# Patient Record
Sex: MALE | Race: WHITE | NOT HISPANIC OR LATINO | URBAN - METROPOLITAN AREA
[De-identification: names, ages, dates, MRNs, and addresses within clinical notes are randomized per-mention and may not be internally consistent; named-entity substitution may affect disease eponyms.]

---

## 2023-10-30 ENCOUNTER — HOSPITAL ENCOUNTER (INPATIENT)
Facility: HOSPITAL | Age: 48
LOS: 15 days | Discharge: HOME HEALTH | DRG: 853 | End: 2023-11-14
Attending: EMERGENCY MEDICINE | Admitting: EMERGENCY MEDICINE
Payer: COMMERCIAL

## 2023-10-30 DIAGNOSIS — N17.9 AKI (ACUTE KIDNEY INJURY): ICD-10-CM

## 2023-10-30 DIAGNOSIS — R00.0 TACHYCARDIA: ICD-10-CM

## 2023-10-30 DIAGNOSIS — R79.89 ELEVATED TROPONIN: ICD-10-CM

## 2023-10-30 DIAGNOSIS — Z98.890 HISTORY OF CARDIOVASCULAR SURGERY: ICD-10-CM

## 2023-10-30 DIAGNOSIS — M31.19 TTP (THROMBOTIC THROMBOCYTOPENIC PURPURA): ICD-10-CM

## 2023-10-30 DIAGNOSIS — I34.0 SEVERE MITRAL REGURGITATION: ICD-10-CM

## 2023-10-30 DIAGNOSIS — R78.81 MRSA BACTEREMIA: ICD-10-CM

## 2023-10-30 DIAGNOSIS — R50.9 FEVER: ICD-10-CM

## 2023-10-30 DIAGNOSIS — D69.6 THROMBOCYTOPENIA: ICD-10-CM

## 2023-10-30 DIAGNOSIS — A41.9 SEPTIC SHOCK: ICD-10-CM

## 2023-10-30 DIAGNOSIS — I38 ENDOCARDITIS: ICD-10-CM

## 2023-10-30 DIAGNOSIS — A41.9 SEPSIS, DUE TO UNSPECIFIED ORGANISM, UNSPECIFIED WHETHER ACUTE ORGAN DYSFUNCTION PRESENT: ICD-10-CM

## 2023-10-30 DIAGNOSIS — I50.20 HFREF (HEART FAILURE WITH REDUCED EJECTION FRACTION): ICD-10-CM

## 2023-10-30 DIAGNOSIS — J96.01 ACUTE HYPOXEMIC RESPIRATORY FAILURE: ICD-10-CM

## 2023-10-30 DIAGNOSIS — I49.9 ARRHYTHMIA: ICD-10-CM

## 2023-10-30 DIAGNOSIS — R79.89 ELEVATED SERUM CREATININE: ICD-10-CM

## 2023-10-30 DIAGNOSIS — I33.0 ACUTE BACTERIAL ENDOCARDITIS: ICD-10-CM

## 2023-10-30 DIAGNOSIS — R78.81 BACTEREMIA: ICD-10-CM

## 2023-10-30 DIAGNOSIS — R73.9 TRANSIENT HYPERGLYCEMIA POST PROCEDURE: ICD-10-CM

## 2023-10-30 DIAGNOSIS — T14.8XXA SURGICAL WOUND PRESENT: ICD-10-CM

## 2023-10-30 DIAGNOSIS — R79.89 ABNORMAL LFTS: ICD-10-CM

## 2023-10-30 DIAGNOSIS — Z98.890 S/P MVR (MITRAL VALVE REPAIR): Primary | ICD-10-CM

## 2023-10-30 DIAGNOSIS — B95.62 MRSA BACTEREMIA: ICD-10-CM

## 2023-10-30 DIAGNOSIS — I51.1 RUPTURE OF CHORDAE TENDINEAE: ICD-10-CM

## 2023-10-30 DIAGNOSIS — R79.89 TROPONIN LEVEL ELEVATED: ICD-10-CM

## 2023-10-30 DIAGNOSIS — I38 ENDOCARDITIS, UNSPECIFIED CHRONICITY, UNSPECIFIED ENDOCARDITIS TYPE: ICD-10-CM

## 2023-10-30 DIAGNOSIS — R65.21 SEPTIC SHOCK: ICD-10-CM

## 2023-10-30 PROBLEM — R74.01 ELEVATED TRANSAMINASE LEVEL: Status: ACTIVE | Noted: 2023-10-30

## 2023-10-30 LAB
ADENOVIRUS: NOT DETECTED
ALBUMIN SERPL BCP-MCNC: 2.8 G/DL (ref 3.5–5.2)
ALLENS TEST: ABNORMAL
ALLENS TEST: ABNORMAL
ALP SERPL-CCNC: 109 U/L (ref 55–135)
ALT SERPL W/O P-5'-P-CCNC: 141 U/L (ref 10–44)
AMMONIA PLAS-SCNC: 30 UMOL/L (ref 10–50)
AMPHET+METHAMPHET UR QL: NEGATIVE
ANION GAP SERPL CALC-SCNC: 12 MMOL/L (ref 8–16)
APAP SERPL-MCNC: <3 UG/ML (ref 10–20)
APTT PPP: 44.4 SEC (ref 21–32)
AST SERPL-CCNC: 191 U/L (ref 10–40)
BACTERIA #/AREA URNS AUTO: ABNORMAL /HPF
BARBITURATES UR QL SCN>200 NG/ML: NEGATIVE
BASOPHILS # BLD AUTO: 0.03 K/UL (ref 0–0.2)
BASOPHILS NFR BLD: 0.5 % (ref 0–1.9)
BENZODIAZ UR QL SCN>200 NG/ML: NEGATIVE
BILIRUB SERPL-MCNC: 3 MG/DL (ref 0.1–1)
BILIRUB UR QL STRIP: NEGATIVE
BORDETELLA PARAPERTUSSIS (IS1001): NOT DETECTED
BORDETELLA PERTUSSIS (PTXP): NOT DETECTED
BUN SERPL-MCNC: 34 MG/DL (ref 6–20)
BZE UR QL SCN: NEGATIVE
CALCIUM SERPL-MCNC: 8 MG/DL (ref 8.7–10.5)
CANNABINOIDS UR QL SCN: NEGATIVE
CHLAMYDIA PNEUMONIAE: NOT DETECTED
CHLORIDE SERPL-SCNC: 100 MMOL/L (ref 95–110)
CK SERPL-CCNC: 1406 U/L (ref 20–200)
CLARITY UR REFRACT.AUTO: CLEAR
CO2 SERPL-SCNC: 17 MMOL/L (ref 23–29)
COLOR UR AUTO: YELLOW
CORONAVIRUS 229E, COMMON COLD VIRUS: NOT DETECTED
CORONAVIRUS HKU1, COMMON COLD VIRUS: NOT DETECTED
CORONAVIRUS NL63, COMMON COLD VIRUS: NOT DETECTED
CORONAVIRUS OC43, COMMON COLD VIRUS: NOT DETECTED
CREAT SERPL-MCNC: 1.6 MG/DL (ref 0.5–1.4)
CREAT UR-MCNC: 63 MG/DL (ref 23–375)
DIFFERENTIAL METHOD: ABNORMAL
EOSINOPHIL # BLD AUTO: 0 K/UL (ref 0–0.5)
EOSINOPHIL NFR BLD: 0.2 % (ref 0–8)
ERYTHROCYTE [DISTWIDTH] IN BLOOD BY AUTOMATED COUNT: 13.6 % (ref 11.5–14.5)
EST. GFR  (NO RACE VARIABLE): 52.5 ML/MIN/1.73 M^2
FLUBV RNA NPH QL NAA+NON-PROBE: NOT DETECTED
GLUCOSE SERPL-MCNC: 146 MG/DL (ref 70–110)
GLUCOSE UR QL STRIP: NEGATIVE
HAV IGM SERPL QL IA: NORMAL
HBV CORE IGM SERPL QL IA: NORMAL
HBV SURFACE AG SERPL QL IA: NORMAL
HCO3 UR-SCNC: 16.3 MMOL/L (ref 24–28)
HCT VFR BLD AUTO: 43.2 % (ref 40–54)
HCV AB SERPL QL IA: NORMAL
HETEROPH AB SERPL QL IA: NEGATIVE
HGB BLD-MCNC: 15 G/DL (ref 14–18)
HGB UR QL STRIP: ABNORMAL
HIV 1+2 AB+HIV1 P24 AG SERPL QL IA: NORMAL
HPIV1 RNA NPH QL NAA+NON-PROBE: NOT DETECTED
HPIV2 RNA NPH QL NAA+NON-PROBE: NOT DETECTED
HPIV3 RNA NPH QL NAA+NON-PROBE: NOT DETECTED
HPIV4 RNA NPH QL NAA+NON-PROBE: NOT DETECTED
HUMAN METAPNEUMOVIRUS: NOT DETECTED
HYALINE CASTS UR QL AUTO: 0 /LPF
IMM GRANULOCYTES # BLD AUTO: 0.09 K/UL (ref 0–0.04)
IMM GRANULOCYTES NFR BLD AUTO: 1.5 % (ref 0–0.5)
INFLUENZA A (SUBTYPES H1,H1-2009,H3): NOT DETECTED
INFLUENZA A, MOLECULAR: NEGATIVE
INFLUENZA B, MOLECULAR: NEGATIVE
INR PPP: 1 (ref 0.8–1.2)
KETONES UR QL STRIP: NEGATIVE
LDH SERPL L TO P-CCNC: 6.61 MMOL/L (ref 0.5–2.2)
LEUKOCYTE ESTERASE UR QL STRIP: ABNORMAL
LYMPHOCYTES # BLD AUTO: 0.4 K/UL (ref 1–4.8)
LYMPHOCYTES NFR BLD: 6.3 % (ref 18–48)
MCH RBC QN AUTO: 30.9 PG (ref 27–31)
MCHC RBC AUTO-ENTMCNC: 34.7 G/DL (ref 32–36)
MCV RBC AUTO: 89 FL (ref 82–98)
METHADONE UR QL SCN>300 NG/ML: NEGATIVE
MICROSCOPIC COMMENT: ABNORMAL
MONOCYTES # BLD AUTO: 0.4 K/UL (ref 0.3–1)
MONOCYTES NFR BLD: 6 % (ref 4–15)
MYCOPLASMA PNEUMONIAE: NOT DETECTED
NEUTROPHILS # BLD AUTO: 5.3 K/UL (ref 1.8–7.7)
NEUTROPHILS NFR BLD: 85.5 % (ref 38–73)
NITRITE UR QL STRIP: POSITIVE
NRBC BLD-RTO: 0 /100 WBC
OPIATES UR QL SCN: NEGATIVE
OSMOLALITY SERPL: 297 MOSM/KG (ref 280–300)
OSMOLALITY UR: 472 MOSM/KG (ref 50–1200)
PATH REV BLD -IMP: NORMAL
PCO2 BLDA: 27.8 MMHG (ref 35–45)
PCP UR QL SCN>25 NG/ML: NEGATIVE
PH SMN: 7.38 [PH] (ref 7.35–7.45)
PH UR STRIP: 6 [PH] (ref 5–8)
PLATELET # BLD AUTO: 23 K/UL (ref 150–450)
PMV BLD AUTO: ABNORMAL FL (ref 9.2–12.9)
PO2 BLDA: 35 MMHG (ref 40–60)
POC BE: -9 MMOL/L
POC SATURATED O2: 68 % (ref 95–100)
POC TCO2: 17 MMOL/L (ref 24–29)
POTASSIUM SERPL-SCNC: 3.7 MMOL/L (ref 3.5–5.1)
PROT SERPL-MCNC: 6.5 G/DL (ref 6–8.4)
PROT UR QL STRIP: ABNORMAL
PROTHROMBIN TIME: 11.1 SEC (ref 9–12.5)
RBC # BLD AUTO: 4.85 M/UL (ref 4.6–6.2)
RBC #/AREA URNS AUTO: 34 /HPF (ref 0–4)
RESPIRATORY INFECTION PANEL SOURCE: NORMAL
RSV RNA NPH QL NAA+NON-PROBE: NOT DETECTED
RV+EV RNA NPH QL NAA+NON-PROBE: NOT DETECTED
SALICYLATES SERPL-MCNC: <5 MG/DL (ref 15–30)
SAMPLE: ABNORMAL
SAMPLE: ABNORMAL
SARS-COV-2 RDRP RESP QL NAA+PROBE: NEGATIVE
SARS-COV-2 RNA RESP QL NAA+PROBE: NOT DETECTED
SITE: ABNORMAL
SITE: ABNORMAL
SODIUM SERPL-SCNC: 129 MMOL/L (ref 136–145)
SP GR UR STRIP: >1.03 (ref 1–1.03)
SPECIMEN SOURCE: NORMAL
TOXICOLOGY INFORMATION: NORMAL
URN SPEC COLLECT METH UR: ABNORMAL
WBC # BLD AUTO: 6.16 K/UL (ref 3.9–12.7)
WBC #/AREA URNS AUTO: 15 /HPF (ref 0–5)

## 2023-10-30 PROCEDURE — 63600175 PHARM REV CODE 636 W HCPCS: Performed by: EMERGENCY MEDICINE

## 2023-10-30 PROCEDURE — 80053 COMPREHEN METABOLIC PANEL: CPT | Performed by: EMERGENCY MEDICINE

## 2023-10-30 PROCEDURE — 85025 COMPLETE CBC W/AUTO DIFF WBC: CPT | Mod: 91

## 2023-10-30 PROCEDURE — U0002 COVID-19 LAB TEST NON-CDC: HCPCS | Performed by: EMERGENCY MEDICINE

## 2023-10-30 PROCEDURE — 84484 ASSAY OF TROPONIN QUANT: CPT

## 2023-10-30 PROCEDURE — 85045 AUTOMATED RETICULOCYTE COUNT: CPT

## 2023-10-30 PROCEDURE — 87389 HIV-1 AG W/HIV-1&-2 AB AG IA: CPT

## 2023-10-30 PROCEDURE — 87186 SC STD MICRODIL/AGAR DIL: CPT | Mod: 59 | Performed by: EMERGENCY MEDICINE

## 2023-10-30 PROCEDURE — 25500020 PHARM REV CODE 255: Performed by: EMERGENCY MEDICINE

## 2023-10-30 PROCEDURE — 99900035 HC TECH TIME PER 15 MIN (STAT)

## 2023-10-30 PROCEDURE — 87798 DETECT AGENT NOS DNA AMP: CPT | Mod: 59

## 2023-10-30 PROCEDURE — 83605 ASSAY OF LACTIC ACID: CPT | Performed by: STUDENT IN AN ORGANIZED HEALTH CARE EDUCATION/TRAINING PROGRAM

## 2023-10-30 PROCEDURE — 82140 ASSAY OF AMMONIA: CPT

## 2023-10-30 PROCEDURE — 83930 ASSAY OF BLOOD OSMOLALITY: CPT

## 2023-10-30 PROCEDURE — 87502 INFLUENZA DNA AMP PROBE: CPT | Performed by: EMERGENCY MEDICINE

## 2023-10-30 PROCEDURE — 85730 THROMBOPLASTIN TIME PARTIAL: CPT

## 2023-10-30 PROCEDURE — 63600175 PHARM REV CODE 636 W HCPCS

## 2023-10-30 PROCEDURE — 87077 CULTURE AEROBIC IDENTIFY: CPT | Mod: 59 | Performed by: EMERGENCY MEDICINE

## 2023-10-30 PROCEDURE — 85025 COMPLETE CBC W/AUTO DIFF WBC: CPT | Performed by: EMERGENCY MEDICINE

## 2023-10-30 PROCEDURE — 87086 URINE CULTURE/COLONY COUNT: CPT | Performed by: EMERGENCY MEDICINE

## 2023-10-30 PROCEDURE — 87150 DNA/RNA AMPLIFIED PROBE: CPT | Performed by: EMERGENCY MEDICINE

## 2023-10-30 PROCEDURE — 82550 ASSAY OF CK (CPK): CPT | Performed by: EMERGENCY MEDICINE

## 2023-10-30 PROCEDURE — 85610 PROTHROMBIN TIME: CPT

## 2023-10-30 PROCEDURE — 93010 ELECTROCARDIOGRAM REPORT: CPT | Mod: ,,, | Performed by: INTERNAL MEDICINE

## 2023-10-30 PROCEDURE — 87040 BLOOD CULTURE FOR BACTERIA: CPT | Performed by: EMERGENCY MEDICINE

## 2023-10-30 PROCEDURE — 99291 CRITICAL CARE FIRST HOUR: CPT

## 2023-10-30 PROCEDURE — 93010 EKG 12-LEAD: ICD-10-PCS | Mod: 76,,, | Performed by: INTERNAL MEDICINE

## 2023-10-30 PROCEDURE — 20000000 HC ICU ROOM

## 2023-10-30 PROCEDURE — 86038 ANTINUCLEAR ANTIBODIES: CPT

## 2023-10-30 PROCEDURE — 86308 HETEROPHILE ANTIBODY SCREEN: CPT

## 2023-10-30 PROCEDURE — 25000003 PHARM REV CODE 250: Performed by: EMERGENCY MEDICINE

## 2023-10-30 PROCEDURE — 83010 ASSAY OF HAPTOGLOBIN QUANT: CPT

## 2023-10-30 PROCEDURE — 80179 DRUG ASSAY SALICYLATE: CPT

## 2023-10-30 PROCEDURE — 83935 ASSAY OF URINE OSMOLALITY: CPT

## 2023-10-30 PROCEDURE — 96365 THER/PROPH/DIAG IV INF INIT: CPT

## 2023-10-30 PROCEDURE — 93005 ELECTROCARDIOGRAM TRACING: CPT

## 2023-10-30 PROCEDURE — 85060 PATHOLOGIST REVIEW: ICD-10-PCS | Mod: ,,, | Performed by: PATHOLOGY

## 2023-10-30 PROCEDURE — 93010 ELECTROCARDIOGRAM REPORT: CPT | Mod: 76,,, | Performed by: INTERNAL MEDICINE

## 2023-10-30 PROCEDURE — 96366 THER/PROPH/DIAG IV INF ADDON: CPT

## 2023-10-30 PROCEDURE — 80307 DRUG TEST PRSMV CHEM ANLYZR: CPT | Performed by: EMERGENCY MEDICINE

## 2023-10-30 PROCEDURE — 85060 BLOOD SMEAR INTERPRETATION: CPT | Mod: ,,, | Performed by: PATHOLOGY

## 2023-10-30 PROCEDURE — 86592 SYPHILIS TEST NON-TREP QUAL: CPT

## 2023-10-30 PROCEDURE — 80143 DRUG ASSAY ACETAMINOPHEN: CPT

## 2023-10-30 PROCEDURE — 25000003 PHARM REV CODE 250

## 2023-10-30 PROCEDURE — 80074 ACUTE HEPATITIS PANEL: CPT

## 2023-10-30 PROCEDURE — 96367 TX/PROPH/DG ADDL SEQ IV INF: CPT

## 2023-10-30 PROCEDURE — 85397 CLOTTING FUNCT ACTIVITY: CPT

## 2023-10-30 PROCEDURE — 83605 ASSAY OF LACTIC ACID: CPT

## 2023-10-30 PROCEDURE — 36415 COLL VENOUS BLD VENIPUNCTURE: CPT

## 2023-10-30 PROCEDURE — 86880 COOMBS TEST DIRECT: CPT

## 2023-10-30 PROCEDURE — 87633 RESP VIRUS 12-25 TARGETS: CPT

## 2023-10-30 PROCEDURE — 96375 TX/PRO/DX INJ NEW DRUG ADDON: CPT

## 2023-10-30 PROCEDURE — 87088 URINE BACTERIA CULTURE: CPT | Performed by: EMERGENCY MEDICINE

## 2023-10-30 PROCEDURE — 81001 URINALYSIS AUTO W/SCOPE: CPT | Performed by: EMERGENCY MEDICINE

## 2023-10-30 PROCEDURE — 86850 RBC ANTIBODY SCREEN: CPT

## 2023-10-30 PROCEDURE — 82803 BLOOD GASES ANY COMBINATION: CPT

## 2023-10-30 PROCEDURE — 85384 FIBRINOGEN ACTIVITY: CPT

## 2023-10-30 RX ORDER — IBUPROFEN 200 MG
24 TABLET ORAL
Status: DISCONTINUED | OUTPATIENT
Start: 2023-10-30 | End: 2023-11-03

## 2023-10-30 RX ORDER — DEXAMETHASONE SODIUM PHOSPHATE 4 MG/ML
10 INJECTION, SOLUTION INTRA-ARTICULAR; INTRALESIONAL; INTRAMUSCULAR; INTRAVENOUS; SOFT TISSUE
Status: COMPLETED | OUTPATIENT
Start: 2023-10-30 | End: 2023-10-30

## 2023-10-30 RX ORDER — ONDANSETRON 2 MG/ML
4 INJECTION INTRAMUSCULAR; INTRAVENOUS EVERY 8 HOURS PRN
Status: DISCONTINUED | OUTPATIENT
Start: 2023-10-30 | End: 2023-11-03

## 2023-10-30 RX ORDER — IBUPROFEN 200 MG
16 TABLET ORAL
Status: DISCONTINUED | OUTPATIENT
Start: 2023-10-30 | End: 2023-11-03

## 2023-10-30 RX ORDER — INSULIN ASPART 100 [IU]/ML
0-5 INJECTION, SOLUTION INTRAVENOUS; SUBCUTANEOUS
Status: DISCONTINUED | OUTPATIENT
Start: 2023-10-30 | End: 2023-11-03

## 2023-10-30 RX ORDER — GLUCAGON 1 MG
1 KIT INJECTION
Status: DISCONTINUED | OUTPATIENT
Start: 2023-10-30 | End: 2023-11-03

## 2023-10-30 RX ORDER — ACETAMINOPHEN 500 MG
1000 TABLET ORAL
Status: COMPLETED | OUTPATIENT
Start: 2023-10-30 | End: 2023-10-30

## 2023-10-30 RX ORDER — NOREPINEPHRINE BITARTRATE/D5W 4MG/250ML
0-3 PLASTIC BAG, INJECTION (ML) INTRAVENOUS CONTINUOUS
Status: DISCONTINUED | OUTPATIENT
Start: 2023-10-30 | End: 2023-10-30

## 2023-10-30 RX ORDER — NOREPINEPHRINE BITARTRATE/D5W 4MG/250ML
0-.2 PLASTIC BAG, INJECTION (ML) INTRAVENOUS CONTINUOUS
Status: ACTIVE | OUTPATIENT
Start: 2023-10-31 | End: 2023-11-01

## 2023-10-30 RX ORDER — SODIUM CHLORIDE 0.9 % (FLUSH) 0.9 %
10 SYRINGE (ML) INJECTION
Status: DISCONTINUED | OUTPATIENT
Start: 2023-10-30 | End: 2023-11-03

## 2023-10-30 RX ADMIN — IOHEXOL 75 ML: 350 INJECTION, SOLUTION INTRAVENOUS at 08:10

## 2023-10-30 RX ADMIN — SODIUM CHLORIDE 2313 ML: 9 INJECTION, SOLUTION INTRAVENOUS at 07:10

## 2023-10-30 RX ADMIN — ACETAMINOPHEN 1000 MG: 500 TABLET ORAL at 08:10

## 2023-10-30 RX ADMIN — DEXAMETHASONE SODIUM PHOSPHATE 10 MG: 4 INJECTION INTRA-ARTICULAR; INTRALESIONAL; INTRAMUSCULAR; INTRAVENOUS; SOFT TISSUE at 08:10

## 2023-10-30 RX ADMIN — ACYCLOVIR SODIUM 800 MG: 50 INJECTION, SOLUTION INTRAVENOUS at 10:10

## 2023-10-30 RX ADMIN — PIPERACILLIN SODIUM AND TAZOBACTAM SODIUM 4.5 G: 4; .5 INJECTION, POWDER, FOR SOLUTION INTRAVENOUS at 07:10

## 2023-10-30 RX ADMIN — VANCOMYCIN HYDROCHLORIDE 1500 MG: 1.5 INJECTION, POWDER, LYOPHILIZED, FOR SOLUTION INTRAVENOUS at 08:10

## 2023-10-30 NOTE — Clinical Note
5 ml of contrast were injected throughout the case. 15 mL of contrast was the total wasted during the case. 20 mL was the total amount used during the case.

## 2023-10-31 PROBLEM — J18.9 PNA (PNEUMONIA): Status: ACTIVE | Noted: 2023-10-31

## 2023-10-31 PROBLEM — R79.89 ELEVATED TROPONIN: Status: ACTIVE | Noted: 2023-10-31

## 2023-10-31 PROBLEM — I38 ENDOCARDITIS: Status: ACTIVE | Noted: 2023-10-31

## 2023-10-31 LAB
ABO + RH BLD: NORMAL
ALBUMIN SERPL BCP-MCNC: 2.3 G/DL (ref 3.5–5.2)
ALP SERPL-CCNC: 81 U/L (ref 55–135)
ALT SERPL W/O P-5'-P-CCNC: 114 U/L (ref 10–44)
AMPHET+METHAMPHET UR QL: NEGATIVE
ANA SER QL IF: NORMAL
ANION GAP SERPL CALC-SCNC: 12 MMOL/L (ref 8–16)
ANISOCYTOSIS BLD QL SMEAR: SLIGHT
ASCENDING AORTA: 3.1 CM
AST SERPL-CCNC: 202 U/L (ref 10–40)
AV INDEX (PROSTH): 0.68
AV MEAN GRADIENT: 3 MMHG
AV PEAK GRADIENT: 5 MMHG
AV VALVE AREA BY VELOCITY RATIO: 2.92 CM²
AV VALVE AREA: 2.89 CM²
AV VELOCITY RATIO: 0.69
BARBITURATES UR QL SCN>200 NG/ML: NEGATIVE
BASOPHILS # BLD AUTO: 0.02 K/UL (ref 0–0.2)
BASOPHILS # BLD AUTO: 0.05 K/UL (ref 0–0.2)
BASOPHILS # BLD AUTO: ABNORMAL K/UL (ref 0–0.2)
BASOPHILS NFR BLD: 0 % (ref 0–1.9)
BASOPHILS NFR BLD: 0.2 % (ref 0–1.9)
BASOPHILS NFR BLD: 0.7 % (ref 0–1.9)
BENZODIAZ UR QL SCN>200 NG/ML: NEGATIVE
BILIRUB SERPL-MCNC: 2.2 MG/DL (ref 0.1–1)
BLD GP AB SCN CELLS X3 SERPL QL: NORMAL
BLD GP AB SCN CELLS X3 SERPL QL: NORMAL
BLD PROD TYP BPU: NORMAL
BLD PROD TYP BPU: NORMAL
BLOOD UNIT EXPIRATION DATE: NORMAL
BLOOD UNIT EXPIRATION DATE: NORMAL
BLOOD UNIT TYPE CODE: 5100
BLOOD UNIT TYPE CODE: 9500
BLOOD UNIT TYPE: NORMAL
BLOOD UNIT TYPE: NORMAL
BSA FOR ECHO PROCEDURE: 1.99 M2
BUN SERPL-MCNC: 24 MG/DL (ref 6–20)
BURR CELLS BLD QL SMEAR: ABNORMAL
BZE UR QL SCN: NEGATIVE
C TRACH DNA SPEC QL NAA+PROBE: NOT DETECTED
CALCIUM SERPL-MCNC: 7.5 MG/DL (ref 8.7–10.5)
CANNABINOIDS UR QL SCN: NEGATIVE
CHLORIDE SERPL-SCNC: 102 MMOL/L (ref 95–110)
CK SERPL-CCNC: 1977 U/L (ref 20–200)
CK SERPL-CCNC: 2363 U/L (ref 20–200)
CO2 SERPL-SCNC: 17 MMOL/L (ref 23–29)
CODING SYSTEM: NORMAL
CODING SYSTEM: NORMAL
CREAT SERPL-MCNC: 1.1 MG/DL (ref 0.5–1.4)
CREAT UR-MCNC: 64 MG/DL (ref 23–375)
CROSSMATCH INTERPRETATION: NORMAL
CROSSMATCH INTERPRETATION: NORMAL
CRP SERPL-MCNC: 327.1 MG/L (ref 0–8.2)
CV ECHO LV RWT: 0.34 CM
DAT IGG-SP REAG RBC-IMP: NORMAL
DIFFERENTIAL METHOD: ABNORMAL
DISPENSE STATUS: NORMAL
DISPENSE STATUS: NORMAL
DOHLE BOD BLD QL SMEAR: PRESENT
DOP CALC AO PEAK VEL: 1.13 M/S
DOP CALC AO VTI: 17.53 CM
DOP CALC LVOT AREA: 4.2 CM2
DOP CALC LVOT DIAMETER: 2.32 CM
DOP CALC LVOT PEAK VEL: 0.78 M/S
DOP CALC LVOT STROKE VOLUME: 50.62 CM3
DOP CALCLVOT PEAK VEL VTI: 11.98 CM
E WAVE DECELERATION TIME: 202.44 MSEC
E/A RATIO: 0.88
E/E' RATIO: 7.45 M/S
ECHO LV POSTERIOR WALL: 0.9 CM (ref 0.6–1.1)
EOSINOPHIL # BLD AUTO: 0 K/UL (ref 0–0.5)
EOSINOPHIL # BLD AUTO: 0 K/UL (ref 0–0.5)
EOSINOPHIL # BLD AUTO: ABNORMAL K/UL (ref 0–0.5)
EOSINOPHIL NFR BLD: 0 % (ref 0–8)
EOSINOPHIL NFR BLD: 0 % (ref 0–8)
EOSINOPHIL NFR BLD: 0.1 % (ref 0–8)
ERYTHROCYTE [DISTWIDTH] IN BLOOD BY AUTOMATED COUNT: 13.7 % (ref 11.5–14.5)
ERYTHROCYTE [DISTWIDTH] IN BLOOD BY AUTOMATED COUNT: 13.7 % (ref 11.5–14.5)
ERYTHROCYTE [DISTWIDTH] IN BLOOD BY AUTOMATED COUNT: 13.8 % (ref 11.5–14.5)
ERYTHROCYTE [SEDIMENTATION RATE] IN BLOOD BY PHOTOMETRIC METHOD: 46 MM/HR (ref 0–23)
EST. GFR  (NO RACE VARIABLE): >60 ML/MIN/1.73 M^2
ETHANOL UR-MCNC: <10 MG/DL
FERRITIN SERPL-MCNC: 4925 NG/ML (ref 20–300)
FIBRINOGEN PPP-MCNC: 489 MG/DL (ref 182–400)
FRACTIONAL SHORTENING: 37 % (ref 28–44)
GIANT PLATELETS BLD QL SMEAR: PRESENT
GLUCOSE SERPL-MCNC: 130 MG/DL (ref 70–110)
HAPTOGLOB SERPL-MCNC: 82 MG/DL (ref 30–250)
HCT VFR BLD AUTO: 33.6 % (ref 40–54)
HCT VFR BLD AUTO: 36.9 % (ref 40–54)
HCT VFR BLD AUTO: 38.1 % (ref 40–54)
HGB BLD-MCNC: 11.8 G/DL (ref 14–18)
HGB BLD-MCNC: 12.9 G/DL (ref 14–18)
HGB BLD-MCNC: 13.3 G/DL (ref 14–18)
HYPOCHROMIA BLD QL SMEAR: ABNORMAL
IMM GRANULOCYTES # BLD AUTO: 0.1 K/UL (ref 0–0.04)
IMM GRANULOCYTES # BLD AUTO: 0.25 K/UL (ref 0–0.04)
IMM GRANULOCYTES # BLD AUTO: ABNORMAL K/UL (ref 0–0.04)
IMM GRANULOCYTES NFR BLD AUTO: 1.4 % (ref 0–0.5)
IMM GRANULOCYTES NFR BLD AUTO: 2.4 % (ref 0–0.5)
IMM GRANULOCYTES NFR BLD AUTO: ABNORMAL % (ref 0–0.5)
INTERVENTRICULAR SEPTUM: 0.7 CM (ref 0.6–1.1)
LA MAJOR: 5.49 CM
LA MINOR: 6.43 CM
LA WIDTH: 4.73 CM
LACTATE SERPL-SCNC: 3.1 MMOL/L (ref 0.5–2.2)
LACTATE SERPL-SCNC: 3.8 MMOL/L (ref 0.5–2.2)
LDH SERPL L TO P-CCNC: 721 U/L (ref 110–260)
LEFT ATRIUM SIZE: 4.42 CM
LEFT ATRIUM VOLUME INDEX MOD: 39.2 ML/M2
LEFT ATRIUM VOLUME INDEX: 52.4 ML/M2
LEFT ATRIUM VOLUME MOD: 78.77 CM3
LEFT ATRIUM VOLUME: 105.25 CM3
LEFT INTERNAL DIMENSION IN SYSTOLE: 3.36 CM (ref 2.1–4)
LEFT VENTRICLE DIASTOLIC VOLUME INDEX: 51.85 ML/M2
LEFT VENTRICLE DIASTOLIC VOLUME: 104.21 ML
LEFT VENTRICLE MASS INDEX: 75 G/M2
LEFT VENTRICLE SYSTOLIC VOLUME INDEX: 23 ML/M2
LEFT VENTRICLE SYSTOLIC VOLUME: 46.14 ML
LEFT VENTRICULAR INTERNAL DIMENSION IN DIASTOLE: 5.3 CM (ref 3.5–6)
LEFT VENTRICULAR MASS: 150.05 G
LV LATERAL E/E' RATIO: 9.11 M/S
LV SEPTAL E/E' RATIO: 6.31 M/S
LYMPHOCYTES # BLD AUTO: 0.3 K/UL (ref 1–4.8)
LYMPHOCYTES # BLD AUTO: 0.6 K/UL (ref 1–4.8)
LYMPHOCYTES # BLD AUTO: ABNORMAL K/UL (ref 1–4.8)
LYMPHOCYTES NFR BLD: 4 % (ref 18–48)
LYMPHOCYTES NFR BLD: 4.6 % (ref 18–48)
LYMPHOCYTES NFR BLD: 5.8 % (ref 18–48)
MAGNESIUM SERPL-MCNC: 2 MG/DL (ref 1.6–2.6)
MCH RBC QN AUTO: 30 PG (ref 27–31)
MCH RBC QN AUTO: 30.7 PG (ref 27–31)
MCH RBC QN AUTO: 30.7 PG (ref 27–31)
MCHC RBC AUTO-ENTMCNC: 34.9 G/DL (ref 32–36)
MCHC RBC AUTO-ENTMCNC: 35 G/DL (ref 32–36)
MCHC RBC AUTO-ENTMCNC: 35.1 G/DL (ref 32–36)
MCV RBC AUTO: 86 FL (ref 82–98)
MCV RBC AUTO: 88 FL (ref 82–98)
MCV RBC AUTO: 88 FL (ref 82–98)
METHADONE UR QL SCN>300 NG/ML: NEGATIVE
MONOCYTES # BLD AUTO: 0.5 K/UL (ref 0.3–1)
MONOCYTES # BLD AUTO: 0.9 K/UL (ref 0.3–1)
MONOCYTES # BLD AUTO: ABNORMAL K/UL (ref 0.3–1)
MONOCYTES NFR BLD: 7 % (ref 4–15)
MONOCYTES NFR BLD: 7.2 % (ref 4–15)
MONOCYTES NFR BLD: 8.5 % (ref 4–15)
MRSA ID BY PCR: POSITIVE
MV PEAK A VEL: 0.93 M/S
MV PEAK E VEL: 0.82 M/S
MV STENOSIS PRESSURE HALF TIME: 58.71 MS
MV VALVE AREA P 1/2 METHOD: 3.75 CM2
MYELOCYTES NFR BLD MANUAL: 1 %
N GONORRHOEA DNA SPEC QL NAA+PROBE: NOT DETECTED
NEUTROPHILS # BLD AUTO: 6.1 K/UL (ref 1.8–7.7)
NEUTROPHILS # BLD AUTO: 8.8 K/UL (ref 1.8–7.7)
NEUTROPHILS NFR BLD: 83 % (ref 38–73)
NEUTROPHILS NFR BLD: 84 % (ref 38–73)
NEUTROPHILS NFR BLD: 86.1 % (ref 38–73)
NEUTS BAND NFR BLD MANUAL: 2 %
NRBC BLD-RTO: 0 /100 WBC
NUM UNITS TRANS FFP: NORMAL
NUM UNITS TRANS FFP: NORMAL
OHS LV EJECTION FRACTION SIMPSONS BIPLANE MOD: 43 %
OPIATES UR QL SCN: NEGATIVE
OVALOCYTES BLD QL SMEAR: ABNORMAL
OVALOCYTES BLD QL SMEAR: ABNORMAL
PCP UR QL SCN>25 NG/ML: NEGATIVE
PHOSPHATE SERPL-MCNC: 1.7 MG/DL (ref 2.7–4.5)
PISA MRMAX VEL: 0.03 M/S
PISA TR MAX VEL: 2.34 M/S
PLATELET # BLD AUTO: 19 K/UL (ref 150–450)
PLATELET # BLD AUTO: 21 K/UL (ref 150–450)
PLATELET # BLD AUTO: 22 K/UL (ref 150–450)
PLATELET BLD QL SMEAR: ABNORMAL
PMV BLD AUTO: 14.8 FL (ref 9.2–12.9)
PMV BLD AUTO: ABNORMAL FL (ref 9.2–12.9)
PMV BLD AUTO: ABNORMAL FL (ref 9.2–12.9)
POCT GLUCOSE: 184 MG/DL (ref 70–110)
POIKILOCYTOSIS BLD QL SMEAR: SLIGHT
POIKILOCYTOSIS BLD QL SMEAR: SLIGHT
POTASSIUM SERPL-SCNC: 3.6 MMOL/L (ref 3.5–5.1)
PROCALCITONIN SERPL IA-MCNC: 12.83 NG/ML
PROT SERPL-MCNC: 5.5 G/DL (ref 6–8.4)
RA MAJOR: 5.29 CM
RA PRESSURE ESTIMATED: 8 MMHG
RA WIDTH: 3.8 CM
RBC # BLD AUTO: 3.84 M/UL (ref 4.6–6.2)
RBC # BLD AUTO: 4.3 M/UL (ref 4.6–6.2)
RBC # BLD AUTO: 4.33 M/UL (ref 4.6–6.2)
RETICS/RBC NFR AUTO: 0.6 % (ref 0.4–2)
RIGHT VENTRICULAR END-DIASTOLIC DIMENSION: 3.51 CM
RPR SER QL: NORMAL
RV TB RVSP: 10 MMHG
SARS-COV-2 IGG SERPL IA-ACNC: 4037 AU/ML
SARS-COV-2 IGG SERPL QL IA: POSITIVE
SINUS: 3.6 CM
SODIUM SERPL-SCNC: 131 MMOL/L (ref 136–145)
SPECIMEN OUTDATE: NORMAL
SPHEROCYTES BLD QL SMEAR: ABNORMAL
STAPH AUREUS ID BY PCR: POSITIVE
STJ: 3.67 CM
TARGETS BLD QL SMEAR: ABNORMAL
TDI LATERAL: 0.09 M/S
TDI SEPTAL: 0.13 M/S
TDI: 0.11 M/S
TOXIC GRANULES BLD QL SMEAR: PRESENT
TOXICOLOGY INFORMATION: NORMAL
TR MAX PG: 22 MMHG
TRICUSPID ANNULAR PLANE SYSTOLIC EXCURSION: 2.05 CM
TRIGL SERPL-MCNC: 370 MG/DL (ref 30–150)
TROPONIN I SERPL DL<=0.01 NG/ML-MCNC: 18.3 NG/ML (ref 0–0.03)
TROPONIN I SERPL DL<=0.01 NG/ML-MCNC: 19.81 NG/ML (ref 0–0.03)
TROPONIN I SERPL DL<=0.01 NG/ML-MCNC: 27.89 NG/ML (ref 0–0.03)
TROPONIN I SERPL DL<=0.01 NG/ML-MCNC: 29.12 NG/ML (ref 0–0.03)
TV REST PULMONARY ARTERY PRESSURE: 30 MMHG
VANCOMYCIN SERPL-MCNC: 8.6 UG/ML
WBC # BLD AUTO: 10.58 K/UL (ref 3.9–12.7)
WBC # BLD AUTO: 13.31 K/UL (ref 3.9–12.7)
WBC # BLD AUTO: 7.11 K/UL (ref 3.9–12.7)
WBC OTHER NFR BLD MANUAL: 2 %
WBC TOXIC VACUOLES BLD QL SMEAR: PRESENT
Z-SCORE OF LEFT VENTRICULAR DIMENSION IN END DIASTOLE: -1.05
Z-SCORE OF LEFT VENTRICULAR DIMENSION IN END SYSTOLE: -0.58

## 2023-10-31 PROCEDURE — 20000000 HC ICU ROOM

## 2023-10-31 PROCEDURE — 80053 COMPREHEN METABOLIC PANEL: CPT | Performed by: NURSE PRACTITIONER

## 2023-10-31 PROCEDURE — 63600175 PHARM REV CODE 636 W HCPCS: Performed by: STUDENT IN AN ORGANIZED HEALTH CARE EDUCATION/TRAINING PROGRAM

## 2023-10-31 PROCEDURE — 82728 ASSAY OF FERRITIN: CPT

## 2023-10-31 PROCEDURE — P9017 PLASMA 1 DONOR FRZ W/IN 8 HR: HCPCS | Performed by: STUDENT IN AN ORGANIZED HEALTH CARE EDUCATION/TRAINING PROGRAM

## 2023-10-31 PROCEDURE — 25000003 PHARM REV CODE 250: Performed by: STUDENT IN AN ORGANIZED HEALTH CARE EDUCATION/TRAINING PROGRAM

## 2023-10-31 PROCEDURE — 83605 ASSAY OF LACTIC ACID: CPT | Performed by: STUDENT IN AN ORGANIZED HEALTH CARE EDUCATION/TRAINING PROGRAM

## 2023-10-31 PROCEDURE — 80202 ASSAY OF VANCOMYCIN: CPT | Performed by: EMERGENCY MEDICINE

## 2023-10-31 PROCEDURE — 83529 ASAY OF INTERLEUKIN-6 (IL-6): CPT | Performed by: STUDENT IN AN ORGANIZED HEALTH CARE EDUCATION/TRAINING PROGRAM

## 2023-10-31 PROCEDURE — 82550 ASSAY OF CK (CPK): CPT | Mod: 91

## 2023-10-31 PROCEDURE — 99223 PR INITIAL HOSPITAL CARE,LEVL III: ICD-10-PCS | Mod: ,,, | Performed by: INTERNAL MEDICINE

## 2023-10-31 PROCEDURE — 86769 SARS-COV-2 COVID-19 ANTIBODY: CPT | Performed by: STUDENT IN AN ORGANIZED HEALTH CARE EDUCATION/TRAINING PROGRAM

## 2023-10-31 PROCEDURE — 25000003 PHARM REV CODE 250

## 2023-10-31 PROCEDURE — 36430 TRANSFUSION BLD/BLD COMPNT: CPT

## 2023-10-31 PROCEDURE — 99291 PR CRITICAL CARE, E/M 30-74 MINUTES: ICD-10-PCS | Mod: ,,, | Performed by: STUDENT IN AN ORGANIZED HEALTH CARE EDUCATION/TRAINING PROGRAM

## 2023-10-31 PROCEDURE — 94761 N-INVAS EAR/PLS OXIMETRY MLT: CPT

## 2023-10-31 PROCEDURE — 99232 SBSQ HOSP IP/OBS MODERATE 35: CPT | Mod: ,,, | Performed by: INTERNAL MEDICINE

## 2023-10-31 PROCEDURE — 93010 EKG 12-LEAD: ICD-10-PCS | Mod: ,,, | Performed by: INTERNAL MEDICINE

## 2023-10-31 PROCEDURE — 86140 C-REACTIVE PROTEIN: CPT | Performed by: STUDENT IN AN ORGANIZED HEALTH CARE EDUCATION/TRAINING PROGRAM

## 2023-10-31 PROCEDURE — 83735 ASSAY OF MAGNESIUM: CPT | Performed by: NURSE PRACTITIONER

## 2023-10-31 PROCEDURE — 82550 ASSAY OF CK (CPK): CPT

## 2023-10-31 PROCEDURE — 84484 ASSAY OF TROPONIN QUANT: CPT | Mod: 91

## 2023-10-31 PROCEDURE — 99291 CRITICAL CARE FIRST HOUR: CPT | Mod: ,,, | Performed by: STUDENT IN AN ORGANIZED HEALTH CARE EDUCATION/TRAINING PROGRAM

## 2023-10-31 PROCEDURE — 84145 PROCALCITONIN (PCT): CPT | Performed by: STUDENT IN AN ORGANIZED HEALTH CARE EDUCATION/TRAINING PROGRAM

## 2023-10-31 PROCEDURE — 85652 RBC SED RATE AUTOMATED: CPT | Performed by: STUDENT IN AN ORGANIZED HEALTH CARE EDUCATION/TRAINING PROGRAM

## 2023-10-31 PROCEDURE — 86901 BLOOD TYPING SEROLOGIC RH(D): CPT

## 2023-10-31 PROCEDURE — 87491 CHLMYD TRACH DNA AMP PROBE: CPT | Performed by: STUDENT IN AN ORGANIZED HEALTH CARE EDUCATION/TRAINING PROGRAM

## 2023-10-31 PROCEDURE — 84100 ASSAY OF PHOSPHORUS: CPT | Performed by: NURSE PRACTITIONER

## 2023-10-31 PROCEDURE — 83615 LACTATE (LD) (LDH) ENZYME: CPT

## 2023-10-31 PROCEDURE — 85025 COMPLETE CBC W/AUTO DIFF WBC: CPT | Performed by: NURSE PRACTITIONER

## 2023-10-31 PROCEDURE — 85007 BL SMEAR W/DIFF WBC COUNT: CPT | Performed by: STUDENT IN AN ORGANIZED HEALTH CARE EDUCATION/TRAINING PROGRAM

## 2023-10-31 PROCEDURE — 84478 ASSAY OF TRIGLYCERIDES: CPT

## 2023-10-31 PROCEDURE — 93005 ELECTROCARDIOGRAM TRACING: CPT

## 2023-10-31 PROCEDURE — 86720 LEPTOSPIRA ANTIBODY: CPT

## 2023-10-31 PROCEDURE — 93010 ELECTROCARDIOGRAM REPORT: CPT | Mod: ,,, | Performed by: INTERNAL MEDICINE

## 2023-10-31 PROCEDURE — 99223 1ST HOSP IP/OBS HIGH 75: CPT | Mod: ,,, | Performed by: INTERNAL MEDICINE

## 2023-10-31 PROCEDURE — 99232 PR SUBSEQUENT HOSPITAL CARE,LEVL II: ICD-10-PCS | Mod: ,,, | Performed by: INTERNAL MEDICINE

## 2023-10-31 PROCEDURE — 93010 ELECTROCARDIOGRAM REPORT: CPT | Mod: 76,,, | Performed by: INTERNAL MEDICINE

## 2023-10-31 PROCEDURE — 27000207 HC ISOLATION

## 2023-10-31 PROCEDURE — 63600175 PHARM REV CODE 636 W HCPCS

## 2023-10-31 PROCEDURE — 85027 COMPLETE CBC AUTOMATED: CPT | Performed by: STUDENT IN AN ORGANIZED HEALTH CARE EDUCATION/TRAINING PROGRAM

## 2023-10-31 PROCEDURE — 80307 DRUG TEST PRSMV CHEM ANLYZR: CPT | Performed by: STUDENT IN AN ORGANIZED HEALTH CARE EDUCATION/TRAINING PROGRAM

## 2023-10-31 RX ORDER — MUPIROCIN 20 MG/G
OINTMENT TOPICAL 2 TIMES DAILY
Status: DISCONTINUED | OUTPATIENT
Start: 2023-10-31 | End: 2023-11-03

## 2023-10-31 RX ORDER — HYDROCODONE BITARTRATE AND ACETAMINOPHEN 500; 5 MG/1; MG/1
TABLET ORAL
Status: DISCONTINUED | OUTPATIENT
Start: 2023-10-31 | End: 2023-11-01

## 2023-10-31 RX ORDER — ACETAMINOPHEN 325 MG/1
650 TABLET ORAL EVERY 6 HOURS PRN
Status: DISCONTINUED | OUTPATIENT
Start: 2023-10-31 | End: 2023-11-03

## 2023-10-31 RX ORDER — SODIUM CHLORIDE, SODIUM LACTATE, POTASSIUM CHLORIDE, CALCIUM CHLORIDE 600; 310; 30; 20 MG/100ML; MG/100ML; MG/100ML; MG/100ML
INJECTION, SOLUTION INTRAVENOUS CONTINUOUS
Status: ACTIVE | OUTPATIENT
Start: 2023-10-31 | End: 2023-11-01

## 2023-10-31 RX ORDER — FAMOTIDINE 10 MG/ML
20 INJECTION INTRAVENOUS 2 TIMES DAILY
Status: DISCONTINUED | OUTPATIENT
Start: 2023-10-31 | End: 2023-11-01

## 2023-10-31 RX ADMIN — SODIUM PHOSPHATE, MONOBASIC, MONOHYDRATE AND SODIUM PHOSPHATE, DIBASIC, ANHYDROUS 39.99 MMOL: 142; 276 INJECTION, SOLUTION INTRAVENOUS at 11:10

## 2023-10-31 RX ADMIN — VANCOMYCIN HYDROCHLORIDE 1000 MG: 1 INJECTION, POWDER, LYOPHILIZED, FOR SOLUTION INTRAVENOUS at 08:10

## 2023-10-31 RX ADMIN — CEFTRIAXONE 2 G: 2 INJECTION, POWDER, FOR SOLUTION INTRAMUSCULAR; INTRAVENOUS at 12:10

## 2023-10-31 RX ADMIN — SODIUM CHLORIDE, POTASSIUM CHLORIDE, SODIUM LACTATE AND CALCIUM CHLORIDE: 600; 310; 30; 20 INJECTION, SOLUTION INTRAVENOUS at 01:10

## 2023-10-31 RX ADMIN — FAMOTIDINE 20 MG: 10 INJECTION, SOLUTION INTRAVENOUS at 08:10

## 2023-10-31 RX ADMIN — MUPIROCIN: 20 OINTMENT TOPICAL at 09:10

## 2023-10-31 RX ADMIN — ACETAMINOPHEN 650 MG: 325 TABLET ORAL at 03:10

## 2023-10-31 RX ADMIN — AZITHROMYCIN 500 MG: 500 INJECTION, POWDER, LYOPHILIZED, FOR SOLUTION INTRAVENOUS at 05:10

## 2023-10-31 RX ADMIN — VANCOMYCIN HYDROCHLORIDE 1000 MG: 1 INJECTION, POWDER, LYOPHILIZED, FOR SOLUTION INTRAVENOUS at 09:10

## 2023-10-31 RX ADMIN — ACYCLOVIR SODIUM 770 MG: 50 INJECTION, SOLUTION INTRAVENOUS at 11:10

## 2023-10-31 RX ADMIN — METHYLPREDNISOLONE SODIUM SUCCINATE 1000 MG: 1 INJECTION, POWDER, LYOPHILIZED, FOR SOLUTION INTRAMUSCULAR; INTRAVENOUS at 11:10

## 2023-10-31 RX ADMIN — FAMOTIDINE 20 MG: 10 INJECTION, SOLUTION INTRAVENOUS at 11:10

## 2023-10-31 RX ADMIN — MUPIROCIN: 20 OINTMENT TOPICAL at 08:10

## 2023-10-31 NOTE — ASSESSMENT & PLAN NOTE
Pt presenting with low platelets (23) in setting of acute liver injury likely secondary to shock. Other cause to rule out can include cirrhosis, BM problem (myelodysplasia, chemotherapy, alcohol), HCV/HIV. Labs ordered for GARRY, Smear. PLASMIC score 6/7 but [id number] wnl.    -- Transfused 2 units FFP  -- solumedrol  -- Trend CBC, PT/INR, aPTT  -- Consulted hematology, appreciate recommendations  -- per hematology team, they heard from lab [id number] wnl  -- PT 11.0, INR 1.0, Rogelio negative, haptoglobin 82, fibrinogen 489, aPTT 44.4  -- transfuse platelets when:   - <10k for patient with failure of platelet production   - <20k for hemorrhagic diathesis   - <50k for active bleeding or immediately prior to invasive procedure   - <100k for neurosurgery, ophthalmic surgery, CNS bleeding   - >100k for evidence of platelet dysfunction

## 2023-10-31 NOTE — HPI
Lorenzo Nino is a 48yoM who presented to the ED for evaluation of fatigue, body aches, abd pain, and hematuria. History provided by pt and significant other. Pt went to urgent care 3 days ago with headache, abdominal pain with nausea but no vomiting or diarrhea. He was discharged with instructions to take Tylenol and return to clinic if still feeling unwell. He represented to urgent care with blood in his urine and was advised to go to the ED on 10/30/23. Presenting symptoms now include decreased PO intake, subjective fevers, persistent headache, severe muscle aches, and neck stiffness. Urine now persistently orange in setting of increased confusion. No reported fever, diaphoresis, nausea or vomiting, alcohol abuse or drug use. Pt is not currently sexually active with his partner secondary to her chronological problems with cancer.     In the ED, patient was found to be significantly tachycardic with mild hypotension requiring IVF and vasopressors. His lactic acid was significantly elevated and there were initial concerns for meningitis. However, patients platelet count was too low for a LP to be performed so he was empirically treated with ABX, acyclovir, and steroids. CT head negative, CT C/A/P showing perinephric stranding, distended bladder, and hiatal hernia. Patient admitted to MICU for further workup and treatment of his hypotension and likely severe sepsis

## 2023-10-31 NOTE — FIRST PROVIDER EVALUATION
"Medical screening examination initiated.  I have conducted a focused provider triage encounter, findings are as follows:    Brief history of present illness:  here with blood in urine. Went to urgent care Friday. Having fevers and upset stomach. Went back today and was told to come to the ED for blood in urine.  "They gave him 2 bags of urine and a B12 shot".  Reports urine is orange. No figueroa blood.  No burning with urination.  Fevers have been going on since Friday. Have been as high as 103 (today). Negative covid test.  Does report diffuse body aches. Having b/l low back pain. No abdominal pain.    Vitals:    10/30/23 1910   BP: 115/80   Pulse: (!) 150   Resp: 20   Temp: 99.9 °F (37.7 °C)   TempSrc: Oral   SpO2: 99%   Weight: 77.1 kg (170 lb)       Pertinent physical exam:  appears to not feel well, tachcyardic    Brief workup plan:  sepsis bundle, to room, 1L NS ordered as the patient just received 2L per family.     Preliminary workup initiated; this workup will be continued and followed by the physician or advanced practice provider that is assigned to the patient when roomed.  "

## 2023-10-31 NOTE — ASSESSMENT & PLAN NOTE
"This patient does have evidence of infective focus  My overall impression is septic shock due to lactate > 4, MAP < 65 and SBP < 90.  Source: Urinary Tract  Antibiotics given-   Antibiotics (72h ago, onward)    Start     Stop Route Frequency Ordered    10/31/23 0000  cefTRIAXone (ROCEPHIN) 2 g in dextrose 5 % in water (D5W) 100 mL IVPB (MB+)         -- IV Every 24 hours (non-standard times) 10/30/23 2259    10/30/23 2358  vancomycin - pharmacy to dose  (vancomycin IVPB (PEDS and ADULTS))        See Hyperspace for full Linked Orders Report.    -- IV pharmacy to manage frequency 10/30/23 2259      Latest lactate reviewed-  No results for input(s): "LACTATE" in the last 72 hours.  Organ dysfunction indicated by Acute kidney injury, Acute liver injury and Thrombocytopenia   Fluid challenge Ideal Body Weight- The patient's ideal body weight is Ideal body weight: 79.9 kg (176 lb 2.4 oz) which will be used to calculate fluid bolus of 30 ml/kg for treatment of septic shock.    Will Start Pressors- Levophed for MAP of 65  Source control achieved by: Vanc & Ceftriaxone      "

## 2023-10-31 NOTE — ASSESSMENT & PLAN NOTE
As evidence by history, physical exam and imaging. CXR significant for perihilar interstitial opacities compatible with multifocal pneumonia vs pulmonary edema. 3 out of 4 SIRS criteria met. Initiate IV antibiotics for comm-acquired pneumonia with vanc, ceftriaxone and azithromycin to cover for atypical pathogens. Respiratory viral panel was negative. Respiratory culture and gram stain ordered.    -- continuing vanc/rocephin/azithro  -- respitory cx w/ gram stain to be collected  -- Monitor SpO2  -- Supplemental O2 as indicated  -- ABGs PRN  -- Acetaminophen for fever  -- Guaifenesin or Tessalon Perls PRN for cough  -- Encourage Incentive spirometry    -- Strict aspiration precautions  -- Consider chest physiotherapy as course dictates  -- Provide nebulizer treatments scheduled

## 2023-10-31 NOTE — CONSULTS
[facility]  Cardiology  Consult Note    Patient Name: Lorenzo Nino  MRN: [id number]  Admission Date: 10/30/2023  Hospital Length of Stay: 1 days  Code Status: Full Code   Attending Provider: Santos Caballero MD   Consulting Provider: Federica Vazquez DO  Primary Care Physician: Santos Caballero MD  Principal Problem:<principal problem not specified>    Patient information was obtained from patient, past medical records, and ER records.     Inpatient consult to Cardiology  Consult performed by: Federica Vazquez DO  Consult ordered by: Bianca Sheppard MD        Subjective:     Chief Complaint:  Sepsis     HPI:   47 yo M with no past medical history admitted 10/30 for sepsis. Last Friday, 10/27, he developed headache, abdominal pain, and nausea. He presented to , who prescribed Tylenol and recommended he return if he continued to feel poorly. He presented to C 10/30 with worsening abdominal pain, as well as fatigue, body aches, and hematuria. He was found to be tachycardic and hypotensive requiring vasopressor support. He was admitted to MICU for management of sepsis 2/2 PNA vs UTI vs meningitis (reported symptoms of neck stiffness). He was unable to undergo LP due to thrombocytopenia of 23. He is currently being evaluated for TTP.     Further work up revealed MRSA bacteremia. He underwent TTE, which was notable for mitral valve vegetation with flail anterior leaflet, as well as possible aortic valve vegetation. EKG sinus rhythm, low voltage. Troponin elevated to 27.8. Cardiology consulted for further recommendations regarding endocarditis.     History reviewed. No pertinent past medical history.    History reviewed. No pertinent surgical history.    Review of patient's allergies indicates:  No Known Allergies    No current facility-administered medications on file prior to encounter.     No current outpatient medications on file prior to encounter.     Family History    None       Tobacco Use     Smoking status: Unknown    Smokeless tobacco: Not on file   Substance and Sexual Activity    Alcohol use: Yes    Drug use: Never    Sexual activity: Not on file     Review of Systems   Constitutional: Positive for chills, fever and malaise/fatigue.   Eyes:  Negative for double vision.   Cardiovascular:  Negative for chest pain, cyanosis, irregular heartbeat, leg swelling and palpitations.   Respiratory:  Negative for cough, shortness of breath and wheezing.    Musculoskeletal:  Positive for muscle weakness, myalgias and neck pain.   Genitourinary:  Positive for hematuria.   Neurological:  Positive for headaches.     Objective:     Vital Signs (Most Recent):  Temp: 99.2 °F (37.3 °C) (10/31/23 1530)  Pulse: (!) 119 (10/31/23 1315)  Resp: (!) 25 (10/31/23 1315)  BP: 100/70 (10/31/23 1530)  SpO2: 95 % (10/31/23 1315) Vital Signs (24h Range):  Temp:  [98.1 °F (36.7 °C)-102.8 °F (39.3 °C)] 99.2 °F (37.3 °C)  Pulse:  [114-150] 119  Resp:  [20-44] 25  SpO2:  [94 %-99 %] 95 %  BP: ()/(58-86) 100/70     Weight: 77.1 kg (170 lb)  Body mass index is 22.43 kg/m².    SpO2: 95 %         Intake/Output Summary (Last 24 hours) at 10/31/2023 1624  Last data filed at 10/31/2023 1542  Gross per 24 hour   Intake 3222.65 ml   Output 2651 ml   Net 571.65 ml       Lines/Drains/Airways       Peripheral Intravenous Line  Duration                  Peripheral IV - Single Lumen 10/30/23 1924 20 G Right Antecubital <1 day         Peripheral IV - Single Lumen 10/30/23 2047 20 G Anterior;Left Forearm <1 day         Peripheral IV - Single Lumen 10/31/23 1230 18 G Anterior;Distal;Right Forearm <1 day                     Physical Exam  Vitals and nursing note reviewed.   Constitutional:       General: He is not in acute distress.     Appearance: Normal appearance. He is not ill-appearing.   HENT:      Head: Normocephalic and atraumatic.   Cardiovascular:      Rate and Rhythm: Regular rhythm. Tachycardia present.      Heart sounds: Murmur  (holosystolic and left sternal border) heard.   Pulmonary:      Effort: Pulmonary effort is normal. No respiratory distress.      Breath sounds: Normal breath sounds. No wheezing or rales.      Comments: On room air  Abdominal:      General: Abdomen is flat.      Palpations: Abdomen is soft.   Musculoskeletal:      Right lower leg: No edema.      Left lower leg: No edema.   Skin:     Comments: Petechiae present BL feet/ankles  No noted splinter hemorrhages   Neurological:      General: No focal deficit present.      Mental Status: He is alert.   Psychiatric:      Comments: Flat affect          Significant Labs: All pertinent lab results from the last 24 hours have been reviewed.    Significant Imaging:  Reviewed  Assessment and Plan:     Endocarditis  47 yo M with no past medical history admitted 10/30 2/2 PNA vs UTI vs meningitis requiring vasopressor support. Further work up revealed MRSA bacteremia. He underwent TTE, which was notable for mitral valve vegetation with flail anterior leaflet, as well as possible aortic valve vegetation.   - EKG sinus rhythm, low voltage.   - Troponin 18.298 --> 19.814 --> 27.88    Risk factors: he reports COVID ~2 weeks ago, this may be a resulting COVID myocarditis. Otherwise, he denies IVDU (supported by negative UDS on admission). No history of dental abscesses or recent dental work (does have dental caps, states they were placed remotely). No significant family cardiac history.     Recommendations:  - Will discuss possible myocarditis with HTS  - Recommend CTS consult for definitive intervention  - Continue antibiotics. Agree with ID consult.        VTE Risk Mitigation (From admission, onward)           Ordered     Place sequential compression device  Until discontinued         10/30/23 2231     IP VTE LOW RISK PATIENT  Once         10/30/23 2231                    Thank you for your consult. I will follow-up with patient. Please contact us if you have any additional  questions.    Federica Vazquez, DO  Cardiology   [facility]

## 2023-10-31 NOTE — HPI
Hematology consults:     Consult received:      Briefly, patient is a 48 year old male who presents to hospital with symptoms of fatigue, body aches and abdominal pain of several days duration. Patient presented to urgent care with with hematuria and was advised to go to ED. Patient initially treated for meningitis. CT head negative, CT CAP with perinephric stranding, distended bladder, and hiatal hernia. He was found to have thrombocytopenia down to 23. Hematology consulted out of concern for TTP.     Work up to date shows mild normocytic anemia, thrombocytopenia but normal WBC count with mild left shift. Ferritin elevated to 4,925, fibrinogen 489, and . He also has elevated CPK and troponin of 19. Notably, little evidence of significant microangiopathic hemolytic anemia. Haptoglobin wnl and peripheral smear with only occasional schistocytes, bilirubin elevated but not fractionated.  PLASMIC score as below. Discussed case with Dr. Dailey. [id number] level sent at 11:30 pm last night and is expected back between 2-4 pm today. Hold of on plasmapheresis until result. Agree with continued high dose steroids and recommend 2 units FFP over 8 hours while awaiting results.

## 2023-10-31 NOTE — ASSESSMENT & PLAN NOTE
Mr. Lorenzo Nino is being treated for FRANCESCO likely secondary to pre-renal azotemia due to IVVD. Scr of 1.6. Ordered CMP, serum osmolality, urine osmolality, urine sodium, and urine creatinine. Started IVF resuscitation. Strict I/Os to monitor renal function. Discontinued nephrotoxic medications.    --Cr improving following fluid bolus  --Monitor with serial Cr / GFR levels closely with serial labs  --Avoid nephrotoxic medications such as NSAIDs, IV contrast, or RAAS blockade  --Renally dose medications    Lab Results   Component Value Date    CREATININE 1.1 10/31/2023

## 2023-10-31 NOTE — ASSESSMENT & PLAN NOTE
Mr. Lorenzo Nino is being treated for FRANCESCO likely secondary to pre-renal azotemia due to IVVD. Scr of 1.6. Ordered CMP, serum osmolality, urine osmolality, urine sodium, and urine creatinine. Started IVF resuscitation. Strict I/Os to monitor renal function. Discontinued nephrotoxic medications.  --F/U labs and evaluate for pre-renal, intrarenal, and post-obstructive etiology.  --Monitor with serial Cr / GFR levels closely with serial labs  --Avoid nephrotoxic medications such as NSAIDs, IV contrast, or RAAS blockade  --Renally dose medications  --Consider Renal ultrasound  --Consider Nephrology consult    Lab Results   Component Value Date    CREATININE 1.6 (H) 10/30/2023

## 2023-10-31 NOTE — ASSESSMENT & PLAN NOTE
This patient does have evidence of infective focus  My overall impression is septic shock due to lactate > 4, MAP < 65 and SBP < 90.  Source: Urinary Tract vs PNA  Antibiotics given-   Antibiotics (72h ago, onward)    Start     Stop Route Frequency Ordered    10/31/23 0600  azithromycin (ZITHROMAX) 500 mg in dextrose 5 % (D5W) 250 mL IVPB (Vial-Mate)         11/03/23 0559 IV Every 24 hours (non-standard times) 10/31/23 0207    10/31/23 0100  cefTRIAXone (ROCEPHIN) 2 g in dextrose 5 % in water (D5W) 100 mL IVPB (MB+)         -- IV Every 12 hours (non-standard times) 10/30/23 2353    10/30/23 2358  vancomycin - pharmacy to dose  (vancomycin IVPB (PEDS and ADULTS))        See Hyperspace for full Linked Orders Report.    -- IV pharmacy to manage frequency 10/30/23 2259      Latest lactate reviewed-  Recent Labs   Lab 10/30/23  2327   LACTATE 3.8*     Organ dysfunction indicated by Acute kidney injury, Acute liver injury and Thrombocytopenia   Fluid challenge Ideal Body Weight- The patient's ideal body weight is Ideal body weight: 79.9 kg (176 lb 2.4 oz) which will be used to calculate fluid bolus of 30 ml/kg for treatment of septic shock.      -- Peripheral Pressors prn to maintain MAP of 65  -- BCx growing gram positive cocci, PCR positive for MRSA  -- Adjust abx as indicated  -- Source control achieved by: Vanc, Ceftriaxone, Azithromycin  -- d/c'd acyclovir given low suspicion of viral meningitis and positive blood cultures

## 2023-10-31 NOTE — HPI
47 yo M with no past medical history admitted 10/30 for sepsis. Last Friday, 10/27, he developed headache, abdominal pain, and nausea. He presented to , who prescribed Tylenol and recommended he return if he continued to feel poorly. He presented to [facility] 10/30 with worsening abdominal pain, as well as fatigue, body aches, and hematuria. He was found to be tachycardic and hypotensive requiring vasopressor support. He was admitted to MICU for management of sepsis 2/2 PNA vs UTI vs meningitis (reported symptoms of neck stiffness). He was unable to undergo LP due to thrombocytopenia of 23. He is currently being evaluated for TTP.     Further work up revealed MRSA bacteremia. He underwent TTE, which was notable for mitral valve vegetation with flail anterior leaflet, as well as possible aortic valve vegetation. EKG sinus rhythm, low voltage. Troponin elevated to 27.8.     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 Pt decompensated with increasing oxygen requirements. Intubated on 11/2/23. Repeat TTE showed mild AV prolapse with regurgitation and MV with flail posterior leaflet and severe regurgitation. CCU contacted for transfer.

## 2023-10-31 NOTE — PROGRESS NOTES
Pharmacokinetic Assessment Follow Up: IV Vancomycin    Vancomycin Regimen Assessment & Plan  - Vancomycin level drawn with morning labs today resulted as 8.6 mcg/mL.  - FRANCESCO appears to be resolving. Down trending SCr and good UOP. Changing from pulse dosing by levels to scheduled vancomycin 1000 mg IV q12h.  - Draw trough on 11/1 @0800 prior to 4th total dose, goal 15-20 mcg/mL.    Drug levels (last 3 results):  Recent Labs   Lab Result Units 10/31/23  0514   Vancomycin, Random ug/mL 8.6     Pharmacy will continue to follow and monitor vancomycin.    Please contact pharmacy at extension [phone] for questions regarding this assessment.    Thank you for the consult,   Ck Wilson     Patient brief summary:  Lorenzo Nino is a 48 y.o. male initiated on antimicrobial therapy with IV Vancomycin for treatment of bacteremia    Drug Allergies:   Review of patient's allergies indicates:  No Known Allergies    Actual Body Weight:   77.1 kg    Renal Function:   Estimated Creatinine Clearance: 88.6 mL/min (based on SCr of 1.1 mg/dL).,     Dialysis Method (if applicable):  N/A

## 2023-10-31 NOTE — ASSESSMENT & PLAN NOTE
Pt presenting with low platelets (23) in setting of acute liver injury likely secondary to shock. Other cause to rule out can include cirrhosis, BM problem (myelodysplasia, chemotherapy, alcohol), HCV/HIV. Labs ordered for PT/INR, aPTT, Fibrinogen, Haptoglobin, AdamsTS, GARRY, Rogelio, Smear.  -- Trend CBC, PT/INR, aPTT  -- F/U lab results  -- Consider Heme/Onc consult  -- transfuse platelets when:   - <10k for patient with failure of platelet production   - <20k for hemorrhagic diathesis   - <50k for active bleeding or immediately prior to invasive procedure   - <100k for neurosurgery, ophthalmic surgery, CNS bleeding   - >100k for evidence of platelet dysfunction

## 2023-10-31 NOTE — HPI
Mr. Nino is a 48yoM without past medical history who presented with 4 days of flu like symptoms and was admitted to the MICU with sepsis. About 2 weeks ago he got COVID, but it was uncomplicated. He works on river barges, loading and unloading them, and on Friday on the way from home he started having fever, chills, body aches, back aching, headache, and fatigue/malaise. He went to urgent care, who treated him supportively. He continued to feel poorly over the weekend and went back to  on Monday. He was found to have hematuria and was [place] [facility] On [place] he was tachycardic into the 140s-150s, normotensive (with mild hypotensive episodes overnight, but no shock). His CXR was concerning for PNA, UA concerning for possible UTI (with perinephric stranding on CT), and he grew MRSA in his serum. TTE showed a flail mitral valve leaflet with a vegetation, moderate MR, and an EF of 40-45%. He is thrombocytopenic with a Plt count in the 20s. His troponin was 18 -> 28. He denies any history of drug use and drug screen is negative. He has poor dentition, only retaining a few teeth (mostly incisors) in the front on the bottom row. No report or oral ulcers, pain, swelling, or signs of infection. No obvious wounds reported. ID is consulted for MRSA bacteremia, endocarditis. CTS is consulted for possible valvular intervention. We were consulted for evaluation of his elevated troponin, concern for possible myocarditis.

## 2023-10-31 NOTE — PLAN OF CARE
SW attempted to complete DC Assessment but patient was asleep and unarousable to voice.  SW will complete assessment at a later date.    Jennifer Katz LMSW  [facility]  [phone]

## 2023-10-31 NOTE — ASSESSMENT & PLAN NOTE
- Plasmic score as previously listed at 6 points. However as noted in care update, there is an insignificant amount of schistocytes per high powered field. In addition, lab evaluation shows detectable haptoglobin which makes hemolysis unlikely.   - Unlikely to have other consumptive processes such as DIC given elevated fibrinogen and grossly normal coagulation studies  - At this point, suspect thrombocytopenia is due to marrow suppression from critical illness and infectious process. If this is the case then platelets will recover with ongoing treatment, although this can take a few weeks.   - [id number] still pending. Will follow up results

## 2023-10-31 NOTE — CONSULTS
[facility]  Hematology  Bone Marrow Transplant  Consult Note    Patient Name: Lorenzo Nino  Admission Date: 10/30/2023  Hospital Length of Stay: 1 days  Code Status: Full Code    HPI:  [email or web address], patient is a 48 year old male who presents to hospital with symptoms of fatigue, body aches and abdominal pain of several days duration. Patient presented to urgent care with with hematuria and was advised to go to ED. Patient initially treated for meningitis. CT head negative, CT CAP with perinephric stranding, distended bladder, and hiatal hernia. He was found to have thrombocytopenia down to 23. Hematology consulted out of concern for TTP.     Work up to date shows mild normocytic anemia, thrombocytopenia but normal WBC count with mild left shift. Ferritin elevated to 4,925, fibrinogen 489, and . He also has elevated CPK and troponin of 19. Notably, little evidence of significant microangiopathic hemolytic anemia. Haptoglobin wnl and peripheral smear with only occasional schistocytes, bilirubin elevated but not fractionated.  PLASMIC score as below. Discussed case with Dr. Dailey. [id number] level sent at 11:30 pm last night and is expected back between 2-4 pm today. Hold of on plasmapheresis until result. Agree with continued high dose steroids and recommend 2 units FFP over 8 hours while awaiting results.      Objective:     Vital Signs (Most Recent):  Temp: 99.2 °F (37.3 °C) (10/31/23 1345)  Pulse: (!) 119 (10/31/23 1315)  Resp: (!) 25 (10/31/23 1315)  BP: 113/73 (10/31/23 1315)  SpO2: 95 % (10/31/23 1315) Vital Signs (24h Range):  Temp:  [98.1 °F (36.7 °C)-102.8 °F (39.3 °C)] 99.2 °F (37.3 °C)  Pulse:  [114-150] 119  Resp:  [20-44] 25  SpO2:  [94 %-99 %] 95 %  BP: ()/(58-86) 113/73     Weight: 77.1 kg (170 lb)  Body mass index is 22.43 kg/m².  Body surface area is 1.99 meters squared.      Intake/Output - Last 3 Shifts         10/29 0700  10/30 0659 10/30 0700  10/31 0659 10/31  0700  11/01 0659    I.V. (mL/kg)   1368.9 (17.8)    IV Piggyback   1465    Total Intake(mL/kg)   2834 (36.8)    Urine (mL/kg/hr)  1150 950 (1.5)    Stool   1    Total Output  1150 951    Net  -1150 +1883           Stool Occurrence   1 x             Physical Exam  Vitals reviewed.   Constitutional:       Appearance: He is well-developed. He is ill-appearing.   HENT:      Head: Normocephalic and atraumatic.      Right Ear: External ear normal.      Left Ear: External ear normal.      Nose: Nose normal.      Mouth/Throat:      Mouth: Mucous membranes are moist.      Pharynx: Oropharynx is clear. No oropharyngeal exudate.   Eyes:      General: No scleral icterus.     Extraocular Movements: Extraocular movements intact.      Conjunctiva/sclera: Conjunctivae normal.   Pulmonary:      Effort: Pulmonary effort is normal. No respiratory distress.   Abdominal:      General: Abdomen is flat. There is no distension.   Musculoskeletal:         General: Normal range of motion.      Cervical back: Normal range of motion and neck supple.      Right lower leg: No edema.      Left lower leg: No edema.   Skin:     General: Skin is dry.   Neurological:      Mental Status: He is alert and oriented to person, place, and time.   Psychiatric:         Behavior: Behavior normal.            Significant Labs:   All pertinent labs from the last 24 hours have been reviewed.    Diagnostic Results:  I have reviewed all pertinent imaging results/findings within the past 24 hours.  Assessment/Plan:     Thrombocytopenia  - Plasmic score as previously listed at 6 points. However as noted in care update, there is an insignificant amount of schistocytes per high powered field. In addition, lab evaluation shows detectable haptoglobin which makes hemolysis unlikely.   - Unlikely to have other consumptive processes such as DIC given elevated fibrinogen and grossly normal coagulation studies  - At this point, suspect thrombocytopenia is due to marrow  suppression from critical illness and infectious process. If this is the case then platelets will recover with ongoing treatment, although this can take a few weeks.   - [id number] still pending. Will follow up results        VTE Risk Mitigation (From admission, onward)           Ordered     Place sequential compression device  Until discontinued         10/30/23 2231     IP VTE LOW RISK PATIENT  Once         10/30/23 2231                    Disposition: Hematology will continue to follow up with the patient, please contact us with any further questions or concerns    Miguel A Hoyos MD  Bone Marrow Transplant  [facility]

## 2023-10-31 NOTE — SUBJECTIVE & OBJECTIVE
History reviewed. No pertinent past medical history.    History reviewed. No pertinent surgical history.    Review of patient's allergies indicates:  No Known Allergies    Current Facility-Administered Medications   Medication    0.9%  NaCl infusion (for blood administration)    0.9%  NaCl infusion (for blood administration)    acetaminophen tablet 650 mg    azithromycin (ZITHROMAX) 500 mg in dextrose 5 % (D5W) 250 mL IVPB (Vial-Mate)    cefTRIAXone (ROCEPHIN) 2 g in dextrose 5 % in water (D5W) 100 mL IVPB (MB+)    dextrose 10% bolus 125 mL 125 mL    dextrose 10% bolus 250 mL 250 mL    famotidine (PF) injection 20 mg    glucagon (human recombinant) injection 1 mg    glucose chewable tablet 16 g    glucose chewable tablet 24 g    insulin aspart U-100 pen 0-5 Units    lactated ringers infusion    methylPREDNISolone sodium succinate (SOLU-MEDROL) 1,000 mg in dextrose 5 % (D5W) 100 mL IVPB    mupirocin 2 % ointment    NORepinephrine bitartrate-D5W 4 mg/250 mL (16 mcg/mL) PERIPHERAL access infusion    ondansetron injection 4 mg    sodium chloride 0.9% flush 10 mL    sodium chloride 0.9% flush 10 mL    vancomycin (VANCOCIN) 1,000 mg in dextrose 5 % (D5W) 250 mL IVPB (Vial-Mate)    vancomycin - pharmacy to dose     Family History    None       Tobacco Use    Smoking status: Unknown    Smokeless tobacco: Not on file   Substance and Sexual Activity    Alcohol use: Yes    Drug use: Never    Sexual activity: Not on file     Review of Systems   Constitutional:  Positive for chills, fatigue and fever. Negative for appetite change.   HENT:  Negative for congestion and sinus pain.    Eyes:  Negative for discharge and redness.   Respiratory:  Positive for cough. Negative for shortness of breath.    Cardiovascular:  Negative for chest pain and leg swelling.   Gastrointestinal:  Negative for diarrhea and nausea.   Endocrine: Negative for polydipsia and polyuria.   Genitourinary:  Positive for hematuria. Negative for dysuria.    Musculoskeletal:  Positive for back pain and myalgias. Negative for arthralgias.   Skin:  Negative for color change and rash.   Neurological:  Positive for headaches. Negative for weakness and numbness.   Psychiatric/Behavioral:  Negative for agitation and confusion.      Objective:     Vital Signs (Most Recent):  Temp: 98.9 °F (37.2 °C) (10/31/23 1700)  Pulse: 105 (10/31/23 1730)  Resp: (!) 23 (10/31/23 1730)  BP: (!) 84/57 (10/31/23 1730)  SpO2: (!) 94 % (10/31/23 1730) Vital Signs (24h Range):  Temp:  [98.1 °F (36.7 °C)-102.8 °F (39.3 °C)] 98.9 °F (37.2 °C)  Pulse:  [104-150] 105  Resp:  [20-44] 23  SpO2:  [94 %-99 %] 94 %  BP: ()/(57-86) 84/57     Patient Vitals for the past 72 hrs (Last 3 readings):   Weight   10/31/23 0834 77.1 kg (170 lb)   10/30/23 1910 77.1 kg (170 lb)     Body mass index is 22.43 kg/m².      Intake/Output Summary (Last 24 hours) at 10/31/2023 1755  Last data filed at 10/31/2023 1700  Gross per 24 hour   Intake 3499.22 ml   Output 3251 ml   Net 248.22 ml          Physical Exam  Constitutional:       General: He is not in acute distress.     Appearance: Normal appearance.   HENT:      Head: Normocephalic and atraumatic.      Mouth/Throat:      Mouth: Mucous membranes are moist.      Pharynx: Oropharynx is clear.   Eyes:      Conjunctiva/sclera: Conjunctivae normal.      Pupils: Pupils are equal, round, and reactive to light.   Cardiovascular:      Rate and Rhythm: Normal rate and regular rhythm.      Pulses: Normal pulses.      Heart sounds: Murmur (holosystolic at LLSB without impressive radiation to axilla) heard.   Pulmonary:      Effort: Pulmonary effort is normal.      Breath sounds: Rales (bibasilar) present.   Abdominal:      General: Abdomen is flat.      Palpations: Abdomen is soft.   Musculoskeletal:      Cervical back: Normal range of motion and neck supple. No tenderness.      Right lower leg: Edema (trace) present.      Left lower leg: Edema (trace) present.   Skin:      "General: Skin is warm and dry.      Comments: Petechiae on dorsal feet, palms of the hands. Poorly kept nails. Black nail on left hand where he recently smashed it. No cutaneous evidence of thromboembolic phenomena.   Neurological:      Mental Status: He is alert and oriented to person, place, and time.            Significant Labs:  CBC:  Recent Labs   Lab 10/30/23  1923 10/30/23  2327 10/31/23  0514   WBC 6.16 7.11 10.58   RBC 4.85 4.30* 4.33*   HGB 15.0 12.9* 13.3*   HCT 43.2 36.9* 38.1*   PLT 23* 22* 21*   MCV 89 86 88   MCH 30.9 30.0 30.7   MCHC 34.7 35.0 34.9     BNP:  No results for input(s): "BNP" in the last 168 hours.    Invalid input(s): "BNPTRIAGELBLO"  CMP:  Recent Labs   Lab 10/30/23  1923 10/31/23  0514   * 130*   CALCIUM 8.0* 7.5*   ALBUMIN 2.8* 2.3*   PROT 6.5 5.5*   * 131*   K 3.7 3.6   CO2 17* 17*    102   BUN 34* 24*   CREATININE 1.6* 1.1   ALKPHOS 109 81   * 114*   * 202*   BILITOT 3.0* 2.2*      Coagulation:   Recent Labs   Lab 10/30/23  2021   INR 1.0   APTT 44.4*     LDH:  Recent Labs   Lab 10/31/23  0019   *     Microbiology:  Microbiology Results (last 7 days)       Procedure Component Value Units Date/Time    MRSA/SA Rapid ID by PCR from Blood culture [[id number]]  (Abnormal) Collected: 10/30/23 1923    Order Status: Completed Updated: 10/31/23 0751     Staph aureus ID by PCR Positive     Methicillin Resistant ID by PCR Positive    Narrative:      Aerobic and anaerobic    Blood culture x two cultures. Draw prior to antibiotics. [[id number]] Collected: 10/30/23 1924    Order Status: Completed Specimen: Blood from Peripheral, Antecubital, Left Updated: 10/31/23 0635     Blood Culture, Routine Gram stain aer bottle: Gram positive cocci in clusters resembling Staph      Gram stain carlie bottle: Gram positive cocci in clusters resembling Staph      Results called to and read back by: Savannah Butler RN 10/31/2023  06    Narrative:      Aerobic and anaerobic    " Blood culture x two cultures. Draw prior to antibiotics. [[id number]] Collected: 10/30/23 1923    Order Status: Completed Specimen: Blood from Peripheral, Antecubital, Right Updated: 10/31/23 0633     Blood Culture, Routine Gram stain aer bottle: Gram positive cocci in clusters resembling Staph      Gram stain carlie bottle: Gram positive cocci in clusters resembling Staph      Results called to and read back by: Savannah Butler RN 10/31/2023  06:33    Narrative:      Aerobic and anaerobic    Culture, Respiratory with Gram Stain [[id number]]     Order Status: No result Specimen: Respiratory from Sputum     C. trachomatis/N. gonorrhoeae by AMP DNA [facility]; Urine [[id number]] Collected: 10/31/23 0030    Order Status: Sent Specimen: Urine from Vaginal Updated: 10/31/23 0045    Respiratory Infection Panel (PCR), Nasopharyngeal [[id number]] Collected: 10/30/23 2153    Order Status: Completed Specimen: Nasopharyngeal Swab Updated: 10/30/23 2323     Respiratory Infection Panel Source NP Swab     Adenovirus Not Detected     Coronavirus 229E, Common Cold Virus Not Detected     Coronavirus HKU1, Common Cold Virus Not Detected     Coronavirus NL63, Common Cold Virus Not Detected     Coronavirus OC43, Common Cold Virus Not Detected     Comment: The Coronavirus strains detected in this test cause the common cold.  These strains are not the COVID-19 (novel Coronavirus)strain   associated with the respiratory disease outbreak.          SARS-CoV2 (COVID-19) Qualitative PCR Not Detected     Human Metapneumovirus Not Detected     Human Rhinovirus/Enterovirus Not Detected     Influenza A (subtypes H1, H1-2009,H3) Not Detected     Influenza B Not Detected     Parainfluenza Virus 1 Not Detected     Parainfluenza Virus 2 Not Detected     Parainfluenza Virus 3 Not Detected     Parainfluenza Virus 4 Not Detected     Respiratory Syncytial Virus Not Detected     Bordetella Parapertussis (HN7021) Not Detected     Bordetella pertussis (ptxP) Not  Detected     Chlamydia pneumoniae Not Detected     Mycoplasma pneumoniae Not Detected    Narrative:      For all other respiratory sources, order [id number] -  Respiratory Viral Panel by PCR    C. trachomatis/N. gonorrhoeae by AMP DNA [[id number]] Collected: 10/30/23 2304    Order Status: Canceled     C. trachomatis/N. gonorrhoeae by AMP DNA [facility]; Urine [[id number]]     Order Status: Completed Specimen: Genital from Vaginal     Urine culture [[id number]] Collected: 10/30/23 2152    Order Status: No result Specimen: Urine Updated: 10/30/23 2237    Influenza A & B by Molecular [[id number]] Collected: 10/30/23 1925    Order Status: Completed Specimen: Nasopharyngeal Swab Updated: 10/30/23 1958     Influenza A, Molecular NEGATIVE     Influenza B, Molecular NEGATIVE     Flu A & B Source Nasal swab            I have reviewed all pertinent labs within the past 24 hours.    Diagnostic Results:  I have reviewed and interpreted all pertinent imaging results/findings within the past 24 hours.

## 2023-10-31 NOTE — CONSULTS
[facility]  Heart Transplant  Consult Note    Patient Name: Lorenzo Nino  MRN: [id number]  Admission Date: 10/30/2023  Hospital Length of Stay: 1 days  Attending Physician: Santos Caballero MD  Primary Care Provider: Santos Caballero MD   Principal Problem:<principal problem not specified>     Inpatient consult to Heart Transplant  Consult performed by: Gillies, Connor M, MD  Consult ordered by: Bianca Sheppard MD        Subjective:     History of Present Illness:  Mr. Nino is a 48yoM without past medical history who presented with 4 days of flu like symptoms and was admitted to the MICU with sepsis. About 2 weeks ago he got COVID, but it was uncomplicated. He works on river barges, loading and unloading them, and on Friday on the way from home he started having fever, chills, body aches, back aching, headache, and fatigue/malaise. He went to urgent care, who treated him supportively. He continued to feel poorly over the weekend and went back to  on Monday. He was found to have hematuria and was sent to [facility] [place]. On admission he was tachycardic into the 140s-150s, normotensive (with mild hypotensive episodes overnight, but no shock). His CXR was concerning for PNA, UA concerning for possible UTI (with perinephric stranding on CT), and he grew MRSA in his serum. TTE showed a flail mitral valve leaflet with a vegetation, moderate MR, and an EF of 40-45%. He is thrombocytopenic with a Plt count in the 20s. His troponin was 18 -> 28. He denies any history of drug use and drug screen is negative. He has poor dentition, only retaining a few teeth (mostly incisors) in the front on the bottom row. No report or oral ulcers, pain, swelling, or signs of infection. No obvious wounds reported. ID is consulted for MRSA bacteremia, endocarditis. CTS is consulted for possible valvular intervention. We were consulted for evaluation of his elevated troponin, concern for possible myocarditis.       History  reviewed. No pertinent past medical history.    History reviewed. No pertinent surgical history.    Review of patient's allergies indicates:  No Known Allergies    Current Facility-Administered Medications   Medication    0.9%  NaCl infusion (for blood administration)    0.9%  NaCl infusion (for blood administration)    acetaminophen tablet 650 mg    azithromycin (ZITHROMAX) 500 mg in dextrose 5 % (D5W) 250 mL IVPB (Vial-Mate)    cefTRIAXone (ROCEPHIN) 2 g in dextrose 5 % in water (D5W) 100 mL IVPB (MB+)    dextrose 10% bolus 125 mL 125 mL    dextrose 10% bolus 250 mL 250 mL    famotidine (PF) injection 20 mg    glucagon (human recombinant) injection 1 mg    glucose chewable tablet 16 g    glucose chewable tablet 24 g    insulin aspart U-100 pen 0-5 Units    lactated ringers infusion    methylPREDNISolone sodium succinate (SOLU-MEDROL) 1,000 mg in dextrose 5 % (D5W) 100 mL IVPB    mupirocin 2 % ointment    NORepinephrine bitartrate-D5W 4 mg/250 mL (16 mcg/mL) PERIPHERAL access infusion    ondansetron injection 4 mg    sodium chloride 0.9% flush 10 mL    sodium chloride 0.9% flush 10 mL    vancomycin (VANCOCIN) 1,000 mg in dextrose 5 % (D5W) 250 mL IVPB (Vial-Mate)    vancomycin - pharmacy to dose     Family History    None       Tobacco Use    Smoking status: Unknown    Smokeless tobacco: Not on file   Substance and Sexual Activity    Alcohol use: Yes    Drug use: Never    Sexual activity: Not on file     Review of Systems   Constitutional:  Positive for chills, fatigue and fever. Negative for appetite change.   HENT:  Negative for congestion and sinus pain.    Eyes:  Negative for discharge and redness.   Respiratory:  Positive for cough. Negative for shortness of breath.    Cardiovascular:  Negative for chest pain and leg swelling.   Gastrointestinal:  Negative for diarrhea and nausea.   Endocrine: Negative for polydipsia and polyuria.   Genitourinary:  Positive for hematuria. Negative for  dysuria.   Musculoskeletal:  Positive for back pain and myalgias. Negative for arthralgias.   Skin:  Negative for color change and rash.   Neurological:  Positive for headaches. Negative for weakness and numbness.   Psychiatric/Behavioral:  Negative for agitation and confusion.      Objective:     Vital Signs (Most Recent):  Temp: 98.9 °F (37.2 °C) (10/31/23 1700)  Pulse: 105 (10/31/23 1730)  Resp: (!) 23 (10/31/23 1730)  BP: (!) 84/57 (10/31/23 1730)  SpO2: (!) 94 % (10/31/23 1730) Vital Signs (24h Range):  Temp:  [98.1 °F (36.7 °C)-102.8 °F (39.3 °C)] 98.9 °F (37.2 °C)  Pulse:  [104-150] 105  Resp:  [20-44] 23  SpO2:  [94 %-99 %] 94 %  BP: ()/(57-86) 84/57     Patient Vitals for the past 72 hrs (Last 3 readings):   Weight   10/31/23 0834 77.1 kg (170 lb)   10/30/23 1910 77.1 kg (170 lb)     Body mass index is 22.43 kg/m².      Intake/Output Summary (Last 24 hours) at 10/31/2023 1755  Last data filed at 10/31/2023 1700  Gross per 24 hour   Intake 3499.22 ml   Output 3251 ml   Net 248.22 ml          Physical Exam  Constitutional:       General: He is not in acute distress.     Appearance: Normal appearance.   HENT:      Head: Normocephalic and atraumatic.      Mouth/Throat:      Mouth: Mucous membranes are moist.      Pharynx: Oropharynx is clear.   Eyes:      Conjunctiva/sclera: Conjunctivae normal.      Pupils: Pupils are equal, round, and reactive to light.   Cardiovascular:      Rate and Rhythm: Normal rate and regular rhythm.      Pulses: Normal pulses.      Heart sounds: Murmur (holosystolic at LLSB without impressive radiation to axilla) heard.   Pulmonary:      Effort: Pulmonary effort is normal.      Breath sounds: Rales (bibasilar) present.   Abdominal:      General: Abdomen is flat.      Palpations: Abdomen is soft.   Musculoskeletal:      Cervical back: Normal range of motion and neck supple. No tenderness.      Right lower leg: Edema (trace) present.      Left lower leg: Edema (trace) present.  "  Skin:     General: Skin is warm and dry.      Comments: Petechiae on dorsal feet, palms of the hands. Poorly kept nails. Black nail on left hand where he recently smashed it. No cutaneous evidence of thromboembolic phenomena.   Neurological:      Mental Status: He is alert and oriented to person, place, and time.            Significant Labs:  CBC:  Recent Labs   Lab 10/30/23  1923 10/30/23  2327 10/31/23  0514   WBC 6.16 7.11 10.58   RBC 4.85 4.30* 4.33*   HGB 15.0 12.9* 13.3*   HCT 43.2 36.9* 38.1*   PLT 23* 22* 21*   MCV 89 86 88   MCH 30.9 30.0 30.7   MCHC 34.7 35.0 34.9     BNP:  No results for input(s): "BNP" in the last 168 hours.    Invalid input(s): "BNPTRIAGELBLO"  CMP:  Recent Labs   Lab 10/30/23  1923 10/31/23  0514   * 130*   CALCIUM 8.0* 7.5*   ALBUMIN 2.8* 2.3*   PROT 6.5 5.5*   * 131*   K 3.7 3.6   CO2 17* 17*    102   BUN 34* 24*   CREATININE 1.6* 1.1   ALKPHOS 109 81   * 114*   * 202*   BILITOT 3.0* 2.2*      Coagulation:   Recent Labs   Lab 10/30/23  2021   INR 1.0   APTT 44.4*     LDH:  Recent Labs   Lab 10/31/23  0019   *     Microbiology:  Microbiology Results (last 7 days)       Procedure Component Value Units Date/Time    MRSA/SA Rapid ID by PCR from Blood culture [[id number]]  (Abnormal) Collected: 10/30/23 1923    Order Status: Completed Updated: 10/31/23 0751     Staph aureus ID by PCR Positive     Methicillin Resistant ID by PCR Positive    Narrative:      Aerobic and anaerobic    Blood culture x two cultures. Draw prior to antibiotics. [[id number]] Collected: 10/30/23 1924    Order Status: Completed Specimen: Blood from Peripheral, Antecubital, Left Updated: 10/31/23 0635     Blood Culture, Routine Gram stain aer bottle: Gram positive cocci in clusters resembling Staph      Gram stain carlie bottle: Gram positive cocci in clusters resembling Staph      Results called to and read back by: Svaannah Butler RN 10/31/2023  06    Narrative:      Aerobic and " anaerobic    Blood culture x two cultures. Draw prior to antibiotics. [[id number]] Collected: 10/30/23 1923    Order Status: Completed Specimen: Blood from Peripheral, Antecubital, Right Updated: 10/31/23 0633     Blood Culture, Routine Gram stain aer bottle: Gram positive cocci in clusters resembling Staph      Gram stain carlie bottle: Gram positive cocci in clusters resembling Staph      Results called to and read back by: Savannah Butler RN 10/31/2023  06:33    Narrative:      Aerobic and anaerobic    Culture, Respiratory with Gram Stain [[id number]]     Order Status: No result Specimen: Respiratory from Sputum     C. trachomatis/N. gonorrhoeae by AMP DNA [facility]; Urine [[id number]] Collected: 10/31/23 0030    Order Status: Sent Specimen: Urine from Vaginal Updated: 10/31/23 0045    Respiratory Infection Panel (PCR), Nasopharyngeal [[id number]] Collected: 10/30/23 2153    Order Status: Completed Specimen: Nasopharyngeal Swab Updated: 10/30/23 2323     Respiratory Infection Panel Source NP Swab     Adenovirus Not Detected     Coronavirus 229E, Common Cold Virus Not Detected     Coronavirus HKU1, Common Cold Virus Not Detected     Coronavirus NL63, Common Cold Virus Not Detected     Coronavirus OC43, Common Cold Virus Not Detected     Comment: The Coronavirus strains detected in this test cause the common cold.  These strains are not the COVID-19 (novel Coronavirus)strain   associated with the respiratory disease outbreak.          SARS-CoV2 (COVID-19) Qualitative PCR Not Detected     Human Metapneumovirus Not Detected     Human Rhinovirus/Enterovirus Not Detected     Influenza A (subtypes H1, H1-2009,H3) Not Detected     Influenza B Not Detected     Parainfluenza Virus 1 Not Detected     Parainfluenza Virus 2 Not Detected     Parainfluenza Virus 3 Not Detected     Parainfluenza Virus 4 Not Detected     Respiratory Syncytial Virus Not Detected     Bordetella Parapertussis (GH1066) Not Detected     Bordetella pertussis  (ptxP) Not Detected     Chlamydia pneumoniae Not Detected     Mycoplasma pneumoniae Not Detected    Narrative:      For all other respiratory sources, order [id number] -  Respiratory Viral Panel by PCR    C. trachomatis/N. gonorrhoeae by AMP DNA [[id number]] Collected: 10/30/23 2304    Order Status: Canceled     C. trachomatis/N. gonorrhoeae by AMP DNA [facility]; Urine [[id number]]     Order Status: Completed Specimen: Genital from Vaginal     Urine culture [[id number]] Collected: 10/30/23 2152    Order Status: No result Specimen: Urine Updated: 10/30/23 2237    Influenza A & B by Molecular [[id number]] Collected: 10/30/23 1925    Order Status: Completed Specimen: Nasopharyngeal Swab Updated: 10/30/23 1958     Influenza A, Molecular NEGATIVE     Influenza B, Molecular NEGATIVE     Flu A & B Source Nasal swab            I have reviewed all pertinent labs within the past 24 hours.    Diagnostic Results:  I have reviewed and interpreted all pertinent imaging results/findings within the past 24 hours.      Assessment/Plan:     Elevated troponin  Mr. Nino is a 48yoM without any medical history who presented with flu like symptoms, was found to have endocarditis with MRSA bacteremia, and new reduced EF at 40-45%. He also has a troponin elevation to 28. He denies any chest pain, has no exercise restrictions, and no ischemic changes on EKG. Non-smoker without family cardiac hx (besides an indirect relative with CABG at an older age). Little concern for ACS at this time. Of note he did have COVID 2 weeks prior. He was diagnosed with bacterial endocarditis and was septic on presentation. It is unlikely this patient has bacterial endocarditis as well as myocarditis. TTE was not concerning for an abscess and showed moderate MR, which is unlikely to cause his reduced EF. Slightly volume up on our exam.    Recommendations:  - No indication for advanced options at this time  - Would diurese to euvolemia  - Unlikely to be  myocarditis. If there is myocarditis present, no additional diagnostics or treatment would be indicated (supportive care)  - Treat endocarditis with ID's help  - Agree with CTS evaluation  - Primary team to manage with the help of general cardiology as needed      Thank you for your consult. I will sign off. Please contact us if you have any additional questions.    Connor M Gillies, MD  Heart Transplant  [facility]

## 2023-10-31 NOTE — H&P
[facility]  Critical Care Medicine  History & Physical    Patient Name: Lorenzo Nino  MRN: [id number]  Admission Date: 10/30/2023  Hospital Length of Stay: 1 days  Code Status: Full Code  Attending Physician: Santos Caballero MD   Primary Care Provider: No primary care provider on file.   Principal Problem: <principal problem not specified>    Subjective:     HPI:  Lorenzo Nino is a 48yoM who presented to the ED for evaluation of fatigue, body aches, abd pain, and hematuria. History provided by pt and significant other. Pt went to urgent care 3 days ago with headache, abdominal pain with nausea but no vomiting or diarrhea. He was discharged with instructions to take Tylenol and return to clinic if still feeling unwell. He represented to urgent care with blood in his urine and was advised to go to the ED on 10/30/23. Presenting symptoms now include decreased PO intake, subjective fevers, persistent headache, severe muscle aches, and neck stiffness. Urine now persistently orange in setting of increased confusion. No reported fever, diaphoresis, nausea or vomiting, alcohol abuse or drug use. Pt is not currently sexually active with his partner secondary to her chronological problems with cancer.     In the ED, patient was found to be significantly tachycardic with mild hypotension requiring IVF and vasopressors. His lactic acid was significantly elevated and there were initial concerns for meningitis. However, patients platelet count was too low for a LP to be performed so he was empirically treated with ABX, acyclovir, and steroids. CT head negative, CT C/A/P showing perinephric stranding, distended bladder, and hiatal hernia. Patient admitted to MICU for further workup and treatment of his hypotension and likely severe sepsis      Hospital/ICU Course:  No notes on file     History reviewed. No pertinent past medical history.    History reviewed. No pertinent surgical history.    Review of  patient's allergies indicates:  No Known Allergies    Family History    None       Tobacco Use    Smoking status: Unknown    Smokeless tobacco: Not on file   Substance and Sexual Activity    Alcohol use: Yes    Drug use: Never    Sexual activity: Not on file      Review of Systems   Constitutional:  Positive for fever.   Respiratory:  Negative for shortness of breath.    Cardiovascular:  Negative for chest pain and palpitations.   Gastrointestinal:  Negative for abdominal pain, nausea and vomiting.   Musculoskeletal:  Positive for neck pain and neck stiffness.   Skin:  Positive for rash.   Neurological:  Positive for weakness. Negative for numbness.   Psychiatric/Behavioral:  Negative for agitation and confusion.    All other systems reviewed and are negative.    Objective:     Vital Signs (Most Recent):  Temp: 99.7 °F (37.6 °C) (10/30/23 2135)  Pulse: (!) 131 (10/30/23 2249)  Resp: (!) 22 (10/30/23 2232)  BP: (!) 89/62 (10/30/23 2249)  SpO2: 96 % (10/30/23 2249) Vital Signs (24h Range):  Temp:  [99.7 °F (37.6 °C)-102.8 °F (39.3 °C)] 99.7 °F (37.6 °C)  Pulse:  [128-150] 131  Resp:  [20-41] 22  SpO2:  [96 %-99 %] 96 %  BP: ()/(59-86) 89/62   Weight: 77.1 kg (170 lb)  Body mass index is 22.43 kg/m².      Intake/Output Summary (Last 24 hours) at 10/30/2023 2316  Last data filed at 10/30/2023 2245  Gross per 24 hour   Intake --   Output 400 ml   Net -400 ml          Physical Exam  Vitals and nursing note reviewed.   Constitutional:       Comments: Patient supine in bed, no acute distress, acutely ill-appearing, protecting his airway    HENT:      Head: Normocephalic and atraumatic.      Right Ear: External ear normal.      Left Ear: External ear normal.      Nose: Nose normal.      Mouth/Throat:      Mouth: Mucous membranes are moist.      Pharynx: Oropharyngeal exudate present.      Comments: White exudates over the anterior tongue, posterior oropharynx,   Eyes:      Extraocular Movements: Extraocular movements  "intact.      Pupils: Pupils are equal, round, and reactive to light.      Comments: Conjunctival erythema bilaterally    Neck:      Comments: Reduced ROM of neck, tenderness over the thoracic paraspinals, no midline c-spine tenderness   Cardiovascular:      Rate and Rhythm: Regular rhythm. Tachycardia present.      Pulses: Normal pulses.      Heart sounds: Normal heart sounds.   Pulmonary:      Effort: Pulmonary effort is normal.      Breath sounds: Normal breath sounds.   Abdominal:      Palpations: Abdomen is soft.      Tenderness: There is no abdominal tenderness. There is no guarding or rebound.   Musculoskeletal:         General: No tenderness, deformity or signs of injury.      Cervical back: Rigidity present.   Skin:     General: Skin is warm.      Findings: Rash present.      Comments: Scattered petechia, primarily over the BLEs, chest sparing,    Neurological:      General: No focal deficit present.      Mental Status: He is oriented to person, place, and time. Mental status is at baseline.   Psychiatric:         Mood and Affect: Mood normal.         Behavior: Behavior normal.            Vents:     Lines/Drains/Airways       Peripheral Intravenous Line  Duration                  Peripheral IV - Single Lumen 10/30/23 1924 20 G Right Antecubital <1 day         Peripheral IV - Single Lumen 10/30/23 2047 20 G Anterior;Left Forearm <1 day                  Significant Labs:    CBC/Anemia Profile:  Recent Labs   Lab 10/30/23  1923   WBC 6.16   HGB 15.0   HCT 43.2   PLT 23*   MCV 89   RDW 13.6        Chemistries:  Recent Labs   Lab 10/30/23  1923   *   K 3.7      CO2 17*   BUN 34*   CREATININE 1.6*   CALCIUM 8.0*   ALBUMIN 2.8*   PROT 6.5   BILITOT 3.0*   ALKPHOS 109   *   *       Blood Culture: No results for input(s): "LABBLOO" in the last 48 hours.  CMP:   Recent Labs   Lab 10/30/23  1923   *   K 3.7      CO2 17*   *   BUN 34*   CREATININE 1.6*   CALCIUM 8.0*   PROT " "6.5   ALBUMIN 2.8*   BILITOT 3.0*   ALKPHOS 109   *   *   ANIONGAP 12     Cardiac Markers: No results for input(s): "CKMB", "TROPONINT", "MYOGLOBIN" in the last 48 hours.  Lactic Acid: No results for input(s): "LACTATE" in the last 48 hours.  POCT Glucose: No results for input(s): "POCTGLUCOSE" in the last 48 hours.  Urine Culture: No results for input(s): "LABURIN" in the last 48 hours.  Urine Studies:   Recent Labs   Lab 10/30/23  2152   COLORU Yellow   APPEARANCEUA Clear   PHUR 6.0   SPECGRAV >1.030*   PROTEINUA 1+*   GLUCUA Negative   KETONESU Negative   BILIRUBINUA Negative   OCCULTUA 3+*   NITRITE Positive*   LEUKOCYTESUR Trace*   RBCUA 34*   WBCUA 15*   BACTERIA Rare   HYALINECASTS 0     All pertinent labs within the past 24 hours have been reviewed.    Significant Imaging: I have reviewed all pertinent imaging results/findings within the past 24 hours.    Assessment/Plan:     Pulmonary  PNA (pneumonia)  As evidence by history, physical exam and imaging. CXR significant for perihilar interstitial opacities compatible with multifocal pneumonia vs pulmonary edema. 3 out of 4 SIRS criteria met. Initiate IV antibiotics for comm-acquired pneumonia with vanc, ceftriaxone and azithromycin to cover for atypical pathogens. Respiratory viral panel was negative. Respiratory culture and gram stain ordered.  -- Monitor SpO2  -- Supplemental O2 as indicated  -- ABGs PRN  -- Acetaminophen for fever  -- Guaifenesin or Tessalon Perls PRN for cough  -- Encourage Incentive spirometry    -- Strict aspiration precautions  -- Consider chest physiotherapy as course dictates  -- Provide nebulizer treatments scheduled    Renal/  FRANCESCO (acute kidney injury)  Mr. Lorenzo Nino is being treated for FRANCESCO likely secondary to pre-renal azotemia due to IVVD. Scr of 1.6. Ordered CMP, serum osmolality, urine osmolality, urine sodium, and urine creatinine. Started IVF resuscitation. Strict I/Os to monitor renal function. " Discontinued nephrotoxic medications.  --F/U labs and evaluate for pre-renal, intrarenal, and post-obstructive etiology.  --Monitor with serial Cr / GFR levels closely with serial labs  --Avoid nephrotoxic medications such as NSAIDs, IV contrast, or RAAS blockade  --Renally dose medications  --Consider Renal ultrasound  --Consider Nephrology consult    Lab Results   Component Value Date    CREATININE 1.6 (H) 10/30/2023          ID  Septic shock  This patient does have evidence of infective focus  My overall impression is septic shock due to lactate > 4, MAP < 65 and SBP < 90.  Source: Urinary Tract vs PNA  Antibiotics given-   Antibiotics (72h ago, onward)      Start     Stop Route Frequency Ordered    10/31/23 0600  azithromycin (ZITHROMAX) 500 mg in dextrose 5 % (D5W) 250 mL IVPB (Vial-Mate)         11/03/23 0559 IV Every 24 hours (non-standard times) 10/31/23 0207    10/31/23 0100  cefTRIAXone (ROCEPHIN) 2 g in dextrose 5 % in water (D5W) 100 mL IVPB (MB+)         -- IV Every 12 hours (non-standard times) 10/30/23 2353    10/30/23 2358  vancomycin - pharmacy to dose  (vancomycin IVPB (PEDS and ADULTS))        See Hyperspace for full Linked Orders Report.    -- IV pharmacy to manage frequency 10/30/23 2259        Latest lactate reviewed-  Recent Labs   Lab 10/30/23  2327   LACTATE 3.8*     Organ dysfunction indicated by Acute kidney injury, Acute liver injury and Thrombocytopenia   Fluid challenge Ideal Body Weight- The patient's ideal body weight is Ideal body weight: 79.9 kg (176 lb 2.4 oz) which will be used to calculate fluid bolus of 30 ml/kg for treatment of septic shock.    -- Peripheral Pressors- Levophed for MAP of 65  -- F/U Bcx and Rcx  -- Adjust abx as indicated  -- Source control achieved by: Vanc, Ceftriaxone, Azithromycin        Hematology  Thrombocytopenia  Pt presenting with low platelets (23) in setting of acute liver injury likely secondary to shock. Other cause to rule out can include  cirrhosis, BM problem (myelodysplasia, chemotherapy, alcohol), HCV/HIV. Labs ordered for PT/INR, aPTT, Fibrinogen, Haptoglobin, AdamsTS, GARRY, Rogelio, Smear.  -- Trend CBC, PT/INR, aPTT  -- F/U lab results  -- Consider Heme/Onc consult  -- transfuse platelets when:   - <10k for patient with failure of platelet production   - <20k for hemorrhagic diathesis   - <50k for active bleeding or immediately prior to invasive procedure   - <100k for neurosurgery, ophthalmic surgery, CNS bleeding   - >100k for evidence of platelet dysfunction    GI  Elevated transaminase level  Elevated Ast and ALT on presentation along with low platelets and low albumin. Likely secondary to Septic Shock. CMP trended daily for liver function. F/U workup for sepsis and thrombocytopenia. Continue abx. F/U cultures. Acute hepatitis panel negative. No significant hepatic pathology found on CT A/P  -- Trend liver function  -- Continue sepsis treatment         Critical Care Daily Checklist:    A: Awake: RASS Goal/Actual Goal:    Actual:     B: Spontaneous Breathing Trial Performed?     C: SAT & SBT Coordinated?  N/A                      D: Delirium: CAM-ICU     E: Early Mobility Performed? Yes   F: Feeding Goal:    Status:     Current Diet Order   Procedures    Diet NPO      AS: Analgesia/Sedation N/A   T: Thromboembolic Prophylaxis thrombocytopenia   H: HOB > 300 Yes   U: Stress Ulcer Prophylaxis (if needed) N/A   G: Glucose Control SSI   B: Bowel Function     I: Indwelling Catheter (Lines & Watson) Necessity PIVs   D: De-escalation of Antimicrobials/Pharmacotherapies Vanc & Ceftriaxone    Plan for the day/ETD Source Isolation    Code Status:  Family/Goals of Care: Full Code         Critical secondary to Patient has a condition that poses threat to life and bodily function: Septic Shock    COMPLETED  Family history is reviewed and has not changed      Critical care was time spent personally by me on the following activities: development of treatment  plan with patient or surrogate and bedside caregivers, discussions with consultants, evaluation of patient's response to treatment, examination of patient, ordering and performing treatments and interventions, ordering and review of laboratory studies, ordering and review of radiographic studies, pulse oximetry, re-evaluation of patient's condition. This critical care time did not overlap with that of any other provider or involve time for any procedures.     Goran Holcomb MD  Critical Care Medicine  [facility] - Cardiac [facility]

## 2023-10-31 NOTE — CONSULTS
Critical Care consulted for advanced care management in setting of septic shock. Mr Nino seen at bedside and will be admitted to MICU. Full H&P to follow.    Goran Holcomb MD  Internal Medicine, PGY-3  [facility]

## 2023-10-31 NOTE — ASSESSMENT & PLAN NOTE
Elevated Ast and ALT on presentation along with low platelets and low albumin. Likely secondary to Septic Shock. CMP trended daily for liver function. F/U workup for sepsis and thrombocytopenia. Continue abx. F/U cultures. Acute hepatitis panel negative. No significant hepatic pathology found on CT A/P    -- Trend liver function  -- Continue sepsis treatment

## 2023-10-31 NOTE — CARE UPDATE
Hematology consults:    Consult received:     Briefly, patient is a 48 year old male who presents to hospital with symptoms of fatigue, body aches and abdominal pain of several days duration. Patient presented to urgent care with with hematuria and was advised to go to ED. Patient initially treated for meningitis. CT head negative, CT CAP with perinephric stranding, distended bladder, and hiatal hernia. He was found to have thrombocytopenia down to 23. Hematology consulted out of concern for TTP.    Work up to date shows mild normocytic anemia, thrombocytopenia but normal WBC count with mild left shift. Ferritin elevated to 4,925, fibrinogen 489, and . He also has elevated CPK and troponin of 19. Notably, little evidence of significant microangiopathic hemolytic anemia. Haptoglobin wnl and peripheral smear with only occasional schistocytes, bilirubin elevated but not fractionated.  PLASMIC score as below. Discussed case with Dr. Dailey. [id number] level sent at 11:30 pm last night and is expected back between 2-4 pm today. Hold of on plasmapheresis until result. Agree with continued high dose steroids and recommend 2 units FFP over 8 hours while awaiting results. Full consult note to follow.     RESULT SUMMARY:  6 points  PLASMIC Score    High risk  Risk group    72 %  Risk of severe [id number] deficiency (defined as [id number] activity level <15%)    INPUTS:  Platelet count 9/L --> 1 = Yes  Hemolysis --> 0 = No  Active cancer --> 1 = No  History of solid-organ or stem-cell transplant --> 1 = No  MCV -14 L ( --> 1 = Yes  INR   --> 1 = Yes  Creatinine  --> 1 = Yes

## 2023-10-31 NOTE — HOSPITAL COURSE
Pt admitted for hypotension and tachycardia. Platelets 21k, strong concern for TTP given PLASMIC score 6/7. Transfused 2 units FFP pending [id number] results. BCx positive for MRSA on 10/31/23. 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 Was contacted by hematology who had received word from the lab that his [id number] was normal. Cardiology and HTS advising CTS consult. CTS recommending NEW for better evaluation of endocarditis, treat endocarditis, and follow-up outpatient. ID advising vanc for 6 weeks following clearance of blood cultures. NEW pending. Pt decompensated with increasing oxygen requirements. Intubated on 11/2/23. Repeat TTE showed mild AV prolapse with regurgitation and MV with flail posterior leaflet and severe regurgitation. Pt to receive IABP today and transfer to CCU with plans for surgery tomorrow if stable enough.

## 2023-10-31 NOTE — SUBJECTIVE & OBJECTIVE
History reviewed. No pertinent past medical history.    History reviewed. No pertinent surgical history.    Review of patient's allergies indicates:  No Known Allergies    Family History    None       Tobacco Use    Smoking status: Unknown    Smokeless tobacco: Not on file   Substance and Sexual Activity    Alcohol use: Yes    Drug use: Never    Sexual activity: Not on file      Review of Systems   Constitutional:  Positive for fever.   Respiratory:  Negative for shortness of breath.    Cardiovascular:  Negative for chest pain and palpitations.   Gastrointestinal:  Negative for abdominal pain, nausea and vomiting.   Musculoskeletal:  Positive for neck pain and neck stiffness.   Skin:  Positive for rash.   Neurological:  Positive for weakness. Negative for numbness.   Psychiatric/Behavioral:  Negative for agitation and confusion.    All other systems reviewed and are negative.    Objective:     Vital Signs (Most Recent):  Temp: 99.7 °F (37.6 °C) (10/30/23 2135)  Pulse: (!) 131 (10/30/23 2249)  Resp: (!) 22 (10/30/23 2232)  BP: (!) 89/62 (10/30/23 2249)  SpO2: 96 % (10/30/23 2249) Vital Signs (24h Range):  Temp:  [99.7 °F (37.6 °C)-102.8 °F (39.3 °C)] 99.7 °F (37.6 °C)  Pulse:  [128-150] 131  Resp:  [20-41] 22  SpO2:  [96 %-99 %] 96 %  BP: ()/(59-86) 89/62   Weight: 77.1 kg (170 lb)  Body mass index is 22.43 kg/m².      Intake/Output Summary (Last 24 hours) at 10/30/2023 2319  Last data filed at 10/30/2023 2245  Gross per 24 hour   Intake --   Output 400 ml   Net -400 ml          Physical Exam  Vitals and nursing note reviewed.   Constitutional:       Comments: Patient supine in bed, no acute distress, acutely ill-appearing, protecting his airway    HENT:      Head: Normocephalic and atraumatic.      Right Ear: External ear normal.      Left Ear: External ear normal.      Nose: Nose normal.      Mouth/Throat:      Mouth: Mucous membranes are moist.      Pharynx: Oropharyngeal exudate present.      Comments:  "White exudates over the anterior tongue, posterior oropharynx,   Eyes:      Extraocular Movements: Extraocular movements intact.      Pupils: Pupils are equal, round, and reactive to light.      Comments: Conjunctival erythema bilaterally    Neck:      Comments: Reduced ROM of neck, tenderness over the thoracic paraspinals, no midline c-spine tenderness   Cardiovascular:      Rate and Rhythm: Regular rhythm. Tachycardia present.      Pulses: Normal pulses.      Heart sounds: Normal heart sounds.   Pulmonary:      Effort: Pulmonary effort is normal.      Breath sounds: Normal breath sounds.   Abdominal:      Palpations: Abdomen is soft.      Tenderness: There is no abdominal tenderness. There is no guarding or rebound.   Musculoskeletal:         General: No tenderness, deformity or signs of injury.      Cervical back: Rigidity present.   Skin:     General: Skin is warm.      Findings: Rash present.      Comments: Scattered petechia, primarily over the BLEs, chest sparing,    Neurological:      General: No focal deficit present.      Mental Status: He is oriented to person, place, and time. Mental status is at baseline.   Psychiatric:         Mood and Affect: Mood normal.         Behavior: Behavior normal.            Vents:     Lines/Drains/Airways       Peripheral Intravenous Line  Duration                  Peripheral IV - Single Lumen 10/30/23 1924 20 G Right Antecubital <1 day         Peripheral IV - Single Lumen 10/30/23 2047 20 G Anterior;Left Forearm <1 day                  Significant Labs:    CBC/Anemia Profile:  Recent Labs   Lab 10/30/23  1923   WBC 6.16   HGB 15.0   HCT 43.2   PLT 23*   MCV 89   RDW 13.6        Chemistries:  Recent Labs   Lab 10/30/23  1923   *   K 3.7      CO2 17*   BUN 34*   CREATININE 1.6*   CALCIUM 8.0*   ALBUMIN 2.8*   PROT 6.5   BILITOT 3.0*   ALKPHOS 109   *   *       Blood Culture: No results for input(s): "LABBLOO" in the last 48 hours.  CMP:   Recent Labs " "  Lab 10/30/23  1923   *   K 3.7      CO2 17*   *   BUN 34*   CREATININE 1.6*   CALCIUM 8.0*   PROT 6.5   ALBUMIN 2.8*   BILITOT 3.0*   ALKPHOS 109   *   *   ANIONGAP 12     Cardiac Markers: No results for input(s): "CKMB", "TROPONINT", "MYOGLOBIN" in the last 48 hours.  Lactic Acid: No results for input(s): "LACTATE" in the last 48 hours.  POCT Glucose: No results for input(s): "POCTGLUCOSE" in the last 48 hours.  Urine Culture: No results for input(s): "LABURIN" in the last 48 hours.  Urine Studies:   Recent Labs   Lab 10/30/23  2152   COLORU Yellow   APPEARANCEUA Clear   PHUR 6.0   SPECGRAV >1.030*   PROTEINUA 1+*   GLUCUA Negative   KETONESU Negative   BILIRUBINUA Negative   OCCULTUA 3+*   NITRITE Positive*   LEUKOCYTESUR Trace*   RBCUA 34*   WBCUA 15*   BACTERIA Rare   HYALINECASTS 0     All pertinent labs within the past 24 hours have been reviewed.    Significant Imaging: I have reviewed all pertinent imaging results/findings within the past 24 hours.  "

## 2023-10-31 NOTE — ED PROVIDER NOTES
Encounter Date: 10/30/2023       History     Chief Complaint   Patient presents with    Fatigue     Pt c/o worsening fatigue, body aches, abd pain, and hematuria. Was seen at  a little while ago and was told to come here     Patient is a 48-year-old male who presented to the emergency department for evaluation of multiple complaints.  Significant other is at bedside and provides primary history as patient is feeling unwell and altered per significant other.  She reports that patient went to urgent care on Friday with headache, abdominal pain with nausea but no vomiting or diarrhea.  There he was evaluated and instructed to take Tylenol and follow up if he still felt unwell.  Patient presented urgent care and was found to have blood in his urine instructed to come to the emergency department.  Today patient reports that he has persistent headache severe muscle aches with neck stiffness.  Significant other reports that patient has been more confused and his urine is persistently orange.  Patient is not having any nausea or vomiting but she reports that patient has had significantly reduced p.o. intake.  She also notes that patient has had fever.  Patient does not abuse alcohol, is not currently sexually active with his partner secondary to her chronological problems with cancer.  He denies any IV drug use.    The history is provided by the patient, a relative and medical records. No  was used.     Review of patient's allergies indicates:  No Known Allergies  History reviewed. No pertinent past medical history.  History reviewed. No pertinent surgical history.  History reviewed. No pertinent family history.  Social History     Tobacco Use    Smoking status: Unknown   Substance Use Topics    Alcohol use: Yes    Drug use: Never         Physical Exam     Initial Vitals [10/30/23 1910]   BP Pulse Resp Temp SpO2   115/80 (!) 150 20 99.9 °F (37.7 °C) 99 %      MAP       --         Physical  Exam    Nursing note and vitals reviewed.  Constitutional: He appears well-developed and well-nourished. He has a sickly appearance.   HENT:   Head: Normocephalic and atraumatic.   Eyes: EOM are normal. Pupils are equal, round, and reactive to light. Right conjunctiva is injected. Left conjunctiva is injected.   Neck:   Normal range of motion.  Cardiovascular:    Tachycardia present.         Pulmonary/Chest: Breath sounds normal. No respiratory distress. He has no wheezes.   Abdominal: Abdomen is soft. He exhibits no distension.   No flank tenderness  No right upper quadrant tenderness  No suprapubic tenderness   Musculoskeletal:      Cervical back: Normal range of motion.     Neurological: He is alert and oriented to person, place, and time.   Patient alert and oriented x3 but slowed to response and having difficulty recalling events   Skin: Skin is warm and dry.   Nonblanching petechial rash noted to the bilateral dorsal aspects of his feet and along his anterior and posterior shin and calf, arms and palms         ED Course   Procedures  Labs Reviewed   CBC W/ AUTO DIFFERENTIAL - Abnormal; Notable for the following components:       Result Value    Platelets 23 (*)     Immature Granulocytes 1.5 (*)     Immature Grans (Abs) 0.09 (*)     Lymph # 0.4 (*)     Gran % 85.5 (*)     Lymph % 6.3 (*)     All other components within normal limits    Narrative:     ADD ON CPK PER DR KINGS IGLESIAS/ORDER# [id number] @ 20:15    PLT  critical result(s) called and verbal readback obtained from   Gamal Price RN  by [facility] 10/30/2023 21:29   COMPREHENSIVE METABOLIC PANEL - Abnormal; Notable for the following components:    Sodium 129 (*)     CO2 17 (*)     Glucose 146 (*)     BUN 34 (*)     Creatinine 1.6 (*)     Calcium 8.0 (*)     Albumin 2.8 (*)     Total Bilirubin 3.0 (*)      (*)      (*)     eGFR 52.5 (*)     All other components within normal limits   URINALYSIS, REFLEX TO URINE CULTURE - Abnormal; Notable for  the following components:    Specific Gravity, UA >1.030 (*)     Protein, UA 1+ (*)     Occult Blood UA 3+ (*)     Nitrite, UA Positive (*)     Leukocytes, UA Trace (*)     All other components within normal limits    Narrative:     Specimen Source->Urine   CK - Abnormal; Notable for the following components:    CPK 1406 (*)     All other components within normal limits    Narrative:     ADD ON CPK PER DR KINGS IGLESIAS/ORDER# [id number] @ 20:15   SALICYLATE LEVEL - Abnormal; Notable for the following components:    Salicylate Lvl <5.0 (*)     All other components within normal limits    Narrative:     Release to patient->Immediate   ACETAMINOPHEN LEVEL - Abnormal; Notable for the following components:    Acetaminophen (Tylenol), Serum <3.0 (*)     All other components within normal limits    Narrative:     Release to patient->Immediate   URINALYSIS MICROSCOPIC - Abnormal; Notable for the following components:    RBC, UA 34 (*)     WBC, UA 15 (*)     All other components within normal limits    Narrative:     Specimen Source->Urine   APTT - Abnormal; Notable for the following components:    aPTT 44.4 (*)     All other components within normal limits    Narrative:     ADD ON PTT PER DR KEILA AGUIRRE/ORDER# [id number] @ 22:51   ISTAT LACTATE - Abnormal; Notable for the following components:    POC Lactate 6.61 (*)     All other components within normal limits   ISTAT PROCEDURE - Abnormal; Notable for the following components:    POC PCO2 27.8 (*)     POC PO2 35 (*)     POC HCO3 16.3 (*)     POC BE -9 (*)     POC TCO2 17 (*)     All other components within normal limits   INFLUENZA A & B BY MOLECULAR   C. TRACHOMATIS/N. GONORRHOEAE BY AMP DNA   CULTURE, URINE   SARS-COV-2 RNA AMPLIFICATION, QUAL   CK   PROTIME-INR   AMMONIA    Narrative:     Release to patient->Immediate   HEPATITIS PANEL, ACUTE    Narrative:     Release to patient->Immediate   OSMOLALITY, URINE RANDOM    Narrative:     Specimen Source->Urine    OSMOLALITY, SERUM    Narrative:     Release to patient->Immediate   HIV 1 / 2 ANTIBODY    Narrative:     Release to patient->Immediate   APTT   PROTIME-INR   TOXICOLOGY SCREEN, URINE, RANDOM (COMPLIANCE)     EKG Readings: (Independently Interpreted)   Initial Reading: No STEMI. Previous EKG: Compared with most recent EKG Previous EKG Date: 4/15/2011. Rhythm: Sinus Tachycardia. Heart Rate: 136. Clinical Impression: Sinus Tachycardia   Nonspecific ST changes      ECG Results              EKG 12-lead (Final result)  Result time 10/31/23 13:31:07      Final result by Interface, Lab In [facility] (10/31/23 13:31:07)                   Narrative:    Test Reason : R50.9,    Vent. Rate : 129 BPM     Atrial Rate : 129 BPM     P-R Int : 154 ms          QRS Dur : 076 ms      QT Int : 292 ms       P-R-T Axes : 084 078 093 degrees     QTc Int : 427 ms    Sinus tachycardia  Otherwise normal ECG  When compared with ECG of 30-OCT-2023 19:11,  No significant change was found  Confirmed by SERA VALENTE MD ([id number]) on 10/31/2023 1:30:59 PM    Referred By: [email or web address]   SELF           Confirmed By:SERA VALENTE MD                                  Imaging Results              X-Ray Chest AP Portable (Final result)  Result time 10/30/23 21:43:30      Final result by Kenyon Escobar DO (10/30/23 21:43:30)                   Impression:      Perihilar interstitial opacities compatible with pulmonary edema or multifocal pneumonia.      Electronically signed by: Kenyon Escobar  Date:    10/30/2023  Time:    21:43               Narrative:    EXAMINATION:  XR CHEST AP PORTABLE    CLINICAL HISTORY:  Sepsis;    TECHNIQUE:  Single frontal view of the chest was performed.    COMPARISON:  None    FINDINGS:  There are perihilar interstitial opacities compatible with pulmonary edema or multifocal pneumonia.  No large focal consolidation.  The pleural spaces are clear.  The cardiac silhouette is enlarged.  Osseous structures are intact.                                        CT Chest Abdomen Pelvis With IV Contrast (XPD) (Final result)  Result time 10/30/23 22:03:33      Final result by Maude Carpio MD (10/30/23 22:03:33)                   Impression:      1. Distention of the urinary bladder.  Correlation for symptoms of urinary retention advised.  2. Small hiatal hernia and questionable distal esophageal wall thickening.  Correlation for symptoms of dysmotility/reflux advised and further assessment as warranted.  3. Wall thickening of the transverse and proximal descending colon, presumably on the basis of nondistention although correlation for symptoms of colitis advised.  4. Nonspecific bilateral perinephric fat stranding.  Correlation with urinalysis for infectious process advised.  5. Subcentimeter hepatic and renal cortical hypodensities too small to definitively characterize.  Questionable subtle splenic hypodensity which could relate to artifact or indeterminate splenic hypodense lesion.  6. Additional findings as discussed above.      Electronically signed by: Maude Carpio MD  Date:    10/30/2023  Time:    22:03               Narrative:    EXAMINATION:  CT CHEST ABDOMEN PELVIS WITH IV CONTRAST (XPD)    CLINICAL HISTORY:  Sepsis;    TECHNIQUE:  Low dose axial images, sagittal and coronal reformations were obtained from the thoracic inlet to the pubic symphysis following the IV administration of 75 mL of Omnipaque 350 .  No oral contrast.    COMPARISON:  None    FINDINGS:  The visualized soft tissue and vascular structures at the base of the neck are within normal limits.  The thoracic aorta is normal in caliber, contour and course.  The heart is not enlarged.  No significant pericardial fluid.  Normal sized axillary and mediastinal lymph nodes are present.    The trachea is midline and the proximal airways are patent.  Lungs demonstrate dependent bibasilar atelectasis.  No focal consolidation, pleural fluid or pneumothorax identified.    The liver  demonstrates a subcentimeter hypodensity in the right hepatic lobe, too small to definitively characterize.  There are no calcified stones in the gallbladder lumen.  The pancreas and adrenal glands are unremarkable.  There is a vague hypodensity within the spleen, partially artifactual versus indeterminate hypodense lesion.  The portal vein and splenic vein are patent.  No significant intra or extrahepatic biliary ductal dilatation.    The kidneys are normal in size and location and enhance symmetrically.  There are bilateral subcentimeter renal cortical hypodensities, too small to definitively characterize.  Nonobstructing punctate calculus in the inferior pole of the left kidney.  No evidence of hydronephrosis.  Nonspecific bilateral perinephric fat stranding.  Urinary bladder is distended measuring up to 14 cm.  The prostate is within normal limits.    The abdominal aorta is nonaneurysmal with scattered atherosclerosis.  There are shotty periaortic lymph nodes.  There is a small hiatal hernia with questionable distal esophageal wall thickening.  Correlation for symptoms of reflux/motility and further assessment with endoscopy as warranted.    There is wall thickening of the transverse colon and proximal descending colon, possibly on the basis of nondistention although correlation for symptoms of colitis advised.  There is diverticulosis without compelling evidence to suggest acute diverticulitis.  The appendix is unremarkable.  There is no ascites, free intraperitoneal air or portal venous gas.    The visualized osseous structures are intact.  Extraperitoneal soft tissues are unremarkable.                                       CT Head Without Contrast (Final result)  Result time 10/30/23 21:24:12      Final result by Maude Carpio MD (10/30/23 21:24:12)                   Impression:      No CT evidence of acute intracranial abnormality. Clinical correlation and further evaluation as  warranted.      Electronically signed by: Maude Carpio MD  Date:    10/30/2023  Time:    21:24               Narrative:    EXAMINATION:  CT HEAD WITHOUT CONTRAST    CLINICAL HISTORY:  Mental status change, unknown cause;Headache, chronic, new features or increased frequency;    TECHNIQUE:  Low dose axial images were obtained through the head.  Coronal and sagittal reformations were also performed. Contrast was not administered.    COMPARISON:  None.    FINDINGS:  There is no acute intracranial hemorrhage, hydrocephalus, midline shift or mass effect. Gray-white matter differentiation appears maintained. The basal cisterns are patent. There is mild mucosal thickening of the ethmoid air cells and frontal sinuses.  The mastoid air cells are clear.  The visualized bones of the calvarium demonstrate no acute osseous abnormality.                                       Medications   sodium chloride 0.9% flush 10 mL (has no administration in time range)   ondansetron injection 4 mg (has no administration in time range)   glucose chewable tablet 16 g (has no administration in time range)   glucose chewable tablet 24 g (has no administration in time range)   glucagon (human recombinant) injection 1 mg (has no administration in time range)   insulin aspart U-100 pen 0-5 Units (has no administration in time range)   dextrose 10% bolus 125 mL 125 mL (has no administration in time range)   dextrose 10% bolus 250 mL 250 mL (has no administration in time range)   sodium chloride 0.9% flush 10 mL (has no administration in time range)   vancomycin - pharmacy to dose (has no administration in time range)   NORepinephrine bitartrate-D5W 4 mg/250 mL (16 mcg/mL) PERIPHERAL access infusion ( Intravenous Not Given 10/31/23 0015)   cefTRIAXone (ROCEPHIN) 2 g in dextrose 5 % in water (D5W) 100 mL IVPB (MB+) (0 g Intravenous Stopped 10/31/23 1324)   lactated ringers infusion ( Intravenous Stopped 10/31/23 1119)   azithromycin (ZITHROMAX) 500 mg  in dextrose 5 % (D5W) 250 mL IVPB (Vial-Mate) (0 mg Intravenous Stopped 10/31/23 0622)   vancomycin (VANCOCIN) 1,000 mg in dextrose 5 % (D5W) 250 mL IVPB (Vial-Mate) (1,000 mg Intravenous Trough Due As Scheduled Before Dose 11/1/23 0800)   mupirocin 2 % ointment ( Nasal Given 10/31/23 0921)   sodium phosphate 39.99 mmol in dextrose 5 % (D5W) 250 mL IVPB ( Intravenous Verify Only 10/31/23 1300)   methylPREDNISolone sodium succinate (SOLU-MEDROL) 1,000 mg in dextrose 5 % (D5W) 100 mL IVPB (0 mg Intravenous Stopped 10/31/23 1220)   0.9%  NaCl infusion (for blood administration) (has no administration in time range)   famotidine (PF) injection 20 mg (20 mg Intravenous Given 10/31/23 1156)   0.9%  NaCl infusion (for blood administration) (has no administration in time range)   acetaminophen tablet 650 mg (has no administration in time range)   piperacillin-tazobactam (ZOSYN) 4.5 g in dextrose 5 % in water (D5W) 100 mL IVPB (MB+) (0 g Intravenous Stopped 10/30/23 2010)   vancomycin 1,500 mg in dextrose 5 % (D5W) 250 mL IVPB (Vial-Mate) (0 mg Intravenous Stopped 10/30/23 2211)   sodium chloride 0.9% bolus 2,313 mL 2,313 mL (0 mLs Intravenous Stopped 10/30/23 2039)   dexAMETHasone injection 10 mg (10 mg Intravenous Given 10/30/23 2034)   acetaminophen tablet 1,000 mg (1,000 mg Oral Given 10/30/23 2034)   acyclovir (ZOVIRAX) 800 mg in dextrose 5 % (D5W) 250 mL IVPB (0 mg Intravenous Stopped 10/30/23 2314)   iohexoL (OMNIPAQUE 350) injection 75 mL (75 mLs Intravenous Given 10/30/23 2056)     Medical Decision Making  Patient is a 48-year-old male who presented to the emergency department  for persistent fevers, worsening mental status, fatigue, body aches, headache.  Patient was initially triaged by physician provider in triage and concern for sepsis.  Patient was brought back.  Sepsis workup was initiated and 1st labs notable was lactic acidosis 6.61.  Patient was febrile, tachycardic with lactic acidosis.  At this time 30  cc/kg bolus initiated and vanc and Zosyn ordered.  Upon my assessment patient has petechial rash with headache and neck stiffness.      Differential including, but not limited to: cholecystitis, ascending cholangitis, meningitis, urinary tract infection, nephrolithiasis, COVID, gastroenteritis, hepatitis, bacteremia.      Concern for possible meningitis however labs were obtained and patient had a platelet count of 23.  Given thrombocytopenia contraindication for LP at this time however given suspicion, acyclovir was added and due to mental status changes added on dexamethasone to already provided antibiotics.       Labs were also notable for LFT elevation and hyperbilirubinemia.  Patient has no right upper quadrant tenderness, no jaundice or scleral icterus.  Patient does not denies any alcohol abuse and reports minor Tylenol usage.  At this time hepatitis panel sent which was negative.  CT chest abdomen pelvis obtained with distention of urinary bladder but no signs of nephrolithiasis.  No signs of cholecystitis or dilated biliary duct.  Given patient has no right upper quadrant tenderness and no CT findings low suspicion for cholecystitis at this time.  Upon reassessment patient blood pressure down trending despite 30 cc/kg bolus.  Maps still maintaining greater than 70 but Levophed ordered.  Given the degree of acuity with severe lactic acidosis and mental status changes discussed case with critical care who came down to evaluate patient.  Consulted Hematology Oncology and spoke to them about consideration of TTP.  They stated that they would evaluate patient but did not have immediate recommendations.  They stated that they would continue to communicate with ICU for recommendations of possible plasma exchange or any other recommendations.  At this time Medical ICU will admit patient for continued care.  Plan discussed with patient and significant other who are agreeable with plan.    Amount and/or Complexity of  Data Reviewed  Independent Historian:      Details: Significant other present at bedside   External Data Reviewed:      Details: No prior records for review   Labs: ordered. Decision-making details documented in ED Course.  Radiology: ordered and independent interpretation performed.  ECG/medicine tests: ordered and independent interpretation performed.    Risk  OTC drugs.  Prescription drug management.  Decision regarding hospitalization.      Additional MDM:   Sepsis:     My overall impression is sepsis.    Organ dysfunction indicated by Acute kidney injury    Fluid challenge Actual Body weight- Patient will receive 30ml/kg actual body weight to calculate fluid bolus for treatment of septic shock.     Post- resuscitation assessment Yes Perfusion exam was performed within 6 hours of septic shock presentation after bolus shows Adequate tissue perfusion assessed by non-invasive monitoring       Will Start Pressors- Levophed for MAP of 65                 Attending Attestation:   Physician Attestation Statement for Resident:  As the supervising MD   Physician Attestation Statement: I have personally seen and examined this patient.   I agree with the above history.  -: Emergent evaluation of a 47 yo male presenting with fevers, headache, general malaise.   Denies recent travel, insect bite, new medications, family history of cancer.   Feels his neck is stiff.  No vomiting.    As the supervising MD I agree with the above PE.   -: Appears ill  Dry mucus membranes  Conjunctival injection  Tachycardia  Lungs clear  Non-tender abdomen   Petechial rash noted to lower extremities  Moving all extremities equally    As the supervising MD I agree with the above treatment, course, plan, and disposition.   -: Sepsis protocol initiated by provider in triage, agree with 30 cc/kilo bolus and broad spectrum antibiotics.  CXR with possible multifocal pneumonia.   Differential is broad, including, but not limited to: bacteremia,  meningitis, viral illness, endocarditis, cholangitis  Given his neck stiffness, headache, fever, considered meningitis.   Will cover empirically, do not believe risk of LP would outweigh benefit at this time given his platelet function.  Labs reviewed -- unknown baseline creatinine, thrombocytopenia, elevated LFTs.  Added hepatitis panel, EBV testing for further w/u.   CT studies reviewed, no acute ICH findings, ritchie ordered for strict I&O monitoring.   No spinal tenderness, lower suspicion for SEA as infectious etiology at this time.   BP down-trending and lactate elevated, initiated vasopressor therapy and appreciate Medical ICU evaluation.  TTP is another consideration (given CNS complaints, thrombocytopenia, renal dysfunction) -- discussed with hematology for evaluation and consideration of plasma exchange.   Medical ICU evaluated patient and planning for admission.      I have reviewed and agree with the residents interpretation of the following: lab data, x-rays, CT scans and EKG.  I have reviewed the following: old records at this facility.        Attending Critical Care:   Critical Care Times:   Direct Patient Care (initial evaluation, reassessments, and time considering the case)................................................................14 minutes.   Additional History from reviewing old medical records or taking additional history from the family, EMS, PCP, etc.......................4 minutes.   Ordering, Reviewing, and Interpreting Diagnostic Studies...............................................................................................................5 minutes.   Documentation..................................................................................................................................................................................5 minutes.   Consultation with other Physicians.  .................................................................................................................................................6 minutes.   ==============================================================  Total Critical Care Time - exclusive of procedural time: 34 minutes.  ==============================================================  Critical care was necessary to treat or prevent imminent or life-threatening deterioration of the following conditions: sepsis.   Critical care was time spent personally by me on the following activities: obtaining history from patient or relative, examination of patient, ordering lab, x-rays, and/or EKG, ordering and performing treatments and interventions, evaluation of patient's response to treatment, discussion with consultants, re-evaluation of patient's conition and interpretation of cardiac measurements.   Critical Care Condition: potentially life-threatening           ED Course as of 10/31/23 1436   Mon Oct 30, 2023   1938 POC Lactate(!!): 6.61 [AB]   2016 WBC: 6.16  No leukocytosis  [AB]   2017 Hemoglobin: 15.0  No anemia  [AB]   2018 Sodium(!): 129  Hyponatremia  [AB]   2018 Creatinine(!): 1.6  Unknown baseline  [AB]   2101 Influenza A, Molecular: NEGATIVE [AB]   2101 Influenza B, Molecular: NEGATIVE [AB]   2101 SARS-CoV-2 RNA, Amplification, Qual: Negative [AB]   2101 Viral swabs negative  [AB]   2146 Ammonia: 30 [AB]      ED Course User Index  [AB] Pa Houston MD               Medical Decision Making:   Clinical Tests:   Sepsis Perfusion Assessment: "I attest a sepsis perfusion exam was performed within 6 hours of sepsis, severe sepsis, or septic shock presentation, following fluid resuscitation."      Clinical Impression:   Final diagnoses:  [R50.9] Fever  [R79.89] Abnormal LFTs (Primary)  [D69.6] Thrombocytopenia  [A41.9] Sepsis, due to unspecified organism, unspecified whether acute organ dysfunction present  [R79.89] Elevated serum  creatinine        ED Disposition Condition    Admit Stable                German Adame MD  Resident  10/30/23 [id number]       German Adame MD  Resident  10/31/23 1419       Pa Houston MD  10/31/23 0591

## 2023-10-31 NOTE — ED TRIAGE NOTES
Lorenzo Nino, a 48 y.o. male presents to the ED w/ complaint of fatigue. Reports body aches, abdominal pain and hematuria    Adult Physical Assessment  LOC: Lorenzo Nino, 48 y.o. male verified via two identifiers.  The patient is awake, alert, oriented and speaking appropriately at this time.  APPEARANCE: Patient resting comfortably and appears to be in no acute distress at this time. Patient is clean and well groomed, patient's clothing is properly fastened.  SKIN:The skin is warm and dry, color consistent with ethnicity, patient has normal skin turgor and moist mucus membranes, skin intact, no breakdown or brusing noted.  MUSCULOSKELETAL: Patient moving all extremities well, no obvious swelling or deformities noted. Reports generalized fatigue x 2 days   RESPIRATORY: Airway is open and patent, respirations are spontaneous, patient has a normal effort and rate, no accessory muscle use noted.  CARDIAC: Patient has a normal rate and rhythm, no periphreal edema noted in any extremity, capillary refill < 3 seconds in all extremities  ABDOMEN: Soft and non tender to palpation, no abdominal distention noted. Bowel sounds present in all four quadrants .Reports body aches, abdominal pain and hematuria  NEUROLOGIC: Eyes open spontaneously, behavior appropriate to situation, follows commands, facial expression symmetrical, bilateral hand grasp equal and even, purposeful motor response noted, normal sensation in all extremities when touched with a finger.      Triage note:  Chief Complaint   Patient presents with    Fatigue     Pt c/o worsening fatigue, body aches, abd pain, and hematuria. Was seen at  a little while ago and was told to come here     Review of patient's allergies indicates:  No Known Allergies  No past medical history on file.

## 2023-10-31 NOTE — ASSESSMENT & PLAN NOTE
49 yo M with no past medical history admitted 10/30 2/2 PNA vs UTI vs meningitis requiring vasopressor support. Further work up revealed MRSA bacteremia. He underwent TTE, which was notable for mitral valve vegetation with flail anterior leaflet, as well as possible aortic valve vegetation.   - EKG sinus rhythm, low voltage.   - Troponin 18.298 --> 19.814 --> 27.88    Risk factors: he reports COVID ~2 weeks ago, this may be a resulting COVID myocarditis. Otherwise, he denies IVDU (supported by negative UDS on admission). No history of dental abscesses or recent dental work (does have dental caps, states they were placed remotely). No significant family cardiac history.     Recommendations:  - Will discuss possible myocarditis with HTS  - Recommend CTS consult for definitive intervention  - Continue antibiotics. Agree with ID consult.

## 2023-10-31 NOTE — SUBJECTIVE & OBJECTIVE
Interval History/Significant Events: Pt appeared ill this morning and was weak on exam. Mental status appears improved from admission.      Objective:     Vital Signs (Most Recent):  Temp: 99.2 °F (37.3 °C) (10/31/23 1530)  Pulse: (!) 119 (10/31/23 1315)  Resp: (!) 25 (10/31/23 1315)  BP: 100/70 (10/31/23 1530)  SpO2: 95 % (10/31/23 1315) Vital Signs (24h Range):  Temp:  [98.1 °F (36.7 °C)-102.8 °F (39.3 °C)] 99.2 °F (37.3 °C)  Pulse:  [114-150] 119  Resp:  [20-44] 25  SpO2:  [94 %-99 %] 95 %  BP: ()/(58-86) 100/70   Weight: 77.1 kg (170 lb)  Body mass index is 22.43 kg/m².      Intake/Output Summary (Last 24 hours) at 10/31/2023 1608  Last data filed at 10/31/2023 1542  Gross per 24 hour   Intake 3222.65 ml   Output 2651 ml   Net 571.65 ml          Physical Exam  Vitals and nursing note reviewed.   Constitutional:       Appearance: He is ill-appearing.   HENT:      Head: Normocephalic and atraumatic.      Mouth/Throat:      Mouth: Mucous membranes are moist.      Pharynx: Oropharyngeal exudate (white exudate over tongue) present.   Eyes:      Extraocular Movements: Extraocular movements intact.      Conjunctiva/sclera:      Right eye: Right conjunctiva is injected.      Left eye: Left conjunctiva is injected.   Cardiovascular:      Rate and Rhythm: Regular rhythm. Tachycardia present.      Heart sounds: Normal heart sounds.   Pulmonary:      Effort: Pulmonary effort is normal. No respiratory distress.      Breath sounds: Normal breath sounds.   Abdominal:      General: Abdomen is flat.      Palpations: Abdomen is soft.      Tenderness: There is no abdominal tenderness. There is no guarding or rebound.   Musculoskeletal:      Cervical back: Decreased range of motion (pain on movement).      Right lower leg: No edema.      Left lower leg: No edema.   Skin:     General: Skin is warm and dry.      Findings: Rash (Scattered petechiae on BLE and lower back) present.   Neurological:      General: No focal deficit  present.      Mental Status: He is alert and oriented to person, place, and time.      Motor: Weakness (generalized weakness in upper and lower extremities) present.   Psychiatric:         Mood and Affect: Mood normal.         Behavior: Behavior normal.            Vents:     Lines/Drains/Airways       Peripheral Intravenous Line  Duration                  Peripheral IV - Single Lumen 10/30/23 1924 20 G Right Antecubital <1 day         Peripheral IV - Single Lumen 10/30/23 2047 20 G Anterior;Left Forearm <1 day         Peripheral IV - Single Lumen 10/31/23 1230 18 G Anterior;Distal;Right Forearm <1 day                  Significant Labs:    CBC/Anemia Profile:  Recent Labs   Lab 10/30/23  1923 10/30/23  2327 10/31/23  0514   WBC 6.16 7.11 10.58   HGB 15.0 12.9* 13.3*   HCT 43.2 36.9* 38.1*   PLT 23* 22* 21*   MCV 89 86 88   RDW 13.6 13.7 13.8   FERRITIN  --   --  4,925*   RETIC  --  0.6  --         Chemistries:  Recent Labs   Lab 10/30/23  1923 10/31/23  0514   * 131*   K 3.7 3.6    102   CO2 17* 17*   BUN 34* 24*   CREATININE 1.6* 1.1   CALCIUM 8.0* 7.5*   ALBUMIN 2.8* 2.3*   PROT 6.5 5.5*   BILITOT 3.0* 2.2*   ALKPHOS 109 81   * 114*   * 202*   MG  --  2.0   PHOS  --  1.7*       All pertinent labs within the past 24 hours have been reviewed.    Significant Imaging:  I have reviewed all pertinent imaging results/findings within the past 24 hours.

## 2023-10-31 NOTE — PROGRESS NOTES
[facility] - Cardiac Medical ICU  Critical Care Medicine  Progress Note    Patient Name: Lorenzo Nino  MRN: [id number]  Admission Date: 10/30/2023  Hospital Length of Stay: 1 days  Code Status: Full Code  Attending Provider: Santos Caballero MD  Primary Care Provider: Santos Caballero MD   Principal Problem: <principal problem not specified>    Subjective:     HPI:  Lorenzo Nino is a 48yoM who presented to the ED for evaluation of fatigue, body aches, abd pain, and hematuria. History provided by pt and significant other. Pt went to urgent care 3 days ago with headache, abdominal pain with nausea but no vomiting or diarrhea. He was discharged with instructions to take Tylenol and return to clinic if still feeling unwell. He represented to urgent care with blood in his urine and was advised to go to the ED on 10/30/23. Presenting symptoms now include decreased PO intake, subjective fevers, persistent headache, severe muscle aches, and neck stiffness. Urine now persistently orange in setting of increased confusion. No reported fever, diaphoresis, nausea or vomiting, alcohol abuse or drug use. Pt is not currently sexually active with his partner secondary to her chronological problems with cancer.     In the ED, patient was found to be significantly tachycardic with mild hypotension requiring IVF and vasopressors. His lactic acid was significantly elevated and there were initial concerns for meningitis. However, patients platelet count was too low for a LP to be performed so he was empirically treated with ABX, acyclovir, and steroids. CT head negative, CT C/A/P showing perinephric stranding, distended bladder, and hiatal hernia. Patient admitted to MICU for further workup and treatment of his hypotension and likely severe sepsis      Hospital/ICU Course:  Pt admitted for hypotension and tachycardia. Platelets 21k, strong concern for TTP given PLASMIC score 6/7. Transfused 2 units FFP pending [id number] results. BCx  positive for MRSA on 10/31/23. 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 Hematology, ID, cardiology, and HTS have been consulted.      Interval History/Significant Events: Pt appeared ill this morning and was weak on exam. Mental status appears improved from admission.      Objective:     Vital Signs (Most Recent):  Temp: 99.2 °F (37.3 °C) (10/31/23 1530)  Pulse: (!) 119 (10/31/23 1315)  Resp: (!) 25 (10/31/23 1315)  BP: 100/70 (10/31/23 1530)  SpO2: 95 % (10/31/23 1315) Vital Signs (24h Range):  Temp:  [98.1 °F (36.7 °C)-102.8 °F (39.3 °C)] 99.2 °F (37.3 °C)  Pulse:  [114-150] 119  Resp:  [20-44] 25  SpO2:  [94 %-99 %] 95 %  BP: ()/(58-86) 100/70   Weight: 77.1 kg (170 lb)  Body mass index is 22.43 kg/m².      Intake/Output Summary (Last 24 hours) at 10/31/2023 1608  Last data filed at 10/31/2023 1542  Gross per 24 hour   Intake 3222.65 ml   Output 2651 ml   Net 571.65 ml          Physical Exam  Vitals and nursing note reviewed.   Constitutional:       Appearance: He is ill-appearing.   HENT:      Head: Normocephalic and atraumatic.      Mouth/Throat:      Mouth: Mucous membranes are moist.      Pharynx: Oropharyngeal exudate (white exudate over tongue) present.   Eyes:      Extraocular Movements: Extraocular movements intact.      Conjunctiva/sclera:      Right eye: Right conjunctiva is injected.      Left eye: Left conjunctiva is injected.   Cardiovascular:      Rate and Rhythm: Regular rhythm. Tachycardia present.      Heart sounds: Normal heart sounds.   Pulmonary:      Effort: Pulmonary effort is normal. No respiratory distress.      Breath sounds: Normal breath sounds.   Abdominal:      General: Abdomen is flat.      Palpations: Abdomen is soft.      Tenderness: There is no abdominal tenderness. There is no guarding or rebound.   Musculoskeletal:      Cervical back: Decreased range of motion (pain on movement).      Right lower leg: No edema.       Left lower leg: No edema.   Skin:     General: Skin is warm and dry.      Findings: Rash (Scattered petechiae on BLE and lower back) present.   Neurological:      General: No focal deficit present.      Mental Status: He is alert and oriented to person, place, and time.      Motor: Weakness (generalized weakness in upper and lower extremities) present.   Psychiatric:         Mood and Affect: Mood normal.         Behavior: Behavior normal.            Vents:     Lines/Drains/Airways       Peripheral Intravenous Line  Duration                  Peripheral IV - Single Lumen 10/30/23 1924 20 G Right Antecubital <1 day         Peripheral IV - Single Lumen 10/30/23 2047 20 G Anterior;Left Forearm <1 day         Peripheral IV - Single Lumen 10/31/23 1230 18 G Anterior;Distal;Right Forearm <1 day                  Significant Labs:    CBC/Anemia Profile:  Recent Labs   Lab 10/30/23  1923 10/30/23  2327 10/31/23  0514   WBC 6.16 7.11 10.58   HGB 15.0 12.9* 13.3*   HCT 43.2 36.9* 38.1*   PLT 23* 22* 21*   MCV 89 86 88   RDW 13.6 13.7 13.8   FERRITIN  --   --  4,925*   RETIC  --  0.6  --         Chemistries:  Recent Labs   Lab 10/30/23  1923 10/31/23  0514   * 131*   K 3.7 3.6    102   CO2 17* 17*   BUN 34* 24*   CREATININE 1.6* 1.1   CALCIUM 8.0* 7.5*   ALBUMIN 2.8* 2.3*   PROT 6.5 5.5*   BILITOT 3.0* 2.2*   ALKPHOS 109 81   * 114*   * 202*   MG  --  2.0   PHOS  --  1.7*       All pertinent labs within the past 24 hours have been reviewed.    Significant Imaging:  I have reviewed all pertinent imaging results/findings within the past 24 hours.      ABG  Recent Labs   Lab 10/30/23  2023   PH 7.377   PO2 35*   PCO2 27.8*   HCO3 16.3*   BE -9*     Assessment/Plan:     Pulmonary  PNA (pneumonia)  As evidence by history, physical exam and imaging. CXR significant for perihilar interstitial opacities compatible with multifocal pneumonia vs pulmonary edema. 3 out of 4 SIRS criteria met. Initiate IV antibiotics  for comm-acquired pneumonia with vanc, ceftriaxone and azithromycin to cover for atypical pathogens. Respiratory viral panel was negative. Respiratory culture and gram stain ordered.    -- continuing vanc/rocephin/azithro  -- respitory cx w/ gram stain to be collected  -- Monitor SpO2  -- Supplemental O2 as indicated  -- ABGs PRN  -- Acetaminophen for fever  -- Guaifenesin or Tessalon Perls PRN for cough  -- Encourage Incentive spirometry    -- Strict aspiration precautions  -- Consider chest physiotherapy as course dictates  -- Provide nebulizer treatments scheduled    Cardiac/Vascular  Endocarditis  BCx growing gram positive cocci w/ PCR positive for MRSA. Obtained TTE.    TTE (10/31/23):    Presence of vegetation on anterior MV leaflet. possivble small vegetation on RCC of AV. consider NEW for further evaluation.    Left Ventricle: The left ventricle is normal in size. Ventricular mass is normal. Normal wall thickness. Normal wall motion. There is mildly reduced systolic function with a visually estimated ejection fraction of 40 - 45%. Biplane (2D) method of discs ejection fraction is 43%. Grade I diastolic dysfunction.    Right Ventricle: Normal right ventricular cavity size. Wall thickness is normal. Right ventricle wall motion  is normal. Systolic function is normal.    Left Atrium: Left atrium is mildly dilated.    Aortic Valve: The aortic valve is a trileaflet valve. Cannot exclude small vegetation on RCC of AV. There is no aortic abscess prsesent . Other leaflets appear normal.    Mitral Valve: There is a medium mobile echogenic mass present on the anterior MV leaflet that is flail consistent with a large vegetation measuring 1.9 x 0.8 cm. There is at least moderate eccentric anteriorly directed regurgitation due to flail anterior leaflet. Partial perforation of anterior MV leaflet cannot be excluded/    Pulmonary Artery: The estimated pulmonary artery systolic pressure is 30 mmHg.    IVC/SVC:  Intermediate venous pressure at 8 mmHg.    -- covering with vanc/rocephin/azithro  -- trending troponins, 19 on admission, 27 most recently  -- consulted ID, cardiology, and HTS, appreciate their recommendations  -- daily BCx    Renal/  FRANCESCO (acute kidney injury)  Mr. Lorenzo Nino is being treated for FRANCESCO likely secondary to pre-renal azotemia due to IVVD. Scr of 1.6. Ordered CMP, serum osmolality, urine osmolality, urine sodium, and urine creatinine. Started IVF resuscitation. Strict I/Os to monitor renal function. Discontinued nephrotoxic medications.    --Cr improving following fluid bolus  --Monitor with serial Cr / GFR levels closely with serial labs  --Avoid nephrotoxic medications such as NSAIDs, IV contrast, or RAAS blockade  --Renally dose medications    Lab Results   Component Value Date    CREATININE 1.1 10/31/2023          ID  Septic shock  This patient does have evidence of infective focus  My overall impression is septic shock due to lactate > 4, MAP < 65 and SBP < 90.  Source: Urinary Tract vs PNA  Antibiotics given-   Antibiotics (72h ago, onward)    Start     Stop Route Frequency Ordered    10/31/23 0600  azithromycin (ZITHROMAX) 500 mg in dextrose 5 % (D5W) 250 mL IVPB (Vial-Mate)         11/03/23 0559 IV Every 24 hours (non-standard times) 10/31/23 0207    10/31/23 0100  cefTRIAXone (ROCEPHIN) 2 g in dextrose 5 % in water (D5W) 100 mL IVPB (MB+)         -- IV Every 12 hours (non-standard times) 10/30/23 2353    10/30/23 2358  vancomycin - pharmacy to dose  (vancomycin IVPB (PEDS and ADULTS))        See Hyperspace for full Linked Orders Report.    -- IV pharmacy to manage frequency 10/30/23 2256      Latest lactate reviewed-  Recent Labs   Lab 10/30/23  2327   LACTATE 3.8*     Organ dysfunction indicated by Acute kidney injury, Acute liver injury and Thrombocytopenia   Fluid challenge Ideal Body Weight- The patient's ideal body weight is Ideal body weight: 79.9 kg (176 lb 2.4 oz) which will be  used to calculate fluid bolus of 30 ml/kg for treatment of septic shock.      -- Peripheral Pressors prn to maintain MAP of 65  -- BCx growing gram positive cocci, PCR positive for MRSA  -- Adjust abx as indicated  -- Source control achieved by: Vanc, Ceftriaxone, Azithromycin  -- d/c'd acyclovir given low suspicion of viral meningitis and positive blood cultures      Hematology  Thrombocytopenia  Pt presenting with low platelets (23) in setting of acute liver injury likely secondary to shock. Other cause to rule out can include cirrhosis, BM problem (myelodysplasia, chemotherapy, alcohol), HCV/HIV. Labs ordered for GARRY, Smear. PLASMIC score 6/7 but [id number] wnl.    -- Transfused 2 units FFP  -- solumedrol  -- Trend CBC, PT/INR, aPTT  -- Consulted hematology, appreciate recommendations  -- per hematology team, they heard from lab [id number] wnl  -- PT 11.0, INR 1.0, Rogelio negative, haptoglobin 82, fibrinogen 489, aPTT 44.4  -- transfuse platelets when:   - <10k for patient with failure of platelet production   - <20k for hemorrhagic diathesis   - <50k for active bleeding or immediately prior to invasive procedure   - <100k for neurosurgery, ophthalmic surgery, CNS bleeding   - >100k for evidence of platelet dysfunction    GI  Elevated transaminase level  Elevated Ast and ALT on presentation along with low platelets and low albumin. Likely secondary to Septic Shock. CMP trended daily for liver function. F/U workup for sepsis and thrombocytopenia. Continue abx. F/U cultures. Acute hepatitis panel negative. No significant hepatic pathology found on CT A/P    -- Trend liver function  -- Continue sepsis treatment      Critical Care Daily Checklist:    A: Awake: RASS Goal/Actual Goal: RASS Goal: 0-->alert and calm  Actual:     B: Spontaneous Breathing Trial Performed?     C: SAT & SBT Coordinated?  n/a                      D: Delirium: CAM-ICU Overall CAM-ICU: Negative   E: Early Mobility Performed? Yes   F: Feeding Goal:     Status:     Current Diet Order   Procedures    Diet NPO      AS: Analgesia/Sedation tylenol   T: Thromboembolic Prophylaxis No, thrombocytopenia   H: HOB > 300 Yes   U: Stress Ulcer Prophylaxis (if needed) n/a   G: Glucose Control SSI   B: Bowel Function Stool Occurrence: 1   I: Indwelling Catheter (Lines & Watson) Necessity PIVs   D: De-escalation of Antimicrobials/Pharmacotherapies Vanc/rocephin/azithro    Plan for the day/ETD ICU, Isolation precautions (MRSA)    Code Status:  Family/Goals of Care: Full Code         Critical secondary to Patient has a condition that poses threat to life and bodily function: Septic shock      Critical care was time spent personally by me on the following activities: development of treatment plan with patient or surrogate and bedside caregivers, discussions with consultants, evaluation of patient's response to treatment, examination of patient, ordering and performing treatments and interventions, ordering and review of laboratory studies, ordering and review of radiographic studies, pulse oximetry, re-evaluation of patient's condition. This critical care time did not overlap with that of any other provider or involve time for any procedures.     Bianca Sheppard MD  Critical Care Medicine  [facility] - Cardiac Medical ICU

## 2023-10-31 NOTE — ASSESSMENT & PLAN NOTE
Mr. Nino is a 48yoM without any medical history who presented with flu like symptoms, was found to have endocarditis with MRSA bacteremia, and new reduced EF at 40-45%. He also has a troponin elevation to 28. He denies any chest pain, has no exercise restrictions, and no ischemic changes on EKG. Non-smoker without family cardiac hx (besides an indirect relative with CABG at an older age). Little concern for ACS at this time. Of note he did have COVID 2 weeks prior. He was diagnosed with bacterial endocarditis and was septic on presentation. It is unlikely this patient has bacterial endocarditis as well as myocarditis. TTE was not concerning for an abscess and showed moderate MR, which is unlikely to cause his reduced EF. Slightly volume up on our exam.    Recommendations:  - No indication for advanced options at this time  - Would diurese to euvolemia  - Unlikely to be myocarditis. If there is myocarditis present, no additional diagnostics or treatment would be indicated (supportive care)  - Treat endocarditis with ID's help  - Agree with CTS evaluation  - Primary team to manage with the help of general cardiology as needed

## 2023-10-31 NOTE — SUBJECTIVE & OBJECTIVE
Objective:     Vital Signs (Most Recent):  Temp: 99.2 °F (37.3 °C) (10/31/23 1345)  Pulse: (!) 119 (10/31/23 1315)  Resp: (!) 25 (10/31/23 1315)  BP: 113/73 (10/31/23 1315)  SpO2: 95 % (10/31/23 1315) Vital Signs (24h Range):  Temp:  [98.1 °F (36.7 °C)-102.8 °F (39.3 °C)] 99.2 °F (37.3 °C)  Pulse:  [114-150] 119  Resp:  [20-44] 25  SpO2:  [94 %-99 %] 95 %  BP: ()/(58-86) 113/73     Weight: 77.1 kg (170 lb)  Body mass index is 22.43 kg/m².  Body surface area is 1.99 meters squared.      Intake/Output - Last 3 Shifts         10/29 0700  10/30 0659 10/30 0700  10/31 0659 10/31 0700  11/01 0659    I.V. (mL/kg)   1368.9 (17.8)    IV Piggyback   1465    Total Intake(mL/kg)   2834 (36.8)    Urine (mL/kg/hr)  1150 950 (1.5)    Stool   1    Total Output  1150 951    Net  -1150 +1883           Stool Occurrence   1 x             Physical Exam  Vitals reviewed.   Constitutional:       Appearance: He is well-developed. He is ill-appearing.   HENT:      Head: Normocephalic and atraumatic.      Right Ear: External ear normal.      Left Ear: External ear normal.      Nose: Nose normal.      Mouth/Throat:      Mouth: Mucous membranes are moist.      Pharynx: Oropharynx is clear. No oropharyngeal exudate.   Eyes:      General: No scleral icterus.     Extraocular Movements: Extraocular movements intact.      Conjunctiva/sclera: Conjunctivae normal.   Pulmonary:      Effort: Pulmonary effort is normal. No respiratory distress.   Abdominal:      General: Abdomen is flat. There is no distension.   Musculoskeletal:         General: Normal range of motion.      Cervical back: Normal range of motion and neck supple.      Right lower leg: No edema.      Left lower leg: No edema.   Skin:     General: Skin is dry.   Neurological:      Mental Status: He is alert and oriented to person, place, and time.   Psychiatric:         Behavior: Behavior normal.            Significant Labs:   All pertinent labs from the last 24 hours have been  reviewed.    Diagnostic Results:  I have reviewed all pertinent imaging results/findings within the past 24 hours.

## 2023-10-31 NOTE — PROGRESS NOTES
"Pharmacokinetic Initial Assessment: IV Vancomycin    Assessment/Plan:    Initiate intravenous vancomycin with loading dose of 1500 mg once with subsequent doses when random concentrations are less than 20 mcg/mL  Desired empiric serum trough concentration is 15 to 20 mcg/mL  Draw vancomycin random level with AM labs on 10/31  Pharmacy will continue to follow and monitor vancomycin.      Please contact pharmacy at extension [phone] with any questions regarding this assessment.     Thank you for the consult,   [place]       Patient brief summary:  Lorenzo Nino is a 48 y.o. male initiated on antimicrobial therapy with IV Vancomycin for treatment of suspected sepsis    Drug Allergies:   Review of patient's allergies indicates:  No Known Allergies    Actual Body Weight:   77.1 kg     Renal Function:   Estimated Creatinine Clearance: 60.9 mL/min (A) (based on SCr of 1.6 mg/dL (H)).,     Dialysis Method (if applicable):  N/A    CBC (last 72 hours):  Recent Labs   Lab Result Units 10/30/23  1923   WBC K/uL 6.16   Hemoglobin g/dL 15.0   Hematocrit % 43.2   Platelets K/uL 23*   Gran % % 85.5*   Lymph % % 6.3*   Mono % % 6.0   Eosinophil % % 0.2   Basophil % % 0.5   Differential Method  Automated       Metabolic Panel (last 72 hours):  Recent Labs   Lab Result Units 10/30/23  1923 10/30/23  2152   Sodium mmol/L 129*  --    Potassium mmol/L 3.7  --    Chloride mmol/L 100  --    CO2 mmol/L 17*  --    Glucose mg/dL 146*  --    Glucose, UA   --  Negative   BUN mg/dL 34*  --    Creatinine mg/dL 1.6*  --    Albumin g/dL 2.8*  --    Total Bilirubin mg/dL 3.0*  --    Alkaline Phosphatase U/L 109  --    AST U/L 191*  --    ALT U/L 141*  --        Drug levels (last 3 results):  No results for input(s): "VANCOMYCINRA", "VANCORANDOM", "VANCOMYCINPE", "VANCOPEAK", "VANCOMYCINTR", "VANCOTROUGH" in the last 72 hours.    Microbiologic Results:  Microbiology Results (last 7 days)       Procedure Component Value Units Date/Time    C. " trachomatis/N. gonorrhoeae by AMP DNA [[id number]] Collected: 10/30/23 2304    Order Status: No result Updated: 10/30/23 2305    C. trachomatis/N. gonorrhoeae by AMP DNA [facility]; Urine [[id number]]     Order Status: Completed Specimen: Genital from Vaginal     Urine culture [[id number]] Collected: 10/30/23 2152    Order Status: No result Specimen: Urine Updated: 10/30/23 2237    Respiratory Infection Panel (PCR), Nasopharyngeal [[id number]] Collected: 10/30/23 2153    Order Status: Completed Specimen: Nasopharyngeal Swab Updated: 10/30/23 2221     Respiratory Infection Panel Source NP Swab    Narrative:      For all other respiratory sources, order [id number] -  Respiratory Viral Panel by PCR    Influenza A & B by Molecular [[id number]] Collected: 10/30/23 1925    Order Status: Completed Specimen: Nasopharyngeal Swab Updated: 10/30/23 1958     Influenza A, Molecular NEGATIVE     Influenza B, Molecular NEGATIVE     Flu A & B Source Nasal swab    Blood culture x two cultures. Draw prior to antibiotics. [[id number]] Collected: 10/30/23 1924    Order Status: Sent Specimen: Blood from Peripheral, Antecubital, Left Updated: 10/30/23 1934    Blood culture x two cultures. Draw prior to antibiotics. [[id number]] Collected: 10/30/23 1923    Order Status: Sent Specimen: Blood from Peripheral, Antecubital, Right Updated: 10/30/23 1934

## 2023-10-31 NOTE — PLAN OF CARE
MICU DAILY GOALS     Family/Goals of care/Code Status   Code Status: Full Code    24H Vital Sign Range  Temp:  [98.1 °F (36.7 °C)-102.8 °F (39.3 °C)]   Pulse:  [104-150]   Resp:  [20-44]   BP: ()/(57-86)   SpO2:  [94 %-99 %]      Shift Events   Pt tolerated 1 unit and 2nd infusing of FFP (MD wants to run over 4hrs). Pt states that he is starting to feel better. He still c/o aches in all over. VSS, afebrile at this time. He tolerated ice chips and sips of water. Mother , father and sign other at bedside most of today. No distress noted, He is talking on the phone at this time.     AWAKE RASS: Goal - RASS Goal: 0-->alert and calm  Actual - RASS ([place]keon Agitation-Sedation Scale): alert and calm    Restraint necessity: Not necessary   BREATHE SBT: Not intubated    Coordinate A & B, analgesics/sedatives Pain: managed   SAT: Not intubated   Delirium CAM-ICU: Overall CAM-ICU: Negative   Early(intubated/ Progressive (non-intubated) Mobility MOVE Screen (INTUBATED ONLY): Not intubated    Activity: Activity Management: Rolling - L1   Feeding/Nutrition Diet order: Diet/Nutrition Received: NPO,     Thrombus DVT prophylaxis: VTE Required Core Measure: Provider determined low risk VTE   HOB Elevation Head of Bed (HOB) Positioning: HOB at 20-30 degrees   Ulcer Prophylaxis GI: yes   Glucose control managed Glycemic Management: blood glucose monitored   Skin Skin assessed during: Daily Assessment    Sacrum intact? Yes  Heels intact? Yes  Surgical wound? No    [] No Altered Skin Integrity Present    []Prevention Measures Documented    [] Altered Skin Integrity Present or Discovered   [] LDA present /added in EPIC   [] Wound Image Taken     Wound Care Consulted? No    Attending Nurse:  Kymberly Marinelli RN/Staff Member:  Dipti Traylor RN   Bowel Function no issues    Indwelling Catheter Necessity         No ritchie   De-escalation Antibiotics Yes       VS and assessment per flow sheet, patient progressing towards goals as  tolerated, plan of care reviewed with , mother, father and sign other, all concerns addressed, will continue to monitor.   Problem: Infection  Goal: Absence of Infection Signs and Symptoms  Outcome: Ongoing, Progressing     Problem: Infection  Goal: Absence of Infection Signs and Symptoms  Outcome: Ongoing, Progressing     Problem: Adult Inpatient Plan of Care  Goal: Plan of Care Review  Outcome: Ongoing, Progressing  Goal: Patient-Specific Goal (Individualized)  Outcome: Ongoing, Progressing  Goal: Absence of Hospital-Acquired Illness or Injury  Outcome: Ongoing, Progressing  Goal: Optimal Comfort and Wellbeing  Outcome: Ongoing, Progressing  Goal: Readiness for Transition of Care  Outcome: Ongoing, Progressing     Problem: Adjustment to Illness (Sepsis/Septic Shock)  Goal: Optimal Coping  Outcome: Ongoing, Progressing     Problem: Bleeding (Sepsis/Septic Shock)  Goal: Absence of Bleeding  Outcome: Ongoing, Progressing     Problem: Glycemic Control Impaired (Sepsis/Septic Shock)  Goal: Blood Glucose Level Within Desired Range  Outcome: Ongoing, Progressing     Problem: Infection Progression (Sepsis/Septic Shock)  Goal: Absence of Infection Signs and Symptoms  Outcome: Ongoing, Progressing     Problem: Nutrition Impaired (Sepsis/Septic Shock)  Goal: Optimal Nutrition Intake  Outcome: Ongoing, Progressing     Problem: Fluid and Electrolyte Imbalance (Acute Kidney Injury/Impairment)  Goal: Fluid and Electrolyte Balance  Outcome: Ongoing, Progressing     Problem: Fall Injury Risk  Goal: Absence of Fall and Fall-Related Injury  Outcome: Ongoing, Progressing

## 2023-10-31 NOTE — SUBJECTIVE & OBJECTIVE
History reviewed. No pertinent past medical history.    History reviewed. No pertinent surgical history.    Review of patient's allergies indicates:  No Known Allergies    No current facility-administered medications on file prior to encounter.     No current outpatient medications on file prior to encounter.     Family History    None       Tobacco Use    Smoking status: Unknown    Smokeless tobacco: Not on file   Substance and Sexual Activity    Alcohol use: Yes    Drug use: Never    Sexual activity: Not on file     Review of Systems   Constitutional: Positive for chills, fever and malaise/fatigue.   Eyes:  Negative for double vision.   Cardiovascular:  Negative for chest pain, cyanosis, irregular heartbeat, leg swelling and palpitations.   Respiratory:  Negative for cough, shortness of breath and wheezing.    Musculoskeletal:  Positive for muscle weakness, myalgias and neck pain.   Genitourinary:  Positive for hematuria.   Neurological:  Positive for headaches.     Objective:     Vital Signs (Most Recent):  Temp: 99.2 °F (37.3 °C) (10/31/23 1530)  Pulse: (!) 119 (10/31/23 1315)  Resp: (!) 25 (10/31/23 1315)  BP: 100/70 (10/31/23 1530)  SpO2: 95 % (10/31/23 1315) Vital Signs (24h Range):  Temp:  [98.1 °F (36.7 °C)-102.8 °F (39.3 °C)] 99.2 °F (37.3 °C)  Pulse:  [114-150] 119  Resp:  [20-44] 25  SpO2:  [94 %-99 %] 95 %  BP: ()/(58-86) 100/70     Weight: 77.1 kg (170 lb)  Body mass index is 22.43 kg/m².    SpO2: 95 %         Intake/Output Summary (Last 24 hours) at 10/31/2023 1624  Last data filed at 10/31/2023 1542  Gross per 24 hour   Intake 3222.65 ml   Output 2651 ml   Net 571.65 ml       Lines/Drains/Airways       Peripheral Intravenous Line  Duration                  Peripheral IV - Single Lumen 10/30/23 1924 20 G Right Antecubital <1 day         Peripheral IV - Single Lumen 10/30/23 2047 20 G Anterior;Left Forearm <1 day         Peripheral IV - Single Lumen 10/31/23 1230 18 G Anterior;Distal;Right  Forearm <1 day                     Physical Exam  Vitals and nursing note reviewed.   Constitutional:       General: He is not in acute distress.     Appearance: Normal appearance. He is not ill-appearing.   HENT:      Head: Normocephalic and atraumatic.   Cardiovascular:      Rate and Rhythm: Regular rhythm. Tachycardia present.      Heart sounds: Murmur (holosystolic and left sternal border) heard.   Pulmonary:      Effort: Pulmonary effort is normal. No respiratory distress.      Breath sounds: Normal breath sounds. No wheezing or rales.      Comments: On room air  Abdominal:      General: Abdomen is flat.      Palpations: Abdomen is soft.   Musculoskeletal:      Right lower leg: No edema.      Left lower leg: No edema.   Skin:     Comments: Petechiae present BL feet/ankles  No noted splinter hemorrhages   Neurological:      General: No focal deficit present.      Mental Status: He is alert.   Psychiatric:      Comments: Flat affect          Significant Labs: All pertinent lab results from the last 24 hours have been reviewed.    Significant Imaging:  Reviewed

## 2023-10-31 NOTE — ASSESSMENT & PLAN NOTE
BCx growing gram positive cocci w/ PCR positive for MRSA. Obtained TTE.    TTE (10/31/23):    Presence of vegetation on anterior MV leaflet. possivble small vegetation on RCC of AV. consider NEW for further evaluation.    Left Ventricle: The left ventricle is normal in size. Ventricular mass is normal. Normal wall thickness. Normal wall motion. There is mildly reduced systolic function with a visually estimated ejection fraction of 40 - 45%. Biplane (2D) method of discs ejection fraction is 43%. Grade I diastolic dysfunction.    Right Ventricle: Normal right ventricular cavity size. Wall thickness is normal. Right ventricle wall motion  is normal. Systolic function is normal.    Left Atrium: Left atrium is mildly dilated.    Aortic Valve: The aortic valve is a trileaflet valve. Cannot exclude small vegetation on RCC of AV. There is no aortic abscess prsesent . Other leaflets appear normal.    Mitral Valve: There is a medium mobile echogenic mass present on the anterior MV leaflet that is flail consistent with a large vegetation measuring 1.9 x 0.8 cm. There is at least moderate eccentric anteriorly directed regurgitation due to flail anterior leaflet. Partial perforation of anterior MV leaflet cannot be excluded/    Pulmonary Artery: The estimated pulmonary artery systolic pressure is 30 mmHg.    IVC/SVC: Intermediate venous pressure at 8 mmHg.    -- covering with vanc/rocephin/azithro  -- trending troponins, 19 on admission, 27 most recently  -- consulted ID, cardiology, and HTS, appreciate their recommendations  -- daily BCx

## 2023-11-01 PROBLEM — I50.20 HFREF (HEART FAILURE WITH REDUCED EJECTION FRACTION): Status: ACTIVE | Noted: 2023-11-01

## 2023-11-01 PROBLEM — I48.91 ATRIAL FIBRILLATION: Status: ACTIVE | Noted: 2023-11-01

## 2023-11-01 LAB
ALBUMIN SERPL BCP-MCNC: 2.2 G/DL (ref 3.5–5.2)
ALP SERPL-CCNC: 90 U/L (ref 55–135)
ALT SERPL W/O P-5'-P-CCNC: 104 U/L (ref 10–44)
ANION GAP SERPL CALC-SCNC: 14 MMOL/L (ref 8–16)
ANISOCYTOSIS BLD QL SMEAR: SLIGHT
AST SERPL-CCNC: 196 U/L (ref 10–40)
BASOPHILS # BLD AUTO: ABNORMAL K/UL (ref 0–0.2)
BASOPHILS NFR BLD: 0 % (ref 0–1.9)
BILIRUB SERPL-MCNC: 1.3 MG/DL (ref 0.1–1)
BUN SERPL-MCNC: 28 MG/DL (ref 6–20)
CALCIUM SERPL-MCNC: 7.6 MG/DL (ref 8.7–10.5)
CHLORIDE SERPL-SCNC: 101 MMOL/L (ref 95–110)
CO2 SERPL-SCNC: 19 MMOL/L (ref 23–29)
CREAT SERPL-MCNC: 1.1 MG/DL (ref 0.5–1.4)
DIFFERENTIAL METHOD: ABNORMAL
EOSINOPHIL # BLD AUTO: ABNORMAL K/UL (ref 0–0.5)
EOSINOPHIL NFR BLD: 0 % (ref 0–8)
ERYTHROCYTE [DISTWIDTH] IN BLOOD BY AUTOMATED COUNT: 13.7 % (ref 11.5–14.5)
EST. GFR  (NO RACE VARIABLE): >60 ML/MIN/1.73 M^2
GLUCOSE SERPL-MCNC: 179 MG/DL (ref 70–110)
HCT VFR BLD AUTO: 34.7 % (ref 40–54)
HGB BLD-MCNC: 12.2 G/DL (ref 14–18)
HYPOCHROMIA BLD QL SMEAR: ABNORMAL
IMM GRANULOCYTES # BLD AUTO: ABNORMAL K/UL (ref 0–0.04)
IMM GRANULOCYTES NFR BLD AUTO: ABNORMAL % (ref 0–0.5)
LYMPHOCYTES # BLD AUTO: ABNORMAL K/UL (ref 1–4.8)
LYMPHOCYTES NFR BLD: 5 % (ref 18–48)
MAGNESIUM SERPL-MCNC: 2.5 MG/DL (ref 1.6–2.6)
MCH RBC QN AUTO: 30.6 PG (ref 27–31)
MCHC RBC AUTO-ENTMCNC: 35.2 G/DL (ref 32–36)
MCV RBC AUTO: 87 FL (ref 82–98)
MONOCYTES # BLD AUTO: ABNORMAL K/UL (ref 0.3–1)
MONOCYTES NFR BLD: 3 % (ref 4–15)
NEUTROPHILS NFR BLD: 90 % (ref 38–73)
NEUTS BAND NFR BLD MANUAL: 2 %
NRBC BLD-RTO: 0 /100 WBC
OVALOCYTES BLD QL SMEAR: ABNORMAL
PATH REV BLD -IMP: NORMAL
PHOSPHATE SERPL-MCNC: 1.6 MG/DL (ref 2.7–4.5)
PLATELET # BLD AUTO: 25 K/UL (ref 150–450)
PLATELET BLD QL SMEAR: ABNORMAL
PMV BLD AUTO: ABNORMAL FL (ref 9.2–12.9)
POCT GLUCOSE: 167 MG/DL (ref 70–110)
POCT GLUCOSE: 202 MG/DL (ref 70–110)
POIKILOCYTOSIS BLD QL SMEAR: SLIGHT
POLYCHROMASIA BLD QL SMEAR: ABNORMAL
POTASSIUM SERPL-SCNC: 3.3 MMOL/L (ref 3.5–5.1)
POTASSIUM SERPL-SCNC: 4 MMOL/L (ref 3.5–5.1)
PROT SERPL-MCNC: 5.7 G/DL (ref 6–8.4)
RBC # BLD AUTO: 3.99 M/UL (ref 4.6–6.2)
SODIUM SERPL-SCNC: 134 MMOL/L (ref 136–145)
TROPONIN I SERPL DL<=0.01 NG/ML-MCNC: 16.88 NG/ML (ref 0–0.03)
VANCOMYCIN TROUGH SERPL-MCNC: 8.9 UG/ML (ref 10–22)
WBC # BLD AUTO: 17.93 K/UL (ref 3.9–12.7)

## 2023-11-01 PROCEDURE — 94761 N-INVAS EAR/PLS OXIMETRY MLT: CPT

## 2023-11-01 PROCEDURE — 25000003 PHARM REV CODE 250: Performed by: STUDENT IN AN ORGANIZED HEALTH CARE EDUCATION/TRAINING PROGRAM

## 2023-11-01 PROCEDURE — 27000221 HC OXYGEN, UP TO 24 HOURS

## 2023-11-01 PROCEDURE — 87040 BLOOD CULTURE FOR BACTERIA: CPT | Mod: 59 | Performed by: STUDENT IN AN ORGANIZED HEALTH CARE EDUCATION/TRAINING PROGRAM

## 2023-11-01 PROCEDURE — 27000207 HC ISOLATION

## 2023-11-01 PROCEDURE — 85027 COMPLETE CBC AUTOMATED: CPT | Performed by: NURSE PRACTITIONER

## 2023-11-01 PROCEDURE — 63600175 PHARM REV CODE 636 W HCPCS: Performed by: STUDENT IN AN ORGANIZED HEALTH CARE EDUCATION/TRAINING PROGRAM

## 2023-11-01 PROCEDURE — 80053 COMPREHEN METABOLIC PANEL: CPT | Performed by: NURSE PRACTITIONER

## 2023-11-01 PROCEDURE — 99291 PR CRITICAL CARE, E/M 30-74 MINUTES: ICD-10-PCS | Mod: ,,, | Performed by: STUDENT IN AN ORGANIZED HEALTH CARE EDUCATION/TRAINING PROGRAM

## 2023-11-01 PROCEDURE — 20000000 HC ICU ROOM

## 2023-11-01 PROCEDURE — 99233 PR SUBSEQUENT HOSPITAL CARE,LEVL III: ICD-10-PCS | Mod: ,,, | Performed by: INTERNAL MEDICINE

## 2023-11-01 PROCEDURE — 84132 ASSAY OF SERUM POTASSIUM: CPT | Performed by: STUDENT IN AN ORGANIZED HEALTH CARE EDUCATION/TRAINING PROGRAM

## 2023-11-01 PROCEDURE — 85007 BL SMEAR W/DIFF WBC COUNT: CPT | Performed by: NURSE PRACTITIONER

## 2023-11-01 PROCEDURE — 99291 CRITICAL CARE FIRST HOUR: CPT | Mod: ,,, | Performed by: STUDENT IN AN ORGANIZED HEALTH CARE EDUCATION/TRAINING PROGRAM

## 2023-11-01 PROCEDURE — 99223 PR INITIAL HOSPITAL CARE,LEVL III: ICD-10-PCS | Mod: ,,, | Performed by: INTERNAL MEDICINE

## 2023-11-01 PROCEDURE — 99223 1ST HOSP IP/OBS HIGH 75: CPT | Mod: ,,, | Performed by: INTERNAL MEDICINE

## 2023-11-01 PROCEDURE — 80202 ASSAY OF VANCOMYCIN: CPT | Performed by: STUDENT IN AN ORGANIZED HEALTH CARE EDUCATION/TRAINING PROGRAM

## 2023-11-01 PROCEDURE — 63600175 PHARM REV CODE 636 W HCPCS

## 2023-11-01 PROCEDURE — 83735 ASSAY OF MAGNESIUM: CPT | Performed by: NURSE PRACTITIONER

## 2023-11-01 PROCEDURE — 99233 SBSQ HOSP IP/OBS HIGH 50: CPT | Mod: ,,, | Performed by: INTERNAL MEDICINE

## 2023-11-01 PROCEDURE — 84100 ASSAY OF PHOSPHORUS: CPT | Performed by: NURSE PRACTITIONER

## 2023-11-01 PROCEDURE — 25000003 PHARM REV CODE 250

## 2023-11-01 RX ORDER — FUROSEMIDE 10 MG/ML
40 INJECTION INTRAMUSCULAR; INTRAVENOUS ONCE
Status: COMPLETED | OUTPATIENT
Start: 2023-11-01 | End: 2023-11-01

## 2023-11-01 RX ORDER — POTASSIUM CHLORIDE 7.45 MG/ML
10 INJECTION INTRAVENOUS
Status: COMPLETED | OUTPATIENT
Start: 2023-11-01 | End: 2023-11-01

## 2023-11-01 RX ORDER — SODIUM,POTASSIUM PHOSPHATES 280-250MG
2 POWDER IN PACKET (EA) ORAL EVERY 4 HOURS
Status: DISPENSED | OUTPATIENT
Start: 2023-11-01 | End: 2023-11-01

## 2023-11-01 RX ORDER — FAMOTIDINE 20 MG/1
20 TABLET, FILM COATED ORAL 2 TIMES DAILY
Status: DISCONTINUED | OUTPATIENT
Start: 2023-11-01 | End: 2023-11-03

## 2023-11-01 RX ADMIN — FUROSEMIDE 40 MG: 10 INJECTION, SOLUTION INTRAVENOUS at 12:11

## 2023-11-01 RX ADMIN — METHYLPREDNISOLONE SODIUM SUCCINATE 1000 MG: 1 INJECTION, POWDER, LYOPHILIZED, FOR SOLUTION INTRAMUSCULAR; INTRAVENOUS at 10:11

## 2023-11-01 RX ADMIN — POTASSIUM BICARBONATE 40 MEQ: 391 TABLET, EFFERVESCENT ORAL at 10:11

## 2023-11-01 RX ADMIN — POTASSIUM CHLORIDE 10 MEQ: 7.46 INJECTION, SOLUTION INTRAVENOUS at 06:11

## 2023-11-01 RX ADMIN — VANCOMYCIN HYDROCHLORIDE 1000 MG: 1 INJECTION, POWDER, LYOPHILIZED, FOR SOLUTION INTRAVENOUS at 10:11

## 2023-11-01 RX ADMIN — CEFTRIAXONE 2 G: 2 INJECTION, POWDER, FOR SOLUTION INTRAMUSCULAR; INTRAVENOUS at 12:11

## 2023-11-01 RX ADMIN — POTASSIUM CHLORIDE 10 MEQ: 7.46 INJECTION, SOLUTION INTRAVENOUS at 05:11

## 2023-11-01 RX ADMIN — VANCOMYCIN HYDROCHLORIDE 1250 MG: 1.25 INJECTION, POWDER, LYOPHILIZED, FOR SOLUTION INTRAVENOUS at 09:11

## 2023-11-01 RX ADMIN — MUPIROCIN: 20 OINTMENT TOPICAL at 10:11

## 2023-11-01 RX ADMIN — AZITHROMYCIN 500 MG: 500 INJECTION, POWDER, LYOPHILIZED, FOR SOLUTION INTRAVENOUS at 05:11

## 2023-11-01 RX ADMIN — FAMOTIDINE 20 MG: 10 INJECTION, SOLUTION INTRAVENOUS at 10:11

## 2023-11-01 RX ADMIN — INSULIN ASPART 1 UNITS: 100 INJECTION, SOLUTION INTRAVENOUS; SUBCUTANEOUS at 09:11

## 2023-11-01 RX ADMIN — FAMOTIDINE 20 MG: 20 TABLET ORAL at 09:11

## 2023-11-01 RX ADMIN — MUPIROCIN: 20 OINTMENT TOPICAL at 09:11

## 2023-11-01 RX ADMIN — POTASSIUM & SODIUM PHOSPHATES POWDER PACK 280-160-250 MG 2 PACKET: 280-160-250 PACK at 10:11

## 2023-11-01 NOTE — ASSESSMENT & PLAN NOTE
Mr. Lorenzo Nino is being treated for FRANCESCO likely secondary to pre-renal azotemia due to IVVD. Scr of 1.6. Ordered CMP, serum osmolality, urine osmolality, urine sodium, and urine creatinine. Started IVF resuscitation. Strict I/Os to monitor renal function. Discontinued nephrotoxic medications.    --Cr improving following fluid bolus  --Monitor with serial Cr / GFR levels closely with serial labs  --Avoid nephrotoxic medications such as NSAIDs, IV contrast, or RAAS blockade  --Renally dose medications    Lab Results   Component Value Date    CREATININE 1.1 11/01/2023

## 2023-11-01 NOTE — PROGRESS NOTES
[facility]  Cardiology  Progress Note    Patient Name: Lorenzo Nino  MRN: [id number]  Admission Date: 10/30/2023  Hospital Length of Stay: 2 days  Code Status: Full Code   Attending Physician: Santos Caballero MD   Primary Care Physician: Santos Caballero MD  Expected Discharge Date: 11/3/2023  Principal Problem:<principal problem not specified>    Subjective:     Hospital Course:   No notes on file    Interval History: Pt off pressors overnight. Appears to have developed new onset irregular rhythm, likely AF. Also became hypoxic to 80s, placed on NC with improvement. Subjectively, feels better today. Denies SOB and CP, reports that muscle aches have improved.     Review of Systems   Constitutional: Positive for malaise/fatigue. Negative for chills and fever.   Eyes:  Negative for double vision.   Cardiovascular:  Negative for chest pain, cyanosis, irregular heartbeat, leg swelling and palpitations.   Respiratory:  Negative for cough, shortness of breath and wheezing.    Musculoskeletal:  Negative for myalgias.   Neurological:  Positive for headaches.     Objective:     Vital Signs (Most Recent):  Temp: 98.7 °F (37.1 °C) (11/01/23 0705)  Pulse: 98 (11/01/23 1200)  Resp: (!) 27 (11/01/23 1200)  BP: 104/77 (11/01/23 1200)  SpO2: (!) 94 % (11/01/23 1200) Vital Signs (24h Range):  Temp:  [98.1 °F (36.7 °C)-99.7 °F (37.6 °C)] 98.7 °F (37.1 °C)  Pulse:  [] 98  Resp:  [18-41] 27  SpO2:  [89 %-97 %] 94 %  BP: ()/(57-79) 104/77     Weight: 77.1 kg (170 lb)  Body mass index is 22.43 kg/m².     SpO2: (!) 94 %         Intake/Output Summary (Last 24 hours) at 11/1/2023 1218  Last data filed at 11/1/2023 1201  Gross per 24 hour   Intake 3999.23 ml   Output 3250 ml   Net 749.23 ml       Lines/Drains/Airways       Peripheral Intravenous Line  Duration                  Peripheral IV - Single Lumen 10/30/23 1924 20 G Right Antecubital 1 day         Peripheral IV - Single Lumen 10/30/23 2047 20 G  Anterior;Left Forearm 1 day         Peripheral IV - Single Lumen 10/31/23 1230 18 G Anterior;Distal;Right Forearm <1 day                       Physical Exam  Vitals and nursing note reviewed.   Constitutional:       General: He is not in acute distress.     Appearance: Normal appearance. He is not ill-appearing.   HENT:      Head: Normocephalic and atraumatic.   Cardiovascular:      Rate and Rhythm: Normal rate. Rhythm irregular.      Heart sounds: Murmur (holosystolic and left sternal border) heard.   Pulmonary:      Effort: Pulmonary effort is normal. No respiratory distress.      Breath sounds: Normal breath sounds. No wheezing or rales.      Comments: NC  Abdominal:      General: Abdomen is flat.      Palpations: Abdomen is soft.   Musculoskeletal:      Right lower leg: No edema.      Left lower leg: No edema.   Skin:     Comments: Petechiae present BL palms and feet/ankles  No noted splinter hemorrhages   Neurological:      General: No focal deficit present.      Mental Status: He is alert.   Psychiatric:      Comments: Flat affect            Significant Labs: All pertinent lab results from the last 24 hours have been reviewed.    Significant Imaging:  Reviewed  Assessment and Plan:     HFrEF (heart failure with reduced ejection fraction)  Results for orders placed during the hospital encounter of 10/30/23    Echo    Interpretation Summary    Presence of vegetation on anterior MV leaflet. possivble small vegetation on RCC of AV. consider NEW for further evaluation.    Left Ventricle: The left ventricle is normal in size. Ventricular mass is normal. Normal wall thickness. Normal wall motion. There is mildly reduced systolic function with a visually estimated ejection fraction of 40 - 45%. Biplane (2D) method of discs ejection fraction is 43%. Grade I diastolic dysfunction.    Right Ventricle: Normal right ventricular cavity size. Wall thickness is normal. Right ventricle wall motion  is normal. Systolic function  is normal.    Left Atrium: Left atrium is mildly dilated.    Aortic Valve: The aortic valve is a trileaflet valve. Cannot exclude small vegetation on RCC of AV. There is no aortic abscess prsesent . Other leaflets appear normal.    Mitral Valve: There is a medium mobile echogenic mass present on the anterior MV leaflet that is flail consistent with a large vegetation measuring 1.9 x 0.8 cm. There is at least moderate eccentric anteriorly directed regurgitation due to flail anterior leaflet. Partial perforation of anterior MV leaflet cannot be excluded/    Pulmonary Artery: The estimated pulmonary artery systolic pressure is 30 mmHg.    IVC/SVC: Intermediate venous pressure at 8 mmHg.    Appears to be a new problem. Would hold addition of GDMT while treating acute illness.   Follow up with outpatient cardiology.    Atrial fibrillation  Patient appeared to have new onset AF overnight 10/31-11/1. Rate controlled in 90's to low 100's. Otherwise remained hemodynamically stable off of pressors. Telemetry reviewed, noted to have irregularly irregular rhythm which is most likely consistent with AF. Likely due to acute illness.    Would not recommend treatment at this time. He is rate controlled, and with his recent hypotension requiring vasopressor support and critical illness, would not suggest addition of a beta blocker. [id number] is 0, anticoagulation not indicated, and would actually be contraindicated in this patient regardless given his thrombocytopenia, and fear that he may have a hemorrhagic conversion/ICH should he have septic emboli to his brain.    Elevated troponin  See endocarditis  Likely due to demand ischemia in setting of critical illness vs myocarditis (see HTS note)    Endocarditis  47 yo M with no past medical history admitted 10/30 2/2 PNA vs UTI vs meningitis requiring vasopressor support. Further work up revealed MRSA bacteremia. He underwent TTE, which was notable for mitral valve vegetation with  flail anterior leaflet, as well as possible aortic valve vegetation.   - EKG sinus rhythm, low voltage.   - Troponin 18.298 --> 19.814 --> 27.88    He denies IVDU (supported by negative UDS on admission). No history of dental abscesses or recent dental work (does have dental caps, states they were placed remotely). No significant family cardiac history.   He has been evaluated by HTS, who believe that stated that while it is possible this is related to myocarditis, there are other likely explanations for his echo findings and elevated troponin, and even if he were to have myocarditis, there would be no interventions to offer regardless.   Per patient's mother, CTS visited at bedside and stated surgical intervention is not indicated    Recommendations:  - Agree with NEW for further evaluation  - Continue antibiotics per ID        VTE Risk Mitigation (From admission, onward)           Ordered     Place sequential compression device  Until discontinued         10/30/23 2231     IP VTE LOW RISK PATIENT  Once         10/30/23 2231                    Federica Vazquez DO  Cardiology  [facility] - [facility]

## 2023-11-01 NOTE — ASSESSMENT & PLAN NOTE
Patient appeared to have new onset AF overnight 10/31-11/1. Rate controlled in 90's to low 100's. Otherwise remained hemodynamically stable off of pressors. Telemetry reviewed, noted to have irregularly irregular rhythm which is most likely consistent with AF.     Would not recommend treatment at this time. He is rate controlled, and with his recent hypotension requiring vasopressor support and critical illness, would not suggest addition of a beta blocker. [id number] is 0, anticoagulation not indicated, and would actually be contraindicated in this patient regardless given his thrombocytopenia, and fear that he may have a hemorrhagic conversion/ICH should he have septic emboli to his brain.

## 2023-11-01 NOTE — ASSESSMENT & PLAN NOTE
Irregular irregular rhythm noted on telemetry with rates in 90s/100s. Like 2/2 to acute illness.    - cardiology does not recommend treatment at this time

## 2023-11-01 NOTE — HPI
Mr. Nino is a very pleasant 48-year-old gentleman who initially presented to the emergency department on October 30 completing fatigue body aches abdominal pain and hematuria.  He also reported fever and headache.  In the emergency department he was tachycardic and hypotensive.  He underwent a thorough evaluation which ultimately demonstrated bacterial endocarditis.  He was being managed medically until the night of November 1 when he decompensated.  He required intubation.  A previous echo had shown only moderate mitral regurgitation but a repeat echo on the morning of November 2 demonstrated severe mitral regurgitation.  His chest x-ray was consistent with pulmonary edema.  A balloon pump was placed to mitigate the mitral regurgitation and diuresis was initiated.  He has had at least 1 episode of atrial fibrillation.  We plan mitral valve repair and resection of left atrial appendage.  Of note, the intraoperative NEW demonstrated no aortic valve abnormalities.

## 2023-11-01 NOTE — ASSESSMENT & PLAN NOTE
49 yo M with no past cteremia. He undmedical history admitted 10/30 2/2 PNA vs UTI vs meningitis requiring vasopressor support. No history of IVDU or recent dental work. Further work up revealed MRSA bacteremia. TTE revealed LVEF 40-45%. Mitral valve vegetation with moderate MR due to flail anterior leaflet. Small vegetation on RCC of Ao valve, no AI noted. No surgical intervention warranted at this time. Recommend get NEW for further evaluation, compete 6 weeks of IV abx, and will follow up in clinic as outpatient.

## 2023-11-01 NOTE — CARE UPDATE
Hematology Consult     Patient with initial concerns for TTP given elevated Plasmic score of 6 but there were no schistocytes on peripheral smear and [id number] came back as normal which excludes TTP. Thrombocytopenia is likely due to marrow suppression/immune response in the setting of critical illness. Expect platelets to recover over the next couple of weeks with treatment of his sepsis.     - Continue treating current illnesses   - CBC daily   - Transfuse if Hg <7, platelets <10, or platelets <50 and actively bleeding   - Hematology will sign off, please contact us if there are any new issues     Lon Todd MD  Hematology/Oncology Fellow PGY-IV

## 2023-11-01 NOTE — ASSESSMENT & PLAN NOTE
Patient appeared to have new onset AF overnight 10/31-11/1. Rate controlled in 90's to low 100's. Otherwise remained hemodynamically stable off of pressors. Telemetry reviewed, noted to have irregularly irregular rhythm which is most likely consistent with AF. Likely due to acute illness.    Would not recommend treatment at this time. He is rate controlled, and with his recent hypotension requiring vasopressor support and critical illness, would not suggest addition of a beta blocker. [id number] is 0, anticoagulation not indicated, and would actually be contraindicated in this patient regardless given his thrombocytopenia, and fear that he may have a hemorrhagic conversion/ICH should he have septic emboli to his brain.

## 2023-11-01 NOTE — MEDICAL/APP STUDENT
[facility] Card[facility]  Progress Note    Patient Name: Lorenzo Nino  MRN: [id number]  Patient Class: IP- Inpatient   Admission Date: 10/30/2023  Length of Stay: 2 days  Attending Physician: Santos Caballero MD  Primary Care Provider: Santos Caballero MD    Subjective:      Mr. Lorenzo Nino is a 48 YOM with no known past medical history admitted ED 10/30 for sepsis. Symptoms started last Friday (10/27), he was feeling unwell and developed a headache, abdominal pain, and nausea. He presented to  on 10/28, and discharged with instructions to take Tylenol and return if condition worsened. He returned to the  on 10/30 where UA was +blood and advised to go to ED. Upon presenting to [facility] that day, he was complaining of worsening abdominal pain, fatigue, and body aches. He was found to be tachycardic (150BPM) and hypotensive requiring vasopressor support and lactate was 6.61. He was started on vanc and zosyn.      He was admitted to MICU for management of sepsis 2/2 PNA vs UTI vs meningitis (reported symptoms of neck stiffness). He was unable to undergo LP due to thrombocytopenia of 23. Patient was started on empiric dexamethasone and acyclovir. His blood cx was positive for MRSA. He underwent a TTE, which was notable for mitral valve vegetation with flail anterior leaflet large vegetation measuring 1.9 x 0.8 cm, as well as possible aortic valve vegetation. EKG sinus rhythm, low voltage. Troponin elevated to 27.8. On Heme/Onc evaluation, thrombocytopenia likely due to marrow suppression/immune response in the setting of critical illness.     He is currently on IV vancomycin. Cardiothoracic surgery evaluated patient and no surgical intervention is warranted at this time, NEW is planned for tomorrow and he will be reviewed in outpatient following completion of 6 week antibiotics course.      Per history provided by patient and mother at bedside: he lives in a home in [place] with his girlfriend  and stepdaughter. He has 3 dogs. He works nearby on the [employer]. Denies any history of smoking or IVDU. Drinks approx 10 beers x week (over the weekend). No hospitalization in the past. No recent surgery. Remote cardiac family history only of maternal grandmother with bypass and valve repair in her 60s.    HIV test is negative. No history of homelessness, incarceration or working as a . Girlfriend has Crohn's Disease and has a history of hospitalization but pt states he avoids hospital visitation. Daughter and GF work at [employer]. Pt and girlfriend usually attend cruises 1-2x per year and have visited places in the [facility] and [facility], last cruise was July 4th. He has dental implants and follows up with his dentist every 6 months. Most recent visit was around April.      ID consulted for found to have endocarditits on TTE, MRSA bacteremia, management recommendations     Review of Systems   Constitutional:  Positive for malaise/fatigue.   HENT: Negative.     Eyes: Negative.    Respiratory: Negative.     Cardiovascular: Negative.    Gastrointestinal: Negative.    Genitourinary:  Positive for hematuria.   Musculoskeletal:  Negative for joint pain.   Skin:  Positive for rash.   Neurological: Negative.       Physical Exam  Constitutional:       Appearance: He is ill-appearing.      Comments: somnolent   HENT:      Head: Normocephalic and atraumatic.      Nose:      Comments: +Nasal canula     Mouth/Throat:      Mouth: Mucous membranes are dry.      Comments: +Dental implant  Cardiovascular:      Heart sounds: Murmur heard.      Comments: Holosystolic murmur  Pulmonary:      Effort: Pulmonary effort is normal.      Breath sounds: Normal breath sounds.   Abdominal:      Palpations: Abdomen is soft.   Musculoskeletal:         General: Normal range of motion.      Cervical back: Normal range of motion.   Skin:     General: Skin is warm.      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    - IV vancomycin for 6 weeks (Trough 15-20)   - Follow up with repeat blood cultures and sensitivities.    - Planning to undergo NEW, follow up with results     Active Diagnoses:    Diagnosis Date Noted POA    Atrial fibrillation [I48.91] 11/01/2023 No    HFrEF (heart failure with reduced ejection fraction) [I50.20] 11/01/2023 Yes    PNA (pneumonia) [J18.9] 10/31/2023 Unknown    Endocarditis [I38] 10/31/2023 Yes    Elevated troponin [R79.89] 10/31/2023 Yes    Septic shock [A41.9, R65.21] 10/30/2023 Yes    FRANCESCO (acute kidney injury) [N17.9] 10/30/2023 Yes    Thrombocytopenia [D69.6] 10/30/2023 Yes    Elevated transaminase level [R74.01] 10/30/2023 Yes      Problems Resolved During this Admission:     VTE Risk Mitigation (From admission, onward)           Ordered     Place sequential compression device  Until discontinued         10/30/23 2231     IP VTE LOW RISK PATIENT  Once         10/30/23 2231                       Annie Der Avedissian [facility] MS[facility]

## 2023-11-01 NOTE — SUBJECTIVE & OBJECTIVE
No current facility-administered medications on file prior to encounter.     No current outpatient medications on file prior to encounter.       Review of patient's allergies indicates:  No Known Allergies    History reviewed. No pertinent past medical history.  History reviewed. No pertinent surgical history.  Family History    None       Tobacco Use    Smoking status: Unknown    Smokeless tobacco: Not on file   Substance and Sexual Activity    Alcohol use: Yes    Drug use: Never    Sexual activity: Not on file     Review of Systems   Constitutional:  Negative for activity change, appetite change, fatigue and fever.   HENT:  Negative for nosebleeds.    Respiratory:  Negative for cough and shortness of breath.    Cardiovascular:  Negative for chest pain, palpitations and leg swelling.   Gastrointestinal:  Negative for abdominal distention, abdominal pain and nausea.   Genitourinary:  Negative for frequency.   Musculoskeletal:  Negative for arthralgias and myalgias.   Skin:  Negative for rash.   Neurological:  Negative for dizziness and numbness.   Hematological:  Does not bruise/bleed easily.     Objective:     Vital Signs (Most Recent):  Temp: 98.6 °F (37 °C) (11/01/23 0300)  Pulse: 108 (11/01/23 0705)  Resp: (!) 25 (11/01/23 0705)  BP: 105/72 (11/01/23 0705)  SpO2: (!) 89 % (11/01/23 0705) Vital Signs (24h Range):  Temp:  [98.1 °F (36.7 °C)-99.7 °F (37.6 °C)] 98.6 °F (37 °C)  Pulse:  [] 108  Resp:  [18-41] 25  SpO2:  [89 %-98 %] 89 %  BP: ()/(57-79) 105/72     Weight: 77.1 kg (170 lb)  Body mass index is 22.43 kg/m².    SpO2: (!) 89 %        Intake/Output - Last 3 Shifts         10/30 0700  10/31 0659 10/31 0700  11/01 0659 11/01 0700  11/02 0659    I.V. (mL/kg)  2855.4 (37)     Blood  583.8     IV Piggyback  2057     Total Intake(mL/kg)  5496.2 (71.3)     Urine (mL/kg/hr) 1150 3200 (1.7)     Stool  1     Total Output 1150 3201     Net -1150 +2295.2            Stool Occurrence  1 x             "  Lines/Drains/Airways       Peripheral Intravenous Line  Duration                  Peripheral IV - Single Lumen 10/30/23 1924 20 G Right Antecubital 1 day         Peripheral IV - Single Lumen 10/30/23 2047 20 G Anterior;Left Forearm 1 day         Peripheral IV - Single Lumen 10/31/23 1230 18 G Anterior;Distal;Right Forearm <1 day                          Physical Exam  Constitutional:       Appearance: Normal appearance.   HENT:      Head: Normocephalic and atraumatic.   Eyes:      Extraocular Movements: Extraocular movements intact.   Cardiovascular:      Rate and Rhythm: Normal rate and regular rhythm.      Heart sounds: Normal heart sounds.   Pulmonary:      Effort: Pulmonary effort is normal.      Breath sounds: Normal breath sounds.   Abdominal:      General: Abdomen is flat.      Palpations: Abdomen is soft.   Musculoskeletal:         General: Normal range of motion.      Cervical back: Normal range of motion.   Skin:     General: Skin is warm and dry.      Capillary Refill: Capillary refill takes less than 2 seconds.   Neurological:      General: No focal deficit present.      Mental Status: He is alert.            Significant Labs:  ABGs:   Recent Labs   Lab 10/30/23  2023   PH 7.377   PCO2 27.8*   PO2 35*   HCO3 16.3*   POCSATURATED 68   BE -9*     Amylase: No results for input(s): "AMYLASE" in the last 48 hours.  BMP:   Recent Labs   Lab 11/01/23  0558   *   *   K 3.3*      CO2 19*   BUN 28*   CREATININE 1.1   CALCIUM 7.6*   MG 2.5     Cardiac markers:   Recent Labs   Lab 10/31/23  2356   TROPONINI 16.875*     CBC:   Recent Labs   Lab 10/31/23  2146 11/01/23  0558   WBC 13.31* 17.93*   RBC 3.84* 3.99*   HGB 11.8* 12.2*   HCT 33.6* 34.7*   PLT 19*  --    MCV 88 87   MCH 30.7 30.6   MCHC 35.1 35.2     CMP:   Recent Labs   Lab 11/01/23  0558   *   CALCIUM 7.6*   ALBUMIN 2.2*   PROT 5.7*   *   K 3.3*   CO2 19*      BUN 28*   CREATININE 1.1   ALKPHOS 90   *   AST " "196*   BILITOT 1.3*     Coagulation:   Recent Labs   Lab 10/30/23  2021   INR 1.0   APTT 44.4*     Lactic Acid:   Recent Labs   Lab 10/31/23  1543   LACTATE 3.1*     LFTs:   Recent Labs   Lab 11/01/23  0558   *   *   ALKPHOS 90   BILITOT 1.3*   PROT 5.7*   ALBUMIN 2.2*     Lipase: No results for input(s): "LIPASE" in the last 48 hours.    Significant Diagnostics:  I have reviewed all pertinent imaging results/findings within the past 24 hours.  Echo 10/31/23    Presence of vegetation on anterior MV leaflet. possivble small vegetation on RCC of AV. consider NEW for further evaluation.    Left Ventricle: The left ventricle is normal in size. Ventricular mass is normal. Normal wall thickness. Normal wall motion. There is mildly reduced systolic function with a visually estimated ejection fraction of 40 - 45%. Biplane (2D) method of discs ejection fraction is 43%. Grade I diastolic dysfunction.    Right Ventricle: Normal right ventricular cavity size. Wall thickness is normal. Right ventricle wall motion  is normal. Systolic function is normal.    Left Atrium: Left atrium is mildly dilated.    Aortic Valve: The aortic valve is a trileaflet valve. Cannot exclude small vegetation on RCC of AV. There is no aortic abscess prsesent . Other leaflets appear normal.    Mitral Valve: There is a medium mobile echogenic mass present on the anterior MV leaflet that is flail consistent with a large vegetation measuring 1.9 x 0.8 cm. There is at least moderate eccentric anteriorly directed regurgitation due to flail anterior leaflet. Partial perforation of anterior MV leaflet cannot be excluded/    Pulmonary Artery: The estimated pulmonary artery systolic pressure is 30 mmHg.    IVC/SVC: Intermediate venous pressure at 8 mmHg.       "

## 2023-11-01 NOTE — ASSESSMENT & PLAN NOTE
BCx growing gram positive cocci w/ PCR positive for MRSA. Obtained TTE.    TTE (10/31/23):    Presence of vegetation on anterior MV leaflet. possivble small vegetation on RCC of AV. consider NEW for further evaluation.    Left Ventricle: The left ventricle is normal in size. Ventricular mass is normal. Normal wall thickness. Normal wall motion. There is mildly reduced systolic function with a visually estimated ejection fraction of 40 - 45%. Biplane (2D) method of discs ejection fraction is 43%. Grade I diastolic dysfunction.    Right Ventricle: Normal right ventricular cavity size. Wall thickness is normal. Right ventricle wall motion  is normal. Systolic function is normal.    Left Atrium: Left atrium is mildly dilated.    Aortic Valve: The aortic valve is a trileaflet valve. Cannot exclude small vegetation on RCC of AV. There is no aortic abscess prsesent . Other leaflets appear normal.    Mitral Valve: There is a medium mobile echogenic mass present on the anterior MV leaflet that is flail consistent with a large vegetation measuring 1.9 x 0.8 cm. There is at least moderate eccentric anteriorly directed regurgitation due to flail anterior leaflet. Partial perforation of anterior MV leaflet cannot be excluded/    Pulmonary Artery: The estimated pulmonary artery systolic pressure is 30 mmHg.    IVC/SVC: Intermediate venous pressure at 8 mmHg.    -- NEW today  -- covering with vanc, d'c'd rocephin and azithromycin  -- trending troponins, 19 on admission, 16 most recently  -- ID recommended 6 weeks of vancomycin following clearance of blood cultures  -- Cardiology and [facility] recommending CTS consult  -- CTS recommending NEW and will f/u outpatient following treatment for endocarditis  -- daily BCx

## 2023-11-01 NOTE — ASSESSMENT & PLAN NOTE
Pt presenting with low platelets (23) in setting of acute liver injury likely secondary to shock. Other cause to rule out can include cirrhosis, BM problem (myelodysplasia, chemotherapy, alcohol), HCV/HIV. Labs ordered for GARRY, Smear. PLASMIC score 6/7 but [id number] wnl. Likely 2/2 sepsis and critical illness.    -- Transfused 2 units FFP, platelets improved to 25  -- solumedrol d/c'd given no TPP  -- Trend CBC, PT/INR, aPTT  -- Consulted hematology, appreciate recommendations  -- per hematology team, they heard from lab [id number] wnl  -- PT 11.0, INR 1.0, Rogelio negative, haptoglobin 82, fibrinogen 489, aPTT 44.4  -- transfuse platelets when:   - <10k for patient with failure of platelet production   - <20k for hemorrhagic diathesis   - <50k for active bleeding or immediately prior to invasive procedure   - <100k for neurosurgery, ophthalmic surgery, CNS bleeding   - >100k for evidence of platelet dysfunction

## 2023-11-01 NOTE — H&P (VIEW-ONLY)
[facility] Card[facility]  Cardiothoracic Surgery  Consult Note    Patient Name: Lorenzo Nion  MRN: [id number]  Admission Date: 10/30/2023  Attending Physician: Santos Caballero MD  Referring Provider: Self, Aaareferral    Patient information was obtained from patient, spouse/SO, ER records and primary team.     Inpatient consult to Cardiothoracic Surgery  Consult performed by: Raquel Escalante PA-C  Consult ordered by: Bianca Sheppard MD        Subjective:     Principal Problem: <principal problem not specified>    History of Present Illness: 49 yo M with no past medical history admitted 10/30 for sepsis. Last Friday, 10/27, he developed headache, abdominal pain, and nausea. He presented to , who prescribed Tylenol and recommended he return if he continued to feel poorly. He presented to C 10/30 with worsening abdominal pain, as well as fatigue, body aches, and hematuria. He was found to be tachycardic and hypotensive requiring vasopressor support. He was admitted to MICU for management of sepsis 2/2 PNA vs UTI vs meningitis (reported symptoms of neck stiffness). He was unable to undergo LP due to thrombocytopenia of 23. He is currently being evaluated for TTP.      Further work up revealed MRSA bacteremia. He underwent TTE, which was notable for mitral valve vegetation with flail anterior leaflet, as well as possible aortic valve vegetation.      Denies IVDU or recent dental work    CTS consulted for endocarditis       No current facility-administered medications on file prior to encounter.     No current outpatient medications on file prior to encounter.       Review of patient's allergies indicates:  No Known Allergies    History reviewed. No pertinent past medical history.  History reviewed. No pertinent surgical history.  Family History    None       Tobacco Use    Smoking status: Unknown    Smokeless tobacco: Not on file   Substance and Sexual Activity    Alcohol use: Yes    Drug use: Never     Sexual activity: Not on file     Review of Systems   Constitutional:  Negative for activity change, appetite change, fatigue and fever.   HENT:  Negative for nosebleeds.    Respiratory:  Negative for cough and shortness of breath.    Cardiovascular:  Negative for chest pain, palpitations and leg swelling.   Gastrointestinal:  Negative for abdominal distention, abdominal pain and nausea.   Genitourinary:  Negative for frequency.   Musculoskeletal:  Negative for arthralgias and myalgias.   Skin:  Negative for rash.   Neurological:  Negative for dizziness and numbness.   Hematological:  Does not bruise/bleed easily.     Objective:     Vital Signs (Most Recent):  Temp: 98.6 °F (37 °C) (11/01/23 0300)  Pulse: 108 (11/01/23 0705)  Resp: (!) 25 (11/01/23 0705)  BP: 105/72 (11/01/23 0705)  SpO2: (!) 89 % (11/01/23 0705) Vital Signs (24h Range):  Temp:  [98.1 °F (36.7 °C)-99.7 °F (37.6 °C)] 98.6 °F (37 °C)  Pulse:  [] 108  Resp:  [18-41] 25  SpO2:  [89 %-98 %] 89 %  BP: ()/(57-79) 105/72     Weight: 77.1 kg (170 lb)  Body mass index is 22.43 kg/m².    SpO2: (!) 89 %        Intake/Output - Last 3 Shifts         10/30 0700  10/31 0659 10/31 0700  11/01 0659 11/01 0700  11/02 0659    I.V. (mL/kg)  2855.4 (37)     Blood  583.8     IV Piggyback  2057     Total Intake(mL/kg)  5496.2 (71.3)     Urine (mL/kg/hr) 1150 3200 (1.7)     Stool  1     Total Output 1150 3201     Net -1150 +2295.2            Stool Occurrence  1 x              Lines/Drains/Airways       Peripheral Intravenous Line  Duration                  Peripheral IV - Single Lumen 10/30/23 1924 20 G Right Antecubital 1 day         Peripheral IV - Single Lumen 10/30/23 2047 20 G Anterior;Left Forearm 1 day         Peripheral IV - Single Lumen 10/31/23 1230 18 G Anterior;Distal;Right Forearm <1 day                          Physical Exam  Constitutional:       Appearance: Normal appearance.   HENT:      Head: Normocephalic and atraumatic.   Eyes:       "Extraocular Movements: Extraocular movements intact.   Cardiovascular:      Rate and Rhythm: Normal rate and regular rhythm.      Heart sounds: Normal heart sounds.   Pulmonary:      Effort: Pulmonary effort is normal.      Breath sounds: Normal breath sounds.   Abdominal:      General: Abdomen is flat.      Palpations: Abdomen is soft.   Musculoskeletal:         General: Normal range of motion.      Cervical back: Normal range of motion.   Skin:     General: Skin is warm and dry.      Capillary Refill: Capillary refill takes less than 2 seconds.   Neurological:      General: No focal deficit present.      Mental Status: He is alert.            Significant Labs:  ABGs:   Recent Labs   Lab 10/30/23  2023   PH 7.377   PCO2 27.8*   PO2 35*   HCO3 16.3*   POCSATURATED 68   BE -9*     Amylase: No results for input(s): "AMYLASE" in the last 48 hours.  BMP:   Recent Labs   Lab 11/01/23  0558   *   *   K 3.3*      CO2 19*   BUN 28*   CREATININE 1.1   CALCIUM 7.6*   MG 2.5     Cardiac markers:   Recent Labs   Lab 10/31/23  2356   TROPONINI 16.875*     CBC:   Recent Labs   Lab 10/31/23  2146 11/01/23  0558   WBC 13.31* 17.93*   RBC 3.84* 3.99*   HGB 11.8* 12.2*   HCT 33.6* 34.7*   PLT 19*  --    MCV 88 87   MCH 30.7 30.6   MCHC 35.1 35.2     CMP:   Recent Labs   Lab 11/01/23  0558   *   CALCIUM 7.6*   ALBUMIN 2.2*   PROT 5.7*   *   K 3.3*   CO2 19*      BUN 28*   CREATININE 1.1   ALKPHOS 90   *   *   BILITOT 1.3*     Coagulation:   Recent Labs   Lab 10/30/23  2021   INR 1.0   APTT 44.4*     Lactic Acid:   Recent Labs   Lab 10/31/23  1543   LACTATE 3.1*     LFTs:   Recent Labs   Lab 11/01/23  0558   *   *   ALKPHOS 90   BILITOT 1.3*   PROT 5.7*   ALBUMIN 2.2*     Lipase: No results for input(s): "LIPASE" in the last 48 hours.    Significant Diagnostics:  I have reviewed all pertinent imaging results/findings within the past 24 hours.  Echo 10/31/23    Presence " of vegetation on anterior MV leaflet. possivble small vegetation on RCC of AV. consider NEW for further evaluation.    Left Ventricle: The left ventricle is normal in size. Ventricular mass is normal. Normal wall thickness. Normal wall motion. There is mildly reduced systolic function with a visually estimated ejection fraction of 40 - 45%. Biplane (2D) method of discs ejection fraction is 43%. Grade I diastolic dysfunction.    Right Ventricle: Normal right ventricular cavity size. Wall thickness is normal. Right ventricle wall motion  is normal. Systolic function is normal.    Left Atrium: Left atrium is mildly dilated.    Aortic Valve: The aortic valve is a trileaflet valve. Cannot exclude small vegetation on RCC of AV. There is no aortic abscess prsesent . Other leaflets appear normal.    Mitral Valve: There is a medium mobile echogenic mass present on the anterior MV leaflet that is flail consistent with a large vegetation measuring 1.9 x 0.8 cm. There is at least moderate eccentric anteriorly directed regurgitation due to flail anterior leaflet. Partial perforation of anterior MV leaflet cannot be excluded/    Pulmonary Artery: The estimated pulmonary artery systolic pressure is 30 mmHg.    IVC/SVC: Intermediate venous pressure at 8 mmHg.         Assessment/Plan:         Endocarditis  49 yo M with no past cteremia. He undmedical history admitted 10/30 2/2 PNA vs UTI vs meningitis requiring vasopressor support. No history of IVDU or recent dental work. Further work up revealed MRSA bacteremia. TTE revealed LVEF 40-45%. Mitral valve vegetation with moderate MR due to flail anterior leaflet. Small vegetation on RCC of Ao valve, no AI noted. No surgical intervention warranted at this time. Recommend get NEW for further evaluation, compete 6 weeks of IV abx, and will follow up in clinic as outpatient. Dr. Beal to review and staff.        Thank you for your consult. Please contact us if you have any additional  questions.    CTS Attending Note:    I have personally taken the history and examined this patient and agree with the SIMEON's note as stated above. I spoke with pt and his family member. With moderate MR only, would not recommend surgery. Recommend NEW and assuming no additional findings, 6 weeks of IV abx. I will be glad to see back in 6 weeks with a TTE.    ANGIE ManeC  Cardiothoracic Surgery  [facility] - [facility]

## 2023-11-01 NOTE — HPI
This is the strange case of a 47 yo man with no medical problems who reportedly developed acute onset of symptoms a few days ago. He was seen in several urgent cares w/headache, abdominal pain with nausea but no vomiting or diarrhea.  There he was evaluated and instructed to take Tylenol and follow up if he still felt unwell.  When he presented with hematuria, he was sent to the ED here and was found to have thrombocytopenia. CT head negative, CT C/A/P showing perinephric stranding, distended bladder, and hiatal hernia. Patient admitted to MICU for further workup and treatment of his hypotension and likely severe sepsis. Blood cultures have grown MRSA and a TTE revealed a flail mitral leflet and a possible vegetation on the aortic valve. ID is consulted for that. The patient is accompanied by his mother. He is scheduled for a NEW. Denies IDU. Denetal implants. HIV NR.

## 2023-11-01 NOTE — SUBJECTIVE & OBJECTIVE
Interval History/Significant Events: Pt says he is feeling better. Strength appears improved.    Objective:     Vital Signs (Most Recent):  Temp: 98.6 °F (37 °C) (11/01/23 1105)  Pulse: 97 (11/01/23 1400)  Resp: (!) 38 (11/01/23 1400)  BP: 105/77 (11/01/23 1400)  SpO2: 95 % (11/01/23 1400) Vital Signs (24h Range):  Temp:  [98.1 °F (36.7 °C)-99.2 °F (37.3 °C)] 98.6 °F (37 °C)  Pulse:  [] 97  Resp:  [18-42] 38  SpO2:  [89 %-97 %] 95 %  BP: ()/(57-84) 105/77   Weight: 77.1 kg (170 lb)  Body mass index is 22.43 kg/m².      Intake/Output Summary (Last 24 hours) at 11/1/2023 1506  Last data filed at 11/1/2023 1201  Gross per 24 hour   Intake 3477.55 ml   Output 2150 ml   Net 1327.55 ml          Physical Exam  Vitals and nursing note reviewed.   Constitutional:       Appearance: He is ill-appearing.   HENT:      Head: Normocephalic and atraumatic.      Mouth/Throat:      Mouth: Mucous membranes are moist.      Pharynx: Oropharyngeal exudate (white exudate on tongue) present.      Comments: Dental implants  Eyes:      Extraocular Movements: Extraocular movements intact.      Conjunctiva/sclera:      Right eye: Right conjunctiva is injected.      Left eye: Left conjunctiva is injected.   Cardiovascular:      Rate and Rhythm: Regular rhythm. Tachycardia present.      Heart sounds: Normal heart sounds.   Pulmonary:      Effort: Pulmonary effort is normal. No respiratory distress.      Breath sounds: Normal breath sounds.   Abdominal:      General: Abdomen is flat. There is no distension.      Palpations: Abdomen is soft.      Tenderness: There is no abdominal tenderness.   Musculoskeletal:      Cervical back: Neck supple.      Right lower leg: No edema.      Left lower leg: No edema.   Skin:     General: Skin is warm and dry.      Findings: Rash (petechiae b/l LE, lower, L palm) present.   Neurological:      General: No focal deficit present.      Mental Status: He is alert and oriented to person, place, and time.       Motor: Weakness (generalized weakness, improving, 4+ all extremities) present.   Psychiatric:         Mood and Affect: Mood normal.         Behavior: Behavior normal.            Vents:     Lines/Drains/Airways       Peripheral Intravenous Line  Duration                  Peripheral IV - Single Lumen 10/30/23 1924 20 G Right Antecubital 1 day         Peripheral IV - Single Lumen 10/30/23 2047 20 G Anterior;Left Forearm 1 day         Peripheral IV - Single Lumen 10/31/23 1230 18 G Anterior;Distal;Right Forearm 1 day                  Significant Labs:    CBC/Anemia Profile:  Recent Labs   Lab 10/30/23  2327 10/31/23  0514 10/31/23  2146 11/01/23  0558   WBC 7.11 10.58 13.31* 17.93*   HGB 12.9* 13.3* 11.8* 12.2*   HCT 36.9* 38.1* 33.6* 34.7*   PLT 22* 21* 19* 25*   MCV 86 88 88 87   RDW 13.7 13.8 13.7 13.7   FERRITIN  --  4,925*  --   --    RETIC 0.6  --   --   --         Chemistries:  Recent Labs   Lab 10/30/23  1923 10/31/23  0514 11/01/23  0558   * 131* 134*   K 3.7 3.6 3.3*    102 101   CO2 17* 17* 19*   BUN 34* 24* 28*   CREATININE 1.6* 1.1 1.1   CALCIUM 8.0* 7.5* 7.6*   ALBUMIN 2.8* 2.3* 2.2*   PROT 6.5 5.5* 5.7*   BILITOT 3.0* 2.2* 1.3*   ALKPHOS 109 81 90   * 114* 104*   * 202* 196*   MG  --  2.0 2.5   PHOS  --  1.7* 1.6*       All pertinent labs within the past 24 hours have been reviewed.    Significant Imaging:  I have reviewed all pertinent imaging results/findings within the past 24 hours.

## 2023-11-01 NOTE — ASSESSMENT & PLAN NOTE
47 yo M with no past medical history admitted 10/30 2/2 PNA vs UTI vs meningitis requiring vasopressor support. Further work up revealed MRSA bacteremia. He underwent TTE, which was notable for mitral valve vegetation with flail anterior leaflet, as well as possible aortic valve vegetation.   - EKG sinus rhythm, low voltage.   - Troponin 18.298 --> 19.814 --> 27.88    He denies IVDU (supported by negative UDS on admission). No history of dental abscesses or recent dental work (does have dental caps, states they were placed remotely). No significant family cardiac history.   He has been evaluated by HTS, who believe that stated that while it is possible this is related to myocarditis, there are other likely explanations for his echo findings and elevated troponin, and even if he were to have myocarditis, there would be no interventions to offer regardless.   Per patient's mother, CTS visited at bedside and stated surgical intervention is not indicated    Recommendations:  - Agree with NEW for further evaluation  - Continue antibiotics per ID

## 2023-11-01 NOTE — CONSULTS
[facility] - Cardiac Medical ICU  Infectious Disease  Consult Note    Patient Name: Lorenzo Nino  MRN: [id number]  Admission Date: 10/30/2023  Hospital Length of Stay: 2 days  Attending Physician: Santos Caballero MD  Primary Care Provider: Santos Caballero MD     Isolation Status: Contact    Patient information was obtained from patient, parent, past medical records and ER records.      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  - awaiting NEW  - agree with vancomycin, keeping trough 15-20  - anticipating 6 weeks of abx from clearance of bacteremia  - d/w patient and Mom    Thank you for your consult. I will follow-up with patient. Please contact us if you have any additional questions.    Mark Montejo MD  Infectious Disease  [facility] - Cardiac Medical ICU    Subjective:     Principal Problem: <principal problem not specified>    HPI: This is the strange case of a 49 yo man with no medical problems who reportedly developed acute onset of symptoms a few days ago. He was seen in several urgent cares w/headache, abdominal pain with nausea but no vomiting or diarrhea.  There he was evaluated and instructed to take Tylenol and follow up if he still felt unwell.  When he presented with hematuria, he was sent to the ED here and was found to have thrombocytopenia. CT head negative, CT C/A/P showing perinephric stranding, distended bladder, and hiatal hernia. Patient admitted to MICU for further workup and treatment of his hypotension and likely severe sepsis. Blood cultures have grown MRSA and a TTE revealed a flail mitral leflet and a possible vegetation on the aortic valve. ID is consulted for that. The patient is accompanied by his mother. He is scheduled for a NEW. Denies IDU. Denetal  implants. HIV NR.         History reviewed. No pertinent past medical history.    History reviewed. No pertinent surgical history.    Review of patient's allergies indicates:  No Known Allergies    Medications:  No medications prior to admission.     Antibiotics (From admission, onward)      Start     Stop Route Frequency Ordered    11/01/23 2100  vancomycin 1,250 mg in dextrose 5 % (D5W) 250 mL IVPB (Vial-Mate)         -- IV Every 12 hours (non-standard times) 11/01/23 1108    10/31/23 0900  mupirocin 2 % ointment         11/05/23 0859 Nasl 2 times daily 10/31/23 0730    10/30/23 2358  vancomycin - pharmacy to dose  (vancomycin IVPB (PEDS and ADULTS))        See Hyperspace for full Linked Orders Report.    -- IV pharmacy to manage frequency 10/30/23 2259          Antifungals (From admission, onward)      None          Antivirals (From admission, onward)      None             Immunization History   Administered Date(s) Administered    Hepatitis A, Adult 09/16/2005    Td - PF (ADULT) 09/16/2005       Family History    None       Social History     Socioeconomic History    Marital status: Single   Tobacco Use    Smoking status: Unknown   Substance and Sexual Activity    Alcohol use: Yes    Drug use: Never     Review of Systems  Objective:     Vital Signs (Most Recent):  Temp: 98.6 °F (37 °C) (11/01/23 1105)  Pulse: 98 (11/01/23 1200)  Resp: (!) 27 (11/01/23 1200)  BP: 104/77 (11/01/23 1200)  SpO2: (!) 94 % (11/01/23 1200) Vital Signs (24h Range):  Temp:  [98.1 °F (36.7 °C)-99.4 °F (37.4 °C)] 98.6 °F (37 °C)  Pulse:  [] 98  Resp:  [18-41] 27  SpO2:  [89 %-97 %] 94 %  BP: ()/(57-79) 104/77     Weight: 77.1 kg (170 lb)  Body mass index is 22.43 kg/m².    Estimated Creatinine Clearance: 88.6 mL/min (based on SCr of 1.1 mg/dL).     Physical Exam  Vitals and nursing note reviewed.   Constitutional:       Appearance: Normal appearance.   HENT:      Head: Normocephalic.      Nose: Nose normal.       Mouth/Throat:      Mouth: Mucous membranes are moist.      Comments: Eschar upper palate  Eyes:      Pupils: Pupils are equal, round, and reactive to light.   Cardiovascular:      Rate and Rhythm: Rhythm irregular.      Heart sounds: Murmur heard.   Abdominal:      Tenderness: There is no abdominal tenderness. There is no guarding.   Skin:     Findings: Lesion present. No erythema.      Comments: Petechiae palms   Neurological:      General: No focal deficit present.      Mental Status: He is alert.   Psychiatric:         Mood and Affect: Mood normal.          Significant Labs: Blood Culture:   Recent Labs   Lab 10/30/23  1923 10/30/23  1924 11/01/23  0607 11/01/23  0609   LABBLOO Gram stain aer bottle: Gram positive cocci in clusters resembling Staph  Gram stain carlie bottle: Gram positive cocci in clusters resembling Staph  Results called to and read back by: Savannah Butler RN 10/31/2023  06:33  STAPHYLOCOCCUS AUREUS  ID consult required at [facility].  For susceptibility see order #[id number]  * Gram stain aer bottle: Gram positive cocci in clusters resembling Staph  Gram stain carlie bottle: Gram positive cocci in clusters resembling Staph  Results called to and read back by: Savannah Butler RN 10/31/2023  06  STAPHYLOCOCCUS AUREUS  Susceptibility pending  ID consult required at [facility].  * No Growth to date No Growth to date     CBC:   Recent Labs   Lab 10/31/23  0514 10/31/23  2146 11/01/23  0558   WBC 10.58 13.31* 17.93*   HGB 13.3* 11.8* 12.2*   HCT 38.1* 33.6* 34.7*   PLT 21* 19* 25*     CMP:   Recent Labs   Lab 10/30/23  1923 10/31/23  0514 11/01/23  0558   * 131* 134*   K 3.7 3.6 3.3*    102 101   CO2 17* 17* 19*   * 130* 179*   BUN 34* 24* 28*   CREATININE 1.6* 1.1 1.1   CALCIUM 8.0* 7.5* 7.6*   PROT 6.5 5.5* 5.7*   ALBUMIN 2.8* 2.3* 2.2*   BILITOT 3.0* 2.2* 1.3*   ALKPHOS 109 81 90   * 202* 196*   * 114* 104*  "  ANIONGAP 12 12 14     Respiratory Culture: No results for input(s): "GSRESP", "RESPIRATORYC" in the last 4320 hours.  Urine Culture:   Recent Labs   Lab 10/30/23  2152   LABURIN STAPHYLOCOCCUS AUREUS  >100,000cfu/ml  Susceptibility pending  No other significant isolate  *       Significant Imaging: I have reviewed all pertinent imaging results/findings within the past 24 hours.              "

## 2023-11-01 NOTE — ASSESSMENT & PLAN NOTE
47 yo man with suspected MRSA MVE/AVE of unclear etiology  - dental implant?  - awaiting NEW  - agree with vancomycin, keeping trough 15-20  - anticipating 6 weeks of abx from clearance of bacteremia  - d/w patient and Mom

## 2023-11-01 NOTE — ASSESSMENT & PLAN NOTE
Results for orders placed during the hospital encounter of 10/30/23    Echo Saline Bubble? No    Interpretation Summary    Left Ventricle: The left ventricle is normal in size. Ventricular mass is normal. Normal wall thickness. Global hypokinesis present. There is mildly reduced systolic function with a visually estimated ejection fraction of 40 - 45%.    Right Ventricle: Normal right ventricular cavity size. Wall thickness is normal. Right ventricle wall motion  is normal. Systolic function is normal.    Left Atrium: Left atrium is severely dilated.    Right Atrium: Right atrium is dilated.    Aortic Valve: RCC is thickended with mild prolapse.  Cannot exclude RCC vegetation. The remaining leaflets have normal mobility. There is trace aortic regurgitation.    Mitral Valve: Both leaflets are thickened.Cannot exclude anterior leaflet vegetation coating this leaflet. There is prolapse of the posterior mitral leaflet. Flail posterior leaflet. There is a large mobile echogenic mass present on the posterior leaflet consistent with vegetation. The mean pressure gradient across the mitral valve is 6 mmHg at a heart rate of 124 bpm. There is severe regurgitation with an anteromedial eccentrically directed jet.    Tricuspid Valve: There is mild regurgitation.    Pulmonary Artery: The estimated pulmonary artery systolic pressure is 31 mmHg.    IVC/SVC: Normal venous pressure at 3 mmHg.    Appears to be a new problem. Would hold addition of GDMT while managing acute illness.   Follow up with outpatient cardiology.

## 2023-11-01 NOTE — ASSESSMENT & PLAN NOTE
New finding of HFrEF, TTE findings above under Endocarditis.     - lasix today  - cardiology advising holding of GDMT while inpatient, will f/u outpatient

## 2023-11-01 NOTE — ASSESSMENT & PLAN NOTE
See endocarditis  Likely due to demand ischemia in setting of critical illness vs myocarditis (see HTS note)

## 2023-11-01 NOTE — PLAN OF CARE
[facility] - Cardiac Medical ICU  Initial Discharge Assessment       Primary Care Provider: Santos Caballero MD    Admission Diagnosis: Tachycardia [R00.0]  Thrombocytopenia [D69.6]  Fever [R50.9]  Abnormal LFTs [R79.89]    Admission Date: 10/30/2023  Expected Discharge Date: 11/3/2023    Transition of Care Barriers: None    Payor: BLUE CROSS BLUE SHIELD / Plan: [place] PPO / Product Type: PPO /     Extended Emergency Contact Information  Primary Emergency Contact: Federica Shelley  Mobile Phone: [phone]  Relation: Significant other  Secondary Emergency Contact: Jolie Briscoe  Mobile Phone: [phone]  Relation: Mother   needed? No    Discharge Plan A: Home with family  Discharge Plan B: Home    No Pharmacies Listed    Initial Assessment (most recent)       Adult Discharge Assessment - 11/01/23 1647          Discharge Assessment    Assessment Type Discharge Planning Assessment     Confirmed/corrected address, phone number and insurance Yes     Confirmed Demographics Correct on Facesheet     Source of Information family     If unable to respond/provide information was family/caregiver contacted? Yes     Contact Name/Number mother Pitts/cp# [phone]     When was your last doctors appointment? --   n/a    Does patient/caregiver understand observation status No     Was observation education provided? No     Communicated DARIELA with patient/caregiver Date not available/Unable to determine     Reason For Admission Tachycardia     People in Home spouse;other relative(s)     Do you expect to return to your current living situation? Yes     Do you have help at home or someone to help you manage your care at home? Yes     Who are your caregiver(s) and their phone number(s)? Federica Shelley, s/o/cp# [phone]     Prior to hospitilization cognitive status: Alert/Oriented     Current cognitive status: Unable to Assess     Home Layout Able to live on 1st floor     Equipment Currently Used at Home none      Readmission within 30 days? No     Patient currently being followed by outpatient case management? No     Do you currently have service(s) that help you manage your care at home? No     Do you take prescription medications? No     Do you have any problems affording any of your prescribed medications? No     Is the patient taking medications as prescribed? no     If no, which medications is patient not taking? Patient not on any medications     Who is going to help you get home at discharge? Mother: Jolie Briscoe     How do you get to doctors appointments? other (see comments)   Patient doesn't have a pcp    Are you on dialysis? No     Do you take coumadin? No     DME Needed Upon Discharge  other (see comments)   TBD    Discharge Plan discussed with: Parent(s)     Name(s) and Number(s) Jolie Briscoe, mother/cp# [phone]     Transition of Care Barriers None     Discharge Plan A Home with family     Discharge Plan B Home        Physical Activity    On average, how many days per week do you engage in moderate to strenuous exercise (like a brisk walk)? 0 days     On average, how many minutes do you engage in exercise at this level? 0 min        Financial Resource Strain    How hard is it for you to pay for the very basics like food, housing, medical care, and heating? Not hard at all        Housing Stability    In the last 12 months, was there a time when you were not able to pay the mortgage or rent on time? No     In the last 12 months, how many places have you lived? 1     In the last 12 months, was there a time when you did not have a steady place to sleep or slept in a shelter (including now)? No        Transportation Needs    In the past 12 months, has lack of transportation kept you from medical appointments or from getting medications? No     In the past 12 months, has lack of transportation kept you from meetings, work, or from getting things needed for daily living? No        Food Insecurity    Within the  past 12 months, you worried that your food would run out before you got the money to buy more. Never true     Within the past 12 months, the food you bought just didn't last and you didn't have money to get more. Never true        Stress    Do you feel stress - tense, restless, nervous, or anxious, or unable to sleep at night because your mind is troubled all the time - these days? Not at all        Social Connections    In a typical week, how many times do you talk on the phone with family, friends, or neighbors? More than three times a week     How often do you get together with friends or relatives? Once a week     How often do you attend Hindu or Moravian services? Never     Do you belong to any clubs or organizations such as Hindu groups, unions, fraternal or athletic groups, or school groups? No     How often do you attend meetings of the clubs or organizations you belong to? Never     Are you , , , , never , or living with a partner? Never         Alcohol Use    Q1: How often do you have a drink containing alcohol? 2-4 times a month     Q2: How many drinks containing alcohol do you have on a typical day when you are drinking? 10 or more     Q3: How often do you have six or more drinks on one occasion? Weekly        OTHER    Name(s) of People in Home Federica Shelley s/natonia and her daughter (Zoila Shelley 24 y/o)                   Spoke to mother (Jolie Briscoe).  Patient lives with Federica Shelley, s/o and jazzmine Navarrete of s/o. Post hospital stay Federica will be his support person and help in the home.  Patient has transportation at discharge with mother or s/o.  There have been no hospitalizations within the last 30 days per mother. Verified patient's PCP and preferred Pharmacy.  Mother states not on Coumadin and is not receiving dialysis.  All questions answered regarding Case Management Discharge Planning, mother verbalized understanding.  SW will  continue to follow and assist with dc planning needs or concerns.      Discharge Plan A and Plan B have been determined by review of patient's clinical status, future medical and therapeutic needs, and coverage/benefits for post-acute care in coordination with multidisciplinary team members.    Jennifer Katz LMSW  [facility]  [phone]

## 2023-11-01 NOTE — SUBJECTIVE & OBJECTIVE
History reviewed. No pertinent past medical history.    History reviewed. No pertinent surgical history.    Review of patient's allergies indicates:  No Known Allergies    Medications:  No medications prior to admission.     Antibiotics (From admission, onward)      Start     Stop Route Frequency Ordered    11/01/23 2100  vancomycin 1,250 mg in dextrose 5 % (D5W) 250 mL IVPB (Vial-Mate)         -- IV Every 12 hours (non-standard times) 11/01/23 1108    10/31/23 0900  mupirocin 2 % ointment         11/05/23 0859 Nasl 2 times daily 10/31/23 0730    10/30/23 2358  vancomycin - pharmacy to dose  (vancomycin IVPB (PEDS and ADULTS))        See Hyperspace for full Linked Orders Report.    -- IV pharmacy to manage frequency 10/30/23 2259          Antifungals (From admission, onward)      None          Antivirals (From admission, onward)      None             Immunization History   Administered Date(s) Administered    Hepatitis A, Adult 09/16/2005    Td - PF (ADULT) 09/16/2005       Family History    None       Social History     Socioeconomic History    Marital status: Single   Tobacco Use    Smoking status: Unknown   Substance and Sexual Activity    Alcohol use: Yes    Drug use: Never     Review of Systems  Objective:     Vital Signs (Most Recent):  Temp: 98.6 °F (37 °C) (11/01/23 1105)  Pulse: 98 (11/01/23 1200)  Resp: (!) 27 (11/01/23 1200)  BP: 104/77 (11/01/23 1200)  SpO2: (!) 94 % (11/01/23 1200) Vital Signs (24h Range):  Temp:  [98.1 °F (36.7 °C)-99.4 °F (37.4 °C)] 98.6 °F (37 °C)  Pulse:  [] 98  Resp:  [18-41] 27  SpO2:  [89 %-97 %] 94 %  BP: ()/(57-79) 104/77     Weight: 77.1 kg (170 lb)  Body mass index is 22.43 kg/m².    Estimated Creatinine Clearance: 88.6 mL/min (based on SCr of 1.1 mg/dL).     Physical Exam  Vitals and nursing note reviewed.   Constitutional:       Appearance: Normal appearance.   HENT:      Head: Normocephalic.      Nose: Nose normal.      Mouth/Throat:      Mouth: Mucous  membranes are moist.      Comments: Eschar upper palate  Eyes:      Pupils: Pupils are equal, round, and reactive to light.   Cardiovascular:      Rate and Rhythm: Rhythm irregular.      Heart sounds: Murmur heard.   Abdominal:      Tenderness: There is no abdominal tenderness. There is no guarding.   Skin:     Findings: Lesion present. No erythema.      Comments: Petechiae palms   Neurological:      General: No focal deficit present.      Mental Status: He is alert.   Psychiatric:         Mood and Affect: Mood normal.          Significant Labs: Blood Culture:   Recent Labs   Lab 10/30/23  1923 10/30/23  1924 11/01/23  0607 11/01/23  0609   LABBLOO Gram stain aer bottle: Gram positive cocci in clusters resembling Staph  Gram stain carlie bottle: Gram positive cocci in clusters resembling Staph  Results called to and read back by: Savannah Butler RN 10/31/2023  06:33  STAPHYLOCOCCUS AUREUS  ID consult required at [facility] and [facility] locations.  For susceptibility see order #[id number]  * Gram stain aer bottle: Gram positive cocci in clusters resembling Staph  Gram stain carlie bottle: Gram positive cocci in clusters resembling Staph  Results called to and read back by: Savannah Butler RN 10/31/2023  06  STAPHYLOCOCCUS AUREUS  Susceptibility pending  ID consult required at [facility] and [facility] locations.  * No Growth to date No Growth to date     CBC:   Recent Labs   Lab 10/31/23  0514 10/31/23  2146 11/01/23  0558   WBC 10.58 13.31* 17.93*   HGB 13.3* 11.8* 12.2*   HCT 38.1* 33.6* 34.7*   PLT 21* 19* 25*     CMP:   Recent Labs   Lab 10/30/23  1923 10/31/23  0514 11/01/23  0558   * 131* 134*   K 3.7 3.6 3.3*    102 101   CO2 17* 17* 19*   * 130* 179*   BUN 34* 24* 28*   CREATININE 1.6* 1.1 1.1   CALCIUM 8.0* 7.5* 7.6*   PROT 6.5 5.5* 5.7*   ALBUMIN 2.8* 2.3* 2.2*   BILITOT 3.0* 2.2* 1.3*   ALKPHOS 109 81 90   * 202* 196*   * 114* 104*   ANIONGAP 12 12 14     Respiratory  "Culture: No results for input(s): "GSRESP", "RESPIRATORYC" in the last 4320 hours.  Urine Culture:   Recent Labs   Lab 10/30/23  2152   LABURIN STAPHYLOCOCCUS AUREUS  >100,000cfu/ml  Susceptibility pending  No other significant isolate  *       Significant Imaging: I have reviewed all pertinent imaging results/findings within the past 24 hours.  "

## 2023-11-01 NOTE — ASSESSMENT & PLAN NOTE
As evidence by history, physical exam and imaging. CXR significant for perihilar interstitial opacities compatible with multifocal pneumonia vs pulmonary edema. 3 out of 4 SIRS criteria met. Initiate IV antibiotics for comm-acquired pneumonia with vanc, ceftriaxone and azithromycin to cover for atypical pathogens. Respiratory viral panel was negative. Respiratory culture and gram stain ordered.    -- continuing vanc  -- Monitor SpO2  -- Supplemental O2 as indicated  -- ABGs PRN  -- Acetaminophen for fever  -- Guaifenesin or Tessalon Perls PRN for cough  -- Encourage Incentive spirometry    -- Strict aspiration precautions  -- Provide nebulizer treatments scheduled

## 2023-11-01 NOTE — PROGRESS NOTES
Pharmacokinetic Assessment Follow Up: IV Vancomycin    Vancomycin Regimen Assessment & Plan  - Vancomycin trough drawn this morning resulted as 8.9 mcg/mL.  - Renal function appears stable and trough is below goal 15-20 mcg/mL. Increasing vancomycin regimen from 1000 mg IV q12h to 1250 mg IV q12h.  - Draw trough on 11/3 @0800 prior to 4th total dose.    Drug levels (last 3 results):  Recent Labs   Lab Result Units 10/31/23  0514 11/01/23  1020   Vancomycin, Random ug/mL 8.6  --    Vancomycin-Trough ug/mL  --  8.9*     Pharmacy will continue to follow and monitor vancomycin.    Please contact pharmacy at extension [phone] for questions regarding this assessment.    Thank you for the consult,   Ck Wilson     Patient brief summary:  Lorenzo Nino is a 48 y.o. male initiated on antimicrobial therapy with IV Vancomycin for treatment of bacteremia    Drug Allergies:   Review of patient's allergies indicates:  No Known Allergies    Actual Body Weight:   77.1 kg    Renal Function:   Estimated Creatinine Clearance: 88.6 mL/min (based on SCr of 1.1 mg/dL).,     Dialysis Method (if applicable):  N/A

## 2023-11-01 NOTE — PROGRESS NOTES
[facility] - Cardiac Medical ICU  Critical Care Medicine  Progress Note    Patient Name: Lorenzo Nino  MRN: [id number]  Admission Date: 10/30/2023  Hospital Length of Stay: 2 days  Code Status: Full Code  Attending Provider: Santos Caballero MD  Primary Care Provider: Santos Caballero MD   Principal Problem: <principal problem not specified>    Subjective:     HPI:  Lorenzo Nino is a 48yoM who presented to the ED for evaluation of fatigue, body aches, abd pain, and hematuria. History provided by pt and significant other. Pt went to urgent care 3 days ago with headache, abdominal pain with nausea but no vomiting or diarrhea. He was discharged with instructions to take Tylenol and return to clinic if still feeling unwell. He represented to urgent care with blood in his urine and was advised to go to the ED on 10/30/23. Presenting symptoms now include decreased PO intake, subjective fevers, persistent headache, severe muscle aches, and neck stiffness. Urine now persistently orange in setting of increased confusion. No reported fever, diaphoresis, nausea or vomiting, alcohol abuse or drug use. Pt is not currently sexually active with his partner secondary to her chronological problems with cancer.     In the ED, patient was found to be significantly tachycardic with mild hypotension requiring IVF and vasopressors. His lactic acid was significantly elevated and there were initial concerns for meningitis. However, patients platelet count was too low for a LP to be performed so he was empirically treated with ABX, acyclovir, and steroids. CT head negative, CT C/A/P showing perinephric stranding, distended bladder, and hiatal hernia. Patient admitted to MICU for further workup and treatment of his hypotension and likely severe sepsis      Hospital/ICU Course:  Pt admitted for hypotension and tachycardia. Platelets 21k, strong concern for TTP given PLASMIC score 6/7. Transfused 2 units FFP pending [id number] results. BCx  positive for MRSA on 10/31/23. 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 Was contacted by hematology who had received word from the lab that his [id number] was normal. Cardiology and HTS advising CTS consult. CTS recommending NEW for better evaluation of endocarditis, treat endocarditis, and follow-up outpatient. ID advising vanc for 6 weeks following clearance of blood cultures. NEW pending.      Interval History/Significant Events: Pt says he is feeling better. Strength appears improved.    Objective:     Vital Signs (Most Recent):  Temp: 98.6 °F (37 °C) (11/01/23 1105)  Pulse: 97 (11/01/23 1400)  Resp: (!) 38 (11/01/23 1400)  BP: 105/77 (11/01/23 1400)  SpO2: 95 % (11/01/23 1400) Vital Signs (24h Range):  Temp:  [98.1 °F (36.7 °C)-99.2 °F (37.3 °C)] 98.6 °F (37 °C)  Pulse:  [] 97  Resp:  [18-42] 38  SpO2:  [89 %-97 %] 95 %  BP: ()/(57-84) 105/77   Weight: 77.1 kg (170 lb)  Body mass index is 22.43 kg/m².      Intake/Output Summary (Last 24 hours) at 11/1/2023 1506  Last data filed at 11/1/2023 1201  Gross per 24 hour   Intake 3477.55 ml   Output 2150 ml   Net 1327.55 ml          Physical Exam  Vitals and nursing note reviewed.   Constitutional:       Appearance: He is ill-appearing.   HENT:      Head: Normocephalic and atraumatic.      Mouth/Throat:      Mouth: Mucous membranes are moist.      Pharynx: Oropharyngeal exudate (white exudate on tongue) present.      Comments: Dental implants  Eyes:      Extraocular Movements: Extraocular movements intact.      Conjunctiva/sclera:      Right eye: Right conjunctiva is injected.      Left eye: Left conjunctiva is injected.   Cardiovascular:      Rate and Rhythm: Regular rhythm. Tachycardia present.      Heart sounds: Normal heart sounds.   Pulmonary:      Effort: Pulmonary effort is normal. No respiratory distress.      Breath sounds: Normal breath sounds.   Abdominal:      General: Abdomen is flat.  There is no distension.      Palpations: Abdomen is soft.      Tenderness: There is no abdominal tenderness.   Musculoskeletal:      Cervical back: Neck supple.      Right lower leg: No edema.      Left lower leg: No edema.   Skin:     General: Skin is warm and dry.      Findings: Rash (petechiae b/l LE, lower, L palm) present.   Neurological:      General: No focal deficit present.      Mental Status: He is alert and oriented to person, place, and time.      Motor: Weakness (generalized weakness, improving, 4+ all extremities) present.   Psychiatric:         Mood and Affect: Mood normal.         Behavior: Behavior normal.            Vents:     Lines/Drains/Airways       Peripheral Intravenous Line  Duration                  Peripheral IV - Single Lumen 10/30/23 1924 20 G Right Antecubital 1 day         Peripheral IV - Single Lumen 10/30/23 2047 20 G Anterior;Left Forearm 1 day         Peripheral IV - Single Lumen 10/31/23 1230 18 G Anterior;Distal;Right Forearm 1 day                  Significant Labs:    CBC/Anemia Profile:  Recent Labs   Lab 10/30/23  2327 10/31/23  0514 10/31/23  2146 11/01/23  0558   WBC 7.11 10.58 13.31* 17.93*   HGB 12.9* 13.3* 11.8* 12.2*   HCT 36.9* 38.1* 33.6* 34.7*   PLT 22* 21* 19* 25*   MCV 86 88 88 87   RDW 13.7 13.8 13.7 13.7   FERRITIN  --  4,925*  --   --    RETIC 0.6  --   --   --         Chemistries:  Recent Labs   Lab 10/30/23  1923 10/31/23  0514 11/01/23  0558   * 131* 134*   K 3.7 3.6 3.3*    102 101   CO2 17* 17* 19*   BUN 34* 24* 28*   CREATININE 1.6* 1.1 1.1   CALCIUM 8.0* 7.5* 7.6*   ALBUMIN 2.8* 2.3* 2.2*   PROT 6.5 5.5* 5.7*   BILITOT 3.0* 2.2* 1.3*   ALKPHOS 109 81 90   * 114* 104*   * 202* 196*   MG  --  2.0 2.5   PHOS  --  1.7* 1.6*       All pertinent labs within the past 24 hours have been reviewed.    Significant Imaging:  I have reviewed all pertinent imaging results/findings within the past 24 hours.      ABG  Recent Labs   Lab  10/30/23  2023   PH 7.377   PO2 35*   PCO2 27.8*   HCO3 16.3*   BE -9*     Assessment/Plan:     Pulmonary  PNA (pneumonia)  As evidence by history, physical exam and imaging. CXR significant for perihilar interstitial opacities compatible with multifocal pneumonia vs pulmonary edema. 3 out of 4 SIRS criteria met. Initiate IV antibiotics for comm-acquired pneumonia with vanc, ceftriaxone and azithromycin to cover for atypical pathogens. Respiratory viral panel was negative. Respiratory culture and gram stain ordered.    -- continuing vanc  -- Monitor SpO2  -- Supplemental O2 as indicated  -- ABGs PRN  -- Acetaminophen for fever  -- Guaifenesin or Tessalon Perls PRN for cough  -- Encourage Incentive spirometry    -- Strict aspiration precautions  -- Provide nebulizer treatments scheduled    Cardiac/Vascular  HFrEF (heart failure with reduced ejection fraction)  New finding of HFrEF, TTE findings above under Endocarditis.     - lasix today  - cardiology advising holding of GDMT while inpatient, will f/u outpatient    Atrial fibrillation  Irregular irregular rhythm noted on telemetry with rates in 90s/100s. Like 2/2 to acute illness.    - cardiology does not recommend treatment at this time    Endocarditis  BCx growing gram positive cocci w/ PCR positive for MRSA. Obtained TTE.    TTE (10/31/23):    Presence of vegetation on anterior MV leaflet. possivble small vegetation on RCC of AV. consider NEW for further evaluation.    Left Ventricle: The left ventricle is normal in size. Ventricular mass is normal. Normal wall thickness. Normal wall motion. There is mildly reduced systolic function with a visually estimated ejection fraction of 40 - 45%. Biplane (2D) method of discs ejection fraction is 43%. Grade I diastolic dysfunction.    Right Ventricle: Normal right ventricular cavity size. Wall thickness is normal. Right ventricle wall motion  is normal. Systolic function is normal.    Left Atrium: Left atrium is mildly  dilated.    Aortic Valve: The aortic valve is a trileaflet valve. Cannot exclude small vegetation on RCC of AV. There is no aortic abscess prsesent . Other leaflets appear normal.    Mitral Valve: There is a medium mobile echogenic mass present on the anterior MV leaflet that is flail consistent with a large vegetation measuring 1.9 x 0.8 cm. There is at least moderate eccentric anteriorly directed regurgitation due to flail anterior leaflet. Partial perforation of anterior MV leaflet cannot be excluded/    Pulmonary Artery: The estimated pulmonary artery systolic pressure is 30 mmHg.    IVC/SVC: Intermediate venous pressure at 8 mmHg.    -- NEW today  -- covering with vanc, d'c'd rocephin and azithromycin  -- trending troponins, 19 on admission, 16 most recently  -- ID recommended 6 weeks of vancomycin following clearance of blood cultures  -- Cardiology and [facility] recommending CTS consult  -- CTS recommending NEW and will f/u outpatient following treatment for endocarditis  -- daily BCx    Renal/  FRANCESCO (acute kidney injury)  Mr. Lorenzo Nino is being treated for FRANCESCO likely secondary to pre-renal azotemia due to IVVD. Scr of 1.6. Ordered CMP, serum osmolality, urine osmolality, urine sodium, and urine creatinine. Started IVF resuscitation. Strict I/Os to monitor renal function. Discontinued nephrotoxic medications.    --Cr improving following fluid bolus  --Monitor with serial Cr / GFR levels closely with serial labs  --Avoid nephrotoxic medications such as NSAIDs, IV contrast, or RAAS blockade  --Renally dose medications    Lab Results   Component Value Date    CREATININE 1.1 11/01/2023          ID  Septic shock  This patient does have evidence of infective focus  My overall impression is septic shock due to lactate > 4, MAP < 65 and SBP < 90.  Source: Urinary Tract vs PNA  Antibiotics given-   Antibiotics (72h ago, onward)    Start     Stop Route Frequency Ordered    11/01/23 2100  vancomycin 1,250 mg in  dextrose 5 % (D5W) 250 mL IVPB (Vial-Mate)         -- IV Every 12 hours (non-standard times) 11/01/23 1108    10/31/23 0900  mupirocin 2 % ointment         11/05/23 0859 Nasl 2 times daily 10/31/23 0730    10/30/23 2358  vancomycin - pharmacy to dose  (vancomycin IVPB (PEDS and ADULTS))        See Hyperspace for full Linked Orders Report.    -- IV pharmacy to manage frequency 10/30/23 2259      Latest lactate reviewed-  Recent Labs   Lab 10/30/23  2327 10/31/23  1543   LACTATE 3.8* 3.1*     Organ dysfunction indicated by Acute kidney injury, Acute liver injury and Thrombocytopenia   Fluid challenge Ideal Body Weight- The patient's ideal body weight is Ideal body weight: 79.9 kg (176 lb 2.4 oz) which will be used to calculate fluid bolus of 30 ml/kg for treatment of septic shock.      -- Peripheral Pressors prn to maintain MAP of 65  -- BCx growing gram positive cocci, PCR positive for MRSA  -- Continuing vanc, d/c'd rocephin and azithromycin 11/1/23  -- d/c'd acyclovir given low suspicion of viral meningitis and positive blood cultures      Hematology  Thrombocytopenia  Pt presenting with low platelets (23) in setting of acute liver injury likely secondary to shock. Other cause to rule out can include cirrhosis, BM problem (myelodysplasia, chemotherapy, alcohol), HCV/HIV. Labs ordered for GARRY, Smear. PLASMIC score 6/7 but [id number] wnl. Likely 2/2 sepsis and critical illness.    -- Transfused 2 units FFP, platelets improved to 25  -- solumedrol d/c'd given no TPP  -- Trend CBC, PT/INR, aPTT  -- Consulted hematology, appreciate recommendations  -- per hematology team, they heard from lab [id number] wnl  -- PT 11.0, INR 1.0, Rogelio negative, haptoglobin 82, fibrinogen 489, aPTT 44.4  -- transfuse platelets when:   - <10k for patient with failure of platelet production   - <20k for hemorrhagic diathesis   - <50k for active bleeding or immediately prior to invasive procedure   - <100k for neurosurgery, ophthalmic surgery,  CNS bleeding   - >100k for evidence of platelet dysfunction    GI  Elevated transaminase level  Elevated Ast and ALT on presentation along with low platelets and low albumin. Likely secondary to Septic Shock. CMP trended daily for liver function. F/U workup for sepsis and thrombocytopenia. Continue abx. F/U cultures. Acute hepatitis panel negative. No significant hepatic pathology found on CT A/P    -- Trend liver function  -- Continue sepsis treatment        Critical Care Daily Checklist:    A: Awake: RASS Goal/Actual Goal: RASS Goal: 0-->alert and calm  Actual:     B: Spontaneous Breathing Trial Performed?     C: SAT & SBT Coordinated?  n/a                      D: Delirium: CAM-ICU Overall CAM-ICU: Negative   E: Early Mobility Performed? Yes   F: Feeding Goal:    Status:     Current Diet Order   Procedures    Diet NPO      AS: Analgesia/Sedation Tylenol   T: Thromboembolic Prophylaxis No, thrombocytopenia   H: HOB > 300 Yes   U: Stress Ulcer Prophylaxis (if needed) Famotidine   G: Glucose Control LDSSI   B: Bowel Function Stool Occurrence: 1   I: Indwelling Catheter (Lines & Watson) Necessity PIVs   D: De-escalation of Antimicrobials/Pharmacotherapies Vanc  D/c'd azithromycin and rocephin 11/1/23    Plan for the day/ETD NEW    Code Status:  Family/Goals of Care: Full Code         Critical secondary to Patient has a condition that poses threat to life and bodily function: Septic shock      Critical care was time spent personally by me on the following activities: development of treatment plan with patient or surrogate and bedside caregivers, discussions with consultants, evaluation of patient's response to treatment, examination of patient, ordering and performing treatments and interventions, ordering and review of laboratory studies, ordering and review of radiographic studies, pulse oximetry, re-evaluation of patient's condition. This critical care time did not overlap with that of any other provider or involve  time for any procedures.     Bianca Sheppard MD  Critical Care Medicine  [facility] - Cardiac Medical ICU

## 2023-11-01 NOTE — ASSESSMENT & PLAN NOTE
This patient does have evidence of infective focus  My overall impression is septic shock due to lactate > 4, MAP < 65 and SBP < 90.  Source: Urinary Tract vs PNA  Antibiotics given-   Antibiotics (72h ago, onward)    Start     Stop Route Frequency Ordered    11/01/23 2100  vancomycin 1,250 mg in dextrose 5 % (D5W) 250 mL IVPB (Vial-Mate)         -- IV Every 12 hours (non-standard times) 11/01/23 1108    10/31/23 0900  mupirocin 2 % ointment         11/05/23 0859 Nasl 2 times daily 10/31/23 0730    10/30/23 2358  vancomycin - pharmacy to dose  (vancomycin IVPB (PEDS and ADULTS))        See Hyperspace for full Linked Orders Report.    -- IV pharmacy to manage frequency 10/30/23 2259      Latest lactate reviewed-  Recent Labs   Lab 10/30/23  2327 10/31/23  1543   LACTATE 3.8* 3.1*     Organ dysfunction indicated by Acute kidney injury, Acute liver injury and Thrombocytopenia   Fluid challenge Ideal Body Weight- The patient's ideal body weight is Ideal body weight: 79.9 kg (176 lb 2.4 oz) which will be used to calculate fluid bolus of 30 ml/kg for treatment of septic shock.      -- Peripheral Pressors prn to maintain MAP of 65  -- BCx growing gram positive cocci, PCR positive for MRSA  -- Continuing vanc, d/c'd rocephin and azithromycin 11/1/23  -- d/c'd acyclovir given low suspicion of viral meningitis and positive blood cultures

## 2023-11-01 NOTE — CONSULTS
[facility]  Cardiothoracic Surgery  Consult Note    Patient Name: Lorenzo Nino  MRN: [id number]  Admission Date: 10/30/2023  Attending Physician: Santos Caballero MD  Referring Provider: Self, Aaareferral    Patient information was obtained from patient, spouse/SO, ER records and primary team.     Inpatient consult to Cardiothoracic Surgery  Consult performed by: Raquel Escalante PA-C  Consult ordered by: Bianca Sheppard MD        Subjective:     Principal Problem: <principal problem not specified>    History of Present Illness: 49 yo M with no past medical history admitted 10/30 for sepsis. Last Friday, 10/27, he developed headache, abdominal pain, and nausea. He presented to , who prescribed Tylenol and recommended he return if he continued to feel poorly. He presented to C 10/30 with worsening abdominal pain, as well as fatigue, body aches, and hematuria. He was found to be tachycardic and hypotensive requiring vasopressor support. He was admitted to MICU for management of sepsis 2/2 PNA vs UTI vs meningitis (reported symptoms of neck stiffness). He was unable to undergo LP due to thrombocytopenia of 23. He is currently being evaluated for TTP.      Further work up revealed MRSA bacteremia. He underwent TTE, which was notable for mitral valve vegetation with flail anterior leaflet, as well as possible aortic valve vegetation.      Denies IVDU or recent dental work    CTS consulted for endocarditis       No current facility-administered medications on file prior to encounter.     No current outpatient medications on file prior to encounter.       Review of patient's allergies indicates:  No Known Allergies    History reviewed. No pertinent past medical history.  History reviewed. No pertinent surgical history.  Family History    None       Tobacco Use    Smoking status: Unknown    Smokeless tobacco: Not on file   Substance and Sexual Activity    Alcohol use: Yes    Drug use: Never     Sexual activity: Not on file     Review of Systems   Constitutional:  Negative for activity change, appetite change, fatigue and fever.   HENT:  Negative for nosebleeds.    Respiratory:  Negative for cough and shortness of breath.    Cardiovascular:  Negative for chest pain, palpitations and leg swelling.   Gastrointestinal:  Negative for abdominal distention, abdominal pain and nausea.   Genitourinary:  Negative for frequency.   Musculoskeletal:  Negative for arthralgias and myalgias.   Skin:  Negative for rash.   Neurological:  Negative for dizziness and numbness.   Hematological:  Does not bruise/bleed easily.     Objective:     Vital Signs (Most Recent):  Temp: 98.6 °F (37 °C) (11/01/23 0300)  Pulse: 108 (11/01/23 0705)  Resp: (!) 25 (11/01/23 0705)  BP: 105/72 (11/01/23 0705)  SpO2: (!) 89 % (11/01/23 0705) Vital Signs (24h Range):  Temp:  [98.1 °F (36.7 °C)-99.7 °F (37.6 °C)] 98.6 °F (37 °C)  Pulse:  [] 108  Resp:  [18-41] 25  SpO2:  [89 %-98 %] 89 %  BP: ()/(57-79) 105/72     Weight: 77.1 kg (170 lb)  Body mass index is 22.43 kg/m².    SpO2: (!) 89 %        Intake/Output - Last 3 Shifts         10/30 0700  10/31 0659 10/31 0700  11/01 0659 11/01 0700  11/02 0659    I.V. (mL/kg)  2855.4 (37)     Blood  583.8     IV Piggyback  2057     Total Intake(mL/kg)  5496.2 (71.3)     Urine (mL/kg/hr) 1150 3200 (1.7)     Stool  1     Total Output 1150 3201     Net -1150 +2295.2            Stool Occurrence  1 x              Lines/Drains/Airways       Peripheral Intravenous Line  Duration                  Peripheral IV - Single Lumen 10/30/23 1924 20 G Right Antecubital 1 day         Peripheral IV - Single Lumen 10/30/23 2047 20 G Anterior;Left Forearm 1 day         Peripheral IV - Single Lumen 10/31/23 1230 18 G Anterior;Distal;Right Forearm <1 day                          Physical Exam  Constitutional:       Appearance: Normal appearance.   HENT:      Head: Normocephalic and atraumatic.   Eyes:       "Extraocular Movements: Extraocular movements intact.   Cardiovascular:      Rate and Rhythm: Normal rate and regular rhythm.      Heart sounds: Normal heart sounds.   Pulmonary:      Effort: Pulmonary effort is normal.      Breath sounds: Normal breath sounds.   Abdominal:      General: Abdomen is flat.      Palpations: Abdomen is soft.   Musculoskeletal:         General: Normal range of motion.      Cervical back: Normal range of motion.   Skin:     General: Skin is warm and dry.      Capillary Refill: Capillary refill takes less than 2 seconds.   Neurological:      General: No focal deficit present.      Mental Status: He is alert.            Significant Labs:  ABGs:   Recent Labs   Lab 10/30/23  2023   PH 7.377   PCO2 27.8*   PO2 35*   HCO3 16.3*   POCSATURATED 68   BE -9*     Amylase: No results for input(s): "AMYLASE" in the last 48 hours.  BMP:   Recent Labs   Lab 11/01/23  0558   *   *   K 3.3*      CO2 19*   BUN 28*   CREATININE 1.1   CALCIUM 7.6*   MG 2.5     Cardiac markers:   Recent Labs   Lab 10/31/23  2356   TROPONINI 16.875*     CBC:   Recent Labs   Lab 10/31/23  2146 11/01/23  0558   WBC 13.31* 17.93*   RBC 3.84* 3.99*   HGB 11.8* 12.2*   HCT 33.6* 34.7*   PLT 19*  --    MCV 88 87   MCH 30.7 30.6   MCHC 35.1 35.2     CMP:   Recent Labs   Lab 11/01/23  0558   *   CALCIUM 7.6*   ALBUMIN 2.2*   PROT 5.7*   *   K 3.3*   CO2 19*      BUN 28*   CREATININE 1.1   ALKPHOS 90   *   *   BILITOT 1.3*     Coagulation:   Recent Labs   Lab 10/30/23  2021   INR 1.0   APTT 44.4*     Lactic Acid:   Recent Labs   Lab 10/31/23  1543   LACTATE 3.1*     LFTs:   Recent Labs   Lab 11/01/23  0558   *   *   ALKPHOS 90   BILITOT 1.3*   PROT 5.7*   ALBUMIN 2.2*     Lipase: No results for input(s): "LIPASE" in the last 48 hours.    Significant Diagnostics:  I have reviewed all pertinent imaging results/findings within the past 24 hours.  Echo 10/31/23    Presence " of vegetation on anterior MV leaflet. possivble small vegetation on RCC of AV. consider NEW for further evaluation.    Left Ventricle: The left ventricle is normal in size. Ventricular mass is normal. Normal wall thickness. Normal wall motion. There is mildly reduced systolic function with a visually estimated ejection fraction of 40 - 45%. Biplane (2D) method of discs ejection fraction is 43%. Grade I diastolic dysfunction.    Right Ventricle: Normal right ventricular cavity size. Wall thickness is normal. Right ventricle wall motion  is normal. Systolic function is normal.    Left Atrium: Left atrium is mildly dilated.    Aortic Valve: The aortic valve is a trileaflet valve. Cannot exclude small vegetation on RCC of AV. There is no aortic abscess prsesent . Other leaflets appear normal.    Mitral Valve: There is a medium mobile echogenic mass present on the anterior MV leaflet that is flail consistent with a large vegetation measuring 1.9 x 0.8 cm. There is at least moderate eccentric anteriorly directed regurgitation due to flail anterior leaflet. Partial perforation of anterior MV leaflet cannot be excluded/    Pulmonary Artery: The estimated pulmonary artery systolic pressure is 30 mmHg.    IVC/SVC: Intermediate venous pressure at 8 mmHg.         Assessment/Plan:         Endocarditis  47 yo M with no past cteremia. He undmedical history admitted 10/30 2/2 PNA vs UTI vs meningitis requiring vasopressor support. No history of IVDU or recent dental work. Further work up revealed MRSA bacteremia. TTE revealed LVEF 40-45%. Mitral valve vegetation with moderate MR due to flail anterior leaflet. Small vegetation on RCC of Ao valve, no AI noted. No surgical intervention warranted at this time. Recommend get NEW for further evaluation, compete 6 weeks of IV abx, and will follow up in clinic as outpatient. Dr. Beal to review and staff.        Thank you for your consult. Please contact us if you have any additional  questions.    CTS Attending Note:    I have personally taken the history and examined this patient and agree with the SIMEON's note as stated above. I spoke with pt and his family member. With moderate MR only, would not recommend surgery. Recommend NEW and assuming no additional findings, 6 weeks of IV abx. I will be glad to see back in 6 weeks with a TTE.    ANGIE ManeC  Cardiothoracic Surgery  [facility]

## 2023-11-02 ENCOUNTER — ANESTHESIA EVENT (OUTPATIENT)
Dept: SURGERY | Facility: HOSPITAL | Age: 48
DRG: 853 | End: 2023-11-02
Payer: COMMERCIAL

## 2023-11-02 DIAGNOSIS — I34.0 MITRAL VALVE INSUFFICIENCY, UNSPECIFIED ETIOLOGY: ICD-10-CM

## 2023-11-02 DIAGNOSIS — I38 ENDOCARDITIS, UNSPECIFIED CHRONICITY, UNSPECIFIED ENDOCARDITIS TYPE: Primary | ICD-10-CM

## 2023-11-02 PROBLEM — J96.01 ACUTE HYPOXEMIC RESPIRATORY FAILURE: Status: ACTIVE | Noted: 2023-11-02

## 2023-11-02 LAB
ABO + RH BLD: NORMAL
ALBUMIN SERPL BCP-MCNC: 2.2 G/DL (ref 3.5–5.2)
ALLENS TEST: ABNORMAL
ALP SERPL-CCNC: 89 U/L (ref 55–135)
ALT SERPL W/O P-5'-P-CCNC: 86 U/L (ref 10–44)
ANION GAP SERPL CALC-SCNC: 17 MMOL/L (ref 8–16)
ANISOCYTOSIS BLD QL SMEAR: SLIGHT
ASCENDING AORTA: 2.95 CM
AST SERPL-CCNC: 105 U/L (ref 10–40)
AV INDEX (PROSTH): 0.78
AV MEAN GRADIENT: 4 MMHG
AV PEAK GRADIENT: 7 MMHG
AV VALVE AREA BY VELOCITY RATIO: 3.24 CM²
AV VALVE AREA: 3.47 CM²
AV VELOCITY RATIO: 0.73
BACTERIA UR CULT: ABNORMAL
BASOPHILS # BLD AUTO: 0.16 K/UL (ref 0–0.2)
BASOPHILS NFR BLD: 0.7 % (ref 0–1.9)
BILIRUB SERPL-MCNC: 1.6 MG/DL (ref 0.1–1)
BLD GP AB SCN CELLS X3 SERPL QL: NORMAL
BLD PROD TYP BPU: NORMAL
BLD PROD TYP BPU: NORMAL
BLOOD UNIT EXPIRATION DATE: NORMAL
BLOOD UNIT EXPIRATION DATE: NORMAL
BLOOD UNIT TYPE CODE: 6200
BLOOD UNIT TYPE CODE: 6200
BLOOD UNIT TYPE: NORMAL
BLOOD UNIT TYPE: NORMAL
BNP SERPL-MCNC: 1681 PG/ML (ref 0–99)
BSA FOR ECHO PROCEDURE: 1.96 M2
BUN SERPL-MCNC: 42 MG/DL (ref 6–20)
BURR CELLS BLD QL SMEAR: ABNORMAL
CALCIUM SERPL-MCNC: 7.5 MG/DL (ref 8.7–10.5)
CHLORIDE SERPL-SCNC: 100 MMOL/L (ref 95–110)
CO2 SERPL-SCNC: 22 MMOL/L (ref 23–29)
CODING SYSTEM: NORMAL
CODING SYSTEM: NORMAL
CREAT SERPL-MCNC: 1.3 MG/DL (ref 0.5–1.4)
CROSSMATCH INTERPRETATION: NORMAL
CROSSMATCH INTERPRETATION: NORMAL
CV ECHO LV RWT: 0.4 CM
DELSYS: ABNORMAL
DIFFERENTIAL METHOD: ABNORMAL
DISPENSE STATUS: NORMAL
DISPENSE STATUS: NORMAL
DOP CALC AO PEAK VEL: 1.33 M/S
DOP CALC AO VTI: 18.92 CM
DOP CALC LVOT AREA: 4.4 CM2
DOP CALC LVOT DIAMETER: 2.38 CM
DOP CALC LVOT PEAK VEL: 0.97 M/S
DOP CALC LVOT STROKE VOLUME: 65.59 CM3
DOP CALC MV VTI: 28.56 CM
DOP CALCLVOT PEAK VEL VTI: 14.75 CM
E/E' RATIO: 8.82 M/S
ECHO LV POSTERIOR WALL: 1.01 CM (ref 0.6–1.1)
EOSINOPHIL # BLD AUTO: 0 K/UL (ref 0–0.5)
EOSINOPHIL NFR BLD: 0 % (ref 0–8)
ERYTHROCYTE [DISTWIDTH] IN BLOOD BY AUTOMATED COUNT: 14.2 % (ref 11.5–14.5)
ERYTHROCYTE [SEDIMENTATION RATE] IN BLOOD BY WESTERGREN METHOD: 20 MM/H
ERYTHROCYTE [SEDIMENTATION RATE] IN BLOOD BY WESTERGREN METHOD: 20 MM/H
EST. GFR  (NO RACE VARIABLE): >60 ML/MIN/1.73 M^2
FIO2: 50
FIO2: 50
FRACTIONAL SHORTENING: 25 % (ref 28–44)
GLUCOSE SERPL-MCNC: 236 MG/DL (ref 70–110)
HCO3 UR-SCNC: 19.5 MMOL/L (ref 24–28)
HCO3 UR-SCNC: 26.1 MMOL/L (ref 24–28)
HCO3 UR-SCNC: 29.6 MMOL/L (ref 24–28)
HCT VFR BLD AUTO: 34.5 % (ref 40–54)
HCT VFR BLD CALC: 34 %PCV (ref 36–54)
HCT VFR BLD CALC: 35 %PCV (ref 36–54)
HGB BLD-MCNC: 11.9 G/DL (ref 14–18)
HR MV ECHO: 124 BPM
HYPOCHROMIA BLD QL SMEAR: ABNORMAL
IL6 SERPL-MCNC: 72 PG/ML
IMM GRANULOCYTES # BLD AUTO: 0.98 K/UL (ref 0–0.04)
IMM GRANULOCYTES NFR BLD AUTO: 4.2 % (ref 0–0.5)
INTERVENTRICULAR SEPTUM: 0.99 CM (ref 0.6–1.1)
LA MAJOR: 6.7 CM
LA MINOR: 6.7 CM
LA WIDTH: 5 CM
LACTATE SERPL-SCNC: 1.8 MMOL/L (ref 0.5–2.2)
LDH SERPL L TO P-CCNC: 2.3 MMOL/L (ref 0.36–1.25)
LEFT ATRIUM SIZE: 5.05 CM
LEFT ATRIUM VOLUME INDEX MOD: 59.1 ML/M2
LEFT ATRIUM VOLUME INDEX: 72.6 ML/M2
LEFT ATRIUM VOLUME MOD: 117.1 CM3
LEFT ATRIUM VOLUME: 143.8 CM3
LEFT INTERNAL DIMENSION IN SYSTOLE: 3.8 CM (ref 2.1–4)
LEFT VENTRICLE DIASTOLIC VOLUME INDEX: 30.16 ML/M2
LEFT VENTRICLE DIASTOLIC VOLUME: 59.71 ML
LEFT VENTRICLE MASS INDEX: 95 G/M2
LEFT VENTRICLE SYSTOLIC VOLUME INDEX: 13.9 ML/M2
LEFT VENTRICLE SYSTOLIC VOLUME: 27.58 ML
LEFT VENTRICULAR INTERNAL DIMENSION IN DIASTOLE: 5.1 CM (ref 3.5–6)
LEFT VENTRICULAR MASS: 188.02 G
LEPTOSPIRA AB SER QL: NEGATIVE
LV LATERAL E/E' RATIO: 7.5 M/S
LV SEPTAL E/E' RATIO: 10.71 M/S
LYMPHOCYTES # BLD AUTO: 1.8 K/UL (ref 1–4.8)
LYMPHOCYTES NFR BLD: 7.6 % (ref 18–48)
MAGNESIUM SERPL-MCNC: 2.4 MG/DL (ref 1.6–2.6)
MCH RBC QN AUTO: 30.8 PG (ref 27–31)
MCHC RBC AUTO-ENTMCNC: 34.5 G/DL (ref 32–36)
MCV RBC AUTO: 89 FL (ref 82–98)
MIN VOL: 10.1
MIN VOL: 9.7
MODE: ABNORMAL
MODE: ABNORMAL
MONOCYTES # BLD AUTO: 2.1 K/UL (ref 0.3–1)
MONOCYTES NFR BLD: 9 % (ref 4–15)
MV MEAN GRADIENT: 6 MMHG
MV PEAK E VEL: 1.5 M/S
MV PEAK GRADIENT: 14 MMHG
MV VALVE AREA BY CONTINUITY EQUATION: 2.3 CM2
NEUTROPHILS # BLD AUTO: 18.5 K/UL (ref 1.8–7.7)
NEUTROPHILS NFR BLD: 78.5 % (ref 38–73)
NRBC BLD-RTO: 0 /100 WBC
OVALOCYTES BLD QL SMEAR: ABNORMAL
PCO2 BLDA: 33.6 MMHG (ref 35–45)
PCO2 BLDA: 35.4 MMHG (ref 35–45)
PCO2 BLDA: 39.6 MMHG (ref 35–45)
PEEP: 10
PEEP: 10
PH SMN: 7.37 [PH] (ref 7.35–7.45)
PH SMN: 7.47 [PH] (ref 7.35–7.45)
PH SMN: 7.48 [PH] (ref 7.35–7.45)
PHOSPHATE SERPL-MCNC: 1.6 MG/DL (ref 2.7–4.5)
PHOSPHATE SERPL-MCNC: 2.3 MG/DL (ref 2.7–4.5)
PIP: 23
PIP: 24
PISA MRMAX VEL: 0.04 M/S
PISA TR MAX VEL: 2.66 M/S
PLATELET # BLD AUTO: 46 K/UL (ref 150–450)
PLATELET BLD QL SMEAR: ABNORMAL
PMV BLD AUTO: 15.3 FL (ref 9.2–12.9)
PO2 BLDA: 61 MMHG (ref 80–100)
PO2 BLDA: 73 MMHG (ref 80–100)
PO2 BLDA: 90 MMHG (ref 80–100)
POC BE: -6 MMOL/L
POC BE: 2 MMOL/L
POC BE: 6 MMOL/L
POC IONIZED CALCIUM: 1.05 MMOL/L (ref 1.06–1.42)
POC IONIZED CALCIUM: 1.08 MMOL/L (ref 1.06–1.42)
POC SATURATED O2: 93 % (ref 95–100)
POC SATURATED O2: 95 % (ref 95–100)
POC SATURATED O2: 97 % (ref 95–100)
POC TCO2: 21 MMOL/L (ref 23–27)
POC TCO2: 27 MMOL/L (ref 23–27)
POC TCO2: 31 MMOL/L (ref 23–27)
POCT GLUCOSE: 265 MG/DL (ref 70–110)
POCT GLUCOSE: 320 MG/DL (ref 70–110)
POIKILOCYTOSIS BLD QL SMEAR: SLIGHT
POLYCHROMASIA BLD QL SMEAR: ABNORMAL
POTASSIUM BLD-SCNC: 3.3 MMOL/L (ref 3.5–5.1)
POTASSIUM BLD-SCNC: 3.3 MMOL/L (ref 3.5–5.1)
POTASSIUM SERPL-SCNC: 3.2 MMOL/L (ref 3.5–5.1)
POTASSIUM SERPL-SCNC: 3.5 MMOL/L (ref 3.5–5.1)
PROT SERPL-MCNC: 5.9 G/DL (ref 6–8.4)
RA MAJOR: 5.81 CM
RA PRESSURE ESTIMATED: 3 MMHG
RA WIDTH: 3.21 CM
RBC # BLD AUTO: 3.86 M/UL (ref 4.6–6.2)
RIGHT VENTRICULAR END-DIASTOLIC DIMENSION: 3.66 CM
RV TB RVSP: 6 MMHG
SAMPLE: ABNORMAL
SINUS: 3.59 CM
SITE: ABNORMAL
SODIUM BLD-SCNC: 138 MMOL/L (ref 136–145)
SODIUM BLD-SCNC: 141 MMOL/L (ref 136–145)
SODIUM SERPL-SCNC: 139 MMOL/L (ref 136–145)
SP02: 95
SP02: 98
SPECIMEN OUTDATE: NORMAL
STJ: 3.33 CM
TDI LATERAL: 0.2 M/S
TDI SEPTAL: 0.14 M/S
TDI: 0.17 M/S
TR MAX PG: 28 MMHG
TRICUSPID ANNULAR PLANE SYSTOLIC EXCURSION: 1.69 CM
TROPONIN I SERPL DL<=0.01 NG/ML-MCNC: 5.86 NG/ML (ref 0–0.03)
TV REST PULMONARY ARTERY PRESSURE: 31 MMHG
UNIT NUMBER: NORMAL
UNIT NUMBER: NORMAL
VT: 480
VT: 480
WBC # BLD AUTO: 23.6 K/UL (ref 3.9–12.7)
Z-SCORE OF LEFT VENTRICULAR DIMENSION IN END DIASTOLE: -1.14
Z-SCORE OF LEFT VENTRICULAR DIMENSION IN END SYSTOLE: 0.62

## 2023-11-02 PROCEDURE — 94640 AIRWAY INHALATION TREATMENT: CPT

## 2023-11-02 PROCEDURE — 93010 EKG 12-LEAD: ICD-10-PCS | Mod: ,,, | Performed by: INTERNAL MEDICINE

## 2023-11-02 PROCEDURE — 25000003 PHARM REV CODE 250

## 2023-11-02 PROCEDURE — 25500020 PHARM REV CODE 255: Performed by: INTERNAL MEDICINE

## 2023-11-02 PROCEDURE — 99900035 HC TECH TIME PER 15 MIN (STAT)

## 2023-11-02 PROCEDURE — 82330 ASSAY OF CALCIUM: CPT

## 2023-11-02 PROCEDURE — 63600175 PHARM REV CODE 636 W HCPCS: Performed by: STUDENT IN AN ORGANIZED HEALTH CARE EDUCATION/TRAINING PROGRAM

## 2023-11-02 PROCEDURE — 83735 ASSAY OF MAGNESIUM: CPT | Performed by: NURSE PRACTITIONER

## 2023-11-02 PROCEDURE — 63600175 PHARM REV CODE 636 W HCPCS

## 2023-11-02 PROCEDURE — 93005 ELECTROCARDIOGRAM TRACING: CPT

## 2023-11-02 PROCEDURE — 25000003 PHARM REV CODE 250: Performed by: STUDENT IN AN ORGANIZED HEALTH CARE EDUCATION/TRAINING PROGRAM

## 2023-11-02 PROCEDURE — 20000000 HC ICU ROOM

## 2023-11-02 PROCEDURE — 63600175 PHARM REV CODE 636 W HCPCS: Performed by: INTERNAL MEDICINE

## 2023-11-02 PROCEDURE — 85025 COMPLETE CBC W/AUTO DIFF WBC: CPT | Performed by: NURSE PRACTITIONER

## 2023-11-02 PROCEDURE — 99233 SBSQ HOSP IP/OBS HIGH 50: CPT | Mod: 25,,, | Performed by: INTERNAL MEDICINE

## 2023-11-02 PROCEDURE — 83605 ASSAY OF LACTIC ACID: CPT | Performed by: INTERNAL MEDICINE

## 2023-11-02 PROCEDURE — 27000221 HC OXYGEN, UP TO 24 HOURS

## 2023-11-02 PROCEDURE — 36430 TRANSFUSION BLD/BLD COMPNT: CPT

## 2023-11-02 PROCEDURE — 99223 1ST HOSP IP/OBS HIGH 75: CPT | Mod: 25,,, | Performed by: INTERNAL MEDICINE

## 2023-11-02 PROCEDURE — C1894 INTRO/SHEATH, NON-LASER: HCPCS | Performed by: INTERNAL MEDICINE

## 2023-11-02 PROCEDURE — 36620 INSERTION CATHETER ARTERY: CPT

## 2023-11-02 PROCEDURE — 37799 UNLISTED PX VASCULAR SURGERY: CPT

## 2023-11-02 PROCEDURE — 99291 PR CRITICAL CARE, E/M 30-74 MINUTES: ICD-10-PCS | Mod: ,,, | Performed by: STUDENT IN AN ORGANIZED HEALTH CARE EDUCATION/TRAINING PROGRAM

## 2023-11-02 PROCEDURE — 99291 PR CRITICAL CARE, E/M 30-74 MINUTES: ICD-10-PCS | Mod: ,,, | Performed by: INTERNAL MEDICINE

## 2023-11-02 PROCEDURE — 27201423 OPTIME MED/SURG SUP & DEVICES STERILE SUPPLY: Performed by: INTERNAL MEDICINE

## 2023-11-02 PROCEDURE — 99292 PR CRITICAL CARE, ADDL 30 MIN: ICD-10-PCS | Mod: ,,, | Performed by: STUDENT IN AN ORGANIZED HEALTH CARE EDUCATION/TRAINING PROGRAM

## 2023-11-02 PROCEDURE — 83880 ASSAY OF NATRIURETIC PEPTIDE: CPT | Performed by: STUDENT IN AN ORGANIZED HEALTH CARE EDUCATION/TRAINING PROGRAM

## 2023-11-02 PROCEDURE — 33967 PR IABP INSERTION: ICD-10-PCS | Mod: ,,, | Performed by: INTERNAL MEDICINE

## 2023-11-02 PROCEDURE — 99900026 HC AIRWAY MAINTENANCE (STAT)

## 2023-11-02 PROCEDURE — 94761 N-INVAS EAR/PLS OXIMETRY MLT: CPT

## 2023-11-02 PROCEDURE — 27000207 HC ISOLATION

## 2023-11-02 PROCEDURE — 25000242 PHARM REV CODE 250 ALT 637 W/ HCPCS

## 2023-11-02 PROCEDURE — 94660 CPAP INITIATION&MGMT: CPT | Mod: XB

## 2023-11-02 PROCEDURE — 83605 ASSAY OF LACTIC ACID: CPT

## 2023-11-02 PROCEDURE — 99233 PR SUBSEQUENT HOSPITAL CARE,LEVL III: ICD-10-PCS | Mod: ,,, | Performed by: INTERNAL MEDICINE

## 2023-11-02 PROCEDURE — 33967 INSERT I-AORT PERCUT DEVICE: CPT | Performed by: INTERNAL MEDICINE

## 2023-11-02 PROCEDURE — 99233 PR SUBSEQUENT HOSPITAL CARE,LEVL III: ICD-10-PCS | Mod: 25,,, | Performed by: INTERNAL MEDICINE

## 2023-11-02 PROCEDURE — 80053 COMPREHEN METABOLIC PANEL: CPT | Performed by: NURSE PRACTITIONER

## 2023-11-02 PROCEDURE — 93010 ELECTROCARDIOGRAM REPORT: CPT | Mod: ,,, | Performed by: INTERNAL MEDICINE

## 2023-11-02 PROCEDURE — 27100171 HC OXYGEN HIGH FLOW UP TO 24 HOURS

## 2023-11-02 PROCEDURE — 84100 ASSAY OF PHOSPHORUS: CPT | Mod: 91 | Performed by: STUDENT IN AN ORGANIZED HEALTH CARE EDUCATION/TRAINING PROGRAM

## 2023-11-02 PROCEDURE — 27000190 HC CPAP FULL FACE MASK W/VALVE

## 2023-11-02 PROCEDURE — 84132 ASSAY OF SERUM POTASSIUM: CPT

## 2023-11-02 PROCEDURE — 99233 SBSQ HOSP IP/OBS HIGH 50: CPT | Mod: ,,, | Performed by: INTERNAL MEDICINE

## 2023-11-02 PROCEDURE — 82565 ASSAY OF CREATININE: CPT

## 2023-11-02 PROCEDURE — P9035 PLATELET PHERES LEUKOREDUCED: HCPCS | Performed by: STUDENT IN AN ORGANIZED HEALTH CARE EDUCATION/TRAINING PROGRAM

## 2023-11-02 PROCEDURE — 99223 PR INITIAL HOSPITAL CARE,LEVL III: ICD-10-PCS | Mod: 25,,, | Performed by: INTERNAL MEDICINE

## 2023-11-02 PROCEDURE — 99292 CRITICAL CARE ADDL 30 MIN: CPT | Mod: ,,, | Performed by: STUDENT IN AN ORGANIZED HEALTH CARE EDUCATION/TRAINING PROGRAM

## 2023-11-02 PROCEDURE — 33967 INSERT I-AORT PERCUT DEVICE: CPT | Mod: ,,, | Performed by: INTERNAL MEDICINE

## 2023-11-02 PROCEDURE — 94002 VENT MGMT INPAT INIT DAY: CPT

## 2023-11-02 PROCEDURE — 36600 WITHDRAWAL OF ARTERIAL BLOOD: CPT

## 2023-11-02 PROCEDURE — 86850 RBC ANTIBODY SCREEN: CPT

## 2023-11-02 PROCEDURE — 84132 ASSAY OF SERUM POTASSIUM: CPT | Performed by: STUDENT IN AN ORGANIZED HEALTH CARE EDUCATION/TRAINING PROGRAM

## 2023-11-02 PROCEDURE — 84100 ASSAY OF PHOSPHORUS: CPT | Performed by: NURSE PRACTITIONER

## 2023-11-02 PROCEDURE — 25000003 PHARM REV CODE 250: Performed by: INTERNAL MEDICINE

## 2023-11-02 PROCEDURE — 27100094 HC IABP, SET-UP

## 2023-11-02 PROCEDURE — 99291 CRITICAL CARE FIRST HOUR: CPT | Mod: ,,, | Performed by: STUDENT IN AN ORGANIZED HEALTH CARE EDUCATION/TRAINING PROGRAM

## 2023-11-02 PROCEDURE — 99291 CRITICAL CARE FIRST HOUR: CPT | Mod: ,,, | Performed by: INTERNAL MEDICINE

## 2023-11-02 PROCEDURE — 84484 ASSAY OF TROPONIN QUANT: CPT | Performed by: STUDENT IN AN ORGANIZED HEALTH CARE EDUCATION/TRAINING PROGRAM

## 2023-11-02 PROCEDURE — C1769 GUIDE WIRE: HCPCS | Performed by: INTERNAL MEDICINE

## 2023-11-02 PROCEDURE — 82803 BLOOD GASES ANY COMBINATION: CPT

## 2023-11-02 RX ORDER — FUROSEMIDE 10 MG/ML
40 INJECTION INTRAMUSCULAR; INTRAVENOUS ONCE
Status: DISCONTINUED | OUTPATIENT
Start: 2023-11-02 | End: 2023-11-02

## 2023-11-02 RX ORDER — NOREPINEPHRINE BITARTRATE/D5W 4MG/250ML
0-.2 PLASTIC BAG, INJECTION (ML) INTRAVENOUS CONTINUOUS
Status: DISCONTINUED | OUTPATIENT
Start: 2023-11-02 | End: 2023-11-03

## 2023-11-02 RX ORDER — POTASSIUM CHLORIDE 20 MEQ/1
40 TABLET, EXTENDED RELEASE ORAL ONCE
Status: COMPLETED | OUTPATIENT
Start: 2023-11-02 | End: 2023-11-02

## 2023-11-02 RX ORDER — CLINDAMYCIN PHOSPHATE 600 MG/50ML
600 INJECTION, SOLUTION INTRAVENOUS
Status: DISCONTINUED | OUTPATIENT
Start: 2023-11-02 | End: 2023-11-04

## 2023-11-02 RX ORDER — HYDROCODONE BITARTRATE AND ACETAMINOPHEN 500; 5 MG/1; MG/1
TABLET ORAL
Status: CANCELLED | OUTPATIENT
Start: 2023-11-02

## 2023-11-02 RX ORDER — IPRATROPIUM BROMIDE AND ALBUTEROL SULFATE 2.5; .5 MG/3ML; MG/3ML
3 SOLUTION RESPIRATORY (INHALATION) EVERY 4 HOURS PRN
Status: DISCONTINUED | OUTPATIENT
Start: 2023-11-02 | End: 2023-11-02

## 2023-11-02 RX ORDER — FUROSEMIDE 10 MG/ML
40 INJECTION INTRAMUSCULAR; INTRAVENOUS ONCE
Status: COMPLETED | OUTPATIENT
Start: 2023-11-02 | End: 2023-11-02

## 2023-11-02 RX ORDER — FUROSEMIDE 10 MG/ML
80 INJECTION INTRAMUSCULAR; INTRAVENOUS 3 TIMES DAILY
Status: DISCONTINUED | OUTPATIENT
Start: 2023-11-02 | End: 2023-11-02

## 2023-11-02 RX ORDER — ROCURONIUM BROMIDE 10 MG/ML
INJECTION, SOLUTION INTRAVENOUS
Status: COMPLETED
Start: 2023-11-02 | End: 2023-11-02

## 2023-11-02 RX ORDER — ROCURONIUM BROMIDE 10 MG/ML
75 INJECTION, SOLUTION INTRAVENOUS ONCE
Status: COMPLETED | OUTPATIENT
Start: 2023-11-02 | End: 2023-11-02

## 2023-11-02 RX ORDER — NOREPINEPHRINE BITARTRATE/D5W 4MG/250ML
0-3 PLASTIC BAG, INJECTION (ML) INTRAVENOUS CONTINUOUS
Status: DISCONTINUED | OUTPATIENT
Start: 2023-11-02 | End: 2023-11-02

## 2023-11-02 RX ORDER — SODIUM,POTASSIUM PHOSPHATES 280-250MG
1 POWDER IN PACKET (EA) ORAL ONCE
Status: COMPLETED | OUTPATIENT
Start: 2023-11-02 | End: 2023-11-02

## 2023-11-02 RX ORDER — ETOMIDATE 2 MG/ML
INJECTION INTRAVENOUS
Status: COMPLETED
Start: 2023-11-02 | End: 2023-11-02

## 2023-11-02 RX ORDER — FENTANYL CITRATE-0.9 % NACL/PF 10 MCG/ML
0-200 PLASTIC BAG, INJECTION (ML) INTRAVENOUS CONTINUOUS
Status: DISCONTINUED | OUTPATIENT
Start: 2023-11-02 | End: 2023-11-03

## 2023-11-02 RX ORDER — IPRATROPIUM BROMIDE AND ALBUTEROL SULFATE 2.5; .5 MG/3ML; MG/3ML
3 SOLUTION RESPIRATORY (INHALATION) EVERY 4 HOURS
Status: DISCONTINUED | OUTPATIENT
Start: 2023-11-02 | End: 2023-11-03

## 2023-11-02 RX ORDER — MUPIROCIN 20 MG/G
OINTMENT TOPICAL
Status: CANCELLED | OUTPATIENT
Start: 2023-11-02

## 2023-11-02 RX ORDER — PROPOFOL 10 MG/ML
VIAL (ML) INTRAVENOUS
Status: COMPLETED
Start: 2023-11-02 | End: 2023-11-02

## 2023-11-02 RX ORDER — ETOMIDATE 2 MG/ML
30 INJECTION INTRAVENOUS ONCE
Status: COMPLETED | OUTPATIENT
Start: 2023-11-02 | End: 2023-11-02

## 2023-11-02 RX ORDER — SUCCINYLCHOLINE CHLORIDE 20 MG/ML
INJECTION INTRAMUSCULAR; INTRAVENOUS
Status: DISPENSED
Start: 2023-11-02 | End: 2023-11-02

## 2023-11-02 RX ORDER — MORPHINE SULFATE 2 MG/ML
2 INJECTION, SOLUTION INTRAMUSCULAR; INTRAVENOUS ONCE
Status: COMPLETED | OUTPATIENT
Start: 2023-11-02 | End: 2023-11-02

## 2023-11-02 RX ORDER — NOREPINEPHRINE BITARTRATE/D5W 4MG/250ML
PLASTIC BAG, INJECTION (ML) INTRAVENOUS
Status: DISPENSED
Start: 2023-11-02 | End: 2023-11-02

## 2023-11-02 RX ORDER — FENTANYL CITRATE 50 UG/ML
100 INJECTION, SOLUTION INTRAMUSCULAR; INTRAVENOUS EVERY 10 MIN PRN
Status: DISCONTINUED | OUTPATIENT
Start: 2023-11-02 | End: 2023-11-03

## 2023-11-02 RX ORDER — DIPHENHYDRAMINE HYDROCHLORIDE 50 MG/ML
INJECTION INTRAMUSCULAR; INTRAVENOUS
Status: DISCONTINUED | OUTPATIENT
Start: 2023-11-02 | End: 2023-11-02 | Stop reason: HOSPADM

## 2023-11-02 RX ORDER — HYDROCODONE BITARTRATE AND ACETAMINOPHEN 500; 5 MG/1; MG/1
TABLET ORAL
Status: DISCONTINUED | OUTPATIENT
Start: 2023-11-02 | End: 2023-11-03

## 2023-11-02 RX ORDER — NOREPINEPHRINE BITARTRATE/D5W 4MG/250ML
0-.2 PLASTIC BAG, INJECTION (ML) INTRAVENOUS CONTINUOUS
Status: DISCONTINUED | OUTPATIENT
Start: 2023-11-02 | End: 2023-11-02

## 2023-11-02 RX ADMIN — ETOMIDATE 30 MG: 2 INJECTION INTRAVENOUS at 09:11

## 2023-11-02 RX ADMIN — FUROSEMIDE 40 MG: 10 INJECTION, SOLUTION INTRAVENOUS at 08:11

## 2023-11-02 RX ADMIN — MORPHINE SULFATE 2 MG: 2 INJECTION, SOLUTION INTRAMUSCULAR; INTRAVENOUS at 08:11

## 2023-11-02 RX ADMIN — IPRATROPIUM BROMIDE AND ALBUTEROL SULFATE 3 ML: .5; 3 SOLUTION RESPIRATORY (INHALATION) at 11:11

## 2023-11-02 RX ADMIN — MUPIROCIN: 20 OINTMENT TOPICAL at 11:11

## 2023-11-02 RX ADMIN — CLINDAMYCIN IN 5 PERCENT DEXTROSE 600 MG: 12 INJECTION, SOLUTION INTRAVENOUS at 05:11

## 2023-11-02 RX ADMIN — VANCOMYCIN HYDROCHLORIDE 1250 MG: 1.25 INJECTION, POWDER, LYOPHILIZED, FOR SOLUTION INTRAVENOUS at 09:11

## 2023-11-02 RX ADMIN — INSULIN ASPART 1 UNITS: 100 INJECTION, SOLUTION INTRAVENOUS; SUBCUTANEOUS at 09:11

## 2023-11-02 RX ADMIN — IPRATROPIUM BROMIDE AND ALBUTEROL SULFATE 3 ML: .5; 3 SOLUTION RESPIRATORY (INHALATION) at 07:11

## 2023-11-02 RX ADMIN — NOREPINEPHRINE BITARTRATE 0.02 MCG/KG/MIN: 4 INJECTION, SOLUTION INTRAVENOUS at 06:11

## 2023-11-02 RX ADMIN — FAMOTIDINE 20 MG: 20 TABLET ORAL at 08:11

## 2023-11-02 RX ADMIN — FUROSEMIDE 40 MG: 10 INJECTION, SOLUTION INTRAVENOUS at 11:11

## 2023-11-02 RX ADMIN — POTASSIUM CHLORIDE 40 MEQ: 1500 TABLET, EXTENDED RELEASE ORAL at 04:11

## 2023-11-02 RX ADMIN — Medication 25 MCG/HR: at 09:11

## 2023-11-02 RX ADMIN — CLINDAMYCIN IN 5 PERCENT DEXTROSE 600 MG: 12 INJECTION, SOLUTION INTRAVENOUS at 08:11

## 2023-11-02 RX ADMIN — POTASSIUM & SODIUM PHOSPHATES POWDER PACK 280-160-250 MG 1 PACKET: 280-160-250 PACK at 04:11

## 2023-11-02 RX ADMIN — MUPIROCIN: 20 OINTMENT TOPICAL at 09:11

## 2023-11-02 RX ADMIN — IPRATROPIUM BROMIDE AND ALBUTEROL SULFATE 3 ML: .5; 3 SOLUTION RESPIRATORY (INHALATION) at 04:11

## 2023-11-02 RX ADMIN — VANCOMYCIN HYDROCHLORIDE 1250 MG: 1.25 INJECTION, POWDER, LYOPHILIZED, FOR SOLUTION INTRAVENOUS at 11:11

## 2023-11-02 RX ADMIN — NOREPINEPHRINE BITARTRATE 0.02 MCG/KG/MIN: 4 INJECTION, SOLUTION INTRAVENOUS at 07:11

## 2023-11-02 RX ADMIN — ROCURONIUM BROMIDE 75 MG: 10 INJECTION, SOLUTION INTRAVENOUS at 09:11

## 2023-11-02 RX ADMIN — IPRATROPIUM BROMIDE AND ALBUTEROL SULFATE 3 ML: .5; 3 SOLUTION RESPIRATORY (INHALATION) at 01:11

## 2023-11-02 RX ADMIN — FAMOTIDINE 20 MG: 20 TABLET ORAL at 11:11

## 2023-11-02 RX ADMIN — INSULIN ASPART 4 UNITS: 100 INJECTION, SOLUTION INTRAVENOUS; SUBCUTANEOUS at 01:11

## 2023-11-02 RX ADMIN — ROCURONIUM BROMIDE 75 MG: 10 INJECTION INTRAVENOUS at 09:11

## 2023-11-02 RX ADMIN — FUROSEMIDE 40 MG: 10 INJECTION, SOLUTION INTRAVENOUS at 01:11

## 2023-11-02 RX ADMIN — INSULIN ASPART 3 UNITS: 100 INJECTION, SOLUTION INTRAVENOUS; SUBCUTANEOUS at 06:11

## 2023-11-02 RX ADMIN — FUROSEMIDE 20 MG/HR: 10 INJECTION, SOLUTION INTRAMUSCULAR; INTRAVENOUS at 11:11

## 2023-11-02 RX ADMIN — Medication 150 MCG/HR: at 11:11

## 2023-11-02 NOTE — SUBJECTIVE & OBJECTIVE
Interval History: Pt noted to have significant O2 desaturation overnight with associate tachypnea, required BiPAP. Intubated this AM 2/2 respiratory distress. Concern for flash pulmonary edema and worsening of MR. Consulting with CCU and CTS, patient may benefit from balloon pump.    Review of Systems   Unable to perform ROS: Intubated     Objective:     Vital Signs (Most Recent):  Temp: 98.4 °F (36.9 °C) (11/02/23 0300)  Pulse: 95 (11/02/23 1200)  Resp: 20 (11/02/23 1200)  BP: 96/69 (11/02/23 1100)  SpO2: 98 % (11/02/23 1200) Vital Signs (24h Range):  Temp:  [98.4 °F (36.9 °C)-98.7 °F (37.1 °C)] 98.4 °F (36.9 °C)  Pulse:  [] 95  Resp:  [14-51] 20  SpO2:  [88 %-98 %] 98 %  BP: ()/(58-90) 96/69     Weight: 74.4 kg (164 lb)  Body mass index is 21.64 kg/m².     SpO2: 98 %         Intake/Output Summary (Last 24 hours) at 11/2/2023 1305  Last data filed at 11/2/2023 1200  Gross per 24 hour   Intake 448.82 ml   Output 3125 ml   Net -2676.18 ml       Lines/Drains/Airways       Drain  Duration                  Urethral Catheter 11/02/23 1030 <1 day              Airway  Duration                  Airway - Non-Surgical 11/02/23 0855 Endotracheal Tube <1 day              Arterial Line  Duration             Arterial Line 11/02/23 1031 Right Radial <1 day              Peripheral Intravenous Line  Duration                  Peripheral IV - Single Lumen 10/30/23 1924 20 G Right Antecubital 2 days         Peripheral IV - Single Lumen 10/30/23 2047 20 G Anterior;Left Forearm 2 days         Peripheral IV - Single Lumen 10/31/23 1230 18 G Anterior;Distal;Right Forearm 2 days                       Physical Exam  Vitals and nursing note reviewed.   Constitutional:       Comments: Sedated/intubated   Cardiovascular:      Rate and Rhythm: Tachycardia present. Rhythm irregular.   Pulmonary:      Comments: Mechanically ventillated  Musculoskeletal:      Right lower leg: No edema.      Left lower leg: No edema.   Skin:      Comments: Petechiae of bilateral feet and palms   Neurological:      Comments: Sedated            Significant Labs: All pertinent lab results from the last 24 hours have been reviewed.    Significant Imaging:  Reviewed

## 2023-11-02 NOTE — ASSESSMENT & PLAN NOTE
Pt presenting with low platelets (23) in setting of acute liver injury likely secondary to shock. Hematology evaluated for TTP, but [id number] wnl. No signs of active bleeding.     -- Transfused 2 units FFP, platelets improved to 25  -- Trend CBC, PT/INR, aPTT  -- PT 11.0, INR 1.0, Rogelio negative, haptoglobin 82, fibrinogen 489, aPTT 44.4  -- transfusing a unit of platelets 11/2/23 for plt 46 prior to surgery  -- transfuse platelets when:          - <10k for patient with failure of platelet production          - <20k for hemorrhagic diathesis          - <50k for active bleeding or immediately prior to invasive procedure          - <100k for neurosurgery, ophthalmic surgery, CNS bleeding          - >100k for evidence of platelet dysfunction

## 2023-11-02 NOTE — H&P
[facility]  Cardiology  History and Physical     Patient Name: Lorenzo Nino  MRN: [id number]  Admission Date: 10/30/2023  Code Status: Full Code   Attending Provider: Emerson Mercado MD   Primary Care Physician: Santos Caballero MD  Principal Problem:Endocarditis    Patient information was obtained from relative(s), past medical records, and ER records.     Subjective:     Chief Complaint:  Endocarditis     HPI:  49 yo M with no past medical history admitted 10/30 for sepsis. Last Friday, 10/27, he developed headache, abdominal pain, and nausea. He presented to , who prescribed Tylenol and recommended he return if he continued to feel poorly. He presented to [facility] [place] 10/30 with worsening abdominal pain, as well as fatigue, body aches, and hematuria. He was found to be tachycardic and hypotensive requiring vasopressor support. He was admitted to MICU for management of sepsis 2/2 PNA vs UTI vs meningitis (reported symptoms of neck stiffness). He was unable to undergo LP due to thrombocytopenia of 23. He is currently being evaluated for TTP.     Further work up revealed MRSA bacteremia. He underwent TTE, which was notable for mitral valve vegetation with flail anterior leaflet, as well as possible aortic valve vegetation. EKG sinus rhythm, low voltage. Troponin elevated to 27.8.     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 Pt decompensated with increasing oxygen requirements. Intubated on 11/2/23. Repeat TTE showed mild AV prolapse with regurgitation and MV with flail posterior leaflet and severe regurgitation. CCU contacted for transfer.    History reviewed. No pertinent past medical history.    History reviewed. No pertinent surgical history.    Review of patient's allergies indicates:  No Known Allergies    No current facility-administered medications on file prior to encounter.     No current outpatient medications  on file prior to encounter.     Family History    None       Tobacco Use    Smoking status: Unknown    Smokeless tobacco: Not on file   Substance and Sexual Activity    Alcohol use: Yes    Drug use: Never    Sexual activity: Not on file     Review of Systems   Reason unable to perform ROS: intubated.     Objective:     Vital Signs (Most Recent):  Temp: 97.6 °F (36.4 °C) (11/02/23 1345)  Pulse: 97 (11/02/23 1345)  Resp: 14 (11/02/23 1345)  BP: 90/63 (11/02/23 1330)  SpO2: 98 % (11/02/23 1345) Vital Signs (24h Range):  Temp:  [97.6 °F (36.4 °C)-98.7 °F (37.1 °C)] 97.6 °F (36.4 °C)  Pulse:  [] 97  Resp:  [12-51] 14  SpO2:  [88 %-98 %] 98 %  BP: ()/(57-90) 90/63  Arterial Line BP: (88-95)/(54-58) 95/58     Weight: 74.4 kg (164 lb)  Body mass index is 21.64 kg/m².    SpO2: 98 %         Intake/Output Summary (Last 24 hours) at 11/2/2023 1503  Last data filed at 11/2/2023 1345  Gross per 24 hour   Intake 1045.13 ml   Output 3125 ml   Net -2079.87 ml       Lines/Drains/Airways       Drain  Duration                  Urethral Catheter 11/02/23 1030 <1 day              Airway  Duration                  Airway - Non-Surgical 11/02/23 0855 Endotracheal Tube <1 day              Arterial Line  Duration             Arterial Line 11/02/23 1031 Right Radial <1 day              Peripheral Intravenous Line  Duration                  Peripheral IV - Single Lumen 10/30/23 1924 20 G Right Antecubital 2 days         Peripheral IV - Single Lumen 10/30/23 2047 20 G Anterior;Left Forearm 2 days         Peripheral IV - Single Lumen 10/31/23 1230 18 G Anterior;Distal;Right Forearm 2 days                     Physical Exam  Vitals and nursing note reviewed.   Constitutional:       General: He is not in acute distress.     Appearance: He is ill-appearing.      Comments: Sedated/intubated   HENT:      Head: Normocephalic and atraumatic.      Nose: No rhinorrhea.   Cardiovascular:      Rate and Rhythm: Tachycardia present. Rhythm  irregular.   Pulmonary:      Comments: Mechanically ventillated  Abdominal:      General: Abdomen is flat.   Musculoskeletal:      Right lower leg: No edema.      Left lower leg: No edema.   Skin:     General: Skin is warm.      Comments: Petechiae of bilateral feet and palms   Neurological:      Comments: Sedated          Significant Labs: CMP   Recent Labs   Lab 11/01/23  0558 11/01/23  2151 11/02/23  0341 11/02/23  0803   *  --  139  --    K 3.3* 4.0 3.2* 3.5     --  100  --    CO2 19*  --  22*  --    *  --  236*  --    BUN 28*  --  42*  --    CREATININE 1.1  --  1.3  --    CALCIUM 7.6*  --  7.5*  --    PROT 5.7*  --  5.9*  --    ALBUMIN 2.2*  --  2.2*  --    BILITOT 1.3*  --  1.6*  --    ALKPHOS 90  --  89  --    *  --  105*  --    *  --  86*  --    ANIONGAP 14  --  17*  --    , CBC   Recent Labs   Lab 10/31/23  2146 11/01/23  0558 11/02/23  0100 11/02/23  0341 11/02/23  0651   WBC 13.31* 17.93*  --  23.60*  --    HGB 11.8* 12.2*  --  11.9*  --    HCT 33.6* 34.7*   < > 34.5* 35*   PLT 19* 25*  --  46*  --     < > = values in this interval not displayed.   Troponin   Recent Labs   Lab 10/31/23  1830 10/31/23  2356 11/02/23  0803   TROPONINI 29.120* 16.875* 5.857*       Significant Imaging:   X-Ray Chest 1 View    Result Date: 11/2/2023  Patchy bilateral airspace disease concerning for an underlying infectious process. Electronically signed by: Alisson Lilly MD Date:    11/02/2023 Time:    09:56    X-Ray Chest 1 View    Result Date: 11/2/2023  Progression of radiographic findings with bilateral pattern interstitial and alveolar opacities, more prominently centrally on the right. Electronically signed by: Adi Briscoe Date:    11/02/2023 Time:    01:38    Assessment and Plan:     * Endocarditis  47 yo M with no past medical history admitted 10/30 2/2 PNA vs UTI vs meningitis requiring vasopressor support. Further work up revealed MRSA bacteremia. He underwent TTE, which was  notable for mitral valve vegetation with flail anterior leaflet, as well as possible aortic valve vegetation.   - EKG sinus rhythm, low voltage.   - Troponin 18.298 --> 19.814 --> 27.88    He denies IVDU (supported by negative UDS on admission). No history of dental abscesses or recent dental work (does have dental caps, states they were placed remotely). No significant family cardiac history.   He has been evaluated by HTS, who believe that while it is possible this is related to myocarditis, there are other likely explanations for his echo findings and elevated troponin, and even if he were to have myocarditis, there would be no interventions to offer regardless.   Per patient's mother, CTS visited at bedside and stated surgical intervention is not indicated.   Overnight 11/1-11/2 unfortunately developed respiratory distress with hypoxia and tachypnea requiring BiPAP and eventually intubation. Concern for flash pulmonary edema as well as worsening of MR in setting of flail leaflet.     Recommendations:  - CTS to place intra aortic balloon pump today for further hemodynamic support  - Admit to CCU  - Diuresis as tolerated   - Continue antibiotics per ID, on vancomycin for MRSA and clindamycin for toxin mediation    Septic shock  See endocarditis    HFrEF (heart failure with reduced ejection fraction)  Results for orders placed during the hospital encounter of 10/30/23    Echo Saline Bubble? No    Interpretation Summary    Left Ventricle: The left ventricle is normal in size. Ventricular mass is normal. Normal wall thickness. Global hypokinesis present. There is mildly reduced systolic function with a visually estimated ejection fraction of 40 - 45%.    Right Ventricle: Normal right ventricular cavity size. Wall thickness is normal. Right ventricle wall motion  is normal. Systolic function is normal.    Left Atrium: Left atrium is severely dilated.    Right Atrium: Right atrium is dilated.    Aortic Valve: RCC is  thickended with mild prolapse.  Cannot exclude RCC vegetation. The remaining leaflets have normal mobility. There is trace aortic regurgitation.    Mitral Valve: Both leaflets are thickened.Cannot exclude anterior leaflet vegetation coating this leaflet. There is prolapse of the posterior mitral leaflet. Flail posterior leaflet. There is a large mobile echogenic mass present on the posterior leaflet consistent with vegetation. The mean pressure gradient across the mitral valve is 6 mmHg at a heart rate of 124 bpm. There is severe regurgitation with an anteromedial eccentrically directed jet.    Tricuspid Valve: There is mild regurgitation.    Pulmonary Artery: The estimated pulmonary artery systolic pressure is 31 mmHg.    IVC/SVC: Normal venous pressure at 3 mmHg.    Appears to be a new problem. Would hold addition of GDMT while managing acute illness.   Follow up with outpatient cardiology.  - continue lasix 20cc/hr    Atrial fibrillation  Patient appeared to have new onset AF overnight 10/31-11/1. Rate controlled in 90's to low 100's. Otherwise remained hemodynamically stable off of pressors. Telemetry reviewed, noted to have irregularly irregular rhythm which is most likely consistent with AF. Likely due to acute illness. CHADSVASc 0, anticoagulation not indicated and contraindicated given thrombocytopenia and potential for hemorrhagic conversion if there are septic emboli present.  - tele      Elevated troponin  See endocarditis  Likely due to demand ischemia in setting of critical illness vs myocarditis (see HTS note)    PNA (pneumonia)  CXR significant for perihilar interstitial opacities compatible with multifocal pneumonia vs pulmonary edema. 3 out of 4 SIRS criteria met. Treated for comm-acquired pneumonia with vanc, ceftriaxone and azithromycin. Respiratory viral panel was negative.   -- continuing vanc  -- ABGs PRN  -- Acetaminophen for fever  -- patient intubated for respiratory distress  11/2/23    Elevated transaminase level  Elevated Ast and ALT on presentation along with low platelets and low albumin. Likely liver dysfunction 2/2 Septic Shock. Acute hepatitis panel negative. No significant hepatic pathology found on CT A/P   - improving    Thrombocytopenia  Pt presenting with low platelets (23) in setting of acute liver injury likely secondary to shock. Hematology evaluated for TTP, but [id number] wnl. No signs of active bleeding.     -- Transfused 2 units FFP, platelets improved to 25  -- Trend CBC, PT/INR, aPTT  -- PT 11.0, INR 1.0, Rogelio negative, haptoglobin 82, fibrinogen 489, aPTT 44.4  -- transfusing a unit of platelets 11/2/23 for plt 46 prior to surgery  -- transfuse platelets when:          - <10k for patient with failure of platelet production          - <20k for hemorrhagic diathesis          - <50k for active bleeding or immediately prior to invasive procedure          - <100k for neurosurgery, ophthalmic surgery, CNS bleeding          - >100k for evidence of platelet dysfunction    FRANCESCO (acute kidney injury)  Cr 1.6 on admission, thought to be 2/2 volume depletion. Improved after fluid repletion.  - monitor CMP  - I/Os        VTE Risk Mitigation (From admission, onward)           Ordered     Place sequential compression device  Until discontinued         10/30/23 2231     IP VTE LOW RISK PATIENT  Once         10/30/23 2231                    Jyoti Azul MD  Cardiology   [facility]

## 2023-11-02 NOTE — ASSESSMENT & PLAN NOTE
Elevated Ast and ALT on presentation along with low platelets and low albumin. Likely liver dysfunction 2/2 Septic Shock. Acute hepatitis panel negative. No significant hepatic pathology found on CT A/P   - improving

## 2023-11-02 NOTE — ASSESSMENT & PLAN NOTE
CXR significant for perihilar interstitial opacities compatible with multifocal pneumonia vs pulmonary edema. 3 out of 4 SIRS criteria met. Treated for comm-acquired pneumonia with vanc, ceftriaxone and azithromycin. Respiratory viral panel was negative.   -- continuing vanc  -- ABGs PRN  -- Acetaminophen for fever  -- patient intubated for respiratory distress 11/2/23

## 2023-11-02 NOTE — ASSESSMENT & PLAN NOTE
Cr 1.6 on admission, thought to be 2/2 volume depletion. Improved after fluid repletion.  - monitor CMP  - I/Os

## 2023-11-02 NOTE — EICU
Intervention Initiated From:  Bedside    Beth intervened regarding:  Documentation, Respiratory, and Time-Out    Comments: call received for intubation.  Dr Bullock and Dr Caballero @ bs for procedure.  Serina CASTILLO and Bria MCCARTY @ bs for assistance.  Pt currently on bipap. Etomidate 30 mg ivp and rocuronium 80 mg ivp per md order @ 0853.  Intubated @ 0855 by Dr Bullock, sz 8.0 ett, 24 cm lips.  Stat cxr ordered.      Procedure end 0901

## 2023-11-02 NOTE — ASSESSMENT & PLAN NOTE
BCx growing gram positive cocci w/ PCR positive for MRSA. Obtained TTE.    TTE (10/31/23):    Presence of vegetation on anterior MV leaflet. possivble small vegetation on RCC of AV. consider NEW for further evaluation.    Left Ventricle: The left ventricle is normal in size. Ventricular mass is normal. Normal wall thickness. Normal wall motion. There is mildly reduced systolic function with a visually estimated ejection fraction of 40 - 45%. Biplane (2D) method of discs ejection fraction is 43%. Grade I diastolic dysfunction.    Right Ventricle: Normal right ventricular cavity size. Wall thickness is normal. Right ventricle wall motion  is normal. Systolic function is normal.    Left Atrium: Left atrium is mildly dilated.    Aortic Valve: The aortic valve is a trileaflet valve. Cannot exclude small vegetation on RCC of AV. There is no aortic abscess prsesent . Other leaflets appear normal.    Mitral Valve: There is a medium mobile echogenic mass present on the anterior MV leaflet that is flail consistent with a large vegetation measuring 1.9 x 0.8 cm. There is at least moderate eccentric anteriorly directed regurgitation due to flail anterior leaflet. Partial perforation of anterior MV leaflet cannot be excluded/    Pulmonary Artery: The estimated pulmonary artery systolic pressure is 30 mmHg.    IVC/SVC: Intermediate venous pressure at 8 mmHg.    TTE (11/02/23):    Left Ventricle: The left ventricle is normal in size. Ventricular mass is normal. Normal wall thickness. Global hypokinesis present. There is mildly reduced systolic function with a visually estimated ejection fraction of 40 - 45%.    Right Ventricle: Normal right ventricular cavity size. Wall thickness is normal. Right ventricle wall motion  is normal. Systolic function is normal.    Left Atrium: Left atrium is severely dilated.    Right Atrium: Right atrium is dilated.    Aortic Valve: RCC is thickended with mild prolapse.  Cannot exclude  RCC vegetation. The remaining leaflets have normal mobility. There is trace aortic regurgitation.    Mitral Valve: Both leaflets are thickened.Cannot exclude anterior leaflet vegetation coating this leaflet. There is prolapse of the posterior mitral leaflet. Flail posterior leaflet. There is a large mobile echogenic mass present on the posterior leaflet consistent with vegetation. The mean pressure gradient across the mitral valve is 6 mmHg at a heart rate of 124 bpm. There is severe regurgitation with an anteromedial eccentrically directed jet.    Tricuspid Valve: There is mild regurgitation.    Pulmonary Artery: The estimated pulmonary artery systolic pressure is 31 mmHg.    IVC/SVC: Normal venous pressure at 3 mmHg.    -- Decompensated due to worsening MV regurg, CTS to place IABP today and transfer to CCU  -- covering with vanc, d'c'd rocephin and azithromycin  -- trending troponins, 19 on admission, 16 most recently  -- ID recommended 6 weeks of vancomycin following clearance of blood cultures  -- daily BCx

## 2023-11-02 NOTE — ASSESSMENT & PLAN NOTE
Pt presenting with low platelets (23) in setting of acute liver injury likely secondary to shock. Other cause to rule out can include cirrhosis, BM problem (myelodysplasia, chemotherapy, alcohol), HCV/HIV. Labs ordered for GARRY, Smear. PLASMIC score 6/7 but [id number] wnl. Likely 2/2 sepsis and critical illness.    -- Transfusing platelets today  -- Transfused 2 units FFP, platelets improved to 25  -- solumedrol d/c'd given no TPP  -- Trend CBC, PT/INR, aPTT  -- Consulted hematology, appreciate recommendations  -- per hematology team, they heard from lab [id number] wnl  -- PT 11.0, INR 1.0, Rogelio negative, haptoglobin 82, fibrinogen 489, aPTT 44.4  -- transfuse platelets when:   - <10k for patient with failure of platelet production   - <20k for hemorrhagic diathesis   - <50k for active bleeding or immediately prior to invasive procedure   - <100k for neurosurgery, ophthalmic surgery, CNS bleeding   - >100k for evidence of platelet dysfunction

## 2023-11-02 NOTE — PROGRESS NOTES
[facility]  Cardiology  Progress Note    Patient Name: Lorenzo Nino  MRN: [id number]  Admission Date: 10/30/2023  Hospital Length of Stay: 3 days  Code Status: Full Code   Attending Physician: Santos Caballero MD   Primary Care Physician: Santos Caballero MD  Expected Discharge Date: 11/3/2023  Principal Problem:<principal problem not specified>    Subjective:     Hospital Course:   No notes on file    Interval History: Pt noted to have significant O2 desaturation overnight with associate tachypnea, required BiPAP. Intubated this AM 2/2 respiratory distress. Concern for flash pulmonary edema and worsening of MR. Consulting with CCU and CTS, patient may benefit from balloon pump.    Review of Systems   Unable to perform ROS: Intubated     Objective:     Vital Signs (Most Recent):  Temp: 98.4 °F (36.9 °C) (11/02/23 0300)  Pulse: 95 (11/02/23 1200)  Resp: 20 (11/02/23 1200)  BP: 96/69 (11/02/23 1100)  SpO2: 98 % (11/02/23 1200) Vital Signs (24h Range):  Temp:  [98.4 °F (36.9 °C)-98.7 °F (37.1 °C)] 98.4 °F (36.9 °C)  Pulse:  [] 95  Resp:  [14-51] 20  SpO2:  [88 %-98 %] 98 %  BP: ()/(58-90) 96/69     Weight: 74.4 kg (164 lb)  Body mass index is 21.64 kg/m².     SpO2: 98 %         Intake/Output Summary (Last 24 hours) at 11/2/2023 1305  Last data filed at 11/2/2023 1200  Gross per 24 hour   Intake 448.82 ml   Output 3125 ml   Net -2676.18 ml       Lines/Drains/Airways       Drain  Duration                  Urethral Catheter 11/02/23 1030 <1 day              Airway  Duration                  Airway - Non-Surgical 11/02/23 0855 Endotracheal Tube <1 day              Arterial Line  Duration             Arterial Line 11/02/23 1031 Right Radial <1 day              Peripheral Intravenous Line  Duration                  Peripheral IV - Single Lumen 10/30/23 1924 20 G Right Antecubital 2 days         Peripheral IV - Single Lumen 10/30/23 2047 20 G Anterior;Left Forearm 2 days         Peripheral  IV - Single Lumen 10/31/23 1230 18 G Anterior;Distal;Right Forearm 2 days                       Physical Exam  Vitals and nursing note reviewed.   Constitutional:       Comments: Sedated/intubated   Cardiovascular:      Rate and Rhythm: Tachycardia present. Rhythm irregular.   Pulmonary:      Comments: Mechanically ventillated  Musculoskeletal:      Right lower leg: No edema.      Left lower leg: No edema.   Skin:     Comments: Petechiae of bilateral feet and palms   Neurological:      Comments: Sedated            Significant Labs: All pertinent lab results from the last 24 hours have been reviewed.    Significant Imaging:  Reviewed  Assessment and Plan:     Endocarditis  47 yo M with no past medical history admitted 10/30 2/2 PNA vs UTI vs meningitis requiring vasopressor support. Further work up revealed MRSA bacteremia. He underwent TTE, which was notable for mitral valve vegetation with flail anterior leaflet, as well as possible aortic valve vegetation.   - EKG sinus rhythm, low voltage.   - Troponin 18.298 --> 19.814 --> 27.88    He denies IVDU (supported by negative UDS on admission). No history of dental abscesses or recent dental work (does have dental caps, states they were placed remotely). No significant family cardiac history.   He has been evaluated by HTS, who believe that stated that while it is possible this is related to myocarditis, there are other likely explanations for his echo findings and elevated troponin, and even if he were to have myocarditis, there would be no interventions to offer regardless.   Per patient's mother, CTS visited at bedside and stated surgical intervention is not indicated.   Overnight 11/1-11/2 unfortunately developed respiratory distress with hypoxia and tachypnea requiring BiPAP and eventually intubation. Concern for flash pulmonary edema as well as worsening of MR in setting of flail leaflet.     Recommendations:  - CTS to place intra aortic balloon pump today for  further hemodynamic support  - Transfer to CCU for further management  - Diuresis as tolerated   - Continue antibiotics per ID    Septic shock  See endocarditis    HFrEF (heart failure with reduced ejection fraction)  Results for orders placed during the hospital encounter of 10/30/23    Echo Saline Bubble? No    Interpretation Summary    Left Ventricle: The left ventricle is normal in size. Ventricular mass is normal. Normal wall thickness. Global hypokinesis present. There is mildly reduced systolic function with a visually estimated ejection fraction of 40 - 45%.    Right Ventricle: Normal right ventricular cavity size. Wall thickness is normal. Right ventricle wall motion  is normal. Systolic function is normal.    Left Atrium: Left atrium is severely dilated.    Right Atrium: Right atrium is dilated.    Aortic Valve: RCC is thickended with mild prolapse.  Cannot exclude RCC vegetation. The remaining leaflets have normal mobility. There is trace aortic regurgitation.    Mitral Valve: Both leaflets are thickened.Cannot exclude anterior leaflet vegetation coating this leaflet. There is prolapse of the posterior mitral leaflet. Flail posterior leaflet. There is a large mobile echogenic mass present on the posterior leaflet consistent with vegetation. The mean pressure gradient across the mitral valve is 6 mmHg at a heart rate of 124 bpm. There is severe regurgitation with an anteromedial eccentrically directed jet.    Tricuspid Valve: There is mild regurgitation.    Pulmonary Artery: The estimated pulmonary artery systolic pressure is 31 mmHg.    IVC/SVC: Normal venous pressure at 3 mmHg.    Appears to be a new problem. Would hold addition of GDMT while managing acute illness.   Follow up with outpatient cardiology.    Atrial fibrillation  Patient appeared to have new onset AF overnight 10/31-11/1. Rate controlled in 90's to low 100's. Otherwise remained hemodynamically stable off of pressors. Telemetry reviewed,  noted to have irregularly irregular rhythm which is most likely consistent with AF. Likely due to acute illness.    Would not recommend treatment at this time. He is rate controlled, and with his recent hypotension requiring vasopressor support and critical illness, would not suggest addition of a beta blocker. [id number] is 0, anticoagulation not indicated, and would actually be contraindicated in this patient regardless given his thrombocytopenia, and fear that he may have a hemorrhagic conversion/ICH should he have septic emboli to his brain.    Elevated troponin  See endocarditis  Likely due to demand ischemia in setting of critical illness vs myocarditis (see HTS note)        VTE Risk Mitigation (From admission, onward)           Ordered     Place sequential compression device  Until discontinued         10/30/23 2231     IP VTE LOW RISK PATIENT  Once         10/30/23 2231                    Federica Vazquez,   Cardiology  [facility]

## 2023-11-02 NOTE — ANESTHESIA PREPROCEDURE EVALUATION
[facility]  Anesthesia Pre-Operative Evaluation         Patient Name: Lorenzo Nino  YOB: 1975  MRN: [id number]    SUBJECTIVE:     Pre-operative evaluation for Procedure(s) (LRB):  REPLACEMENT, MITRAL VALVE (N/A)     11/02/2023    Lorenzo Nino is a 48 y.o. male w/ a significant PMHx of PNA, endocarditis, thrombocytopenia. He was initially seen in several urgent cares w/headache, abdominal pain with nausea but no vomiting or diarrhea. When he presented with hematuria, he was sent to the ED at [facility] and was found to have thrombocytopenia. CT head negative, CT C/A/P showing perinephric stranding, distended bladder, and hiatal hernia. Patient admitted to MICU for further workup and treatment of his hypotension and likely severe sepsis. Blood cultures have grown MRSA and a TTE revealed a flail mitral leflet and a possible vegetation on the aortic valve. There was initial concern for TTP however lab work negative. He has dental implants and follows up with his dentist every 6 months. Scheduled for NEW 11/2 however developed respiratory failure requiring intubation. Planning for IABP 11/2 with interventional cardiology.     Of note, patient's maternal aunt developed cardiac arrest under anesthesia (during colonoscopy)    Paper consent signed by patient's mother and placed in chart. E consent not working.     Echo 11/2/2023:    Left Ventricle: The left ventricle is normal in size. Ventricular mass is normal. Normal wall thickness. Global hypokinesis present. There is mildly reduced systolic function with a visually estimated ejection fraction of 40 - 45%.    Right Ventricle: Normal right ventricular cavity size. Wall thickness is normal. Right ventricle wall motion  is normal. Systolic function is normal.    Left Atrium: Left atrium is severely dilated.    Right Atrium: Right atrium is dilated.    Aortic Valve: RCC is thickended with mild prolapse.  Cannot exclude RCC vegetation. The  remaining leaflets have normal mobility. There is trace aortic regurgitation.    Mitral Valve: Both leaflets are thickened.Cannot exclude anterior leaflet vegetation coating this leaflet. There is prolapse of the posterior mitral leaflet. Flail posterior leaflet. There is a large mobile echogenic mass present on the posterior leaflet consistent with vegetation. The mean pressure gradient across the mitral valve is 6 mmHg at a heart rate of 124 bpm. There is severe regurgitation with an anteromedial eccentrically directed jet.    Tricuspid Valve: There is mild regurgitation.    Pulmonary Artery: The estimated pulmonary artery systolic pressure is 31 mmHg.    IVC/SVC: Normal venous pressure at 3 mmHg.            LDA:        Peripheral IV - Single Lumen 10/30/23 1924 20 G Right Antecubital (Active)   Site Assessment Clean;Dry;Intact;No redness;No swelling 11/02/23 1101   Extremity Assessment Distal to IV No abnormal discoloration;No redness;No swelling;No warmth 11/02/23 1101   Line Status Flushed;Saline locked 11/02/23 1101   Dressing Status Clean;Dry;Intact 11/02/23 1101   Dressing Intervention Integrity maintained 11/02/23 1101   Dressing Change Due 11/03/23 11/02/23 1101   Site Change Due 11/03/23 11/02/23 1101   Reason Not Rotated Not due 11/02/23 1101   Number of days: 2            Peripheral IV - Single Lumen 10/30/23 2047 20 G Anterior;Left Forearm (Active)   Site Assessment Clean;Dry;Intact;No redness;No swelling 11/02/23 1101   Extremity Assessment Distal to IV No abnormal discoloration;No redness;No warmth;No swelling 11/02/23 1101   Line Status Infusing 11/02/23 1101   Dressing Status Clean;Dry;Intact 11/02/23 1101   Dressing Intervention Integrity maintained 11/02/23 1101   Dressing Change Due 11/03/23 11/02/23 1101   Site Change Due 11/03/23 11/02/23 1101   Reason Not Rotated Not due 11/02/23 1101   Number of days: 2            Peripheral IV - Single Lumen 10/31/23 1230 18 G Anterior;Distal;Right Forearm  "(Active)   Site Assessment Clean;Dry;Intact;No redness;No swelling 11/02/23 1101   Extremity Assessment Distal to IV No redness;No abnormal discoloration;No swelling;No warmth 11/02/23 1101   Line Status Flushed;Saline locked 11/02/23 1101   Dressing Status Dry;Clean;Intact 11/02/23 1101   Dressing Intervention Integrity maintained 11/02/23 1101   Dressing Change Due 11/04/23 11/02/23 1101   Site Change Due 11/04/23 11/02/23 1101   Reason Not Rotated Not due 11/02/23 1101   Number of days: 2       Arterial Line 11/02/23 1031 Right Radial (Active)   $ Arterial Line Charges (Upon Insertion) Bedside Insertion Performed 11/02/23 1031   Site Assessment Clean;Dry;Intact;No redness;No swelling 11/02/23 1101   Line Status Pulsatile blood flow 11/02/23 1101   Art Line Waveform Appropriate 11/02/23 1101   Arterial Line Interventions Leveled;Zeroed and calibrated;Connections checked and tightened;Flushed per protocol;Line pulled back 11/02/23 1101   Color/Movement/Sensation Capillary refill less than 3 sec 11/02/23 1101   Dressing Type CHG impregnated dressing/sponge;Central line dressing;Transparent (Tegaderm) 11/02/23 1101   Dressing Status Clean;Dry;Intact 11/02/23 1101   Dressing Intervention Integrity maintained 11/02/23 1101   Dressing Change Due 11/06/23 11/02/23 1101   Tubing Change Due 11/06/23 11/02/23 1101   Number of days: 0            Urethral Catheter 11/02/23 1030 (Active)   Site Assessment Clean;Intact 11/02/23 1101   Collection Container Urimeter 11/02/23 1101   Securement Method secured to top of thigh w/ adhesive device 11/02/23 1101   Catheter Care Performed yes 11/02/23 1101   Reason for Continuing Urinary Catheterization Critically ill in ICU and requiring hourly monitoring of intake/output 11/02/23 1101   CAUTI Prevention Bundle Securement Device in place with 1" slack;Intact seal between catheter & drainage tubing;Drainage bag/urimeter off the floor;No dependent loops or kinks;Sheeting clip in " use;Drainage bag/urimeter not overfilled (<2/3 full);Drainage bag/urimeter below bladder 11/02/23 1101   Output (mL) 75 mL 11/02/23 1200   Number of days: 0       Prev airway: None documented.    Drips:    fentanyl 25 mcg/hr (11/02/23 0901)    furosemide (LASIX) 500 mg in 50 mL infusion (conc: 10 mg/mL) 20 mg/hr (11/02/23 1129)    NORepinephrine bitartrate-D5W         Patient Active Problem List   Diagnosis    Septic shock    FRANCESCO (acute kidney injury)    Thrombocytopenia    Elevated transaminase level    PNA (pneumonia)    Endocarditis    Elevated troponin    Atrial fibrillation    HFrEF (heart failure with reduced ejection fraction)       Review of patient's allergies indicates:  No Known Allergies    Current Inpatient Medications:   albuterol-ipratropium  3 mL Nebulization Q4H    clindamycin (CLEOCIN) IVPB  900 mg Intravenous Q8H    famotidine  20 mg Oral BID    mupirocin   Nasal BID    succinylcholine        vancomycin (VANCOCIN) IV (PEDS and ADULTS)  1,250 mg Intravenous Q12H       No current facility-administered medications on file prior to encounter.     No current outpatient medications on file prior to encounter.       History reviewed. No pertinent surgical history.    Social History     Socioeconomic History    Marital status: Single   Tobacco Use    Smoking status: Unknown   Substance and Sexual Activity    Alcohol use: Yes    Drug use: Never     OBJECTIVE:     Vital Signs Range (Last 24H):  Temp:  [36.9 °C (98.4 °F)-37.1 °C (98.7 °F)]   Pulse:  []   Resp:  [14-51]   BP: ()/(58-90)   SpO2:  [88 %-98 %]       Significant Labs:  Lab Results   Component Value Date    WBC 23.60 (H) 11/02/2023    HGB 11.9 (L) 11/02/2023    HCT 35 (L) 11/02/2023    PLT 46 (L) 11/02/2023    TRIG 370 (H) 10/31/2023    ALT 86 (H) 11/02/2023     (H) 11/02/2023     11/02/2023    K 3.5 11/02/2023     11/02/2023    CREATININE 1.3 11/02/2023    BUN 42 (H) 11/02/2023    CO2 22 (L) 11/02/2023    INR 1.0  10/30/2023       Diagnostic Studies: No relevant studies.    EKG:   Results for orders placed or performed during the hospital encounter of 10/30/23   EKG 12-lead    Collection Time: 11/02/23  7:58 AM    Narrative    Test Reason : I38,    Vent. Rate : 100 BPM     Atrial Rate : 096 BPM     P-R Int : 000 ms          QRS Dur : 092 ms      QT Int : 352 ms       P-R-T Axes : 000 028 -70 degrees     QTc Int : 454 ms    Atrial fibrillation  T wave abnormality, consider inferolateral ischemia  Abnormal ECG  When compared with ECG of 31-OCT-2023 15:25,  Atrial fibrillation has replaced Sinus rhythm  Inverted T waves have replaced nonspecific T wave abnormality in Inferior  leads  Confirmed by SERA VALENTE MD ([id number]) on 11/2/2023 1:11:34 PM    Referred By: [email or web address]   SELF           Confirmed By:SERA VALENTE MD       2D ECHO:  TTE:  Results for orders placed or performed during the hospital encounter of 10/30/23   Echo Saline Bubble? No   Result Value Ref Range    RA Width 3.21 cm    LA volume (mod) 117.10 cm3    Left Atrium Major Axis 6.70 cm    Left Atrium Minor Axis 6.7 cm    RA Major Axis 5.81 cm    LV Diastolic Volume 59.71 mL    LV Systolic Volume 27.58 mL    MV VTI 28.56 cm    TR Max Jabier 2.66 m/s    MV Peak E Jabier 1.50 m/s    MV peak gradient 14 mmHg    Mr max jabier 0.04 m/s    Ao VTI 18.92 cm    Ao peak jabier 1.33 m/s    LVOT peak VTI 14.75 cm    LVOT peak jabier 0.97 m/s    LVOT diameter 2.38 cm    MV mean gradient 6 mmHg    AV mean gradient 4 mmHg    TAPSE 1.69 cm    RVDD 3.66 cm    LA size 5.05 cm    Ascending aorta 2.95 cm    STJ 3.33 cm    Sinus 3.59 cm    LVIDs 3.8 2.1 - 4.0 cm    Posterior Wall 1.01 0.6 - 1.1 cm    IVS 0.99 0.6 - 1.1 cm    LVIDd 5.1 3.5 - 6.0 cm    TDI LATERAL 0.20 m/s    LA WIDTH 5.0 cm    TDI SEPTAL 0.14 m/s    LV LATERAL E/E' RATIO 7.50 m/s    LV SEPTAL E/E' RATIO 10.71 m/s    FS 25 (A) 28 - 44 %    LA volume 143.80 cm3    LV mass 188.02 g    ZLVIDD -1.14     ZLVIDS 0.62     Left Ventricle  Relative Wall Thickness 0.40 cm    AV valve area 3.47 cm²    AV Velocity Ratio 0.73     AV index (prosthetic) 0.78     MV valve area by continuity eq 2.30 cm2    Mean e' 0.17 m/s    LVOT area 4.4 cm2    LVOT stroke volume 65.59 cm3    AV peak gradient 7 mmHg    E/E' ratio 8.82 m/s    LV Systolic Volume Index 13.9 mL/m2    LV Diastolic Volume Index 30.16 mL/m2    LA Volume Index 72.6 mL/m2    LV Mass Index 95 g/m2    Triscuspid Valve Regurgitation Peak Gradient 28 mmHg    LA Volume Index (Mod) 59.1 mL/m2    FATUMA by Velocity Ratio 3.24 cm²    BSA 1.96 m2    TV resting pulmonary artery pressure 31 mmHg    RV TB RVSP 6 mmHg    Est. RA pres 3 mmHg    Mitral Valve Heart Rate 124 bpm    Narrative      Left Ventricle: The left ventricle is normal in size. Ventricular mass   is normal. Normal wall thickness. Global hypokinesis present. There is   mildly reduced systolic function with a visually estimated ejection   fraction of 40 - 45%.    Right Ventricle: Normal right ventricular cavity size. Wall thickness   is normal. Right ventricle wall motion  is normal. Systolic function is   normal.    Left Atrium: Left atrium is severely dilated.    Right Atrium: Right atrium is dilated.    Aortic Valve: RCC is thickended with mild prolapse.  Cannot exclude RCC   vegetation. The remaining leaflets have normal mobility. There is trace   aortic regurgitation.    Mitral Valve: Both leaflets are thickened.Cannot exclude anterior   leaflet vegetation coating this leaflet. There is prolapse of the   posterior mitral leaflet. Flail posterior leaflet. There is a large mobile   echogenic mass present on the posterior leaflet consistent with   vegetation. The mean pressure gradient across the mitral valve is 6 mmHg   at a heart rate of 124 bpm. There is severe regurgitation with an   anteromedial eccentrically directed jet.    Tricuspid Valve: There is mild regurgitation.    Pulmonary Artery: The estimated pulmonary artery systolic pressure is    31 mmHg.    IVC/SVC: Normal venous pressure at 3 mmHg.         NEW:  No results found for this or any previous visit.    ASSESSMENT/PLAN:         Pre-op Assessment    I have reviewed the Patient Summary Reports.     I have reviewed the Nursing Notes. I have reviewed the NPO Status.   I have reviewed the Medications.     Review of Systems  Anesthesia Hx:  No previous Anesthesia   Neg history of prior surgery.          Family Hx of Anesthesia complications:  Family Anesthesia Complications are Patient's maternal aunt developed cardiac arrest under anesthesia (during colonoscopy)  Denies Personal Hx of Anesthesia complications.                    Social:  Social Alcohol Use       Hematology/Oncology:    Oncology Normal    -- Anemia:                                  EENT/Dental:  EENT/Dental Normal           Cardiovascular:                    MRSA bacteremia with mitral vegetations                         Pulmonary:  Pneumonia                      Renal/:  Chronic Renal Disease, ARF                Hepatic/GI:  Hepatic/GI Normal                 Musculoskeletal:  Musculoskeletal Normal                Neurological:  Neurology Normal                                      Endocrine:  Endocrine Normal            Psych:  Psychiatric Normal                    Physical Exam  General: Intubated, sedated  Airway:  Mallampati: unable to assess   Pre-Existing Airway: Oral Endotracheal tube        Anesthesia Plan  Type of Anesthesia, risks & benefits discussed:    Anesthesia Type: Gen ETT  Intra-op Monitoring Plan: Standard ASA Monitors, Art Line, Central Line, NEW, CO and PA  Post Op Pain Control Plan: multimodal analgesia and IV/PO Opioids PRN  Induction:  IV  Airway Plan: Direct and Video, Post-Induction  Informed Consent: Informed consent signed with the Patient representative and all parties understand the risks and agree with anesthesia plan.  All questions answered.   ASA Score: 4  Day of Surgery Review of History &  Physical: H&P Update referred to the surgeon/provider.    Ready For Surgery From Anesthesia Perspective.     .

## 2023-11-02 NOTE — SUBJECTIVE & OBJECTIVE
History reviewed. No pertinent past medical history.    History reviewed. No pertinent surgical history.    Review of patient's allergies indicates:  No Known Allergies    No current facility-administered medications on file prior to encounter.     No current outpatient medications on file prior to encounter.     Family History    None       Tobacco Use    Smoking status: Unknown    Smokeless tobacco: Not on file   Substance and Sexual Activity    Alcohol use: Yes    Drug use: Never    Sexual activity: Not on file     Review of Systems   Reason unable to perform ROS: intubated.     Objective:     Vital Signs (Most Recent):  Temp: 97.6 °F (36.4 °C) (11/02/23 1345)  Pulse: 97 (11/02/23 1345)  Resp: 14 (11/02/23 1345)  BP: 90/63 (11/02/23 1330)  SpO2: 98 % (11/02/23 1345) Vital Signs (24h Range):  Temp:  [97.6 °F (36.4 °C)-98.7 °F (37.1 °C)] 97.6 °F (36.4 °C)  Pulse:  [] 97  Resp:  [12-51] 14  SpO2:  [88 %-98 %] 98 %  BP: ()/(57-90) 90/63  Arterial Line BP: (88-95)/(54-58) 95/58     Weight: 74.4 kg (164 lb)  Body mass index is 21.64 kg/m².    SpO2: 98 %         Intake/Output Summary (Last 24 hours) at 11/2/2023 1503  Last data filed at 11/2/2023 1345  Gross per 24 hour   Intake 1045.13 ml   Output 3125 ml   Net -2079.87 ml       Lines/Drains/Airways       Drain  Duration                  Urethral Catheter 11/02/23 1030 <1 day              Airway  Duration                  Airway - Non-Surgical 11/02/23 0855 Endotracheal Tube <1 day              Arterial Line  Duration             Arterial Line 11/02/23 1031 Right Radial <1 day              Peripheral Intravenous Line  Duration                  Peripheral IV - Single Lumen 10/30/23 1924 20 G Right Antecubital 2 days         Peripheral IV - Single Lumen 10/30/23 2047 20 G Anterior;Left Forearm 2 days         Peripheral IV - Single Lumen 10/31/23 1230 18 G Anterior;Distal;Right Forearm 2 days                     Physical Exam  Vitals and nursing note reviewed.    Constitutional:       General: He is not in acute distress.     Appearance: He is ill-appearing.      Comments: Sedated/intubated   HENT:      Head: Normocephalic and atraumatic.      Nose: No rhinorrhea.   Cardiovascular:      Rate and Rhythm: Tachycardia present. Rhythm irregular.   Pulmonary:      Comments: Mechanically ventillated  Abdominal:      General: Abdomen is flat.   Musculoskeletal:      Right lower leg: No edema.      Left lower leg: No edema.   Skin:     General: Skin is warm.      Comments: Petechiae of bilateral feet and palms   Neurological:      Comments: Sedated          Significant Labs: CMP   Recent Labs   Lab 11/01/23  0558 11/01/23  2151 11/02/23  0341 11/02/23  0803   *  --  139  --    K 3.3* 4.0 3.2* 3.5     --  100  --    CO2 19*  --  22*  --    *  --  236*  --    BUN 28*  --  42*  --    CREATININE 1.1  --  1.3  --    CALCIUM 7.6*  --  7.5*  --    PROT 5.7*  --  5.9*  --    ALBUMIN 2.2*  --  2.2*  --    BILITOT 1.3*  --  1.6*  --    ALKPHOS 90  --  89  --    *  --  105*  --    *  --  86*  --    ANIONGAP 14  --  17*  --    , CBC   Recent Labs   Lab 10/31/23  2146 11/01/23  0558 11/02/23  0100 11/02/23  0341 11/02/23  0651   WBC 13.31* 17.93*  --  23.60*  --    HGB 11.8* 12.2*  --  11.9*  --    HCT 33.6* 34.7*   < > 34.5* 35*   PLT 19* 25*  --  46*  --     < > = values in this interval not displayed.   Troponin   Recent Labs   Lab 10/31/23  1830 10/31/23  2356 11/02/23  0803   TROPONINI 29.120* 16.875* 5.857*       Significant Imaging:   X-Ray Chest 1 View    Result Date: 11/2/2023  Patchy bilateral airspace disease concerning for an underlying infectious process. Electronically signed by: Alisson Lilly MD Date:    11/02/2023 Time:    09:56    X-Ray Chest 1 View    Result Date: 11/2/2023  Progression of radiographic findings with bilateral pattern interstitial and alveolar opacities, more prominently centrally on the right. Electronically signed  by: Adi Briscoe Date:    11/02/2023 Time:    01:38

## 2023-11-02 NOTE — ASSESSMENT & PLAN NOTE
Mr. Lorenzo Nino is being treated for FRANCESCO likely secondary to pre-renal azotemia due to IVVD. Scr of 1.6. Ordered CMP, serum osmolality, urine osmolality, urine sodium, and urine creatinine. Started IVF resuscitation. Strict I/Os to monitor renal function. Discontinued nephrotoxic medications.    --Monitor with serial Cr / GFR levels closely with serial labs  --Avoid nephrotoxic medications such as NSAIDs, IV contrast, or RAAS blockade  --Renally dose medications    Lab Results   Component Value Date    CREATININE 1.3 11/02/2023

## 2023-11-02 NOTE — PLAN OF CARE
MICU DAILY GOALS     Family/Goals of care/Code Status   Code Status: Full Code    24H Vital Sign Range  Temp:  [98.4 °F (36.9 °C)-98.7 °F (37.1 °C)]   Pulse:  []   Resp:  [19-42]   BP: ()/(62-84)   SpO2:  [88 %-96 %]      Shift Events   No acute events throughout shift, Pt desatted to 85% O2 at approximately 0030. RT notified. Pt moved to 10L bubble flow. CC2 notified. CXR, ABG, and meds ordered.    AWAKE RASS: Goal - RASS Goal: 0-->alert and calm  Actual - RASS (Mckeon Agitation-Sedation Scale): alert and calm    Restraint necessity: Not necessary   BREATHE SBT: Not intubated    Coordinate A & B, analgesics/sedatives Pain: managed   SAT: Not intubated   Delirium CAM-ICU: Overall CAM-ICU: Negative   Early(intubated/ Progressive (non-intubated) Mobility MOVE Screen (INTUBATED ONLY): Pass    Activity: Activity Management: Rolling - L1   Feeding/Nutrition Diet order: Diet/Nutrition Received: NPO,     Thrombus DVT prophylaxis: VTE Required Core Measure: (SCDs) Sequential compression device initiated/maintained   HOB Elevation Head of Bed (HOB) Positioning: HOB at 30 degrees   Ulcer Prophylaxis GI: yes   Glucose control managed Glycemic Management: blood glucose monitored   Skin Skin assessed during: Q Shift Change    Sacrum intact? Yes  Heels intact? Yes  Surgical wound? No    [] No Altered Skin Integrity Present    []Prevention Measures Documented    [] Altered Skin Integrity Present or Discovered   [] LDA present /added in EPIC   [] Wound Image Taken     Wound Care Consulted? No    Attending Nurse:  Melissa Marinelli RN/Staff Member:  ANNA Farah   Bowel Function no issues    Indwelling Catheter Necessity            De-escalation Antibiotics No       VS and assessment per flow sheet, patient progressing towards goals as tolerated, plan of care reviewed with  Lorenzo Nino , all concerns addressed, will continue to monitor.

## 2023-11-02 NOTE — PLAN OF CARE
Patient seen and examined on morning rounds.  I spoke with the primary team.  Acute deterioration overnight.  Now intubated.  Chest x-ray concerning for pulmonary edema.  Repeat echo shows now severe MR.  Mitral valve replacement is indicated.  I am concerned that were we to operate immediately separation from cardiopulmonary bypass would be extremely problematic due to the pulmonary edema.  ECMO may very well be required.  We will plan for insertion of a balloon pump with a diuretic infusion in an effort to improve the pulmonary edema prior to surgery.  Tentatively plan for mitral replacement tomorrow.

## 2023-11-02 NOTE — ASSESSMENT & PLAN NOTE
Patient with Hypoxic Respiratory failure which is Acute.  he is not on home oxygen. Supplemental oxygen was provided and noted- Vent Mode: A/C  Oxygen Concentration (%):  [] 50  Resp Rate Total:  [20 br/min-28 br/min] 20 br/min  Vt Set:  [420 mL-480 mL] 480 mL  PEEP/CPAP:  [10 cmH20] 10 cmH20  Mean Airway Pressure:  [14 cmH20] 14 cmH20    .   Signs/symptoms of respiratory failure include- tachypnea, increased work of breathing, respiratory distress and use of accessory muscles. Contributing diagnoses includes - Aspiration, CHF and Mitral Regurgitation Labs and images were reviewed. Patient Has recent ABG, which has been reviewed. Will treat underlying causes and adjust management of respiratory failure as follows:    Pt decompensated overnight with worsening oxygen saturation. After trials of HFNC and BiPAP, the decision was made to intubate. Vent settings as above. Repeat TTE on 11/2/23 showed worsening mitral regurgitation. AHRF likely 2/2 to worsening mitral regurg. Other possible etiologies include penumonia, aspiration, septic emboli.    - monitor sats, adjust vent settings as needed  - ABGs prn  - fentanyl gtt  - diuresing today  - CTS to place IABP today, hoping for surgery tomorrow if stable

## 2023-11-02 NOTE — ASSESSMENT & PLAN NOTE
49 yo man with suspected MRSA MVE/AVE of unclear etiology  - source unclear: dental implant?  - scheduled for IABP and MVR  - continuevancomycin, keeping trough 15-20  - added clinda for toxin mediation  - anticipating 6 weeks of abx from clearance of bacteremia/MVR  - d/w  Mom and Dad

## 2023-11-02 NOTE — CONSULTS
[facility] - Cardiac Medical [facility]  Interventional Cardiology  Consult Note    Patient Name: Lorenzo Nino  MRN: [id number]  Admission Date: 10/30/2023  Hospital Length of Stay: 3 days  Code Status: Full Code   Attending Provider: Santos Caballero MD  Consulting Provider: Dayo Toth MD  Primary Care Physician: Santos Caballero MD  Principal Problem:<principal problem not specified>    Patient information was obtained from relative(s), past medical records, and primary team.     Inpatient consult to Interventional Cardiology  Consult performed by: Dayo Toth MD  Consult ordered by: Marjan Bullock MD        Subjective:     Chief Complaint:  septic shock, staph bacteremia, severe MR     HPI:  49 yo M with no past medical history admitted 10/30 for sepsis. Last Friday, 10/27, he developed headache, abdominal pain, and nausea. He presented to , who prescribed Tylenol and recommended he return if he continued to feel poorly. He presented to [facility] 10/30 with worsening abdominal pain, as well as fatigue, body aches, and hematuria. He was found to be tachycardic and hypotensive requiring vasopressor support. He was admitted to MICU for management of sepsis 2/2 PNA vs UTI vs meningitis (reported symptoms of neck stiffness). He was unable to undergo LP due to thrombocytopenia. He is currently being evaluated for TTP. He is currently intubated for respiratory failure.     Further work up revealed MRSA bacteremia. He underwent TTE, which was notable for mitral valve vegetation with flail anterior leaflet, as well as possible aortic valve vegetation.      CTS consulted and planning for mitral valve repair, and recommended IVC consult for IABP placement.    Hgb 11.9, Plts 46, Cr 1.3.    No obvious active bleeding and hgb stable.    History reviewed. No pertinent past medical history.    History reviewed. No pertinent surgical history.    Review of patient's allergies indicates:  No Known Allergies    No medications  prior to admission.     Family History    None       Tobacco Use    Smoking status: Unknown    Smokeless tobacco: Not on file   Substance and Sexual Activity    Alcohol use: Yes    Drug use: Never    Sexual activity: Not on file     Review of Systems   Reason unable to perform ROS: intubated and sedated.     Objective:     Vital Signs (Most Recent):  Temp: 98.4 °F (36.9 °C) (11/02/23 0300)  Pulse: 96 (11/02/23 1130)  Resp: 20 (11/02/23 1130)  BP: (!) 120/58 (11/02/23 0900)  SpO2: 97 % (11/02/23 1130) Vital Signs (24h Range):  Temp:  [98.4 °F (36.9 °C)-98.7 °F (37.1 °C)] 98.4 °F (36.9 °C)  Pulse:  [] 96  Resp:  [14-51] 20  SpO2:  [88 %-98 %] 97 %  BP: ()/(58-90) 120/58     Weight: 74.4 kg (164 lb)  Body mass index is 21.64 kg/m².    SpO2: 97 %         Intake/Output Summary (Last 24 hours) at 11/2/2023 1214  Last data filed at 11/2/2023 1100  Gross per 24 hour   Intake 448.82 ml   Output 3050 ml   Net -2601.18 ml       Lines/Drains/Airways       Drain  Duration             Male External Urinary Catheter 11/01/23 1800 <1 day              Airway  Duration                  Airway - Non-Surgical 11/02/23 0855 Endotracheal Tube <1 day              Arterial Line  Duration             Arterial Line 11/02/23 1031 Right Radial <1 day              Peripheral Intravenous Line  Duration                  Peripheral IV - Single Lumen 10/30/23 1924 20 G Right Antecubital 2 days         Peripheral IV - Single Lumen 10/30/23 2047 20 G Anterior;Left Forearm 2 days         Peripheral IV - Single Lumen 10/31/23 1230 18 G Anterior;Distal;Right Forearm 1 day                     Physical Exam  Constitutional:       Comments: intubated   Cardiovascular:      Comments: Right radial arterial line  2+ pulses radial femoral and pedal bilaterally  Warm extremities  Pulmonary:      Comments: Mechanically ventilated  Abdominal:      Palpations: Abdomen is soft.      Tenderness: There is no guarding.   Skin:     Comments: Petechiae on  ankles   Neurological:      Comments: sedated            Significant Labs: All pertinent lab results from the last 24 hours have been reviewed.      Assessment and Plan:     ID  Septic shock  Currently on levophed  Cath lab for IABP for hemodynamic support prior to mitral valve surgery        VTE Risk Mitigation (From admission, onward)           Ordered     Place sequential compression device  Until discontinued         10/30/23 2231     IP VTE LOW RISK PATIENT  Once         10/30/23 2231                    Thank you for your consult. I will follow-up with patient. Please contact us if you have any additional questions.    Dayo Toth MD  Interventional Cardiology   [facility] - Cardiac Medical ICU

## 2023-11-02 NOTE — ASSESSMENT & PLAN NOTE
49 yo M with no past medical history admitted 10/30 2/2 PNA vs UTI vs meningitis requiring vasopressor support. Further work up revealed MRSA bacteremia. He underwent TTE, which was notable for mitral valve vegetation with flail anterior leaflet, as well as possible aortic valve vegetation.   - EKG sinus rhythm, low voltage.   - Troponin 18.298 --> 19.814 --> 27.88    He denies IVDU (supported by negative UDS on admission). No history of dental abscesses or recent dental work (does have dental caps, states they were placed remotely). No significant family cardiac history.   He has been evaluated by HTS, who believe that while it is possible this is related to myocarditis, there are other likely explanations for his echo findings and elevated troponin, and even if he were to have myocarditis, there would be no interventions to offer regardless.   Per patient's mother, CTS visited at bedside and stated surgical intervention is not indicated.   Overnight 11/1-11/2 unfortunately developed respiratory distress with hypoxia and tachypnea requiring BiPAP and eventually intubation. Concern for flash pulmonary edema as well as worsening of MR in setting of flail leaflet.     Recommendations:  - CTS to place intra aortic balloon pump today for further hemodynamic support  - Admit to CCU  - Diuresis as tolerated   - Continue antibiotics per ID, on vancomycin for MRSA and clindamycin for toxin mediation

## 2023-11-02 NOTE — PROCEDURES
"Lorenzo Nino is a 48 y.o. male patient.    Temp: 98.3 °F (36.8 °C) (11/02/23 1330)  Pulse: 93 (11/02/23 1330)  Resp: 13 (11/02/23 1330)  BP: 90/63 (11/02/23 1330)  SpO2: 98 % (11/02/23 1330)  Weight: 74.4 kg (164 lb) (11/02/23 0900)  Height: 6' 1" (185.4 cm) (11/02/23 0900)  Mallampati Scale:  (unable to assess. intubated already)  ASA Classification: Class 3    Intubation    Date/Time: 11/2/2023 1:40 PM  Location procedure was performed: [facility] CRIT[facility]    Performed by: Santos Caballero MD  Authorized by: Santos Caballero MD  Assisting provider: Marjan Bullock MD  Pre-operative diagnosis: Acute hypoxic respiratory failure  Post-operative diagnosis: Acute hypoxic respiratory failure  Consent Done: Emergent Situation  Indications: respiratory distress, respiratory failure and airway protection  Intubation method: video-assisted  Patient status: paralyzed (RSI)  Preoxygenation: NIV.  Pretreatment medications: none  Sedatives: etomidate  Paralytic: succinylcholine  Laryngoscope size: Mac 4  Tube size: 8.0 mm  Tube type: cuffed  Number of attempts: 1  Cricoid pressure: no  Cords visualized: yes  Post-procedure assessment: chest rise and CO2 detector  Breath sounds: equal and absent over the epigastrium  Cuff inflated: yes  ETT to teeth: 22 cm  Tube secured with: ETT orona  Chest x-ray interpreted by me.  Chest x-ray findings: endotracheal tube in appropriate position  Patient tolerance: Patient tolerated the procedure well with no immediate complications  Complications: No  Estimated blood loss (mL): 0  Specimens: No  Implants: No          11/2/2023    "

## 2023-11-02 NOTE — ASSESSMENT & PLAN NOTE
Patient appeared to have new onset AF overnight 10/31-11/1. Rate controlled in 90's to low 100's. Otherwise remained hemodynamically stable off of pressors. Telemetry reviewed, noted to have irregularly irregular rhythm which is most likely consistent with AF. Likely due to acute illness. CHADSVASc 0, anticoagulation not indicated and contraindicated given thrombocytopenia and potential for hemorrhagic conversion if there are septic emboli present.  - tele

## 2023-11-02 NOTE — HPI
47 yo M with no past medical history admitted 10/30 for sepsis. Last Friday, 10/27, he developed headache, abdominal pain, and nausea. He presented to , who prescribed Tylenol and recommended he return if he continued to feel poorly. He presented [place] [facility] [place] with worsening abdominal pain, as well as fatigue, body aches, and hematuria. He was found to be tachycardic and hypotensive requiring vasopressor support. He was admitted to MICU for management of sepsis 2/2 PNA vs UTI vs meningitis (reported symptoms of neck stiffness). He was unable to undergo LP due to thrombocytopenia. He is currently being evaluated for TTP. He is currently intubated for respiratory failure.     Further work up revealed MRSA bacteremia. He underwent TTE, which was notable for mitral valve vegetation with flail anterior leaflet, as well as possible aortic valve vegetation.      CTS consulted and planning for mitral valve repair, and recommended IVC consult for IABP placement.    Hgb 11.9, Plts 46, Cr 1.3.    No obvious active bleeding and hgb stable.

## 2023-11-02 NOTE — SUBJECTIVE & OBJECTIVE
History reviewed. No pertinent past medical history.    History reviewed. No pertinent surgical history.    Review of patient's allergies indicates:  No Known Allergies    No medications prior to admission.     Family History    None       Tobacco Use    Smoking status: Unknown    Smokeless tobacco: Not on file   Substance and Sexual Activity    Alcohol use: Yes    Drug use: Never    Sexual activity: Not on file     Review of Systems   Reason unable to perform ROS: intubated and sedated.     Objective:     Vital Signs (Most Recent):  Temp: 98.4 °F (36.9 °C) (11/02/23 0300)  Pulse: 96 (11/02/23 1130)  Resp: 20 (11/02/23 1130)  BP: (!) 120/58 (11/02/23 0900)  SpO2: 97 % (11/02/23 1130) Vital Signs (24h Range):  Temp:  [98.4 °F (36.9 °C)-98.7 °F (37.1 °C)] 98.4 °F (36.9 °C)  Pulse:  [] 96  Resp:  [14-51] 20  SpO2:  [88 %-98 %] 97 %  BP: ()/(58-90) 120/58     Weight: 74.4 kg (164 lb)  Body mass index is 21.64 kg/m².    SpO2: 97 %         Intake/Output Summary (Last 24 hours) at 11/2/2023 1214  Last data filed at 11/2/2023 1100  Gross per 24 hour   Intake 448.82 ml   Output 3050 ml   Net -2601.18 ml       Lines/Drains/Airways       Drain  Duration             Male External Urinary Catheter 11/01/23 1800 <1 day              Airway  Duration                  Airway - Non-Surgical 11/02/23 0855 Endotracheal Tube <1 day              Arterial Line  Duration             Arterial Line 11/02/23 1031 Right Radial <1 day              Peripheral Intravenous Line  Duration                  Peripheral IV - Single Lumen 10/30/23 1924 20 G Right Antecubital 2 days         Peripheral IV - Single Lumen 10/30/23 2047 20 G Anterior;Left Forearm 2 days         Peripheral IV - Single Lumen 10/31/23 1230 18 G Anterior;Distal;Right Forearm 1 day                     Physical Exam  Constitutional:       Comments: intubated   Cardiovascular:      Comments: Right radial arterial line  2+ pulses radial femoral and pedal  bilaterally  Warm extremities  Pulmonary:      Comments: Mechanically ventilated  Abdominal:      Palpations: Abdomen is soft.      Tenderness: There is no guarding.   Skin:     Comments: Petechiae on ankles   Neurological:      Comments: sedated            Significant Labs: All pertinent lab results from the last 24 hours have been reviewed.

## 2023-11-02 NOTE — ASSESSMENT & PLAN NOTE
New finding of HFrEF, TTE findings above under Endocarditis.     - lasix gtt per CTS  - IABP today  - intubated due to worsening respiratory failure, see Acute Hypoxemic Respiratory Failure  - cardiology advising holding of GDMT while inpatient, will f/u outpatient

## 2023-11-02 NOTE — ASSESSMENT & PLAN NOTE
As evidence by history, physical exam and imaging. CXR significant for perihilar interstitial opacities compatible with multifocal pneumonia vs pulmonary edema. 3 out of 4 SIRS criteria met. Initiate IV antibiotics for comm-acquired pneumonia with vanc, ceftriaxone and azithromycin to cover for atypical pathogens. Respiratory viral panel was negative. Respiratory culture and gram stain ordered.    -- Intubated 11/2/23  -- continuing vanc  -- Monitor SpO2  -- ABGs PRN  -- Acetaminophen for fever  -- Guaifenesin or Tessalon Perls PRN for cough    -- Strict aspiration precautions  -- Provide nebulizer treatments scheduled

## 2023-11-02 NOTE — PROGRESS NOTES
[facility]  Critical Care Medicine  Progress Note    Patient Name: Lorenzo Nino  MRN: [id number]  Admission Date: 10/30/2023  Hospital Length of Stay: 3 days  Code Status: Full Code  Attending Provider: Santos Caballero MD  Primary Care Provider: Santos Caballero MD   Principal Problem: <principal problem not specified>    Subjective:     HPI:  Lorenzo Nino is a 48yoM who presented to the ED for evaluation of fatigue, body aches, abd pain, and hematuria. History provided by pt and significant other. Pt went to urgent care 3 days ago with headache, abdominal pain with nausea but no vomiting or diarrhea. He was discharged with instructions to take Tylenol and return to clinic if still feeling unwell. He represented to urgent care with blood in his urine and was advised to go to the ED on 10/30/23. Presenting symptoms now include decreased PO intake, subjective fevers, persistent headache, severe muscle aches, and neck stiffness. Urine now persistently orange in setting of increased confusion. No reported fever, diaphoresis, nausea or vomiting, alcohol abuse or drug use. Pt is not currently sexually active with his partner secondary to her chronological problems with cancer.     In the ED, patient was found to be significantly tachycardic with mild hypotension requiring IVF and vasopressors. His lactic acid was significantly elevated and there were initial concerns for meningitis. However, patients platelet count was too low for a LP to be performed so he was empirically treated with ABX, acyclovir, and steroids. CT head negative, CT C/A/P showing perinephric stranding, distended bladder, and hiatal hernia. Patient admitted to MICU for further workup and treatment of his hypotension and likely severe sepsis      Hospital/ICU Course:  Pt admitted for hypotension and tachycardia. Platelets 21k, strong concern for TTP given PLASMIC score 6/7. Transfused 2 units FFP pending [id number] results. BCx  positive for MRSA on 10/31/23. 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 Was contacted by hematology who had received word from the lab that his [id number] was normal. Cardiology and HTS advising CTS consult. CTS recommending NEW for better evaluation of endocarditis, treat endocarditis, and follow-up outpatient. ID advising vanc for 6 weeks following clearance of blood cultures. NEW pending. Pt decompensated with increasing oxygen requirements. Intubated on 11/2/23. Repeat TTE showed mild AV prolapse with regurgitation and MV with flail posterior leaflet and severe regurgitation. Pt to receive IABP today and transfer to CCU with plans for surgery tomorrow if stable enough.      Interval History/Significant Events: Pt decompensated overnight. CXR showed worsening pulmonary edema. TTE showed worsening MV regurg. Intubated today. Plans for IABP today and transfer to CCU.      Objective:     Vital Signs (Most Recent):  Temp: 98.4 °F (36.9 °C) (11/02/23 0300)  Pulse: 98 (11/02/23 1316)  Resp: 12 (11/02/23 1316)  BP: 96/69 (11/02/23 1100)  SpO2: 98 % (11/02/23 1316) Vital Signs (24h Range):  Temp:  [98.4 °F (36.9 °C)-98.7 °F (37.1 °C)] 98.4 °F (36.9 °C)  Pulse:  [] 98  Resp:  [12-51] 12  SpO2:  [88 %-98 %] 98 %  BP: ()/(58-90) 96/69  Arterial Line BP: (89)/(55) 89/55   Weight: 74.4 kg (164 lb)  Body mass index is 21.64 kg/m².      Intake/Output Summary (Last 24 hours) at 11/2/2023 1319  Last data filed at 11/2/2023 1200  Gross per 24 hour   Intake 448.82 ml   Output 3125 ml   Net -2676.18 ml          Physical Exam  Constitutional:       Appearance: He is toxic-appearing.   HENT:      Head: Normocephalic and atraumatic.      Mouth/Throat:      Mouth: Mucous membranes are moist.      Pharynx: Oropharyngeal exudate present.   Eyes:      Extraocular Movements: Extraocular movements intact.      Conjunctiva/sclera: Conjunctivae normal.   Cardiovascular:       Rate and Rhythm: Regular rhythm. Tachycardia present.      Heart sounds: Murmur heard.   Pulmonary:      Effort: Respiratory distress present.      Breath sounds: Normal breath sounds. No wheezing, rhonchi or rales.   Abdominal:      General: Abdomen is flat. There is no distension.      Palpations: Abdomen is soft.      Tenderness: There is no abdominal tenderness.   Musculoskeletal:      Cervical back: Neck supple.      Right lower leg: No edema.      Left lower leg: No edema.   Skin:     General: Skin is warm and dry.      Findings: Rash (scattered petechiae b/l LE) present.   Neurological:      Mental Status: He is alert and oriented to person, place, and time.      Motor: Weakness (generalized weakness) present.            Vents:  Vent Mode: A/C (11/02/23 1130)  Set Rate: 20 BPM (11/02/23 1130)  Vt Set: 480 mL (11/02/23 1130)  PEEP/CPAP: 10 cmH20 (11/02/23 1130)  Oxygen Concentration (%): 50 (11/02/23 1200)  Peak Airway Pressure: 24 cmH20 (11/02/23 1130)  Plateau Pressure: 20 cmH20 (11/02/23 1130)  Total Ve: 9.94 L/m (11/02/23 1130)  Negative Inspiratory Force (cm H2O): 0 (11/02/23 1130)  F/VT Ratio<105 (RSBI): (!) 40.32 (11/02/23 1130)  Lines/Drains/Airways       Drain  Duration                  Urethral Catheter 11/02/23 1030 <1 day              Airway  Duration                  Airway - Non-Surgical 11/02/23 0855 Endotracheal Tube <1 day              Arterial Line  Duration             Arterial Line 11/02/23 1031 Right Radial <1 day              Peripheral Intravenous Line  Duration                  Peripheral IV - Single Lumen 10/30/23 1924 20 G Right Antecubital 2 days         Peripheral IV - Single Lumen 10/30/23 2047 20 G Anterior;Left Forearm 2 days         Peripheral IV - Single Lumen 10/31/23 1230 18 G Anterior;Distal;Right Forearm 2 days                  Significant Labs:    CBC/Anemia Profile:  Recent Labs   Lab 10/31/23  2146 11/01/23  0558 11/02/23  0100 11/02/23  0341 11/02/23  0651   WBC 13.31*  17.93*  --  23.60*  --    HGB 11.8* 12.2*  --  11.9*  --    HCT 33.6* 34.7* 34* 34.5* 35*   PLT 19* 25*  --  46*  --    MCV 88 87  --  89  --    RDW 13.7 13.7  --  14.2  --         Chemistries:  Recent Labs   Lab 11/01/23  0558 11/01/23  2151 11/02/23  0341 11/02/23  0803   *  --  139  --    K 3.3* 4.0 3.2* 3.5     --  100  --    CO2 19*  --  22*  --    BUN 28*  --  42*  --    CREATININE 1.1  --  1.3  --    CALCIUM 7.6*  --  7.5*  --    ALBUMIN 2.2*  --  2.2*  --    PROT 5.7*  --  5.9*  --    BILITOT 1.3*  --  1.6*  --    ALKPHOS 90  --  89  --    *  --  86*  --    *  --  105*  --    MG 2.5  --  2.4  --    PHOS 1.6*  --  1.6* 2.3*       All pertinent labs within the past 24 hours have been reviewed.    Significant Imaging:  I have reviewed all pertinent imaging results/findings within the past 24 hours.      ABG  Recent Labs   Lab 11/02/23  1042   PH 7.372   PO2 90   PCO2 33.6*   HCO3 19.5*   BE -6*     Assessment/Plan:     Pulmonary  Acute hypoxemic respiratory failure  Patient with Hypoxic Respiratory failure which is Acute.  he is not on home oxygen. Supplemental oxygen was provided and noted- Vent Mode: A/C  Oxygen Concentration (%):  [] 50  Resp Rate Total:  [20 br/min-28 br/min] 20 br/min  Vt Set:  [420 mL-480 mL] 480 mL  PEEP/CPAP:  [10 cmH20] 10 cmH20  Mean Airway Pressure:  [14 cmH20] 14 cmH20    .   Signs/symptoms of respiratory failure include- tachypnea, increased work of breathing, respiratory distress and use of accessory muscles. Contributing diagnoses includes - Aspiration, CHF and Mitral Regurgitation Labs and images were reviewed. Patient Has recent ABG, which has been reviewed. Will treat underlying causes and adjust management of respiratory failure as follows:    Pt decompensated overnight with worsening oxygen saturation. After trials of HFNC and BiPAP, the decision was made to intubate. Vent settings as above. Repeat TTE on 11/2/23 showed worsening mitral  regurgitation. AHRF likely 2/2 to worsening mitral regurg. Other possible etiologies include penumonia, aspiration, septic emboli.    - monitor sats, adjust vent settings as needed  - ABGs prn  - fentanyl gtt  - diuresing today  - CTS to place IABP today, hoping for surgery tomorrow if stable    PNA (pneumonia)  As evidence by history, physical exam and imaging. CXR significant for perihilar interstitial opacities compatible with multifocal pneumonia vs pulmonary edema. 3 out of 4 SIRS criteria met. Initiate IV antibiotics for comm-acquired pneumonia with vanc, ceftriaxone and azithromycin to cover for atypical pathogens. Respiratory viral panel was negative. Respiratory culture and gram stain ordered.    -- Intubated 11/2/23  -- continuing vanc  -- Monitor SpO2  -- ABGs PRN  -- Acetaminophen for fever  -- Guaifenesin or Tessalon Perls PRN for cough    -- Strict aspiration precautions  -- Provide nebulizer treatments scheduled    Cardiac/Vascular  HFrEF (heart failure with reduced ejection fraction)  New finding of HFrEF, TTE findings above under Endocarditis.     - lasix gtt per CTS  - IABP today  - intubated due to worsening respiratory failure, see Acute Hypoxemic Respiratory Failure  - cardiology advising holding of GDMT while inpatient, will f/u outpatient    Atrial fibrillation  Irregular irregular rhythm noted on telemetry with rates in 90s/100s. Like 2/2 to acute illness.    - cardiology does not recommend treatment at this time    Endocarditis  BCx growing gram positive cocci w/ PCR positive for MRSA. Obtained TTE.    TTE (10/31/23):    Presence of vegetation on anterior MV leaflet. possivble small vegetation on RCC of AV. consider NEW for further evaluation.    Left Ventricle: The left ventricle is normal in size. Ventricular mass is normal. Normal wall thickness. Normal wall motion. There is mildly reduced systolic function with a visually estimated ejection fraction of 40 - 45%. Biplane (2D) method of  discs ejection fraction is 43%. Grade I diastolic dysfunction.    Right Ventricle: Normal right ventricular cavity size. Wall thickness is normal. Right ventricle wall motion  is normal. Systolic function is normal.    Left Atrium: Left atrium is mildly dilated.    Aortic Valve: The aortic valve is a trileaflet valve. Cannot exclude small vegetation on RCC of AV. There is no aortic abscess prsesent . Other leaflets appear normal.    Mitral Valve: There is a medium mobile echogenic mass present on the anterior MV leaflet that is flail consistent with a large vegetation measuring 1.9 x 0.8 cm. There is at least moderate eccentric anteriorly directed regurgitation due to flail anterior leaflet. Partial perforation of anterior MV leaflet cannot be excluded/    Pulmonary Artery: The estimated pulmonary artery systolic pressure is 30 mmHg.    IVC/SVC: Intermediate venous pressure at 8 mmHg.    TTE (11/02/23):    Left Ventricle: The left ventricle is normal in size. Ventricular mass is normal. Normal wall thickness. Global hypokinesis present. There is mildly reduced systolic function with a visually estimated ejection fraction of 40 - 45%.    Right Ventricle: Normal right ventricular cavity size. Wall thickness is normal. Right ventricle wall motion  is normal. Systolic function is normal.    Left Atrium: Left atrium is severely dilated.    Right Atrium: Right atrium is dilated.    Aortic Valve: RCC is thickended with mild prolapse.  Cannot exclude RCC vegetation. The remaining leaflets have normal mobility. There is trace aortic regurgitation.    Mitral Valve: Both leaflets are thickened.Cannot exclude anterior leaflet vegetation coating this leaflet. There is prolapse of the posterior mitral leaflet. Flail posterior leaflet. There is a large mobile echogenic mass present on the posterior leaflet consistent with vegetation. The mean pressure gradient across the mitral valve is 6 mmHg at a heart rate of 124  bpm. There is severe regurgitation with an anteromedial eccentrically directed jet.    Tricuspid Valve: There is mild regurgitation.    Pulmonary Artery: The estimated pulmonary artery systolic pressure is 31 mmHg.    IVC/SVC: Normal venous pressure at 3 mmHg.    -- Decompensated due to worsening MV regurg, CTS to place IABP today and transfer to CCU  -- covering with vanc, d'c'd rocephin and azithromycin  -- trending troponins, 19 on admission, 16 most recently  -- ID recommended 6 weeks of vancomycin following clearance of blood cultures  -- daily BCx    Renal/  FRANCESCO (acute kidney injury)  Mr. Lorenzo Nino is being treated for FRANCESCO likely secondary to pre-renal azotemia due to IVVD. Scr of 1.6. Ordered CMP, serum osmolality, urine osmolality, urine sodium, and urine creatinine. Started IVF resuscitation. Strict I/Os to monitor renal function. Discontinued nephrotoxic medications.    --Monitor with serial Cr / GFR levels closely with serial labs  --Avoid nephrotoxic medications such as NSAIDs, IV contrast, or RAAS blockade  --Renally dose medications    Lab Results   Component Value Date    CREATININE 1.3 11/02/2023          ID  Septic shock  This patient does have evidence of infective focus  My overall impression is septic shock due to lactate > 4, MAP < 65 and SBP < 90.  Source: Urinary Tract vs PNA  Antibiotics given-   Antibiotics (72h ago, onward)    Start     Stop Route Frequency Ordered    11/02/23 1500  clindamycin in D5W 600 mg/50 mL IVPB 600 mg         -- IV Every 6 hours (non-standard times) 11/02/23 1307    11/01/23 2100  vancomycin 1,250 mg in dextrose 5 % (D5W) 250 mL IVPB (Vial-Mate)         -- IV Every 12 hours (non-standard times) 11/01/23 1108    10/31/23 0900  mupirocin 2 % ointment         11/05/23 0859 Nasl 2 times daily 10/31/23 0730    10/30/23 2358  vancomycin - pharmacy to dose  (vancomycin IVPB (PEDS and ADULTS))        See Hyperspace for full Linked Orders Report.    -- IV  pharmacy to manage frequency 10/30/23 8734      Latest lactate reviewed-  Recent Labs   Lab 10/30/23  3734 10/31/23  1543   LACTATE 3.8* 3.1*     Organ dysfunction indicated by Acute kidney injury, Acute liver injury and Thrombocytopenia   Fluid challenge Ideal Body Weight- The patient's ideal body weight is Ideal body weight: 79.9 kg (176 lb 2.4 oz) which will be used to calculate fluid bolus of 30 ml/kg for treatment of septic shock.      -- Peripheral Pressors prn to maintain MAP of 65  -- BCx growing gram positive cocci, PCR positive for MRSA  -- Continuing vanc, d/c'd rocephin and azithromycin 11/1/23  -- d/c'd acyclovir given low suspicion of viral meningitis and positive blood cultures      Hematology  Thrombocytopenia  Pt presenting with low platelets (23) in setting of acute liver injury likely secondary to shock. Other cause to rule out can include cirrhosis, BM problem (myelodysplasia, chemotherapy, alcohol), HCV/HIV. Labs ordered for GARRY, Smear. PLASMIC score 6/7 but [id number] wnl. Likely 2/2 sepsis and critical illness.    -- Transfusing platelets today  -- Transfused 2 units FFP, platelets improved to 25  -- solumedrol d/c'd given no TPP  -- Trend CBC, PT/INR, aPTT  -- Consulted hematology, appreciate recommendations  -- per hematology team, they heard from lab [id number] wnl  -- PT 11.0, INR 1.0, Rogelio negative, haptoglobin 82, fibrinogen 489, aPTT 44.4  -- transfuse platelets when:   - <10k for patient with failure of platelet production   - <20k for hemorrhagic diathesis   - <50k for active bleeding or immediately prior to invasive procedure   - <100k for neurosurgery, ophthalmic surgery, CNS bleeding   - >100k for evidence of platelet dysfunction    GI  Elevated transaminase level  Elevated Ast and ALT on presentation along with low platelets and low albumin. Likely secondary to Septic Shock. CMP trended daily for liver function. F/U workup for sepsis and thrombocytopenia. Continue abx. F/U cultures.  Acute hepatitis panel negative. No significant hepatic pathology found on CT A/P    -- Trend liver function  -- Continue sepsis treatment        Critical Care Daily Checklist:    A: Awake: RASS Goal/Actual Goal: RASS Goal: 0-->alert and calm  Actual:     B: Spontaneous Breathing Trial Performed? Spon. Breathing Trial Initiated?: Not initiated (11/02/23 1130)   C: SAT & SBT Coordinated?  n/a                      D: Delirium: CAM-ICU Overall CAM-ICU: Negative   E: Early Mobility Performed? No   F: Feeding Goal:    Status:     Current Diet Order   Procedures    Diet NPO      AS: Analgesia/Sedation Fentanyl   T: Thromboembolic Prophylaxis No, thrombocytopenia   H: HOB > 300 Yes   U: Stress Ulcer Prophylaxis (if needed) Famotidine   G: Glucose Control LDSSI   B: Bowel Function Stool Occurrence: 1   I: Indwelling Catheter (Lines & Ritchie) Necessity 2 PIVs, arterial line, ritchie, ETT   D: De-escalation of Antimicrobials/Pharmacotherapies Vancomycin    Plan for the day/ETD IABP, transfer to CCU    Code Status:  Family/Goals of Care: Full Code  Discussing with mother       Critical secondary to Patient has a condition that poses threat to life and bodily function: Severe Respiratory Distress and Severe Mitral Regurgitation      Critical care was time spent personally by me on the following activities: development of treatment plan with patient or surrogate and bedside caregivers, discussions with consultants, evaluation of patient's response to treatment, examination of patient, ordering and performing treatments and interventions, ordering and review of laboratory studies, ordering and review of radiographic studies, pulse oximetry, re-evaluation of patient's condition. This critical care time did not overlap with that of any other provider or involve time for any procedures.     Bianca Sheppard MD  Critical Care Medicine  [facility] - Cardiac Medical ICU

## 2023-11-02 NOTE — SUBJECTIVE & OBJECTIVE
Interval History/Significant Events: Pt decompensated overnight. CXR showed worsening pulmonary edema. TTE showed worsening MV regurg. Intubated today. Plans for IABP today and transfer to CCU.      Objective:     Vital Signs (Most Recent):  Temp: 98.4 °F (36.9 °C) (11/02/23 0300)  Pulse: 98 (11/02/23 1316)  Resp: 12 (11/02/23 1316)  BP: 96/69 (11/02/23 1100)  SpO2: 98 % (11/02/23 1316) Vital Signs (24h Range):  Temp:  [98.4 °F (36.9 °C)-98.7 °F (37.1 °C)] 98.4 °F (36.9 °C)  Pulse:  [] 98  Resp:  [12-51] 12  SpO2:  [88 %-98 %] 98 %  BP: ()/(58-90) 96/69  Arterial Line BP: (89)/(55) 89/55   Weight: 74.4 kg (164 lb)  Body mass index is 21.64 kg/m².      Intake/Output Summary (Last 24 hours) at 11/2/2023 1319  Last data filed at 11/2/2023 1200  Gross per 24 hour   Intake 448.82 ml   Output 3125 ml   Net -2676.18 ml          Physical Exam  Constitutional:       Appearance: He is toxic-appearing.   HENT:      Head: Normocephalic and atraumatic.      Mouth/Throat:      Mouth: Mucous membranes are moist.      Pharynx: Oropharyngeal exudate present.   Eyes:      Extraocular Movements: Extraocular movements intact.      Conjunctiva/sclera: Conjunctivae normal.   Cardiovascular:      Rate and Rhythm: Regular rhythm. Tachycardia present.      Heart sounds: Murmur heard.   Pulmonary:      Effort: Respiratory distress present.      Breath sounds: Normal breath sounds. No wheezing, rhonchi or rales.   Abdominal:      General: Abdomen is flat. There is no distension.      Palpations: Abdomen is soft.      Tenderness: There is no abdominal tenderness.   Musculoskeletal:      Cervical back: Neck supple.      Right lower leg: No edema.      Left lower leg: No edema.   Skin:     General: Skin is warm and dry.      Findings: Rash (scattered petechiae b/l LE) present.   Neurological:      Mental Status: He is alert and oriented to person, place, and time.      Motor: Weakness (generalized weakness) present.             Vents:  Vent Mode: A/C (11/02/23 1130)  Set Rate: 20 BPM (11/02/23 1130)  Vt Set: 480 mL (11/02/23 1130)  PEEP/CPAP: 10 cmH20 (11/02/23 1130)  Oxygen Concentration (%): 50 (11/02/23 1200)  Peak Airway Pressure: 24 cmH20 (11/02/23 1130)  Plateau Pressure: 20 cmH20 (11/02/23 1130)  Total Ve: 9.94 L/m (11/02/23 1130)  Negative Inspiratory Force (cm H2O): 0 (11/02/23 1130)  F/VT Ratio<105 (RSBI): (!) 40.32 (11/02/23 1130)  Lines/Drains/Airways       Drain  Duration                  Urethral Catheter 11/02/23 1030 <1 day              Airway  Duration                  Airway - Non-Surgical 11/02/23 0855 Endotracheal Tube <1 day              Arterial Line  Duration             Arterial Line 11/02/23 1031 Right Radial <1 day              Peripheral Intravenous Line  Duration                  Peripheral IV - Single Lumen 10/30/23 1924 20 G Right Antecubital 2 days         Peripheral IV - Single Lumen 10/30/23 2047 20 G Anterior;Left Forearm 2 days         Peripheral IV - Single Lumen 10/31/23 1230 18 G Anterior;Distal;Right Forearm 2 days                  Significant Labs:    CBC/Anemia Profile:  Recent Labs   Lab 10/31/23  2146 11/01/23  0558 11/02/23  0100 11/02/23  0341 11/02/23  0651   WBC 13.31* 17.93*  --  23.60*  --    HGB 11.8* 12.2*  --  11.9*  --    HCT 33.6* 34.7* 34* 34.5* 35*   PLT 19* 25*  --  46*  --    MCV 88 87  --  89  --    RDW 13.7 13.7  --  14.2  --         Chemistries:  Recent Labs   Lab 11/01/23  0558 11/01/23  2151 11/02/23  0341 11/02/23  0803   *  --  139  --    K 3.3* 4.0 3.2* 3.5     --  100  --    CO2 19*  --  22*  --    BUN 28*  --  42*  --    CREATININE 1.1  --  1.3  --    CALCIUM 7.6*  --  7.5*  --    ALBUMIN 2.2*  --  2.2*  --    PROT 5.7*  --  5.9*  --    BILITOT 1.3*  --  1.6*  --    ALKPHOS 90  --  89  --    *  --  86*  --    *  --  105*  --    MG 2.5  --  2.4  --    PHOS 1.6*  --  1.6* 2.3*       All pertinent labs within the past 24 hours have been  reviewed.    Significant Imaging:  I have reviewed all pertinent imaging results/findings within the past 24 hours.

## 2023-11-02 NOTE — ASSESSMENT & PLAN NOTE
Results for orders placed during the hospital encounter of 10/30/23    Echo Saline Bubble? No    Interpretation Summary    Left Ventricle: The left ventricle is normal in size. Ventricular mass is normal. Normal wall thickness. Global hypokinesis present. There is mildly reduced systolic function with a visually estimated ejection fraction of 40 - 45%.    Right Ventricle: Normal right ventricular cavity size. Wall thickness is normal. Right ventricle wall motion  is normal. Systolic function is normal.    Left Atrium: Left atrium is severely dilated.    Right Atrium: Right atrium is dilated.    Aortic Valve: RCC is thickended with mild prolapse.  Cannot exclude RCC vegetation. The remaining leaflets have normal mobility. There is trace aortic regurgitation.    Mitral Valve: Both leaflets are thickened.Cannot exclude anterior leaflet vegetation coating this leaflet. There is prolapse of the posterior mitral leaflet. Flail posterior leaflet. There is a large mobile echogenic mass present on the posterior leaflet consistent with vegetation. The mean pressure gradient across the mitral valve is 6 mmHg at a heart rate of 124 bpm. There is severe regurgitation with an anteromedial eccentrically directed jet.    Tricuspid Valve: There is mild regurgitation.    Pulmonary Artery: The estimated pulmonary artery systolic pressure is 31 mmHg.    IVC/SVC: Normal venous pressure at 3 mmHg.    Appears to be a new problem. Would hold addition of GDMT while managing acute illness.   Follow up with outpatient cardiology.  - continue lasix 20cc/hr

## 2023-11-02 NOTE — SUBJECTIVE & OBJECTIVE
Interval History: Events noted. Now intubated. Family at bedside.    Review of Systems   Unable to perform ROS: Intubated     Objective:     Vital Signs (Most Recent):  Temp: 97.6 °F (36.4 °C) (11/02/23 1345)  Pulse: 97 (11/02/23 1345)  Resp: 14 (11/02/23 1345)  BP: 90/63 (11/02/23 1330)  SpO2: 98 % (11/02/23 1345) Vital Signs (24h Range):  Temp:  [97.6 °F (36.4 °C)-98.7 °F (37.1 °C)] 97.6 °F (36.4 °C)  Pulse:  [] 97  Resp:  [12-51] 14  SpO2:  [88 %-98 %] 98 %  BP: ()/(57-90) 90/63  Arterial Line BP: (88-95)/(54-58) 95/58     Weight: 74.4 kg (164 lb)  Body mass index is 21.64 kg/m².    Estimated Creatinine Clearance: 72.3 mL/min (based on SCr of 1.3 mg/dL).     Physical Exam  Vitals and nursing note reviewed.   Constitutional:       Appearance: Normal appearance.   HENT:      Head: Normocephalic and atraumatic.   Eyes:      Pupils: Pupils are equal, round, and reactive to light.   Cardiovascular:      Rate and Rhythm: Normal rate.      Heart sounds: Murmur heard.   Pulmonary:      Breath sounds: Rales present. No rhonchi.   Abdominal:      Tenderness: There is no abdominal tenderness. There is no guarding.   Musculoskeletal:         General: No swelling.   Neurological:      Mental Status: He is alert.          Significant Labs: Blood Culture:   Recent Labs   Lab 10/30/23  1923 10/30/23  1924 11/01/23  0607 11/01/23  0609   LABBLOO Gram stain aer bottle: Gram positive cocci in clusters resembling Staph  Gram stain carlie bottle: Gram positive cocci in clusters resembling Staph  Results called to and read back by: Savannah Butler RN 10/31/2023  06:33  STAPHYLOCOCCUS AUREUS  ID consult required at [facility] [facility].  For susceptibility see order #[id number]  * Gram stain aer bottle: Gram positive cocci in clusters resembling Staph  Gram stain carlie bottle: Gram positive cocci in clusters resembling Staph  Results called to and read back by: Savannah Butler RN 10/31/2023  06  STAPHYLOCOCCUS  AUREUS  Susceptibility pending  [facility]  * Gram stain aer bottle: Gram positive cocci in clusters resembling Staph  Positive results previously called 11/02/2023  04:20 Gram stain aer bottle: Gram positive cocci in clusters resembling Staph  Results called to and read back by:Melissa Ferro RN 11/01/2023  22:25  STAPHYLOCOCCUS AUREUS  [facility] [place]  For susceptibility see order #[id number]  *     BMP:   Recent Labs   Lab 11/02/23  0341 11/02/23  0803   *  --      --    K 3.2* 3.5     --    CO2 22*  --    BUN 42*  --    CREATININE 1.3  --    CALCIUM 7.5*  --    MG 2.4  --      CBC:   Recent Labs   Lab 10/31/23  2146 11/01/23  0558 11/02/23  0100 11/02/23  0341 11/02/23  0651   WBC 13.31* 17.93*  --  23.60*  --    HGB 11.8* 12.2*  --  11.9*  --    HCT 33.6* 34.7* 34* 34.5* 35*   PLT 19* 25*  --  46*  --        Significant Imaging: I have reviewed all pertinent imaging results/findings within the past 24 hours.

## 2023-11-02 NOTE — HOSPITAL COURSE
Patient admitted to CCU for endocarditis in setting of MRSA bacteremia. Underwent TTE, which revealed mitral valve vegetation with flail anterior leaflet, as well as possible aortic valve vegetation. EKG sinus rhythm, low voltage. Troponin elevated to 27.8. Patient was admitted to MICU and treated for septic shock 2/2 mitral valve and aortic valve endocarditis. Pt decompensated with increasing oxygen requirements. Intubated on 11/2/23 and admitted to CCU.  Repeat TTE showed mild AV prolapse with regurgitation and MV with flail posterior leaflet and severe regurgitation. IABP inserted 11/02 with plan for MVR today per CTS, likely transfer to SICU following procedure.

## 2023-11-02 NOTE — ASSESSMENT & PLAN NOTE
This patient does have evidence of infective focus  My overall impression is septic shock due to lactate > 4, MAP < 65 and SBP < 90.  Source: Urinary Tract vs PNA  Antibiotics given-   Antibiotics (72h ago, onward)    Start     Stop Route Frequency Ordered    11/02/23 1500  clindamycin in D5W 600 mg/50 mL IVPB 600 mg         -- IV Every 6 hours (non-standard times) 11/02/23 1307    11/01/23 2100  vancomycin 1,250 mg in dextrose 5 % (D5W) 250 mL IVPB (Vial-Mate)         -- IV Every 12 hours (non-standard times) 11/01/23 1108    10/31/23 0900  mupirocin 2 % ointment         11/05/23 0859 Nasl 2 times daily 10/31/23 0730    10/30/23 2358  vancomycin - pharmacy to dose  (vancomycin IVPB (PEDS and ADULTS))        See Hyperspace for full Linked Orders Report.    -- IV pharmacy to manage frequency 10/30/23 2259      Latest lactate reviewed-  Recent Labs   Lab 10/30/23  2327 10/31/23  1543   LACTATE 3.8* 3.1*     Organ dysfunction indicated by Acute kidney injury, Acute liver injury and Thrombocytopenia   Fluid challenge Ideal Body Weight- The patient's ideal body weight is Ideal body weight: 79.9 kg (176 lb 2.4 oz) which will be used to calculate fluid bolus of 30 ml/kg for treatment of septic shock.      -- Peripheral Pressors prn to maintain MAP of 65  -- BCx growing gram positive cocci, PCR positive for MRSA  -- Continuing vanc, d/c'd rocephin and azithromycin 11/1/23  -- d/c'd acyclovir given low suspicion of viral meningitis and positive blood cultures

## 2023-11-02 NOTE — PROGRESS NOTES
Nurses Note -- 4 Eyes      11/2/2023   4:36 PM      Skin assessed during: Admit      [x] No Altered Skin Integrity Present    [x]Prevention Measures Documented      [] Yes- Altered Skin Integrity Present or Discovered   [] LDA Added if Not in Epic (Describe Wound)   [] New Altered Skin Integrity was Present on Admit and Documented in LDA   [] Wound Image Taken    Wound Care Consulted? No    Attending Nurse:  Roxanna Marinelli RN/Staff Member:  Vianney Gaytan RN and Roxanna Yoo RN

## 2023-11-02 NOTE — ASSESSMENT & PLAN NOTE
49 yo M with no past medical history admitted 10/30 2/2 PNA vs UTI vs meningitis requiring vasopressor support. Further work up revealed MRSA bacteremia. He underwent TTE, which was notable for mitral valve vegetation with flail anterior leaflet, as well as possible aortic valve vegetation.   - EKG sinus rhythm, low voltage.   - Troponin 18.298 --> 19.814 --> 27.88    He denies IVDU (supported by negative UDS on admission). No history of dental abscesses or recent dental work (does have dental caps, states they were placed remotely). No significant family cardiac history.   He has been evaluated by HTS, who believe that stated that while it is possible this is related to myocarditis, there are other likely explanations for his echo findings and elevated troponin, and even if he were to have myocarditis, there would be no interventions to offer regardless.   Per patient's mother, CTS visited at bedside and stated surgical intervention is not indicated.   Overnight 11/1-11/2 unfortunately developed respiratory distress with hypoxia and tachypnea requiring BiPAP and eventually intubation. Concern for flash pulmonary edema as well as worsening of MR in setting of flail leaflet.     Recommendations:  - CTS to place intra aortic balloon pump today for further hemodynamic support  - Transfer to CCU for further management  - Diuresis as tolerated   - Continue antibiotics per ID

## 2023-11-02 NOTE — PROGRESS NOTES
[facility] - Cardiac Medical ICU  Infectious Disease  Progress Note    Patient Name: Lorenzo Nino  MRN: [id number]  Admission Date: 10/30/2023  Length of Stay: 3 days  Attending Physician: Santos Caballero MD  Primary Care Provider: Santos Caballero MD    Isolation Status: Contact     Assessment/Plan:        Endocarditis    47 yo man with suspected MRSA MVE/AVE of unclear etiology  - source unclear: dental implant?  - scheduled for IABP and MVR  - continuevancomycin, keeping trough 15-20  - added clinda for toxin mediation  - anticipating 6 weeks of abx from clearance of bacteremia/MVR  - d/w  Mom and Dad      Mark Montejo MD  Infectious Disease  [facility] - Cardiac Medical ICU    Subjective:     Principal Problem:<principal problem not specified>    HPI: This is the strange case of a 47 yo man with no medical problems who reportedly developed acute onset of symptoms a few days ago. He was seen in several urgent cares w/headache, abdominal pain with nausea but no vomiting or diarrhea.  There he was evaluated and instructed to take Tylenol and follow up if he still felt unwell.  When he presented with hematuria, he was sent to the ED here and was found to have thrombocytopenia. CT head negative, CT C/A/P showing perinephric stranding, distended bladder, and hiatal hernia. Patient admitted to MICU for further workup and treatment of his hypotension and likely severe sepsis. Blood cultures have grown MRSA and a TTE revealed a flail mitral leflet and a possible vegetation on the aortic valve. ID is consulted for that. The patient is accompanied by his mother. He is scheduled for a NEW. Denies IDU. Denetal implants. HIV NR.       Interval History: Events noted. Now intubated. Family at bedside.    Review of Systems   Unable to perform ROS: Intubated     Objective:     Vital Signs (Most Recent):  Temp: 97.6 °F (36.4 °C) (11/02/23 1345)  Pulse: 97 (11/02/23 1345)  Resp: 14 (11/02/23 1345)  BP: 90/63 (11/02/23  1330)  SpO2: 98 % (11/02/23 1345) Vital Signs (24h Range):  Temp:  [97.6 °F (36.4 °C)-98.7 °F (37.1 °C)] 97.6 °F (36.4 °C)  Pulse:  [] 97  Resp:  [12-51] 14  SpO2:  [88 %-98 %] 98 %  BP: ()/(57-90) 90/63  Arterial Line BP: (88-95)/(54-58) 95/58     Weight: 74.4 kg (164 lb)  Body mass index is 21.64 kg/m².    Estimated Creatinine Clearance: 72.3 mL/min (based on SCr of 1.3 mg/dL).     Physical Exam  Vitals and nursing note reviewed.   Constitutional:       Appearance: Normal appearance.   HENT:      Head: Normocephalic and atraumatic.   Eyes:      Pupils: Pupils are equal, round, and reactive to light.   Cardiovascular:      Rate and Rhythm: Normal rate.      Heart sounds: Murmur heard.   Pulmonary:      Breath sounds: Rales present. No rhonchi.   Abdominal:      Tenderness: There is no abdominal tenderness. There is no guarding.   Musculoskeletal:         General: No swelling.   Neurological:      Mental Status: He is alert.          Significant Labs: Blood Culture:   Recent Labs   Lab 10/30/23  1923 10/30/23  1924 11/01/23  0607 11/01/23  0609   LABBLOO Gram stain aer bottle: Gram positive cocci in clusters resembling Staph  Gram stain carlie bottle: Gram positive cocci in clusters resembling Staph  Results called to and read back by: Savannah Butler RN 10/31/2023  06:33  STAPHYLOCOCCUS AUREUS  ID consult required at [facility].  For susceptibility see order #[id number]  * Gram stain aer bottle: Gram positive cocci in clusters resembling Staph  Gram stain carlie bottle: Gram positive cocci in clusters resembling Staph  Results called to and read back by: Savannah Butler RN 10/31/2023  06  STAPHYLOCOCCUS AUREUS  Susceptibility pending  ID consult required at [facility].  * Gram stain aer bottle: Gram positive cocci in clusters resembling Staph  Positive results previously called 11/02/2023  04:20 Gram stain aer bottle: Gram positive cocci in clusters  resembling Staph  Results called to and read back by:Melissa Ferro,ANNA 11/01/2023  22:25  STAPHYLOCOCCUS AUREUS  ID consult required at [facility].  For susceptibility see order #[id number]  *     BMP:   Recent Labs   Lab 11/02/23  0341 11/02/23  0803   *  --      --    K 3.2* 3.5     --    CO2 22*  --    BUN 42*  --    CREATININE 1.3  --    CALCIUM 7.5*  --    MG 2.4  --      CBC:   Recent Labs   Lab 10/31/23  2146 11/01/23  0558 11/02/23  0100 11/02/23  0341 11/02/23  0651   WBC 13.31* 17.93*  --  23.60*  --    HGB 11.8* 12.2*  --  11.9*  --    HCT 33.6* 34.7* 34* 34.5* 35*   PLT 19* 25*  --  46*  --        Significant Imaging: I have reviewed all pertinent imaging results/findings within the past 24 hours.

## 2023-11-02 NOTE — PROCEDURES
"Lorenzo Nino is a 48 y.o. male patient.    Temp: 98.4 °F (36.9 °C) (23 0300)  Pulse: 96 (23 1130)  Resp: 20 (23 1130)  BP: (!) 120/58 (23 09)  SpO2: 97 % (23 113)  Weight: 74.4 kg (164 lb) (23)  Height: 6' 1" (185.4 cm) (23)       Arterial Line    Date/Time: 2023 12:22 PM  Location procedure was performed: [facility] CRITICAL CARE MEDICINE    Performed by: Bianca Sheppard MD  Authorized by: Bianca Sheppard MD  Assisting provider: Santos Caballero MD  Consent Done: Yes  Consent: Written consent obtained.  Risks and benefits: risks, benefits and alternatives were discussed  Consent given by: parent  Relevant documents: relevant documents present and verified  Patient identity confirmed: , MRN and name  Preparation: Patient was prepped and draped in the usual sterile fashion.  Indications: hemodynamic monitoring  Location: right radial    Patient sedated: yes  Sedatives: fentanyl  Analgesia: fentanyl  Vitals: Vital signs were monitored during sedation.  Needle gauge: 20  Seldinger technique: Seldinger technique used  Number of attempts: 1  Complications: No  Estimated blood loss (mL): 3  Specimens: No  Implants: No  Post-procedure: line sutured and dressing applied  Patient tolerance: Patient tolerated the procedure well with no immediate complications  Comments: Pt sedated on fentanyl gtt due to recent intubation          2023    "

## 2023-11-02 NOTE — PROCEDURES
Brief Operative Note:    : Jose Vidal MD     Referring Physician: Self,Aaareferral     All Operators: Surgeon(s):  Jonathon Keith MD Tafur Soto, Jose D., MD     Preoperative Diagnosis: Endocarditis, unspecified chronicity, unspecified endocarditis type [I38]     Postop Diagnosis: Endocarditis, unspecified chronicity, unspecified endocarditis type [I38]    Treatments/Procedures: Procedure(s) (LRB):  INSERTION, INTRA-AORTIC BALLOON PUMP (Right)    Access: Right CFA    Findings: acute MR, endocarditis     See catheterization report for full details.    Intervention: IABP placement.     See catheterization report for full details.    Closure device: none        Plan:  - Post cath protocol   - Bed rest as long as patient has IABP  - Consider anticoagulation while IABP, way risks and benefits considering risk of bleeding with thrombocytopenia, if no anticoagulation, please keep IABP 1:1     Estimated Blood loss: 20 cc    Specimens removed: No    Jonathon Keith MD

## 2023-11-03 ENCOUNTER — ANESTHESIA (OUTPATIENT)
Dept: SURGERY | Facility: HOSPITAL | Age: 48
DRG: 853 | End: 2023-11-03
Payer: COMMERCIAL

## 2023-11-03 DIAGNOSIS — Z95.2 S/P MITRAL VALVE REPLACEMENT: Primary | ICD-10-CM

## 2023-11-03 PROBLEM — B95.62 MRSA BACTEREMIA: Status: ACTIVE | Noted: 2023-11-03

## 2023-11-03 PROBLEM — J18.9 PNA (PNEUMONIA): Status: RESOLVED | Noted: 2023-10-31 | Resolved: 2023-11-03

## 2023-11-03 PROBLEM — I34.0 SEVERE MITRAL REGURGITATION: Status: ACTIVE | Noted: 2023-11-03

## 2023-11-03 PROBLEM — R78.81 MRSA BACTEREMIA: Status: ACTIVE | Noted: 2023-11-03

## 2023-11-03 PROBLEM — I50.22 HEART FAILURE WITH MID-RANGE EJECTION FRACTION (HFMEF): Status: ACTIVE | Noted: 2023-11-01

## 2023-11-03 PROBLEM — I51.1 RUPTURE OF CHORDAE TENDINEAE: Status: ACTIVE | Noted: 2023-11-03

## 2023-11-03 PROBLEM — I33.0 ACUTE BACTERIAL ENDOCARDITIS: Status: ACTIVE | Noted: 2023-11-03

## 2023-11-03 LAB
ALBUMIN SERPL BCP-MCNC: 1.8 G/DL (ref 3.5–5.2)
ALBUMIN SERPL BCP-MCNC: 2.2 G/DL (ref 3.5–5.2)
ALLENS TEST: ABNORMAL
ALP SERPL-CCNC: 62 U/L (ref 55–135)
ALP SERPL-CCNC: 87 U/L (ref 55–135)
ALT SERPL W/O P-5'-P-CCNC: 52 U/L (ref 10–44)
ALT SERPL W/O P-5'-P-CCNC: 68 U/L (ref 10–44)
ANION GAP SERPL CALC-SCNC: 13 MMOL/L (ref 8–16)
ANION GAP SERPL CALC-SCNC: 16 MMOL/L (ref 8–16)
ANISOCYTOSIS BLD QL SMEAR: SLIGHT
APTT PPP: 24.8 SEC (ref 21–32)
AST SERPL-CCNC: 53 U/L (ref 10–40)
AST SERPL-CCNC: 73 U/L (ref 10–40)
BACTERIA BLD CULT: ABNORMAL
BASOPHILS NFR BLD: 0 % (ref 0–1.9)
BILIRUB SERPL-MCNC: 1.3 MG/DL (ref 0.1–1)
BILIRUB SERPL-MCNC: 2 MG/DL (ref 0.1–1)
BLD PROD TYP BPU: NORMAL
BLOOD UNIT EXPIRATION DATE: NORMAL
BLOOD UNIT TYPE CODE: 5100
BLOOD UNIT TYPE: NORMAL
BUN SERPL-MCNC: 64 MG/DL (ref 6–20)
BUN SERPL-MCNC: 66 MG/DL (ref 6–20)
CALCIUM SERPL-MCNC: 7.3 MG/DL (ref 8.7–10.5)
CALCIUM SERPL-MCNC: 9.5 MG/DL (ref 8.7–10.5)
CHLORIDE SERPL-SCNC: 103 MMOL/L (ref 95–110)
CHLORIDE SERPL-SCNC: 109 MMOL/L (ref 95–110)
CO2 SERPL-SCNC: 27 MMOL/L (ref 23–29)
CO2 SERPL-SCNC: 28 MMOL/L (ref 23–29)
CODING SYSTEM: NORMAL
CREAT SERPL-MCNC: 1.4 MG/DL (ref 0.5–1.4)
CREAT SERPL-MCNC: 1.7 MG/DL (ref 0.5–1.4)
CROSSMATCH INTERPRETATION: NORMAL
DELSYS: ABNORMAL
DIFFERENTIAL METHOD: ABNORMAL
DISPENSE STATUS: NORMAL
EOSINOPHIL NFR BLD: 0 % (ref 0–8)
ERYTHROCYTE [DISTWIDTH] IN BLOOD BY AUTOMATED COUNT: 14.6 % (ref 11.5–14.5)
ERYTHROCYTE [DISTWIDTH] IN BLOOD BY AUTOMATED COUNT: 14.6 % (ref 11.5–14.5)
ERYTHROCYTE [SEDIMENTATION RATE] IN BLOOD BY WESTERGREN METHOD: 20 MM/H
ERYTHROCYTE [SEDIMENTATION RATE] IN BLOOD BY WESTERGREN METHOD: 22 MM/H
EST. GFR  (NO RACE VARIABLE): 48.8 ML/MIN/1.73 M^2
EST. GFR  (NO RACE VARIABLE): >60 ML/MIN/1.73 M^2
FIBRINOGEN PPP-MCNC: 332 MG/DL (ref 182–400)
FIO2: 50
FIO2: 80
GLUCOSE SERPL-MCNC: 185 MG/DL (ref 70–110)
GLUCOSE SERPL-MCNC: 204 MG/DL (ref 70–110)
GLUCOSE SERPL-MCNC: 213 MG/DL (ref 70–110)
GLUCOSE SERPL-MCNC: 221 MG/DL (ref 70–110)
GLUCOSE SERPL-MCNC: 234 MG/DL (ref 70–110)
GLUCOSE SERPL-MCNC: 238 MG/DL (ref 70–110)
GLUCOSE SERPL-MCNC: 245 MG/DL (ref 70–110)
GLUCOSE SERPL-MCNC: 274 MG/DL (ref 70–110)
GLUCOSE SERPL-MCNC: 330 MG/DL (ref 70–110)
GRAM STN SPEC: NORMAL
GRAM STN SPEC: NORMAL
HCO3 UR-SCNC: 32.2 MMOL/L (ref 24–28)
HCO3 UR-SCNC: 32.7 MMOL/L (ref 24–28)
HCO3 UR-SCNC: 32.7 MMOL/L (ref 24–28)
HCO3 UR-SCNC: 33.7 MMOL/L (ref 24–28)
HCO3 UR-SCNC: 33.9 MMOL/L (ref 24–28)
HCO3 UR-SCNC: 33.9 MMOL/L (ref 24–28)
HCO3 UR-SCNC: 34.4 MMOL/L (ref 24–28)
HCO3 UR-SCNC: 34.8 MMOL/L (ref 24–28)
HCO3 UR-SCNC: 34.9 MMOL/L (ref 24–28)
HCO3 UR-SCNC: 36.7 MMOL/L (ref 24–28)
HCO3 UR-SCNC: 38.2 MMOL/L (ref 24–28)
HCT VFR BLD AUTO: 27.2 % (ref 40–54)
HCT VFR BLD AUTO: 30.5 % (ref 40–54)
HCT VFR BLD CALC: 24 %PCV (ref 36–54)
HCT VFR BLD CALC: 25 %PCV (ref 36–54)
HCT VFR BLD CALC: 26 %PCV (ref 36–54)
HCT VFR BLD CALC: 26 %PCV (ref 36–54)
HCT VFR BLD CALC: 27 %PCV (ref 36–54)
HCT VFR BLD CALC: 27 %PCV (ref 36–54)
HCT VFR BLD CALC: 28 %PCV (ref 36–54)
HCT VFR BLD CALC: 28 %PCV (ref 36–54)
HCT VFR BLD CALC: 29 %PCV (ref 36–54)
HCT VFR BLD CALC: 30 %PCV (ref 36–54)
HGB BLD-MCNC: 10.1 G/DL (ref 14–18)
HGB BLD-MCNC: 9.1 G/DL (ref 14–18)
HYPOCHROMIA BLD QL SMEAR: ABNORMAL
IMM GRANULOCYTES # BLD AUTO: ABNORMAL K/UL (ref 0–0.04)
IMM GRANULOCYTES NFR BLD AUTO: ABNORMAL % (ref 0–0.5)
INR PPP: 1.3 (ref 0.8–1.2)
LDH SERPL L TO P-CCNC: 1.96 MMOL/L (ref 0.36–1.25)
LDH SERPL L TO P-CCNC: 2.11 MMOL/L (ref 0.5–2.2)
LDH SERPL L TO P-CCNC: 2.71 MMOL/L (ref 0.36–1.25)
LDH SERPL L TO P-CCNC: 3.22 MMOL/L (ref 0.36–1.25)
LDH SERPL L TO P-CCNC: 3.4 MMOL/L (ref 0.36–1.25)
LDH SERPL L TO P-CCNC: 5.28 MMOL/L (ref 0.36–1.25)
LEFT CBA DIAS: 11 CM/S
LEFT CBA SYS: 22 CM/S
LEFT CCA DIST DIAS: 14 CM/S
LEFT CCA DIST SYS: 33 CM/S
LEFT CCA MID DIAS: 11 CM/S
LEFT CCA MID SYS: 39 CM/S
LEFT CCA PROX DIAS: 42 CM/S
LEFT CCA PROX SYS: 35 CM/S
LEFT ECA DIAS: 8 CM/S
LEFT ECA SYS: 31 CM/S
LEFT ICA DIST DIAS: 33 CM/S
LEFT ICA DIST SYS: 34 CM/S
LEFT ICA MID DIAS: 20 CM/S
LEFT ICA MID SYS: 32 CM/S
LEFT ICA PROX DIAS: 14 CM/S
LEFT ICA PROX SYS: 27 CM/S
LEFT VERTEBRAL DIAS: 15 CM/S
LEFT VERTEBRAL SYS: 21 CM/S
LYMPHOCYTES NFR BLD: 6 % (ref 18–48)
MAGNESIUM SERPL-MCNC: 2.5 MG/DL (ref 1.6–2.6)
MAGNESIUM SERPL-MCNC: 3 MG/DL (ref 1.6–2.6)
MCH RBC QN AUTO: 30.4 PG (ref 27–31)
MCH RBC QN AUTO: 31.2 PG (ref 27–31)
MCHC RBC AUTO-ENTMCNC: 33.1 G/DL (ref 32–36)
MCHC RBC AUTO-ENTMCNC: 33.5 G/DL (ref 32–36)
MCV RBC AUTO: 92 FL (ref 82–98)
MCV RBC AUTO: 93 FL (ref 82–98)
METAMYELOCYTES NFR BLD MANUAL: 0.5 %
MIN VOL: 10.5
MODE: ABNORMAL
MONOCYTES NFR BLD: 11.5 % (ref 4–15)
MYELOCYTES NFR BLD MANUAL: 0.5 %
NEUTROPHILS NFR BLD: 79.5 % (ref 38–73)
NEUTS BAND NFR BLD MANUAL: 2 %
NRBC BLD-RTO: 0 /100 WBC
OHS CV CAROTID ULTRASOUND LEFT ICA/CCA RATIO: 1.03
OHS CV PV CAROTID LEFT HIGHEST CCA: 39
OHS CV PV CAROTID LEFT HIGHEST ICA: 34
OHS CV US CAROTID LEFT HIGHEST EDV: 33
PCO2 BLDA: 36.3 MMHG (ref 35–45)
PCO2 BLDA: 37.8 MMHG (ref 35–45)
PCO2 BLDA: 39.6 MMHG (ref 35–45)
PCO2 BLDA: 41.4 MMHG (ref 35–45)
PCO2 BLDA: 41.9 MMHG (ref 35–45)
PCO2 BLDA: 42.7 MMHG (ref 35–45)
PCO2 BLDA: 45.3 MMHG (ref 35–45)
PCO2 BLDA: 49.8 MMHG (ref 35–45)
PCO2 BLDA: 50.5 MMHG (ref 35–45)
PCO2 BLDA: 51 MMHG (ref 35–45)
PCO2 BLDA: 53.9 MMHG (ref 35–45)
PEEP: 10
PEEP: 5
PH SMN: 7.43 [PH] (ref 7.35–7.45)
PH SMN: 7.43 [PH] (ref 7.35–7.45)
PH SMN: 7.44 [PH] (ref 7.35–7.45)
PH SMN: 7.45 [PH] (ref 7.35–7.45)
PH SMN: 7.49 [PH] (ref 7.35–7.45)
PH SMN: 7.49 [PH] (ref 7.35–7.45)
PH SMN: 7.52 [PH] (ref 7.35–7.45)
PH SMN: 7.53 [PH] (ref 7.35–7.45)
PH SMN: 7.56 [PH] (ref 7.35–7.45)
PH SMN: 7.56 [PH] (ref 7.35–7.45)
PH SMN: 7.57 [PH] (ref 7.35–7.45)
PHOSPHATE SERPL-MCNC: 2.6 MG/DL (ref 2.7–4.5)
PHOSPHATE SERPL-MCNC: 2.9 MG/DL (ref 2.7–4.5)
PIP: 20
PLATELET # BLD AUTO: 90 K/UL (ref 150–450)
PLATELET # BLD AUTO: 94 K/UL (ref 150–450)
PLATELET BLD QL SMEAR: ABNORMAL
PMV BLD AUTO: 13.3 FL (ref 9.2–12.9)
PMV BLD AUTO: ABNORMAL FL (ref 9.2–12.9)
PO2 BLDA: 186 MMHG (ref 80–100)
PO2 BLDA: 191 MMHG (ref 80–100)
PO2 BLDA: 197 MMHG (ref 80–100)
PO2 BLDA: 221 MMHG (ref 80–100)
PO2 BLDA: 246 MMHG (ref 80–100)
PO2 BLDA: 28 MMHG (ref 40–60)
PO2 BLDA: 36 MMHG (ref 40–60)
PO2 BLDA: 397 MMHG (ref 80–100)
PO2 BLDA: 419 MMHG (ref 80–100)
PO2 BLDA: 43 MMHG (ref 40–60)
PO2 BLDA: 440 MMHG (ref 80–100)
PO2 BLDA: 467 MMHG (ref 80–100)
POC BE: 10 MMOL/L
POC BE: 11 MMOL/L
POC BE: 12 MMOL/L
POC BE: 12 MMOL/L
POC BE: 13 MMOL/L
POC BE: 16 MMOL/L
POC BE: 9 MMOL/L
POC IONIZED CALCIUM: 0.92 MMOL/L (ref 1.06–1.42)
POC IONIZED CALCIUM: 1.03 MMOL/L (ref 1.06–1.42)
POC IONIZED CALCIUM: 1.04 MMOL/L (ref 1.06–1.42)
POC IONIZED CALCIUM: 1.04 MMOL/L (ref 1.06–1.42)
POC IONIZED CALCIUM: 1.05 MMOL/L (ref 1.06–1.42)
POC IONIZED CALCIUM: 1.06 MMOL/L (ref 1.06–1.42)
POC IONIZED CALCIUM: 1.12 MMOL/L (ref 1.06–1.42)
POC IONIZED CALCIUM: 1.23 MMOL/L (ref 1.06–1.42)
POC IONIZED CALCIUM: 1.26 MMOL/L (ref 1.06–1.42)
POC IONIZED CALCIUM: 1.34 MMOL/L (ref 1.06–1.42)
POC SATURATED O2: 100 % (ref 95–100)
POC SATURATED O2: 57 % (ref 95–100)
POC SATURATED O2: 71 % (ref 95–100)
POC SATURATED O2: 79 % (ref 95–100)
POC TCO2: 33 MMOL/L (ref 23–27)
POC TCO2: 34 MMOL/L (ref 23–27)
POC TCO2: 34 MMOL/L (ref 23–27)
POC TCO2: 35 MMOL/L (ref 23–27)
POC TCO2: 35 MMOL/L (ref 23–27)
POC TCO2: 35 MMOL/L (ref 24–29)
POC TCO2: 36 MMOL/L (ref 23–27)
POC TCO2: 36 MMOL/L (ref 23–27)
POC TCO2: 36 MMOL/L (ref 24–29)
POC TCO2: 38 MMOL/L (ref 23–27)
POC TCO2: 39 MMOL/L (ref 23–27)
POCT GLUCOSE: 148 MG/DL (ref 70–110)
POCT GLUCOSE: 160 MG/DL (ref 70–110)
POCT GLUCOSE: 164 MG/DL (ref 70–110)
POCT GLUCOSE: 164 MG/DL (ref 70–110)
POCT GLUCOSE: 175 MG/DL (ref 70–110)
POCT GLUCOSE: 185 MG/DL (ref 70–110)
POCT GLUCOSE: 235 MG/DL (ref 70–110)
POCT GLUCOSE: 256 MG/DL (ref 70–110)
POCT GLUCOSE: 318 MG/DL (ref 70–110)
POTASSIUM BLD-SCNC: 2.8 MMOL/L (ref 3.5–5.1)
POTASSIUM BLD-SCNC: 2.8 MMOL/L (ref 3.5–5.1)
POTASSIUM BLD-SCNC: 2.9 MMOL/L (ref 3.5–5.1)
POTASSIUM BLD-SCNC: 2.9 MMOL/L (ref 3.5–5.1)
POTASSIUM BLD-SCNC: 3.1 MMOL/L (ref 3.5–5.1)
POTASSIUM BLD-SCNC: 3.1 MMOL/L (ref 3.5–5.1)
POTASSIUM BLD-SCNC: 3.2 MMOL/L (ref 3.5–5.1)
POTASSIUM BLD-SCNC: 3.3 MMOL/L (ref 3.5–5.1)
POTASSIUM SERPL-SCNC: 3.1 MMOL/L (ref 3.5–5.1)
POTASSIUM SERPL-SCNC: 3.1 MMOL/L (ref 3.5–5.1)
POTASSIUM SERPL-SCNC: 3.8 MMOL/L (ref 3.5–5.1)
PROT SERPL-MCNC: 4.7 G/DL (ref 6–8.4)
PROT SERPL-MCNC: 6 G/DL (ref 6–8.4)
PROTHROMBIN TIME: 13.3 SEC (ref 9–12.5)
PROVIDER CREDENTIALS: ABNORMAL
PROVIDER CREDENTIALS: ABNORMAL
PROVIDER NOTIFIED: ABNORMAL
PROVIDER NOTIFIED: ABNORMAL
RBC # BLD AUTO: 2.92 M/UL (ref 4.6–6.2)
RBC # BLD AUTO: 3.32 M/UL (ref 4.6–6.2)
SAMPLE: ABNORMAL
SAMPLE: NORMAL
SITE: ABNORMAL
SODIUM BLD-SCNC: 146 MMOL/L (ref 136–145)
SODIUM BLD-SCNC: 147 MMOL/L (ref 136–145)
SODIUM BLD-SCNC: 148 MMOL/L (ref 136–145)
SODIUM BLD-SCNC: 148 MMOL/L (ref 136–145)
SODIUM BLD-SCNC: 149 MMOL/L (ref 136–145)
SODIUM BLD-SCNC: 150 MMOL/L (ref 136–145)
SODIUM BLD-SCNC: 152 MMOL/L (ref 136–145)
SODIUM SERPL-SCNC: 147 MMOL/L (ref 136–145)
SODIUM SERPL-SCNC: 149 MMOL/L (ref 136–145)
SP02: 100
TIME NOTIFIED: 1819
TIME NOTIFIED: 1819
TOXIC GRANULES BLD QL SMEAR: PRESENT
UNIT NUMBER: NORMAL
VANCOMYCIN TROUGH SERPL-MCNC: 30.6 UG/ML (ref 10–22)
VERBAL RESULT READBACK PERFORMED: YES
VERBAL RESULT READBACK PERFORMED: YES
VT: 480
VT: 500
WBC # BLD AUTO: 17.45 K/UL (ref 3.9–12.7)
WBC # BLD AUTO: 37.84 K/UL (ref 3.9–12.7)

## 2023-11-03 PROCEDURE — 27100171 HC OXYGEN HIGH FLOW UP TO 24 HOURS

## 2023-11-03 PROCEDURE — 25000003 PHARM REV CODE 250: Performed by: STUDENT IN AN ORGANIZED HEALTH CARE EDUCATION/TRAINING PROGRAM

## 2023-11-03 PROCEDURE — 33427 PR MITRALPLASTY W RADICAL RECONSTR: ICD-10-PCS | Mod: ,,, | Performed by: THORACIC SURGERY (CARDIOTHORACIC VASCULAR SURGERY)

## 2023-11-03 PROCEDURE — 82330 ASSAY OF CALCIUM: CPT

## 2023-11-03 PROCEDURE — 87186 SC STD MICRODIL/AGAR DIL: CPT | Performed by: INTERNAL MEDICINE

## 2023-11-03 PROCEDURE — 63600175 PHARM REV CODE 636 W HCPCS: Performed by: INTERNAL MEDICINE

## 2023-11-03 PROCEDURE — 63600175 PHARM REV CODE 636 W HCPCS: Performed by: STUDENT IN AN ORGANIZED HEALTH CARE EDUCATION/TRAINING PROGRAM

## 2023-11-03 PROCEDURE — 27202608 HC CANNULA, MISC

## 2023-11-03 PROCEDURE — 93320 DOPPLER ECHO COMPLETE: CPT | Mod: 26,,, | Performed by: ANESTHESIOLOGY

## 2023-11-03 PROCEDURE — 36556 INSERT NON-TUNNEL CV CATH: CPT | Mod: 59,,, | Performed by: ANESTHESIOLOGY

## 2023-11-03 PROCEDURE — 83735 ASSAY OF MAGNESIUM: CPT | Performed by: NURSE PRACTITIONER

## 2023-11-03 PROCEDURE — 36556 INSERT NON-TUNNEL CV CATH: CPT

## 2023-11-03 PROCEDURE — 99291 CRITICAL CARE FIRST HOUR: CPT | Mod: ,,, | Performed by: INTERNAL MEDICINE

## 2023-11-03 PROCEDURE — 87116 MYCOBACTERIA CULTURE: CPT | Performed by: INTERNAL MEDICINE

## 2023-11-03 PROCEDURE — 80202 ASSAY OF VANCOMYCIN: CPT | Performed by: STUDENT IN AN ORGANIZED HEALTH CARE EDUCATION/TRAINING PROGRAM

## 2023-11-03 PROCEDURE — 85384 FIBRINOGEN ACTIVITY: CPT | Performed by: STUDENT IN AN ORGANIZED HEALTH CARE EDUCATION/TRAINING PROGRAM

## 2023-11-03 PROCEDURE — 99291 PR CRITICAL CARE, E/M 30-74 MINUTES: ICD-10-PCS | Mod: ,,, | Performed by: INTERNAL MEDICINE

## 2023-11-03 PROCEDURE — 87186 SC STD MICRODIL/AGAR DIL: CPT | Mod: 59 | Performed by: INTERNAL MEDICINE

## 2023-11-03 PROCEDURE — 99900026 HC AIRWAY MAINTENANCE (STAT)

## 2023-11-03 PROCEDURE — 87075 CULTR BACTERIA EXCEPT BLOOD: CPT | Performed by: INTERNAL MEDICINE

## 2023-11-03 PROCEDURE — 36556: ICD-10-PCS | Mod: 59,GC,, | Performed by: ANESTHESIOLOGY

## 2023-11-03 PROCEDURE — 33427 REPAIR OF MITRAL VALVE: CPT | Mod: ,,, | Performed by: THORACIC SURGERY (CARDIOTHORACIC VASCULAR SURGERY)

## 2023-11-03 PROCEDURE — 93312 ECHO TRANSESOPHAGEAL: CPT | Mod: 26,59,, | Performed by: ANESTHESIOLOGY

## 2023-11-03 PROCEDURE — 83735 ASSAY OF MAGNESIUM: CPT | Mod: 91 | Performed by: STUDENT IN AN ORGANIZED HEALTH CARE EDUCATION/TRAINING PROGRAM

## 2023-11-03 PROCEDURE — 87015 SPECIMEN INFECT AGNT CONCNTJ: CPT | Performed by: INTERNAL MEDICINE

## 2023-11-03 PROCEDURE — 85007 BL SMEAR W/DIFF WBC COUNT: CPT | Performed by: NURSE PRACTITIONER

## 2023-11-03 PROCEDURE — 83605 ASSAY OF LACTIC ACID: CPT

## 2023-11-03 PROCEDURE — P9035 PLATELET PHERES LEUKOREDUCED: HCPCS | Performed by: INTERNAL MEDICINE

## 2023-11-03 PROCEDURE — 80053 COMPREHEN METABOLIC PANEL: CPT | Mod: 91 | Performed by: STUDENT IN AN ORGANIZED HEALTH CARE EDUCATION/TRAINING PROGRAM

## 2023-11-03 PROCEDURE — 93320 PR DOPPLER ECHO HEART,COMPLETE: ICD-10-PCS | Mod: 26,,, | Performed by: ANESTHESIOLOGY

## 2023-11-03 PROCEDURE — 27100026 HC SHUNT SENSOR, TERUMO

## 2023-11-03 PROCEDURE — 76937 US GUIDE VASCULAR ACCESS: CPT | Mod: 26,,, | Performed by: ANESTHESIOLOGY

## 2023-11-03 PROCEDURE — 27000191 HC C-V MONITORING

## 2023-11-03 PROCEDURE — 25000242 PHARM REV CODE 250 ALT 637 W/ HCPCS

## 2023-11-03 PROCEDURE — 94640 AIRWAY INHALATION TREATMENT: CPT

## 2023-11-03 PROCEDURE — 76937: ICD-10-PCS | Mod: 26,,, | Performed by: ANESTHESIOLOGY

## 2023-11-03 PROCEDURE — 27100088 HC CELL SAVER

## 2023-11-03 PROCEDURE — 87040 BLOOD CULTURE FOR BACTERIA: CPT | Performed by: INTERNAL MEDICINE

## 2023-11-03 PROCEDURE — C1751 CATH, INF, PER/CENT/MIDLINE: HCPCS

## 2023-11-03 PROCEDURE — 87205 SMEAR GRAM STAIN: CPT | Performed by: INTERNAL MEDICINE

## 2023-11-03 PROCEDURE — 27000202 HC IABP, ADD'L DAY

## 2023-11-03 PROCEDURE — D9220A PRA ANESTHESIA: Mod: ,,, | Performed by: ANESTHESIOLOGY

## 2023-11-03 PROCEDURE — 82803 BLOOD GASES ANY COMBINATION: CPT

## 2023-11-03 PROCEDURE — C1729 CATH, DRAINAGE: HCPCS | Performed by: THORACIC SURGERY (CARDIOTHORACIC VASCULAR SURGERY)

## 2023-11-03 PROCEDURE — 27201037 HC PRESSURE MONITORING SET UP

## 2023-11-03 PROCEDURE — 87077 CULTURE AEROBIC IDENTIFY: CPT | Performed by: INTERNAL MEDICINE

## 2023-11-03 PROCEDURE — 87070 CULTURE OTHR SPECIMN AEROBIC: CPT | Performed by: INTERNAL MEDICINE

## 2023-11-03 PROCEDURE — 37000008 HC ANESTHESIA 1ST 15 MINUTES: Performed by: THORACIC SURGERY (CARDIOTHORACIC VASCULAR SURGERY)

## 2023-11-03 PROCEDURE — 37799 UNLISTED PX VASCULAR SURGERY: CPT

## 2023-11-03 PROCEDURE — 87077 CULTURE AEROBIC IDENTIFY: CPT | Mod: 59 | Performed by: INTERNAL MEDICINE

## 2023-11-03 PROCEDURE — 84295 ASSAY OF SERUM SODIUM: CPT

## 2023-11-03 PROCEDURE — 27000175 HC ADULT BYPASS PUMP

## 2023-11-03 PROCEDURE — 87102 FUNGUS ISOLATION CULTURE: CPT | Performed by: INTERNAL MEDICINE

## 2023-11-03 PROCEDURE — 94002 VENT MGMT INPAT INIT DAY: CPT

## 2023-11-03 PROCEDURE — 36592 COLLECT BLOOD FROM PICC: CPT

## 2023-11-03 PROCEDURE — 85610 PROTHROMBIN TIME: CPT | Performed by: STUDENT IN AN ORGANIZED HEALTH CARE EDUCATION/TRAINING PROGRAM

## 2023-11-03 PROCEDURE — 87040 BLOOD CULTURE FOR BACTERIA: CPT | Mod: 59 | Performed by: INTERNAL MEDICINE

## 2023-11-03 PROCEDURE — 87206 SMEAR FLUORESCENT/ACID STAI: CPT | Performed by: INTERNAL MEDICINE

## 2023-11-03 PROCEDURE — 20000000 HC ICU ROOM

## 2023-11-03 PROCEDURE — 36000713 HC OR TIME LEV V EA ADD 15 MIN: Performed by: THORACIC SURGERY (CARDIOTHORACIC VASCULAR SURGERY)

## 2023-11-03 PROCEDURE — 93325 PR DOPPLER COLOR FLOW VELOCITY MAP: ICD-10-PCS | Mod: 26,,, | Performed by: ANESTHESIOLOGY

## 2023-11-03 PROCEDURE — 85027 COMPLETE CBC AUTOMATED: CPT | Mod: 91 | Performed by: STUDENT IN AN ORGANIZED HEALTH CARE EDUCATION/TRAINING PROGRAM

## 2023-11-03 PROCEDURE — 84100 ASSAY OF PHOSPHORUS: CPT | Performed by: NURSE PRACTITIONER

## 2023-11-03 PROCEDURE — 88304 TISSUE EXAM BY PATHOLOGIST: CPT | Performed by: PATHOLOGY

## 2023-11-03 PROCEDURE — 80053 COMPREHEN METABOLIC PANEL: CPT | Performed by: NURSE PRACTITIONER

## 2023-11-03 PROCEDURE — 93325 DOPPLER ECHO COLOR FLOW MAPG: CPT | Mod: 26,,, | Performed by: ANESTHESIOLOGY

## 2023-11-03 PROCEDURE — 99900035 HC TECH TIME PER 15 MIN (STAT)

## 2023-11-03 PROCEDURE — 36000712 HC OR TIME LEV V 1ST 15 MIN: Performed by: THORACIC SURGERY (CARDIOTHORACIC VASCULAR SURGERY)

## 2023-11-03 PROCEDURE — 84100 ASSAY OF PHOSPHORUS: CPT | Mod: 91 | Performed by: STUDENT IN AN ORGANIZED HEALTH CARE EDUCATION/TRAINING PROGRAM

## 2023-11-03 PROCEDURE — 88304 PR  SURG PATH,LEVEL III: ICD-10-PCS | Mod: 26,,, | Performed by: PATHOLOGY

## 2023-11-03 PROCEDURE — 63600175 PHARM REV CODE 636 W HCPCS

## 2023-11-03 PROCEDURE — D9220A PRA ANESTHESIA: ICD-10-PCS | Mod: ,,, | Performed by: ANESTHESIOLOGY

## 2023-11-03 PROCEDURE — 85730 THROMBOPLASTIN TIME PARTIAL: CPT | Performed by: STUDENT IN AN ORGANIZED HEALTH CARE EDUCATION/TRAINING PROGRAM

## 2023-11-03 PROCEDURE — 85014 HEMATOCRIT: CPT

## 2023-11-03 PROCEDURE — 27201015 HC HEMO-CONCENTRATOR

## 2023-11-03 PROCEDURE — 37000009 HC ANESTHESIA EA ADD 15 MINS: Performed by: THORACIC SURGERY (CARDIOTHORACIC VASCULAR SURGERY)

## 2023-11-03 PROCEDURE — 86920 COMPATIBILITY TEST SPIN: CPT | Performed by: INTERNAL MEDICINE

## 2023-11-03 PROCEDURE — 94003 VENT MGMT INPAT SUBQ DAY: CPT

## 2023-11-03 PROCEDURE — 84132 ASSAY OF SERUM POTASSIUM: CPT

## 2023-11-03 PROCEDURE — 27200953 HC CARDIOPLEGIA SYSTEM

## 2023-11-03 PROCEDURE — 93312 TEE: ICD-10-PCS | Mod: 26,59,, | Performed by: ANESTHESIOLOGY

## 2023-11-03 PROCEDURE — 27000207 HC ISOLATION

## 2023-11-03 PROCEDURE — 25000003 PHARM REV CODE 250: Performed by: THORACIC SURGERY (CARDIOTHORACIC VASCULAR SURGERY)

## 2023-11-03 PROCEDURE — 85520 HEPARIN ASSAY: CPT

## 2023-11-03 PROCEDURE — 27201423 OPTIME MED/SURG SUP & DEVICES STERILE SUPPLY: Performed by: THORACIC SURGERY (CARDIOTHORACIC VASCULAR SURGERY)

## 2023-11-03 PROCEDURE — 85027 COMPLETE CBC AUTOMATED: CPT | Performed by: NURSE PRACTITIONER

## 2023-11-03 PROCEDURE — 36556 INSERT NON-TUNNEL CV CATH: CPT | Mod: 59,GC,, | Performed by: ANESTHESIOLOGY

## 2023-11-03 PROCEDURE — 88304 TISSUE EXAM BY PATHOLOGIST: CPT | Mod: 26,,, | Performed by: PATHOLOGY

## 2023-11-03 PROCEDURE — 94761 N-INVAS EAR/PLS OXIMETRY MLT: CPT

## 2023-11-03 DEVICE — PATCH PERI-GUARD 6CM X 8CM: Type: IMPLANTABLE DEVICE | Site: HEART | Status: FUNCTIONAL

## 2023-11-03 RX ORDER — POTASSIUM CHLORIDE 29.8 MG/ML
40 INJECTION INTRAVENOUS
Status: DISCONTINUED | OUTPATIENT
Start: 2023-11-03 | End: 2023-11-06

## 2023-11-03 RX ORDER — OXYCODONE HYDROCHLORIDE 10 MG/1
10 TABLET ORAL EVERY 4 HOURS PRN
Status: DISCONTINUED | OUTPATIENT
Start: 2023-11-03 | End: 2023-11-14 | Stop reason: HOSPADM

## 2023-11-03 RX ORDER — TRANEXAMIC ACID 100 MG/ML
INJECTION, SOLUTION INTRAVENOUS CONTINUOUS PRN
Status: DISCONTINUED | OUTPATIENT
Start: 2023-11-03 | End: 2023-11-03

## 2023-11-03 RX ORDER — ONDANSETRON 2 MG/ML
INJECTION INTRAMUSCULAR; INTRAVENOUS
Status: DISCONTINUED | OUTPATIENT
Start: 2023-11-03 | End: 2023-11-03

## 2023-11-03 RX ORDER — IPRATROPIUM BROMIDE AND ALBUTEROL SULFATE 2.5; .5 MG/3ML; MG/3ML
3 SOLUTION RESPIRATORY (INHALATION) EVERY 4 HOURS PRN
Status: DISCONTINUED | OUTPATIENT
Start: 2023-11-03 | End: 2023-11-14 | Stop reason: HOSPADM

## 2023-11-03 RX ORDER — HEPARIN SODIUM 1000 [USP'U]/ML
INJECTION, SOLUTION INTRAVENOUS; SUBCUTANEOUS
Status: DISCONTINUED | OUTPATIENT
Start: 2023-11-03 | End: 2023-11-03

## 2023-11-03 RX ORDER — MIDAZOLAM HYDROCHLORIDE 1 MG/ML
INJECTION, SOLUTION INTRAMUSCULAR; INTRAVENOUS
Status: DISCONTINUED | OUTPATIENT
Start: 2023-11-03 | End: 2023-11-03

## 2023-11-03 RX ORDER — FAMOTIDINE 10 MG/ML
20 INJECTION INTRAVENOUS 2 TIMES DAILY
Status: DISCONTINUED | OUTPATIENT
Start: 2023-11-03 | End: 2023-11-05

## 2023-11-03 RX ORDER — PROPOFOL 10 MG/ML
0-50 INJECTION, EMULSION INTRAVENOUS CONTINUOUS
Status: DISCONTINUED | OUTPATIENT
Start: 2023-11-03 | End: 2023-11-05

## 2023-11-03 RX ORDER — DOBUTAMINE HYDROCHLORIDE 400 MG/100ML
5 INJECTION INTRAVENOUS CONTINUOUS
Status: DISCONTINUED | OUTPATIENT
Start: 2023-11-03 | End: 2023-11-05

## 2023-11-03 RX ORDER — FENTANYL CITRATE 50 UG/ML
INJECTION, SOLUTION INTRAMUSCULAR; INTRAVENOUS
Status: DISCONTINUED | OUTPATIENT
Start: 2023-11-03 | End: 2023-11-03

## 2023-11-03 RX ORDER — GLUCAGON 1 MG
1 KIT INJECTION
Status: DISCONTINUED | OUTPATIENT
Start: 2023-11-03 | End: 2023-11-03

## 2023-11-03 RX ORDER — ROCURONIUM BROMIDE 10 MG/ML
INJECTION, SOLUTION INTRAVENOUS
Status: DISCONTINUED | OUTPATIENT
Start: 2023-11-03 | End: 2023-11-03

## 2023-11-03 RX ORDER — CEFAZOLIN 2 G/1
INJECTION, POWDER, FOR SOLUTION INTRAMUSCULAR; INTRAVENOUS
Status: DISCONTINUED | OUTPATIENT
Start: 2023-11-03 | End: 2023-11-03

## 2023-11-03 RX ORDER — FENTANYL CITRATE 50 UG/ML
50 INJECTION, SOLUTION INTRAMUSCULAR; INTRAVENOUS
Status: ACTIVE | OUTPATIENT
Start: 2023-11-03 | End: 2023-11-03

## 2023-11-03 RX ORDER — POTASSIUM CHLORIDE 14.9 MG/ML
60 INJECTION INTRAVENOUS
Status: DISCONTINUED | OUTPATIENT
Start: 2023-11-03 | End: 2023-11-06

## 2023-11-03 RX ORDER — MAGNESIUM SULFATE HEPTAHYDRATE 40 MG/ML
2 INJECTION, SOLUTION INTRAVENOUS
Status: DISCONTINUED | OUTPATIENT
Start: 2023-11-03 | End: 2023-11-07

## 2023-11-03 RX ORDER — CALCIUM GLUCONATE 20 MG/ML
3 INJECTION, SOLUTION INTRAVENOUS
Status: DISCONTINUED | OUTPATIENT
Start: 2023-11-03 | End: 2023-11-07

## 2023-11-03 RX ORDER — ACETAMINOPHEN 500 MG
1000 TABLET ORAL EVERY 8 HOURS
Status: DISCONTINUED | OUTPATIENT
Start: 2023-11-03 | End: 2023-11-10

## 2023-11-03 RX ORDER — CALCIUM GLUCONATE 20 MG/ML
1 INJECTION, SOLUTION INTRAVENOUS
Status: DISCONTINUED | OUTPATIENT
Start: 2023-11-03 | End: 2023-11-07

## 2023-11-03 RX ORDER — PROPOFOL 10 MG/ML
INJECTION, EMULSION INTRAVENOUS
Status: COMPLETED
Start: 2023-11-03 | End: 2023-11-04

## 2023-11-03 RX ORDER — MAGNESIUM SULFATE HEPTAHYDRATE 40 MG/ML
4 INJECTION, SOLUTION INTRAVENOUS
Status: DISCONTINUED | OUTPATIENT
Start: 2023-11-03 | End: 2023-11-07

## 2023-11-03 RX ORDER — MUPIROCIN 20 MG/G
OINTMENT TOPICAL 2 TIMES DAILY
Status: COMPLETED | OUTPATIENT
Start: 2023-11-03 | End: 2023-11-08

## 2023-11-03 RX ORDER — CALCIUM GLUCONATE 20 MG/ML
2 INJECTION, SOLUTION INTRAVENOUS
Status: DISCONTINUED | OUTPATIENT
Start: 2023-11-03 | End: 2023-11-07

## 2023-11-03 RX ORDER — NOREPINEPHRINE BITARTRATE/D5W 4MG/250ML
0-3 PLASTIC BAG, INJECTION (ML) INTRAVENOUS CONTINUOUS
Status: DISCONTINUED | OUTPATIENT
Start: 2023-11-03 | End: 2023-11-05

## 2023-11-03 RX ORDER — TRANEXAMIC ACID 100 MG/ML
INJECTION, SOLUTION INTRAVENOUS
Status: DISCONTINUED | OUTPATIENT
Start: 2023-11-03 | End: 2023-11-03

## 2023-11-03 RX ORDER — FENTANYL CITRATE 50 UG/ML
50 INJECTION, SOLUTION INTRAMUSCULAR; INTRAVENOUS
Status: DISCONTINUED | OUTPATIENT
Start: 2023-11-03 | End: 2023-11-04

## 2023-11-03 RX ORDER — PROTAMINE SULFATE 10 MG/ML
INJECTION, SOLUTION INTRAVENOUS
Status: DISCONTINUED | OUTPATIENT
Start: 2023-11-03 | End: 2023-11-03

## 2023-11-03 RX ORDER — POTASSIUM CHLORIDE 14.9 MG/ML
20 INJECTION INTRAVENOUS
Status: DISCONTINUED | OUTPATIENT
Start: 2023-11-03 | End: 2023-11-06

## 2023-11-03 RX ORDER — POTASSIUM CHLORIDE 14.9 MG/ML
INJECTION INTRAVENOUS CONTINUOUS PRN
Status: DISCONTINUED | OUTPATIENT
Start: 2023-11-03 | End: 2023-11-03

## 2023-11-03 RX ORDER — RIFAMPIN 600 MG/10ML
INJECTION, POWDER, LYOPHILIZED, FOR SOLUTION INTRAVENOUS
Status: DISCONTINUED | OUTPATIENT
Start: 2023-11-03 | End: 2023-11-03

## 2023-11-03 RX ORDER — POTASSIUM CHLORIDE 20 MEQ/1
20 TABLET, EXTENDED RELEASE ORAL ONCE
Status: DISCONTINUED | OUTPATIENT
Start: 2023-11-03 | End: 2023-11-03

## 2023-11-03 RX ORDER — ACETAMINOPHEN 325 MG/1
650 TABLET ORAL EVERY 6 HOURS PRN
Status: DISCONTINUED | OUTPATIENT
Start: 2023-11-03 | End: 2023-11-14 | Stop reason: HOSPADM

## 2023-11-03 RX ORDER — ONDANSETRON 2 MG/ML
4 INJECTION INTRAMUSCULAR; INTRAVENOUS EVERY 6 HOURS PRN
Status: DISCONTINUED | OUTPATIENT
Start: 2023-11-03 | End: 2023-11-14 | Stop reason: HOSPADM

## 2023-11-03 RX ORDER — LIDOCAINE HYDROCHLORIDE 20 MG/ML
INJECTION, SOLUTION EPIDURAL; INFILTRATION; INTRACAUDAL; PERINEURAL
Status: DISCONTINUED | OUTPATIENT
Start: 2023-11-03 | End: 2023-11-03

## 2023-11-03 RX ORDER — PROPOFOL 10 MG/ML
VIAL (ML) INTRAVENOUS CONTINUOUS PRN
Status: DISCONTINUED | OUTPATIENT
Start: 2023-11-03 | End: 2023-11-03

## 2023-11-03 RX ORDER — POLYETHYLENE GLYCOL 3350 17 G/17G
17 POWDER, FOR SOLUTION ORAL DAILY
Status: DISCONTINUED | OUTPATIENT
Start: 2023-11-04 | End: 2023-11-14 | Stop reason: HOSPADM

## 2023-11-03 RX ORDER — ASPIRIN 325 MG
325 TABLET ORAL DAILY
Status: DISCONTINUED | OUTPATIENT
Start: 2023-11-03 | End: 2023-11-14 | Stop reason: HOSPADM

## 2023-11-03 RX ORDER — INSULIN ASPART 100 [IU]/ML
0-5 INJECTION, SOLUTION INTRAVENOUS; SUBCUTANEOUS EVERY 6 HOURS PRN
Status: DISCONTINUED | OUTPATIENT
Start: 2023-11-03 | End: 2023-11-03

## 2023-11-03 RX ORDER — OXYCODONE HYDROCHLORIDE 5 MG/1
5 TABLET ORAL EVERY 4 HOURS PRN
Status: DISCONTINUED | OUTPATIENT
Start: 2023-11-03 | End: 2023-11-14 | Stop reason: HOSPADM

## 2023-11-03 RX ORDER — DOCUSATE SODIUM 100 MG/1
100 CAPSULE, LIQUID FILLED ORAL 2 TIMES DAILY
Status: DISCONTINUED | OUTPATIENT
Start: 2023-11-03 | End: 2023-11-14 | Stop reason: HOSPADM

## 2023-11-03 RX ORDER — PROCHLORPERAZINE EDISYLATE 5 MG/ML
5 INJECTION INTRAMUSCULAR; INTRAVENOUS EVERY 6 HOURS PRN
Status: DISCONTINUED | OUTPATIENT
Start: 2023-11-03 | End: 2023-11-14 | Stop reason: HOSPADM

## 2023-11-03 RX ADMIN — CLINDAMYCIN IN 5 PERCENT DEXTROSE 600 MG: 12 INJECTION, SOLUTION INTRAVENOUS at 10:11

## 2023-11-03 RX ADMIN — FENTANYL CITRATE 200 MCG: 50 INJECTION INTRAMUSCULAR; INTRAVENOUS at 02:11

## 2023-11-03 RX ADMIN — PROPOFOL 50 MCG/KG/MIN: 10 INJECTION, EMULSION INTRAVENOUS at 05:11

## 2023-11-03 RX ADMIN — IPRATROPIUM BROMIDE AND ALBUTEROL SULFATE 3 ML: .5; 3 SOLUTION RESPIRATORY (INHALATION) at 07:11

## 2023-11-03 RX ADMIN — ASPIRIN 325 MG ORAL TABLET 325 MG: 325 PILL ORAL at 08:11

## 2023-11-03 RX ADMIN — POTASSIUM CHLORIDE 40 MEQ: 29.8 INJECTION, SOLUTION INTRAVENOUS at 08:11

## 2023-11-03 RX ADMIN — FAMOTIDINE 20 MG: 10 INJECTION, SOLUTION INTRAVENOUS at 08:11

## 2023-11-03 RX ADMIN — FUROSEMIDE 20 MG/HR: 10 INJECTION, SOLUTION INTRAMUSCULAR; INTRAVENOUS at 06:11

## 2023-11-03 RX ADMIN — CALCIUM GLUCONATE 1 G: 98 INJECTION, SOLUTION INTRAVENOUS at 09:11

## 2023-11-03 RX ADMIN — DOCUSATE SODIUM 100 MG: 100 CAPSULE, LIQUID FILLED ORAL at 08:11

## 2023-11-03 RX ADMIN — ROCURONIUM BROMIDE 50 MG: 10 INJECTION INTRAVENOUS at 01:11

## 2023-11-03 RX ADMIN — ROCURONIUM BROMIDE 50 MG: 10 INJECTION INTRAVENOUS at 02:11

## 2023-11-03 RX ADMIN — FAMOTIDINE 20 MG: 20 TABLET ORAL at 08:11

## 2023-11-03 RX ADMIN — IPRATROPIUM BROMIDE AND ALBUTEROL SULFATE 3 ML: .5; 3 SOLUTION RESPIRATORY (INHALATION) at 03:11

## 2023-11-03 RX ADMIN — CLINDAMYCIN IN 5 PERCENT DEXTROSE 600 MG: 12 INJECTION, SOLUTION INTRAVENOUS at 08:11

## 2023-11-03 RX ADMIN — CALCIUM CHLORIDE 1 G: 100 INJECTION, SOLUTION INTRAVENOUS at 02:11

## 2023-11-03 RX ADMIN — ROCURONIUM BROMIDE 50 MG: 10 INJECTION INTRAVENOUS at 04:11

## 2023-11-03 RX ADMIN — FENTANYL CITRATE 500 MCG: 50 INJECTION INTRAMUSCULAR; INTRAVENOUS at 04:11

## 2023-11-03 RX ADMIN — TRANEXAMIC ACID 1 MG/KG/HR: 100 INJECTION, SOLUTION INTRAVENOUS at 01:11

## 2023-11-03 RX ADMIN — ACETAMINOPHEN 1000 MG: 500 TABLET ORAL at 10:11

## 2023-11-03 RX ADMIN — PROPOFOL 50 MCG/KG/MIN: 10 INJECTION, EMULSION INTRAVENOUS at 08:11

## 2023-11-03 RX ADMIN — FENTANYL CITRATE 500 MCG: 50 INJECTION INTRAMUSCULAR; INTRAVENOUS at 02:11

## 2023-11-03 RX ADMIN — CEFAZOLIN 2 G: 330 INJECTION, POWDER, FOR SOLUTION INTRAMUSCULAR; INTRAVENOUS at 02:11

## 2023-11-03 RX ADMIN — SODIUM CHLORIDE, SODIUM GLUCONATE, SODIUM ACETATE, POTASSIUM CHLORIDE, MAGNESIUM CHLORIDE, SODIUM PHOSPHATE, DIBASIC, AND POTASSIUM PHOSPHATE: .53; .5; .37; .037; .03; .012; .00082 INJECTION, SOLUTION INTRAVENOUS at 01:11

## 2023-11-03 RX ADMIN — VANCOMYCIN HYDROCHLORIDE 1250 MG: 1.25 INJECTION, POWDER, LYOPHILIZED, FOR SOLUTION INTRAVENOUS at 09:11

## 2023-11-03 RX ADMIN — POTASSIUM BICARBONATE 20 MEQ: 391 TABLET, EFFERVESCENT ORAL at 08:11

## 2023-11-03 RX ADMIN — PROTAMINE SULFATE 250 MG: 10 INJECTION, SOLUTION INTRAVENOUS at 04:11

## 2023-11-03 RX ADMIN — CLINDAMYCIN IN 5 PERCENT DEXTROSE 600 MG: 12 INJECTION, SOLUTION INTRAVENOUS at 05:11

## 2023-11-03 RX ADMIN — MIDAZOLAM 2 MG: 1 INJECTION INTRAMUSCULAR; INTRAVENOUS at 12:11

## 2023-11-03 RX ADMIN — SODIUM CHLORIDE 10 UNITS/HR: 9 INJECTION, SOLUTION INTRAVENOUS at 01:11

## 2023-11-03 RX ADMIN — TRANEXAMIC ACID 1000 MG: 100 INJECTION INTRAVENOUS at 02:11

## 2023-11-03 RX ADMIN — HEPARIN SODIUM 29000 UNITS: 1000 INJECTION, SOLUTION INTRAVENOUS; SUBCUTANEOUS at 02:11

## 2023-11-03 RX ADMIN — MUPIROCIN: 20 OINTMENT TOPICAL at 08:11

## 2023-11-03 RX ADMIN — POTASSIUM CHLORIDE: 14.9 INJECTION INTRAVENOUS at 04:11

## 2023-11-03 RX ADMIN — EPINEPHRINE 0.06 MCG/KG/MIN: 1 INJECTION INTRAMUSCULAR; INTRAVENOUS; SUBCUTANEOUS at 04:11

## 2023-11-03 RX ADMIN — NOREPINEPHRINE BITARTRATE 0.04 MCG/KG/MIN: 1 INJECTION, SOLUTION, CONCENTRATE INTRAVENOUS at 04:11

## 2023-11-03 RX ADMIN — FENTANYL CITRATE 100 MCG: 50 INJECTION INTRAMUSCULAR; INTRAVENOUS at 02:11

## 2023-11-03 RX ADMIN — VASOPRESSIN 0.04 UNITS/MIN: 20 INJECTION INTRAVENOUS at 04:11

## 2023-11-03 RX ADMIN — LIDOCAINE HYDROCHLORIDE 100 MG: 20 INJECTION, SOLUTION EPIDURAL; INFILTRATION; INTRACAUDAL at 04:11

## 2023-11-03 RX ADMIN — INSULIN ASPART 4 UNITS: 100 INJECTION, SOLUTION INTRAVENOUS; SUBCUTANEOUS at 11:11

## 2023-11-03 NOTE — HPI
Mr. Nino is a 47 yo M with no past medical history admitted 10/30 for sepsis. Last Friday, 10/27, he developed headache, abdominal pain, and nausea. He presented to , who prescribed Tylenol and recommended he return if he continued to feel poorly. He presented to [facility] 10/30 with worsening abdominal pain, as well as fatigue, body aches, and hematuria. He was found to be tachycardic and hypotensive requiring vasopressor support. He was admitted to MICU for management of sepsis 2/2 PNA vs UTI vs meningitis (reported symptoms of neck stiffness). He was unable to undergo LP due to thrombocytopenia of 23. He is currently being evaluated for TTP. Further work up revealed MRSA bacteremia. He underwent TTE, which was notable for mitral valve vegetation with flail anterior leaflet, as well as possible aortic valve vegetation. CTS consulted for endocarditis.    The patient presents to the SICU s/p mitral valve repair with Dr. Beal on 11/03/2023. On admission, they are intubated, sedated with propofol, and in stable condition. Inotropic and vasopressor requirements upon admission 0.04 units/min of vasopressin and IABP 1:1 to maintain blood pressure at a MAP 60-80 and SBP < 140. Central access includes a RIJ CVC and LIJ CVC, arterial access includes a right radial arterial line. They also have 1 pleural and 2 mediastinal chest tubes with appropriate output.    Intraoperatively, they received 3L of crystalloid, 500cc of cell saver. Urine output intraoperatively was 750cc. The pre-operative echocardiogram was notable for EF 40%, normal RV function, severe MR . Post-operative echo was normal BiV function, trace MR.    Immediate post-operative plans include hemodynamic stabilization and weaning of cardiac and respiratory support. Plan to wean vasopressors and inotropes as tolerated, wean ventilator support with goal of early extubation, and closely monitor fluid status with strict Is/Os and continued fluid resuscitation as  needed.

## 2023-11-03 NOTE — ASSESSMENT & PLAN NOTE
11/3 growing MRSA in bld cx x2. ID following.     -- 6 weeks vanc from operation.   -- will need PICC

## 2023-11-03 NOTE — TRANSFER OF CARE
"Anesthesia Transfer of Care Note    Patient: Lorenzo Nino    Procedure(s) Performed: Procedure(s) (LRB):  REPAIR, MITRAL VALVE, OPEN (N/A)  EXCLUSION, LEFT ATRIAL APPENDAGE, OPEN, AS PART OF OPEN CHEST SURGERY (Left)    Patient location: ICU    Anesthesia Type: general    Transport from OR: Transported from OR intubated on 100% O2 by AMBU with adequate controlled ventilation. Upon arrival to PACU/ICU, patient attached to ventilator and auscultated to confirm bilateral breath sounds and adequate TV. Continuous ECG monitoring in transport. Continuous SpO2 monitoring in transport. Continuos invasive BP monitoring in transport    Post pain: adequate analgesia    Post assessment: no apparent anesthetic complications and tolerated procedure well    Post vital signs: stable    Level of consciousness: sedated    Nausea/Vomiting: no nausea/vomiting    Complications: none    Transfer of care protocol was followed      Last vitals: Visit Vitals  BP (!) 88/64   Pulse 84   Temp 36.3 °C (97.4 °F) (Axillary)   Resp 20   Ht 6' 1" (1.854 m)   Wt (S) 76.5 kg (168 lb 10.4 oz)   SpO2 99%   BMI 22.25 kg/m²     "

## 2023-11-03 NOTE — OP NOTE
DATE OF PROCEDURE:  11/3/2023     ATTENDING SURGEON:  Manuel Beal M.D.    ASSISTANT: Ghulam Lehman MD, (Cardiothoracic Resident)     PREOPERATIVE DIAGNOSES:  1.  Severe mitral regurgitation.  2.  Bacterial endocarditis    POSTOPERATIVE DIAGNOSES:  1.  Severe mitral regurgitation.  2.  Bacterial endocarditis    OPERATION PERFORMED:      1)  Mitral valve repair with triangular resection of P2 and placement of a freehand-created bovine pericardial annuloplasty band using a 40 mm Medtronic Simuplus sizer as a template    2)  Resection of left atrial appendage     ANESTHESIA:  General endotracheal.     ESTIMATED BLOOD LOSS:  100 mL.     BRIEF HISTORY:  Mr. Nino is a very pleasant 48-year-old gentleman who initially presented to the emergency department on October 30 completing fatigue body aches abdominal pain and hematuria.  He also reported fever and headache.  In the emergency department he was tachycardic and hypotensive.  He underwent a thorough evaluation which ultimately demonstrated bacterial endocarditis.  He was being managed medically until the night of November 1 when he decompensated.  He required intubation.  A previous echo had shown only moderate mitral regurgitation but a repeat echo on the morning of November 2 demonstrated severe mitral regurgitation.  His chest x-ray was consistent with pulmonary edema.  A balloon pump was placed to mitigate the mitral regurgitation and diuresis was initiated.  He has had at least 1 episode of atrial fibrillation.  We plan mitral valve repair and resection of left atrial appendage.  Of note, the intraoperative NEW demonstrated no aortic valve abnormalities.      PROCEDURE IN DETAIL:  After obtaining informed and written consent, the patient   was brought to the Operating Room and placed on the operating table in supine   position.  After induction of adequate general endotracheal anesthesia, the   patient's chest, abdomen, pelvis and bilateral lower  extremities were prepped   and draped in the usual sterile fashion.  An upper midline skin incision was   made, and a median sternotomy was performed.  A pericardial well was created.    The patient was systemically heparinized.  Cannulation sutures were placed in   the aorta and in the superior vena cava.  The aortic cannula was inserted,   followed by the venous.  An IVC cannula was also placed.  Antegrade and   retrograde cardioplegia catheters were placed.  The patient was then put on   cardiopulmonary bypass.  Once on bypass, circumferential control was obtained   over the superior vena cava.  The aortic crossclamp was applied, and the heart   was arrested using cold blood enhanced antegrade cardioplegia.  A prompt   electromechanical arrest was achieved.  Once the cardioplegia was all in, the   cannulas were repositioned.  A left atriotomy was made near the right-sided   pulmonary veins.  The Bolaños retractor was placed for exposure. The left atrium was quite dilated, suggesting some degree of mitral regurgitation prior to the onset of endocarditis.  The mitral   valve was evaluated.  The P2 scallop of the posterior leaflet demonstrated   myxomatous change, and was quite redundant.  There were multiple vegetations and multiple broken chords.  I performed a triangular resection of the involved portion of P2.  This portion of leaflet was sent for culture.  The remainder of the valve did not have evidence of endocarditis.  The ventricle and left atrium were irrigated with a dilute rifampin solution.  With the involved scallop resected, the valve appeared amenable to repair.  I felt this was a better solution than replacement.  A 4-0 Prolene was used to align the coaptation edges on either side of the resection.  The leaflet was then reconstructed using a running 4-0 Prolene suture in 2 layers.  I elected to use monofilament suture to reduce the risk of recurrent infection, but the leaflet reconstruction was  performed from near the annulus to the coapting edge and back, thus placing the knot away from the coapted edge.  The valve was again tested, and appeared fairly competent.  Multiple nonpledgeted 4-0 Prolene sutures were placed along the posterior annulus from the medial trigone to the lateral trigone.  The annulus was sized using the [employer]iogens sizers.  A 40 mm band was indicated.  We used the Sizer as a template and fashioned a band from a sheet of bovine pericardium.  The pericardial band was brought up, the annuloplasty sutures were passed through it, and it was slid into position.  The sutures were tied and then cut.  The valve was then   tested, and no significant mitral regurgitation was seen. The left atriotomy was then closed in a   single layer using running 4-0 Prolene suture.  Prior to closing the left   atrium, de-airing maneuvers were performed.  We then addressed the left atrial appendage.  It was resected using the echelon stapler. The hot shot was given retrograde.  Additional de-airing maneuvers were   performed, and the aortic cross-clamp was removed.  The patient was subsequently   weaned from cardiopulmonary bypass.  The patient did separate easily from   bypass.  Once off bypass, all surgical sites were inspected.  There was good   hemostasis.  The valve was evaluated using NEW.  There was no residual mitral   regurgitation seen. The test   dose of protamine was administered, and this was well tolerated.  The total dose   was then given.  long term through the total dose, all cannulas were removed.    All surgical sites were again inspected, again there was good hemostasis.    Ventricular pacing wires were placed.  Drains were placed.  After again   confirming adequate hemostasis, the patient's chest was closed using eight #6   stainless steel wires to reapproximate the sternum.  The overlying soft tissues   were reapproximated using absorbable suture material.  The patient's chest was    washed and dried, and a dry dressing was applied.  The patient tolerated the   procedure well, there were no complications.  At the conclusion of the case,   sponge and instrument counts were correct.

## 2023-11-03 NOTE — SUBJECTIVE & OBJECTIVE
Follow-up For: Procedure(s) (LRB):  REPAIR, MITRAL VALVE, OPEN (N/A)  EXCLUSION, LEFT ATRIAL APPENDAGE, OPEN, AS PART OF OPEN CHEST SURGERY (Left)    Post-Operative Day: Day of Surgery     History reviewed. No pertinent past medical history.    Past Surgical History:   Procedure Laterality Date    INSERTION OF INTRA-AORTIC BALLOON ASSIST DEVICE Right 11/2/2023    Procedure: INSERTION, INTRA-AORTIC BALLOON PUMP;  Surgeon: Jose Vidal MD;  Location: [facility] CATH LAB;  Service: Cardiology;  Laterality: Right;       Review of patient's allergies indicates:  No Known Allergies    Family History    None       Tobacco Use    Smoking status: Unknown    Smokeless tobacco: Not on file   Substance and Sexual Activity    Alcohol use: Yes    Drug use: Never    Sexual activity: Not on file      Review of Systems   Unable to perform ROS: Intubated     Objective:     Vital Signs (Most Recent):  Temp: 97.4 °F (36.3 °C) (11/03/23 1101)  Pulse: 77 (11/03/23 1815)  Resp: 10 (11/03/23 1815)  BP: (!) 88/64 (11/03/23 1201)  SpO2: 100 % (11/03/23 1815) Vital Signs (24h Range):  Temp:  [97.4 °F (36.3 °C)-100.4 °F (38 °C)] 97.4 °F (36.3 °C)  Pulse:  [] 77  Resp:  [10-38] 10  SpO2:  [98 %-100 %] 100 %  BP: (88-91)/(58-71) 88/64  Arterial Line BP: (0-96)/(0-68) 90/51     Weight: (S) 76.5 kg (168 lb 10.4 oz)  Body mass index is 22.25 kg/m².      Intake/Output Summary (Last 24 hours) at 11/3/2023 1835  Last data filed at 11/3/2023 1815  Gross per 24 hour   Intake 5042.55 ml   Output 3952 ml   Net 1090.55 ml          Physical Exam  Vitals reviewed.   Constitutional:       General: He is not in acute distress.     Interventions: He is sedated and intubated.   Eyes:      Extraocular Movements: Extraocular movements intact.      Pupils: Pupils are equal, round, and reactive to light.   Neck:      Comments: RIJ CVC  LIJ CVC  Cardiovascular:      Rate and Rhythm: Normal rate and regular rhythm.      Pulses: Normal pulses.   Pulmonary:       Effort: No respiratory distress. He is intubated.   Abdominal:      General: Abdomen is flat.      Palpations: Abdomen is soft.   Musculoskeletal:      Right lower leg: No edema.      Left lower leg: No edema.   Skin:     General: Skin is warm and dry.      Capillary Refill: Capillary refill takes less than 2 seconds.      Comments: MSI clean, dry and intact   Neurological:      General: No focal deficit present.            Vents:  Vent Mode: A/C (11/03/23 1805)  Ventilator Initiated: Yes (11/03/23 1805)  Set Rate: 22 BPM (11/03/23 1805)  Vt Set: 500 mL (11/03/23 1805)  PEEP/CPAP: 5 cmH20 (11/03/23 1805)  Oxygen Concentration (%): 100 (11/03/23 1815)  Peak Airway Pressure: 22 cmH20 (11/03/23 1805)  Plateau Pressure: 0 cmH20 (11/03/23 1805)  Total Ve: 11.2 L/m (11/03/23 1805)  Negative Inspiratory Force (cm H2O): 0 (11/03/23 1805)  F/VT Ratio<105 (RSBI): (!) 43.82 (11/03/23 1805)    Lines/Drains/Airways       Central Venous Catheter Line  Duration             Introducer 11/03/23 1425 Subclavian Left <1 day    Percutaneous Central Line Insertion/Assessment - Triple Lumen  11/03/23 0251 Internal Jugular Right <1 day    Percutaneous Central Line Insertion/Assessment - Triple Lumen  11/03/23 1344 Internal Jugular Left <1 day              Drain  Duration                  Urethral Catheter 11/02/23 1030 1 day         Chest Tube 11/03/23 1723 Tube - 3 Right Pleural 19 Fr. <1 day         Y Chest Tube 1 and 2 11/03/23 1722 1 Left Mediastinal 19 Fr. 2 Right Mediastinal 19 Fr. <1 day              Airway  Duration                  Airway - Non-Surgical 11/02/23 0855 Endotracheal Tube 1 day              Arterial Line  Duration             Arterial Line 11/02/23 1031 Right Radial 1 day              Line  Duration                  IABP 11/02/23 1534 Other (Comment) 40 mL 1 day         Pacer Wires 11/03/23 1700 <1 day              Peripheral Intravenous Line  Duration                  Peripheral IV - Single Lumen 10/30/23 1924 20 G  Right Antecubital 3 days         Peripheral IV - Single Lumen 10/30/23 2047 20 G Anterior;Left Forearm 3 days         Peripheral IV - Single Lumen 10/31/23 1230 18 G Anterior;Distal;Right Forearm 3 days                    Significant Labs:    CBC/Anemia Profile:  Recent Labs   Lab 11/02/23  0341 11/02/23  0651 11/03/23  0306 11/03/23  1511 11/03/23  1624 11/03/23  1812 11/03/23  1815   WBC 23.60*  --  17.45*  --   --  37.84*  --    HGB 11.9*  --  10.1*  --   --  9.1*  --    HCT 34.5*   < > 30.5*   < > 28* 27.2* 26*   PLT 46*  --  94*  --   --  90*  --    MCV 89  --  92  --   --  93  --    RDW 14.2  --  14.6*  --   --  14.6*  --     < > = values in this interval not displayed.        Chemistries:  Recent Labs   Lab 11/02/23  0341 11/02/23  0803 11/03/23  0306     --  147*   K 3.2* 3.5 3.8     --  103   CO2 22*  --  28   BUN 42*  --  64*   CREATININE 1.3  --  1.7*   CALCIUM 7.5*  --  7.3*   ALBUMIN 2.2*  --  2.2*   PROT 5.9*  --  6.0   BILITOT 1.6*  --  1.3*   ALKPHOS 89  --  87   ALT 86*  --  68*   *  --  53*   MG 2.4  --  2.5   PHOS 1.6* 2.3* 2.6*       All pertinent labs within the past 24 hours have been reviewed.    Significant Imaging: I have reviewed all pertinent imaging results/findings within the past 24 hours.

## 2023-11-03 NOTE — ASSESSMENT & PLAN NOTE
TTE showed EF 40-45% with global hypokinesis and MV with flail posterior leaflet associated with severe regurgitation; mild AV prolapse with regurgitation also noted. IABP placed 11/02.    Plan for MVR today with CTS

## 2023-11-03 NOTE — ASSESSMENT & PLAN NOTE
Patient appeared to have new onset AF overnight 10/31-11/1. Rate controlled in 90's to low 100's. Otherwise remained hemodynamically stable off of pressors. Telemetry reviewed, noted to have irregularly irregular rhythm which is most likely consistent with AF. Likely due to acute illness. CHADSVASc 0, anticoagulation not indicated and contraindicated given thrombocytopenia and potential for hemorrhagic conversion if there are septic emboli present.  Rate-controlled    Cardiac telemetry  Consider antiarrythmic if persists post-operatively

## 2023-11-03 NOTE — ASSESSMENT & PLAN NOTE
Elevated Ast and ALT on presentation along with low platelets and low albumin. Likely liver dysfunction 2/2 Septic Shock. Acute hepatitis panel negative. No significant hepatic pathology found on CT A/P   Improving

## 2023-11-03 NOTE — PROGRESS NOTES
Pharmacokinetic Assessment Follow Up: IV Vancomycin    Vancomycin Regimen Assessment & Plan  - Vancomycin trough drawn this morning resulted as 30.6 mcg/mL.  - New FRANCESCO today with up trending SCr and supra therapeutic trough. Changing from scheduled vancomycin regimen to pulse dosing by levels.  - Draw vancomycin level with morning labs tomorrow. Pharmacy to re-dose as needed depending on level and renal function. Goal trough 15-20 mcg/mL.    Drug levels (last 3 results):  Recent Labs   Lab Result Units 11/01/23  1020 11/03/23  0854   Vancomycin-Trough ug/mL 8.9* 30.6*     Pharmacy will continue to follow and monitor vancomycin.    Please contact pharmacy at extension [phone] for questions regarding this assessment.    Thank you for the consult,   Ck Wilson     Patient brief summary:  Lorenzo Nino is a 48 y.o. male initiated on antimicrobial therapy with IV Vancomycin for treatment of bacteremia    Drug Allergies:   Review of patient's allergies indicates:  No Known Allergies    Actual Body Weight:   77.1 kg    Renal Function:   Estimated Creatinine Clearance: 56.9 mL/min (A) (based on SCr of 1.7 mg/dL (H)).,     Dialysis Method (if applicable):  N/A

## 2023-11-03 NOTE — ASSESSMENT & PLAN NOTE
sCr day of sx 1.7 in the setting of prior septic shock.     -- MAP >65  -- avoid nephrotoxic agents, renally dose medications

## 2023-11-03 NOTE — PROGRESS NOTES
11/03/2023  Gómez Pugh    Current provider:  Emerson Mercado MD    Device interrogation:       No data to display                   Rounded on Lorenzo Nino to ensure all mechanical assist device settings (IABP or VAD) were appropriate and all parameters were within limits.  I was able to ensure all back up equipment was present, the staff had no issues, and the Perfusion Department daily rounding was complete.      For implantable VADs: Interrogation of Ventricular assist device was performed with analysis of device parameters and review of device function. I have personally reviewed the interrogation findings and agree with findings as stated.     In emergency, the nursing units have been notified to contact the perfusion department either by:  Calling [phone] from 630am to 4pm Mon thru Fri, utilizing the On-Call Finder functionality of Epic and searching for Perfusion, or by contacting the hospital  from 4pm to 630am and on weekends and asking to speak with the perfusionist on call.    6:52 AM

## 2023-11-03 NOTE — INTERVAL H&P NOTE
The patient has been examined and the H&P has been reviewed:    I concur with the findings and changes have been noted since the H&P was written: Patient with acute decompensation over the last 48 hours requiring intubation for severe pulmonary edema. Repeat TTE demonstrated severe MR. Decision made to insert IABP yesterday, aggressive diuresis overnight, and proceed to the OR today for mitral valve replacement. Plan discussed with the patient's parents and they are in agreement.     Surgery risks, benefits and alternative options discussed and understood by patient/family.          Active Hospital Problems    Diagnosis  POA    *Endocarditis [I38]  Yes    Acute hypoxemic respiratory failure [J96.01]  Unknown    Atrial fibrillation [I48.91]  No    HFrEF (heart failure with reduced ejection fraction) [I50.20]  Yes    PNA (pneumonia) [J18.9]  Unknown    Elevated troponin [R79.89]  Yes    Septic shock [A41.9, R65.21]  Yes    FRANCESCO (acute kidney injury) [N17.9]  Yes    Thrombocytopenia [D69.6]  Yes    Elevated transaminase level [R74.01]  Yes      Resolved Hospital Problems   No resolved problems to display.

## 2023-11-03 NOTE — PROGRESS NOTES
Therapy with vancomycin complete and/or consult discontinued by provider.  Pharmacy will sign off, please re-consult as needed.      Shruti Rodney, PharmD, BCCCP  Neurocritical Care Clinical Pharmacist  Ext. [phone]

## 2023-11-03 NOTE — EICU
Intervention Initiated From:  Bedside    Beth intervened regarding:  Time-Out    Nurse Notified:  Yes    Doctor Notified:  No    Comments: Called into room for time out of central line placement by Dr. Morillo. See flowsheet.

## 2023-11-03 NOTE — ASSESSMENT & PLAN NOTE
Neuro/Psych:     - Sedation: propofol    - Pain:    - Scheduled Tylenol 1g q8h   - Fentanyl PRN while intubated, Oxy PRN             Cardiac:     - S/P MVR with Dr. Beal on 11/3/2023    - BP Goal: MAP 60-80, SBP <140    - IABP 1:1, will wean to 1:2 in AM for removal    - Cleviprex PRN    - Pressors: arrived on vaso 0.04, adding epi 0.04 for inotropic support    - Anti-HTNs: Will start when appropriate    - Rhythm: NSR    - Beta blocker: Will start when appropriate    - Statin: Atorvastatin 40 mg QD      Pulmonary:     - Goal SpO2 >92%    - Will wean ventilator support as tolerated to extubate    - Chest Tubes x 3 (2 Meds & 1 Pleural)    - ABGs PRN      Renal:    - Trend BUN/Cr     - Maintain Watson, record strict Is/Os    Recent Labs   Lab 11/01/23 0558 11/02/23 0341 11/03/23  0306   BUN 28* 42* 64*   CREATININE 1.1 1.3 1.7*         FEN / GI:     - Daily CMP, PRN K/Mag/Phos per protocol     - Replace electrolytes as needed    - Nutrition: NPO pending extubation    - Bowel Regimen: Miralax, docusate      ID:     - Afebrile    - WBC stable    - Abx: vanc x6 weeks. Clindamycin to mediate toxins    Recent Labs   Lab 11/01/23 0558 11/02/23 0341 11/03/23  0306   WBC 17.93* 23.60* 17.45*         Heme/Onc:     - Hgb 10.1 pre-operatively    - CBC daily    - ASA 325mg daily    Recent Labs   Lab 10/30/23  2021 10/30/23  2327 11/01/23  0558 11/02/23  0341 11/03/23  0306   HGB  --    < > 12.2* 11.9* 10.1*   PLT  --    < > 25* 46* 94*   APTT 44.4*  --   --   --   --    INR 1.0  --   --   --   --     < > = values in this interval not displayed.         Endocrine:     - CTS Goal -140    - HgbA1c: not on file    - Endocrinology consulted for insulin management      PPx:   Feeding: npo  Analgesia/Sedation: multimodal  Thromboembolic Prevention: SCDs  HOB >30: Yes  Stress Ulcer: pepcid  Glucose Control: Yes, insulin management per Endocrinology     Lines/Drains/Airway:   ETT   IABP   Left radial arterial line   RIJ  CVC   Trialysis   Left subclavian cordis   Watson   Chest Tubes: 3   Pacing Wires: Temporary ventricular pacing wires      Dispo/Code Status/Palliative:     - Continue SICU Care    - Full Code

## 2023-11-03 NOTE — PROCEDURES
"Lorenzo Nino is a 48 y.o. male patient.    Temp: (!) 100.4 °F (38 °C) (11/03/23 0301)  Pulse: (!) 200 (11/03/23 0500)  Resp: 20 (11/03/23 0500)  BP: 90/63 (11/02/23 1330)  SpO2: 100 % (11/03/23 0500)  Weight: 74.4 kg (164 lb) (11/02/23 0900)  Height: 6' 1" (185.4 cm) (11/02/23 1949)  Mallampati Scale:  (unable to assess. intubated already)  ASA Classification: Class 3    Central Line    Date/Time: 11/3/2023 6:14 AM    Performed by: Santiago Morillo MD  Authorized by: Santiago Morillo MD    Location procedure was performed:  [facility] CARDIOLOGY  Indications:  Med administration and vascular access  Anesthesia:  See MAR for details  Preparation:  Skin prepped with ChloraPrep  Location:  Right internal jugular  Catheter size:  7 Fr  Ultrasound guidance: Yes    Manometry: Yes    Number of attempts:  1  Securement:  Line sutured  XRay:  Placement verified by x-ray  Adverse Events:  None      11/3/2023    "

## 2023-11-03 NOTE — H&P
[facility]  Critical Care - Surgery  History & Physical    Patient Name: Lorenzo Nino  MRN: [id number]  Admission Date: 10/30/2023  Code Status: Full Code  Attending Physician: Manuel Beal MD   Primary Care Provider: Santos Caballero MD   Principal Problem: Endocarditis    Subjective:     HPI:  Mr. Nino is a 49 yo M with no past medical history admitted 10/30 for sepsis. Last Friday, 10/27, he developed headache, abdominal pain, and nausea. He presented to , who prescribed Tylenol and recommended he return if he continued to feel poorly. He presented to [facility] 10/30 with worsening abdominal pain, as well as fatigue, body aches, and hematuria. He was found to be tachycardic and hypotensive requiring vasopressor support. He was admitted to MICU for management of sepsis 2/2 PNA vs UTI vs meningitis (reported symptoms of neck stiffness). He was unable to undergo LP due to thrombocytopenia of 23. He is currently being evaluated for TTP. Further work up revealed MRSA bacteremia. He underwent TTE, which was notable for mitral valve vegetation with flail anterior leaflet, as well as possible aortic valve vegetation. CTS consulted for endocarditis.    The patient presents to the SICU s/p mitral valve repair with Dr. Beal on 11/03/2023. On admission, they are intubated, sedated with propofol, and in stable condition. Inotropic and vasopressor requirements upon admission 0.04 units/min of vasopressin and IABP 1:1 to maintain blood pressure at a MAP 60-80 and SBP < 140. Central access includes a RIJ CVC and LIJ CVC, arterial access includes a right radial arterial line. They also have 1 pleural and 2 mediastinal chest tubes with appropriate output.    Intraoperatively, they received 3L of crystalloid, 500cc of cell saver. Urine output intraoperatively was 750cc. The pre-operative echocardiogram was notable for EF 40%, normal RV function, severe MR . Post-operative echo was normal BiV  function, trace MR.    Immediate post-operative plans include hemodynamic stabilization and weaning of cardiac and respiratory support. Plan to wean vasopressors and inotropes as tolerated, wean ventilator support with goal of early extubation, and closely monitor fluid status with strict Is/Os and continued fluid resuscitation as needed.      Hospital/ICU Course:  No notes on file    Follow-up For: Procedure(s) (LRB):  REPAIR, MITRAL VALVE, OPEN (N/A)  EXCLUSION, LEFT ATRIAL APPENDAGE, OPEN, AS PART OF OPEN CHEST SURGERY (Left)    Post-Operative Day: Day of Surgery     History reviewed. No pertinent past medical history.    Past Surgical History:   Procedure Laterality Date    INSERTION OF INTRA-AORTIC BALLOON ASSIST DEVICE Right 11/2/2023    Procedure: INSERTION, INTRA-AORTIC BALLOON PUMP;  Surgeon: Jose Vidal MD;  Location: [facility] CATH LAB;  Service: Cardiology;  Laterality: Right;       Review of patient's allergies indicates:  No Known Allergies    Family History    None       Tobacco Use    Smoking status: Unknown    Smokeless tobacco: Not on file   Substance and Sexual Activity    Alcohol use: Yes    Drug use: Never    Sexual activity: Not on file      Review of Systems   Unable to perform ROS: Intubated     Objective:     Vital Signs (Most Recent):  Temp: 97.4 °F (36.3 °C) (11/03/23 1101)  Pulse: 77 (11/03/23 1815)  Resp: 10 (11/03/23 1815)  BP: (!) 88/64 (11/03/23 1201)  SpO2: 100 % (11/03/23 1815) Vital Signs (24h Range):  Temp:  [97.4 °F (36.3 °C)-100.4 °F (38 °C)] 97.4 °F (36.3 °C)  Pulse:  [] 77  Resp:  [10-38] 10  SpO2:  [98 %-100 %] 100 %  BP: (88-91)/(58-71) 88/64  Arterial Line BP: (0-96)/(0-68) 90/51     Weight: (S) 76.5 kg (168 lb 10.4 oz)  Body mass index is 22.25 kg/m².      Intake/Output Summary (Last 24 hours) at 11/3/2023 1835  Last data filed at 11/3/2023 1815  Gross per 24 hour   Intake 5042.55 ml   Output 3952 ml   Net 1090.55 ml          Physical Exam  Vitals reviewed.    Constitutional:       General: He is not in acute distress.     Interventions: He is sedated and intubated.   Eyes:      Extraocular Movements: Extraocular movements intact.      Pupils: Pupils are equal, round, and reactive to light.   Neck:      Comments: RI CVC  [facility] CVC  Cardiovascular:      Rate and Rhythm: Normal rate and regular rhythm.      Pulses: Normal pulses.   Pulmonary:      Effort: No respiratory distress. He is intubated.   Abdominal:      General: Abdomen is flat.      Palpations: Abdomen is soft.   Musculoskeletal:      Right lower leg: No edema.      Left lower leg: No edema.   Skin:     General: Skin is warm and dry.      Capillary Refill: Capillary refill takes less than 2 seconds.      Comments: MSI clean, dry and intact   Neurological:      General: No focal deficit present.            Vents:  Vent Mode: A/C (11/03/23 1805)  Ventilator Initiated: Yes (11/03/23 1805)  Set Rate: 22 BPM (11/03/23 1805)  Vt Set: 500 mL (11/03/23 1805)  PEEP/CPAP: 5 cmH20 (11/03/23 1805)  Oxygen Concentration (%): 100 (11/03/23 1815)  Peak Airway Pressure: 22 cmH20 (11/03/23 1805)  Plateau Pressure: 0 cmH20 (11/03/23 1805)  Total Ve: 11.2 L/m (11/03/23 1805)  Negative Inspiratory Force (cm H2O): 0 (11/03/23 1805)  F/VT Ratio<105 (RSBI): (!) 43.82 (11/03/23 1805)    Lines/Drains/Airways       Central Venous Catheter Line  Duration             Introducer 11/03/23 1425 Subclavian Left <1 day    Percutaneous Central Line Insertion/Assessment - Triple Lumen  11/03/23 0251 Internal Jugular Right <1 day    Percutaneous Central Line Insertion/Assessment - Triple Lumen  11/03/23 1344 Internal Jugular Left <1 day              Drain  Duration                  Urethral Catheter 11/02/23 1030 1 day         Chest Tube 11/03/23 1723 Tube - 3 Right Pleural 19 Fr. <1 day         Y Chest Tube 1 and 2 11/03/23 1722 1 Left Mediastinal 19 Fr. 2 Right Mediastinal 19 Fr. <1 day              Airway  Duration                  Airway -  Non-Surgical 11/02/23 0855 Endotracheal Tube 1 day              Arterial Line  Duration             Arterial Line 11/02/23 1031 Right Radial 1 day              Line  Duration                  IABP 11/02/23 1534 Other (Comment) 40 mL 1 day         Pacer Wires 11/03/23 1700 <1 day              Peripheral Intravenous Line  Duration                  Peripheral IV - Single Lumen 10/30/23 1924 20 G Right Antecubital 3 days         Peripheral IV - Single Lumen 10/30/23 2047 20 G Anterior;Left Forearm 3 days         Peripheral IV - Single Lumen 10/31/23 1230 18 G Anterior;Distal;Right Forearm 3 days                    Significant Labs:    CBC/Anemia Profile:  Recent Labs   Lab 11/02/23  0341 11/02/23  0651 11/03/23  0306 11/03/23  1511 11/03/23  1624 11/03/23  1812 11/03/23  1815   WBC 23.60*  --  17.45*  --   --  37.84*  --    HGB 11.9*  --  10.1*  --   --  9.1*  --    HCT 34.5*   < > 30.5*   < > 28* 27.2* 26*   PLT 46*  --  94*  --   --  90*  --    MCV 89  --  92  --   --  93  --    RDW 14.2  --  14.6*  --   --  14.6*  --     < > = values in this interval not displayed.        Chemistries:  Recent Labs   Lab 11/02/23  0341 11/02/23  0803 11/03/23  0306     --  147*   K 3.2* 3.5 3.8     --  103   CO2 22*  --  28   BUN 42*  --  64*   CREATININE 1.3  --  1.7*   CALCIUM 7.5*  --  7.3*   ALBUMIN 2.2*  --  2.2*   PROT 5.9*  --  6.0   BILITOT 1.6*  --  1.3*   ALKPHOS 89  --  87   ALT 86*  --  68*   *  --  53*   MG 2.4  --  2.5   PHOS 1.6* 2.3* 2.6*       All pertinent labs within the past 24 hours have been reviewed.    Significant Imaging: I have reviewed all pertinent imaging results/findings within the past 24 hours.  Assessment/Plan:     Cardiac/Vascular  * Endocarditis    Neuro/Psych:     - Sedation: propofol    - Pain:    - Scheduled Tylenol 1g q8h   - Fentanyl PRN while intubated, Oxy PRN             Cardiac:     - S/P MVR with Dr. Beal on 11/3/2023    - BP Goal: MAP 60-80, SBP <140    - IABP 1:1,  will wean to 1:2 in AM for removal    - Cleviprex PRN    - Pressors: arrived on vaso 0.04, adding epi 0.04 for inotropic support    - Anti-HTNs: Will start when appropriate    - Rhythm: NSR    - Beta blocker: Will start when appropriate    - Statin: Atorvastatin 40 mg QD      Pulmonary:     - Goal SpO2 >92%    - Will wean ventilator support as tolerated to extubate    - Chest Tubes x 3 (2 Meds & 1 Pleural)    - ABGs PRN      Renal:    - Trend BUN/Cr     - Maintain Watson, record strict Is/Os    Recent Labs   Lab 11/01/23  0558 11/02/23  0341 11/03/23  0306   BUN 28* 42* 64*   CREATININE 1.1 1.3 1.7*         FEN / GI:     - Daily CMP, PRN K/Mag/Phos per protocol     - Replace electrolytes as needed    - Nutrition: NPO pending extubation    - Bowel Regimen: Miralax, docusate      ID:     - Afebrile    - WBC stable    - Abx: vanc x6 weeks. Clindamycin to mediate toxins    Recent Labs   Lab 11/01/23  0558 11/02/23  0341 11/03/23  0306   WBC 17.93* 23.60* 17.45*         Heme/Onc:     - Hgb 10.1 pre-operatively    - CBC daily    - ASA 325mg daily    Recent Labs   Lab 10/30/23  2021 10/30/23  2327 11/01/23  0558 11/02/23  0341 11/03/23  0306   HGB  --    < > 12.2* 11.9* 10.1*   PLT  --    < > 25* 46* 94*   APTT 44.4*  --   --   --   --    INR 1.0  --   --   --   --     < > = values in this interval not displayed.         Endocrine:     - CTS Goal -140    - HgbA1c: not on file    - Endocrinology consulted for insulin management      PPx:   Feeding: npo  Analgesia/Sedation: multimodal  Thromboembolic Prevention: SCDs  HOB >30: Yes  Stress Ulcer: pepcid  Glucose Control: Yes, insulin management per Endocrinology     Lines/Drains/Airway:   ETT   IABP   Left radial arterial line   RIJ CVC   Trialysis   Left subclavian cordis   Watson   Chest Tubes: 3   Pacing Wires: Temporary ventricular pacing wires      Dispo/Code Status/Palliative:     - Continue SICU Care    - Full Code     Heart failure with mid-range ejection fraction  (HFmEF)  11/2 TTE with EF 40%. Arrived on IABP 1:1, will start epi to removal IABP in the AM    Renal/  FRANCESCO (acute kidney injury)  sCr day of sx 1.7 in the setting of prior septic shock.     -- MAP >65  -- avoid nephrotoxic agents, renally dose medications      ID  MRSA bacteremia  11/3 growing MRSA in bld cx x2. ID following.     -- 6 weeks vanc from operation.   -- will need PICC        Critical care was time spent personally by me on the following activities: development of treatment plan with patient or surrogate and bedside caregivers, discussions with consultants, evaluation of patient's response to treatment, examination of patient, ordering and performing treatments and interventions, ordering and review of laboratory studies, ordering and review of radiographic studies, pulse oximetry, re-evaluation of patient's condition.  This critical care time did not overlap with that of any other provider or involve time for any procedures.     Mendy Duke NP  Critical Care - Surgery  [facility]

## 2023-11-03 NOTE — PLAN OF CARE
Vital Signs:   Temp: 98.8 °F (37.1 °C) (11/02 1600)  Pulse: 101 (11/02 1900)  Resp: 20 (11/02 1900)  BP: 90/63 (11/02 1330)  SpO2: 99 % (11/02 1900)  Arterial Line BP: 82/56 (11/02 1900)        Neuro: Sedated. Opens eye to stimulation. PERRL. Tmax 36.4 Repositioned q2hr.     Cardiac: Afib . MAP>65, levophed. IABP 1:1, R groin, CDI. P2/2. No edema.     Respiratory: AC -10-50%. Lungs dim.     Gtts: Levophed, Lasix, Fentanyl     Urine Output/Last BM: Watson, UOP adequate. No BM this shift. Last Bowel Movement: 11/01/23      Diet:  NPO. OGT LCS.     Labs/Accuchecks: BG q6hr.     Skin: BLE petechia.      Plan: Maintain MAP>65. Diuresis. MV replacement tomorrow.     Problem: Infection  Goal: Absence of Infection Signs and Symptoms  Outcome: Ongoing, Not Progressing     Problem: Adult Inpatient Plan of Care  Goal: Readiness for Transition of Care  Outcome: Ongoing, Not Progressing     Problem: Adjustment to Illness (Sepsis/Septic Shock)  Goal: Optimal Coping  Outcome: Ongoing, Not Progressing     Problem: Glycemic Control Impaired (Sepsis/Septic Shock)  Goal: Blood Glucose Level Within Desired Range  Outcome: Ongoing, Not Progressing     Problem: Infection Progression (Sepsis/Septic Shock)  Goal: Absence of Infection Signs and Symptoms  Outcome: Ongoing, Not Progressing     Problem: Nutrition Impaired (Sepsis/Septic Shock)  Goal: Optimal Nutrition Intake  Outcome: Ongoing, Not Progressing     Problem: Oral Intake Inadequate (Acute Kidney Injury/Impairment)  Goal: Optimal Nutrition Intake  Outcome: Ongoing, Not Progressing     Problem: Infection (Pneumonia)  Goal: Resolution of Infection Signs and Symptoms  Outcome: Ongoing, Not Progressing     Problem: Respiratory Compromise (Pneumonia)  Goal: Effective Oxygenation and Ventilation  Outcome: Ongoing, Not Progressing     Problem: Communication Impairment (Mechanical Ventilation, Invasive)  Goal: Effective Communication  Outcome: Ongoing, Not Progressing      Problem: Inability to Wean (Mechanical Ventilation, Invasive)  Goal: Mechanical Ventilation Liberation  Outcome: Ongoing, Not Progressing     Problem: Nutrition Impairment (Mechanical Ventilation, Invasive)  Goal: Optimal Nutrition Delivery  Outcome: Ongoing, Not Progressing     Problem: Communication Impairment (Artificial Airway)  Goal: Effective Communication  Outcome: Ongoing, Not Progressing     Problem: Adult Inpatient Plan of Care  Goal: Plan of Care Review  Outcome: Ongoing, Progressing  Goal: Patient-Specific Goal (Individualized)  Outcome: Ongoing, Progressing  Goal: Absence of Hospital-Acquired Illness or Injury  Outcome: Ongoing, Progressing  Goal: Optimal Comfort and Wellbeing  Outcome: Ongoing, Progressing     Problem: Bleeding (Sepsis/Septic Shock)  Goal: Absence of Bleeding  Outcome: Ongoing, Progressing     Problem: Fluid and Electrolyte Imbalance (Acute Kidney Injury/Impairment)  Goal: Fluid and Electrolyte Balance  Outcome: Ongoing, Progressing     Problem: Renal Function Impairment (Acute Kidney Injury/Impairment)  Goal: Effective Renal Function  Outcome: Ongoing, Progressing     Problem: Skin Injury Risk Increased  Goal: Skin Health and Integrity  Outcome: Ongoing, Progressing     Problem: Fall Injury Risk  Goal: Absence of Fall and Fall-Related Injury  Outcome: Ongoing, Progressing     Problem: Fluid Imbalance (Pneumonia)  Goal: Fluid Balance  Outcome: Ongoing, Progressing     Problem: Restraint, Nonbehavioral (Nonviolent)  Goal: Absence of Harm or Injury  Outcome: Ongoing, Progressing     Problem: Device-Related Complication Risk (Mechanical Ventilation, Invasive)  Goal: Optimal Device Function  Outcome: Ongoing, Progressing     Problem: Skin and Tissue Injury (Mechanical Ventilation, Invasive)  Goal: Absence of Device-Related Skin and Tissue Injury  Outcome: Ongoing, Progressing     Problem: Ventilator-Induced Lung Injury (Mechanical Ventilation, Invasive)  Goal: Absence of  Ventilator-Induced Lung Injury  Outcome: Ongoing, Progressing     Problem: Device-Related Complication Risk (Artificial Airway)  Goal: Optimal Device Function  Outcome: Ongoing, Progressing     Problem: Skin and Tissue Injury (Artificial Airway)  Goal: Absence of Device-Related Skin or Tissue Injury  Outcome: Ongoing, Progressing

## 2023-11-03 NOTE — ASSESSMENT & PLAN NOTE
49 yo M with no past medical history admitted 10/30 with sepsis 2/2 MRSA bacteremia requiring vasopressor support. Troponin 18.298 < 19.814 < 27.88. Admit EKG EKG sinus rhythm, low voltage.   No hx of IVDU, UDS negative   No hx of dental abscess, recent intervention  No significant family cardiac history.   Initial TTE notable for mitral valve vegetation with flail anterior leaflet with severe regurgitation.   Per HTS, it is possible this is related to myocarditis, there are other likely explanations for his echo findings and elevated troponin, and even if he were to have myocarditis, there would be no interventions to offer regardless.   Acute respiratory distress 2/2 severe pulmonary edema 11/01-11/02 ultimately requiring intubation.   Repeat NEW with severe MR  IABP 1:1 placed 11/02 for hemodynamic support and aggressive diuresis recommended per CTS      Mitral valve replacement today  Continue diuresis with Lasix gtt 20 mg/hr  Continue IV Vanc/Clinda per ID recs

## 2023-11-03 NOTE — PROGRESS NOTES
Autotransfusion/Rapid Infusion Record:      11/03/2023  Autotransfusionist:  Ayla Ramos    Surgeon(s) and Role:     * Manuel Beal MD - Primary     * Ghulam Lehman MD - Fellow  Anesthesiologist:  Vipul Garibay MD    History reviewed. No pertinent past medical history.    Procedure(s) (LRB):  REPAIR, MITRAL VALVE, OPEN (N/A)  EXCLUSION, LEFT ATRIAL APPENDAGE, OPEN, AS PART OF OPEN CHEST SURGERY (Left)     5:36 PM    Equipment:    Cell Saver     R.I.S.  : [place] Model: CATSmart or CATSplus : [place]   Model: [id number]     Serial number: [id number]   Serial number: 0   Disposable lot #: [id number]   Disposable lot #: [id number]     Were extra cardiotomies used for cell saver?  N   if yes, #:  0    Solutions:  Anticoagulant: ACD-A   Expiration date: 4/24 Volume used: 600   Wash solution: 0.9% NaCl   Expiration date: 8/25 Volume used: 4738     Cell saver checklist  Time completed:           [x]   Circuit assembled correctly     [x]   Cell saver powered and operational     [x]   Vacuum connected, functional, adjust to max -150mmHg     [x]   Anticoagulant drip rate adjusted     [x]   Transfer bag properly labeled with patient name, expiration time, volume,       anticoagulant, OR number, and initials     [x]   Cell saver disinfected after use (completed at end of case)       Cell Saver volumes:    Total volume processed:     4134 mL     Total volume pRBCs recovered     495 mL     Volume pRBCs infused     495 mL         RIS checklist   Time completed:  []   RIS circuit assembled correctly     []   RIS power and operational     []   RIS disinfected after use (completed at end of case)       RIS volumes:    Total volume infused:    (see anesthesia record for blood       product information)   0 mL       Additional comments:

## 2023-11-03 NOTE — ANESTHESIA PROCEDURE NOTES
L Subclavian 9Fr Cordis    Diagnosis: Mitral Regurgitation  Patient location during procedure: done in OR    Staffing  Authorizing Provider: Vipul Garibay MD  Performing Provider: Dnenis Wu MD    Staffing  Performed: resident/CRNA   Performed by: Dennis Wu MD  Authorized by: Vipul Garibay MD    Anesthesiologist was present at the time of the procedure.  Preanesthetic Checklist  Completed: patient identified, IV checked, site marked, risks and benefits discussed, surgical consent, monitors and equipment checked, pre-op evaluation, timeout performed and anesthesia consent given  Indication   Indication: hemodynamic monitoring, vascular access, med administration     Anesthesia   general anesthesia    Central Line   Skin Prep: skin prepped with ChloraPrep, skin prep agent completely dried prior to procedure  Sterile Barriers Followed: Yes    All five maximal barriers used- gloves, gown, cap, mask, and large sterile sheet    hand hygiene performed prior to central venous catheter insertion  Location: left subclavian.   Catheter type: introducer  Catheter Size: 9 Fr  Ultrasound: vascular probe with ultrasound   Vessel Caliber: large, patent, compressibility normal  Needle advanced into vessel with real time Ultrasound guidance.  Guidewire confirmed in vessel.  sterile gel and probe cover used in ultrasound-guided central venous catheter insertion  Manometry: none (placement confirmed by manometry)  Insertion Attempts: 1   Securement:line sutured, chlorhexidine patch, sterile dressing applied and blood return through all ports    Post-Procedure    Adverse Events:none      Guidewire Guidewire removed intact.

## 2023-11-03 NOTE — ASSESSMENT & PLAN NOTE
Patient with Hypoxic Respiratory failure which is Acute. Supplemental oxygen was provided and noted- Vent Mode: A/C  Oxygen Concentration (%):  [40-50] 40  Resp Rate Total:  [20 br/min-24 br/min] 20 br/min  Vt Set:  [480 mL] 480 mL  PEEP/CPAP:  [10 cmH20] 10 cmH20  Mean Airway Pressure:  [14 cyH71-33 cmH20] 14 cmH20    Continue mechanical ventilation  Lasix gtt

## 2023-11-03 NOTE — ASSESSMENT & PLAN NOTE
Cr 1.6 on admission, thought to be 2/2 volume depletion. Improved after fluid repletion.  Cr 1.7     monitor CMP  I/Os

## 2023-11-03 NOTE — ASSESSMENT & PLAN NOTE
Pt presenting with low platelets (23) in setting of acute liver injury likely secondary to shock. Hematology evaluated for TTP, but [id number] wnl. No s/s of active bleed.  PT 11.0, INR 1.0, Rogelio negative, haptoglobin 82, fibrinogen 489, aPTT 44.4  S/p 2u FFP with appropriate correction  Plt 94 today    MVR with CTS today  Monitor CBC  Transfuse as clinically indicated

## 2023-11-03 NOTE — ASSESSMENT & PLAN NOTE
TTE 11/02/23:    Left Ventricle: The left ventricle is normal in size. Ventricular mass is normal. Normal wall thickness. Global hypokinesis present. There is mildly reduced systolic function with a visually estimated ejection fraction of 40 - 45%.    Right Ventricle: Normal right ventricular cavity size. Wall thickness is normal. Right ventricle wall motion  is normal. Systolic function is normal.    Left Atrium: Left atrium is severely dilated.    Right Atrium: Right atrium is dilated.    Aortic Valve: RCC is thickended with mild prolapse. Cannot exclude RCC vegetation. The remaining leaflets have normal mobility. There is trace aortic regurgitation.    Mitral Valve: Both leaflets are thickened.Cannot exclude anterior leaflet vegetation coating this leaflet. There is prolapse of the posterior mitral leaflet. Flail posterior leaflet. There is a large mobile echogenic mass present on the posterior leaflet consistent with vegetation. The mean pressure gradient across the mitral valve is 6 mmHg at a heart rate of 124 bpm. There is severe regurgitation with an anteromedial eccentrically directed jet.    Tricuspid Valve: There is mild regurgitation.    Pulmonary Artery: The estimated pulmonary artery systolic pressure is 31 mmHg.    IVC/SVC: Normal venous pressure at 3 mmHg.    Continue Lasix gtt

## 2023-11-03 NOTE — ASSESSMENT & PLAN NOTE
CXR significant for perihilar interstitial opacities compatible with multifocal pneumonia vs pulmonary edema. Initially treated for comm-acquired pneumonia with vanc, ceftriaxone, and azithromycin. Respiratory viral panel was negative. Presentation more c/w severe pulmonary edema 2/2 mitral regurg

## 2023-11-03 NOTE — ANESTHESIA PROCEDURE NOTES
LIJ 13Fr Trialysis    Diagnosis: Acute MR  Patient location during procedure: done in OR    Staffing  Authorizing Provider: Vipul Garibay MD  Performing Provider: Naresh Hester MD    Staffing  Performed: resident/CRNA   Performed by: Naresh Hester MD  Authorized by: Vipul Garibay MD    Anesthesiologist was present at the time of the procedure.  Preanesthetic Checklist  Completed: patient identified, IV checked, site marked, risks and benefits discussed, surgical consent, monitors and equipment checked, pre-op evaluation, timeout performed and anesthesia consent given  Indication   Indication: hemodynamic monitoring, vascular access, med administration, hemodialysis     Anesthesia   general anesthesia    Central Line   Skin Prep: skin prepped with ChloraPrep, skin prep agent completely dried prior to procedure  Sterile Barriers Followed: Yes    All five maximal barriers used- gloves, gown, cap, mask, and large sterile sheet    hand hygiene performed prior to central venous catheter insertion  Location: left internal jugular.   Catheter type: triple lumen  Catheter Size: 12 Fr (13Fr)  Inserted Catheter Length: 25 cm  Ultrasound: vascular probe with ultrasound   Vessel Caliber: medium, patent  Vascular Doppler:  not done, compressibility normal  Needle advanced into vessel with real time Ultrasound guidance.  Guidewire confirmed in vessel.  Image recorded and saved.  sterile gel and probe cover used in ultrasound-guided central venous catheter insertion  Manometry: none (Wire visualized in RA on NEW)  Insertion Attempts: 1   Securement:line sutured    Post-Procedure    Adverse Events:none      Guidewire  Guidewire removed intact, verified with nurse.

## 2023-11-03 NOTE — ANESTHESIA PROCEDURE NOTES
NEW    Diagnosis: endocarditis  Patient location during procedure: OR  Exam type: Baseline    Staffing  Performed: anesthesiologist and fellow     Anesthesiologist: Vipul Garibay MD        Anesthesiologist Present  Yes      Setup & Induction  Patient preparation: bite block inserted  Probe Insertion: easy  Exam: completeDoppler Echo: 2D, continuous wave Doppler, 3D, color flow mapping and pulse wave Doppler.  Exam     Left Heart  Left Atruim: dilated and severly enlarged (men >5.2; women >4.7)  Left appendage velocity:50    Left Ventricle: 5.4 cm  LV Wall Thickness (posterior wall):cm, normal (men 0.6-1.0 cm; women 0.6-0.9 cm)    LVAD:no  Estimated Ejection Fraction: > 55% normal  Regional Wall Abnormalities: no RWMA            Right Heart  Right Ventricle: normal  Right Ventricle Function: normal  Right Atria:  normal    Intra Atrial Septum  PFO: no shunt by color flow doppler  no IAS aneurysm  no lipomatous hypertrophy  no Atrial Septal Defect (Asd)    Right Ventricle  Size: normal, Free Wall Thickness: normal    Aortic Valve:  Stenosis: none.  Morphology: trileaflet    Regurgitation: no aortic valve regurgitation      Mitral Valve:   Morphology:endocarditis  Prolapse: none  Flail: P2  Jet Description: severe and eccentricStenosis: no significant stenosis.    Tricuspid Valve:  Morphology: normal    Pulmonic Valve:  Morphology:normal  Regurgitation(color flow): none    Great Vessels  Ascending Aorta Atherosclerosis: 1=nl-min dz  Aortic Arch Atherosclerosis: 1=nl-min dz  IABP: no  Descending Aorta Atherosclerosis: 1=nl-min dz  Aorta    Descending aorta IABP: no    Effusions none    SummaryFindings discussed with surgeon.    Other Findings   Intraoperative NEW completed for Dr. Beal  Pre:  -Normal Biventricular function  -No RWMA  -Normal AV without vegetative disease.  No AI or AS.  -Severe MR with P2 prolapse and an anterior directed jet.   -MV posterior leaflet thickened consistent with vegetation  -MV  anterior leaflet slightly thickened. Vegetative disease cannot be ruled out.  -TV and PV structurally normal   -Mild TR  -No PFO by CFD  -No significant aortic pathology  -No clot appreciated in the ROMAN  -No effusions     Post:  -Normal biventricular systolic function  -MV s/p repair with bovine band and P2 resection  -No MR, no MS  -ZAK tendency when hyperdynamic and hypovolemic, otherwise normal    Chest closed without hemodynamic instability   Probe removed atraumatically

## 2023-11-03 NOTE — BRIEF OP NOTE
[facility] - [facility] ([facility])  Cardiothoracic Surgery  Operative Note    SUMMARY     Date of Procedure: 11/3/2023     Procedure: Procedure(s) (LRB):    1)  REPAIR, MITRAL VALVE, with triangular resection of P2 and placement of a freehand-created bovine pericardial annuloplasty band using a 40 mm Medtronic Simuplus sizer as a template  53075    2)  EXCLUSION, LEFT ATRIAL APPENDAGE, OPEN, AS PART OF OPEN CHEST SURGERY 44036    Surgeon(s) and Role:     * Herberth Bennett MD - Primary     * Ghulam Lehman MD - Fellow    Assisting Surgeon: None    Pre-Operative Diagnosis: Endocarditis, unspecified chronicity, unspecified endocarditis type [I38]  Mitral valve insufficiency, unspecified etiology [I34.0]    Post-Operative Diagnosis: Post-Op Diagnosis Codes:     * Endocarditis, unspecified chronicity, unspecified endocarditis type [I38]     * Mitral valve insufficiency, unspecified etiology [I34.0]    Anesthesia: General    Operative Findings (including complications, if any): Flail P2 with multiple broken chords, vegetations, and exudate on leaflet.    Estimated Blood Loss (EBL): 100 mL           Implants:   Implant Name Type Inv. Item Serial No.  Lot No. LRB No. Used Action   PATCH CELINE-GUARD 6CM X 8CM - [id number]  PATCH CELINE-GUARD 6CM X 8CM [id number] [employer] [id number] N/A 1 Implanted       Specimens:   Specimen (24h ago, onward)       Start     Ordered    11/03/23 1633  Specimen to Pathology, Surgery Cardiovascular  Once        Comments: Pre-op Diagnosis: Endocarditis, unspecified chronicity, unspecified endocarditis type [I38]Mitral valve insufficiency, unspecified etiology [I34.0]Procedure(s):REPAIR, MITRAL VALVE, OPEN Number of specimens: 1Name of specimens: 1).  Left Atrial Appendage - Permanent     References:    Click here for ordering Quick Tip   Question Answer Comment   Procedure Type: Cardiovascular    Which provider would you like to cc? HERBERTH BENNETT    Release to  patient Immediate        11/03/23 0013                   Attestation:  I was present and scrubbed for the procedure.

## 2023-11-03 NOTE — PLAN OF CARE
Cardiac ICU Care Plan    POC reviewed with Lorenzo Nino and family. Questions and concerns addressed. No acute events today. Pt progressing toward goals. Will continue to monitor. See below and flowsheets for full assessment and VS info.       Neuro:  Clay Coma Scale  Best Eye Response: 2-->(E2) to pain  Best Motor Response: 4-->(M4) withdraws from pain  Best Verbal Response: 1-->(V1) none  Clay Coma Scale Score: 7  Assessment Qualifiers: patient chemically sedated or paralyzed, patient intubated  Pupil PERRLA: yes    24 hr Temp:  [97.6 °F (36.4 °C)-100.4 °F (38 °C)]      CV:  Rhythm: atrial rhythm   DVT prophylaxis: VTE Required Core Measure: Pharmacological prophylaxis initiated/maintained    CVP (mean): (S) 15 mmHg (11/03/23 0401)      IABP Mode: Auto  IABP Rate: 1:1  IABP Trigger: ECG  IABP Augmented Diastolic Pressure: 75          Pulses  Right Radial Pulse: 2+ (normal)  Left Radial Pulse: 2+ (normal)  Right Dorsalis Pedis Pulse: 2+ (normal)  Left Dorsalis Pedis Pulse: 2+ (normal)  Right Posterior Tibial Pulse: 2+ (normal)  Left Posterior Tibial Pulse: 2+ (normal)    Resp:  Flow (L/min): 15  Vent Mode: A/C  Set Rate: 20 BPM  Oxygen Concentration (%): 50  Vt Set: 480 mL  PEEP/CPAP: 10 cmH20    GI/:  GI prophylaxis: no  Diet/Nutrition Received: NPO  Last Bowel Movement: 11/01/23  Voiding Characteristics: urethral catheter (bladder)       Urethral Catheter 11/02/23 1030-Reason for Continuing Urinary Catheterization: Critically ill in ICU and requiring hourly monitoring of intake/output   Intake/Output Summary (Last 24 hours) at 11/3/2023 0742  Last data filed at 11/3/2023 0701  Gross per 24 hour   Intake 1383.02 ml   Output 1800 ml   Net -416.98 ml        Nutritional Supplement Intake: Quantity None, Type:  None    Labs/Accuchecks:  Recent Labs   Lab 11/01/23  0558 11/02/23  0100 11/02/23  0341 11/02/23  0651 11/03/23  0306   WBC 17.93*  --  23.60*  --  17.45*   RBC 3.99*  --  3.86*  --  3.32*   HGB  12.2*  --  11.9*  --  10.1*   HCT 34.7*   < > 34.5* 35* 30.5*   PLT 25*  --  46*  --  94*    < > = values in this interval not displayed.      Recent Labs   Lab 10/30/23  2021   INR 1.0   APTT 44.4*      Recent Labs     11/03/23  0306   *   K 3.8   CO2 28      BUN 64*   CREATININE 1.7*   ALKPHOS 87   ALT 68*   AST 53*   BILITOT 1.3*       Recent Labs   Lab 10/30/23  1923 10/30/23  2327 10/31/23  0019 10/31/23  0514 10/31/23  1201 10/31/23  1830 10/31/23  2356 11/02/23  0803   CPK 1406*  --  1977* 2363*  --   --   --   --    TROPONINI  --    < >  --  19.814*   < > 29.120* 16.875* 5.857*    < > = values in this interval not displayed.      Recent Labs     11/02/23  0651 11/02/23  1042 11/03/23  0459   PH 7.481* 7.372 7.428   PCO2 39.6 33.6* 51.0*   PO2 73* 90 43   HCO3 29.6* 19.5* 33.7*   POCSATURATED 95 97 79   BE 6* -6* 9*       Electrolytes: Electrolytes replaced  Accuchecks: ACHS    Gtts/LDAs:   fentanyl 150 mcg/hr (11/03/23 0701)    furosemide (LASIX) 500 mg in 50 mL infusion (conc: 10 mg/mL) 20 mg/hr (11/03/23 0701)    NORepinephrine bitartrate-D5W 0.02 mcg/kg/min (11/03/23 0701)       Lines/Drains/Airways       Central Venous Catheter Line  Duration             Percutaneous Central Line Insertion/Assessment - Triple Lumen  11/03/23 0251 Internal Jugular Right <1 day              Drain  Duration                  Urethral Catheter 11/02/23 1030 <1 day              Airway  Duration                  Airway - Non-Surgical 11/02/23 0855 Endotracheal Tube <1 day              Arterial Line  Duration             Arterial Line 11/02/23 1031 Right Radial <1 day              Line  Duration                  IABP 11/02/23 1534 Other (Comment) 40 mL <1 day              Peripheral Intravenous Line  Duration                  Peripheral IV - Single Lumen 10/30/23 1924 20 G Right Antecubital 3 days         Peripheral IV - Single Lumen 10/30/23 2047 20 G Anterior;Left Forearm 3 days         Peripheral IV - Single Lumen  10/31/23 1230 18 G Anterior;Distal;Right Forearm 2 days                    Skin/Wounds  Bathing/Skin Care: (S) bath, complete;dressed/undressed;incontinence care;linen changed (11/03/23 0601)    Wounds: No  Wound care consulted: No  Problem: Adult Inpatient Plan of Care  Goal: Plan of Care Review  Outcome: Ongoing, Progressing  Goal: Absence of Hospital-Acquired Illness or Injury  Outcome: Ongoing, Progressing  Goal: Optimal Comfort and Wellbeing  Outcome: Ongoing, Progressing

## 2023-11-03 NOTE — PROGRESS NOTES
[facility] - [facility] ([facility])  Cardiology  Progress Note    Patient Name: Lorenzo Nino  MRN: [id number]  Admission Date: 10/30/2023  Hospital Length of Stay: 4 days  Code Status: Full Code   Attending Physician: Emerson Mercado MD   Primary Care Physician: Santos Caballero MD  Expected Discharge Date: 11/7/2023  Principal Problem:Endocarditis    Subjective:     Hospital Course:   Patient admitted to CCU for endocarditis in setting of MRSA bacteremia. Underwent TTE, which revealed mitral valve vegetation with flail anterior leaflet, as well as possible aortic valve vegetation. EKG sinus rhythm, low voltage. Troponin elevated to 27.8. Patient was admitted to MICU and treated for septic shock 2/2 mitral valve and aortic valve endocarditis. Pt decompensated with increasing oxygen requirements. Intubated on 11/2/23 and admitted to CCU.  Repeat TTE showed mild AV prolapse with regurgitation and MV with flail posterior leaflet and severe regurgitation. IABP inserted 11/02 with plan for MVR today per CTS, likely transfer to SICU following procedure.     Interval History: NAEON. Remains in afib, HR 80s-110s. Febrile to Tmax 100.4 overnight, remains on IV Vanc/Clinda for MRSA bacteremia/endocarditis. Intubated and sedated on Fentanyl 150 mcg/hr. Initially requiring Levo for pressor support, stopped this afternoon. Continuing Lasix gtt at 20 mg/hr, UoP 1.05 L in last 24 hrs, net -2.43 L since admit; CVP 9. IABP 1:1, plan for MVR today per CTS.       Objective:     Vital Signs (Most Recent):  Temp: 97.4 °F (36.3 °C) (11/03/23 1101)  Pulse: 84 (11/03/23 1201)  Resp: 20 (11/03/23 1201)  BP: (!) 88/64 (11/03/23 1201)  SpO2: 99 % (11/03/23 1201) Vital Signs (24h Range):  Temp:  [97.4 °F (36.3 °C)-100.4 °F (38 °C)] 97.4 °F (36.3 °C)  Pulse:  [] 84  Resp:  [13-38] 20  SpO2:  [86 %-100 %] 99 %  BP: (88-91)/(58-71) 88/64  Arterial Line BP: (0-105)/(0-71) 84/60     Weight: (S) 76.5 kg (168 lb 10.4 oz)  Body mass index is  22.25 kg/m².     SpO2: 99 %         Intake/Output Summary (Last 24 hours) at 11/3/2023 1438  Last data filed at 11/3/2023 1201  Gross per 24 hour   Intake 1254.71 ml   Output 3275 ml   Net -2020.29 ml       Lines/Drains/Airways       Central Venous Catheter Line  Duration             Introducer 11/03/23 1425 <1 day    Percutaneous Central Line Insertion/Assessment - Triple Lumen  11/03/23 0251 Internal Jugular Right <1 day    Percutaneous Central Line Insertion/Assessment - Triple Lumen  11/03/23 1344 Internal Jugular Left <1 day              Drain  Duration                  Urethral Catheter 11/02/23 1030 1 day              Airway  Duration                  Airway - Non-Surgical 11/02/23 0855 Endotracheal Tube 1 day              Arterial Line  Duration             Arterial Line 11/02/23 1031 Right Radial 1 day              Line  Duration                  IABP 11/02/23 1534 Other (Comment) 40 mL <1 day              Peripheral Intravenous Line  Duration                  Peripheral IV - Single Lumen 10/30/23 1924 20 G Right Antecubital 3 days         Peripheral IV - Single Lumen 10/30/23 2047 20 G Anterior;Left Forearm 3 days         Peripheral IV - Single Lumen 10/31/23 1230 18 G Anterior;Distal;Right Forearm 3 days                  Vent Mode: A/C  Oxygen Concentration (%):  [40-50] 40  Resp Rate Total:  [20 br/min-24 br/min] 20 br/min  Vt Set:  [480 mL] 480 mL  PEEP/CPAP:  [10 cmH20] 10 cmH20  Mean Airway Pressure:  [14 lvK76-84 cmH20] 14 cmH20       Physical Exam  Vitals and nursing note reviewed.   Constitutional:       General: He is not in acute distress.     Appearance: He is ill-appearing.      Comments: Sedated/intubated   HENT:      Head: Normocephalic and atraumatic.      Nose: No rhinorrhea.   Cardiovascular:      Rate and Rhythm: Tachycardia present. Rhythm irregular.      Comments: RIJ CVC in place  Pulmonary:      Comments: Mechanically ventillated  Abdominal:      General: Abdomen is flat.    Genitourinary:     Comments: Watson in place  Musculoskeletal:      Right lower leg: No edema.      Left lower leg: No edema.   Skin:     General: Skin is warm.      Comments: Petechiae of bilateral feet and palms   Neurological:      Comments: Sedated            Significant Labs: Blood Culture:   Recent Labs   Lab 11/03/23  0609 11/03/23  0614   LABBLOO No Growth to date No Growth to date   CMP   Recent Labs   Lab 11/02/23  0341 11/02/23  0803 11/03/23  0306     --  147*   K 3.2* 3.5 3.8     --  103   CO2 22*  --  28   *  --  274*   BUN 42*  --  64*   CREATININE 1.3  --  1.7*   CALCIUM 7.5*  --  7.3*   PROT 5.9*  --  6.0   ALBUMIN 2.2*  --  2.2*   BILITOT 1.6*  --  1.3*   ALKPHOS 89  --  87   *  --  53*   ALT 86*  --  68*   ANIONGAP 17*  --  16   , CBC   Recent Labs   Lab 11/02/23  0341 11/02/23  0651 11/03/23  0306   WBC 23.60*  --  17.45*   HGB 11.9*  --  10.1*   HCT 34.5*   < > 30.5*   PLT 46*  --  94*    < > = values in this interval not displayed.     All pertinent lab and imaging results from the last 24 hours have been reviewed.    Significant Imaging:  Transthoracic echo (TTE) complete (Cupid Only):   Results for orders placed or performed during the hospital encounter of 10/30/23   Echo Saline Bubble? No   Result Value Ref Range    RA Width 3.21 cm    LA volume (mod) 117.10 cm3    Left Atrium Major Axis 6.70 cm    Left Atrium Minor Axis 6.7 cm    RA Major Axis 5.81 cm    LV Diastolic Volume 59.71 mL    LV Systolic Volume 27.58 mL    MV VTI 28.56 cm    TR Max Jabier 2.66 m/s    MV Peak E Jabier 1.50 m/s    MV peak gradient 14 mmHg    Mr max jabier 0.04 m/s    Ao VTI 18.92 cm    Ao peak jabier 1.33 m/s    LVOT peak VTI 14.75 cm    LVOT peak jabier 0.97 m/s    LVOT diameter 2.38 cm    MV mean gradient 6 mmHg    AV mean gradient 4 mmHg    TAPSE 1.69 cm    RVDD 3.66 cm    LA size 5.05 cm    Ascending aorta 2.95 cm    STJ 3.33 cm    Sinus 3.59 cm    LVIDs 3.8 2.1 - 4.0 cm    Posterior Wall 1.01 0.6 -  1.1 cm    IVS 0.99 0.6 - 1.1 cm    LVIDd 5.1 3.5 - 6.0 cm    TDI LATERAL 0.20 m/s    LA WIDTH 5.0 cm    TDI SEPTAL 0.14 m/s    LV LATERAL E/E' RATIO 7.50 m/s    LV SEPTAL E/E' RATIO 10.71 m/s    FS 25 (A) 28 - 44 %    LA volume 143.80 cm3    LV mass 188.02 g    ZLVIDD -1.14     ZLVIDS 0.62     Left Ventricle Relative Wall Thickness 0.40 cm    AV valve area 3.47 cm²    AV Velocity Ratio 0.73     AV index (prosthetic) 0.78     MV valve area by continuity eq 2.30 cm2    Mean e' 0.17 m/s    LVOT area 4.4 cm2    LVOT stroke volume 65.59 cm3    AV peak gradient 7 mmHg    E/E' ratio 8.82 m/s    LV Systolic Volume Index 13.9 mL/m2    LV Diastolic Volume Index 30.16 mL/m2    LA Volume Index 72.6 mL/m2    LV Mass Index 95 g/m2    Triscuspid Valve Regurgitation Peak Gradient 28 mmHg    LA Volume Index (Mod) 59.1 mL/m2    FATUMA by Velocity Ratio 3.24 cm²    BSA 1.96 m2    TV resting pulmonary artery pressure 31 mmHg    RV TB RVSP 6 mmHg    Est. RA pres 3 mmHg    Mitral Valve Heart Rate 124 bpm    Narrative      Left Ventricle: The left ventricle is normal in size. Ventricular mass   is normal. Normal wall thickness. Global hypokinesis present. There is   mildly reduced systolic function with a visually estimated ejection   fraction of 40 - 45%.    Right Ventricle: Normal right ventricular cavity size. Wall thickness   is normal. Right ventricle wall motion  is normal. Systolic function is   normal.    Left Atrium: Left atrium is severely dilated.    Right Atrium: Right atrium is dilated.    Aortic Valve: RCC is thickended with mild prolapse.  Cannot exclude RCC   vegetation. The remaining leaflets have normal mobility. There is trace   aortic regurgitation.    Mitral Valve: Both leaflets are thickened.Cannot exclude anterior   leaflet vegetation coating this leaflet. There is prolapse of the   posterior mitral leaflet. Flail posterior leaflet. There is a large mobile   echogenic mass present on the posterior leaflet consistent  with   vegetation. The mean pressure gradient across the mitral valve is 6 mmHg   at a heart rate of 124 bpm. There is severe regurgitation with an   anteromedial eccentrically directed jet.    Tricuspid Valve: There is mild regurgitation.    Pulmonary Artery: The estimated pulmonary artery systolic pressure is   31 mmHg.    IVC/SVC: Normal venous pressure at 3 mmHg.      and X-Ray: CXR: X-Ray Chest 1 View (CXR):   Results for orders placed or performed during the hospital encounter of 10/30/23   X-Ray Chest 1 View    Narrative    EXAMINATION:  XR CHEST 1 VIEW    CLINICAL HISTORY:  pulmonary edema;    TECHNIQUE:  Single frontal view of the chest was performed.    COMPARISON:  11/03/2023    FINDINGS:  ET tube distal tip within the mid trachea, right central line distal tip within the SVC, enteric tube distal tip within the stomach, intra-aortic balloon pump projects between the left posterior 4th and 5th rib, aortic arch region, unchanged.    The cardiac silhouette is normal in size, midline.    Patchy, but diffuse upper and lower lung zones bilateral, more central areas of airspace consolidation sparing somewhat the periphery of the lung concerning for an underlying infectious process, pneumocystis jiroveci pneumonia and/or COVID 19.  Other infectious/inflammatory etiology not excluded    It is not significantly changed from the prior study.    No pleural effusion.  No pneumothorax.    The osseous structures appear normal.      Impression    No interval detrimental change.      Electronically signed by: Alisson Lilly MD  Date:    11/03/2023  Time:    08:23     Assessment and Plan:     * Endocarditis  MRSA bacteremia  Septic shock  49 yo M with no past medical history admitted 10/30 with sepsis 2/2 MRSA bacteremia requiring vasopressor support. Troponin 18.298 < 19.814 < 27.88. Admit EKG EKG sinus rhythm, low voltage.   No hx of IVDU, UDS negative   No hx of dental abscess, recent intervention  No significant  family cardiac history.   Initial TTE notable for mitral valve vegetation with flail anterior leaflet with severe regurgitation.   Per HTS, it is possible this is related to myocarditis, there are other likely explanations for his echo findings and elevated troponin, and even if he were to have myocarditis, there would be no interventions to offer regardless.   Acute respiratory distress 2/2 severe pulmonary edema 11/01-11/02 ultimately requiring intubation.   Repeat NEW with severe MR  IABP 1:1 placed 11/02 for hemodynamic support and aggressive diuresis recommended per CTS      Mitral valve replacement today  Continue diuresis with Lasix gtt 20 mg/hr  Continue IV Vanc/Clinda per ID recs    Severe mitral regurgitation  TTE showed EF 40-45% with global hypokinesis and MV with flail posterior leaflet associated with severe regurgitation; mild AV prolapse with regurgitation also noted. IABP placed 11/02.    Plan for MVR today with [facility]    Acute hypoxemic respiratory failure  Patient with Hypoxic Respiratory failure which is Acute. Supplemental oxygen was provided and noted- Vent Mode: A/C  Oxygen Concentration (%):  [40-50] 40  Resp Rate Total:  [20 br/min-24 br/min] 20 br/min  Vt Set:  [480 mL] 480 mL  PEEP/CPAP:  [10 cmH20] 10 cmH20  Mean Airway Pressure:  [14 yoN94-72 cmH20] 14 cmH20    Continue mechanical ventilation  Lasix gtt     Atrial fibrillation  Patient appeared to have new onset AF overnight 10/31-11/1. Rate controlled in 90's to low 100's. Otherwise remained hemodynamically stable off of pressors. Telemetry reviewed, noted to have irregularly irregular rhythm which is most likely consistent with AF. Likely due to acute illness. CHADSVASc 0, anticoagulation not indicated and contraindicated given thrombocytopenia and potential for hemorrhagic conversion if there are septic emboli present.  Rate-controlled    Cardiac telemetry  Consider antiarrythmic if persists post-operatively       Thrombocytopenia  Pt  presenting with low platelets (23) in setting of acute liver injury likely secondary to shock. Hematology evaluated for TTP, but [id number] wnl. No s/s of active bleed.  PT 11.0, INR 1.0, Rogelio negative, haptoglobin 82, fibrinogen 489, aPTT 44.4  S/p 2u FFP with appropriate correction  Plt 94 today    MVR with CTS today  Monitor CBC  Transfuse as clinically indicated    FRANCESCO (acute kidney injury)  Cr 1.6 on admission, thought to be 2/2 volume depletion. Improved after fluid repletion.  Cr 1.7     monitor CMP  I/Os    Heart failure with mid-range ejection fraction (HFmEF)  TTE 11/02/23:    Left Ventricle: The left ventricle is normal in size. Ventricular mass is normal. Normal wall thickness. Global hypokinesis present. There is mildly reduced systolic function with a visually estimated ejection fraction of 40 - 45%.    Right Ventricle: Normal right ventricular cavity size. Wall thickness is normal. Right ventricle wall motion  is normal. Systolic function is normal.    Left Atrium: Left atrium is severely dilated.    Right Atrium: Right atrium is dilated.    Aortic Valve: RCC is thickended with mild prolapse. Cannot exclude RCC vegetation. The remaining leaflets have normal mobility. There is trace aortic regurgitation.    Mitral Valve: Both leaflets are thickened.Cannot exclude anterior leaflet vegetation coating this leaflet. There is prolapse of the posterior mitral leaflet. Flail posterior leaflet. There is a large mobile echogenic mass present on the posterior leaflet consistent with vegetation. The mean pressure gradient across the mitral valve is 6 mmHg at a heart rate of 124 bpm. There is severe regurgitation with an anteromedial eccentrically directed jet.    Tricuspid Valve: There is mild regurgitation.    Pulmonary Artery: The estimated pulmonary artery systolic pressure is 31 mmHg.    IVC/SVC: Normal venous pressure at 3 mmHg.    Continue Lasix gtt    Elevated troponin  See endocarditis  Likely due to  demand ischemia in setting of critical illness vs myocarditis (see HTS note)    Elevated transaminase level  Elevated Ast and ALT on presentation along with low platelets and low albumin. Likely liver dysfunction 2/2 Septic Shock. Acute hepatitis panel negative. No significant hepatic pathology found on CT A/P   Improving        VTE Risk Mitigation (From admission, onward)           Ordered     Place sequential compression device  Until discontinued         10/30/23 2231     IP VTE LOW RISK PATIENT  Once         10/30/23 2231                    Sandrita Moore MD  Cardiology  [facility] - Surgery ([facility])

## 2023-11-03 NOTE — SUBJECTIVE & OBJECTIVE
Interval History: NAEON. Remains in afib, HR 80s-110s. Febrile to Tmax 100.4 overnight, remains on IV Vanc/Clinda for MRSA bacteremia/endocarditis. Intubated and sedated on Fentanyl 150 mcg/hr. Initially requiring Levo for pressor support, stopped this afternoon. Continuing Lasix gtt at 20 mg/hr, UoP 1.05 L in last 24 hrs, net -2.43 L since admit; CVP 9. IABP 1:1, plan for MVR today per CTS.       Objective:     Vital Signs (Most Recent):  Temp: 97.4 °F (36.3 °C) (11/03/23 1101)  Pulse: 84 (11/03/23 1201)  Resp: 20 (11/03/23 1201)  BP: (!) 88/64 (11/03/23 1201)  SpO2: 99 % (11/03/23 1201) Vital Signs (24h Range):  Temp:  [97.4 °F (36.3 °C)-100.4 °F (38 °C)] 97.4 °F (36.3 °C)  Pulse:  [] 84  Resp:  [13-38] 20  SpO2:  [86 %-100 %] 99 %  BP: (88-91)/(58-71) 88/64  Arterial Line BP: (0-105)/(0-71) 84/60     Weight: (S) 76.5 kg (168 lb 10.4 oz)  Body mass index is 22.25 kg/m².     SpO2: 99 %         Intake/Output Summary (Last 24 hours) at 11/3/2023 1438  Last data filed at 11/3/2023 1201  Gross per 24 hour   Intake 1254.71 ml   Output 3275 ml   Net -2020.29 ml       Lines/Drains/Airways       Central Venous Catheter Line  Duration             Introducer 11/03/23 1425 <1 day    Percutaneous Central Line Insertion/Assessment - Triple Lumen  11/03/23 0251 Internal Jugular Right <1 day    Percutaneous Central Line Insertion/Assessment - Triple Lumen  11/03/23 1344 Internal Jugular Left <1 day              Drain  Duration                  Urethral Catheter 11/02/23 1030 1 day              Airway  Duration                  Airway - Non-Surgical 11/02/23 0855 Endotracheal Tube 1 day              Arterial Line  Duration             Arterial Line 11/02/23 1031 Right Radial 1 day              Line  Duration                  IABP 11/02/23 1534 Other (Comment) 40 mL <1 day              Peripheral Intravenous Line  Duration                  Peripheral IV - Single Lumen 10/30/23 1924 20 G Right Antecubital 3 days          Peripheral IV - Single Lumen 10/30/23 2047 20 G Anterior;Left Forearm 3 days         Peripheral IV - Single Lumen 10/31/23 1230 18 G Anterior;Distal;Right Forearm 3 days                  Vent Mode: A/C  Oxygen Concentration (%):  [40-50] 40  Resp Rate Total:  [20 br/min-24 br/min] 20 br/min  Vt Set:  [480 mL] 480 mL  PEEP/CPAP:  [10 cmH20] 10 cmH20  Mean Airway Pressure:  [14 jjN95-12 cmH20] 14 cmH20       Physical Exam  Vitals and nursing note reviewed.   Constitutional:       General: He is not in acute distress.     Appearance: He is ill-appearing.      Comments: Sedated/intubated   HENT:      Head: Normocephalic and atraumatic.      Nose: No rhinorrhea.   Cardiovascular:      Rate and Rhythm: Tachycardia present. Rhythm irregular.      Comments: RIJ CVC in place  Pulmonary:      Comments: Mechanically ventillated  Abdominal:      General: Abdomen is flat.   Genitourinary:     Comments: Watson in place  Musculoskeletal:      Right lower leg: No edema.      Left lower leg: No edema.   Skin:     General: Skin is warm.      Comments: Petechiae of bilateral feet and palms   Neurological:      Comments: Sedated            Significant Labs: Blood Culture:   Recent Labs   Lab 11/03/23  0609 11/03/23  0614   LABBLOO No Growth to date No Growth to date   CMP   Recent Labs   Lab 11/02/23  0341 11/02/23  0803 11/03/23  0306     --  147*   K 3.2* 3.5 3.8     --  103   CO2 22*  --  28   *  --  274*   BUN 42*  --  64*   CREATININE 1.3  --  1.7*   CALCIUM 7.5*  --  7.3*   PROT 5.9*  --  6.0   ALBUMIN 2.2*  --  2.2*   BILITOT 1.6*  --  1.3*   ALKPHOS 89  --  87   *  --  53*   ALT 86*  --  68*   ANIONGAP 17*  --  16   , CBC   Recent Labs   Lab 11/02/23  0341 11/02/23  0651 11/03/23  0306   WBC 23.60*  --  17.45*   HGB 11.9*  --  10.1*   HCT 34.5*   < > 30.5*   PLT 46*  --  94*    < > = values in this interval not displayed.     All pertinent lab and imaging results from the last 24 hours have been  reviewed.    Significant Imaging:  Transthoracic echo (TTE) complete (Cupid Only):   Results for orders placed or performed during the hospital encounter of 10/30/23   Echo Saline Bubble? No   Result Value Ref Range    RA Width 3.21 cm    LA volume (mod) 117.10 cm3    Left Atrium Major Axis 6.70 cm    Left Atrium Minor Axis 6.7 cm    RA Major Axis 5.81 cm    LV Diastolic Volume 59.71 mL    LV Systolic Volume 27.58 mL    MV VTI 28.56 cm    TR Max Jabier 2.66 m/s    MV Peak E Jabier 1.50 m/s    MV peak gradient 14 mmHg    Mr max jabier 0.04 m/s    Ao VTI 18.92 cm    Ao peak jabier 1.33 m/s    LVOT peak VTI 14.75 cm    LVOT peak jabier 0.97 m/s    LVOT diameter 2.38 cm    MV mean gradient 6 mmHg    AV mean gradient 4 mmHg    TAPSE 1.69 cm    RVDD 3.66 cm    LA size 5.05 cm    Ascending aorta 2.95 cm    STJ 3.33 cm    Sinus 3.59 cm    LVIDs 3.8 2.1 - 4.0 cm    Posterior Wall 1.01 0.6 - 1.1 cm    IVS 0.99 0.6 - 1.1 cm    LVIDd 5.1 3.5 - 6.0 cm    TDI LATERAL 0.20 m/s    LA WIDTH 5.0 cm    TDI SEPTAL 0.14 m/s    LV LATERAL E/E' RATIO 7.50 m/s    LV SEPTAL E/E' RATIO 10.71 m/s    FS 25 (A) 28 - 44 %    LA volume 143.80 cm3    LV mass 188.02 g    ZLVIDD -1.14     ZLVIDS 0.62     Left Ventricle Relative Wall Thickness 0.40 cm    AV valve area 3.47 cm²    AV Velocity Ratio 0.73     AV index (prosthetic) 0.78     MV valve area by continuity eq 2.30 cm2    Mean e' 0.17 m/s    LVOT area 4.4 cm2    LVOT stroke volume 65.59 cm3    AV peak gradient 7 mmHg    E/E' ratio 8.82 m/s    LV Systolic Volume Index 13.9 mL/m2    LV Diastolic Volume Index 30.16 mL/m2    LA Volume Index 72.6 mL/m2    LV Mass Index 95 g/m2    Triscuspid Valve Regurgitation Peak Gradient 28 mmHg    LA Volume Index (Mod) 59.1 mL/m2    FATUMA by Velocity Ratio 3.24 cm²    BSA 1.96 m2    TV resting pulmonary artery pressure 31 mmHg    RV TB RVSP 6 mmHg    Est. RA pres 3 mmHg    Mitral Valve Heart Rate 124 bpm    Narrative      Left Ventricle: The left ventricle is normal in size.  Ventricular mass   is normal. Normal wall thickness. Global hypokinesis present. There is   mildly reduced systolic function with a visually estimated ejection   fraction of 40 - 45%.    Right Ventricle: Normal right ventricular cavity size. Wall thickness   is normal. Right ventricle wall motion  is normal. Systolic function is   normal.    Left Atrium: Left atrium is severely dilated.    Right Atrium: Right atrium is dilated.    Aortic Valve: RCC is thickended with mild prolapse.  Cannot exclude RCC   vegetation. The remaining leaflets have normal mobility. There is trace   aortic regurgitation.    Mitral Valve: Both leaflets are thickened.Cannot exclude anterior   leaflet vegetation coating this leaflet. There is prolapse of the   posterior mitral leaflet. Flail posterior leaflet. There is a large mobile   echogenic mass present on the posterior leaflet consistent with   vegetation. The mean pressure gradient across the mitral valve is 6 mmHg   at a heart rate of 124 bpm. There is severe regurgitation with an   anteromedial eccentrically directed jet.    Tricuspid Valve: There is mild regurgitation.    Pulmonary Artery: The estimated pulmonary artery systolic pressure is   31 mmHg.    IVC/SVC: Normal venous pressure at 3 mmHg.      and X-Ray: CXR: X-Ray Chest 1 View (CXR):   Results for orders placed or performed during the hospital encounter of 10/30/23   X-Ray Chest 1 View    Narrative    EXAMINATION:  XR CHEST 1 VIEW    CLINICAL HISTORY:  pulmonary edema;    TECHNIQUE:  Single frontal view of the chest was performed.    COMPARISON:  11/03/2023    FINDINGS:  ET tube distal tip within the mid trachea, right central line distal tip within the SVC, enteric tube distal tip within the stomach, intra-aortic balloon pump projects between the left posterior 4th and 5th rib, aortic arch region, unchanged.    The cardiac silhouette is normal in size, midline.    Patchy, but diffuse upper and lower lung zones bilateral,  more central areas of airspace consolidation sparing somewhat the periphery of the lung concerning for an underlying infectious process, pneumocystis jiroveci pneumonia and/or COVID 19.  Other infectious/inflammatory etiology not excluded    It is not significantly changed from the prior study.    No pleural effusion.  No pneumothorax.    The osseous structures appear normal.      Impression    No interval detrimental change.      Electronically signed by: Alisson Lilly MD  Date:    11/03/2023  Time:    08:23

## 2023-11-04 PROBLEM — I38 ENDOCARDITIS: Chronic | Status: ACTIVE | Noted: 2023-10-31

## 2023-11-04 PROBLEM — T14.8XXA SURGICAL WOUND PRESENT: Status: ACTIVE | Noted: 2023-11-04

## 2023-11-04 PROBLEM — R73.9 TRANSIENT HYPERGLYCEMIA POST PROCEDURE: Status: ACTIVE | Noted: 2023-11-04

## 2023-11-04 LAB
ABO + RH BLD: NORMAL
ALBUMIN SERPL BCP-MCNC: 1.7 G/DL (ref 3.5–5.2)
ALBUMIN SERPL BCP-MCNC: 1.7 G/DL (ref 3.5–5.2)
ALBUMIN SERPL BCP-MCNC: 1.8 G/DL (ref 3.5–5.2)
ALLENS TEST: ABNORMAL
ALLENS TEST: ABNORMAL
ALP SERPL-CCNC: 55 U/L (ref 55–135)
ALP SERPL-CCNC: 57 U/L (ref 55–135)
ALP SERPL-CCNC: 58 U/L (ref 55–135)
ALT SERPL W/O P-5'-P-CCNC: 40 U/L (ref 10–44)
ALT SERPL W/O P-5'-P-CCNC: 45 U/L (ref 10–44)
ALT SERPL W/O P-5'-P-CCNC: 47 U/L (ref 10–44)
ANION GAP SERPL CALC-SCNC: 10 MMOL/L (ref 8–16)
ANION GAP SERPL CALC-SCNC: 12 MMOL/L (ref 8–16)
ANION GAP SERPL CALC-SCNC: 12 MMOL/L (ref 8–16)
ANION GAP SERPL CALC-SCNC: 15 MMOL/L (ref 8–16)
APTT PPP: 22.2 SEC (ref 21–32)
AST SERPL-CCNC: 60 U/L (ref 10–40)
AST SERPL-CCNC: 67 U/L (ref 10–40)
AST SERPL-CCNC: 68 U/L (ref 10–40)
BILIRUB SERPL-MCNC: 2.1 MG/DL (ref 0.1–1)
BILIRUB SERPL-MCNC: 2.5 MG/DL (ref 0.1–1)
BILIRUB SERPL-MCNC: 2.7 MG/DL (ref 0.1–1)
BLD GP AB SCN CELLS X3 SERPL QL: NORMAL
BUN SERPL-MCNC: 66 MG/DL (ref 6–20)
BUN SERPL-MCNC: 69 MG/DL (ref 6–20)
BUN SERPL-MCNC: 71 MG/DL (ref 6–20)
BUN SERPL-MCNC: 77 MG/DL (ref 6–20)
CALCIUM SERPL-MCNC: 7.7 MG/DL (ref 8.7–10.5)
CALCIUM SERPL-MCNC: 7.9 MG/DL (ref 8.7–10.5)
CALCIUM SERPL-MCNC: 8.5 MG/DL (ref 8.7–10.5)
CALCIUM SERPL-MCNC: 8.6 MG/DL (ref 8.7–10.5)
CHLORIDE SERPL-SCNC: 112 MMOL/L (ref 95–110)
CHLORIDE SERPL-SCNC: 112 MMOL/L (ref 95–110)
CHLORIDE SERPL-SCNC: 113 MMOL/L (ref 95–110)
CHLORIDE SERPL-SCNC: 114 MMOL/L (ref 95–110)
CO2 SERPL-SCNC: 24 MMOL/L (ref 23–29)
CO2 SERPL-SCNC: 25 MMOL/L (ref 23–29)
CO2 SERPL-SCNC: 26 MMOL/L (ref 23–29)
CO2 SERPL-SCNC: 27 MMOL/L (ref 23–29)
CREAT SERPL-MCNC: 1.5 MG/DL (ref 0.5–1.4)
CREAT SERPL-MCNC: 1.5 MG/DL (ref 0.5–1.4)
CREAT SERPL-MCNC: 1.6 MG/DL (ref 0.5–1.4)
CREAT SERPL-MCNC: 1.7 MG/DL (ref 0.5–1.4)
DELSYS: ABNORMAL
DELSYS: ABNORMAL
ERYTHROCYTE [DISTWIDTH] IN BLOOD BY AUTOMATED COUNT: 14.6 % (ref 11.5–14.5)
ERYTHROCYTE [SEDIMENTATION RATE] IN BLOOD BY WESTERGREN METHOD: 13 MM/H
ERYTHROCYTE [SEDIMENTATION RATE] IN BLOOD BY WESTERGREN METHOD: 22 MM/H
EST. GFR  (NO RACE VARIABLE): 48.8 ML/MIN/1.73 M^2
EST. GFR  (NO RACE VARIABLE): 52.5 ML/MIN/1.73 M^2
EST. GFR  (NO RACE VARIABLE): 56.7 ML/MIN/1.73 M^2
EST. GFR  (NO RACE VARIABLE): 56.7 ML/MIN/1.73 M^2
FIO2: 40
FIO2: 50
GLUCOSE SERPL-MCNC: 111 MG/DL (ref 70–110)
GLUCOSE SERPL-MCNC: 121 MG/DL (ref 70–110)
GLUCOSE SERPL-MCNC: 122 MG/DL (ref 70–110)
GLUCOSE SERPL-MCNC: 181 MG/DL (ref 70–110)
HCO3 UR-SCNC: 32 MMOL/L (ref 24–28)
HCT VFR BLD AUTO: 25.5 % (ref 40–54)
HGB BLD-MCNC: 8.7 G/DL (ref 14–18)
INR PPP: 1.1 (ref 0.8–1.2)
LDH SERPL L TO P-CCNC: 2.6 MMOL/L (ref 0.36–1.25)
MAGNESIUM SERPL-MCNC: 2.5 MG/DL (ref 1.6–2.6)
MAGNESIUM SERPL-MCNC: 2.5 MG/DL (ref 1.6–2.6)
MCH RBC QN AUTO: 30.7 PG (ref 27–31)
MCHC RBC AUTO-ENTMCNC: 34.1 G/DL (ref 32–36)
MCV RBC AUTO: 90 FL (ref 82–98)
MIN VOL: 10.5
MODE: ABNORMAL
MODE: ABNORMAL
PCO2 BLDA: 42.3 MMHG (ref 35–45)
PEEP: 5
PH SMN: 7.49 [PH] (ref 7.35–7.45)
PHOSPHATE SERPL-MCNC: 1.6 MG/DL (ref 2.7–4.5)
PHOSPHATE SERPL-MCNC: 3.1 MG/DL (ref 2.7–4.5)
PIP: 20
PLATELET # BLD AUTO: 89 K/UL (ref 150–450)
PMV BLD AUTO: ABNORMAL FL (ref 9.2–12.9)
PO2 BLDA: 156 MMHG (ref 80–100)
POC BE: 9 MMOL/L
POC SATURATED O2: 100 % (ref 95–100)
POC TCO2: 33 MMOL/L (ref 23–27)
POCT GLUCOSE: 105 MG/DL (ref 70–110)
POCT GLUCOSE: 112 MG/DL (ref 70–110)
POCT GLUCOSE: 121 MG/DL (ref 70–110)
POCT GLUCOSE: 121 MG/DL (ref 70–110)
POCT GLUCOSE: 127 MG/DL (ref 70–110)
POCT GLUCOSE: 139 MG/DL (ref 70–110)
POCT GLUCOSE: 141 MG/DL (ref 70–110)
POCT GLUCOSE: 144 MG/DL (ref 70–110)
POCT GLUCOSE: 157 MG/DL (ref 70–110)
POCT GLUCOSE: 161 MG/DL (ref 70–110)
POCT GLUCOSE: 174 MG/DL (ref 70–110)
POCT GLUCOSE: 84 MG/DL (ref 70–110)
POTASSIUM SERPL-SCNC: 3.5 MMOL/L (ref 3.5–5.1)
POTASSIUM SERPL-SCNC: 3.9 MMOL/L (ref 3.5–5.1)
POTASSIUM SERPL-SCNC: 4.3 MMOL/L (ref 3.5–5.1)
POTASSIUM SERPL-SCNC: 4.5 MMOL/L (ref 3.5–5.1)
PROT SERPL-MCNC: 4.6 G/DL (ref 6–8.4)
PROT SERPL-MCNC: 4.7 G/DL (ref 6–8.4)
PROT SERPL-MCNC: 4.8 G/DL (ref 6–8.4)
PROTHROMBIN TIME: 11.9 SEC (ref 9–12.5)
RBC # BLD AUTO: 2.83 M/UL (ref 4.6–6.2)
SAMPLE: ABNORMAL
SAMPLE: ABNORMAL
SITE: ABNORMAL
SITE: ABNORMAL
SODIUM SERPL-SCNC: 149 MMOL/L (ref 136–145)
SODIUM SERPL-SCNC: 150 MMOL/L (ref 136–145)
SODIUM SERPL-SCNC: 151 MMOL/L (ref 136–145)
SODIUM SERPL-SCNC: 152 MMOL/L (ref 136–145)
SP02: 100
SP02: 100
SPECIMEN OUTDATE: NORMAL
VANCOMYCIN SERPL-MCNC: 21.5 UG/ML
VT: 500
WBC # BLD AUTO: 28.25 K/UL (ref 3.9–12.7)

## 2023-11-04 PROCEDURE — 63600175 PHARM REV CODE 636 W HCPCS

## 2023-11-04 PROCEDURE — 99233 PR SUBSEQUENT HOSPITAL CARE,LEVL III: ICD-10-PCS | Mod: ,,, | Performed by: INTERNAL MEDICINE

## 2023-11-04 PROCEDURE — 27000202 HC IABP, ADD'L DAY

## 2023-11-04 PROCEDURE — 99223 1ST HOSP IP/OBS HIGH 75: CPT | Mod: ,,, | Performed by: PHYSICIAN ASSISTANT

## 2023-11-04 PROCEDURE — 25000003 PHARM REV CODE 250

## 2023-11-04 PROCEDURE — 80053 COMPREHEN METABOLIC PANEL: CPT | Mod: 91

## 2023-11-04 PROCEDURE — 99233 SBSQ HOSP IP/OBS HIGH 50: CPT | Mod: ,,, | Performed by: INTERNAL MEDICINE

## 2023-11-04 PROCEDURE — 80053 COMPREHEN METABOLIC PANEL: CPT | Mod: 91 | Performed by: NURSE PRACTITIONER

## 2023-11-04 PROCEDURE — 99900026 HC AIRWAY MAINTENANCE (STAT)

## 2023-11-04 PROCEDURE — 85610 PROTHROMBIN TIME: CPT | Performed by: STUDENT IN AN ORGANIZED HEALTH CARE EDUCATION/TRAINING PROGRAM

## 2023-11-04 PROCEDURE — 99291 PR CRITICAL CARE, E/M 30-74 MINUTES: ICD-10-PCS | Mod: ,,, | Performed by: ANESTHESIOLOGY

## 2023-11-04 PROCEDURE — 27100171 HC OXYGEN HIGH FLOW UP TO 24 HOURS

## 2023-11-04 PROCEDURE — 25000003 PHARM REV CODE 250: Performed by: STUDENT IN AN ORGANIZED HEALTH CARE EDUCATION/TRAINING PROGRAM

## 2023-11-04 PROCEDURE — 85730 THROMBOPLASTIN TIME PARTIAL: CPT | Performed by: STUDENT IN AN ORGANIZED HEALTH CARE EDUCATION/TRAINING PROGRAM

## 2023-11-04 PROCEDURE — 80048 BASIC METABOLIC PNL TOTAL CA: CPT | Performed by: THORACIC SURGERY (CARDIOTHORACIC VASCULAR SURGERY)

## 2023-11-04 PROCEDURE — 27000207 HC ISOLATION

## 2023-11-04 PROCEDURE — 83735 ASSAY OF MAGNESIUM: CPT | Performed by: NURSE PRACTITIONER

## 2023-11-04 PROCEDURE — 99223 PR INITIAL HOSPITAL CARE,LEVL III: ICD-10-PCS | Mod: ,,, | Performed by: PHYSICIAN ASSISTANT

## 2023-11-04 PROCEDURE — 94003 VENT MGMT INPAT SUBQ DAY: CPT

## 2023-11-04 PROCEDURE — 86850 RBC ANTIBODY SCREEN: CPT | Performed by: THORACIC SURGERY (CARDIOTHORACIC VASCULAR SURGERY)

## 2023-11-04 PROCEDURE — 80202 ASSAY OF VANCOMYCIN: CPT | Performed by: THORACIC SURGERY (CARDIOTHORACIC VASCULAR SURGERY)

## 2023-11-04 PROCEDURE — 63600175 PHARM REV CODE 636 W HCPCS: Performed by: INTERNAL MEDICINE

## 2023-11-04 PROCEDURE — 83735 ASSAY OF MAGNESIUM: CPT | Mod: 91 | Performed by: THORACIC SURGERY (CARDIOTHORACIC VASCULAR SURGERY)

## 2023-11-04 PROCEDURE — 63600175 PHARM REV CODE 636 W HCPCS: Performed by: PHYSICIAN ASSISTANT

## 2023-11-04 PROCEDURE — 84100 ASSAY OF PHOSPHORUS: CPT | Mod: 91 | Performed by: THORACIC SURGERY (CARDIOTHORACIC VASCULAR SURGERY)

## 2023-11-04 PROCEDURE — 99900035 HC TECH TIME PER 15 MIN (STAT)

## 2023-11-04 PROCEDURE — 27200966 HC CLOSED SUCTION SYSTEM

## 2023-11-04 PROCEDURE — 84100 ASSAY OF PHOSPHORUS: CPT | Performed by: NURSE PRACTITIONER

## 2023-11-04 PROCEDURE — 63600175 PHARM REV CODE 636 W HCPCS: Performed by: STUDENT IN AN ORGANIZED HEALTH CARE EDUCATION/TRAINING PROGRAM

## 2023-11-04 PROCEDURE — 25000003 PHARM REV CODE 250: Performed by: PHYSICIAN ASSISTANT

## 2023-11-04 PROCEDURE — 25000003 PHARM REV CODE 250: Performed by: THORACIC SURGERY (CARDIOTHORACIC VASCULAR SURGERY)

## 2023-11-04 PROCEDURE — 94761 N-INVAS EAR/PLS OXIMETRY MLT: CPT

## 2023-11-04 PROCEDURE — 80053 COMPREHEN METABOLIC PANEL: CPT | Performed by: STUDENT IN AN ORGANIZED HEALTH CARE EDUCATION/TRAINING PROGRAM

## 2023-11-04 PROCEDURE — 20000000 HC ICU ROOM

## 2023-11-04 PROCEDURE — 63600175 PHARM REV CODE 636 W HCPCS: Performed by: THORACIC SURGERY (CARDIOTHORACIC VASCULAR SURGERY)

## 2023-11-04 PROCEDURE — 99291 CRITICAL CARE FIRST HOUR: CPT | Mod: ,,, | Performed by: ANESTHESIOLOGY

## 2023-11-04 PROCEDURE — 85027 COMPLETE CBC AUTOMATED: CPT | Performed by: STUDENT IN AN ORGANIZED HEALTH CARE EDUCATION/TRAINING PROGRAM

## 2023-11-04 RX ORDER — GLUCAGON 1 MG
1 KIT INJECTION
Status: DISCONTINUED | OUTPATIENT
Start: 2023-11-04 | End: 2023-11-07

## 2023-11-04 RX ORDER — ALBUMIN HUMAN 50 G/1000ML
SOLUTION INTRAVENOUS
Status: DISCONTINUED
Start: 2023-11-04 | End: 2023-11-04 | Stop reason: WASHOUT

## 2023-11-04 RX ORDER — IBUPROFEN 200 MG
16 TABLET ORAL
Status: DISCONTINUED | OUTPATIENT
Start: 2023-11-04 | End: 2023-11-07

## 2023-11-04 RX ORDER — ALBUMIN HUMAN 50 G/1000ML
12.5 SOLUTION INTRAVENOUS ONCE
Status: DISCONTINUED | OUTPATIENT
Start: 2023-11-04 | End: 2023-11-04

## 2023-11-04 RX ORDER — IBUPROFEN 200 MG
24 TABLET ORAL
Status: DISCONTINUED | OUTPATIENT
Start: 2023-11-04 | End: 2023-11-07

## 2023-11-04 RX ORDER — DEXMEDETOMIDINE HYDROCHLORIDE 4 UG/ML
INJECTION, SOLUTION INTRAVENOUS
Status: COMPLETED
Start: 2023-11-04 | End: 2023-11-04

## 2023-11-04 RX ORDER — HYDROMORPHONE HYDROCHLORIDE 1 MG/ML
0.5 INJECTION, SOLUTION INTRAMUSCULAR; INTRAVENOUS; SUBCUTANEOUS
Status: DISCONTINUED | OUTPATIENT
Start: 2023-11-04 | End: 2023-11-07

## 2023-11-04 RX ORDER — DEXMEDETOMIDINE HYDROCHLORIDE 4 UG/ML
0-1.4 INJECTION, SOLUTION INTRAVENOUS CONTINUOUS
Status: DISCONTINUED | OUTPATIENT
Start: 2023-11-04 | End: 2023-11-05

## 2023-11-04 RX ORDER — INSULIN ASPART 100 [IU]/ML
0-5 INJECTION, SOLUTION INTRAVENOUS; SUBCUTANEOUS
Status: DISCONTINUED | OUTPATIENT
Start: 2023-11-04 | End: 2023-11-07

## 2023-11-04 RX ADMIN — OXYCODONE HYDROCHLORIDE 10 MG: 10 TABLET ORAL at 07:11

## 2023-11-04 RX ADMIN — ACETAMINOPHEN 1000 MG: 500 TABLET ORAL at 09:11

## 2023-11-04 RX ADMIN — VANCOMYCIN HYDROCHLORIDE 750 MG: 750 INJECTION, POWDER, LYOPHILIZED, FOR SOLUTION INTRAVENOUS at 02:11

## 2023-11-04 RX ADMIN — INSULIN HUMAN 1.2 UNITS/HR: 1 INJECTION, SOLUTION INTRAVENOUS at 12:11

## 2023-11-04 RX ADMIN — FAMOTIDINE 20 MG: 10 INJECTION, SOLUTION INTRAVENOUS at 08:11

## 2023-11-04 RX ADMIN — INSULIN HUMAN 1.5 UNITS/HR: 1 INJECTION, SOLUTION INTRAVENOUS at 05:11

## 2023-11-04 RX ADMIN — CLINDAMYCIN IN 5 PERCENT DEXTROSE 600 MG: 12 INJECTION, SOLUTION INTRAVENOUS at 08:11

## 2023-11-04 RX ADMIN — POTASSIUM CHLORIDE 20 MEQ: 200 INJECTION, SOLUTION INTRAVENOUS at 02:11

## 2023-11-04 RX ADMIN — PROPOFOL 30 MCG/KG/MIN: 10 INJECTION, EMULSION INTRAVENOUS at 02:11

## 2023-11-04 RX ADMIN — INSULIN ASPART 1 UNITS: 100 INJECTION, SOLUTION INTRAVENOUS; SUBCUTANEOUS at 11:11

## 2023-11-04 RX ADMIN — MUPIROCIN: 20 OINTMENT TOPICAL at 08:11

## 2023-11-04 RX ADMIN — POLYETHYLENE GLYCOL 3350 17 G: 17 POWDER, FOR SOLUTION ORAL at 08:11

## 2023-11-04 RX ADMIN — HYDROMORPHONE HYDROCHLORIDE 0.5 MG: 0.5 INJECTION, SOLUTION INTRAMUSCULAR; INTRAVENOUS; SUBCUTANEOUS at 10:11

## 2023-11-04 RX ADMIN — SODIUM PHOSPHATE, MONOBASIC, MONOHYDRATE AND SODIUM PHOSPHATE, DIBASIC, ANHYDROUS 20.01 MMOL: 142; 276 INJECTION, SOLUTION INTRAVENOUS at 09:11

## 2023-11-04 RX ADMIN — POTASSIUM CHLORIDE 20 MEQ: 200 INJECTION, SOLUTION INTRAVENOUS at 04:11

## 2023-11-04 RX ADMIN — HYDROMORPHONE HYDROCHLORIDE 0.5 MG: 0.5 INJECTION, SOLUTION INTRAMUSCULAR; INTRAVENOUS; SUBCUTANEOUS at 07:11

## 2023-11-04 RX ADMIN — ACETAMINOPHEN 1000 MG: 500 TABLET ORAL at 05:11

## 2023-11-04 RX ADMIN — CLINDAMYCIN IN 5 PERCENT DEXTROSE 600 MG: 12 INJECTION, SOLUTION INTRAVENOUS at 02:11

## 2023-11-04 RX ADMIN — HYDROMORPHONE HYDROCHLORIDE 0.5 MG: 0.5 INJECTION, SOLUTION INTRAMUSCULAR; INTRAVENOUS; SUBCUTANEOUS at 01:11

## 2023-11-04 RX ADMIN — ASPIRIN 325 MG ORAL TABLET 325 MG: 325 PILL ORAL at 08:11

## 2023-11-04 RX ADMIN — INSULIN ASPART 1 UNITS: 100 INJECTION, SOLUTION INTRAVENOUS; SUBCUTANEOUS at 08:11

## 2023-11-04 RX ADMIN — DOCUSATE SODIUM 100 MG: 100 CAPSULE, LIQUID FILLED ORAL at 08:11

## 2023-11-04 RX ADMIN — OXYCODONE HYDROCHLORIDE 10 MG: 10 TABLET ORAL at 04:11

## 2023-11-04 RX ADMIN — DEXMEDETOMIDINE HYDROCHLORIDE 0.2 MCG/KG/HR: 4 INJECTION, SOLUTION INTRAVENOUS at 07:11

## 2023-11-04 RX ADMIN — NOREPINEPHRINE BITARTRATE 0.01 MCG/KG/MIN: 4 INJECTION, SOLUTION INTRAVENOUS at 02:11

## 2023-11-04 RX ADMIN — OXYCODONE HYDROCHLORIDE 10 MG: 10 TABLET ORAL at 12:11

## 2023-11-04 RX ADMIN — ACETAMINOPHEN 1000 MG: 500 TABLET ORAL at 01:11

## 2023-11-04 RX ADMIN — INSULIN ASPART 1 UNITS: 100 INJECTION, SOLUTION INTRAVENOUS; SUBCUTANEOUS at 04:11

## 2023-11-04 NOTE — PLAN OF CARE
SICU PLAN OF CARE NOTE    Dx: Acute bacterial endocarditis    Vital Signs (last 12 hours):   Temp:  [97.8 °F (36.6 °C)-98 °F (36.7 °C)]   Pulse:  [82-92]   Resp:  [22-30]   BP: ()/(52-72)   SpO2:  [99 %-100 %]   Arterial Line BP: ()/()      Neuro: Arouses to Voice and Follows Commands    Cardiac: NSR. Pulses palpable in upper extremities 2+, pulses dopplerable in lower extremities    Respiratory: Ventilator 50%, 5 PEEP    Gtts: Epinephrine , Norepinephrine, Propofol, and Insulin    Urine Output: Urinary Catheter 75 - 125 cc/hour    Drains: Chest Tube, total output 300 cc /  shift and NG/OG Tube 0 cc /  shift    Diet: NPO    IABP 1:2 , lower extremity pulses dopplerable     Labs/Accuchecks: Accuchecks Q1. AM labs    Skin: clean, dry, and intact. Foam dressings in place    Shift Events: Sodium level elevated, team notified, 300 mL water flushed Q6 to be provided. IABP changed from 1:1 to 1:2, pt tolerating well

## 2023-11-04 NOTE — PLAN OF CARE
Recommendations   1. If EN warranted rec'd Impact Peptide 1.5 to goal rate @ 45 ml/hr to provide 1620 kcals, 101 g of protein, and 832 ml of fluid.   2. If pt able to tolerate a diet rec'd cardiac diet-fluid per MD (texture per SLP)   3. RD following     Goals: Meet % of EEN/EPN by RD f/u date  Nutrition Goal Status: goal not met  Communication of RD Recs:POC

## 2023-11-04 NOTE — PROGRESS NOTES
11/04/2023  Kassie Langley    Current provider:  Manuel Beal MD    Device interrogation:       No data to display                   Rounded on Lorenzo Nino to ensure all mechanical assist device settings (IABP or VAD) were appropriate and all parameters were within limits.  I was able to ensure all back up equipment was present, the staff had no issues, and the Perfusion Department daily rounding was complete.      For implantable VADs: Interrogation of Ventricular assist device was performed with analysis of device parameters and review of device function. I have personally reviewed the interrogation findings and agree with findings as stated.     In emergency, the nursing units have been notified to contact the perfusion department either by:  Calling [phone] from 630am to 4pm Mon thru Fri, utilizing the On-Call Finder functionality of Epic and searching for Perfusion, or by contacting the hospital  from 4pm to 630am and on weekends and asking to speak with the perfusionist on call.    1:09 PM

## 2023-11-04 NOTE — CONSULTS
"[facility]  Endocrinology  Diabetes Consult Note    Consult Requested by: Manuel Beal MD   Reason for admit: Acute bacterial endocarditis    HISTORY OF PRESENT ILLNESS:  Reason for Consult: Management of Hyperglycemia     Surgical Procedure and Date: s/p MVr    No known hx of DM and not on meds at home.    HPI:   Patient is a 48 y.o. male with a diagnosis of no past medical history admitted 10/30 for sepsis. Last Friday, 10/27, he developed headache, abdominal pain, and nausea. He presented to , who prescribed Tylenol and recommended he return if he continued to feel poorly. He presented to [facility] 10/30 with worsening abdominal pain, as well as fatigue, body aches, and hematuria. He was found to be tachycardic and hypotensive requiring vasopressor support. He was admitted to MICU for management of sepsis 2/2 PNA vs UTI vs meningitis (reported symptoms of neck stiffness). He was unable to undergo LP due to thrombocytopenia of 23. He is currently being evaluated for TTP. Further work up revealed MRSA bacteremia. He underwent TTE, which was notable for mitral valve vegetation with flail anterior leaflet, as well as possible aortic valve vegetation. Patient is now s/p MVr and resection of left atrial appendage. Endocrinology consulted for BG management post-operatively.     No results found for: "LABA1C", "HGBA1C"               Interval HPI:   Overnight events: Received in 61158/62215 A. BG above goal on IV insulin infusion 1.5-10 cc/hr. Remains intubated. 1 Day Post-Op.   Eating: Diet NPO Except for: Sips with Medication  Nausea: No  Hypoglycemia and intervention: No  Fever: No  TPN and/or TF: No    PMH, PSH, FH, SH  reviewed       Review of Systems   Unable to perform ROS: Intubated     Unable to obtain due to: Sedation,Intubation,Altered mental status,Critical illness,Reviewed ROS from note dated 11/1/23 per Dr. Beal    Current Medications and/or Treatments Impacting Glycemic " Control  Immunotherapy:    Immunosuppressants       None          Steroids:   Hormones (From admission, onward)      Start     Stop Route Frequency Ordered    11/03/23 1900  vasopressin (PITRESSIN) 0.2 Units/mL in dextrose 5 % (D5W) 100 mL infusion         -- IV Continuous 11/03/23 1756          Pressors:    Autonomic Drugs (From admission, onward)      Start     Stop Route Frequency Ordered    11/03/23 1845  EPINEPHrine 5 mg in dextrose 5% 250 mL infusion (premix)        Question Answer Comment   Begin at (in mcg/kg/min): 0.02    Titrate by: (in mcg/kg/min) 0.02    Titrate interval: (in minutes) 5    Titrate to maintain: (SBP or MAP or Cardiac Index) MAP    Greater than: (in mmHg) 60    Maximum dose: (in mcg/kg/min) 2        -- IV Continuous 11/03/23 1839    11/03/23 1800  NORepinephrine 4 mg in dextrose 5% 250 mL infusion (premix)        Question Answer Comment   Begin at (in mcg/kg/min): 0.02    Titrate by: (in mcg/kg/min) 0.02    Titrate interval: (in minutes) 5    Titrate to maintain: (MAP or SBP) MAP    Greater than: (in mmHg) 60    Maximum dose: (in mcg/kg/min) 3        -- IV Continuous 11/03/23 1756    11/02/23 0838  succinylcholine (ANECTINE) 20 mg/mL injection        Note to Pharmacy: Created by cabinet override    11/02/23 2044   11/02/23 0838    10/31/23 0015  NORepinephrine bitartrate-D5W 4 mg/250 mL (16 mcg/mL) PERIPHERAL access infusion        Question Answer Comment   Begin at (in mcg/kg/min): 0.02    Titrate by: (in mcg/kg/min) 0.02    Titrate interval: (in minutes) 5    Titrate to maintain: (MAP or SBP) MAP    Greater than: (in mmHg) 60    Maximum dose: (in mcg/kg/min) 0.2        11/01/23 0014 IV Continuous 10/30/23 2305          Hyperglycemia/Diabetes Medications:   Antihyperglycemics (From admission, onward)      Start     Stop Route Frequency Ordered    11/03/23 1845  insulin regular in 0.9 % NaCl 100 unit/100 mL (1 unit/mL) infusion        Question Answer Comment   Insulin rate changes (DO NOT  "MODIFY ANSWER) [email or web address]\Images\[email or web address]\InsulinInfusions\CTS INSULIN [id number]    Enter initial dose (Units/hr): 1        -- IV Continuous 11/03/23 1747             PHYSICAL EXAMINATION:  Vitals:    11/04/23 0815   BP:    Pulse: 88   Resp: (!) 22   Temp:      Body mass index is 22.25 kg/m².     Physical Exam  Constitutional:       Appearance: He is ill-appearing.   HENT:      Head: Normocephalic and atraumatic.   Pulmonary:      Effort: Pulmonary effort is normal.      Breath sounds: Normal breath sounds.      Comments: Intubated  Musculoskeletal:      Right lower leg: No edema.   Skin:     Findings: No lesion.      Comments: CTx3   Neurological:      Comments: Sedated              Labs Reviewed and Include   Recent Labs   Lab 11/04/23  0541   *   CALCIUM 7.9*   ALBUMIN 1.7*   PROT 4.6*   *   K 4.5   CO2 24   *   BUN 66*   CREATININE 1.5*   ALKPHOS 55   ALT 45*   AST 67*   BILITOT 2.5*     Lab Results   Component Value Date    WBC 28.25 (H) 11/04/2023    HGB 8.7 (L) 11/04/2023    HCT 25.5 (L) 11/04/2023    MCV 90 11/04/2023    PLT 89 (L) 11/04/2023     No results for input(s): "TSH", "FREET4" in the last 168 hours.  No results found for: "HGBA1C"    Nutritional status:   Body mass index is 22.25 kg/m².  Lab Results   Component Value Date    ALBUMIN 1.7 (L) 11/04/2023    ALBUMIN 1.8 (L) 11/04/2023    ALBUMIN 1.8 (L) 11/03/2023     No results found for: "PREALBUMIN"    Estimated Creatinine Clearance: 64.5 mL/min (A) (based on SCr of 1.5 mg/dL (H)).    Accu-Checks  Recent Labs     11/03/23  2356 11/04/23  0058 11/04/23  0207 11/04/23  0309 11/04/23  0400 11/04/23  0505 11/04/23  0542 11/04/23  0833 11/04/23  0906 11/04/23  1123   POCTGLUCOSE 141* 121* 84 127* 112* 121* 105 144* 139* 157*        ASSESSMENT and PLAN    Cardiac/Vascular  * Acute bacterial endocarditis  Managed per primary.   May impact BG      Renal/  FRANCESCO (acute kidney injury)  Titrate insulin slowly to avoid hypoglycemia " as the risk of hypoglycemia increases with decreased creatinine clearance.          Endocrine  Transient hyperglycemia post procedure  BG goal 140 - 180   No known hx of DM.  S/p MVR for endocarditis.    -Discontinue IV insulin infusion protocol  -Start on transition drip at 1.2 u/hr w/ stepdown parameters  -Start /50  -POCT q4 hr      ** Please notify Endocrine for any change and/or advance in diet**  ** Please call Endocrine for any BG related issues **     Discharge Planning:   TBD. Please notify endocrinology prior to discharge.          Orthopedic  Surgical wound present  Optimize BG for surgical wound healing.             Plan discussed with patient, family, and RN at bedside.     Mark Gamble PA-C  Endocrinology  [facility]

## 2023-11-04 NOTE — CARE UPDATE
Plan of care reviewed with patient and family. Patient able to follow commands, moves all extremities when sedation paused. Levo and EPi gtts @ 0.04 each. Watson with 80-150ml/hr. Minimal output from chestubes. IABP removed by CTS fellow, pulses +2 bilaterally in lower extremities.  Pt with no skin breakdown to sacrum or coccyx, pt now being turned every 2 hours. Plan is once 6hrs has passes post IABP removal, to attempt to pt patient on SBT. Family updated by team, PANDA.

## 2023-11-04 NOTE — SUBJECTIVE & OBJECTIVE
Interval HPI:   Overnight events: Received in 34230/08594 A. BG above goal on IV insulin infusion 1.5-10 cc/hr. Remains intubated. 1 Day Post-Op.   Eating: Diet NPO Except for: Sips with Medication  Nausea: No  Hypoglycemia and intervention: No  Fever: No  TPN and/or TF: No    PMH, PSH, FH, SH  reviewed       Review of Systems   Unable to perform ROS: Intubated     Unable to obtain due to: Sedation,Intubation,Altered mental status,Critical illness,Reviewed ROS from note dated 11/1/23 per Dr. Beal    Current Medications and/or Treatments Impacting Glycemic Control  Immunotherapy:    Immunosuppressants       None          Steroids:   Hormones (From admission, onward)      Start     Stop Route Frequency Ordered    11/03/23 1900  vasopressin (PITRESSIN) 0.2 Units/mL in dextrose 5 % (D5W) 100 mL infusion         -- IV Continuous 11/03/23 1756          Pressors:    Autonomic Drugs (From admission, onward)      Start     Stop Route Frequency Ordered    11/03/23 1845  EPINEPHrine 5 mg in dextrose 5% 250 mL infusion (premix)        Question Answer Comment   Begin at (in mcg/kg/min): 0.02    Titrate by: (in mcg/kg/min) 0.02    Titrate interval: (in minutes) 5    Titrate to maintain: (SBP or MAP or Cardiac Index) MAP    Greater than: (in mmHg) 60    Maximum dose: (in mcg/kg/min) 2        -- IV Continuous 11/03/23 1839    11/03/23 1800  NORepinephrine 4 mg in dextrose 5% 250 mL infusion (premix)        Question Answer Comment   Begin at (in mcg/kg/min): 0.02    Titrate by: (in mcg/kg/min) 0.02    Titrate interval: (in minutes) 5    Titrate to maintain: (MAP or SBP) MAP    Greater than: (in mmHg) 60    Maximum dose: (in mcg/kg/min) 3        -- IV Continuous 11/03/23 1756    11/02/23 0838  succinylcholine (ANECTINE) 20 mg/mL injection        Note to Pharmacy: Created by cabinet override    11/02/23 2044   11/02/23 0838    10/31/23 0015  NORepinephrine bitartrate-D5W 4 mg/250 mL (16 mcg/mL) PERIPHERAL access infusion         Question Answer Comment   Begin at (in mcg/kg/min): 0.02    Titrate by: (in mcg/kg/min) 0.02    Titrate interval: (in minutes) 5    Titrate to maintain: (MAP or SBP) MAP    Greater than: (in mmHg) 60    Maximum dose: (in mcg/kg/min) 0.2        11/01/23 0014 IV Continuous 10/30/23 2305          Hyperglycemia/Diabetes Medications:   Antihyperglycemics (From admission, onward)      Start     Stop Route Frequency Ordered    11/03/23 1845  insulin regular in 0.9 % NaCl 100 unit/100 mL (1 unit/mL) infusion        Question Answer Comment   Insulin rate changes (DO NOT MODIFY ANSWER) [email or web address]\Images\[email or web address]\InsulinInfusions\CTS INSULIN [id number]    Enter initial dose (Units/hr): 1        -- IV Continuous 11/03/23 1747             PHYSICAL EXAMINATION:  Vitals:    11/04/23 0815   BP:    Pulse: 88   Resp: (!) 22   Temp:      Body mass index is 22.25 kg/m².     Physical Exam  Constitutional:       Appearance: He is ill-appearing.   HENT:      Head: Normocephalic and atraumatic.   Pulmonary:      Effort: Pulmonary effort is normal.      Breath sounds: Normal breath sounds.      Comments: Intubated  Musculoskeletal:      Right lower leg: No edema.   Skin:     Findings: No lesion.      Comments: CTx3   Neurological:      Comments: Sedated

## 2023-11-04 NOTE — SUBJECTIVE & OBJECTIVE
Interval History/Significant Events: POD 1 s/p MV repair and bovine pericardial annuloplasty for infectious endocarditis. AF, HDS. .04 levo, .04 epi. Balloon pump in place 1:1, maintaining MAPs >65. Hypernatremic and FWF started.     Follow-up For: Procedure(s) (LRB):  REPAIR, MITRAL VALVE, OPEN (N/A)  EXCLUSION, LEFT ATRIAL APPENDAGE, OPEN, AS PART OF OPEN CHEST SURGERY (Left)    Post-Operative Day: 1 Day Post-Op    Objective:     Vital Signs (Most Recent):  Temp: 99 °F (37.2 °C) (11/04/23 1600)  Pulse: 93 (11/04/23 1800)  Resp: (!) 22 (11/04/23 1800)  BP: 101/62 (11/04/23 1800)  SpO2: 100 % (11/04/23 1800) Vital Signs (24h Range):  Temp:  [97.6 °F (36.4 °C)-99 °F (37.2 °C)] 99 °F (37.2 °C)  Pulse:  [] 93  Resp:  [18-30] 22  SpO2:  [99 %-100 %] 100 %  BP: ()/(52-72) 101/62  Arterial Line BP: ()/() 95/56     Weight: (S) 76.5 kg (168 lb 10.4 oz)  Body mass index is 22.25 kg/m².      Intake/Output Summary (Last 24 hours) at 11/4/2023 1828  Last data filed at 11/4/2023 1800  Gross per 24 hour   Intake 2966.15 ml   Output 2815 ml   Net 151.15 ml          Physical Exam  Vitals and nursing note reviewed.   Constitutional:       General: He is not in acute distress.  Cardiovascular:      Rate and Rhythm: Normal rate.      Comments: Balloon pump in place 1:1  Pulmonary:      Comments: Intubated    Abdominal:      General: There is no distension.      Palpations: Abdomen is soft.   Skin:     General: Skin is warm and dry.      Comments: MSI CDI   Neurological:      Mental Status: He is alert.      Comments: Sedated             Vents:  Vent Mode: A/C (11/04/23 1620)  Ventilator Initiated: Yes (11/03/23 1805)  Set Rate: 22 BPM (11/04/23 1620)  Vt Set: 500 mL (11/04/23 1620)  PEEP/CPAP: 5 cmH20 (11/04/23 1620)  Oxygen Concentration (%): 40 (11/04/23 1800)  Peak Airway Pressure: 21 cmH20 (11/04/23 1620)  Plateau Pressure: 17 cmH20 (11/04/23 1620)  Total Ve: 11 L/m (11/04/23 1620)  Negative Inspiratory  Force (cm H2O): 0 (11/04/23 1620)  F/VT Ratio<105 (RSBI): (!) 43.82 (11/04/23 1620)    Lines/Drains/Airways       Central Venous Catheter Line  Duration             Trialysis (Dialysis) Catheter 11/03/23 1341 left internal jugular 1 day              Drain  Duration                  Urethral Catheter 11/02/23 1030 2 days         Chest Tube 11/03/23 1723 Tube - 3 Right Pleural 19 Fr. 1 day         Y Chest Tube 1 and 2 11/03/23 1722 1 Left Mediastinal 19 Fr. 2 Right Mediastinal 19 Fr. 1 day         NG/OG Tube 11/03/23 1832 orogastric 18 Fr. Right mouth <1 day              Airway  Duration                  Airway - Non-Surgical 11/02/23 0855 Endotracheal Tube 2 days              Arterial Line  Duration             Arterial Line 11/02/23 1031 Right Radial 2 days              Line  Duration                  Pacer Wires 11/03/23 1700 1 day              Peripheral Intravenous Line  Duration                  Peripheral IV - Single Lumen 10/30/23 1924 20 G Right Antecubital 4 days         Peripheral IV - Single Lumen 10/30/23 2047 20 G Anterior;Left Forearm 4 days         Peripheral IV - Single Lumen 10/31/23 1230 18 G Anterior;Distal;Right Forearm 4 days                    Significant Labs:    CBC/Anemia Profile:  Recent Labs   Lab 11/03/23  0306 11/03/23  1342 11/03/23  1812 11/03/23  1815 11/03/23  2117 11/03/23  2226 11/04/23  0312   WBC 17.45*  --  37.84*  --   --   --  28.25*   HGB 10.1*  --  9.1*  --   --   --  8.7*   HCT 30.5*   < > 27.2*   < > 26* 27* 25.5*   PLT 94*  --  90*  --   --   --  89*   MCV 92  --  93  --   --   --  90   RDW 14.6*  --  14.6*  --   --   --  14.6*    < > = values in this interval not displayed.        Chemistries:  Recent Labs   Lab 11/03/23  1812 11/04/23  0038 11/04/23  0312 11/04/23  0541 11/04/23  1639   *   < > 149* 150* 151*   K 3.1*  3.1*   < > 3.9 4.5 4.3      < > 112* 114* 113*   CO2 27   < > 27 24 26   BUN 66*   < > 71* 66* 77*   CREATININE 1.4   < > 1.5* 1.5* 1.7*    CALCIUM 9.5   < > 8.5* 7.9* 7.7*   ALBUMIN 1.8*  --  1.8* 1.7* 1.7*   PROT 4.7*  --  4.7* 4.6* 4.8*   BILITOT 2.0*  --  2.7* 2.5* 2.1*   ALKPHOS 62  --  58 55 57   ALT 52*  --  47* 45* 40   AST 73*  --  68* 67* 60*   MG 3.0*  --   --  2.5 2.5   PHOS 2.9  --   --  1.6* 3.1    < > = values in this interval not displayed.       All pertinent labs within the past 24 hours have been reviewed.    Significant Imaging:  I have reviewed all pertinent imaging results/findings within the past 24 hours.

## 2023-11-04 NOTE — PROGRESS NOTES
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  - s/p MV repair with bioprosthetic material  - continue vancomycin, keeping trough 15-20  - vanc tr 21 -> stop clinda  - repeat blood cultures in am  - anticipating 6 weeks of abx from clearance of bacteremia/MVR  - d/w  Mom and Dad    Mark Montejo MD  Infectious Disease  [facility] - Surgical Intensive Care    Subjective:     Principal Problem:Acute bacterial endocarditis    HPI: This is the strange case of a 47 yo man with no medical problems who reportedly developed acute onset of symptoms a few days ago. He was seen in several urgent cares w/headache, abdominal pain with nausea but no vomiting or diarrhea.  There he was evaluated and instructed to take Tylenol and follow up if he still felt unwell.  When he presented with hematuria, he was sent to the ED here and was found to have thrombocytopenia. CT head negative, CT C/A/P showing perinephric stranding, distended bladder, and hiatal hernia. Patient admitted to MICU for further workup and treatment of his hypotension and likely severe sepsis. Blood cultures have grown MRSA and a TTE revealed a flail mitral leflet and a possible vegetation on the aortic valve. ID is consulted for that. The patient is accompanied by his mother. He is scheduled for a NEW. Denies IDU. Denetal implants. HIV NR.       Interval History: Stable in SICU. Intubated. Parents at bedside. POD#1 s/p MV repair.    Review of Systems   Unable to perform ROS: Intubated     Objective:     Vital Signs (Most Recent):  Temp: 99 °F (37.2 °C) (11/04/23 0700)  Pulse: 91 (11/04/23  1000)  Resp: (!) 22 (11/04/23 1000)  BP: 100/65 (11/04/23 1000)  SpO2: 100 % (11/04/23 1000) Vital Signs (24h Range):  Temp:  [97.4 °F (36.3 °C)-99 °F (37.2 °C)] 99 °F (37.2 °C)  Pulse:  [] 91  Resp:  [10-30] 22  SpO2:  [99 %-100 %] 100 %  BP: ()/(52-72) 100/65  Arterial Line BP: ()/() 94/51     Weight: (S) 76.5 kg (168 lb 10.4 oz)  Body mass index is 22.25 kg/m².    Estimated Creatinine Clearance: 64.5 mL/min (A) (based on SCr of 1.5 mg/dL (H)).     Physical Exam  Vitals and nursing note reviewed.   Constitutional:       General: He is not in acute distress.     Appearance: Normal appearance. He is diaphoretic. He is not ill-appearing or toxic-appearing.   HENT:      Head: Normocephalic and atraumatic.   Cardiovascular:      Rate and Rhythm: Normal rate and regular rhythm.      Heart sounds: No murmur heard.  Abdominal:      General: There is no distension.      Tenderness: There is no abdominal tenderness.   Musculoskeletal:         General: Swelling present.   Neurological:      General: No focal deficit present.      Mental Status: He is alert and oriented to person, place, and time.   Psychiatric:         Mood and Affect: Mood normal.         Behavior: Behavior normal.         Thought Content: Thought content normal.         Judgment: Judgment normal.          Significant Labs: Blood Culture:   Recent Labs   Lab 10/30/23  1924 11/01/23  0607 11/01/23  0609 11/03/23  0609 11/03/23  0614   LABBLOO Gram stain aer bottle: Gram positive cocci in clusters resembling Staph  Gram stain carlie bottle: Gram positive cocci in clusters resembling Staph  Results called to and read back by: Savannah Butler RN 10/31/2023  06  METHICILLIN RESISTANT STAPHYLOCOCCUS AUREUS  ID consult required at [facility] and [facility] locations.  * Gram stain aer bottle: Gram positive cocci in clusters resembling Staph  Positive results previously called 11/02/2023  04:20  METHICILLIN RESISTANT STAPHYLOCOCCUS  "AUREUS  For susceptibility see order #[id number]  * Gram stain aer bottle: Gram positive cocci in clusters resembling Staph  Results called to and read back by:Melissa Ferro RN 11/01/2023  22:25  Gram stain carlie bottle: Gram positive cocci in clusters resembling Staph  Positive results previously called 11/03/2023  21:56  METHICILLIN RESISTANT STAPHYLOCOCCUS AUREUS  ID consult required at [facility],[place] and [facility].  For susceptibility see order #[id number]  * Gram stain aer bottle: Gram positive cocci in clusters resembling Staph  Positive results previously called  11/04/2023 06:05 Gram stain aer bottle: Gram positive cocci in clusters resembling Staph  Results called to and read back by:Manuel Centeno Rn 11/04/2023  04:12     CBC:   Recent Labs   Lab 11/03/23  0306 11/03/23  1342 11/03/23  1812 11/03/23  1815 11/03/23  2117 11/03/23  2226 11/04/23  0312   WBC 17.45*  --  37.84*  --   --   --  28.25*   HGB 10.1*  --  9.1*  --   --   --  8.7*   HCT 30.5*   < > 27.2*   < > 26* 27* 25.5*   PLT 94*  --  90*  --   --   --  89*    < > = values in this interval not displayed.     Wound Culture: No results for input(s): "LABAERO" in the last 4320 hours.    Significant Imaging: I have reviewed all pertinent imaging results/findings within the past 24 hours.    "

## 2023-11-04 NOTE — HPI
"Reason for Consult: Management of Hyperglycemia     Surgical Procedure and Date: s/p MVr    No known hx of DM and not on meds at home.    HPI:   Patient is a 48 y.o. male with a diagnosis of no past medical history admitted 10/30 for sepsis. Last Friday, 10/27, he developed headache, abdominal pain, and nausea. He presented to , who prescribed Tylenol and recommended he return if he continued to feel poorly. He presented to [facility] 10/30 with worsening abdominal pain, as well as fatigue, body aches, and hematuria. He was found to be tachycardic and hypotensive requiring vasopressor support. He was admitted to MICU for management of sepsis 2/2 PNA vs UTI vs meningitis (reported symptoms of neck stiffness). He was unable to undergo LP due to thrombocytopenia of 23. He is currently being evaluated for TTP. Further work up revealed MRSA bacteremia. He underwent TTE, which was notable for mitral valve vegetation with flail anterior leaflet, as well as possible aortic valve vegetation. Patient is now s/p MVr and resection of left atrial appendage. Endocrinology consulted for BG management post-operatively.     No results found for: "LABA1C", "HGBA1C"           "

## 2023-11-04 NOTE — ASSESSMENT & PLAN NOTE
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  - s/p MV repair with bioprosthetic material  - continue vancomycin, keeping trough 15-20  - vanc tr 21 -> stop clinda  - repeat blood cultures in am  - anticipating 6 weeks of abx from clearance of bacteremia/MVR  - d/w  Mom and Dad

## 2023-11-04 NOTE — PLAN OF CARE
Please merge this note with today's resident critical care note.    SICU Staff Addendum  I have reviewed and concur with the resident's/NP's history, physical, assessment, and plan.  I have personally interviewed and examined the patient at bedside.  See below for any additional findings/changes.    Reason for admission:  Acute bacterial endocarditis  Present on Admission:   Septic shock   FRANCESCO (acute kidney injury)   Thrombocytopenia   Elevated transaminase level   Endocarditis   Elevated troponin   Heart failure with mid-range ejection fraction (HFmEF)   Acute hypoxemic respiratory failure   MRSA bacteremia   Severe mitral regurgitation   Acute bacterial endocarditis      Active issues/Goals for Today:     POD 1 s/p MV repair and palcement of bovine pericardial annuloplasty in the setting of infectious endocarditis     - Daily SAT. Propofol for RASS -2 and opioids for pain control. Plan for SBT and weaning off propofol today.  - Removing IABP today.  - Epi/levo for MAP > 60. Would wean epi first in the setting of ZAK tendency when hyperdynamic on post-intervention NEW.  - FWF for hypernatremia. Monitoring sodium every 8 hours.  - F/U OR cultures. Currently on vancomycin for MRSA endocarditis. ID recs noted and appreciated.   - FRANCESCO noted. Holding diuresis for now.  - He will need a PICC line eventually.    46 minutes of critical care time was spent personally by me on the following activities: development of treatment plan with patient or surrogate and bedside caregivers, discussions with consultants, evaluation of patient's response to treatment, examination of patient, ordering and performing treatments and interventions, ordering and review of laboratory studies, ordering and review of radiographic studies, pulse oximetry, re-evaluation of patient's condition.  This critical care time did not overlap with that of any other provider or involve time for any procedures.    Edin Robledo,  MD  Anesthesiology/Critical Care

## 2023-11-04 NOTE — ASSESSMENT & PLAN NOTE
BG goal 140 - 180   No known hx of DM.  S/p MVR for endocarditis.    -Discontinue IV insulin infusion protocol  -Start on transition drip at 1.2 u/hr w/ stepdown parameters  -Start /50  -POCT q4 hr      ** Please notify Endocrine for any change and/or advance in diet**  ** Please call Endocrine for any BG related issues **     Discharge Planning:   TBD. Please notify endocrinology prior to discharge.

## 2023-11-04 NOTE — ASSESSMENT & PLAN NOTE
Neuro/Psych:     - Sedation: propofol    - Pain:    - Scheduled Tylenol 1g q8h   - d/c Fentanyl, start dilaudid PRN while intubated, Oxy PRN             Cardiac:     - S/P MVR with Dr. Beal on 11/3/2023    - BP Goal: MAP 60-80, SBP <140    - IABP 1:1, start 1:2 at 5AM for removal today     - Cleviprex PRN    - Pressors: vaso 0.04, adding epi 0.04 for inotropic support, wean    - Anti-HTNs: Will start when appropriate    - Rhythm: NSR    - Beta blocker: Will start when appropriate    - Statin: Atorvastatin 40 mg QD      Pulmonary:     - Goal SpO2 >92%    - Will wean ventilator support as tolerated to extubate    - Chest Tubes x 3 (2 Meds & 1 Pleural), Meds (180cc), Pleural (140cc)    - ABGs PRN      Renal:    - Trend BUN/Cr     - Maintain Watson, record strict Is/Os    Recent Labs   Lab 11/04/23 0312 11/04/23  0541 11/04/23  1639   BUN 71* 66* 77*   CREATININE 1.5* 1.5* 1.7*         FEN / GI:     - Daily CMP, PRN K/Mag/Phos per protocol     - Replace electrolytes as needed    - Nutrition: NPO pending extubation    - Bowel Regimen: Miralax, docusate    - hypernatremic, started  TID. Increase if not improving       ID:     - Afebrile    - WBC stable    - Abx: vanc x6 weeks. Clindamycin to mediate toxins   - will eventually need PICC    Recent Labs   Lab 11/03/23 0306 11/03/23 1812 11/04/23 0312   WBC 17.45* 37.84* 28.25*         Heme/Onc:     - Hgb 10.1 pre-operatively    - CBC daily    - ASA 325mg daily    Recent Labs   Lab 10/30/23  2021 10/30/23  2327 11/03/23  0306 11/03/23  1812 11/04/23  0312   HGB  --    < > 10.1* 9.1* 8.7*   PLT  --    < > 94* 90* 89*   APTT 44.4*  --   --  24.8 22.2   INR 1.0  --   --  1.3* 1.1    < > = values in this interval not displayed.         Endocrine:     - CTS Goal -140    - HgbA1c: not on file    - Endocrinology consulted for insulin management      PPx:   Feeding: npo  Analgesia/Sedation: multimodal, dialudid / prop  Thromboembolic Prevention: SCDs  HOB >30:  Yes  Stress Ulcer: pepcid  Glucose Control: Yes, insulin management per Endocrinology     Lines/Drains/Airway:   ETT   IABP   Left radial arterial line   RIJ CVC   Trialysis   Left subclavian cordis   Watson   Chest Tubes: 3   Pacing Wires: Temporary ventricular pacing wires     D/c KASHIF CVC, L subclavia cordis and swan      Dispo/Code Status/Palliative:     - Continue SICU Care    - Full Code

## 2023-11-04 NOTE — CONSULTS
"  [facility] - Surgical Intensive Care  Adult Nutrition  Consult Note    SUMMARY     Recommendations   1. If EN warranted rec'd Impact Peptide 1.5 to goal rate @ 45 ml/hr to provide 1620 kcals, 101 g of protein, and 832 ml of fluid.   2. If pt able to tolerate a diet rec'd cardiac diet-fluid per MD (texture per SLP)   3. RD following    Goals: Meet % of EEN/EPN by RD f/u date  Nutrition Goal Status: goal not met  Communication of RD Recs:POC    Assessment and Plan    Nutrition Problem  Inadequate energy intake    Related to (etiology):   Physiological demands     Signs and Symptoms (as evidenced by):   NPO status     Interventions (treatment strategy):  Collaboration of nutrition care w/ other providers     Nutrition Diagnosis Status:   New     Reason for Assessment    Reason For Assessment: consult  Diagnosis: cardiac disease  Relevant Medical History: no PMHx documented.  Interdisciplinary Rounds: did not attend  General Information Comments: Pt is a 49 yo M with no past medical history admitted 10/30 with sepsis 2/2 MRSA bacteremia requiring vasopressor support .RD consulted for post op cardiac surgery. Pt is intubated/sedated at this time. Unable to obtain nutriton hx PTA. Alternate nutrition recommendations provided in POC if warranted. Per chart review, limited wt hx available. Unable to assess for malnutrition at this time, will re-assess at RD f/u. LBM noted-11/1/23  Nutrition Discharge Planning: pending clinical course    Nutrition Risk Screen  Level of Risk/Frequency of Follow-up: (1x/week)       Anthropometrics    Temp: 98.8 °F (37.1 °C)  Height: 6' 1" (185.4 cm)  Height (inches): 73 in  Weight Method: Bed Scale  Weight: (S) 76.5 kg (168 lb 10.4 oz)  Weight (lb): 168.65 lb  Ideal Body Weight (IBW), Male: 184 lb  % Ideal Body Weight, Male (lb): 89.13 %  BMI (Calculated): 22.3  BMI Grade: 18.5-24.9 - normal       Lab/Procedures/Meds    Pertinent Labs Reviewed: reviewed  Pertinent Labs Comments: Sodium " 150, BUN 66, GFR 56.7, AST 67, ALT 45  Pertinent Medications Reviewed: reviewed  Pertinent Medications Comments: propofol    Estimated/Assessed Needs    Weight Used For Calorie Calculations: 76.5 kg (168 lb 10.4 oz)  Energy Calorie Requirements (kcal): 1683  Energy Need Method: [place]  Protein Requirements:  g (1.2-1.5 g/kg)  Weight Used For Protein Calculations: 76.5 kg (168 lb 10.4 oz)        RDA Method (mL): 1683         Nutrition Prescription Ordered    Current Diet Order: NPO    Evaluation of Received Nutrient/Fluid Intake    I/O: +443 ml since admit  Energy Calories Required: not meeting needs  Protein Required: not meeting needs  Fluid Required: not meeting needs  Total Fluid Intake (mL/kg): 1 ml or fluid per MD  Tolerance: tolerating  % Intake of Estimated Energy Needs: 0 - 25 %  % Meal Intake: 0 - 25 %    Nutrition Risk    Level of Risk/Frequency of Follow-up: low ((1x/week))       Monitor and Evaluation    Food and Nutrient Intake: energy intake, food and beverage intake  Food and Nutrient Adminstration: diet order  Knowledge/Beliefs/Attitudes: food and nutrition knowledge/skill, beliefs and attitudes  Physical Activity and Function: nutrition-related ADLs and IADLs, factors affecting access to physical activity  Anthropometric Measurements: height/length, weight, weight change, body mass index, growth pattern indices/percentile ranks  Biochemical Data, Medical Tests and Procedures: gastrointestinal profile, electrolyte and renal panel, glucose/endocrine profile, inflammatory profile, lipid profile  Nutrition-Focused Physical Findings: overall appearance, extremities, muscles and bones, head and eyes, skin       Nutrition Follow-Up    RD Follow-up?: Yes

## 2023-11-04 NOTE — PROGRESS NOTES
[facility]  Critical Care - Surgery  Progress Note    Patient Name: Lorenzo Nino  MRN: [id number]  Admission Date: 10/30/2023  Hospital Length of Stay: 5 days  Code Status: Full Code  Attending Provider: Manuel Beal MD  Primary Care Provider: No primary care provider on file.   Principal Problem: Acute bacterial endocarditis    Subjective:     Hospital/ICU Course:  No notes on file    Interval History/Significant Events: POD 1 s/p MV repair and bovine pericardial annuloplasty for infectious endocarditis. AF, HDS. .04 levo, .04 epi. Balloon pump in place 1:1, maintaining MAPs >65. Hypernatremic and FWF started.     Follow-up For: Procedure(s) (LRB):  REPAIR, MITRAL VALVE, OPEN (N/A)  EXCLUSION, LEFT ATRIAL APPENDAGE, OPEN, AS PART OF OPEN CHEST SURGERY (Left)    Post-Operative Day: 1 Day Post-Op    Objective:     Vital Signs (Most Recent):  Temp: 99 °F (37.2 °C) (11/04/23 1600)  Pulse: 93 (11/04/23 1800)  Resp: (!) 22 (11/04/23 1800)  BP: 101/62 (11/04/23 1800)  SpO2: 100 % (11/04/23 1800) Vital Signs (24h Range):  Temp:  [97.6 °F (36.4 °C)-99 °F (37.2 °C)] 99 °F (37.2 °C)  Pulse:  [] 93  Resp:  [18-30] 22  SpO2:  [99 %-100 %] 100 %  BP: ()/(52-72) 101/62  Arterial Line BP: ()/() 95/56     Weight: (S) 76.5 kg (168 lb 10.4 oz)  Body mass index is 22.25 kg/m².      Intake/Output Summary (Last 24 hours) at 11/4/2023 1828  Last data filed at 11/4/2023 1800  Gross per 24 hour   Intake 2966.15 ml   Output 2815 ml   Net 151.15 ml          Physical Exam  Vitals and nursing note reviewed.   Constitutional:       General: He is not in acute distress.  Cardiovascular:      Rate and Rhythm: Normal rate.      Comments: Balloon pump in place 1:1  Pulmonary:      Comments: Intubated    Abdominal:      General: There is no distension.      Palpations: Abdomen is soft.   Skin:     General: Skin is warm and dry.      Comments: MSI CDI   Neurological:      Mental Status: He is  alert.      Comments: Sedated             Vents:  Vent Mode: A/C (11/04/23 1620)  Ventilator Initiated: Yes (11/03/23 1805)  Set Rate: 22 BPM (11/04/23 1620)  Vt Set: 500 mL (11/04/23 1620)  PEEP/CPAP: 5 cmH20 (11/04/23 1620)  Oxygen Concentration (%): 40 (11/04/23 1800)  Peak Airway Pressure: 21 cmH20 (11/04/23 1620)  Plateau Pressure: 17 cmH20 (11/04/23 1620)  Total Ve: 11 L/m (11/04/23 1620)  Negative Inspiratory Force (cm H2O): 0 (11/04/23 1620)  F/VT Ratio<105 (RSBI): (!) 43.82 (11/04/23 1620)    Lines/Drains/Airways       Central Venous Catheter Line  Duration             Trialysis (Dialysis) Catheter 11/03/23 1341 left internal jugular 1 day              Drain  Duration                  Urethral Catheter 11/02/23 1030 2 days         Chest Tube 11/03/23 1723 Tube - 3 Right Pleural 19 Fr. 1 day         Y Chest Tube 1 and 2 11/03/23 1722 1 Left Mediastinal 19 Fr. 2 Right Mediastinal 19 Fr. 1 day         NG/OG Tube 11/03/23 1832 orogastric 18 Fr. Right mouth <1 day              Airway  Duration                  Airway - Non-Surgical 11/02/23 0855 Endotracheal Tube 2 days              Arterial Line  Duration             Arterial Line 11/02/23 1031 Right Radial 2 days              Line  Duration                  Pacer Wires 11/03/23 1700 1 day              Peripheral Intravenous Line  Duration                  Peripheral IV - Single Lumen 10/30/23 1924 20 G Right Antecubital 4 days         Peripheral IV - Single Lumen 10/30/23 2047 20 G Anterior;Left Forearm 4 days         Peripheral IV - Single Lumen 10/31/23 1230 18 G Anterior;Distal;Right Forearm 4 days                    Significant Labs:    CBC/Anemia Profile:  Recent Labs   Lab 11/03/23  0306 11/03/23  1342 11/03/23  1812 11/03/23  1815 11/03/23  2117 11/03/23  2226 11/04/23  0312   WBC 17.45*  --  37.84*  --   --   --  28.25*   HGB 10.1*  --  9.1*  --   --   --  8.7*   HCT 30.5*   < > 27.2*   < > 26* 27* 25.5*   PLT 94*  --  90*  --   --   --  89*   MCV 92   --  93  --   --   --  90   RDW 14.6*  --  14.6*  --   --   --  14.6*    < > = values in this interval not displayed.        Chemistries:  Recent Labs   Lab 11/03/23  1812 11/04/23  0038 11/04/23  0312 11/04/23  0541 11/04/23  1639   *   < > 149* 150* 151*   K 3.1*  3.1*   < > 3.9 4.5 4.3      < > 112* 114* 113*   CO2 27   < > 27 24 26   BUN 66*   < > 71* 66* 77*   CREATININE 1.4   < > 1.5* 1.5* 1.7*   CALCIUM 9.5   < > 8.5* 7.9* 7.7*   ALBUMIN 1.8*  --  1.8* 1.7* 1.7*   PROT 4.7*  --  4.7* 4.6* 4.8*   BILITOT 2.0*  --  2.7* 2.5* 2.1*   ALKPHOS 62  --  58 55 57   ALT 52*  --  47* 45* 40   AST 73*  --  68* 67* 60*   MG 3.0*  --   --  2.5 2.5   PHOS 2.9  --   --  1.6* 3.1    < > = values in this interval not displayed.       All pertinent labs within the past 24 hours have been reviewed.    Significant Imaging:  I have reviewed all pertinent imaging results/findings within the past 24 hours.  Assessment/Plan:     Cardiac/Vascular  Endocarditis    Neuro/Psych:     - Sedation: propofol    - Pain:    - Scheduled Tylenol 1g q8h   - d/c Fentanyl, start dilaudid PRN while intubated, Oxy PRN             Cardiac:     - S/P MVR with Dr. Beal on 11/3/2023    - BP Goal: MAP 60-80, SBP <140    - IABP 1:1, start 1:2 at 5AM for removal today     - Cleviprex PRN    - Pressors: vaso 0.04, adding epi 0.04 for inotropic support, wean    - Anti-HTNs: Will start when appropriate    - Rhythm: NSR    - Beta blocker: Will start when appropriate    - Statin: Atorvastatin 40 mg QD      Pulmonary:     - Goal SpO2 >92%    - Will wean ventilator support as tolerated to extubate    - Chest Tubes x 3 (2 Meds & 1 Pleural), Meds (180cc), Pleural (140cc)    - ABGs PRN      Renal:    - Trend BUN/Cr     - Maintain Watson, record strict Is/Os    Recent Labs   Lab 11/04/23  0312 11/04/23  0541 11/04/23  1639   BUN 71* 66* 77*   CREATININE 1.5* 1.5* 1.7*         FEN / GI:     - Daily CMP, PRN K/Mag/Phos per protocol     - Replace  electrolytes as needed    - Nutrition: NPO pending extubation    - Bowel Regimen: Miralax, docusate    - hypernatremic, started  TID. Increase if not improving       ID:     - Afebrile    - WBC stable    - Abx: vanc x6 weeks. Clindamycin to mediate toxins   - will eventually need PICC    Recent Labs   Lab 11/03/23  0306 11/03/23  1812 11/04/23  0312   WBC 17.45* 37.84* 28.25*         Heme/Onc:     - Hgb 10.1 pre-operatively    - CBC daily    - ASA 325mg daily    Recent Labs   Lab 10/30/23  2021 10/30/23  2327 11/03/23  0306 11/03/23  1812 11/04/23  0312   HGB  --    < > 10.1* 9.1* 8.7*   PLT  --    < > 94* 90* 89*   APTT 44.4*  --   --  24.8 22.2   INR 1.0  --   --  1.3* 1.1    < > = values in this interval not displayed.         Endocrine:     - CTS Goal -140    - HgbA1c: not on file    - Endocrinology consulted for insulin management      PPx:   Feeding: npo  Analgesia/Sedation: multimodal, dialudid / prop  Thromboembolic Prevention: SCDs  HOB >30: Yes  Stress Ulcer: pepcid  Glucose Control: Yes, insulin management per Endocrinology     Lines/Drains/Airway:   ETT   IABP   Left radial arterial line   RIJ CVC   Trialysis   Left subclavian cordis   Watson   Chest Tubes: 3   Pacing Wires: Temporary ventricular pacing wires     D/c KASHIF CVC, L subclavia cordis and swan      Dispo/Code Status/Palliative:     - Continue SICU Care    - Full Code            Luz Loaiza MD  Critical Care - Surgery  [facility]

## 2023-11-04 NOTE — SUBJECTIVE & OBJECTIVE
Interval History: Stable in SICU. Intubated. Parents at bedside. POD#1 s/p MV repair.    Review of Systems   Unable to perform ROS: Intubated     Objective:     Vital Signs (Most Recent):  Temp: 99 °F (37.2 °C) (11/04/23 0700)  Pulse: 91 (11/04/23 1000)  Resp: (!) 22 (11/04/23 1000)  BP: 100/65 (11/04/23 1000)  SpO2: 100 % (11/04/23 1000) Vital Signs (24h Range):  Temp:  [97.4 °F (36.3 °C)-99 °F (37.2 °C)] 99 °F (37.2 °C)  Pulse:  [] 91  Resp:  [10-30] 22  SpO2:  [99 %-100 %] 100 %  BP: ()/(52-72) 100/65  Arterial Line BP: ()/() 94/51     Weight: (S) 76.5 kg (168 lb 10.4 oz)  Body mass index is 22.25 kg/m².    Estimated Creatinine Clearance: 64.5 mL/min (A) (based on SCr of 1.5 mg/dL (H)).     Physical Exam  Vitals and nursing note reviewed.   Constitutional:       General: He is not in acute distress.     Appearance: Normal appearance. He is diaphoretic. He is not ill-appearing or toxic-appearing.   HENT:      Head: Normocephalic and atraumatic.   Cardiovascular:      Rate and Rhythm: Normal rate and regular rhythm.      Heart sounds: No murmur heard.  Abdominal:      General: There is no distension.      Tenderness: There is no abdominal tenderness.   Musculoskeletal:         General: Swelling present.   Neurological:      General: No focal deficit present.      Mental Status: He is alert and oriented to person, place, and time.   Psychiatric:         Mood and Affect: Mood normal.         Behavior: Behavior normal.         Thought Content: Thought content normal.         Judgment: Judgment normal.          Significant Labs: Blood Culture:   Recent Labs   Lab 10/30/23  1924 11/01/23  0607 11/01/23  0609 11/03/23  0609 11/03/23  0614   LABBLOO Gram stain aer bottle: Gram positive cocci in clusters resembling Staph  Gram stain carlie bottle: Gram positive cocci in clusters resembling Staph  Results called to and read back by: Savannah Butler RN 10/31/2023  06  METHICILLIN RESISTANT STAPHYLOCOCCUS  "AUREUS  ID consult required at [facility] and [facility].  * Gram stain aer bottle: Gram positive cocci in clusters resembling Staph  Positive results previously called 11/02/2023  04:20  METHICILLIN RESISTANT STAPHYLOCOCCUS AUREUS  For susceptibility see order #[id number]  * Gram stain aer bottle: Gram positive cocci in clusters resembling Staph  Results called to and read back by:Melissa Ferro RN 11/01/2023  22:25  Gram stain carlie bottle: Gram positive cocci in clusters resembling Staph  Positive results previously called 11/03/2023  21:56  METHICILLIN RESISTANT STAPHYLOCOCCUS AUREUS  ID consult required at [facility] and [facility].  For susceptibility see order #[id number]  * Gram stain aer bottle: Gram positive cocci in clusters resembling Staph  Positive results previously called  11/04/2023 06:05 Gram stain aer bottle: Gram positive cocci in clusters resembling Staph  Results called to and read back by:Manuel Centeno Rn 11/04/2023  04:12     CBC:   Recent Labs   Lab 11/03/23  0306 11/03/23  1342 11/03/23  1812 11/03/23  1815 11/03/23  2117 11/03/23  2226 11/04/23  0312   WBC 17.45*  --  37.84*  --   --   --  28.25*   HGB 10.1*  --  9.1*  --   --   --  8.7*   HCT 30.5*   < > 27.2*   < > 26* 27* 25.5*   PLT 94*  --  90*  --   --   --  89*    < > = values in this interval not displayed.     Wound Culture: No results for input(s): "LABAERO" in the last 4320 hours.    Significant Imaging: I have reviewed all pertinent imaging results/findings within the past 24 hours.  "

## 2023-11-04 NOTE — PROGRESS NOTES
Pharmacokinetic Assessment Follow Up: IV Vancomycin    Vancomycin serum concentration assessment(s):    The random level was drawn correctly and can be used to guide therapy at this time. The measurement is above the desired definitive target range of 15 to 20 mcg/mL.    Vancomycin Regimen Plan:    Based on PK calculations, anticipate level to be within re-dosing range at this time. Will give vancomycin 750 mg x1 (10 mg/kg). Re-dose when the random level is less than 20 mcg/mL, next level to be drawn at 0900 on 11/5 .     Drug levels (last 3 results):  Recent Labs   Lab Result Units 11/03/23  0854 11/04/23  0312   Vancomycin, Random ug/mL  --  21.5   Vancomycin-Trough ug/mL 30.6*  --        Pharmacy will continue to follow and monitor vancomycin.    Please contact pharmacy at extension [phone] for questions regarding this assessment.    Thank you for the consult,   Sid Tamayo       Patient brief summary:  Lorenzo Nino is a 48 y.o. male initiated on antimicrobial therapy with IV Vancomycin for treatment of endocarditis    Drug Allergies:   Review of patient's allergies indicates:  No Known Allergies    Actual Body Weight:   76.5 kg    Renal Function:   Estimated Creatinine Clearance: 64.5 mL/min (A) (based on SCr of 1.5 mg/dL (H)).,     Dialysis Method (if applicable):  N/A

## 2023-11-05 LAB
ALBUMIN SERPL BCP-MCNC: 1.8 G/DL (ref 3.5–5.2)
ALBUMIN SERPL BCP-MCNC: 1.9 G/DL (ref 3.5–5.2)
ALP SERPL-CCNC: 60 U/L (ref 55–135)
ALP SERPL-CCNC: 69 U/L (ref 55–135)
ALT SERPL W/O P-5'-P-CCNC: 39 U/L (ref 10–44)
ALT SERPL W/O P-5'-P-CCNC: 41 U/L (ref 10–44)
ANION GAP SERPL CALC-SCNC: 10 MMOL/L (ref 8–16)
ANION GAP SERPL CALC-SCNC: 8 MMOL/L (ref 8–16)
APTT PPP: 23 SEC (ref 21–32)
AST SERPL-CCNC: 56 U/L (ref 10–40)
AST SERPL-CCNC: 57 U/L (ref 10–40)
BACTERIA BLD CULT: ABNORMAL
BILIRUB SERPL-MCNC: 2 MG/DL (ref 0.1–1)
BILIRUB SERPL-MCNC: 2.1 MG/DL (ref 0.1–1)
BUN SERPL-MCNC: 60 MG/DL (ref 6–20)
BUN SERPL-MCNC: 71 MG/DL (ref 6–20)
CALCIUM SERPL-MCNC: 7.4 MG/DL (ref 8.7–10.5)
CALCIUM SERPL-MCNC: 7.7 MG/DL (ref 8.7–10.5)
CHLORIDE SERPL-SCNC: 114 MMOL/L (ref 95–110)
CHLORIDE SERPL-SCNC: 115 MMOL/L (ref 95–110)
CO2 SERPL-SCNC: 28 MMOL/L (ref 23–29)
CO2 SERPL-SCNC: 29 MMOL/L (ref 23–29)
CREAT SERPL-MCNC: 1.3 MG/DL (ref 0.5–1.4)
CREAT SERPL-MCNC: 1.5 MG/DL (ref 0.5–1.4)
ERYTHROCYTE [DISTWIDTH] IN BLOOD BY AUTOMATED COUNT: 15.4 % (ref 11.5–14.5)
EST. GFR  (NO RACE VARIABLE): 56.7 ML/MIN/1.73 M^2
EST. GFR  (NO RACE VARIABLE): >60 ML/MIN/1.73 M^2
GLUCOSE SERPL-MCNC: 117 MG/DL (ref 70–110)
GLUCOSE SERPL-MCNC: 138 MG/DL (ref 70–110)
HCT VFR BLD AUTO: 21.7 % (ref 40–54)
HGB BLD-MCNC: 7.2 G/DL (ref 14–18)
INR PPP: 1.1 (ref 0.8–1.2)
MAGNESIUM SERPL-MCNC: 2.6 MG/DL (ref 1.6–2.6)
MCH RBC QN AUTO: 31 PG (ref 27–31)
MCHC RBC AUTO-ENTMCNC: 33.2 G/DL (ref 32–36)
MCV RBC AUTO: 94 FL (ref 82–98)
PHOSPHATE SERPL-MCNC: 4.3 MG/DL (ref 2.7–4.5)
PLATELET # BLD AUTO: 84 K/UL (ref 150–450)
PMV BLD AUTO: 14.9 FL (ref 9.2–12.9)
POCT GLUCOSE: 126 MG/DL (ref 70–110)
POCT GLUCOSE: 147 MG/DL (ref 70–110)
POCT GLUCOSE: 149 MG/DL (ref 70–110)
POCT GLUCOSE: 154 MG/DL (ref 70–110)
POCT GLUCOSE: 159 MG/DL (ref 70–110)
POCT GLUCOSE: 173 MG/DL (ref 70–110)
POTASSIUM SERPL-SCNC: 4 MMOL/L (ref 3.5–5.1)
POTASSIUM SERPL-SCNC: 4.5 MMOL/L (ref 3.5–5.1)
PROT SERPL-MCNC: 5 G/DL (ref 6–8.4)
PROT SERPL-MCNC: 5.5 G/DL (ref 6–8.4)
PROTHROMBIN TIME: 11.5 SEC (ref 9–12.5)
RBC # BLD AUTO: 2.32 M/UL (ref 4.6–6.2)
SODIUM SERPL-SCNC: 151 MMOL/L (ref 136–145)
SODIUM SERPL-SCNC: 153 MMOL/L (ref 136–145)
VANCOMYCIN SERPL-MCNC: 11.6 UG/ML
WBC # BLD AUTO: 19.95 K/UL (ref 3.9–12.7)

## 2023-11-05 PROCEDURE — 25000003 PHARM REV CODE 250: Performed by: PHYSICIAN ASSISTANT

## 2023-11-05 PROCEDURE — 85730 THROMBOPLASTIN TIME PARTIAL: CPT | Performed by: STUDENT IN AN ORGANIZED HEALTH CARE EDUCATION/TRAINING PROGRAM

## 2023-11-05 PROCEDURE — 99900026 HC AIRWAY MAINTENANCE (STAT)

## 2023-11-05 PROCEDURE — 84295 ASSAY OF SERUM SODIUM: CPT | Mod: 91

## 2023-11-05 PROCEDURE — 63600175 PHARM REV CODE 636 W HCPCS

## 2023-11-05 PROCEDURE — 97530 THERAPEUTIC ACTIVITIES: CPT

## 2023-11-05 PROCEDURE — 25000003 PHARM REV CODE 250: Performed by: THORACIC SURGERY (CARDIOTHORACIC VASCULAR SURGERY)

## 2023-11-05 PROCEDURE — 25000003 PHARM REV CODE 250

## 2023-11-05 PROCEDURE — 97165 OT EVAL LOW COMPLEX 30 MIN: CPT

## 2023-11-05 PROCEDURE — 99233 SBSQ HOSP IP/OBS HIGH 50: CPT | Mod: ,,, | Performed by: INTERNAL MEDICINE

## 2023-11-05 PROCEDURE — 94761 N-INVAS EAR/PLS OXIMETRY MLT: CPT

## 2023-11-05 PROCEDURE — 94150 VITAL CAPACITY TEST: CPT

## 2023-11-05 PROCEDURE — 94010 BREATHING CAPACITY TEST: CPT

## 2023-11-05 PROCEDURE — 27100171 HC OXYGEN HIGH FLOW UP TO 24 HOURS

## 2023-11-05 PROCEDURE — 97535 SELF CARE MNGMENT TRAINING: CPT

## 2023-11-05 PROCEDURE — 80053 COMPREHEN METABOLIC PANEL: CPT

## 2023-11-05 PROCEDURE — 27000207 HC ISOLATION

## 2023-11-05 PROCEDURE — 87040 BLOOD CULTURE FOR BACTERIA: CPT | Performed by: INTERNAL MEDICINE

## 2023-11-05 PROCEDURE — 84100 ASSAY OF PHOSPHORUS: CPT | Performed by: THORACIC SURGERY (CARDIOTHORACIC VASCULAR SURGERY)

## 2023-11-05 PROCEDURE — 99232 SBSQ HOSP IP/OBS MODERATE 35: CPT | Mod: ,,, | Performed by: PHYSICIAN ASSISTANT

## 2023-11-05 PROCEDURE — 85610 PROTHROMBIN TIME: CPT | Performed by: STUDENT IN AN ORGANIZED HEALTH CARE EDUCATION/TRAINING PROGRAM

## 2023-11-05 PROCEDURE — 97162 PT EVAL MOD COMPLEX 30 MIN: CPT

## 2023-11-05 PROCEDURE — 99233 PR SUBSEQUENT HOSPITAL CARE,LEVL III: ICD-10-PCS | Mod: ,,, | Performed by: INTERNAL MEDICINE

## 2023-11-05 PROCEDURE — 99900035 HC TECH TIME PER 15 MIN (STAT)

## 2023-11-05 PROCEDURE — 20000000 HC ICU ROOM

## 2023-11-05 PROCEDURE — 99291 PR CRITICAL CARE, E/M 30-74 MINUTES: ICD-10-PCS | Mod: ,,, | Performed by: ANESTHESIOLOGY

## 2023-11-05 PROCEDURE — 83735 ASSAY OF MAGNESIUM: CPT | Performed by: THORACIC SURGERY (CARDIOTHORACIC VASCULAR SURGERY)

## 2023-11-05 PROCEDURE — 85027 COMPLETE CBC AUTOMATED: CPT | Performed by: STUDENT IN AN ORGANIZED HEALTH CARE EDUCATION/TRAINING PROGRAM

## 2023-11-05 PROCEDURE — 25000003 PHARM REV CODE 250: Performed by: STUDENT IN AN ORGANIZED HEALTH CARE EDUCATION/TRAINING PROGRAM

## 2023-11-05 PROCEDURE — 99291 CRITICAL CARE FIRST HOUR: CPT | Mod: ,,, | Performed by: ANESTHESIOLOGY

## 2023-11-05 PROCEDURE — 99232 PR SUBSEQUENT HOSPITAL CARE,LEVL II: ICD-10-PCS | Mod: ,,, | Performed by: PHYSICIAN ASSISTANT

## 2023-11-05 PROCEDURE — 80202 ASSAY OF VANCOMYCIN: CPT | Performed by: THORACIC SURGERY (CARDIOTHORACIC VASCULAR SURGERY)

## 2023-11-05 PROCEDURE — 63600175 PHARM REV CODE 636 W HCPCS: Performed by: THORACIC SURGERY (CARDIOTHORACIC VASCULAR SURGERY)

## 2023-11-05 RX ORDER — HEPARIN SODIUM 5000 [USP'U]/ML
5000 INJECTION, SOLUTION INTRAVENOUS; SUBCUTANEOUS EVERY 8 HOURS
Status: DISCONTINUED | OUTPATIENT
Start: 2023-11-05 | End: 2023-11-06

## 2023-11-05 RX ORDER — DEXTROSE MONOHYDRATE 50 MG/ML
INJECTION, SOLUTION INTRAVENOUS CONTINUOUS
Status: DISCONTINUED | OUTPATIENT
Start: 2023-11-05 | End: 2023-11-07

## 2023-11-05 RX ORDER — LABETALOL HCL 20 MG/4 ML
10 SYRINGE (ML) INTRAVENOUS EVERY 4 HOURS PRN
Status: DISCONTINUED | OUTPATIENT
Start: 2023-11-05 | End: 2023-11-06

## 2023-11-05 RX ADMIN — VANCOMYCIN HYDROCHLORIDE 1000 MG: 1 INJECTION, POWDER, LYOPHILIZED, FOR SOLUTION INTRAVENOUS at 11:11

## 2023-11-05 RX ADMIN — HEPARIN SODIUM 5000 UNITS: 5000 INJECTION INTRAVENOUS; SUBCUTANEOUS at 10:11

## 2023-11-05 RX ADMIN — VANCOMYCIN HYDROCHLORIDE 1000 MG: 1 INJECTION, POWDER, LYOPHILIZED, FOR SOLUTION INTRAVENOUS at 10:11

## 2023-11-05 RX ADMIN — DEXTROSE MONOHYDRATE: 50 INJECTION, SOLUTION INTRAVENOUS at 11:11

## 2023-11-05 RX ADMIN — ACETAMINOPHEN 1000 MG: 500 TABLET ORAL at 10:11

## 2023-11-05 RX ADMIN — DOCUSATE SODIUM 100 MG: 100 CAPSULE, LIQUID FILLED ORAL at 08:11

## 2023-11-05 RX ADMIN — FAMOTIDINE 20 MG: 10 INJECTION, SOLUTION INTRAVENOUS at 08:11

## 2023-11-05 RX ADMIN — OXYCODONE HYDROCHLORIDE 5 MG: 5 TABLET ORAL at 08:11

## 2023-11-05 RX ADMIN — POLYETHYLENE GLYCOL 3350 17 G: 17 POWDER, FOR SOLUTION ORAL at 08:11

## 2023-11-05 RX ADMIN — ACETAMINOPHEN 1000 MG: 500 TABLET ORAL at 08:11

## 2023-11-05 RX ADMIN — LABETALOL HYDROCHLORIDE 10 MG: 5 INJECTION, SOLUTION INTRAVENOUS at 08:11

## 2023-11-05 RX ADMIN — INSULIN ASPART 1 UNITS: 100 INJECTION, SOLUTION INTRAVENOUS; SUBCUTANEOUS at 07:11

## 2023-11-05 RX ADMIN — HEPARIN SODIUM 5000 UNITS: 5000 INJECTION INTRAVENOUS; SUBCUTANEOUS at 02:11

## 2023-11-05 RX ADMIN — OXYCODONE HYDROCHLORIDE 10 MG: 10 TABLET ORAL at 02:11

## 2023-11-05 RX ADMIN — ASPIRIN 325 MG ORAL TABLET 325 MG: 325 PILL ORAL at 08:11

## 2023-11-05 RX ADMIN — LABETALOL HYDROCHLORIDE 10 MG: 5 INJECTION, SOLUTION INTRAVENOUS at 10:11

## 2023-11-05 RX ADMIN — INSULIN ASPART 1 UNITS: 100 INJECTION, SOLUTION INTRAVENOUS; SUBCUTANEOUS at 04:11

## 2023-11-05 RX ADMIN — ACETAMINOPHEN 1000 MG: 500 TABLET ORAL at 02:11

## 2023-11-05 RX ADMIN — CLEVIPIDINE 1 MG/HR: 0.5 EMULSION INTRAVENOUS at 11:11

## 2023-11-05 RX ADMIN — MUPIROCIN: 20 OINTMENT TOPICAL at 08:11

## 2023-11-05 RX ADMIN — EPINEPHRINE 0.02 MCG/KG/MIN: 1 INJECTION INTRAMUSCULAR; INTRAVENOUS; SUBCUTANEOUS at 05:11

## 2023-11-05 RX ADMIN — INSULIN HUMAN 0.8 UNITS/HR: 1 INJECTION, SOLUTION INTRAVENOUS at 08:11

## 2023-11-05 RX ADMIN — HYDROMORPHONE HYDROCHLORIDE 0.5 MG: 0.5 INJECTION, SOLUTION INTRAMUSCULAR; INTRAVENOUS; SUBCUTANEOUS at 08:11

## 2023-11-05 NOTE — SUBJECTIVE & OBJECTIVE
Interval History: Extubated. Looks great. Afebrile. Family at bedside.    Review of Systems   Unable to perform ROS: Other     Objective:     Vital Signs (Most Recent):  Temp: 98.1 °F (36.7 °C) (11/05/23 0701)  Pulse: 83 (11/05/23 1000)  Resp: (!) 22 (11/05/23 1000)  BP: 116/68 (11/05/23 0930)  SpO2: (!) 92 % (11/05/23 1000) Vital Signs (24h Range):  Temp:  [98.1 °F (36.7 °C)-100.2 °F (37.9 °C)] 98.1 °F (36.7 °C)  Pulse:  [] 83  Resp:  [11-29] 22  SpO2:  [90 %-100 %] 92 %  BP: ()/(56-74) 116/68  Arterial Line BP: ()/(42-65) 112/55     Weight: (S) 76.5 kg (168 lb 10.4 oz)  Body mass index is 22.25 kg/m².    Estimated Creatinine Clearance: 74.4 mL/min (based on SCr of 1.3 mg/dL).     Physical Exam  Vitals and nursing note reviewed.   Constitutional:       General: He is not in acute distress.     Appearance: Normal appearance. He is not ill-appearing, toxic-appearing or diaphoretic.   HENT:      Head: Normocephalic and atraumatic.      Right Ear: External ear normal.      Left Ear: External ear normal.   Eyes:      Pupils: Pupils are equal, round, and reactive to light.   Cardiovascular:      Rate and Rhythm: Normal rate.      Heart sounds: No murmur heard.  Pulmonary:      Breath sounds: No wheezing or rhonchi.   Neurological:      General: No focal deficit present.      Mental Status: He is alert.   Psychiatric:         Mood and Affect: Mood normal.         Behavior: Behavior normal.         Thought Content: Thought content normal.          Significant Labs: Blood Culture:   Recent Labs   Lab 10/30/23  1924 11/01/23  0607 11/01/23  0609 11/03/23  0609 11/03/23  0614   LABBLOO Gram stain aer bottle: Gram positive cocci in clusters resembling Staph  Gram stain carlie bottle: Gram positive cocci in clusters resembling Staph  Results called to and read back by: Savannah Butler, ANNA 10/31/2023  06  METHICILLIN RESISTANT STAPHYLOCOCCUS AUREUS  [place] consult [place] [facility] [place] [address]  locations.  * Gram stain aer bottle: Gram positive cocci in clusters resembling Staph  Positive results previously called 11/02/2023  04:20  Gram stain carlie bottle: Gram positive cocci in clusters resembling Staph  Positive results previously called 11/04/2023  11:33  METHICILLIN RESISTANT STAPHYLOCOCCUS AUREUS  For susceptibility see order #[id number]  ID consult required at [facility].  * Gram stain aer bottle: Gram positive cocci in clusters resembling Staph  Results called to and read back by:Melissa Ferro RN 11/01/2023  22:25  Gram stain carlie bottle: Gram positive cocci in clusters resembling Staph  Positive results previously called 11/03/2023  21:56  METHICILLIN RESISTANT STAPHYLOCOCCUS AUREUS  ID consult required at [facility].  For susceptibility see order #[id number]  * Gram stain aer bottle: Gram positive cocci in clusters resembling Staph  Positive results previously called  11/04/2023 06:05  STAPHYLOCOCCUS AUREUS  Susceptibility pending  ID consult required at [facility].  * Gram stain aer bottle: Gram positive cocci in clusters resembling Staph  Results called to and read back by:Manuel Centeno Rn 11/04/2023  04:12  STAPHYLOCOCCUS AUREUS  ID consult required at [facility].  For susceptibility see order # [id number]  *     BMP:   Recent Labs   Lab 11/05/23 0310 11/05/23  0849   GLU  --  117*   NA  --  153*   K  --  4.0   CL  --  115*   CO2  --  28   BUN  --  60*   CREATININE  --  1.3   CALCIUM  --  7.7*   MG 2.6  --      CBC:   Recent Labs   Lab 11/03/23  1812 11/03/23  1815 11/03/23  2226 11/04/23 0312 11/05/23  0310   WBC 37.84*  --   --  28.25* 19.95*   HGB 9.1*  --   --  8.7* 7.2*   HCT 27.2*   < > 27* 25.5* 21.7*   PLT 90*  --   --  89* 84*    < > = values in this interval not displayed.     Urine Culture:   Recent Labs   Lab 10/30/23  2152   LABURIN METHICILLIN  RESISTANT STAPHYLOCOCCUS AUREUS  >100,000cfu/ml  No other significant isolate  *     Wound Culture:   Recent Labs   Lab 11/03/23  1516   LABAERO STAPHYLOCOCCUS AUREUS  Moderate  Susceptibility pending  *       Significant Imaging: I have reviewed all pertinent imaging results/findings within the past 24 hours.

## 2023-11-05 NOTE — PROGRESS NOTES
Pharmacokinetic Assessment Follow Up: IV Vancomycin    Vancomycin serum concentration assessment(s):    The random level was drawn correctly and can be used to guide therapy at this time. The measurement is below the desired definitive target range of 15 to 20 mcg/mL.    Vancomycin Regimen Plan:    Will schedule vancomycin 1000 mg IV every 12 hours with improving scr. Next serum concentration measured at 1000 prior to 3rd dose on 11/06 to ensure there is no accumulation    Drug levels (last 3 results):  Recent Labs   Lab Result Units 11/03/23  0854 11/04/23  0312 11/05/23  0849   Vancomycin, Random ug/mL  --  21.5 11.6   Vancomycin-Trough ug/mL 30.6*  --   --        Pharmacy will continue to follow and monitor vancomycin.    Please contact pharmacy at extension [phone] for questions regarding this assessment.    Thank you for the consult,   Sid Tamayo       Patient brief summary:  Lorenzo Nino is a 48 y.o. male initiated on antimicrobial therapy with IV Vancomycin for treatment of endocarditis    Drug Allergies:   Review of patient's allergies indicates:  No Known Allergies    Actual Body Weight:   76.5 kg    Renal Function:   Estimated Creatinine Clearance: 74.4 mL/min (based on SCr of 1.3 mg/dL).,     Dialysis Method (if applicable):  N/A

## 2023-11-05 NOTE — ASSESSMENT & PLAN NOTE
BG goal 140 - 180   No known hx of DM.  S/p MVR for endocarditis.    -Adjust transition drip to 0.8 u/hr w/ stepdown parameters  -Continue /50  -POCT q4 hr      ** Please notify Endocrine for any change and/or advance in diet**  ** Please call Endocrine for any BG related issues **     Discharge Planning:   TBD. Please notify endocrinology prior to discharge.

## 2023-11-05 NOTE — PROGRESS NOTES
[facility]  Infectious Disease  Progress Note    Patient Name: Lorenzo Nino  MRN: [id number]  Admission Date: 10/30/2023  Length of Stay: 6 days  Attending Physician: Manuel Beal MD  Primary Care Provider: No primary care provider on file.    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  - POD#2, s/p MV repair with bioprosthetic material  - continue vancomycin, keeping trough 15-20  - repeat blood culture this am  - anticipating 6 weeks of abx from clearance of bacteremia/MVR (followed by po doxy?)  - d/w  Mom and Dad    Mark Montejo MD  Infectious Disease  [facility]    Subjective:     Principal Problem:Acute bacterial endocarditis    HPI: This is the strange case of a 49 yo man with no medical problems who reportedly developed acute onset of symptoms a few days ago. He was seen in several urgent cares w/headache, abdominal pain with nausea but no vomiting or diarrhea.  There he was evaluated and instructed to take Tylenol and follow up if he still felt unwell.  When he presented with hematuria, he was sent to the ED here and was found to have thrombocytopenia. CT head negative, CT C/A/P showing perinephric stranding, distended bladder, and hiatal hernia. Patient admitted to MICU for further workup and treatment of his hypotension and likely severe sepsis. Blood cultures have grown MRSA and a TTE revealed a flail mitral leflet and a possible vegetation on the aortic valve. ID is consulted for that. The patient is accompanied by his mother. He is scheduled for a NEW. Denies IDU. Denetal implants. HIV NR.       Interval History: Extubated. Looks great. Afebrile. Family at bedside.    Review of Systems   Unable to perform ROS: Other     Objective:     Vital Signs (Most Recent):  Temp: 98.1 °F (36.7 °C) (11/05/23 0701)  Pulse: 83 (11/05/23  1000)  Resp: (!) 22 (11/05/23 1000)  BP: 116/68 (11/05/23 0930)  SpO2: (!) 92 % (11/05/23 1000) Vital Signs (24h Range):  Temp:  [98.1 °F (36.7 °C)-100.2 °F (37.9 °C)] 98.1 °F (36.7 °C)  Pulse:  [] 83  Resp:  [11-29] 22  SpO2:  [90 %-100 %] 92 %  BP: ()/(56-74) 116/68  Arterial Line BP: ()/(42-65) 112/55     Weight: (S) 76.5 kg (168 lb 10.4 oz)  Body mass index is 22.25 kg/m².    Estimated Creatinine Clearance: 74.4 mL/min (based on SCr of 1.3 mg/dL).     Physical Exam  Vitals and nursing note reviewed.   Constitutional:       General: He is not in acute distress.     Appearance: Normal appearance. He is not ill-appearing, toxic-appearing or diaphoretic.   HENT:      Head: Normocephalic and atraumatic.      Right Ear: External ear normal.      Left Ear: External ear normal.   Eyes:      Pupils: Pupils are equal, round, and reactive to light.   Cardiovascular:      Rate and Rhythm: Normal rate.      Heart sounds: No murmur heard.  Pulmonary:      Breath sounds: No wheezing or rhonchi.   Neurological:      General: No focal deficit present.      Mental Status: He is alert.   Psychiatric:         Mood and Affect: Mood normal.         Behavior: Behavior normal.         Thought Content: Thought content normal.          Significant Labs: Blood Culture:   Recent Labs   Lab 10/30/23  1924 11/01/23  0607 11/01/23  0609 11/03/23  0609 11/03/23  0614   LABBLOO Gram stain aer bottle: Gram positive cocci in clusters resembling Staph  Gram stain carlie bottle: Gram positive cocci in clusters resembling Staph  Results called to and read back by: Savannah Butler RN 10/31/2023  06  METHICILLIN RESISTANT STAPHYLOCOCCUS AUREUS  ID consult required at [facility]ly [facility], later [facility] and [facility] locations.  * Gram stain aer bottle: Gram positive cocci in clusters resembling Staph  Positive results previously called 11/02/2023  04:20  Gram stain carlie bottle: Gram positive cocci in clusters resembling Staph  Positive results  previously called 11/04/2023  11:33  METHICILLIN RESISTANT STAPHYLOCOCCUS AUREUS  For susceptibility see order #[id number]  ID consult required at [facility].  * Gram stain aer bottle: Gram positive cocci in clusters resembling Staph  Results called to and read back by:Melissa Ferro RN 11/01/2023  22:25  Gram stain carlie bottle: Gram positive cocci in clusters resembling Staph  Positive results previously called 11/03/2023  21:56  METHICILLIN RESISTANT STAPHYLOCOCCUS AUREUS  ID consult required at [facility].  For susceptibility see order #[id number]  * Gram stain aer bottle: Gram positive cocci in clusters resembling Staph  Positive results previously called  11/04/2023 06:05  STAPHYLOCOCCUS AUREUS  Susceptibility pending  ID consult required at [facility].  * Gram stain aer bottle: Gram positive cocci in clusters resembling Staph  Results called to and read back by:Manuel Centeno Rn 11/04/2023  04:12  STAPHYLOCOCCUS AUREUS  ID consult required at [facility].  For susceptibility see order # [id number]  *     BMP:   Recent Labs   Lab 11/05/23  0310 11/05/23  0849   GLU  --  117*   NA  --  153*   K  --  4.0   CL  --  115*   CO2  --  28   BUN  --  60*   CREATININE  --  1.3   CALCIUM  --  7.7*   MG 2.6  --      CBC:   Recent Labs   Lab 11/03/23  1812 11/03/23  1815 11/03/23  2226 11/04/23  0312 11/05/23  0310   WBC 37.84*  --   --  28.25* 19.95*   HGB 9.1*  --   --  8.7* 7.2*   HCT 27.2*   < > 27* 25.5* 21.7*   PLT 90*  --   --  89* 84*    < > = values in this interval not displayed.     Urine Culture:   Recent Labs   Lab 10/30/23  2152   LABURIN METHICILLIN RESISTANT STAPHYLOCOCCUS AUREUS  >100,000cfu/ml  No other significant isolate  *     Wound Culture:   Recent Labs   Lab 11/03/23  1516   LABAERO STAPHYLOCOCCUS AUREUS  Moderate  Susceptibility pending  *       Significant Imaging: I  have reviewed all pertinent imaging results/findings within the past 24 hours.

## 2023-11-05 NOTE — PT/OT/SLP EVAL
Physical Therapy Co-Evaluation and Treatment    OT present for coeval due to pt's multiple medical comorbidities and functional/cognition deficits requiring two skilled therapists to appropriately progress pt's musculoskeletal strength, neuromuscular control, and endurance while taking into consideration medical acuity and pt safety.    Patient Name:  Lorenzo Nino   MRN:  [id number]    Recommendations:     Discharge Recommendations: Low Intensity Therapy   Discharge Equipment Recommendations:  (TBD pending progress)   Barriers to discharge: None    Assessment:     Lorenzo Nino is a 48 y.o. male admitted with a medical diagnosis of Acute bacterial endocarditis.  He presents with the following impairments/functional limitations: weakness, impaired endurance, impaired self care skills, impaired functional mobility, gait instability, impaired balance, decreased lower extremity function, impaired cardiopulmonary response to activity, decreased upper extremity function, decreased coordination, decreased ROM     Pt receptive and tolerated PT co-eval with OT well. Pt educated on sternal precautions prior to start of treatment with pt verbalizing understanding. Pt presented with delayed cognition needing increased time to answer questions and re-orientation to situation. Pt able to transfer to bedside chair this session with mod A x2 persons with PT/OT blocking drew knees for sit <> stand. PT recommended Low intensity therapy once medically ready for discharge to increased functional mobility and activity tolerance.    Rehab Prognosis: Good; patient would benefit from acute skilled PT services to address these deficits and reach maximum level of function.    Recent Surgery: Procedure(s) (LRB):  REPAIR, MITRAL VALVE, OPEN (N/A)  EXCLUSION, LEFT ATRIAL APPENDAGE, OPEN, AS PART OF OPEN CHEST SURGERY (Left) 2 Days Post-Op    Plan:     During this hospitalization, patient to be seen 5 x/week to address the identified rehab  "impairments via gait training, therapeutic activities, therapeutic exercises, neuromuscular re-education and progress toward the following goals:    Plan of Care Expires:  12/05/23    Subjective     Chief Complaint: none reported  Patient/Family Comments/goals: "I had a stroke" in response to situational questions  Pain/Comfort:  Pain Rating 1: 0/10  Pain Rating Post-Intervention 1: 0/10    Patients cultural, spiritual, Scientologist conflicts given the current situation: no    Patient History:     Living Environment: Pt lives with significant other in [facility] with no [place]SE [place]. Bathroom: tub/shower combo   Prior Level of Function: IND with amb and ADLs  DME owned: none  Caregiver Assistance: family    Objective:     Communicated with RN prior to session.  Patient found HOB elevated with arterial line, pulse ox (continuous), telemetry, blood pressure cuff, SCD, peripheral IV, pressure relief boots, ritchie catheter, chest tube  upon PT entry to room.    General Precautions: Standard, fall, contact, sternal  Orthopedic Precautions:N/A   Braces: N/A  Respiratory Status: Room air    Exams:  Sensation:    -       Intact  RLE ROM: WFL  RLE Strength: grossly 3+/5  LLE ROM: WFL  LLE Strength: grossly 3+/5    Functional Mobility:    Bed Mobility:   Rolling: to R with moderate assistance  Supine > Sit: moderate assistance  Scooting: maximal assistance and of 2 persons    Transfers:   Sit <> Stand Transfer: maximal assistance and of 2 persons from EOB with HHA  PT/OT blocking B knees during transfer  Verbal cues for upright posture  Bed <> Chair: moderate assistance and of 2 persons with HHA  Pt needed increased time to complete    Balance:   Sitting balance: FAIR: Cannot move trunk without losing balance  Standing balance:   POOR: Needs MODERATE assist to maintain  POOR: Needs MOD (moderate) assist during gait                 Gait:  Distance: 4 steps to bedside chair   Assistive Device: no AD  Assistance Level: moderate assistance and of 2 " persons  Gait Assessment: Pt amb with decreased step length, verbal cues for step sequencing and manual cues for upright posture with B knee blocking during transfer      AM-PAC 6 CLICK MOBILITY  Total Score:13       Treatment & Education:  Pt educated on sternal precautions prior to start of treatment with pt verbalizing understanding. Pt needed mod A x2 persons to transfer to bedside chair this session.    Pt educated on safety with mobility and using call button for assistance from nursing staff with OOB mobility.  Pt educated on tips to reduce fall risk.  All questions answered within the scope of PT.  White board updated accordingly.      Patient left up in chair with all lines intact, call button in reach, and RN and family present.    GOALS:   Multidisciplinary Problems       Physical Therapy Goals          Problem: Physical Therapy    Goal Priority Disciplines Outcome Goal Variances Interventions   Physical Therapy Goal     PT, PT/OT Ongoing, Progressing     Description: Goals to be met by: 23     Patient will increase functional independence with mobility by performin. Supine to sit with [place]  2. Sit to stand transfer with Stand-by Assistance  3. Bed to chair transfer with Stand-by Assistance using No Assistive Device  4. Gait  x 175 feet with Stand-by Assistance using No Assistive Device.   5. Lower extremity exercise program x20 reps per handout, with independence  6. Pt independently recalling 3/3 sternal precautions                         History:     History reviewed. No pertinent past medical history.    Past Surgical History:   Procedure Laterality Date    INSERTION OF INTRA-AORTIC BALLOON ASSIST DEVICE Right 2023    Procedure: INSERTION, INTRA-AORTIC BALLOON PUMP;  Surgeon: Jose Vidal MD;  Location: [facility] CATH LAB;  Service: Cardiology;  Laterality: Right;       Time Tracking:     PT Received On: 23  PT Start Time: 1111     PT Stop Time: 1137  PT  Total Time (min): 26 min     Billable Minutes: Evaluation 10 and Therapeutic Activity 16      11/05/2023

## 2023-11-05 NOTE — PLAN OF CARE
SICU PLAN OF CARE NOTE    Dx: Acute bacterial endocarditis    Vital Signs (last 12 hours):   Temp:  [100.1 °F (37.8 °C)-100.2 °F (37.9 °C)]   Pulse:  [77-89]   Resp:  [11-28]   BP: ()/(56-71)   SpO2:  [91 %-100 %]   Arterial Line BP: ()/(43-62)      Neuro: Follows Commands and Moves All Extremities    Cardiac: NSR, all pulses palpable 2+    Respiratory: Room Air    Gtts: Insulin    Urine Output: Urinary Catheter 100 - 150 cc/hour    Drains: Chest Tube, total output 100 cc /  shift    Diet: NPO     Labs/Accuchecks: Accuchecks Q4. AM labs. BMP Q8    Skin: clean, dry, and intact. Foam dressings in use    Shift Events: pt extubated with team at bedside, tolerated well, follows commands, still drowsy.

## 2023-11-05 NOTE — SUBJECTIVE & OBJECTIVE
"Interval HPI:   No acute events overnight. Patient in room 39586/60106 A. Blood glucose stable. BG at goal on current insulin regimen (Transition Insulin Drip). Steroid use- None .   2 Days Post-Op  Renal function- Abnormal -    Vasopressors-  Epinephrine  and Norepinephrine     Diet NPO Except for: Sips with Medication     Eating:   NPO  Nausea: No  Hypoglycemia and intervention: No  Fever: No  TPN and/or TF: No    /65   Pulse 80   Temp 98.1 °F (36.7 °C) (Oral)   Resp 20   Ht 6' 1" (1.854 m)   Wt (S) 76.5 kg (168 lb 10.4 oz)   SpO2 (!) 91%   BMI 22.25 kg/m²     Labs Reviewed and Include    Recent Labs   Lab 11/04/23 2357   *   CALCIUM 7.4*   ALBUMIN 1.8*   PROT 5.0*   *   K 4.5   CO2 29   *   BUN 71*   CREATININE 1.5*   ALKPHOS 60   ALT 39   AST 57*   BILITOT 2.0*     Lab Results   Component Value Date    WBC 19.95 (H) 11/05/2023    HGB 7.2 (L) 11/05/2023    HCT 21.7 (L) 11/05/2023    MCV 94 11/05/2023    PLT 84 (L) 11/05/2023     No results for input(s): "TSH", "FREET4" in the last 168 hours.  No results found for: "HGBA1C"    Nutritional status:   Body mass index is 22.25 kg/m².  Lab Results   Component Value Date    ALBUMIN 1.8 (L) 11/04/2023    ALBUMIN 1.7 (L) 11/04/2023    ALBUMIN 1.7 (L) 11/04/2023     No results found for: "PREALBUMIN"    Estimated Creatinine Clearance: 64.5 mL/min (A) (based on SCr of 1.5 mg/dL (H)).    Accu-Checks  Recent Labs     11/04/23  0400 11/04/23  0505 11/04/23  0542 11/04/23  0833 11/04/23  0906 11/04/23  1123 11/04/23  1638 11/04/23 2002 11/04/23  2359 11/05/23  0408   POCTGLUCOSE 112* 121* 105 144* 139* 157* 174* 161* 147* 126*       Current Medications and/or Treatments Impacting Glycemic Control  Immunotherapy:    Immunosuppressants       None          Steroids:   Hormones (From admission, onward)      None          Pressors:    Autonomic Drugs (From admission, onward)      Start     Stop Route Frequency Ordered    11/03/23 1845  EPINEPHrine 5 " mg in dextrose 5% 250 mL infusion (premix)        Question Answer Comment   Begin at (in mcg/kg/min): 0.02    Titrate by: (in mcg/kg/min) 0.02    Titrate interval: (in minutes) 5    Titrate to maintain: (SBP or MAP or Cardiac Index) MAP    Greater than: (in mmHg) 60    Maximum dose: (in mcg/kg/min) 2        -- IV Continuous 11/03/23 1839    11/03/23 1800  NORepinephrine 4 mg in dextrose 5% 250 mL infusion (premix)        Question Answer Comment   Begin at (in mcg/kg/min): 0.02    Titrate by: (in mcg/kg/min) 0.02    Titrate interval: (in minutes) 5    Titrate to maintain: (MAP or SBP) MAP    Greater than: (in mmHg) 60    Maximum dose: (in mcg/kg/min) 3        -- IV Continuous 11/03/23 1756    11/02/23 0838  succinylcholine (ANECTINE) 20 mg/mL injection        Note to Pharmacy: Created by cabinet override    11/02/23 2044   11/02/23 0838    10/31/23 0015  NORepinephrine bitartrate-D5W 4 mg/250 mL (16 mcg/mL) PERIPHERAL access infusion        Question Answer Comment   Begin at (in mcg/kg/min): 0.02    Titrate by: (in mcg/kg/min) 0.02    Titrate interval: (in minutes) 5    Titrate to maintain: (MAP or SBP) MAP    Greater than: (in mmHg) 60    Maximum dose: (in mcg/kg/min) 0.2        11/01/23 0014 IV Continuous 10/30/23 2305          Hyperglycemia/Diabetes Medications:   Antihyperglycemics (From admission, onward)      Start     Stop Route Frequency Ordered    11/04/23 1100  insulin regular in 0.9 % NaCl 100 unit/100 mL (1 unit/mL) infusion        Question:  Enter initial dose (Units/hr):  Answer:  1.2    -- IV Continuous 11/04/23 0957    11/04/23 1056  insulin aspart U-100 pen 0-5 Units         -- SubQ As needed (PRN) 11/04/23 0957

## 2023-11-05 NOTE — PT/OT/SLP EVAL
Occupational Therapy  Co- Evaluation and Treatment  Co-treat with PT due to medical complexity of pt and to optimize functional performance.      Name: Lorenzo Nino  MRN: [id number]  Admitting Diagnosis: Acute bacterial endocarditis  Recent Surgery: Procedure(s) (LRB):  REPAIR, MITRAL VALVE, OPEN (N/A)  EXCLUSION, LEFT ATRIAL APPENDAGE, OPEN, AS PART OF OPEN CHEST SURGERY (Left) 2 Days Post-Op    Recommendations:     Discharge Recommendations: Low Intensity Therapy  Discharge Equipment Recommendations:  other (see comments) (TBD progress pending)  Barriers to discharge:  None    Assessment:     Lorenzo Nino is a 48 y.o. male with a medical diagnosis of Acute bacterial endocarditis. Performance deficits affecting function: weakness, impaired endurance, impaired self care skills, impaired functional mobility, gait instability, impaired balance, pain, decreased safety awareness, impaired cardiopulmonary response to activity, decreased lower extremity function, decreased upper extremity function, edema, decreased coordination, decreased ROM, orthopedic precautions.      Pt presenting with delayed cognition, increased fatigue, impaired balance, impaired BLE function and decreased safety awareness upon evaluation this date, requiring increased time and assistance to complete functional tasks. Education provided on sternal precautions, will continue to require reinforcement. Patient with slight difficulty with word finding, requiring increased time for communication. Also required orienting to situation, patient expressed that he had a stroke, mother at bedside reports they have been trying to educate him as well. Patient with B knee buckling during stand and step trial requiring increased assistance and B knee blocking for safety. Pt would benefit from continued acute skilled OT services at this time to increase functional performance, and improve quality of life. OT to recommend low intensity therapy with potential  "to progress at discharge once medically appropriate for increased functional independence and to improve patient safety before returning home.      Rehab Prognosis: Fair; patient would benefit from acute skilled OT services to address these deficits and reach maximum level of function.       Plan:     Patient to be seen 5 x/week to address the above listed problems via self-care/home management, therapeutic activities, therapeutic exercises, neuromuscular re-education  Plan of Care Expires: 12/05/23  Plan of Care Reviewed with: patient, family    Subjective   "I had a stroke" -re situation orientation questions    Chief Complaint: none identified  Patient/Family Comments/goals: to return home at [facility]    Occupational Profile:  Living Environment: lives with significant other in [facility] 0 [place]; tub/shower combo  Previous level of function: Independent  Roles and Routines: Drives, works as heavy machines   Equipment Used at Home: none  Assistance upon Discharge: Family/significant other    Pain/Comfort:  Pain Rating 1: 0/10  Pain Rating Post-Intervention 1: 0/10    Patients cultural, spiritual, Roman Catholic conflicts given the current situation: no    Objective:     Communicated with: Nurse prior to session.  Patient found HOB elevated with arterial line, blood pressure cuff, Trialysis, telemetry, SCD, pulse ox (continuous), peripheral IV, pressure relief boots, ritchie catheter, chest tube upon OT entry to room.    General Precautions: Standard, fall, sternal, contact  Orthopedic Precautions: N/A  Braces: N/A  Respiratory Status: Room air    Occupational Performance:    Bed Mobility:    Patient completed Rolling/Turning to Right with moderate assistance  Patient completed Scooting to EOB with maximal assistance x 2  Patient completed Supine to Sit with moderate assistance    Functional Mobility/Transfers:  Patient completed Sit <> Stand Transfer with maximal assistance and of 2 persons  with  hand-held assist ; " required B knee blocking due to buckling  Functional Mobility: Patient completed step transfer x 4 from bed to chair with Mod A x 2 with increased time and cueing for posture as well as B knee blocking for safety    Activities of Daily Living:  Grooming: Declined to perform  Lower Body Dressing: maximal assistance to don socks    Cognitive/Visual Perceptual:  Cognitive/Psychosocial Skills:     -       Oriented to: Person, Place, and Time   -       Follows Commands/attention:Follows one-step commands  -       Communication: clear/fluent  -       Memory: Impaired STM and Impaired LTM  -       Safety awareness/insight to disability: impaired   -       Mood/Affect/Coping skills/emotional control: Appropriate to situation  Visual/Perceptual:      -Intact      Physical Exam:  Balance:    -       Fair+ static sitting  Dominant hand:    -       R hand  Upper Extremity Range of Motion:     -       Right Upper Extremity: WFL, limited due to edema  -       Left Upper Extremity: WFL, limited due to edema  Upper Extremity Strength:    -       Right Upper Extremity: Deferred, sternal  -       Left Upper Extremity: Deferred, sternal   Strength:    -       Right Upper Extremity: 3+/5  -       Left Upper Extremity: 3+/5  Fine Motor Coordination:    -       Intact  Gross motor coordination:   WFL    AMPAC 6 Click ADL:  AMPAC Total Score: 15    Treatment & Education:  -Pt and parents education on OT role and POC.  -Importance of E/OOB activity with staff assistance, emphasis on daily participation  -Safety during functional transfer and mobility ensured  -Patient provided with education on importance of Bilateral UB/LB integration during functional tasks for improvement in functional performance.   -Education provided/reviewed, questions answered within OT scope of practice.   -Patient will continue to require reinforcement to demonstrate understanding and learning this date.         Patient left up in chair with all lines intact,  call button in reach, and nurse and parents present    GOALS:   Multidisciplinary Problems       Occupational Therapy Goals          Problem: Occupational Therapy    Goal Priority Disciplines Outcome Interventions   Occupational Therapy Goal     OT, PT/OT Ongoing, Progressing    Description: Goals set on 11/5, with expiration date 12/4:  Patient will increase functional independence with ADLs by performing:    Bed mobility with Supervision  Grooming while standing at sink with [place]  UB Dressing with [place].  LB Dressing with Supervision.  Toileting from toilet with [place] for hygiene and clothing management.   Functional mobility of household and community distance with Supervision and AD as needed  Pt will demonstrate understanding of education provided regarding energy conservation and task modification through teach-back method.  Pt will demonstrate understanding and learning of sternal precautions via recall and demonstration 3/3.                         History:     History reviewed. No pertinent past medical history.      Past Surgical History:   Procedure Laterality Date    INSERTION OF INTRA-AORTIC BALLOON ASSIST DEVICE Right 11/2/2023    Procedure: INSERTION, INTRA-AORTIC BALLOON PUMP;  Surgeon: Jose Vidal MD;  Location: [facility] CATH LAB;  Service: Cardiology;  Laterality: Right;       Time Tracking:     OT Date of Treatment: 11/05/23  OT Start Time: 1112  OT Stop Time: 1137  OT Total Time (min): 25 min    Billable Minutes:Evaluation 10  Self Care/Home Management 15    11/5/2023

## 2023-11-05 NOTE — ASSESSMENT & PLAN NOTE
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  - POD#2, s/p MV repair with bioprosthetic material  - continue vancomycin, keeping trough 15-20  - repeat blood culture this am  - anticipating 6 weeks of abx from clearance of bacteremia/MVR (followed by po doxy?)  - d/w  Mom and Dad

## 2023-11-05 NOTE — PLAN OF CARE
OT evaluation completed, POC established as appropriate.     Problem: Occupational Therapy  Goal: Occupational Therapy Goal  Description: Goals set on 11/5, with expiration date 12/4:  Patient will increase functional independence with ADLs by performing:    Bed mobility with Supervision  Grooming while standing at sink with Angelina  UB Dressing with Angelina.  LB Dressing with Supervision.  Toileting from toilet with Angelina for hygiene and clothing management.   Functional mobility of household and community distance with Supervision and AD as needed  Pt will demonstrate understanding of education provided regarding energy conservation and task modification through teach-back method.  Pt will demonstrate understanding and learning of sternal precautions via recall and demonstration 3/3.    Outcome: Ongoing, Progressing

## 2023-11-05 NOTE — SUBJECTIVE & OBJECTIVE
Interval History/Significant Events: NAEON. IABP removed. Extubated overnight, off pressors. Hypernatremia worsening and patient is somnolent, arousable.     Follow-up For: Procedure(s) (LRB):  REPAIR, MITRAL VALVE, OPEN (N/A)  EXCLUSION, LEFT ATRIAL APPENDAGE, OPEN, AS PART OF OPEN CHEST SURGERY (Left)    Post-Operative Day: 2 Days Post-Op    Objective:     Vital Signs (Most Recent):  Temp: 98.1 °F (36.7 °C) (11/05/23 0701)  Pulse: 80 (11/05/23 0715)  Resp: 20 (11/05/23 0715)  BP: 118/65 (11/05/23 0715)  SpO2: (!) 91 % (11/05/23 0715) Vital Signs (24h Range):  Temp:  [98.1 °F (36.7 °C)-100.2 °F (37.9 °C)] 98.1 °F (36.7 °C)  Pulse:  [] 80  Resp:  [11-28] 20  SpO2:  [91 %-100 %] 91 %  BP: ()/(56-71) 118/65  Arterial Line BP: ()/(42-65) 112/55     Weight: (S) 76.5 kg (168 lb 10.4 oz)  Body mass index is 22.25 kg/m².      Intake/Output Summary (Last 24 hours) at 11/5/2023 0759  Last data filed at 11/5/2023 0701  Gross per 24 hour   Intake 3065.14 ml   Output 3515 ml   Net -449.86 ml          Physical Exam  Vitals and nursing note reviewed.   Constitutional:       General: He is not in acute distress.     Appearance: Normal appearance. He is ill-appearing.   HENT:      Head: Normocephalic and atraumatic.      Right Ear: External ear normal.      Left Ear: External ear normal.      Nose: Nose normal.      Mouth/Throat:      Mouth: Mucous membranes are dry.   Eyes:      General: No scleral icterus.     Extraocular Movements: Extraocular movements intact.   Cardiovascular:      Rate and Rhythm: Normal rate and regular rhythm.      Pulses: Normal pulses.   Pulmonary:      Effort: Pulmonary effort is normal. No respiratory distress.      Breath sounds: Normal breath sounds.   Abdominal:      General: Abdomen is flat. There is no distension.      Palpations: Abdomen is soft.   Skin:     General: Skin is warm and dry.      Comments: MSI CDI   Neurological:      General: No focal deficit present.      Mental  Status: He is alert.      Comments: Somnolent, arousable answers questions   Psychiatric:         Mood and Affect: Mood normal.         Behavior: Behavior normal.            Vents:  Vent Mode: Spont (11/05/23 0240)  Ventilator Initiated: Yes (11/03/23 1805)  Set Rate: 22 BPM (11/04/23 1950)  Vt Set: 500 mL (11/04/23 1950)  Pressure Support: 8 cmH20 (11/05/23 0240)  PEEP/CPAP: 5 cmH20 (11/05/23 0240)  Oxygen Concentration (%): 30 (11/05/23 0330)  Peak Airway Pressure: 14 cmH20 (11/05/23 0240)  Plateau Pressure: 17 cmH20 (11/05/23 0240)  Total Ve: 8.2 L/m (11/05/23 0240)  Negative Inspiratory Force (cm H2O): -23 (11/05/23 0300)  F/VT Ratio<105 (RSBI): (!) 21.85 (11/05/23 0240)    Lines/Drains/Airways       Central Venous Catheter Line  Duration             Trialysis (Dialysis) Catheter 11/03/23 1341 left internal jugular 1 day              Drain  Duration                  Urethral Catheter 11/02/23 1030 2 days         Chest Tube 11/03/23 1723 Tube - 3 Right Pleural 19 Fr. 1 day         Y Chest Tube 1 and 2 11/03/23 1722 1 Left Mediastinal 19 Fr. 2 Right Mediastinal 19 Fr. 1 day              Airway  Duration                  Airway - Non-Surgical 11/02/23 0855 Endotracheal Tube 3 days              Arterial Line  Duration             Arterial Line 11/02/23 1031 Right Radial 2 days              Line  Duration                  Pacer Wires 11/03/23 1700 1 day              Peripheral Intravenous Line  Duration                  Peripheral IV - Single Lumen 11/05/23 0100 18 G Left;Posterior Hand <1 day                    Significant Labs:    CBC/Anemia Profile:  Recent Labs   Lab 11/03/23  1812 11/03/23  1815 11/03/23  2226 11/04/23  0312 11/05/23  0310   WBC 37.84*  --   --  28.25* 19.95*   HGB 9.1*  --   --  8.7* 7.2*   HCT 27.2*   < > 27* 25.5* 21.7*   PLT 90*  --   --  89* 84*   MCV 93  --   --  90 94   RDW 14.6*  --   --  14.6* 15.4*    < > = values in this interval not displayed.        Chemistries:  Recent Labs   Lab  11/04/23  0541 11/04/23  1639 11/04/23  2357 11/05/23  0310   * 151* 151*  --    K 4.5 4.3 4.5  --    * 113* 114*  --    CO2 24 26 29  --    BUN 66* 77* 71*  --    CREATININE 1.5* 1.7* 1.5*  --    CALCIUM 7.9* 7.7* 7.4*  --    ALBUMIN 1.7* 1.7* 1.8*  --    PROT 4.6* 4.8* 5.0*  --    BILITOT 2.5* 2.1* 2.0*  --    ALKPHOS 55 57 60  --    ALT 45* 40 39  --    AST 67* 60* 57*  --    MG 2.5 2.5  --  2.6   PHOS 1.6* 3.1  --  4.3       All pertinent labs within the past 24 hours have been reviewed.    Significant Imaging:  I have reviewed all pertinent imaging results/findings within the past 24 hours.

## 2023-11-05 NOTE — PLAN OF CARE
PT mitchellal completed- see note for details, goals and POC established.     Problem: Physical Therapy  Goal: Physical Therapy Goal  Description: Goals to be met by: 23     Patient will increase functional independence with mobility by performin. Supine to sit with Set-up [place]  2. Sit to stand transfer with Stand-by Assistance  3. Bed to chair transfer with Stand-by Assistance using No Assistive Device  4. Gait  x 175 feet with Stand-by Assistance using No Assistive Device.   5. Lower extremity exercise program x20 reps per handout, with independence  6. Pt independently recalling 3/3 sternal precautions    Outcome: Ongoing, Progressing   2023

## 2023-11-05 NOTE — ASSESSMENT & PLAN NOTE
Neuro/Psych:   - Afebrile  - Somnolent, arouseable to voice, follows commands   - Sedation: none  - Pain: scheduled acetaminophen, PRN oxycodone            Cardiac:   - S/P MVR with Dr. Beal on 11/3/2023  - BP Goal: MAP 60-80, SBP <140  - IABP removed 11/4  - Cleviprex PRN  - Pressors: weaned off, none  - Anti-HTNs: Will start when appropriate  - Rhythm: NSR  - Beta blocker: can hold  - Statin: Atorvastatin 40 mg QD     Pulmonary:   - Goal SpO2 >92%  - Room air  - Chest Tubes x 3 (2 Meds & 1 Pleural), output 100 am, 100 pm    - ABGs PRN     Renal:  - Trend BUN/Cr   - Maintain Watson, record strict Is/Os  - UOP 3065 past 24 hours  - Net negative 201cc past 24 hours    Recent Labs   Lab 11/04/23  0541 11/04/23  1639 11/04/23  2357   BUN 66* 77* 71*   CREATININE 1.5* 1.7* 1.5*          FEN / GI:   - Daily CMP, PRN K/Mag/Phos per protocol   - Replace electrolytes as needed  - Nutrition: NPO, evaluate SLP swallowing- advance if able  - Bowel Regimen: Miralax, docusate  Hypernatremia- worsened, stable around 151, FWD 3.6L  -Liberalize fluid restriction  -Trend sodium on BMP q8h, avoid correction 12 mEq/24 hours  -Start D5W 75/hour     ID:   - Afebrile  - WBC downtrending  - Abx: Continue vancomycin, 6 week course  - will eventually need PICC  - ID following, appreciate recs    Recent Labs   Lab 11/03/23 1812 11/04/23 0312 11/05/23 0310   WBC 37.84* 28.25* 19.95*          Heme/Onc:   - Hgb 10.1 pre-operatively, slight downtrend postoperatively 7.2 today  - Trend H/H on daily CBC  - CBC daily  - ASA 325mg daily  - Thrombocytopenic, platelets 84- no s/s of bleeding, will continue to monitor    Recent Labs   Lab 11/03/23  1812 11/04/23  0312 11/05/23  0310   HGB 9.1* 8.7* 7.2*   PLT 90* 89* 84*   APTT 24.8 22.2 23.0   INR 1.3* 1.1 1.1          Endocrine:   - CTS Goal -140  - HgbA1c: not on file   - Endocrinology consulted for insulin management     PPx:   Feeding: Advance if pass SLP swallow  Analgesia/Sedation:  multimodal, dialudid / prop  Thromboembolic Prevention: heparin 5000 tid  HOB >30: Yes  Stress Ulcer: not indicated  Glucose Control: Yes, insulin management per Endocrinology     Lines/Drains/Airway:   Left radial arterial line   Watson   Chest Tubes: 3   Pacing Wires: Temporary ventricular pacing wires     Dispo/Code Status/Palliative:   - Continue SICU Care  - Full Code

## 2023-11-05 NOTE — PROGRESS NOTES
"[facility]  Endocrinology  Progress Note    Admit Date: 10/30/2023     Reason for Consult: Management of Hyperglycemia     Surgical Procedure and Date: s/p MVr    No known hx of DM and not on meds at home.    HPI:   Patient is a 48 y.o. male with a diagnosis of no past medical history admitted 10/30 for sepsis. Last Friday, 10/27, he developed headache, abdominal pain, and nausea. He presented to , who prescribed Tylenol and recommended he return if he continued to feel poorly. He presented to [facility] 10/30 with worsening abdominal pain, as well as fatigue, body aches, and hematuria. He was found to be tachycardic and hypotensive requiring vasopressor support. He was admitted to MICU for management of sepsis 2/2 PNA vs UTI vs meningitis (reported symptoms of neck stiffness). He was unable to undergo LP due to thrombocytopenia of 23. He is currently being evaluated for TTP. Further work up revealed MRSA bacteremia. He underwent TTE, which was notable for mitral valve vegetation with flail anterior leaflet, as well as possible aortic valve vegetation. Patient is now s/p MVr and resection of left atrial appendage. Endocrinology consulted for BG management post-operatively.     No results found for: "LABA1C", "HGBA1C"               Interval HPI:   No acute events overnight. Patient in room 39337/70204 A. Blood glucose stable. BG at goal on current insulin regimen (Transition Insulin Drip). Steroid use- None .   2 Days Post-Op  Renal function- Abnormal -    Vasopressors-  Epinephrine  and Norepinephrine     Diet NPO Except for: Sips with Medication     Eating:   NPO  Nausea: No  Hypoglycemia and intervention: No  Fever: No  TPN and/or TF: No    /65   Pulse 80   Temp 98.1 °F (36.7 °C) (Oral)   Resp 20   Ht 6' 1" (1.854 m)   Wt (S) 76.5 kg (168 lb 10.4 oz)   SpO2 (!) 91%   BMI 22.25 kg/m²     Labs Reviewed and Include    Recent Labs   Lab 11/04/23  2357   *   CALCIUM 7.4*   ALBUMIN " "1.8*   PROT 5.0*   *   K 4.5   CO2 29   *   BUN 71*   CREATININE 1.5*   ALKPHOS 60   ALT 39   AST 57*   BILITOT 2.0*     Lab Results   Component Value Date    WBC 19.95 (H) 11/05/2023    HGB 7.2 (L) 11/05/2023    HCT 21.7 (L) 11/05/2023    MCV 94 11/05/2023    PLT 84 (L) 11/05/2023     No results for input(s): "TSH", "FREET4" in the last 168 hours.  No results found for: "HGBA1C"    Nutritional status:   Body mass index is 22.25 kg/m².  Lab Results   Component Value Date    ALBUMIN 1.8 (L) 11/04/2023    ALBUMIN 1.7 (L) 11/04/2023    ALBUMIN 1.7 (L) 11/04/2023     No results found for: "PREALBUMIN"    Estimated Creatinine Clearance: 64.5 mL/min (A) (based on SCr of 1.5 mg/dL (H)).    Accu-Checks  Recent Labs     11/04/23  0400 11/04/23  0505 11/04/23  0542 11/04/23  0833 11/04/23  0906 11/04/23  1123 11/04/23  1638 11/04/23  2002 11/04/23  2359 11/05/23  0408   POCTGLUCOSE 112* 121* 105 144* 139* 157* 174* 161* 147* 126*       Current Medications and/or Treatments Impacting Glycemic Control  Immunotherapy:    Immunosuppressants       None          Steroids:   Hormones (From admission, onward)      None          Pressors:    Autonomic Drugs (From admission, onward)      Start     Stop Route Frequency Ordered    11/03/23 1845  EPINEPHrine 5 mg in dextrose 5% 250 mL infusion (premix)        Question Answer Comment   Begin at (in mcg/kg/min): 0.02    Titrate by: (in mcg/kg/min) 0.02    Titrate interval: (in minutes) 5    Titrate to maintain: (SBP or MAP or Cardiac Index) MAP    Greater than: (in mmHg) 60    Maximum dose: (in mcg/kg/min) 2        -- IV Continuous 11/03/23 1839    11/03/23 1800  NORepinephrine 4 mg in dextrose 5% 250 mL infusion (premix)        Question Answer Comment   Begin at (in mcg/kg/min): 0.02    Titrate by: (in mcg/kg/min) 0.02    Titrate interval: (in minutes) 5    Titrate to maintain: (MAP or SBP) MAP    Greater than: (in mmHg) 60    Maximum dose: (in mcg/kg/min) 3        -- IV " Continuous 11/03/23 1756    11/02/23 0838  succinylcholine (ANECTINE) 20 mg/mL injection        Note to Pharmacy: Created by cabinet override    11/02/23 2044 11/02/23 0838    10/31/23 0015  NORepinephrine bitartrate-D5W 4 mg/250 mL (16 mcg/mL) PERIPHERAL access infusion        Question Answer Comment   Begin at (in mcg/kg/min): 0.02    Titrate by: (in mcg/kg/min) 0.02    Titrate interval: (in minutes) 5    Titrate to maintain: (MAP or SBP) MAP    Greater than: (in mmHg) 60    Maximum dose: (in mcg/kg/min) 0.2        11/01/23 0014 IV Continuous 10/30/23 2305          Hyperglycemia/Diabetes Medications:   Antihyperglycemics (From admission, onward)      Start     Stop Route Frequency Ordered    11/04/23 1100  insulin regular in 0.9 % NaCl 100 unit/100 mL (1 unit/mL) infusion        Question:  Enter initial dose (Units/hr):  Answer:  1.2    -- IV Continuous 11/04/23 0957    11/04/23 1056  insulin aspart U-100 pen 0-5 Units         -- SubQ As needed (PRN) 11/04/23 0957            ASSESSMENT and PLAN    Cardiac/Vascular  * Acute bacterial endocarditis  Managed per primary.   May impact BG      Renal/  FRANCESCO (acute kidney injury)  Titrate insulin slowly to avoid hypoglycemia as the risk of hypoglycemia increases with decreased creatinine clearance.          Endocrine  Transient hyperglycemia post procedure  BG goal 140 - 180   No known hx of DM.  S/p MVR for endocarditis.    -Adjust transition drip to 0.8 u/hr w/ stepdown parameters  -Continue /50  -POCT q4 hr      ** Please notify Endocrine for any change and/or advance in diet**  ** Please call Endocrine for any BG related issues **     Discharge Planning:   TBD. Please notify endocrinology prior to discharge.              Mark Gamble PA-C  Endocrinology  [facility]

## 2023-11-05 NOTE — PROGRESS NOTES
Dr. Loaiza and team at bedside for pt extubation. Pt on 2 L nasal cannula stating 100% and tolerating well. Follows commands, still drowsy. VSS

## 2023-11-06 LAB
ALBUMIN SERPL BCP-MCNC: 1.8 G/DL (ref 3.5–5.2)
ALBUMIN SERPL BCP-MCNC: 1.9 G/DL (ref 3.5–5.2)
ALBUMIN SERPL BCP-MCNC: 2 G/DL (ref 3.5–5.2)
ALP SERPL-CCNC: 77 U/L (ref 55–135)
ALP SERPL-CCNC: 85 U/L (ref 55–135)
ALP SERPL-CCNC: 91 U/L (ref 55–135)
ALT SERPL W/O P-5'-P-CCNC: 38 U/L (ref 10–44)
ALT SERPL W/O P-5'-P-CCNC: 38 U/L (ref 10–44)
ALT SERPL W/O P-5'-P-CCNC: 43 U/L (ref 10–44)
ANION GAP SERPL CALC-SCNC: 7 MMOL/L (ref 8–16)
ANION GAP SERPL CALC-SCNC: 8 MMOL/L (ref 8–16)
ANION GAP SERPL CALC-SCNC: 9 MMOL/L (ref 8–16)
APTT PPP: 25 SEC (ref 21–32)
AST SERPL-CCNC: 51 U/L (ref 10–40)
AST SERPL-CCNC: 57 U/L (ref 10–40)
AST SERPL-CCNC: 59 U/L (ref 10–40)
BACTERIA BLD CULT: ABNORMAL
BACTERIA SPEC AEROBE CULT: ABNORMAL
BILIRUB SERPL-MCNC: 1.6 MG/DL (ref 0.1–1)
BILIRUB SERPL-MCNC: 1.8 MG/DL (ref 0.1–1)
BILIRUB SERPL-MCNC: 1.8 MG/DL (ref 0.1–1)
BLD PROD TYP BPU: NORMAL
BLOOD UNIT EXPIRATION DATE: NORMAL
BLOOD UNIT TYPE CODE: 5100
BLOOD UNIT TYPE CODE: 9500
BLOOD UNIT TYPE: NORMAL
BUN SERPL-MCNC: 23 MG/DL (ref 6–20)
BUN SERPL-MCNC: 26 MG/DL (ref 6–20)
BUN SERPL-MCNC: 36 MG/DL (ref 6–20)
CALCIUM SERPL-MCNC: 7.3 MG/DL (ref 8.7–10.5)
CALCIUM SERPL-MCNC: 7.6 MG/DL (ref 8.7–10.5)
CALCIUM SERPL-MCNC: 7.7 MG/DL (ref 8.7–10.5)
CHLORIDE SERPL-SCNC: 114 MMOL/L (ref 95–110)
CHLORIDE SERPL-SCNC: 115 MMOL/L (ref 95–110)
CHLORIDE SERPL-SCNC: 115 MMOL/L (ref 95–110)
CO2 SERPL-SCNC: 24 MMOL/L (ref 23–29)
CO2 SERPL-SCNC: 25 MMOL/L (ref 23–29)
CO2 SERPL-SCNC: 26 MMOL/L (ref 23–29)
CODING SYSTEM: NORMAL
CREAT SERPL-MCNC: 0.7 MG/DL (ref 0.5–1.4)
CREAT SERPL-MCNC: 0.8 MG/DL (ref 0.5–1.4)
CREAT SERPL-MCNC: 0.9 MG/DL (ref 0.5–1.4)
CROSSMATCH INTERPRETATION: NORMAL
DISPENSE STATUS: NORMAL
ERYTHROCYTE [DISTWIDTH] IN BLOOD BY AUTOMATED COUNT: 16 % (ref 11.5–14.5)
EST. GFR  (NO RACE VARIABLE): >60 ML/MIN/1.73 M^2
ESTIMATED AVG GLUCOSE: 120 MG/DL (ref 68–131)
GLUCOSE SERPL-MCNC: 147 MG/DL (ref 70–110)
GLUCOSE SERPL-MCNC: 187 MG/DL (ref 70–110)
GLUCOSE SERPL-MCNC: 190 MG/DL (ref 70–110)
HBA1C MFR BLD: 5.8 % (ref 4–5.6)
HCT VFR BLD AUTO: 23.1 % (ref 40–54)
HGB BLD-MCNC: 7.4 G/DL (ref 14–18)
INR PPP: 1 (ref 0.8–1.2)
MAGNESIUM SERPL-MCNC: 2.5 MG/DL (ref 1.6–2.6)
MCH RBC QN AUTO: 31 PG (ref 27–31)
MCHC RBC AUTO-ENTMCNC: 32 G/DL (ref 32–36)
MCV RBC AUTO: 97 FL (ref 82–98)
NUM UNITS TRANS FFP: NORMAL
NUM UNITS TRANS PACKED RBC: NORMAL
PHOSPHATE SERPL-MCNC: 3.4 MG/DL (ref 2.7–4.5)
PLATELET # BLD AUTO: 98 K/UL (ref 150–450)
PMV BLD AUTO: 14.4 FL (ref 9.2–12.9)
POCT GLUCOSE: 117 MG/DL (ref 70–110)
POCT GLUCOSE: 156 MG/DL (ref 70–110)
POCT GLUCOSE: 182 MG/DL (ref 70–110)
POCT GLUCOSE: 187 MG/DL (ref 70–110)
POCT GLUCOSE: 190 MG/DL (ref 70–110)
POCT GLUCOSE: 238 MG/DL (ref 70–110)
POTASSIUM SERPL-SCNC: 3.4 MMOL/L (ref 3.5–5.1)
POTASSIUM SERPL-SCNC: 3.4 MMOL/L (ref 3.5–5.1)
POTASSIUM SERPL-SCNC: 3.5 MMOL/L (ref 3.5–5.1)
POTASSIUM SERPL-SCNC: 3.6 MMOL/L (ref 3.5–5.1)
PROT SERPL-MCNC: 5.6 G/DL (ref 6–8.4)
PROT SERPL-MCNC: 6.1 G/DL (ref 6–8.4)
PROT SERPL-MCNC: 6.4 G/DL (ref 6–8.4)
PROTHROMBIN TIME: 11.2 SEC (ref 9–12.5)
RBC # BLD AUTO: 2.39 M/UL (ref 4.6–6.2)
SODIUM SERPL-SCNC: 142 MMOL/L (ref 136–145)
SODIUM SERPL-SCNC: 146 MMOL/L (ref 136–145)
SODIUM SERPL-SCNC: 147 MMOL/L (ref 136–145)
SODIUM SERPL-SCNC: 148 MMOL/L (ref 136–145)
SODIUM SERPL-SCNC: 150 MMOL/L (ref 136–145)
SODIUM SERPL-SCNC: 150 MMOL/L (ref 136–145)
VANCOMYCIN SERPL-MCNC: 13 UG/ML
WBC # BLD AUTO: 13.55 K/UL (ref 3.9–12.7)

## 2023-11-06 PROCEDURE — 63600175 PHARM REV CODE 636 W HCPCS: Performed by: STUDENT IN AN ORGANIZED HEALTH CARE EDUCATION/TRAINING PROGRAM

## 2023-11-06 PROCEDURE — 99291 CRITICAL CARE FIRST HOUR: CPT | Mod: ,,, | Performed by: ANESTHESIOLOGY

## 2023-11-06 PROCEDURE — 25000003 PHARM REV CODE 250: Performed by: STUDENT IN AN ORGANIZED HEALTH CARE EDUCATION/TRAINING PROGRAM

## 2023-11-06 PROCEDURE — 84100 ASSAY OF PHOSPHORUS: CPT | Performed by: THORACIC SURGERY (CARDIOTHORACIC VASCULAR SURGERY)

## 2023-11-06 PROCEDURE — 63600175 PHARM REV CODE 636 W HCPCS: Mod: JZ,JG

## 2023-11-06 PROCEDURE — 97116 GAIT TRAINING THERAPY: CPT

## 2023-11-06 PROCEDURE — 27000207 HC ISOLATION

## 2023-11-06 PROCEDURE — 63600175 PHARM REV CODE 636 W HCPCS

## 2023-11-06 PROCEDURE — 85610 PROTHROMBIN TIME: CPT | Performed by: STUDENT IN AN ORGANIZED HEALTH CARE EDUCATION/TRAINING PROGRAM

## 2023-11-06 PROCEDURE — 99232 SBSQ HOSP IP/OBS MODERATE 35: CPT | Mod: ,,,

## 2023-11-06 PROCEDURE — 99233 SBSQ HOSP IP/OBS HIGH 50: CPT | Mod: ,,, | Performed by: STUDENT IN AN ORGANIZED HEALTH CARE EDUCATION/TRAINING PROGRAM

## 2023-11-06 PROCEDURE — 94761 N-INVAS EAR/PLS OXIMETRY MLT: CPT

## 2023-11-06 PROCEDURE — 97535 SELF CARE MNGMENT TRAINING: CPT

## 2023-11-06 PROCEDURE — 80202 ASSAY OF VANCOMYCIN: CPT | Performed by: THORACIC SURGERY (CARDIOTHORACIC VASCULAR SURGERY)

## 2023-11-06 PROCEDURE — 87077 CULTURE AEROBIC IDENTIFY: CPT | Performed by: STUDENT IN AN ORGANIZED HEALTH CARE EDUCATION/TRAINING PROGRAM

## 2023-11-06 PROCEDURE — 99291 PR CRITICAL CARE, E/M 30-74 MINUTES: ICD-10-PCS | Mod: ,,, | Performed by: ANESTHESIOLOGY

## 2023-11-06 PROCEDURE — 84132 ASSAY OF SERUM POTASSIUM: CPT | Performed by: THORACIC SURGERY (CARDIOTHORACIC VASCULAR SURGERY)

## 2023-11-06 PROCEDURE — 92610 EVALUATE SWALLOWING FUNCTION: CPT

## 2023-11-06 PROCEDURE — 84295 ASSAY OF SERUM SODIUM: CPT | Mod: 91 | Performed by: THORACIC SURGERY (CARDIOTHORACIC VASCULAR SURGERY)

## 2023-11-06 PROCEDURE — 63600175 PHARM REV CODE 636 W HCPCS: Performed by: THORACIC SURGERY (CARDIOTHORACIC VASCULAR SURGERY)

## 2023-11-06 PROCEDURE — 25000003 PHARM REV CODE 250

## 2023-11-06 PROCEDURE — 85027 COMPLETE CBC AUTOMATED: CPT | Performed by: STUDENT IN AN ORGANIZED HEALTH CARE EDUCATION/TRAINING PROGRAM

## 2023-11-06 PROCEDURE — 20000000 HC ICU ROOM

## 2023-11-06 PROCEDURE — 99900035 HC TECH TIME PER 15 MIN (STAT)

## 2023-11-06 PROCEDURE — 87040 BLOOD CULTURE FOR BACTERIA: CPT | Performed by: STUDENT IN AN ORGANIZED HEALTH CARE EDUCATION/TRAINING PROGRAM

## 2023-11-06 PROCEDURE — 85730 THROMBOPLASTIN TIME PARTIAL: CPT | Performed by: STUDENT IN AN ORGANIZED HEALTH CARE EDUCATION/TRAINING PROGRAM

## 2023-11-06 PROCEDURE — 99233 PR SUBSEQUENT HOSPITAL CARE,LEVL III: ICD-10-PCS | Mod: ,,, | Performed by: STUDENT IN AN ORGANIZED HEALTH CARE EDUCATION/TRAINING PROGRAM

## 2023-11-06 PROCEDURE — C9248 INJ, CLEVIDIPINE BUTYRATE: HCPCS | Mod: JZ,JG

## 2023-11-06 PROCEDURE — 80053 COMPREHEN METABOLIC PANEL: CPT | Performed by: STUDENT IN AN ORGANIZED HEALTH CARE EDUCATION/TRAINING PROGRAM

## 2023-11-06 PROCEDURE — 80053 COMPREHEN METABOLIC PANEL: CPT | Mod: 91 | Performed by: THORACIC SURGERY (CARDIOTHORACIC VASCULAR SURGERY)

## 2023-11-06 PROCEDURE — 83735 ASSAY OF MAGNESIUM: CPT | Performed by: THORACIC SURGERY (CARDIOTHORACIC VASCULAR SURGERY)

## 2023-11-06 PROCEDURE — 83036 HEMOGLOBIN GLYCOSYLATED A1C: CPT | Performed by: PHYSICIAN ASSISTANT

## 2023-11-06 PROCEDURE — 97530 THERAPEUTIC ACTIVITIES: CPT

## 2023-11-06 PROCEDURE — 87186 SC STD MICRODIL/AGAR DIL: CPT | Performed by: STUDENT IN AN ORGANIZED HEALTH CARE EDUCATION/TRAINING PROGRAM

## 2023-11-06 PROCEDURE — 99232 PR SUBSEQUENT HOSPITAL CARE,LEVL II: ICD-10-PCS | Mod: ,,,

## 2023-11-06 PROCEDURE — 25000003 PHARM REV CODE 250: Performed by: THORACIC SURGERY (CARDIOTHORACIC VASCULAR SURGERY)

## 2023-11-06 PROCEDURE — 84295 ASSAY OF SERUM SODIUM: CPT

## 2023-11-06 RX ORDER — LANOLIN ALCOHOL/MO/W.PET/CERES
800 CREAM (GRAM) TOPICAL
Status: DISCONTINUED | OUTPATIENT
Start: 2023-11-06 | End: 2023-11-07

## 2023-11-06 RX ORDER — METOPROLOL TARTRATE 25 MG/1
25 TABLET, FILM COATED ORAL 2 TIMES DAILY
Status: DISCONTINUED | OUTPATIENT
Start: 2023-11-06 | End: 2023-11-07

## 2023-11-06 RX ORDER — NIFEDIPINE 30 MG/1
30 TABLET, EXTENDED RELEASE ORAL DAILY
Status: DISCONTINUED | OUTPATIENT
Start: 2023-11-06 | End: 2023-11-07

## 2023-11-06 RX ORDER — SODIUM,POTASSIUM PHOSPHATES 280-250MG
2 POWDER IN PACKET (EA) ORAL
Status: DISCONTINUED | OUTPATIENT
Start: 2023-11-06 | End: 2023-11-07

## 2023-11-06 RX ORDER — ENOXAPARIN SODIUM 100 MG/ML
40 INJECTION SUBCUTANEOUS EVERY 24 HOURS
Status: DISCONTINUED | OUTPATIENT
Start: 2023-11-06 | End: 2023-11-07

## 2023-11-06 RX ORDER — CARVEDILOL 3.12 MG/1
3.12 TABLET ORAL 2 TIMES DAILY
Status: DISCONTINUED | OUTPATIENT
Start: 2023-11-06 | End: 2023-11-06

## 2023-11-06 RX ORDER — POTASSIUM CHLORIDE 20 MEQ/1
20 TABLET, EXTENDED RELEASE ORAL ONCE
Status: COMPLETED | OUTPATIENT
Start: 2023-11-06 | End: 2023-11-06

## 2023-11-06 RX ADMIN — ENOXAPARIN SODIUM 40 MG: 40 INJECTION SUBCUTANEOUS at 05:11

## 2023-11-06 RX ADMIN — OXYCODONE HYDROCHLORIDE 10 MG: 10 TABLET ORAL at 06:11

## 2023-11-06 RX ADMIN — METOPROLOL TARTRATE 25 MG: 25 TABLET, FILM COATED ORAL at 08:11

## 2023-11-06 RX ADMIN — INSULIN ASPART 2 UNITS: 100 INJECTION, SOLUTION INTRAVENOUS; SUBCUTANEOUS at 12:11

## 2023-11-06 RX ADMIN — DEXTROSE MONOHYDRATE: 50 INJECTION, SOLUTION INTRAVENOUS at 04:11

## 2023-11-06 RX ADMIN — POTASSIUM CHLORIDE 40 MEQ: 29.8 INJECTION, SOLUTION INTRAVENOUS at 04:11

## 2023-11-06 RX ADMIN — INSULIN ASPART 1 UNITS: 100 INJECTION, SOLUTION INTRAVENOUS; SUBCUTANEOUS at 09:11

## 2023-11-06 RX ADMIN — DOCUSATE SODIUM 100 MG: 100 CAPSULE, LIQUID FILLED ORAL at 08:11

## 2023-11-06 RX ADMIN — OXYCODONE HYDROCHLORIDE 10 MG: 10 TABLET ORAL at 03:11

## 2023-11-06 RX ADMIN — MUPIROCIN: 20 OINTMENT TOPICAL at 08:11

## 2023-11-06 RX ADMIN — OXYCODONE HYDROCHLORIDE 10 MG: 10 TABLET ORAL at 11:11

## 2023-11-06 RX ADMIN — ACETAMINOPHEN 1000 MG: 500 TABLET ORAL at 06:11

## 2023-11-06 RX ADMIN — POTASSIUM CHLORIDE 40 MEQ: 29.8 INJECTION, SOLUTION INTRAVENOUS at 01:11

## 2023-11-06 RX ADMIN — CHLOROTHIAZIDE 250 MG: 250 SUSPENSION ORAL at 11:11

## 2023-11-06 RX ADMIN — METOPROLOL TARTRATE 25 MG: 25 TABLET, FILM COATED ORAL at 10:11

## 2023-11-06 RX ADMIN — INSULIN ASPART 1 UNITS: 100 INJECTION, SOLUTION INTRAVENOUS; SUBCUTANEOUS at 04:11

## 2023-11-06 RX ADMIN — OXYCODONE HYDROCHLORIDE 10 MG: 10 TABLET ORAL at 08:11

## 2023-11-06 RX ADMIN — VANCOMYCIN HYDROCHLORIDE 1250 MG: 1.25 INJECTION, POWDER, LYOPHILIZED, FOR SOLUTION INTRAVENOUS at 08:11

## 2023-11-06 RX ADMIN — NIFEDIPINE 30 MG: 30 TABLET, FILM COATED, EXTENDED RELEASE ORAL at 08:11

## 2023-11-06 RX ADMIN — HYDROMORPHONE HYDROCHLORIDE 0.5 MG: 0.5 INJECTION, SOLUTION INTRAMUSCULAR; INTRAVENOUS; SUBCUTANEOUS at 12:11

## 2023-11-06 RX ADMIN — HYDROMORPHONE HYDROCHLORIDE 0.5 MG: 0.5 INJECTION, SOLUTION INTRAMUSCULAR; INTRAVENOUS; SUBCUTANEOUS at 02:11

## 2023-11-06 RX ADMIN — CLEVIPIDINE 3 MG/HR: 0.5 EMULSION INTRAVENOUS at 08:11

## 2023-11-06 RX ADMIN — HEPARIN SODIUM 5000 UNITS: 5000 INJECTION INTRAVENOUS; SUBCUTANEOUS at 06:11

## 2023-11-06 RX ADMIN — POLYETHYLENE GLYCOL 3350 17 G: 17 POWDER, FOR SOLUTION ORAL at 08:11

## 2023-11-06 RX ADMIN — POTASSIUM BICARBONATE 50 MEQ: 978 TABLET, EFFERVESCENT ORAL at 10:11

## 2023-11-06 RX ADMIN — ACETAMINOPHEN 1000 MG: 500 TABLET ORAL at 08:11

## 2023-11-06 RX ADMIN — POTASSIUM CHLORIDE 20 MEQ: 1500 TABLET, EXTENDED RELEASE ORAL at 05:11

## 2023-11-06 RX ADMIN — ACETAMINOPHEN 1000 MG: 500 TABLET ORAL at 02:11

## 2023-11-06 RX ADMIN — INSULIN ASPART 1 UNITS: 100 INJECTION, SOLUTION INTRAVENOUS; SUBCUTANEOUS at 11:11

## 2023-11-06 RX ADMIN — ASPIRIN 325 MG ORAL TABLET 325 MG: 325 PILL ORAL at 08:11

## 2023-11-06 RX ADMIN — VANCOMYCIN HYDROCHLORIDE 1000 MG: 1 INJECTION, POWDER, LYOPHILIZED, FOR SOLUTION INTRAVENOUS at 11:11

## 2023-11-06 NOTE — PROGRESS NOTES
[facility] - Surgical Intensive Care  Infectious Disease  Progress Note    Patient Name: Lorenzo Nino  MRN: [id number]  Admission Date: 10/30/2023  Length of Stay: 7 days  Attending Physician: Manuel Beal MD  Primary Care Provider: No primary care provider on file.    Isolation Status: Contact  Assessment/Plan:      Cardiac/Vascular  Endocarditis  49 yo male with MRSA bacteremia c/b MV/AVIE s/p MV repair with bovine pericardial annuloplasty, OR cx with MRSA. Repeat blcx positive from 11/5 - gram/stain with GPC resembling staph. Pt reports tolerating abx therapy, denies new joint or back pain.     Recommendations:  -continue vancomycin pharm to dose   -monitor renal fx while on vancomycin, goal trough 15-20  -d/w micro, MRSA susceptible to dapto/linezolid  -repeat blood cultures (ordered this morning) x 2 given positive gram stain on blood cultures from 11/5  -continue isolation precautions per hospital protocol              Thank you for your consult. I will follow-up with patient. Please contact us if you have any additional questions. Above d/w primary team.         50 minutes of total time spent on the encounter, which includes face to face time and non-face to face time preparing to see the patient (eg, review of tests), obtaining and/or reviewing separately obtained history, documenting clinical information in the electronic or other health record, independently interpreting results (not separately reported) and communicating results to the patient/family/caregiver, or care coordination (not separately reported).       Verito Desir MD  Infectious Disease  [facility] - Surgical Intensive Care    Subjective:     Principal Problem:Acute bacterial endocarditis    HPI: This is the strange case of a 49 yo man with no medical problems who reportedly developed acute onset of symptoms a few days ago. He was seen in several urgent cares w/headache, abdominal pain with nausea but no vomiting or diarrhea.   There he was evaluated and instructed to take Tylenol and follow up if he still felt unwell.  When he presented with hematuria, he was sent to the ED here and was found to have thrombocytopenia. CT head negative, CT C/A/P showing perinephric stranding, distended bladder, and hiatal hernia. Patient admitted to MICU for further workup and treatment of his hypotension and likely severe sepsis. Blood cultures have grown MRSA and a TTE revealed a flail mitral leflet and a possible vegetation on the aortic valve. ID is consulted for that. The patient is accompanied by his mother. He is scheduled for a NEW. Denies IDU. Denetal implants. HIV NR.       Interval History: No fevers documented overnight. Family at bedside. Pt reports tolerating abx without issues.       Review of Systems  Objective:     Vital Signs (Most Recent):  Temp: 98.2 °F (36.8 °C) (11/06/23 0800)  Pulse: 87 (11/06/23 0800)  Resp: 18 (11/06/23 0817)  BP: 125/72 (11/06/23 0800)  SpO2: 95 % (11/06/23 0800) Vital Signs (24h Range):  Temp:  [97.7 °F (36.5 °C)-98.3 °F (36.8 °C)] 98.2 °F (36.8 °C)  Pulse:  [71-92] 87  Resp:  [15-33] 18  SpO2:  [90 %-98 %] 95 %  BP: (111-142)/(65-82) 125/72  Arterial Line BP: ()/(31-64) 142/52     Weight: (S) 76.5 kg (168 lb 10.4 oz)  Body mass index is 22.25 kg/m².    Estimated Creatinine Clearance: 107.4 mL/min (based on SCr of 0.9 mg/dL).     Physical Exam  Constitutional:       General: He is not in acute distress.     Appearance: He is not ill-appearing or toxic-appearing.   HENT:      Head: Normocephalic and atraumatic.   Eyes:      General:         Right eye: No discharge.         Left eye: No discharge.   Cardiovascular:      Comments: Mediastinal surgical site -dressed  Pulmonary:      Comments: Chest tube  Abdominal:      General: There is no distension.      Palpations: Abdomen is soft.      Tenderness: There is no abdominal tenderness.   Skin:     General: Skin is warm and dry.      Comments: JEANINE castellon  scab  L knee scrape   Neurological:      Mental Status: He is alert and oriented to person, place, and time.          Significant Labs:   Microbiology Results (last 7 days)       Procedure Component Value Units Date/Time    Blood culture [[id number]] Collected: 11/05/23 0109    Order Status: Completed Specimen: Blood from Peripheral, Hand, Left Updated: 11/06/23 0329     Blood Culture, Routine Gram stain aer bottle: Gram positive cocci in clusters resembling Staph      Results called to and read back by:Manuel Centeno Rn 11/06/2023  03:29    Aerobic culture [[id number]]  (Abnormal)  (Susceptibility) Collected: 11/03/23 1516    Order Status: Completed Specimen: Chest from Heart Updated: 11/05/23 1343     Aerobic Bacterial Culture METHICILLIN RESISTANT STAPHYLOCOCCUS AUREUS  Moderate      Narrative:      Mitral Leaflet for Culture    Blood culture [[id number]]  (Abnormal) Collected: 11/03/23 0609    Order Status: Completed Specimen: Blood from Peripheral, Hand, Right Updated: 11/05/23 1109     Blood Culture, Routine Gram stain aer bottle: Gram positive cocci in clusters resembling Staph      Positive results previously called  11/04/2023 06:05      STAPHYLOCOCCUS AUREUS  Susceptibility pending  ID consult required at [facility].      Blood culture [[id number]]  (Abnormal) Collected: 11/03/23 0614    Order Status: Completed Specimen: Blood from Peripheral, Hand, Left Updated: 11/05/23 0940     Blood Culture, Routine Gram stain aer bottle: Gram positive cocci in clusters resembling Staph      Results called to and read back by:Manuel Centeno Rn 11/04/2023  04:12      STAPHYLOCOCCUS AUREUS  ID consult required at [facility].  For susceptibility see order # [id number]      Blood culture [[id number]]  (Abnormal) Collected: 11/01/23 0607    Order Status: Completed Specimen: Blood from Peripheral, Forearm, Left Updated: 11/05/23 0938     Blood Culture, Routine  Gram stain aer bottle: Gram positive cocci in clusters resembling Staph      Positive results previously called 11/02/2023  04:20      Gram stain carlie bottle: Gram positive cocci in clusters resembling Staph      Positive results previously called 11/04/2023  11:33      METHICILLIN RESISTANT STAPHYLOCOCCUS AUREUS  For susceptibility see order #[id number]  ID consult required at [facility].      Blood culture [[id number]]  (Abnormal) Collected: 11/01/23 0609    Order Status: Completed Specimen: Blood from Peripheral, Hand, Right Updated: 11/05/23 0714     Blood Culture, Routine Gram stain aer bottle: Gram positive cocci in clusters resembling Staph      Results called to and read back by:Melissa Ferro RN 11/01/2023  22:25      Gram stain carlie bottle: Gram positive cocci in clusters resembling Staph      Positive results previously called 11/03/2023  21:56      METHICILLIN RESISTANT STAPHYLOCOCCUS AUREUS  ID consult required at [facility].  For susceptibility see order #[id number]       Comment: Previous comment was modified by CHRISTEN VELÁSQUEZ at 08:21 on 11/04/2023   ID consult required at [facility].For   susceptibility see order #[id number] consult required at [facility].         AFB Culture & Smear [[id number]] Collected: 11/03/23 1516    Order Status: Completed Specimen: Chest from Heart Updated: 11/04/23 2127     AFB Culture & Smear Culture in progress    Narrative:      Mitral Leaflet for Culture    Culture, Anaerobe [[id number]] Collected: 11/03/23 1516    Order Status: Completed Specimen: Chest from Heart Updated: 11/04/23 1217     Anaerobic Culture Culture in progress    Narrative:      Mitral Leaflet for Culture    Gram stain [[id number]] Collected: 11/03/23 1516    Order Status: Completed Specimen: Chest from Heart Updated: 11/03/23 1711     Gram Stain Result Rare WBC's      Few Gram  positive cocci    Narrative:      Mitral Leaflet for Culture    Fungus culture [[id number]] Collected: 11/03/23 1516    Order Status: Sent Specimen: Chest from Heart Updated: 11/03/23 1541    Blood culture x two cultures. Draw prior to antibiotics. [[id number]]  (Abnormal) Collected: 10/30/23 1923    Order Status: Completed Specimen: Blood from Peripheral, Antecubital, Right Updated: 11/03/23 0800     Blood Culture, Routine Gram stain aer bottle: Gram positive cocci in clusters resembling Staph      Gram stain carlie bottle: Gram positive cocci in clusters resembling Staph      Results called to and read back by: Savannah Butler RN 10/31/2023  06:33      METHICILLIN RESISTANT STAPHYLOCOCCUS AUREUS  ID consult required at [facility].  For susceptibility see order #[id number]      Narrative:      Aerobic and anaerobic    Blood culture x two cultures. Draw prior to antibiotics. [[id number]]  (Abnormal)  (Susceptibility) Collected: 10/30/23 1924    Order Status: Completed Specimen: Blood from Peripheral, Antecubital, Left Updated: 11/03/23 0759     Blood Culture, Routine Gram stain aer bottle: Gram positive cocci in clusters resembling Staph      Gram stain carlie bottle: Gram positive cocci in clusters resembling Staph      Results called to and read back by: Savannah Butler RN 10/31/2023  06      METHICILLIN RESISTANT STAPHYLOCOCCUS AUREUS  ID consult required at [facility] and [facility].      Narrative:      Aerobic and anaerobic    Urine culture [[id number]]  (Abnormal)  (Susceptibility) Collected: 10/30/23 2152    Order Status: Completed Specimen: Urine Updated: 11/02/23 0250     Urine Culture, Routine METHICILLIN RESISTANT STAPHYLOCOCCUS AUREUS  >100,000cfu/ml  No other significant isolate      Narrative:      Specimen Source->Urine    Add on udrug [id number] ctgc [id number] PER DR.WELLFORD BAEZ    10/30/2023  23:04     C. trachomatis/N. gonorrhoeae by AMP DNA [facility]; Urine  [[id number]] Collected: 10/31/23 0030    Order Status: Completed Specimen: Urine from Vaginal Updated: 10/31/23 1814     Chlamydia, Amplified DNA Not Detected     N gonorrhoeae, amplified DNA Not Detected    Narrative:      Sources by Resulting Lab:->[facility]  Release to patient->Immediate    MRSA/SA Rapid ID by PCR from Blood culture [[id number]]  (Abnormal) Collected: 10/30/23 1923    Order Status: Completed Updated: 10/31/23 0751     Staph aureus ID by PCR Positive     Methicillin Resistant ID by PCR Positive    Narrative:      Aerobic and anaerobic    Culture, Respiratory with Gram Stain [[id number]]     Order Status: Canceled Specimen: Respiratory from Sputum     Respiratory Infection Panel (PCR), Nasopharyngeal [[id number]] Collected: 10/30/23 2153    Order Status: Completed Specimen: Nasopharyngeal Swab Updated: 10/30/23 2323     Respiratory Infection Panel Source NP Swab     Adenovirus Not Detected     Coronavirus 229E, Common Cold Virus Not Detected     Coronavirus HKU1, Common Cold Virus Not Detected     Coronavirus NL63, Common Cold Virus Not Detected     Coronavirus OC43, Common Cold Virus Not Detected     Comment: The Coronavirus strains detected in this test cause the common cold.  These strains are not the COVID-19 (novel Coronavirus)strain   associated with the respiratory disease outbreak.          SARS-CoV2 (COVID-19) Qualitative PCR Not Detected     Human Metapneumovirus Not Detected     Human Rhinovirus/Enterovirus Not Detected     Influenza A (subtypes H1, H1-2009,H3) Not Detected     Influenza B Not Detected     Parainfluenza Virus 1 Not Detected     Parainfluenza Virus 2 Not Detected     Parainfluenza Virus 3 Not Detected     Parainfluenza Virus 4 Not Detected     Respiratory Syncytial Virus Not Detected     Bordetella Parapertussis (KY1653) Not Detected     Bordetella pertussis (ptxP) Not Detected     Chlamydia pneumoniae Not Detected     Mycoplasma pneumoniae Not Detected    Narrative:       For all other respiratory sources, order [id number] -  Respiratory Viral Panel by PCR    C. trachomatis/N. gonorrhoeae by AMP DNA [[id number]] Collected: 10/30/23 2304    Order Status: Canceled     C. trachomatis/N. gonorrhoeae by AMP DNA [facility]; Urine [[id number]]     Order Status: Completed Specimen: Genital from Vaginal     Influenza A & B by Molecular [[id number]] Collected: 10/30/23 1925    Order Status: Completed Specimen: Nasopharyngeal Swab Updated: 10/30/23 1958     Influenza A, Molecular NEGATIVE     Influenza B, Molecular NEGATIVE     Flu A & B Source Nasal swab            Significant Imaging: I have reviewed all pertinent imaging results/findings within the past 24 hours.

## 2023-11-06 NOTE — PLAN OF CARE
[facility] - Surgical Intensive Care  Discharge Reassessment    Primary Care Provider: No primary care provider on file.    Expected Discharge Date: 11/9/2023    Reassessment (most recent)       Discharge Reassessment - 11/06/23 1111          Discharge Reassessment    Assessment Type Discharge Planning Reassessment     Did the patient's condition or plan change since previous assessment? Yes     Discharge Plan discussed with: Patient;Parent(s)     Communicated DARIELA with patient/caregiver Date not available/Unable to determine     Discharge Plan A Home with family;Home Health     Discharge Plan B Home;Home with family     DME Needed Upon Discharge  none     Transition of Care Barriers None     Why the patient remains in the hospital Requires continued medical care        Post-Acute Status    Discharge Delays None known at this time                 Discharge Plan A  is for discharge home with home health family and Plan B is for patient to go home with family have been determined by review of patient's clinical status, future medical and therapeutic needs, and coverage/benefits for post-acute care in coordination with multidisciplinary team members.

## 2023-11-06 NOTE — ASSESSMENT & PLAN NOTE
Neuro/Psych:   - Afebrile  - Reserved, quiet but more interactive today. Answering questions, following commands.   - Sedation: none  - Pain: scheduled acetaminophen, PRN oxycodone            Cardiac:   - S/P MVR with Dr. Beal on 11/3/2023  - BP Goal: MAP 60-80, SBP <140  - IABP removed 11/4  - Cleviprex PRN - started last night   - Pressors: weaned off, none  - Anti-HTNs: Start anti-HTN (per CTS preference and wean cleviprex)  - Rhythm: NSR  - Beta blocker: can hold  - Statin: Atorvastatin 40 mg QD  -  20/45= 65/24hrs     Pulmonary:   - Goal SpO2 >92%  - Started on 2L NC when clevidipine started, wean off as tolerated  - Chest Tubes x 3 (2 Meds & 1 Pleural), output 100 am, 100 pm    - ABGs PRN     Renal:  - Trend BUN/Cr - back at baseline 36/0.9  - FRANCESCO resolved  - Maintain [place], record strict Is/Os  - UOP 2460 past 24 hours  - Net negative 237cc past 24 hours      FEN / GI:   - Daily CMP, PRN K/Mag/Phos per protocol   - Replace electrolytes as needed  - Nutrition: NPO, evaluate SLP swallowing- advance diet  - Bowel Regimen: Miralax, docusate  Hypernatremia- stable 150 today, FWD 3.3L  -Liberalize fluid restriction  -Trend sodium q6h, avoid correction 12 mEq/24 hours  -Increase D5W to 100/hour     ID:   - Afebrile  - WBC downtrending - 13 today  - Abx: Continue vancomycin, 6 week course  - Will eventually need PICC prior to discharge  - ID following, appreciate recs     Heme/Onc:   - Hgb 10.1 pre-operatively,  postoperatively 7.2-- remains stable postoperatively  - Trend H/H on daily CBC  - CBC daily  - ASA 325mg daily  - Thrombocytopenic, platelets stable. 98 today - no s/s of bleeding, will continue to monitor       Endocrine:   - CTS Goal -140, currently above goals  - HgbA1c: not on file   - Endocrinology consulted for insulin management  - Insulin gtt per Endocrine with SSI      PPx:   Feeding: Advance if pass SLP swallow  Analgesia/Sedation: multimodal, adequate pain control  Thromboembolic  Prevention: heparin 5000 tid - change to enoxaparin  HOB >30: Yes  Stress Ulcer: not indicated  Glucose Control: Yes, insulin management per Endocrinology     Lines/Drains/Airway:   Left radial arterial line    [place]    Chest Tubes: 3   Pacing Wires: Temporary ventricular pacing wires     Dispo/Code Status/Palliative:   - Continue SICU Care  - Full Code

## 2023-11-06 NOTE — PLAN OF CARE
Pt seen for bedside swallow assessment. Pt with generalized weakness and appearing safest for oral meds whole with thin liquids, thin liquids and level 6 soft bite sized diet. Pt with significantly selective baseline eating preferences and may benefit from supplemental nutritious beverages with input from RD. Speech to continue to follow.    Qi Salas MS, CCC-SLP  Speech Language Pathologist  Pager: [phone]  Date 11/6/2023

## 2023-11-06 NOTE — ANESTHESIA POSTPROCEDURE EVALUATION
Anesthesia Post Evaluation    Patient: Lorenzo Nino    Procedure(s) Performed: Procedure(s) (LRB):  REPAIR, MITRAL VALVE, OPEN (N/A)  EXCLUSION, LEFT ATRIAL APPENDAGE, OPEN, AS PART OF OPEN CHEST SURGERY (Left)    Final Anesthesia Type: general      Patient location during evaluation: ICU  Patient participation: Yes- Able to Participate  Post-procedure vital signs: reviewed and stable  Pain management: adequate  Airway patency: patent    PONV status at discharge: No PONV  Anesthetic complications: no      Cardiovascular status: hemodynamically stable  Respiratory status: unassisted  Follow-up not needed.          Vitals Value Taken Time   /72 11/06/23 0801   Temp 36.8 °C (98.2 °F) 11/06/23 0800   Pulse 88 11/06/23 0818   Resp 20 11/06/23 0818   SpO2 95 % 11/06/23 0818   Vitals shown include unvalidated device data.      No case tracking events are documented in the log.      Pain/Gladis Score: Pain Rating Prior to Med Admin: 6 (11/6/2023  8:17 AM)  Pain Rating Post Med Admin: 2 (11/5/2023  2:40 PM)         Patient was seen in clinic today for follow up.  Patient continues  to smoke  3 cigarettes per day.  Patient remains on prescribed tobacco cessation medication regimen of 21 mg patch without any negative side effects at this time. Refill sent to pharmacy.  Patient does not use nicotine patch every day.  Reminded patient the importance of wear nicotine patch everyday to achieve quit.  Patient also states that the patch came off.  Informed patient about Coban tape and a sample was given.  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  Reviewed strategies to manage unexpected difficulties. We discussed ambivalence and willingness to quit. Prescribed medication management will be by physician. Patient will continue with sessions and medication monitoring by CTTS.

## 2023-11-06 NOTE — PLAN OF CARE
SICU PLAN OF CARE NOTE    Dx: Acute bacterial endocarditis    Vital Signs (last 12 hours):   Temp:  [97.7 °F (36.5 °C)-98.3 °F (36.8 °C)]   Pulse:  [71-85]   Resp:  [15-25]   BP: (114-142)/(68-82)   SpO2:  [91 %-98 %]   Arterial Line BP: (129-160)/(54-64)      Neuro: AAO x4 and Follows Commands    Cardiac: NSR with all pulses palpable 2+. 9 beat run of Vtach, team notified, K 3.4, replacements given    Respiratory: Nasal Cannula 1 L    Gtts: Insulin and Cleviprex     Urine Output: External catheter 200 cc/hour    Drains: Chest Tube, total output 45 cc /  shift    Diet: NPO and Sips with Meds     Labs/Accuchecks: Accuchecks Q4. AM labs, Na+ Q6    Skin: clean, dry, and intact. Foam dressings in place    Shift Events: Cleviprex started. Pt up to chair.

## 2023-11-06 NOTE — PROGRESS NOTES
[facility]  Critical Care - Surgery  Progress Note    Patient Name: Lorenzo Nino  MRN: [id number]  Admission Date: 10/30/2023  Hospital Length of Stay: 7 days  Code Status: Full Code  Attending Provider: Manuel Beal MD  Primary Care Provider: No primary care provider on file.   Principal Problem: Acute bacterial endocarditis    Subjective:     Hospital/ICU Course:  No notes on file    Interval History/Significant Events: NAEON. Hypernatremia improving. Patient seems more arousable, interactive today. HTN overnight, started on clevidipine.      Follow-up For: Procedure(s) (LRB):  REPAIR, MITRAL VALVE, OPEN (N/A)  EXCLUSION, LEFT ATRIAL APPENDAGE, OPEN, AS PART OF OPEN CHEST SURGERY (Left)    Post-Operative Day: 3 Days Post-Op    Objective:     Vital Signs (Most Recent):  Temp: 98.3 °F (36.8 °C) (11/06/23 0300)  Pulse: 82 (11/06/23 0353)  Resp: 18 (11/06/23 0300)  BP: 120/70 (11/06/23 0300)  SpO2: (!) 93 % (11/06/23 0300) Vital Signs (24h Range):  Temp:  [97.7 °F (36.5 °C)-98.3 °F (36.8 °C)] 98.3 °F (36.8 °C)  Pulse:  [71-92] 82  Resp:  [15-33] 18  SpO2:  [90 %-98 %] 93 %  BP: (110-142)/(65-82) 120/70  Arterial Line BP: (109-160)/(48-64) 139/56     Weight: (S) 76.5 kg (168 lb 10.4 oz)  Body mass index is 22.25 kg/m².      Intake/Output Summary (Last 24 hours) at 11/6/2023 0621  Last data filed at 11/6/2023 0500  Gross per 24 hour   Intake 2287.66 ml   Output 2525 ml   Net -237.34 ml          Physical Exam  Vitals and nursing note reviewed.   Constitutional:       General: He is not in acute distress.     Appearance: Normal appearance. He is not ill-appearing.   HENT:      Head: Normocephalic and atraumatic.      Right Ear: External ear normal.      Left Ear: External ear normal.      Nose: Nose normal.      Mouth/Throat:      Mouth: Mucous membranes are dry.   Eyes:      General: No scleral icterus.     Extraocular Movements: Extraocular movements intact.   Cardiovascular:      Rate  and Rhythm: Normal rate and regular rhythm.      Pulses: Normal pulses.   Pulmonary:      Effort: Pulmonary effort is normal. No respiratory distress.      Breath sounds: Normal breath sounds.   Abdominal:      General: Abdomen is flat. There is no distension.      Palpations: Abdomen is soft.   Skin:     General: Skin is warm and dry.      Comments: MSI CDI   Neurological:      General: No focal deficit present.      Mental Status: He is alert.      Comments: Quiet, somnolent but arousable and interactive   Psychiatric:         Mood and Affect: Mood normal.         Behavior: Behavior normal.            Vents:  Vent Mode: Spont (11/05/23 0240)  Ventilator Initiated: Yes (11/03/23 1805)  Set Rate: 22 BPM (11/04/23 1950)  Vt Set: 500 mL (11/04/23 1950)  Pressure Support: 8 cmH20 (11/05/23 0240)  PEEP/CPAP: 5 cmH20 (11/05/23 0240)  Oxygen Concentration (%): 30 (11/06/23 0300)  Peak Airway Pressure: 14 cmH20 (11/05/23 0240)  Plateau Pressure: 17 cmH20 (11/05/23 0240)  Total Ve: 8.2 L/m (11/05/23 0240)  Negative Inspiratory Force (cm H2O): -23 (11/05/23 0300)  F/VT Ratio<105 (RSBI): (!) 21.85 (11/05/23 0240)    Lines/Drains/Airways       Central Venous Catheter Line  Duration             Trialysis (Dialysis) Catheter 11/03/23 1341 left internal jugular 2 days              Drain  Duration                  Chest Tube 11/03/23 1723 Tube - 3 Right Pleural 19 Fr. 2 days         Y Chest Tube 1 and 2 11/03/23 1722 1 Left Mediastinal 19 Fr. 2 Right Mediastinal 19 Fr. 2 days    Male External Urinary Catheter 11/05/23 1235 <1 day              Arterial Line  Duration             Arterial Line 11/02/23 1031 Right Radial 3 days              Line  Duration                  Pacer Wires 11/03/23 1700 2 days              Peripheral Intravenous Line  Duration                  Peripheral IV - Single Lumen 11/05/23 0100 18 G Left;Posterior Hand 1 day         Peripheral IV - Single Lumen 11/05/23 1155 18 G;1 3/4 in Anterior;Left Forearm <1  day                    Significant Labs:    CBC/Anemia Profile:  Recent Labs   Lab 11/05/23 0310 11/06/23 0252   WBC 19.95* 13.55*   HGB 7.2* 7.4*   HCT 21.7* 23.1*   PLT 84* 98*   MCV 94 97   RDW 15.4* 16.0*        Chemistries:  Recent Labs   Lab 11/04/23  1639 11/04/23  2357 11/05/23  0310 11/05/23  0849 11/05/23  1617 11/05/23 2359 11/06/23 0252 11/06/23  0540   * 151*  --  153*   < > 148* 150* 150*   K 4.3 4.5  --  4.0  --   --  3.4*  --    * 114*  --  115*  --   --  115*  --    CO2 26 29  --  28  --   --  26  --    BUN 77* 71*  --  60*  --   --  36*  --    CREATININE 1.7* 1.5*  --  1.3  --   --  0.9  --    CALCIUM 7.7* 7.4*  --  7.7*  --   --  7.3*  --    ALBUMIN 1.7* 1.8*  --  1.9*  --   --  1.8*  --    PROT 4.8* 5.0*  --  5.5*  --   --  5.6*  --    BILITOT 2.1* 2.0*  --  2.1*  --   --  1.8*  --    ALKPHOS 57 60  --  69  --   --  77  --    ALT 40 39  --  41  --   --  38  --    AST 60* 57*  --  56*  --   --  51*  --    MG 2.5  --  2.6  --   --   --  2.5  --    PHOS 3.1  --  4.3  --   --   --  3.4  --     < > = values in this interval not displayed.       All pertinent labs within the past 24 hours have been reviewed.    Significant Imaging:  I have reviewed all pertinent imaging results/findings within the past 24 hours.  Assessment/Plan:     Cardiac/Vascular  * Acute bacterial endocarditis  Neuro/Psych:   - Afebrile  - Reserved, quiet but more interactive today. Answering questions, following commands.   - Sedation: none  - Pain: scheduled acetaminophen, PRN oxycodone            Cardiac:   - S/P MVR with Dr. Beal on 11/3/2023  - BP Goal: MAP 60-80, SBP <140  - IABP removed 11/4  - Cleviprex PRN - started last night, wean as PO is started  - Pressors: weaned off, none  - Anti-HTNs: Start PO anti-HTN today, nifedipine 30mg  - Rhythm: NSR  - Beta blocker: Start metoprolol 25mg bid  - Statin: Atorvastatin 40 mg QD  -  20/45= 65/24hrs     Pulmonary:   - Goal SpO2 >92%  - Started on 2L NC when  clevidipine started, wean off as tolerated  - Chest Tubes x 3 (2 Meds & 1 Pleural), output 100 am, 100 pm    - ABGs PRN     Renal:  - Trend BUN/Cr - back at baseline 36/0.9  - FRANCESCO resolved  - Maintain Watson, record strict Is/Os  - UOP 2460 past 24 hours  - Net negative 237cc past 24 hours  - Start diuril 250mg today      FEN / GI:   - Daily CMP, PRN K/Mag/Phos per protocol   - Replace electrolytes as needed  - Nutrition: Cardiac, 2L fluid restriction  - Bowel Regimen: Miralax, docusate  Hypernatremia- stable 150 today, FWD 3.3L  -Liberalize fluid restriction  -Trend sodium q6h, avoid correction 12 mEq/24 hours  -Increase D5W to 100/hour     ID:   - Afebrile  - WBC downtrending - 13 today  - Abx: Continue vancomycin, 6 week course  - Will eventually need PICC prior to discharge  - ID following, appreciate recs     Heme/Onc:   - Hgb 10.1 pre-operatively,  postoperatively 7.2-- remains stable postoperatively  - Trend H/H on daily CBC  - CBC daily  - ASA 325mg daily  - Thrombocytopenic, platelets stable. 98 today - no s/s of bleeding, will continue to monitor       Endocrine:   - CTS Goal -140, currently above goals  - HgbA1c: not on file   - Endocrinology consulted for insulin management  - Insulin gtt per Endocrine with SSI      PPx:   Feeding: Advance if pass SLP swallow  Analgesia/Sedation: multimodal, adequate pain control  Thromboembolic Prevention: heparin 5000 tid - change to enoxaparin  HOB >30: Yes  Stress Ulcer: not indicated  Glucose Control: Yes, insulin management per Endocrinology     Lines/Drains/Airway:   Left radial arterial line    Watson - remove   Chest Tubes: 3 - remove   Pacing Wires: Temporary ventricular pacing wires     Dispo/Code Status/Palliative:   - Continue SICU Care  - Full Code         Critical secondary to Patient has a condition that poses threat to life and bodily function: Major Cardiac Surgery     Critical care was time spent personally by me on the following activities:  development of treatment plan with patient or surrogate and bedside caregivers, discussions with consultants, evaluation of patient's response to treatment, examination of patient, ordering and performing treatments and interventions, ordering and review of laboratory studies, ordering and review of radiographic studies, pulse oximetry, re-evaluation of patient's condition.  This critical care time did not overlap with that of any other provider or involve time for any procedures.     Jairon Hernandez,   Critical Care - Surgery  [facility]

## 2023-11-06 NOTE — ASSESSMENT & PLAN NOTE
Neuro/Psych:   - Afebrile  - Sedation: none  - Pain: scheduled acetaminophen, PRN oxycodone            Cardiac:   - S/P MVR with Dr. Beal on 11/3/2023  - BP Goal: MAP 60-80, SBP <140  - IABP removed 11/4  - Cleviprex PRN - continued, uptitrate PO meds, wean cleviprex  - Pressors: weaned off, none  - Anti-HTNs: Increase nifedipine 60mg  - Rhythm: NSR  - Beta blocker: Metoprolol 25mg bid  - Statin: Atorvastatin 40 mg QD  - CT removed 11/6     Pulmonary:   - Goal SpO2 >92%  - Room air     Renal:  - Trend BUN/Cr - back at baseline 23/0.7  - FRANCESCO resolved  - Maintain Watson, record strict Is/Os  - UOP 3L past 24 hours  - Net negative 18cc (almost even) past 24 hours  - Continue diuresis PRN    FEN / GI:   - Daily CMP, PRN K/Mag/Phos per protocol   - Replace electrolytes as needed  - Nutrition: Cardiac, 2L fluid restriction  - Bowel Regimen: Miralax, docusate  - Hypernatremia resolved- discontinue D5W, encourage PO intake     ID:   - Afebrile  - Leukocytosis but WBC stable at 14 today  - Abx: Continue vancomycin, 6 week course  - Will eventually need PICC prior to discharge, need 48 hours of cleared blood cultures  - Bcx drawn 11/6 so far NGTD- continue to monitor  - ID following, appreciate recs     Heme/Onc:   - Hgb 10.1 pre-operatively,  postoperatively 7.2-- remains stable postoperatively  - Trend H/H on daily CBC  - CBC daily  - ASA 325mg daily  - Thrombocytopenic, platelets stable. 136 today - no s/s of bleeding, will continue to monitor     Endocrine:   - CTS Goal -140, at goal  - HgbA1c: not on file  - Endocrinology consulted for insulin management  - Insulin gtt per Endocrine with SSI      PPx:   Feeding: Level 6 per SLP, cardiac diet with fluid restriction  Analgesia/Sedation: multimodal, adequate pain control  Thromboembolic Prevention: lovenox   HOB >30: Yes  Stress Ulcer: not indicated  Glucose Control: Yes, insulin management per Endocrinology     Lines/Drains/Airway:   Left radial arterial line - can  remove once clevidipine weaned   Pacing Wires: Temporary ventricular pacing wires     Dispo/Code Status/Palliative:   - Continue SICU Care, likely stepdown after clevidipine wean  - Full Code

## 2023-11-06 NOTE — CARE UPDATE
Plan of care reviewed with patient and family, verbalizes understanding. , VSS. Patient AAOx4 with minor intermittent confusion, able to move all extremities. Patient ambulated in room, up in chair all shift, tolerated well. Patient tolerating liquids at this time, no flatus yet. Pt currently on room air, 02 sats >92%, afebrile. No skin breakdown to sacrum or coccyx. Patient reports pain as well controlled. Plan is for PICC line with blood cultures clear, stepdown tomorrow, WCTM.

## 2023-11-06 NOTE — PROGRESS NOTES
Pt experienced 9 beat run of Vtach. K 3.4, PRN replacements given. Pt asymptomatic. MD notified. No new orders placed.

## 2023-11-06 NOTE — ASSESSMENT & PLAN NOTE
BG goal 140 - 180   No known hx of DM.  S/p MVR for endocarditis.    - transition drip to 0.8 u/hr w/ stepdown parameters  -Continue /50  -POCT q4 hr      ** Please notify Endocrine for any change and/or advance in diet**  ** Please call Endocrine for any BG related issues **     Discharge Planning:   TBD. Please notify endocrinology prior to discharge.

## 2023-11-06 NOTE — PT/OT/SLP PROGRESS
Physical Therapy Treatment    Patient Name:  Lorenzo Nino   MRN:  [id number]  Admit Date: 10/30/2023  Admitting Diagnosis:  Acute bacterial endocarditis   Length of Stay: 7 days  Recent Surgery: Procedure(s) (LRB):  REPAIR, MITRAL VALVE, OPEN (N/A)  EXCLUSION, LEFT ATRIAL APPENDAGE, OPEN, AS PART OF OPEN CHEST SURGERY (Left) 3 Days Post-Op    Recommendations:     Discharge Recommendations:  Low Intensity Therapy   Discharge Equipment Recommendations: shower chair, bedside commode   Barriers to discharge: None    Plan:     During this hospitalization, patient to be seen 5 x/week to address the listed problems via gait training, therapeutic activities, therapeutic exercises, neuromuscular re-education  Plan of Care Expires:  23  Plan of Care Reviewed with: patient, mother, father    Assessment:     Lorenzo Nino is a 48 y.o. male admitted with a medical diagnosis of Acute bacterial endocarditis. Pt found alert and cooperative. VSS throughout session. Pt progressing towards goals, but not at PLOF. Pt tolerated session well.  Pt is improving with therapy evidenced by increased gait distance and improved gait quality. Pt would benefit from continued acute PT to maximize functional mobility after surgical intervention.         Problem List: weakness, impaired endurance, impaired functional mobility, gait instability, impaired balance, decreased upper extremity function, impaired cardiopulmonary response to activity.  Rehab Prognosis: Good     GOALS:   Multidisciplinary Problems       Physical Therapy Goals          Problem: Physical Therapy    Goal Priority Disciplines Outcome Goal Variances Interventions   Physical Therapy Goal     PT, PT/OT Ongoing, Progressing     Description: Goals to be met by: 23     Patient will increase functional independence with mobility by performin. Supine to sit with Set-up [place]  2. Sit to stand transfer with Stand-by Assistance  3. Bed to chair transfer with  "Stand-by Assistance using No Assistive Device  4. Gait  x 175 feet with Stand-by Assistance using No Assistive Device.   5. Lower extremity exercise program x20 reps per handout, with independence  6. Pt independently recalling 3/3 sternal precautions                         Subjective   Communicated with RN prior to session.  Patient found up in chair upon PT entry to room, agreeable to evaluation. Lorenzo Nino's mother, father, and brother present during session.    Chief Complaint: none reported   Patient/Family Comments/goals: to get better  Pain/Comfort:  Pain Rating 1: 0/10  Pain Rating Post-Intervention 1: 0/10    Objective:   Patient found with: telemetry, pulse ox (continuous), blood pressure cuff, central line, peripheral IV, chest tube, arterial line   General Precautions: Standard, Cardiac fall   Orthopedic Precautions:N/A   Braces: N/A   Oxygen Device: Nasal Cannula   Vitals: /74 (BP Location: Right arm, Patient Position: Sitting)   Pulse 77   Temp 98.4 °F (36.9 °C) (Oral)   Resp 19   Ht 6' 1" (1.854 m)   Wt (S) 76.5 kg (168 lb 10.4 oz)   SpO2 96%   BMI 22.25 kg/m²     Outcome Measures:  AM-PAC 6 CLICK MOBILITY  Turning over in bed (including adjusting bedclothes, sheets and blankets)?: 3  Sitting down on and standing up from a chair with arms (e.g., wheelchair, bedside commode, etc.): 2  Moving from lying on back to sitting on the side of the bed?: 3  Moving to and from a bed to a chair (including a wheelchair)?: 2  Need to walk in hospital room?: 2  Climbing 3-5 steps with a railing?: 2  Basic Mobility Total Score: 14       Functional Mobility:  Additional staff present: OT  Bed Mobility:   Not performed 2nd to pt found in chair     Transfers:   Sit <> Stand Transfer: minimum assistance and of 2 persons with no assistive device from chair x 2 trials      Gait:  Patient ambulated: 10ft  + 10ft (ADLs standing at the sink  and seated rest break between trials)   Patient required: min A x " 2 persons   Patient used: B HHA  Gait Pattern observed: reciprocal gait  Gait Deviation(s): occasional unsteady gait, decreased step length, decreased kayode, and inconsistent foot placement   Impairments due to: impaired balance, decreased strength, and decreased endurance  Comments:   Pt demo'd increased energy expenditure with activity . Pt demo'd a short step length with a narrow TAY, requiring B HHA for external stability.  Verbal cuing to increase step length, purposeful steps, upright posture, and forward gaze    Therapeutic Activities, Exercises, and Education:   Educated pt on PT role/POC  Educated pt on importance of OOB activity and daily ambulation   Educated pt on sternal precautions   Pt verbalized understanding     Pt performed ADLs standing at the sink with OT      Patient left up in chair with all lines intact, call button in reach, RN notified, and mother and father  present..    Time Tracking:     PT Received On: 11/06/23  PT Start Time: 1057     PT Stop Time: 1120  PT Total Time (min): 23 min       Billable Minutes:   Gait Training 15 and Therapeutic Activity 8    Treatment Type: Treatment  PT/PTA: PT

## 2023-11-06 NOTE — PT/OT/SLP PROGRESS
Occupational Therapy   Treatment    Name: Lorenzo Nino  MRN: [id number]  Admitting Diagnosis:  Acute bacterial endocarditis  3 Days Post-Op    Recommendations:     Discharge Recommendations: Low Intensity Therapy  Discharge Equipment Recommendations:  none  Barriers to discharge:  None    Assessment:     Lorenzo Nino is a 48 y.o. male with a medical diagnosis of Acute bacterial endocarditis. Performance deficits affecting function are weakness, impaired endurance, impaired self care skills, impaired functional mobility, gait instability, impaired balance, decreased lower extremity function, impaired cardiopulmonary response to activity, decreased upper extremity function, decreased ROM, impaired coordination. Patient agreed to therapy and was motivated to participate during session. VSS throughout session. Patient would benefit from continued skilled acute OT 5x/wk to improve functional mobility, increase independence with ADLs, and address established goals. Recommending low intensity therapy once medically appropriate for discharge to increase maximal independence, reduce burden of care, and ensure safety.     Rehab Prognosis:  Good; patient would benefit from acute skilled OT services to address these deficits and reach maximum level of function.       Plan:     Patient to be seen 5 x/week to address the above listed problems via self-care/home management, therapeutic activities, therapeutic exercises, neuromuscular re-education  Plan of Care Expires: 12/05/23  Plan of Care Reviewed with: patient, spouse    Subjective     Chief Complaint: none  Patient/Family Comments/goals: to get better  Pain/Comfort:  Pain Rating 1: 0/10  Pain Rating Post-Intervention 1: 0/10    Objective:     Communicated with: MERLIN prior to session.  Patient found HOB elevated with arterial line, blood pressure cuff, telemetry, peripheral IV, ritchie catheter upon OT entry to room.    General Precautions: Standard, fall    Orthopedic  Precautions:N/A  Braces: N/A  Respiratory Status: Nasal cannula     Occupational Performance:     Functional Mobility/Transfers:  Patient completed Sit <> Stand Transfer with minimum assistance and of 2 persons  with  hand-held assist   Functional Mobility: Patient ambulated bedside chair>sink for ADL tasks. Patient then sat in chair for seated rest break with chair follow up and then stood again to ambulate again minimally with chair follow up then sat down again prior to end of session. All ambulation performed with HHA and min A of 2 persons.     Activities of Daily Living:  Grooming: minimum assistance for balance as patient stood at sink for oral care with rinsing mouth and washed face. Patient was noted to be shaky and needed some assistance to get toothpaste on toothbrush and some assistance with rinsing for task.    Upper Body Dressing: total assistance Donning back gown only due to lines while sitting up in chair    [facility] 6 Click ADL: 15    Treatment & Education:  Role of OT and POC  ADL retraining  Functional mobility training  Safety  Importance EOB/OOB activity    Patient left up in chair with all lines intact, call button in reach, nurse notified, family present, and all needs met.     GOALS:   Multidisciplinary Problems       Occupational Therapy Goals          Problem: Occupational Therapy    Goal Priority Disciplines Outcome Interventions   Occupational Therapy Goal     OT, PT/OT Ongoing, Progressing    Description: Goals set on 11/5, with expiration date 12/4:  Patient will increase functional independence with ADLs by performing:    Bed mobility with Supervision  Grooming while standing at sink with [place]  UB Dressing with [place].  LB Dressing with Supervision.  Toileting from toilet with [place] for hygiene and clothing management.   Functional mobility of household and community distance with Supervision and AD as needed  Pt will demonstrate understanding of education provided  regarding energy conservation and task modification through teach-back method.  Pt will demonstrate understanding and learning of sternal precautions via recall and demonstration 3/3.                         Time Tracking:     OT Date of Treatment: 11/06/23  OT Start Time: 1057  OT Stop Time: 1120  OT Total Time (min): 23 min    Billable Minutes:Self Care/Home Management 23 11/6/2023

## 2023-11-06 NOTE — PT/OT/SLP EVAL
Speech Language Pathology Evaluation  Bedside Swallow    Patient Name:  Lorenzo Nino   MRN:  [id number]  Admitting Diagnosis: Acute bacterial endocarditis    Recommendations:             Pt seen for bedside swallow assessment. Pt with generalized weakness and appearing safest for oral meds whole with thin liquids, thin liquids and level 6 soft bite sized diet. Pt with significantly selective baseline eating preferences and may benefit from supplemental nutritious beverages with input from RD.           General Recommendations:  Dysphagia therapy  Diet recommendations:  Soft & Bite Sized Diet - IDDSI Level 6, Thin liquids - IDDSI Level 0   Aspiration Precautions: 1 bite/sip at a time, Alternating bites/sips, Meds whole 1 at a time, Small bites/sips, and Standard aspiration precautions   General Precautions: Standard,    Communication strategies:  none    Assessment:     Lorenzo Nino is a 48 y.o. male with an SLP diagnosis of Dysphagia.      History:     History reviewed. No pertinent past medical history.    Past Surgical History:   Procedure Laterality Date    EXCLUSION, LEFT ATRIAL APPENDAGE, OPEN, AS PART OF OPEN CHEST SURGERY Left 11/3/2023    Procedure: EXCLUSION, LEFT ATRIAL APPENDAGE, OPEN, AS PART OF OPEN CHEST SURGERY;  Surgeon: Manuel Beal MD;  Location: [facility] OR [address] [place] [address];  Service: Cardiothoracic;  Laterality: Left;    INSERTION OF INTRA-AORTIC BALLOON ASSIST DEVICE Right 11/2/2023    Procedure: INSERTION, INTRA-AORTIC BALLOON PUMP;  Surgeon: Jose Vidal MD;  Location: [facility] CATH LAB;  Service: Cardiology;  Laterality: Right;    REPAIR, MITRAL VALVE, OPEN N/A 11/3/2023    Procedure: REPAIR, MITRAL VALVE, OPEN;  Surgeon: Manuel Beal MD;  Location: [facility] OR [address] [place] [address];  Service: Cardiothoracic;  Laterality: N/A;     Mr. Nino is a 49 yo M with no past medical history admitted 10/30 for sepsis. Last Friday, 10/27, he developed headache, abdominal pain, and nausea. He presented to  [facility], who prescribed Tylenol and recommended he return if he continued to feel poorly. He presented to C 10/30 with worsening abdominal pain, as well as fatigue, body aches, and hematuria. He was found to be tachycardic and hypotensive requiring vasopressor support. He was admitted to MICU for management of sepsis 2/2 PNA vs UTI vs meningitis (reported symptoms of neck stiffness). He was unable to undergo LP due to thrombocytopenia of 23. He is currently being evaluated for TTP. Further work up revealed MRSA bacteremia. He underwent TTE, which was notable for mitral valve vegetation with flail anterior leaflet, as well as possible aortic valve vegetation. CTS consulted for endocarditis.     The patient presents to the SICU s/p mitral valve repair with Dr. Beal on 11/03/2023.       Prior Intubation HX:  11/2-11/5    Prior diet: Pt very selective diet at baseline       Subjective     Pt awake and alert   Parents at the bedside     Pain/Comfort:  Pain Rating 1: 5/10  Location - Orientation 1: generalized  Pain Rating Post-Intervention 1: 5/10    Respiratory Status: Room air    Objective:     Oral Musculature Evaluation  Oral Musculature: general weakness, WFL  Dentition: scattered dentition (pt reportedly has upper dentures but not present at bedside at this time)  Secretion Management: adequate  Oral Labial Strength and Mobility: WFL  Lingual Strength and Mobility: WFL  Volitional Cough: adequate  Volitional Swallow: prompt; adeqaute rise  Voice Prior to PO Intake: strong and clear    Bedside Swallow Eval:   Consistencies Assessed:  Thin liquids single sips from straw across 3oz   Solids x2      Oral Phase:   Prolonged mastication yet functional   Otherwise intact oral structures     Pharyngeal Phase:   no overt clinical signs/symptoms of aspiration  no overt clinical signs/symptoms of pharyngeal dysphagia    Compensatory Strategies  None    Treatment: Pt seen for bedside swallow assessment. Pt with generalized  weakness and appearing safest for oral meds whole with thin liquids, thin liquids and level 6 soft bite sized diet. Pt with significantly selective baseline eating preferences and may benefit from supplemental nutritious beverages with input from RD.     Goals:   Multidisciplinary Problems       SLP Goals          Problem: SLP    Goal Priority Disciplines Outcome   SLP Goal     SLP    Description: Speech Language Pathology Goals  Goals expected to be met by 11/20    1. Pt will tolerate PO intake without overt clinical signs of aspiration                        Plan:     Patient to be seen:  4 x/week   Plan of Care expires:     Plan of Care reviewed with:  patient, mother, father   SLP Follow-Up:  Yes       Discharge recommendations:  Low Intensity Therapy   Barriers to Discharge:  None    Time Tracking:     SLP Treatment Date:   11/06/23  Speech Start Time:  0822  Speech Stop Time:  0842     Speech Total Time (min):  20 min    Billable Minutes: Eval Swallow and Oral Function 10 and Self Care/Home Management Training 10    11/06/2023

## 2023-11-06 NOTE — SUBJECTIVE & OBJECTIVE
Interval History: No fevers documented overnight. Family at bedside. Pt reports tolerating abx without issues.       Review of Systems  Objective:     Vital Signs (Most Recent):  Temp: 98.2 °F (36.8 °C) (11/06/23 0800)  Pulse: 87 (11/06/23 0800)  Resp: 18 (11/06/23 0817)  BP: 125/72 (11/06/23 0800)  SpO2: 95 % (11/06/23 0800) Vital Signs (24h Range):  Temp:  [97.7 °F (36.5 °C)-98.3 °F (36.8 °C)] 98.2 °F (36.8 °C)  Pulse:  [71-92] 87  Resp:  [15-33] 18  SpO2:  [90 %-98 %] 95 %  BP: (111-142)/(65-82) 125/72  Arterial Line BP: ()/(31-64) 142/52     Weight: (S) 76.5 kg (168 lb 10.4 oz)  Body mass index is 22.25 kg/m².    Estimated Creatinine Clearance: 107.4 mL/min (based on SCr of 0.9 mg/dL).     Physical Exam  Constitutional:       General: He is not in acute distress.     Appearance: He is not ill-appearing or toxic-appearing.   HENT:      Head: Normocephalic and atraumatic.   Eyes:      General:         Right eye: No discharge.         Left eye: No discharge.   Cardiovascular:      Comments: Mediastinal surgical site -dressed  Pulmonary:      Comments: Chest tube  Abdominal:      General: There is no distension.      Palpations: Abdomen is soft.      Tenderness: There is no abdominal tenderness.   Skin:     General: Skin is warm and dry.      Comments: [facility]   R ankle scab  L knee scrape   Neurological:      Mental Status: He is alert and oriented to person, place, and time.          Significant Labs:   Microbiology Results (last 7 days)       Procedure Component Value Units Date/Time    Blood culture [[id number]] Collected: 11/05/23 0109    Order Status: Completed Specimen: Blood from Peripheral, Hand, Left Updated: 11/06/23 0329     Blood Culture, Routine Gram stain aer bottle: Gram positive cocci in clusters resembling Staph      Results called to and read back by:Manuel Centeno Rn 11/06/2023  03:29    Aerobic culture [[id number]]  (Abnormal)  (Susceptibility) Collected: 11/03/23 1516    Order Status:  Completed Specimen: Chest from Heart Updated: 11/05/23 1343     Aerobic Bacterial Culture METHICILLIN RESISTANT STAPHYLOCOCCUS AUREUS  Moderate      Narrative:      Mitral Leaflet for Culture    Blood culture [[id number]]  (Abnormal) Collected: 11/03/23 0609    Order Status: Completed Specimen: Blood from Peripheral, Hand, Right Updated: 11/05/23 1109     Blood Culture, Routine Gram stain aer bottle: Gram positive cocci in clusters resembling Staph      Positive results previously called  11/04/2023 06:05      STAPHYLOCOCCUS AUREUS  Susceptibility pending  ID consult required at [facility].      Blood culture [[id number]]  (Abnormal) Collected: 11/03/23 0614    Order Status: Completed Specimen: Blood from Peripheral, Hand, Left Updated: 11/05/23 0940     Blood Culture, Routine Gram stain aer bottle: Gram positive cocci in clusters resembling Staph      Results called to and read back by:Manuel Centeno Rn 11/04/2023  04:12      STAPHYLOCOCCUS AUREUS  ID consult required at [facility].  For susceptibility see order # [id number]      Blood culture [[id number]]  (Abnormal) Collected: 11/01/23 0607    Order Status: Completed Specimen: Blood from Peripheral, Forearm, Left Updated: 11/05/23 0938     Blood Culture, Routine Gram stain aer bottle: Gram positive cocci in clusters resembling Staph      Positive results previously called 11/02/2023  04:20      Gram stain carlie bottle: Gram positive cocci in clusters resembling Staph      Positive results previously called 11/04/2023  11:33      METHICILLIN RESISTANT STAPHYLOCOCCUS AUREUS  For susceptibility see order #[id number]  ID consult required at [facility].      Blood culture [[id number]]  (Abnormal) Collected: 11/01/23 0609    Order Status: Completed Specimen: Blood from Peripheral, Hand, Right Updated: 11/05/23 0714     Blood Culture, Routine Gram stain aer bottle: Gram positive  cocci in clusters resembling Staph      Results called to and read back by:Melissa Ferro RN 11/01/2023  22:25      Gram stain carlie bottle: Gram positive cocci in clusters resembling Staph      Positive results previously called 11/03/2023  21:56      METHICILLIN RESISTANT STAPHYLOCOCCUS AUREUS  ID consult required at [facility].  For susceptibility see order #[id number]       Comment: Previous comment was modified by CHRISTEN VELÁSQUEZ at 08:21 on 11/04/2023   ID consult required at [facility].For   susceptibility see order #[id number] consult required at [facility].         AFB Culture & Smear [[id number]] Collected: 11/03/23 1516    Order Status: Completed Specimen: Chest from Heart Updated: 11/04/23 2127     AFB Culture & Smear Culture in progress    Narrative:      Mitral Leaflet for Culture    Culture, Anaerobe [[id number]] Collected: 11/03/23 1516    Order Status: Completed Specimen: Chest from Heart Updated: 11/04/23 1217     Anaerobic Culture Culture in progress    Narrative:      Mitral Leaflet for Culture    Gram stain [[id number]] Collected: 11/03/23 1516    Order Status: Completed Specimen: Chest from Heart Updated: 11/03/23 1711     Gram Stain Result Rare WBC's      Few Gram positive cocci    Narrative:      Mitral Leaflet for Culture    Fungus culture [[id number]] Collected: 11/03/23 1516    Order Status: Sent Specimen: Chest from Heart Updated: 11/03/23 1541    Blood culture x two cultures. Draw prior to antibiotics. [[id number]]  (Abnormal) Collected: 10/30/23 1923    Order Status: Completed Specimen: Blood from Peripheral, Antecubital, Right Updated: 11/03/23 0800     Blood Culture, Routine Gram stain aer bottle: Gram positive cocci in clusters resembling Staph      Gram stain carlie bottle: Gram positive cocci in clusters resembling Staph      Results called to and read back by: Savannah Butler RN 10/31/2023  06:33       METHICILLIN RESISTANT STAPHYLOCOCCUS AUREUS  ID consult required at [facility] and [facility].  For susceptibility see order #[id number]      Narrative:      Aerobic and anaerobic    Blood culture x two cultures. Draw prior to antibiotics. [[id number]]  (Abnormal)  (Susceptibility) Collected: 10/30/23 1924    Order Status: Completed Specimen: Blood from Peripheral, Antecubital, Left Updated: 11/03/23 0759     Blood Culture, Routine Gram stain aer bottle: Gram positive cocci in clusters resembling Staph      Gram stain carlie bottle: Gram positive cocci in clusters resembling Staph      Results called to and read back by: Savannah Butler RN 10/31/2023  06      METHICILLIN RESISTANT STAPHYLOCOCCUS AUREUS  ID consult required at [facility] and [facility] locations.      Narrative:      Aerobic and anaerobic    Urine culture [[id number]]  (Abnormal)  (Susceptibility) Collected: 10/30/23 2152    Order Status: Completed Specimen: Urine Updated: 11/02/23 0250     Urine Culture, Routine METHICILLIN RESISTANT STAPHYLOCOCCUS AUREUS  >100,000cfu/ml  No other significant isolate      Narrative:      Specimen Source->Urine    Add on udrug [id number] [facility] [id number] PER DR.WELLFORD BAEZ    10/30/2023  23:04     C. trachomatis/N. gonorrhoeae by AMP DNA [facility]; Urine [[id number]] Collected: 10/31/23 0030    Order Status: Completed Specimen: Urine from Vaginal Updated: 10/31/23 1814     Chlamydia, Amplified DNA Not Detected     N gonorrhoeae, amplified DNA Not Detected    Narrative:      Sources by Resulting Lab:->[facility]  Release to patient->Immediate    MRSA/SA Rapid ID by PCR from Blood culture [[id number]]  (Abnormal) Collected: 10/30/23 1923    Order Status: Completed Updated: 10/31/23 0751     Staph aureus ID by PCR Positive     Methicillin Resistant ID by PCR Positive    Narrative:      Aerobic and anaerobic    Culture, Respiratory with Gram Stain [[id number]]     Order Status: Canceled Specimen:  Respiratory from Sputum     Respiratory Infection Panel (PCR), Nasopharyngeal [[id number]] Collected: 10/30/23 2153    Order Status: Completed Specimen: Nasopharyngeal Swab Updated: 10/30/23 2323     Respiratory Infection Panel Source NP Swab     Adenovirus Not Detected     Coronavirus 229E, Common Cold Virus Not Detected     Coronavirus HKU1, Common Cold Virus Not Detected     Coronavirus NL63, Common Cold Virus Not Detected     Coronavirus OC43, Common Cold Virus Not Detected     Comment: The Coronavirus strains detected in this test cause the common cold.  These strains are not the COVID-19 (novel Coronavirus)strain   associated with the respiratory disease outbreak.          SARS-CoV2 (COVID-19) Qualitative PCR Not Detected     Human Metapneumovirus Not Detected     Human Rhinovirus/Enterovirus Not Detected     Influenza A (subtypes H1, H1-2009,H3) Not Detected     Influenza B Not Detected     Parainfluenza Virus 1 Not Detected     Parainfluenza Virus 2 Not Detected     Parainfluenza Virus 3 Not Detected     Parainfluenza Virus 4 Not Detected     Respiratory Syncytial Virus Not Detected     Bordetella Parapertussis (OS1173) Not Detected     Bordetella pertussis (ptxP) Not Detected     Chlamydia pneumoniae Not Detected     Mycoplasma pneumoniae Not Detected    Narrative:      For all other respiratory sources, order [id number] -  Respiratory Viral Panel by PCR    C. trachomatis/N. gonorrhoeae by AMP DNA [[id number]] Collected: 10/30/23 2304    Order Status: Canceled     C. trachomatis/N. gonorrhoeae by AMP DNA [facility]; Urine [[id number]]     Order Status: Completed Specimen: Genital from Vaginal     Influenza A & B by Molecular [[id number]] Collected: 10/30/23 1925    Order Status: Completed Specimen: Nasopharyngeal Swab Updated: 10/30/23 1958     Influenza A, Molecular NEGATIVE     Influenza B, Molecular NEGATIVE     Flu A & B Source Nasal swab            Significant Imaging: I have reviewed all pertinent imaging  results/findings within the past 24 hours.

## 2023-11-06 NOTE — ASSESSMENT & PLAN NOTE
47 yo male with MRSA bacteremia c/b MV/AVIE s/p MV repair with bioprosthetic material, OR cx with MRSA. Repeat blcx positive from 11/5 - gram/stain with GPC resembling staph. Pt reports tolerating abx therapy, denies new joint or back pain.     Recommendations:  -continue vancomycin pharm to dose  -d/w micro, MRSA susceptible to dapto/linezolid  -repeat blood cultures (ordered this morning) x 2 given positive gram stain on blood cultures from 1//5  -continue isolation precautions per hospital protocol

## 2023-11-06 NOTE — SUBJECTIVE & OBJECTIVE
Interval History/Significant Events: NAEON. Hypernatremia improving. Patient seems more arousable, interactive today. HTN overnight, started on clevidipine.      Follow-up For: Procedure(s) (LRB):  REPAIR, MITRAL VALVE, OPEN (N/A)  EXCLUSION, LEFT ATRIAL APPENDAGE, OPEN, AS PART OF OPEN CHEST SURGERY (Left)    Post-Operative Day: 3 Days Post-Op    Objective:     Vital Signs (Most Recent):  Temp: 98.3 °F (36.8 °C) (11/06/23 0300)  Pulse: 82 (11/06/23 0353)  Resp: 18 (11/06/23 0300)  BP: 120/70 (11/06/23 0300)  SpO2: (!) 93 % (11/06/23 0300) Vital Signs (24h Range):  Temp:  [97.7 °F (36.5 °C)-98.3 °F (36.8 °C)] 98.3 °F (36.8 °C)  Pulse:  [71-92] 82  Resp:  [15-33] 18  SpO2:  [90 %-98 %] 93 %  BP: (110-142)/(65-82) 120/70  Arterial Line BP: (109-160)/(48-64) 139/56     Weight: (S) 76.5 kg (168 lb 10.4 oz)  Body mass index is 22.25 kg/m².      Intake/Output Summary (Last 24 hours) at 11/6/2023 0621  Last data filed at 11/6/2023 0500  Gross per 24 hour   Intake 2287.66 ml   Output 2525 ml   Net -237.34 ml          Physical Exam  Vitals and nursing note reviewed.   Constitutional:       General: He is not in acute distress.     Appearance: Normal appearance. He is not ill-appearing.   HENT:      Head: Normocephalic and atraumatic.      Right Ear: External ear normal.      Left Ear: External ear normal.      Nose: Nose normal.      Mouth/Throat:      Mouth: Mucous membranes are dry.   Eyes:      General: No scleral icterus.     Extraocular Movements: Extraocular movements intact.   Cardiovascular:      Rate and Rhythm: Normal rate and regular rhythm.      Pulses: Normal pulses.   Pulmonary:      Effort: Pulmonary effort is normal. No respiratory distress.      Breath sounds: Normal breath sounds.   Abdominal:      General: Abdomen is flat. There is no distension.      Palpations: Abdomen is soft.   Skin:     General: Skin is warm and dry.      Comments: MSI CDI   Neurological:      General: No focal deficit present.       Mental Status: He is alert.      Comments: Quiet, somnolent but arousable and interactive   Psychiatric:         Mood and Affect: Mood normal.         Behavior: Behavior normal.            Vents:  Vent Mode: Spont (11/05/23 0240)  Ventilator Initiated: Yes (11/03/23 1805)  Set Rate: 22 BPM (11/04/23 1950)  Vt Set: 500 mL (11/04/23 1950)  Pressure Support: 8 cmH20 (11/05/23 0240)  PEEP/CPAP: 5 cmH20 (11/05/23 0240)  Oxygen Concentration (%): 30 (11/06/23 0300)  Peak Airway Pressure: 14 cmH20 (11/05/23 0240)  Plateau Pressure: 17 cmH20 (11/05/23 0240)  Total Ve: 8.2 L/m (11/05/23 0240)  Negative Inspiratory Force (cm H2O): -23 (11/05/23 0300)  F/VT Ratio<105 (RSBI): (!) 21.85 (11/05/23 0240)    Lines/Drains/Airways       Central Venous Catheter Line  Duration             Trialysis (Dialysis) Catheter 11/03/23 1341 left internal jugular 2 days              Drain  Duration                  Chest Tube 11/03/23 1723 Tube - 3 Right Pleural 19 Fr. 2 days         Y Chest Tube 1 and 2 11/03/23 1722 1 Left Mediastinal 19 Fr. 2 Right Mediastinal 19 Fr. 2 days    Male External Urinary Catheter 11/05/23 1235 <1 day              Arterial Line  Duration             Arterial Line 11/02/23 1031 Right Radial 3 days              Line  Duration                  Pacer Wires 11/03/23 1700 2 days              Peripheral Intravenous Line  Duration                  Peripheral IV - Single Lumen 11/05/23 0100 18 G Left;Posterior Hand 1 day         Peripheral IV - Single Lumen 11/05/23 1155 18 G;1 3/4 in Anterior;Left Forearm <1 day                    Significant Labs:    CBC/Anemia Profile:  Recent Labs   Lab 11/05/23  0310 11/06/23  0252   WBC 19.95* 13.55*   HGB 7.2* 7.4*   HCT 21.7* 23.1*   PLT 84* 98*   MCV 94 97   RDW 15.4* 16.0*        Chemistries:  Recent Labs   Lab 11/04/23  1639 11/04/23  2357 11/05/23  0310 11/05/23  0849 11/05/23  1617 11/05/23  2359 11/06/23  0252 11/06/23  0540   * 151*  --  153*   < > 148* 150* 150*   K 4.3  4.5  --  4.0  --   --  3.4*  --    * 114*  --  115*  --   --  115*  --    CO2 26 29  --  28  --   --  26  --    BUN 77* 71*  --  60*  --   --  36*  --    CREATININE 1.7* 1.5*  --  1.3  --   --  0.9  --    CALCIUM 7.7* 7.4*  --  7.7*  --   --  7.3*  --    ALBUMIN 1.7* 1.8*  --  1.9*  --   --  1.8*  --    PROT 4.8* 5.0*  --  5.5*  --   --  5.6*  --    BILITOT 2.1* 2.0*  --  2.1*  --   --  1.8*  --    ALKPHOS 57 60  --  69  --   --  77  --    ALT 40 39  --  41  --   --  38  --    AST 60* 57*  --  56*  --   --  51*  --    MG 2.5  --  2.6  --   --   --  2.5  --    PHOS 3.1  --  4.3  --   --   --  3.4  --     < > = values in this interval not displayed.       All pertinent labs within the past 24 hours have been reviewed.    Significant Imaging:  I have reviewed all pertinent imaging results/findings within the past 24 hours.   Cimzia Counseling:  I discussed with the patient the risks of Cimzia including but not limited to immunosuppression, allergic reactions and infections.  The patient understands that monitoring is required including a PPD at baseline and must alert us or the primary physician if symptoms of infection or other concerning signs are noted.

## 2023-11-06 NOTE — PLAN OF CARE
"      SICU PLAN OF CARE NOTE    Dx: Acute bacterial endocarditis    Shift Events: Pt. OOBTC throughout day, VSS throughout shift. Removed ritchie per orders.     Goals of Care: MAP 60-80, SBP < 140     Neuro: Arouses to voice     Vital Signs: /72 (BP Location: Left arm, Patient Position: Lying)   Pulse 80   Temp 97.7 °F (36.5 °C) (Oral)   Resp (!) 27   Ht 6' 1" (1.854 m)   Wt (S) 76.5 kg (168 lb 10.4 oz)   SpO2 95%   BMI 22.25 kg/m²     Respiratory: Room Air    Diet: NPO and Sips with Meds    Gtts: Insulin @ .08   D5 @ 75 ml/hr    Urine Output: Voids Spontaneously 1420 cc/shift    Drains:   Chest tube 1 - 5 cc /shift   Chest tube 2 - 20 cc/shift      Labs/Accuchecks: Accucheck ACHS.    Skin: See LDA      "

## 2023-11-06 NOTE — PROGRESS NOTES
"[facility]  Endocrinology  Progress Note    Admit Date: 10/30/2023     Reason for Consult: Management of Hyperglycemia     Surgical Procedure and Date: s/p MVr    No known hx of DM and not on meds at home.    HPI:   Patient is a 48 y.o. male with a diagnosis of no past medical history admitted 10/30 for sepsis. Last Friday, 10/27, he developed headache, abdominal pain, and nausea. He presented to , who prescribed Tylenol and recommended he return if he continued to feel poorly. [place] [facility] [place] with worsening abdominal pain, as well as fatigue, body aches, and hematuria. He was found to be tachycardic and hypotensive requiring vasopressor support. He was admitted to MICU for management of sepsis 2/2 PNA vs UTI vs meningitis (reported symptoms of neck stiffness). He was unable to undergo LP due to thrombocytopenia of 23. He is currently being evaluated for TTP. Further work up revealed MRSA bacteremia. He underwent TTE, which was notable for mitral valve vegetation with flail anterior leaflet, as well as possible aortic valve vegetation. Patient is now s/p MVr and resection of left atrial appendage. Endocrinology consulted for BG management post-operatively.     No results found for: "LABA1C", "HGBA1C"               Interval HPI:   No acute events overnight. Patient in room 46960/41921 A. Blood glucose stable. BG at and above goal on current insulin regimen (Transition Insulin Drip). Steroid use- None  3 Days Post-Op  Renal function-  Normal   Vasopressors-  None     Diet clear liquid Fluid - 1500mL; Isolation Tray - Styrofoam     Eating:   <25%  Nausea: No  Hypoglycemia and intervention: No  Fever: No  TPN and/or TF: No    /72 (BP Location: Right arm, Patient Position: Sitting)   Pulse 90   Temp 98.2 °F (36.8 °C) (Oral)   Resp (!) 24   Ht 6' 1" (1.854 m)   Wt (S) 76.5 kg (168 lb 10.4 oz)   SpO2 95%   BMI 22.25 kg/m²     Labs Reviewed and Include    Recent Labs   Lab " "11/06/23  0252 11/06/23  0540   *  --    CALCIUM 7.3*  --    ALBUMIN 1.8*  --    PROT 5.6*  --    * 150*   K 3.4*  --    CO2 26  --    *  --    BUN 36*  --    CREATININE 0.9  --    ALKPHOS 77  --    ALT 38  --    AST 51*  --    BILITOT 1.8*  --      Lab Results   Component Value Date    WBC 13.55 (H) 11/06/2023    HGB 7.4 (L) 11/06/2023    HCT 23.1 (L) 11/06/2023    MCV 97 11/06/2023    PLT 98 (L) 11/06/2023     No results for input(s): "TSH", "FREET4" in the last 168 hours.  Lab Results   Component Value Date    HGBA1C 5.8 (H) 11/06/2023       Nutritional status:   Body mass index is 22.25 kg/m².  Lab Results   Component Value Date    ALBUMIN 1.8 (L) 11/06/2023    ALBUMIN 1.9 (L) 11/05/2023    ALBUMIN 1.8 (L) 11/04/2023     No results found for: "PREALBUMIN"    Estimated Creatinine Clearance: 107.4 mL/min (based on SCr of 0.9 mg/dL).    Accu-Checks  Recent Labs     11/04/23 2002 11/04/23  2359 11/05/23  0408 11/05/23  0837 11/05/23  1225 11/05/23  1615 11/05/23  1957 11/05/23  2359 11/06/23  0435 11/06/23  0801   POCTGLUCOSE 161* 147* 126* 159* 149* 154* 173* 238* 187* 156*       Current Medications and/or Treatments Impacting Glycemic Control  Immunotherapy:    Immunosuppressants       None          Steroids:   Hormones (From admission, onward)      None          Pressors:    Autonomic Drugs (From admission, onward)      Start     Stop Route Frequency Ordered    11/02/23 0838  succinylcholine (ANECTINE) 20 mg/mL injection        Note to Pharmacy: Created by cabinet override    11/02/23 2044 11/02/23 0838    10/31/23 0015  NORepinephrine bitartrate-D5W 4 mg/250 mL (16 mcg/mL) PERIPHERAL access infusion        Question Answer Comment   Begin at (in mcg/kg/min): 0.02    Titrate by: (in mcg/kg/min) 0.02    Titrate interval: (in minutes) 5    Titrate to maintain: (MAP or SBP) MAP    Greater than: (in mmHg) 60    Maximum dose: (in mcg/kg/min) 0.2        11/01/23 0014 IV Continuous 10/30/23 1274 "          Hyperglycemia/Diabetes Medications:   Antihyperglycemics (From admission, onward)      Start     Stop Route Frequency Ordered    11/05/23 0745  insulin regular in 0.9 % NaCl 100 unit/100 mL (1 unit/mL) infusion        Question:  Enter initial dose (Units/hr):  Answer:  0.8    -- IV Continuous 11/05/23 0740    11/04/23 1056  insulin aspart U-100 pen 0-5 Units         -- SubQ As needed (PRN) 11/04/23 0957            ASSESSMENT and PLAN    Cardiac/Vascular  * Acute bacterial endocarditis  Managed per primary      Endocrine  Transient hyperglycemia post procedure  BG goal 140 - 180   No known hx of DM.  S/p MVR for endocarditis.    - transition drip to 0.8 u/hr w/ stepdown parameters  -Continue /50  -POCT q4 hr      ** Please notify Endocrine for any change and/or advance in diet**  ** Please call Endocrine for any BG related issues **     Discharge Planning:   TBD. Please notify endocrinology prior to discharge.          Orthopedic  Surgical wound present  Optimize BG for surgical wound healing.             Rupali Bray PA-C  Endocrinology  [facility]

## 2023-11-06 NOTE — PROGRESS NOTES
Pharmacokinetic Assessment Follow Up: IV Vancomycin    Vancomycin serum concentration assessment(s):    The trough level was drawn correctly and can be used to guide therapy at this time. The measurement is below the desired definitive target range of 15 to 20 mcg/mL.     FRANCESCO resolving  While not quite at steady state, favor more aggressive dosing given persistent MRSA bacteremia. Will increase dose to 1250 q 12 h (AUC/FABY =499)    Vancomycin Regimen Plan:    Change regimen to Vancomycin 1250 mg IV every 12 hours with next serum trough concentration measured at 0800 prior to 4th dose on 11/8    Drug levels (last 3 results):  Recent Labs   Lab Result Units 11/04/23  0312 11/05/23  0849 11/06/23  1021   Vancomycin, Random ug/mL 21.5 11.6 13.0       Pharmacy will continue to follow and monitor vancomycin.    Please contact pharmacy at extension [phone] for questions regarding this assessment.    Thank you for the consult,   Janet Sparrow       Patient brief summary:  Lorenzo Nino is a 48 y.o. male initiated on antimicrobial therapy with IV Vancomycin for treatment of bacteremia    The patient's current regimen is 1,000 mg IV q 12 hours    Drug Allergies:   Review of patient's allergies indicates:  No Known Allergies    Actual Body Weight:   76.5 kg    Renal Function:   Estimated Creatinine Clearance: 120.9 mL/min (based on SCr of 0.8 mg/dL).,     Dialysis Method (if applicable):  N/A    CBC (last 72 hours):  Recent Labs   Lab Result Units 11/03/23 1812 11/04/23  0312 11/05/23  0310 11/06/23  0252   WBC K/uL 37.84* 28.25* 19.95* 13.55*   Hemoglobin g/dL 9.1* 8.7* 7.2* 7.4*   Hemoglobin A1C %  --   --   --  5.8*   Hematocrit % 27.2* 25.5* 21.7* 23.1*   Platelets K/uL 90* 89* 84* 98*       Metabolic Panel (last 72 hours):  Recent Labs   Lab Result Units 11/03/23  1812 11/04/23  0038 11/04/23  0312 11/04/23  0541 11/04/23  1639 11/04/23  2357 11/05/23  0310 11/05/23  0849 11/05/23  1617 11/05/23  1821 11/05/23  2359  11/06/23  0252 11/06/23  0540 11/06/23  1021   Sodium mmol/L 149* 152* 149* 150* 151* 151*  --  153* 151* 151* 148* 150* 150* 147*   Potassium mmol/L 3.1*  3.1* 3.5 3.9 4.5 4.3 4.5  --  4.0  --   --   --  3.4*  --  3.4*   Chloride mmol/L 109 112* 112* 114* 113* 114*  --  115*  --   --   --  115*  --  114*   CO2 mmol/L 27 25 27 24 26 29  --  28  --   --   --  26  --  25   Glucose mg/dL 185* 122* 121* 111* 181* 138*  --  117*  --   --   --  190*  --  187*   BUN mg/dL 66* 69* 71* 66* 77* 71*  --  60*  --   --   --  36*  --  26*   Creatinine mg/dL 1.4 1.6* 1.5* 1.5* 1.7* 1.5*  --  1.3  --   --   --  0.9  --  0.8   Albumin g/dL 1.8*  --  1.8* 1.7* 1.7* 1.8*  --  1.9*  --   --   --  1.8*  --  2.0*   Total Bilirubin mg/dL 2.0*  --  2.7* 2.5* 2.1* 2.0*  --  2.1*  --   --   --  1.8*  --  1.8*   Alkaline Phosphatase U/L 62  --  58 55 57 60  --  69  --   --   --  77  --  91   AST U/L 73*  --  68* 67* 60* 57*  --  56*  --   --   --  51*  --  57*   ALT U/L 52*  --  47* 45* 40 39  --  41  --   --   --  38  --  43   Magnesium mg/dL 3.0*  --   --  2.5 2.5  --  2.6  --   --   --   --  2.5  --   --    Phosphorus mg/dL 2.9  --   --  1.6* 3.1  --  4.3  --   --   --   --  3.4  --   --        Vancomycin Administrations:  vancomycin given in the last 96 hours                     vancomycin (VANCOCIN) 1,000 mg in dextrose 5 % (D5W) 250 mL IVPB (Vial-Mate) (mg) 1,000 mg New Bag 11/06/23 1135     1,000 mg New Bag 11/05/23 2234     1,000 mg New Bag  1152    vancomycin 750 mg in dextrose 5 % (D5W) 250 mL IVPB (Vial-Mate) (mg) 750 mg New Bag 11/04/23 1448    vancomycin 1,250 mg in dextrose 5 % (D5W) 250 mL IVPB (Vial-Mate) (mg) 1,250 mg New Bag 11/03/23 0906     1,250 mg New Bag 11/02/23 2151                    Microbiologic Results:  Microbiology Results (last 7 days)       Procedure Component Value Units Date/Time    AFB Culture & Smear [[id number]] Collected: 11/03/23 1516    Order Status: Completed Specimen: Chest from Heart Updated:  11/06/23 1424     AFB Culture & Smear Culture in progress     AFB CULTURE STAIN No acid fast bacilli seen.    Narrative:      Mitral Leaflet for Culture    Blood culture [[id number]] Collected: 11/06/23 1228    Order Status: Sent Specimen: Blood from Peripheral, Hand, Left Updated: 11/06/23 1238    Blood culture [[id number]] Collected: 11/06/23 1216    Order Status: Sent Specimen: Blood from Peripheral, Antecubital, Right Updated: 11/06/23 1222    Blood culture [[id number]]  (Abnormal) Collected: 11/03/23 0614    Order Status: Completed Specimen: Blood from Peripheral, Hand, Left Updated: 11/06/23 1155     Blood Culture, Routine Gram stain aer bottle: Gram positive cocci in clusters resembling Staph      Results called to and read back by:Manuel Centeno Rn 11/04/2023  04:12      METHICILLIN RESISTANT STAPHYLOCOCCUS AUREUS  ID consult required at [facility].  For susceptibility see order # [id number]      Blood culture [[id number]]  (Abnormal) Collected: 11/01/23 0607    Order Status: Completed Specimen: Blood from Peripheral, Forearm, Left Updated: 11/06/23 1154     Blood Culture, Routine Gram stain aer bottle: Gram positive cocci in clusters resembling Staph      Positive results previously called 11/02/2023  04:20      Gram stain carlie bottle: Gram positive cocci in clusters resembling Staph      Positive results previously called 11/04/2023  11:33      METHICILLIN RESISTANT STAPHYLOCOCCUS AUREUS  For susceptibility see order #[id number]  ID consult required at [facility] and [facility].      Blood culture [[id number]]  (Abnormal)  (Susceptibility) Collected: 11/03/23 0609    Order Status: Completed Specimen: Blood from Peripheral, Hand, Right Updated: 11/06/23 1153     Blood Culture, Routine Gram stain aer bottle: Gram positive cocci in clusters resembling Staph      Positive results previously called  11/04/2023 06:05      METHICILLIN RESISTANT STAPHYLOCOCCUS  AUREUS  ID consult required at [facility].      Aerobic culture [[id number]]  (Abnormal)  (Susceptibility) Collected: 11/03/23 1516    Order Status: Completed Specimen: Chest from Heart Updated: 11/06/23 1028     Aerobic Bacterial Culture METHICILLIN RESISTANT STAPHYLOCOCCUS AUREUS  Moderate      Narrative:      Mitral Leaflet for Culture    Blood culture [[id number]] Collected: 11/05/23 0109    Order Status: Completed Specimen: Blood from Peripheral, Hand, Left Updated: 11/06/23 0329     Blood Culture, Routine Gram stain aer bottle: Gram positive cocci in clusters resembling Staph      Results called to and read back by:Manuel Centeno Rn 11/06/2023  03:29    Blood culture [[id number]]  (Abnormal) Collected: 11/01/23 0609    Order Status: Completed Specimen: Blood from Peripheral, Hand, Right Updated: 11/05/23 0714     Blood Culture, Routine Gram stain aer bottle: Gram positive cocci in clusters resembling Staph      Results called to and read back by:Melissa Ferro RN 11/01/2023  22:25      Gram stain carlie bottle: Gram positive cocci in clusters resembling Staph      Positive results previously called 11/03/2023  21:56      METHICILLIN RESISTANT STAPHYLOCOCCUS AUREUS  ID consult required at [facility].  For susceptibility see order #[id number]       Comment: Previous comment was modified by CHRISTEN VELÁSQUEZ at 08:21 on 11/04/2023   ID consult required at [facility].For   susceptibility see order #[id number] consult required at [facility].         Culture, Anaerobe [[id number]] Collected: 11/03/23 1516    Order Status: Completed Specimen: Chest from Heart Updated: 11/04/23 1217     Anaerobic Culture Culture in progress    Narrative:      Mitral Leaflet for Culture    Gram stain [[id number]] Collected: 11/03/23 1516    Order Status: Completed Specimen: Chest from Heart Updated: 11/03/23 1711      Gram Stain Result Rare WBC's      Few Gram positive cocci    Narrative:      Mitral Leaflet for Culture    Fungus culture [[id number]] Collected: 11/03/23 1516    Order Status: Sent Specimen: Chest from Heart Updated: 11/03/23 1541    Blood culture x two cultures. Draw prior to antibiotics. [[id number]]  (Abnormal) Collected: 10/30/23 1923    Order Status: Completed Specimen: Blood from Peripheral, Antecubital, Right Updated: 11/03/23 0800     Blood Culture, Routine Gram stain aer bottle: Gram positive cocci in clusters resembling Staph      Gram stain carlie bottle: Gram positive cocci in clusters resembling Staph      Results called to and read back by: Savannah Butler RN 10/31/2023  06:33      METHICILLIN RESISTANT STAPHYLOCOCCUS AUREUS  ID consult required at [facility].  For susceptibility see order #[id number]      Narrative:      Aerobic and anaerobic    Blood culture x two cultures. Draw prior to antibiotics. [[id number]]  (Abnormal)  (Susceptibility) Collected: 10/30/23 1924    Order Status: Completed Specimen: Blood from Peripheral, Antecubital, Left Updated: 11/03/23 0759     Blood Culture, Routine Gram stain aer bottle: Gram positive cocci in clusters resembling Staph      Gram stain carlie bottle: Gram positive cocci in clusters resembling Staph      Results called to and read back by: Savannah Butler RN 10/31/2023  06      METHICILLIN RESISTANT STAPHYLOCOCCUS AUREUS  ID consult required at [facility].      Narrative:      Aerobic and anaerobic    Urine culture [[id number]]  (Abnormal)  (Susceptibility) Collected: 10/30/23 2152    Order Status: Completed Specimen: Urine Updated: 11/02/23 0250     Urine Culture, Routine METHICILLIN RESISTANT STAPHYLOCOCCUS AUREUS  >100,000cfu/ml  No other significant isolate      Narrative:      Specimen Source->Urine    Add on udrug [id number] ctg [id number] PER DR.WELLFORD BAEZ    10/30/2023  23:04     C.  trachomatis/N. gonorrhoeae by AMP DNA [facility]; Urine [[id number]] Collected: 10/31/23 0030    Order Status: Completed Specimen: Urine from Vaginal Updated: 10/31/23 1814     Chlamydia, Amplified DNA Not Detected     N gonorrhoeae, amplified DNA Not Detected    Narrative:      Sources by Resulting Lab:->[facility]  Release to patient->Immediate    MRSA/SA Rapid ID by PCR from Blood culture [[id number]]  (Abnormal) Collected: 10/30/23 1923    Order Status: Completed Updated: 10/31/23 0751     Staph aureus ID by PCR Positive     Methicillin Resistant ID by PCR Positive    Narrative:      Aerobic and anaerobic    Culture, Respiratory with Gram Stain [[id number]]     Order Status: Canceled Specimen: Respiratory from Sputum     Respiratory Infection Panel (PCR), Nasopharyngeal [[id number]] Collected: 10/30/23 2153    Order Status: Completed Specimen: Nasopharyngeal Swab Updated: 10/30/23 2323     Respiratory Infection Panel Source NP Swab     Adenovirus Not Detected     Coronavirus 229E, Common Cold Virus Not Detected     Coronavirus HKU1, Common Cold Virus Not Detected     Coronavirus NL63, Common Cold Virus Not Detected     Coronavirus OC43, Common Cold Virus Not Detected     Comment: The Coronavirus strains detected in this test cause the common cold.  These strains are not the COVID-19 (novel Coronavirus)strain   associated with the respiratory disease outbreak.          SARS-CoV2 (COVID-19) Qualitative PCR Not Detected     Human Metapneumovirus Not Detected     Human Rhinovirus/Enterovirus Not Detected     Influenza A (subtypes H1, H1-2009,H3) Not Detected     Influenza B Not Detected     Parainfluenza Virus 1 Not Detected     Parainfluenza Virus 2 Not Detected     Parainfluenza Virus 3 Not Detected     Parainfluenza Virus 4 Not Detected     Respiratory Syncytial Virus Not Detected     Bordetella Parapertussis (JQ8121) Not Detected     Bordetella pertussis (ptxP) Not Detected     Chlamydia pneumoniae Not Detected      Mycoplasma pneumoniae Not Detected    Narrative:      For all other respiratory sources, order [id number] -  Respiratory Viral Panel by PCR    C. trachomatis/N. gonorrhoeae by AMP DNA [[id number]] Collected: 10/30/23 2304    Order Status: Canceled     C. trachomatis/N. gonorrhoeae by AMP DNA [facility]; Urine [[id number]]     Order Status: Completed Specimen: Genital from Vaginal     Influenza A & B by Molecular [[id number]] Collected: 10/30/23 1925    Order Status: Completed Specimen: Nasopharyngeal Swab Updated: 10/30/23 1958     Influenza A, Molecular NEGATIVE     Influenza B, Molecular NEGATIVE     Flu A & B Source Nasal swab

## 2023-11-06 NOTE — SUBJECTIVE & OBJECTIVE
"Interval HPI:   No acute events overnight. Patient in room 76440/91647 A. Blood glucose stable. BG at and above goal on current insulin regimen (Transition Insulin Drip). Steroid use- None  3 Days Post-Op  Renal function-  Normal   Vasopressors-  None     Diet clear liquid Fluid - 1500mL; Isolation Tray - Styrofoam     Eating:   <25%  Nausea: No  Hypoglycemia and intervention: No  Fever: No  TPN and/or TF: No    /72 (BP Location: Right arm, Patient Position: Sitting)   Pulse 90   Temp 98.2 °F (36.8 °C) (Oral)   Resp (!) 24   Ht 6' 1" (1.854 m)   Wt (S) 76.5 kg (168 lb 10.4 oz)   SpO2 95%   BMI 22.25 kg/m²     Labs Reviewed and Include    Recent Labs   Lab 11/06/23  0252 11/06/23  0540   *  --    CALCIUM 7.3*  --    ALBUMIN 1.8*  --    PROT 5.6*  --    * 150*   K 3.4*  --    CO2 26  --    *  --    BUN 36*  --    CREATININE 0.9  --    ALKPHOS 77  --    ALT 38  --    AST 51*  --    BILITOT 1.8*  --      Lab Results   Component Value Date    WBC 13.55 (H) 11/06/2023    HGB 7.4 (L) 11/06/2023    HCT 23.1 (L) 11/06/2023    MCV 97 11/06/2023    PLT 98 (L) 11/06/2023     No results for input(s): "TSH", "FREET4" in the last 168 hours.  Lab Results   Component Value Date    HGBA1C 5.8 (H) 11/06/2023       Nutritional status:   Body mass index is 22.25 kg/m².  Lab Results   Component Value Date    ALBUMIN 1.8 (L) 11/06/2023    ALBUMIN 1.9 (L) 11/05/2023    ALBUMIN 1.8 (L) 11/04/2023     No results found for: "PREALBUMIN"    Estimated Creatinine Clearance: 107.4 mL/min (based on SCr of 0.9 mg/dL).    Accu-Checks  Recent Labs     11/04/23 2002 11/04/23 2359 11/05/23  0408 11/05/23  0837 11/05/23  1225 11/05/23  1615 11/05/23  1957 11/05/23  2359 11/06/23  0435 11/06/23  0801   POCTGLUCOSE 161* 147* 126* 159* 149* 154* 173* 238* 187* 156*       Current Medications and/or Treatments Impacting Glycemic Control  Immunotherapy:    Immunosuppressants       None          Steroids:   Hormones (From " admission, onward)      None          Pressors:    Autonomic Drugs (From admission, onward)      Start     Stop Route Frequency Ordered    11/02/23 0838  succinylcholine (ANECTINE) 20 mg/mL injection        Note to Pharmacy: Created by cabinet override    11/02/23 2044 11/02/23 0838    10/31/23 0015  NORepinephrine bitartrate-D5W 4 mg/250 mL (16 mcg/mL) PERIPHERAL access infusion        Question Answer Comment   Begin at (in mcg/kg/min): 0.02    Titrate by: (in mcg/kg/min) 0.02    Titrate interval: (in minutes) 5    Titrate to maintain: (MAP or SBP) MAP    Greater than: (in mmHg) 60    Maximum dose: (in mcg/kg/min) 0.2        11/01/23 0014 IV Continuous 10/30/23 2305          Hyperglycemia/Diabetes Medications:   Antihyperglycemics (From admission, onward)      Start     Stop Route Frequency Ordered    11/05/23 0745  insulin regular in 0.9 % NaCl 100 unit/100 mL (1 unit/mL) infusion        Question:  Enter initial dose (Units/hr):  Answer:  0.8    -- IV Continuous 11/05/23 0740    11/04/23 1056  insulin aspart U-100 pen 0-5 Units         -- SubQ As needed (PRN) 11/04/23 0957

## 2023-11-07 LAB
ALBUMIN SERPL BCP-MCNC: 1.9 G/DL (ref 3.5–5.2)
ALP SERPL-CCNC: 99 U/L (ref 55–135)
ALT SERPL W/O P-5'-P-CCNC: 44 U/L (ref 10–44)
ANION GAP SERPL CALC-SCNC: 8 MMOL/L (ref 8–16)
APTT PPP: 25.9 SEC (ref 21–32)
AST SERPL-CCNC: 58 U/L (ref 10–40)
BACTERIA SPEC ANAEROBE CULT: NORMAL
BILIRUB SERPL-MCNC: 1.5 MG/DL (ref 0.1–1)
BUN SERPL-MCNC: 17 MG/DL (ref 6–20)
CALCIUM SERPL-MCNC: 8 MG/DL (ref 8.7–10.5)
CHLORIDE SERPL-SCNC: 113 MMOL/L (ref 95–110)
CK SERPL-CCNC: 411 U/L (ref 20–200)
CO2 SERPL-SCNC: 23 MMOL/L (ref 23–29)
CREAT SERPL-MCNC: 0.7 MG/DL (ref 0.5–1.4)
ERYTHROCYTE [DISTWIDTH] IN BLOOD BY AUTOMATED COUNT: 16.4 % (ref 11.5–14.5)
EST. GFR  (NO RACE VARIABLE): >60 ML/MIN/1.73 M^2
GLUCOSE SERPL-MCNC: 118 MG/DL (ref 70–110)
HCT VFR BLD AUTO: 25.4 % (ref 40–54)
HGB BLD-MCNC: 8.2 G/DL (ref 14–18)
INR PPP: 1 (ref 0.8–1.2)
MCH RBC QN AUTO: 30.8 PG (ref 27–31)
MCHC RBC AUTO-ENTMCNC: 32.3 G/DL (ref 32–36)
MCV RBC AUTO: 96 FL (ref 82–98)
PLATELET # BLD AUTO: 136 K/UL (ref 150–450)
PMV BLD AUTO: 14.2 FL (ref 9.2–12.9)
POCT GLUCOSE: 109 MG/DL (ref 70–110)
POCT GLUCOSE: 110 MG/DL (ref 70–110)
POCT GLUCOSE: 112 MG/DL (ref 70–110)
POCT GLUCOSE: 167 MG/DL (ref 70–110)
POTASSIUM SERPL-SCNC: 3.3 MMOL/L (ref 3.5–5.1)
POTASSIUM SERPL-SCNC: 3.8 MMOL/L (ref 3.5–5.1)
PROT SERPL-MCNC: 6.4 G/DL (ref 6–8.4)
PROTHROMBIN TIME: 10.9 SEC (ref 9–12.5)
RBC # BLD AUTO: 2.66 M/UL (ref 4.6–6.2)
SODIUM SERPL-SCNC: 144 MMOL/L (ref 136–145)
WBC # BLD AUTO: 14.07 K/UL (ref 3.9–12.7)

## 2023-11-07 PROCEDURE — 20600001 HC STEP DOWN PRIVATE ROOM

## 2023-11-07 PROCEDURE — 25000003 PHARM REV CODE 250

## 2023-11-07 PROCEDURE — 99232 PR SUBSEQUENT HOSPITAL CARE,LEVL II: ICD-10-PCS | Mod: ,,,

## 2023-11-07 PROCEDURE — 97535 SELF CARE MNGMENT TRAINING: CPT

## 2023-11-07 PROCEDURE — 85027 COMPLETE CBC AUTOMATED: CPT | Performed by: STUDENT IN AN ORGANIZED HEALTH CARE EDUCATION/TRAINING PROGRAM

## 2023-11-07 PROCEDURE — 84132 ASSAY OF SERUM POTASSIUM: CPT | Performed by: THORACIC SURGERY (CARDIOTHORACIC VASCULAR SURGERY)

## 2023-11-07 PROCEDURE — 25000003 PHARM REV CODE 250: Performed by: STUDENT IN AN ORGANIZED HEALTH CARE EDUCATION/TRAINING PROGRAM

## 2023-11-07 PROCEDURE — 85610 PROTHROMBIN TIME: CPT | Performed by: STUDENT IN AN ORGANIZED HEALTH CARE EDUCATION/TRAINING PROGRAM

## 2023-11-07 PROCEDURE — 99233 PR SUBSEQUENT HOSPITAL CARE,LEVL III: ICD-10-PCS | Mod: ,,, | Performed by: ANESTHESIOLOGY

## 2023-11-07 PROCEDURE — 97116 GAIT TRAINING THERAPY: CPT

## 2023-11-07 PROCEDURE — 63600175 PHARM REV CODE 636 W HCPCS

## 2023-11-07 PROCEDURE — 82550 ASSAY OF CK (CPK): CPT | Performed by: STUDENT IN AN ORGANIZED HEALTH CARE EDUCATION/TRAINING PROGRAM

## 2023-11-07 PROCEDURE — 99233 SBSQ HOSP IP/OBS HIGH 50: CPT | Mod: ,,, | Performed by: STUDENT IN AN ORGANIZED HEALTH CARE EDUCATION/TRAINING PROGRAM

## 2023-11-07 PROCEDURE — 63600175 PHARM REV CODE 636 W HCPCS: Performed by: THORACIC SURGERY (CARDIOTHORACIC VASCULAR SURGERY)

## 2023-11-07 PROCEDURE — 85730 THROMBOPLASTIN TIME PARTIAL: CPT | Performed by: STUDENT IN AN ORGANIZED HEALTH CARE EDUCATION/TRAINING PROGRAM

## 2023-11-07 PROCEDURE — 27000207 HC ISOLATION

## 2023-11-07 PROCEDURE — 92526 ORAL FUNCTION THERAPY: CPT

## 2023-11-07 PROCEDURE — 80053 COMPREHEN METABOLIC PANEL: CPT

## 2023-11-07 PROCEDURE — 99232 SBSQ HOSP IP/OBS MODERATE 35: CPT | Mod: ,,,

## 2023-11-07 PROCEDURE — 25000003 PHARM REV CODE 250: Performed by: THORACIC SURGERY (CARDIOTHORACIC VASCULAR SURGERY)

## 2023-11-07 PROCEDURE — 94761 N-INVAS EAR/PLS OXIMETRY MLT: CPT

## 2023-11-07 PROCEDURE — 99233 SBSQ HOSP IP/OBS HIGH 50: CPT | Mod: ,,, | Performed by: ANESTHESIOLOGY

## 2023-11-07 PROCEDURE — 99233 PR SUBSEQUENT HOSPITAL CARE,LEVL III: ICD-10-PCS | Mod: ,,, | Performed by: STUDENT IN AN ORGANIZED HEALTH CARE EDUCATION/TRAINING PROGRAM

## 2023-11-07 RX ORDER — INSULIN ASPART 100 [IU]/ML
0-5 INJECTION, SOLUTION INTRAVENOUS; SUBCUTANEOUS
Status: DISCONTINUED | OUTPATIENT
Start: 2023-11-07 | End: 2023-11-09

## 2023-11-07 RX ORDER — HYDROMORPHONE HYDROCHLORIDE 1 MG/ML
0.5 INJECTION, SOLUTION INTRAMUSCULAR; INTRAVENOUS; SUBCUTANEOUS
Status: DISCONTINUED | OUTPATIENT
Start: 2023-11-07 | End: 2023-11-08

## 2023-11-07 RX ORDER — METOPROLOL TARTRATE 50 MG/1
50 TABLET ORAL 2 TIMES DAILY
Status: DISCONTINUED | OUTPATIENT
Start: 2023-11-08 | End: 2023-11-10

## 2023-11-07 RX ORDER — GLUCAGON 1 MG
1 KIT INJECTION
Status: DISCONTINUED | OUTPATIENT
Start: 2023-11-07 | End: 2023-11-09

## 2023-11-07 RX ORDER — NIFEDIPINE 60 MG/1
60 TABLET, EXTENDED RELEASE ORAL DAILY
Status: DISCONTINUED | OUTPATIENT
Start: 2023-11-07 | End: 2023-11-14 | Stop reason: HOSPADM

## 2023-11-07 RX ORDER — METOPROLOL TARTRATE 25 MG/1
25 TABLET, FILM COATED ORAL ONCE
Status: DISCONTINUED | OUTPATIENT
Start: 2023-11-07 | End: 2023-11-11

## 2023-11-07 RX ORDER — IBUPROFEN 200 MG
24 TABLET ORAL
Status: DISCONTINUED | OUTPATIENT
Start: 2023-11-07 | End: 2023-11-09

## 2023-11-07 RX ORDER — IBUPROFEN 200 MG
16 TABLET ORAL
Status: DISCONTINUED | OUTPATIENT
Start: 2023-11-07 | End: 2023-11-09

## 2023-11-07 RX ADMIN — DOCUSATE SODIUM 100 MG: 100 CAPSULE, LIQUID FILLED ORAL at 09:11

## 2023-11-07 RX ADMIN — MUPIROCIN: 20 OINTMENT TOPICAL at 09:11

## 2023-11-07 RX ADMIN — VANCOMYCIN HYDROCHLORIDE 1250 MG: 1.25 INJECTION, POWDER, LYOPHILIZED, FOR SOLUTION INTRAVENOUS at 08:11

## 2023-11-07 RX ADMIN — POTASSIUM BICARBONATE 35 MEQ: 391 TABLET, EFFERVESCENT ORAL at 05:11

## 2023-11-07 RX ADMIN — VANCOMYCIN HYDROCHLORIDE 1250 MG: 1.25 INJECTION, POWDER, LYOPHILIZED, FOR SOLUTION INTRAVENOUS at 09:11

## 2023-11-07 RX ADMIN — OXYCODONE HYDROCHLORIDE 10 MG: 10 TABLET ORAL at 06:11

## 2023-11-07 RX ADMIN — ASPIRIN 325 MG ORAL TABLET 325 MG: 325 PILL ORAL at 08:11

## 2023-11-07 RX ADMIN — ACETAMINOPHEN 1000 MG: 500 TABLET ORAL at 05:11

## 2023-11-07 RX ADMIN — POLYETHYLENE GLYCOL 3350 17 G: 17 POWDER, FOR SOLUTION ORAL at 08:11

## 2023-11-07 RX ADMIN — OXYCODONE HYDROCHLORIDE 10 MG: 10 TABLET ORAL at 11:11

## 2023-11-07 RX ADMIN — POTASSIUM BICARBONATE 35 MEQ: 391 TABLET, EFFERVESCENT ORAL at 06:11

## 2023-11-07 RX ADMIN — METOPROLOL TARTRATE 25 MG: 25 TABLET, FILM COATED ORAL at 08:11

## 2023-11-07 RX ADMIN — HYDROMORPHONE HYDROCHLORIDE 0.5 MG: 0.5 INJECTION, SOLUTION INTRAMUSCULAR; INTRAVENOUS; SUBCUTANEOUS at 03:11

## 2023-11-07 RX ADMIN — OXYCODONE HYDROCHLORIDE 10 MG: 10 TABLET ORAL at 03:11

## 2023-11-07 RX ADMIN — NIFEDIPINE 60 MG: 60 TABLET, FILM COATED, EXTENDED RELEASE ORAL at 08:11

## 2023-11-07 RX ADMIN — DOCUSATE SODIUM 100 MG: 100 CAPSULE, LIQUID FILLED ORAL at 08:11

## 2023-11-07 RX ADMIN — ACETAMINOPHEN 1000 MG: 500 TABLET ORAL at 09:11

## 2023-11-07 RX ADMIN — MUPIROCIN: 20 OINTMENT TOPICAL at 08:11

## 2023-11-07 RX ADMIN — METOPROLOL TARTRATE 25 MG: 25 TABLET, FILM COATED ORAL at 09:11

## 2023-11-07 RX ADMIN — OXYCODONE HYDROCHLORIDE 10 MG: 10 TABLET ORAL at 07:11

## 2023-11-07 NOTE — SUBJECTIVE & OBJECTIVE
Interval History:     Patient worked with PT this morning and is feeling tired afterwards. Reports mild soreness at the surgical site.     Review of Systems   Constitutional:  Negative for chills and fever.   Respiratory:  Negative for cough and shortness of breath.    Cardiovascular:  Negative for chest pain.   Gastrointestinal:  Negative for abdominal pain, constipation, diarrhea, nausea and vomiting.   Genitourinary:  Negative for dysuria and hematuria.   Skin:  Negative for rash.   Neurological:  Negative for headaches.     Objective:     Vital Signs (Most Recent):  Temp: 98.4 °F (36.9 °C) (11/07/23 1200)  Pulse: 81 (11/07/23 1200)  Resp: (!) 36 (11/07/23 1200)  BP: 109/73 (11/07/23 1200)  SpO2: 98 % (11/07/23 1200) Vital Signs (24h Range):  Temp:  [98.3 °F (36.8 °C)-98.7 °F (37.1 °C)] 98.4 °F (36.9 °C)  Pulse:  [] 81  Resp:  [16-64] 36  SpO2:  [92 %-99 %] 98 %  BP: (109-138)/(60-81) 109/73  Arterial Line BP: (122-164)/(48-71) 127/59     Weight: (S) 76.5 kg (168 lb 10.4 oz)  Body mass index is 22.25 kg/m².    Estimated Creatinine Clearance: 138.1 mL/min (based on SCr of 0.7 mg/dL).     Physical Exam  Vitals reviewed.   Constitutional:       General: He is not in acute distress.     Appearance: Normal appearance. He is not ill-appearing.   HENT:      Head: Normocephalic and atraumatic.   Eyes:      Extraocular Movements: Extraocular movements intact.      Conjunctiva/sclera: Conjunctivae normal.   Cardiovascular:      Rate and Rhythm: Normal rate and regular rhythm.      Heart sounds: No murmur heard.  Pulmonary:      Effort: Pulmonary effort is normal. No respiratory distress.      Breath sounds: Normal breath sounds.   Musculoskeletal:      Cervical back: Normal range of motion.   Skin:     General: Skin is warm and dry.   Neurological:      General: No focal deficit present.      Mental Status: He is alert and oriented to person, place, and time.   Psychiatric:         Mood and Affect: Mood normal.          Behavior: Behavior normal.         Thought Content: Thought content normal.          Significant Labs: All pertinent labs within the past 24 hours have been reviewed.  Recent Lab Results  (Last 5 results in the past 24 hours)        11/07/23  1146   11/07/23  0831   11/07/23  0426   11/06/23  2155   11/06/23  2155        Albumin     1.9           ALP     99           ALT     44           Anion Gap     8           aPTT     25.9  Comment: Refer to local heparin nomogram for intensity/dose specific   therapeutic   range.             AST     58           BILIRUBIN TOTAL     1.5  Comment: For infants and newborns, interpretation of results should be based  on gestational age, weight and in agreement with clinical  observations.    Premature Infant recommended reference ranges:  Up to 24 hours.............<8.0 mg/dL  Up to 48 hours............<12.0 mg/dL  3-5 days..................<15.0 mg/dL  6-29 days.................<15.0 mg/dL             BUN     17           Calcium     8.0           Chloride     113           CO2     23           Creatinine     0.7           eGFR     >60.0           Glucose     118           Hematocrit     25.4           Hemoglobin     8.2           INR     1.0  Comment: Coumadin Therapy:  2.0 - 3.0 for INR for all indicators except mechanical heart valves  and antiphospholipid syndromes which should use 2.5 - 3.5.             MCH     30.8           MCHC     32.3           MCV     96           MPV     14.2           Platelet Count     136           POCT Glucose 167   112     190         Potassium     3.3     3.6       PROTEIN TOTAL     6.4           Protime     10.9           RBC     2.66           RDW     16.4           Sodium     144     142       WBC     14.07                                  Significant Imaging: I have reviewed all pertinent imaging results/findings within the past 24 hours.

## 2023-11-07 NOTE — PROGRESS NOTES
[facility] - [facility]  Infectious Disease  Progress Note    Patient Name: Lorenzo Nino  MRN: [id number]  Admission Date: 10/30/2023  Length of Stay: 8 days  Attending Physician: Manuel Beal MD  Primary Care Provider: No primary care provider on file.    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 Repeat blcx positive from 11/5 - gram/stain with GPC resembling staph.      Recommendations:  Continue vancomycin for now  Check CK (last from 10/31 was 2363). May switch to daptomycin tomorrow  Will need 6 weeks of treatment from last negative blood cultures  Follow up repeat blood cultures (from 11/6)  Continue isolation precautions        Above recommendations were discussed with primary team.     Anticipated Disposition: TBD    Thank you for your consult. I will follow-up with patient. Please contact us if you have any additional questions.    Kasey Van,   Infectious Disease  [facility] - Surgical Intensive Care    Subjective:     Principal Problem:Acute bacterial endocarditis    HPI: This is the strange case of a 47 yo man with no medical problems who reportedly developed acute onset of symptoms a few days ago. He was seen in several urgent cares w/headache, abdominal pain with nausea but no vomiting or diarrhea.  There he was evaluated and instructed to take Tylenol and follow up if he still felt unwell.  When he presented with hematuria, he was sent to the ED here and was found to have thrombocytopenia. CT head negative, CT C/A/P showing perinephric stranding, distended bladder, and hiatal hernia. Patient admitted to MICU for further workup and treatment of his hypotension and likely severe sepsis. Blood cultures have grown MRSA and a TTE revealed a flail mitral leflet and a possible vegetation on the aortic  valve. ID is consulted for that. The patient is accompanied by his mother. He is scheduled for a NEW. Denies IDU. Denetal implants. HIV NR.       Interval History:     Patient worked with PT this morning and is feeling tired afterwards. Reports mild soreness at the surgical site.     Review of Systems   Constitutional:  Negative for chills and fever.   Respiratory:  Negative for cough and shortness of breath.    Cardiovascular:  Negative for chest pain.   Gastrointestinal:  Negative for abdominal pain, constipation, diarrhea, nausea and vomiting.   Genitourinary:  Negative for dysuria and hematuria.   Skin:  Negative for rash.   Neurological:  Negative for headaches.     Objective:     Vital Signs (Most Recent):  Temp: 98.4 °F (36.9 °C) (11/07/23 1200)  Pulse: 81 (11/07/23 1200)  Resp: (!) 36 (11/07/23 1200)  BP: 109/73 (11/07/23 1200)  SpO2: 98 % (11/07/23 1200) Vital Signs (24h Range):  Temp:  [98.3 °F (36.8 °C)-98.7 °F (37.1 °C)] 98.4 °F (36.9 °C)  Pulse:  [] 81  Resp:  [16-64] 36  SpO2:  [92 %-99 %] 98 %  BP: (109-138)/(60-81) 109/73  Arterial Line BP: (122-164)/(48-71) 127/59     Weight: (S) 76.5 kg (168 lb 10.4 oz)  Body mass index is 22.25 kg/m².    Estimated Creatinine Clearance: 138.1 mL/min (based on SCr of 0.7 mg/dL).     Physical Exam  Vitals reviewed.   Constitutional:       General: He is not in acute distress.     Appearance: Normal appearance. He is not ill-appearing.   HENT:      Head: Normocephalic and atraumatic.   Eyes:      Extraocular Movements: Extraocular movements intact.      Conjunctiva/sclera: Conjunctivae normal.   Cardiovascular:      Rate and Rhythm: Normal rate and regular rhythm.      Heart sounds: No murmur heard.  Pulmonary:      Effort: Pulmonary effort is normal. No respiratory distress.      Breath sounds: Normal breath sounds.   Musculoskeletal:      Cervical back: Normal range of motion.   Skin:     General: Skin is warm and dry.   Neurological:      General: No focal  deficit present.      Mental Status: He is alert and oriented to person, place, and time.   Psychiatric:         Mood and Affect: Mood normal.         Behavior: Behavior normal.         Thought Content: Thought content normal.          Significant Labs: All pertinent labs within the past 24 hours have been reviewed.  Recent Lab Results  (Last 5 results in the past 24 hours)        11/07/23  1146   11/07/23  0831   11/07/23  0426   11/06/23  2155   11/06/23  2155        Albumin     1.9           ALP     99           ALT     44           Anion Gap     8           aPTT     25.9  Comment: Refer to local heparin nomogram for intensity/dose specific   therapeutic   range.             AST     58           BILIRUBIN TOTAL     1.5  Comment: For infants and newborns, interpretation of results should be based  on gestational age, weight and in agreement with clinical  observations.    Premature Infant recommended reference ranges:  Up to 24 hours.............<8.0 mg/dL  Up to 48 hours............<12.0 mg/dL  3-5 days..................<15.0 mg/dL  6-29 days.................<15.0 mg/dL             BUN     17           Calcium     8.0           Chloride     113           CO2     23           Creatinine     0.7           eGFR     >60.0           Glucose     118           Hematocrit     25.4           Hemoglobin     8.2           INR     1.0  Comment: Coumadin Therapy:  2.0 - 3.0 for INR for all indicators except mechanical heart valves  and antiphospholipid syndromes which should use 2.5 - 3.5.             MCH     30.8           MCHC     32.3           MCV     96           MPV     14.2           Platelet Count     136           POCT Glucose 167   112     190         Potassium     3.3     3.6       PROTEIN TOTAL     6.4           Protime     10.9           RBC     2.66           RDW     16.4           Sodium     144     142       WBC     14.07                                  Significant Imaging: I have reviewed all pertinent  imaging results/findings within the past 24 hours.

## 2023-11-07 NOTE — CARE UPDATE
Patient much more alert today, able to follow commands, moves all extremities. Patient ambulated in hallway, tolerated well, up in chair during shift. Pts PO intake much better today, +BM. Patient reports pain as well controlled with PRN meds. Patient with no skin breakdown to sacrum or coccyx. Stepdown orders in place, family at bedside, [facility].

## 2023-11-07 NOTE — PROGRESS NOTES
[facility] - [facility]  Critical Care - Surgery  Progress Note    Patient Name: Lorenzo Nino  MRN: [id number]  Admission Date: 10/30/2023  Hospital Length of Stay: 8 days  Code Status: Full Code  Attending Provider: Manuel Beal MD  Primary Care Provider: No primary care provider on file.   Principal Problem: Acute bacterial endocarditis    Subjective:     Hospital/ICU Course:  No notes on file    Interval History/Significant Events: NAEON. Hypernatremia resolved. Still requiring some cleviprex for BP goals. CT removed yesterday.     Follow-up For: Procedure(s) (LRB):  REPAIR, MITRAL VALVE, OPEN (N/A)  EXCLUSION, LEFT ATRIAL APPENDAGE, OPEN, AS PART OF OPEN CHEST SURGERY (Left)    Post-Operative Day: 4 Days Post-Op    Objective:     Vital Signs (Most Recent):  Temp: 98.7 °F (37.1 °C) (11/07/23 0300)  Pulse: 90 (11/07/23 0500)  Resp: 18 (11/07/23 0500)  BP: 114/73 (11/07/23 0400)  SpO2: 96 % (11/07/23 0500) Vital Signs (24h Range):  Temp:  [98.2 °F (36.8 °C)-98.7 °F (37.1 °C)] 98.7 °F (37.1 °C)  Pulse:  [] 90  Resp:  [16-64] 18  SpO2:  [92 %-99 %] 96 %  BP: (114-138)/(60-81) 114/73  Arterial Line BP: (123-175)/(44-88) 139/62     Weight: (S) 76.5 kg (168 lb 10.4 oz)  Body mass index is 22.25 kg/m².      Intake/Output Summary (Last 24 hours) at 11/7/2023 0558  Last data filed at 11/7/2023 0500  Gross per 24 hour   Intake 3517.9 ml   Output 3200 ml   Net 317.9 ml          Physical Exam  Vitals and nursing note reviewed.   Constitutional:       General: He is not in acute distress.     Appearance: Normal appearance. He is normal weight. He is not ill-appearing or diaphoretic.   HENT:      Head: Normocephalic and atraumatic.      Right Ear: External ear normal.      Left Ear: External ear normal.      Nose: Nose normal.   Eyes:      General: No scleral icterus.     Extraocular Movements: Extraocular movements intact.   Cardiovascular:      Rate and Rhythm: Normal rate and regular rhythm.       Pulses: Normal pulses.   Pulmonary:      Effort: Pulmonary effort is normal. No respiratory distress.      Breath sounds: Normal breath sounds.   Abdominal:      General: Abdomen is flat. There is no distension.      Palpations: Abdomen is soft.   Skin:     General: Skin is warm and dry.      Comments: MSI CDI   Neurological:      General: No focal deficit present.      Mental Status: He is alert.      Comments: Quiet, somnolent but arousable and interactive   Psychiatric:         Mood and Affect: Mood normal.         Behavior: Behavior normal.            Vents:  Vent Mode: Spont (11/05/23 0240)  Ventilator Initiated: Yes (11/03/23 1805)  Set Rate: 22 BPM (11/04/23 1950)  Vt Set: 500 mL (11/04/23 1950)  Pressure Support: 8 cmH20 (11/05/23 0240)  PEEP/CPAP: 5 cmH20 (11/05/23 0240)  Oxygen Concentration (%): 0 (11/06/23 2000)  Peak Airway Pressure: 14 cmH20 (11/05/23 0240)  Plateau Pressure: 17 cmH20 (11/05/23 0240)  Total Ve: 8.2 L/m (11/05/23 0240)  Negative Inspiratory Force (cm H2O): 0 (11/06/23 1901)  F/VT Ratio<105 (RSBI): (!) 21.85 (11/05/23 0240)    Lines/Drains/Airways       Arterial Line  Duration             Arterial Line 11/02/23 1031 Right Radial 4 days              Line  Duration                  Pacer Wires 11/03/23 1700 3 days              Peripheral Intravenous Line  Duration                  Peripheral IV - Single Lumen 11/05/23 0100 18 G Left;Posterior Hand 2 days         Peripheral IV - Single Lumen 11/05/23 1155 18 G;1 3/4 in Anterior;Left Forearm 1 day                    Significant Labs:    CBC/Anemia Profile:  Recent Labs   Lab 11/06/23  0252 11/07/23  0426   WBC 13.55* 14.07*   HGB 7.4* 8.2*   HCT 23.1* 25.4*   PLT 98* 136*   MCV 97 96   RDW 16.0* 16.4*        Chemistries:  Recent Labs   Lab 11/06/23  0252 11/06/23  0540 11/06/23  1021 11/06/23  1604 11/06/23  2155 11/07/23  0426   *   < > 147* 146* 142 144   K 3.4*  --  3.4* 3.5 3.6 3.3*   *  --  114* 115*  --  113*   CO2 26  --   25 24  --  23   BUN 36*  --  26* 23*  --  17   CREATININE 0.9  --  0.8 0.7  --  0.7   CALCIUM 7.3*  --  7.7* 7.6*  --  8.0*   ALBUMIN 1.8*  --  2.0* 1.9*  --  1.9*   PROT 5.6*  --  6.4 6.1  --  6.4   BILITOT 1.8*  --  1.8* 1.6*  --  1.5*   ALKPHOS 77  --  91 85  --  99   ALT 38  --  43 38  --  44   AST 51*  --  57* 59*  --  58*   MG 2.5  --   --   --   --   --    PHOS 3.4  --   --   --   --   --     < > = values in this interval not displayed.       All pertinent labs within the past 24 hours have been reviewed.    Significant Imaging:  I have reviewed all pertinent imaging results/findings within the past 24 hours.  Assessment/Plan:     Cardiac/Vascular  * Acute bacterial endocarditis  Neuro/Psych:   - Afebrile  - Sedation: none  - Pain: scheduled acetaminophen, PRN oxycodone            Cardiac:   - S/P MVR with Dr. Beal on 11/3/2023  - BP Goal: MAP 60-80, SBP <140  - IABP removed 11/4  - Cleviprex PRN - continued, uptitrate PO meds, wean cleviprex  - Pressors: weaned off, none  - Anti-HTNs: Increase nifedipine 60mg  - Rhythm: NSR  - Beta blocker: Metoprolol 25mg bid  - Statin: Atorvastatin 40 mg QD  - CT removed 11/6     Pulmonary:   - Goal SpO2 >92%  - Room air     Renal:  - Trend BUN/Cr - back at baseline 23/0.7  - FRANCESCO resolved  - Maintain Watson, record strict Is/Os  - UOP 3L past 24 hours  - Net negative 18cc (almost even) past 24 hours  - Continue diuresis PRN    FEN / GI:   - Daily CMP, PRN K/Mag/Phos per protocol   - Replace electrolytes as needed  - Nutrition: Cardiac, 2L fluid restriction  - Bowel Regimen: Miralax, docusate  - Hypernatremia resolved- discontinue D5W, encourage PO intake     ID:   - Afebrile  - Leukocytosis but WBC stable at 14 today  - Abx: Continue vancomycin, 6 week course  - Will eventually need PICC prior to discharge, need 48 hours of cleared blood cultures  - Bcx drawn 11/6 so far NGTD- continue to monitor  - ID following, appreciate recs     Heme/Onc:   - Hgb 10.1  pre-operatively,  postoperatively 7.2-- remains stable postoperatively  - Trend H/H on daily CBC  - CBC daily  - ASA 325mg daily  - Thrombocytopenic, platelets stable. 136 today - no s/s of bleeding, will continue to monitor     Endocrine:   - CTS Goal -140, at goal  - HgbA1c: not on file  - Endocrinology consulted for insulin management  - Insulin gtt per Endocrine with SSI      PPx:   Feeding: Level 6 per SLP, cardiac diet with fluid restriction  Analgesia/Sedation: multimodal, adequate pain control  Thromboembolic Prevention: lovenox   HOB >30: Yes  Stress Ulcer: not indicated  Glucose Control: Yes, insulin management per Endocrinology     Lines/Drains/Airway:   Left radial arterial line - can remove once clevidipine weaned   Pacing Wires: Temporary ventricular pacing wires     Dispo/Code Status/Palliative:   - Continue SICU Care, likely stepdown after clevidipine wean  - Full Code       Critical secondary to Patient has a condition that poses threat to life and bodily function: major cardiac surgery.     Critical care was time spent personally by me on the following activities: development of treatment plan with patient or surrogate and bedside caregivers, discussions with consultants, evaluation of patient's response to treatment, examination of patient, ordering and performing treatments and interventions, ordering and review of laboratory studies, ordering and review of radiographic studies, pulse oximetry, re-evaluation of patient's condition.  This critical care time did not overlap with that of any other provider or involve time for any procedures.     Jairon Hernandez, DO  Critical Care - Surgery  [facility] - [facility]

## 2023-11-07 NOTE — SUBJECTIVE & OBJECTIVE
"Interval HPI:   No acute events overnight. Patient in room 72686/85520 A. Blood glucose stable. BG at goal on current insulin regimen (Transition Insulin Drip). Steroid use- None.   4 Days Post-Op  Renal function- Normal   Vasopressors-  None     Diet Soft & Bite Sized (IDDSI Level 6) Cardiac (Low Na/Chol); Fluid - 2000mL; Thin     Eatin%  Nausea: No  Hypoglycemia and intervention: No  Fever: No  TPN and/or TF: No    /79 (BP Location: Right arm, Patient Position: Sitting)   Pulse 87   Temp 98.5 °F (36.9 °C) (Oral)   Resp (!) 21   Ht 6' 1" (1.854 m)   Wt (S) 76.5 kg (168 lb 10.4 oz)   SpO2 99%   BMI 22.25 kg/m²     Labs Reviewed and Include    Recent Labs   Lab 23  0426   *   CALCIUM 8.0*   ALBUMIN 1.9*   PROT 6.4      K 3.3*   CO2 23   *   BUN 17   CREATININE 0.7   ALKPHOS 99   ALT 44   AST 58*   BILITOT 1.5*     Lab Results   Component Value Date    WBC 14.07 (H) 2023    HGB 8.2 (L) 2023    HCT 25.4 (L) 2023    MCV 96 2023     (L) 2023     No results for input(s): "TSH", "FREET4" in the last 168 hours.  Lab Results   Component Value Date    HGBA1C 5.8 (H) 2023       Nutritional status:   Body mass index is 22.25 kg/m².  Lab Results   Component Value Date    ALBUMIN 1.9 (L) 2023    ALBUMIN 1.9 (L) 2023    ALBUMIN 2.0 (L) 2023     No results found for: "PREALBUMIN"    Estimated Creatinine Clearance: 138.1 mL/min (based on SCr of 0.7 mg/dL).    Accu-Checks  Recent Labs     23  1225 23  1615 23  1957 23  2359 23  0435 23  0801 23  1147 23  1528 23  2155 23  0831   POCTGLUCOSE 149* 154* 173* 238* 187* 156* 182* 117* 190* 112*       Current Medications and/or Treatments Impacting Glycemic Control  Immunotherapy:    Immunosuppressants       None          Steroids:   Hormones (From admission, onward)      None          Pressors:    Autonomic Drugs (From " admission, onward)      Start     Stop Route Frequency Ordered    11/02/23 0838  succinylcholine (ANECTINE) 20 mg/mL injection        Note to Pharmacy: Created by cabinet override    11/02/23 2044 11/02/23 0838    10/31/23 0015  NORepinephrine bitartrate-D5W 4 mg/250 mL (16 mcg/mL) PERIPHERAL access infusion        Question Answer Comment   Begin at (in mcg/kg/min): 0.02    Titrate by: (in mcg/kg/min) 0.02    Titrate interval: (in minutes) 5    Titrate to maintain: (MAP or SBP) MAP    Greater than: (in mmHg) 60    Maximum dose: (in mcg/kg/min) 0.2        11/01/23 0014 IV Continuous 10/30/23 2309          Hyperglycemia/Diabetes Medications:   Antihyperglycemics (From admission, onward)      Start     Stop Route Frequency Ordered    11/07/23 1048  insulin aspart U-100 pen 0-5 Units         -- SubQ Before meals & nightly PRN 11/07/23 0962

## 2023-11-07 NOTE — PT/OT/SLP PROGRESS
Occupational Therapy   Treatment    Name: Lorenzo Nino  MRN: [id number]  Admitting Diagnosis:  Acute bacterial endocarditis  4 Days Post-Op    Recommendations:     Discharge Recommendations: High Intensity Therapy  Discharge Equipment Recommendations:  shower chair, bedside commode  Barriers to discharge:  None    Assessment:     Lorenzo Nino is a 48 y.o. male with a medical diagnosis of Acute bacterial endocarditis. Performance deficits affecting function are weakness, impaired endurance, impaired self care skills, impaired functional mobility, gait instability, impaired balance, impaired cardiopulmonary response to activity. Patient tolerated treatment session and was motivated to participate in therapy. VSS throughout session. Patient did demonstrate fatigue after ADL tasks. Patient would benefit from continued skilled acute OT 5x/wk to improve functional mobility, increase independence with ADLs, and address established goals. Recommending high intensity therapy once medically appropriate for discharge to increase maximal independence, reduce burden of care, and ensure safety.     Rehab Prognosis:  Good; patient would benefit from acute skilled OT services to address these deficits and reach maximum level of function.       Plan:     Patient to be seen 5 x/week to address the above listed problems via self-care/home management, therapeutic activities, therapeutic exercises, neuromuscular re-education  Plan of Care Expires: 12/05/23  Plan of Care Reviewed with: patient, mother    Subjective     Chief Complaint: fatigue  Patient/Family Comments/goals: patient agreed to therapy  Pain/Comfort:  Pain Rating 1: 0/10  Pain Rating Post-Intervention 1: 0/10    Objective:     Communicated with: MERLIN prior to session.  Patient found up in chair with telemetry, pulse ox (continuous), blood pressure cuff, peripheral IV upon OT entry to room.    General Precautions: Standard, fall, sternal, contact  Orthopedic  Precautions:N/A  Braces: N/A  Respiratory Status: Room air     Occupational Performance:     Functional Mobility/Transfers:  Patient completed Sit <> Stand Transfer with minimum assistance  with  hand-held assist   Functional Mobility: Patient ambulated bedside chair<>sink with mod A<>min A HHA.    Activities of Daily Living:  Grooming: minimum assistance standing at sink for oral care and washing face.  Patient was able to perform task better this date than 11/6/2023. Patient was not as shaky and able to apply toothpaste on toothbrush better.     [facility] 6 Click ADL: 15    Treatment & Education:  Role of OT and POC  ADL retraining  Functional mobility training  Safety  Importance EOB/OOB activity    Patient left up in chair with all lines intact, call button in reach, nurse notified, and family present (all needs met).     GOALS:   Multidisciplinary Problems       Occupational Therapy Goals          Problem: Occupational Therapy    Goal Priority Disciplines Outcome Interventions   Occupational Therapy Goal     OT, PT/OT Ongoing, Progressing    Description: Goals set on 11/5, with expiration date 12/4:  Patient will increase functional independence with ADLs by performing:    Bed mobility with Supervision  Grooming while standing at sink with [place]  UB Dressing with [place].  LB Dressing with Supervision.  Toileting from toilet with [place] for hygiene and clothing management.   Functional mobility of household and community distance with Supervision and AD as needed  Pt will demonstrate understanding of education provided regarding energy conservation and task modification through teach-back method.  Pt will demonstrate understanding and learning of sternal precautions via recall and demonstration 3/3.                         Time Tracking:     OT Date of Treatment: 11/07/23  OT Start Time: 1250  OT Stop Time: 1306  OT Total Time (min): 16 min    Billable Minutes:Self Care/Home Management 16                11/7/2023

## 2023-11-07 NOTE — PT/OT/SLP PROGRESS
Speech Language Pathology Treatment    Patient Name:  Lorenzo Nino   MRN:  [id number]  Admitting Diagnosis: Acute bacterial endocarditis    Recommendations:                 General Recommendations:  Dysphagia therapy  Diet recommendations:  Regular Diet - IDDSI Level 7, Liquid Diet Level: Thin liquids - IDDSI Level 0   Aspiration Precautions: Standard aspiration precautions   General Precautions: Standard,    Communication strategies:  none    Assessment:     Lorenzo Nino is a 48 y.o. male with an SLP diagnosis of Dysphagia.     Subjective     Pt awake alert and upright in chair   Mother at the bedside   RN in agreement with SLP seeing at this time    Pain/Comfort:  Pain Rating 1: 0/10  Pain Rating Post-Intervention 1: 0/10    Respiratory Status: Room air    Objective:     Has the patient been evaluated by SLP for swallowing?   Yes  Keep patient NPO? No     Pt with improved alertness this date. Pt managed regular solid trials in a more timely fashion this date despite continued absence of upper dentures. Pt managed cyclic straw sips of thin liquids. Pt with significant selective eating habits at baseline and mother asking re: bringing pt preferred foods from home/outside locations. SLP reporting pt fully liberated to a regular unrestricted diet however to check with medical team re: medical dietary restrictions. speech service will plan to check pt x1 to ensure diet tolerance. All partied demonstrating understanding and agreement with plan.  Whiteboard updated accordingly.     Goals:   Multidisciplinary Problems       SLP Goals          Problem: SLP    Goal Priority Disciplines Outcome   SLP Goal     SLP    Description: Speech Language Pathology Goals  Goals expected to be met by 11/20    1. Pt will tolerate PO intake without overt clinical signs of aspiration                        Plan:     Patient to be seen:  3 x/week   Plan of Care expires:     Plan of Care reviewed with:  patient, mother, father   SLP  Follow-Up:  Yes       Discharge recommendations:  Low Intensity Therapy   Barriers to Discharge:  None    Time Tracking:     SLP Treatment Date:   11/07/23  Speech Start Time:  0820  Speech Stop Time:  0841     Speech Total Time (min):  21 min    Billable Minutes: Treatment Swallowing Dysfunction 10 and Self Care/Home Management Training 11    11/07/2023

## 2023-11-07 NOTE — PROGRESS NOTES
"[facility]  Endocrinology  Progress Note    Admit Date: 10/30/2023     Reason for Consult: Management of Hyperglycemia     Surgical Procedure and Date: s/p MVr    No known hx of DM and not on meds at home.    HPI:   Patient is a 48 y.o. male with a diagnosis of no past medical history admitted 10/30 for sepsis. Last Friday, 10/27, he developed headache, abdominal pain, and nausea. He presented to , who prescribed Tylenol and recommended he return if he continued to feel poorly. He presented to [facility] 10[place]30 with worsening abdominal pain, as well as fatigue, body aches, and hematuria. He was found to be tachycardic and hypotensive requiring vasopressor support. He was admitted to MICU for management of sepsis 2/2 PNA vs UTI vs meningitis (reported symptoms of neck stiffness). He was unable to undergo LP due to thrombocytopenia of 23. He is currently being evaluated for TTP. Further work up revealed MRSA bacteremia. He underwent TTE, which was notable for mitral valve vegetation with flail anterior leaflet, as well as possible aortic valve vegetation. Patient is now s/p MVr and resection of left atrial appendage. Endocrinology consulted for BG management post-operatively.     No results found for: "LABA1C", "HGBA1C"               Interval HPI:   No acute events overnight. Patient in room 70218/97236 A. Blood glucose stable. BG at goal on current insulin regimen (Transition Insulin Drip). Steroid use- None.   4 Days Post-Op  Renal function- Normal   Vasopressors-  None     Diet Soft & Bite Sized (IDDSI Level 6) Cardiac (Low Na/Chol); Fluid - 2000mL; Thin     Eatin%  Nausea: No  Hypoglycemia and intervention: No  Fever: No  TPN and/or TF: No    /79 (BP Location: Right arm, Patient Position: Sitting)   Pulse 87   Temp 98.5 °F (36.9 °C) (Oral)   Resp (!) 21   Ht 6' 1" (1.854 m)   Wt (S) 76.5 kg (168 lb 10.4 oz)   SpO2 99%   BMI 22.25 kg/m²     Labs Reviewed and Include    Recent " "Labs   Lab 11/07/23  0426   *   CALCIUM 8.0*   ALBUMIN 1.9*   PROT 6.4      K 3.3*   CO2 23   *   BUN 17   CREATININE 0.7   ALKPHOS 99   ALT 44   AST 58*   BILITOT 1.5*     Lab Results   Component Value Date    WBC 14.07 (H) 11/07/2023    HGB 8.2 (L) 11/07/2023    HCT 25.4 (L) 11/07/2023    MCV 96 11/07/2023     (L) 11/07/2023     No results for input(s): "TSH", "FREET4" in the last 168 hours.  Lab Results   Component Value Date    HGBA1C 5.8 (H) 11/06/2023       Nutritional status:   Body mass index is 22.25 kg/m².  Lab Results   Component Value Date    ALBUMIN 1.9 (L) 11/07/2023    ALBUMIN 1.9 (L) 11/06/2023    ALBUMIN 2.0 (L) 11/06/2023     No results found for: "PREALBUMIN"    Estimated Creatinine Clearance: 138.1 mL/min (based on SCr of 0.7 mg/dL).    Accu-Checks  Recent Labs     11/05/23  1225 11/05/23  1615 11/05/23  1957 11/05/23  2359 11/06/23  0435 11/06/23  0801 11/06/23  1147 11/06/23  1528 11/06/23  2155 11/07/23  0831   POCTGLUCOSE 149* 154* 173* 238* 187* 156* 182* 117* 190* 112*       Current Medications and/or Treatments Impacting Glycemic Control  Immunotherapy:    Immunosuppressants       None          Steroids:   Hormones (From admission, onward)      None          Pressors:    Autonomic Drugs (From admission, onward)      Start     Stop Route Frequency Ordered    11/02/23 0838  succinylcholine (ANECTINE) 20 mg/mL injection        Note to Pharmacy: Created by cabinet override    11/02/23 2044 11/02/23 0838    10/31/23 0015  NORepinephrine bitartrate-D5W 4 mg/250 mL (16 mcg/mL) PERIPHERAL access infusion        Question Answer Comment   Begin at (in mcg/kg/min): 0.02    Titrate by: (in mcg/kg/min) 0.02    Titrate interval: (in minutes) 5    Titrate to maintain: (MAP or SBP) MAP    Greater than: (in mmHg) 60    Maximum dose: (in mcg/kg/min) 0.2        11/01/23 0014 IV Continuous 10/30/23 4154          Hyperglycemia/Diabetes Medications:   Antihyperglycemics (From " admission, onward)      Start     Stop Route Frequency Ordered    11/07/23 1048  insulin aspart U-100 pen 0-5 Units         -- SubQ Before meals & nightly PRN 11/07/23 0948            ASSESSMENT and PLAN    Cardiac/Vascular  * Acute bacterial endocarditis  Managed per primary      Endocrine  Transient hyperglycemia post procedure  BG goal 140 - 180   No known hx of DM.  S/p MVR for endocarditis.    - D/c Transition insulin drip   - Continue /50 prn TIDWM   - POCT glucose AC/HS     ** Please notify Endocrine for any change and/or advance in diet**  ** Please call Endocrine for any BG related issues **     Discharge Planning:   TBD. Please notify endocrinology prior to discharge.          Orthopedic  Surgical wound present  Optimize BG for surgical wound healing.             Rupali Bray PA-C  Endocrinology  [facility]

## 2023-11-07 NOTE — PLAN OF CARE
"      SICU PLAN OF CARE NOTE    Dx: Acute bacterial endocarditis    Shift Events: No acute events over night. Cleviprex remained on throughout the shift.    Goals of Care: Ambulate in room or hallway today.     Neuro: AAO x4, Follows Commands, and Moves All Extremities    Cardiac:    Vital Signs: /77 (BP Location: Right arm, Patient Position: Lying)   Pulse 91   Temp 98.7 °F (37.1 °C) (Oral)   Resp (!) 22   Ht 6' 1" (1.854 m)   Wt (S) 76.5 kg (168 lb 10.4 oz)   SpO2 96%   BMI 22.25 kg/m²     Respiratory: Room Air    Diet: Cardiac Diet    Gtts: MIVF    Urine Output: Voids Spontaneously 2125 cc/shift    Drains:                  Labs/Accuchecks: Routine AM labs. Q6H sodium labs d/c'd.    Skin: Surgery incision, chest tube sites, left lateral neck from CVL.      "

## 2023-11-07 NOTE — PLAN OF CARE
Outpatient Antibiotic Therapy Plan:    Please send referral to [facility].    1) Infection: MRSA endocarditis    2) Discharge Antibiotics:    Intravenous antibiotics:  Daptomycin 10mg/kg IV q 24 hours       3) Therapy Duration:  6 weeks    Estimated end date of IV antibiotics: 12/17/23    4) Outpatient Weekly Labs:    Order the following labs to be drawn on Mondays:   CBC  CMP   CRP  CPK (when on Daptomycin)    5) Fax Lab Results to Infectious Diseases Provider: Dr. Van    [facility] Fax Number: [phone]    6) Outpatient Infectious Diseases Follow-up    Follow-up appointment will be arranged by the ID clinic and will be found in the patient's appointments tab.    Prior to discharge, please ensure the patient's follow-up has been scheduled.    If there is still no follow-up scheduled prior to discharge, please send an EPIC message to Arlette Alaniz in Infectious Diseases.

## 2023-11-07 NOTE — PT/OT/SLP PROGRESS
"Physical Therapy Treatment    Patient Name:  Lorenzo Nino   MRN:  [id number]  Admit Date: 10/30/2023  Admitting Diagnosis:  Acute bacterial endocarditis   Length of Stay: 8 days  Recent Surgery: Procedure(s) (LRB):  REPAIR, MITRAL VALVE, OPEN (N/A)  EXCLUSION, LEFT ATRIAL APPENDAGE, OPEN, AS PART OF OPEN CHEST SURGERY (Left) 4 Days Post-Op    Recommendations:     Discharge Recommendations:  High Intensity Therapy   Discharge Equipment Recommendations: shower chair, bedside commode       Plan:     During this hospitalization, patient to be seen 5 x/week to address the listed problems via gait training, therapeutic activities, therapeutic exercises, neuromuscular re-education  Plan of Care Expires:  12/03/23  Plan of Care Reviewed with: patient, mother    Assessment:     Lorenzo Nino is a 48 y.o. male admitted with a medical diagnosis of Acute bacterial endocarditis. Pt found alert but reported having "low energy". VSS throughout session. Pt progressing towards goals, but not at PLOF. Pt tolerated session well. Pt is improving with therapy evidenced by increased gait distance. Will continue to work on endurance, gait distance, and gait stability. Pt would benefit from continued acute PT to maximize functional mobility after surgical intervention.  Patient has demonstrated sufficient progression to warrant High Intensity Therapy evidenced by objectives noted below.        Problem List: weakness, impaired endurance, decreased upper extremity function, impaired functional mobility, gait instability, impaired balance, impaired cardiopulmonary response to activity.  Rehab Prognosis: Good     GOALS:   Multidisciplinary Problems       Physical Therapy Goals          Problem: Physical Therapy    Goal Priority Disciplines Outcome Goal Variances Interventions   Physical Therapy Goal     PT, PT/OT Ongoing, Progressing     Description: Goals to be met by: 11/19/23     Patient will increase functional independence with " "mobility by performin. Supine to sit with Set-up [place]  2. Sit to stand transfer with Stand-by Assistance  3. Bed to chair transfer with Stand-by Assistance using No Assistive Device  4. Gait  x 175 feet with Stand-by Assistance using No Assistive Device.   5. Lower extremity exercise program x20 reps per handout, with independence  6. Pt independently recalling 3/3 sternal precautions                         Subjective   Communicated with RN prior to session.  Patient found up in chair upon PT entry to room, agreeable to evaluation. Lorenzo Nino's mother present during session.    Chief Complaint: feeling low energy   Patient/Family Comments/goals: to get better   Pain/Comfort:  Pain Rating 1: 0/10  Pain Rating Post-Intervention 1: 0/10    Objective:   Patient found with: telemetry, pulse ox (continuous), blood pressure cuff, peripheral IV, arterial line   General Precautions: Standard, Cardiac fall, sternal   Orthopedic Precautions:N/A   Braces: N/A   Oxygen Device: Room Air  Vitals: /73 (BP Location: Right arm, Patient Position: Sitting)   Pulse 81   Temp 98.4 °F (36.9 °C) (Oral)   Resp (!) 36   Ht 6' 1" (1.854 m)   Wt (S) 76.5 kg (168 lb 10.4 oz)   SpO2 98%   BMI 22.25 kg/m²     Outcome Measures:  AM-PAC 6 CLICK MOBILITY  Turning over in bed (including adjusting bedclothes, sheets and blankets)?: 3  Sitting down on and standing up from a chair with arms (e.g., wheelchair, bedside commode, etc.): 2  Moving from lying on back to sitting on the side of the bed?: 3  Moving to and from a bed to a chair (including a wheelchair)?: 2  Need to walk in hospital room?: 2  Climbing 3-5 steps with a railing?: 2  Basic Mobility Total Score: 14       Functional Mobility:  Additional staff present: NA  Bed Mobility:   Not performed 2nd to pt found in chair     Transfers:   Sit <> Stand Transfer: minimum assistance with no assistive device from chair  Encouragement provided to increase " effort  Facilitation of hip and thoracic extension; facilitation of ascent       Gait:  Patient ambulated: 40ft + 40ft (standing rest break between trials)   Patient required: moderate assist  Patient used: no assistive device  Gait Pattern observed: reciprocal gait  Gait Deviation(s): unsteady gait, decreased step length, narrow base of support, decreased kayode, and inconsistent foot placement  Impairments due to: impaired balance, decreased strength, and decreased endurance  Comments:   Portable monitor intact and RN present; chair follow present  Pt demo'd short, inconsistent steps due to generalized weakness and impaired bracing of core  External stability provided by writing therapist   Verbal cuing to brace core and for purposeful steps   Verbal cuing for pacing         Therapeutic Activities, Exercises, and Education:   Educated pt on PT role/POC  Educated pt on importance of OOB activity and daily ambulation   Educated pt on sternal precautions   Pt verbalized understanding       Patient left up in chair with all lines intact, call button in reach, RN notified, and mother present..    Time Tracking:     PT Received On: 11/07/23  PT Start Time: 1007     PT Stop Time: 1031  PT Total Time (min): 24 min       Billable Minutes:   Gait Training 23    Treatment Type: Treatment  PT/PTA: PT

## 2023-11-07 NOTE — ASSESSMENT & PLAN NOTE
BG goal 140 - 180   No known hx of DM.  S/p MVR for endocarditis.    - D/c Transition insulin drip   - Continue /50 prn TIDWM   - POCT glucose AC/HS     ** Please notify Endocrine for any change and/or advance in diet**  ** Please call Endocrine for any BG related issues **     Discharge Planning:   TBD. Please notify endocrinology prior to discharge.

## 2023-11-07 NOTE — SUBJECTIVE & OBJECTIVE
Interval History/Significant Events: NAEON. Hypernatremia resolved. Still requiring some cleviprex for BP goals. CT removed yesterday.     Follow-up For: Procedure(s) (LRB):  REPAIR, MITRAL VALVE, OPEN (N/A)  EXCLUSION, LEFT ATRIAL APPENDAGE, OPEN, AS PART OF OPEN CHEST SURGERY (Left)    Post-Operative Day: 4 Days Post-Op    Objective:     Vital Signs (Most Recent):  Temp: 98.7 °F (37.1 °C) (11/07/23 0300)  Pulse: 90 (11/07/23 0500)  Resp: 18 (11/07/23 0500)  BP: 114/73 (11/07/23 0400)  SpO2: 96 % (11/07/23 0500) Vital Signs (24h Range):  Temp:  [98.2 °F (36.8 °C)-98.7 °F (37.1 °C)] 98.7 °F (37.1 °C)  Pulse:  [] 90  Resp:  [16-64] 18  SpO2:  [92 %-99 %] 96 %  BP: (114-138)/(60-81) 114/73  Arterial Line BP: (123-175)/(44-88) 139/62     Weight: (S) 76.5 kg (168 lb 10.4 oz)  Body mass index is 22.25 kg/m².      Intake/Output Summary (Last 24 hours) at 11/7/2023 0558  Last data filed at 11/7/2023 0500  Gross per 24 hour   Intake 3517.9 ml   Output 3200 ml   Net 317.9 ml          Physical Exam  Vitals and nursing note reviewed.   Constitutional:       General: He is not in acute distress.     Appearance: Normal appearance. He is normal weight. He is not ill-appearing or diaphoretic.   HENT:      Head: Normocephalic and atraumatic.      Right Ear: External ear normal.      Left Ear: External ear normal.      Nose: Nose normal.   Eyes:      General: No scleral icterus.     Extraocular Movements: Extraocular movements intact.   Cardiovascular:      Rate and Rhythm: Normal rate and regular rhythm.      Pulses: Normal pulses.   Pulmonary:      Effort: Pulmonary effort is normal. No respiratory distress.      Breath sounds: Normal breath sounds.   Abdominal:      General: Abdomen is flat. There is no distension.      Palpations: Abdomen is soft.   Skin:     General: Skin is warm and dry.      Comments: MSI CDI   Neurological:      General: No focal deficit present.      Mental Status: He is alert.      Comments: Quiet,  somnolent but arousable and interactive   Psychiatric:         Mood and Affect: Mood normal.         Behavior: Behavior normal.            Vents:  Vent Mode: Spont (11/05/23 0240)  Ventilator Initiated: Yes (11/03/23 1805)  Set Rate: 22 BPM (11/04/23 1950)  Vt Set: 500 mL (11/04/23 1950)  Pressure Support: 8 cmH20 (11/05/23 0240)  PEEP/CPAP: 5 cmH20 (11/05/23 0240)  Oxygen Concentration (%): 0 (11/06/23 2000)  Peak Airway Pressure: 14 cmH20 (11/05/23 0240)  Plateau Pressure: 17 cmH20 (11/05/23 0240)  Total Ve: 8.2 L/m (11/05/23 0240)  Negative Inspiratory Force (cm H2O): 0 (11/06/23 1901)  F/VT Ratio<105 (RSBI): (!) 21.85 (11/05/23 0240)    Lines/Drains/Airways       Arterial Line  Duration             Arterial Line 11/02/23 1031 Right Radial 4 days              Line  Duration                  Pacer Wires 11/03/23 1700 3 days              Peripheral Intravenous Line  Duration                  Peripheral IV - Single Lumen 11/05/23 0100 18 G Left;Posterior Hand 2 days         Peripheral IV - Single Lumen 11/05/23 1155 18 G;1 3/4 in Anterior;Left Forearm 1 day                    Significant Labs:    CBC/Anemia Profile:  Recent Labs   Lab 11/06/23  0252 11/07/23  0426   WBC 13.55* 14.07*   HGB 7.4* 8.2*   HCT 23.1* 25.4*   PLT 98* 136*   MCV 97 96   RDW 16.0* 16.4*        Chemistries:  Recent Labs   Lab 11/06/23  0252 11/06/23  0540 11/06/23  1021 11/06/23  1604 11/06/23  2155 11/07/23  0426   *   < > 147* 146* 142 144   K 3.4*  --  3.4* 3.5 3.6 3.3*   *  --  114* 115*  --  113*   CO2 26  --  25 24  --  23   BUN 36*  --  26* 23*  --  17   CREATININE 0.9  --  0.8 0.7  --  0.7   CALCIUM 7.3*  --  7.7* 7.6*  --  8.0*   ALBUMIN 1.8*  --  2.0* 1.9*  --  1.9*   PROT 5.6*  --  6.4 6.1  --  6.4   BILITOT 1.8*  --  1.8* 1.6*  --  1.5*   ALKPHOS 77  --  91 85  --  99   ALT 38  --  43 38  --  44   AST 51*  --  57* 59*  --  58*   MG 2.5  --   --   --   --   --    PHOS 3.4  --   --   --   --   --     < > = values in this  interval not displayed.       All pertinent labs within the past 24 hours have been reviewed.    Significant Imaging:  I have reviewed all pertinent imaging results/findings within the past 24 hours.

## 2023-11-07 NOTE — ASSESSMENT & PLAN NOTE
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 Repeat blcx positive from 11/5 - gram/stain with GPC resembling staph.      Recommendations:  · Switch vancomycin to daptomycin  · Will need 6 weeks of treatment from last negative blood cultures  · Follow up repeat blood cultures (from 11/6)  · Continue isolation precautions

## 2023-11-08 LAB
ALBUMIN SERPL BCP-MCNC: 1.9 G/DL (ref 3.5–5.2)
ALBUMIN SERPL BCP-MCNC: 2 G/DL (ref 3.5–5.2)
ALBUMIN SERPL BCP-MCNC: 2.1 G/DL (ref 3.5–5.2)
ALP SERPL-CCNC: 139 U/L (ref 55–135)
ALP SERPL-CCNC: 139 U/L (ref 55–135)
ALP SERPL-CCNC: 157 U/L (ref 55–135)
ALT SERPL W/O P-5'-P-CCNC: 54 U/L (ref 10–44)
ALT SERPL W/O P-5'-P-CCNC: 58 U/L (ref 10–44)
ALT SERPL W/O P-5'-P-CCNC: 61 U/L (ref 10–44)
ANION GAP SERPL CALC-SCNC: 10 MMOL/L (ref 8–16)
ANION GAP SERPL CALC-SCNC: 10 MMOL/L (ref 8–16)
ANION GAP SERPL CALC-SCNC: 9 MMOL/L (ref 8–16)
APTT PPP: 21 SEC (ref 21–32)
APTT PPP: <21 SEC (ref 21–32)
APTT PPP: <21 SEC (ref 21–32)
AST SERPL-CCNC: 64 U/L (ref 10–40)
AST SERPL-CCNC: 71 U/L (ref 10–40)
AST SERPL-CCNC: 81 U/L (ref 10–40)
BACTERIA BLD CULT: ABNORMAL
BASOPHILS # BLD AUTO: 0.03 K/UL (ref 0–0.2)
BASOPHILS NFR BLD: 0.2 % (ref 0–1.9)
BILIRUB SERPL-MCNC: 1.3 MG/DL (ref 0.1–1)
BILIRUB SERPL-MCNC: 1.4 MG/DL (ref 0.1–1)
BILIRUB SERPL-MCNC: 1.4 MG/DL (ref 0.1–1)
BUN SERPL-MCNC: 17 MG/DL (ref 6–20)
BUN SERPL-MCNC: 18 MG/DL (ref 6–20)
BUN SERPL-MCNC: 18 MG/DL (ref 6–20)
CALCIUM SERPL-MCNC: 7.6 MG/DL (ref 8.7–10.5)
CALCIUM SERPL-MCNC: 7.7 MG/DL (ref 8.7–10.5)
CALCIUM SERPL-MCNC: 8.2 MG/DL (ref 8.7–10.5)
CHLORIDE SERPL-SCNC: 110 MMOL/L (ref 95–110)
CHLORIDE SERPL-SCNC: 110 MMOL/L (ref 95–110)
CHLORIDE SERPL-SCNC: 112 MMOL/L (ref 95–110)
CO2 SERPL-SCNC: 20 MMOL/L (ref 23–29)
CO2 SERPL-SCNC: 22 MMOL/L (ref 23–29)
CO2 SERPL-SCNC: 23 MMOL/L (ref 23–29)
CREAT SERPL-MCNC: 0.8 MG/DL (ref 0.5–1.4)
CREAT SERPL-MCNC: 0.8 MG/DL (ref 0.5–1.4)
CREAT SERPL-MCNC: 0.9 MG/DL (ref 0.5–1.4)
DIFFERENTIAL METHOD: ABNORMAL
EOSINOPHIL # BLD AUTO: 0.1 K/UL (ref 0–0.5)
EOSINOPHIL NFR BLD: 0.9 % (ref 0–8)
ERYTHROCYTE [DISTWIDTH] IN BLOOD BY AUTOMATED COUNT: 16 % (ref 11.5–14.5)
EST. GFR  (NO RACE VARIABLE): >60 ML/MIN/1.73 M^2
GLUCOSE SERPL-MCNC: 118 MG/DL (ref 70–110)
GLUCOSE SERPL-MCNC: 123 MG/DL (ref 70–110)
GLUCOSE SERPL-MCNC: 124 MG/DL (ref 70–110)
HCT VFR BLD AUTO: 28.7 % (ref 40–54)
HGB BLD-MCNC: 9.2 G/DL (ref 14–18)
IMM GRANULOCYTES # BLD AUTO: 0.32 K/UL (ref 0–0.04)
IMM GRANULOCYTES NFR BLD AUTO: 2.5 % (ref 0–0.5)
INR PPP: 1 (ref 0.8–1.2)
LYMPHOCYTES # BLD AUTO: 1.5 K/UL (ref 1–4.8)
LYMPHOCYTES NFR BLD: 11.9 % (ref 18–48)
MAGNESIUM SERPL-MCNC: 2.2 MG/DL (ref 1.6–2.6)
MCH RBC QN AUTO: 30.5 PG (ref 27–31)
MCHC RBC AUTO-ENTMCNC: 32.1 G/DL (ref 32–36)
MCV RBC AUTO: 95 FL (ref 82–98)
MONOCYTES # BLD AUTO: 1.1 K/UL (ref 0.3–1)
MONOCYTES NFR BLD: 8.9 % (ref 4–15)
NEUTROPHILS # BLD AUTO: 9.6 K/UL (ref 1.8–7.7)
NEUTROPHILS NFR BLD: 75.6 % (ref 38–73)
NRBC BLD-RTO: 0 /100 WBC
PHOSPHATE SERPL-MCNC: 3.5 MG/DL (ref 2.7–4.5)
PLATELET # BLD AUTO: 189 K/UL (ref 150–450)
PLATELET BLD QL SMEAR: ABNORMAL
PMV BLD AUTO: 12.6 FL (ref 9.2–12.9)
POCT GLUCOSE: 102 MG/DL (ref 70–110)
POCT GLUCOSE: 116 MG/DL (ref 70–110)
POCT GLUCOSE: 147 MG/DL (ref 70–110)
POTASSIUM SERPL-SCNC: 3.8 MMOL/L (ref 3.5–5.1)
POTASSIUM SERPL-SCNC: 4 MMOL/L (ref 3.5–5.1)
POTASSIUM SERPL-SCNC: 5.4 MMOL/L (ref 3.5–5.1)
PROT SERPL-MCNC: 6.6 G/DL (ref 6–8.4)
PROT SERPL-MCNC: 7.1 G/DL (ref 6–8.4)
PROT SERPL-MCNC: 7.2 G/DL (ref 6–8.4)
PROTHROMBIN TIME: 10.8 SEC (ref 9–12.5)
PROTHROMBIN TIME: 10.9 SEC (ref 9–12.5)
PROTHROMBIN TIME: 11.1 SEC (ref 9–12.5)
RBC # BLD AUTO: 3.02 M/UL (ref 4.6–6.2)
SODIUM SERPL-SCNC: 140 MMOL/L (ref 136–145)
SODIUM SERPL-SCNC: 142 MMOL/L (ref 136–145)
SODIUM SERPL-SCNC: 144 MMOL/L (ref 136–145)
VANCOMYCIN TROUGH SERPL-MCNC: 11.9 UG/ML (ref 10–22)
WBC # BLD AUTO: 12.65 K/UL (ref 3.9–12.7)

## 2023-11-08 PROCEDURE — 85730 THROMBOPLASTIN TIME PARTIAL: CPT

## 2023-11-08 PROCEDURE — 97530 THERAPEUTIC ACTIVITIES: CPT

## 2023-11-08 PROCEDURE — 80053 COMPREHEN METABOLIC PANEL: CPT | Mod: 91

## 2023-11-08 PROCEDURE — 97535 SELF CARE MNGMENT TRAINING: CPT

## 2023-11-08 PROCEDURE — 94761 N-INVAS EAR/PLS OXIMETRY MLT: CPT

## 2023-11-08 PROCEDURE — 25000003 PHARM REV CODE 250: Performed by: STUDENT IN AN ORGANIZED HEALTH CARE EDUCATION/TRAINING PROGRAM

## 2023-11-08 PROCEDURE — 87040 BLOOD CULTURE FOR BACTERIA: CPT | Mod: 59

## 2023-11-08 PROCEDURE — 25000003 PHARM REV CODE 250

## 2023-11-08 PROCEDURE — 27000207 HC ISOLATION

## 2023-11-08 PROCEDURE — 83735 ASSAY OF MAGNESIUM: CPT

## 2023-11-08 PROCEDURE — 99233 SBSQ HOSP IP/OBS HIGH 50: CPT | Mod: ,,, | Performed by: STUDENT IN AN ORGANIZED HEALTH CARE EDUCATION/TRAINING PROGRAM

## 2023-11-08 PROCEDURE — 99233 PR SUBSEQUENT HOSPITAL CARE,LEVL III: ICD-10-PCS | Mod: ,,, | Performed by: ANESTHESIOLOGY

## 2023-11-08 PROCEDURE — 85610 PROTHROMBIN TIME: CPT

## 2023-11-08 PROCEDURE — 84100 ASSAY OF PHOSPHORUS: CPT | Mod: 91 | Performed by: THORACIC SURGERY (CARDIOTHORACIC VASCULAR SURGERY)

## 2023-11-08 PROCEDURE — 80053 COMPREHEN METABOLIC PANEL: CPT

## 2023-11-08 PROCEDURE — 85610 PROTHROMBIN TIME: CPT | Mod: 91

## 2023-11-08 PROCEDURE — 99233 SBSQ HOSP IP/OBS HIGH 50: CPT | Mod: ,,, | Performed by: ANESTHESIOLOGY

## 2023-11-08 PROCEDURE — 99233 PR SUBSEQUENT HOSPITAL CARE,LEVL III: ICD-10-PCS | Mod: ,,, | Performed by: STUDENT IN AN ORGANIZED HEALTH CARE EDUCATION/TRAINING PROGRAM

## 2023-11-08 PROCEDURE — 20600001 HC STEP DOWN PRIVATE ROOM

## 2023-11-08 PROCEDURE — 97110 THERAPEUTIC EXERCISES: CPT

## 2023-11-08 PROCEDURE — 83735 ASSAY OF MAGNESIUM: CPT | Mod: 91 | Performed by: THORACIC SURGERY (CARDIOTHORACIC VASCULAR SURGERY)

## 2023-11-08 PROCEDURE — 84100 ASSAY OF PHOSPHORUS: CPT

## 2023-11-08 PROCEDURE — 85730 THROMBOPLASTIN TIME PARTIAL: CPT | Mod: 91

## 2023-11-08 PROCEDURE — 63600175 PHARM REV CODE 636 W HCPCS: Performed by: STUDENT IN AN ORGANIZED HEALTH CARE EDUCATION/TRAINING PROGRAM

## 2023-11-08 PROCEDURE — 63600175 PHARM REV CODE 636 W HCPCS: Performed by: THORACIC SURGERY (CARDIOTHORACIC VASCULAR SURGERY)

## 2023-11-08 PROCEDURE — 25000003 PHARM REV CODE 250: Performed by: THORACIC SURGERY (CARDIOTHORACIC VASCULAR SURGERY)

## 2023-11-08 PROCEDURE — 80202 ASSAY OF VANCOMYCIN: CPT | Performed by: THORACIC SURGERY (CARDIOTHORACIC VASCULAR SURGERY)

## 2023-11-08 PROCEDURE — 85025 COMPLETE CBC W/AUTO DIFF WBC: CPT

## 2023-11-08 RX ORDER — TALC
6 POWDER (GRAM) TOPICAL NIGHTLY PRN
Status: DISCONTINUED | OUTPATIENT
Start: 2023-11-08 | End: 2023-11-14 | Stop reason: HOSPADM

## 2023-11-08 RX ADMIN — METOPROLOL TARTRATE 50 MG: 50 TABLET, FILM COATED ORAL at 08:11

## 2023-11-08 RX ADMIN — CEFTAROLINE FOSAMIL 600 MG: 600 POWDER, FOR SOLUTION INTRAVENOUS at 08:11

## 2023-11-08 RX ADMIN — DOCUSATE SODIUM 100 MG: 100 CAPSULE, LIQUID FILLED ORAL at 08:11

## 2023-11-08 RX ADMIN — CEFTAROLINE FOSAMIL 600 MG: 600 POWDER, FOR SOLUTION INTRAVENOUS at 02:11

## 2023-11-08 RX ADMIN — NIFEDIPINE 60 MG: 60 TABLET, FILM COATED, EXTENDED RELEASE ORAL at 08:11

## 2023-11-08 RX ADMIN — Medication 6 MG: at 03:11

## 2023-11-08 RX ADMIN — ASPIRIN 325 MG ORAL TABLET 325 MG: 325 PILL ORAL at 08:11

## 2023-11-08 RX ADMIN — ACETAMINOPHEN 1000 MG: 500 TABLET ORAL at 06:11

## 2023-11-08 RX ADMIN — VANCOMYCIN HYDROCHLORIDE 1750 MG: 500 INJECTION, POWDER, LYOPHILIZED, FOR SOLUTION INTRAVENOUS at 09:11

## 2023-11-08 RX ADMIN — MUPIROCIN: 20 OINTMENT TOPICAL at 08:11

## 2023-11-08 RX ADMIN — ACETAMINOPHEN 1000 MG: 500 TABLET ORAL at 02:11

## 2023-11-08 RX ADMIN — POLYETHYLENE GLYCOL 3350 17 G: 17 POWDER, FOR SOLUTION ORAL at 08:11

## 2023-11-08 RX ADMIN — DAPTOMYCIN 610 MG: 500 INJECTION, POWDER, LYOPHILIZED, FOR SOLUTION INTRAVENOUS at 02:11

## 2023-11-08 NOTE — CARE UPDATE
-Glucose Goal 110-140    -A1C:   Hemoglobin A1C   Date Value Ref Range Status   11/06/2023 5.8 (H) 4.0 - 5.6 % Final     Comment:     ADA Screening Guidelines:  5.7-6.4%  Consistent with prediabetes  >or=6.5%  Consistent with diabetes    High levels of fetal hemoglobin interfere with the HbA1C  assay. Heterozygous hemoglobin variants (HbS, HgC, etc)do  not significantly interfere with this assay.   However, presence of multiple variants may affect accuracy.           -HOME REGIMEN: No hx of DM and not on DM meds     -GLUCOSE TREND FOR THE PAST 24HRS:   Recent Labs   Lab 11/06/23  2155 11/07/23  0831 11/07/23  1146 11/07/23  1514 11/07/23  2132 11/08/23  0744   POCTGLUCOSE 190* 112* 167* 109 110 116*         -NO HYPOGYCEMIAS NOTED     - Diet  Diet Adult Regular (IDDSI Level 7) Cardiac (Low Na/Chol); Fluid - 1500mL    -TOLERATING 50 % OF PO DIET     -NPO? No     -Steroids - No     -Tube Feeds - No         Plan:   - Continue LDC (150/50)   - POCT Glucose before meals and at bedtime  - Hypoglycemia protocol in place      ** Please notify Endocrine for any change and/or advance in diet**  ** Please call Endocrine for any BG related issues **     Discharge Planning:   TBD. Please notify endocrinology prior to discharge.

## 2023-11-08 NOTE — PROGRESS NOTES
VANCOMYCIN DOSING BY PHARMACY DISCONTINUATION NOTE    Lorenzo Nino is a 48 y.o. male who had been consulted for vancomycin dosing.    The pharmacy consult for vancomycin dosing has been discontinued.     Vancomycin Dosing by Pharmacy Consult will sign-off. Please reconsult if necessary. Thank you for allowing us to participate in this patient's care.     Gamal Yanez Pharm.D.  [id number]

## 2023-11-08 NOTE — PT/OT/SLP PROGRESS
"Occupational Therapy   Treatment    Name: Lorenzo Nino  MRN: [id number]  Admitting Diagnosis:  Acute bacterial endocarditis  5 Days Post-Op    Recommendations:     Discharge Recommendations: High Intensity Therapy  Discharge Equipment Recommendations:  bedside commode, shower chair  Barriers to discharge:  None    Assessment:     Lorenzo Nino is a 48 y.o. male with a medical diagnosis of Acute bacterial endocarditis. Performance deficits affecting function are weakness, impaired endurance, impaired self care skills, impaired functional mobility, gait instability, impaired balance, pain, decreased safety awareness, impaired cardiopulmonary response to activity, decreased coordination, orthopedic precautions.     Pt tolerated session well this date and was willing to participate. Demonstrating continued increased fatigue and decreased activity tolerance, requiring increased time to complete functional tasks. Continues to require  Patient tolerated ADLs in stance at sink and toileting tasks, increased fatigue noted following. Patient is progressing towards established goals, and continues to benefit from acute skilled OT services to increase functional performance and improve quality of life. OT to continue to recommend high intensity therapy at discharge to improve pt functional independence and increase patient safety before returning home.      Rehab Prognosis:  Good; patient would benefit from acute skilled OT services to address these deficits and reach maximum level of function.       Plan:     Patient to be seen 5 x/week to address the above listed problems via self-care/home management, therapeutic activities, therapeutic exercises, neuromuscular re-education  Plan of Care Expires: 12/05/23  Plan of Care Reviewed with: patient, mother    Subjective   "I need to go to the bathroom."    Chief Complaint: none identified  Patient/Family Comments/goals: to return home at OF  Pain/Comfort:  Pain Rating 1: " 3/10  Location - Orientation 1: generalized  Location 1: sternal  Pain Addressed 1: Distraction, Cessation of Activity  Pain Rating Post-Intervention 1: 3/10    Objective:     Communicated with: Nurse prior to session.  Patient found HOB elevated with telemetry, pulse ox (continuous), blood pressure cuff, peripheral IV upon OT entry to room.    General Precautions: Standard, fall, sternal, contact    Orthopedic Precautions:N/A  Braces: N/A  Respiratory Status: Room air     Occupational Performance:     Bed Mobility:    Patient completed Rolling/Turning to Right with contact guard assistance  Patient completed Scooting to EOB with SBA  Patient completed Supine to Sit with contact guard assistance     Functional Mobility/Transfers:  Patient completed Sit <> Stand Transfer with contact guard assistance  with  no assistive device   Patient completed stand transfer from toilet level with Min A using no AD  Patient completed Toilet Transfer Step Transfer technique with contact guard assistance with  no AD  Functional Mobility: patient completed functional mobility of household distance ~30ft with CGA using no AD  patient completed functional mobility of household distance ~12ft with CGA using no AD from bathroom    Activities of Daily Living:  Grooming: stand by assistance to perform oral care and wash face in stance at sink; to wash B hands  Toileting: supervision to perform pericare following BM at toilet level      [facility] 6 Click ADL: 18    Treatment & Education:  -Pt education on OT role and POC.  -Importance of E/OOB activity with staff assistance, emphasis on daily participation  -Safety during functional transfer and mobility ensured  -Patient provided with education on importance of Bilateral UB/LB integration during functional tasks for improvement in functional performance.   -Education provided/reviewed, questions answered within OT scope of practice.   -Patient demonstrates understanding and learning this  date.         Patient left up in chair with all lines intact, call button in reach, nurse notified, and spouse present    GOALS:   Multidisciplinary Problems       Occupational Therapy Goals          Problem: Occupational Therapy    Goal Priority Disciplines Outcome Interventions   Occupational Therapy Goal     OT, PT/OT Ongoing, Progressing    Description: Goals set on 11/5, with expiration date 12/4:  Patient will increase functional independence with ADLs by performing:    Bed mobility with Supervision  Grooming while standing at sink with [place]  UB Dressing with [place].  LB Dressing with Supervision.  Toileting from toilet with [place] for hygiene and clothing management.   Functional mobility of household and community distance with Supervision and AD as needed  Pt will demonstrate understanding of education provided regarding energy conservation and task modification through teach-back method.  Pt will demonstrate understanding and learning of sternal precautions via recall and demonstration 3/3.                         Time Tracking:     OT Date of Treatment: 11/08/23  OT Start Time: 0852  OT Stop Time: 0915  OT Total Time (min): 23 min    Billable Minutes:Self Care/Home Management 23    OT/GABRIEL: OT          11/8/2023

## 2023-11-08 NOTE — PROGRESS NOTES
[facility] - Surgical Intensive Care  Infectious Disease  Progress Note    Patient Name: Lorenzo Nino  MRN: [id number]  Admission Date: 10/30/2023  Length of Stay: 9 days  Attending Physician: Manuel Beal MD  Primary Care Provider: No primary care provider on file.    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 11/6 blood cultures still positive.     Recommendations:  · Switch vancomycin to daptomycin and ceftaroline  · Will need 6 weeks of treatment from last negative blood cultures  · Follow up repeat blood cultures (from 11/8)  · Continue isolation precautions      Above recommendations discussed with primary team.     Anticipated Disposition: TBD    Thank you for your consult. I will follow-up with patient. Please contact us if you have any additional questions.    Kasey Van DO  Infectious Disease  [facility] - Surgical Intensive Care    Subjective:     Principal Problem:Acute bacterial endocarditis    HPI: This is the strange case of a 47 yo man with no medical problems who reportedly developed acute onset of symptoms a few days ago. He was seen in several urgent cares w/headache, abdominal pain with nausea but no vomiting or diarrhea.  There he was evaluated and instructed to take Tylenol and follow up if he still felt unwell.  When he presented with hematuria, he was sent to the ED here and was found to have thrombocytopenia. CT head negative, CT C/A/P showing perinephric stranding, distended bladder, and hiatal hernia. Patient admitted to MICU for further workup and treatment of his hypotension and likely severe sepsis. Blood cultures have grown MRSA and a TTE revealed a flail mitral leflet and a possible vegetation on the aortic valve. ID is consulted for that. The patient is accompanied by his mother. He is  scheduled for a NEW. Denies IDU. Denetal implants. HIV NR.       Interval History:     Patient states he is feeling well, denies any new symptoms. Denies new joint or back pains.     Review of Systems   Constitutional:  Negative for chills and fever.   Respiratory:  Negative for cough and shortness of breath.    Cardiovascular:  Negative for chest pain.   Gastrointestinal:  Negative for abdominal pain, constipation, diarrhea, nausea and vomiting.   Musculoskeletal:  Negative for arthralgias, back pain, joint swelling and myalgias.   Skin:  Negative for rash.   Neurological:  Negative for headaches.     Objective:     Vital Signs (Most Recent):  Temp: 98 °F (36.7 °C) (11/08/23 0701)  Pulse: 96 (11/08/23 0919)  Resp: (!) 23 (11/08/23 0919)  BP: 110/77 (11/08/23 0900)  SpO2: 97 % (11/08/23 0919) Vital Signs (24h Range):  Temp:  [98 °F (36.7 °C)-98.8 °F (37.1 °C)] 98 °F (36.7 °C)  Pulse:  [] 96  Resp:  [18-53] 23  SpO2:  [94 %-99 %] 97 %  BP: (102-134)/(63-81) 110/77     Weight: (S) 76.5 kg (168 lb 10.4 oz)  Body mass index is 22.25 kg/m².    Estimated Creatinine Clearance: 120.9 mL/min (based on SCr of 0.8 mg/dL).     Physical Exam  Vitals reviewed.   Constitutional:       General: He is not in acute distress.     Appearance: Normal appearance. He is not ill-appearing.   HENT:      Head: Normocephalic and atraumatic.   Eyes:      Extraocular Movements: Extraocular movements intact.      Conjunctiva/sclera: Conjunctivae normal.   Cardiovascular:      Rate and Rhythm: Normal rate and regular rhythm.      Heart sounds: No murmur heard.  Pulmonary:      Effort: Pulmonary effort is normal. No respiratory distress.      Breath sounds: Normal breath sounds.   Abdominal:      General: Abdomen is flat. Bowel sounds are normal.      Palpations: Abdomen is soft.      Tenderness: There is no abdominal tenderness.   Musculoskeletal:      Cervical back: Normal range of motion.   Skin:     General: Skin is warm and dry.    Neurological:      General: No focal deficit present.      Mental Status: He is alert and oriented to person, place, and time.   Psychiatric:         Mood and Affect: Mood normal.         Behavior: Behavior normal.         Thought Content: Thought content normal.          Significant Labs: All pertinent labs within the past 24 hours have been reviewed.  Recent Lab Results  (Last 5 results in the past 24 hours)        11/08/23  0819   11/08/23  0744   11/08/23  0447   11/08/23  0359   11/08/23  0256        Albumin     2.1   1.9   2.0       ALP     157   139   139       ALT     61   54   58       Anion Gap     9   10   10       aPTT     <21.0  Comment: Refer to local heparin nomogram for intensity/dose specific   therapeutic   range.     21.0  Comment: Refer to local heparin nomogram for intensity/dose specific   therapeutic   range.     <21.0  Comment: Refer to local heparin nomogram for intensity/dose specific   therapeutic   range.         AST     71   64   81  Comment: *Result may be interfered by visible hemolysis       Baso #     0.03           Basophil %     0.2           BILIRUBIN TOTAL     1.4  Comment: For infants and newborns, interpretation of results should be based  on gestational age, weight and in agreement with clinical  observations.    Premature Infant recommended reference ranges:  Up to 24 hours.............<8.0 mg/dL  Up to 48 hours............<12.0 mg/dL  3-5 days..................<15.0 mg/dL  6-29 days.................<15.0 mg/dL     1.3  Comment: For infants and newborns, interpretation of results should be based  on gestational age, weight and in agreement with clinical  observations.    Premature Infant recommended reference ranges:  Up to 24 hours.............<8.0 mg/dL  Up to 48 hours............<12.0 mg/dL  3-5 days..................<15.0 mg/dL  6-29 days.................<15.0 mg/dL     1.4  Comment: For infants and newborns, interpretation of results should be based  on gestational  age, weight and in agreement with clinical  observations.    Premature Infant recommended reference ranges:  Up to 24 hours.............<8.0 mg/dL  Up to 48 hours............<12.0 mg/dL  3-5 days..................<15.0 mg/dL  6-29 days.................<15.0 mg/dL         BUN     18   18   17       Calcium     8.2   7.6   7.7       Chloride     110   112   110       CO2     23   22   20       Creatinine     0.8   0.8   0.9       Differential Method     Automated           eGFR     >60.0   >60.0   >60.0       Eos #     0.1           Eosinophil %     0.9           Glucose     123   118   124       Gran # (ANC)     9.6           Gran %     75.6           Hematocrit     28.7           Hemoglobin     9.2           Immature Grans (Abs)     0.32  Comment: Mild elevation in immature granulocytes is non specific and   can be seen in a variety of conditions including stress response,   acute inflammation, trauma and pregnancy. Correlation with other   laboratory and clinical findings is essential.             Immature Granulocytes     2.5           INR     1.0  Comment: Coumadin Therapy:  2.0 - 3.0 for INR for all indicators except mechanical heart valves  and antiphospholipid syndromes which should use 2.5 - 3.5.     1.0  Comment: Coumadin Therapy:  2.0 - 3.0 for INR for all indicators except mechanical heart valves  and antiphospholipid syndromes which should use 2.5 - 3.5.     1.0  Comment: Coumadin Therapy:  2.0 - 3.0 for INR for all indicators except mechanical heart valves  and antiphospholipid syndromes which should use 2.5 - 3.5.         Lymph #     1.5           Lymph %     11.9           Magnesium      2.2   2.2   2.2       MCH     30.5           MCHC     32.1           MCV     95           Mono #     1.1           Mono %     8.9           MPV     12.6           nRBC     0           Phosphorus Level     3.5   3.5   3.5       Platelet Estimate     Clumped           Platelet Count     189  Comment: Platelets are  clumped on smear.Platelet count may be affected.           POCT Glucose   116             Potassium     4.0   3.8   5.4  Comment: *Result may be interfered by visible hemolysis       PROTEIN TOTAL     7.1   6.6   7.2  Comment: *Result may be interfered by visible hemolysis       Protime     10.8   11.1   10.9       RBC     3.02           RDW     16.0           Sodium     142   144   140       Vancomycin-Trough 11.9               WBC     12.65                                  Significant Imaging: I have reviewed all pertinent imaging results/findings within the past 24 hours.

## 2023-11-08 NOTE — PROGRESS NOTES
[facility] - Surgical Intensive Care  Critical Care - Surgery  Progress Note    Patient Name: Lorenzo Nino  MRN: [id number]  Admission Date: 10/30/2023  Hospital Length of Stay: 9 days  Code Status: Full Code  Attending Provider: Manuel Beal MD  Primary Care Provider: No primary care provider on file.   Principal Problem: Acute bacterial endocarditis    Subjective:     Hospital/ICU Course:  No notes on file    Interval History/Significant Events: NAEON. Brief episode of tachycardia 100-120 last night before evening dose of metoprolol. His nurse said it looked like sinus tach and resolved shortly after his metoprolol given. He reports some difficulty sleeping overnight. Otherwise, progressing very well postoperatively.     Follow-up For: Procedure(s) (LRB):  REPAIR, MITRAL VALVE, OPEN (N/A)  EXCLUSION, LEFT ATRIAL APPENDAGE, OPEN, AS PART OF OPEN CHEST SURGERY (Left)    Post-Operative Day: 5 Days Post-Op    Objective:     Vital Signs (Most Recent):  Temp: 98.2 °F (36.8 °C) (11/08/23 0315)  Pulse: 97 (11/08/23 0400)  Resp: (!) 22 (11/08/23 0400)  BP: 123/81 (11/08/23 0400)  SpO2: 97 % (11/08/23 0400) Vital Signs (24h Range):  Temp:  [98.2 °F (36.8 °C)-98.8 °F (37.1 °C)] 98.2 °F (36.8 °C)  Pulse:  [] 97  Resp:  [18-53] 22  SpO2:  [95 %-99 %] 97 %  BP: (102-134)/(63-81) 123/81  Arterial Line BP: (122-141)/(53-71) 127/59     Weight: (S) 76.5 kg (168 lb 10.4 oz)  Body mass index is 22.25 kg/m².      Intake/Output Summary (Last 24 hours) at 11/8/2023 0558  Last data filed at 11/8/2023 0300  Gross per 24 hour   Intake 1261.52 ml   Output 1700 ml   Net -438.48 ml          Physical Exam  Vitals and nursing note reviewed.   Constitutional:       General: He is not in acute distress.     Appearance: Normal appearance. He is normal weight. He is not ill-appearing or diaphoretic.   HENT:      Head: Normocephalic and atraumatic.      Right Ear: External ear normal.      Left Ear: External ear normal.      Nose: Nose  normal.   Eyes:      General: No scleral icterus.     Extraocular Movements: Extraocular movements intact.   Cardiovascular:      Rate and Rhythm: Normal rate and regular rhythm.      Pulses: Normal pulses.   Pulmonary:      Effort: Pulmonary effort is normal. No respiratory distress.      Breath sounds: Normal breath sounds.   Abdominal:      General: Abdomen is flat. There is no distension.      Palpations: Abdomen is soft.   Skin:     General: Skin is warm and dry.      Comments: MSI CDI   Neurological:      General: No focal deficit present.      Mental Status: He is alert. Mental status is at baseline.      Sensory: No sensory deficit.      Motor: No weakness.   Psychiatric:         Mood and Affect: Mood normal.         Behavior: Behavior normal.            Vents:  Vent Mode: Spont (11/05/23 0240)  Ventilator Initiated: Yes (11/03/23 1805)  Set Rate: 22 BPM (11/04/23 1950)  Vt Set: 500 mL (11/04/23 1950)  Pressure Support: 8 cmH20 (11/05/23 0240)  PEEP/CPAP: 5 cmH20 (11/05/23 0240)  Oxygen Concentration (%): 21 (11/07/23 2059)  Peak Airway Pressure: 14 cmH20 (11/05/23 0240)  Plateau Pressure: 17 cmH20 (11/05/23 0240)  Total Ve: 8.2 L/m (11/05/23 0240)  Negative Inspiratory Force (cm H2O): 0 (11/06/23 1901)  F/VT Ratio<105 (RSBI): (!) 21.85 (11/05/23 0240)    Lines/Drains/Airways       Peripheral Intravenous Line  Duration                  Peripheral IV - Single Lumen 11/05/23 0100 18 G Left;Posterior Hand 3 days         Peripheral IV - Single Lumen 11/05/23 1155 18 G;1 3/4 in Anterior;Left Forearm 2 days                    Significant Labs:    CBC/Anemia Profile:  Recent Labs   Lab 11/07/23  0426 11/08/23  0447   WBC 14.07* 12.65   HGB 8.2* 9.2*   HCT 25.4* 28.7*   *  --    MCV 96 95   RDW 16.4* 16.0*        Chemistries:  Recent Labs   Lab 11/08/23  0256 11/08/23  0359 11/08/23 0447    144 142   K 5.4* 3.8 4.0    112* 110   CO2 20* 22* 23   BUN 17 18 18   CREATININE 0.9 0.8 0.8   CALCIUM  7.7* 7.6* 8.2*   ALBUMIN 2.0* 1.9* 2.1*   PROT 7.2 6.6 7.1   BILITOT 1.4* 1.3* 1.4*   ALKPHOS 139* 139* 157*   ALT 58* 54* 61*   AST 81* 64* 71*   MG 2.2 2.2 2.2   PHOS 3.5 3.5 3.5       All pertinent labs within the past 24 hours have been reviewed.    Significant Imaging:  I have reviewed all pertinent imaging results/findings within the past 24 hours.  Assessment/Plan:     Cardiac/Vascular  * Acute bacterial endocarditis  Neuro/Psych:   - Afebrile  - Sedation: none  - Pain: scheduled acetaminophen, PRN oxycodone            Cardiac:   - S/P MVR with Dr. Beal on 11/3/2023  - BP Goal: MAP 60-80, SBP <140  - IABP removed 11/4  - Cleviprex discontinued yesterday early morning  - Pressors: weaned off, none  - Anti-HTNs: Continue nifedipine 60mg  - Rhythm: NSR  - Beta blocker: Metoprolol 25mg bid, increase to 50mg   - Statin: Atorvastatin 40 mg QD     Pulmonary:   - Goal SpO2 >92%  - Room air     Renal:  - Trend BUN/Cr - back at baseline 18/0.8  - FRANCESCO resolved  - UOP 1.4 past 24 hours  - Net negative -177 past 24 hours    FEN / GI:   - Daily CMP, PRN K/Mag/Phos per protocol   - Replace electrolytes as needed  - Nutrition: Cardiac, 2L fluid restriction  - Bowel Regimen: Miralax, docusate  - Hypernatremia resolved- discontinue D5W, encourage PO intake     ID:   - Afebrile  - Leukocytosis resolved WBC at 12 today  - Abx: Continue vancomycin, 6 week course  - Will eventually need PICC prior to discharge, need 48 hours of cleared blood cultures  - Bcx drawn 11/6 had growth- will redraw today  - ID following, appreciate recs     Heme/Onc:   - Hgb 10.1 pre-operatively,  postoperatively 9.2-- remains stable postoperatively  - Trend H/H on daily CBC  - CBC daily  - ASA 325mg daily  - Thrombocytopenic, platelets stable. 136 today - no s/s of bleeding, will continue to monitor     Endocrine:   - CTS Goal -140, at goal  - HgbA1c: not on file  - Endocrinology consulted for insulin management  - Insulin gtt per Endocrine  with SSI      PPx:   Feeding: Cardiac diet with fluid restriction  Analgesia/Sedation: multimodal, adequate pain control  Thromboembolic Prevention: SCD's, and continue to ambulate   HOB >30: Yes  Stress Ulcer: not indicated  Glucose Control: Yes, insulin management per Endocrinology     Lines/Drains/Airway: no invasive lines     Dispo/Code Status/Palliative:   - Stable for stepdown  - Full Code          Critical care was time spent personally by me on the following activities: development of treatment plan with patient or surrogate and bedside caregivers, discussions with consultants, evaluation of patient's response to treatment, examination of patient, ordering and performing treatments and interventions, ordering and review of laboratory studies, ordering and review of radiographic studies, pulse oximetry, re-evaluation of patient's condition.  This critical care time did not overlap with that of any other provider or involve time for any procedures.     Jairon Hernandez, DO  Critical Care - Surgery  [facility] - [facility]

## 2023-11-08 NOTE — SUBJECTIVE & OBJECTIVE
Interval History/Significant Events: NAEON. Brief episode of tachycardia 100-120 last night before evening dose of metoprolol. His nurse said it looked like sinus tach and resolved shortly after his metoprolol given. He reports some difficulty sleeping overnight. Otherwise, progressing very well postoperatively.     Follow-up For: Procedure(s) (LRB):  REPAIR, MITRAL VALVE, OPEN (N/A)  EXCLUSION, LEFT ATRIAL APPENDAGE, OPEN, AS PART OF OPEN CHEST SURGERY (Left)    Post-Operative Day: 5 Days Post-Op    Objective:     Vital Signs (Most Recent):  Temp: 98.2 °F (36.8 °C) (11/08/23 0315)  Pulse: 97 (11/08/23 0400)  Resp: (!) 22 (11/08/23 0400)  BP: 123/81 (11/08/23 0400)  SpO2: 97 % (11/08/23 0400) Vital Signs (24h Range):  Temp:  [98.2 °F (36.8 °C)-98.8 °F (37.1 °C)] 98.2 °F (36.8 °C)  Pulse:  [] 97  Resp:  [18-53] 22  SpO2:  [95 %-99 %] 97 %  BP: (102-134)/(63-81) 123/81  Arterial Line BP: (122-141)/(53-71) 127/59     Weight: (S) 76.5 kg (168 lb 10.4 oz)  Body mass index is 22.25 kg/m².      Intake/Output Summary (Last 24 hours) at 11/8/2023 0558  Last data filed at 11/8/2023 0300  Gross per 24 hour   Intake 1261.52 ml   Output 1700 ml   Net -438.48 ml          Physical Exam  Vitals and nursing note reviewed.   Constitutional:       General: He is not in acute distress.     Appearance: Normal appearance. He is normal weight. He is not ill-appearing or diaphoretic.   HENT:      Head: Normocephalic and atraumatic.      Right Ear: External ear normal.      Left Ear: External ear normal.      Nose: Nose normal.   Eyes:      General: No scleral icterus.     Extraocular Movements: Extraocular movements intact.   Cardiovascular:      Rate and Rhythm: Normal rate and regular rhythm.      Pulses: Normal pulses.   Pulmonary:      Effort: Pulmonary effort is normal. No respiratory distress.      Breath sounds: Normal breath sounds.   Abdominal:      General: Abdomen is flat. There is no distension.      Palpations: Abdomen  is soft.   Skin:     General: Skin is warm and dry.      Comments: MSI CDI   Neurological:      General: No focal deficit present.      Mental Status: He is alert. Mental status is at baseline.      Sensory: No sensory deficit.      Motor: No weakness.   Psychiatric:         Mood and Affect: Mood normal.         Behavior: Behavior normal.            Vents:  Vent Mode: Spont (11/05/23 0240)  Ventilator Initiated: Yes (11/03/23 1805)  Set Rate: 22 BPM (11/04/23 1950)  Vt Set: 500 mL (11/04/23 1950)  Pressure Support: 8 cmH20 (11/05/23 0240)  PEEP/CPAP: 5 cmH20 (11/05/23 0240)  Oxygen Concentration (%): 21 (11/07/23 2059)  Peak Airway Pressure: 14 cmH20 (11/05/23 0240)  Plateau Pressure: 17 cmH20 (11/05/23 0240)  Total Ve: 8.2 L/m (11/05/23 0240)  Negative Inspiratory Force (cm H2O): 0 (11/06/23 1901)  F/VT Ratio<105 (RSBI): (!) 21.85 (11/05/23 0240)    Lines/Drains/Airways       Peripheral Intravenous Line  Duration                  Peripheral IV - Single Lumen 11/05/23 0100 18 G Left;Posterior Hand 3 days         Peripheral IV - Single Lumen 11/05/23 1155 18 G;1 3/4 in Anterior;Left Forearm 2 days                    Significant Labs:    CBC/Anemia Profile:  Recent Labs   Lab 11/07/23  0426 11/08/23 0447   WBC 14.07* 12.65   HGB 8.2* 9.2*   HCT 25.4* 28.7*   *  --    MCV 96 95   RDW 16.4* 16.0*        Chemistries:  Recent Labs   Lab 11/08/23  0256 11/08/23  0359 11/08/23  0447    144 142   K 5.4* 3.8 4.0    112* 110   CO2 20* 22* 23   BUN 17 18 18   CREATININE 0.9 0.8 0.8   CALCIUM 7.7* 7.6* 8.2*   ALBUMIN 2.0* 1.9* 2.1*   PROT 7.2 6.6 7.1   BILITOT 1.4* 1.3* 1.4*   ALKPHOS 139* 139* 157*   ALT 58* 54* 61*   AST 81* 64* 71*   MG 2.2 2.2 2.2   PHOS 3.5 3.5 3.5       All pertinent labs within the past 24 hours have been reviewed.    Significant Imaging:  I have reviewed all pertinent imaging results/findings within the past 24 hours.

## 2023-11-08 NOTE — PT/OT/SLP PROGRESS
"Physical Therapy Treatment    Patient Name:  Lorenzo Nino   MRN:  [id number]    Recommendations:     Discharge Recommendations: High Intensity Therapy  Discharge Equipment Recommendations: shower chair, bedside commode  Barriers to discharge: None    Assessment:     Lorenzo Nino is a 48 y.o. male admitted with a medical diagnosis of Acute bacterial endocarditis.  He presents with the following impairments/functional limitations: weakness, impaired endurance, decreased upper extremity function, impaired functional mobility, gait instability, impaired balance, impaired cardiopulmonary response to activity. Patient displayed improved exercise tolerance at present visit achieving increased ambulation distance (72' x2) as well as decreased gait deviations during ambulation. He continues to require moderate assistance for transfers and during ambulation due to balance deficits. Continuing to recommend a high level of post-acute therapy after discharge to maximize benefits; patient could tolerate 3xhours/day of combined therapies. Vitals remained appropriate throughout session.    Rehab Prognosis: Good; patient would benefit from acute skilled PT services to address these deficits and reach maximum level of function.    Recent Surgery: Procedure(s) (LRB):  REPAIR, MITRAL VALVE, OPEN (N/A)  EXCLUSION, LEFT ATRIAL APPENDAGE, OPEN, AS PART OF OPEN CHEST SURGERY (Left) 5 Days Post-Op    Plan:     During this hospitalization, patient to be seen 5 x/week to address the identified rehab impairments via gait training, therapeutic activities, therapeutic exercises, neuromuscular re-education and progress toward the following goals:    Plan of Care Expires:  12/03/23    Subjective     Chief Complaint: Fatigue  Patient/Family Comments/goals: "exhausting" post ambulation.   Pain/Comfort:  Pain Rating 1: 3/10  Location - Side 1: Bilateral  Location - Orientation 1: upper  Location 1: sternal  Pain Addressed 1: Distraction  Pain " Rating Post-Intervention 1: 3/10      Objective:     Communicated with RN prior to session.  Patient found up in chair with telemetry, pulse ox (continuous), blood pressure cuff, peripheral IV upon PT entry to room.     General Precautions: Standard, fall, sternal  Orthopedic Precautions: N/A  Braces: N/A  Respiratory Status: Room air     Functional Mobility:  Transfers:     Sit to Stand:  moderate assistance with no AD, increased assist during initiation with patient able to assist to a greater extent towards end of transfer to achieve full upright stance  Stand to sit: minimal assist for controlled descent   Gait: patient ambulated 72' x2 with moderate assist with no AD. Assist for balance.  Deviations Noted: decreased gait speed, decreased step height R,L, decreased step length R, L, narrow TAY, and increased lateral sway R, L. Deviations increasing with fatigue at end of second trial. Seated rest break between trials.   Balance: Standing balance fair.     AM-PAC 6 CLICK MOBILITY  Turning over in bed (including adjusting bedclothes, sheets and blankets)?: 3  Sitting down on and standing up from a chair with arms (e.g., wheelchair, bedside commode, etc.): 2  Moving from lying on back to sitting on the side of the bed?: 3  Moving to and from a bed to a chair (including a wheelchair)?: 2  Need to walk in hospital room?: 2  Climbing 3-5 steps with a railing?: 2  Basic Mobility Total Score: 14       Treatment & Education:  Cues during ambulation to bring chest upright and extend hips forward, increase step length.  Review of sternal precautions with patient with patient demonstrating good carryover of education verbally and during transfers; minimal cueing given during transfers for optimal TAY prior to STS transfer, UE positioning, weight shifting forward.   Portable monitor utilized for out of room ambulation with RN present. Chair follow.     Patient left up in chair with all lines intact, call button in reach, RN  notified, and family present.    GOALS:   Multidisciplinary Problems       Physical Therapy Goals          Problem: Physical Therapy    Goal Priority Disciplines Outcome Goal Variances Interventions   Physical Therapy Goal     PT, PT/OT Ongoing, Progressing     Description: Goals to be met by: 23     Patient will increase functional independence with mobility by performin. Supine to sit with Set-up [place]  2. Sit to stand transfer with Stand-by Assistance  3. Bed to chair transfer with Stand-by Assistance using No Assistive Device  4. Gait  x 175 feet with Stand-by Assistance using No Assistive Device.   5. Lower extremity exercise program x20 reps per handout, with independence  6. Pt independently recalling 3/3 sternal precautions                         Time Tracking:     PT Received On: 23  PT Start Time: 1115     PT Stop Time: 1139  PT Total Time (min): 24 min     Billable Minutes: Therapeutic Activity 8 minutes and Therapeutic Exercise 16 minutes    Treatment Type: Treatment  PT/PTA: PT     Number of PTA visits since last PT visit: 0     2023

## 2023-11-08 NOTE — PLAN OF CARE
"      SICU PLAN OF CARE NOTE    Dx: Acute bacterial endocarditis    Vital Signs: /63 (BP Location: Right arm, Patient Position: Sitting)   Pulse 91   Temp 98.6 °F (37 °C) (Oral)   Resp (!) 26   Ht 6' 1" (1.854 m)   Wt (S) 76.5 kg (168 lb 10.4 oz)   SpO2 97%   BMI 22.25 kg/m²     SHIFT EVENTS:    VSS / No acute events this shift.     Neuro: AAO x4, Follows Commands, and Moves All Extremities    Respiratory: Room Air    Cardiac: Normal Sinus Rhythm and Sinus Tachycardia    Diet: Cardiac Diet     Urine Output: Voids Spontaneously 1050 ml/shift       Labs/Accuchecks: AC/HS accuchecks and daily labs.    Skin precautions maintained including:  Sacrum and heels with foam dressing in place for pressure protection. Frequent weight shift encouraged Q2 hr to prevent breakdown. Bed plugged in and mattress inflated. Adhesive use limited. Heels elevated off bed.  Pressure points protected and positioning supports utilized.  Skin-to-device areas padded. Skin-to-skin areas padded    POC reviewed with patient and family; questions and concerns addressed. See flow sheets for full assessment details.        Problem: Infection  Goal: Absence of Infection Signs and Symptoms  Outcome: Ongoing, Progressing     Problem: Adult Inpatient Plan of Care  Goal: Plan of Care Review  Outcome: Ongoing, Progressing  Goal: Patient-Specific Goal (Individualized)  Outcome: Ongoing, Progressing  Goal: Absence of Hospital-Acquired Illness or Injury  Outcome: Ongoing, Progressing  Goal: Optimal Comfort and Wellbeing  Outcome: Ongoing, Progressing  Goal: Readiness for Transition of Care  Outcome: Ongoing, Progressing     Problem: Glycemic Control Impaired (Sepsis/Septic Shock)  Goal: Blood Glucose Level Within Desired Range  Outcome: Ongoing, Progressing     "

## 2023-11-08 NOTE — ASSESSMENT & PLAN NOTE
Neuro/Psych:   - Afebrile  - Sedation: none  - Pain: scheduled acetaminophen, PRN oxycodone            Cardiac:   - S/P MVR with Dr. Beal on 11/3/2023  - BP Goal: MAP 60-80, SBP <140  - IABP removed 11/4  - Cleviprex discontinued yesterday early morning  - Pressors: weaned off, none  - Anti-HTNs: Continue nifedipine 60mg  - Rhythm: NSR  - Beta blocker: Metoprolol 25mg bid, increase to 50mg   - Statin: Atorvastatin 40 mg QD     Pulmonary:   - Goal SpO2 >92%  - Room air     Renal:  - Trend BUN/Cr - back at baseline 18/0.8  - FRANCESCO resolved  - UOP 1.4 past 24 hours  - Net negative -177 past 24 hours    FEN / GI:   - Daily CMP, PRN K/Mag/Phos per protocol   - Replace electrolytes as needed  - Nutrition: Cardiac, 2L fluid restriction  - Bowel Regimen: Miralax, docusate  - Hypernatremia resolved- discontinue D5W, encourage PO intake     ID:   - Afebrile  - Leukocytosis resolved WBC at 12 today  - Abx: Continue vancomycin, 6 week course  - Will eventually need PICC prior to discharge, need 48 hours of cleared blood cultures  - Bcx drawn 11/6 had growth- will redraw today  - ID following, appreciate recs     Heme/Onc:   - Hgb 10.1 pre-operatively,  postoperatively 9.2-- remains stable postoperatively  - Trend H/H on daily CBC  - CBC daily  - ASA 325mg daily  - Thrombocytopenic, platelets stable. 136 today - no s/s of bleeding, will continue to monitor     Endocrine:   - CTS Goal -140, at goal  - HgbA1c: not on file  - Endocrinology consulted for insulin management  - Insulin gtt per Endocrine with SSI      PPx:   Feeding: Cardiac diet with fluid restriction  Analgesia/Sedation: multimodal, adequate pain control  Thromboembolic Prevention: SCD's, and continue to ambulate   HOB >30: Yes  Stress Ulcer: not indicated  Glucose Control: Yes, insulin management per Endocrinology     Lines/Drains/Airway: no invasive lines     Dispo/Code Status/Palliative:   - Stable for stepdown  - Full Code

## 2023-11-08 NOTE — ASSESSMENT & PLAN NOTE
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 11/6 blood cultures still positive.     Recommendations:  · Switch vancomycin to daptomycin and ceftaroline  · Will need 6 weeks of treatment from last negative blood cultures  · Follow up repeat blood cultures (from 11/8)  · Continue isolation precautions

## 2023-11-08 NOTE — NURSING
SICU PLAN OF CARE NOTE    Dx: Acute bacterial endocarditis    Goals of Care: Stepdown    Vital Signs (last 12 hours):   Temp:  [98 °F (36.7 °C)-98.7 °F (37.1 °C)]   Pulse:  []   Resp:  [20-41]   BP: (100-119)/(63-80)   SpO2:  [94 %-98 %]      Neuro: AAO x4    Cardiac: NSR 70-90s    Respiratory: Room Air    Gtts: None    Urine Output: Voids Spontaneously 175 cc/shift    Diet: Cardiac Diet w/ 1500 fluid restriction     Labs/Accuchecks: Daily labs, CK; ACHS    Skin: No breakdown, Turned Q2 while in bed; heel and sacral area intact; CHG bath completed    Shift Events: No significant events

## 2023-11-09 PROBLEM — Z98.890 S/P MVR (MITRAL VALVE REPAIR): Status: ACTIVE | Noted: 2023-11-09

## 2023-11-09 PROBLEM — I48.0 PAROXYSMAL ATRIAL FIBRILLATION: Status: ACTIVE | Noted: 2023-11-01

## 2023-11-09 LAB
ALBUMIN SERPL BCP-MCNC: 1.8 G/DL (ref 3.5–5.2)
ALP SERPL-CCNC: 186 U/L (ref 55–135)
ALT SERPL W/O P-5'-P-CCNC: 67 U/L (ref 10–44)
ANION GAP SERPL CALC-SCNC: 6 MMOL/L (ref 8–16)
APTT PPP: 26.1 SEC (ref 21–32)
AST SERPL-CCNC: 66 U/L (ref 10–40)
BACTERIA BLD CULT: ABNORMAL
BILIRUB SERPL-MCNC: 1.1 MG/DL (ref 0.1–1)
BUN SERPL-MCNC: 15 MG/DL (ref 6–20)
CALCIUM SERPL-MCNC: 7.9 MG/DL (ref 8.7–10.5)
CHLORIDE SERPL-SCNC: 109 MMOL/L (ref 95–110)
CK SERPL-CCNC: 220 U/L (ref 20–200)
CO2 SERPL-SCNC: 24 MMOL/L (ref 23–29)
CREAT SERPL-MCNC: 0.8 MG/DL (ref 0.5–1.4)
ERYTHROCYTE [DISTWIDTH] IN BLOOD BY AUTOMATED COUNT: 16.2 % (ref 11.5–14.5)
EST. GFR  (NO RACE VARIABLE): >60 ML/MIN/1.73 M^2
GLUCOSE SERPL-MCNC: 111 MG/DL (ref 70–110)
HCT VFR BLD AUTO: 25.6 % (ref 40–54)
HGB BLD-MCNC: 8.1 G/DL (ref 14–18)
INR PPP: 1.1 (ref 0.8–1.2)
MAGNESIUM SERPL-MCNC: 2 MG/DL (ref 1.6–2.6)
MCH RBC QN AUTO: 30.1 PG (ref 27–31)
MCHC RBC AUTO-ENTMCNC: 31.6 G/DL (ref 32–36)
MCV RBC AUTO: 95 FL (ref 82–98)
PHOSPHATE SERPL-MCNC: 3.4 MG/DL (ref 2.7–4.5)
PLATELET # BLD AUTO: 235 K/UL (ref 150–450)
PMV BLD AUTO: 13 FL (ref 9.2–12.9)
POCT GLUCOSE: 108 MG/DL (ref 70–110)
POCT GLUCOSE: 147 MG/DL (ref 70–110)
POTASSIUM SERPL-SCNC: 3.2 MMOL/L (ref 3.5–5.1)
PROT SERPL-MCNC: 6.3 G/DL (ref 6–8.4)
PROTHROMBIN TIME: 11.3 SEC (ref 9–12.5)
RBC # BLD AUTO: 2.69 M/UL (ref 4.6–6.2)
SODIUM SERPL-SCNC: 139 MMOL/L (ref 136–145)
WBC # BLD AUTO: 11.11 K/UL (ref 3.9–12.7)

## 2023-11-09 PROCEDURE — 25000003 PHARM REV CODE 250

## 2023-11-09 PROCEDURE — 27000207 HC ISOLATION

## 2023-11-09 PROCEDURE — 97535 SELF CARE MNGMENT TRAINING: CPT | Mod: CO

## 2023-11-09 PROCEDURE — 80053 COMPREHEN METABOLIC PANEL: CPT

## 2023-11-09 PROCEDURE — 20600001 HC STEP DOWN PRIVATE ROOM

## 2023-11-09 PROCEDURE — 94761 N-INVAS EAR/PLS OXIMETRY MLT: CPT

## 2023-11-09 PROCEDURE — 85730 THROMBOPLASTIN TIME PARTIAL: CPT

## 2023-11-09 PROCEDURE — 82550 ASSAY OF CK (CPK): CPT | Performed by: STUDENT IN AN ORGANIZED HEALTH CARE EDUCATION/TRAINING PROGRAM

## 2023-11-09 PROCEDURE — 25000003 PHARM REV CODE 250: Performed by: STUDENT IN AN ORGANIZED HEALTH CARE EDUCATION/TRAINING PROGRAM

## 2023-11-09 PROCEDURE — 99233 PR SUBSEQUENT HOSPITAL CARE,LEVL III: ICD-10-PCS | Mod: ,,, | Performed by: STUDENT IN AN ORGANIZED HEALTH CARE EDUCATION/TRAINING PROGRAM

## 2023-11-09 PROCEDURE — 25000003 PHARM REV CODE 250: Performed by: NURSE PRACTITIONER

## 2023-11-09 PROCEDURE — 97535 SELF CARE MNGMENT TRAINING: CPT

## 2023-11-09 PROCEDURE — 85027 COMPLETE CBC AUTOMATED: CPT

## 2023-11-09 PROCEDURE — 84100 ASSAY OF PHOSPHORUS: CPT | Performed by: THORACIC SURGERY (CARDIOTHORACIC VASCULAR SURGERY)

## 2023-11-09 PROCEDURE — 99233 SBSQ HOSP IP/OBS HIGH 50: CPT | Mod: ,,, | Performed by: STUDENT IN AN ORGANIZED HEALTH CARE EDUCATION/TRAINING PROGRAM

## 2023-11-09 PROCEDURE — 63600175 PHARM REV CODE 636 W HCPCS: Mod: JZ,JG | Performed by: STUDENT IN AN ORGANIZED HEALTH CARE EDUCATION/TRAINING PROGRAM

## 2023-11-09 PROCEDURE — 97116 GAIT TRAINING THERAPY: CPT | Mod: CQ

## 2023-11-09 PROCEDURE — 85610 PROTHROMBIN TIME: CPT

## 2023-11-09 PROCEDURE — 83735 ASSAY OF MAGNESIUM: CPT | Performed by: THORACIC SURGERY (CARDIOTHORACIC VASCULAR SURGERY)

## 2023-11-09 PROCEDURE — 92526 ORAL FUNCTION THERAPY: CPT

## 2023-11-09 RX ORDER — POTASSIUM CHLORIDE 20 MEQ/1
40 TABLET, EXTENDED RELEASE ORAL ONCE
Status: COMPLETED | OUTPATIENT
Start: 2023-11-09 | End: 2023-11-09

## 2023-11-09 RX ORDER — POTASSIUM CHLORIDE 20 MEQ/1
20 TABLET, EXTENDED RELEASE ORAL 2 TIMES DAILY
Status: DISCONTINUED | OUTPATIENT
Start: 2023-11-09 | End: 2023-11-10

## 2023-11-09 RX ADMIN — CEFTAROLINE FOSAMIL 600 MG: 600 POWDER, FOR SOLUTION INTRAVENOUS at 06:11

## 2023-11-09 RX ADMIN — DOCUSATE SODIUM 100 MG: 100 CAPSULE, LIQUID FILLED ORAL at 10:11

## 2023-11-09 RX ADMIN — OXYCODONE HYDROCHLORIDE 5 MG: 5 TABLET ORAL at 10:11

## 2023-11-09 RX ADMIN — ACETAMINOPHEN 1000 MG: 500 TABLET ORAL at 06:11

## 2023-11-09 RX ADMIN — ACETAMINOPHEN 1000 MG: 500 TABLET ORAL at 10:11

## 2023-11-09 RX ADMIN — ASPIRIN 325 MG ORAL TABLET 325 MG: 325 PILL ORAL at 09:11

## 2023-11-09 RX ADMIN — POTASSIUM CHLORIDE 40 MEQ: 1500 TABLET, EXTENDED RELEASE ORAL at 09:11

## 2023-11-09 RX ADMIN — CEFTAROLINE FOSAMIL 600 MG: 600 POWDER, FOR SOLUTION INTRAVENOUS at 01:11

## 2023-11-09 RX ADMIN — CEFTAROLINE FOSAMIL 600 MG: 600 POWDER, FOR SOLUTION INTRAVENOUS at 10:11

## 2023-11-09 RX ADMIN — DOCUSATE SODIUM 100 MG: 100 CAPSULE, LIQUID FILLED ORAL at 09:11

## 2023-11-09 RX ADMIN — METOPROLOL TARTRATE 50 MG: 50 TABLET, FILM COATED ORAL at 10:11

## 2023-11-09 RX ADMIN — POTASSIUM CHLORIDE 20 MEQ: 1500 TABLET, EXTENDED RELEASE ORAL at 10:11

## 2023-11-09 RX ADMIN — POTASSIUM CHLORIDE 20 MEQ: 1500 TABLET, EXTENDED RELEASE ORAL at 11:11

## 2023-11-09 RX ADMIN — DAPTOMYCIN 610 MG: 500 INJECTION, POWDER, LYOPHILIZED, FOR SOLUTION INTRAVENOUS at 01:11

## 2023-11-09 RX ADMIN — NIFEDIPINE 60 MG: 60 TABLET, FILM COATED, EXTENDED RELEASE ORAL at 09:11

## 2023-11-09 RX ADMIN — ACETAMINOPHEN 1000 MG: 500 TABLET ORAL at 01:11

## 2023-11-09 NOTE — ASSESSMENT & PLAN NOTE
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 Blcx sterile from 11/8 thus far, previously had positive blood cultures. Denies ongoing new or worsening joint or back pain.     Recommendations:  Continue daptomycin and ceftaroline for salvage therapy   Not a candidate for gent/rif given risk of nephrotoxicity/hepatotoxicity  Monitor CK while on abx therapy  Follow up blcx  Anticipate 6w course of abx therapy  Contact precautions per hospital protocol   Detail Level: Generalized

## 2023-11-09 NOTE — PROGRESS NOTES
[facility] - Cardiology [facility]  Infectious Disease  Progress Note    Patient Name: Lorenzo Nino  MRN: [id number]  Admission Date: 10/30/2023  Length of Stay: 10 days  Attending Physician: Manuel Beal MD  Primary Care Provider: No primary care provider on file.    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 Blcx sterile from 11/8 thus far, previously had positive blood cultures. Denies ongoing new or worsening joint or back pain.     Recommendations:  Continue daptomycin and ceftaroline for salvage therapy   Not a candidate for gent/rif given risk of nephrotoxicity/hepatotoxicity  Monitor CK while on abx therapy  Follow up blcx  Anticipate 6w course of abx therapy from cleared cx - repeat so far no growth to date  Contact precautions per hospital protocol            Thank you for your consult. I will follow-up with patient. Please contact us if you have any additional questions. Above d/w primary team.       Verito Desir MD  Infectious Disease  [facility]    Subjective:     Principal Problem:Acute bacterial endocarditis    HPI:   This is the strange case of a 49 yo man with no medical problems who reportedly developed acute onset of symptoms a few days ago. He was seen in several urgent cares w/headache, abdominal pain with nausea but no vomiting or diarrhea.  There he was evaluated and instructed to take Tylenol and follow up if he still felt unwell.  When he presented with hematuria, he was sent to the ED here and was found to have thrombocytopenia. CT head negative, CT C/A/P showing perinephric stranding, distended bladder, and hiatal hernia. Patient admitted to MICU for further workup and treatment of his hypotension and likely severe sepsis. Blood cultures have grown MRSA and a TTE revealed a flail mitral leflet and  a possible vegetation on the aortic valve. ID is consulted for that. The patient is accompanied by his mother. He is scheduled for a NEW. Denies IDU. Denetal implants. HIV NR.     Interval History: No fevers documented overnight. Stepped down from ICU. Repeat blcx so far no growth to date. Pt reports tolerating abx without issues.     Review of Systems  Objective:     Vital Signs (Most Recent):  Temp: 98.9 °F (37.2 °C) (11/09/23 0810)  Pulse: 103 (11/09/23 0810)  Resp: 20 (11/09/23 0810)  BP: 104/67 (11/09/23 0810)  SpO2: 97 % (11/09/23 0810) Vital Signs (24h Range):  Temp:  [97.4 °F (36.3 °C)-99.5 °F (37.5 °C)] 98.9 °F (37.2 °C)  Pulse:  [] 103  Resp:  [16-56] 20  SpO2:  [97 %-100 %] 97 %  BP: (100-114)/(63-77) 104/67     Weight: (S) 76.5 kg (168 lb 10.4 oz)  Body mass index is 22.25 kg/m².    Estimated Creatinine Clearance: 120.9 mL/min (based on SCr of 0.8 mg/dL).     Physical Exam  Constitutional:       General: He is not in acute distress.     Appearance: He is not ill-appearing or toxic-appearing.   Eyes:      General:         Right eye: No discharge.         Left eye: No discharge.   Cardiovascular:      Comments: MSI steristrips  Pulmonary:      Effort: Pulmonary effort is normal. No respiratory distress.   Skin:     General: Skin is warm and dry.   Neurological:      Mental Status: He is alert and oriented to person, place, and time.          Significant Labs:   Microbiology Results (last 7 days)       Procedure Component Value Units Date/Time    Blood culture [[id number]]  (Abnormal) Collected: 11/06/23 1228    Order Status: Completed Specimen: Blood from Peripheral, Hand, Left Updated: 11/09/23 0848     Blood Culture, Routine Gram stain aer bottle: Gram positive cocci in clusters resembling Staph      Positive results previously called 11/07/2023  20:44      Gram stain carlie bottle: Gram positive cocci in clusters resembling Staph      Positive results previously called 11/08/2023  03:01       STAPHYLOCOCCUS AUREUS  ID consult required at [facility].  For susceptibility see order #[id number]      Blood culture [[id number]] Collected: 11/08/23 0819    Order Status: Completed Specimen: Blood from Peripheral, Wrist, Left Updated: 11/08/23 1515     Blood Culture, Routine No Growth to date    Blood culture [[id number]] Collected: 11/08/23 0814    Order Status: Completed Specimen: Blood from Peripheral, Antecubital, Right Updated: 11/08/23 1515     Blood Culture, Routine No Growth to date    Blood culture [[id number]]  (Abnormal) Collected: 11/05/23 0109    Order Status: Completed Specimen: Blood from Peripheral, Hand, Left Updated: 11/08/23 1125     Blood Culture, Routine Gram stain aer bottle: Gram positive cocci in clusters resembling Staph      Results called to and read back by:Manuel Centeno Rn 11/06/2023  03:29      METHICILLIN RESISTANT STAPHYLOCOCCUS AUREUS  ID consult required at [facility].  For susceptibility see order #[id number]      Blood culture [[id number]]  (Abnormal) Collected: 11/06/23 1216    Order Status: Completed Specimen: Blood from Peripheral, Antecubital, Right Updated: 11/08/23 1037     Blood Culture, Routine Gram stain aer bottle: Gram positive cocci in clusters resembling Staph      Results called to and read back by:Mark Garrido RN 11/07/2023      16:14      STAPHYLOCOCCUS AUREUS  Susceptibility pending  ID consult required at [facility].      Fungus culture [[id number]] Collected: 11/03/23 1516    Order Status: Completed Specimen: Chest from Heart Updated: 11/08/23 0753     Fungus (Mycology) Culture Culture in progress    Narrative:      Mitral Leaflet for Culture    Culture, Anaerobe [[id number]] Collected: 11/03/23 1516    Order Status: Completed Specimen: Chest from Heart Updated: 11/07/23 0814     Anaerobic Culture No anaerobes isolated    Narrative:      Mitral Leaflet for  Culture    AFB Culture & Smear [[id number]] Collected: 11/03/23 1516    Order Status: Completed Specimen: Chest from Heart Updated: 11/06/23 1424     AFB Culture & Smear Culture in progress     AFB CULTURE STAIN No acid fast bacilli seen.    Narrative:      Mitral Leaflet for Culture    Blood culture [[id number]]  (Abnormal) Collected: 11/03/23 0614    Order Status: Completed Specimen: Blood from Peripheral, Hand, Left Updated: 11/06/23 1155     Blood Culture, Routine Gram stain aer bottle: Gram positive cocci in clusters resembling Staph      Results called to and read back by:Manuel Centeno Rn 11/04/2023  04:12      METHICILLIN RESISTANT STAPHYLOCOCCUS AUREUS  ID consult required at [facility].  For susceptibility see order # [id number]      Blood culture [[id number]]  (Abnormal) Collected: 11/01/23 0607    Order Status: Completed Specimen: Blood from Peripheral, Forearm, Left Updated: 11/06/23 1154     Blood Culture, Routine Gram stain aer bottle: Gram positive cocci in clusters resembling Staph      Positive results previously called 11/02/2023  04:20      Gram stain carlie bottle: Gram positive cocci in clusters resembling Staph      Positive results previously called 11/04/2023  11:33      METHICILLIN RESISTANT STAPHYLOCOCCUS AUREUS  For susceptibility see order #[id number]  ID consult required at [facility].      Blood culture [[id number]]  (Abnormal)  (Susceptibility) Collected: 11/03/23 0609    Order Status: Completed Specimen: Blood from Peripheral, Hand, Right Updated: 11/06/23 1153     Blood Culture, Routine Gram stain aer bottle: Gram positive cocci in clusters resembling Staph      Positive results previously called  11/04/2023 06:05      METHICILLIN RESISTANT STAPHYLOCOCCUS AUREUS  ID consult required at [facility] and [facility].      Aerobic culture [[id number]]  (Abnormal)  (Susceptibility) Collected: 11/03/23 1516    Order  Status: Completed Specimen: Chest from Heart Updated: 11/06/23 1028     Aerobic Bacterial Culture METHICILLIN RESISTANT STAPHYLOCOCCUS AUREUS  Moderate      Narrative:      Mitral Leaflet for Culture    Blood culture [[id number]]  (Abnormal) Collected: 11/01/23 0609    Order Status: Completed Specimen: Blood from Peripheral, Hand, Right Updated: 11/05/23 0714     Blood Culture, Routine Gram stain aer bottle: Gram positive cocci in clusters resembling Staph      Results called to and read back by:Melissa Ferro RN 11/01/2023  22:25      Gram stain carlie bottle: Gram positive cocci in clusters resembling Staph      Positive results previously called 11/03/2023  21:56      METHICILLIN RESISTANT STAPHYLOCOCCUS AUREUS  ID consult required at [facility].  For susceptibility see order #[id number]       Comment: Previous comment was modified by CHRISTEN VELÁSQUEZ at 08:21 on 11/04/2023   ID consult required at [facility].For   susceptibility see order #[id number] consult required at [facility].         Gram stain [[id number]] Collected: 11/03/23 1516    Order Status: Completed Specimen: Chest from Heart Updated: 11/03/23 1711     Gram Stain Result Rare WBC's      Few Gram positive cocci    Narrative:      Mitral Leaflet for Culture    Blood culture x two cultures. Draw prior to antibiotics. [[id number]]  (Abnormal) Collected: 10/30/23 1923    Order Status: Completed Specimen: Blood from Peripheral, Antecubital, Right Updated: 11/03/23 0800     Blood Culture, Routine Gram stain aer bottle: Gram positive cocci in clusters resembling Staph      Gram stain carlie bottle: Gram positive cocci in clusters resembling Staph      Results called to and read back by: Savannah Butler RN 10/31/2023  06:33      METHICILLIN RESISTANT STAPHYLOCOCCUS AUREUS  ID consult required at [facility].  For susceptibility see order  #[id number]      Narrative:      Aerobic and anaerobic    Blood culture x two cultures. Draw prior to antibiotics. [[id number]]  (Abnormal)  (Susceptibility) Collected: 10/30/23 1924    Order Status: Completed Specimen: Blood from Peripheral, Antecubital, Left Updated: 11/03/23 0759     Blood Culture, Routine Gram stain aer bottle: Gram positive cocci in clusters resembling Staph      Gram stain carlie bottle: Gram positive cocci in clusters resembling Staph      Results called to and read back by: Savannah Butler RN 10/31/2023  06      METHICILLIN RESISTANT STAPHYLOCOCCUS AUREUS  [facility]      Narrative:      Aerobic and anaerobic            Significant Imaging: I have reviewed all pertinent imaging results/findings within the past 24 hours.

## 2023-11-09 NOTE — PROGRESS NOTES
[facility] - Cardiology Stepdown  Cardiothoracic Surgery  Progress Note    Patient Name: Lorenzo Nino  MRN: [id number]  Admission Date: 10/30/2023  Hospital Length of Stay: 10 days  Code Status: Full Code   Attending Physician: Manuel Beal MD   Referring Provider: Self, Aaareferral  Principal Problem:Acute bacterial endocarditis    Subjective:     Post-Op Info:  Procedure(s) (LRB):  REPAIR, MITRAL VALVE, OPEN (N/A)  EXCLUSION, LEFT ATRIAL APPENDAGE, OPEN, AS PART OF OPEN CHEST SURGERY (Left)   6 Days Post-Op     Interval History: Stepdown from ICU uneventful, blood cultures remains negative from 11/8.  Awaiting PICC line placement and antibiotic recommendations.  Patient expressed not wanting to go to a facility once discharged (never been an IV drug user) and that he will have strong family support at home. Continue hospital care     Review of Systems   Constitutional: Negative for malaise/fatigue.   Cardiovascular:  Negative for chest pain, claudication, dyspnea on exertion, irregular heartbeat, leg swelling and palpitations.   Respiratory:  Negative for cough and shortness of breath.    Hematologic/Lymphatic: Negative for bleeding problem.   Gastrointestinal:  Negative for abdominal pain.   Genitourinary:  Negative for dysuria.   Neurological:  Negative for headaches and weakness.     Medications:  Continuous Infusions:  Scheduled Meds:   acetaminophen  1,000 mg Oral Q8H    aspirin  325 mg Oral Daily    ceftaroline (TEFLARO) IVPB  600 mg Intravenous Q8H    DAPTOmycin (CUBICIN) IV (PEDS and ADULTS)  8 mg/kg Intravenous Q24H    docusate sodium  100 mg Oral BID    metoprolol tartrate  25 mg Oral Once    metoprolol tartrate  50 mg Oral BID    NIFEdipine  60 mg Oral Daily    polyethylene glycol  17 g Oral Daily    potassium chloride  20 mEq Oral BID     PRN Meds:acetaminophen, albuterol-ipratropium, dextrose 10%, dextrose 10%, glucagon (human recombinant), glucose, glucose, insulin aspart U-100, melatonin,  ondansetron, oxyCODONE, oxyCODONE, prochlorperazine     Objective:     Vital Signs (Most Recent):  Temp: 98.9 °F (37.2 °C) (11/09/23 0810)  Pulse: 90 (11/09/23 1115)  Resp: 20 (11/09/23 0810)  BP: 104/67 (11/09/23 0810)  SpO2: 97 % (11/09/23 0810) Vital Signs (24h Range):  Temp:  [97.4 °F (36.3 °C)-99.5 °F (37.5 °C)] 98.9 °F (37.2 °C)  Pulse:  [] 90  Resp:  [16-56] 20  SpO2:  [97 %-100 %] 97 %  BP: (100-114)/(63-72) 104/67     Weight: (S) 76.5 kg (168 lb 10.4 oz)  Body mass index is 22.25 kg/m².    SpO2: 97 %       Intake/Output - Last 3 Shifts         11/07 0700  11/08 0659 11/08 0700  11/09 0659 11/09 0700  11/10 0659    P.O. 1000 870     I.V. (mL/kg)       IV Piggyback 247.7 834.3     Total Intake(mL/kg) 1247.7 (16.3) 1704.3 (22.3)     Urine (mL/kg/hr) 1700 (0.9) 425 (0.2)     Stool 0      Chest Tube       Total Output 1700 425     Net -452.3 +1279.3            Urine Occurrence  1 x     Stool Occurrence 1 x 1 x             Lines/Drains/Airways       Peripheral Intravenous Line  Duration                  Peripheral IV - Single Lumen 11/05/23 0100 18 G Left;Posterior Hand 4 days         Peripheral IV - Single Lumen 11/05/23 1155 18 G;1 3/4 in Anterior;Left Forearm 4 days                     Physical Exam  Constitutional:       Appearance: Normal appearance.   HENT:      Head: Normocephalic.   Eyes:      Pupils: Pupils are equal, round, and reactive to light.   Cardiovascular:      Rate and Rhythm: Normal rate and regular rhythm.      Pulses: Normal pulses.   Pulmonary:      Effort: Pulmonary effort is normal.   Abdominal:      General: Abdomen is flat. Bowel sounds are normal.      Palpations: Abdomen is soft.   Musculoskeletal:         General: Normal range of motion.   Skin:     General: Skin is warm and dry.      Comments: MSI CDI   Neurological:      General: No focal deficit present.      Mental Status: He is alert.            Significant Labs:  BMP:   Recent Labs   Lab 11/09/23  0648   *       K 3.2*      CO2 24   BUN 15   CREATININE 0.8   CALCIUM 7.9*   MG 2.0     CBC:   Recent Labs   Lab 11/09/23  0648   WBC 11.11   RBC 2.69*   HGB 8.1*   HCT 25.6*      MCV 95   MCH 30.1   MCHC 31.6*     CMP:   Recent Labs   Lab 11/09/23  0648   *   CALCIUM 7.9*   ALBUMIN 1.8*   PROT 6.3      K 3.2*   CO2 24      BUN 15   CREATININE 0.8   ALKPHOS 186*   ALT 67*   AST 66*   BILITOT 1.1*     Coagulation:   Recent Labs   Lab 11/09/23  0648   INR 1.1   APTT 26.1       Significant Diagnostics:  I have reviewed and interpreted all pertinent imaging results/findings within the past 24 hours.  Assessment/Plan:     * Acute bacterial endocarditis  - ID following   - Continue with recommended antibiotics  - Awaiting blood cultures to clear for PICC line placement    S/P MVR (mitral valve repair)  - Maintain sternal precautions   - ASA  - BB  - Statin  - Lasix  with scheduled potassium ordered   - Encourage ambulation  - PT/OT  - Regular diet with 1500 cc fluid restriction   - Boost with each meal  - Bowel regimen in place   - Monitor electrolytes to keep Mag above 2 and Potassium above 4  - OOBTO   - Encourage IS use      Transient hyperglycemia post procedure  - Endocrine following    Paroxysmal atrial fibrillation        Endocarditis  49 yo M with no past cteremia. He undmedical history admitted 10/30 2/2 PNA vs UTI vs meningitis requiring vasopressor support. No history of IVDU or recent dental work. Further work up revealed MRSA bacteremia. TTE revealed LVEF 40-45%. Mitral valve vegetation with moderate MR due to flail anterior leaflet. Small vegetation on RCC of Ao valve, no AI noted. No surgical intervention warranted at this time. Recommend get NEW for further evaluation, compete 6 weeks of IV abx, and will follow up in clinic as outpatient.     Thrombocytopenia  - CBC daily   - Platelet count 235  - Resolved     FRANCESCO (acute kidney injury)  - Cr 0.8  - Resolved        Tamiko Conti,  NP  Cardiothoracic Surgery  [facility] - Cardiology Stepdown

## 2023-11-09 NOTE — PT/OT/SLP PROGRESS
Occupational Therapy   Treatment    Name: Lorenzo Nino  MRN: [id number]  Admitting Diagnosis:  Acute bacterial endocarditis  6 Days Post-Op    Recommendations:     Discharge Recommendations: High Intensity Therapy  Discharge Equipment Recommendations:  bedside commode  Barriers to discharge:  None    Assessment:     Lorenzo Nino is a 48 y.o. male with a medical diagnosis of Acute bacterial endocarditis.  He presents with .Performance deficits affecting function are weakness, impaired endurance, impaired self care skills, impaired functional mobility, gait instability, impaired balance, pain, decreased safety awareness, impaired cardiopulmonary response to activity, decreased coordination, orthopedic precautions.     Pt. Was cooperative and participated well with session on this day.  Pt. Able to recall 2/3 sternal precautions on this day. Pt. With mild weakness and easily fatigued during session on this day. Pt  continues to demonstrate levels of physical deficits with  functional indep with daily management activities tasks, selfcare skills with balance,  functional mobility, UB strength and endurance. Pt. Will continue to benefit from continued OT to progress towards goals      Rehab Prognosis:  Fair; patient would benefit from acute skilled OT services to address these deficits and reach maximum level of function.       Plan:     Patient to be seen 5 x/week to address the above listed problems via self-care/home management, therapeutic activities, therapeutic exercises, neuromuscular re-education  Plan of Care Expires: 12/05/23  Plan of Care Reviewed with: patient, mother    Subjective     Chief Complaint: None  Patient/Family Comments/goals: To go home  Pain/Comfort:  Pain Rating 1: 3/10  Location - Side 1: Bilateral  Location - Orientation 1: upper  Location 1: sternal  Pain Addressed 1: Pre-medicate for activity, Reposition, Distraction  Pain Rating Post-Intervention 1: 3/10    Objective:     Communicated  with: Nsg prior to session.  Patient found supine with telemetry, peripheral IV upon OT entry to room.    General Precautions: Standard, fall, sternal, contact    Orthopedic Precautions:N/A  Braces: N/A  Respiratory Status: Room air     Occupational Performance:     Bed Mobility:    Patient completed Scooting/Bridging with contact guard assistance  Patient completed Supine to Sit with moderate assistance for UB Support    Functional Mobility/Transfers:  Patient completed Sit <> Stand Transfer with contact guard assistance  with  hand-held assist   Patient completed Bed <> Chair Transfer using Step Transfer technique with contact guard assistance with hand-held assist  Functional Mobility: Pt. With fxl mobility approx 8 steps forwards and then turned around walked another  8 steps with HHA and cues for safety with in transitional phase for not to use hands/arms    Activities of Daily Living:  Grooming: stand by assistance to wipe face Pt. Already performed oral care  Upper Body Dressing: moderate assistance to mange gown around back and edu on person ware with tech for dressing   Lower Body Dressing: stand by assistance to doff/gloria BLE socks with fig 4 tech with       AMPAC 6 Click ADL: 18    Treatment & Education:  Pt edu on role of OT, POC, safety when performing self care tasks , benefit of performing OOB activity, and safety when performing functional transfers and mobility management for preparation with goals to progress towards next level of care     Patient left up in chair with all lines intact and call button in reach    GOALS:   Multidisciplinary Problems       Occupational Therapy Goals          Problem: Occupational Therapy    Goal Priority Disciplines Outcome Interventions   Occupational Therapy Goal     OT, PT/OT Ongoing, Progressing    Description: Goals set on 11/5, with expiration date 12/4:  Patient will increase functional independence with ADLs by performing:    Bed mobility with  Supervision  Grooming while standing at sink with [place]  UB Dressing with [place].  LB Dressing with Supervision.  Toileting from toilet with [place] for hygiene and clothing management.   Functional mobility of household and community distance with Supervision and AD as needed  Pt will demonstrate understanding of education provided regarding energy conservation and task modification through teach-back method.  Pt will demonstrate understanding and learning of sternal precautions via recall and demonstration 3/3.                         Time Tracking:     OT Date of Treatment: 11/09/23  OT Start Time: 0846  OT Stop Time: 0910  OT Total Time (min): 24 min    Billable Minutes:Self Care/Home Management 24    OT/GABRIEL: GABRIEL     Number of GABRIEL visits since last OT visit: 1 11/9/2023  A client care conference was performed between the LUISR and COTTER, prior to treatment to discuss the patient's status, treatment plan and established goals.

## 2023-11-09 NOTE — PLAN OF CARE
Problem: Occupational Therapy  Goal: Occupational Therapy Goal  Description: Goals set on 11/5, with expiration date 12/4:  Patient will increase functional independence with ADLs by performing:    Bed mobility with Supervision  Grooming while standing at sink with [place]  UB Dressing with [place].  LB Dressing with Supervision.  Toileting from toilet with [place] for hygiene and clothing management.   Functional mobility of household and community distance with Supervision and AD as needed  Pt will demonstrate understanding of education provided regarding energy conservation and task modification through teach-back method.  Pt will demonstrate understanding and learning of sternal precautions via recall and demonstration 3/3.    Outcome: Ongoing, Progressing

## 2023-11-09 NOTE — PHYSICIAN QUERY
"PT Name: Lorenzo Nino  MR #: [id number]     Documentation Clarification      CDS/: Jacobo Gonzáles RN, CCDS               Contact information:   [email or web address]       This form is a permanent document in the medical record.     Query Date: November 9, 2023    By submitting this query, we are merely seeking further clarification of documentation. Please utilize your independent clinical judgment when addressing the question(s) below.    The Medical Record reflects the following:    Supporting Clinical Findings Location in Medical Record   Preoperative Diagnoses :   1.  Severe mitral regurgitation.  2.  Bacterial endocarditis    Operation Preformed :     1)  Mitral valve repair with triangular resection of P2 and placement of a freehand-created bovine pericardial annuloplasty band using a 40 mm Medtronic Simuplus sizer as a template   2)  Resection of left atrial appendage     Procedure Detail :   The P2 scallop of the posterior leaflet demonstrated   myxomatous change, and was quite redundant.      There were multiple vegetations and multiple broken chords.   11/3 Op note   Manuel Beal MD                                                                                 Provider, please clarify the diagnosis associate with "broken chords"  .    [ X  ] Rupture of chordae tendineae   [   ] Other (please specify): ____________   [  ] Clinically undetermined                                                                                                                       "

## 2023-11-09 NOTE — PT/OT/SLP PROGRESS
Speech Language Pathology Treatment/ Discharge Summary     Patient Name:  Lorenzo Nino   MRN:  [id number]  Admitting Diagnosis: Acute bacterial endocarditis    Recommendations:                 General Recommendations:  Dysphagia therapy  Diet recommendations:  Regular Diet - IDDSI Level 7, Liquid Diet Level: Thin liquids - IDDSI Level 0   Aspiration Precautions: Standard aspiration precautions   General Precautions: Standard,    Communication strategies:  none    Assessment:     Lorenzo Nino is a 48 y.o. male with an SLP diagnosis of Dysphagia.     Subjective     Pt awake alert and upright in chair   Mother at the bedside     Pain/Comfort:  Pain Rating 1: 0/10  Pain Rating Post-Intervention 1: 0/10    Respiratory Status: Room air    Objective:     Has the patient been evaluated by SLP for swallowing?   Yes  Keep patient NPO? No     Pt seen for ongoing dysphagia treatment. Pt sitting upright in chair with lunch meal at the bedside.  Pt observed to consume 75% of meal with thin liquids via cyclic sips from from straw.  Pt managing diet without difficulty and without concern for aspiration. Pt to consume regular unrestricted diet without restrictions. No additional skilled speech services warranted at this time. Whiteboard updated accordingly.     Goals:   Multidisciplinary Problems       SLP Goals          Problem: SLP    Goal Priority Disciplines Outcome   SLP Goal     SLP    Description: Speech Language Pathology Goals  Goals expected to be met by 11/20    1. Pt will tolerate PO intake without overt clinical signs of aspiration                        Plan:     Patient to be seen:  3 x/week   Plan of Care expires:     Plan of Care reviewed with:  patient, mother, father   SLP Follow-Up:  No       Discharge recommendations:  Low Intensity Therapy   Barriers to Discharge:  None    Time Tracking:     SLP Treatment Date:   11/09/23  Speech Start Time:  1243  Speech Stop Time:  1259     Speech Total Time (min):  16  min    Billable Minutes: Treatment Swallowing Dysfunction 8 and Self Care/Home Management Training 8    11/09/2023

## 2023-11-09 NOTE — CARE UPDATE
-Glucose Goal 110-140    -A1C:   Hemoglobin A1C   Date Value Ref Range Status   11/06/2023 5.8 (H) 4.0 - 5.6 % Final     Comment:     ADA Screening Guidelines:  5.7-6.4%  Consistent with prediabetes  >or=6.5%  Consistent with diabetes    High levels of fetal hemoglobin interfere with the HbA1C  assay. Heterozygous hemoglobin variants (HbS, HgC, etc)do  not significantly interfere with this assay.   However, presence of multiple variants may affect accuracy.           -HOME REGIMEN: Not on any DM meds, no hx of DM     -GLUCOSE TREND FOR THE PAST 24HRS:   Recent Labs   Lab 11/07/23  1514 11/07/23  2132 11/08/23  0744 11/08/23  1656 11/08/23 2024 11/09/23  1212   POCTGLUCOSE 109 110 116* 102 147* 108         -NO HYPOGYCEMIAS NOTED     - Diet  Diet Adult Regular (IDDSI Level 7) Fluid - 1500mL    -TOLERATING 50 % OF PO DIET     -NPO? No     -Steroids - No    -Tube Feeds - None        Discharge Planning:   Patient has no history of diabetes mellitus and BG has been stable WNL upon d/c insulin drip post-CTS with diet advanced. Given this, endocrine will sign off at this time. Please do not hesitate to reach out if needed.

## 2023-11-09 NOTE — SUBJECTIVE & OBJECTIVE
Interval History: No fevers documented overnight. Stepped down from ICU. Repeat blcx so far no growth to date. Pt reports tolerating abx without issues.     Review of Systems  Objective:     Vital Signs (Most Recent):  Temp: 98.9 °F (37.2 °C) (11/09/23 0810)  Pulse: 103 (11/09/23 0810)  Resp: 20 (11/09/23 0810)  BP: 104/67 (11/09/23 0810)  SpO2: 97 % (11/09/23 0810) Vital Signs (24h Range):  Temp:  [97.4 °F (36.3 °C)-99.5 °F (37.5 °C)] 98.9 °F (37.2 °C)  Pulse:  [] 103  Resp:  [16-56] 20  SpO2:  [97 %-100 %] 97 %  BP: (100-114)/(63-77) 104/67     Weight: (S) 76.5 kg (168 lb 10.4 oz)  Body mass index is 22.25 kg/m².    Estimated Creatinine Clearance: 120.9 mL/min (based on SCr of 0.8 mg/dL).     Physical Exam  Constitutional:       General: He is not in acute distress.     Appearance: He is not ill-appearing or toxic-appearing.   Eyes:      General:         Right eye: No discharge.         Left eye: No discharge.   Cardiovascular:      Comments: MSI steristrips  Pulmonary:      Effort: Pulmonary effort is normal. No respiratory distress.   Skin:     General: Skin is warm and dry.   Neurological:      Mental Status: He is alert and oriented to person, place, and time.          Significant Labs:   Microbiology Results (last 7 days)       Procedure Component Value Units Date/Time    Blood culture [[id number]]  (Abnormal) Collected: 11/06/23 1228    Order Status: Completed Specimen: Blood from Peripheral, Hand, Left Updated: 11/09/23 0848     Blood Culture, Routine Gram stain aer bottle: Gram positive cocci in clusters resembling Staph      Positive results previously called 11/07/2023  20:44      Gram stain carlie bottle: Gram positive cocci in clusters resembling Staph      Positive results previously called 11/08/2023  03:01      STAPHYLOCOCCUS AUREUS  ID consult required at [facility],[place] and [facility].  For susceptibility see order #[id number]      Blood culture [[id number]] Collected: 11/08/23 0819     Order Status: Completed Specimen: Blood from Peripheral, Wrist, Left Updated: 11/08/23 1515     Blood Culture, Routine No Growth to date    Blood culture [[id number]] Collected: 11/08/23 0814    Order Status: Completed Specimen: Blood from Peripheral, Antecubital, Right Updated: 11/08/23 1515     Blood Culture, Routine No Growth to date    Blood culture [[id number]]  (Abnormal) Collected: 11/05/23 0109    Order Status: Completed Specimen: Blood from Peripheral, Hand, Left Updated: 11/08/23 1125     Blood Culture, Routine Gram stain aer bottle: Gram positive cocci in clusters resembling Staph      Results called to and read back by:Manuel Centeno Rn 11/06/2023  03:29      METHICILLIN RESISTANT STAPHYLOCOCCUS AUREUS  ID consult required at [facility]yleen Ceballos and [facility].  For susceptibility see order #[id number]      Blood culture [[id number]]  (Abnormal) Collected: 11/06/23 1216    Order Status: Completed Specimen: Blood from Peripheral, Antecubital, Right Updated: 11/08/23 1037     Blood Culture, Routine Gram stain aer bottle: Gram positive cocci in clusters resembling Staph      Results called to and read back by:Mark Garrido RN 11/07/2023      16:14      STAPHYLOCOCCUS AUREUS  Susceptibility pending  [facility].      Fungus culture [[id number]] Collected: 11/03/23 1516    Order Status: Completed Specimen: Chest from Heart Updated: 11/08/23 0753     Fungus (Mycology) Culture Culture in progress    Narrative:      Mitral Leaflet for Culture    Culture, Anaerobe [[id number]] Collected: 11/03/23 1516    Order Status: Completed Specimen: Chest from Heart Updated: 11/07/23 0814     Anaerobic Culture No anaerobes isolated    Narrative:      Mitral Leaflet for Culture    AFB Culture & Smear [[id number]] Collected: 11/03/23 1516    Order Status: Completed Specimen: Chest from Heart Updated: 11/06/23 1424     AFB Culture & Smear Culture in progress      AFB CULTURE STAIN No acid fast bacilli seen.    Narrative:      Mitral Leaflet for Culture    Blood culture [[id number]]  (Abnormal) Collected: 11/03/23 0614    Order Status: Completed Specimen: Blood from Peripheral, Hand, Left Updated: 11/06/23 1155     Blood Culture, Routine Gram stain aer bottle: Gram positive cocci in clusters resembling Staph      Results called to and read back by:Manuel Centeno Rn 11/04/2023  04:12      METHICILLIN RESISTANT STAPHYLOCOCCUS AUREUS  ID consult required at [facility].  For susceptibility see order # [id number]      Blood culture [[id number]]  (Abnormal) Collected: 11/01/23 0607    Order Status: Completed Specimen: Blood from Peripheral, Forearm, Left Updated: 11/06/23 1154     Blood Culture, Routine Gram stain aer bottle: Gram positive cocci in clusters resembling Staph      Positive results previously called 11/02/2023  04:20      Gram stain carlie bottle: Gram positive cocci in clusters resembling Staph      Positive results previously called 11/04/2023  11:33      METHICILLIN RESISTANT STAPHYLOCOCCUS AUREUS  For susceptibility see order #[id number]  ID consult required at [facility].      Blood culture [[id number]]  (Abnormal)  (Susceptibility) Collected: 11/03/23 0609    Order Status: Completed Specimen: Blood from Peripheral, Hand, Right Updated: 11/06/23 1153     Blood Culture, Routine Gram stain aer bottle: Gram positive cocci in clusters resembling Staph      Positive results previously called  11/04/2023 06:05      METHICILLIN RESISTANT STAPHYLOCOCCUS AUREUS  ID consult required at [facility].      Aerobic culture [[id number]]  (Abnormal)  (Susceptibility) Collected: 11/03/23 1516    Order Status: Completed Specimen: Chest from Heart Updated: 11/06/23 1028     Aerobic Bacterial Culture METHICILLIN RESISTANT STAPHYLOCOCCUS AUREUS  Moderate      Narrative:      Mitral Leaflet for  Culture    Blood culture [[id number]]  (Abnormal) Collected: 11/01/23 0609    Order Status: Completed Specimen: Blood from Peripheral, Hand, Right Updated: 11/05/23 0714     Blood Culture, Routine Gram stain aer bottle: Gram positive cocci in clusters resembling Staph      Results called to and read back by:Melissa Ferro RN 11/01/2023  22:25      Gram stain carlie bottle: Gram positive cocci in clusters resembling Staph      Positive results previously called 11/03/2023  21:56      METHICILLIN RESISTANT STAPHYLOCOCCUS AUREUS  ID consult required at [facility].  For susceptibility see order #[id number]       Comment: Previous comment was modified by CHRISTEN VELÁSQUEZ at 08:21 on 11/04/2023   ID consult required at [facility].For   susceptibility see order #[id number] consult required at [facility] and [facility].         Gram stain [[id number]] Collected: 11/03/23 1516    Order Status: Completed Specimen: Chest from Heart Updated: 11/03/23 1711     Gram Stain Result Rare WBC's      Few Gram positive cocci    Narrative:      Mitral Leaflet for Culture    Blood culture x two cultures. Draw prior to antibiotics. [[id number]]  (Abnormal) Collected: 10/30/23 1923    Order Status: Completed Specimen: Blood from Peripheral, Antecubital, Right Updated: 11/03/23 0800     Blood Culture, Routine Gram stain aer bottle: Gram positive cocci in clusters resembling Staph      Gram stain carlie bottle: Gram positive cocci in clusters resembling Staph      Results called to and read back by: Savannah Butler RN 10/31/2023  06:33      METHICILLIN RESISTANT STAPHYLOCOCCUS AUREUS  ID consult required at [facility].  For susceptibility see order #[id number]      Narrative:      Aerobic and anaerobic    Blood culture x two cultures. Draw prior to antibiotics. [[id number]]  (Abnormal)  (Susceptibility) Collected: 10/30/23 1924    Order Status:  Completed Specimen: Blood from Peripheral, Antecubital, Left Updated: 11/03/23 0759     Blood Culture, Routine Gram stain aer bottle: Gram positive cocci in clusters resembling Staph      Gram stain carlie bottle: Gram positive cocci in clusters resembling Staph      Results called to and read back by: Savannah Butler RN 10/31/2023  06      METHICILLIN RESISTANT STAPHYLOCOCCUS AUREUS  ID consult required at [facility],[place] and [facility] locations.      Narrative:      Aerobic and anaerobic            Significant Imaging: I have reviewed all pertinent imaging results/findings within the past 24 hours.

## 2023-11-09 NOTE — ASSESSMENT & PLAN NOTE
- ID following   - Continue with recommended antibiotics  - Awaiting blood cultures to clear for PICC line placement

## 2023-11-09 NOTE — PT/OT/SLP PROGRESS
Physical Therapy Treatment    Patient Name:  Lorenoz Nino   MRN:  [id number]    Recommendations:     Discharge Recommendations: High Intensity Therapy  Discharge Equipment Recommendations: shower chair, bedside commode  Barriers to discharge: None    Assessment:     Lorenzo Nino is a 48 y.o. male admitted with a medical diagnosis of Acute bacterial endocarditis.  He presents with the following impairments/functional limitations: weakness, impaired endurance, gait instability, impaired functional mobility, decreased safety awareness, impaired cardiopulmonary response to activity.  Pt was agreeable and tolerated fairly well. Pt was most limited by fatigued today. Pt tolerated increase time sitting up in the chair, but decrease gait distance. Pt continues to need re-education to recall sternal precautions. Pt is progressing well and continues to benefit from therapy to achieve highest level of independence prior to discharge.     Rehab Prognosis: Good; patient would benefit from acute skilled PT services to address these deficits and reach maximum level of function.    Recent Surgery: Procedure(s) (LRB):  REPAIR, MITRAL VALVE, OPEN (N/A)  EXCLUSION, LEFT ATRIAL APPENDAGE, OPEN, AS PART OF OPEN CHEST SURGERY (Left) 6 Days Post-Op    Plan:     During this hospitalization, patient to be seen 5 x/week to address the identified rehab impairments via gait training, therapeutic activities, therapeutic exercises, neuromuscular re-education and progress toward the following goals:    Plan of Care Expires:  12/03/23    Subjective     Chief Complaint: fatigued  Patient/Family Comments/goals: pt reports sitting up in chair since OT session (at 9am)  Pain/Comfort:  Pain Rating 1: 0/10  Pain Rating Post-Intervention 1: 0/10      Objective:     Communicated with RN prior to session.  Patient found up in chair with  (no active lines and significant other present in room) upon PT entry to room.     General Precautions: Standard,  fall, sternal  Orthopedic Precautions: N/A  Braces: N/A  Respiratory Status: Room air     Functional Mobility:  Additional staff present: N/A  Bed Mobility:   Sit to Supine: minimum assistance with LE management  Transfers:   Sit <> Stand Transfer: minimum assistance with no assistive device   Bed <> Chair Transfer: minimum assistance with no assistive device using Step Transfer technique   Gait:  Pt ambulated 2x ~28ft with minimum assistance and no assistive device.  1 seated rest break   Distance limited by SOB and fatigued today, SpO2 98-99% following gait trial  Gait Deviation(s): mild lateral instability noted with decrease kayode, decrease step length, decrease arm swings, and occasional downward gaze  Verbal/tactile cues for pacing and posture  Balance:   Static sitting EOB balance: CGA     AM-PAC 6 CLICK MOBILITY  Turning over in bed (including adjusting bedclothes, sheets and blankets)?: 3  Sitting down on and standing up from a chair with arms (e.g., wheelchair, bedside commode, etc.): 3  Moving from lying on back to sitting on the side of the bed?: 3  Moving to and from a bed to a chair (including a wheelchair)?: 3  Need to walk in hospital room?: 3  Climbing 3-5 steps with a railing?: 2  Basic Mobility Total Score: 17       Treatment & Education:  Pt able to recall 2/3 sternal precautions and re-educated on no picking up.   Seated BLE therex x10 reps: ankle pumps, long arc quads, and marches  Patient educated on role of therapy, goals of session, and benefits of out of bed mobility.   Instructed on use of call button and importance of calling nursing staff for assistance with mobility   Questions/concerns addressed within PTA scope of practice  Pt verbalized understanding.    Patient left HOB elevated with all lines intact, call button in reach, and nurse present..    GOALS:   Multidisciplinary Problems       Physical Therapy Goals          Problem: Physical Therapy    Goal Priority Disciplines Outcome  Goal Variances Interventions   Physical Therapy Goal     PT, PT/OT Ongoing, Progressing     Description: Goals to be met by: 23     Patient will increase functional independence with mobility by performin. Supine to sit with Set-up Young  2. Sit to stand transfer with Stand-by Assistance  3. Bed to chair transfer with Stand-by Assistance using No Assistive Device  4. Gait  x 175 feet with Stand-by Assistance using No Assistive Device.   5. Lower extremity exercise program x20 reps per handout, with independence  6. Pt independently recalling 3/3 sternal precautions                         Time Tracking:     PT Received On: 23  PT Start Time: 1324     PT Stop Time: 1340  PT Total Time (min): 16 min     Billable Minutes: Gait Training 16    Treatment Type: Treatment  PT/PTA: PTA     Number of PTA visits since last PT visit: 2023

## 2023-11-09 NOTE — NURSING TRANSFER
Nursing Transfer Note      11/8/2023   9:20 PM    Nurse giving handoff: Rocío Mitchell RN  Nurse receiving handoff: ANNA Scruggs    Reason patient is being transferred: Step Down    Transfer From: SICU [id number]    Transfer via stretcher    Transfer with cardiac monitoring    Transported by RN    Transfer Vital Signs:  Blood Pressure:109/68  Heart Rate:103  O2: 98%  Temperature: 98.7  Respirations:16    Telemetry: YES    Medicines sent: Yes    Patient belongings transferred with patient: Yes    Chart send with patient: Yes    Notified: Mother    Upon arrival to floor: cardiac monitor applied, patient oriented to room, call bell in reach, and bed in lowest position

## 2023-11-09 NOTE — PHYSICIAN QUERY
"PT Name: Lorenzo Nino  MR #: [id number]    DOCUMENTATION CLARIFICATION      CDS/: Jacobo Gonzáles RN, CCDS              Contact information:    [email or web address]       This form is a permanent document in the medical record.    Query Date: November 9, 2023    By submitting this query, we are merely seeking further clarification of documentation. Please utilize your independent clinical judgment when addressing the question(s) below.    The medical record contains the following:   Indicators   Supporting Clinical Findings Location in Medical Record   x "Pulmonary Edema" significant O2 desaturation overnight with associate tachypnea, required BiPAP. Intubated this AM 2/2 respiratory distress. Concern for flash pulmonary edema and worsening of MR    11/2 CTS: Concern for flash pulmonary edema and worsening of MR  --Acute deterioration overnight.  Now intubated.  Chest x-ray concerning for pulmonary edema.  -- We will plan for insertion of a balloon pump with a diuretic infusion in an effort to improve the pulmonary edema prior to surgery    Patient with acute decompensation over the last 48 hours requiring intubation for severe pulmonary edema.   11/2 Cardiology           11/2  CTS  Manuel Beal MD               11/3 CTS; Ghulam Lehman MD     Wheezing, Productive cough, SOB, HALL, Use of accessory muscles Resp:  [18-41] 25;   SpO2:  [89 %-98 %] 89 %  Pulmonary:    Effort: Pulmonary effort is normal.      Breath sounds: Normal breath sounds.    11/1 Cardiothoracic Surgery/ Manuel Beal MD    x Radiology Findings 10/30@21:09 CT ch: The heart is not enlarged;  Lungs demonstrate dependent bibasilar atelectasis.  No focal consolidation, pleural fluid or pneumothorax identified.    10/30 @21:29 CXR: Perihilar interstitial opacities compatible with pulmonary edema or multifocal pneumonia.    11/3 CXR: stable interstitial and patchy airspace opacities in the bilateral lungs. The cardiac silhouette " is enlarged      CXR: bilateral interstitial and patchy alveolar airspace opacities     CXR: cardiac size remains enlarged; patchy airspace consolidation bilaterally little worse  10/30   CXR        10/30  CXR      11/3  CXR        CXR        CXR       x CHF documented/Respiratory Failure documented HFrEF - appears to be a new problem ;Follow up with outpatient cardiology     Cardiology   x Respiratory condition On vent    CTS      RR                  PaO2                  PaCO2                     O2 sat 10/30  VB.377    AB.475/   35.4/  61/  26.1-- sats 93  on NC    AB.372/  33.6/  90/  19.5-- on vent      Point of care    x Treatment: Intubated    CTS    Supplemental O2:      Oxygen dependence     x Other: admitted 10/30 2/2 PNA vs UTI vs meningitis requiring vasopressor support;  admitted to MICU for management of sepsis  PNA vs UTI vs meningitis  Endocarditis:   TTE revealed LVEF 40-45%. Mitral valve vegetation with moderate MR due to flail anterior leaflet;    Small vegetation on RCC of Ao valve, no AI noted;  No surgical intervention warranted at this time     Cardiothoracic Surgery/ Manuel Beal MD        Provider, please specify      acuity and cause of pulmonary edema       associated with above clinical findings.    [ X   ] Acute pulmonary edema, cardiac cause (please specify cardiac condition): ______endocarditis_____________   [    ] Acute pulmonary edema, non-cardiac cause (please specify causative condition): ___________________   [    ] Acute pulmonary edema, unspecified cause   [    ] Acute and/on chronic pulmonary edema, cardiac cause (please specify cardiac condition): ___________________   [    ] Acute and/on chronic pulmonary edema, non-cardiac cause (please specify causative condition): ___________________   [    ] Acute and/on chronic pulmonary edema, unspecified cause   [    ] Other respiratory diagnosis (please specify):  _________________________________         Please document in your progress notes daily for the duration of treatment, until resolved, and include in your discharge summary.

## 2023-11-09 NOTE — PLAN OF CARE
Problem: Infection  Goal: Absence of Infection Signs and Symptoms  Outcome: Ongoing, Progressing     Problem: Adult Inpatient Plan of Care  Goal: Plan of Care Review  Outcome: Ongoing, Progressing  Goal: Patient-Specific Goal (Individualized)  Outcome: Ongoing, Progressing  Goal: Absence of Hospital-Acquired Illness or Injury  Outcome: Ongoing, Progressing  Goal: Optimal Comfort and Wellbeing  Outcome: Ongoing, Progressing  Goal: Readiness for Transition of Care  Outcome: Ongoing, Progressing     Problem: Adjustment to Illness (Sepsis/Septic Shock)  Goal: Optimal Coping  Outcome: Ongoing, Progressing     Problem: Bleeding (Sepsis/Septic Shock)  Goal: Absence of Bleeding  Outcome: Ongoing, Progressing     Problem: Glycemic Control Impaired (Sepsis/Septic Shock)  Goal: Blood Glucose Level Within Desired Range  Outcome: Ongoing, Progressing     Problem: Infection Progression (Sepsis/Septic Shock)  Goal: Absence of Infection Signs and Symptoms  Outcome: Ongoing, Progressing     Problem: Nutrition Impaired (Sepsis/Septic Shock)  Goal: Optimal Nutrition Intake  Outcome: Ongoing, Progressing     Problem: Fluid and Electrolyte Imbalance (Acute Kidney Injury/Impairment)  Goal: Fluid and Electrolyte Balance  Outcome: Ongoing, Progressing     Problem: Oral Intake Inadequate (Acute Kidney Injury/Impairment)  Goal: Optimal Nutrition Intake  Outcome: Ongoing, Progressing     Problem: Renal Function Impairment (Acute Kidney Injury/Impairment)  Goal: Effective Renal Function  Outcome: Ongoing, Progressing     Problem: Skin Injury Risk Increased  Goal: Skin Health and Integrity  Outcome: Ongoing, Progressing     Problem: Fall Injury Risk  Goal: Absence of Fall and Fall-Related Injury  Outcome: Ongoing, Progressing     Problem: Fluid Imbalance (Pneumonia)  Goal: Fluid Balance  Outcome: Ongoing, Progressing     Problem: Infection (Pneumonia)  Goal: Resolution of Infection Signs and Symptoms  Outcome: Ongoing, Progressing      Problem: Respiratory Compromise (Pneumonia)  Goal: Effective Oxygenation and Ventilation  Outcome: Ongoing, Progressing     Problem: Restraint, Nonbehavioral (Nonviolent)  Goal: Absence of Harm or Injury  Outcome: Ongoing, Progressing     Problem: Communication Impairment (Mechanical Ventilation, Invasive)  Goal: Effective Communication  Outcome: Ongoing, Progressing     Problem: Device-Related Complication Risk (Mechanical Ventilation, Invasive)  Goal: Optimal Device Function  Outcome: Ongoing, Progressing     Problem: Inability to Wean (Mechanical Ventilation, Invasive)  Goal: Mechanical Ventilation Liberation  Outcome: Ongoing, Progressing     Problem: Nutrition Impairment (Mechanical Ventilation, Invasive)  Goal: Optimal Nutrition Delivery  Outcome: Ongoing, Progressing     Problem: Skin and Tissue Injury (Mechanical Ventilation, Invasive)  Goal: Absence of Device-Related Skin and Tissue Injury  Outcome: Ongoing, Progressing     Problem: Ventilator-Induced Lung Injury (Mechanical Ventilation, Invasive)  Goal: Absence of Ventilator-Induced Lung Injury  Outcome: Ongoing, Progressing     Problem: Communication Impairment (Artificial Airway)  Goal: Effective Communication  Outcome: Ongoing, Progressing     Problem: Device-Related Complication Risk (Artificial Airway)  Goal: Optimal Device Function  Outcome: Ongoing, Progressing     Problem: Skin and Tissue Injury (Artificial Airway)  Goal: Absence of Device-Related Skin or Tissue Injury  Outcome: Ongoing, Progressing

## 2023-11-09 NOTE — SUBJECTIVE & OBJECTIVE
Interval History: Stepdown from ICU uneventful, blood cultures remains negative from 11/8.  Awaiting PICC line placement and antibiotic recommendations.  Patient expressed not wanting to go to a facility once discharged (never been an IV drug user) and that he will have strong family support at home. Continue hospital care     Review of Systems   Constitutional: Negative for malaise/fatigue.   Cardiovascular:  Negative for chest pain, claudication, dyspnea on exertion, irregular heartbeat, leg swelling and palpitations.   Respiratory:  Negative for cough and shortness of breath.    Hematologic/Lymphatic: Negative for bleeding problem.   Gastrointestinal:  Negative for abdominal pain.   Genitourinary:  Negative for dysuria.   Neurological:  Negative for headaches and weakness.     Medications:  Continuous Infusions:  Scheduled Meds:   acetaminophen  1,000 mg Oral Q8H    aspirin  325 mg Oral Daily    ceftaroline (TEFLARO) IVPB  600 mg Intravenous Q8H    DAPTOmycin (CUBICIN) IV (PEDS and ADULTS)  8 mg/kg Intravenous Q24H    docusate sodium  100 mg Oral BID    metoprolol tartrate  25 mg Oral Once    metoprolol tartrate  50 mg Oral BID    NIFEdipine  60 mg Oral Daily    polyethylene glycol  17 g Oral Daily    potassium chloride  20 mEq Oral BID     PRN Meds:acetaminophen, albuterol-ipratropium, dextrose 10%, dextrose 10%, glucagon (human recombinant), glucose, glucose, insulin aspart U-100, melatonin, ondansetron, oxyCODONE, oxyCODONE, prochlorperazine     Objective:     Vital Signs (Most Recent):  Temp: 98.9 °F (37.2 °C) (11/09/23 0810)  Pulse: 90 (11/09/23 1115)  Resp: 20 (11/09/23 0810)  BP: 104/67 (11/09/23 0810)  SpO2: 97 % (11/09/23 0810) Vital Signs (24h Range):  Temp:  [97.4 °F (36.3 °C)-99.5 °F (37.5 °C)] 98.9 °F (37.2 °C)  Pulse:  [] 90  Resp:  [16-56] 20  SpO2:  [97 %-100 %] 97 %  BP: (100-114)/(63-72) 104/67     Weight: (S) 76.5 kg (168 lb 10.4 oz)  Body mass index is 22.25 kg/m².    SpO2: 97 %        Intake/Output - Last 3 Shifts         11/07 0700  11/08 0659 11/08 0700  11/09 0659 11/09 0700  11/10 0659    P.O. 1000 870     I.V. (mL/kg)       IV Piggyback 247.7 834.3     Total Intake(mL/kg) 1247.7 (16.3) 1704.3 (22.3)     Urine (mL/kg/hr) 1700 (0.9) 425 (0.2)     Stool 0      Chest Tube       Total Output 1700 425     Net -452.3 +1279.3            Urine Occurrence  1 x     Stool Occurrence 1 x 1 x             Lines/Drains/Airways       Peripheral Intravenous Line  Duration                  Peripheral IV - Single Lumen 11/05/23 0100 18 G Left;Posterior Hand 4 days         Peripheral IV - Single Lumen 11/05/23 1155 18 G;1 3/4 in Anterior;Left Forearm 4 days                     Physical Exam  Constitutional:       Appearance: Normal appearance.   HENT:      Head: Normocephalic.   Eyes:      Pupils: Pupils are equal, round, and reactive to light.   Cardiovascular:      Rate and Rhythm: Normal rate and regular rhythm.      Pulses: Normal pulses.   Pulmonary:      Effort: Pulmonary effort is normal.   Abdominal:      General: Abdomen is flat. Bowel sounds are normal.      Palpations: Abdomen is soft.   Musculoskeletal:         General: Normal range of motion.   Skin:     General: Skin is warm and dry.      Comments: MSI CDI   Neurological:      General: No focal deficit present.      Mental Status: He is alert.            Significant Labs:  BMP:   Recent Labs   Lab 11/09/23  0648   *      K 3.2*      CO2 24   BUN 15   CREATININE 0.8   CALCIUM 7.9*   MG 2.0     CBC:   Recent Labs   Lab 11/09/23  0648   WBC 11.11   RBC 2.69*   HGB 8.1*   HCT 25.6*      MCV 95   MCH 30.1   MCHC 31.6*     CMP:   Recent Labs   Lab 11/09/23  0648   *   CALCIUM 7.9*   ALBUMIN 1.8*   PROT 6.3      K 3.2*   CO2 24      BUN 15   CREATININE 0.8   ALKPHOS 186*   ALT 67*   AST 66*   BILITOT 1.1*     Coagulation:   Recent Labs   Lab 11/09/23  0648   INR 1.1   APTT 26.1       Significant  Diagnostics:  I have reviewed and interpreted all pertinent imaging results/findings within the past 24 hours.

## 2023-11-10 LAB
ALBUMIN SERPL BCP-MCNC: 1.9 G/DL (ref 3.5–5.2)
ALP SERPL-CCNC: 172 U/L (ref 55–135)
ALT SERPL W/O P-5'-P-CCNC: 64 U/L (ref 10–44)
ANION GAP SERPL CALC-SCNC: 6 MMOL/L (ref 8–16)
APTT PPP: 26.9 SEC (ref 21–32)
AST SERPL-CCNC: 57 U/L (ref 10–40)
BILIRUB SERPL-MCNC: 0.8 MG/DL (ref 0.1–1)
BUN SERPL-MCNC: 15 MG/DL (ref 6–20)
CALCIUM SERPL-MCNC: 8 MG/DL (ref 8.7–10.5)
CHLORIDE SERPL-SCNC: 110 MMOL/L (ref 95–110)
CO2 SERPL-SCNC: 23 MMOL/L (ref 23–29)
CREAT SERPL-MCNC: 0.9 MG/DL (ref 0.5–1.4)
ERYTHROCYTE [DISTWIDTH] IN BLOOD BY AUTOMATED COUNT: 16.5 % (ref 11.5–14.5)
EST. GFR  (NO RACE VARIABLE): >60 ML/MIN/1.73 M^2
FINAL PATHOLOGIC DIAGNOSIS: NORMAL
GLUCOSE SERPL-MCNC: 98 MG/DL (ref 70–110)
GROSS: NORMAL
HCT VFR BLD AUTO: 23.4 % (ref 40–54)
HGB BLD-MCNC: 7.5 G/DL (ref 14–18)
INR PPP: 1 (ref 0.8–1.2)
Lab: NORMAL
MAGNESIUM SERPL-MCNC: 2.1 MG/DL (ref 1.6–2.6)
MCH RBC QN AUTO: 30.5 PG (ref 27–31)
MCHC RBC AUTO-ENTMCNC: 32.1 G/DL (ref 32–36)
MCV RBC AUTO: 95 FL (ref 82–98)
PHOSPHATE SERPL-MCNC: 3.6 MG/DL (ref 2.7–4.5)
PLATELET # BLD AUTO: 258 K/UL (ref 150–450)
PMV BLD AUTO: 13 FL (ref 9.2–12.9)
POTASSIUM SERPL-SCNC: 4 MMOL/L (ref 3.5–5.1)
PROT SERPL-MCNC: 6.4 G/DL (ref 6–8.4)
PROTHROMBIN TIME: 11.1 SEC (ref 9–12.5)
RBC # BLD AUTO: 2.46 M/UL (ref 4.6–6.2)
SODIUM SERPL-SCNC: 139 MMOL/L (ref 136–145)
WBC # BLD AUTO: 10.57 K/UL (ref 3.9–12.7)

## 2023-11-10 PROCEDURE — 25000003 PHARM REV CODE 250

## 2023-11-10 PROCEDURE — 99233 PR SUBSEQUENT HOSPITAL CARE,LEVL III: ICD-10-PCS | Mod: ,,, | Performed by: STUDENT IN AN ORGANIZED HEALTH CARE EDUCATION/TRAINING PROGRAM

## 2023-11-10 PROCEDURE — 85027 COMPLETE CBC AUTOMATED: CPT

## 2023-11-10 PROCEDURE — 85610 PROTHROMBIN TIME: CPT

## 2023-11-10 PROCEDURE — 83735 ASSAY OF MAGNESIUM: CPT | Performed by: THORACIC SURGERY (CARDIOTHORACIC VASCULAR SURGERY)

## 2023-11-10 PROCEDURE — 36415 COLL VENOUS BLD VENIPUNCTURE: CPT | Performed by: NURSE PRACTITIONER

## 2023-11-10 PROCEDURE — 85730 THROMBOPLASTIN TIME PARTIAL: CPT

## 2023-11-10 PROCEDURE — 99233 SBSQ HOSP IP/OBS HIGH 50: CPT | Mod: ,,, | Performed by: STUDENT IN AN ORGANIZED HEALTH CARE EDUCATION/TRAINING PROGRAM

## 2023-11-10 PROCEDURE — 20600001 HC STEP DOWN PRIVATE ROOM

## 2023-11-10 PROCEDURE — 63600175 PHARM REV CODE 636 W HCPCS: Mod: JZ,JG | Performed by: STUDENT IN AN ORGANIZED HEALTH CARE EDUCATION/TRAINING PROGRAM

## 2023-11-10 PROCEDURE — 87186 SC STD MICRODIL/AGAR DIL: CPT | Performed by: NURSE PRACTITIONER

## 2023-11-10 PROCEDURE — 84100 ASSAY OF PHOSPHORUS: CPT | Performed by: THORACIC SURGERY (CARDIOTHORACIC VASCULAR SURGERY)

## 2023-11-10 PROCEDURE — 87077 CULTURE AEROBIC IDENTIFY: CPT | Performed by: NURSE PRACTITIONER

## 2023-11-10 PROCEDURE — 97116 GAIT TRAINING THERAPY: CPT

## 2023-11-10 PROCEDURE — 25000003 PHARM REV CODE 250: Performed by: STUDENT IN AN ORGANIZED HEALTH CARE EDUCATION/TRAINING PROGRAM

## 2023-11-10 PROCEDURE — 36415 COLL VENOUS BLD VENIPUNCTURE: CPT

## 2023-11-10 PROCEDURE — 25000003 PHARM REV CODE 250: Performed by: NURSE PRACTITIONER

## 2023-11-10 PROCEDURE — 80053 COMPREHEN METABOLIC PANEL: CPT

## 2023-11-10 PROCEDURE — 87040 BLOOD CULTURE FOR BACTERIA: CPT | Performed by: NURSE PRACTITIONER

## 2023-11-10 PROCEDURE — 27000207 HC ISOLATION

## 2023-11-10 RX ORDER — METOPROLOL TARTRATE 50 MG/1
50 TABLET ORAL 2 TIMES DAILY
Status: DISCONTINUED | OUTPATIENT
Start: 2023-11-10 | End: 2023-11-14 | Stop reason: HOSPADM

## 2023-11-10 RX ADMIN — DAPTOMYCIN 610 MG: 500 INJECTION, POWDER, LYOPHILIZED, FOR SOLUTION INTRAVENOUS at 01:11

## 2023-11-10 RX ADMIN — CEFTAROLINE FOSAMIL 600 MG: 600 POWDER, FOR SOLUTION INTRAVENOUS at 01:11

## 2023-11-10 RX ADMIN — CEFTAROLINE FOSAMIL 600 MG: 600 POWDER, FOR SOLUTION INTRAVENOUS at 09:11

## 2023-11-10 RX ADMIN — ACETAMINOPHEN 1000 MG: 500 TABLET ORAL at 06:11

## 2023-11-10 RX ADMIN — ASPIRIN 325 MG ORAL TABLET 325 MG: 325 PILL ORAL at 09:11

## 2023-11-10 RX ADMIN — DOCUSATE SODIUM 100 MG: 100 CAPSULE, LIQUID FILLED ORAL at 09:11

## 2023-11-10 RX ADMIN — CEFTAROLINE FOSAMIL 600 MG: 600 POWDER, FOR SOLUTION INTRAVENOUS at 06:11

## 2023-11-10 RX ADMIN — METOPROLOL TARTRATE 50 MG: 50 TABLET, FILM COATED ORAL at 09:11

## 2023-11-10 RX ADMIN — POTASSIUM CHLORIDE 20 MEQ: 1500 TABLET, EXTENDED RELEASE ORAL at 09:11

## 2023-11-10 RX ADMIN — NIFEDIPINE 60 MG: 60 TABLET, FILM COATED, EXTENDED RELEASE ORAL at 09:11

## 2023-11-10 RX ADMIN — OXYCODONE HYDROCHLORIDE 5 MG: 5 TABLET ORAL at 09:11

## 2023-11-10 NOTE — ASSESSMENT & PLAN NOTE
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  Denies ongoing new or worsening joint or back pain. Blcx from 11/8 1:4 with GPC (drawn prior to initiation of salvage therapy), repeat blood cx sent this morning.     Recommendations:  Continue daptomycin and ceftaroline for salvage therapy   Not a candidate for gent/rif given risk of nephrotoxicity/hepatotoxicity  Monitor CK while on abx therapy, prior elevated CK trending down, recent 220.  Follow up repeat blcx  If repeat blcx are positive, will need further work up (spinal imaging etc)  Anticipate 6w course of abx therapy from cleared blood cultures  Contact precautions per hospital protocol

## 2023-11-10 NOTE — PLAN OF CARE
Problem: Infection Progression (Sepsis/Septic Shock)  Goal: Absence of Infection Signs and Symptoms  Outcome: Ongoing, Progressing     Problem: Adjustment to Illness (Heart Failure)  Goal: Optimal Coping  Outcome: Ongoing, Progressing

## 2023-11-10 NOTE — ASSESSMENT & PLAN NOTE
- Not on amiodarone secondary to elevated LFTs  - NSR on monitor   - No need for anticoagulation at this stage   - Continue BB  - Tele monitor   - Keep K above 4

## 2023-11-10 NOTE — PROGRESS NOTES
[facility] - Cardiology Stepdown  Cardiothoracic Surgery  Progress Note    Patient Name: Lorenzo Nino  MRN: [id number]  Admission Date: 10/30/2023  Hospital Length of Stay: 11 days  Code Status: Full Code   Attending Physician: Manuel Beal MD   Referring Provider: Self, Aaareferral  Principal Problem:Acute bacterial endocarditis    Subjective:     Post-Op Info:  Procedure(s) (LRB):  REPAIR, MITRAL VALVE, OPEN (N/A)  EXCLUSION, LEFT ATRIAL APPENDAGE, OPEN, AS PART OF OPEN CHEST SURGERY (Left)   7 Days Post-Op     Interval History: Unable to place PICC line as blood cultures from 11/8 ar positive.  Will draw blood cultures daily until cleared per ID recommendations. He remains afebrile, continues to work with PT/OT to optimize strength and conditioning    Review of Systems   Constitutional: Positive for malaise/fatigue.   Cardiovascular:  Negative for chest pain, claudication, dyspnea on exertion, irregular heartbeat, leg swelling and palpitations.   Respiratory:  Negative for cough and shortness of breath.    Hematologic/Lymphatic: Negative for bleeding problem.   Gastrointestinal:  Negative for abdominal pain.   Genitourinary:  Negative for dysuria.   Neurological:  Negative for headaches and weakness.     Medications:  Continuous Infusions:  Scheduled Meds:   acetaminophen  1,000 mg Oral Q8H    aspirin  325 mg Oral Daily    ceftaroline (TEFLARO) IVPB  600 mg Intravenous Q8H    DAPTOmycin (CUBICIN) IV (PEDS and ADULTS)  8 mg/kg Intravenous Q24H    docusate sodium  100 mg Oral BID    metoprolol tartrate  25 mg Oral Once    metoprolol tartrate  75 mg Oral BID    NIFEdipine  60 mg Oral Daily    polyethylene glycol  17 g Oral Daily    potassium chloride  20 mEq Oral BID     PRN Meds:acetaminophen, albuterol-ipratropium, dextrose 10%, dextrose 10%, melatonin, ondansetron, oxyCODONE, oxyCODONE, prochlorperazine     Objective:     Vital Signs (Most Recent):  Temp: 98.5 °F (36.9 °C) (11/10/23 0752)  Pulse: 79  (11/10/23 0502)  Resp: 20 (11/10/23 0752)  BP: (!) 99/54 (11/10/23 0752)  SpO2: 98 % (11/10/23 0752) Vital Signs (24h Range):  Temp:  [97.9 °F (36.6 °C)-98.5 °F (36.9 °C)] 98.5 °F (36.9 °C)  Pulse:  [] 79  Resp:  [18-20] 20  SpO2:  [96 %-99 %] 98 %  BP: ()/(54-66) 99/54     Weight: 64.8 kg (142 lb 13.7 oz)  Body mass index is 18.85 kg/m².    SpO2: 98 %       Intake/Output - Last 3 Shifts         11/08 0700  11/09 0659 11/09 0700  11/10 0659 11/10 0700 11/11 0659    P.O. 870 1242     IV Piggyback 834.3      Total Intake(mL/kg) 1704.3 (22.3) 1242 (18.3)     Urine (mL/kg/hr) 425 (0.2) 1450 (0.9)     Stool       Total Output 425 1450     Net +1279.3 -208            Urine Occurrence 1 x 0 x     Stool Occurrence 1 x              Lines/Drains/Airways       Peripheral Intravenous Line  Duration                  Peripheral IV - Single Lumen 11/05/23 0100 18 G Left;Posterior Hand 5 days         Peripheral IV - Single Lumen 11/05/23 1155 18 G;1 3/4 in Anterior;Left Forearm 4 days                     Physical Exam  Constitutional:       Appearance: Normal appearance.   HENT:      Head: Normocephalic.   Eyes:      Pupils: Pupils are equal, round, and reactive to light.   Cardiovascular:      Rate and Rhythm: Normal rate and regular rhythm.      Pulses: Normal pulses.   Pulmonary:      Effort: Pulmonary effort is normal.   Abdominal:      General: Abdomen is flat. Bowel sounds are normal.      Palpations: Abdomen is soft.   Musculoskeletal:         General: Normal range of motion.   Skin:     General: Skin is warm and dry.      Comments: MSI CDI   Neurological:      General: No focal deficit present.      Mental Status: He is alert.            Significant Labs:  BMP:   Recent Labs   Lab 11/10/23  0459   GLU 98      K 4.0      CO2 23   BUN 15   CREATININE 0.9   CALCIUM 8.0*   MG 2.1     CBC:   Recent Labs   Lab 11/10/23  0459   WBC 10.57   RBC 2.46*   HGB 7.5*   HCT 23.4*      MCV 95   MCH 30.5    MCHC 32.1     CMP:   Recent Labs   Lab 11/10/23  0459   GLU 98   CALCIUM 8.0*   ALBUMIN 1.9*   PROT 6.4      K 4.0   CO2 23      BUN 15   CREATININE 0.9   ALKPHOS 172*   ALT 64*   AST 57*   BILITOT 0.8     Coagulation:   Recent Labs   Lab 11/10/23  0459   INR 1.0   APTT 26.9       Significant Diagnostics:  I have reviewed and interpreted all pertinent imaging results/findings within the past 24 hours.  Assessment/Plan:     * Acute bacterial endocarditis  - ID following   - Continue with recommended antibiotics  - Awaiting blood cultures to clear for PICC line placement  - Blood cultures daily until cleared   - Continue with salvage therapy per ID recommendations     S/P MVR (mitral valve repair)  - Maintain sternal precautions   - ASA  - BB  - Statin - held for elevated LFTs  - Encourage ambulation  - PT/OT  - Regular diet - removing fluid restriction   - Boost with each meal  - Bowel regimen in place   - Monitor electrolytes to keep Mag above 2 and Potassium above 4  - OOBTO   - Encourage IS use      Transient hyperglycemia post procedure  - Endocrine consulted during admission but have since signed off      MRSA bacteremia  - ID following   Recommendations:  Continue daptomycin and ceftaroline for salvage therapy   Not a candidate for gent/rif given risk of nephrotoxicity/hepatotoxicity  Monitor CK while on abx therapy, prior elevated CK trending down, recent 220. Ordered   Follow up repeat blcx  If repeat blcx are positive, will need further work up (spinal imaging etc)  Anticipate 6w course of abx therapy from cleared blood cultures  Contact precautions per hospital protocol    Heart failure with mid-range ejection fraction (HFmEF)  - Lasix daily   - Tele monitor   - Oxygen PRN    Paroxysmal atrial fibrillation  - Not on amiodarone secondary to elevated LFTs  - NSR on monitor   - No need for anticoagulation at this stage   - Continue BB  - Tele monitor   - Keep K above 4    Endocarditis  49 yo M  with no past cteremia. He undmedical history admitted 10/30 2/2 PNA vs UTI vs meningitis requiring vasopressor support. No history of IVDU or recent dental work. Further work up revealed MRSA bacteremia. TTE revealed LVEF 40-45%. Mitral valve vegetation with moderate MR due to flail anterior leaflet. Small vegetation on RCC of Ao valve, no AI noted. No surgical intervention warranted at this time. Recommend get NEW for further evaluation, compete 6 weeks of IV abx, and will follow up in clinic as outpatient.     Elevated transaminase level  - CMP daily to monitor trends   - Stopping scheduled tylenol   - LFT's decreasing      Thrombocytopenia  - CBC daily   - Platelet count 258  - Resolved     FRANCESCO (acute kidney injury)  - Cr 0.9  - Resolved        Tamiko Conti NP  Cardiothoracic Surgery  [facility] - Cardiology Stepdown

## 2023-11-10 NOTE — ASSESSMENT & PLAN NOTE
- ID following   - Continue with recommended antibiotics  - Awaiting blood cultures to clear for PICC line placement  - Blood cultures daily until cleared   - Continue with salvage therapy per ID recommendations

## 2023-11-10 NOTE — ASSESSMENT & PLAN NOTE
- Maintain sternal precautions   - ASA  - BB  - Statin - held for elevated LFTs  - Encourage ambulation  - PT/OT  - Regular diet - removing fluid restriction   - Boost with each meal  - Bowel regimen in place   - Monitor electrolytes to keep Mag above 2 and Potassium above 4  - OOBTO   - Encourage IS use

## 2023-11-10 NOTE — PROGRESS NOTES
[facility] - Cardiology [facility]  Infectious Disease  Progress Note    Patient Name: Lorenzo Nino  MRN: [id number]  Admission Date: 10/30/2023  Length of Stay: 11 days  Attending Physician: Manuel Beal MD  Primary Care Provider: No primary care provider on file.    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  Denies ongoing new or worsening joint or back pain. Blcx from 11/8 1:4 with GPC (drawn prior to initiation of salvage therapy), repeat blood cx sent this morning.     Recommendations:  Continue daptomycin and ceftaroline for salvage therapy   Not a candidate for gent/rif given risk of nephrotoxicity/hepatotoxicity  Monitor CK while on abx therapy, prior elevated CK trending down, recent 220. Ordered   Follow up repeat blcx  If repeat blcx are positive, will need further work up (spinal imaging etc)  Anticipate 6w course of abx therapy from cleared blood cultures  Contact precautions per hospital protocol        Thank you for your consult. I will follow-up with patient. Please contact us if you have any additional questions. Above d/w primary team.       Verito Desir MD  Infectious Disease  [facility]    Subjective:     Principal Problem:Acute bacterial endocarditis    HPI:   This is the strange case of a 49 yo man with no medical problems who reportedly developed acute onset of symptoms a few days ago. He was seen in several urgent cares w/headache, abdominal pain with nausea but no vomiting or diarrhea.  There he was evaluated and instructed to take Tylenol and follow up if he still felt unwell.  When he presented with hematuria, he was sent to the ED here and was found to have thrombocytopenia. CT head negative, CT C/A/P showing perinephric stranding, distended bladder, and hiatal hernia. Patient admitted to MICU for further workup  and treatment of his hypotension and likely severe sepsis. Blood cultures have grown MRSA and a TTE revealed a flail mitral leflet and a possible vegetation on the aortic valve. ID is consulted for that. The patient is accompanied by his mother. He is scheduled for a NEW. Denies IDU. Denetal implants. HIV NR.     Interval History: No fevers documented overnight.   Mother at bedside this morning. Pt reports tolerating antibiotics without issues. Denies new or ongoing back/joint pain.     Review of Systems  Objective:     Vital Signs (Most Recent):  Temp: 98.5 °F (36.9 °C) (11/10/23 0752)  Pulse: 79 (11/10/23 0502)  Resp: 20 (11/10/23 0752)  BP: (!) 99/54 (11/10/23 0752)  SpO2: 98 % (11/10/23 0752) Vital Signs (24h Range):  Temp:  [97.9 °F (36.6 °C)-98.5 °F (36.9 °C)] 98.5 °F (36.9 °C)  Pulse:  [] 79  Resp:  [18-20] 20  SpO2:  [96 %-99 %] 98 %  BP: ()/(54-66) 99/54     Weight: 64.8 kg (142 lb 13.7 oz)  Body mass index is 18.85 kg/m².    Estimated Creatinine Clearance: 91 mL/min (based on SCr of 0.9 mg/dL).     Physical Exam  Constitutional:       General: He is not in acute distress.     Appearance: He is not ill-appearing or toxic-appearing.   Eyes:      General:         Right eye: No discharge.         Left eye: No discharge.   Cardiovascular:      Comments: MSI steristrips  Pulmonary:      Effort: Pulmonary effort is normal. No respiratory distress.   Skin:     General: Skin is warm and dry.   Neurological:      Mental Status: He is alert and oriented to person, place, and time.          Significant Labs:   Microbiology Results (last 7 days)       Procedure Component Value Units Date/Time    Blood culture [[id number]] Collected: 11/08/23 0814    Order Status: Completed Specimen: Blood from Peripheral, Antecubital, Right Updated: 11/10/23 1012     Blood Culture, Routine No Growth to date      No Growth to date      No Growth to date    Blood culture [[id number]] Collected: 11/10/23 0904    Order Status:  Sent Specimen: Blood from Antecubital, Right Arm Updated: 11/10/23 0915    Blood culture [[id number]] Collected: 11/10/23 0905    Order Status: Sent Specimen: Blood from Peripheral, Right Hand Updated: 11/10/23 0915    Blood culture [[id number]] Collected: 11/08/23 0819    Order Status: Completed Specimen: Blood from Peripheral, Wrist, Left Updated: 11/09/23 2336     Blood Culture, Routine Gram stain aer bottle: Gram positive cocci in clusters resembling Staph      Results called to and read back by: Harini Murphy RN  11/09/2023  23:35    Blood culture [[id number]]  (Abnormal) Collected: 11/06/23 1228    Order Status: Completed Specimen: Blood from Peripheral, Hand, Left Updated: 11/09/23 1123     Blood Culture, Routine Gram stain aer bottle: Gram positive cocci in clusters resembling Staph      Positive results previously called 11/07/2023  20:44      Gram stain carlie bottle: Gram positive cocci in clusters resembling Staph      Positive results previously called 11/08/2023  03:01      METHICILLIN RESISTANT STAPHYLOCOCCUS AUREUS  ID consult required at [facility].  For susceptibility see order #[id number]  ID consult required at [facility].      Blood culture [[id number]]  (Abnormal)  (Susceptibility) Collected: 11/06/23 1216    Order Status: Completed Specimen: Blood from Peripheral, Antecubital, Right Updated: 11/09/23 1122     Blood Culture, Routine Gram stain aer bottle: Gram positive cocci in clusters resembling Staph      Results called to and read back by:Mark Garrido RN 11/07/2023      16:14      METHICILLIN RESISTANT STAPHYLOCOCCUS AUREUS  ID consult required at [facility].      Blood culture [[id number]]  (Abnormal) Collected: 11/05/23 0109    Order Status: Completed Specimen: Blood from Peripheral, Hand, Left Updated: 11/08/23 1125     Blood Culture, Routine Gram stain aer bottle: Gram positive cocci in  clusters resembling Staph      Results called to and read back by:Manuel Centeno Rn 11/06/2023  03:29      METHICILLIN RESISTANT STAPHYLOCOCCUS AUREUS  ID consult required at [facility].  For susceptibility see order #[id number]      Fungus culture [[id number]] Collected: 11/03/23 1516    Order Status: Completed Specimen: Chest from Heart Updated: 11/08/23 0753     Fungus (Mycology) Culture Culture in progress    Narrative:      Mitral Leaflet for Culture    Culture, Anaerobe [[id number]] Collected: 11/03/23 1516    Order Status: Completed Specimen: Chest from Heart Updated: 11/07/23 0814     Anaerobic Culture No anaerobes isolated    Narrative:      Mitral Leaflet for Culture    AFB Culture & Smear [[id number]] Collected: 11/03/23 1516    Order Status: Completed Specimen: Chest from Heart Updated: 11/06/23 1424     AFB Culture & Smear Culture in progress     AFB CULTURE STAIN No acid fast bacilli seen.    Narrative:      Mitral Leaflet for Culture    Blood culture [[id number]]  (Abnormal) Collected: 11/03/23 0614    Order Status: Completed Specimen: Blood from Peripheral, Hand, Left Updated: 11/06/23 1155     Blood Culture, Routine Gram stain aer bottle: Gram positive cocci in clusters resembling Staph      Results called to and read back by:Manuel Centeno Rn 11/04/2023  04:12      METHICILLIN RESISTANT STAPHYLOCOCCUS AUREUS  ID consult required at [facility].  For susceptibility see order # [id number]      Blood culture [[id number]]  (Abnormal) Collected: 11/01/23 0607    Order Status: Completed Specimen: Blood from Peripheral, Forearm, Left Updated: 11/06/23 1154     Blood Culture, Routine Gram stain aer bottle: Gram positive cocci in clusters resembling Staph      Positive results previously called 11/02/2023  04:20      Gram stain carlie bottle: Gram positive cocci in clusters resembling Staph      Positive results previously called 11/04/2023  11:33       METHICILLIN RESISTANT STAPHYLOCOCCUS AUREUS  For susceptibility see order #[id number]  ID consult required at [facility] and [facility].      Blood culture [[id number]]  (Abnormal)  (Susceptibility) Collected: 11/03/23 0609    Order Status: Completed Specimen: Blood from Peripheral, Hand, Right Updated: 11/06/23 1153     Blood Culture, Routine Gram stain aer bottle: Gram positive cocci in clusters resembling Staph      Positive results previously called  11/04/2023 06:05      METHICILLIN RESISTANT STAPHYLOCOCCUS AUREUS  ID consult required at [facility] and [facility].      Aerobic culture [[id number]]  (Abnormal)  (Susceptibility) Collected: 11/03/23 1516    Order Status: Completed Specimen: Chest from Heart Updated: 11/06/23 1028     Aerobic Bacterial Culture METHICILLIN RESISTANT STAPHYLOCOCCUS AUREUS  Moderate      Narrative:      Mitral Leaflet for Culture    Blood culture [[id number]]  (Abnormal) Collected: 11/01/23 0609    Order Status: Completed Specimen: Blood from Peripheral, Hand, Right Updated: 11/05/23 0714     Blood Culture, Routine Gram stain aer bottle: Gram positive cocci in clusters resembling Staph      Results called to and read back by:Melissa Ferro RN 11/01/2023  22:25      Gram stain carlie bottle: Gram positive cocci in clusters resembling Staph      Positive results previously called 11/03/2023  21:56      METHICILLIN RESISTANT STAPHYLOCOCCUS AUREUS  ID consult required at [facility] and [facility].  For susceptibility see order #[id number]       Comment: Previous comment was modified by CHRISTEN VELÁSQUEZ at 08:21 on 11/04/2023   ID consult required at [facility].For   susceptibility see order #[id number] consult required at [facility].         Gram stain [[id number]] Collected: 11/03/23 1516    Order Status: Completed Specimen: Chest from Heart Updated: 11/03/23 1711     Gram Stain  Result Rare WBC's      Few Gram positive cocci    Narrative:      Mitral Leaflet for Culture            Significant Imaging: I have reviewed all pertinent imaging results/findings within the past 24 hours.

## 2023-11-10 NOTE — PLAN OF CARE
Recommendations    1. Continue Regular diet      2. Continue Boost Breeze TID      3. RD to monitor and follow    Goals: Meet % EEN, EPN by RD f/u  Nutrition Goal Status: new  Communication of RD Recs:  (POC)

## 2023-11-10 NOTE — ASSESSMENT & PLAN NOTE
- ID following   Recommendations:  Continue daptomycin and ceftaroline for salvage therapy   Not a candidate for gent/rif given risk of nephrotoxicity/hepatotoxicity  Monitor CK while on abx therapy, prior elevated CK trending down, recent 220. Ordered   Follow up repeat blcx  If repeat blcx are positive, will need further work up (spinal imaging etc)  Anticipate 6w course of abx therapy from cleared blood cultures  Contact precautions per hospital protocol

## 2023-11-10 NOTE — PROGRESS NOTES
"[facility] - Cardiology Stepdown  Adult Nutrition  Progress Note    SUMMARY       Recommendations    1. Continue Regular diet      2. Continue Boost Breeze TID      3. RD to monitor and follow    Goals: Meet % EEN, EPN by RD f/u  Nutrition Goal Status: new  Communication of RD Recs:  (POC)    Assessment and Plan    Nutrition Problem  Inadequate energy intake     Related to (etiology):   Physiological demands      Signs and Symptoms (as evidenced by):   NPO status      Interventions (treatment strategy):  Collaboration of nutrition care w/ other providers      Nutrition Diagnosis Status:   Resolved    Reason for Assessment    Reason For Assessment: RD follow-up  Diagnosis: cardiac disease  Relevant Medical History: no PMHx documented.  Interdisciplinary Rounds: did not attend    General Information Comments:   RD f/u. Pt reports appetite improving. Tolerating % of meals. Denies N/V/D/C, chewing/swallowing difficulties. Unsure of UBW. Denies significant wt loss recently. Pt appears nourished. Does not meet criteria for malnutrition.     Nutrition Discharge Planning: pending clinical course    Nutrition Risk Screen    Nutrition Risk Screen: no indicators present    Nutrition/Diet History    Spiritual, Cultural Beliefs, Church Practices, Values that Affect Care: no    Anthropometrics    Temp: 97.6 °F (36.4 °C)  Height: 6' 1" (185.4 cm)  Height (inches): 73 in  Weight Method: Standard Scale  Weight: 64.8 kg (142 lb 13.7 oz)  Weight (lb): 142.86 lb  Ideal Body Weight (IBW), Male: 184 lb  % Ideal Body Weight, Male (lb): 89.13 %  BMI (Calculated): 18.9  BMI Grade: 18.5-24.9 - normal       Lab/Procedures/Meds    Pertinent Labs Reviewed: reviewed  Pertinent Labs Comments: albumin 1.9, , AST 57, ALT 64  Pertinent Medications Reviewed: reviewed  Pertinent Medications Comments: polyethylene glycol, docusate    Estimated/Assessed Needs    Weight Used For Calorie Calculations: 64.4 kg (142 lb)  Energy Calorie " Requirements (kcal): 1932  Energy Need Method: Kcal/kg (30)  Protein Requirements: 77g (1.2g/kg)  Weight Used For Protein Calculations: 64.4 kg (142 lb)  Fluid Requirements (mL): 1ml/kcal or per MD  Estimated Fluid Requirement Method: RDA Method  RDA Method (mL): 1932     Nutrition Prescription Ordered    Current Diet Order: Regular  Oral Nutrition Supplement: Boost Breeze    Evaluation of Received Nutrient/Fluid Intake    I/O: + 681 ml since admit  Energy Calories Required: meeting needs  Protein Required: meeting needs  Fluid Required: not meeting needs  Total Fluid Intake (mL/kg): 1 ml or fluid per MD  Comments: LBM 11/9  Tolerance: tolerating  % Intake of Estimated Energy Needs: 75 - 100 %  % Meal Intake: 75 - 100 %    Nutrition Risk    Level of Risk/Frequency of Follow-up:  (1x/week)     Monitor and Evaluation    Food and Nutrient Intake: energy intake, food and beverage intake  Food and Nutrient Adminstration: diet order  Knowledge/Beliefs/Attitudes: food and nutrition knowledge/skill  Physical Activity and Function: nutrition-related ADLs and IADLs  Anthropometric Measurements: height/length, weight, weight change, body mass index  Biochemical Data, Medical Tests and Procedures: electrolyte and renal panel, gastrointestinal profile, glucose/endocrine profile, inflammatory profile, lipid profile  Nutrition-Focused Physical Findings: overall appearance     Nutrition Follow-Up    RD Follow-up?: Yes

## 2023-11-10 NOTE — SUBJECTIVE & OBJECTIVE
Interval History: No fevers documented overnight.   Mother at bedside this morning. Pt reports tolerating antibiotics without issues. Denies new or ongoing back/joint pain.     Review of Systems  Objective:     Vital Signs (Most Recent):  Temp: 98.5 °F (36.9 °C) (11/10/23 0752)  Pulse: 79 (11/10/23 0502)  Resp: 20 (11/10/23 0752)  BP: (!) 99/54 (11/10/23 0752)  SpO2: 98 % (11/10/23 0752) Vital Signs (24h Range):  Temp:  [97.9 °F (36.6 °C)-98.5 °F (36.9 °C)] 98.5 °F (36.9 °C)  Pulse:  [] 79  Resp:  [18-20] 20  SpO2:  [96 %-99 %] 98 %  BP: ()/(54-66) 99/54     Weight: 64.8 kg (142 lb 13.7 oz)  Body mass index is 18.85 kg/m².    Estimated Creatinine Clearance: 91 mL/min (based on SCr of 0.9 mg/dL).     Physical Exam  Constitutional:       General: He is not in acute distress.     Appearance: He is not ill-appearing or toxic-appearing.   Eyes:      General:         Right eye: No discharge.         Left eye: No discharge.   Cardiovascular:      Comments: MSI steristrips  Pulmonary:      Effort: Pulmonary effort is normal. No respiratory distress.   Skin:     General: Skin is warm and dry.   Neurological:      Mental Status: He is alert and oriented to person, place, and time.          Significant Labs:   Microbiology Results (last 7 days)       Procedure Component Value Units Date/Time    Blood culture [[id number]] Collected: 11/08/23 0814    Order Status: Completed Specimen: Blood from Peripheral, Antecubital, Right Updated: 11/10/23 1012     Blood Culture, Routine No Growth to date      No Growth to date      No Growth to date    Blood culture [[id number]] Collected: 11/10/23 0904    Order Status: Sent Specimen: Blood from Antecubital, Right Arm Updated: 11/10/23 0915    Blood culture [[id number]] Collected: 11/10/23 0905    Order Status: Sent Specimen: Blood from Peripheral, Right Hand Updated: 11/10/23 0915    Blood culture [[id number]] Collected: 11/08/23 0819    Order Status: Completed Specimen: Blood  from Peripheral, Wrist, Left Updated: 11/09/23 2336     Blood Culture, Routine Gram stain aer bottle: Gram positive cocci in clusters resembling Staph      Results called to and read back by: Harini Murphy RN  11/09/2023  23:35    Blood culture [[id number]]  (Abnormal) Collected: 11/06/23 1228    Order Status: Completed Specimen: Blood from Peripheral, Hand, Left Updated: 11/09/23 1123     Blood Culture, Routine Gram stain aer bottle: Gram positive cocci in clusters resembling Staph      Positive results previously called 11/07/2023  20:44      Gram stain carlie bottle: Gram positive cocci in clusters resembling Staph      Positive results previously called 11/08/2023  03:01      METHICILLIN RESISTANT STAPHYLOCOCCUS AUREUS  ID consult required at [facility].  For susceptibility see order #[id number]  ID consult required at [facility] and [facility].      Blood culture [[id number]]  (Abnormal)  (Susceptibility) Collected: 11/06/23 1216    Order Status: Completed Specimen: Blood from Peripheral, Antecubital, Right Updated: 11/09/23 1122     Blood Culture, Routine Gram stain aer bottle: Gram positive cocci in clusters resembling Staph      Results called to and read back by:Mark Garrido RN 11/07/2023      16:14      METHICILLIN RESISTANT STAPHYLOCOCCUS AUREUS  ID consult required at [facility] and [facility].      Blood culture [[id number]]  (Abnormal) Collected: 11/05/23 0109    Order Status: Completed Specimen: Blood from Peripheral, Hand, Left Updated: 11/08/23 1125     Blood Culture, Routine Gram stain aer bottle: Gram positive cocci in clusters resembling Staph      Results called to and read back by:Manuel Centeno Rn 11/06/2023  03:29      METHICILLIN RESISTANT STAPHYLOCOCCUS AUREUS  ID consult required at [facility].  For susceptibility see order #[id number]      Fungus culture [[id number]] Collected: 11/03/23 1516     Order Status: Completed Specimen: Chest from Heart Updated: 11/08/23 0753     Fungus (Mycology) Culture Culture in progress    Narrative:      Mitral Leaflet for Culture    Culture, Anaerobe [[id number]] Collected: 11/03/23 1516    Order Status: Completed Specimen: Chest from Heart Updated: 11/07/23 0814     Anaerobic Culture No anaerobes isolated    Narrative:      Mitral Leaflet for Culture    AFB Culture & Smear [[id number]] Collected: 11/03/23 1516    Order Status: Completed Specimen: Chest from Heart Updated: 11/06/23 1424     AFB Culture & Smear Culture in progress     AFB CULTURE STAIN No acid fast bacilli seen.    Narrative:      Mitral Leaflet for Culture    Blood culture [[id number]]  (Abnormal) Collected: 11/03/23 0614    Order Status: Completed Specimen: Blood from Peripheral, Hand, Left Updated: 11/06/23 1155     Blood Culture, Routine Gram stain aer bottle: Gram positive cocci in clusters resembling Staph      Results called to and read back by:Manuel Centeno Rn 11/04/2023  04:12      METHICILLIN RESISTANT STAPHYLOCOCCUS AUREUS  [facility]  For susceptibility see order # [id number]      Blood culture [[id number]]  (Abnormal) Collected: 11/01/23 0607    Order Status: Completed Specimen: Blood from Peripheral, Forearm, Left Updated: 11/06/23 1154     Blood Culture, Routine Gram stain aer bottle: Gram positive cocci in clusters resembling Staph      Positive results previously called 11/02/2023  04:20      Gram stain carlie bottle: Gram positive cocci in clusters resembling Staph      Positive results previously called 11/04/2023  11:33      METHICILLIN RESISTANT STAPHYLOCOCCUS AUREUS  For susceptibility see order #[id number]  [facility]      Blood culture [[id number]]  (Abnormal)  (Susceptibility) Collected: 11/03/23 0609    Order Status: Completed Specimen: Blood from Peripheral, Hand, Right  Updated: 11/06/23 1153     Blood Culture, Routine Gram stain aer bottle: Gram positive cocci in clusters resembling Staph      Positive results previously called  11/04/2023 06:05      METHICILLIN RESISTANT STAPHYLOCOCCUS AUREUS  ID consult required at [facility].      Aerobic culture [[id number]]  (Abnormal)  (Susceptibility) Collected: 11/03/23 1516    Order Status: Completed Specimen: Chest from Heart Updated: 11/06/23 1028     Aerobic Bacterial Culture METHICILLIN RESISTANT STAPHYLOCOCCUS AUREUS  Moderate      Narrative:      Mitral Leaflet for Culture    Blood culture [[id number]]  (Abnormal) Collected: 11/01/23 0609    Order Status: Completed Specimen: Blood from Peripheral, Hand, Right Updated: 11/05/23 0714     Blood Culture, Routine Gram stain aer bottle: Gram positive cocci in clusters resembling Staph      Results called to and read back by:Melissa Ferro RN 11/01/2023  22:25      Gram stain carlie bottle: Gram positive cocci in clusters resembling Staph      Positive results previously called 11/03/2023  21:56      METHICILLIN RESISTANT STAPHYLOCOCCUS AUREUS  ID consult required at [facility].  For susceptibility see order #[id number]       Comment: Previous comment was modified by CHRISTEN VELÁSQUEZ at 08:21 on 11/04/2023   ID consult required at [facility].For   susceptibility see order #[id number] consult required at [facility].         Gram stain [[id number]] Collected: 11/03/23 1516    Order Status: Completed Specimen: Chest from Heart Updated: 11/03/23 1711     Gram Stain Result Rare WBC's      Few Gram positive cocci    Narrative:      Mitral Leaflet for Culture            Significant Imaging: I have reviewed all pertinent imaging results/findings within the past 24 hours.

## 2023-11-10 NOTE — PT/OT/SLP PROGRESS
Physical Therapy Treatment    Patient Name:  Lorenzo Nino   MRN:  [id number]  Admitting Diagnosis:  Acute bacterial endocarditis   Recent Surgery: Procedure(s) (LRB):  REPAIR, MITRAL VALVE, OPEN (N/A)  EXCLUSION, LEFT ATRIAL APPENDAGE, OPEN, AS PART OF OPEN CHEST SURGERY (Left) 7 Days Post-Op  Admit Date: 10/30/2023  Length of Stay: 11 days    Recommendations:     Discharge Recommendations:    High Intensity Therapy  Discharge Equipment Recommendations: bedside commode   Barriers to discharge:  increased need for caregiver assistance; fall risk    Appropriate transfer level with nursing staff: Ambulatory in room with minimum assistance    Plan:     During this hospitalization, patient to be seen 5 x/week to address the identified rehab impairments via gait training, therapeutic activities, therapeutic exercises, neuromuscular re-education and progress towards the established goals.  Plan of Care Expires:  12/03/23  Plan of Care Reviewed with: patient, family    Assessment:     Lorenzo Nino is a 48 y.o. male admitted with a medical diagnosis of Acute bacterial endocarditis. Pt agreeable to session today. Pt demo'd increased ambulation distance this session but continues to demo decreased endurance and impaired balance resulting in need for minimum assistance with ambulation. Patient would benefit from skilled therapy services to maximize safety and independence, increase activity tolerance, decrease fall risk, decrease caregiver burden, improve QOL, improve patient's functional mobility, and decrease risk of contractures and pressure sores. Patient has demonstrated sufficient progression to warrant high intensity therapy evidenced by objectives noted below.     Problem List: weakness, impaired endurance, impaired self care skills, impaired functional mobility, gait instability, impaired balance, decreased lower extremity function, decreased upper extremity function, decreased safety awareness, impaired  "cardiopulmonary response to activity.  Rehab Prognosis: Good; patient would benefit from acute skilled PT services to address these deficits and reach maximum level of function.      Goals:   Multidisciplinary Problems       Physical Therapy Goals          Problem: Physical Therapy    Goal Priority Disciplines Outcome Goal Variances Interventions   Physical Therapy Goal     PT, PT/OT Ongoing, Progressing     Description: Goals to be met by: 23     Patient will increase functional independence with mobility by performin. Supine to sit with Set-up [place]  2. Sit to stand transfer with Stand-by Assistance  3. Bed to chair transfer with Stand-by Assistance using No Assistive Device  4. Gait  x 175 feet with Stand-by Assistance using No Assistive Device.   5. Lower extremity exercise program x20 reps per handout, with independence  6. Pt independently recalling 3/3 sternal precautions                         Subjective     RN notified prior to session. Family present upon PT entrance into room. Patient agreeable to PT treatment session.    Chief Complaint: "I feel better today"  Patient/Family Comments/goals: increase mobility   Pain/Comfort:  Pain Rating 1: 0/10  Pain Rating Post-Intervention 1: 0/10      Objective:     Patient found up in chair with: telemetry   Cognition:   Alert and Cooperative  Patient is oriented to Person, Place, Time, Situation  General Precautions: Standard, Cardiac fall, sternal   Orthopedic Precautions:N/A   Braces: N/A   Body mass index is 18.85 kg/m².  Oxygen Device: Room Air  Vitals: BP (!) 101/58 (BP Location: Left arm, Patient Position: Lying)   Pulse 85   Temp 97.6 °F (36.4 °C) (Oral)   Resp 16   Ht 6' 1" (1.854 m)   Wt 64.8 kg (142 lb 13.7 oz)   SpO2 (!) 94%   BMI 18.85 kg/m²     Outcome Measures:  AM-PAC 6 CLICK MOBILITY  Turning over in bed (including adjusting bedclothes, sheets and blankets)?: 3  Sitting down on and standing up from a chair with arms (e.g., " wheelchair, bedside commode, etc.): 3  Moving from lying on back to sitting on the side of the bed?: 3  Moving to and from a bed to a chair (including a wheelchair)?: 3  Need to walk in hospital room?: 3  Climbing 3-5 steps with a railing?: 2  Basic Mobility Total Score: 17     Functional Mobility:    Bed Mobility:   Pt found/returned to bedside chair    Transfers:   Sit <> Stand Transfer: minimum assistance with no assistive device x2 trials from chair    Standing Balance:  Static Standing Balance: contact guard assistance  Dynamic Standing Balance: minimum assistance  Patient used: no assistive device       Gait:  Patient ambulated: 78' x2 trials (seated rest break between trials)   Patient required: minimal assist  Patient used:  no assistive device   Gait Deviation(s): unsteady gait, decreased step length, narrow base of support, decreased weight shift, decreased foot clearance, flexed posture, decreased kayode, and decreased arm swing  all lines remained intact throughout ambulation trial  Chair follow for patient safety (family)  Gait belt utilized  Comments: Minimum assistance required for balance. Pt with no overt LOB but with slight R lateral lean during first gait trial. VC to increase TAY, step length and increase heel strike but pt unable to maintain corrections throughout entire trial.     Education:  Time provided for education, counseling and discussion of health disposition in regards to patient's current status  All questions answered within PT scope of practice and to patient's satisfaction  PT role in POC to address current functional deficits  Pt educated on proper body mechanics, safety techniques, and energy conservation with PT facilitation and cueing throughout session  Call nursing/pct to transfer to chair/use bathroom. Pt stated understanding.    Patient left up in chair with all lines intact, call button in reach, RN notified, and family present.    Time Tracking:     PT Received On:  11/10/23  PT Start Time: 1345     PT Stop Time: 1403  PT Total Time (min): 18 min     Billable Minutes:   Gait Training 18 minutes    Treatment Type: Treatment  PT/PTA: PT       11/10/2023

## 2023-11-10 NOTE — SUBJECTIVE & OBJECTIVE
Interval History: Unable to place PICC line as blood cultures from 11/8 ar positive.  Will draw blood cultures daily until cleared per ID recommendations. He remains afebrile, continues to work with PT/OT to optimize strength and conditioning    Review of Systems   Constitutional: Positive for malaise/fatigue.   Cardiovascular:  Negative for chest pain, claudication, dyspnea on exertion, irregular heartbeat, leg swelling and palpitations.   Respiratory:  Negative for cough and shortness of breath.    Hematologic/Lymphatic: Negative for bleeding problem.   Gastrointestinal:  Negative for abdominal pain.   Genitourinary:  Negative for dysuria.   Neurological:  Negative for headaches and weakness.     Medications:  Continuous Infusions:  Scheduled Meds:   acetaminophen  1,000 mg Oral Q8H    aspirin  325 mg Oral Daily    ceftaroline (TEFLARO) IVPB  600 mg Intravenous Q8H    DAPTOmycin (CUBICIN) IV (PEDS and ADULTS)  8 mg/kg Intravenous Q24H    docusate sodium  100 mg Oral BID    metoprolol tartrate  25 mg Oral Once    metoprolol tartrate  75 mg Oral BID    NIFEdipine  60 mg Oral Daily    polyethylene glycol  17 g Oral Daily    potassium chloride  20 mEq Oral BID     PRN Meds:acetaminophen, albuterol-ipratropium, dextrose 10%, dextrose 10%, melatonin, ondansetron, oxyCODONE, oxyCODONE, prochlorperazine     Objective:     Vital Signs (Most Recent):  Temp: 98.5 °F (36.9 °C) (11/10/23 0752)  Pulse: 79 (11/10/23 0502)  Resp: 20 (11/10/23 0752)  BP: (!) 99/54 (11/10/23 0752)  SpO2: 98 % (11/10/23 0752) Vital Signs (24h Range):  Temp:  [97.9 °F (36.6 °C)-98.5 °F (36.9 °C)] 98.5 °F (36.9 °C)  Pulse:  [] 79  Resp:  [18-20] 20  SpO2:  [96 %-99 %] 98 %  BP: ()/(54-66) 99/54     Weight: 64.8 kg (142 lb 13.7 oz)  Body mass index is 18.85 kg/m².    SpO2: 98 %       Intake/Output - Last 3 Shifts         11/08 0700  11/09 0659 11/09 0700  11/10 0659 11/10 0700  11/11 0659    P.O. 870 1242     IV Piggyback 834.3      Total  Intake(mL/kg) 1704.3 (22.3) 1242 (18.3)     Urine (mL/kg/hr) 425 (0.2) 1450 (0.9)     Stool       Total Output 425 1450     Net +1279.3 -208            Urine Occurrence 1 x 0 x     Stool Occurrence 1 x              Lines/Drains/Airways       Peripheral Intravenous Line  Duration                  Peripheral IV - Single Lumen 11/05/23 0100 18 G Left;Posterior Hand 5 days         Peripheral IV - Single Lumen 11/05/23 1155 18 G;1 3/4 in Anterior;Left Forearm 4 days                     Physical Exam  Constitutional:       Appearance: Normal appearance.   HENT:      Head: Normocephalic.   Eyes:      Pupils: Pupils are equal, round, and reactive to light.   Cardiovascular:      Rate and Rhythm: Normal rate and regular rhythm.      Pulses: Normal pulses.   Pulmonary:      Effort: Pulmonary effort is normal.   Abdominal:      General: Abdomen is flat. Bowel sounds are normal.      Palpations: Abdomen is soft.   Musculoskeletal:         General: Normal range of motion.   Skin:     General: Skin is warm and dry.      Comments: MSI CDI   Neurological:      General: No focal deficit present.      Mental Status: He is alert.            Significant Labs:  BMP:   Recent Labs   Lab 11/10/23  0459   GLU 98      K 4.0      CO2 23   BUN 15   CREATININE 0.9   CALCIUM 8.0*   MG 2.1     CBC:   Recent Labs   Lab 11/10/23  0459   WBC 10.57   RBC 2.46*   HGB 7.5*   HCT 23.4*      MCV 95   MCH 30.5   MCHC 32.1     CMP:   Recent Labs   Lab 11/10/23  0459   GLU 98   CALCIUM 8.0*   ALBUMIN 1.9*   PROT 6.4      K 4.0   CO2 23      BUN 15   CREATININE 0.9   ALKPHOS 172*   ALT 64*   AST 57*   BILITOT 0.8     Coagulation:   Recent Labs   Lab 11/10/23  0459   INR 1.0   APTT 26.9       Significant Diagnostics:  I have reviewed and interpreted all pertinent imaging results/findings within the past 24 hours.

## 2023-11-11 LAB
ALBUMIN SERPL BCP-MCNC: 1.9 G/DL (ref 3.5–5.2)
ALP SERPL-CCNC: 149 U/L (ref 55–135)
ALT SERPL W/O P-5'-P-CCNC: 48 U/L (ref 10–44)
ANION GAP SERPL CALC-SCNC: 8 MMOL/L (ref 8–16)
APTT PPP: 27.9 SEC (ref 21–32)
AST SERPL-CCNC: 32 U/L (ref 10–40)
BILIRUB SERPL-MCNC: 0.7 MG/DL (ref 0.1–1)
BUN SERPL-MCNC: 11 MG/DL (ref 6–20)
CALCIUM SERPL-MCNC: 8 MG/DL (ref 8.7–10.5)
CHLORIDE SERPL-SCNC: 108 MMOL/L (ref 95–110)
CK SERPL-CCNC: 89 U/L (ref 20–200)
CO2 SERPL-SCNC: 21 MMOL/L (ref 23–29)
CREAT SERPL-MCNC: 0.9 MG/DL (ref 0.5–1.4)
ERYTHROCYTE [DISTWIDTH] IN BLOOD BY AUTOMATED COUNT: 15.9 % (ref 11.5–14.5)
EST. GFR  (NO RACE VARIABLE): >60 ML/MIN/1.73 M^2
GLUCOSE SERPL-MCNC: 121 MG/DL (ref 70–110)
HCT VFR BLD AUTO: 22.2 % (ref 40–54)
HGB BLD-MCNC: 6.9 G/DL (ref 14–18)
HGB BLD-MCNC: 7.4 G/DL (ref 14–18)
INR PPP: 1.1 (ref 0.8–1.2)
MAGNESIUM SERPL-MCNC: 2 MG/DL (ref 1.6–2.6)
MCH RBC QN AUTO: 29.7 PG (ref 27–31)
MCHC RBC AUTO-ENTMCNC: 31.1 G/DL (ref 32–36)
MCV RBC AUTO: 96 FL (ref 82–98)
PHOSPHATE SERPL-MCNC: 3.5 MG/DL (ref 2.7–4.5)
PLATELET # BLD AUTO: 328 K/UL (ref 150–450)
PMV BLD AUTO: 12.1 FL (ref 9.2–12.9)
POTASSIUM SERPL-SCNC: 3.7 MMOL/L (ref 3.5–5.1)
PROT SERPL-MCNC: 6.8 G/DL (ref 6–8.4)
PROTHROMBIN TIME: 11.4 SEC (ref 9–12.5)
RBC # BLD AUTO: 2.32 M/UL (ref 4.6–6.2)
SODIUM SERPL-SCNC: 137 MMOL/L (ref 136–145)
WBC # BLD AUTO: 9.6 K/UL (ref 3.9–12.7)

## 2023-11-11 PROCEDURE — 97530 THERAPEUTIC ACTIVITIES: CPT | Mod: CO

## 2023-11-11 PROCEDURE — 25000003 PHARM REV CODE 250: Performed by: NURSE PRACTITIONER

## 2023-11-11 PROCEDURE — 82550 ASSAY OF CK (CPK): CPT | Performed by: STUDENT IN AN ORGANIZED HEALTH CARE EDUCATION/TRAINING PROGRAM

## 2023-11-11 PROCEDURE — 36415 COLL VENOUS BLD VENIPUNCTURE: CPT | Performed by: NURSE PRACTITIONER

## 2023-11-11 PROCEDURE — 97116 GAIT TRAINING THERAPY: CPT | Mod: CQ

## 2023-11-11 PROCEDURE — 85018 HEMOGLOBIN: CPT | Performed by: STUDENT IN AN ORGANIZED HEALTH CARE EDUCATION/TRAINING PROGRAM

## 2023-11-11 PROCEDURE — 25000003 PHARM REV CODE 250: Performed by: STUDENT IN AN ORGANIZED HEALTH CARE EDUCATION/TRAINING PROGRAM

## 2023-11-11 PROCEDURE — 87040 BLOOD CULTURE FOR BACTERIA: CPT | Performed by: NURSE PRACTITIONER

## 2023-11-11 PROCEDURE — 36415 COLL VENOUS BLD VENIPUNCTURE: CPT | Performed by: STUDENT IN AN ORGANIZED HEALTH CARE EDUCATION/TRAINING PROGRAM

## 2023-11-11 PROCEDURE — 80053 COMPREHEN METABOLIC PANEL: CPT | Performed by: STUDENT IN AN ORGANIZED HEALTH CARE EDUCATION/TRAINING PROGRAM

## 2023-11-11 PROCEDURE — 63600175 PHARM REV CODE 636 W HCPCS: Mod: JZ,JG | Performed by: STUDENT IN AN ORGANIZED HEALTH CARE EDUCATION/TRAINING PROGRAM

## 2023-11-11 PROCEDURE — 20600001 HC STEP DOWN PRIVATE ROOM

## 2023-11-11 PROCEDURE — 83735 ASSAY OF MAGNESIUM: CPT | Performed by: THORACIC SURGERY (CARDIOTHORACIC VASCULAR SURGERY)

## 2023-11-11 PROCEDURE — 27000207 HC ISOLATION

## 2023-11-11 PROCEDURE — 97530 THERAPEUTIC ACTIVITIES: CPT | Mod: CQ

## 2023-11-11 PROCEDURE — 84100 ASSAY OF PHOSPHORUS: CPT | Performed by: THORACIC SURGERY (CARDIOTHORACIC VASCULAR SURGERY)

## 2023-11-11 PROCEDURE — 85027 COMPLETE CBC AUTOMATED: CPT

## 2023-11-11 PROCEDURE — 25000003 PHARM REV CODE 250

## 2023-11-11 PROCEDURE — 85610 PROTHROMBIN TIME: CPT

## 2023-11-11 PROCEDURE — 85730 THROMBOPLASTIN TIME PARTIAL: CPT

## 2023-11-11 RX ORDER — POTASSIUM CHLORIDE 750 MG/1
30 CAPSULE, EXTENDED RELEASE ORAL ONCE
Status: COMPLETED | OUTPATIENT
Start: 2023-11-11 | End: 2023-11-11

## 2023-11-11 RX ADMIN — CEFTAROLINE FOSAMIL 600 MG: 600 POWDER, FOR SOLUTION INTRAVENOUS at 08:11

## 2023-11-11 RX ADMIN — NIFEDIPINE 60 MG: 60 TABLET, FILM COATED, EXTENDED RELEASE ORAL at 09:11

## 2023-11-11 RX ADMIN — POTASSIUM CHLORIDE 30 MEQ: 10 CAPSULE, COATED, EXTENDED RELEASE ORAL at 12:11

## 2023-11-11 RX ADMIN — CEFTAROLINE FOSAMIL 600 MG: 600 POWDER, FOR SOLUTION INTRAVENOUS at 12:11

## 2023-11-11 RX ADMIN — ACETAMINOPHEN 650 MG: 325 TABLET ORAL at 12:11

## 2023-11-11 RX ADMIN — OXYCODONE HYDROCHLORIDE 5 MG: 5 TABLET ORAL at 07:11

## 2023-11-11 RX ADMIN — ASPIRIN 325 MG ORAL TABLET 325 MG: 325 PILL ORAL at 09:11

## 2023-11-11 RX ADMIN — METOPROLOL TARTRATE 50 MG: 50 TABLET, FILM COATED ORAL at 07:11

## 2023-11-11 RX ADMIN — METOPROLOL TARTRATE 50 MG: 50 TABLET, FILM COATED ORAL at 09:11

## 2023-11-11 RX ADMIN — CEFTAROLINE FOSAMIL 600 MG: 600 POWDER, FOR SOLUTION INTRAVENOUS at 04:11

## 2023-11-11 RX ADMIN — DAPTOMYCIN 610 MG: 500 INJECTION, POWDER, LYOPHILIZED, FOR SOLUTION INTRAVENOUS at 12:11

## 2023-11-11 RX ADMIN — DOCUSATE SODIUM 100 MG: 100 CAPSULE, LIQUID FILLED ORAL at 07:11

## 2023-11-11 RX ADMIN — ACETAMINOPHEN 650 MG: 325 TABLET ORAL at 09:11

## 2023-11-11 NOTE — SUBJECTIVE & OBJECTIVE
Interval History: No acute events overnight, no new complaints. Bcx from 11/10 preliminarily positive for GPC. Hg again low at 6.8 this morning in the setting of multiple blood draws/cultures.    Review of Systems   Constitutional: Positive for malaise/fatigue.   Cardiovascular:  Negative for chest pain, claudication, dyspnea on exertion, irregular heartbeat, leg swelling and palpitations.   Respiratory:  Negative for cough and shortness of breath.    Hematologic/Lymphatic: Negative for bleeding problem.   Gastrointestinal:  Negative for abdominal pain.   Genitourinary:  Negative for dysuria.   Neurological:  Negative for headaches and weakness.     Medications:  Continuous Infusions:  Scheduled Meds:   aspirin  325 mg Oral Daily    ceftaroline (TEFLARO) IVPB  600 mg Intravenous Q8H    DAPTOmycin (CUBICIN) IV (PEDS and ADULTS)  8 mg/kg Intravenous Q24H    docusate sodium  100 mg Oral BID    metoprolol tartrate  50 mg Oral BID    NIFEdipine  60 mg Oral Daily    polyethylene glycol  17 g Oral Daily     PRN Meds:acetaminophen, albuterol-ipratropium, dextrose 10%, dextrose 10%, melatonin, ondansetron, oxyCODONE, oxyCODONE, prochlorperazine     Objective:     Vital Signs (Most Recent):  Temp: 98.8 °F (37.1 °C) (11/11/23 0739)  Pulse: 89 (11/11/23 0739)  Resp: 18 (11/11/23 0739)  BP: (!) 107/58 (11/11/23 0739)  SpO2: 97 % (11/11/23 0739) Vital Signs (24h Range):  Temp:  [97.6 °F (36.4 °C)-100.3 °F (37.9 °C)] 98.8 °F (37.1 °C)  Pulse:  [] 89  Resp:  [16-20] 18  SpO2:  [94 %-97 %] 97 %  BP: ()/(57-68) 107/58     Weight: 64.8 kg (142 lb 13.7 oz)  Body mass index is 18.85 kg/m².    SpO2: 97 %       Intake/Output - Last 3 Shifts         11/09 0700  11/10 0659 11/10 0700  11/11 0659 11/11 0700  11/12 0659    P.O. 1242 1560     IV Piggyback       Total Intake(mL/kg) 1242 (18.3) 1560 (24.1)     Urine (mL/kg/hr) 1450 (0.9) 1950 (1.3)     Stool  0     Total Output 1450 1950     Net -208 -390            Urine Occurrence  0 x      Stool Occurrence  0 x             Lines/Drains/Airways       Peripheral Intravenous Line  Duration                  Peripheral IV - Single Lumen 11/05/23 0100 18 G Left;Posterior Hand 6 days         Peripheral IV - Single Lumen 11/05/23 1155 18 G;1 3/4 in Anterior;Left Forearm 5 days                     Physical Exam  Constitutional:       Appearance: Normal appearance.   HENT:      Head: Normocephalic.   Eyes:      Pupils: Pupils are equal, round, and reactive to light.   Cardiovascular:      Rate and Rhythm: Normal rate and regular rhythm.      Pulses: Normal pulses.   Pulmonary:      Effort: Pulmonary effort is normal.   Abdominal:      General: Abdomen is flat. Bowel sounds are normal.      Palpations: Abdomen is soft.   Musculoskeletal:         General: Normal range of motion.   Skin:     General: Skin is warm and dry.      Comments: MSI CDI   Neurological:      General: No focal deficit present.      Mental Status: He is alert.            Significant Labs:  All pertinent labs from the last 24 hours have been reviewed.    Significant Diagnostics:  I have reviewed all pertinent imaging results/findings within the past 24 hours.

## 2023-11-11 NOTE — PROGRESS NOTES
[facility] - Cardiology Stepdown  Cardiothoracic Surgery  Progress Note    Patient Name: Lorenzo Nino  MRN: [id number]  Admission Date: 10/30/2023  Hospital Length of Stay: 12 days  Code Status: Full Code   Attending Physician: Manuel Beal MD   Referring Provider: Self, Aaareferral  Principal Problem:Acute bacterial endocarditis      Subjective:     Post-Op Info:  Procedure(s) (LRB):  REPAIR, MITRAL VALVE, OPEN (N/A)  EXCLUSION, LEFT ATRIAL APPENDAGE, OPEN, AS PART OF OPEN CHEST SURGERY (Left)   8 Days Post-Op     No new subjective & objective note has been filed under this hospital service since the last note was generated.    Assessment/Plan:     * Acute bacterial endocarditis  - ID following   - Continue with recommended antibiotics  - Awaiting blood cultures to clear for PICC line placement  - Blood cultures daily until cleared   - Continue with salvage therapy per ID recommendations     S/P MVR (mitral valve repair)  - Hg 6.9 this morning. Recheck later, possibly transfuse. Currently asymptomatic and HDS.  - Maintain sternal precautions   - ASA  - BB  - Encourage ambulation  - Regular diet   - Boost with each meal  - Bowel regimen in place   - Monitor electrolytes to keep Mag above 2 and Potassium above 4  - OOBTO   - Encourage IS use  - Continue working with PT/OT      Transient hyperglycemia post procedure  - Endocrine consulted during admission but have since signed off      MRSA bacteremia  - ID following   Recommendations:  Continue daptomycin and ceftaroline for salvage therapy   Not a candidate for gent/rif given risk of nephrotoxicity/hepatotoxicity  Monitor CK while on abx therapy, prior elevated CK trending down, recent 220. Ordered   Follow up repeat blcx  If repeat blcx are positive, will need further work up (spinal imaging etc)  Anticipate 6w course of abx therapy from cleared blood cultures  Contact precautions per hospital protocol    Heart failure with mid-range ejection fraction  (HFmEF)  - stable    Paroxysmal atrial fibrillation  - Continue BB  - Tele monitor   - Monitor and replace lytes as needed    Endocarditis  47 yo M admitted 10/30 with PNA vs UTI vs meningitis requiring vasopressor support. No history of IVDU or recent dental work. Further work up revealed MRSA bacteremia. TTE revealed LVEF 40-45%. Mitral valve vegetation with moderate MR due to flail anterior leaflet. Small vegetation on RCC of Ao valve, no AI noted.     Elevated transaminase level  - Resolving    Thrombocytopenia  - CBC daily   - Resolved     FRANCESCO (acute kidney injury)  - Resolved        Tiago Arevalo DO  Cardiothoracic Surgery  [facility] - Cardiology Stepdown

## 2023-11-11 NOTE — PLAN OF CARE
Problem: Infection  Goal: Absence of Infection Signs and Symptoms  Outcome: Ongoing, Progressing   Continuing antibiotics; home with IV antibiotics once PICC line is place.      Problem: Fall Injury Risk  Goal: Absence of Fall and Fall-Related Injury  Outcome: Ongoing, Progressing   Patient advised to call for assistance when getting up; assist x1 to ambulate due to weakness. Patient has been compliant with education. Will continue to monitor.

## 2023-11-11 NOTE — ASSESSMENT & PLAN NOTE
47 yo M admitted 10/30 with PNA vs UTI vs meningitis requiring vasopressor support. No history of IVDU or recent dental work. Further work up revealed MRSA bacteremia. TTE revealed LVEF 40-45%. Mitral valve vegetation with moderate MR due to flail anterior leaflet. Small vegetation on RCC of Ao valve, no AI noted.

## 2023-11-11 NOTE — ASSESSMENT & PLAN NOTE
- Hg 6.9 this morning. Recheck later, possibly transfuse. Currently asymptomatic and HDS.  - Maintain sternal precautions   - ASA  - BB  - Encourage ambulation  - Regular diet   - Boost with each meal  - Bowel regimen in place   - Monitor electrolytes to keep Mag above 2 and Potassium above 4  - OOBTO   - Encourage IS use  - Continue working with PT/OT

## 2023-11-11 NOTE — PLAN OF CARE
Chart reviewed - blood cx from 11/10 so far no growth to date. Continue salvage therapy with dapto/ceftaroline, CPK 89. Follow up blcx.     ID will continue to follow.

## 2023-11-11 NOTE — PROGRESS NOTES
[facility] - Cardiology Stepdown  Cardiothoracic Surgery  Progress Note    Patient Name: Lorenzo Nino  MRN: [id number]  Admission Date: 10/30/2023  Hospital Length of Stay: 12 days  Code Status: Full Code   Attending Physician: Manuel Beal MD   Referring Provider: Self, Aaareferral  Principal Problem:Acute bacterial endocarditis      Subjective:     Post-Op Info:  Procedure(s) (LRB):  REPAIR, MITRAL VALVE, OPEN (N/A)  EXCLUSION, LEFT ATRIAL APPENDAGE, OPEN, AS PART OF OPEN CHEST SURGERY (Left)   8 Days Post-Op     Interval History: No acute events overnight, no new complaints. Working with therapy.    Review of Systems   Constitutional: Positive for malaise/fatigue.   Cardiovascular:  Negative for chest pain, claudication, dyspnea on exertion, irregular heartbeat, leg swelling and palpitations.   Respiratory:  Negative for cough and shortness of breath.    Hematologic/Lymphatic: Negative for bleeding problem.   Gastrointestinal:  Negative for abdominal pain.   Genitourinary:  Negative for dysuria.   Neurological:  Negative for headaches and weakness.     Medications:  Continuous Infusions:  Scheduled Meds:   aspirin  325 mg Oral Daily    ceftaroline (TEFLARO) IVPB  600 mg Intravenous Q8H    DAPTOmycin (CUBICIN) IV (PEDS and ADULTS)  8 mg/kg Intravenous Q24H    docusate sodium  100 mg Oral BID    metoprolol tartrate  50 mg Oral BID    NIFEdipine  60 mg Oral Daily    polyethylene glycol  17 g Oral Daily     PRN Meds:acetaminophen, albuterol-ipratropium, dextrose 10%, dextrose 10%, melatonin, ondansetron, oxyCODONE, oxyCODONE, prochlorperazine     Objective:     Vital Signs (Most Recent):  Temp: 98.8 °F (37.1 °C) (11/11/23 0739)  Pulse: 89 (11/11/23 0739)  Resp: 18 (11/11/23 0739)  BP: (!) 107/58 (11/11/23 0739)  SpO2: 97 % (11/11/23 0739) Vital Signs (24h Range):  Temp:  [97.6 °F (36.4 °C)-100.3 °F (37.9 °C)] 98.8 °F (37.1 °C)  Pulse:  [] 89  Resp:  [16-20] 18  SpO2:  [94 %-97 %] 97 %  BP:  ()/(57-68) 107/58     Weight: 64.8 kg (142 lb 13.7 oz)  Body mass index is 18.85 kg/m².    SpO2: 97 %       Intake/Output - Last 3 Shifts         11/09 0700  11/10 0659 11/10 0700  11/11 0659 11/11 0700  11/12 0659    P.O. 1242 1560     IV Piggyback       Total Intake(mL/kg) 1242 (18.3) 1560 (24.1)     Urine (mL/kg/hr) 1450 (0.9) 1950 (1.3)     Stool  0     Total Output 1450 1950     Net -208 -390            Urine Occurrence 0 x      Stool Occurrence  0 x             Lines/Drains/Airways       Peripheral Intravenous Line  Duration                  Peripheral IV - Single Lumen 11/05/23 0100 18 G Left;Posterior Hand 6 days         Peripheral IV - Single Lumen 11/05/23 1155 18 G;1 3/4 in Anterior;Left Forearm 5 days                     Physical Exam  Constitutional:       Appearance: Normal appearance.   HENT:      Head: Normocephalic.   Eyes:      Pupils: Pupils are equal, round, and reactive to light.   Cardiovascular:      Rate and Rhythm: Normal rate and regular rhythm.      Pulses: Normal pulses.   Pulmonary:      Effort: Pulmonary effort is normal.   Abdominal:      General: Abdomen is flat. Bowel sounds are normal.      Palpations: Abdomen is soft.   Musculoskeletal:         General: Normal range of motion.   Skin:     General: Skin is warm and dry.      Comments: MSI CDI   Neurological:      General: No focal deficit present.      Mental Status: He is alert.            Significant Labs:  All pertinent labs from the last 24 hours have been reviewed.    Significant Diagnostics:  I have reviewed all pertinent imaging results/findings within the past 24 hours.  Assessment/Plan:     * Acute bacterial endocarditis  - ID following   - Continue with recommended antibiotics  - Awaiting blood cultures to clear for PICC line placement  - Blood cultures daily until cleared   - Continue with salvage therapy per ID recommendations     S/P MVR (mitral valve repair)  - Maintain sternal precautions   - ASA  - BB  -  Encourage ambulation  - Regular diet   - Boost with each meal  - Bowel regimen in place   - Monitor electrolytes to keep Mag above 2 and Potassium above 4  - OOBTO   - Encourage IS use  - Continue working with PT/OT      Transient hyperglycemia post procedure  - Endocrine consulted during admission but have since signed off      MRSA bacteremia  - ID following   Recommendations:  Continue daptomycin and ceftaroline for salvage therapy   Not a candidate for gent/rif given risk of nephrotoxicity/hepatotoxicity  Monitor CK while on abx therapy, prior elevated CK trending down, recent 220. Ordered   Follow up repeat blcx  If repeat blcx are positive, will need further work up (spinal imaging etc)  Anticipate 6w course of abx therapy from cleared blood cultures  Contact precautions per hospital protocol    Heart failure with mid-range ejection fraction (HFmEF)  - stable    Paroxysmal atrial fibrillation  - Continue BB  - Tele monitor   - Monitor and replace lytes as needed    Endocarditis  49 yo M admitted 10/30 with PNA vs UTI vs meningitis requiring vasopressor support. No history of IVDU or recent dental work. Further work up revealed MRSA bacteremia. TTE revealed LVEF 40-45%. Mitral valve vegetation with moderate MR due to flail anterior leaflet. Small vegetation on RCC of Ao valve, no AI noted.     Elevated transaminase level  - Resolving    Thrombocytopenia  - CBC daily   - Resolved     FRANCESCO (acute kidney injury)  - Resolved        Tiago Arevalo DO  Cardiothoracic Surgery  [facility] - Cardiology Stepdown

## 2023-11-11 NOTE — PT/OT/SLP PROGRESS
Physical Therapy Treatment    Patient Name:  Lorenzo Nino   MRN:  [id number]    Recommendations:     Discharge Recommendations: High Intensity Therapy  Discharge Equipment Recommendations: bedside commode  Barriers to discharge:  fall risk    Assessment:     Lorenzo Nino is a 48 y.o. male admitted with a medical diagnosis of Acute bacterial endocarditis.  He presents with the following impairments/functional limitations: weakness, impaired endurance, impaired self care skills, impaired functional mobility, gait instability, impaired balance, decreased lower extremity function, decreased upper extremity function, decreased safety awareness, impaired cardiopulmonary response to activity  Pt continues to remain motivated and cooperative with treatment session. Pt Progressing with PT Intervention. Pt Progressing with improving gait distance. Pt would continue to benefit from skilled PT to address overall functional mobility, goals , and to return to functional baseline.  Goals remain appropriate.      Rehab Prognosis: Good; patient would benefit from acute skilled PT services to address these deficits and reach maximum level of function.    Recent Surgery: Procedure(s) (LRB):  REPAIR, MITRAL VALVE, OPEN (N/A)  EXCLUSION, LEFT ATRIAL APPENDAGE, OPEN, AS PART OF OPEN CHEST SURGERY (Left) 8 Days Post-Op    Plan:     During this hospitalization, patient to be seen 5 x/week to address the identified rehab impairments via gait training, therapeutic activities, therapeutic exercises, neuromuscular re-education and progress toward the following goals:    Plan of Care Expires:  12/03/23    Subjective     Chief Complaint: its hard for me to get out of the chair    Pain/Comfort:  Pain Rating 1: 0/10      Objective:     Communicated with RN prior to session.  Patient found up in chair with telemetry upon PT entry to room.     General Precautions: Standard, fall, sternal  Orthopedic Precautions: N/A  Braces: N/A  Respiratory  Status: Room air     Functional Mobility:  Transfers:     Sit to Stand:  minimum assistance with no AD  Gait: pt amb with no AD with [place]win 85 ft x 2 with   slight R lateral lean during first gait trial. VC to increase TAY, step length and increase heel strike       AM-PAC 6 CLICK MOBILITY  Turning over in bed (including adjusting bedclothes, sheets and blankets)?: 3  Sitting down on and standing up from a chair with arms (e.g., wheelchair, bedside commode, etc.): 3  Moving from lying on back to sitting on the side of the bed?: 3  Moving to and from a bed to a chair (including a wheelchair)?: 3  Need to walk in hospital room?: 3  Climbing 3-5 steps with a railing?: 2  Basic Mobility Total Score: 17       Treatment & Education:  Therapist provided instruction and educated of  patient on progress, safety,d/c,PT POC,   proper body mechanics, energy conservation, and fall prevention strategies during tasks listed above, on the effects of prolonged immobility and the importance of performing OOB activity and exercises to promote healing and reduce recovery time      Patient  facilitated therex  seated in bedside chair B LE AROM AP, LAQ, Hip Flexion, Hip Abd/Add. Patient required skilled PTA for instruction of exercises and appropriate cues to perform exercises safely, sequencing and appropriately.   Exercises performed to develop and maintain pt's strength, endurance and flexibility.  Updated white board with appropriate PT mobility information for medical team notification     Pt safe to ambulate in hallway with RN or PCT assistance      Call nursing/pct to transfer to chair/use bathroom. Pt stated understanding  Sternal Precautions: patient able to voice?2/3 precautions without assistance.?Pt provided education of sternal precautions.?Patient able to maintain precautions throughout session with?min?verbal cues      Bedside table in front of patient and area set up for function, convenience, and safety. RN aware of  patient's mobility needs and status. Questions/concerns addressed within PTA scope of practice; patient  with no further questions. Time was provided for active listening, discussion of health disposition, and discussion of safe discharge. Pt?verbalized?agreement .    Patient left up in chair with all lines intact, call button in reach, and nsg notified..    GOALS:   Multidisciplinary Problems       Physical Therapy Goals          Problem: Physical Therapy    Goal Priority Disciplines Outcome Goal Variances Interventions   Physical Therapy Goal     PT, PT/OT Ongoing, Progressing     Description: Goals to be met by: 23     Patient will increase functional independence with mobility by performin. Supine to sit with Set-up [place]  2. Sit to stand transfer with Stand-by Assistance  3. Bed to chair transfer with Stand-by Assistance using No Assistive Device  4. Gait  x 175 feet with Stand-by Assistance using No Assistive Device.   5. Lower extremity exercise program x20 reps per handout, with independence  6. Pt independently recalling 3/3 sternal precautions                         Time Tracking:     PT Received On: 23  PT Start Time: 914     PT Stop Time: 937  PT Total Time (min): 23 min     Billable Minutes: Gait Training 15 and Therapeutic Activity 8    Treatment Type: Treatment  PT/PTA: PTA     Number of PTA visits since last PT visit: 2023

## 2023-11-11 NOTE — ASSESSMENT & PLAN NOTE
- Maintain sternal precautions   - ASA  - BB  - Encourage ambulation  - Regular diet   - Boost with each meal  - Bowel regimen in place   - Monitor electrolytes to keep Mag above 2 and Potassium above 4  - OOBTO   - Encourage IS use  - Continue working with PT/OT

## 2023-11-12 LAB
ABO + RH BLD: NORMAL
ALBUMIN SERPL BCP-MCNC: 1.9 G/DL (ref 3.5–5.2)
ALP SERPL-CCNC: 133 U/L (ref 55–135)
ALT SERPL W/O P-5'-P-CCNC: 40 U/L (ref 10–44)
ANION GAP SERPL CALC-SCNC: 7 MMOL/L (ref 8–16)
AST SERPL-CCNC: 27 U/L (ref 10–40)
BACTERIA BLD CULT: ABNORMAL
BILIRUB SERPL-MCNC: 0.7 MG/DL (ref 0.1–1)
BLD GP AB SCN CELLS X3 SERPL QL: NORMAL
BLD PROD TYP BPU: NORMAL
BLOOD UNIT EXPIRATION DATE: NORMAL
BLOOD UNIT TYPE CODE: 5100
BLOOD UNIT TYPE: NORMAL
BUN SERPL-MCNC: 10 MG/DL (ref 6–20)
CALCIUM SERPL-MCNC: 8.2 MG/DL (ref 8.7–10.5)
CHLORIDE SERPL-SCNC: 107 MMOL/L (ref 95–110)
CO2 SERPL-SCNC: 22 MMOL/L (ref 23–29)
CODING SYSTEM: NORMAL
CREAT SERPL-MCNC: 0.9 MG/DL (ref 0.5–1.4)
CROSSMATCH INTERPRETATION: NORMAL
DISPENSE STATUS: NORMAL
ERYTHROCYTE [DISTWIDTH] IN BLOOD BY AUTOMATED COUNT: 15.6 % (ref 11.5–14.5)
EST. GFR  (NO RACE VARIABLE): >60 ML/MIN/1.73 M^2
GLUCOSE SERPL-MCNC: 105 MG/DL (ref 70–110)
HCT VFR BLD AUTO: 22.5 % (ref 40–54)
HGB BLD-MCNC: 6.8 G/DL (ref 14–18)
MAGNESIUM SERPL-MCNC: 1.9 MG/DL (ref 1.6–2.6)
MCH RBC QN AUTO: 29.2 PG (ref 27–31)
MCHC RBC AUTO-ENTMCNC: 30.2 G/DL (ref 32–36)
MCV RBC AUTO: 97 FL (ref 82–98)
NUM UNITS TRANS PACKED RBC: NORMAL
PHOSPHATE SERPL-MCNC: 3.7 MG/DL (ref 2.7–4.5)
PLATELET # BLD AUTO: 396 K/UL (ref 150–450)
PMV BLD AUTO: 12.1 FL (ref 9.2–12.9)
POTASSIUM SERPL-SCNC: 4.1 MMOL/L (ref 3.5–5.1)
PROT SERPL-MCNC: 6.8 G/DL (ref 6–8.4)
RBC # BLD AUTO: 2.33 M/UL (ref 4.6–6.2)
SODIUM SERPL-SCNC: 136 MMOL/L (ref 136–145)
SPECIMEN OUTDATE: NORMAL
WBC # BLD AUTO: 9.41 K/UL (ref 3.9–12.7)

## 2023-11-12 PROCEDURE — 86920 COMPATIBILITY TEST SPIN: CPT | Performed by: STUDENT IN AN ORGANIZED HEALTH CARE EDUCATION/TRAINING PROGRAM

## 2023-11-12 PROCEDURE — 25000003 PHARM REV CODE 250: Performed by: STUDENT IN AN ORGANIZED HEALTH CARE EDUCATION/TRAINING PROGRAM

## 2023-11-12 PROCEDURE — 84100 ASSAY OF PHOSPHORUS: CPT | Performed by: THORACIC SURGERY (CARDIOTHORACIC VASCULAR SURGERY)

## 2023-11-12 PROCEDURE — 87040 BLOOD CULTURE FOR BACTERIA: CPT | Mod: 59 | Performed by: NURSE PRACTITIONER

## 2023-11-12 PROCEDURE — 85027 COMPLETE CBC AUTOMATED: CPT

## 2023-11-12 PROCEDURE — 25000003 PHARM REV CODE 250

## 2023-11-12 PROCEDURE — 36430 TRANSFUSION BLD/BLD COMPNT: CPT

## 2023-11-12 PROCEDURE — 25000003 PHARM REV CODE 250: Performed by: NURSE PRACTITIONER

## 2023-11-12 PROCEDURE — P9016 RBC LEUKOCYTES REDUCED: HCPCS | Performed by: STUDENT IN AN ORGANIZED HEALTH CARE EDUCATION/TRAINING PROGRAM

## 2023-11-12 PROCEDURE — 83735 ASSAY OF MAGNESIUM: CPT | Performed by: THORACIC SURGERY (CARDIOTHORACIC VASCULAR SURGERY)

## 2023-11-12 PROCEDURE — 99233 PR SUBSEQUENT HOSPITAL CARE,LEVL III: ICD-10-PCS | Mod: ,,, | Performed by: STUDENT IN AN ORGANIZED HEALTH CARE EDUCATION/TRAINING PROGRAM

## 2023-11-12 PROCEDURE — 20600001 HC STEP DOWN PRIVATE ROOM

## 2023-11-12 PROCEDURE — 36415 COLL VENOUS BLD VENIPUNCTURE: CPT | Performed by: STUDENT IN AN ORGANIZED HEALTH CARE EDUCATION/TRAINING PROGRAM

## 2023-11-12 PROCEDURE — 99233 SBSQ HOSP IP/OBS HIGH 50: CPT | Mod: ,,, | Performed by: STUDENT IN AN ORGANIZED HEALTH CARE EDUCATION/TRAINING PROGRAM

## 2023-11-12 PROCEDURE — 27000207 HC ISOLATION

## 2023-11-12 PROCEDURE — 63600175 PHARM REV CODE 636 W HCPCS: Mod: JZ,JG | Performed by: STUDENT IN AN ORGANIZED HEALTH CARE EDUCATION/TRAINING PROGRAM

## 2023-11-12 PROCEDURE — 86900 BLOOD TYPING SEROLOGIC ABO: CPT | Performed by: STUDENT IN AN ORGANIZED HEALTH CARE EDUCATION/TRAINING PROGRAM

## 2023-11-12 PROCEDURE — 80053 COMPREHEN METABOLIC PANEL: CPT | Performed by: STUDENT IN AN ORGANIZED HEALTH CARE EDUCATION/TRAINING PROGRAM

## 2023-11-12 RX ORDER — HYDROCODONE BITARTRATE AND ACETAMINOPHEN 500; 5 MG/1; MG/1
TABLET ORAL
Status: DISCONTINUED | OUTPATIENT
Start: 2023-11-12 | End: 2023-11-14 | Stop reason: HOSPADM

## 2023-11-12 RX ADMIN — METOPROLOL TARTRATE 50 MG: 50 TABLET, FILM COATED ORAL at 10:11

## 2023-11-12 RX ADMIN — METOPROLOL TARTRATE 50 MG: 50 TABLET, FILM COATED ORAL at 09:11

## 2023-11-12 RX ADMIN — ASPIRIN 325 MG ORAL TABLET 325 MG: 325 PILL ORAL at 09:11

## 2023-11-12 RX ADMIN — CEFTAROLINE FOSAMIL 600 MG: 600 POWDER, FOR SOLUTION INTRAVENOUS at 03:11

## 2023-11-12 RX ADMIN — OXYCODONE HYDROCHLORIDE 5 MG: 5 TABLET ORAL at 10:11

## 2023-11-12 RX ADMIN — CEFTAROLINE FOSAMIL 600 MG: 600 POWDER, FOR SOLUTION INTRAVENOUS at 05:11

## 2023-11-12 RX ADMIN — DOCUSATE SODIUM 100 MG: 100 CAPSULE, LIQUID FILLED ORAL at 09:11

## 2023-11-12 RX ADMIN — NIFEDIPINE 60 MG: 60 TABLET, FILM COATED, EXTENDED RELEASE ORAL at 09:11

## 2023-11-12 RX ADMIN — CEFTAROLINE FOSAMIL 600 MG: 600 POWDER, FOR SOLUTION INTRAVENOUS at 10:11

## 2023-11-12 RX ADMIN — DAPTOMYCIN 610 MG: 500 INJECTION, POWDER, LYOPHILIZED, FOR SOLUTION INTRAVENOUS at 02:11

## 2023-11-12 RX ADMIN — DOCUSATE SODIUM 100 MG: 100 CAPSULE, LIQUID FILLED ORAL at 10:11

## 2023-11-12 NOTE — ASSESSMENT & PLAN NOTE
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 Blood cultures from 11/10 positive, repeat from 11/11 no growth to date. Pt denies new or worsening joint or back pain.       Recommendations:  · Continue daptomycin and ceftaroline for salvage therapy   · Not a candidate for gent/rif given risk of nephrotoxicity/hepatotoxicity, LFTS now improving  · Monitor CK while on abx therapy, prior elevated CK trending down, recent 89.  · Follow up repeat blcx  · If repeat blcx are positive, will need further work up (spinal imaging etc)  · Anticipate 6w course of abx therapy from cleared blood cultures  · Contact precautions per hospital protocol

## 2023-11-12 NOTE — ASSESSMENT & PLAN NOTE
- ID following   - Continue with recommended antibiotics  - Awaiting blood cultures to clear for PICC line placement, most recent from 11/10 preliminarily positive for GPC  - Blood cultures daily until cleared   - Continue with salvage therapy per ID recommendations

## 2023-11-12 NOTE — PROGRESS NOTES
[facility] Cardiology [facility]  Infectious Disease  Progress Note    Patient Name: Lorenzo Nino  MRN: [id number]  Admission Date: 10/30/2023  Length of Stay: 13 days  Attending Physician: Manuel Beal MD  Primary Care Provider: No primary care provider on file.    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 Blood cultures from 11/10 positive, repeat from 11/11 no growth to date. Pt denies new or worsening joint or back pain.       Recommendations:  · Continue daptomycin and ceftaroline for salvage therapy   · Monitor CK while on abx therapy, prior elevated CK trending down, recent 89.  · Follow up repeat blcx  · If repeat blcx are positive, will need further work up (spinal imaging etc)  · Anticipate 6w course of abx therapy from cleared blood cultures  · Contact precautions per hospital protocol          Thank you for your consult. ID will continue to follow-up with the patient. Please contact us if you have any additional questions. Above d/w primary team.       Verito Desir MD  Infectious Disease  [facility]    Subjective:     Principal Problem:Acute bacterial endocarditis    HPI: This is the strange case of a 47 yo man with no medical problems who reportedly developed acute onset of symptoms a few days ago. He was seen in several urgent cares w/headache, abdominal pain with nausea but no vomiting or diarrhea.  There he was evaluated and instructed to take Tylenol and follow up if he still felt unwell.  When he presented with hematuria, he was sent to the ED here and was found to have thrombocytopenia. CT head negative, CT C/A/P showing perinephric stranding, distended bladder, and hiatal hernia. Patient admitted to MICU for further workup and treatment of his hypotension and likely severe sepsis. Blood cultures have grown MRSA and a  TTE revealed a flail mitral leflet and a possible vegetation on the aortic valve. ID is consulted for that. The patient is accompanied by his mother. He is scheduled for a NEW. Denies IDU. Denetal implants. HIV NR.       Interval History: Tolerating abx without issues. Repeat blcx from 11/11 no growth to date so far. No new joint or back pain. Hg low this morning. Family at bedside.     Review of Systems  Objective:     Vital Signs (Most Recent):  Temp: 98.5 °F (36.9 °C) (11/12/23 1155)  Pulse: 70 (11/12/23 1155)  Resp: 16 (11/12/23 1155)  BP: (!) 99/57 (11/12/23 1155)  SpO2: 96 % (11/12/23 1155) Vital Signs (24h Range):  Temp:  [97.5 °F (36.4 °C)-99.3 °F (37.4 °C)] 98.5 °F (36.9 °C)  Pulse:  [70-88] 70  Resp:  [16-20] 16  SpO2:  [93 %-98 %] 96 %  BP: ()/(51-63) 99/57     Weight: 66.5 kg (146 lb 9.7 oz)  Body mass index is 19.34 kg/m².    Estimated Creatinine Clearance: 93.4 mL/min (based on SCr of 0.9 mg/dL).     Physical Exam  Constitutional:       General: He is not in acute distress.     Appearance: He is not ill-appearing or toxic-appearing.   Eyes:      General:         Right eye: No discharge.         Left eye: No discharge.   Cardiovascular:      Comments: MSI steristrips  Pulmonary:      Effort: Pulmonary effort is normal. No respiratory distress.   Musculoskeletal:         General: No swelling or tenderness.   Skin:     General: Skin is warm and dry.   Neurological:      Mental Status: He is alert and oriented to person, place, and time.          Significant Labs:   Microbiology Results (last 7 days)       Procedure Component Value Units Date/Time    Blood culture [[id number]]  (Abnormal) Collected: 11/10/23 0904    Order Status: Completed Specimen: Blood from Antecubital, Right Arm Updated: 11/12/23 1105     Blood Culture, Routine Gram stain aer bottle: Gram positive cocci in clusters resembling Staph      Results called to and read back by:JENIFER SUAREZ  11/11/2023  17:59      STAPHYLOCOCCUS  AUREUS  Susceptibility pending  ID consult required at [facility].      Blood culture [[id number]]  (Abnormal) Collected: 11/08/23 0814    Order Status: Completed Specimen: Blood from Peripheral, Antecubital, Right Updated: 11/12/23 0851     Blood Culture, Routine Gram stain aer bottle: Gram positive cocci in clusters resembling Staph      Results called to and read back by: ANNA Earl. 11/10/2023  10:52      Gram stain carlie bottle: Gram positive cocci in clusters resembling Staph      METHICILLIN RESISTANT STAPHYLOCOCCUS AUREUS  ID consult required at [facility].  For susceptibility see order #[id number]  ID consult required at [facility].      Blood culture [[id number]]  (Abnormal) Collected: 11/08/23 0819    Order Status: Completed Specimen: Blood from Peripheral, Wrist, Left Updated: 11/12/23 0850     Blood Culture, Routine Gram stain aer bottle: Gram positive cocci in clusters resembling Staph      Results called to and read back by: Harini Murphy RN  11/09/2023  23:35      METHICILLIN RESISTANT STAPHYLOCOCCUS AUREUS  ID consult required at [facility].  For susceptibility see order #[id number]  ID consult required at [facility].      Blood culture [[id number]] Collected: 11/11/23 0345    Order Status: Completed Specimen: Blood Updated: 11/12/23 0613     Blood Culture, Routine No Growth to date      No Growth to date    Blood culture [[id number]] Collected: 11/11/23 0345    Order Status: Completed Specimen: Blood Updated: 11/12/23 0613     Blood Culture, Routine No Growth to date      No Growth to date    Blood culture [[id number]] Collected: 11/12/23 0334    Order Status: Sent Specimen: Blood Updated: 11/12/23 0449    Blood culture [[id number]] Collected: 11/12/23 0334    Order Status: Sent Specimen: Blood Updated: 11/12/23 0449    Blood culture [[id number]]  Collected: 11/10/23 0905    Order Status: Completed Specimen: Blood from Peripheral, Right Hand Updated: 11/11/23 2332     Blood Culture, Routine Gram stain aer bottle: Gram positive cocci in clusters resembling Staph      Results called to and read back by: Anthony Murphy RN  11/11/2023  23:32    Blood culture [[id number]]  (Abnormal) Collected: 11/06/23 1228    Order Status: Completed Specimen: Blood from Peripheral, Hand, Left Updated: 11/09/23 1123     Blood Culture, Routine Gram stain aer bottle: Gram positive cocci in clusters resembling Staph      Positive results previously called 11/07/2023  20:44      Gram stain carlie bottle: Gram positive cocci in clusters resembling Staph      Positive results previously called 11/08/2023  03:01      METHICILLIN RESISTANT STAPHYLOCOCCUS AUREUS  ID consult required at [facility].  For susceptibility see order #[id number]  ID consult required at [facility].      Blood culture [[id number]]  (Abnormal)  (Susceptibility) Collected: 11/06/23 1216    Order Status: Completed Specimen: Blood from Peripheral, Antecubital, Right Updated: 11/09/23 1122     Blood Culture, Routine Gram stain aer bottle: Gram positive cocci in clusters resembling Staph      Results called to and read back by:Mark Garrido RN 11/07/2023      16:14      METHICILLIN RESISTANT STAPHYLOCOCCUS AUREUS  ID consult required at [facility].      Blood culture [[id number]]  (Abnormal) Collected: 11/05/23 0109    Order Status: Completed Specimen: Blood from Peripheral, Hand, Left Updated: 11/08/23 1125     Blood Culture, Routine Gram stain aer bottle: Gram positive cocci in clusters resembling Staph      Results called to and read back by:Manuel Centeno Rn 11/06/2023  03:29      METHICILLIN RESISTANT STAPHYLOCOCCUS AUREUS  ID consult required at [facility].  For susceptibility see order  #[id number]      Fungus culture [[id number]] Collected: 11/03/23 1516    Order Status: Completed Specimen: Chest from Heart Updated: 11/08/23 0753     Fungus (Mycology) Culture Culture in progress    Narrative:      Mitral Leaflet for Culture    Culture, Anaerobe [[id number]] Collected: 11/03/23 1516    Order Status: Completed Specimen: Chest from Heart Updated: 11/07/23 0814     Anaerobic Culture No anaerobes isolated    Narrative:      Mitral Leaflet for Culture    AFB Culture & Smear [[id number]] Collected: 11/03/23 1516    Order Status: Completed Specimen: Chest from Heart Updated: 11/06/23 1424     AFB Culture & Smear Culture in progress     AFB CULTURE STAIN No acid fast bacilli seen.    Narrative:      Mitral Leaflet for Culture    Blood culture [[id number]]  (Abnormal) Collected: 11/03/23 0614    Order Status: Completed Specimen: Blood from Peripheral, Hand, Left Updated: 11/06/23 1155     Blood Culture, Routine Gram stain aer bottle: Gram positive cocci in clusters resembling Staph      Results called to and read back by:Manuel Centeno Rn 11/04/2023  04:12      METHICILLIN RESISTANT STAPHYLOCOCCUS AUREUS  ID consult required at [facility] and [facility].  For susceptibility see order # [id number]      Blood culture [[id number]]  (Abnormal) Collected: 11/01/23 0607    Order Status: Completed Specimen: Blood from Peripheral, Forearm, Left Updated: 11/06/23 1154     Blood Culture, Routine Gram stain aer bottle: Gram positive cocci in clusters resembling Staph      Positive results previously called 11/02/2023  04:20      Gram stain carlie bottle: Gram positive cocci in clusters resembling Staph      Positive results previously called 11/04/2023  11:33      METHICILLIN RESISTANT STAPHYLOCOCCUS AUREUS  For susceptibility see order #[id number]  ID consult required at [facility] and [facility].      Blood culture [[id number]]  (Abnormal)  (Susceptibility) Collected: 11/03/23 0609     Order Status: Completed Specimen: Blood from Peripheral, Hand, Right Updated: 11/06/23 1153     Blood Culture, Routine Gram stain aer bottle: Gram positive cocci in clusters resembling Staph      Positive results previously called  11/04/2023 06:05      METHICILLIN RESISTANT STAPHYLOCOCCUS AUREUS  ID consult required at [facility].      Aerobic culture [[id number]]  (Abnormal)  (Susceptibility) Collected: 11/03/23 1516    Order Status: Completed Specimen: Chest from Heart Updated: 11/06/23 1028     Aerobic Bacterial Culture METHICILLIN RESISTANT STAPHYLOCOCCUS AUREUS  Moderate      Narrative:      Mitral Leaflet for Culture            Significant Imaging: I have reviewed all pertinent imaging results/findings within the past 24 hours.

## 2023-11-12 NOTE — PROGRESS NOTES
[facility] - Cardiology Stepdown  Cardiothoracic Surgery  Progress Note    Patient Name: Lorenzo Nino  MRN: [id number]  Admission Date: 10/30/2023  Hospital Length of Stay: 13 days  Code Status: Full Code   Attending Physician: Manuel Beal MD   Referring Provider: Self, Aaareferral  Principal Problem:Acute bacterial endocarditis      Subjective:     Post-Op Info:  Procedure(s) (LRB):  REPAIR, MITRAL VALVE, OPEN (N/A)  EXCLUSION, LEFT ATRIAL APPENDAGE, OPEN, AS PART OF OPEN CHEST SURGERY (Left)   9 Days Post-Op     Interval History: No acute events overnight, no new complaints. Bcx from 11/10 preliminarily positive for GPC. Hg again low at 6.8 this morning in the setting of multiple blood draws/cultures.    Review of Systems   Constitutional: Positive for malaise/fatigue.   Cardiovascular:  Negative for chest pain, claudication, dyspnea on exertion, irregular heartbeat, leg swelling and palpitations.   Respiratory:  Negative for cough and shortness of breath.    Hematologic/Lymphatic: Negative for bleeding problem.   Gastrointestinal:  Negative for abdominal pain.   Genitourinary:  Negative for dysuria.   Neurological:  Negative for headaches and weakness.     Medications:  Continuous Infusions:  Scheduled Meds:   aspirin  325 mg Oral Daily    ceftaroline (TEFLARO) IVPB  600 mg Intravenous Q8H    DAPTOmycin (CUBICIN) IV (PEDS and ADULTS)  8 mg/kg Intravenous Q24H    docusate sodium  100 mg Oral BID    metoprolol tartrate  50 mg Oral BID    NIFEdipine  60 mg Oral Daily    polyethylene glycol  17 g Oral Daily     PRN Meds:acetaminophen, albuterol-ipratropium, dextrose 10%, dextrose 10%, melatonin, ondansetron, oxyCODONE, oxyCODONE, prochlorperazine     Objective:     Vital Signs (Most Recent):  Temp: 98.8 °F (37.1 °C) (11/11/23 0739)  Pulse: 89 (11/11/23 0739)  Resp: 18 (11/11/23 0739)  BP: (!) 107/58 (11/11/23 0739)  SpO2: 97 % (11/11/23 0739) Vital Signs (24h Range):  Temp:  [97.6 °F (36.4 °C)-100.3 °F (37.9  °C)] 98.8 °F (37.1 °C)  Pulse:  [] 89  Resp:  [16-20] 18  SpO2:  [94 %-97 %] 97 %  BP: ()/(57-68) 107/58     Weight: 64.8 kg (142 lb 13.7 oz)  Body mass index is 18.85 kg/m².    SpO2: 97 %       Intake/Output - Last 3 Shifts         11/09 0700  11/10 0659 11/10 0700  11/11 0659 11/11 0700  11/12 0659    P.O. 1242 1560     IV Piggyback       Total Intake(mL/kg) 1242 (18.3) 1560 (24.1)     Urine (mL/kg/hr) 1450 (0.9) 1950 (1.3)     Stool  0     Total Output 1450 1950     Net -208 -390            Urine Occurrence 0 x      Stool Occurrence  0 x             Lines/Drains/Airways       Peripheral Intravenous Line  Duration                  Peripheral IV - Single Lumen 11/05/23 0100 18 G Left;Posterior Hand 6 days         Peripheral IV - Single Lumen 11/05/23 1155 18 G;1 3/4 in Anterior;Left Forearm 5 days                     Physical Exam  Constitutional:       Appearance: Normal appearance.   HENT:      Head: Normocephalic.   Eyes:      Pupils: Pupils are equal, round, and reactive to light.   Cardiovascular:      Rate and Rhythm: Normal rate and regular rhythm.      Pulses: Normal pulses.   Pulmonary:      Effort: Pulmonary effort is normal.   Abdominal:      General: Abdomen is flat. Bowel sounds are normal.      Palpations: Abdomen is soft.   Musculoskeletal:         General: Normal range of motion.   Skin:     General: Skin is warm and dry.      Comments: MSI CDI   Neurological:      General: No focal deficit present.      Mental Status: He is alert.            Significant Labs:  All pertinent labs from the last 24 hours have been reviewed.    Significant Diagnostics:  I have reviewed all pertinent imaging results/findings within the past 24 hours.  Assessment/Plan:     * Acute bacterial endocarditis  - ID following   - Continue with recommended antibiotics  - Awaiting blood cultures to clear for PICC line placement, most recent from 11/10 preliminarily positive for GPC  - Blood cultures daily until  cleared   - Continue with salvage therapy per ID recommendations     S/P MVR (mitral valve repair)  - Hg 6.8 again this morning, will transfuse 1u PRBC in the setting of getting multiple blood draws/cultures  - Maintain sternal precautions   - ASA  - BB  - Encourage ambulation  - Cardiac diet   - Boost with each meal  - Bowel regimen in place   - Monitor electrolytes to keep Mag above 2 and Potassium above 4  - OOBTO   - Encourage IS use  - Continue working with PT/OT      Transient hyperglycemia post procedure  - Endocrine consulted during admission but have since signed off      MRSA bacteremia  - ID following   Recommendations:  Continue daptomycin and ceftaroline for salvage therapy   Not a candidate for gent/rif given risk of nephrotoxicity/hepatotoxicity  Monitor CK while on abx therapy, prior elevated CK trending down, recent 220. Ordered   Follow up repeat blcx  If repeat blcx are positive, will need further work up (spinal imaging etc)  Anticipate 6w course of abx therapy from cleared blood cultures  Contact precautions per hospital protocol    Heart failure with mid-range ejection fraction (HFmEF)  - stable    Paroxysmal atrial fibrillation  - Continue BB  - Tele monitor   - Monitor and replace lytes as needed    Endocarditis  47 yo M admitted 10/30 with PNA vs UTI vs meningitis requiring vasopressor support. No history of IVDU or recent dental work. Further work up revealed MRSA bacteremia. TTE revealed LVEF 40-45%. Mitral valve vegetation with moderate MR due to flail anterior leaflet. Small vegetation on RCC of Ao valve, no AI noted.     Elevated transaminase level  - Resolved    Thrombocytopenia  - CBC daily   - Resolved     FRANCESCO (acute kidney injury)  - Resolved        Tiago Arevalo, DO  Cardiothoracic Surgery  [facility] - Cardiology Stepdown

## 2023-11-12 NOTE — PLAN OF CARE
Problem: Infection  Goal: Absence of Infection Signs and Symptoms  Outcome: Ongoing, Progressing     Problem: Oral Intake Inadequate (Acute Kidney Injury/Impairment)  Goal: Optimal Nutrition Intake  Outcome: Ongoing, Progressing     Problem: Fall Injury Risk  Goal: Absence of Fall and Fall-Related Injury  Outcome: Ongoing, Progressing     Problem: Infection (Pneumonia)  Goal: Resolution of Infection Signs and Symptoms  Outcome: Ongoing, Progressing     Problem: Skin and Tissue Injury (Mechanical Ventilation, Invasive)  Goal: Absence of Device-Related Skin and Tissue Injury  Outcome: Ongoing, Progressing     Problem: Skin and Tissue Injury (Artificial Airway)  Goal: Absence of Device-Related Skin or Tissue Injury  Outcome: Ongoing, Progressing     Problem: Adjustment to Illness (Heart Failure)  Goal: Optimal Coping  Outcome: Ongoing, Progressing

## 2023-11-12 NOTE — SUBJECTIVE & OBJECTIVE
Interval History: Tolerating abx without issues. Repeat blcx from 11/11 no growth to date so far. No new joint or back pain. Hg low this morning. Family at bedside.     Review of Systems  Objective:     Vital Signs (Most Recent):  Temp: 98.5 °F (36.9 °C) (11/12/23 1155)  Pulse: 70 (11/12/23 1155)  Resp: 16 (11/12/23 1155)  BP: (!) 99/57 (11/12/23 1155)  SpO2: 96 % (11/12/23 1155) Vital Signs (24h Range):  Temp:  [97.5 °F (36.4 °C)-99.3 °F (37.4 °C)] 98.5 °F (36.9 °C)  Pulse:  [70-88] 70  Resp:  [16-20] 16  SpO2:  [93 %-98 %] 96 %  BP: ()/(51-63) 99/57     Weight: 66.5 kg (146 lb 9.7 oz)  Body mass index is 19.34 kg/m².    Estimated Creatinine Clearance: 93.4 mL/min (based on SCr of 0.9 mg/dL).     Physical Exam  Constitutional:       General: He is not in acute distress.     Appearance: He is not ill-appearing or toxic-appearing.   Eyes:      General:         Right eye: No discharge.         Left eye: No discharge.   Cardiovascular:      Comments: MSI steristrips  Pulmonary:      Effort: Pulmonary effort is normal. No respiratory distress.   Musculoskeletal:         General: No swelling or tenderness.   Skin:     General: Skin is warm and dry.   Neurological:      Mental Status: He is alert and oriented to person, place, and time.          Significant Labs:   Microbiology Results (last 7 days)       Procedure Component Value Units Date/Time    Blood culture [[id number]]  (Abnormal) Collected: 11/10/23 0904    Order Status: Completed Specimen: Blood from Antecubital, Right Arm Updated: 11/12/23 1105     Blood Culture, Routine Gram stain aer bottle: Gram positive cocci in clusters resembling Staph      Results called to and read back by:JENIFER SUAREZ  11/11/2023  17:59      STAPHYLOCOCCUS AUREUS  Susceptibility pending  ID consult required at [facility],[place] and [facility].      Blood culture [[id number]]  (Abnormal) Collected: 11/08/23 0814    Order Status: Completed Specimen: Blood from Peripheral,  Antecubital, Right Updated: 11/12/23 0851     Blood Culture, Routine Gram stain aer bottle: Gram positive cocci in clusters resembling Staph      Results called to and read back by: ANNA Earl. 11/10/2023  10:52      Gram stain carlie bottle: Gram positive cocci in clusters resembling Staph      METHICILLIN RESISTANT STAPHYLOCOCCUS AUREUS  ID consult required at [facility].  For susceptibility see order #[id number]  ID consult required at [facility].      Blood culture [[id number]]  (Abnormal) Collected: 11/08/23 0819    Order Status: Completed Specimen: Blood from Peripheral, Wrist, Left Updated: 11/12/23 0850     Blood Culture, Routine Gram stain aer bottle: Gram positive cocci in clusters resembling Staph      Results called to and read back by: Harini Murphy RN  11/09/2023  23:35      METHICILLIN RESISTANT STAPHYLOCOCCUS AUREUS  ID consult required at [facility].  For susceptibility see order #[id number]  ID consult required at [facility].      Blood culture [[id number]] Collected: 11/11/23 0345    Order Status: Completed Specimen: Blood Updated: 11/12/23 0613     Blood Culture, Routine No Growth to date      No Growth to date    Blood culture [[id number]] Collected: 11/11/23 0345    Order Status: Completed Specimen: Blood Updated: 11/12/23 0613     Blood Culture, Routine No Growth to date      No Growth to date    Blood culture [[id number]] Collected: 11/12/23 0334    Order Status: Sent Specimen: Blood Updated: 11/12/23 0449    Blood culture [[id number]] Collected: 11/12/23 0334    Order Status: Sent Specimen: Blood Updated: 11/12/23 0449    Blood culture [[id number]] Collected: 11/10/23 0905    Order Status: Completed Specimen: Blood from Peripheral, Right Hand Updated: 11/11/23 2332     Blood Culture, Routine Gram stain aer bottle: Gram positive cocci in clusters resembling Staph       Results called to and read back by: Anthony Murphy RN  11/11/2023  23:32    Blood culture [[id number]]  (Abnormal) Collected: 11/06/23 1228    Order Status: Completed Specimen: Blood from Peripheral, Hand, Left Updated: 11/09/23 1123     Blood Culture, Routine Gram stain aer bottle: Gram positive cocci in clusters resembling Staph      Positive results previously called 11/07/2023  20:44      Gram stain carlie bottle: Gram positive cocci in clusters resembling Staph      Positive results previously called 11/08/2023  03:01      METHICILLIN RESISTANT STAPHYLOCOCCUS AUREUS  ID consult required at [facility].  For susceptibility see order #[id number]  ID consult required at [facility].      Blood culture [[id number]]  (Abnormal)  (Susceptibility) Collected: 11/06/23 1216    Order Status: Completed Specimen: Blood from Peripheral, Antecubital, Right Updated: 11/09/23 1122     Blood Culture, Routine Gram stain aer bottle: Gram positive cocci in clusters resembling Staph      Results called to and read back by:Mark Garrido RN 11/07/2023      16:14      METHICILLIN RESISTANT STAPHYLOCOCCUS AUREUS  ID consult required at [facility] and [facility].      Blood culture [[id number]]  (Abnormal) Collected: 11/05/23 0109    Order Status: Completed Specimen: Blood from Peripheral, Hand, Left Updated: 11/08/23 1125     Blood Culture, Routine Gram stain aer bottle: Gram positive cocci in clusters resembling Staph      Results called to and read back by:Manuel Centeno Rn 11/06/2023  03:29      METHICILLIN RESISTANT STAPHYLOCOCCUS AUREUS  ID consult required at [facility].  For susceptibility see order #[id number]      Fungus culture [[id number]] Collected: 11/03/23 1516    Order Status: Completed Specimen: Chest from Heart Updated: 11/08/23 0753     Fungus (Mycology) Culture Culture in progress    Narrative:      Mitral  Leaflet for Culture    Culture, Anaerobe [[id number]] Collected: 11/03/23 1516    Order Status: Completed Specimen: Chest from Heart Updated: 11/07/23 0814     Anaerobic Culture No anaerobes isolated    Narrative:      Mitral Leaflet for Culture    AFB Culture & Smear [[id number]] Collected: 11/03/23 1516    Order Status: Completed Specimen: Chest from Heart Updated: 11/06/23 1424     AFB Culture & Smear Culture in progress     AFB CULTURE STAIN No acid fast bacilli seen.    Narrative:      Mitral Leaflet for Culture    Blood culture [[id number]]  (Abnormal) Collected: 11/03/23 0614    Order Status: Completed Specimen: Blood from Peripheral, Hand, Left Updated: 11/06/23 1155     Blood Culture, Routine Gram stain aer bottle: Gram positive cocci in clusters resembling Staph      Results called to and read back by:Manuel Centeno Rn 11/04/2023  04:12      METHICILLIN RESISTANT STAPHYLOCOCCUS AUREUS  ID consult required at [facility].  For susceptibility see order # [id number]      Blood culture [[id number]]  (Abnormal) Collected: 11/01/23 0607    Order Status: Completed Specimen: Blood from Peripheral, Forearm, Left Updated: 11/06/23 1154     Blood Culture, Routine Gram stain aer bottle: Gram positive cocci in clusters resembling Staph      Positive results previously called 11/02/2023  04:20      Gram stain carlie bottle: Gram positive cocci in clusters resembling Staph      Positive results previously called 11/04/2023  11:33      METHICILLIN RESISTANT STAPHYLOCOCCUS AUREUS  For susceptibility see order #[id number]  [facility].      Blood culture [[id number]]  (Abnormal)  (Susceptibility) Collected: 11/03/23 0609    Order Status: Completed Specimen: Blood from Peripheral, Hand, Right Updated: 11/06/23 1153     Blood Culture, Routine Gram stain aer bottle: Gram positive cocci in clusters resembling Staph      Positive results previously  called  11/04/2023 06:05      METHICILLIN RESISTANT STAPHYLOCOCCUS AUREUS  ID consult required [facility]      Aerobic culture [[id number]]  (Abnormal)  (Susceptibility) Collected: 11/03/23 1516    Order Status: Completed Specimen: Chest from Heart Updated: 11/06/23 1028     Aerobic Bacterial Culture METHICILLIN RESISTANT STAPHYLOCOCCUS AUREUS  Moderate      Narrative:      Mitral Leaflet for Culture            Significant Imaging: I have reviewed all pertinent imaging results/findings within the past 24 hours.

## 2023-11-12 NOTE — ASSESSMENT & PLAN NOTE
- Hg 6.8 again this morning, will transfuse 1u PRBC in the setting of getting multiple blood draws/cultures  - Maintain sternal precautions   - ASA  - BB  - Encourage ambulation  - Cardiac diet   - Boost with each meal  - Bowel regimen in place   - Monitor electrolytes to keep Mag above 2 and Potassium above 4  - OOBTO   - Encourage IS use  - Continue working with PT/OT

## 2023-11-12 NOTE — PT/OT/SLP PROGRESS
Occupational Therapy   Treatment    Name: Lorenzo Nino  MRN: [id number]  Admitting Diagnosis:  Acute bacterial endocarditis  8 Days Post-Op    Recommendations:     Discharge Recommendations: High Intensity Therapy  Discharge Equipment Recommendations:  bedside commode  Barriers to discharge:   (patient requires significantly increase level of assistance)    Assessment:     Lorenzo Nino is a 48 y.o. male with a medical diagnosis of Acute bacterial endocarditis.  He presents with the fallowing performance deficits affecting function are weakness, impaired endurance, impaired functional mobility, gait instability, impaired self care skills, decreased lower extremity function, decreased upper extremity function, pain, impaired balance, impaired cardiopulmonary response to activity, decreased safety awareness. Patient is making progress with bed mobility, transfers, and mobility. Pt would continue to benefit from skilled OT post acute environment to continue to progress towards stated goals and return to PLOF.     Rehab Prognosis:  Good; patient would benefit from acute skilled OT services to address these deficits and reach maximum level of function.       Plan:     Patient to be seen 5 x/week to address the above listed problems via self-care/home management, therapeutic activities, therapeutic exercises  Plan of Care Expires: 12/05/23  Plan of Care Reviewed with: patient, spouse    Subjective     Chief Complaint: fatigue   Patient/Family Comments/goals: to return to PLOF  Pain/Comfort:  Pain Rating 1: 0/10    Objective:     Communicated with: Nurse prior to session.  Patient found HOB elevated with telemetry upon OT entry to room.  A client care conference was completed by the OTR and the COTTER prior to treatment by the COTTER to discuss the patient's POC and current status.   General Precautions: Standard, fall, sternal    Orthopedic Precautions:N/A  Braces: N/A  Respiratory Status: Room air     Occupational  Performance:     Bed Mobility:    Patient completed Rolling/Turning to Left with  contact guard assistance  Patient completed Scooting/Bridging with contact guard assistance  Patient completed Supine to Sit with contact guard assistance for trunk support to adhere sternal precautions.     Functional Mobility/Transfers:  Patient completed Sit <> Stand Transfer from bed with minimum assistance  with  no assistive device and pt having both arms close to his body tech to fallow sternal precautions    Functional Mobility: Patient ambulated 100ft x 1 with no AD did required HHA and verbal cues for turning due to  R side lateral lean and limited step length. Patient did reported feeling dizzy and fatigue and needed to return to his room.    [facility] 6 Click ADL: 18    Treatment & Education:  GABRIEL educated pt on the importance to continuing performing OOB activities to promote mobility and impact with functional endurance. Also educated pt on the importance to continue to fallow sternal precautions protocol.   Addressed patient's questions and concerns within COTTER scope of practice.     Patient left up in chair with all lines intact, call button in reach, and family present    GOALS:   Multidisciplinary Problems       Occupational Therapy Goals          Problem: Occupational Therapy    Goal Priority Disciplines Outcome Interventions   Occupational Therapy Goal     OT, PT/OT Ongoing, Progressing    Description: Goals set on 11/5, with expiration date 12/4:  Patient will increase functional independence with ADLs by performing:    Bed mobility with Supervision  Grooming while standing at sink with [place]  UB Dressing with [place].  LB Dressing with Supervision.  Toileting from toilet with [place] for hygiene and clothing management.   Functional mobility of household and community distance with Supervision and AD as needed  Pt will demonstrate understanding of education provided regarding energy conservation and  task modification through teach-back method.  Pt will demonstrate understanding and learning of sternal precautions via recall and demonstration 3/3.                         Time Tracking:     OT Date of Treatment: 11/11/23  OT Start Time: 1439  OT Stop Time: 1450  OT Total Time (min): 11 min    Billable Minutes:Therapeutic Activity 11    OT/GABRIEL: GABRIEL     Number of GABRIEL visits since last OT visit: 1    11/11/2023

## 2023-11-13 LAB
ALBUMIN SERPL BCP-MCNC: 1.9 G/DL (ref 3.5–5.2)
ALP SERPL-CCNC: 113 U/L (ref 55–135)
ALT SERPL W/O P-5'-P-CCNC: 32 U/L (ref 10–44)
ANION GAP SERPL CALC-SCNC: 9 MMOL/L (ref 8–16)
AST SERPL-CCNC: 24 U/L (ref 10–40)
BACTERIA BLD CULT: ABNORMAL
BILIRUB SERPL-MCNC: 0.8 MG/DL (ref 0.1–1)
BUN SERPL-MCNC: 11 MG/DL (ref 6–20)
CALCIUM SERPL-MCNC: 8.4 MG/DL (ref 8.7–10.5)
CHLORIDE SERPL-SCNC: 104 MMOL/L (ref 95–110)
CO2 SERPL-SCNC: 22 MMOL/L (ref 23–29)
CREAT SERPL-MCNC: 0.9 MG/DL (ref 0.5–1.4)
ERYTHROCYTE [DISTWIDTH] IN BLOOD BY AUTOMATED COUNT: 16.5 % (ref 11.5–14.5)
EST. GFR  (NO RACE VARIABLE): >60 ML/MIN/1.73 M^2
GLUCOSE SERPL-MCNC: 103 MG/DL (ref 70–110)
HCT VFR BLD AUTO: 24.6 % (ref 40–54)
HGB BLD-MCNC: 7.8 G/DL (ref 14–18)
MAGNESIUM SERPL-MCNC: 1.9 MG/DL (ref 1.6–2.6)
MCH RBC QN AUTO: 29.9 PG (ref 27–31)
MCHC RBC AUTO-ENTMCNC: 31.7 G/DL (ref 32–36)
MCV RBC AUTO: 94 FL (ref 82–98)
PHOSPHATE SERPL-MCNC: 4.2 MG/DL (ref 2.7–4.5)
PLATELET # BLD AUTO: 411 K/UL (ref 150–450)
PMV BLD AUTO: 12.1 FL (ref 9.2–12.9)
POTASSIUM SERPL-SCNC: 3.9 MMOL/L (ref 3.5–5.1)
PROT SERPL-MCNC: 6.9 G/DL (ref 6–8.4)
RBC # BLD AUTO: 2.61 M/UL (ref 4.6–6.2)
SODIUM SERPL-SCNC: 135 MMOL/L (ref 136–145)
WBC # BLD AUTO: 8.24 K/UL (ref 3.9–12.7)

## 2023-11-13 PROCEDURE — 94761 N-INVAS EAR/PLS OXIMETRY MLT: CPT

## 2023-11-13 PROCEDURE — 97116 GAIT TRAINING THERAPY: CPT

## 2023-11-13 PROCEDURE — 25000003 PHARM REV CODE 250: Performed by: THORACIC SURGERY (CARDIOTHORACIC VASCULAR SURGERY)

## 2023-11-13 PROCEDURE — 36415 COLL VENOUS BLD VENIPUNCTURE: CPT | Performed by: THORACIC SURGERY (CARDIOTHORACIC VASCULAR SURGERY)

## 2023-11-13 PROCEDURE — 25000003 PHARM REV CODE 250: Performed by: NURSE PRACTITIONER

## 2023-11-13 PROCEDURE — 83735 ASSAY OF MAGNESIUM: CPT | Performed by: THORACIC SURGERY (CARDIOTHORACIC VASCULAR SURGERY)

## 2023-11-13 PROCEDURE — 84100 ASSAY OF PHOSPHORUS: CPT | Performed by: THORACIC SURGERY (CARDIOTHORACIC VASCULAR SURGERY)

## 2023-11-13 PROCEDURE — 80053 COMPREHEN METABOLIC PANEL: CPT | Performed by: STUDENT IN AN ORGANIZED HEALTH CARE EDUCATION/TRAINING PROGRAM

## 2023-11-13 PROCEDURE — 99900035 HC TECH TIME PER 15 MIN (STAT)

## 2023-11-13 PROCEDURE — 85027 COMPLETE CBC AUTOMATED: CPT

## 2023-11-13 PROCEDURE — 25000003 PHARM REV CODE 250: Performed by: STUDENT IN AN ORGANIZED HEALTH CARE EDUCATION/TRAINING PROGRAM

## 2023-11-13 PROCEDURE — 97535 SELF CARE MNGMENT TRAINING: CPT

## 2023-11-13 PROCEDURE — 25000003 PHARM REV CODE 250

## 2023-11-13 PROCEDURE — 76937 US GUIDE VASCULAR ACCESS: CPT

## 2023-11-13 PROCEDURE — 36573 INSJ PICC RS&I 5 YR+: CPT

## 2023-11-13 PROCEDURE — A4216 STERILE WATER/SALINE, 10 ML: HCPCS | Performed by: THORACIC SURGERY (CARDIOTHORACIC VASCULAR SURGERY)

## 2023-11-13 PROCEDURE — 99233 PR SUBSEQUENT HOSPITAL CARE,LEVL III: ICD-10-PCS | Mod: ,,, | Performed by: INTERNAL MEDICINE

## 2023-11-13 PROCEDURE — 27000207 HC ISOLATION

## 2023-11-13 PROCEDURE — C1751 CATH, INF, PER/CENT/MIDLINE: HCPCS

## 2023-11-13 PROCEDURE — 63600175 PHARM REV CODE 636 W HCPCS: Performed by: STUDENT IN AN ORGANIZED HEALTH CARE EDUCATION/TRAINING PROGRAM

## 2023-11-13 PROCEDURE — 99233 SBSQ HOSP IP/OBS HIGH 50: CPT | Mod: ,,, | Performed by: INTERNAL MEDICINE

## 2023-11-13 PROCEDURE — 20600001 HC STEP DOWN PRIVATE ROOM

## 2023-11-13 RX ORDER — SODIUM CHLORIDE 0.9 % (FLUSH) 0.9 %
10 SYRINGE (ML) INJECTION EVERY 6 HOURS
Status: DISCONTINUED | OUTPATIENT
Start: 2023-11-13 | End: 2023-11-14 | Stop reason: HOSPADM

## 2023-11-13 RX ORDER — SODIUM CHLORIDE 0.9 % (FLUSH) 0.9 %
10 SYRINGE (ML) INJECTION
Status: DISCONTINUED | OUTPATIENT
Start: 2023-11-13 | End: 2023-11-14 | Stop reason: HOSPADM

## 2023-11-13 RX ADMIN — Medication 10 ML: at 06:11

## 2023-11-13 RX ADMIN — METOPROLOL TARTRATE 50 MG: 50 TABLET, FILM COATED ORAL at 08:11

## 2023-11-13 RX ADMIN — DOCUSATE SODIUM 100 MG: 100 CAPSULE, LIQUID FILLED ORAL at 09:11

## 2023-11-13 RX ADMIN — OXYCODONE HYDROCHLORIDE 5 MG: 5 TABLET ORAL at 08:11

## 2023-11-13 RX ADMIN — POLYETHYLENE GLYCOL 3350 17 G: 17 POWDER, FOR SOLUTION ORAL at 09:11

## 2023-11-13 RX ADMIN — ASPIRIN 325 MG ORAL TABLET 325 MG: 325 PILL ORAL at 09:11

## 2023-11-13 RX ADMIN — METOPROLOL TARTRATE 50 MG: 50 TABLET, FILM COATED ORAL at 09:11

## 2023-11-13 RX ADMIN — CEFTAROLINE FOSAMIL 600 MG: 600 POWDER, FOR SOLUTION INTRAVENOUS at 11:11

## 2023-11-13 RX ADMIN — NIFEDIPINE 60 MG: 60 TABLET, FILM COATED, EXTENDED RELEASE ORAL at 09:11

## 2023-11-13 RX ADMIN — DOCUSATE SODIUM 100 MG: 100 CAPSULE, LIQUID FILLED ORAL at 08:11

## 2023-11-13 RX ADMIN — DAPTOMYCIN 610 MG: 500 INJECTION, POWDER, LYOPHILIZED, FOR SOLUTION INTRAVENOUS at 01:11

## 2023-11-13 RX ADMIN — CEFTAROLINE FOSAMIL 600 MG: 600 POWDER, FOR SOLUTION INTRAVENOUS at 06:11

## 2023-11-13 RX ADMIN — CEFTAROLINE FOSAMIL 600 MG: 600 POWDER, FOR SOLUTION INTRAVENOUS at 01:11

## 2023-11-13 NOTE — SUBJECTIVE & OBJECTIVE
Interval History: no acute changes. Blood cx's remains ngtd and remains afebrile with no leukocytosis. Plan for PICC line placement today in preporation for discharge this week. Remain on abx regimen per ID.    Review of Systems   Constitutional: Negative for malaise/fatigue.   Cardiovascular:  Negative for chest pain, claudication, dyspnea on exertion, irregular heartbeat, leg swelling and palpitations.   Respiratory:  Negative for cough and shortness of breath.    Hematologic/Lymphatic: Negative for bleeding problem.   Gastrointestinal:  Negative for abdominal pain.   Genitourinary:  Negative for dysuria.   Neurological:  Negative for headaches and weakness.     Medications:  Continuous Infusions:  Scheduled Meds:   aspirin  325 mg Oral Daily    ceftaroline (TEFLARO) IVPB  600 mg Intravenous Q8H    DAPTOmycin (CUBICIN) IV (PEDS and ADULTS)  8 mg/kg Intravenous Q24H    docusate sodium  100 mg Oral BID    metoprolol tartrate  50 mg Oral BID    NIFEdipine  60 mg Oral Daily    polyethylene glycol  17 g Oral Daily     PRN Meds:0.9%  NaCl infusion (for blood administration), acetaminophen, albuterol-ipratropium, dextrose 10%, dextrose 10%, melatonin, ondansetron, oxyCODONE, oxyCODONE, prochlorperazine     Objective:     Vital Signs (Most Recent):  Temp: 97.7 °F (36.5 °C) (11/13/23 1202)  Pulse: 71 (11/13/23 1202)  Resp: 17 (11/13/23 1202)  BP: (!) 100/55 (11/13/23 1202)  SpO2: 97 % (11/13/23 1202) Vital Signs (24h Range):  Temp:  [97.7 °F (36.5 °C)-98.6 °F (37 °C)] 97.7 °F (36.5 °C)  Pulse:  [61-90] 71  Resp:  [16-20] 17  SpO2:  [93 %-98 %] 97 %  BP: (100-119)/(52-58) 100/55     Weight: 65.8 kg (145 lb 1 oz)  Body mass index is 19.14 kg/m².    SpO2: 97 %       Intake/Output - Last 3 Shifts         11/11 0700  11/12 0659 11/12 0700 11/13 0659 11/13 0700 11/14 0659    P.O. 480 480 240    Total Intake(mL/kg) 480 (7.4) 480 (7.2) 240 (3.6)    Urine (mL/kg/hr) 1700 (1.1) 1450 (0.9)     Stool 0 0     Total Output 1700 1450   "   Net -1220 -970 +240           Stool Occurrence 0 x 0 x             Lines/Drains/Airways       Peripheral Intravenous Line  Duration                  Peripheral IV - Single Lumen 11/05/23 0100 18 G Left;Posterior Hand 8 days         Peripheral IV - Single Lumen 11/12/23 1400 20 G Anterior;Right Forearm <1 day                     Physical Exam  Constitutional:       Appearance: Normal appearance.   HENT:      Head: Normocephalic.   Eyes:      Pupils: Pupils are equal, round, and reactive to light.   Cardiovascular:      Rate and Rhythm: Normal rate and regular rhythm.      Pulses: Normal pulses.   Pulmonary:      Effort: Pulmonary effort is normal.   Abdominal:      General: Abdomen is flat. Bowel sounds are normal.      Palpations: Abdomen is soft.   Musculoskeletal:         General: Normal range of motion.   Skin:     General: Skin is warm and dry.      Comments: MSI CDI   Neurological:      General: No focal deficit present.      Mental Status: He is alert.            Significant Labs:  CBC:   Recent Labs   Lab 11/13/23  0516   WBC 8.24   RBC 2.61*   HGB 7.8*   HCT 24.6*      MCV 94   MCH 29.9   MCHC 31.7*     CMP:   Recent Labs   Lab 11/13/23  0516      CALCIUM 8.4*   ALBUMIN 1.9*   PROT 6.9   *   K 3.9   CO2 22*      BUN 11   CREATININE 0.9   ALKPHOS 113   ALT 32   AST 24   BILITOT 0.8     Coagulation: No results for input(s): "PT", "INR", "APTT" in the last 48 hours.    Significant Diagnostics:  I have reviewed all pertinent imaging results/findings within the past 24 hours.  "

## 2023-11-13 NOTE — PT/OT/SLP PROGRESS
Occupational Therapy   Treatment    Name: Lorenzo Nino  MRN: [id number]  Admitting Diagnosis:  Acute bacterial endocarditis  10 Days Post-Op    Recommendations:     Discharge Recommendations: High Intensity Therapy  Discharge Equipment Recommendations:  bedside commode  Barriers to discharge:       Assessment:     Lorenzo Nino is a 48 y.o. male with a medical diagnosis of Acute bacterial endocarditis.  Pt presents with decreased endurance and impaired mobility performance as limited by cardiovascular status and generalized weakness. Pt found upright in chair and agreeable for therapy. Pt demonstrated functional mobility training to simulate household and community environment gait training during session. Pt tolerated ~8 minutes total using no AD with 2 small LOB and decreased visual search and navigation strategies. Pt overall making excellent progress but still poses as a fall risk. Patient has demonstrated sufficient progression to warrant high intensity therapy evidenced by objectives noted below.  Performance deficits affecting function are weakness, gait instability, impaired endurance, impaired balance, impaired self care skills, impaired functional mobility, decreased lower extremity function, decreased upper extremity function.     Rehab Prognosis:  Good; patient would benefit from acute skilled OT services to address these deficits and reach maximum level of function.       Plan:     Patient to be seen 5 x/week to address the above listed problems via self-care/home management, therapeutic activities, therapeutic exercises  Plan of Care Expires: 12/05/23  Plan of Care Reviewed with: patient    Subjective     Chief Complaint: none  Patient/Family Comments/goals: improve strength  Pain/Comfort:  Pain Rating 1: 0/10  Pain Rating Post-Intervention 1: 0/10  Pain Rating Post-Intervention 2: 0/10    Objective:     Communicated with: RN prior to session.  Patient found up in chair with telemetry upon OT entry  to room.    General Precautions: Standard, fall, sternal    Orthopedic Precautions:N/A  Braces: N/A  Respiratory Status: Room air     Occupational Performance:     Bed Mobility:    nt    Functional Mobility/Transfers:  Patient completed Sit <> Stand Transfer with minimum assistance  with  hand-held assist   Functional Mobility: Pt stood from chair with min A and mobilized in room and into restroom for seated toileting. Pt needed min A to ascend from toilet. Pt then completed oral care, hand hygiene, and face cleaning at sink. Pt then completed fx'l mobility in hallway with 2 LOB    Activities of Daily Living:  Grooming: contact guard assistance washing face and brushing teeth standing at sink  Upper Body Dressing: minimum assistance donning gown  Toileting: stand by assistance for urination      [facility] 6 Click ADL: 18    Treatment & Education:  Pt educated on role of occupational therapy, POC, and safety during ADLs and functional mobility. Pt and OT discussed importance of safe, continued mobility to optimize daily living skills. Pt verbalized understanding.   Pt able to recall 2/3 sternal prec.  White board updated during session. Pt given instruction to call for medical staff/nurse for assistance.       Patient left up in chair with all lines intact, call button in reach, RN notified, and mother present    GOALS:   Multidisciplinary Problems       Occupational Therapy Goals          Problem: Occupational Therapy    Goal Priority Disciplines Outcome Interventions   Occupational Therapy Goal     OT, PT/OT Ongoing, Progressing    Description: Goals set on 11/5, with expiration date 12/4:  Patient will increase functional independence with ADLs by performing:    Bed mobility with Supervision  Grooming while standing at sink with [place]  UB Dressing with [place].  LB Dressing with Supervision.  Toileting from toilet with [place] for hygiene and clothing management.   Functional mobility of household and  community distance with Supervision and AD as needed  Pt will demonstrate understanding of education provided regarding energy conservation and task modification through teach-back method.  Pt will demonstrate understanding and learning of sternal precautions via recall and demonstration 3/3.                         Time Tracking:     OT Date of Treatment: 11/13/23  OT Start Time: 0924  OT Stop Time: 0941  OT Total Time (min): 17 min    Billable Minutes:Self Care/Home Management 17 minutes    OT/GABRIEL: OT     Number of GABRIEL visits since last OT visit: 1    11/13/2023

## 2023-11-13 NOTE — PROGRESS NOTES
[facility] - [facility]  Cardiothoracic Surgery  Progress Note    Patient Name: Lorenzo Nino  MRN: [id number]  Admission Date: 10/30/2023  Hospital Length of Stay: 14 days  Code Status: Full Code   Attending Physician: Manuel Beal MD   Referring Provider: Self, Aaareferral  Principal Problem:Acute bacterial endocarditis            Subjective:     Post-Op Info:  Procedure(s) (LRB):  REPAIR, MITRAL VALVE, OPEN (N/A)  EXCLUSION, LEFT ATRIAL APPENDAGE, OPEN, AS PART OF OPEN CHEST SURGERY (Left)   10 Days Post-Op     Interval History: no acute changes. Blood cx's remains ngtd and afebrile with no leukocytosis. Plan for PICC line placement today in preporation for discharge this week. Remain on abx regimen per ID.    Review of Systems   Constitutional: Negative for malaise/fatigue.   Cardiovascular:  Negative for chest pain, claudication, dyspnea on exertion, irregular heartbeat, leg swelling and palpitations.   Respiratory:  Negative for cough and shortness of breath.    Hematologic/Lymphatic: Negative for bleeding problem.   Gastrointestinal:  Negative for abdominal pain.   Genitourinary:  Negative for dysuria.   Neurological:  Negative for headaches and weakness.     Medications:  Continuous Infusions:  Scheduled Meds:   aspirin  325 mg Oral Daily    ceftaroline (TEFLARO) IVPB  600 mg Intravenous Q8H    DAPTOmycin (CUBICIN) IV (PEDS and ADULTS)  8 mg/kg Intravenous Q24H    docusate sodium  100 mg Oral BID    metoprolol tartrate  50 mg Oral BID    NIFEdipine  60 mg Oral Daily    polyethylene glycol  17 g Oral Daily     PRN Meds:0.9%  NaCl infusion (for blood administration), acetaminophen, albuterol-ipratropium, dextrose 10%, dextrose 10%, melatonin, ondansetron, oxyCODONE, oxyCODONE, prochlorperazine     Objective:     Vital Signs (Most Recent):  Temp: 97.7 °F (36.5 °C) (11/13/23 1202)  Pulse: 71 (11/13/23 1202)  Resp: 17 (11/13/23 1202)  BP: (!) 100/55 (11/13/23 1202)  SpO2: 97 % (11/13/23 1202) Vital  "Signs (24h Range):  Temp:  [97.7 °F (36.5 °C)-98.6 °F (37 °C)] 97.7 °F (36.5 °C)  Pulse:  [61-90] 71  Resp:  [16-20] 17  SpO2:  [93 %-98 %] 97 %  BP: (100-119)/(52-58) 100/55     Weight: 65.8 kg (145 lb 1 oz)  Body mass index is 19.14 kg/m².    SpO2: 97 %       Intake/Output - Last 3 Shifts         11/11 0700  11/12 0659 11/12 0700  11/13 0659 11/13 0700  11/14 0659    P.O. 480 480 240    Total Intake(mL/kg) 480 (7.4) 480 (7.2) 240 (3.6)    Urine (mL/kg/hr) 1700 (1.1) 1450 (0.9)     Stool 0 0     Total Output 1700 1450     Net -1220 -970 +240           Stool Occurrence 0 x 0 x             Lines/Drains/Airways       Peripheral Intravenous Line  Duration                  Peripheral IV - Single Lumen 11/05/23 0100 18 G Left;Posterior Hand 8 days         Peripheral IV - Single Lumen 11/12/23 1400 20 G Anterior;Right Forearm <1 day                     Physical Exam  Constitutional:       Appearance: Normal appearance.   HENT:      Head: Normocephalic.   Eyes:      Pupils: Pupils are equal, round, and reactive to light.   Cardiovascular:      Rate and Rhythm: Normal rate and regular rhythm.      Pulses: Normal pulses.   Pulmonary:      Effort: Pulmonary effort is normal.   Abdominal:      General: Abdomen is flat. Bowel sounds are normal.      Palpations: Abdomen is soft.   Musculoskeletal:         General: Normal range of motion.   Skin:     General: Skin is warm and dry.      Comments: MSI CDI   Neurological:      General: No focal deficit present.      Mental Status: He is alert.            Significant Labs:  CBC:   Recent Labs   Lab 11/13/23  0516   WBC 8.24   RBC 2.61*   HGB 7.8*   HCT 24.6*      MCV 94   MCH 29.9   MCHC 31.7*     CMP:   Recent Labs   Lab 11/13/23  0516      CALCIUM 8.4*   ALBUMIN 1.9*   PROT 6.9   *   K 3.9   CO2 22*      BUN 11   CREATININE 0.9   ALKPHOS 113   ALT 32   AST 24   BILITOT 0.8     Coagulation: No results for input(s): "PT", "INR", "APTT" in the last 48 " hours.    Significant Diagnostics:  I have reviewed all pertinent imaging results/findings within the past 24 hours.  Assessment/Plan:     * Acute bacterial endocarditis  - ID following   - Continue with recommended antibiotics  - recent blood cx ngtd, plan for PICC line today  - Continue with salvage therapy per ID recommendations     S/P MVR (mitral valve repair)    - Maintain sternal precautions   - ASA  - BB  - Encourage ambulation  - regular diet   - Boost with each meal  - Bowel regimen in place   - Monitor electrolytes to keep Mag above 2 and Potassium above 4  - OOBTO   - Encourage IS use  - Continue working with PT/OT      Transient hyperglycemia post procedure  - Endocrine consulted during admission but have since signed off      MRSA bacteremia  - ID following   Recommendations:  Continue daptomycin and ceftaroline for salvage therapy   Not a candidate for gent/rif given risk of nephrotoxicity/hepatotoxicity  Monitor CK while on abx therapy, prior elevated CK trending down, recent 220. Ordered   repeat blcx ngtd, plan for PICC line   Anticipate 6w course of abx therapy from cleared blood cultures  Contact precautions per hospital protocol    Heart failure with mid-range ejection fraction (HFmEF)  - stable    Paroxysmal atrial fibrillation  - Continue BB  - Tele monitor   - Monitor and replace lytes as needed    Endocarditis  49 yo M admitted 10/30 with PNA vs UTI vs meningitis requiring vasopressor support. No history of IVDU or recent dental work. Further work up revealed MRSA bacteremia. TTE revealed LVEF 40-45%. Mitral valve vegetation with moderate MR due to flail anterior leaflet. Small vegetation on RCC of Ao valve, no AI noted.     Elevated transaminase level  - Resolved    Thrombocytopenia  - CBC daily   - Resolved     FRANCESCO (acute kidney injury)  - Resolved        Raquel Escalante PA-C  Cardiothoracic Surgery  [facility] - [facility]

## 2023-11-13 NOTE — ASSESSMENT & PLAN NOTE
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 Blood cultures from 11/10 positive, repeat from 11/11 and 11/12 no growth to date. Pt denies new or worsening joint or back pain.       Recommendations:  Continue daptomycin and ceftaroline, can stop ceftaroline after 1 week. Will continue daptomycin for total 6 weeks  Monitor CK while on daptomycin  6week course of abx therapy from cleared blood cultures  Contact precautions per hospital protocol

## 2023-11-13 NOTE — PLAN OF CARE
Outpatient Antibiotic Therapy Plan:    Please send referral to [facility].    1) Infection: MRSA bacteremia, endocarditis    2) Discharge Antibiotics:    Intravenous antibiotics:  Daptomycin 8mg/kg IV q 24 hours   Ceftaroline 600mg IV q 8 hours     3) Therapy Duration:  6 weeks    Estimated end date of IV ceftaroline: 11/19/23  Estimated end date of IV daptomycin: 12/24/23    4) Outpatient Weekly Labs:    Order the following labs to be drawn on Mondays:   CBC  CMP   CPK    5) Fax Lab Results to Infectious Diseases Provider: Dr. Van    [facility] Fax Number: [phone]    6) Outpatient Infectious Diseases Follow-up    Follow-up appointment will be arranged by the ID clinic and will be found in the patient's appointments tab.    Prior to discharge, please ensure the patient's follow-up has been scheduled.    If there is still no follow-up scheduled prior to discharge, please send an EPIC message to Arlette Alaniz in Infectious Diseases.

## 2023-11-13 NOTE — HOSPITAL COURSE
On 11/3/23, the patient was taken to the Operating Room for the above stated procedure. Please see the previously dictated operative report for complete details. Postoperatively, the patient was taken from the  Operating Room to the ICU where the vital signs were monitored and pain was kept under control. The patient was weaned from the drips and extubated in the ICU per protocol. Once hemodynamically stable, the patient was transferred to the Cardiac Step-Down floor for continued strengthening and ambulation. On postoperative day 10, the patient was ready for discharge to home. At the time of discharge, the patient was ambulating with contact guard assist. PT/OT recommended high intensive therapy for which rehab was indicated for further conditioning. Patient and family refused rehab plaacement, risks and benefits were provided to them. Patient will be discharged with home health services, picc line for IV abx administration, and platform walker. Pain was well controlled with oral analgesics and the patient was tolerating the diet.     Patient had prolong MRSA bacteremia and endocarditis for which ID is following. PICC line was placed, he will be discharged on: IV ceftaroline, eed 11/19/23. IV daptomycin, eed 12/24/23. Opthalmology consulted for intermittent glare in the setting bacteremia with notable hope spots on evaluation. Patient to f/u with ophthalmology team as outpatient. Their clinic will call to schedule an appointment.       MOBILITY AND ACTIVITY: As tolerated. Patient may shower. No heavy lifting of greater than 5 pounds and no driving.     DIET: An 1800-calorie ADA with a 1500 mL fluid restriction.     WOUND CARE INSTRUCTIONS: Check for redness, swelling and drainage around the  incision or wound. Patient is to call for any obvious bleeding, drainage, pus from the wound, unusual problems or difficulties or temperature of greater than 101   degrees.     FOLLOWUP: Follow up with Dr. Beal in  approximately 3 weeks. Prior to this  appointment, the patient will have a chest x-ray and EKG.  F/u with ID   F/u with opthalmology      Patient not placed on [employer] at the time of discharge due to potential for hypotension       DISCHARGE CONDITION: At the time of discharge, the patient was in sinus rhythm and afebrile with stable vital signs.

## 2023-11-13 NOTE — PLAN OF CARE
AAOX4,VSS,O2 sats>95% on room air.Plan of care discussed with patient, IV antibiotics continued.Patient has no complaints of pain other than midsternal incision soreness and no complaints of SOB. Discussed medications and care. Patient has no questions at this time.Pt visualised and stable.Call light within reach.Pt resting,in the recliner chair .Pt's mother by the bedside.Will continue to monitor through the shift.       Problem: Infection  Goal: Absence of Infection Signs and Symptoms  Outcome: Ongoing, Progressing     Problem: Adult Inpatient Plan of Care  Goal: Plan of Care Review  Outcome: Ongoing, Progressing  Goal: Patient-Specific Goal (Individualized)  Outcome: Ongoing, Progressing  Goal: Absence of Hospital-Acquired Illness or Injury  Outcome: Ongoing, Progressing  Goal: Optimal Comfort and Wellbeing  Outcome: Ongoing, Progressing  Goal: Readiness for Transition of Care  Outcome: Ongoing, Progressing     Problem: Adult Inpatient Plan of Care  Goal: Patient-Specific Goal (Individualized)  Outcome: Ongoing, Progressing     Problem: Adult Inpatient Plan of Care  Goal: Absence of Hospital-Acquired Illness or Injury  Outcome: Ongoing, Progressing     Problem: Adult Inpatient Plan of Care  Goal: Optimal Comfort and Wellbeing  Outcome: Ongoing, Progressing     Problem: Adult Inpatient Plan of Care  Goal: Readiness for Transition of Care  Outcome: Ongoing, Progressing     Problem: Adjustment to Illness (Sepsis/Septic Shock)  Goal: Optimal Coping  Outcome: Ongoing, Progressing     Problem: Bleeding (Sepsis/Septic Shock)  Goal: Absence of Bleeding  Outcome: Ongoing, Progressing     Problem: Glycemic Control Impaired (Sepsis/Septic Shock)  Goal: Blood Glucose Level Within Desired Range  Outcome: Ongoing, Progressing     Problem: Infection Progression (Sepsis/Septic Shock)  Goal: Absence of Infection Signs and Symptoms  Outcome: Ongoing, Progressing     Problem: Nutrition Impaired (Sepsis/Septic Shock)  Goal: Optimal  Nutrition Intake  Outcome: Ongoing, Progressing     Problem: Fluid and Electrolyte Imbalance (Acute Kidney Injury/Impairment)  Goal: Fluid and Electrolyte Balance  Outcome: Ongoing, Progressing     Problem: Oral Intake Inadequate (Acute Kidney Injury/Impairment)  Goal: Optimal Nutrition Intake  Outcome: Ongoing, Progressing     Problem: Renal Function Impairment (Acute Kidney Injury/Impairment)  Goal: Effective Renal Function  Outcome: Ongoing, Progressing     Problem: Skin Injury Risk Increased  Goal: Skin Health and Integrity  Outcome: Ongoing, Progressing     Problem: Fall Injury Risk  Goal: Absence of Fall and Fall-Related Injury  Outcome: Ongoing, Progressing     Problem: Fluid Imbalance (Pneumonia)  Goal: Fluid Balance  Outcome: Ongoing, Progressing     Problem: Infection (Pneumonia)  Goal: Resolution of Infection Signs and Symptoms  Outcome: Ongoing, Progressing     Problem: Infection (Pneumonia)  Goal: Resolution of Infection Signs and Symptoms  Outcome: Ongoing, Progressing     Problem: Respiratory Compromise (Pneumonia)  Goal: Effective Oxygenation and Ventilation  Outcome: Ongoing, Progressing     Problem: Respiratory Compromise (Pneumonia)  Goal: Effective Oxygenation and Ventilation  Outcome: Ongoing, Progressing     Problem: Restraint, Nonbehavioral (Nonviolent)  Goal: Absence of Harm or Injury  Outcome: Ongoing, Progressing     Problem: Communication Impairment (Mechanical Ventilation, Invasive)  Goal: Effective Communication  Outcome: Ongoing, Progressing     Problem: Device-Related Complication Risk (Mechanical Ventilation, Invasive)  Goal: Optimal Device Function  Outcome: Ongoing, Progressing     Problem: Inability to Wean (Mechanical Ventilation, Invasive)  Goal: Mechanical Ventilation Liberation  Outcome: Ongoing, Progressing     Problem: Nutrition Impairment (Mechanical Ventilation, Invasive)  Goal: Optimal Nutrition Delivery  Outcome: Ongoing, Progressing     Problem: Skin and Tissue Injury  (Mechanical Ventilation, Invasive)  Goal: Absence of Device-Related Skin and Tissue Injury  Outcome: Ongoing, Progressing     Problem: Ventilator-Induced Lung Injury (Mechanical Ventilation, Invasive)  Goal: Absence of Ventilator-Induced Lung Injury  Outcome: Ongoing, Progressing     Problem: Communication Impairment (Artificial Airway)  Goal: Effective Communication  Outcome: Ongoing, Progressing     Problem: Skin and Tissue Injury (Artificial Airway)  Goal: Absence of Device-Related Skin or Tissue Injury  Outcome: Ongoing, Progressing     Problem: Adjustment to Illness (Heart Failure)  Goal: Optimal Coping  Outcome: Ongoing, Progressing

## 2023-11-13 NOTE — PLAN OF CARE
11/13/23 1644   Post-Acute Status   Post-Acute Authorization Home Health   Home Health Status Pending medical clearance/testing     Pt accepted by [facility].      Humera Luong LMSW  [facility]  [id number]

## 2023-11-13 NOTE — CONSULTS
D/L PICC placed in right basilic vein, 40 cm in length with 0 cm exposed. Arm circumference 29 cm. Lot# [id number]

## 2023-11-13 NOTE — ASSESSMENT & PLAN NOTE
- ID following   - Continue with recommended antibiotics  - recent blood cx ngtd, plan for PICC line today  - Continue with salvage therapy per ID recommendations

## 2023-11-13 NOTE — PLAN OF CARE
Problem: Infection  Goal: Absence of Infection Signs and Symptoms  Outcome: Ongoing, Progressing     Problem: Adult Inpatient Plan of Care  Goal: Plan of Care Review  Outcome: Ongoing, Progressing  Goal: Patient-Specific Goal (Individualized)  Outcome: Ongoing, Progressing  Goal: Absence of Hospital-Acquired Illness or Injury  Outcome: Ongoing, Progressing  Goal: Optimal Comfort and Wellbeing  Outcome: Ongoing, Progressing  Goal: Readiness for Transition of Care  Outcome: Ongoing, Progressing     Problem: Adjustment to Illness (Sepsis/Septic Shock)  Goal: Optimal Coping  Outcome: Ongoing, Progressing     Problem: Bleeding (Sepsis/Septic Shock)  Goal: Absence of Bleeding  Outcome: Ongoing, Progressing     Problem: Glycemic Control Impaired (Sepsis/Septic Shock)  Goal: Blood Glucose Level Within Desired Range  Outcome: Ongoing, Progressing     Problem: Infection Progression (Sepsis/Septic Shock)  Goal: Absence of Infection Signs and Symptoms  Outcome: Ongoing, Progressing     Problem: Nutrition Impaired (Sepsis/Septic Shock)  Goal: Optimal Nutrition Intake  Outcome: Ongoing, Progressing     Problem: Fluid and Electrolyte Imbalance (Acute Kidney Injury/Impairment)  Goal: Fluid and Electrolyte Balance  Outcome: Ongoing, Progressing     Problem: Oral Intake Inadequate (Acute Kidney Injury/Impairment)  Goal: Optimal Nutrition Intake  Outcome: Ongoing, Progressing     Problem: Renal Function Impairment (Acute Kidney Injury/Impairment)  Goal: Effective Renal Function  Outcome: Ongoing, Progressing     Problem: Skin Injury Risk Increased  Goal: Skin Health and Integrity  Outcome: Ongoing, Progressing     Problem: Fall Injury Risk  Goal: Absence of Fall and Fall-Related Injury  Outcome: Ongoing, Progressing     Problem: Fluid Imbalance (Pneumonia)  Goal: Fluid Balance  Outcome: Ongoing, Progressing     Problem: Infection (Pneumonia)  Goal: Resolution of Infection Signs and Symptoms  Outcome: Ongoing, Progressing      Problem: Respiratory Compromise (Pneumonia)  Goal: Effective Oxygenation and Ventilation  Outcome: Ongoing, Progressing     Problem: Restraint, Nonbehavioral (Nonviolent)  Goal: Absence of Harm or Injury  Outcome: Ongoing, Progressing     Problem: Communication Impairment (Mechanical Ventilation, Invasive)  Goal: Effective Communication  Outcome: Ongoing, Progressing     Problem: Device-Related Complication Risk (Mechanical Ventilation, Invasive)  Goal: Optimal Device Function  Outcome: Ongoing, Progressing     Problem: Inability to Wean (Mechanical Ventilation, Invasive)  Goal: Mechanical Ventilation Liberation  Outcome: Ongoing, Progressing     Problem: Nutrition Impairment (Mechanical Ventilation, Invasive)  Goal: Optimal Nutrition Delivery  Outcome: Ongoing, Progressing     Problem: Skin and Tissue Injury (Mechanical Ventilation, Invasive)  Goal: Absence of Device-Related Skin and Tissue Injury  Outcome: Ongoing, Progressing     Problem: Ventilator-Induced Lung Injury (Mechanical Ventilation, Invasive)  Goal: Absence of Ventilator-Induced Lung Injury  Outcome: Ongoing, Progressing     Problem: Communication Impairment (Artificial Airway)  Goal: Effective Communication  Outcome: Ongoing, Progressing     Problem: Device-Related Complication Risk (Artificial Airway)  Goal: Optimal Device Function  Outcome: Ongoing, Progressing     Problem: Skin and Tissue Injury (Artificial Airway)  Goal: Absence of Device-Related Skin or Tissue Injury  Outcome: Ongoing, Progressing     Problem: Adjustment to Illness (Heart Failure)  Goal: Optimal Coping  Outcome: Ongoing, Progressing

## 2023-11-13 NOTE — SUBJECTIVE & OBJECTIVE
Interval History:     States he is feeling well, denies any complaints including any new back or joint pain.     Review of Systems   Constitutional:  Negative for chills and fever.   Respiratory:  Negative for cough and shortness of breath.    Cardiovascular:  Negative for chest pain, palpitations and leg swelling.   Gastrointestinal:  Negative for abdominal pain, constipation, diarrhea, nausea and vomiting.   Genitourinary:  Negative for dysuria and hematuria.   Musculoskeletal:  Negative for arthralgias and myalgias.   Skin:  Negative for rash.   Neurological:  Negative for weakness and headaches.     Objective:     Vital Signs (Most Recent):  Temp: 97.8 °F (36.6 °C) (11/13/23 0900)  Pulse: 69 (11/13/23 1129)  Resp: 20 (11/13/23 0900)  BP: (!) 119/58 (11/13/23 0900)  SpO2: 98 % (11/13/23 0853) Vital Signs (24h Range):  Temp:  [97.4 °F (36.3 °C)-98.7 °F (37.1 °C)] 97.8 °F (36.6 °C)  Pulse:  [61-90] 69  Resp:  [15-20] 20  SpO2:  [93 %-98 %] 98 %  BP: ()/(52-58) 119/58     Weight: 65.8 kg (145 lb 1 oz)  Body mass index is 19.14 kg/m².    Estimated Creatinine Clearance: 92.4 mL/min (based on SCr of 0.9 mg/dL).     Physical Exam  Vitals reviewed.   Constitutional:       General: He is not in acute distress.     Appearance: Normal appearance. He is not ill-appearing.   HENT:      Head: Normocephalic and atraumatic.   Eyes:      Extraocular Movements: Extraocular movements intact.      Conjunctiva/sclera: Conjunctivae normal.   Cardiovascular:      Rate and Rhythm: Normal rate and regular rhythm.      Heart sounds: No murmur heard.  Pulmonary:      Effort: Pulmonary effort is normal. No respiratory distress.      Breath sounds: Normal breath sounds.   Abdominal:      General: Abdomen is flat. Bowel sounds are normal.      Palpations: Abdomen is soft.      Tenderness: There is no abdominal tenderness.   Musculoskeletal:      Cervical back: Normal range of motion.   Skin:     General: Skin is warm and dry.    Neurological:      General: No focal deficit present.      Mental Status: He is alert and oriented to person, place, and time.   Psychiatric:         Mood and Affect: Mood normal.         Behavior: Behavior normal.         Thought Content: Thought content normal.          Significant Labs: All pertinent labs within the past 24 hours have been reviewed.  Recent Lab Results         11/13/23  0516        Albumin 1.9              ALT 32       Anion Gap 9       AST 24       BILIRUBIN TOTAL 0.8  Comment: For infants and newborns, interpretation of results should be based  on gestational age, weight and in agreement with clinical  observations.    Premature Infant recommended reference ranges:  Up to 24 hours.............<8.0 mg/dL  Up to 48 hours............<12.0 mg/dL  3-5 days..................<15.0 mg/dL  6-29 days.................<15.0 mg/dL         BUN 11       Calcium 8.4       Chloride 104       CO2 22       Creatinine 0.9       eGFR >60.0       Glucose 103       Hematocrit 24.6       Hemoglobin 7.8       Magnesium  1.9       MCH 29.9       MCHC 31.7       MCV 94       MPV 12.1       Phosphorus Level 4.2       Platelet Count 411       Potassium 3.9       PROTEIN TOTAL 6.9       RBC 2.61       RDW 16.5       Sodium 135       WBC 8.24               Significant Imaging: I have reviewed all pertinent imaging results/findings within the past 24 hours.

## 2023-11-13 NOTE — PROGRESS NOTES
[facility] - Cardiology Stepdown  Infectious Disease  Progress Note    Patient Name: Lorenzo Nion  MRN: [id number]  Admission Date: 10/30/2023  Length of Stay: 14 days  Attending Physician: Manuel Beal MD  Primary Care Provider: No primary care provider on file.    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 Blood cultures from 11/10 positive, repeat from 11/11 and 11/12 no growth to date. Pt denies new or worsening joint or back pain, but reporting new vision changes (intermittent glare).       Recommendations:  Continue daptomycin and ceftaroline, can stop ceftaroline after 1 week. Will continue daptomycin for total 6 weeks  Monitor CK while on daptomycin  6week course of abx therapy from cleared blood cultures  Contact precautions per hospital protocol  Recommend ophtho eval for vision changes in the setting of prolonged bacteremia        Anticipated Disposition: TBD    Thank you for your consult. I will sign off. Please contact us if you have any additional questions.    Kasey Van DO  Infectious Disease  [facility] - [facility] Step[facility]    Subjective:     Principal Problem:Acute bacterial endocarditis    HPI:   This is the strange case of a 49 yo man with no medical problems who reportedly developed acute onset of symptoms a few days ago. He was seen in several urgent cares w/headache, abdominal pain with nausea but no vomiting or diarrhea.  There he was evaluated and instructed to take Tylenol and follow up if he still felt unwell.  When he presented with hematuria, he was sent to the ED here and was found to have thrombocytopenia. CT head negative, CT C/A/P showing perinephric stranding, distended bladder, and hiatal hernia. Patient admitted to MICU for further workup and treatment of his hypotension and likely severe sepsis. Blood cultures have grown MRSA  and a TTE revealed a flail mitral leflet and a possible vegetation on the aortic valve. ID is consulted for that. The patient is accompanied by his mother. He is scheduled for a NEW. Denies IDU. Denetal implants. HIV NR.     Interval History:     States he is feeling well, denies any complaints including any new back or joint pain.     Review of Systems   Constitutional:  Negative for chills and fever.   Respiratory:  Negative for cough and shortness of breath.    Cardiovascular:  Negative for chest pain, palpitations and leg swelling.   Gastrointestinal:  Negative for abdominal pain, constipation, diarrhea, nausea and vomiting.   Genitourinary:  Negative for dysuria and hematuria.   Musculoskeletal:  Negative for arthralgias and myalgias.   Skin:  Negative for rash.   Neurological:  Negative for weakness and headaches.     Objective:     Vital Signs (Most Recent):  Temp: 97.8 °F (36.6 °C) (11/13/23 0900)  Pulse: 69 (11/13/23 1129)  Resp: 20 (11/13/23 0900)  BP: (!) 119/58 (11/13/23 0900)  SpO2: 98 % (11/13/23 0853) Vital Signs (24h Range):  Temp:  [97.4 °F (36.3 °C)-98.7 °F (37.1 °C)] 97.8 °F (36.6 °C)  Pulse:  [61-90] 69  Resp:  [15-20] 20  SpO2:  [93 %-98 %] 98 %  BP: ()/(52-58) 119/58     Weight: 65.8 kg (145 lb 1 oz)  Body mass index is 19.14 kg/m².    Estimated Creatinine Clearance: 92.4 mL/min (based on SCr of 0.9 mg/dL).     Physical Exam  Vitals reviewed.   Constitutional:       General: He is not in acute distress.     Appearance: Normal appearance. He is not ill-appearing.   HENT:      Head: Normocephalic and atraumatic.   Eyes:      Extraocular Movements: Extraocular movements intact.      Conjunctiva/sclera: Conjunctivae normal.   Cardiovascular:      Rate and Rhythm: Normal rate and regular rhythm.      Heart sounds: No murmur heard.  Pulmonary:      Effort: Pulmonary effort is normal. No respiratory distress.      Breath sounds: Normal breath sounds.   Abdominal:      General: Abdomen is flat.  Bowel sounds are normal.      Palpations: Abdomen is soft.      Tenderness: There is no abdominal tenderness.   Musculoskeletal:      Cervical back: Normal range of motion.   Skin:     General: Skin is warm and dry.   Neurological:      General: No focal deficit present.      Mental Status: He is alert and oriented to person, place, and time.   Psychiatric:         Mood and Affect: Mood normal.         Behavior: Behavior normal.         Thought Content: Thought content normal.          Significant Labs: All pertinent labs within the past 24 hours have been reviewed.  Recent Lab Results         11/13/23  0516        Albumin 1.9              ALT 32       Anion Gap 9       AST 24       BILIRUBIN TOTAL 0.8  Comment: For infants and newborns, interpretation of results should be based  on gestational age, weight and in agreement with clinical  observations.    Premature Infant recommended reference ranges:  Up to 24 hours.............<8.0 mg/dL  Up to 48 hours............<12.0 mg/dL  3-5 days..................<15.0 mg/dL  6-29 days.................<15.0 mg/dL         BUN 11       Calcium 8.4       Chloride 104       CO2 22       Creatinine 0.9       eGFR >60.0       Glucose 103       Hematocrit 24.6       Hemoglobin 7.8       Magnesium  1.9       MCH 29.9       MCHC 31.7       MCV 94       MPV 12.1       Phosphorus Level 4.2       Platelet Count 411       Potassium 3.9       PROTEIN TOTAL 6.9       RBC 2.61       RDW 16.5       Sodium 135       WBC 8.24               Significant Imaging: I have reviewed all pertinent imaging results/findings within the past 24 hours.

## 2023-11-13 NOTE — PROCEDURES
"Lorenzo Nino is a 48 y.o. male patient.    Temp: 97.7 °F (36.5 °C) (11/13/23 1202)  Pulse: 71 (11/13/23 1202)  Resp: 17 (11/13/23 1202)  BP: (!) 100/55 (11/13/23 1202)  SpO2: 97 % (11/13/23 1202)  Weight: 65.8 kg (145 lb 1 oz) (11/13/23 0700)  Height: 6' 1" (185.4 cm) (11/04/23 0700)    PICC  Date/Time: 11/13/2023 3:09 PM  Performed by: Ck Taylor RN  Assisting provider: Anabel Stanley LPN  Time out: Immediately prior to procedure a time out was called to verify the correct patient, procedure, equipment, support staff and site/side marked as required  Indications: med administration and vascular access  Anesthesia: local infiltration  Local anesthetic: lidocaine 1% without epinephrine  Anesthetic Total (mL): 3  Description of findings: picc  Preparation: skin prepped with ChloraPrep  Skin prep agent dried: skin prep agent completely dried prior to procedure  Sterile barriers: all five maximum sterile barriers used - cap, mask, sterile gown, sterile gloves, and large sterile sheet  Hand hygiene: hand hygiene performed prior to central venous catheter insertion  Location details: right basilic  Catheter type: double lumen  Catheter size: 5 Fr  Catheter Length: 40cm    Ultrasound guidance: yes  Vessel Caliber: medium and patent, compressibility normal  Vascular Doppler: not done  Needle advanced into vessel with real time Ultrasound guidance.  Guidewire confirmed in vessel.  Image recorded and saved.  Sterile sheath used.  no esophageal manometryNumber of attempts: 1  Post-procedure: blood return through all ports, sterile dressing applied and chlorhexidine patch  Technical procedures used: 3CG  Specimens: No  Implants: No  Assessment: placement verified by x-ray  Complications: none          Name Anabel Stanley LPN  11/13/2023    "

## 2023-11-13 NOTE — PLAN OF CARE
[facility] Step[facility]  Discharge Reassessment    Primary Care Provider: No primary care provider on file.    Expected Discharge Date: 11/14/2023    Reassessment (most recent)       Discharge Reassessment - 11/13/23 1604          Discharge Reassessment    Assessment Type Discharge Planning Reassessment     Discharge Plan discussed with: Patient;Parent(s)     Communicated DARIELA with patient/caregiver Yes     Discharge Plan A Home Health     Discharge Plan B Rehab     DME Needed Upon Discharge  walker, rolling   platform    Transition of Care Barriers None     Why the patient remains in the hospital Requires continued medical care        Post-Acute Status    Post-Acute Authorization IV Infusion;Home Health     Home Health Status Referrals Sent     IV Infusion Status Referral(s) sent                 SW met with pt and mother at bedside to review discharge recommendation of HH and home IV abx and are agreeable to plan. Patient/family instructed to identify preference.    Preferred provider: (if more than 1, listed in order of descending preference)  [facility] (per pt's mother pt's girlfriend is current with them)    Referrals sent to [facility] and [facility].  If an additional preferred provider not listed above is identified, additional referral to be sent. If above facilities unable to accept, will send additional referrals to in-network providers.     Discharge Plan A and Plan B have been determined by review of patient's clinical status, future medical and therapeutic needs, and coverage/benefits for post-acute care in coordination with multidisciplinary team members.  EL and HELEN name and ext on whiteboard.  Will continue to follow.      Humera Luong LMSW  [facility]  [id number]

## 2023-11-13 NOTE — PT/OT/SLP PROGRESS
Physical Therapy Treatment    Patient Name:  Lorenzo Nino   MRN:  [id number]  Admitting Diagnosis:  Acute bacterial endocarditis   Recent Surgery: Procedure(s) (LRB):  REPAIR, MITRAL VALVE, OPEN (N/A)  EXCLUSION, LEFT ATRIAL APPENDAGE, OPEN, AS PART OF OPEN CHEST SURGERY (Left) 10 Days Post-Op  Admit Date: 10/30/2023  Length of Stay: 14 days    Recommendations:     Discharge Recommendations:    High Intensity Therapy  Discharge Equipment Recommendations: bedside commode   Barriers to discharge:  fall risk    Appropriate transfer level with nursing staff: Ambulatory in room with assist x1 person    Plan:     During this hospitalization, patient to be seen 5 x/week to address the identified rehab impairments via gait training, therapeutic activities, therapeutic exercises, neuromuscular re-education and progress towards the established goals.  Plan of Care Expires:  12/03/23  Plan of Care Reviewed with: patient    Assessment:     Lorenzo Nino is a 48 y.o. male admitted with a medical diagnosis of Acute bacterial endocarditis. Pt agreeable to therapy session. Pt able to increase ambulation distance but continues to be limited by impaired balance and experienced 1 LOB during gait trial today. Patient also limited by BLE weakness and decreased endurance. Patient is not functioning at his baseline and is currently a fall risk. Patient would benefit from skilled therapy services to maximize safety and independence, increase activity tolerance, decrease fall risk, decrease caregiver burden, improve QOL, improve patient's functional mobility, and decrease risk of contractures and pressure sores. Patient has demonstrated sufficient progression to warrant high intensity therapy evidenced by objectives noted below.     Problem List: weakness, impaired endurance, impaired self care skills, impaired functional mobility, gait instability, impaired balance, decreased upper extremity function, decreased lower extremity  "function, decreased safety awareness.  Rehab Prognosis: Good; patient would benefit from acute skilled PT services to address these deficits and reach maximum level of function.      Goals:   Multidisciplinary Problems       Physical Therapy Goals          Problem: Physical Therapy    Goal Priority Disciplines Outcome Goal Variances Interventions   Physical Therapy Goal     PT, PT/OT Ongoing, Progressing     Description: Goals to be met by: 23     Patient will increase functional independence with mobility by performin. Supine to sit with Set-up [place]  2. Sit to stand transfer with Stand-by Assistance  3. Bed to chair transfer with Stand-by Assistance using No Assistive Device  4. Gait  x 175 feet with Stand-by Assistance using No Assistive Device.   5. Lower extremity exercise program x20 reps per handout, with independence  6. Pt independently recalling 3/3 sternal precautions                         Subjective     RN notified prior to session. Mother present upon PT entrance into room. Patient agreeable to PT treatment session.    Chief Complaint: instability   Patient/Family Comments/goals: mom- go home  Pain/Comfort:  Pain Rating 1: 0/10  Pain Rating Post-Intervention 1: 0/10      Objective:     Patient found up in chair with: peripheral IV, telemetry   Cognition:   Alert and Cooperative  Patient is oriented to Person, Place, Time, Situation  General Precautions: Standard, Cardiac fall, sternal   Orthopedic Precautions:N/A   Braces: N/A   Body mass index is 19.14 kg/m².  Oxygen Device: Room Air  Vitals: BP (!) 94/56 (BP Location: Left arm, Patient Position: Lying)   Pulse 75   Temp 97.8 °F (36.6 °C) (Tympanic)   Resp 18   Ht 6' 1" (1.854 m)   Wt 65.8 kg (145 lb 1 oz)   SpO2 98%   BMI 19.14 kg/m²     Outcome Measures:  AM-PAC 6 CLICK MOBILITY  Turning over in bed (including adjusting bedclothes, sheets and blankets)?: 3  Sitting down on and standing up from a chair with arms (e.g., " wheelchair, bedside commode, etc.): 3  Moving from lying on back to sitting on the side of the bed?: 3  Moving to and from a bed to a chair (including a wheelchair)?: 3  Need to walk in hospital room?: 3  Climbing 3-5 steps with a railing?: 2  Basic Mobility Total Score: 17     Functional Mobility:    Bed Mobility:   Pt found/returned to bedside chair    Transfers:   Sit <> Stand Transfer: contact guard assistance with no assistive device     Standing Balance:  Static Standing Balance: stand by assistance  Dynamic Standing Balance: contact guard assistance  Patient used: no assistive device       Gait:  Patient ambulated: 90' x2 trials (standing rest break between trials)   Patient required: minimal assist  Patient used:  no assistive device   Gait Deviation(s): unsteady gait, decreased step length, narrow base of support, decreased weight shift, decreased foot clearance, flexed posture, decreased kayode, and decreased arm swing  Gait belt utilized  Comments: Patient with 1 LOB requiring mod A to correct. Pt continues to demo NBOS, inconsistent foot placement, and instability during trial. Pt required cueing for pacing to increase stability.     Education:  Time provided for education, counseling and discussion of health disposition in regards to patient's current status  All questions answered within PT scope of practice and to patient's satisfaction  PT role in POC to address current functional deficits  Pt educated on proper body mechanics, safety techniques, and energy conservation with PT facilitation and cueing throughout session  Call nursing/pct to transfer to chair/use bathroom. Pt stated understanding.  Pt encouraged to ambulate with shoes to increase ankle stability  Pt encouraged to performed B ankle ABCs to increase intrinsic ankle stability     Patient left up in chair with all lines intact, call button in reach, RN notified, and mother present.    Time Tracking:     PT Received On: 11/13/23  PT Start  Time: 1408     PT Stop Time: 1421  PT Total Time (min): 13 min     Billable Minutes:   Gait Training 13 minutes    Treatment Type: Treatment  PT/PTA: PT       11/13/2023

## 2023-11-13 NOTE — ASSESSMENT & PLAN NOTE
- ID following   Recommendations:  Continue daptomycin and ceftaroline for salvage therapy   Not a candidate for gent/rif given risk of nephrotoxicity/hepatotoxicity  Monitor CK while on abx therapy, prior elevated CK trending down, recent 220. Ordered   repeat blcx ngtd, plan for PICC line   Anticipate 6w course of abx therapy from cleared blood cultures  Contact precautions per hospital protocol

## 2023-11-14 ENCOUNTER — NURSE TRIAGE (OUTPATIENT)
Dept: ADMINISTRATIVE | Facility: CLINIC | Age: 48
End: 2023-11-14
Payer: COMMERCIAL

## 2023-11-14 VITALS
HEIGHT: 73 IN | TEMPERATURE: 98 F | OXYGEN SATURATION: 98 % | RESPIRATION RATE: 17 BRPM | SYSTOLIC BLOOD PRESSURE: 101 MMHG | HEART RATE: 75 BPM | WEIGHT: 145 LBS | DIASTOLIC BLOOD PRESSURE: 59 MMHG | BODY MASS INDEX: 19.22 KG/M2

## 2023-11-14 LAB
ALBUMIN SERPL BCP-MCNC: 2 G/DL (ref 3.5–5.2)
ALP SERPL-CCNC: 112 U/L (ref 55–135)
ALT SERPL W/O P-5'-P-CCNC: 30 U/L (ref 10–44)
ANION GAP SERPL CALC-SCNC: 7 MMOL/L (ref 8–16)
ASCENDING AORTA: 2.97 CM
AST SERPL-CCNC: 24 U/L (ref 10–40)
AV INDEX (PROSTH): 1.02
AV MEAN GRADIENT: 5 MMHG
AV PEAK GRADIENT: 9 MMHG
AV VALVE AREA BY VELOCITY RATIO: 3.63 CM²
AV VALVE AREA: 3.81 CM²
AV VELOCITY RATIO: 0.97
BACTERIA BLD CULT: ABNORMAL
BILIRUB SERPL-MCNC: 0.7 MG/DL (ref 0.1–1)
BSA FOR ECHO PROCEDURE: 1.84 M2
BUN SERPL-MCNC: 11 MG/DL (ref 6–20)
CALCIUM SERPL-MCNC: 8.5 MG/DL (ref 8.7–10.5)
CHLORIDE SERPL-SCNC: 104 MMOL/L (ref 95–110)
CO2 SERPL-SCNC: 23 MMOL/L (ref 23–29)
CREAT SERPL-MCNC: 0.9 MG/DL (ref 0.5–1.4)
CV ECHO LV RWT: 0.51 CM
DOP CALC AO PEAK VEL: 1.52 M/S
DOP CALC AO VTI: 28.63 CM
DOP CALC LVOT AREA: 3.7 CM2
DOP CALC LVOT DIAMETER: 2.18 CM
DOP CALC LVOT PEAK VEL: 1.48 M/S
DOP CALC LVOT STROKE VOLUME: 108.97 CM3
DOP CALC MV VTI: 35.35 CM
DOP CALCLVOT PEAK VEL VTI: 29.21 CM
E WAVE DECELERATION TIME: 237.27 MSEC
E/A RATIO: 1.64
E/E' RATIO: 15.38 M/S
ECHO LV POSTERIOR WALL: 1.06 CM (ref 0.6–1.1)
ERYTHROCYTE [DISTWIDTH] IN BLOOD BY AUTOMATED COUNT: 15.9 % (ref 11.5–14.5)
EST. GFR  (NO RACE VARIABLE): >60 ML/MIN/1.73 M^2
FRACTIONAL SHORTENING: 24 % (ref 28–44)
GLUCOSE SERPL-MCNC: 104 MG/DL (ref 70–110)
HCT VFR BLD AUTO: 24.9 % (ref 40–54)
HGB BLD-MCNC: 8 G/DL (ref 14–18)
HR MV ECHO: 70 BPM
INTERVENTRICULAR SEPTUM: 1.09 CM (ref 0.6–1.1)
LA MAJOR: 6.11 CM
LA MINOR: 6.26 CM
LA WIDTH: 5.39 CM
LEFT ATRIUM SIZE: 3.3 CM
LEFT ATRIUM VOLUME INDEX MOD: 58.5 ML/M2
LEFT ATRIUM VOLUME INDEX: 49.7 ML/M2
LEFT ATRIUM VOLUME MOD: 110 CM3
LEFT ATRIUM VOLUME: 93.5 CM3
LEFT INTERNAL DIMENSION IN SYSTOLE: 3.13 CM (ref 2.1–4)
LEFT VENTRICLE DIASTOLIC VOLUME INDEX: 40 ML/M2
LEFT VENTRICLE DIASTOLIC VOLUME: 75.2 ML
LEFT VENTRICLE MASS INDEX: 78 G/M2
LEFT VENTRICLE SYSTOLIC VOLUME INDEX: 20.7 ML/M2
LEFT VENTRICLE SYSTOLIC VOLUME: 38.86 ML
LEFT VENTRICULAR INTERNAL DIMENSION IN DIASTOLE: 4.12 CM (ref 3.5–6)
LEFT VENTRICULAR MASS: 147.5 G
LV LATERAL E/E' RATIO: 14.29 M/S
LV SEPTAL E/E' RATIO: 16.67 M/S
MAGNESIUM SERPL-MCNC: 1.9 MG/DL (ref 1.6–2.6)
MCH RBC QN AUTO: 30.1 PG (ref 27–31)
MCHC RBC AUTO-ENTMCNC: 32.1 G/DL (ref 32–36)
MCV RBC AUTO: 94 FL (ref 82–98)
MV MEAN GRADIENT: 2 MMHG
MV PEAK A VEL: 0.61 M/S
MV PEAK E VEL: 1 M/S
MV PEAK GRADIENT: 6 MMHG
MV STENOSIS PRESSURE HALF TIME: 68.81 MS
MV VALVE AREA BY CONTINUITY EQUATION: 3.08 CM2
MV VALVE AREA P 1/2 METHOD: 3.2 CM2
PHOSPHATE SERPL-MCNC: 3.9 MG/DL (ref 2.7–4.5)
PISA TR MAX VEL: 2.28 M/S
PLATELET # BLD AUTO: 399 K/UL (ref 150–450)
PMV BLD AUTO: 11.1 FL (ref 9.2–12.9)
POTASSIUM SERPL-SCNC: 3.8 MMOL/L (ref 3.5–5.1)
PROT SERPL-MCNC: 7.2 G/DL (ref 6–8.4)
RA MAJOR: 5.29 CM
RA PRESSURE ESTIMATED: 3 MMHG
RA WIDTH: 2.87 CM
RBC # BLD AUTO: 2.66 M/UL (ref 4.6–6.2)
RIGHT VENTRICULAR END-DIASTOLIC DIMENSION: 3.46 CM
RV TB RVSP: 5 MMHG
SINUS: 3.31 CM
SODIUM SERPL-SCNC: 134 MMOL/L (ref 136–145)
STJ: 3.19 CM
TDI LATERAL: 0.07 M/S
TDI SEPTAL: 0.06 M/S
TDI: 0.07 M/S
TR MAX PG: 21 MMHG
TRICUSPID ANNULAR PLANE SYSTOLIC EXCURSION: 1.16 CM
TV REST PULMONARY ARTERY PRESSURE: 24 MMHG
WBC # BLD AUTO: 7.81 K/UL (ref 3.9–12.7)
Z-SCORE OF LEFT VENTRICULAR DIMENSION IN END DIASTOLE: -2.43
Z-SCORE OF LEFT VENTRICULAR DIMENSION IN END SYSTOLE: -0.27

## 2023-11-14 PROCEDURE — 80053 COMPREHEN METABOLIC PANEL: CPT | Performed by: STUDENT IN AN ORGANIZED HEALTH CARE EDUCATION/TRAINING PROGRAM

## 2023-11-14 PROCEDURE — 25000003 PHARM REV CODE 250

## 2023-11-14 PROCEDURE — A4216 STERILE WATER/SALINE, 10 ML: HCPCS | Performed by: THORACIC SURGERY (CARDIOTHORACIC VASCULAR SURGERY)

## 2023-11-14 PROCEDURE — 25000003 PHARM REV CODE 250: Performed by: NURSE PRACTITIONER

## 2023-11-14 PROCEDURE — 25000003 PHARM REV CODE 250: Performed by: STUDENT IN AN ORGANIZED HEALTH CARE EDUCATION/TRAINING PROGRAM

## 2023-11-14 PROCEDURE — 85027 COMPLETE CBC AUTOMATED: CPT | Performed by: THORACIC SURGERY (CARDIOTHORACIC VASCULAR SURGERY)

## 2023-11-14 PROCEDURE — 83735 ASSAY OF MAGNESIUM: CPT | Performed by: THORACIC SURGERY (CARDIOTHORACIC VASCULAR SURGERY)

## 2023-11-14 PROCEDURE — 25000003 PHARM REV CODE 250: Performed by: THORACIC SURGERY (CARDIOTHORACIC VASCULAR SURGERY)

## 2023-11-14 PROCEDURE — 84100 ASSAY OF PHOSPHORUS: CPT | Performed by: THORACIC SURGERY (CARDIOTHORACIC VASCULAR SURGERY)

## 2023-11-14 PROCEDURE — 63600175 PHARM REV CODE 636 W HCPCS: Performed by: STUDENT IN AN ORGANIZED HEALTH CARE EDUCATION/TRAINING PROGRAM

## 2023-11-14 PROCEDURE — 63600175 PHARM REV CODE 636 W HCPCS

## 2023-11-14 PROCEDURE — 97116 GAIT TRAINING THERAPY: CPT

## 2023-11-14 RX ORDER — OXYCODONE HYDROCHLORIDE 5 MG/1
5 TABLET ORAL EVERY 4 HOURS PRN
Qty: 42 TABLET | Refills: 0 | Status: SHIPPED | OUTPATIENT
Start: 2023-11-14 | End: 2023-12-05 | Stop reason: ALTCHOICE

## 2023-11-14 RX ORDER — METOPROLOL TARTRATE 50 MG/1
50 TABLET ORAL 2 TIMES DAILY
Qty: 60 TABLET | Refills: 11 | Status: SHIPPED | OUTPATIENT
Start: 2023-11-14 | End: 2024-01-12

## 2023-11-14 RX ORDER — POTASSIUM CHLORIDE 20 MEQ/1
TABLET, EXTENDED RELEASE ORAL
Qty: 30 TABLET | Refills: 11 | Status: SHIPPED | OUTPATIENT
Start: 2023-11-14 | End: 2023-12-05 | Stop reason: ALTCHOICE

## 2023-11-14 RX ORDER — DOCUSATE SODIUM 100 MG/1
100 CAPSULE, LIQUID FILLED ORAL 2 TIMES DAILY PRN
Qty: 30 CAPSULE | Refills: 0 | Status: SHIPPED | OUTPATIENT
Start: 2023-11-14 | End: 2023-12-05 | Stop reason: ALTCHOICE

## 2023-11-14 RX ORDER — POLYETHYLENE GLYCOL 3350 17 G/17G
17 POWDER, FOR SOLUTION ORAL DAILY PRN
Qty: 238 G | Refills: 0 | Status: SHIPPED | OUTPATIENT
Start: 2023-11-14 | End: 2023-12-05 | Stop reason: ALTCHOICE

## 2023-11-14 RX ORDER — FUROSEMIDE 20 MG/1
TABLET ORAL
Qty: 60 TABLET | Refills: 11 | Status: SHIPPED | OUTPATIENT
Start: 2023-11-14 | End: 2023-12-05 | Stop reason: ALTCHOICE

## 2023-11-14 RX ORDER — ASPIRIN 325 MG
325 TABLET, DELAYED RELEASE (ENTERIC COATED) ORAL DAILY
Qty: 30 TABLET | Refills: 11 | Status: ON HOLD | OUTPATIENT
Start: 2023-11-14 | End: 2023-12-29

## 2023-11-14 RX ORDER — ACETAMINOPHEN 325 MG/1
650 TABLET ORAL EVERY 6 HOURS PRN
Qty: 30 TABLET | Refills: 0 | Status: SHIPPED | OUTPATIENT
Start: 2023-11-14 | End: 2023-12-05 | Stop reason: ALTCHOICE

## 2023-11-14 RX ORDER — NIFEDIPINE 60 MG/1
60 TABLET, EXTENDED RELEASE ORAL DAILY
Qty: 30 TABLET | Refills: 11 | Status: SHIPPED | OUTPATIENT
Start: 2023-11-14 | End: 2023-12-15

## 2023-11-14 RX ORDER — FUROSEMIDE 10 MG/ML
40 INJECTION INTRAMUSCULAR; INTRAVENOUS ONCE
Status: COMPLETED | OUTPATIENT
Start: 2023-11-14 | End: 2023-11-14

## 2023-11-14 RX ORDER — POTASSIUM CHLORIDE 20 MEQ/1
20 TABLET, EXTENDED RELEASE ORAL ONCE
Status: COMPLETED | OUTPATIENT
Start: 2023-11-14 | End: 2023-11-14

## 2023-11-14 RX ADMIN — Medication 10 ML: at 12:11

## 2023-11-14 RX ADMIN — CEFTAROLINE FOSAMIL 600 MG: 600 POWDER, FOR SOLUTION INTRAVENOUS at 01:11

## 2023-11-14 RX ADMIN — NIFEDIPINE 60 MG: 60 TABLET, FILM COATED, EXTENDED RELEASE ORAL at 09:11

## 2023-11-14 RX ADMIN — ASPIRIN 325 MG ORAL TABLET 325 MG: 325 PILL ORAL at 09:11

## 2023-11-14 RX ADMIN — FUROSEMIDE 40 MG: 10 INJECTION, SOLUTION INTRAVENOUS at 09:11

## 2023-11-14 RX ADMIN — DOCUSATE SODIUM 100 MG: 100 CAPSULE, LIQUID FILLED ORAL at 09:11

## 2023-11-14 RX ADMIN — DAPTOMYCIN 610 MG: 500 INJECTION, POWDER, LYOPHILIZED, FOR SOLUTION INTRAVENOUS at 01:11

## 2023-11-14 RX ADMIN — Medication 10 ML: at 06:11

## 2023-11-14 RX ADMIN — POTASSIUM CHLORIDE 20 MEQ: 1500 TABLET, EXTENDED RELEASE ORAL at 09:11

## 2023-11-14 RX ADMIN — METOPROLOL TARTRATE 50 MG: 50 TABLET, FILM COATED ORAL at 09:11

## 2023-11-14 RX ADMIN — CEFTAROLINE FOSAMIL 600 MG: 600 POWDER, FOR SOLUTION INTRAVENOUS at 05:11

## 2023-11-14 NOTE — PLAN OF CARE
[facility] - Cardiology Step[facility]  Discharge Final Note    Primary Care Provider: No primary care provider on file.    Expected Discharge Date: 11/14/2023    Final Discharge Note (most recent)       Final Note - 11/14/23 1512          Final Note    Assessment Type Final Discharge Note     Anticipated Discharge Disposition Home-Health Care Svc        Post-Acute Status    Post-Acute Authorization Home Health;IV Infusion     Home Health Status Set-up Complete/Auth obtained     IV Infusion Status Set-up Complete/Auth obtained                   Per Natalie with [facility] they received auth for IV abx and will deliver the meds to the home.  RN Mady notified.  SW called and left message for Desire with [facility] informing her that pt will leave today.      Humera Luong, LMSW [facility]  [id number]        Future Appointments   Date Time Provider Department Center   11/21/2023 10:40 AM WILBER Cornelius MD [facility] OPHTHAL [facility]   11/30/2023  9:30 AM Kasey Van DO [facility] ID [facility]   12/5/2023  9:45 AM [facility]RAY [facility]   12/5/2023 10:45 AM EKG, APPT [facility] EKG [facility]   12/5/2023 11:15 AM Manuel Beal MD [facility] CARDVAS [facility]   12/26/2023 10:00 AM Chris Grande MD [facility] ID [facility]

## 2023-11-14 NOTE — PROGRESS NOTES
[facility] educator met with patient and mother discussed discharge plan for home IVABX. Lorenzo Nino will dc home with family support. Patient will infuse medication via Elastomeric Pump. patient and mother educated on [facility] procedure. [facility] mat provided.  Patient education checklist reviewed and acknowledged by above person(s) above and are agreeable to discharge with home infusion plan of care. IV administration process using aspetic technique was reviewed with successful return demonstration. Patient feels comfortable with infusion. Patient will dc home with Daptomycin 610 mg  IV q24 hours for estimated end of therapy date 12/24/23 and Ceftarolin 600mg IV q8 till 11/19/23 . Dosing schedule times are 1pm for Dapto and 0600/1400/2200.  Extension to be placed per  nurse tomorrow if patient wants them. [facility]  will follow patient for weekly dressing changes and lab draws. Time allotted for questions. Patients nurse and case management team notified teaching has been completed.     Medication delivery will be made to home    Patient accepted to care by [facility] and report called to Sh[facility]    Phone number [phone]

## 2023-11-14 NOTE — PT/OT/SLP PROGRESS
Physical Therapy Treatment    Patient Name:  Lorenzo Nino   MRN:  [id number]  Admitting Diagnosis:  Acute bacterial endocarditis   Recent Surgery: Procedure(s) (LRB):  REPAIR, MITRAL VALVE, OPEN (N/A)  EXCLUSION, LEFT ATRIAL APPENDAGE, OPEN, AS PART OF OPEN CHEST SURGERY (Left) 11 Days Post-Op  Admit Date: 10/30/2023  Length of Stay: 15 days    Recommendations:     Discharge Recommendations:    High Intensity Therapy  Discharge Equipment Recommendations: shower chair, other (see comments) (rolling walker with bilateral platform attachments)   Barriers to discharge:  fall risk, increased need for caregiver assistance    Plan:     During this hospitalization, patient to be seen 5 x/week to address the identified rehab impairments via gait training, therapeutic activities, therapeutic exercises, neuromuscular re-education and progress towards the established goals.  Plan of Care Expires:  12/03/23  Plan of Care Reviewed with: patient    Assessment:     Lorenzo Nino is a 48 y.o. male admitted with a medical diagnosis of Acute bacterial endocarditis. Pt agreeable to therapy session. Pt demo'd improvements with gait stability, safety and endurance with use of rolling walker with bilateral platform attachments today. Pt required less assistance, experienced no LOB, and demo'd improved gait mechanics during gait trial today with use of B PRW. Patient with sternal precautions so rolling walker with bilateral platform attachments is safest assistive device option for patient in order to protect integrity of sternal precautions. Patient continues to be limited by decreased endurance, impaired balance, and decreased activity tolerance. Patient would benefit from skilled therapy services to maximize safety and independence, increase activity tolerance, decrease fall risk, decrease caregiver burden, improve QOL, improve patient's functional mobility, and decrease risk of contractures and pressure sores. Patient has  "demonstrated sufficient progression to warrant high intensity therapy evidenced by objectives noted below.    Problem List: weakness, impaired endurance, impaired self care skills, impaired functional mobility, gait instability, impaired balance, decreased safety awareness.  Rehab Prognosis: Good; patient would benefit from acute skilled PT services to address these deficits and reach maximum level of function.      Goals:   Multidisciplinary Problems       Physical Therapy Goals          Problem: Physical Therapy    Goal Priority Disciplines Outcome Goal Variances Interventions   Physical Therapy Goal     PT, PT/OT Ongoing, Progressing     Description: Goals to be met by: 23     Patient will increase functional independence with mobility by performin. Supine to sit with Set-up [place]  2. Sit to stand transfer with Stand-by Assistance  3. Bed to chair transfer with Stand-by Assistance using No Assistive Device  4. Gait  x 175 feet with Stand-by Assistance using No Assistive Device.   5. Lower extremity exercise program x20 reps per handout, with independence  6. Pt independently recalling 3/3 sternal precautions                         Subjective     RN notified prior to session. Mother present upon PT entrance into room. Patient agreeable to PT treatment session.    Chief Complaint: "I feel better with this walker more steady"  Patient/Family Comments/goals: go home  Pain/Comfort:  Pain Rating 1: 0/10  Pain Rating Post-Intervention 1: 0/10      Objective:       Patient found up in chair with: telemetry   Cognition:   Alert and Cooperative  Patient is oriented to Person, Place, Time, Situation  General Precautions: Standard, Cardiac fall, sternal   Orthopedic Precautions:N/A   Braces: N/A   Body mass index is 19.13 kg/m².  Oxygen Device: Room Air  Vitals: BP (!) 101/59 (BP Location: Left arm, Patient Position: Sitting)   Pulse 69   Temp 97.8 °F (36.6 °C) (Tympanic)   Resp 17   Ht 6' 1" (1.854 " m)   Wt 65.8 kg (145 lb)   SpO2 98%   BMI 19.13 kg/m²     Outcome Measures:  AM-PAC 6 CLICK MOBILITY  Turning over in bed (including adjusting bedclothes, sheets and blankets)?: 3  Sitting down on and standing up from a chair with arms (e.g., wheelchair, bedside commode, etc.): 3  Moving from lying on back to sitting on the side of the bed?: 3  Moving to and from a bed to a chair (including a wheelchair)?: 3  Need to walk in hospital room?: 3  Climbing 3-5 steps with a railing?: 2  Basic Mobility Total Score: 17     Functional Mobility:    Bed Mobility:   Pt found/returned to bedside chair    Transfers:   Sit <> Stand Transfer: contact guard assistance with platform walker     Standing Balance:  Static Standing Balance: stand by assistance  Dynamic Standing Balance: stand by assistance  Patient used: platform walker       Gait:  Patient ambulated: 260' with 1 standing rest break   Patient required: standy by assistance  Patient used:   rolling walker with bilateral platform attachments    Gait Deviation(s): occasional unsteady gait, decreased step length, flexed posture, and decreased kayode  all lines remained intact throughout ambulation trial  Comments: Patient with no LOB during gait trial. Pt demo'd improved step length. Pt required occasional VC for erect posture and to decreased UE WB on B PRW. Pt required VC for AD management managing tight areas and for descent into chair.     Education:  Time provided for education, counseling and discussion of health disposition in regards to patient's current status  All questions answered within PT scope of practice and to patient's satisfaction  PT role in POC to address current functional deficits  Pt educated on proper body mechanics, safety techniques, and energy conservation with PT facilitation and cueing throughout session  Call nursing/pct to transfer to chair/use bathroom. Pt stated understanding.  Pt and mother educated on modifying height of walker to fit  patient  Pt educated on how to navigate turns with B PRW to increase safety    Patient left up in chair with all lines intact, call button in reach, RN notified, and mother present.    Time Tracking:     PT Received On: 11/14/23  PT Start Time: 1108     PT Stop Time: 1121  PT Total Time (min): 13 min     Billable Minutes:   Gait Training 13 minutes    Treatment Type: Treatment  PT/PTA: PT       11/14/2023

## 2023-11-14 NOTE — TELEPHONE ENCOUNTER
I have the mother of this patient calling about the antibiotic medication. Its has yet to be delivered and wondering if this is going to be a problem if the dose is missed as of course you know his hx of infection. Any advise that I can pass on to the mom. I reached out to the PA of cardiothoracic per Sc since it said that she was available.    [facility] pharmacist called about the 2 antibiotics promised today and pt was just discharged home and mom calling about the arrival and if will get it tonight. Pharmacist told me it would be delivered tonight was picked up at 5:12pm.    Pts mom called to let her know that medication is out for delivery and it was picked up at 5:12 pm. I left  and for her to call back OOC if any other questions or concerns                  Reason for Disposition   [1] Caller has URGENT medicine question about med that PCP or specialist prescribed AND [2] triager unable to answer question    Protocols used: Medication Question Call-A-

## 2023-11-14 NOTE — NURSING
1540-Pt discharge per md orders. Pt/Mother  verbalize complete understanding and follow up appointment. Pt was given a copy of home care d/c instructions w/ a list of home medications.  Address all issues prior d/c . Pt left w/ all of valuables. Pt voice no concerns. Transport notified of discharge. Tele box place in dirty bin by .

## 2023-11-14 NOTE — CONSULTS
"Consultation Report  Ophthalmology Service    Date: 11/14/2023    Chief complaint/Reason for Consult: vision changes     History of Present Illness: Lorenzo Nino is a 48 y.o. male who presents was admitted for MRSA bacteremia found to have infective endocarditis now s/p mitral valve replacement. Patient notes that he has a "smudge" over his central vision in his right eye that makes it hard to read. He's not sure when it started, but noticed it was worse after his surgery. Also notices some "glare" when light comes through the window. Denies flashes, floaters, eye pain, discharge. Denies vision changes in left eye.     POcularHx: No history of ocular problems or past ocular surgeries.  Does not use glasses or contacts.    Current eye gtts: none      PMHx:  has no past medical history on file.     PSurgHx:  has a past surgical history that includes Insertion of intra-aortic balloon assist device (Right, 11/2/2023); repair, mitral valve, open (N/A, 11/3/2023); and exclusion, left atrial appendage, open, as part of open chest surgery (Left, 11/3/2023).     Home Medications:   Prior to Admission medications    Medication Sig Start Date End Date Taking? Authorizing Provider   acetaminophen (TYLENOL) 325 MG tablet Take 2 tablets (650 mg total) by mouth every 6 (six) hours as needed for Temperature greater than or Pain (or equal to 101 degree F). 11/14/23   Raquel Escalante PA-C   aspirin (ECOTRIN) 325 MG EC tablet Take 1 tablet (325 mg total) by mouth once daily. 11/14/23 11/13/24  Raquel Escalante PA-C   docusate sodium (COLACE) 100 MG capsule Take 1 capsule (100 mg total) by mouth 2 (two) times daily as needed for Constipation. 11/14/23   Raquel Escalante PA-C   furosemide (LASIX) 20 MG tablet Take one tablet by mouth twice daily for 7 days then decrease to once daily 11/14/23   Raquel Escalante PA-C   metoprolol tartrate (LOPRESSOR) 50 MG tablet Take 1 tablet (50 mg total) by mouth 2 (two) times daily. " 11/14/23 11/13/24  Raquel Escalante PA-C   NIFEdipine (PROCARDIA-XL) 60 MG (OSM) 24 hr tablet Take 1 tablet (60 mg total) by mouth once daily. 11/14/23 11/13/24  Raquel Escalante PA-C   oxyCODONE (ROXICODONE) 5 MG immediate release tablet Take 1 tablet (5 mg total) by mouth every 4 (four) hours as needed for Pain. 11/14/23   Raquel Escalante PA-C   polyethylene glycol (GLYCOLAX) 17 gram PwPk Take 17 g by mouth daily as needed. 11/14/23   Raquel Escalante PA-C   potassium chloride SA (K-DUR,KLOR-CON) 20 MEQ tablet Take one tablet by mouth twice daily for 7 days then decrease to once daily 11/14/23   Raquel Escalante PA-C        Medications this encounter:    aspirin  325 mg Oral Daily    ceftaroline (TEFLARO) IVPB  600 mg Intravenous Q8H    DAPTOmycin (CUBICIN) IV (PEDS and ADULTS)  8 mg/kg Intravenous Q24H    docusate sodium  100 mg Oral BID    metoprolol tartrate  50 mg Oral BID    NIFEdipine  60 mg Oral Daily    polyethylene glycol  17 g Oral Daily    sodium chloride 0.9%  10 mL Intravenous Q6H       Allergies: has No Known Allergies.     Social:  reports current alcohol use. He reports that he does not use drugs.     Family Hx: No family history of glaucoma, macular degeneration, or blindness. family history is not on file.     ROS: see hpi     Ocular examination/Dilated fundus examination:  Base Eye Exam       Visual Acuity (Snellen - Linear)         Right Left    Dist sc 20/200 20/40    Dist ph sc NI               Tonometry (Tonopen, 8:48 AM)         Right Left    Pressure 5 5              Pupils         Pupils Dark Light Shape React APD    Right PERRL 3 2 Round Brisk None    Left PERRL 3 2 Round Brisk None              Visual Fields         Right Left     Full Full              Extraocular Movement         Right Left     Full, Ortho Full, Ortho              Dilation       Both eyes: 1% Mydriacyl, 2.5% Phenylephrine @ 8:49 AM                  Slit Lamp and Fundus Exam       External Exam         Right  Left    External Normal Normal              Pen Light Exam         Right Left    Lids/Lashes Normal Normal    Conjunctiva/Sclera White and quiet White and quiet    Cornea Clear Clear    Anterior Chamber Deep and formed Deep and formed    Iris Round and reactive Round and reactive    Lens Clear Clear    Anterior Vitreous Normal Normal              Fundus Exam         Right Left    Disc Normal Normal    C/D Ratio .3 .3    Macula slightly elevated white lesion with adjacent intra-retinal blood and subjacent subretinal blood near infeior arcade, sub-retinal blood extending into juxta-foveal region Normal    Vessels Normal Normal    Periphery Normal Normal                      =======================================    Assessment/Plan:   1. Infectious embolus, right eye in setting of infective endocarditis  - Patient admitted for MRSA bacteria found to have IE now s/p mitral valve repair complaining of blurry vision in right eye since the surgery  - Blood cultures negative x 5 days  - VA 20/200, exam notable for white macular lesion with surround intra-retinal and sub-retinal blood, likely an infectious embolus. No signs of left eye involvement.   - no vitritis or eye pain  - No acute intervention from ophthalmology, ok to continue with IV antibiotic per primary team/ID  - Will contact patient to arrange clinic follow-up in the next week for fundus photos/OCT mac    Patient best contact number- [phone] (Jolie, patient mother)    Seen w/ Dr. Herring, discussed w/ Dr. Adryan Olson MD  PGY-2, Ophthalmology  11/14/2023  11:23 AM

## 2023-11-14 NOTE — PLAN OF CARE
11/14/23 1421   Post-Acute Status   Post-Acute Authorization [facility] Infusion   IV Infusion Status Pending payor review/awaiting authorization (if required)     Per Natalie with [facility] pt has completed education but still waiting on auth for the medication before it can be delivered.  Auth request escalated to  leadership.      Humera Luong LMSW  [facility]  [id number]

## 2023-11-14 NOTE — DISCHARGE SUMMARY
[facility] - Cardiology Stepdown  Cardiothoracic Surgery  Discharge Summary      Patient Name: Lorenzo Nino  MRN: [id number]  Admission Date: 10/30/2023  Hospital Length of Stay: 15 days  Discharge Date and Time:  11/14/2023 11:28 AM  Attending Physician: Manuel Beal MD   Discharging Provider: Raquel Escalante PA-C  Primary Care Provider: No primary care provider on file.    HPI:   Mr. Nino is a very pleasant 48-year-old gentleman who initially presented to the emergency department on October 30 completing fatigue body aches abdominal pain and hematuria.  He also reported fever and headache.  In the emergency department he was tachycardic and hypotensive.  He underwent a thorough evaluation which ultimately demonstrated bacterial endocarditis.  He was being managed medically until the night of November 1 when he decompensated.  He required intubation.  A previous echo had shown only moderate mitral regurgitation but a repeat echo on the morning of November 2 demonstrated severe mitral regurgitation.  His chest x-ray was consistent with pulmonary edema.  A balloon pump was placed to mitigate the mitral regurgitation and diuresis was initiated.  He has had at least 1 episode of atrial fibrillation.  We plan mitral valve repair and resection of left atrial appendage.  Of note, the intraoperative NEW demonstrated no aortic valve abnormalities.        Procedure(s) (LRB):  REPAIR, MITRAL VALVE, OPEN (N/A)  EXCLUSION, LEFT ATRIAL APPENDAGE, OPEN, AS PART OF OPEN CHEST SURGERY (Left)      Indwelling Lines/Drains at time of discharge:   Lines/Drains/Airways       Peripherally Inserted Central Catheter Line  Duration             PICC Double Lumen 11/13/23 1509 right basilic <1 day                  Hospital Course: On 11/3/23, the patient was taken to the Operating Room for the above stated procedure. Please see the previously dictated operative report for complete details. Postoperatively, the patient was taken  from the  Operating Room to the ICU where the vital signs were monitored and pain was kept under control. The patient was weaned from the drips and extubated in the ICU per protocol. Once hemodynamically stable, the patient was transferred to the Cardiac Step-Down floor for continued strengthening and ambulation. On postoperative day 10, the patient was ready for discharge to home. At the time of discharge, the patient was ambulating with contact guard assist. PT/OT recommended high intensive therapy for which rehab was indicated for further conditioning. Patient and family refused rehab plaacement, risks and benefits were provided to them. Patient will be discharged with home health services, picc line for IV abx administration, and platform walker. Pain was well controlled with oral analgesics and the patient was tolerating the diet.     Patient had prolong MRSA bacteremia and endocarditis for which ID is following. PICC line was placed, he will be discharged on: IV ceftaroline, eed 11/19/23. IV daptomycin, eed 12/24/23. Opthalmology consulted for intermittent glare in the setting bacteremia with notable hope spots on evaluation. Patient to f/u with ophthalmology team as outpatient. Their clinic will call to schedule an appointment.       MOBILITY AND ACTIVITY: As tolerated. Patient may shower. No heavy lifting of greater than 5 pounds and no driving.     DIET: An 1800-calorie ADA with a 1500 mL fluid restriction.     WOUND CARE INSTRUCTIONS: Check for redness, swelling and drainage around the  incision or wound. Patient is to call for any obvious bleeding, drainage, pus from the wound, unusual problems or difficulties or temperature of greater than 101   degrees.     FOLLOWUP: Follow up with Dr. Beal in approximately 3 weeks. Prior to this  appointment, the patient will have a chest x-ray and EKG.  F/u with ID   F/u with opthalmology      Patient not placed on Ace-Inhibitor at the time of discharge due to  potential for hypotension       DISCHARGE CONDITION: At the time of discharge, the patient was in sinus rhythm and afebrile with stable vital signs.        Goals of Care Treatment Preferences:  Code Status: Full Code      Consults (From admission, onward)          Status Ordering Provider     Inpatient consult to Ophthalmology  Once        Provider:  (Not yet assigned)    Acknowledged BROOKLYNN ELLER     Inpatient consult to PICC team ([facility])  Once        Provider:  (Not yet assigned)    Completed HERBERTH BENNETT     Consult to Endocrinology  Once        Provider:  (Not yet assigned)    Completed ALEIDA GRIMALDO     Inpatient consult to Registered Dietitian/Nutritionist  Once        Provider:  (Not yet assigned)    Completed ALEIDA GRIMALDO     Inpatient consult to Interventional Cardiology  Once        Provider:  (Not yet assigned)    Completed MARIBEL SCHWAB     Inpatient consult to Cardiothoracic Surgery  Once        Provider:  (Not yet assigned)    Completed IVETT MILLER     Inpatient consult to Heart Transplant  Once        Provider:  (Not yet assigned)    Completed IVETT MILLER     Inpatient consult to Cardiology  Once        Provider:  (Not yet assigned)    Completed IVETT MILLER     Inpatient consult to Infectious Diseases  Once        Provider:  (Not yet assigned)    Completed IVETT MILLER     Inpatient consult to Hematology/Oncology  Once        Provider:  (Not yet assigned)    Completed IVETT MILLER     Inpatient consult to Critical Care Medicine  Once        Provider:  (Not yet assigned)    Completed ALYSSA HURLEY            Significant Diagnostic Studies:     Pending Diagnostic Studies:       Procedure Component Value Units Date/Time    [id number] Evaluation [[id number]] Collected: 11/06/23 1021    Order Status: Sent Lab Status: In process Updated: 11/06/23 1021    Specimen: Blood     CK [[id number]] Collected: 11/01/23 1306    Order Status: Sent Lab Status: In process Updated: 11/01/23 1306    Specimen: Blood      Echo [[id number]]  (Abnormal) Resulted: 11/14/23 1122    Order Status: Sent Lab Status: In process Updated: 11/14/23 1123     RA Width 2.87 cm      LA volume (mod) 107.62 cm3      Left Atrium Major Axis 6.11 cm      Left Atrium Minor Axis 6.26 cm      RA Major Axis 5.29 cm      LV Diastolic Volume 75.20 mL      LV Systolic Volume 38.86 mL      MV Peak A Jabier 0.61 m/s      MV stenosis pressure 1/2 time 68.81 ms      MV VTI 35.35 cm      TR Max Jabier 2.28 m/s      MV Peak E Jabier 1.00 m/s      MV peak gradient 6 mmHg      Ao VTI 28.63 cm      Ao peak jabier 1.52 m/s      LVOT peak VTI 29.21 cm      LVOT peak jabier 1.48 m/s      LVOT diameter 2.18 cm      E wave deceleration time 237.27 msec      MV mean gradient 2 mmHg      AV mean gradient 5 mmHg      TAPSE 1.16 cm      RVDD 3.46 cm      LA size 3.30 cm      Ascending aorta 2.97 cm      STJ 3.19 cm      Sinus 3.31 cm      LVIDs 3.13 cm      Posterior Wall 1.06 cm      IVS 1.09 cm      LVIDd 4.12 cm      TDI LATERAL 0.07 m/s      LA WIDTH 5.39 cm      TDI SEPTAL 0.06 m/s      LV LATERAL E/E' RATIO 14.29 m/s      LV SEPTAL E/E' RATIO 16.67 m/s      FS 24 %      LA volume 93.50 cm3      LV mass 147.50 g      ZLVIDD -2.43     ZLVIDS -0.27     Left Ventricle Relative Wall Thickness 0.51 cm      AV valve area 3.81 cm²      AV Velocity Ratio 0.97     AV index (prosthetic) 1.02     MV valve area p 1/2 method 3.20 cm2      MV valve area by continuity eq 3.08 cm2      E/A ratio 1.64     Mean e' 0.07 m/s      LVOT area 3.7 cm2      LVOT stroke volume 108.97 cm3      AV peak gradient 9 mmHg      E/E' ratio 15.38 m/s      LV Systolic Volume Index 20.7 mL/m2      LV Diastolic Volume Index 40.00 mL/m2      LA Volume Index 49.7 mL/m2      LV Mass Index 78 g/m2      Triscuspid Valve Regurgitation Peak Gradient 21 mmHg      LA Volume Index (Mod) 57.2 mL/m2      FATUMA by Velocity Ratio 3.63 cm²      BSA 1.84 m2             No new Assessment & Plan notes have been filed under this hospital  "service since the last note was generated.  Service: Cardiothoracic Surgery    Final Active Diagnoses:    Diagnosis Date Noted POA    PRINCIPAL PROBLEM:  Acute bacterial endocarditis [I33.0] 11/03/2023 Yes    S/P MVR (mitral valve repair) [Z98.890] 11/09/2023 Not Applicable    Surgical wound present [T14.8XXA] 11/04/2023 Unknown    Transient hyperglycemia post procedure [R73.9] 11/04/2023 Unknown    MRSA bacteremia [R78.81, B95.62] 11/03/2023 Yes    Severe mitral regurgitation [I34.0] 11/03/2023 Yes    Rupture of chordae tendineae [I51.1] 11/03/2023 No    Acute hypoxemic respiratory failure [J96.01] 11/02/2023 Yes    Paroxysmal atrial fibrillation [I48.0] 11/01/2023 No    Heart failure with mid-range ejection fraction (HFmEF) [I50.22] 11/01/2023 Yes    Endocarditis [I38] 10/31/2023 Yes     Chronic    Elevated troponin [R79.89] 10/31/2023 Yes    Septic shock [A41.9, R65.21] 10/30/2023 Yes    FRANCESCO (acute kidney injury) [N17.9] 10/30/2023 Yes    Thrombocytopenia [D69.6] 10/30/2023 Yes    Elevated transaminase level [R74.01] 10/30/2023 Yes      Problems Resolved During this Admission:      Discharged Condition: stable    Disposition: Home-Health Care [facility]    Follow Up:    Patient Instructions:      WALKER FOR HOME USE     Order Specific Question Answer Comments   Type of Walker: Adult (5'4"-6'6")    With wheels? Yes    Height: 6' 1" (1.854 m)    Weight: 65.8 kg (145 lb 1 oz)    Length of need (1-99 months): 99    Platform attachment: Bilateral    Does patient have medical equipment at home? none    Please check all that apply: Patient's condition impairs ambulation.      BATH/SHOWER CHAIR FOR HOME USE     Order Specific Question Answer Comments   Height: 6' 1" (1.854 m)    Weight: 65.8 kg (145 lb 1 oz)    Does patient have medical equipment at home? none    Length of need (1-99 months): 99    Type: Without back      Ambulatory referral/consult to Home Health   Standing Status: Future   Referral Priority: Routine Referral " Type: Home Health   Referral Reason: Specialty Services Required   Requested Specialty: Home Health Services   Number of Visits Requested: 1     Ambulatory referral/consult to Cardiology   Standing Status: Future   Referral Priority: Routine Referral Type: Consultation   Referral Reason: Specialty Services Required   Requested Specialty: Cardiology   Number of Visits Requested: 1     Ambulatory referral/consult to Family Practice   Standing Status: Future   Referral Priority: Routine Referral Type: Consultation   Referral Reason: Specialty Services Required   Requested Specialty: Family Medicine   Number of Visits Requested: 1     Medications:  Reconciled Home Medications:      Medication List        START taking these medications      acetaminophen 325 MG tablet  Commonly known as: TYLENOL  Take 2 tablets (650 mg total) by mouth every 6 (six) hours as needed for Temperature greater than or Pain (or equal to 101 degree F).     aspirin 325 MG EC tablet  Commonly known as: ECOTRIN  Take 1 tablet (325 mg total) by mouth once daily.     docusate sodium 100 MG capsule  Commonly known as: COLACE  Take 1 capsule (100 mg total) by mouth 2 (two) times daily as needed for Constipation.     furosemide 20 MG tablet  Commonly known as: LASIX  Take one tablet by mouth twice daily for 7 days then decrease to once daily     metoprolol tartrate 50 MG tablet  Commonly known as: LOPRESSOR  Take 1 tablet (50 mg total) by mouth 2 (two) times daily.     NIFEdipine 60 MG (OSM) 24 hr tablet  Commonly known as: PROCARDIA-XL  Take 1 tablet (60 mg total) by mouth once daily.     oxyCODONE 5 MG immediate release tablet  Commonly known as: ROXICODONE  Take 1 tablet (5 mg total) by mouth every 4 (four) hours as needed for Pain.     polyethylene glycol 17 gram/dose powder  Commonly known as: GLYCOLAX  Use cap to measure 17 grams, mix in liquid and take by mouth daily as needed.     potassium chloride SA 20 MEQ tablet  Commonly known as:  BILLIE-DUR,KLOR-CON  Take one tablet by mouth twice daily for 7 days then decrease to once daily            Time spent on the discharge of patient: 65 minutes    Raquel Escalante PAKavinC  Cardiothoracic Surgery  [facility] - Cardiology Stepdown

## 2023-11-15 PROCEDURE — G0180 MD CERTIFICATION HHA PATIENT: HCPCS | Mod: ,,,

## 2023-11-16 ENCOUNTER — PATIENT MESSAGE (OUTPATIENT)
Dept: CARDIOTHORACIC SURGERY | Facility: CLINIC | Age: 48
End: 2023-11-16
Payer: COMMERCIAL

## 2023-11-16 ENCOUNTER — TELEPHONE (OUTPATIENT)
Dept: CARDIOTHORACIC SURGERY | Facility: CLINIC | Age: 48
End: 2023-11-16
Payer: COMMERCIAL

## 2023-11-16 LAB
BACTERIA BLD CULT: NORMAL
BACTERIA BLD CULT: NORMAL

## 2023-11-16 NOTE — TELEPHONE ENCOUNTER
Attempted call to pt to review home care instructions and follow up post hospital discharge. No answer, left detailed VM with my contact information and requested a call back. Also sent My Chart message with instructions and my contact information.

## 2023-11-17 LAB
BACTERIA BLD CULT: NORMAL
BACTERIA BLD CULT: NORMAL

## 2023-11-20 ENCOUNTER — PATIENT MESSAGE (OUTPATIENT)
Dept: CARDIOTHORACIC SURGERY | Facility: CLINIC | Age: 48
End: 2023-11-20
Payer: COMMERCIAL

## 2023-11-21 ENCOUNTER — OFFICE VISIT (OUTPATIENT)
Dept: OPHTHALMOLOGY | Facility: CLINIC | Age: 48
End: 2023-11-21
Payer: COMMERCIAL

## 2023-11-21 DIAGNOSIS — Z95.2 S/P MITRAL VALVE REPLACEMENT: Primary | ICD-10-CM

## 2023-11-21 DIAGNOSIS — H35.61 SUBRETINAL HEMORRHAGE, RIGHT: Primary | ICD-10-CM

## 2023-11-21 PROCEDURE — 1160F PR REVIEW ALL MEDS BY PRESCRIBER/CLIN PHARMACIST DOCUMENTED: ICD-10-PCS | Mod: CPTII,S$GLB,, | Performed by: OPHTHALMOLOGY

## 2023-11-21 PROCEDURE — 92134 CPTRZ OPH DX IMG PST SGM RTA: CPT | Mod: S$GLB,,, | Performed by: OPHTHALMOLOGY

## 2023-11-21 PROCEDURE — 92004 COMPRE OPH EXAM NEW PT 1/>: CPT | Mod: S$GLB,,, | Performed by: OPHTHALMOLOGY

## 2023-11-21 PROCEDURE — 92201 PR OPHTHALMOSCOPY, EXT, W/RET DRAW/SCLERAL DEPR, I&R, UNI/BI: ICD-10-PCS | Mod: S$GLB,,, | Performed by: OPHTHALMOLOGY

## 2023-11-21 PROCEDURE — 3044F HG A1C LEVEL LT 7.0%: CPT | Mod: CPTII,S$GLB,, | Performed by: OPHTHALMOLOGY

## 2023-11-21 PROCEDURE — 1159F MED LIST DOCD IN RCRD: CPT | Mod: CPTII,S$GLB,, | Performed by: OPHTHALMOLOGY

## 2023-11-21 PROCEDURE — 3044F PR MOST RECENT HEMOGLOBIN A1C LEVEL <7.0%: ICD-10-PCS | Mod: CPTII,S$GLB,, | Performed by: OPHTHALMOLOGY

## 2023-11-21 PROCEDURE — 1160F RVW MEDS BY RX/DR IN RCRD: CPT | Mod: CPTII,S$GLB,, | Performed by: OPHTHALMOLOGY

## 2023-11-21 PROCEDURE — 99999 PR PBB SHADOW E&M-EST. PATIENT-LVL III: ICD-10-PCS | Mod: PBBFAC,,, | Performed by: OPHTHALMOLOGY

## 2023-11-21 PROCEDURE — 92201 OPSCPY EXTND RTA DRAW UNI/BI: CPT | Mod: S$GLB,,, | Performed by: OPHTHALMOLOGY

## 2023-11-21 PROCEDURE — 99999 PR PBB SHADOW E&M-EST. PATIENT-LVL III: CPT | Mod: PBBFAC,,, | Performed by: OPHTHALMOLOGY

## 2023-11-21 PROCEDURE — 92134 POSTERIOR SEGMENT OCT RETINA (OCULAR COHERENCE TOMOGRAPHY)-BOTH EYES: ICD-10-PCS | Mod: S$GLB,,, | Performed by: OPHTHALMOLOGY

## 2023-11-21 PROCEDURE — 92004 PR EYE EXAM, NEW PATIENT,COMPREHESV: ICD-10-PCS | Mod: S$GLB,,, | Performed by: OPHTHALMOLOGY

## 2023-11-21 PROCEDURE — 1159F PR MEDICATION LIST DOCUMENTED IN MEDICAL RECORD: ICD-10-PCS | Mod: CPTII,S$GLB,, | Performed by: OPHTHALMOLOGY

## 2023-11-21 NOTE — PROGRESS NOTES
HPI    Inpatient f/u-  Patient hospitalized for sub Acute bacterial endocarditis    Patient here today with c/o central [place] blur OD, no pain and denies   floaters / flashes ou. No previous sx or injury ou. No fmhx of eye   disease.    No gtts  Last edited by Kiah Charles on 11/21/2023 10:49 AM.          OCT - OD SR hemorrhage, IR hemorrhage improving      A/P    SR/IR hemorrhage OD  Recent MVR for SBE  11/3/23    Improving from inpatient photos    No tx today  Somewhat guarded central visual potential OD        2-3 weeks OCT no dilate

## 2023-11-29 ENCOUNTER — PATIENT MESSAGE (OUTPATIENT)
Dept: INFECTIOUS DISEASES | Facility: CLINIC | Age: 48
End: 2023-11-29
Payer: COMMERCIAL

## 2023-11-30 ENCOUNTER — OFFICE VISIT (OUTPATIENT)
Dept: INFECTIOUS DISEASES | Facility: CLINIC | Age: 48
End: 2023-11-30
Payer: COMMERCIAL

## 2023-11-30 DIAGNOSIS — I38 ENDOCARDITIS, UNSPECIFIED CHRONICITY, UNSPECIFIED ENDOCARDITIS TYPE: Primary | Chronic | ICD-10-CM

## 2023-11-30 LAB — ADAMTS13 ACTIVITY: NORMAL %

## 2023-11-30 PROCEDURE — 1111F DSCHRG MED/CURRENT MED MERGE: CPT | Mod: CPTII,95,, | Performed by: STUDENT IN AN ORGANIZED HEALTH CARE EDUCATION/TRAINING PROGRAM

## 2023-11-30 PROCEDURE — 3044F PR MOST RECENT HEMOGLOBIN A1C LEVEL <7.0%: ICD-10-PCS | Mod: CPTII,95,, | Performed by: STUDENT IN AN ORGANIZED HEALTH CARE EDUCATION/TRAINING PROGRAM

## 2023-11-30 PROCEDURE — 99215 PR OFFICE/OUTPT VISIT, EST, LEVL V, 40-54 MIN: ICD-10-PCS | Mod: 95,,, | Performed by: STUDENT IN AN ORGANIZED HEALTH CARE EDUCATION/TRAINING PROGRAM

## 2023-11-30 PROCEDURE — 1111F PR DISCHARGE MEDS RECONCILED W/ CURRENT OUTPATIENT MED LIST: ICD-10-PCS | Mod: CPTII,95,, | Performed by: STUDENT IN AN ORGANIZED HEALTH CARE EDUCATION/TRAINING PROGRAM

## 2023-11-30 PROCEDURE — 99215 OFFICE O/P EST HI 40 MIN: CPT | Mod: 95,,, | Performed by: STUDENT IN AN ORGANIZED HEALTH CARE EDUCATION/TRAINING PROGRAM

## 2023-11-30 PROCEDURE — 3044F HG A1C LEVEL LT 7.0%: CPT | Mod: CPTII,95,, | Performed by: STUDENT IN AN ORGANIZED HEALTH CARE EDUCATION/TRAINING PROGRAM

## 2023-11-30 NOTE — PROGRESS NOTES
INFECTIOUS DISEASE CLINIC  11/30/2023     The patient location is: [place]   The chief complaint leading to consultation is: hospital follow up for endocarditis    Visit type: audiovisual    Face to Face time with patient: 30 minutes  45 minutes of total time spent on the encounter, which includes face to face time and non-face to face time preparing to see the patient (eg, review of tests), Obtaining and/or reviewing separately obtained history, Documenting clinical information in the electronic or other health record, Independently interpreting results (not separately reported) and communicating results to the patient/family/caregiver, or Care coordination (not separately reported).     Each patient to whom he or she provides medical services by telemedicine is:  (1) informed of the relationship between the physician and patient and the respective role of any other health care provider with respect to management of the patient; and (2) notified that he or she may decline to receive medical services by telemedicine and may withdraw from such care at any time.    Subjective:      Chief Complaint: hospital follow up    History of Present Illness:    Mr. Nino is a 48M recently admitted for MRSA bacteremia complicated by mitral and aortic valve endocarditis, s/p MV repair with porcine annuloplastly. Was discharged with 1 week of ceftaroline and 6 weeks of daptomycin. Found to have R eye lesion concerning for infectious embolus, saw ophtho in clinic on 11/21, told to follow up again in 2-3 weeks.     Daptomycin to end 12/24.     Patient states his eye symptoms are the same/slightly improved. He denies any fevers, chills, back pain, joint pain. No issues with PICC.     Review of Systems   Constitutional: Negative for chills, fever and malaise/fatigue.   Cardiovascular:  Negative for chest pain.   Respiratory:  Negative for shortness of breath.    Skin:  Negative for itching, poor wound healing and rash.    Musculoskeletal:  Negative for back pain and joint pain.   Gastrointestinal:  Negative for bloating, diarrhea, nausea and vomiting.         No past medical history on file.  Past Surgical History:   Procedure Laterality Date    EXCLUSION, LEFT ATRIAL APPENDAGE, OPEN, AS PART OF OPEN CHEST SURGERY Left 11/3/2023    Procedure: EXCLUSION, LEFT ATRIAL APPENDAGE, OPEN, AS PART OF OPEN CHEST SURGERY;  Surgeon: Manuel Beal MD;  Location: [address];  Service: Cardiothoracic;  Laterality: Left;    INSERTION OF INTRA-AORTIC BALLOON ASSIST DEVICE Right 11/2/2023    Procedure: INSERTION, INTRA-AORTIC BALLOON PUMP;  Surgeon: Jose Vidal MD;  Location: [facility] CATH LAB;  Service: Cardiology;  Laterality: Right;    REPAIR, MITRAL VALVE, OPEN N/A 11/3/2023    Procedure: REPAIR, MITRAL VALVE, OPEN;  Surgeon: Manuel Beal MD;  Location: [address];  Service: Cardiothoracic;  Laterality: N/A;     Family History   Problem Relation Age of Onset    Amblyopia Neg Hx     Blindness Neg Hx     Cataracts Neg Hx     Glaucoma Neg Hx     Macular degeneration Neg Hx     Retinal detachment Neg Hx     Strabismus Neg Hx      Social History     Tobacco Use    Smoking status: Unknown   Substance Use Topics    Alcohol use: Yes    Drug use: Never       Review of patient's allergies indicates:  No Known Allergies      Objective:   VS (24h): There were no vitals filed for this visit. - virtual visit      Physical Exam  Constitutional:       General: He is not in acute distress.     Appearance: Normal appearance. He is not ill-appearing.   HENT:      Head: Normocephalic and atraumatic.   Eyes:      Extraocular Movements: Extraocular movements intact.      Conjunctiva/sclera: Conjunctivae normal.   Musculoskeletal:      Cervical back: Normal range of motion.   Neurological:      Mental Status: He is alert and oriented to person, place, and time.   Psychiatric:         Mood and Affect: Mood normal.         Behavior:  Behavior normal.         Thought Content: Thought content normal.                 Immunization History   Administered Date(s) Administered    Hepatitis A, Adult 09/16/2005    Td - PF (ADULT) 09/16/2005         Assessment & Plan:     1. Endocarditis, unspecified chronicity, unspecified endocarditis type  -MRSA bacteremia complicated by mitral and aortic valve endocarditis, s/p MV repair with porcine annuloplastly  -Found to have R eye lesion concerning for infectious embolus, saw ophtho in clinic on 11/21, told to follow up again in 2-3 weeks, patient reports eye symptoms are same/better  Plan  -Continue daptomycin, end date 12/24  -Follow up with ophtho as scheduled      Follow up in 3 weeks, before end of daptomycin      Kasey Van DO  Infectious Disease Fellow, PGY-4

## 2023-12-04 LAB — FUNGUS SPEC CULT: NORMAL

## 2023-12-05 ENCOUNTER — HOSPITAL ENCOUNTER (OUTPATIENT)
Dept: RADIOLOGY | Facility: HOSPITAL | Age: 48
Discharge: HOME | End: 2023-12-05
Attending: THORACIC SURGERY (CARDIOTHORACIC VASCULAR SURGERY)
Payer: COMMERCIAL

## 2023-12-05 ENCOUNTER — OFFICE VISIT (OUTPATIENT)
Dept: CARDIOTHORACIC SURGERY | Facility: CLINIC | Age: 48
End: 2023-12-05
Payer: COMMERCIAL

## 2023-12-05 ENCOUNTER — HOSPITAL ENCOUNTER (OUTPATIENT)
Dept: CARDIOLOGY | Facility: CLINIC | Age: 48
Discharge: HOME | End: 2023-12-05
Attending: THORACIC SURGERY (CARDIOTHORACIC VASCULAR SURGERY)
Payer: COMMERCIAL

## 2023-12-05 VITALS
BODY MASS INDEX: 19.71 KG/M2 | HEIGHT: 72 IN | HEART RATE: 67 BPM | WEIGHT: 145.5 LBS | DIASTOLIC BLOOD PRESSURE: 61 MMHG | OXYGEN SATURATION: 100 % | SYSTOLIC BLOOD PRESSURE: 99 MMHG

## 2023-12-05 DIAGNOSIS — Z95.2 S/P MITRAL VALVE REPLACEMENT: Primary | ICD-10-CM

## 2023-12-05 DIAGNOSIS — Z95.2 S/P MITRAL VALVE REPLACEMENT: ICD-10-CM

## 2023-12-05 DIAGNOSIS — Z98.890 S/P MVR (MITRAL VALVE REPAIR): ICD-10-CM

## 2023-12-05 PROCEDURE — 99024 PR POST-OP FOLLOW-UP VISIT: ICD-10-PCS | Mod: S$GLB,,, | Performed by: THORACIC SURGERY (CARDIOTHORACIC VASCULAR SURGERY)

## 2023-12-05 PROCEDURE — 3078F DIAST BP <80 MM HG: CPT | Mod: CPTII,S$GLB,, | Performed by: THORACIC SURGERY (CARDIOTHORACIC VASCULAR SURGERY)

## 2023-12-05 PROCEDURE — 99999 PR PBB SHADOW E&M-EST. PATIENT-LVL III: CPT | Mod: PBBFAC,,, | Performed by: THORACIC SURGERY (CARDIOTHORACIC VASCULAR SURGERY)

## 2023-12-05 PROCEDURE — 3078F PR MOST RECENT DIASTOLIC BLOOD PRESSURE < 80 MM HG: ICD-10-PCS | Mod: CPTII,S$GLB,, | Performed by: THORACIC SURGERY (CARDIOTHORACIC VASCULAR SURGERY)

## 2023-12-05 PROCEDURE — 93010 ELECTROCARDIOGRAM REPORT: CPT | Mod: S$GLB,,, | Performed by: INTERNAL MEDICINE

## 2023-12-05 PROCEDURE — 3044F HG A1C LEVEL LT 7.0%: CPT | Mod: CPTII,S$GLB,, | Performed by: THORACIC SURGERY (CARDIOTHORACIC VASCULAR SURGERY)

## 2023-12-05 PROCEDURE — 99999 PR PBB SHADOW E&M-EST. PATIENT-LVL III: ICD-10-PCS | Mod: PBBFAC,,, | Performed by: THORACIC SURGERY (CARDIOTHORACIC VASCULAR SURGERY)

## 2023-12-05 PROCEDURE — 3044F PR MOST RECENT HEMOGLOBIN A1C LEVEL <7.0%: ICD-10-PCS | Mod: CPTII,S$GLB,, | Performed by: THORACIC SURGERY (CARDIOTHORACIC VASCULAR SURGERY)

## 2023-12-05 PROCEDURE — 99024 POSTOP FOLLOW-UP VISIT: CPT | Mod: S$GLB,,, | Performed by: THORACIC SURGERY (CARDIOTHORACIC VASCULAR SURGERY)

## 2023-12-05 PROCEDURE — 3074F PR MOST RECENT SYSTOLIC BLOOD PRESSURE < 130 MM HG: ICD-10-PCS | Mod: CPTII,S$GLB,, | Performed by: THORACIC SURGERY (CARDIOTHORACIC VASCULAR SURGERY)

## 2023-12-05 PROCEDURE — 71046 X-RAY EXAM CHEST 2 VIEWS: CPT | Mod: 26,,, | Performed by: RADIOLOGY

## 2023-12-05 PROCEDURE — 93005 EKG 12-LEAD: ICD-10-PCS | Mod: S$GLB,,, | Performed by: THORACIC SURGERY (CARDIOTHORACIC VASCULAR SURGERY)

## 2023-12-05 PROCEDURE — 93010 EKG 12-LEAD: ICD-10-PCS | Mod: S$GLB,,, | Performed by: INTERNAL MEDICINE

## 2023-12-05 PROCEDURE — 3074F SYST BP LT 130 MM HG: CPT | Mod: CPTII,S$GLB,, | Performed by: THORACIC SURGERY (CARDIOTHORACIC VASCULAR SURGERY)

## 2023-12-05 PROCEDURE — 93005 ELECTROCARDIOGRAM TRACING: CPT | Mod: S$GLB,,, | Performed by: THORACIC SURGERY (CARDIOTHORACIC VASCULAR SURGERY)

## 2023-12-05 PROCEDURE — 71046 XR CHEST PA AND LATERAL: ICD-10-PCS | Mod: 26,,, | Performed by: RADIOLOGY

## 2023-12-05 PROCEDURE — 71046 X-RAY EXAM CHEST 2 VIEWS: CPT | Mod: TC

## 2023-12-05 NOTE — PROGRESS NOTES
Patient seen and examined. Patient is progressively increasing activity. No significant complaints. Doing very well.      Sternum: stable, incision CDI  Chest xray: Acceptable post op chest  EKG: NSR     Assessment:  S/p Surgery on 11/3/23  1)  Mitral valve repair with triangular resection of P2 and placement of a freehand-created bovine pericardial annuloplasty band using a 40 mm [employer]onic Simuplus sizer as a template   2)  Resection of left atrial appendage     Plan:  Continue to maintain sternal precautions- on 12/15 ok to push and pull with arms and lift no more than 20 lbs. On 1/26, sternal restrictions are lifted.   Can begin driving as long as he has power steering  Can begin cardiac rehab in the next couple of weeks  We will refer to cardiology to assume care   DC lasix  DC potassium        No scheduled appointment, RTC prn    CTS Attending Note:    I have personally taken the history and examined this patient and agree with the SIMEON's note as stated above.

## 2023-12-05 NOTE — PATIENT INSTRUCTIONS
Call to [facility] at [phone]. Message will be given to RN case manager and/or physical therapist for call back.    Please make appointments to check in with your PCP and Cardiologist in the next 2 to 6 weeks.    Continue walking during cooler parts of the day or early evening to build up your endurance.     You may drive, if you have power steering.    Your lifting restriction is still in place until you are 12 weeks out from your surgery:     ** 5 pounds first 6 weeks after surgery **   **20 pounds for weeks 7 - 12 after surgery **    COVID Precautions:    Continue to take precautions against COVID. Mask up when around unknown people, wash / sanitize hands frequently. Avoid large groups of people until at least 12 weeks post op.    Cardiac Rehab:    We will send over orders for your Cardiac Rehab referral. It may take 7-10 business days to get authorization from your insurance company. The facility will call you to set up your first appointment once they have insurance authorization.

## 2023-12-12 ENCOUNTER — OFFICE VISIT (OUTPATIENT)
Dept: OPHTHALMOLOGY | Facility: CLINIC | Age: 48
End: 2023-12-12
Payer: COMMERCIAL

## 2023-12-12 DIAGNOSIS — H35.61 SUBRETINAL HEMORRHAGE, RIGHT: Primary | ICD-10-CM

## 2023-12-12 PROCEDURE — 99999 PR PBB SHADOW E&M-EST. PATIENT-LVL III: CPT | Mod: PBBFAC,,, | Performed by: OPHTHALMOLOGY

## 2023-12-12 PROCEDURE — 3044F PR MOST RECENT HEMOGLOBIN A1C LEVEL <7.0%: ICD-10-PCS | Mod: CPTII,S$GLB,, | Performed by: OPHTHALMOLOGY

## 2023-12-12 PROCEDURE — 1160F RVW MEDS BY RX/DR IN RCRD: CPT | Mod: CPTII,S$GLB,, | Performed by: OPHTHALMOLOGY

## 2023-12-12 PROCEDURE — 3044F HG A1C LEVEL LT 7.0%: CPT | Mod: CPTII,S$GLB,, | Performed by: OPHTHALMOLOGY

## 2023-12-12 PROCEDURE — 1160F PR REVIEW ALL MEDS BY PRESCRIBER/CLIN PHARMACIST DOCUMENTED: ICD-10-PCS | Mod: CPTII,S$GLB,, | Performed by: OPHTHALMOLOGY

## 2023-12-12 PROCEDURE — 92014 COMPRE OPH EXAM EST PT 1/>: CPT | Mod: S$GLB,,, | Performed by: OPHTHALMOLOGY

## 2023-12-12 PROCEDURE — 1159F MED LIST DOCD IN RCRD: CPT | Mod: CPTII,S$GLB,, | Performed by: OPHTHALMOLOGY

## 2023-12-12 PROCEDURE — 92134 POSTERIOR SEGMENT OCT RETINA (OCULAR COHERENCE TOMOGRAPHY)-BOTH EYES: ICD-10-PCS | Mod: S$GLB,,, | Performed by: OPHTHALMOLOGY

## 2023-12-12 PROCEDURE — 92014 PR EYE EXAM, EST PATIENT,COMPREHESV: ICD-10-PCS | Mod: S$GLB,,, | Performed by: OPHTHALMOLOGY

## 2023-12-12 PROCEDURE — 1159F PR MEDICATION LIST DOCUMENTED IN MEDICAL RECORD: ICD-10-PCS | Mod: CPTII,S$GLB,, | Performed by: OPHTHALMOLOGY

## 2023-12-12 PROCEDURE — 92134 CPTRZ OPH DX IMG PST SGM RTA: CPT | Mod: S$GLB,,, | Performed by: OPHTHALMOLOGY

## 2023-12-12 PROCEDURE — 99999 PR PBB SHADOW E&M-EST. PATIENT-LVL III: ICD-10-PCS | Mod: PBBFAC,,, | Performed by: OPHTHALMOLOGY

## 2023-12-12 NOTE — PROGRESS NOTES
HPI    DLS: 11/21/23    SR/IR hemorrhage OD  Recent MVR for SBE 11/3/23    No eyedrops    Pt here for 3 week OCT, no dilate.  Pt states he still has a central blur   in [place] OD.  Pt denies flashes, floaters or eye pain OU.   Last edited by Saundra La MA on 12/12/2023 10:31 AM.          OCT - OD SR hemorrhage, IR hemorrhage improving      A/P    SR/IR hemorrhage OD  Recent MVR for SBE  11/3/23    Improving from inpatient photos    No tx today  Somewhat guarded central visual potential OD        4  weeks OCT/MRx no dilate

## 2023-12-15 ENCOUNTER — OFFICE VISIT (OUTPATIENT)
Dept: CARDIOLOGY | Facility: CLINIC | Age: 48
End: 2023-12-15
Payer: COMMERCIAL

## 2023-12-15 ENCOUNTER — TELEPHONE (OUTPATIENT)
Dept: CARDIAC REHAB | Facility: CLINIC | Age: 48
End: 2023-12-15
Payer: COMMERCIAL

## 2023-12-15 VITALS
SYSTOLIC BLOOD PRESSURE: 101 MMHG | WEIGHT: 149.69 LBS | BODY MASS INDEX: 20.28 KG/M2 | HEIGHT: 72 IN | DIASTOLIC BLOOD PRESSURE: 66 MMHG | OXYGEN SATURATION: 95 % | HEART RATE: 96 BPM

## 2023-12-15 DIAGNOSIS — I50.22 HEART FAILURE WITH MID-RANGE EJECTION FRACTION (HFMEF): Primary | ICD-10-CM

## 2023-12-15 DIAGNOSIS — Z98.890 S/P MVR (MITRAL VALVE REPAIR): ICD-10-CM

## 2023-12-15 DIAGNOSIS — Z95.2 S/P MITRAL VALVE REPLACEMENT: ICD-10-CM

## 2023-12-15 DIAGNOSIS — I38 ENDOCARDITIS, UNSPECIFIED CHRONICITY, UNSPECIFIED ENDOCARDITIS TYPE: Chronic | ICD-10-CM

## 2023-12-15 PROCEDURE — 3078F DIAST BP <80 MM HG: CPT | Mod: CPTII,S$GLB,, | Performed by: INTERNAL MEDICINE

## 2023-12-15 PROCEDURE — 3078F PR MOST RECENT DIASTOLIC BLOOD PRESSURE < 80 MM HG: ICD-10-PCS | Mod: CPTII,S$GLB,, | Performed by: INTERNAL MEDICINE

## 2023-12-15 PROCEDURE — 99214 PR OFFICE/OUTPT VISIT, EST, LEVL IV, 30-39 MIN: ICD-10-PCS | Mod: S$GLB,,, | Performed by: INTERNAL MEDICINE

## 2023-12-15 PROCEDURE — 99214 OFFICE O/P EST MOD 30 MIN: CPT | Mod: S$GLB,,, | Performed by: INTERNAL MEDICINE

## 2023-12-15 PROCEDURE — 3044F PR MOST RECENT HEMOGLOBIN A1C LEVEL <7.0%: ICD-10-PCS | Mod: CPTII,S$GLB,, | Performed by: INTERNAL MEDICINE

## 2023-12-15 PROCEDURE — 3008F PR BODY MASS INDEX (BMI) DOCUMENTED: ICD-10-PCS | Mod: CPTII,S$GLB,, | Performed by: INTERNAL MEDICINE

## 2023-12-15 PROCEDURE — 3074F SYST BP LT 130 MM HG: CPT | Mod: CPTII,S$GLB,, | Performed by: INTERNAL MEDICINE

## 2023-12-15 PROCEDURE — 99999 PR PBB SHADOW E&M-EST. PATIENT-LVL III: ICD-10-PCS | Mod: PBBFAC,,, | Performed by: INTERNAL MEDICINE

## 2023-12-15 PROCEDURE — 3008F BODY MASS INDEX DOCD: CPT | Mod: CPTII,S$GLB,, | Performed by: INTERNAL MEDICINE

## 2023-12-15 PROCEDURE — 3074F PR MOST RECENT SYSTOLIC BLOOD PRESSURE < 130 MM HG: ICD-10-PCS | Mod: CPTII,S$GLB,, | Performed by: INTERNAL MEDICINE

## 2023-12-15 PROCEDURE — 99999 PR PBB SHADOW E&M-EST. PATIENT-LVL III: CPT | Mod: PBBFAC,,, | Performed by: INTERNAL MEDICINE

## 2023-12-15 PROCEDURE — 3044F HG A1C LEVEL LT 7.0%: CPT | Mod: CPTII,S$GLB,, | Performed by: INTERNAL MEDICINE

## 2023-12-15 RX ORDER — NIFEDIPINE 30 MG/1
30 TABLET, EXTENDED RELEASE ORAL DAILY
Qty: 30 TABLET | Refills: 11 | Status: ON HOLD | OUTPATIENT
Start: 2023-12-15 | End: 2023-12-29 | Stop reason: HOSPADM

## 2023-12-15 RX ORDER — BENZONATATE 100 MG/1
100 CAPSULE ORAL 3 TIMES DAILY PRN
Qty: 30 CAPSULE | Refills: 0 | Status: ON HOLD | OUTPATIENT
Start: 2023-12-15 | End: 2023-12-28 | Stop reason: HOSPADM

## 2023-12-15 NOTE — LETTER
December 15, 2023    Lorenzo Nino  [address] [place] [address]             [facility]  [address] [facility]  [place] [address]  Phone: [phone]                                  [facility]   [address] [facility]   [place] [address]  [phone]         [facility]   [address] [place] [address] [phone]         [facility]    [address] [facility] [address] [place] [address] [phone]   Re: Lorenzo Nino  Clinic number: [id number]    Dear Mr. Nino:    You were recently admitted to an [facility] facility for cardiac (heart) problem.  Your physician has referred you to [facility].  Cardiac Rehab Phase 2 is an educational and exercise program, conducted in a outpatient setting, proven to help reduce your risk for recurrent heart events.    Cardiac rehab has two major parts:    1. Exercise training to help you achieve cardiovascular fitness while learning how to exercise safely and improve muscle strength and endurance.  Your exercise prescription will be based on the results of the cardiopulmonary stress test (CPX) which will be done before entering the program and at completion.  2. Education, counseling and training to help you understand your heart condition and find ways to reduce your risk of future heart problems.  The cardiac rehab team will help you learn how to cope with the stress of adjusting to a new lifestyle and to deal with your fears about the future.    Phase 2 is a 36-session program, meeting 3 times a week for 12 weeks.  Each session consists of an hour of exercise and half-hour dedicated to the educational topic of the day.  Class days vary per location.  Please contact your nearest facility for details.    Through cardiac rehab you will learn:  About your heart condition, medical therapies, and medication  Risk factors in y our lifestyle contributing to  heart disease  New strategies to modify your risk factors  About a healthy diet that can lower your blood cholesterol, control weight, help prevent or control high blood pressure, and diabetes  How to stop smoking  How to manage stress    If you are interested in getting started, call the [facility] of your choosing.     Sincerely,     [facility]

## 2023-12-15 NOTE — PROGRESS NOTES
Subjective:   Chief Complaint: Establish Care and Dizziness  Last Clinic Visit: New Patient    History of Present Illness: Lorenzo Nino is a 48 y.o.  gentleman with history of bacterial endocarditis and severe mitral regurgitation s/p repair 11/03/2023 who presents to establish cardiology care postoperatively.  He did not have any known medical issues prior to presenting with shortness of breath, fever, and fatigue for several days on 10/31/2023.  He was subsequently found to have severe mitral regurgitation with bacterial endocarditis, initially thought to be involvement of aortic valve however was confirmed not to be involved on subsequent NEW.  Hospital course was complicated by acute hypoxic respiratory failure requiring intubation, along with thrombocytopenia, ultimately had successful repair on 11/03/2023.  Postoperatively sternum well healed, and will be cleared for cardiac rehab around the end of December. He was started on nifedipine metoprolol during hospitalization and has continued, checking his blood pressure on a daily basis and reports systolics in the 90s on a regular basis with some lightheadedness.  No fevers at home, no shortness of breath, no orthopnea, no dyspnea on exertion.  He does have some fatigue which is still present postoperatively.  Echocardiogram post repair did show that EF was still mildly depressed.   Still on IV antibiotics scheduled a run-through the 26th.  He does have a dry cough and has had this for the past several days.    Dx:  Bacterial endocarditis-severe mitral regurgitation  -S/p 11/3/23 Mitral valve repair with triangular resection of P2 and placement of a freehand-created bovine pericardial annuloplasty band using a 40 mm Medtronic Simuplus sizer as a template  -S/p Resection of left atrial appendage     Medications:  Outpatient Encounter Medications as of 12/15/2023   Medication Sig Dispense Refill    aspirin (ECOTRIN) 325 MG EC tablet Take 1 tablet (325 mg total)  by mouth once daily. 30 tablet 11    metoprolol tartrate (LOPRESSOR) 50 MG tablet Take 1 tablet (50 mg total) by mouth 2 (two) times daily. 60 tablet 11    [DISCONTINUED] NIFEdipine (PROCARDIA-XL) 60 MG (OSM) 24 hr tablet Take 1 tablet (60 mg total) by mouth once daily. 30 tablet 11    benzonatate (TESSALON) 100 MG capsule Take 1 capsule (100 mg total) by mouth 3 (three) times daily as needed for Cough. 30 capsule 0    NIFEdipine (PROCARDIA-XL) 30 MG (OSM) 24 hr tablet Take 1 tablet (30 mg total) by mouth once daily. 30 tablet 11    [DISCONTINUED] acetaminophen (TYLENOL) 325 MG tablet Take 2 tablets (650 mg total) by mouth every 6 (six) hours as needed for Temperature greater than or Pain (or equal to 101 degree F). 30 tablet 0    [DISCONTINUED] docusate sodium (COLACE) 100 MG capsule Take 1 capsule (100 mg total) by mouth 2 (two) times daily as needed for Constipation. 30 capsule 0    [DISCONTINUED] furosemide (LASIX) 20 MG tablet Take one tablet by mouth twice daily for 7 days then decrease to once daily 60 tablet 11    [DISCONTINUED] oxyCODONE (ROXICODONE) 5 MG immediate release tablet Take 1 tablet (5 mg total) by mouth every 4 (four) hours as needed for Pain. 42 tablet 0    [DISCONTINUED] polyethylene glycol (GLYCOLAX) 17 gram/dose powder Use cap to measure 17 grams, mix in liquid and take by mouth daily as needed. 238 g 0    [DISCONTINUED] potassium chloride SA (K-DUR,KLOR-CON) 20 MEQ tablet Take one tablet by mouth twice daily for 7 days then decrease to once daily 30 tablet 11     No facility-administered encounter medications on file as of 12/15/2023.     Social History:  Lorenzo reports current alcohol use. He reports that he does not use drugs.  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    Objective:   /66   Pulse 96   Ht 6' (1.829 m)   Wt 67.9 kg (149 lb 11.1 oz)   SpO2 95%   BMI 20.30 kg/m²     Physical Exam   Constitutional: He does not appear ill. No distress.   HENT:   Head:  Normocephalic and atraumatic.   Mouth/Throat: Mucous membranes are moist.   Cardiovascular: Normal rate, regular rhythm and normal pulses. Exam reveals no gallop and no friction rub.   Murmur heard.  Pulmonary/Chest: Effort normal and breath sounds normal. No stridor. No respiratory distress. He has no wheezes. He has no rhonchi. He has no rales. He exhibits no tenderness.   Abdominal: Normal appearance.   Musculoskeletal:      Right lower leg: No edema.      Left lower leg: No edema.   Neurological: He is alert.   Skin: Skin is warm.      EKG:  My independent visualization of most recent EKG is Normal sinus rhythm, diffuse T-wave inversions    TTE:  11/14/23    Left Ventricle: The left ventricle is normal in size. Ventricular mass is normal. Normal wall thickness. There is concentric remodeling. Normal wall motion. See diagram for wall motion findings. There is mildly reduced systolic function with a visually estimated ejection fraction of 40 - 45%. Diastolic function cannot be reliably determined in the presence of mitral valve ring/replacement.    Right Ventricle: Normal right ventricular cavity size. Wall thickness is normal. Right ventricle wall motion  is normal. Systolic function is normal.    Left Atrium: Left atrium is severely dilated.    Mitral Valve: The mitral valve is repaired by bovine pericardial annuloplasty band. The mean pressure gradient across the mitral valve is 2 mmHg at a heart rate of 70 bpm. There is no significant regurgitation.    Pulmonary Artery: The estimated pulmonary artery systolic pressure is 24 mmHg.    IVC/SVC: Normal venous pressure at 3 mmHg.    Pericardium: There is a moderate posterior effusion with maximal diameter 1.6cm.     11/02/2023     Left Ventricle: The left ventricle is normal in size. Ventricular mass is normal. Normal wall thickness. Global hypokinesis present. There is mildly reduced systolic function with a visually estimated ejection fraction of 40 - 45%.     Right Ventricle: Normal right ventricular cavity size. Wall thickness is normal. Right ventricle wall motion  is normal. Systolic function is normal.    Left Atrium: Left atrium is severely dilated.    Right Atrium: Right atrium is dilated.    Aortic Valve: RCC is thickended with mild prolapse.  Cannot exclude RCC vegetation. The remaining leaflets have normal mobility. There is trace aortic regurgitation.    Mitral Valve: Both leaflets are thickened.Cannot exclude anterior leaflet vegetation coating this leaflet. There is prolapse of the posterior mitral leaflet. Flail posterior leaflet. There is a large mobile echogenic mass present on the posterior leaflet consistent with vegetation. The mean pressure gradient across the mitral valve is 6 mmHg at a heart rate of 124 bpm. There is severe regurgitation with an anteromedial eccentrically directed jet.    Tricuspid Valve: There is mild regurgitation.    Pulmonary Artery: The estimated pulmonary artery systolic pressure is 31 mmHg.    IVC/SVC: Normal venous pressure at 3 mmHg.     Lipids:       Renal:  Recent Labs   Lab 12/11/23  1454   Creatinine 1.0   Potassium 3.9   CO2 25   BUN 11     Liver:  Recent Labs   Lab 12/11/23  1454   AST 18   ALT 11     Assessment:     1. Heart failure with mid-range ejection fraction (HFmEF)    2. S/P MVR (mitral valve repair)    3. Severe Mitral Regurgitation    4. Endocarditis, unspecified chronicity, unspecified endocarditis type      Plan:   1. Heart failure with mid-range ejection fraction (HFmEF)  Blood pressure noted to be low normal, suspect that he does not need nifedipine, also likely has lost some weight over the course the past 2 months.  Will start with down titration of nifedipine to 30 mg.  This will  also give us some currency for further uptitration of GDMT.  Repeating echocardiogram, and will discuss starting Entresto if blood pressure improves, and EF persistently depressed.  Discussed healthy diet lifestyle.    2.  S/P MVR (mitral valve repair)  Discussed cardiac rehab, will place referral and see what may be available near him.    3. Severe Mitral Regurgitation    Down titrating nifedipine to 30 mg as above, with likely goal to stop and start GDMT if blood pressure allows based on subsequent echocardiogram.  Discussed prophylactic antibiotics for any subsequent dental procedures once he finishes his current course of antibiotics.  - Ambulatory referral/consult to Cardiology  - NIFEdipine (PROCARDIA-XL) 30 MG (OSM) 24 hr tablet; Take 1 tablet (30 mg total) by mouth once daily.  Dispense: 30 tablet; Refill: 11  - Echo; Future  - Cardiac Rehab Phase II; Future  - benzonatate (TESSALON) 100 MG capsule; Take 1 capsule (100 mg total) by mouth 3 (three) times daily as needed for Cough.  Dispense: 30 capsule; Refill: 0    4. Endocarditis, unspecified chronicity, unspecified endocarditis type  Cough dry, no evidence of systemic infection, no fever, will continue to monitor closely.    Follow up in   Three months      Emerson Mercado MD [facility]

## 2023-12-15 NOTE — TELEPHONE ENCOUNTER
Letter regarding Phase II cardiac rehab was sent to patient.  Will contact patient in 2 weeks to see if interested.  Also, information letter sent to [facility].  Kiran Oakley, RN  Cardiac Rehab Nurse

## 2023-12-18 ENCOUNTER — HOSPITAL ENCOUNTER (INPATIENT)
Facility: HOSPITAL | Age: 48
LOS: 21 days | Discharge: HOME HEALTH | DRG: 291 | End: 2024-01-08
Attending: EMERGENCY MEDICINE | Admitting: INTERNAL MEDICINE
Payer: COMMERCIAL

## 2023-12-18 DIAGNOSIS — I38 ENDOCARDITIS: ICD-10-CM

## 2023-12-18 DIAGNOSIS — Z95.2 S/P MITRAL VALVE REPLACEMENT: ICD-10-CM

## 2023-12-18 DIAGNOSIS — I34.0 MITRAL VALVE INSUFFICIENCY, UNSPECIFIED ETIOLOGY: ICD-10-CM

## 2023-12-18 DIAGNOSIS — I50.41 ACUTE COMBINED SYSTOLIC AND DIASTOLIC HEART FAILURE: ICD-10-CM

## 2023-12-18 DIAGNOSIS — R06.02 SHORTNESS OF BREATH: ICD-10-CM

## 2023-12-18 DIAGNOSIS — J81.0 ACUTE PULMONARY EDEMA: ICD-10-CM

## 2023-12-18 DIAGNOSIS — K92.2 GASTROINTESTINAL HEMORRHAGE, UNSPECIFIED GASTROINTESTINAL HEMORRHAGE TYPE: ICD-10-CM

## 2023-12-18 DIAGNOSIS — D72.829 LEUKOCYTOSIS, UNSPECIFIED TYPE: ICD-10-CM

## 2023-12-18 DIAGNOSIS — Z98.890 S/P MVR (MITRAL VALVE REPAIR): Primary | ICD-10-CM

## 2023-12-18 DIAGNOSIS — I50.9 CONGESTIVE HEART FAILURE, UNSPECIFIED HF CHRONICITY, UNSPECIFIED HEART FAILURE TYPE: ICD-10-CM

## 2023-12-18 DIAGNOSIS — I38 ENDOCARDITIS, UNSPECIFIED CHRONICITY, UNSPECIFIED ENDOCARDITIS TYPE: ICD-10-CM

## 2023-12-18 DIAGNOSIS — I33.0 ACUTE BACTERIAL ENDOCARDITIS: ICD-10-CM

## 2023-12-18 DIAGNOSIS — R78.81 MRSA BACTEREMIA: ICD-10-CM

## 2023-12-18 DIAGNOSIS — K92.1 HEMATOCHEZIA: ICD-10-CM

## 2023-12-18 DIAGNOSIS — I47.20 VT (VENTRICULAR TACHYCARDIA): ICD-10-CM

## 2023-12-18 DIAGNOSIS — B95.62 MRSA BACTEREMIA: ICD-10-CM

## 2023-12-18 DIAGNOSIS — R06.00 DYSPNEA: ICD-10-CM

## 2023-12-18 DIAGNOSIS — I34.0 SEVERE MITRAL REGURGITATION: ICD-10-CM

## 2023-12-18 DIAGNOSIS — I48.91 ATRIAL FIBRILLATION, UNSPECIFIED TYPE: ICD-10-CM

## 2023-12-18 LAB
ADENOVIRUS: NOT DETECTED
ALBUMIN SERPL BCP-MCNC: 2.2 G/DL (ref 3.5–5.2)
ALLENS TEST: ABNORMAL
ALLENS TEST: ABNORMAL
ALLENS TEST: NORMAL
ALP SERPL-CCNC: 107 U/L (ref 55–135)
ALT SERPL W/O P-5'-P-CCNC: 13 U/L (ref 10–44)
ANION GAP SERPL CALC-SCNC: 13 MMOL/L (ref 8–16)
APTT PPP: 33.7 SEC (ref 21–32)
AST SERPL-CCNC: 25 U/L (ref 10–40)
BASOPHILS # BLD AUTO: 0.12 K/UL (ref 0–0.2)
BASOPHILS NFR BLD: 0.7 % (ref 0–1.9)
BILIRUB SERPL-MCNC: 1 MG/DL (ref 0.1–1)
BNP SERPL-MCNC: 1640 PG/ML (ref 0–99)
BORDETELLA PARAPERTUSSIS (IS1001): NOT DETECTED
BORDETELLA PERTUSSIS (PTXP): NOT DETECTED
BUN SERPL-MCNC: 22 MG/DL (ref 6–20)
CALCIUM SERPL-MCNC: 9.5 MG/DL (ref 8.7–10.5)
CHLAMYDIA PNEUMONIAE: NOT DETECTED
CHLORIDE SERPL-SCNC: 97 MMOL/L (ref 95–110)
CO2 SERPL-SCNC: 21 MMOL/L (ref 23–29)
CORONAVIRUS 229E, COMMON COLD VIRUS: NOT DETECTED
CORONAVIRUS HKU1, COMMON COLD VIRUS: NOT DETECTED
CORONAVIRUS NL63, COMMON COLD VIRUS: NOT DETECTED
CORONAVIRUS OC43, COMMON COLD VIRUS: NOT DETECTED
CREAT SERPL-MCNC: 0.9 MG/DL (ref 0.5–1.4)
DELSYS: ABNORMAL
DIFFERENTIAL METHOD BLD: ABNORMAL
EOSINOPHIL # BLD AUTO: 0.1 K/UL (ref 0–0.5)
EOSINOPHIL NFR BLD: 0.6 % (ref 0–8)
ERYTHROCYTE [DISTWIDTH] IN BLOOD BY AUTOMATED COUNT: 18.5 % (ref 11.5–14.5)
EST. GFR  (NO RACE VARIABLE): >60 ML/MIN/1.73 M^2
FLUBV RNA NPH QL NAA+NON-PROBE: NOT DETECTED
GLUCOSE SERPL-MCNC: 156 MG/DL (ref 70–110)
HCO3 UR-SCNC: 22.3 MMOL/L (ref 24–28)
HCO3 UR-SCNC: 24.5 MMOL/L (ref 24–28)
HCT VFR BLD AUTO: 27.8 % (ref 40–54)
HCT VFR BLD CALC: 28 %PCV (ref 36–54)
HGB BLD-MCNC: 9.1 G/DL (ref 14–18)
HPIV1 RNA NPH QL NAA+NON-PROBE: NOT DETECTED
HPIV2 RNA NPH QL NAA+NON-PROBE: NOT DETECTED
HPIV3 RNA NPH QL NAA+NON-PROBE: NOT DETECTED
HPIV4 RNA NPH QL NAA+NON-PROBE: NOT DETECTED
HUMAN METAPNEUMOVIRUS: NOT DETECTED
IMM GRANULOCYTES # BLD AUTO: 0.14 K/UL (ref 0–0.04)
IMM GRANULOCYTES NFR BLD AUTO: 0.9 % (ref 0–0.5)
INFLUENZA A (SUBTYPES H1,H1-2009,H3): NOT DETECTED
INFLUENZA A, MOLECULAR: NEGATIVE
INFLUENZA B, MOLECULAR: NEGATIVE
INR PPP: 1.1 (ref 0.8–1.2)
LACTATE SERPL-SCNC: 1.2 MMOL/L (ref 0.5–2.2)
LACTATE SERPL-SCNC: 1.8 MMOL/L (ref 0.5–2.2)
LDH SERPL L TO P-CCNC: 1.13 MMOL/L (ref 0.5–2.2)
LYMPHOCYTES # BLD AUTO: 1.4 K/UL (ref 1–4.8)
LYMPHOCYTES NFR BLD: 9 % (ref 18–48)
MCH RBC QN AUTO: 26.9 PG (ref 27–31)
MCHC RBC AUTO-ENTMCNC: 32.7 G/DL (ref 32–36)
MCV RBC AUTO: 82 FL (ref 82–98)
MODE: ABNORMAL
MONOCYTES # BLD AUTO: 1.5 K/UL (ref 0.3–1)
MONOCYTES NFR BLD: 9.3 % (ref 4–15)
MYCOPLASMA PNEUMONIAE: NOT DETECTED
NEUTROPHILS # BLD AUTO: 12.8 K/UL (ref 1.8–7.7)
NEUTROPHILS NFR BLD: 79.5 % (ref 38–73)
NRBC BLD-RTO: 0 /100 WBC
PCO2 BLDA: 32.1 MMHG (ref 35–45)
PCO2 BLDA: 32.4 MMHG (ref 35–45)
PH SMN: 7.45 [PH] (ref 7.35–7.45)
PH SMN: 7.49 [PH] (ref 7.35–7.45)
PLATELET # BLD AUTO: 487 K/UL (ref 150–450)
PMV BLD AUTO: 11.9 FL (ref 9.2–12.9)
PO2 BLDA: 29 MMHG (ref 40–60)
PO2 BLDA: 29 MMHG (ref 40–60)
POC BE: -2 MMOL/L
POC BE: 1 MMOL/L
POC IONIZED CALCIUM: 1.08 MMOL/L (ref 1.06–1.42)
POC SATURATED O2: 58 % (ref 95–100)
POC SATURATED O2: 61 % (ref 95–100)
POC TCO2: 23 MMOL/L (ref 24–29)
POC TCO2: 25 MMOL/L (ref 24–29)
POTASSIUM BLD-SCNC: 3.5 MMOL/L (ref 3.5–5.1)
POTASSIUM SERPL-SCNC: 4.5 MMOL/L (ref 3.5–5.1)
PROT SERPL-MCNC: 8.5 G/DL (ref 6–8.4)
PROTHROMBIN TIME: 12.1 SEC (ref 9–12.5)
RBC # BLD AUTO: 3.38 M/UL (ref 4.6–6.2)
RESPIRATORY INFECTION PANEL SOURCE: NORMAL
RSV RNA NPH QL NAA+NON-PROBE: NOT DETECTED
RV+EV RNA NPH QL NAA+NON-PROBE: NOT DETECTED
SAMPLE: ABNORMAL
SAMPLE: ABNORMAL
SAMPLE: NORMAL
SARS-COV-2 RDRP RESP QL NAA+PROBE: NEGATIVE
SARS-COV-2 RNA RESP QL NAA+PROBE: NOT DETECTED
SITE: ABNORMAL
SITE: ABNORMAL
SITE: NORMAL
SODIUM BLD-SCNC: 132 MMOL/L (ref 136–145)
SODIUM SERPL-SCNC: 131 MMOL/L (ref 136–145)
SPECIMEN SOURCE: NORMAL
TROPONIN I SERPL DL<=0.01 NG/ML-MCNC: 0.06 NG/ML (ref 0–0.03)
VANCOMYCIN SERPL-MCNC: 33.6 UG/ML
WBC # BLD AUTO: 16.06 K/UL (ref 3.9–12.7)

## 2023-12-18 PROCEDURE — 82330 ASSAY OF CALCIUM: CPT

## 2023-12-18 PROCEDURE — 96375 TX/PRO/DX INJ NEW DRUG ADDON: CPT

## 2023-12-18 PROCEDURE — 63600175 PHARM REV CODE 636 W HCPCS: Performed by: STUDENT IN AN ORGANIZED HEALTH CARE EDUCATION/TRAINING PROGRAM

## 2023-12-18 PROCEDURE — 85730 THROMBOPLASTIN TIME PARTIAL: CPT | Performed by: STUDENT IN AN ORGANIZED HEALTH CARE EDUCATION/TRAINING PROGRAM

## 2023-12-18 PROCEDURE — 87040 BLOOD CULTURE FOR BACTERIA: CPT | Performed by: EMERGENCY MEDICINE

## 2023-12-18 PROCEDURE — 80053 COMPREHEN METABOLIC PANEL: CPT | Mod: 91 | Performed by: EMERGENCY MEDICINE

## 2023-12-18 PROCEDURE — 82800 BLOOD PH: CPT

## 2023-12-18 PROCEDURE — U0002 COVID-19 LAB TEST NON-CDC: HCPCS | Performed by: EMERGENCY MEDICINE

## 2023-12-18 PROCEDURE — 83605 ASSAY OF LACTIC ACID: CPT | Mod: 91 | Performed by: EMERGENCY MEDICINE

## 2023-12-18 PROCEDURE — 82803 BLOOD GASES ANY COMBINATION: CPT

## 2023-12-18 PROCEDURE — 80307 DRUG TEST PRSMV CHEM ANLYZR: CPT | Performed by: STUDENT IN AN ORGANIZED HEALTH CARE EDUCATION/TRAINING PROGRAM

## 2023-12-18 PROCEDURE — 93010 ELECTROCARDIOGRAM REPORT: CPT | Mod: 76,,, | Performed by: INTERNAL MEDICINE

## 2023-12-18 PROCEDURE — 20000000 HC ICU ROOM

## 2023-12-18 PROCEDURE — 27100171 HC OXYGEN HIGH FLOW UP TO 24 HOURS

## 2023-12-18 PROCEDURE — 99900035 HC TECH TIME PER 15 MIN (STAT)

## 2023-12-18 PROCEDURE — 99233 SBSQ HOSP IP/OBS HIGH 50: CPT | Mod: ,,, | Performed by: INTERNAL MEDICINE

## 2023-12-18 PROCEDURE — 93005 ELECTROCARDIOGRAM TRACING: CPT

## 2023-12-18 PROCEDURE — 80202 ASSAY OF VANCOMYCIN: CPT | Performed by: INTERNAL MEDICINE

## 2023-12-18 PROCEDURE — 84132 ASSAY OF SERUM POTASSIUM: CPT

## 2023-12-18 PROCEDURE — 83605 ASSAY OF LACTIC ACID: CPT | Performed by: INTERNAL MEDICINE

## 2023-12-18 PROCEDURE — 85025 COMPLETE CBC W/AUTO DIFF WBC: CPT | Performed by: EMERGENCY MEDICINE

## 2023-12-18 PROCEDURE — 25000003 PHARM REV CODE 250: Performed by: STUDENT IN AN ORGANIZED HEALTH CARE EDUCATION/TRAINING PROGRAM

## 2023-12-18 PROCEDURE — 87502 INFLUENZA DNA AMP PROBE: CPT | Performed by: EMERGENCY MEDICINE

## 2023-12-18 PROCEDURE — 84295 ASSAY OF SERUM SODIUM: CPT

## 2023-12-18 PROCEDURE — 85014 HEMATOCRIT: CPT

## 2023-12-18 PROCEDURE — 83880 ASSAY OF NATRIURETIC PEPTIDE: CPT | Performed by: EMERGENCY MEDICINE

## 2023-12-18 PROCEDURE — 25000003 PHARM REV CODE 250: Performed by: EMERGENCY MEDICINE

## 2023-12-18 PROCEDURE — 94761 N-INVAS EAR/PLS OXIMETRY MLT: CPT | Mod: XB

## 2023-12-18 PROCEDURE — 96365 THER/PROPH/DIAG IV INF INIT: CPT

## 2023-12-18 PROCEDURE — 83605 ASSAY OF LACTIC ACID: CPT

## 2023-12-18 PROCEDURE — 93010 ELECTROCARDIOGRAM REPORT: CPT | Mod: ,,, | Performed by: INTERNAL MEDICINE

## 2023-12-18 PROCEDURE — 84484 ASSAY OF TROPONIN QUANT: CPT | Performed by: EMERGENCY MEDICINE

## 2023-12-18 PROCEDURE — 63600175 PHARM REV CODE 636 W HCPCS: Performed by: EMERGENCY MEDICINE

## 2023-12-18 PROCEDURE — 85610 PROTHROMBIN TIME: CPT | Performed by: STUDENT IN AN ORGANIZED HEALTH CARE EDUCATION/TRAINING PROGRAM

## 2023-12-18 PROCEDURE — 87633 RESP VIRUS 12-25 TARGETS: CPT | Performed by: INTERNAL MEDICINE

## 2023-12-18 PROCEDURE — 99285 EMERGENCY DEPT VISIT HI MDM: CPT | Mod: 25

## 2023-12-18 RX ORDER — FUROSEMIDE 10 MG/ML
80 INJECTION INTRAMUSCULAR; INTRAVENOUS ONCE
Status: COMPLETED | OUTPATIENT
Start: 2023-12-18 | End: 2023-12-18

## 2023-12-18 RX ORDER — HEPARIN SODIUM,PORCINE/D5W 25000/250
0-40 INTRAVENOUS SOLUTION INTRAVENOUS CONTINUOUS
Status: DISCONTINUED | OUTPATIENT
Start: 2023-12-18 | End: 2023-12-29

## 2023-12-18 RX ORDER — DILTIAZEM HYDROCHLORIDE 5 MG/ML
10 INJECTION INTRAVENOUS ONCE
Status: COMPLETED | OUTPATIENT
Start: 2023-12-18 | End: 2023-12-18

## 2023-12-18 RX ORDER — DIGOXIN 0.25 MG/ML
250 INJECTION INTRAMUSCULAR; INTRAVENOUS ONCE
Status: COMPLETED | OUTPATIENT
Start: 2023-12-18 | End: 2023-12-18

## 2023-12-18 RX ORDER — DILTIAZEM HCL 1 MG/ML
0-15 INJECTION, SOLUTION INTRAVENOUS CONTINUOUS
Status: DISCONTINUED | OUTPATIENT
Start: 2023-12-18 | End: 2023-12-19

## 2023-12-18 RX ORDER — FUROSEMIDE 10 MG/ML
40 INJECTION INTRAMUSCULAR; INTRAVENOUS
Status: COMPLETED | OUTPATIENT
Start: 2023-12-18 | End: 2023-12-18

## 2023-12-18 RX ADMIN — DILTIAZEM HYDROCHLORIDE 10 MG: 5 INJECTION INTRAVENOUS at 10:12

## 2023-12-18 RX ADMIN — HEPARIN SODIUM 12 UNITS/KG/HR: 10000 INJECTION, SOLUTION INTRAVENOUS at 11:12

## 2023-12-18 RX ADMIN — VANCOMYCIN HYDROCHLORIDE 2000 MG: 500 INJECTION, POWDER, LYOPHILIZED, FOR SOLUTION INTRAVENOUS at 05:12

## 2023-12-18 RX ADMIN — DIGOXIN 250 MCG: 250 INJECTION, SOLUTION INTRAMUSCULAR; INTRAVENOUS at 09:12

## 2023-12-18 RX ADMIN — MEROPENEM 2 G: 1 INJECTION INTRAVENOUS at 10:12

## 2023-12-18 RX ADMIN — FUROSEMIDE 10 MG/HR: 10 INJECTION, SOLUTION INTRAMUSCULAR; INTRAVENOUS at 08:12

## 2023-12-18 RX ADMIN — DILTIAZEM HYDROCHLORIDE 5 MG/HR: 5 INJECTION INTRAVENOUS at 11:12

## 2023-12-18 RX ADMIN — FUROSEMIDE 40 MG: 10 INJECTION, SOLUTION INTRAVENOUS at 04:12

## 2023-12-18 RX ADMIN — PIPERACILLIN SODIUM AND TAZOBACTAM SODIUM 4.5 G: 4; .5 INJECTION, POWDER, FOR SOLUTION INTRAVENOUS at 05:12

## 2023-12-18 RX ADMIN — FUROSEMIDE 80 MG: 10 INJECTION, SOLUTION INTRAVENOUS at 08:12

## 2023-12-18 NOTE — FIRST PROVIDER EVALUATION
Medical screening examination initiated.  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 Increase SOB for last 2 weeks worse for 3 days. Dry cough. No leg swelling. No fever    Vitals:    12/18/23 1513   BP: 110/65   Pulse: 110   Resp: (!) 28   Temp: 98.2 °F (36.8 °C)   TempSrc: Oral   SpO2: (!) 82%   Weight: 64.9 kg (143 lb)   Height: 6' (1.829 m)       Pertinent physical exam:  tachypnea. Tachycardia. 82% sat.    Brief workup plan:  CHF order set. O2 via NC.    Preliminary workup initiated; this workup will be continued and followed by the physician or advanced practice provider that is assigned to the patient when roomed.

## 2023-12-18 NOTE — ED PROVIDER NOTES
Encounter Date: 12/18/2023       History     Chief Complaint   Patient presents with    Shortness of Breath     Mitral valve repair on nov 3, now more sob, placed on 3l nc     48-year-old male with history of mitral valve replacement, congestive heart failure with diminished ejection fraction presents with shortness of breath and orthopnea.  He has been feeling short of breath over the last 3 days.  Gradual onset.  Associated orthopnea.  Denies any chest pain cough fever.  There is some mild ankle swelling bilaterally.  No leg pain.  He has not on diuretics or anticoagulants.        Review of patient's allergies indicates:  No Known Allergies  Past Medical History:   Diagnosis Date    Hypertension      Past Surgical History:   Procedure Laterality Date    EXCLUSION, LEFT ATRIAL APPENDAGE, OPEN, AS PART OF OPEN CHEST SURGERY Left 11/3/2023    Procedure: EXCLUSION, LEFT ATRIAL APPENDAGE, OPEN, AS PART OF OPEN CHEST SURGERY;  Surgeon: Manuel Beal MD;  Location: [facility] OR [address] [place] [address];  Service: Cardiothoracic;  Laterality: Left;    INSERTION OF INTRA-AORTIC BALLOON ASSIST DEVICE Right 11/2/2023    Procedure: INSERTION, INTRA-AORTIC BALLOON PUMP;  Surgeon: Jose Vidal MD;  Location: [facility] CATH LAB;  Service: Cardiology;  Laterality: Right;    REPAIR, MITRAL VALVE, OPEN N/A 11/3/2023    Procedure: REPAIR, MITRAL VALVE, OPEN;  Surgeon: Manuel Beal MD;  Location: [facility] OR [address] [place] [address];  Service: Cardiothoracic;  Laterality: N/A;     Family History   Problem Relation Age of Onset    Amblyopia Neg Hx     Blindness Neg Hx     Cataracts Neg Hx     Glaucoma Neg Hx     Macular degeneration Neg Hx     Retinal detachment Neg Hx     Strabismus Neg Hx      Social History     Tobacco Use    Smoking status: Unknown     Passive exposure: Never   Substance Use Topics    Alcohol use: Yes     Alcohol/week: 1.0 standard drink of alcohol     Types: 1 Cans of beer per week    Drug use: Never     Review of Systems    Physical  Exam     Initial Vitals [12/18/23 1513]   BP Pulse Resp Temp SpO2   110/65 110 (!) 32 98.2 °F (36.8 °C) (!) 82 %      MAP       --         Physical Exam    Constitutional: He appears well-developed and well-nourished.   Moderate respiratory distress   HENT:   Head: Normocephalic.   Eyes: Pupils are equal, round, and reactive to light.   Neck: Neck supple. No JVD present.   Normal range of motion.  Cardiovascular:  Normal rate and regular rhythm.           Murmur heard.  Pulmonary/Chest: He is in respiratory distress. He has rales.   Abdominal: Abdomen is soft. Bowel sounds are normal. He exhibits no distension. There is no abdominal tenderness.   Musculoskeletal:         General: Edema (Trace lower extremity edema) present.      Cervical back: Normal range of motion and neck supple.     Neurological: He is alert. He has normal strength.         ED Course   Procedures  Labs Reviewed   CBC W/ AUTO DIFFERENTIAL - Abnormal; Notable for the following components:       Result Value    WBC 16.06 (*)     RBC 3.38 (*)     Hemoglobin 9.1 (*)     Hematocrit 27.8 (*)     MCH 26.9 (*)     RDW 18.5 (*)     Platelets 487 (*)     Immature Granulocytes 0.9 (*)     Gran # (ANC) 12.8 (*)     Immature Grans (Abs) 0.14 (*)     Mono # 1.5 (*)     Gran % 79.5 (*)     Lymph % 9.0 (*)     All other components within normal limits   COMPREHENSIVE METABOLIC PANEL - Abnormal; Notable for the following components:    Sodium 131 (*)     CO2 21 (*)     Glucose 156 (*)     BUN 22 (*)     Total Protein 8.5 (*)     Albumin 2.2 (*)     All other components within normal limits   TROPONIN I - Abnormal; Notable for the following components:    Troponin I 0.060 (*)     All other components within normal limits   B-TYPE NATRIURETIC PEPTIDE - Abnormal; Notable for the following components:    BNP 1,640 (*)     All other components within normal limits   ISTAT PROCEDURE - Abnormal; Notable for the following components:    POC PCO2 32.4 (*)     POC PO2 29  (*)     POC HCO3 22.3 (*)     POC TCO2 23 (*)     All other components within normal limits   INFLUENZA A & B BY MOLECULAR   RESPIRATORY INFECTION PANEL (PCR), NASOPHARYNGEAL    Narrative:     For all other respiratory sources, order [id number] -  Respiratory Viral Panel by PCR   SARS-COV-2 RNA AMPLIFICATION, QUAL   LACTIC ACID, PLASMA     EKG Readings: (Independently Interpreted)   Sinus tachycardia at 107 without acute ischemic changes.  Significant artifact.     ECG Results              EKG 12-lead (Final result)  Result time 12/19/23 08:57:22      Final result by Interface, Lab In [facility] (12/19/23 08:57:22)                   Narrative:    Test Reason : R06.02,    Vent. Rate : 108 BPM     Atrial Rate : 108 BPM     P-R Int : 118 ms          QRS Dur : 076 ms      QT Int : 322 ms       P-R-T Axes : 074 074 079 degrees     QTc Int : 431 ms    Sinus tachycardia  Nonspecific ST and T wave abnormality  Abnormal ECG  When compared with ECG of 18-DEC-2023 15:26,  Premature ventricular complexes are no longer Present  Criteria for Inferior infarct are no longer Present  ST no longer depressed in Inferior leads  Confirmed by Aenl Hobson MD ([id number]) on 12/19/2023 8:57:11 AM    Referred By: [email or web address]   SELF           Confirmed By:Anel Hobson MD                                     EKG 12-lead (Final result)  Result time 12/18/23 15:46:05      Final result by Interface, Lab In [facility] (12/18/23 15:46:05)                   Narrative:    Test Reason : R06.00,    Vent. Rate : 107 BPM     Atrial Rate : 107 BPM     P-R Int : 168 ms          QRS Dur : 072 ms      QT Int : 346 ms       P-R-T Axes : 000 041 120 degrees     QTc Int : 461 ms    Sinus tachycardia with occasional Premature ventricular complexes  Inferior infarct ,age undetermined  Abnormal ECG  When compared with ECG of 05-DEC-2023 11:10,  Premature ventricular complexes are now Present  Vent. rate has increased BY  40 BPM  Nonspecific T wave abnormality has replaced  inverted T waves in Inferior  leads  T wave inversion no longer evident in Anterior leads  T wave inversion more evident in Lateral leads  Confirmed by Anel Hobson MD ([id number]) on 12/18/2023 3:45:51 PM    Referred By: PAIGE   SELF           Confirmed By:Anel Hobson MD                                  Imaging Results              X-Ray Chest AP Portable (Final result)  Result time 12/18/23 16:29:48      Final result by Tiago Goins MD (12/18/23 16:29:48)                   Impression:      Interval left more than right nonspecific pulmonary opacities as above.  Broad differential considerations include asymmetric confluent pulmonary edema, ARDS, pulmonary drug toxicity, pulmonary hemorrhage, aspiration or multifocal pneumonia.      Electronically signed by: Tiago Goins MD  Date:    12/18/2023  Time:    16:29               Narrative:    EXAMINATION:  XR CHEST AP PORTABLE    CLINICAL HISTORY:  CHF;    TECHNIQUE:  Single frontal view of the chest was performed.    COMPARISON:  Chest radiograph 12/05/2023 and CT thorax 10/30/2023    FINDINGS:  Monitoring leads overlie the chest.  Patient is slightly rotated.    Right-sided PICC and sternotomy wires unchanged.  Cardiomediastinal silhouette is midline and grossly stable.  Hilar contours are grossly unchanged.    Interval increased prominence of the central pulmonary vasculature with diffuse nonspecific interstitial coarsening with patchy airspace and ground-glass opacities with a perihilar somewhat batwing configuration slightly more consolidative on the left with air bronchograms.  Additional scattered linear opacities consistent with platelike scarring versus atelectasis.  No sizable pleural effusion or pneumothorax.                                       Medications   furosemide (LASIX) 500 mg in 50 mL infusion (conc: 10 mg/mL) (10 mg/hr Intravenous Verify Only 12/19/23 0801)   vancomycin - pharmacy to dose (has no administration in time range)   meropenem  (MERREM) 2 g in sodium chloride 0.9% 100 mL IVPB (0 g Intravenous Stopped 12/19/23 0438)   vancomycin 1,250 mg in dextrose 5 % (D5W) 250 mL IVPB (Vial-Mate) (1,250 mg Intravenous Trough Due As Scheduled Before Dose 12/20/23 0500)   diltiaZEM 125 mg in D5W 125 mL infusion (10 mg/hr Intravenous Verify Only 12/19/23 0801)   heparin 25,000 units in dextrose 5% 250 mL (100 units/mL) infusion LOW INTENSITY nomogram - [facility] (14 Units/kg/hr × 67.3 kg (Adjusted) Intravenous Verify Only 12/19/23 0801)   heparin 25,000 units in dextrose 5% (100 units/ml) IV bolus from bag - ADDITIONAL PRN BOLUS - 60 units/kg (has no administration in time range)   heparin 25,000 units in dextrose 5% (100 units/ml) IV bolus from bag - ADDITIONAL PRN BOLUS - 30 units/kg (2,010 Units Intravenous Bolus from Bag 12/19/23 0538)   furosemide injection 40 mg (40 mg Intravenous Given 12/18/23 1631)   vancomycin 2 g in dextrose 5 % 500 mL IVPB (0 mg Intravenous Stopped 12/18/23 1953)   piperacillin-tazobactam (ZOSYN) 4.5 g in dextrose 5 % in water (D5W) 100 mL IVPB (MB+) (0 g Intravenous Stopped 12/18/23 1753)   furosemide injection 80 mg (80 mg Intravenous Given 12/18/23 2013)   digoxin injection 250 mcg (250 mcg Intravenous Given 12/18/23 2123)   diltiaZEM injection 10 mg (10 mg Intravenous Given 12/18/23 2259)   heparin 25,000 units in dextrose 5% (100 units/ml) IV bolus from bag INITIAL BOLUS (4,030 Units Intravenous Bolus from Bag 12/18/23 2130)   magnesium sulfate 2g in water 50mL IVPB (premix) (0 g Intravenous Stopped 12/19/23 0650)   potassium chloride 40 mEq in 100 mL IVPB (FOR CENTRAL LINE ADMINISTRATION ONLY) (0 mEq Intravenous Paused 12/19/23 0800)     Medical Decision Making  Patient with signs and symptoms of congestive heart failure.  He is tachycardic.  Came in quite hypoxic but is doing better on 3 L of oxygen.  Nursing to her got to a monitor pulse oximetry.  To give 40 mg Lasix IV.    Chest x-ray shows bilateral infiltrates.  Elevated  BNP.  Discussed case with cardiology, CT surgery, critical care.  Cardiology did echo and was concerned about a leak around his heart valve.  They will discuss with staff.      Plan is to admit to  Cardiology in the CCU on a furosemide drip.    Amount and/or Complexity of Data Reviewed  Labs: ordered.    Risk  Prescription drug management.  Decision regarding hospitalization.              Attending Attestation:         Attending Critical Care:   Critical Care Times:   Direct Patient Care (initial evaluation, reassessments, and time considering the case)................................................................40 minutes.   Additional History from reviewing old medical records or taking additional history from the family, EMS, PCP, etc.......................5 minutes.   Ordering, Reviewing, and Interpreting Diagnostic Studies...............................................................................................................5 minutes.   Documentation..................................................................................................................................................................................4 minutes.   Consultation with other Physicians. .................................................................................................................................................6 minutes.   ==============================================================  Total Critical Care Time - exclusive of procedural time: 60 minutes.  ==============================================================                                 Clinical Impression:  Final diagnoses:  [R06.02] Shortness of breath  [R06.00] Dyspnea  [D72.829] Leukocytosis, unspecified type  [I50.9] Congestive heart failure, unspecified HF chronicity, unspecified heart failure type  [J81.0] Acute pulmonary edema          ED Disposition Condition    Admit                 Santos Cui MD  12/19/23 0914

## 2023-12-19 PROBLEM — I33.0 ACUTE BACTERIAL ENDOCARDITIS: Status: RESOLVED | Noted: 2023-11-03 | Resolved: 2023-12-19

## 2023-12-19 PROBLEM — I50.41 ACUTE COMBINED SYSTOLIC AND DIASTOLIC HEART FAILURE: Status: ACTIVE | Noted: 2023-12-19

## 2023-12-19 LAB
ALBUMIN SERPL BCP-MCNC: 2 G/DL (ref 3.5–5.2)
ALLENS TEST: ABNORMAL
ALP SERPL-CCNC: 91 U/L (ref 55–135)
ALT SERPL W/O P-5'-P-CCNC: 11 U/L (ref 10–44)
AMPHET+METHAMPHET UR QL: NEGATIVE
ANION GAP SERPL CALC-SCNC: 12 MMOL/L (ref 8–16)
ANION GAP SERPL CALC-SCNC: 12 MMOL/L (ref 8–16)
ANION GAP SERPL CALC-SCNC: 14 MMOL/L (ref 8–16)
ANION GAP SERPL CALC-SCNC: 15 MMOL/L (ref 8–16)
APTT PPP: 33.2 SEC (ref 21–32)
APTT PPP: 35.4 SEC (ref 21–32)
ASCENDING AORTA: 3.28 CM
AST SERPL-CCNC: 25 U/L (ref 10–40)
AV INDEX (PROSTH): 0.84
AV MEAN GRADIENT: 7 MMHG
AV PEAK GRADIENT: 12 MMHG
AV VALVE AREA BY VELOCITY RATIO: 3.2 CM²
AV VALVE AREA: 3.13 CM²
AV VELOCITY RATIO: 0.86
BARBITURATES UR QL SCN>200 NG/ML: NEGATIVE
BASOPHILS # BLD AUTO: 0.11 K/UL (ref 0–0.2)
BASOPHILS NFR BLD: 0.7 % (ref 0–1.9)
BENZODIAZ UR QL SCN>200 NG/ML: NEGATIVE
BILIRUB SERPL-MCNC: 1 MG/DL (ref 0.1–1)
BSA FOR ECHO PROCEDURE: 1.85 M2
BUN SERPL-MCNC: 15 MG/DL (ref 6–20)
BUN SERPL-MCNC: 15 MG/DL (ref 6–20)
BUN SERPL-MCNC: 16 MG/DL (ref 6–20)
BUN SERPL-MCNC: 18 MG/DL (ref 6–20)
BZE UR QL SCN: NEGATIVE
CALCIUM SERPL-MCNC: 8.6 MG/DL (ref 8.7–10.5)
CALCIUM SERPL-MCNC: 8.8 MG/DL (ref 8.7–10.5)
CALCIUM SERPL-MCNC: 8.8 MG/DL (ref 8.7–10.5)
CALCIUM SERPL-MCNC: 8.9 MG/DL (ref 8.7–10.5)
CANNABINOIDS UR QL SCN: NEGATIVE
CHLORIDE SERPL-SCNC: 93 MMOL/L (ref 95–110)
CHLORIDE SERPL-SCNC: 95 MMOL/L (ref 95–110)
CHLORIDE SERPL-SCNC: 95 MMOL/L (ref 95–110)
CHLORIDE SERPL-SCNC: 96 MMOL/L (ref 95–110)
CO2 SERPL-SCNC: 25 MMOL/L (ref 23–29)
CO2 SERPL-SCNC: 26 MMOL/L (ref 23–29)
CO2 SERPL-SCNC: 27 MMOL/L (ref 23–29)
CO2 SERPL-SCNC: 27 MMOL/L (ref 23–29)
CREAT SERPL-MCNC: 0.8 MG/DL (ref 0.5–1.4)
CREAT SERPL-MCNC: 0.9 MG/DL (ref 0.5–1.4)
CREAT SERPL-MCNC: 0.9 MG/DL (ref 0.5–1.4)
CREAT SERPL-MCNC: 1 MG/DL (ref 0.5–1.4)
CREAT UR-MCNC: 17 MG/DL (ref 23–375)
CV ECHO LV RWT: 0.34 CM
DELSYS: ABNORMAL
DIFFERENTIAL METHOD BLD: ABNORMAL
DOP CALC AO PEAK VEL: 1.76 M/S
DOP CALC AO VTI: 29.53 CM
DOP CALC LVOT AREA: 3.7 CM2
DOP CALC LVOT DIAMETER: 2.18 CM
DOP CALC LVOT PEAK VEL: 1.51 M/S
DOP CALC LVOT STROKE VOLUME: 92.48 CM3
DOP CALC MV VTI: 42.87 CM
DOP CALCLVOT PEAK VEL VTI: 24.79 CM
E WAVE DECELERATION TIME: 246.91 MSEC
E/A RATIO: 2.35
E/E' RATIO: 20.67 M/S
ECHO LV POSTERIOR WALL: 0.87 CM (ref 0.6–1.1)
EOSINOPHIL # BLD AUTO: 0.1 K/UL (ref 0–0.5)
EOSINOPHIL NFR BLD: 0.7 % (ref 0–8)
ERYTHROCYTE [DISTWIDTH] IN BLOOD BY AUTOMATED COUNT: 18.5 % (ref 11.5–14.5)
EST. GFR  (NO RACE VARIABLE): >60 ML/MIN/1.73 M^2
ETHANOL UR-MCNC: <10 MG/DL
FRACTIONAL SHORTENING: 44 % (ref 28–44)
GLUCOSE SERPL-MCNC: 130 MG/DL (ref 70–110)
GLUCOSE SERPL-MCNC: 136 MG/DL (ref 70–110)
GLUCOSE SERPL-MCNC: 150 MG/DL (ref 70–110)
GLUCOSE SERPL-MCNC: 159 MG/DL (ref 70–110)
HCO3 UR-SCNC: 22.3 MMOL/L (ref 24–28)
HCO3 UR-SCNC: 24.5 MMOL/L (ref 24–28)
HCO3 UR-SCNC: 28.4 MMOL/L (ref 24–28)
HCO3 UR-SCNC: 29.7 MMOL/L (ref 24–28)
HCO3 UR-SCNC: 29.7 MMOL/L (ref 24–28)
HCT VFR BLD AUTO: 22.6 % (ref 40–54)
HCT VFR BLD CALC: 25 %PCV (ref 36–54)
HCT VFR BLD CALC: 28 %PCV (ref 36–54)
HGB BLD-MCNC: 7.5 G/DL (ref 14–18)
HR MV ECHO: 88 BPM
IMM GRANULOCYTES # BLD AUTO: 0.12 K/UL (ref 0–0.04)
IMM GRANULOCYTES NFR BLD AUTO: 0.8 % (ref 0–0.5)
INTERVENTRICULAR SEPTUM: 0.8 CM (ref 0.6–1.1)
LA MAJOR: 6.69 CM
LA MINOR: 7.28 CM
LA WIDTH: 5.24 CM
LACTATE SERPL-SCNC: 0.7 MMOL/L (ref 0.5–2.2)
LACTATE SERPL-SCNC: 0.8 MMOL/L (ref 0.5–2.2)
LACTATE SERPL-SCNC: 0.8 MMOL/L (ref 0.5–2.2)
LACTATE SERPL-SCNC: 0.9 MMOL/L (ref 0.5–2.2)
LACTATE SERPL-SCNC: 1.3 MMOL/L (ref 0.5–2.2)
LEFT ATRIUM SIZE: 5.13 CM
LEFT ATRIUM VOLUME INDEX MOD: 74.5 ML/M2
LEFT ATRIUM VOLUME INDEX: 85.2 ML/M2
LEFT ATRIUM VOLUME MOD: 139.35 CM3
LEFT ATRIUM VOLUME: 159.32 CM3
LEFT INTERNAL DIMENSION IN SYSTOLE: 2.87 CM (ref 2.1–4)
LEFT VENTRICLE DIASTOLIC VOLUME INDEX: 66.43 ML/M2
LEFT VENTRICLE DIASTOLIC VOLUME: 124.23 ML
LEFT VENTRICLE MASS INDEX: 80 G/M2
LEFT VENTRICLE SYSTOLIC VOLUME INDEX: 16.9 ML/M2
LEFT VENTRICLE SYSTOLIC VOLUME: 31.52 ML
LEFT VENTRICULAR INTERNAL DIMENSION IN DIASTOLE: 5.11 CM (ref 3.5–6)
LEFT VENTRICULAR MASS: 148.89 G
LV LATERAL E/E' RATIO: 16.91 M/S
LV SEPTAL E/E' RATIO: 26.57 M/S
LYMPHOCYTES # BLD AUTO: 1.4 K/UL (ref 1–4.8)
LYMPHOCYTES NFR BLD: 9.5 % (ref 18–48)
MAGNESIUM SERPL-MCNC: 1.6 MG/DL (ref 1.6–2.6)
MAGNESIUM SERPL-MCNC: 1.9 MG/DL (ref 1.6–2.6)
MAGNESIUM SERPL-MCNC: 2.2 MG/DL (ref 1.6–2.6)
MCH RBC QN AUTO: 26.1 PG (ref 27–31)
MCHC RBC AUTO-ENTMCNC: 33.2 G/DL (ref 32–36)
MCV RBC AUTO: 79 FL (ref 82–98)
METHADONE UR QL SCN>300 NG/ML: NEGATIVE
MODE: ABNORMAL
MONOCYTES # BLD AUTO: 1.8 K/UL (ref 0.3–1)
MONOCYTES NFR BLD: 12 % (ref 4–15)
MV MEAN GRADIENT: 6 MMHG
MV PEAK A VEL: 0.79 M/S
MV PEAK E VEL: 1.86 M/S
MV PEAK GRADIENT: 20 MMHG
MV STENOSIS PRESSURE HALF TIME: 71.6 MS
MV VALVE AREA BY CONTINUITY EQUATION: 2.16 CM2
MV VALVE AREA P 1/2 METHOD: 3.07 CM2
NEUTROPHILS # BLD AUTO: 11.3 K/UL (ref 1.8–7.7)
NEUTROPHILS NFR BLD: 76.3 % (ref 38–73)
NRBC BLD-RTO: 0 /100 WBC
OPIATES UR QL SCN: NEGATIVE
PCO2 BLDA: 32.1 MMHG (ref 35–45)
PCO2 BLDA: 32.4 MMHG (ref 35–45)
PCO2 BLDA: 36.2 MMHG (ref 35–45)
PCO2 BLDA: 40.2 MMHG (ref 35–45)
PCO2 BLDA: 40.2 MMHG (ref 35–45)
PCP UR QL SCN>25 NG/ML: NEGATIVE
PH SMN: 7.45 [PH] (ref 7.35–7.45)
PH SMN: 7.48 [PH] (ref 7.35–7.45)
PH SMN: 7.48 [PH] (ref 7.35–7.45)
PH SMN: 7.49 [PH] (ref 7.35–7.45)
PH SMN: 7.5 [PH] (ref 7.35–7.45)
PHOSPHATE SERPL-MCNC: 5 MG/DL (ref 2.7–4.5)
PISA MRMAX VEL: 0.05 M/S
PISA TR MAX VEL: 3.02 M/S
PLATELET # BLD AUTO: 444 K/UL (ref 150–450)
PMV BLD AUTO: 10.9 FL (ref 9.2–12.9)
PO2 BLDA: 29 MMHG (ref 40–60)
PO2 BLDA: 29 MMHG (ref 40–60)
PO2 BLDA: 30 MMHG (ref 40–60)
PO2 BLDA: 31 MMHG (ref 40–60)
PO2 BLDA: 31 MMHG (ref 40–60)
POC BE: -2 MMOL/L
POC BE: 1 MMOL/L
POC BE: 5 MMOL/L
POC BE: 6 MMOL/L
POC BE: 6 MMOL/L
POC IONIZED CALCIUM: 1.08 MMOL/L (ref 1.06–1.42)
POC IONIZED CALCIUM: 1.11 MMOL/L (ref 1.06–1.42)
POC SATURATED O2: 58 % (ref 95–100)
POC SATURATED O2: 61 % (ref 95–100)
POC SATURATED O2: 65 % (ref 95–100)
POC SVO2: 65 %
POC TCO2: 23 MMOL/L (ref 24–29)
POC TCO2: 25 MMOL/L (ref 24–29)
POC TCO2: 30 MMOL/L (ref 24–29)
POC TCO2: 31 MMOL/L (ref 24–29)
POC TCO2: 31 MMOL/L (ref 24–29)
POTASSIUM BLD-SCNC: 3.5 MMOL/L (ref 3.5–5.1)
POTASSIUM BLD-SCNC: 3.6 MMOL/L (ref 3.5–5.1)
POTASSIUM SERPL-SCNC: 3.2 MMOL/L (ref 3.5–5.1)
POTASSIUM SERPL-SCNC: 3.3 MMOL/L (ref 3.5–5.1)
POTASSIUM SERPL-SCNC: 3.3 MMOL/L (ref 3.5–5.1)
POTASSIUM SERPL-SCNC: 3.7 MMOL/L (ref 3.5–5.1)
PROT SERPL-MCNC: 7.5 G/DL (ref 6–8.4)
RA MAJOR: 5.45 CM
RA PRESSURE ESTIMATED: 15 MMHG
RA WIDTH: 3.2 CM
RBC # BLD AUTO: 2.87 M/UL (ref 4.6–6.2)
RIGHT VENTRICULAR END-DIASTOLIC DIMENSION: 3.76 CM
RV TB RVSP: 18 MMHG
SAMPLE: ABNORMAL
SINUS: 3.19 CM
SITE: ABNORMAL
SODIUM BLD-SCNC: 132 MMOL/L (ref 136–145)
SODIUM BLD-SCNC: 132 MMOL/L (ref 136–145)
SODIUM SERPL-SCNC: 134 MMOL/L (ref 136–145)
SODIUM SERPL-SCNC: 135 MMOL/L (ref 136–145)
STJ: 2.75 CM
TDI LATERAL: 0.11 M/S
TDI SEPTAL: 0.07 M/S
TDI: 0.09 M/S
TOXICOLOGY INFORMATION: ABNORMAL
TR MAX PG: 36 MMHG
TRICUSPID ANNULAR PLANE SYSTOLIC EXCURSION: 1.12 CM
TV REST PULMONARY ARTERY PRESSURE: 51 MMHG
WBC # BLD AUTO: 14.88 K/UL (ref 3.9–12.7)
Z-SCORE OF LEFT VENTRICULAR DIMENSION IN END DIASTOLE: -0.2
Z-SCORE OF LEFT VENTRICULAR DIMENSION IN END SYSTOLE: -0.9

## 2023-12-19 PROCEDURE — 83735 ASSAY OF MAGNESIUM: CPT | Performed by: INTERNAL MEDICINE

## 2023-12-19 PROCEDURE — 94761 N-INVAS EAR/PLS OXIMETRY MLT: CPT | Mod: XB

## 2023-12-19 PROCEDURE — 27100171 HC OXYGEN HIGH FLOW UP TO 24 HOURS

## 2023-12-19 PROCEDURE — 85025 COMPLETE CBC W/AUTO DIFF WBC: CPT | Performed by: INTERNAL MEDICINE

## 2023-12-19 PROCEDURE — 85730 THROMBOPLASTIN TIME PARTIAL: CPT | Performed by: INTERNAL MEDICINE

## 2023-12-19 PROCEDURE — 82330 ASSAY OF CALCIUM: CPT

## 2023-12-19 PROCEDURE — 25000003 PHARM REV CODE 250

## 2023-12-19 PROCEDURE — 82803 BLOOD GASES ANY COMBINATION: CPT

## 2023-12-19 PROCEDURE — 63600175 PHARM REV CODE 636 W HCPCS: Performed by: STUDENT IN AN ORGANIZED HEALTH CARE EDUCATION/TRAINING PROGRAM

## 2023-12-19 PROCEDURE — 25000003 PHARM REV CODE 250: Performed by: STUDENT IN AN ORGANIZED HEALTH CARE EDUCATION/TRAINING PROGRAM

## 2023-12-19 PROCEDURE — 85014 HEMATOCRIT: CPT

## 2023-12-19 PROCEDURE — 82800 BLOOD PH: CPT

## 2023-12-19 PROCEDURE — 99900035 HC TECH TIME PER 15 MIN (STAT)

## 2023-12-19 PROCEDURE — 83605 ASSAY OF LACTIC ACID: CPT | Mod: 91 | Performed by: INTERNAL MEDICINE

## 2023-12-19 PROCEDURE — 25000003 PHARM REV CODE 250: Performed by: INTERNAL MEDICINE

## 2023-12-19 PROCEDURE — 84295 ASSAY OF SERUM SODIUM: CPT

## 2023-12-19 PROCEDURE — 87205 SMEAR GRAM STAIN: CPT | Performed by: INTERNAL MEDICINE

## 2023-12-19 PROCEDURE — 99223 1ST HOSP IP/OBS HIGH 75: CPT | Mod: ,,, | Performed by: INTERNAL MEDICINE

## 2023-12-19 PROCEDURE — 99233 SBSQ HOSP IP/OBS HIGH 50: CPT | Mod: ,,, | Performed by: INTERNAL MEDICINE

## 2023-12-19 PROCEDURE — 80048 BASIC METABOLIC PNL TOTAL CA: CPT | Mod: 91,XB | Performed by: STUDENT IN AN ORGANIZED HEALTH CARE EDUCATION/TRAINING PROGRAM

## 2023-12-19 PROCEDURE — 87070 CULTURE OTHR SPECIMN AEROBIC: CPT | Performed by: INTERNAL MEDICINE

## 2023-12-19 PROCEDURE — 87106 FUNGI IDENTIFICATION YEAST: CPT | Performed by: INTERNAL MEDICINE

## 2023-12-19 PROCEDURE — 84100 ASSAY OF PHOSPHORUS: CPT | Performed by: INTERNAL MEDICINE

## 2023-12-19 PROCEDURE — 80053 COMPREHEN METABOLIC PANEL: CPT | Performed by: INTERNAL MEDICINE

## 2023-12-19 PROCEDURE — 80048 BASIC METABOLIC PNL TOTAL CA: CPT | Mod: 91,XB | Performed by: INTERNAL MEDICINE

## 2023-12-19 PROCEDURE — 84132 ASSAY OF SERUM POTASSIUM: CPT

## 2023-12-19 PROCEDURE — 63600175 PHARM REV CODE 636 W HCPCS: Performed by: INTERNAL MEDICINE

## 2023-12-19 PROCEDURE — 20000000 HC ICU ROOM

## 2023-12-19 RX ORDER — DILTIAZEM HYDROCHLORIDE 60 MG/1
60 TABLET, FILM COATED ORAL EVERY 8 HOURS
Status: DISCONTINUED | OUTPATIENT
Start: 2023-12-19 | End: 2023-12-22

## 2023-12-19 RX ORDER — MAGNESIUM SULFATE HEPTAHYDRATE 40 MG/ML
2 INJECTION, SOLUTION INTRAVENOUS ONCE
Status: COMPLETED | OUTPATIENT
Start: 2023-12-19 | End: 2023-12-19

## 2023-12-19 RX ORDER — POTASSIUM CHLORIDE 750 MG/1
30 CAPSULE, EXTENDED RELEASE ORAL
Status: COMPLETED | OUTPATIENT
Start: 2023-12-19 | End: 2023-12-19

## 2023-12-19 RX ORDER — POTASSIUM CHLORIDE 20 MEQ/1
40 TABLET, EXTENDED RELEASE ORAL ONCE
Status: COMPLETED | OUTPATIENT
Start: 2023-12-19 | End: 2023-12-19

## 2023-12-19 RX ORDER — POTASSIUM CHLORIDE 29.8 MG/ML
40 INJECTION INTRAVENOUS ONCE
Status: COMPLETED | OUTPATIENT
Start: 2023-12-19 | End: 2023-12-19

## 2023-12-19 RX ADMIN — VANCOMYCIN HYDROCHLORIDE 1250 MG: 1.25 INJECTION, POWDER, LYOPHILIZED, FOR SOLUTION INTRAVENOUS at 05:12

## 2023-12-19 RX ADMIN — FUROSEMIDE 10 MG/HR: 10 INJECTION, SOLUTION INTRAMUSCULAR; INTRAVENOUS at 02:12

## 2023-12-19 RX ADMIN — MAGNESIUM SULFATE HEPTAHYDRATE 2 G: 40 INJECTION, SOLUTION INTRAVENOUS at 04:12

## 2023-12-19 RX ADMIN — DILTIAZEM HYDROCHLORIDE 60 MG: 60 TABLET, FILM COATED ORAL at 09:12

## 2023-12-19 RX ADMIN — VANCOMYCIN HYDROCHLORIDE 1250 MG: 1.25 INJECTION, POWDER, LYOPHILIZED, FOR SOLUTION INTRAVENOUS at 06:12

## 2023-12-19 RX ADMIN — DILTIAZEM HYDROCHLORIDE 10 MG/HR: 5 INJECTION INTRAVENOUS at 09:12

## 2023-12-19 RX ADMIN — MEROPENEM 2 G: 1 INJECTION INTRAVENOUS at 11:12

## 2023-12-19 RX ADMIN — MEROPENEM 2 G: 1 INJECTION INTRAVENOUS at 08:12

## 2023-12-19 RX ADMIN — MAGNESIUM SULFATE HEPTAHYDRATE 2 G: 40 INJECTION, SOLUTION INTRAVENOUS at 03:12

## 2023-12-19 RX ADMIN — MEROPENEM 2 G: 1 INJECTION INTRAVENOUS at 03:12

## 2023-12-19 RX ADMIN — POTASSIUM CHLORIDE 30 MEQ: 10 CAPSULE, COATED, EXTENDED RELEASE ORAL at 01:12

## 2023-12-19 RX ADMIN — DILTIAZEM HYDROCHLORIDE 60 MG: 60 TABLET, FILM COATED ORAL at 01:12

## 2023-12-19 RX ADMIN — POTASSIUM CHLORIDE 40 MEQ: 29.8 INJECTION, SOLUTION INTRAVENOUS at 04:12

## 2023-12-19 RX ADMIN — HEPARIN SODIUM 16 UNITS/KG/HR: 10000 INJECTION, SOLUTION INTRAVENOUS at 06:12

## 2023-12-19 RX ADMIN — POTASSIUM CHLORIDE 40 MEQ: 1500 TABLET, EXTENDED RELEASE ORAL at 04:12

## 2023-12-19 RX ADMIN — POTASSIUM CHLORIDE 40 MEQ: 1500 TABLET, EXTENDED RELEASE ORAL at 10:12

## 2023-12-19 RX ADMIN — POTASSIUM CHLORIDE 30 MEQ: 10 CAPSULE, COATED, EXTENDED RELEASE ORAL at 02:12

## 2023-12-19 RX ADMIN — POTASSIUM CHLORIDE 40 MEQ: 1500 TABLET, EXTENDED RELEASE ORAL at 05:12

## 2023-12-19 NOTE — PLAN OF CARE
Cardiac ICU Care Plan    POC reviewed with Lorenzo Nino and family. Questions and concerns addressed. Pt progressing toward goals. Will continue to monitor. See below and flowsheets for full assessment and VS info.       Neuro:  [place] Coma Scale  Best Eye Response: 4-->(E4) spontaneous  Best Motor Response: 6-->(M6) obeys commands  Best Verbal Response: 5-->(V5) oriented  [place] Coma Scale Score: 15  Assessment Qualifiers: patient not sedated/intubated  Pupil PERRLA: yes    24 hr Temp:  [98.1 °F (36.7 °C)-99.9 °F (37.7 °C)]      CV:  Rhythm: atrial rhythm   DVT prophylaxis: VTE Required Core Measure: Pharmacological prophylaxis initiated/maintained                            Pulses  Right Radial Pulse: 1+ (weak)  Left Radial Pulse: 1+ (weak)  Right Dorsalis Pedis Pulse: 1+ (weak)  Left Dorsalis Pedis Pulse: 1+ (weak)  Right Posterior Tibial Pulse: 1+ (weak)  Left Posterior Tibial Pulse: 1+ (weak)    Resp:  Flow (L/min): 10       GI/:  GI prophylaxis: none ordered     Last Bowel Movement: 12/18/23      Intake/Output Summary (Last 24 hours) at 12/19/2023 0644  Last data filed at 12/19/2023 0605  Gross per 24 hour   Intake 1085.51 ml   Output 2000 ml   Net -914.49 ml        Nutritional Supplement Intake: Quantity none, Type: Boost    Labs/Accuchecks:  Recent Labs   Lab 12/18/23  1240 12/18/23  1619 12/18/23  2201 12/19/23  0310   WBC 21.24* 16.06*  --  14.88*   RBC 2.94* 3.38*  --  2.87*   HGB 7.8* 9.1*  --  7.5*   HCT 25.1* 27.8* 28*  28* 22.6*   * 487*  --  444      Recent Labs   Lab 12/18/23  2158 12/19/23  0500   INR 1.1  --    APTT 33.7* 35.4*      Recent Labs     12/19/23  0310   *   K 3.3*   CO2 26   CL 96   BUN 18   CREATININE 1.0   ALKPHOS 91   ALT 11   AST 25   BILITOT 1.0       Recent Labs   Lab 12/18/23  1240 12/18/23  1619   CPK 83  --    TROPONINI  --  0.060*      Recent Labs     12/18/23  1626 12/18/23  2201   PH 7.445  7.445 7.491*  7.491*   PCO2 32.4*  32.4* 32.1*  32.1*    PO2 29*  29* 29*  29*   HCO3 22.3*  22.3* 24.5  24.5   POCSATURATED 58  58 61  61   BE -2  -2 1  1       Electrolytes: Electrolytes replaced  Accuchecks: none    Gtts/LDAs:   dilTIAZem 10 mg/hr (12/19/23 0605)    furosemide (LASIX) 500 mg in 50 mL infusion (conc: 10 mg/mL) 10 mg/hr (12/19/23 0605)    heparin (porcine) in D5W 14 Units/kg/hr (12/19/23 0605)       Lines/Drains/Airways       Peripherally Inserted Central Catheter Line  Duration             PICC Double Lumen 11/13/23 1509 right basilic 35 days              Peripheral Intravenous Line  Duration                  Peripheral IV - Single Lumen 12/18/23 1631 18 G Anterior;Left Forearm <1 day         Peripheral IV - Single Lumen 12/18/23 2300 20 G Left Forearm <1 day                    Skin/Wounds  Bathing/Skin Care: bath, complete;dressed/undressed (12/19/23 0605)  Wounds: No  Wound care consulted: No

## 2023-12-19 NOTE — PLAN OF CARE
Patient transferred to the CICU for AHRF 2/2 significant pulmonary edema 2/2 severe mitral regurgitation concerning for ant leaflet tear vs posterior leaflet restriction.  Have spoken with the CTS fellow directly who has agreed with our teams ( after speaking with Dr. Beal) plan of continuing aggressive diuresis, monitoring for hemodynamic instability with plan for NEW tomorrow AM.    Currently getting diuresed with IV lasix.  Upon arrival to the CICU he was found to be in afib with rvr.  Was given a dose of digoxin and placed on heparin gtt. Dilt gtt was added after speaking with CCU staff.  BP stable.  Repeat Lactate wnl.    Will continue to monitor closely.    Norm Hitchcock MD  Cardiovascular medicine; PGY5  [facility]  [address] [place] [address]

## 2023-12-19 NOTE — PROGRESS NOTES
Pharmacokinetic Assessment Follow Up: IV Vancomycin    Vancomycin serum concentration assessment(s):    - Level was drawn incorrectly just a few hours after initial bolus was infused.    Vancomycin Regimen Plan:    - Continue maintenance dose of vancomycin 1250 mg IV every twelve hours.  Desired empiric serum trough concentration is 15 to 20 mcg/mL  Draw vancomycin trough level 60 min prior to fourth dose on 12/20/23 at approximately 0500    Drug levels (last 3 results):  Recent Labs   Lab Result Units 12/18/23  2158   Vancomycin, Random ug/mL 33.6       Pharmacy will continue to follow and monitor vancomycin.    Please contact pharmacy at extension [phone] for questions regarding this assessment.    Thank you for the consult,   Aminata Bueno

## 2023-12-19 NOTE — HPI
Mr. Nino is a 47 y/o with a history of bacterial endocarditis and severe MR s/p repair w/ porcine annuloplasty in 11/3/23 complicated by MRSA endocarditis and bacteremia who presents for dyspnea and orthopnea for the past 3 days. Pt recently had 15 day admission from 10/30-11/15 for bacterial endocarditis and MRSA bacteremia. He received 1 week of ceftaroline and is currently on 6 weeks of daptomycin (DARIELA 12/24). He followed with ID and cardiology clinic. During his previous clinic visits he was noted to have worsening dry cough. In the ED patient satting 82, requiring 5L O2, cxr with nonspecific right sided pulmonary opacities, labs with wbc 16, blood gas PCO2 32.4, HCO3 22.3. ICU was consulted for evaluation of hypoxic resp failure.

## 2023-12-19 NOTE — SUBJECTIVE & OBJECTIVE
Interval History: Upon admission to the CICU on afib w/ RVR. Given 1 dose of digoxin and started on heparin gtt and diltiazem gtt. Stabilized on sinus rhythm. CTS evaluated patient and agree on continuing aggressive diuresis with lasix gtt, at the moment not indicated IABP. Pending ID evaluation for AB management. Switched to PO diltiazem due to normal rhythm. Will hold NEW at the moment due to patient with shortness of breath and increased risk of respiratory failure.     Review of Systems   Constitutional: Negative for chills and fever.   Cardiovascular:  Negative for chest pain, cyanosis, orthopnea and syncope.   Respiratory:  Positive for shortness of breath and wheezing. Negative for hemoptysis.    Skin:  Positive for dry skin. Negative for rash.   Genitourinary:  Negative for dysuria and hematuria.     Objective:     Vital Signs (Most Recent):  Temp: 97.8 °F (36.6 °C) (12/19/23 1101)  Pulse: 97 (12/19/23 1101)  Resp: (!) 39 (12/19/23 1101)  BP: (!) 107/58 (12/19/23 1101)  SpO2: 95 % (12/19/23 1101) Vital Signs (24h Range):  Temp:  [97.8 °F (36.6 °C)-99.9 °F (37.7 °C)] 97.8 °F (36.6 °C)  Pulse:  [] 97  Resp:  [22-47] 39  SpO2:  [82 %-100 %] 95 %  BP: ()/(55-80) 107/58     Weight: 67.3 kg (148 lb 5.9 oz)  Body mass index is 20.12 kg/m².     SpO2: 95 %         Intake/Output Summary (Last 24 hours) at 12/19/2023 1200  Last data filed at 12/19/2023 1101  Gross per 24 hour   Intake 1522.11 ml   Output 3000 ml   Net -1477.89 ml       Lines/Drains/Airways       Peripherally Inserted Central Catheter Line  Duration             PICC Double Lumen 11/13/23 1509 right basilic 35 days              Peripheral Intravenous Line  Duration                  Peripheral IV - Single Lumen 12/18/23 1631 18 G Anterior;Left Forearm <1 day         Peripheral IV - Single Lumen 12/18/23 2300 20 G Left Forearm <1 day                       Physical Exam  Vitals and nursing note reviewed.   Cardiovascular:      Rate and Rhythm:  Normal rate and regular rhythm.      Pulses: Normal pulses.      Heart sounds: Murmur (holosystolic mitral murmur) heard.   Pulmonary:      Effort: Respiratory distress present.      Breath sounds: Wheezing present.   Abdominal:      General: Bowel sounds are normal.   Musculoskeletal:         General: No swelling.      Right lower leg: No edema.      Left lower leg: No edema.   Skin:     Capillary Refill: Capillary refill takes 2 to 3 seconds.   Neurological:      Mental Status: He is alert and oriented to person, place, and time.            Significant Labs: All pertinent lab results from the last 24 hours have been reviewed.    Significant Imaging:  at bedside echo with severe mitral regurgitation concerning for anterior leaflet tear vs posterior leaflet restriction .

## 2023-12-19 NOTE — ASSESSMENT & PLAN NOTE
49 yo male with hx of MRSA bacteremia c/b MV/AV IE s/p MV repair with porcine annuloplasty 11/3 (OR cx + MRSA) and embolus to R eye was recently discharged on dapto + ceftaroline  represented on 12/18 with worsening dyspnea x 3-4 days and was admitted with acute respi failure in the setting of new severe MR and possible tear of anterior leaflet. Reports a dry cough and chills, but denies fevers or sweats, sick contacts.    Of note, pt was followed by ID during recent hospital admission 10/30-11/14. Due to prolonged bacteremia pt was managed with salvage therapy with dapto/ ceftaroline. BCx cleared on 11/11.  Plan was to continue ceftaroline until 11/19 and dapto until 12/24.     In the ED pt was afebrile, tachycardic and hypoxemic. Labs remarkable for leukocytosis. RIP, flu negative. CXR with interstitial coarsening and b/l (L>R) lung opacities. Bedside Echo showed EF 45-50% with severe MR, restricted posterior leaflet, possible tear in anterior leaflet.  Started on empiric vanc/ zosyn. Then broadened with sarah. BCx 12/18 remains ngtd.    Recommendations:  --ok to continue on vancomycin. Pharm to dose for goal trough 15-20.  End date 12/24.  --RCx if able to produce sputum sample to eval for pna  --f/u TTE results

## 2023-12-19 NOTE — CONSULTS
[facility] - Cardiac Intensive Care  Infectious Disease  Consult Note    Patient Name: Lorenzo Nino  MRN: [id number]  Admission Date: 12/18/2023  Hospital Length of Stay: 1 days  Attending Physician: Van Wu MD  Primary Care Provider: No, Primary Doctor     Isolation Status: No active isolations    Patient information was obtained from patient, past medical records, and ER records.      Inpatient consult to Infectious Diseases  Consult performed by: Nikia Cantrell MD  Consult ordered by: Mark Thakur MD        Assessment/Plan:     Pulmonary  Acute hypoxemic respiratory failure  See endocarditis.       Cardiac/Vascular  Endocarditis  49 yo male with hx of MRSA bacteremia c/b MV/AV IE s/p MV repair with porcine annuloplasty 11/3 (OR cx + MRSA) and embolus to R eye was recently discharged on dapto (EOT 12/24) + ceftaroline  (EOT 11/19) represented on 12/18 with worsening dyspnea x 3-4 days and was admitted with acute respi failure in the setting of new severe MR and possible tear of anterior leaflet. Reports a dry cough and chills, but denies fevers or sweats, sick contacts.     CXR with interstitial coarsening and b/l (L>R) lung opacities. Bedside Echo showed EF 45-50% with severe MR, restricted posterior leaflet, possible tear in anterior leaflet. Currently on vanc and sarah. BCx 12/18 remains ngtd. Lower suspicion for pna, but agree with vanc/ sarah for now.     Recommendations:  --ok to continue on vancomycin. Pharm to dose for goal trough 15-20.  End date 12/24.  --RCx if able to produce sputum sample to eval for pna  --f/u TTE results  --f/u BCx  --f/u cards recs         ID  * 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  See endocarditis        Thank you for your consult. I will follow-up with patient. Please contact us if you have any additional questions.    Nikia Cantrell MD  Infectious Disease  [facility] - Cardiac Intensive  "Care    Subjective:     Principal Problem: MRSA bacteremia    HPI: Mr. 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 Reports a dry cough and chills, but denies fevers or sweats, sick contacts.    Of note, pt was followed by ID during recent hospital admission 10/30-11/14. Due to prolonged bacteremia pt was managed with salvage therapy with dapto/ ceftaroline. BCx cleared on 11/11.  Plan was to continue ceftaroline until 11/19 and dapto until 12/24.     In the ED pt was afebrile, tachycardic and hypoxemic. Labs remarkable for leukocytosis. RIP, flu negative. CXR with interstitial coarsening and b/l (L>R) lung opacities. Bedside Echo showed EF 45-50% with severe MR, restricted posterior leaflet, possible tear in anterior leaflet.  Started on empiric vanc/ zosyn. Then broadened with sarah. BCx 12/18 remains ngtd.     ID consulted for: "Antibiotics recommendations. Patient on home IV daptomycin ."    Past Medical History:   Diagnosis Date    Hypertension        Past Surgical History:   Procedure Laterality Date    EXCLUSION, LEFT ATRIAL APPENDAGE, OPEN, AS PART OF OPEN CHEST SURGERY Left 11/3/2023    Procedure: EXCLUSION, LEFT ATRIAL APPENDAGE, OPEN, AS PART OF OPEN CHEST SURGERY;  Surgeon: Manuel Beal MD;  Location: [facility] OR [address] [place] [address];  Service: Cardiothoracic;  Laterality: Left;    INSERTION OF INTRA-AORTIC BALLOON ASSIST DEVICE Right 11/2/2023    Procedure: INSERTION, INTRA-AORTIC BALLOON PUMP;  Surgeon: Jose Vidal MD;  Location: [facility] CATH LAB;  Service: Cardiology;  Laterality: Right;    REPAIR, MITRAL VALVE, OPEN N/A 11/3/2023    Procedure: REPAIR, MITRAL VALVE, OPEN;  Surgeon: Emigdio, " Manuel YEE MD;  Location: [facility] OR [address] [place] [address];  Service: Cardiothoracic;  Laterality: N/A;       Review of patient's allergies indicates:  No Known Allergies    Medications:  Medications Prior to Admission   Medication Sig    aspirin (ECOTRIN) 325 MG EC tablet Take 1 tablet (325 mg total) by mouth once daily.    benzonatate (TESSALON) 100 MG capsule Take 1 capsule (100 mg total) by mouth 3 (three) times daily as needed for Cough.    metoprolol tartrate (LOPRESSOR) 50 MG tablet Take 1 tablet (50 mg total) by mouth 2 (two) times daily.    NIFEdipine (PROCARDIA-XL) 30 MG (OSM) 24 hr tablet Take 1 tablet (30 mg total) by mouth once daily.     Antibiotics (From admission, onward)      Start     Stop Route Frequency Ordered    12/19/23 0600  vancomycin 1,250 mg in dextrose 5 % (D5W) 250 mL IVPB (Vial-Mate)         -- IV Every 12 hours (non-standard times) 12/18/23 2035 12/18/23 2030  meropenem (MERREM) 2 g in sodium chloride 0.9% 100 mL IVPB         -- IV Every 8 hours (non-standard times) 12/18/23 1923 12/18/23 2022  vancomycin - pharmacy to dose  (vancomycin IVPB (PEDS and ADULTS))        See Hyperspace for full Linked Orders Report.    -- IV pharmacy to manage frequency 12/18/23 1923          Antifungals (From admission, onward)      None          Antivirals (From admission, onward)      None             Immunization History   Administered Date(s) Administered    Hepatitis A, Adult 09/16/2005    Td - PF (ADULT) 09/16/2005       Family History    None       Social History     Socioeconomic History    Marital status: Single   Occupational History    Occupation: associated terminal  [employer]    Tobacco Use    Smoking status: Unknown     Passive exposure: Never   Substance and Sexual Activity    Alcohol use: Yes     Alcohol/week: 1.0 standard drink of alcohol     Types: 1 Cans of beer per week    Drug use: Never    Sexual activity: Not Currently     Partners: Female     Birth control/protection: None     Social  Determinants of Health     Financial Resource Strain: Low Risk  (11/1/2023)    Overall Financial Resource Strain (CARDIA)     Difficulty of Paying Living Expenses: Not hard at all   Food Insecurity: No Food Insecurity (11/1/2023)    Hunger Vital Sign     Worried About Running Out of Food in the Last Year: Never true     Ran Out of Food in the Last Year: Never true   Transportation Needs: No Transportation Needs (11/1/2023)    PRAPARE - Transportation     Lack of Transportation (Medical): No     Lack of Transportation (Non-Medical): No   Physical Activity: Unknown (11/30/2023)    Exercise Vital Sign     Days of Exercise per Week: Patient declined     Minutes of Exercise per Session: 0 min   Recent Concern: Physical Activity - Inactive (11/1/2023)    Exercise Vital Sign     Days of Exercise per Week: 0 days     Minutes of Exercise per Session: 0 min   Stress: No Stress Concern Present (11/1/2023)    [place] [employer] - Occupational Stress Questionnaire     Feeling of Stress : Not at all   Social Connections: Socially Isolated (11/1/2023)    Social Connection and Isolation Panel [NHANES]     Frequency of Communication with Friends and Family: More than three times a week     Frequency of Social Gatherings with Friends and Family: Once a week     Attends Yazdanism Services: Never     Active Member of Clubs or Organizations: No     Attends Club or Organization Meetings: Never     Marital Status: Never    Housing Stability: Low Risk  (11/30/2023)    Housing Stability Vital Sign     Unable to Pay for Housing in the Last Year: No     Number of Places Lived in the Last Year: 1     Unstable Housing in the Last Year: No     Review of Systems  Objective:     Vital Signs (Most Recent):  Temp: 98.5 °F (36.9 °C) (12/19/23 1501)  Pulse: 95 (12/19/23 1701)  Resp: (!) 33 (12/19/23 1701)  BP: (!) 98/53 (12/19/23 1701)  SpO2: 98 % (12/19/23 1701) Vital Signs (24h Range):  Temp:  [97.8 °F (36.6 °C)-99.9 °F  (37.7 °C)] 98.5 °F (36.9 °C)  Pulse:  [] 95  Resp:  [22-47] 33  SpO2:  [88 %-100 %] 98 %  BP: ()/(50-78) 98/53     Weight: 67.1 kg (148 lb)  Body mass index is 20.07 kg/m².    Estimated Creatinine Clearance: 106 mL/min (based on SCr of 0.8 mg/dL).     Physical Exam  Vitals reviewed.   Constitutional:       Appearance: He is ill-appearing.   HENT:      Head: Normocephalic and atraumatic.   Eyes:      Conjunctiva/sclera: Conjunctivae normal.      Pupils: Pupils are equal, round, and reactive to light.   Cardiovascular:      Rate and Rhythm: Normal rate and regular rhythm.      Heart sounds: Murmur heard.   Pulmonary:      Effort: Pulmonary effort is normal.      Breath sounds: Rales present.   Abdominal:      General: Abdomen is flat.      Palpations: Abdomen is soft.   Musculoskeletal:      Right lower leg: No edema.      Left lower leg: No edema.      Comments: RUE PICC site with clean dressing   Skin:     Findings: No lesion or rash.   Neurological:      General: No focal deficit present.      Mental Status: He is oriented to person, place, and time.   Psychiatric:         Mood and Affect: Mood normal.         Behavior: Behavior normal.          Significant Labs: Blood Culture:   Recent Labs   Lab 11/10/23  0905 11/11/23  0345 11/12/23  0334 12/18/23  1619 12/18/23  1623   LABBLOO Gram stain aer bottle: Gram positive cocci in clusters resembling Staph  Results called to and read back by: Anthony Murphy RN  11/11/2023  23:32  STAPHYLOCOCCUS AUREUS  ID consult required at [facility].  For susceptibility see order #[id number]  * No growth after 5 days.  No growth after 5 days. No growth after 5 days.  No growth after 5 days. No Growth to date No Growth to date       Significant Imaging: I have reviewed all pertinent imaging results/findings within the past 24 hours.

## 2023-12-19 NOTE — ASSESSMENT & PLAN NOTE
Patient with Hypercapnic Respiratory failure which is Acute.  he is not on home oxygen. Supplemental oxygen was provided with 10L NC.     Signs/symptoms of respiratory failure include- tachypnea, increased work of breathing, respiratory distress, and wheezing. Contributing diagnoses includes - CHF and Pleural effusion Labs and images were reviewed. Patient Has recent ABG, which has been reviewed.     - monitor respiratory status  - hold NEW in the setting of respiratory failure   - once respiratory statu stabilized, will determine the need of NEW

## 2023-12-19 NOTE — PROGRESS NOTES
[facility]  Cardiology  Progress Note    Patient Name: Lorenzo Nino  MRN: [id number]  Admission Date: 12/18/2023  Hospital Length of Stay: 1 days  Code Status: Full Code   Attending Physician: Van Wu MD   Primary Care Physician: Terrie, Primary Doctor  Expected Discharge Date:   Principal Problem:MRSA bacteremia    Subjective:     Hospital Course:   Upon admission to the CICU on afib w/ RVR. Given 1 dose of digoxin and started on heparin gtt and diltiazem gtt. Stabilized on sinus rhythm. CTS evaluated patient and agree on continuing aggressive diuresis with lasix gtt, at the moment not indicated IABP. Pending ID evaluation for AB management. Switched to PO diltiazem due to normal rhythm. Will hold NEW at the moment due to patient with shortness of breath and increased risk of respiratory failure.     Interval History: Upon admission to the CICU on afib w/ RVR. Given 1 dose of digoxin and started on heparin gtt and diltiazem gtt. Stabilized on sinus rhythm. CTS evaluated patient and agree on continuing aggressive diuresis with lasix gtt, at the moment not indicated IABP. Pending ID evaluation for AB management. Switched to PO diltiazem due to normal rhythm. Will hold NEW at the moment due to patient with shortness of breath and increased risk of respiratory failure.     Review of Systems   Constitutional: Negative for chills and fever.   Cardiovascular:  Negative for chest pain, cyanosis, orthopnea and syncope.   Respiratory:  Positive for shortness of breath and wheezing. Negative for hemoptysis.    Skin:  Positive for dry skin. Negative for rash.   Genitourinary:  Negative for dysuria and hematuria.     Objective:     Vital Signs (Most Recent):  Temp: 97.8 °F (36.6 °C) (12/19/23 1101)  Pulse: 97 (12/19/23 1101)  Resp: (!) 39 (12/19/23 1101)  BP: (!) 107/58 (12/19/23 1101)  SpO2: 95 % (12/19/23 1101) Vital Signs (24h Range):  Temp:  [97.8 °F (36.6 °C)-99.9 °F (37.7 °C)] 97.8 °F  (36.6 °C)  Pulse:  [] 97  Resp:  [22-47] 39  SpO2:  [82 %-100 %] 95 %  BP: ()/(55-80) 107/58     Weight: 67.3 kg (148 lb 5.9 oz)  Body mass index is 20.12 kg/m².     SpO2: 95 %         Intake/Output Summary (Last 24 hours) at 12/19/2023 1200  Last data filed at 12/19/2023 1101  Gross per 24 hour   Intake 1522.11 ml   Output 3000 ml   Net -1477.89 ml       Lines/Drains/Airways       Peripherally Inserted Central Catheter Line  Duration             PICC Double Lumen 11/13/23 1509 right basilic 35 days              Peripheral Intravenous Line  Duration                  Peripheral IV - Single Lumen 12/18/23 1631 18 G Anterior;Left Forearm <1 day         Peripheral IV - Single Lumen 12/18/23 2300 20 G Left Forearm <1 day                       Physical Exam  Vitals and nursing note reviewed.   Cardiovascular:      Rate and Rhythm: Normal rate and regular rhythm.      Pulses: Normal pulses.      Heart sounds: Murmur (holosystolic mitral murmur) heard.   Pulmonary:      Effort: Respiratory distress present.      Breath sounds: Wheezing present.   Abdominal:      General: Bowel sounds are normal.   Musculoskeletal:         General: No swelling.      Right lower leg: No edema.      Left lower leg: No edema.   Skin:     Capillary Refill: Capillary refill takes 2 to 3 seconds.   Neurological:      Mental Status: He is alert and oriented to person, place, and time.            Significant Labs: All pertinent lab results from the last 24 hours have been reviewed.    Significant Imaging:  at bedside echo with severe mitral regurgitation concerning for anterior leaflet tear vs posterior leaflet restriction .  Assessment and Plan:     Brief HPI: Mr. Lorenzo Nino is a 48 y.o. 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 Presented for 3-day dyspnea. Denies having fever or chills during this time. He states that he was not doing anything strenuous  when his symptoms came on. His symptoms got progressively worse over the weekend and he decided to present yesterday. He has been on home IV Daptomycin. He follows with Dr. Mercado with Cardiology and his operation was performed by Dr. Haro.    In the ER, he was hypoxic requiring 6 L to maintain sats of 91-94%. Initial lactic acid 1.8. Creatinine 0.9. WBC 20k. Bedside Echo showed EF 45-50% with severe MR (2 jets, one posteriorly directed and one anteriorly directed), restricted posterior leaflet, possible tear in anterior leaflet. No pericardial effusion.    Patient was transferred to the CICU for AHRF 2/2 significant pulmonary edema 2/2 severe mitral regurgitation concerning for anterior leaflet tear vs posterior leaflet restriction.    * 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  48 y.o. 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      Plan:  -continue IV Lasix gtt at 10. Insert ritchie catheter  -pending formal echo  -Hold NEW. not needed at the moment due to respiratory failure.   -Pending ID evaluation for IV AB recs   - pending blood cx   -continue IV Vancomycin and IV Merropenem  - CTS consulted, agree on IV diursesis. Will hold IABP at the moment.     HFmrEF  - Hold GDMT for now.     Acute hypoxemic respiratory failure  Patient with Hypercapnic Respiratory failure which is Acute.  he is not on home oxygen. Supplemental oxygen was provided with 10L NC.     Signs/symptoms of respiratory failure include- tachypnea, increased work of breathing,  respiratory distress, and wheezing. Contributing diagnoses includes - CHF and Pleural effusion Labs and images were reviewed. Patient Has recent ABG, which has been reviewed.     - monitor respiratory status  - hold NEW in the setting of respiratory failure   - once respiratory statu stabilized, will determine the need of NEW    A-fib with RVR  S/p afib with RVR once admitted to the CCU. Started on IV digoxin, heparin gtt and diltiazem gtt. On sinus rhythm. Will de-escalate rhythm medications as tolerated.     - continue heparin gtt  - switch to PO Diltiazem 60mg TID   - monitor telemetry        VTE Risk Mitigation (From admission, onward)           Ordered     heparin 25,000 units in dextrose 5% (100 units/ml) IV bolus from bag - ADDITIONAL PRN BOLUS - 60 units/kg  As needed (PRN)        Question:  Heparin Infusion Adjustment (DO NOT MODIFY ANSWER)  Answer:  [email or web address]\epic\Images\Pharmacy\HeparinInfusions\heparin LOW INTENSITY nomogram for [facility] [id number]    12/18/23 2127     heparin 25,000 units in dextrose 5% (100 units/ml) IV bolus from bag - ADDITIONAL PRN BOLUS - 30 units/kg  As needed (PRN)        Question:  Heparin Infusion Adjustment (DO NOT MODIFY ANSWER)  Answer:  [email or web address]\epic\Images\Pharmacy\HeparinInfusions\heparin LOW INTENSITY nomogram for [facility] [id number]    12/18/23 2127     heparin 25,000 units in dextrose 5% 250 mL (100 units/mL) infusion LOW INTENSITY nomogram - [facility]  Continuous        Question:  Begin at (units/kg/hr)  Answer:  12    12/18/23 2127                    Jaya Zhang MD  Cardiology  [facility]

## 2023-12-19 NOTE — HPI
"Mr. 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 Reports a dry cough and chills, but denies fevers or sweats, sick contacts.    Of note, pt was followed by ID during recent hospital admission 10/30-11/14. Due to prolonged bacteremia pt was managed with salvage therapy with dapto/ ceftaroline. BCx cleared on 11/11.  Plan was to continue ceftaroline until 11/19 and dapto until 12/24.     In the ED pt was afebrile, tachycardic and hypoxemic. Labs remarkable for leukocytosis. RIP, flu negative. CXR with interstitial coarsening and b/l (L>R) lung opacities. Bedside Echo showed EF 45-50% with severe MR, restricted posterior leaflet, possible tear in anterior leaflet.  Started on empiric vanc/ zosyn. Then broadened with sarah. BCx 12/18 remains ngtd.     ID consulted for: "Antibiotics recommendations. Patient on home IV daptomycin ."  "

## 2023-12-19 NOTE — ASSESSMENT & PLAN NOTE
S/p afib with RVR once admitted to the CCU. Started on IV digoxin, heparin gtt and diltiazem gtt. On sinus rhythm. Will de-escalate rhythm medications as tolerated.     - continue heparin gtt  - switch to PO Diltiazem 60mg TID   - monitor telemetry

## 2023-12-19 NOTE — PLAN OF CARE
[facility]  Initial Discharge Assessment       Primary Care Provider: No, Primary Doctor    Admission Diagnosis: Shortness of breath [R06.02]  Dyspnea [R06.00]    Admission Date: 12/18/2023  Expected Discharge Date: 12/26/2023    Transition of Care Barriers: None    Payor: BLUE CROSS BLUE SHIELD / Plan: [place] PPO / Product Type: PPO /     Extended Emergency Contact Information  Primary Emergency Contact: Federica Shelley  Mobile Phone: [phone]  Relation: Significant other  Secondary Emergency Contact: Jolie Briscoe  Mobile Phone: [phone]  Relation: Mother   needed? No    Discharge Plan A: Home Health  Discharge Plan B: Long-term acute care facility (LTAC)      [facility]  [place], [place] - [address]  [address]  [place] [address]  Phone: [phone] Fax: [phone]      Initial Assessment (most recent)       Adult Discharge Assessment - 12/19/23 1412          Discharge Assessment    Assessment Type Discharge Planning Assessment     Confirmed/corrected address, phone number and insurance Yes     Confirmed Demographics Correct on Facesheet     Source of Information patient;family;health record     Communicated DARIELA with patient/caregiver Date not available/Unable to determine     Reason For Admission Severe MR     People in Home significant other     Facility Arrived From: Home     Do you expect to return to your current living situation? Yes     Do you have help at home or someone to help you manage your care at home? Yes     Who are your caregiver(s) and their phone number(s)? Federica Shelley (sig other) [phone], Jolie Briscoe (mother) [phone]     Prior to hospitilization cognitive status: Alert/Oriented     Current cognitive status: Alert/Oriented     Walking or Climbing Stairs Difficulty no     Dressing/Bathing Difficulty no     Home Layout Able to live on 1st floor     Equipment Currently Used at Home none      Readmission within 30 days? No     Patient currently being followed by outpatient case management? No     Do you currently have service(s) that help you manage your care at home? Yes     Name and Contact number of agency [facility]     Is the pt/caregiver preference to resume services with current agency Yes     Do you take prescription medications? Yes     Do you have prescription coverage? Yes     Do you have any problems affording any of your prescribed medications? No     Is the patient taking medications as prescribed? yes     Who is going to help you get home at discharge? Federica Shelley (sig other) [phone], Jolie Briscoe (mother) [phone]     How do you get to doctors appointments? family or friend will provide     Are you on dialysis? No     Do you take coumadin? No     Discharge Plan A Home Health     Discharge Plan B Long-term acute care facility (LTAC)     DME Needed Upon Discharge  none     Discharge Plan discussed with: Patient;Parent(s)     Name(s) and Number(s) Jolie Briscoe (mother) [phone]     Transition of Care Barriers None                 SW met with pt and parents at bedside to discuss discharge planning.  Pt lives with his sig other Federica in a one-story house and is independent with ambulation and ADLs.  Pt will have transportation and assistance from family at discharge.  Pt was current with [facility] for IV abx and [facility].  Discharge Plan A and Plan B have been determined by review of patient's clinical status, future medical and therapeutic needs, and coverage/benefits for post-acute care in coordination with multidisciplinary team members.  EL name and ext on whiteboard; discharge planning booklet provided.  Will continue to follow.      Humera Luong LMSW  [facility]  [id number]

## 2023-12-19 NOTE — H&P
[facility]  CCU   H&P       HPI: Lorenzo Nino is a 48 y.o. 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 Denies having fever or chills during this time. He states that he was not doing anything strenuous when his symptoms came on. His symptoms got progressively worse over the weekend and he decided to present today. He has been on home IV Daptomycin. He follows with Dr. Mercado with Cardiology and his operation was performed by Dr. Haro.    In the ER, he was hypoxic requiring 6 L to maintain sats of 91-94%. Initial lactic acid 1.8. Creatinine 0.9. WBC 20k.     Bedside Echo showed EF 45-50% with severe MR (2 jets, one posteriorly directed and one anteriorly directed), restricted posterior leaflet, possible tear in anterior leaflet. No pericardial effusion.          ROS: Patient denies angina, lower extremity edema, orthopnea, PND, palpitations, presyncope or syncope. All other ROS negative except as stated above.    PMHx:  As above    PSHx:   As above    Family Hx:  Family History   Problem Relation Age of Onset    Amblyopia Neg Hx     Blindness Neg Hx     Cataracts Neg Hx     Glaucoma Neg Hx     Macular degeneration Neg Hx     Retinal detachment Neg Hx     Strabismus Neg Hx        Social Hx:   Social History     Tobacco Use    Smoking status: Unknown     Passive exposure: Never    Smokeless tobacco: Not on file   Substance Use Topics    Alcohol use: Yes     Alcohol/week: 1.0 standard drink of alcohol     Types: 1 Cans of beer per week       Allergies:  Review of patient's allergies indicates:  No Known Allergies    Home Meds:  Current Outpatient Medications   Medication Instructions    aspirin (ECOTRIN) 325 mg, Oral, Daily    benzonatate (TESSALON) 100 mg, Oral, 3 times daily PRN    metoprolol tartrate (LOPRESSOR) 50 mg, Oral, 2 times daily    NIFEdipine (PROCARDIA-XL) 30  mg, Oral, Daily       Vitals:  Temp:  [98.2 °F (36.8 °C)-98.3 °F (36.8 °C)] 98.2 °F (36.8 °C)  Pulse:  [107-114] 112  Resp:  [23-39] 24  SpO2:  [82 %-94 %] 94 %  BP: (110-126)/(64-80) 119/72    Physical Exam  Gen: Appears uncomfortable  HEENT: Pupils equal and reactive to light  Cardio: Regular rate, point of maximal impulse not displaced, no murmur noted, 2+ radial pulses bilaterally, 2+ DP pulses bilaterally  Resp: CTAB, no wheezing  Abd: Soft, non-tender, non-distended  Skin: Warm, dry, no peripheral edema noted, PICC line in place  Neuro: Alert and oriented x3  Psych: Normal mood and affect    ASA: 2  Mallampati: 3    Labs:  Recent Labs   Lab 12/18/23  1240 12/18/23  1619   WBC 21.24* 16.06*   HGB 7.8* 9.1*   HCT 25.1* 27.8*   * 487*     Recent Labs   Lab 12/18/23  1240 12/18/23  1619   * 131*   K 4.2 4.5   CL 98 97   CO2 20* 21*   BUN 20 22*   CREATININE 0.9 0.9   * 156*   CALCIUM 9.3 9.5         Current Meds:   furosemide (LASIX) injection  80 mg Intravenous Once    meropenem (MERREM) IVPB  2 g Intravenous Q8H    vancomycin (VANCOCIN) IV (PEDS and ADULTS)  2,000 mg Intravenous ED 1 Time       Assessment and Plan:    Severe MR  Recent MRSA mitral endocarditis s/p mitral valve repair 11/3/23  -Severe MR noted on Echo today which has changed since post-op Echo performed 11/14/23  -Reviewed images with Dr. Marques, appears to be 2 jets (one posterior and one anterior), possible tear of anterior leaflet, posterior leaflet is restricted.  -Will admit to CCU  -Obtain q1 lactic acid from PICC line  -Give IV Lasix 80 mg x1 then start gtt at 10. Insert ritchie catheter  -Formal Echo in AM  -Ultimately, will need NEW to assess MV  -Consult ID for antibiotic recommendations  -Obtain blood cultures before starting Abx  -Start IV Vancomycin and IV Merropenem  -Consult CTS    HFEF  -Hold GDMT for now      Discussed case with on-call CCU staff. Hand-off given to Dr. Hitchcock.     Janes Thakur,  MD [facility] PGY-6    Staff attestation to follow.    This note was prepared using voice recognition system and may have sound alike errors that may have been overlooked even with proof reading.

## 2023-12-19 NOTE — HPI
Mr. Lorenzo Nino is a 48 y.o. 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 Presented for 3-day dyspnea. Denies having fever or chills during this time. He states that he was not doing anything strenuous when his symptoms came on. His symptoms got progressively worse over the weekend and he decided to present yesterday. He has been on home IV Daptomycin. He follows with Dr. Mercado with Cardiology and his operation was performed by Dr. Haro.    In the ER, he was hypoxic requiring 6 L to maintain sats of 91-94%. Initial lactic acid 1.8. Creatinine 0.9. WBC 20k. Bedside Echo showed EF 45-50% with severe MR (2 jets, one posteriorly directed and one anteriorly directed), restricted posterior leaflet, possible tear in anterior leaflet. No pericardial effusion.    Patient was transferred to the CICU for AHRF 2/2 significant pulmonary edema 2/2 severe mitral regurgitation concerning for anterior leaflet tear vs posterior leaflet restriction.

## 2023-12-19 NOTE — CONSULTS
[facility] - Emergency Dept  Critical Care Medicine  Consult Note    Patient Name: Lorenzo Nino  MRN: [id number]  Admission Date: 12/18/2023  Hospital Length of Stay: 0 days  Code Status: Prior  Attending Physician: Santos Cui MD   Primary Care Provider: No, Primary Doctor   Principal Problem: <principal problem not specified>      Subjective:     HPI:  Mr. Nino is a 47 y/o with a history of bacterial endocarditis and severe MR s/p repair w/ porcine annuloplasty in 11/3/23 complicated by MRSA endocarditis and bacteremia who presents for dyspnea and orthopnea for the past 3 days. Pt recently had 15 day admission from 10/30-11/15 for bacterial endocarditis and MRSA bacteremia. He received 1 week of ceftaroline and is currently on 6 weeks of daptomycin (DARIELA 12/24). He followed with ID and cardiology clinic. During his previous clinic visits he was noted to have worsening dry cough. In the ED patient satting 82, requiring 5L O2, cxr with nonspecific right sided pulmonary opacities, labs with wbc 16, blood gas PCO2 32.4, HCO3 22.3. ICU was consulted for evaluation of hypoxic resp failure.       Hospital/ICU Course:  No notes on file    Past Medical History:   Diagnosis Date    Hypertension        Past Surgical History:   Procedure Laterality Date    EXCLUSION, LEFT ATRIAL APPENDAGE, OPEN, AS PART OF OPEN CHEST SURGERY Left 11/3/2023    Procedure: EXCLUSION, LEFT ATRIAL APPENDAGE, OPEN, AS PART OF OPEN CHEST SURGERY;  Surgeon: Manuel Beal MD;  Location: [facility] OR [address] [place] [address];  Service: Cardiothoracic;  Laterality: Left;    INSERTION OF INTRA-AORTIC BALLOON ASSIST DEVICE Right 11/2/2023    Procedure: INSERTION, INTRA-AORTIC BALLOON PUMP;  Surgeon: Jose Vidal MD;  Location: [facility] CATH LAB;  Service: Cardiology;  Laterality: Right;    REPAIR, MITRAL VALVE, OPEN N/A 11/3/2023    Procedure: REPAIR, MITRAL VALVE, OPEN;  Surgeon: Manuel Beal MD;  Location: [facility] OR [address] [place] [address];  Service: Cardiothoracic;   Laterality: N/A;       Review of patient's allergies indicates:  No Known Allergies    Family History    None       Tobacco Use    Smoking status: Unknown     Passive exposure: Never    Smokeless tobacco: Not on file   Substance and Sexual Activity    Alcohol use: Yes     Alcohol/week: 1.0 standard drink of alcohol     Types: 1 Cans of beer per week    Drug use: Never    Sexual activity: Not Currently     Partners: Female     Birth control/protection: None      Review of Systems   Constitutional:  Negative for chills, fatigue and fever.   HENT:  Negative for congestion and sore throat.    Respiratory:  Positive for shortness of breath. Negative for cough.         Orthopnea   Cardiovascular:  Negative for chest pain.   Gastrointestinal:  Negative for abdominal pain, constipation, diarrhea, nausea and vomiting.   Genitourinary:  Negative for dysuria and flank pain.   Musculoskeletal:  Negative for joint swelling.   Neurological:  Negative for dizziness and headaches.   Psychiatric/Behavioral:  Negative for confusion. The patient is not nervous/anxious.      Objective:     Vital Signs (Most Recent):  Temp: 98.2 °F (36.8 °C) (12/18/23 1513)  Pulse: (!) 112 (12/18/23 1730)  Resp: (!) 24 (12/18/23 1730)  BP: 115/71 (12/18/23 1730)  SpO2: (!) 90 % (12/18/23 1730) Vital Signs (24h Range):  Temp:  [98.2 °F (36.8 °C)-98.3 °F (36.8 °C)] 98.2 °F (36.8 °C)  Pulse:  [107-112] 112  Resp:  [23-39] 24  SpO2:  [82 %-93 %] 90 %  BP: (110-126)/(64-80) 115/71   Weight: 64.9 kg (143 lb)  Body mass index is 19.39 kg/m².    No intake or output data in the 24 hours ending 12/18/23 1832       Physical Exam  HENT:      Head: Normocephalic.      Nose: Nose normal.   Eyes:      Pupils: Pupils are equal, round, and reactive to light.   Cardiovascular:      Rate and Rhythm: Normal rate.      Heart sounds: Murmur heard.   Pulmonary:      Effort: Respiratory distress present.      Breath sounds: Rales present.   Abdominal:      General: Abdomen is  flat.   Musculoskeletal:         General: Normal range of motion.      Cervical back: Normal range of motion.      Right lower leg: No edema.   Skin:     General: Skin is warm.   Neurological:      General: No focal deficit present.      Mental Status: He is alert and oriented to person, place, and time.            Vents:     Lines/Drains/Airways       Peripherally Inserted Central Catheter Line  Duration             PICC Double Lumen 11/13/23 1509 right basilic 35 days              Peripheral Intravenous Line  Duration                  Peripheral IV - Single Lumen 12/18/23 1631 18 G Anterior;Left Forearm <1 day                  Significant Labs:    CBC/Anemia Profile:  Recent Labs   Lab 12/18/23  1240 12/18/23  1619   WBC 21.24* 16.06*   HGB 7.8* 9.1*   HCT 25.1* 27.8*   * 487*   MCV 85 82   RDW 18.3* 18.5*        Chemistries:  Recent Labs   Lab 12/18/23  1240 12/18/23  1619   * 131*   K 4.2 4.5   CL 98 97   CO2 20* 21*   BUN 20 22*   CREATININE 0.9 0.9   CALCIUM 9.3 9.5   ALBUMIN 2.1* 2.2*   PROT 8.1 8.5*   BILITOT 0.8 1.0   ALKPHOS 98 107   ALT 12 13   AST 22 25       All pertinent labs within the past 24 hours have been reviewed.    Significant Imaging: I have reviewed all pertinent imaging results/findings within the past 24 hours.    ABG  Recent Labs   Lab 12/18/23  1626   PH 7.445   PO2 29*   PCO2 32.4*   HCO3 22.3*   BE -2     Assessment/Plan:     Pulmonary  Acute hypoxemic respiratory failure  49 y/o with previous mitral valve replacement c/b endocarditis (MRSA) on daptomycin (DARIELA 12/24) who presents for worsening dyspnea over the past 3 days. CXR with worsening R sided opacities and leukocytosis. Differentials include severe mitral regurg vs infection.     Recommendations:  Agree with cardiology and CTS evaluation  Continue antibiotics for MRSA coverage  Recommend ID consult for antibiotic regimen as daptomycin (his current home regiment) does not cover pulmonary infection well  Diuresis per  cardiology  Follow up RIP         Critical care was time spent personally by me on the following activities: development of treatment plan with patient or surrogate and bedside caregivers, discussions with consultants, evaluation of patient's response to treatment, examination of patient, ordering and performing treatments and interventions, ordering and review of laboratory studies, ordering and review of radiographic studies, pulse oximetry, re-evaluation of patient's condition. This critical care time did not overlap with that of any other provider or involve time for any procedures.    Thank you for your consult. I will sign off. Please contact us if you have any additional questions.     Trung Miller, DO  Critical Care Medicine  [facility] - Emergency Dept

## 2023-12-19 NOTE — SUBJECTIVE & OBJECTIVE
Past Medical History:   Diagnosis Date    Hypertension        Past Surgical History:   Procedure Laterality Date    EXCLUSION, LEFT ATRIAL APPENDAGE, OPEN, AS PART OF OPEN CHEST SURGERY Left 11/3/2023    Procedure: EXCLUSION, LEFT ATRIAL APPENDAGE, OPEN, AS PART OF OPEN CHEST SURGERY;  Surgeon: Manuel Beal MD;  Location: [facility] OR [address] [place] [address];  Service: Cardiothoracic;  Laterality: Left;    INSERTION OF INTRA-AORTIC BALLOON ASSIST DEVICE Right 11/2/2023    Procedure: INSERTION, INTRA-AORTIC BALLOON PUMP;  Surgeon: Jose Vidal MD;  Location: [facility] CATH LAB;  Service: Cardiology;  Laterality: Right;    REPAIR, MITRAL VALVE, OPEN N/A 11/3/2023    Procedure: REPAIR, MITRAL VALVE, OPEN;  Surgeon: Manuel Beal MD;  Location: [facility] OR [address] [place] [address];  Service: Cardiothoracic;  Laterality: N/A;       Review of patient's allergies indicates:  No Known Allergies    Family History    None       Tobacco Use    Smoking status: Unknown     Passive exposure: Never    Smokeless tobacco: Not on file   Substance and Sexual Activity    Alcohol use: Yes     Alcohol/week: 1.0 standard drink of alcohol     Types: 1 Cans of beer per week    Drug use: Never    Sexual activity: Not Currently     Partners: Female     Birth control/protection: None      Review of Systems   Constitutional:  Negative for chills, fatigue and fever.   HENT:  Negative for congestion and sore throat.    Respiratory:  Positive for shortness of breath. Negative for cough.         Orthopnea   Cardiovascular:  Negative for chest pain.   Gastrointestinal:  Negative for abdominal pain, constipation, diarrhea, nausea and vomiting.   Genitourinary:  Negative for dysuria and flank pain.   Musculoskeletal:  Negative for joint swelling.   Neurological:  Negative for dizziness and headaches.   Psychiatric/Behavioral:  Negative for confusion. The patient is not nervous/anxious.      Objective:     Vital Signs (Most Recent):  Temp: 98.2 °F (36.8 °C) (12/18/23  1513)  Pulse: (!) 112 (12/18/23 1730)  Resp: (!) 24 (12/18/23 1730)  BP: 115/71 (12/18/23 1730)  SpO2: (!) 90 % (12/18/23 1730) Vital Signs (24h Range):  Temp:  [98.2 °F (36.8 °C)-98.3 °F (36.8 °C)] 98.2 °F (36.8 °C)  Pulse:  [107-112] 112  Resp:  [23-39] 24  SpO2:  [82 %-93 %] 90 %  BP: (110-126)/(64-80) 115/71   Weight: 64.9 kg (143 lb)  Body mass index is 19.39 kg/m².    No intake or output data in the 24 hours ending 12/18/23 1832       Physical Exam  HENT:      Head: Normocephalic.      Nose: Nose normal.   Eyes:      Pupils: Pupils are equal, round, and reactive to light.   Cardiovascular:      Rate and Rhythm: Normal rate.      Heart sounds: Murmur heard.   Pulmonary:      Effort: Respiratory distress present.      Breath sounds: Rales present.   Abdominal:      General: Abdomen is flat.   Musculoskeletal:         General: Normal range of motion.      Cervical back: Normal range of motion.      Right lower leg: No edema.   Skin:     General: Skin is warm.   Neurological:      General: No focal deficit present.      Mental Status: He is alert and oriented to person, place, and time.            Vents:     Lines/Drains/Airways       Peripherally Inserted Central Catheter Line  Duration             PICC Double Lumen 11/13/23 1509 right basilic 35 days              Peripheral Intravenous Line  Duration                  Peripheral IV - Single Lumen 12/18/23 1631 18 G Anterior;Left Forearm <1 day                  Significant Labs:    CBC/Anemia Profile:  Recent Labs   Lab 12/18/23  1240 12/18/23  1619   WBC 21.24* 16.06*   HGB 7.8* 9.1*   HCT 25.1* 27.8*   * 487*   MCV 85 82   RDW 18.3* 18.5*        Chemistries:  Recent Labs   Lab 12/18/23  1240 12/18/23  1619   * 131*   K 4.2 4.5   CL 98 97   CO2 20* 21*   BUN 20 22*   CREATININE 0.9 0.9   CALCIUM 9.3 9.5   ALBUMIN 2.1* 2.2*   PROT 8.1 8.5*   BILITOT 0.8 1.0   ALKPHOS 98 107   ALT 12 13   AST 22 25       All pertinent labs within the past 24 hours have  been reviewed.    Significant Imaging: I have reviewed all pertinent imaging results/findings within the past 24 hours.

## 2023-12-19 NOTE — ASSESSMENT & PLAN NOTE
48 y.o. 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      Plan:  -continue IV Lasix gtt at 10. Insert ritchie catheter  -pending formal echo  -Hold NEW. not needed at the moment due to respiratory failure.   -Pending ID evaluation for IV AB recs   - pending blood cx   -continue IV Vancomycin and IV Merropenem  - CTS consulted, agree on IV diursesis. Will hold IABP at the moment.

## 2023-12-19 NOTE — ASSESSMENT & PLAN NOTE
49 y/o with previous mitral valve replacement c/b endocarditis (MRSA) on daptomycin (DARIELA 12/24) who presents for worsening dyspnea over the past 3 days. CXR with worsening R sided opacities and leukocytosis. Differentials include severe mitral regurg vs infection.     Recommendations:  Agree with cardiology and CTS evaluation  Continue antibiotics for MRSA coverage  Recommend ID consult for antibiotic regimen as daptomycin (his current home regiment) does not cover pulmonary infection well  Diuresis per cardiology  Follow up RIP

## 2023-12-19 NOTE — SUBJECTIVE & OBJECTIVE
Past Medical History:   Diagnosis Date    Hypertension        Past Surgical History:   Procedure Laterality Date    EXCLUSION, LEFT ATRIAL APPENDAGE, OPEN, AS PART OF OPEN CHEST SURGERY Left 11/3/2023    Procedure: EXCLUSION, LEFT ATRIAL APPENDAGE, OPEN, AS PART OF OPEN CHEST SURGERY;  Surgeon: Manuel Beal MD;  Location: [facility] OR [address] [place] [address];  Service: Cardiothoracic;  Laterality: Left;    INSERTION OF INTRA-AORTIC BALLOON ASSIST DEVICE Right 11/2/2023    Procedure: INSERTION, INTRA-AORTIC BALLOON PUMP;  Surgeon: Jose Vidal MD;  Location: [facility] CATH LAB;  Service: Cardiology;  Laterality: Right;    REPAIR, MITRAL VALVE, OPEN N/A 11/3/2023    Procedure: REPAIR, MITRAL VALVE, OPEN;  Surgeon: Manuel Beal MD;  Location: [facility] OR [address] [place] [address];  Service: Cardiothoracic;  Laterality: N/A;       Review of patient's allergies indicates:  No Known Allergies    Medications:  Medications Prior to Admission   Medication Sig    aspirin (ECOTRIN) 325 MG EC tablet Take 1 tablet (325 mg total) by mouth once daily.    benzonatate (TESSALON) 100 MG capsule Take 1 capsule (100 mg total) by mouth 3 (three) times daily as needed for Cough.    metoprolol tartrate (LOPRESSOR) 50 MG tablet Take 1 tablet (50 mg total) by mouth 2 (two) times daily.    NIFEdipine (PROCARDIA-XL) 30 MG (OSM) 24 hr tablet Take 1 tablet (30 mg total) by mouth once daily.     Antibiotics (From admission, onward)      Start     Stop Route Frequency Ordered    12/19/23 0600  vancomycin 1,250 mg in dextrose 5 % (D5W) 250 mL IVPB (Vial-Mate)         -- IV Every 12 hours (non-standard times) 12/18/23 2035 12/18/23 2030  meropenem (MERREM) 2 g in sodium chloride 0.9% 100 mL IVPB         -- IV Every 8 hours (non-standard times) 12/18/23 1923 12/18/23 2022  vancomycin - pharmacy to dose  (vancomycin IVPB (PEDS and ADULTS))        See [facility] for full Linked Orders Report.    -- IV pharmacy to manage frequency 12/18/23 1923          Antifungals  (From admission, onward)      None          Antivirals (From admission, onward)      None             Immunization History   Administered Date(s) Administered    Hepatitis A, Adult 09/16/2005    Td - PF (ADULT) 09/16/2005       Family History    None       Social History     Socioeconomic History    Marital status: Single   Occupational History    Occupation: associated terminal  [employer]    Tobacco Use    Smoking status: Unknown     Passive exposure: Never   Substance and Sexual Activity    Alcohol use: Yes     Alcohol/week: 1.0 standard drink of alcohol     Types: 1 Cans of beer per week    Drug use: Never    Sexual activity: Not Currently     Partners: Female     Birth control/protection: None     Social Determinants of Health     Financial Resource Strain: Low Risk  (11/1/2023)    Overall Financial Resource Strain (CARDIA)     Difficulty of Paying Living Expenses: Not hard at all   Food Insecurity: No Food Insecurity (11/1/2023)    Hunger Vital Sign     Worried About Running Out of Food in the Last Year: Never true     Ran Out of Food in the Last Year: Never true   Transportation Needs: No Transportation Needs (11/1/2023)    PRAPARE - Transportation     Lack of Transportation (Medical): No     Lack of Transportation (Non-Medical): No   Physical Activity: Unknown (11/30/2023)    Exercise Vital Sign     Days of Exercise per Week: Patient declined     Minutes of Exercise per Session: 0 min   Recent Concern: Physical Activity - Inactive (11/1/2023)    Exercise Vital Sign     Days of Exercise per Week: 0 days     Minutes of Exercise per Session: 0 min   Stress: No Stress Concern Present (11/1/2023)    [employer] [place] [employer] - Occupational Stress Questionnaire     Feeling of Stress : Not at all   Social Connections: Socially Isolated (11/1/2023)    Social Connection and Isolation Panel [NHANES]     Frequency of Communication with Friends and Family: More than three times a week     Frequency of  Social Gatherings with Friends and Family: Once a week     Attends [employer] Services: Never     Active Member of Clubs or Organizations: No     Attends Club or Organization Meetings: Never     Marital Status: Never    Housing Stability: Low Risk  (11/30/2023)    Housing Stability Vital Sign     Unable to Pay for Housing in the Last Year: No     Number of Places Lived in the Last Year: 1     Unstable Housing in the Last Year: No     Review of Systems  Objective:     Vital Signs (Most Recent):  Temp: 98.5 °F (36.9 °C) (12/19/23 1501)  Pulse: 95 (12/19/23 1701)  Resp: (!) 33 (12/19/23 1701)  BP: (!) 98/53 (12/19/23 1701)  SpO2: 98 % (12/19/23 1701) Vital Signs (24h Range):  Temp:  [97.8 °F (36.6 °C)-99.9 °F (37.7 °C)] 98.5 °F (36.9 °C)  Pulse:  [] 95  Resp:  [22-47] 33  SpO2:  [88 %-100 %] 98 %  BP: ()/(50-78) 98/53     Weight: 67.1 kg (148 lb)  Body mass index is 20.07 kg/m².    Estimated Creatinine Clearance: 106 mL/min (based on SCr of 0.8 mg/dL).     Physical Exam  Vitals reviewed.   Constitutional:       Appearance: He is ill-appearing.   HENT:      Head: Normocephalic and atraumatic.   Eyes:      Conjunctiva/sclera: Conjunctivae normal.      Pupils: Pupils are equal, round, and reactive to light.   Cardiovascular:      Rate and Rhythm: Normal rate and regular rhythm.      Heart sounds: Murmur heard.   Pulmonary:      Effort: Pulmonary effort is normal.      Breath sounds: Rales present.   Abdominal:      General: Abdomen is flat.      Palpations: Abdomen is soft.   Musculoskeletal:      Right lower leg: No edema.      Left lower leg: No edema.      Comments: RUE PICC site with clean dressing   Skin:     Findings: No lesion or rash.   Neurological:      General: No focal deficit present.      Mental Status: He is oriented to person, place, and time.   Psychiatric:         Mood and Affect: Mood normal.         Behavior: Behavior normal.          Significant Labs: Blood Culture:   Recent Labs    Lab 11/10/23  0905 11/11/23  0345 11/12/23  0334 12/18/23  1619 12/18/23  1623   LABBLOO Gram stain aer bottle: Gram positive cocci in clusters resembling Staph  Results called to and read back by: Anthony Murphy RN  11/11/2023  23:32  STAPHYLOCOCCUS AUREUS  ID consult required at [facility] [facility].  For susceptibility see order #[id number]  * No growth after 5 days.  No growth after 5 days. No growth after 5 days.  No growth after 5 days. No Growth to date No Growth to date       Significant Imaging: I have reviewed all pertinent imaging results/findings within the past 24 hours.

## 2023-12-19 NOTE — ASSESSMENT & PLAN NOTE
47 yo male with hx of MRSA bacteremia c/b MV/AV IE s/p MV repair with porcine annuloplasty 11/3 (OR cx + MRSA) and embolus to R eye was recently discharged on dapto + ceftaroline who represented on 12/18 with worsening dyspnea and was admitted with acute respi failure in the setting of new severe MR and possible tear of anterior leaflet.    Of note, pt was followed by ID during recent hospital admission 10/30-11/14. Due to prolonged bacteremia pt was managed with salvage therapy with dapto/ ceftaroline. BCx cleared on 11/11.  Plan was to continue ceftaroline until 11/19 and dapto until 12/24.     In the ED pt was afebrile, tachycardic and hypoxemic. Labs remarkable for leukocytosis. RIP, flu negative. CXR with interstitial coarsening and b/l (L>R) lung opacities. Bedside Echo showed EF 45-50% with severe MR, restricted posterior leaflet, possible tear in anterior leaflet.  Started on empiric vanc/ zosyn. Then broadened with sarah. BCx 12/18 remains ngtd.

## 2023-12-19 NOTE — CONSULTS
Consult Note  Cardiothoracic Surgery    Inpatient consult to Cardiothoracic Surgery  Consult performed by: Tiago Arevalo DO  Consult ordered by: Santos Cui MD        SUBJECTIVE:     History of Present Illness:  Mr. Nino is a 48 y.o. 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 Blood cultures remained positive for MRSA perioperatively for which Infectious Disease has been following, and he is scheduled to complete a 6 week course of outpatient Daptomycin next week.  Most recently collected cultures from 11/12 were negative for growth.  Patient presents tonight with a 3 day history of progressively worsening shortness of breath and intermittent subjective low grade temps. He is unable to lay flat without severe shortness of breath. He states he thought he was developing a cold early last week with associated non-productive cough. He was seen for scheduled follow-up by his cardiologist on 12/15 and given some Tessalon at that time. TTE that day showed no significant mitral regurgitation or other overly concerning findings. Was last seen by Cardiothoracic Surgery on 12/5 and noted to be doing well at that time and lasix was discontinued. On arrival to the ED tonight, patient was noted to be hypoxic in the low 80% and tachycardic to 110s but normotensive. BNP 1640, WBC 16K, and bilateral pulmonary opacification on CXR. He is currently oxygenating well on 5L supplemental O2, hemodynamically stable, and mild-moderately short of breath during the interview.    Scheduled Meds:   furosemide (LASIX) injection  80 mg Intravenous Once    meropenem (MERREM) IVPB  2 g Intravenous Q8H     Infusions/Drips:   furosemide (LASIX) 500 mg in 50 mL infusion (conc: 10 mg/mL)       PRN Meds:Pharmacy to dose Vancomycin consult **AND** vancomycin -  pharmacy to dose    Review of patient's allergies indicates:  No Known Allergies    Past Medical History:   Diagnosis Date    Hypertension      Past Surgical History:   Procedure Laterality Date    EXCLUSION, LEFT ATRIAL APPENDAGE, OPEN, AS PART OF OPEN CHEST SURGERY Left 11/3/2023    Procedure: EXCLUSION, LEFT ATRIAL APPENDAGE, OPEN, AS PART OF OPEN CHEST SURGERY;  Surgeon: Manuel Beal MD;  Location: [facility] OR [address] [place] [address];  Service: Cardiothoracic;  Laterality: Left;    INSERTION OF INTRA-AORTIC BALLOON ASSIST DEVICE Right 11/2/2023    Procedure: INSERTION, INTRA-AORTIC BALLOON PUMP;  Surgeon: Jose Vidal MD;  Location: [facility] CATH LAB;  Service: Cardiology;  Laterality: Right;    REPAIR, MITRAL VALVE, OPEN N/A 11/3/2023    Procedure: REPAIR, MITRAL VALVE, OPEN;  Surgeon: Manuel Beal MD;  Location: [facility] OR [address] [place] [address];  Service: Cardiothoracic;  Laterality: N/A;     Family History   Problem Relation Age of Onset    Amblyopia Neg Hx     Blindness Neg Hx     Cataracts Neg Hx     Glaucoma Neg Hx     Macular degeneration Neg Hx     Retinal detachment Neg Hx     Strabismus Neg Hx      Social History     Tobacco Use    Smoking status: Unknown     Passive exposure: Never   Substance Use Topics    Alcohol use: Yes     Alcohol/week: 1.0 standard drink of alcohol     Types: 1 Cans of beer per week    Drug use: Never        Review of Systems:  Constitutional: no chills, subjective low grade temps  ENT: no nasal congestion or sore throat  Respiratory: positive for cough, dyspnea on exertion, and shortness of breath at rest  Cardiovascular: no chest pain or palpitations  Gastrointestinal: no nausea or vomiting, tolerating diet  Hematologic/Lymphatic: no easy bruising or lymphadenopathy  Musculoskeletal: no arthralgias or myalgias  Neurological: no seizures or tremors  Behavioral/Psych: no auditory or visual hallucinations    OBJECTIVE:     Vital Signs (Most Recent)  Temp: 98.2 °F (36.8 °C) (12/18/23 1513)  Pulse:  110 (12/18/23 1930)  Resp: (!) 23 (12/18/23 1930)  BP: 119/68 (12/18/23 1930)  SpO2: 96 % (12/18/23 1930)    Admission Weight: 64.9 kg (143 lb) (12/18/23 1513)   Most Recent Weight: 64.9 kg (143 lb) (12/18/23 1513)    Vital Signs Range (Last 24H):  Temp:  [98.2 °F (36.8 °C)-98.3 °F (36.8 °C)]   Pulse:  [107-114]   Resp:  [23-39]   BP: (110-126)/(64-80)   SpO2:  [82 %-96 %]     Physical Exam:  General: mild distress, sitting upright in bed  Head: normocephalic  Neck: supple, symmetrical, trachea midline  Lungs:  mildly labored breathing when conversing, no audible wheezes  Heart: tachycardic, regular rhythm  Abdomen: non-distended   Extremities: warm, well perfused  Skin: Skin color, texture, turgor normal. No rashes or lesions  Neurologic: Alert and oriented. Thought content appropriate    Laboratory:  I have reviewed all pertinent lab results within the past 24 hours.    Diagnostic Results:  Discussed ED echocardiogram with on-call cardiology fellow    ASSESSMENT/PLAN:     Mr. Nino is a 48 y.o. 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     Agree with CCU admission and aggressive diuresis. IABP if needed. Blood cultures pending. Recommend UDS to complete workup. Will continue to follow.       Tiago Arevalo DO  Cardiothoracic Surgery Fellow    CTS Attending Note:    I have personally taken the history and examined this patient and agree with the resident's note as stated above. Will follow.

## 2023-12-19 NOTE — HOSPITAL COURSE
Mr. Lorenzo Nino is a 48 y.o. 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 Presented for 3-day dyspnea. Patient was transferred to the CICU for AHRF 2/2 significant pulmonary edema 2/2 severe mitral regurgitation concerning for anterior leaflet tear vs posterior leaflet restriction.  Upon admission to the CICU on afib w/ RVR. Given 1 dose of digoxin and started on heparin gtt and diltiazem gtt. Stabilized on sinus rhythm. CTS evaluated patient and agree on continuing aggressive diuresis with lasix gtt, at the moment not indicated IABP.. S/p NEW on 12/26 with severe MR with 2 jets, including posterior jet likely secondary to tear/perforation. On IV vancomycin. CTS to follow as outpatient (Dr. Ascencio).  ID plan with  daptomycin 8mg/kg x 2 weeks and follow with PO doxycyline until new valve replaced. ID recommended intraoperative tissue cx. Optimized diuresis for volume status. Off heparin gtt, started on eliquis. Patient started on GDMT but developed FRANCESCO so GDMT were held. Renal function improved and diuretics were slowly added back on. He reported some bright red bloody bowel movements with hemoglobin trending downwards, concerning for a lower GI bleed. GI was consulted for recommendations. Colonoscopy and EGD performed while inpatient. His colonoscopy was concerning for non obstructing large tumor in the sigmoid colon with stigamata of recent bleeding. Mass was biopsied and tattoeed. Patient remained stable throughout hospital stay and on 12/8, CCU team felt patient was stable for discharge home. His medications were reviewed and reconciled prior to discharge.   All ambulatory appointments were scheduled or requested.

## 2023-12-19 NOTE — PROGRESS NOTES
"Pharmacokinetic Initial Assessment: IV Vancomycin    Assessment/Plan:    Initiate intravenous vancomycin with loading dose of 2000 mg once followed by a maintenance dose of vancomycin 1250 mg IV every twelve hours.  Desired empiric serum trough concentration is 15 to 20 mcg/mL  Draw vancomycin trough level 60 min prior to fourth dose on 12/20/23 at approximately 0500.  Pharmacy will continue to follow and monitor vancomycin.      Please contact pharmacy at extension [phone] with any questions regarding this assessment.     Thank you for the consult,   Tesha Mazariegos       Patient brief summary:  Lorenzo Nino is a 48 y.o. male initiated on antimicrobial therapy with IV Vancomycin for treatment of suspected endocarditis    Drug Allergies:   Review of patient's allergies indicates:  No Known Allergies    Actual Body Weight:   64.9 kg    Renal Function:   Estimated Creatinine Clearance: 91.1 mL/min (based on SCr of 0.9 mg/dL).,     Dialysis Method (if applicable):  N/A    CBC (last 72 hours):  Recent Labs   Lab Result Units 12/18/23  1240 12/18/23  1619   WBC K/uL 21.24* 16.06*   Hemoglobin g/dL 7.8* 9.1*   Hematocrit % 25.1* 27.8*   Platelets K/uL 497* 487*   Gran % %  --  79.5*   Lymph % %  --  9.0*   Mono % %  --  9.3   Eosinophil % %  --  0.6   Basophil % %  --  0.7   Differential Method   --  Automated       Metabolic Panel (last 72 hours):  Recent Labs   Lab Result Units 12/18/23  1240 12/18/23  1619   Sodium mmol/L 132* 131*   Potassium mmol/L 4.2 4.5   Chloride mmol/L 98 97   CO2 mmol/L 20* 21*   Glucose mg/dL 117* 156*   BUN mg/dL 20 22*   Creatinine mg/dL 0.9 0.9   Albumin g/dL 2.1* 2.2*   Total Bilirubin mg/dL 0.8 1.0   Alkaline Phosphatase U/L 98 107   AST U/L 22 25   ALT U/L 12 13       Drug levels (last 3 results):  No results for input(s): "VANCOMYCINRA", "VANCORANDOM", "VANCOMYCINPE", "VANCOPEAK", "VANCOMYCINTR", "VANCOTROUGH" in the last 72 hours.    Microbiologic Results:  Microbiology Results " (last 7 days)       Procedure Component Value Units Date/Time    Respiratory Infection Panel (PCR), Nasopharyngeal [[id number]] Collected: 12/18/23 2025    Order Status: Completed Specimen: Nasopharyngeal Swab Updated: 12/18/23 2025     Respiratory Infection Panel Source NP Swab    Narrative:      For all other respiratory sources, order [id number] -  Respiratory Viral Panel by PCR    Respiratory Infection Panel (PCR), Nasopharyngeal [[id number]] Collected: 12/18/23 2018    Order Status: Completed Specimen: Nasopharyngeal Swab Updated: 12/18/23 2018     Respiratory Infection Panel Source NP Swab    Narrative:      For all other respiratory sources, order [id number] -  Respiratory Viral Panel by PCR    Blood culture [[id number]]     Order Status: Canceled Specimen: Blood     Blood culture [[id number]]     Order Status: Canceled Specimen: Blood     Blood Culture #1 **CANNOT BE ORDERED STAT** [[id number]] Collected: 12/18/23 1623    Order Status: Sent Specimen: Blood from Peripheral, Forearm, Right Updated: 12/18/23 1737    Influenza A & B by Molecular [[id number]] Collected: 12/18/23 1628    Order Status: Completed Specimen: Nasopharyngeal Swab Updated: 12/18/23 1720     Influenza A, Molecular Negative     Influenza B, Molecular Negative     Flu A & B Source Nasal swab    Blood Culture #2 **CANNOT BE ORDERED STAT** [[id number]] Collected: 12/18/23 1619    Order Status: Sent Specimen: Blood from Peripheral, Forearm, Right Updated: 12/18/23 1641    Influenza A & B by Molecular [[id number]]     Order Status: Canceled Specimen: Nasopharyngeal Swab

## 2023-12-19 NOTE — NURSING
Nurses Note -- 4 Eyes      12/19/2023   7:01 AM      Skin assessed during: Q Shift Change      [x] No Altered Skin Integrity Present    [x]Prevention Measures Documented      [] Yes- Altered Skin Integrity Present or Discovered   [] LDA Added if Not in Epic (Describe Wound)   [] New Altered Skin Integrity was Present on Admit and Documented in LDA   [] Wound Image Taken    Wound Care Consulted? No    Attending Nurse:  ANNA Brown    Second RN/Staff Member:  ANNA Schreiber

## 2023-12-19 NOTE — PLAN OF CARE
CICU DAILY GOALS       A: Awake    RASS: Goal - RASS Goal: 0-->alert and calm  Actual - RASS ([place] Agitation-Sedation Scale): alert and calm   Restraint necessity:    B: Breath   SBT: Not intubated   C: Coordinate A & B, analgesics/sedatives   Pain: managed    SAT: Not intubated  D: Delirium   CAM-ICU: Overall CAM-ICU: Negative  E: Early(intubated/ Progressive (non-intubated) Mobility   MOVE Screen: Pass   Activity: Activity Management: Sitting at edge of bed - L2  FAS: Feeding/Nutrition   Diet order: Diet/Nutrition Received: NPO,   Fluid restriction:     Nutritional Supplement Intake: Quantity 1, Type: Boost  T: Thrombus   DVT prophylaxis: VTE Required Core Measure: Pharmacological prophylaxis initiated/maintained  H: HOB Elevation   Head of Bed (HOB) Positioning: HOB at 90 degrees  U: Ulcer Prophylaxis   GI: no  G: Glucose control   managed    S: Skin   Bundle compliance: yes   Bathing/Skin Care: bath, complete, dressed/undressed Date: 12/18  B: Bowel Function   no issues   I: Indwelling Catheters   Watson necessity:     CVC necessity: Yes   IPAD offered: Not appropriate  D: De-escalation Antibx   Yes  Plan for the day   Monitor vitals, electrolytes, intake, and output.   Family/Goals of care/Code Status   Code Status: Full Code     No acute events throughout day, VS and assessment per flow sheet, patient progressing towards goals as tolerated, plan of care reviewed with Lorenzo Nino and family, all concerns addressed, will continue to monitor.

## 2023-12-20 PROBLEM — E83.42 HYPOMAGNESEMIA: Status: ACTIVE | Noted: 2023-12-20

## 2023-12-20 LAB
ALBUMIN SERPL BCP-MCNC: 2.1 G/DL (ref 3.5–5.2)
ALLENS TEST: ABNORMAL
ALLENS TEST: ABNORMAL
ALP SERPL-CCNC: 90 U/L (ref 55–135)
ALT SERPL W/O P-5'-P-CCNC: 10 U/L (ref 10–44)
ANION GAP SERPL CALC-SCNC: 13 MMOL/L (ref 8–16)
ANION GAP SERPL CALC-SCNC: 13 MMOL/L (ref 8–16)
ANION GAP SERPL CALC-SCNC: 14 MMOL/L (ref 8–16)
APTT PPP: 31.3 SEC (ref 21–32)
APTT PPP: 36.7 SEC (ref 21–32)
APTT PPP: 38 SEC (ref 21–32)
AST SERPL-CCNC: 25 U/L (ref 10–40)
BASOPHILS # BLD AUTO: 0.08 K/UL (ref 0–0.2)
BASOPHILS NFR BLD: 0.6 % (ref 0–1.9)
BILIRUB SERPL-MCNC: 1 MG/DL (ref 0.1–1)
BUN SERPL-MCNC: 12 MG/DL (ref 6–20)
BUN SERPL-MCNC: 12 MG/DL (ref 6–20)
BUN SERPL-MCNC: 15 MG/DL (ref 6–20)
CALCIUM SERPL-MCNC: 8.8 MG/DL (ref 8.7–10.5)
CALCIUM SERPL-MCNC: 9.1 MG/DL (ref 8.7–10.5)
CALCIUM SERPL-MCNC: 9.1 MG/DL (ref 8.7–10.5)
CHLORIDE SERPL-SCNC: 91 MMOL/L (ref 95–110)
CHLORIDE SERPL-SCNC: 94 MMOL/L (ref 95–110)
CHLORIDE SERPL-SCNC: 95 MMOL/L (ref 95–110)
CO2 SERPL-SCNC: 27 MMOL/L (ref 23–29)
CO2 SERPL-SCNC: 30 MMOL/L (ref 23–29)
CO2 SERPL-SCNC: 30 MMOL/L (ref 23–29)
CREAT SERPL-MCNC: 0.8 MG/DL (ref 0.5–1.4)
DELSYS: ABNORMAL
DELSYS: ABNORMAL
DIFFERENTIAL METHOD BLD: ABNORMAL
EOSINOPHIL # BLD AUTO: 0.8 K/UL (ref 0–0.5)
EOSINOPHIL NFR BLD: 5.9 % (ref 0–8)
ERYTHROCYTE [DISTWIDTH] IN BLOOD BY AUTOMATED COUNT: 18.7 % (ref 11.5–14.5)
EST. GFR  (NO RACE VARIABLE): >60 ML/MIN/1.73 M^2
FLOW: 5
GLUCOSE SERPL-MCNC: 115 MG/DL (ref 70–110)
GLUCOSE SERPL-MCNC: 138 MG/DL (ref 70–110)
GLUCOSE SERPL-MCNC: 139 MG/DL (ref 70–110)
HCO3 UR-SCNC: 31.8 MMOL/L (ref 24–28)
HCO3 UR-SCNC: 35.8 MMOL/L (ref 24–28)
HCT VFR BLD AUTO: 22.5 % (ref 40–54)
HCT VFR BLD CALC: 25 %PCV (ref 36–54)
HGB BLD-MCNC: 7.5 G/DL (ref 14–18)
IMM GRANULOCYTES # BLD AUTO: 0.13 K/UL (ref 0–0.04)
IMM GRANULOCYTES NFR BLD AUTO: 1 % (ref 0–0.5)
LYMPHOCYTES # BLD AUTO: 1.8 K/UL (ref 1–4.8)
LYMPHOCYTES NFR BLD: 13.8 % (ref 18–48)
MAGNESIUM SERPL-MCNC: 1.9 MG/DL (ref 1.6–2.6)
MAGNESIUM SERPL-MCNC: 2 MG/DL (ref 1.6–2.6)
MAGNESIUM SERPL-MCNC: 2.4 MG/DL (ref 1.6–2.6)
MCH RBC QN AUTO: 26.8 PG (ref 27–31)
MCHC RBC AUTO-ENTMCNC: 33.3 G/DL (ref 32–36)
MCV RBC AUTO: 80 FL (ref 82–98)
MODE: ABNORMAL
MONOCYTES # BLD AUTO: 1.8 K/UL (ref 0.3–1)
MONOCYTES NFR BLD: 13.6 % (ref 4–15)
NEUTROPHILS # BLD AUTO: 8.7 K/UL (ref 1.8–7.7)
NEUTROPHILS NFR BLD: 65.1 % (ref 38–73)
NRBC BLD-RTO: 0 /100 WBC
PCO2 BLDA: 41.3 MMHG (ref 35–45)
PCO2 BLDA: 44.2 MMHG (ref 35–45)
PH SMN: 7.49 [PH] (ref 7.35–7.45)
PH SMN: 7.52 [PH] (ref 7.35–7.45)
PHOSPHATE SERPL-MCNC: 3 MG/DL (ref 2.7–4.5)
PHOSPHATE SERPL-MCNC: 3.2 MG/DL (ref 2.7–4.5)
PLATELET # BLD AUTO: 473 K/UL (ref 150–450)
PMV BLD AUTO: 10.9 FL (ref 9.2–12.9)
PO2 BLDA: 26 MMHG (ref 40–60)
PO2 BLDA: 33 MMHG (ref 40–60)
POC BE: 13 MMOL/L
POC BE: 8 MMOL/L
POC SATURATED O2: 55 % (ref 95–100)
POC SATURATED O2: 69 % (ref 95–100)
POC TCO2: 33 MMOL/L (ref 24–29)
POC TCO2: 37 MMOL/L (ref 24–29)
POTASSIUM SERPL-SCNC: 3.5 MMOL/L (ref 3.5–5.1)
POTASSIUM SERPL-SCNC: 3.6 MMOL/L (ref 3.5–5.1)
POTASSIUM SERPL-SCNC: 3.9 MMOL/L (ref 3.5–5.1)
PROT SERPL-MCNC: 7.7 G/DL (ref 6–8.4)
RBC # BLD AUTO: 2.8 M/UL (ref 4.6–6.2)
SAMPLE: ABNORMAL
SAMPLE: ABNORMAL
SITE: ABNORMAL
SITE: ABNORMAL
SODIUM SERPL-SCNC: 135 MMOL/L (ref 136–145)
SODIUM SERPL-SCNC: 135 MMOL/L (ref 136–145)
SODIUM SERPL-SCNC: 137 MMOL/L (ref 136–145)
SP02: 96
VANCOMYCIN TROUGH SERPL-MCNC: 26.2 UG/ML (ref 10–22)
WBC # BLD AUTO: 13.31 K/UL (ref 3.9–12.7)

## 2023-12-20 PROCEDURE — 80202 ASSAY OF VANCOMYCIN: CPT | Performed by: INTERNAL MEDICINE

## 2023-12-20 PROCEDURE — 63600175 PHARM REV CODE 636 W HCPCS: Performed by: STUDENT IN AN ORGANIZED HEALTH CARE EDUCATION/TRAINING PROGRAM

## 2023-12-20 PROCEDURE — 94761 N-INVAS EAR/PLS OXIMETRY MLT: CPT

## 2023-12-20 PROCEDURE — 25000003 PHARM REV CODE 250

## 2023-12-20 PROCEDURE — 20000000 HC ICU ROOM

## 2023-12-20 PROCEDURE — 27000221 HC OXYGEN, UP TO 24 HOURS

## 2023-12-20 PROCEDURE — 99291 CRITICAL CARE FIRST HOUR: CPT | Mod: ,,, | Performed by: INTERNAL MEDICINE

## 2023-12-20 PROCEDURE — 84100 ASSAY OF PHOSPHORUS: CPT | Mod: 91 | Performed by: INTERNAL MEDICINE

## 2023-12-20 PROCEDURE — 85730 THROMBOPLASTIN TIME PARTIAL: CPT | Mod: 91 | Performed by: INTERNAL MEDICINE

## 2023-12-20 PROCEDURE — 85730 THROMBOPLASTIN TIME PARTIAL: CPT | Performed by: STUDENT IN AN ORGANIZED HEALTH CARE EDUCATION/TRAINING PROGRAM

## 2023-12-20 PROCEDURE — 25000003 PHARM REV CODE 250: Performed by: STUDENT IN AN ORGANIZED HEALTH CARE EDUCATION/TRAINING PROGRAM

## 2023-12-20 PROCEDURE — 80048 BASIC METABOLIC PNL TOTAL CA: CPT | Mod: XB | Performed by: INTERNAL MEDICINE

## 2023-12-20 PROCEDURE — 99900035 HC TECH TIME PER 15 MIN (STAT)

## 2023-12-20 PROCEDURE — 63600175 PHARM REV CODE 636 W HCPCS

## 2023-12-20 PROCEDURE — 82803 BLOOD GASES ANY COMBINATION: CPT

## 2023-12-20 PROCEDURE — 25000003 PHARM REV CODE 250: Performed by: INTERNAL MEDICINE

## 2023-12-20 PROCEDURE — 85025 COMPLETE CBC W/AUTO DIFF WBC: CPT | Performed by: INTERNAL MEDICINE

## 2023-12-20 PROCEDURE — 99233 SBSQ HOSP IP/OBS HIGH 50: CPT | Mod: ,,, | Performed by: INTERNAL MEDICINE

## 2023-12-20 PROCEDURE — 83735 ASSAY OF MAGNESIUM: CPT | Performed by: INTERNAL MEDICINE

## 2023-12-20 PROCEDURE — 80053 COMPREHEN METABOLIC PANEL: CPT | Performed by: INTERNAL MEDICINE

## 2023-12-20 PROCEDURE — 63600175 PHARM REV CODE 636 W HCPCS: Performed by: INTERNAL MEDICINE

## 2023-12-20 RX ORDER — MAGNESIUM SULFATE HEPTAHYDRATE 40 MG/ML
2 INJECTION, SOLUTION INTRAVENOUS ONCE
Status: COMPLETED | OUTPATIENT
Start: 2023-12-20 | End: 2023-12-20

## 2023-12-20 RX ORDER — SODIUM,POTASSIUM PHOSPHATES 280-250MG
2 POWDER IN PACKET (EA) ORAL
Status: DISCONTINUED | OUTPATIENT
Start: 2023-12-20 | End: 2023-12-30

## 2023-12-20 RX ORDER — LANOLIN ALCOHOL/MO/W.PET/CERES
800 CREAM (GRAM) TOPICAL
Status: DISCONTINUED | OUTPATIENT
Start: 2023-12-20 | End: 2023-12-30

## 2023-12-20 RX ORDER — POTASSIUM CHLORIDE 20 MEQ/1
40 TABLET, EXTENDED RELEASE ORAL
Status: DISCONTINUED | OUTPATIENT
Start: 2023-12-20 | End: 2023-12-30

## 2023-12-20 RX ORDER — POTASSIUM CHLORIDE 20 MEQ/1
40 TABLET, EXTENDED RELEASE ORAL ONCE
Status: COMPLETED | OUTPATIENT
Start: 2023-12-20 | End: 2023-12-20

## 2023-12-20 RX ORDER — POTASSIUM CHLORIDE 20 MEQ/1
60 TABLET, EXTENDED RELEASE ORAL ONCE
Status: COMPLETED | OUTPATIENT
Start: 2023-12-20 | End: 2023-12-20

## 2023-12-20 RX ORDER — POTASSIUM CHLORIDE 20 MEQ/1
60 TABLET, EXTENDED RELEASE ORAL
Status: DISCONTINUED | OUTPATIENT
Start: 2023-12-20 | End: 2023-12-30

## 2023-12-20 RX ADMIN — DILTIAZEM HYDROCHLORIDE 60 MG: 60 TABLET, FILM COATED ORAL at 10:12

## 2023-12-20 RX ADMIN — DILTIAZEM HYDROCHLORIDE 60 MG: 60 TABLET, FILM COATED ORAL at 01:12

## 2023-12-20 RX ADMIN — FUROSEMIDE 10 MG/HR: 10 INJECTION, SOLUTION INTRAMUSCULAR; INTRAVENOUS at 06:12

## 2023-12-20 RX ADMIN — DILTIAZEM HYDROCHLORIDE 60 MG: 60 TABLET, FILM COATED ORAL at 05:12

## 2023-12-20 RX ADMIN — MAGNESIUM SULFATE HEPTAHYDRATE 2 G: 40 INJECTION, SOLUTION INTRAVENOUS at 01:12

## 2023-12-20 RX ADMIN — POTASSIUM CHLORIDE 40 MEQ: 1500 TABLET, EXTENDED RELEASE ORAL at 11:12

## 2023-12-20 RX ADMIN — POTASSIUM CHLORIDE 60 MEQ: 1500 TABLET, EXTENDED RELEASE ORAL at 07:12

## 2023-12-20 RX ADMIN — VANCOMYCIN HYDROCHLORIDE 1000 MG: 1 INJECTION, POWDER, LYOPHILIZED, FOR SOLUTION INTRAVENOUS at 06:12

## 2023-12-20 RX ADMIN — POTASSIUM CHLORIDE 60 MEQ: 1500 TABLET, EXTENDED RELEASE ORAL at 03:12

## 2023-12-20 RX ADMIN — MEROPENEM 2 G: 1 INJECTION INTRAVENOUS at 07:12

## 2023-12-20 RX ADMIN — MEROPENEM 2 G: 1 INJECTION INTRAVENOUS at 03:12

## 2023-12-20 RX ADMIN — VANCOMYCIN HYDROCHLORIDE 1250 MG: 1.25 INJECTION, POWDER, LYOPHILIZED, FOR SOLUTION INTRAVENOUS at 05:12

## 2023-12-20 RX ADMIN — MEROPENEM 2 G: 1 INJECTION INTRAVENOUS at 11:12

## 2023-12-20 NOTE — PLAN OF CARE
Cardiac ICU Care Plan    POC reviewed with Lorenzo Nino and family. Questions and concerns addressed. Pt progressing toward goals. Will continue to monitor. See below and flowsheets for full assessment and VS info.       Neuro:  [place] Coma Scale  Best Eye Response: 4-->(E4) spontaneous  Best Motor Response: 6-->(M6) obeys commands  Best Verbal Response: 5-->(V5) oriented  [place] Coma Scale Score: 15  Assessment Qualifiers: patient not sedated/intubated  Pupil PERRLA: yes    24 hr Temp:  [97.8 °F (36.6 °C)-98.9 °F (37.2 °C)]      CV:  Rhythm: atrial rhythm   DVT prophylaxis: VTE Required Core Measure: Pharmacological prophylaxis initiated/maintained                            Pulses  Right Radial Pulse: 1+ (weak)  Left Radial Pulse: 1+ (weak)  Right Dorsalis Pedis Pulse: 1+ (weak)  Left Dorsalis Pedis Pulse: 1+ (weak)  Right Posterior Tibial Pulse: 1+ (weak)  Left Posterior Tibial Pulse: 1+ (weak)    Resp:  Flow (L/min): 6       GI/:  GI prophylaxis:   Diet/Nutrition Received: NPO  Last Bowel Movement: 12/19/23      Intake/Output Summary (Last 24 hours) at 12/20/2023 0637  Last data filed at 12/20/2023 0601  Gross per 24 hour   Intake 1487.24 ml   Output 3475 ml   Net -1987.76 ml        Nutritional Supplement Intake: Quantity none, Type: Boost    Labs/Accuchecks:  Recent Labs   Lab 12/18/23  1619 12/18/23  2201 12/19/23  0310 12/19/23  2039 12/20/23  0205   WBC 16.06*  --  14.88*  --  13.31*   RBC 3.38*  --  2.87*  --  2.80*   HGB 9.1*  --  7.5*  --  7.5*   HCT 27.8*   < > 22.6* 25* 22.5*   *  --  444  --  473*    < > = values in this interval not displayed.      Recent Labs   Lab 12/18/23  2158 12/19/23  0500 12/19/23  1131 12/20/23  0205   INR 1.1  --   --   --    APTT 33.7* 35.4* 33.2* 36.7*      Recent Labs     12/20/23  0205   *   K 3.6   CO2 27   CL 95   BUN 15   CREATININE 0.8   ALKPHOS 90   ALT 10   AST 25   BILITOT 1.0       Recent Labs   Lab 12/18/23  1240 12/18/23  1619   CPK 83  --     TROPONINI  --  0.060*      Recent Labs     12/18/23  2201 12/19/23  0804 12/19/23  2039   PH 7.491*  7.491* 7.477*  7.477* 7.503*   PCO2 32.1*  32.1* 40.2  40.2 36.2   PO2 29*  29* 31*  31* 30*   HCO3 24.5  24.5 29.7*  29.7* 28.4*   POCSATURATED 61  61 65  65 65   BE 1  1 6*  6* 5*       Electrolytes: Electrolytes replaced  Accuchecks: none    Gtts/LDAs:   furosemide (LASIX) 500 mg in 50 mL infusion (conc: 10 mg/mL) 10 mg/hr (12/20/23 0601)    heparin (porcine) in D5W 18 Units/kg/hr (12/20/23 0601)       Lines/Drains/Airways       Peripherally Inserted Central Catheter Line  Duration             PICC Double Lumen 11/13/23 1509 right basilic 36 days              Peripheral Intravenous Line  Duration                  Peripheral IV - Single Lumen 12/18/23 1631 18 G Anterior;Left Forearm 1 day         Peripheral IV - Single Lumen 12/18/23 2300 20 G Left Forearm 1 day                    Skin/Wounds  Bathing/Skin Care: bath, complete (12/20/23 0601)  Wounds: No  Wound care consulted: No

## 2023-12-20 NOTE — ASSESSMENT & PLAN NOTE
47 yo male with hx of MRSA bacteremia c/b MV/AV IE s/p MV repair with porcine annuloplasty 11/3 (OR cx + MRSA) and embolus to R eye was recently discharged on dapto (EOT 12/24) + ceftaroline (EOT 11/19)  represented on 12/18 with worsening dyspnea x 3-4 days and was admitted with acute respi failure in the setting of new severe MR and possible tear of anterior leaflet. Reports a dry cough and chills, but denies fevers or sweats, sick contacts. RIP, flu negative. CXR with interstitial coarsening and b/l (L>R) lung opacities. Bedside Echo showed EF 45-50% with severe MR, restricted posterior leaflet, possible tear in anterior leaflet.  Started on empiric vanc/ zosyn. Then broadened with sarah. BCx 12/18 remains ngtd.    Respi failure likely due to pulm edema and less likely multifocal pna, but agree with empiric abx pending workup and clinical improvement.     Recommendations:  --agree with vanc + sarah for now pending cultures  --Pharm to dose for goal trough 15-20.  End date 12/24.  --f/u RCx , BCx

## 2023-12-20 NOTE — ASSESSMENT & PLAN NOTE
S/p afib with RVR once admitted to the CCU. Started on IV digoxin, heparin gtt and diltiazem gtt. On sinus rhythm. Will de-escalate rhythm medications as tolerated.     - continue heparin gtt  - continue PO Diltiazem 60mg TID   - monitor telemetry

## 2023-12-20 NOTE — PROGRESS NOTES
Pharmacokinetic Assessment Follow Up: IV Vancomycin    Vancomycin serum concentration assessment(s):  Vancomycin trough concentration drawn ~3 hours early resulted above therapeutic range at 26.2 mcg/ml  Renal function stable this admission    Vancomycin Regimen Plan:   Decrease regimen to vancomycin 1000 mg IV q12H  Obtain trough prior to fourth dose: 12/22 @ 0400  Continue to monitor RF and make adjustments as needed    Drug levels (last 3 results):  Recent Labs   Lab Result Units 12/18/23  2158 12/20/23  0205   Vancomycin, Random ug/mL 33.6  --    Vancomycin-Trough ug/mL  --  26.2*         Pharmacy will continue to follow and monitor vancomycin.    Thank you for the consult,   Zoila Mabry, PharmD, BCPS  Cardiology Clinical Pharmacy Specialist  Ext. [phone]

## 2023-12-20 NOTE — PROGRESS NOTES
[facility]  Infectious Disease  Progress Note    Patient Name: Lorenzo Nino  MRN: [id number]  Admission Date: 12/18/2023  Length of Stay: 2 days  Attending Physician: Van Wu MD  Primary Care Provider: No, Primary Doctor    Isolation Status: No active isolations  Assessment/Plan:      Pulmonary  Acute hypoxemic respiratory failure  See endocarditis.       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 Reports a dry cough and chills, but denies fevers or sweats, sick contacts. RIP, flu negative. CXR with interstitial coarsening and b/l (L>R) lung opacities. Bedside Echo showed EF 45-50% with severe MR, restricted posterior leaflet, possible tear in anterior leaflet.  Started on empiric vanc/ zosyn. Then broadened with sarah. BCx 12/18 remains ngtd.    Respi failure likely due to pulm edema and less likely multifocal pna, but agree with empiric abx pending workup and clinical improvement.     Recommendations:  --agree with vanc + sarah for now pending cultures  --Pharm to dose for goal trough 15-20.  End date 12/24.  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 I will follow-up with patient. Please contact us if you have any additional questions.    Nikia Cantrell MD  Infectious Disease  [facility]    Subjective:     Principal Problem:MRSA bacteremia    HPI: Mr. Nino is a  47 yo male with hx of MRSA bacteremia c/b MV/AV IE  "s/p MV repair with porcine annuloplasty 11/3 (OR cx + MRSA) and embolus to R eye was recently discharged on dapto + ceftaroline who represented on 12/18 with worsening dyspnea x 3-4 days and was admitted with acute respi failure in the setting of new severe MR and possible tear of anterior leaflet. Reports a dry cough and chills, but denies fevers or sweats, sick contacts.    Of note, pt was followed by ID during recent hospital admission 10/30-11/14. Due to prolonged bacteremia pt was managed with salvage therapy with dapto/ ceftaroline. BCx cleared on 11/11.  Plan was to continue ceftaroline until 11/19 and dapto until 12/24.     In the ED pt was afebrile, tachycardic and hypoxemic. Labs remarkable for leukocytosis. RIP, flu negative. CXR with interstitial coarsening and b/l (L>R) lung opacities. Bedside Echo showed EF 45-50% with severe MR, restricted posterior leaflet, possible tear in anterior leaflet.  Started on empiric vanc/ zosyn. Then broadened with sarah. BCx 12/18 remains ngtd.     ID consulted for: "Antibiotics recommendations. Patient on home IV daptomycin ."  Interval History: Remained afebrile overnight. O2 requirements downtrending.     Review of Systems   Constitutional:  Positive for fatigue. Negative for chills and fever.   Respiratory:  Positive for cough and shortness of breath.    Gastrointestinal:  Negative for abdominal pain.   Musculoskeletal:  Negative for arthralgias and back pain.   All other systems reviewed and are negative.    Objective:     Vital Signs (Most Recent):  Temp: 98.2 °F (36.8 °C) (12/20/23 1101)  Pulse: 93 (12/20/23 1200)  Resp: (!) 25 (12/20/23 1200)  BP: 106/61 (12/20/23 1200)  SpO2: (!) 94 % (12/20/23 1200) Vital Signs (24h Range):  Temp:  [98.1 °F (36.7 °C)-98.9 °F (37.2 °C)] 98.2 °F (36.8 °C)  Pulse:  [] 93  Resp:  [20-41] 25  SpO2:  [91 %-100 %] 94 %  BP: ()/(50-70) 106/61     Weight: 67.1 kg (148 lb)  Body mass index is 20.07 kg/m².    Estimated " Creatinine Clearance: 106 mL/min (based on SCr of 0.8 mg/dL).     Physical Exam  Vitals reviewed.   Constitutional:       Appearance: He is ill-appearing.   HENT:      Head: Normocephalic and atraumatic.   Eyes:      Conjunctiva/sclera: Conjunctivae normal.      Pupils: Pupils are equal, round, and reactive to light.   Cardiovascular:      Rate and Rhythm: Normal rate and regular rhythm.      Heart sounds: Murmur heard.   Pulmonary:      Effort: Pulmonary effort is normal.      Breath sounds: Rales present.   Abdominal:      General: Abdomen is flat.      Palpations: Abdomen is soft.   Musculoskeletal:      Right lower leg: No edema.      Left lower leg: No edema.      Comments: RUE PICC site with clean dressing   Skin:     Findings: No lesion or rash.   Neurological:      General: No focal deficit present.      Mental Status: He is oriented to person, place, and time.   Psychiatric:         Mood and Affect: Mood normal.         Behavior: Behavior normal.          Significant Labs: Blood Culture:   Recent Labs   Lab 11/10/23  0905 11/11/23  0345 11/12/23  0334 12/18/23  1619 12/18/23  1623   LABBLOO Gram stain aer bottle: Gram positive cocci in clusters resembling Staph  Results called to and read back by: Anthony Murphy RN  11/11/2023  23:32  STAPHYLOCOCCUS AUREUS  ID consult required at [facility] and [facility].  For susceptibility see order #[id number]  * No growth after 5 days.  No growth after 5 days. No growth after 5 days.  No growth after 5 days. No Growth to date  No Growth to date No Growth to date  No Growth to date     Respiratory Culture:   Recent Labs   Lab 12/19/23  1506   GSRESP >10 epithelial cells per low power field  Many WBC's  Moderate yeast  Moderate Gram positive rods  Rare Gram positive cocci   RESPIRATORYC Insufficient incubation, culture in progress       Significant Imaging: I have reviewed all pertinent imaging results/findings within the past 24  hours.    Critical care time spent on the evaluation and treatment of severe organ dysfunction, review of pertinent labs and imaging studies, discussions with consulting providers and discussions with patient/family: 30 minutes.

## 2023-12-20 NOTE — SUBJECTIVE & OBJECTIVE
Interval History: Remained afebrile overnight. O2 requirements downtrending.     Review of Systems   Constitutional:  Positive for fatigue. Negative for chills and fever.   Respiratory:  Positive for cough and shortness of breath.    Gastrointestinal:  Negative for abdominal pain.   Musculoskeletal:  Negative for arthralgias and back pain.   All other systems reviewed and are negative.    Objective:     Vital Signs (Most Recent):  Temp: 98.2 °F (36.8 °C) (12/20/23 1101)  Pulse: 93 (12/20/23 1200)  Resp: (!) 25 (12/20/23 1200)  BP: 106/61 (12/20/23 1200)  SpO2: (!) 94 % (12/20/23 1200) Vital Signs (24h Range):  Temp:  [98.1 °F (36.7 °C)-98.9 °F (37.2 °C)] 98.2 °F (36.8 °C)  Pulse:  [] 93  Resp:  [20-41] 25  SpO2:  [91 %-100 %] 94 %  BP: ()/(50-70) 106/61     Weight: 67.1 kg (148 lb)  Body mass index is 20.07 kg/m².    Estimated Creatinine Clearance: 106 mL/min (based on SCr of 0.8 mg/dL).     Physical Exam  Vitals reviewed.   Constitutional:       Appearance: He is ill-appearing.   HENT:      Head: Normocephalic and atraumatic.   Eyes:      Conjunctiva/sclera: Conjunctivae normal.      Pupils: Pupils are equal, round, and reactive to light.   Cardiovascular:      Rate and Rhythm: Normal rate and regular rhythm.      Heart sounds: Murmur heard.   Pulmonary:      Effort: Pulmonary effort is normal.      Breath sounds: Rales present.   Abdominal:      General: Abdomen is flat.      Palpations: Abdomen is soft.   Musculoskeletal:      Right lower leg: No edema.      Left lower leg: No edema.      Comments: RUE PICC site with clean dressing   Skin:     Findings: No lesion or rash.   Neurological:      General: No focal deficit present.      Mental Status: He is oriented to person, place, and time.   Psychiatric:         Mood and Affect: Mood normal.         Behavior: Behavior normal.          Significant Labs: Blood Culture:   Recent Labs   Lab 11/10/23  0905 11/11/23  0345 11/12/23  0334 12/18/23  4711  12/18/23  1623   LABBLOO Gram stain aer bottle: Gram positive cocci in clusters resembling Staph  Results called to and read back by: Anthony Murphy RN  11/11/2023  23:32  STAPHYLOCOCCUS AUREUS  ID consult required at [facility] and [facility].  For susceptibility see order #[id number]  * No growth after 5 days.  No growth after 5 days. No growth after 5 days.  No growth after 5 days. No Growth to date  No Growth to date No Growth to date  No Growth to date     Respiratory Culture:   Recent Labs   Lab 12/19/23  1506   GSRESP >10 epithelial cells per low power field  Many WBC's  Moderate yeast  Moderate Gram positive rods  Rare Gram positive cocci   RESPIRATORYC Insufficient incubation, culture in progress       Significant Imaging: I have reviewed all pertinent imaging results/findings within the past 24 hours.

## 2023-12-20 NOTE — ASSESSMENT & PLAN NOTE
Patient with Hypercapnic Respiratory failure which is Acute.  he is not on home oxygen. Supplemental oxygen was provided with 10L NC.     Signs/symptoms of respiratory failure include- tachypnea, increased work of breathing, respiratory distress, and wheezing. Contributing diagnoses includes - CHF and Pleural effusion Labs and images were reviewed. Patient Has recent ABG, which has been reviewed.     - monitor respiratory status  - decreased O2 requirements to 5L NC  - hold NEW in the setting of respiratory failure

## 2023-12-20 NOTE — SUBJECTIVE & OBJECTIVE
Interval History: EKG with Aflutter with PVCs. Patient to have daily EKGs to evaluate flutter. ID evaluated and recommend continuing Abx pending blood cultures. TTE with severe MR and severe LA dilation. NEW not needed at the moment. Replaced K and Mag.     Review of Systems   Constitutional: Negative for chills and fever.   Cardiovascular:  Negative for chest pain, cyanosis, orthopnea and syncope.   Respiratory:  Positive for shortness of breath and wheezing. Negative for hemoptysis.    Skin:  Positive for dry skin. Negative for rash.   Genitourinary:  Negative for dysuria and hematuria.     Objective:     Vital Signs (Most Recent):  Temp: 98.2 °F (36.8 °C) (12/20/23 1101)  Pulse: 93 (12/20/23 1300)  Resp: (!) 28 (12/20/23 1300)  BP: 104/61 (12/20/23 1300)  SpO2: 100 % (12/20/23 1300) Vital Signs (24h Range):  Temp:  [98.1 °F (36.7 °C)-98.9 °F (37.2 °C)] 98.2 °F (36.8 °C)  Pulse:  [] 93  Resp:  [20-41] 28  SpO2:  [91 %-100 %] 100 %  BP: ()/(50-70) 104/61     Weight: 67.1 kg (148 lb)  Body mass index is 20.07 kg/m².     SpO2: 100 %         Intake/Output Summary (Last 24 hours) at 12/20/2023 1331  Last data filed at 12/20/2023 1300  Gross per 24 hour   Intake 1690.27 ml   Output 4525 ml   Net -2834.73 ml       Lines/Drains/Airways       Peripherally Inserted Central Catheter Line  Duration             PICC Double Lumen 11/13/23 1509 right basilic 36 days              Peripheral Intravenous Line  Duration                  Peripheral IV - Single Lumen 12/18/23 1631 18 G Anterior;Left Forearm 1 day         Peripheral IV - Single Lumen 12/18/23 2300 20 G Left Forearm 1 day                       Physical Exam  Vitals and nursing note reviewed.   Cardiovascular:      Rate and Rhythm: Normal rate and regular rhythm.      Pulses: Normal pulses.      Heart sounds: Murmur (holosystolic mitral murmur) heard.   Pulmonary:      Effort: Respiratory distress present.      Breath sounds: Wheezing present.   Abdominal:       General: Bowel sounds are normal.   Musculoskeletal:         General: No swelling.      Right lower leg: No edema.      Left lower leg: No edema.   Skin:     Capillary Refill: Capillary refill takes 2 to 3 seconds.   Neurological:      Mental Status: He is alert and oriented to person, place, and time.            Significant Labs: All pertinent lab results from the last 24 hours have been reviewed.    Significant Imaging: Echocardiogram: Transthoracic echo (TTE) complete (Cupid Only):   Results for orders placed or performed during the hospital encounter of 12/18/23   Echo   Result Value Ref Range    BSA 1.85 m2    LVOT stroke volume 92.48 cm3    LVIDd 5.11 3.5 - 6.0 cm    LV Systolic Volume 31.52 mL    LV Systolic Volume Index 16.9 mL/m2    LVIDs 2.87 2.1 - 4.0 cm    LV Diastolic Volume 124.23 mL    LV Diastolic Volume Index 66.43 mL/m2    IVS 0.80 0.6 - 1.1 cm    LVOT diameter 2.18 cm    LVOT area 3.7 cm2    FS 44 28 - 44 %    Left Ventricle Relative Wall Thickness 0.34 cm    Posterior Wall 0.87 0.6 - 1.1 cm    LV mass 148.89 g    LV Mass Index 80 g/m2    MV Peak E Jabier 1.86 m/s    TDI LATERAL 0.11 m/s    TDI SEPTAL 0.07 m/s    E/E' ratio 20.67 m/s    MV Peak A Jabier 0.79 m/s    TR Max Jabier 3.02 m/s    E/A ratio 2.35     E wave deceleration time 246.91 msec    LV SEPTAL E/E' RATIO 26.57 m/s    LA Volume Index 85.2 mL/m2    LV LATERAL E/E' RATIO 16.91 m/s    LA volume 159.32 cm3    LVOT peak jabier 1.51 m/s    RVDD 3.76 cm    TAPSE 1.12 cm    LA size 5.13 cm    Left Atrium Minor Axis 7.28 cm    Left Atrium Major Axis 6.69 cm    LA volume (mod) 139.35 cm3    LA WIDTH 5.24 cm    LA Volume Index (Mod) 74.5 mL/m2    RA Major Axis 5.45 cm    RA Width 3.20 cm    AV mean gradient 7 mmHg    AV peak gradient 12 mmHg    Ao peak jabier 1.76 m/s    Ao VTI 29.53 cm    LVOT peak VTI 24.79 cm    AV valve area 3.13 cm²    AV Velocity Ratio 0.86     AV index (prosthetic) 0.84     FATUMA by Velocity Ratio 3.20 cm²    Mr max jabier 0.05 m/s    MV  mean gradient 6 mmHg    MV peak gradient 20 mmHg    MV stenosis pressure 1/2 time 71.60 ms    MV valve area p 1/2 method 3.07 cm2    MV valve area by continuity eq 2.16 cm2    MV VTI 42.87 cm    Triscuspid Valve Regurgitation Peak Gradient 36 mmHg    Sinus 3.19 cm    STJ 2.75 cm    Ascending aorta 3.28 cm    Mean e' 0.09 m/s    ZLVIDS -0.90     ZLVIDD -0.20     TV resting pulmonary artery pressure 51 mmHg    RV TB RVSP 18 mmHg    Est. RA pres 15 mmHg    Mitral Valve Heart Rate 88 bpm    Narrative      Left Ventricle: The left ventricle is normal in size. Normal wall   thickness. Regional wall motion abnormalities present. There is low normal   systolic function with a visually estimated ejection fraction of 50 - 55%.   There is normal diastolic function.    Right Ventricle: Normal right ventricular cavity size. Wall thickness   is normal. Right ventricle wall motion  is normal. Systolic function is   normal.    Th left atrium is severely dilated.    Mitral Valve: The mitral valve is repaired with a bovine pericardial   annuloplasty band. There is flail motion of a small part of valvular   tissue, perhaps from the posterior leaflet, that leads to eccentric MR   directed somewhat laterally. There is severe regurgitation.    Tricuspid Valve: There is mild regurgitation.    Pulmonary Artery: The estimated pulmonary artery systolic pressure is   51 mmHg.    IVC/SVC: Elevated venous pressure at 15 mmHg.

## 2023-12-20 NOTE — PROGRESS NOTES
Pharmacokinetic Assessment Follow Up: IV Vancomycin    Vancomycin serum concentration assessment(s):    The trough level was drawn correctly and can be used to guide therapy at this time. The measurement is above the desired definitive target range of 15 to 20 mcg/mL.    Vancomycin Regimen Plan:    Change regimen to Vancomycin 1000 mg IV every 12 hours with next serum trough concentration measured at 0500 prior to 2nd dose on 12/21    Drug levels (last 3 results):  Recent Labs   Lab Result Units 12/18/23  2158 12/20/23  0205   Vancomycin, Random ug/mL 33.6  --    Vancomycin-Trough ug/mL  --  26.2*       Pharmacy will continue to follow and monitor vancomycin.    Please contact pharmacy at extension [phone] for questions regarding this assessment.    Thank you for the consult,   Cheyanne Rebolledo       Patient brief summary:  Lorenzo Nino is a 48 y.o. male initiated on antimicrobial therapy with IV Vancomycin for treatment of endocarditis    The patient's current regimen is vancomycin IV 1250mg q12h    Drug Allergies:   Review of patient's allergies indicates:  No Known Allergies    Actual Body Weight:   67.1kg    Renal Function:   Estimated Creatinine Clearance: 106 mL/min (based on SCr of 0.8 mg/dL).,     Dialysis Method (if applicable):  N/A    CBC (last 72 hours):  Recent Labs   Lab Result Units 12/18/23  1240 12/18/23  1619 12/19/23  0310 12/20/23  0205   WBC K/uL 21.24* 16.06* 14.88* 13.31*   Hemoglobin g/dL 7.8* 9.1* 7.5* 7.5*   Hematocrit % 25.1* 27.8* 22.6* 22.5*   Platelets K/uL 497* 487* 444 473*   Gran % %  --  79.5* 76.3* 65.1   Lymph % %  --  9.0* 9.5* 13.8*   Mono % %  --  9.3 12.0 13.6   Eosinophil % %  --  0.6 0.7 5.9   Basophil % %  --  0.7 0.7 0.6   Differential Method   --  Automated Automated Automated       Metabolic Panel (last 72 hours):  Recent Labs   Lab Result Units 12/18/23  1240 12/18/23  1619 12/18/23  2245 12/19/23  0310 12/19/23  1131 12/19/23  1515 12/19/23  2038 12/20/23  0205    Sodium mmol/L 132* 131*  --  134* 135* 134* 134* 135*   Potassium mmol/L 4.2 4.5  --  3.3* 3.2* 3.3* 3.7 3.6   Chloride mmol/L 98 97  --  96 93* 95 95 95   CO2 mmol/L 20* 21*  --  26 27 25 27 27   Glucose mg/dL 117* 156*  --  159* 150* 130* 136* 138*   BUN mg/dL 20 22*  --  18 16 15 15 15   Creatinine mg/dL 0.9 0.9  --  1.0 0.9 0.8 0.9 0.8   Creatinine, Urine mg/dL  --   --  17.0*  --   --   --   --   --    Albumin g/dL 2.1* 2.2*  --  2.0*  --   --   --  2.1*   Total Bilirubin mg/dL 0.8 1.0  --  1.0  --   --   --  1.0   Alkaline Phosphatase U/L 98 107  --  91  --   --   --  90   AST U/L 22 25  --  25  --   --   --  25   ALT U/L 12 13  --  11  --   --   --  10   Magnesium mg/dL  --   --   --  1.6 1.9  --  2.2 2.0   Phosphorus mg/dL  --   --   --  5.0*  --   --   --  3.2       Vancomycin Administrations:  vancomycin given in the last 96 hours                     vancomycin 1,250 mg in dextrose 5 % (D5W) 250 mL IVPB (Vial-Mate) (mg) 1,250 mg New Bag 12/20/23 0507      Restarted 12/19/23 1840     1,250 mg New Bag  1718     1,250 mg New Bag  0653    vancomycin 2 g in dextrose 5 % 500 mL IVPB (mg) 2,000 mg New Bag 12/18/23 1752                    Microbiologic Results:  Microbiology Results (last 7 days)       Procedure Component Value Units Date/Time    Culture, Respiratory with Gram Stain [[id number]] Collected: 12/19/23 1506    Order Status: Completed Specimen: Respiratory from Sputum, Expectorated Updated: 12/19/23 7157     Gram Stain (Respiratory) >10 epithelial cells per low power field     Gram Stain (Respiratory) Many WBC's     Gram Stain (Respiratory) Moderate yeast     Gram Stain (Respiratory) Moderate Gram positive rods     Gram Stain (Respiratory) Rare Gram positive cocci    Blood Culture #2 **CANNOT BE ORDERED STAT** [[id number]] Collected: 12/18/23 1619    Order Status: Completed Specimen: Blood from Peripheral, Forearm, Right Updated: 12/19/23 1812     Blood Culture, Routine No Growth to date      No  Growth to date    Blood Culture #1 **CANNOT BE ORDERED STAT** [[id number]] Collected: 12/18/23 1623    Order Status: Completed Specimen: Blood from Peripheral, Forearm, Right Updated: 12/19/23 1812     Blood Culture, Routine No Growth to date      No Growth to date    Respiratory Infection Panel (PCR), Nasopharyngeal [[id number]] Collected: 12/18/23 2025    Order Status: Completed Specimen: Nasopharyngeal Swab Updated: 12/18/23 2137     Respiratory Infection Panel Source NP Swab     Adenovirus Not Detected     Coronavirus 229E, Common Cold Virus Not Detected     Coronavirus HKU1, Common Cold Virus Not Detected     Coronavirus NL63, Common Cold Virus Not Detected     Coronavirus OC43, Common Cold Virus Not Detected     Comment: The Coronavirus strains detected in this test cause the common cold.  These strains are not the COVID-19 (novel Coronavirus)strain   associated with the respiratory disease outbreak.          SARS-CoV2 (COVID-19) Qualitative PCR Not Detected     Human Metapneumovirus Not Detected     Human Rhinovirus/Enterovirus Not Detected     Influenza A (subtypes H1, H1-2009,H3) Not Detected     Influenza B Not Detected     Parainfluenza Virus 1 Not Detected     Parainfluenza Virus 2 Not Detected     Parainfluenza Virus 3 Not Detected     Parainfluenza Virus 4 Not Detected     Respiratory Syncytial Virus Not Detected     Bordetella Parapertussis (VG1349) Not Detected     Bordetella pertussis (ptxP) Not Detected     Chlamydia pneumoniae Not Detected     Mycoplasma pneumoniae Not Detected    Narrative:      For all other respiratory sources, order [id number] -  Respiratory Viral Panel by PCR    Respiratory Infection Panel (PCR), Nasopharyngeal [[id number]] Collected: 12/18/23 2018    Order Status: Canceled Specimen: Nasopharyngeal Swab     Blood culture [[id number]]     Order Status: Canceled Specimen: Blood     Blood culture [[id number]]     Order Status: Canceled Specimen: Blood     Influenza A & B by  Molecular [[id number]] Collected: 12/18/23 1628    Order Status: Completed Specimen: Nasopharyngeal Swab Updated: 12/18/23 1720     Influenza A, Molecular Negative     Influenza B, Molecular Negative     Flu A & B Source Nasal swab    Influenza A & B by Molecular [[id number]]     Order Status: Canceled Specimen: Nasopharyngeal Swab

## 2023-12-20 NOTE — ASSESSMENT & PLAN NOTE
48 y.o. 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 Echo with EF 50-53% severe MR and severe LA dilation. NEW not needed at the moment. Replaced K and Mag.     Plan:  -continue IV Lasix gtt at 10. Insert ritchie catheter  -Hold NEW. not needed at the moment due to respiratory failure.   - ID following and agree to continue on vanc and meropenem  - pending blood cx   -continue IV Vancomycin and IV Meropenem  - continue diuresis, will consider IV pushes of lasix once stabilized   - CTS consulted, agree on IV diursesis. Will hold IABP at the moment.

## 2023-12-20 NOTE — PROGRESS NOTES
[facility] Quick Note:     Patient observed to be readmitted     Patient is a current home IV ATB/ID OPAT patient of [facility].    Patient to be followed by [facility] staff while admitted for any needs.        Please do not hesitate to reach out for any additional needs.    Oziel Chaudhry MS, RDN, LDN  Clinical Liaison & Dietitian  [facility]   Phone: [phone]  [email or web address]

## 2023-12-20 NOTE — PROGRESS NOTES
[facility]  Cardiology  Progress Note    Patient Name: Lorenzo Nino  MRN: [id number]  Admission Date: 12/18/2023  Hospital Length of Stay: 2 days  Code Status: Full Code   Attending Physician: Van Wu MD   Primary Care Physician: Terrie, Primary Doctor  Expected Discharge Date: 12/26/2023  Principal Problem:MRSA bacteremia    Subjective:     Hospital Course:   Upon admission to the CICU on afib w/ RVR. Given 1 dose of digoxin and started on heparin gtt and diltiazem gtt. Stabilized on sinus rhythm. CTS evaluated patient and agree on continuing aggressive diuresis with lasix gtt, at the moment not indicated IABP. Pending ID evaluation for AB management. Switched to PO diltiazem due to normal rhythm. Will hold NEW at the moment due to patient with shortness of breath and increased risk of respiratory failure. EKG with Aflutter with PVCs. Patient to have daily EKGs to evaluate flutter. ID evaluated and recommend continuing Abx pending blood cultures. TTE with severe MR and severe LA dilation. NEW not needed at the moment. Replaced K and Mag.     Interval History: EKG with Aflutter with PVCs. Patient to have daily EKGs to evaluate flutter. ID evaluated and recommend continuing Abx pending blood cultures. TTE with severe MR and severe LA dilation. NEW not needed at the moment. Replaced K and Mag.     Review of Systems   Constitutional: Negative for chills and fever.   Cardiovascular:  Negative for chest pain, cyanosis, orthopnea and syncope.   Respiratory:  Positive for shortness of breath and wheezing. Negative for hemoptysis.    Skin:  Positive for dry skin. Negative for rash.   Genitourinary:  Negative for dysuria and hematuria.     Objective:     Vital Signs (Most Recent):  Temp: 98.2 °F (36.8 °C) (12/20/23 1101)  Pulse: 93 (12/20/23 1300)  Resp: (!) 28 (12/20/23 1300)  BP: 104/61 (12/20/23 1300)  SpO2: 100 % (12/20/23 1300) Vital Signs (24h Range):  Temp:  [98.1 °F (36.7 °C)-98.9 °F  (37.2 °C)] 98.2 °F (36.8 °C)  Pulse:  [] 93  Resp:  [20-41] 28  SpO2:  [91 %-100 %] 100 %  BP: ()/(50-70) 104/61     Weight: 67.1 kg (148 lb)  Body mass index is 20.07 kg/m².     SpO2: 100 %         Intake/Output Summary (Last 24 hours) at 12/20/2023 1331  Last data filed at 12/20/2023 1300  Gross per 24 hour   Intake 1690.27 ml   Output 4525 ml   Net -2834.73 ml       Lines/Drains/Airways       Peripherally Inserted Central Catheter Line  Duration             PICC Double Lumen 11/13/23 1509 right basilic 36 days              Peripheral Intravenous Line  Duration                  Peripheral IV - Single Lumen 12/18/23 1631 18 G Anterior;Left Forearm 1 day         Peripheral IV - Single Lumen 12/18/23 2300 20 G Left Forearm 1 day                       Physical Exam  Vitals and nursing note reviewed.   Cardiovascular:      Rate and Rhythm: Normal rate and regular rhythm.      Pulses: Normal pulses.      Heart sounds: Murmur (holosystolic mitral murmur) heard.   Pulmonary:      Effort: Respiratory distress present.      Breath sounds: Wheezing present.   Abdominal:      General: Bowel sounds are normal.   Musculoskeletal:         General: No swelling.      Right lower leg: No edema.      Left lower leg: No edema.   Skin:     Capillary Refill: Capillary refill takes 2 to 3 seconds.   Neurological:      Mental Status: He is alert and oriented to person, place, and time.            Significant Labs: All pertinent lab results from the last 24 hours have been reviewed.    Significant Imaging: Echocardiogram: Transthoracic echo (TTE) complete (Cupid Only):   Results for orders placed or performed during the hospital encounter of 12/18/23   Echo   Result Value Ref Range    BSA 1.85 m2    LVOT stroke volume 92.48 cm3    LVIDd 5.11 3.5 - 6.0 cm    LV Systolic Volume 31.52 mL    LV Systolic Volume Index 16.9 mL/m2    LVIDs 2.87 2.1 - 4.0 cm    LV Diastolic Volume 124.23 mL    LV Diastolic Volume Index 66.43 mL/m2     IVS 0.80 0.6 - 1.1 cm    LVOT diameter 2.18 cm    LVOT area 3.7 cm2    FS 44 28 - 44 %    Left Ventricle Relative Wall Thickness 0.34 cm    Posterior Wall 0.87 0.6 - 1.1 cm    LV mass 148.89 g    LV Mass Index 80 g/m2    MV Peak E Jabier 1.86 m/s    TDI LATERAL 0.11 m/s    TDI SEPTAL 0.07 m/s    E/E' ratio 20.67 m/s    MV Peak A Jabier 0.79 m/s    TR Max Jabier 3.02 m/s    E/A ratio 2.35     E wave deceleration time 246.91 msec    LV SEPTAL E/E' RATIO 26.57 m/s    LA Volume Index 85.2 mL/m2    LV LATERAL E/E' RATIO 16.91 m/s    LA volume 159.32 cm3    LVOT peak jabier 1.51 m/s    RVDD 3.76 cm    TAPSE 1.12 cm    LA size 5.13 cm    Left Atrium Minor Axis 7.28 cm    Left Atrium Major Axis 6.69 cm    LA volume (mod) 139.35 cm3    LA WIDTH 5.24 cm    LA Volume Index (Mod) 74.5 mL/m2    RA Major Axis 5.45 cm    RA Width 3.20 cm    AV mean gradient 7 mmHg    AV peak gradient 12 mmHg    Ao peak jabier 1.76 m/s    Ao VTI 29.53 cm    LVOT peak VTI 24.79 cm    AV valve area 3.13 cm²    AV Velocity Ratio 0.86     AV index (prosthetic) 0.84     FATUMA by Velocity Ratio 3.20 cm²    Mr max jabier 0.05 m/s    MV mean gradient 6 mmHg    MV peak gradient 20 mmHg    MV stenosis pressure 1/2 time 71.60 ms    MV valve area p 1/2 method 3.07 cm2    MV valve area by continuity eq 2.16 cm2    MV VTI 42.87 cm    Triscuspid Valve Regurgitation Peak Gradient 36 mmHg    Sinus 3.19 cm    STJ 2.75 cm    Ascending aorta 3.28 cm    Mean e' 0.09 m/s    ZLVIDS -0.90     ZLVIDD -0.20     TV resting pulmonary artery pressure 51 mmHg    RV TB RVSP 18 mmHg    Est. RA pres 15 mmHg    Mitral Valve Heart Rate 88 bpm    Narrative      Left Ventricle: The left ventricle is normal in size. Normal wall   thickness. Regional wall motion abnormalities present. There is low normal   systolic function with a visually estimated ejection fraction of 50 - 55%.   There is normal diastolic function.    Right Ventricle: Normal right ventricular cavity size. Wall thickness   is normal.  Right ventricle wall motion  is normal. Systolic function is   normal.    Th left atrium is severely dilated.    Mitral Valve: The mitral valve is repaired with a bovine pericardial   annuloplasty band. There is flail motion of a small part of valvular   tissue, perhaps from the posterior leaflet, that leads to eccentric MR   directed somewhat laterally. There is severe regurgitation.    Tricuspid Valve: There is mild regurgitation.    Pulmonary Artery: The estimated pulmonary artery systolic pressure is   51 mmHg.    IVC/SVC: Elevated venous pressure at 15 mmHg.       Assessment and Plan:     Brief HPI: 48 y.o. 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 Presented for 3-day dyspnea. Denies having fever or chills during this time. He states that he was not doing anything strenuous when his symptoms came on. His symptoms got progressively worse over the weekend and he decided to present yesterday. He has been on home IV Daptomycin. He follows with Dr. Mercado with Cardiology and his operation was performed by Dr. Haro.     In the ER, he was hypoxic requiring 6 L to maintain sats of 91-94%. Initial lactic acid 1.8. Creatinine 0.9. WBC 20k. Bedside Echo showed EF 45-50% with severe MR (2 jets, one posteriorly directed and one anteriorly directed), restricted posterior leaflet, possible tear in anterior leaflet. No pericardial effusion.     Patient was transferred to the CICU for AHRF 2/2 significant pulmonary edema 2/2 severe mitral regurgitation concerning for anterior leaflet tear vs posterior leaflet restriction.    * 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  48 y.o. male with a recent history of MRSA endocarditis s/p urgent mitral valve repair on 11/3/23 for acutely decompensated severe mitral regurgitation, presented with acute hypoxemic respiratory failure and reported severe mitral regurgitation on  bedside echocardiogram upon admission which showed severe MR, which has changed since post-op Echo performed 11/14/23 appears to be 2 jets (one posterior and one anterior), possible tear of anterior leaflet, posterior leaflet is restricted. Echo with EF 50-53% severe MR and severe LA dilation. NEW not needed at the moment. Replaced K and Mag.     Plan:  -continue IV Lasix gtt at 10. Insert ritchie catheter  -Hold NEW. not needed at the moment due to respiratory failure.   - ID following and agree to continue on vanc and meropenem  - pending blood cx   -continue IV Vancomycin and IV Meropenem  - continue diuresis, will consider IV pushes of lasix once stabilized   - CTS consulted, agree on IV diursesis. Will hold IABP at the moment.     Hypomagnesemia  - replaced Mag and K     HFmrEF  - Hold GDMT for now.     Acute hypoxemic respiratory failure  Patient with Hypercapnic Respiratory failure which is Acute.  he is not on home oxygen. Supplemental oxygen was provided with 10L NC.     Signs/symptoms of respiratory failure include- tachypnea, increased work of breathing, respiratory distress, and wheezing. Contributing diagnoses includes - CHF and Pleural effusion Labs and images were reviewed. Patient Has recent ABG, which has been reviewed.     - monitor respiratory status  - decreased O2 requirements to 5L NC  - hold NEW in the setting of respiratory failure    A-fib with RVR  S/p afib with RVR once admitted to the CCU. Started on IV digoxin, heparin gtt and diltiazem gtt. On sinus rhythm. Will de-escalate rhythm medications as tolerated.     - continue heparin gtt  - continue PO Diltiazem 60mg TID   - monitor telemetry    Endocarditis  See severe MR         VTE Risk Mitigation (From admission, onward)           Ordered     heparin 25,000 units in dextrose 5% (100 units/ml) IV bolus from bag - ADDITIONAL PRN BOLUS - 60 units/kg  As needed (PRN)        Question:  Heparin Infusion Adjustment (DO NOT MODIFY ANSWER)  Answer:   [email or web address]\epic\Images\Pharmacy\HeparinInfusions\heparin LOW INTENSITY nomogram for [facility] [id number]    12/18/23 2127     heparin 25,000 units in dextrose 5% (100 units/ml) IV bolus from bag - ADDITIONAL PRN BOLUS - 30 units/kg  As needed (PRN)        Question:  Heparin Infusion Adjustment (DO NOT MODIFY ANSWER)  Answer:  [email or web address]\epic\Images\Pharmacy\HeparinInfusions\heparin LOW INTENSITY nomogram for [facility] [id number]    12/18/23 2127     heparin 25,000 units in dextrose 5% 250 mL (100 units/mL) infusion LOW INTENSITY nomogram - [facility]  Continuous        Question:  Begin at (units/kg/hr)  Answer:  12    12/18/23 2127                    Jaya Zhang MD  Cardiology  [facility]

## 2023-12-21 PROBLEM — D50.0 ANEMIA DUE TO GI BLOOD LOSS: Status: ACTIVE | Noted: 2023-12-21

## 2023-12-21 LAB
ALBUMIN SERPL BCP-MCNC: 2.2 G/DL (ref 3.5–5.2)
ALLENS TEST: ABNORMAL
ALLENS TEST: ABNORMAL
ALP SERPL-CCNC: 88 U/L (ref 55–135)
ALT SERPL W/O P-5'-P-CCNC: 11 U/L (ref 10–44)
ANION GAP SERPL CALC-SCNC: 10 MMOL/L (ref 8–16)
ANION GAP SERPL CALC-SCNC: 11 MMOL/L (ref 8–16)
APTT PPP: 42.9 SEC (ref 21–32)
APTT PPP: 44.6 SEC (ref 21–32)
AST SERPL-CCNC: 25 U/L (ref 10–40)
BACTERIA SPEC AEROBE CULT: ABNORMAL
BACTERIA SPEC AEROBE CULT: ABNORMAL
BASOPHILS # BLD AUTO: 0.07 K/UL (ref 0–0.2)
BASOPHILS NFR BLD: 0.5 % (ref 0–1.9)
BILIRUB SERPL-MCNC: 1 MG/DL (ref 0.1–1)
BUN SERPL-MCNC: 13 MG/DL (ref 6–20)
BUN SERPL-MCNC: 14 MG/DL (ref 6–20)
CALCIUM SERPL-MCNC: 9.1 MG/DL (ref 8.7–10.5)
CALCIUM SERPL-MCNC: 9.4 MG/DL (ref 8.7–10.5)
CHLORIDE SERPL-SCNC: 88 MMOL/L (ref 95–110)
CHLORIDE SERPL-SCNC: 89 MMOL/L (ref 95–110)
CO2 SERPL-SCNC: 34 MMOL/L (ref 23–29)
CO2 SERPL-SCNC: 35 MMOL/L (ref 23–29)
CREAT SERPL-MCNC: 0.8 MG/DL (ref 0.5–1.4)
CREAT SERPL-MCNC: 0.8 MG/DL (ref 0.5–1.4)
DELSYS: ABNORMAL
DELSYS: ABNORMAL
DIFFERENTIAL METHOD BLD: ABNORMAL
EOSINOPHIL # BLD AUTO: 1.5 K/UL (ref 0–0.5)
EOSINOPHIL NFR BLD: 11 % (ref 0–8)
ERYTHROCYTE [DISTWIDTH] IN BLOOD BY AUTOMATED COUNT: 19.5 % (ref 11.5–14.5)
EST. GFR  (NO RACE VARIABLE): >60 ML/MIN/1.73 M^2
EST. GFR  (NO RACE VARIABLE): >60 ML/MIN/1.73 M^2
FLOW: 3
GLUCOSE SERPL-MCNC: 105 MG/DL (ref 70–110)
GLUCOSE SERPL-MCNC: 98 MG/DL (ref 70–110)
GRAM STN SPEC: ABNORMAL
HCO3 UR-SCNC: 38.2 MMOL/L (ref 24–28)
HCT VFR BLD AUTO: 24.2 % (ref 40–54)
HGB BLD-MCNC: 7.8 G/DL (ref 14–18)
IMM GRANULOCYTES # BLD AUTO: 0.16 K/UL (ref 0–0.04)
IMM GRANULOCYTES NFR BLD AUTO: 1.2 % (ref 0–0.5)
LYMPHOCYTES # BLD AUTO: 2.6 K/UL (ref 1–4.8)
LYMPHOCYTES NFR BLD: 19.2 % (ref 18–48)
MAGNESIUM SERPL-MCNC: 2.3 MG/DL (ref 1.6–2.6)
MAGNESIUM SERPL-MCNC: 2.3 MG/DL (ref 1.6–2.6)
MCH RBC QN AUTO: 26.1 PG (ref 27–31)
MCHC RBC AUTO-ENTMCNC: 32.2 G/DL (ref 32–36)
MCV RBC AUTO: 81 FL (ref 82–98)
MODE: ABNORMAL
MODE: ABNORMAL
MONOCYTES # BLD AUTO: 1.5 K/UL (ref 0.3–1)
MONOCYTES NFR BLD: 11.5 % (ref 4–15)
NEUTROPHILS # BLD AUTO: 7.5 K/UL (ref 1.8–7.7)
NEUTROPHILS NFR BLD: 56.6 % (ref 38–73)
NRBC BLD-RTO: 0 /100 WBC
PCO2 BLDA: 46.1 MMHG (ref 35–45)
PH SMN: 7.53 [PH] (ref 7.35–7.45)
PHOSPHATE SERPL-MCNC: 3.4 MG/DL (ref 2.7–4.5)
PLATELET # BLD AUTO: 532 K/UL (ref 150–450)
PMV BLD AUTO: 12.2 FL (ref 9.2–12.9)
PO2 BLDA: 29 MMHG (ref 40–60)
PO2 BLDA: 29 MMHG (ref 40–60)
POC BE: 15 MMOL/L
POC SATURATED O2: 61 % (ref 95–100)
POC SATURATED O2: 63 % (ref 95–100)
POC TCO2: 40 MMOL/L (ref 24–29)
POTASSIUM SERPL-SCNC: 3.7 MMOL/L (ref 3.5–5.1)
POTASSIUM SERPL-SCNC: 4 MMOL/L (ref 3.5–5.1)
PROT SERPL-MCNC: 8 G/DL (ref 6–8.4)
RBC # BLD AUTO: 2.99 M/UL (ref 4.6–6.2)
SAMPLE: ABNORMAL
SAMPLE: ABNORMAL
SITE: ABNORMAL
SITE: ABNORMAL
SODIUM SERPL-SCNC: 133 MMOL/L (ref 136–145)
SODIUM SERPL-SCNC: 134 MMOL/L (ref 136–145)
SP02: 94
SP02: 96
WBC # BLD AUTO: 13.31 K/UL (ref 3.9–12.7)

## 2023-12-21 PROCEDURE — 99900035 HC TECH TIME PER 15 MIN (STAT)

## 2023-12-21 PROCEDURE — 94761 N-INVAS EAR/PLS OXIMETRY MLT: CPT | Mod: XB

## 2023-12-21 PROCEDURE — 63600175 PHARM REV CODE 636 W HCPCS: Performed by: STUDENT IN AN ORGANIZED HEALTH CARE EDUCATION/TRAINING PROGRAM

## 2023-12-21 PROCEDURE — 63600175 PHARM REV CODE 636 W HCPCS: Performed by: INTERNAL MEDICINE

## 2023-12-21 PROCEDURE — 82803 BLOOD GASES ANY COMBINATION: CPT

## 2023-12-21 PROCEDURE — 85730 THROMBOPLASTIN TIME PARTIAL: CPT | Performed by: STUDENT IN AN ORGANIZED HEALTH CARE EDUCATION/TRAINING PROGRAM

## 2023-12-21 PROCEDURE — 25000003 PHARM REV CODE 250

## 2023-12-21 PROCEDURE — 99233 SBSQ HOSP IP/OBS HIGH 50: CPT | Mod: ,,, | Performed by: INTERNAL MEDICINE

## 2023-12-21 PROCEDURE — 80053 COMPREHEN METABOLIC PANEL: CPT | Performed by: INTERNAL MEDICINE

## 2023-12-21 PROCEDURE — 93005 ELECTROCARDIOGRAM TRACING: CPT

## 2023-12-21 PROCEDURE — 85025 COMPLETE CBC W/AUTO DIFF WBC: CPT | Performed by: INTERNAL MEDICINE

## 2023-12-21 PROCEDURE — 93010 ELECTROCARDIOGRAM REPORT: CPT | Mod: ,,, | Performed by: INTERNAL MEDICINE

## 2023-12-21 PROCEDURE — 20000000 HC ICU ROOM

## 2023-12-21 PROCEDURE — 25000003 PHARM REV CODE 250: Performed by: STUDENT IN AN ORGANIZED HEALTH CARE EDUCATION/TRAINING PROGRAM

## 2023-12-21 PROCEDURE — 25000003 PHARM REV CODE 250: Performed by: INTERNAL MEDICINE

## 2023-12-21 PROCEDURE — 83735 ASSAY OF MAGNESIUM: CPT | Performed by: INTERNAL MEDICINE

## 2023-12-21 RX ORDER — FAMOTIDINE 20 MG/1
20 TABLET, FILM COATED ORAL 2 TIMES DAILY
Status: DISCONTINUED | OUTPATIENT
Start: 2023-12-21 | End: 2023-12-31

## 2023-12-21 RX ADMIN — DILTIAZEM HYDROCHLORIDE 60 MG: 60 TABLET, FILM COATED ORAL at 06:12

## 2023-12-21 RX ADMIN — MEROPENEM 2 G: 1 INJECTION INTRAVENOUS at 12:12

## 2023-12-21 RX ADMIN — DILTIAZEM HYDROCHLORIDE 60 MG: 60 TABLET, FILM COATED ORAL at 09:12

## 2023-12-21 RX ADMIN — FAMOTIDINE 20 MG: 20 TABLET ORAL at 09:12

## 2023-12-21 RX ADMIN — MEROPENEM 2 G: 1 INJECTION INTRAVENOUS at 07:12

## 2023-12-21 RX ADMIN — DILTIAZEM HYDROCHLORIDE 60 MG: 60 TABLET, FILM COATED ORAL at 01:12

## 2023-12-21 RX ADMIN — HEPARIN SODIUM 23 UNITS/KG/HR: 10000 INJECTION, SOLUTION INTRAVENOUS at 05:12

## 2023-12-21 RX ADMIN — VANCOMYCIN HYDROCHLORIDE 1000 MG: 1 INJECTION, POWDER, LYOPHILIZED, FOR SOLUTION INTRAVENOUS at 06:12

## 2023-12-21 RX ADMIN — FAMOTIDINE 20 MG: 20 TABLET ORAL at 12:12

## 2023-12-21 RX ADMIN — HEPARIN SODIUM 23 UNITS/KG/HR: 10000 INJECTION, SOLUTION INTRAVENOUS at 01:12

## 2023-12-21 RX ADMIN — VANCOMYCIN HYDROCHLORIDE 1000 MG: 1 INJECTION, POWDER, LYOPHILIZED, FOR SOLUTION INTRAVENOUS at 04:12

## 2023-12-21 RX ADMIN — MEROPENEM 2 G: 1 INJECTION INTRAVENOUS at 04:12

## 2023-12-21 RX ADMIN — POTASSIUM CHLORIDE 40 MEQ: 1500 TABLET, EXTENDED RELEASE ORAL at 08:12

## 2023-12-21 NOTE — ASSESSMENT & PLAN NOTE
Patient with Hypercapnic Respiratory failure which is Acute.  he is not on home oxygen. Supplemental oxygen was provided with 10L NC.     Signs/symptoms of respiratory failure include- tachypnea, increased work of breathing, respiratory distress, and wheezing. Contributing diagnoses includes - CHF and Pleural effusion Labs and images were reviewed. Patient Has recent ABG, which has been reviewed.     - monitor respiratory status  - decreased O2 requirements to 4L NC  - no indication of NEW at the moment   - famotidine 20 BID for PPI prophylaxis

## 2023-12-21 NOTE — SUBJECTIVE & OBJECTIVE
Interval History: Blood cx NGTD. Respiratory cx with yeast and gram positive cocci. No need of NEW. At the moment with NSR. Will continue diuresing on lasix gtt once euvolemic and will transition to IV pushes once stabilized.     Review of Systems   Constitutional: Negative for chills and fever.   Cardiovascular:  Negative for chest pain, cyanosis, orthopnea and syncope.   Respiratory:  Negative for hemoptysis, shortness of breath and wheezing.    Skin:  Negative for dry skin and rash.   Genitourinary:  Negative for dysuria and hematuria.     Objective:     Vital Signs (Most Recent):  Temp: 98.4 °F (36.9 °C) (12/21/23 0301)  Pulse: 92 (12/21/23 0810)  Resp: (!) 36 (12/21/23 0810)  BP: 99/67 (12/21/23 0700)  SpO2: 95 % (12/21/23 0810) Vital Signs (24h Range):  Temp:  [98 °F (36.7 °C)-98.4 °F (36.9 °C)] 98.4 °F (36.9 °C)  Pulse:  [74-99] 92  Resp:  [20-40] 36  SpO2:  [90 %-100 %] 95 %  BP: ()/(56-73) 99/67     Weight: 67.1 kg (148 lb)  Body mass index is 20.07 kg/m².     SpO2: 95 %         Intake/Output Summary (Last 24 hours) at 12/21/2023 0928  Last data filed at 12/21/2023 0601  Gross per 24 hour   Intake 2040.93 ml   Output 4900 ml   Net -2859.07 ml       Lines/Drains/Airways       Peripherally Inserted Central Catheter Line  Duration             PICC Double Lumen 11/13/23 1509 right basilic 37 days              Peripheral Intravenous Line  Duration                  Peripheral IV - Single Lumen 12/18/23 1631 18 G Anterior;Left Forearm 2 days         Peripheral IV - Single Lumen 12/18/23 2300 20 G Left Forearm 2 days                       Physical Exam  Vitals and nursing note reviewed.   Cardiovascular:      Rate and Rhythm: Normal rate and regular rhythm.      Pulses: Normal pulses.      Heart sounds: Murmur (holosystolic mitral murmur) heard.   Pulmonary:      Effort: No respiratory distress.      Breath sounds: Rales (scattered expiratory rales.) present. No wheezing.   Abdominal:      General: Bowel  sounds are normal.   Musculoskeletal:         General: No swelling.      Right lower leg: No edema.      Left lower leg: No edema.   Skin:     Capillary Refill: Capillary refill takes 2 to 3 seconds.   Neurological:      Mental Status: He is alert and oriented to person, place, and time.            Significant Labs: All pertinent lab results from the last 24 hours have been reviewed.    Significant Imaging: EKG: NSR

## 2023-12-21 NOTE — ASSESSMENT & PLAN NOTE
48 y.o. 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 Echo with EF 50-53% severe MR and severe LA dilation. NEW not needed at the moment. Replaced K and Mag.     Plan:  - cardiac diet   -continue IV Lasix gtt at 10. Will consider lasix pushes once stabilized   - No need of NEW at the moment  - pending blood cx, NGTD   -continue IV Vancomycin and IV Meropenem as per ID recs (Day 3)  - CTS consulted, agree on IV diursesis. Will hold IABP at the moment.

## 2023-12-21 NOTE — PROGRESS NOTES
[facility]  Infectious Disease  Progress Note    Patient Name: Lorenzo Nino  MRN: [id number]  Admission Date: 12/18/2023  Length of Stay: 3 days  Attending Physician: Van Wu MD  Primary Care Provider: No, Primary Doctor    Isolation Status: No active isolations  Assessment/Plan:      Pulmonary  Acute hypoxemic respiratory failure  See endocarditis.       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 Reports a dry cough and chills, but denies fevers or sweats, sick contacts. RIP, flu negative. CXR with interstitial coarsening and b/l (L>R) lung opacities. Bedside Echo showed EF 45-50% with severe MR, restricted posterior leaflet, possible tear in anterior leaflet.  Started on empiric vanc/ zosyn. Then broadened with sarah.      Respi failure likely due to pulm edema and less likely multifocal pna. Symptoms have improved significantly with diuresis. RCx with normal ina. Candida in sputum likely represents an airway colonizer.  BCx NGTD.    Recommendations:  --agree with vanc. Pharm to dose for goal trough 15-20.  End date 12/24.  --stop meropenem  --f/u BCx  --f/u cards/ surgery plan/ NEW        Anticipated Disposition: tbd    Thank you for your consult. I will follow-up with patient. Please contact us if you have any additional questions.    Nikia Cantrell MD  Infectious Disease  [facility]    Subjective:     Principal Problem:MRSA bacteremia    HPI: Mr. Nino is a  49 yo male with hx of MRSA bacteremia c/b MV/AV IE s/p MV repair with porcine annuloplasty 11/3 (OR cx + MRSA) and embolus to  "R eye was recently discharged on dapto + ceftaroline who represented on 12/18 with worsening dyspnea x 3-4 days and was admitted with acute respi failure in the setting of new severe MR and possible tear of anterior leaflet. Reports a dry cough and chills, but denies fevers or sweats, sick contacts.    Of note, pt was followed by ID during recent hospital admission 10/30-11/14. Due to prolonged bacteremia pt was managed with salvage therapy with dapto/ ceftaroline. BCx cleared on 11/11.  Plan was to continue ceftaroline until 11/19 and dapto until 12/24.     In the ED pt was afebrile, tachycardic and hypoxemic. Labs remarkable for leukocytosis. RIP, flu negative. CXR with interstitial coarsening and b/l (L>R) lung opacities. Bedside Echo showed EF 45-50% with severe MR, restricted posterior leaflet, possible tear in anterior leaflet.  Started on empiric vanc/ zosyn. Then broadened with sarah. BCx 12/18 remains ngtd.     ID consulted for: "Antibiotics recommendations. Patient on home IV daptomycin ."  Interval History: Remained afebrile overnight. O2 requirements downtrending. Feeling better today. No longer coughing as much.    Review of Systems   Constitutional:  Positive for fatigue. Negative for chills and fever.   Respiratory:  Positive for cough and shortness of breath.    Gastrointestinal:  Negative for abdominal pain.   Musculoskeletal:  Negative for arthralgias and back pain.   All other systems reviewed and are negative.    Objective:     Vital Signs (Most Recent):  Temp: 98.7 °F (37.1 °C) (12/21/23 1200)  Pulse: 90 (12/21/23 1200)  Resp: (!) 25 (12/21/23 1200)  BP: 100/66 (12/21/23 1200)  SpO2: 100 % (12/21/23 1200) Vital Signs (24h Range):  Temp:  [98 °F (36.7 °C)-98.7 °F (37.1 °C)] 98.7 °F (37.1 °C)  Pulse:  [74-99] 90  Resp:  [20-49] 25  SpO2:  [90 %-100 %] 100 %  BP: ()/(56-73) 100/66     Weight: 67.1 kg (148 lb)  Body mass index is 20.07 kg/m².    Estimated Creatinine Clearance: 106 mL/min (based " on SCr of 0.8 mg/dL).     Physical Exam  Vitals reviewed.   Constitutional:       Appearance: He is ill-appearing.   HENT:      Head: Normocephalic and atraumatic.   Eyes:      Conjunctiva/sclera: Conjunctivae normal.      Pupils: Pupils are equal, round, and reactive to light.   Cardiovascular:      Rate and Rhythm: Normal rate and regular rhythm.      Heart sounds: Murmur heard.   Pulmonary:      Effort: Pulmonary effort is normal.      Breath sounds: Rales present.   Abdominal:      General: Abdomen is flat.      Palpations: Abdomen is soft.   Musculoskeletal:      Right lower leg: No edema.      Left lower leg: No edema.      Comments: RUE PICC site with clean dressing   Skin:     Findings: No lesion or rash.   Neurological:      General: No focal deficit present.      Mental Status: He is oriented to person, place, and time.   Psychiatric:         Mood and Affect: Mood normal.         Behavior: Behavior normal.          Significant Labs: Blood Culture:   Recent Labs   Lab 11/10/23  0905 11/11/23  0345 11/12/23  0334 12/18/23  1619 12/18/23  1623   LABBLOO Gram stain aer bottle: Gram positive cocci in clusters resembling Staph  Results called to and read back by: Anthony Murphy RN  11/11/2023  23:32  STAPHYLOCOCCUS AUREUS  ID consult required at [facility],[place] and [facility].  For susceptibility see order #[id number]  * No growth after 5 days.  No growth after 5 days. No growth after 5 days.  No growth after 5 days. No Growth to date  No Growth to date  No Growth to date No Growth to date  No Growth to date  No Growth to date       Respiratory Culture:   Recent Labs   Lab 12/19/23  1506   GSRESP >10 epithelial cells per low power field  Many WBC's  Moderate yeast  Moderate Gram positive rods  Rare Gram positive cocci   RESPIRATORYC No S aureus or Pseudomonas isolated.  CANDIDA DUBLINIENSIS  Moderate  Normal respiratory ina also present  *         Significant Imaging: I have reviewed  all pertinent imaging results/findings within the past 24 hours.

## 2023-12-21 NOTE — ASSESSMENT & PLAN NOTE
S/p afib with RVR once admitted to the CCU. On sinus rhythm.     - continue heparin gtt  - continue PO Diltiazem 60mg TID   - monitor telemetry

## 2023-12-21 NOTE — ASSESSMENT & PLAN NOTE
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 Reports a dry cough and chills, but denies fevers or sweats, sick contacts. RIP, flu negative. CXR with interstitial coarsening and b/l (L>R) lung opacities. Bedside Echo showed EF 45-50% with severe MR, restricted posterior leaflet, possible tear in anterior leaflet.  Started on empiric vanc/ zosyn. Then broadened with sarah.      Respi failure likely due to pulm edema and less likely multifocal pna. Symptoms have improved significantly with diuresis. RCx with normal ina. Candida in sputum likely represents an airway colonizer.  BCx NGTD.    Recommendations:  --agree with vanc. Pharm to dose for goal trough 15-20.  End date 12/24.  --stop meropenem  --f/u BCx  --f/u cards/ surgery plan/ NEW

## 2023-12-21 NOTE — PLAN OF CARE
CICU Care Plan    POC reviewed with Lorenzo Arzolaythe and family at 1901. Pt verbalized understanding. Questions and concerns addressed. No acute events noted this shift. Patient NPO since midnight except with AM medication. Pre-op checklist completed. Pt progressing toward goals. Security measures in place, plan of care to continue. See below and flowsheets for full assessment and VS info.     Neuro:  [place] Coma Scale  Best Eye Response: 4-->(E4) spontaneous  Best Motor Response: 6-->(M6) obeys commands  Best Verbal Response: 5-->(V5) oriented  [place] Coma Scale Score: 15  Assessment Qualifiers: patient not sedated/intubated, no eye obstruction present  Pupil PERRLA: yes     24 hr Temp:  [98 °F (36.7 °C)-98.4 °F (36.9 °C)]     CV:   Rhythm: atrial rhythm  BP goals:   SBP < 160  MAP > 65    Resp:   Plan: 4L NC    GI/:     Diet/Nutrition Received: NPO  Last Bowel Movement: 12/19/23  Voiding Characteristics: voids spontaneously without difficulty    Intake/Output Summary (Last 24 hours) at 12/21/2023 0640  Last data filed at 12/21/2023 0601  Gross per 24 hour   Intake 2420.08 ml   Output 5800 ml   Net -3379.92 ml     Unmeasured Output  Stool Occurrence: 1  Nutritional Supplement Intake: Quantity 1, Type: Boost    Labs/Accuchecks:  Recent Labs   Lab 12/20/23 0205 12/20/23  0828   WBC 13.31*  --    RBC 2.80*  --    HGB 7.5*  --    HCT 22.5* 25*   *  --       Recent Labs   Lab 12/20/23 0205 12/20/23  0828 12/20/23  2355   *   < > 133*   K 3.6   < > 4.0   CO2 27   < > 34*   CL 95   < > 89*   BUN 15   < > 14   CREATININE 0.8   < > 0.8   ALKPHOS 90  --   --    ALT 10  --   --    AST 25  --   --    BILITOT 1.0  --   --     < > = values in this interval not displayed.      Recent Labs   Lab 12/18/23  2158 12/19/23  0500 12/20/23  2355   INR 1.1  --   --    APTT 33.7*   < > 44.6*    < > = values in this interval not displayed.      Recent Labs   Lab 12/18/23  1240 12/18/23  1619   CPK 83  --    TROPONINI  --   0.060*       Electrolytes: Awaiting Results  Accuchecks: none    Gtts:   furosemide (LASIX) 500 mg in 50 mL infusion (conc: 10 mg/mL) 10 mg/hr (12/21/23 0601)    heparin (porcine) in D5W 23 Units/kg/hr (12/21/23 0601)       LDA/Wounds:  Lines/Drains/Airways       Peripherally Inserted Central Catheter Line  Duration             PICC Double Lumen 11/13/23 1509 right basilic 37 days              Peripheral Intravenous Line  Duration                  Peripheral IV - Single Lumen 12/18/23 1631 18 G Anterior;Left Forearm 2 days         Peripheral IV - Single Lumen 12/18/23 2300 20 G Left Forearm 2 days                  Wounds: No  Wound care consulted: No  Night bath    Problem: Adult Inpatient Plan of Care  Goal: Plan of Care Review  Outcome: Ongoing, Progressing  Goal: Patient-Specific Goal (Individualized)  Outcome: Ongoing, Progressing  Goal: Absence of Hospital-Acquired Illness or Injury  Outcome: Ongoing, Progressing  Goal: Optimal Comfort and Wellbeing  Outcome: Ongoing, Progressing  Goal: Readiness for Transition of Care  Outcome: Ongoing, Progressing     Problem: Adjustment to Illness (Sepsis/Septic Shock)  Goal: Optimal Coping  Outcome: Ongoing, Progressing     Problem: Bleeding (Sepsis/Septic Shock)  Goal: Absence of Bleeding  Outcome: Ongoing, Progressing     Problem: Glycemic Control Impaired (Sepsis/Septic Shock)  Goal: Blood Glucose Level Within Desired Range  Outcome: Ongoing, Progressing     Problem: Infection Progression (Sepsis/Septic Shock)  Goal: Absence of Infection Signs and Symptoms  Outcome: Ongoing, Progressing     Problem: Nutrition Impaired (Sepsis/Septic Shock)  Goal: Optimal Nutrition Intake  Outcome: Ongoing, Progressing     Problem: Fluid and Electrolyte Imbalance (Acute Kidney Injury/Impairment)  Goal: Fluid and Electrolyte Balance  Outcome: Ongoing, Progressing     Problem: Oral Intake Inadequate (Acute Kidney Injury/Impairment)  Goal: Optimal Nutrition Intake  Outcome: Ongoing,  Progressing     Problem: Renal Function Impairment (Acute Kidney Injury/Impairment)  Goal: Effective Renal Function  Outcome: Ongoing, Progressing     Problem: Infection  Goal: Absence of Infection Signs and Symptoms  Outcome: Ongoing, Progressing     Problem: Fall Injury Risk  Goal: Absence of Fall and Fall-Related Injury  Outcome: Ongoing, Progressing     Problem: Skin Injury Risk Increased  Goal: Skin Health and Integrity  Outcome: Ongoing, Progressing

## 2023-12-21 NOTE — SUBJECTIVE & OBJECTIVE
Interval History: Remained afebrile overnight. O2 requirements downtrending. Feeling better today. No longer coughing as much.    Review of Systems   Constitutional:  Positive for fatigue. Negative for chills and fever.   Respiratory:  Positive for cough and shortness of breath.    Gastrointestinal:  Negative for abdominal pain.   Musculoskeletal:  Negative for arthralgias and back pain.   All other systems reviewed and are negative.    Objective:     Vital Signs (Most Recent):  Temp: 98.7 °F (37.1 °C) (12/21/23 1200)  Pulse: 90 (12/21/23 1200)  Resp: (!) 25 (12/21/23 1200)  BP: 100/66 (12/21/23 1200)  SpO2: 100 % (12/21/23 1200) Vital Signs (24h Range):  Temp:  [98 °F (36.7 °C)-98.7 °F (37.1 °C)] 98.7 °F (37.1 °C)  Pulse:  [74-99] 90  Resp:  [20-49] 25  SpO2:  [90 %-100 %] 100 %  BP: ()/(56-73) 100/66     Weight: 67.1 kg (148 lb)  Body mass index is 20.07 kg/m².    Estimated Creatinine Clearance: 106 mL/min (based on SCr of 0.8 mg/dL).     Physical Exam  Vitals reviewed.   Constitutional:       Appearance: He is ill-appearing.   HENT:      Head: Normocephalic and atraumatic.   Eyes:      Conjunctiva/sclera: Conjunctivae normal.      Pupils: Pupils are equal, round, and reactive to light.   Cardiovascular:      Rate and Rhythm: Normal rate and regular rhythm.      Heart sounds: Murmur heard.   Pulmonary:      Effort: Pulmonary effort is normal.      Breath sounds: Rales present.   Abdominal:      General: Abdomen is flat.      Palpations: Abdomen is soft.   Musculoskeletal:      Right lower leg: No edema.      Left lower leg: No edema.      Comments: RUE PICC site with clean dressing   Skin:     Findings: No lesion or rash.   Neurological:      General: No focal deficit present.      Mental Status: He is oriented to person, place, and time.   Psychiatric:         Mood and Affect: Mood normal.         Behavior: Behavior normal.          Significant Labs: Blood Culture:   Recent Labs   Lab 11/10/23  0989  11/11/23  0345 11/12/23  0334 12/18/23  1619 12/18/23  1623   LABBLOO Gram stain aer bottle: Gram positive cocci in clusters resembling Staph  Results called to and read back by: Anthony Murphy RN  11/11/2023  23:32  STAPHYLOCOCCUS AUREUS  ID consult required at [facility],[place] and [facility] locations.  For susceptibility see order #[id number]  * No growth after 5 days.  No growth after 5 days. No growth after 5 days.  No growth after 5 days. No Growth to date  No Growth to date  No Growth to date No Growth to date  No Growth to date  No Growth to date       Respiratory Culture:   Recent Labs   Lab 12/19/23  1506   GSRESP >10 epithelial cells per low power field  Many WBC's  Moderate yeast  Moderate Gram positive rods  Rare Gram positive cocci   RESPIRATORYC No S aureus or Pseudomonas isolated.  CANDIDA DUBLINIENSIS  Moderate  Normal respiratory ina also present  *         Significant Imaging: I have reviewed all pertinent imaging results/findings within the past 24 hours.

## 2023-12-21 NOTE — PROGRESS NOTES
[facility]  Cardiology  Progress Note    Patient Name: Lorenzo Nino  MRN: [id number]  Admission Date: 12/18/2023  Hospital Length of Stay: 3 days  Code Status: Full Code   Attending Physician: Van Wu MD   Primary Care Physician: Terrie, Primary Doctor  Expected Discharge Date: 12/26/2023  Principal Problem:MRSA bacteremia    Subjective:     Hospital Course:   Upon admission to the CICU on afib w/ RVR. Given 1 dose of digoxin and started on heparin gtt and diltiazem gtt. Stabilized on sinus rhythm. CTS evaluated patient and agree on continuing aggressive diuresis with lasix gtt, at the moment not indicated IABP. Pending ID evaluation for AB management. Switched to PO diltiazem due to normal rhythm. Will hold NEW at the moment due to patient with shortness of breath and increased risk of respiratory failure. EKG with Aflutter with PVCs. Patient to have daily EKGs to evaluate flutter. ID evaluated and recommend continuing Abx pending blood cultures. TTE with severe MR and severe LA dilation. No new events of chest pain, with decrease O2 demands on 4L NC . Blood cx NGTD. Respiratory cx with yeast and gram positive cocci. No need of NEW. At the moment with NSR. Will continue diuresing on lasix gtt once euvolemic and will transition to IV pushes once stabilized.     Interval History: Blood cx NGTD. Respiratory cx with yeast and gram positive cocci. No need of NEW. At the moment with NSR. Will continue diuresing on lasix gtt once euvolemic and will transition to IV pushes once stabilized.     Review of Systems   Constitutional: Negative for chills and fever.   Cardiovascular:  Negative for chest pain, cyanosis, orthopnea and syncope.   Respiratory:  Negative for hemoptysis, shortness of breath and wheezing.    Skin:  Negative for dry skin and rash.   Genitourinary:  Negative for dysuria and hematuria.     Objective:     Vital Signs (Most Recent):  Temp: 98.4 °F (36.9 °C) (12/21/23  0301)  Pulse: 92 (12/21/23 0810)  Resp: (!) 36 (12/21/23 0810)  BP: 99/67 (12/21/23 0700)  SpO2: 95 % (12/21/23 0810) Vital Signs (24h Range):  Temp:  [98 °F (36.7 °C)-98.4 °F (36.9 °C)] 98.4 °F (36.9 °C)  Pulse:  [74-99] 92  Resp:  [20-40] 36  SpO2:  [90 %-100 %] 95 %  BP: ()/(56-73) 99/67     Weight: 67.1 kg (148 lb)  Body mass index is 20.07 kg/m².     SpO2: 95 %         Intake/Output Summary (Last 24 hours) at 12/21/2023 0928  Last data filed at 12/21/2023 0601  Gross per 24 hour   Intake 2040.93 ml   Output 4900 ml   Net -2859.07 ml       Lines/Drains/Airways       Peripherally Inserted Central Catheter Line  Duration             PICC Double Lumen 11/13/23 1509 right basilic 37 days              Peripheral Intravenous Line  Duration                  Peripheral IV - Single Lumen 12/18/23 1631 18 G Anterior;Left Forearm 2 days         Peripheral IV - Single Lumen 12/18/23 2300 20 G Left Forearm 2 days                       Physical Exam  Vitals and nursing note reviewed.   Cardiovascular:      Rate and Rhythm: Normal rate and regular rhythm.      Pulses: Normal pulses.      Heart sounds: Murmur (holosystolic mitral murmur) heard.   Pulmonary:      Effort: No respiratory distress.      Breath sounds: Rales (scattered expiratory rales.) present. No wheezing.   Abdominal:      General: Bowel sounds are normal.   Musculoskeletal:         General: No swelling.      Right lower leg: No edema.      Left lower leg: No edema.   Skin:     Capillary Refill: Capillary refill takes 2 to 3 seconds.   Neurological:      Mental Status: He is alert and oriented to person, place, and time.            Significant Labs: All pertinent lab results from the last 24 hours have been reviewed.    Significant Imaging: EKG: NSR  Assessment and Plan:     Brief HPI: 48 y.o. 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 Presented for 3-day dyspnea. Denies having fever  or chills during this time. He states that he was not doing anything strenuous when his symptoms came on. His symptoms got progressively worse over the weekend and he decided to present yesterday. He has been on home IV Daptomycin. He follows with Dr. Mercado with Cardiology and his operation was performed by Dr. Haro.     In the ER, he was hypoxic requiring 6 L to maintain sats of 91-94%. Initial lactic acid 1.8. Creatinine 0.9. WBC 20k. Bedside Echo showed EF 45-50% with severe MR (2 jets, one posteriorly directed and one anteriorly directed), restricted posterior leaflet, possible tear in anterior leaflet. No pericardial effusion.     Patient was transferred to the CICU for AHRF 2/2 significant pulmonary edema 2/2 severe mitral regurgitation concerning for anterior leaflet tear vs posterior leaflet restriction.    * 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  48 y.o. 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 Echo with EF 50-53% severe MR and severe LA dilation. NEW not needed at the moment. Replaced K and Mag.     Plan:  - cardiac diet   -continue IV Lasix gtt at 10. Will consider lasix pushes once stabilized   - No need of NEW at the moment  - pending blood cx, NGTD   -continue IV Vancomycin and IV Meropenem as per ID recs (Day 3)  - CTS consulted, agree on IV diursesis. Will hold IABP at the moment.       Anemia due to GI blood loss  - pending fecal occult blood test   - started on PPIx     Hypomagnesemia  - resolved     HFmrEF  - Hold GDMT  for now.     Acute hypoxemic respiratory failure  Patient with Hypercapnic Respiratory failure which is Acute.  he is not on home oxygen. Supplemental oxygen was provided with 10L NC.     Signs/symptoms of respiratory failure include- tachypnea, increased work of breathing, respiratory distress, and wheezing. Contributing diagnoses includes - CHF and Pleural effusion Labs and images were reviewed. Patient Has recent ABG, which has been reviewed.     - monitor respiratory status  - decreased O2 requirements to 4L NC  - no indication of NEW at the moment   - famotidine 20 BID for PPI prophylaxis     A-fib with RVR  S/p afib with RVR once admitted to the CCU. On sinus rhythm.     - continue heparin gtt  - continue PO Diltiazem 60mg TID   - monitor telemetry    Endocarditis  See severe MR         VTE Risk Mitigation (From admission, onward)           Ordered     heparin 25,000 units in dextrose 5% (100 units/ml) IV bolus from bag - ADDITIONAL PRN BOLUS - 60 units/kg  As needed (PRN)        Question:  Heparin Infusion Adjustment (DO NOT MODIFY ANSWER)  Answer:  \\[email or web address]\epic\Images\Pharmacy\HeparinInfusions\heparin LOW INTENSITY nomogram for [facility] [id number]    12/18/23 2127     heparin 25,000 units in dextrose 5% (100 units/ml) IV bolus from bag - ADDITIONAL PRN BOLUS - 30 units/kg  As needed (PRN)        Question:  Heparin Infusion Adjustment (DO NOT MODIFY ANSWER)  Answer:  [email or web address]\epic\Images\Pharmacy\HeparinInfusions\heparin LOW INTENSITY nomogram for [facility] [id number]    12/18/23 2127     heparin 25,000 units in dextrose 5% 250 mL (100 units/mL) infusion LOW INTENSITY nomogram - [facility]  Continuous        Question:  Begin at (units/kg/hr)  Answer:  12    12/18/23 2127                    Jaya Zhang MD  Cardiology  [facility] - [facility]

## 2023-12-22 PROBLEM — R78.81 MRSA BACTEREMIA: Chronic | Status: ACTIVE | Noted: 2023-11-03

## 2023-12-22 PROBLEM — E83.42 HYPOMAGNESEMIA: Status: RESOLVED | Noted: 2023-12-20 | Resolved: 2023-12-22

## 2023-12-22 PROBLEM — B95.62 MRSA BACTEREMIA: Chronic | Status: ACTIVE | Noted: 2023-11-03

## 2023-12-22 LAB
ACID FAST MOD KINY STN SPEC: NORMAL
ALBUMIN SERPL BCP-MCNC: 2.3 G/DL (ref 3.5–5.2)
ALLENS TEST: ABNORMAL
ALP SERPL-CCNC: 88 U/L (ref 55–135)
ALT SERPL W/O P-5'-P-CCNC: 12 U/L (ref 10–44)
ANION GAP SERPL CALC-SCNC: 14 MMOL/L (ref 8–16)
ANION GAP SERPL CALC-SCNC: 15 MMOL/L (ref 8–16)
ANION GAP SERPL CALC-SCNC: 15 MMOL/L (ref 8–16)
APTT PPP: 47 SEC (ref 21–32)
AST SERPL-CCNC: 29 U/L (ref 10–40)
BASOPHILS # BLD AUTO: 0.11 K/UL (ref 0–0.2)
BASOPHILS NFR BLD: 0.9 % (ref 0–1.9)
BILIRUB SERPL-MCNC: 0.9 MG/DL (ref 0.1–1)
BUN SERPL-MCNC: 18 MG/DL (ref 6–20)
BUN SERPL-MCNC: 20 MG/DL (ref 6–20)
BUN SERPL-MCNC: 22 MG/DL (ref 6–20)
CALCIUM SERPL-MCNC: 10 MG/DL (ref 8.7–10.5)
CALCIUM SERPL-MCNC: 9.7 MG/DL (ref 8.7–10.5)
CALCIUM SERPL-MCNC: 9.9 MG/DL (ref 8.7–10.5)
CHLORIDE SERPL-SCNC: 84 MMOL/L (ref 95–110)
CHLORIDE SERPL-SCNC: 84 MMOL/L (ref 95–110)
CHLORIDE SERPL-SCNC: 85 MMOL/L (ref 95–110)
CO2 SERPL-SCNC: 32 MMOL/L (ref 23–29)
CO2 SERPL-SCNC: 32 MMOL/L (ref 23–29)
CO2 SERPL-SCNC: 35 MMOL/L (ref 23–29)
CREAT SERPL-MCNC: 0.9 MG/DL (ref 0.5–1.4)
CRP SERPL-MCNC: 115.9 MG/L (ref 0–8.2)
DELSYS: ABNORMAL
DIFFERENTIAL METHOD BLD: ABNORMAL
EOSINOPHIL # BLD AUTO: 1.5 K/UL (ref 0–0.5)
EOSINOPHIL NFR BLD: 12.2 % (ref 0–8)
ERYTHROCYTE [DISTWIDTH] IN BLOOD BY AUTOMATED COUNT: 18.8 % (ref 11.5–14.5)
ERYTHROCYTE [SEDIMENTATION RATE] IN BLOOD BY PHOTOMETRIC METHOD: 85 MM/HR (ref 0–23)
EST. GFR  (NO RACE VARIABLE): >60 ML/MIN/1.73 M^2
GLUCOSE SERPL-MCNC: 101 MG/DL (ref 70–110)
GLUCOSE SERPL-MCNC: 105 MG/DL (ref 70–110)
GLUCOSE SERPL-MCNC: 122 MG/DL (ref 70–110)
HCO3 UR-SCNC: 37.8 MMOL/L (ref 24–28)
HCO3 UR-SCNC: 39.6 MMOL/L (ref 24–28)
HCO3 UR-SCNC: 40.8 MMOL/L (ref 24–28)
HCT VFR BLD AUTO: 27.2 % (ref 40–54)
HGB BLD-MCNC: 8.7 G/DL (ref 14–18)
IMM GRANULOCYTES # BLD AUTO: 0.16 K/UL (ref 0–0.04)
IMM GRANULOCYTES NFR BLD AUTO: 1.3 % (ref 0–0.5)
LYMPHOCYTES # BLD AUTO: 2.8 K/UL (ref 1–4.8)
LYMPHOCYTES NFR BLD: 22.3 % (ref 18–48)
MAGNESIUM SERPL-MCNC: 2.1 MG/DL (ref 1.6–2.6)
MCH RBC QN AUTO: 26.2 PG (ref 27–31)
MCHC RBC AUTO-ENTMCNC: 32 G/DL (ref 32–36)
MCV RBC AUTO: 82 FL (ref 82–98)
MONOCYTES # BLD AUTO: 1.6 K/UL (ref 0.3–1)
MONOCYTES NFR BLD: 12.6 % (ref 4–15)
MYCOBACTERIUM SPEC QL CULT: NORMAL
NEUTROPHILS # BLD AUTO: 6.3 K/UL (ref 1.8–7.7)
NEUTROPHILS NFR BLD: 50.7 % (ref 38–73)
NRBC BLD-RTO: 0 /100 WBC
OB PNL STL: POSITIVE
PCO2 BLDA: 50.2 MMHG (ref 35–45)
PCO2 BLDA: 50.9 MMHG (ref 35–45)
PCO2 BLDA: 52.1 MMHG (ref 35–45)
PH SMN: 7.48 [PH] (ref 7.35–7.45)
PH SMN: 7.5 [PH] (ref 7.35–7.45)
PH SMN: 7.5 [PH] (ref 7.35–7.45)
PHOSPHATE SERPL-MCNC: 4.5 MG/DL (ref 2.7–4.5)
PLATELET # BLD AUTO: 573 K/UL (ref 150–450)
PMV BLD AUTO: 10.4 FL (ref 9.2–12.9)
PO2 BLDA: 21 MMHG (ref 40–60)
PO2 BLDA: 27 MMHG (ref 40–60)
PO2 BLDA: 27 MMHG (ref 40–60)
POC BE: 14 MMOL/L
POC BE: 17 MMOL/L
POC BE: 18 MMOL/L
POC SATURATED O2: 38 % (ref 95–100)
POC SATURATED O2: 53 % (ref 95–100)
POC SATURATED O2: 56 % (ref 95–100)
POC TCO2: 39 MMOL/L (ref 24–29)
POC TCO2: 41 MMOL/L (ref 24–29)
POC TCO2: 42 MMOL/L (ref 24–29)
POTASSIUM SERPL-SCNC: 3.4 MMOL/L (ref 3.5–5.1)
POTASSIUM SERPL-SCNC: 3.6 MMOL/L (ref 3.5–5.1)
POTASSIUM SERPL-SCNC: 3.8 MMOL/L (ref 3.5–5.1)
PROT SERPL-MCNC: 8.3 G/DL (ref 6–8.4)
RBC # BLD AUTO: 3.32 M/UL (ref 4.6–6.2)
SAMPLE: ABNORMAL
SITE: ABNORMAL
SODIUM SERPL-SCNC: 131 MMOL/L (ref 136–145)
SODIUM SERPL-SCNC: 132 MMOL/L (ref 136–145)
SODIUM SERPL-SCNC: 133 MMOL/L (ref 136–145)
VANCOMYCIN TROUGH SERPL-MCNC: 29.8 UG/ML (ref 10–22)
WBC # BLD AUTO: 12.46 K/UL (ref 3.9–12.7)

## 2023-12-22 PROCEDURE — 85652 RBC SED RATE AUTOMATED: CPT

## 2023-12-22 PROCEDURE — 80202 ASSAY OF VANCOMYCIN: CPT | Performed by: INTERNAL MEDICINE

## 2023-12-22 PROCEDURE — 84100 ASSAY OF PHOSPHORUS: CPT | Performed by: INTERNAL MEDICINE

## 2023-12-22 PROCEDURE — 25000003 PHARM REV CODE 250: Performed by: STUDENT IN AN ORGANIZED HEALTH CARE EDUCATION/TRAINING PROGRAM

## 2023-12-22 PROCEDURE — 20000000 HC ICU ROOM

## 2023-12-22 PROCEDURE — 25000003 PHARM REV CODE 250: Performed by: INTERNAL MEDICINE

## 2023-12-22 PROCEDURE — 82272 OCCULT BLD FECES 1-3 TESTS: CPT

## 2023-12-22 PROCEDURE — 63600175 PHARM REV CODE 636 W HCPCS: Performed by: STUDENT IN AN ORGANIZED HEALTH CARE EDUCATION/TRAINING PROGRAM

## 2023-12-22 PROCEDURE — 82803 BLOOD GASES ANY COMBINATION: CPT

## 2023-12-22 PROCEDURE — 86140 C-REACTIVE PROTEIN: CPT

## 2023-12-22 PROCEDURE — 99231 SBSQ HOSP IP/OBS SF/LOW 25: CPT | Mod: ,,, | Performed by: INTERNAL MEDICINE

## 2023-12-22 PROCEDURE — 83735 ASSAY OF MAGNESIUM: CPT | Performed by: INTERNAL MEDICINE

## 2023-12-22 PROCEDURE — 25000003 PHARM REV CODE 250

## 2023-12-22 PROCEDURE — 99900035 HC TECH TIME PER 15 MIN (STAT)

## 2023-12-22 PROCEDURE — 85730 THROMBOPLASTIN TIME PARTIAL: CPT | Performed by: STUDENT IN AN ORGANIZED HEALTH CARE EDUCATION/TRAINING PROGRAM

## 2023-12-22 PROCEDURE — 94761 N-INVAS EAR/PLS OXIMETRY MLT: CPT | Mod: XB

## 2023-12-22 PROCEDURE — 80048 BASIC METABOLIC PNL TOTAL CA: CPT | Mod: XB | Performed by: INTERNAL MEDICINE

## 2023-12-22 PROCEDURE — 85025 COMPLETE CBC W/AUTO DIFF WBC: CPT | Performed by: INTERNAL MEDICINE

## 2023-12-22 PROCEDURE — 99233 SBSQ HOSP IP/OBS HIGH 50: CPT | Mod: ,,, | Performed by: INTERNAL MEDICINE

## 2023-12-22 PROCEDURE — 80048 BASIC METABOLIC PNL TOTAL CA: CPT | Mod: 91,XB | Performed by: INTERNAL MEDICINE

## 2023-12-22 PROCEDURE — 80053 COMPREHEN METABOLIC PANEL: CPT | Performed by: INTERNAL MEDICINE

## 2023-12-22 RX ORDER — POLYETHYLENE GLYCOL 3350 17 G/17G
17 POWDER, FOR SOLUTION ORAL DAILY
Status: DISCONTINUED | OUTPATIENT
Start: 2023-12-22 | End: 2024-01-08 | Stop reason: HOSPADM

## 2023-12-22 RX ORDER — AMOXICILLIN 250 MG
1 CAPSULE ORAL DAILY PRN
Status: DISCONTINUED | OUTPATIENT
Start: 2023-12-22 | End: 2023-12-22

## 2023-12-22 RX ORDER — METOPROLOL TARTRATE 25 MG/1
25 TABLET, FILM COATED ORAL 3 TIMES DAILY
Status: CANCELLED | OUTPATIENT
Start: 2023-12-22

## 2023-12-22 RX ORDER — METOPROLOL TARTRATE 25 MG/1
25 TABLET, FILM COATED ORAL 3 TIMES DAILY
Status: DISCONTINUED | OUTPATIENT
Start: 2023-12-22 | End: 2023-12-27

## 2023-12-22 RX ORDER — AMOXICILLIN 250 MG
1 CAPSULE ORAL DAILY PRN
Status: DISCONTINUED | OUTPATIENT
Start: 2023-12-22 | End: 2024-01-08 | Stop reason: HOSPADM

## 2023-12-22 RX ADMIN — POTASSIUM CHLORIDE 60 MEQ: 1500 TABLET, EXTENDED RELEASE ORAL at 06:12

## 2023-12-22 RX ADMIN — POTASSIUM CHLORIDE 60 MEQ: 1500 TABLET, EXTENDED RELEASE ORAL at 04:12

## 2023-12-22 RX ADMIN — FAMOTIDINE 20 MG: 20 TABLET ORAL at 08:12

## 2023-12-22 RX ADMIN — METOPROLOL TARTRATE 25 MG: 25 TABLET, FILM COATED ORAL at 08:12

## 2023-12-22 RX ADMIN — HEPARIN SODIUM 23 UNITS/KG/HR: 10000 INJECTION, SOLUTION INTRAVENOUS at 10:12

## 2023-12-22 RX ADMIN — FAMOTIDINE 20 MG: 20 TABLET ORAL at 09:12

## 2023-12-22 RX ADMIN — METOPROLOL TARTRATE 25 MG: 25 TABLET, FILM COATED ORAL at 02:12

## 2023-12-22 RX ADMIN — POTASSIUM CHLORIDE 40 MEQ: 1500 TABLET, EXTENDED RELEASE ORAL at 09:12

## 2023-12-22 RX ADMIN — MEROPENEM 2 G: 1 INJECTION INTRAVENOUS at 03:12

## 2023-12-22 RX ADMIN — DILTIAZEM HYDROCHLORIDE 60 MG: 60 TABLET, FILM COATED ORAL at 06:12

## 2023-12-22 NOTE — PROGRESS NOTES
[facility]  Cardiology  Progress Note    Patient Name: Lorenzo Nino  MRN: [id number]  Admission Date: 12/18/2023  Hospital Length of Stay: 4 days  Code Status: Full Code   Attending Physician: Justine Lobato MD   Primary Care Physician: Terrie, Primary Doctor  Expected Discharge Date: 12/26/2023  Principal Problem:MRSA bacteremia    Subjective:     Hospital Course:   Upon admission to the CICU on afib w/ RVR. Given 1 dose of digoxin and started on heparin gtt and diltiazem gtt. Stabilized on sinus rhythm. CTS evaluated patient and agree on continuing aggressive diuresis with lasix gtt, at the moment not indicated IABP. Pending ID evaluation for AB management. Switched to PO diltiazem due to normal rhythm. Will hold NEW at the moment due to patient with shortness of breath and increased risk of respiratory failure. EKG with Aflutter with PVCs. Patient to have daily EKGs to evaluate flutter. ID evaluated and recommend continuing Abx pending blood cultures. TTE with severe MR and severe LA dilation. No new events of chest pain, with decrease O2 demands on 4L NC . Blood cx NGTD. Respiratory cx with yeast and gram positive cocci.  At the moment with NSR. Will continue diuresing on lasix gtt once euvolemic and will transition to IV pushes once stabilized.  Blood cx with NGTD. Discontinued meropenem as per ID recs. Switched from diltiazem to metoprolol 25 TID. NEW on 12/26/23. Increased acute phase reactants (ESR 85 / .9).     Interval History: respiratory status improving, with decreased O2 requiriments, off nasal canula. Blood cx with NGTD, increased acute phase reactants. Continue con vancomycin. Off meropenem as per ID recs. Switched to metoprolol 25 TID for rate control. NEW on 12/26/23.    Review of Systems   Constitutional: Negative for chills and fever.   Cardiovascular:  Negative for chest pain, cyanosis, orthopnea and syncope.   Respiratory:  Negative for hemoptysis, shortness of  breath and wheezing.    Skin:  Negative for dry skin and rash.   Genitourinary:  Negative for dysuria and hematuria.     Objective:     Vital Signs (Most Recent):  Temp: 98 °F (36.7 °C) (12/22/23 1200)  Pulse: 101 (12/22/23 1400)  Resp: (!) 21 (12/22/23 1400)  BP: 104/69 (12/22/23 1300)  SpO2: 99 % (12/22/23 1400) Vital Signs (24h Range):  Temp:  [98 °F (36.7 °C)-99 °F (37.2 °C)] 98 °F (36.7 °C)  Pulse:  [] 101  Resp:  [21-43] 21  SpO2:  [94 %-100 %] 99 %  BP: ()/(63-77) 104/69     Weight: 67.1 kg (148 lb)  Body mass index is 20.07 kg/m².     SpO2: 99 %         Intake/Output Summary (Last 24 hours) at 12/22/2023 1537  Last data filed at 12/22/2023 1147  Gross per 24 hour   Intake 3091.73 ml   Output 3075 ml   Net 16.73 ml       Lines/Drains/Airways       Peripherally Inserted Central Catheter Line  Duration             PICC Double Lumen 11/13/23 1509 right basilic 39 days              Peripheral Intravenous Line  Duration                  Peripheral IV - Single Lumen 12/18/23 1631 18 G Anterior;Left Forearm 3 days         Peripheral IV - Single Lumen 12/18/23 2300 20 G Left Forearm 3 days                       Physical Exam  Vitals and nursing note reviewed.   Cardiovascular:      Rate and Rhythm: Normal rate and regular rhythm.      Pulses: Normal pulses.      Heart sounds: Murmur (holosystolic mitral murmur) heard.   Pulmonary:      Effort: No respiratory distress.      Breath sounds: Rales (scattered expiratory rales.) present. No wheezing.   Abdominal:      General: Bowel sounds are normal.   Musculoskeletal:         General: No swelling.      Right lower leg: No edema.      Left lower leg: No edema.   Skin:     Capillary Refill: Capillary refill takes 2 to 3 seconds.   Neurological:      Mental Status: He is alert and oriented to person, place, and time.            Significant Labs: All pertinent lab results from the last 24 hours have been reviewed.    Significant Imaging: EKG: NSR  Assessment and  Plan:     Brief HPI: 48 y.o. 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 Presented for 3-day dyspnea. Denies having fever or chills during this time. He states that he was not doing anything strenuous when his symptoms came on. His symptoms got progressively worse over the weekend and he decided to present yesterday. He has been on home IV Daptomycin. He follows with Dr. Mercado with Cardiology and his operation was performed by Dr. Haro.     In the ER, he was hypoxic requiring 6 L to maintain sats of 91-94%. Initial lactic acid 1.8. Creatinine 0.9. WBC 20k. Bedside Echo showed EF 45-50% with severe MR (2 jets, one posteriorly directed and one anteriorly directed), restricted posterior leaflet, possible tear in anterior leaflet. No pericardial effusion.     Patient was transferred to the CICU for AHRF 2/2 significant pulmonary edema 2/2 severe mitral regurgitation concerning for anterior leaflet tear vs posterior leaflet restriction.    * 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  48 y.o. 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 Echo with EF 50-53% severe MR and severe LA dilation. Optimized rate control with metoprolol 25 TID. Increased acute phase reactants. Scheduled for NEW on 12/26/23    Plan:  - cardiac diet   -continue IV Lasix gtt at 10. Will consider lasix pushes once stabilized   - NEW on 12/26/23   - NPO at midnight prior to NEW   - blood cx with NGTD   -  continue vancomycin until 12/24/23 as per ID  - d/c meropenem following ID recs  - CTS evaluated patient on 12/18/23, agreed on IV diuresis and recommended holding IABP. Patient not a surgical candidate  with increased risk for a new surgery at the moment. Reached out to CTS to re-evaluate and explain patient and family risks and outcomes      Anemia due to GI blood loss  - pending fecal occult blood test   - started on PPIx     HFmrEF  - Hold GDMT for now.     Acute hypoxemic respiratory failure  Patient with Hypercapnic Respiratory failure which is Acute.  he is not on home oxygen. Supplemental oxygen was provided with 10L NC.     Signs/symptoms of respiratory failure include- tachypnea, increased work of breathing, respiratory distress, and wheezing. Contributing diagnoses includes - CHF and Pleural effusion Labs and images were reviewed. Patient Has recent ABG, which has been reviewed.     - monitor respiratory status  - decreased O2 requirements, currently on room air.   - NEW on 12/26/23  - famotidine 20 BID for PPI prophylaxis     A-fib with RVR  S/p afib with RVR once admitted to the CCU. On sinus rhythm.     - continue heparin gtt  - started on metoprolol 25 TID for rate control   - discontinue diltiazem   - monitor telemetry    Endocarditis  See severe MR         VTE Risk Mitigation (From admission, onward)           Ordered     heparin 25,000 units in dextrose 5% (100 units/ml) IV bolus from bag - ADDITIONAL PRN BOLUS - 60 units/kg  As needed (PRN)        Question:  Heparin Infusion Adjustment (DO NOT MODIFY ANSWER)  Answer:  \\[email or web address]\Images\Pharmacy\HeparinInfusions\heparin LOW INTENSITY nomogram for [facility] [id number]    12/18/23 2127     heparin 25,000 units in dextrose 5% (100 units/ml) IV bolus from bag - ADDITIONAL PRN BOLUS - 30 units/kg  As needed (PRN)        Question:  Heparin Infusion Adjustment (DO NOT MODIFY ANSWER)  Answer:  \\[email or web address]\[facility]\Images\Pharmacy\HeparinInfusions\heparin  LOW INTENSITY nomogram for [facility] [id number]    12/18/23 2127     heparin 25,000 units in dextrose 5% 250 mL (100 units/mL) infusion LOW INTENSITY nomogram - [facility]  Continuous        Question:  Begin at (units/kg/hr)  Answer:  12 12/18/23 2127                    Jaya Zhang MD  Cardiology  [facility]

## 2023-12-22 NOTE — PROGRESS NOTES
[facility]  Infectious Disease  Progress Note    Patient Name: Lorenzo Nino  MRN: [id number]  Admission Date: 12/18/2023  Length of Stay: 4 days  Attending Physician: Van Wu MD  Primary Care Provider: No, Primary Doctor    Isolation Status: No active isolations  Assessment/Plan:      Pulmonary  Acute hypoxemic respiratory failure  See endocarditis.       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 Reports a dry cough and chills, but denies fevers or sweats, sick contacts. RIP, flu negative. CXR with interstitial coarsening and b/l (L>R) lung opacities. Bedside Echo showed EF 45-50% with severe MR, restricted posterior leaflet, possible tear in anterior leaflet.  Started on empiric vanc/ zosyn. Then broadened with sarah.      Respi failure likely due to pulm edema and less likely multifocal pna. Symptoms have improved significantly with diuresis. RCx with normal ina. Candida in sputum likely represents an airway colonizer.  BCx NGTD.     Recommendations:  --agree with vanc. Pharm to dose for goal trough 15-20.  End date 12/24.  --plan to transition to longterm suppressive doxycycline 100 mg po bid upon completion of IV abx (OR cx +MRSA at time of bovine annuloplasty)  --discussed potential med side effects with pt. Recommend spacing for atleast 2 hrs from intake of dairy products, Ca/ Fe supplements.  --will arrange f/u in ID clinic.  --please notify ID of positive blood cx. If plan for valvular surgery please send tissue for biopsy -path/cx - to determine need for additional IV therapy.    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 I will sign off. Please contact us if you have any additional questions.    Nikia Cantrell MD  Infectious Disease  [facility]    Review of Systems   Respiratory:  Positive for shortness of breath. Negative for sputum production.    All other systems reviewed and are negative.    Physical Exam  Physical Exam  Vitals reviewed.   Constitutional:       Appearance: He is ill-appearing.   HENT:      Head: Normocephalic and atraumatic.   Eyes:      Conjunctiva/sclera: Conjunctivae normal.      Pupils: Pupils are equal, round, and reactive to light.   Cardiovascular:      Rate and Rhythm: Normal rate and regular rhythm.      Heart sounds: Murmur heard.   Pulmonary:      Effort: Pulmonary effort is normal.      Breath sounds: Rales present.   Abdominal:      General: Abdomen is flat.      Palpations: Abdomen is soft.   Musculoskeletal:      Right lower leg: No edema.      Left lower leg: No edema.      Comments: RUE PICC site with clean dressing   Skin:     Findings: No lesion or rash.   Neurological:      General: No focal deficit present.      Mental Status: He is oriented to person, place, and time.   Psychiatric:         Mood and Affect: Mood normal.         Behavior: Behavior normal.   Subjective:     Principal Problem:MRSA bacteremia    HPI: Mr. 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 Reports a dry cough and chills, but denies fevers or sweats, sick contacts.    Of note, pt was followed by ID during recent  "hospital admission 10/30-11/14. Due to prolonged bacteremia pt was managed with salvage therapy with dapto/ ceftaroline. BCx cleared on 11/11.  Plan was to continue ceftaroline until 11/19 and dapto until 12/24.     In the ED pt was afebrile, tachycardic and hypoxemic. Labs remarkable for leukocytosis. RIP, flu negative. CXR with interstitial coarsening and b/l (L>R) lung opacities. Bedside Echo showed EF 45-50% with severe MR, restricted posterior leaflet, possible tear in anterior leaflet.  Started on empiric vanc/ zosyn. Then broadened with sarah. BCx 12/18 remains ngtd.     ID consulted for: "Antibiotics recommendations. Patient on home IV daptomycin ."  No new subjective & objective note has been filed under this hospital service since the last note was generated.    "

## 2023-12-22 NOTE — ASSESSMENT & PLAN NOTE
Patient with Hypercapnic Respiratory failure which is Acute.  he is not on home oxygen. Supplemental oxygen was provided with 10L NC.     Signs/symptoms of respiratory failure include- tachypnea, increased work of breathing, respiratory distress, and wheezing. Contributing diagnoses includes - CHF and Pleural effusion Labs and images were reviewed. Patient Has recent ABG, which has been reviewed.     - monitor respiratory status  - decreased O2 requirements, currently on room air.   - NEW on 12/26/23  - famotidine 20 BID for PPI prophylaxis

## 2023-12-22 NOTE — ASSESSMENT & PLAN NOTE
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 Reports a dry cough and chills, but denies fevers or sweats, sick contacts. RIP, flu negative. CXR with interstitial coarsening and b/l (L>R) lung opacities. Bedside Echo showed EF 45-50% with severe MR, restricted posterior leaflet, possible tear in anterior leaflet.  NEW performed on 12/26/23 showed dehiscence of the porcine annular repair of his valve.    Cardiology and CT surgery concerned that persistent infection may be the cause for the dehiscence.      Plan    Out of an abundance of precaution, will recommend extending IV daptomycin for another 2 weeks (8 weeks total).  Afterwards, patient should be transitioned to oral doxycycline 100 mg po bid until new valve is placed.    At the time of valve replacement surgery, please obtain intra-operative tissue cultures to definitively determine if there is still some infection present.

## 2023-12-22 NOTE — ASSESSMENT & PLAN NOTE
48 y.o. 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 Echo with EF 50-53% severe MR and severe LA dilation. Optimized rate control with metoprolol 25 TID. Increased acute phase reactants. Scheduled for NEW on 12/26/23    Plan:  - cardiac diet   -continue IV Lasix gtt at 10. Will consider lasix pushes once stabilized   - NEW on 12/26/23   - NPO at midnight prior to NEW   - blood cx with NGTD   - continue vancomycin until 12/24/23 as per ID  - d/c meropenem following ID recs  - CTS evaluated patient on 12/18/23, agreed on IV diuresis and recommended holding IABP. Patient not a surgical candidate  with increased risk for a new surgery at the moment. Reached out to CTS to re-evaluate and explain patient and family risks and outcomes

## 2023-12-22 NOTE — SUBJECTIVE & OBJECTIVE
Interval History: respiratory status improving, with decreased O2 requiriments, off nasal canula. Blood cx with NGTD, increased acute phase reactants. Continue con vancomycin. Off meropenem as per ID recs. Switched to metoprolol 25 TID for rate control. NEW on 12/26/23.    Review of Systems   Constitutional: Negative for chills and fever.   Cardiovascular:  Negative for chest pain, cyanosis, orthopnea and syncope.   Respiratory:  Negative for hemoptysis, shortness of breath and wheezing.    Skin:  Negative for dry skin and rash.   Genitourinary:  Negative for dysuria and hematuria.     Objective:     Vital Signs (Most Recent):  Temp: 98 °F (36.7 °C) (12/22/23 1200)  Pulse: 101 (12/22/23 1400)  Resp: (!) 21 (12/22/23 1400)  BP: 104/69 (12/22/23 1300)  SpO2: 99 % (12/22/23 1400) Vital Signs (24h Range):  Temp:  [98 °F (36.7 °C)-99 °F (37.2 °C)] 98 °F (36.7 °C)  Pulse:  [] 101  Resp:  [21-43] 21  SpO2:  [94 %-100 %] 99 %  BP: ()/(63-77) 104/69     Weight: 67.1 kg (148 lb)  Body mass index is 20.07 kg/m².     SpO2: 99 %         Intake/Output Summary (Last 24 hours) at 12/22/2023 1537  Last data filed at 12/22/2023 1147  Gross per 24 hour   Intake 3091.73 ml   Output 3075 ml   Net 16.73 ml       Lines/Drains/Airways       Peripherally Inserted Central Catheter Line  Duration             PICC Double Lumen 11/13/23 1509 right basilic 39 days              Peripheral Intravenous Line  Duration                  Peripheral IV - Single Lumen 12/18/23 1631 18 G Anterior;Left Forearm 3 days         Peripheral IV - Single Lumen 12/18/23 2300 20 G Left Forearm 3 days                       Physical Exam  Vitals and nursing note reviewed.   Cardiovascular:      Rate and Rhythm: Normal rate and regular rhythm.      Pulses: Normal pulses.      Heart sounds: Murmur (holosystolic mitral murmur) heard.   Pulmonary:      Effort: No respiratory distress.      Breath sounds: Rales (scattered expiratory rales.) present. No  wheezing.   Abdominal:      General: Bowel sounds are normal.   Musculoskeletal:         General: No swelling.      Right lower leg: No edema.      Left lower leg: No edema.   Skin:     Capillary Refill: Capillary refill takes 2 to 3 seconds.   Neurological:      Mental Status: He is alert and oriented to person, place, and time.            Significant Labs: All pertinent lab results from the last 24 hours have been reviewed.    Significant Imaging: EKG: NSR

## 2023-12-22 NOTE — PROGRESS NOTES
Pharmacokinetic Assessment Follow Up: IV Vancomycin    Vancomycin serum concentration assessment(s):    The trough level was drawn correctly and can be used to guide therapy at this time. The measurement is above the desired definitive target range of 15 to 20 mcg/mL.    Vancomycin Regimen Plan:    Discontinue the scheduled vancomycin regimen and re-dose when the random level is less than 20 mcg/mL, next level to be drawn at am labs on 12/23.    Drug levels (last 3 results):  Recent Labs   Lab Result Units 12/20/23  0205 12/22/23  0314   Vancomycin-Trough ug/mL 26.2* 29.8*       Pharmacy will continue to follow and monitor vancomycin.    Please contact pharmacy at extension [phone] for questions regarding this assessment.    Thank you for the consult,   Aminata Bueno

## 2023-12-22 NOTE — ASSESSMENT & PLAN NOTE
S/p afib with RVR once admitted to the CCU. On sinus rhythm.     - continue heparin gtt  - started on metoprolol 25 TID for rate control   - discontinue diltiazem   - monitor telemetry

## 2023-12-22 NOTE — PLAN OF CARE
CICU Care Plan    POC reviewed with Lorenzo Arzolaythe and family at 1901. Pt verbalized understanding. Questions and concerns addressed. No acute events noted this shift. Pt progressing toward goals. Security measures in place, plan of care to continue. See below and flowsheets for full assessment and VS info.     SvO2: 61    Gtts:  Lasix @ 10 mg/hr  Heparin @ 23 units/kg/min    Neuro:  [place] Coma Scale  Best Eye Response: 4-->(E4) spontaneous  Best Motor Response: 6-->(M6) obeys commands  Best Verbal Response: 5-->(V5) oriented  [place] Coma Scale Score: 15  Assessment Qualifiers: patient not sedated/intubated, no eye obstruction present  Pupil PERRLA: yes     24 hr Temp:  [98.3 °F (36.8 °C)-99 °F (37.2 °C)]     CV:   Rhythm: atrial rhythm  BP goals:   SBP < 160  MAP > 65    Resp:   Plan: 2L NC while sleeping, RA during day    GI/:     Diet/Nutrition Received: 2 gram sodium, low saturated fat/low cholesterol  Last Bowel Movement: 12/19/23  Voiding Characteristics: voids spontaneously without difficulty    Intake/Output Summary (Last 24 hours) at 12/22/2023 0633  Last data filed at 12/22/2023 0601  Gross per 24 hour   Intake 2611.73 ml   Output 3325 ml   Net -713.27 ml     Unmeasured Output  Stool Occurrence: 1  Nutritional Supplement Intake: Quantity 0, Type: Boost    Labs/Accuchecks:  Recent Labs   Lab 12/22/23  0314   WBC 12.46   RBC 3.32*   HGB 8.7*   HCT 27.2*   *      Recent Labs   Lab 12/22/23  0314   *   K 3.4*   CO2 32*   CL 84*   BUN 20   CREATININE 0.9   ALKPHOS 88   ALT 12   AST 29   BILITOT 0.9      Recent Labs   Lab 12/18/23  2158 12/19/23  0500 12/22/23  0314   INR 1.1  --   --    APTT 33.7*   < > 47.0*    < > = values in this interval not displayed.      Recent Labs   Lab 12/18/23  1240 12/18/23  1619   CPK 83  --    TROPONINI  --  0.060*       Electrolytes: Electrolytes replaced  Accuchecks: none    Gtts:   furosemide (LASIX) 500 mg in 50 mL infusion (conc: 10 mg/mL) 10 mg/hr  (12/22/23 0401)    heparin (porcine) in D5W 23 Units/kg/hr (12/22/23 0401)       LDA/Wounds:  Lines/Drains/Airways       Peripherally Inserted Central Catheter Line  Duration             PICC Double Lumen 11/13/23 1509 right basilic 38 days              Peripheral Intravenous Line  Duration                  Peripheral IV - Single Lumen 12/18/23 1631 18 G Anterior;Left Forearm 3 days         Peripheral IV - Single Lumen 12/18/23 2300 20 G Left Forearm 3 days                  Wounds: No  Wound care consulted: No  Patient refused 2% CHG Bath, requested bath be provided by significant other at bedside during the day time. Education provided about importance of bath and infection control. Patient verbalized understanding. Care continues.    Problem: Adult Inpatient Plan of Care  Goal: Plan of Care Review  Outcome: Ongoing, Progressing  Goal: Patient-Specific Goal (Individualized)  Outcome: Ongoing, Progressing  Goal: Absence of Hospital-Acquired Illness or Injury  Outcome: Ongoing, Progressing  Goal: Optimal Comfort and Wellbeing  Outcome: Ongoing, Progressing  Goal: Readiness for Transition of Care  Outcome: Ongoing, Progressing     Problem: Adjustment to Illness (Sepsis/Septic Shock)  Goal: Optimal Coping  Outcome: Ongoing, Progressing     Problem: Bleeding (Sepsis/Septic Shock)  Goal: Absence of Bleeding  Outcome: Ongoing, Progressing     Problem: Glycemic Control Impaired (Sepsis/Septic Shock)  Goal: Blood Glucose Level Within Desired Range  Outcome: Ongoing, Progressing     Problem: Infection Progression (Sepsis/Septic Shock)  Goal: Absence of Infection Signs and Symptoms  Outcome: Ongoing, Progressing     Problem: Nutrition Impaired (Sepsis/Septic Shock)  Goal: Optimal Nutrition Intake  Outcome: Ongoing, Progressing     Problem: Fluid and Electrolyte Imbalance (Acute Kidney Injury/Impairment)  Goal: Fluid and Electrolyte Balance  Outcome: Ongoing, Progressing     Problem: Oral Intake Inadequate (Acute Kidney  Injury/Impairment)  Goal: Optimal Nutrition Intake  Outcome: Ongoing, Progressing     Problem: Renal Function Impairment (Acute Kidney Injury/Impairment)  Goal: Effective Renal Function  Outcome: Ongoing, Progressing     Problem: Infection  Goal: Absence of Infection Signs and Symptoms  Outcome: Ongoing, Progressing     Problem: Fall Injury Risk  Goal: Absence of Fall and Fall-Related Injury  Outcome: Ongoing, Progressing     Problem: Skin Injury Risk Increased  Goal: Skin Health and Integrity  Outcome: Ongoing, Progressing

## 2023-12-22 NOTE — PLAN OF CARE
[facility]  Discharge Reassessment    Primary Care Provider: No, Primary Doctor    Expected Discharge Date: 12/26/2023    Per MDRs, patient not medically ready for discharge at this time.  Patient active with [facility] and [facility] prior to admission.  CM following for discharge planning needs    Reassessment (most recent)       Discharge Reassessment - 12/22/23 1542          Discharge Reassessment    Assessment Type Discharge Planning Reassessment     Did the patient's condition or plan change since previous assessment? Yes     Discharge Plan A Home Health   Home IV ABX    Discharge Plan B Long-term acute care facility (LTAC)     DME Needed Upon Discharge  none     Transition of Care Barriers None     Why the patient remains in the hospital Requires continued medical care

## 2023-12-23 LAB
ALBUMIN SERPL BCP-MCNC: 2.5 G/DL (ref 3.5–5.2)
ALP SERPL-CCNC: 91 U/L (ref 55–135)
ALT SERPL W/O P-5'-P-CCNC: 14 U/L (ref 10–44)
ANION GAP SERPL CALC-SCNC: 12 MMOL/L (ref 8–16)
ANION GAP SERPL CALC-SCNC: 17 MMOL/L (ref 8–16)
ANISOCYTOSIS BLD QL SMEAR: SLIGHT
APTT PPP: 45.5 SEC (ref 21–32)
AST SERPL-CCNC: 32 U/L (ref 10–40)
BACTERIA BLD CULT: NORMAL
BACTERIA BLD CULT: NORMAL
BASOPHILS # BLD AUTO: ABNORMAL K/UL (ref 0–0.2)
BASOPHILS NFR BLD: 1 % (ref 0–1.9)
BILIRUB SERPL-MCNC: 0.7 MG/DL (ref 0.1–1)
BUN SERPL-MCNC: 20 MG/DL (ref 6–20)
BUN SERPL-MCNC: 23 MG/DL (ref 6–20)
CALCIUM SERPL-MCNC: 10.1 MG/DL (ref 8.7–10.5)
CALCIUM SERPL-MCNC: 9.7 MG/DL (ref 8.7–10.5)
CHLORIDE SERPL-SCNC: 87 MMOL/L (ref 95–110)
CHLORIDE SERPL-SCNC: 88 MMOL/L (ref 95–110)
CO2 SERPL-SCNC: 28 MMOL/L (ref 23–29)
CO2 SERPL-SCNC: 30 MMOL/L (ref 23–29)
CREAT SERPL-MCNC: 0.9 MG/DL (ref 0.5–1.4)
CREAT SERPL-MCNC: 1 MG/DL (ref 0.5–1.4)
DACRYOCYTES BLD QL SMEAR: ABNORMAL
DIFFERENTIAL METHOD BLD: ABNORMAL
DOHLE BOD BLD QL SMEAR: PRESENT
EOSINOPHIL # BLD AUTO: ABNORMAL K/UL (ref 0–0.5)
EOSINOPHIL NFR BLD: 7 % (ref 0–8)
ERYTHROCYTE [DISTWIDTH] IN BLOOD BY AUTOMATED COUNT: 18.5 % (ref 11.5–14.5)
EST. GFR  (NO RACE VARIABLE): >60 ML/MIN/1.73 M^2
EST. GFR  (NO RACE VARIABLE): >60 ML/MIN/1.73 M^2
FERRITIN SERPL-MCNC: 1477 NG/ML (ref 20–300)
GIANT PLATELETS BLD QL SMEAR: PRESENT
GLUCOSE SERPL-MCNC: 110 MG/DL (ref 70–110)
GLUCOSE SERPL-MCNC: 126 MG/DL (ref 70–110)
HCT VFR BLD AUTO: 29 % (ref 40–54)
HGB BLD-MCNC: 9.2 G/DL (ref 14–18)
HYPOCHROMIA BLD QL SMEAR: ABNORMAL
IMM GRANULOCYTES # BLD AUTO: ABNORMAL K/UL (ref 0–0.04)
IMM GRANULOCYTES NFR BLD AUTO: ABNORMAL % (ref 0–0.5)
IRON SERPL-MCNC: 31 UG/DL (ref 45–160)
LYMPHOCYTES # BLD AUTO: ABNORMAL K/UL (ref 1–4.8)
LYMPHOCYTES NFR BLD: 20 % (ref 18–48)
MAGNESIUM SERPL-MCNC: 2.2 MG/DL (ref 1.6–2.6)
MAGNESIUM SERPL-MCNC: 2.3 MG/DL (ref 1.6–2.6)
MCH RBC QN AUTO: 25.7 PG (ref 27–31)
MCHC RBC AUTO-ENTMCNC: 31.7 G/DL (ref 32–36)
MCV RBC AUTO: 81 FL (ref 82–98)
METAMYELOCYTES NFR BLD MANUAL: 2 %
MONOCYTES # BLD AUTO: ABNORMAL K/UL (ref 0.3–1)
MONOCYTES NFR BLD: 7 % (ref 4–15)
MYELOCYTES NFR BLD MANUAL: 2 %
NEUTROPHILS NFR BLD: 60 % (ref 38–73)
NEUTS BAND NFR BLD MANUAL: 1 %
NRBC BLD-RTO: 0 /100 WBC
OVALOCYTES BLD QL SMEAR: ABNORMAL
PLATELET # BLD AUTO: 627 K/UL (ref 150–450)
PLATELET BLD QL SMEAR: ABNORMAL
PMV BLD AUTO: 10.1 FL (ref 9.2–12.9)
POIKILOCYTOSIS BLD QL SMEAR: SLIGHT
POLYCHROMASIA BLD QL SMEAR: ABNORMAL
POTASSIUM SERPL-SCNC: 3.7 MMOL/L (ref 3.5–5.1)
POTASSIUM SERPL-SCNC: 3.8 MMOL/L (ref 3.5–5.1)
PROT SERPL-MCNC: 8.6 G/DL (ref 6–8.4)
RBC # BLD AUTO: 3.58 M/UL (ref 4.6–6.2)
SATURATED IRON: 10 % (ref 20–50)
SODIUM SERPL-SCNC: 130 MMOL/L (ref 136–145)
SODIUM SERPL-SCNC: 132 MMOL/L (ref 136–145)
SPHEROCYTES BLD QL SMEAR: ABNORMAL
TARGETS BLD QL SMEAR: ABNORMAL
TOTAL IRON BINDING CAPACITY: 315 UG/DL (ref 250–450)
TOXIC GRANULES BLD QL SMEAR: PRESENT
TRANSFERRIN SERPL-MCNC: 213 MG/DL (ref 200–375)
VANCOMYCIN SERPL-MCNC: 11.6 UG/ML
VIT B12 SERPL-MCNC: 344 PG/ML (ref 210–950)
WBC # BLD AUTO: 15.2 K/UL (ref 3.9–12.7)

## 2023-12-23 PROCEDURE — 97530 THERAPEUTIC ACTIVITIES: CPT

## 2023-12-23 PROCEDURE — 20000000 HC ICU ROOM

## 2023-12-23 PROCEDURE — 63600175 PHARM REV CODE 636 W HCPCS: Performed by: STUDENT IN AN ORGANIZED HEALTH CARE EDUCATION/TRAINING PROGRAM

## 2023-12-23 PROCEDURE — 82728 ASSAY OF FERRITIN: CPT

## 2023-12-23 PROCEDURE — 25000003 PHARM REV CODE 250: Performed by: INTERNAL MEDICINE

## 2023-12-23 PROCEDURE — 83735 ASSAY OF MAGNESIUM: CPT | Mod: 91 | Performed by: STUDENT IN AN ORGANIZED HEALTH CARE EDUCATION/TRAINING PROGRAM

## 2023-12-23 PROCEDURE — 83540 ASSAY OF IRON: CPT

## 2023-12-23 PROCEDURE — 97165 OT EVAL LOW COMPLEX 30 MIN: CPT

## 2023-12-23 PROCEDURE — 82607 VITAMIN B-12: CPT

## 2023-12-23 PROCEDURE — 80202 ASSAY OF VANCOMYCIN: CPT | Performed by: INTERNAL MEDICINE

## 2023-12-23 PROCEDURE — 80053 COMPREHEN METABOLIC PANEL: CPT | Performed by: INTERNAL MEDICINE

## 2023-12-23 PROCEDURE — 85730 THROMBOPLASTIN TIME PARTIAL: CPT | Performed by: STUDENT IN AN ORGANIZED HEALTH CARE EDUCATION/TRAINING PROGRAM

## 2023-12-23 PROCEDURE — 63600175 PHARM REV CODE 636 W HCPCS: Performed by: INTERNAL MEDICINE

## 2023-12-23 PROCEDURE — 25000003 PHARM REV CODE 250

## 2023-12-23 PROCEDURE — 85027 COMPLETE CBC AUTOMATED: CPT | Performed by: INTERNAL MEDICINE

## 2023-12-23 PROCEDURE — 85007 BL SMEAR W/DIFF WBC COUNT: CPT | Performed by: INTERNAL MEDICINE

## 2023-12-23 PROCEDURE — 83735 ASSAY OF MAGNESIUM: CPT | Performed by: INTERNAL MEDICINE

## 2023-12-23 PROCEDURE — 80048 BASIC METABOLIC PNL TOTAL CA: CPT | Mod: XB | Performed by: STUDENT IN AN ORGANIZED HEALTH CARE EDUCATION/TRAINING PROGRAM

## 2023-12-23 PROCEDURE — 97161 PT EVAL LOW COMPLEX 20 MIN: CPT

## 2023-12-23 PROCEDURE — 94761 N-INVAS EAR/PLS OXIMETRY MLT: CPT

## 2023-12-23 RX ORDER — VANCOMYCIN HCL IN 5 % DEXTROSE 1.25 G/25
1250 PLASTIC BAG, INJECTION (ML) INTRAVENOUS
Status: DISCONTINUED | OUTPATIENT
Start: 2023-12-23 | End: 2023-12-23

## 2023-12-23 RX ORDER — POTASSIUM CHLORIDE 20 MEQ/1
40 TABLET, EXTENDED RELEASE ORAL ONCE
Status: DISCONTINUED | OUTPATIENT
Start: 2023-12-23 | End: 2023-12-23

## 2023-12-23 RX ADMIN — VANCOMYCIN HYDROCHLORIDE 1250 MG: 1.25 INJECTION, POWDER, LYOPHILIZED, FOR SOLUTION INTRAVENOUS at 09:12

## 2023-12-23 RX ADMIN — METOPROLOL TARTRATE 25 MG: 25 TABLET, FILM COATED ORAL at 03:12

## 2023-12-23 RX ADMIN — FAMOTIDINE 20 MG: 20 TABLET ORAL at 09:12

## 2023-12-23 RX ADMIN — METOPROLOL TARTRATE 25 MG: 25 TABLET, FILM COATED ORAL at 09:12

## 2023-12-23 RX ADMIN — HEPARIN SODIUM 23 UNITS/KG/HR: 10000 INJECTION, SOLUTION INTRAVENOUS at 03:12

## 2023-12-23 RX ADMIN — POTASSIUM CHLORIDE 40 MEQ: 1500 TABLET, EXTENDED RELEASE ORAL at 04:12

## 2023-12-23 RX ADMIN — FUROSEMIDE 10 MG/HR: 10 INJECTION, SOLUTION INTRAMUSCULAR; INTRAVENOUS at 11:12

## 2023-12-23 RX ADMIN — METOPROLOL TARTRATE 25 MG: 25 TABLET, FILM COATED ORAL at 08:12

## 2023-12-23 RX ADMIN — FAMOTIDINE 20 MG: 20 TABLET ORAL at 08:12

## 2023-12-23 RX ADMIN — POTASSIUM CHLORIDE 40 MEQ: 1500 TABLET, EXTENDED RELEASE ORAL at 03:12

## 2023-12-23 RX ADMIN — HEPARIN SODIUM 23 UNITS/KG/HR: 10000 INJECTION, SOLUTION INTRAVENOUS at 09:12

## 2023-12-23 NOTE — ASSESSMENT & PLAN NOTE
S/p afib with RVR once admitted to the CCU. On sinus rhythm.     - continue heparin gtt  - continue metoprolol 25 TID for rate control   - monitor telemetry

## 2023-12-23 NOTE — PROGRESS NOTES
Svo2 is not needed @ this time per RN ( Albert). RN stated that order will be cancelled. Will continue to monitor.

## 2023-12-23 NOTE — TREATMENT PLAN
Cardiothoracic Surgery    Patient seen and discussion was held with him and his mother regarding risks, benefits, and timing of reoperative surgery with thoughtful questions answered. Patient is currently showing significant clinical improvement with lasix gtt - sitting and able to lay comfortably, no dyspnea, conversing well in no distress. Cultures have NGTD and CXR significantly improved. Primary service planning for NEW this Tuesday 12/26 and expect transition off of gtt soon based on clinical presentation.     With anticipation of continued clinical improvement, expect patient will be able to eventually discharge with optimal medical management. He can see Dr. Beal as an outpatient to discuss the role/timing of reoperation. If there is concern for vegetation on the upcoming NEW, would recommend continued antibiotic therapy with re-imaging and still see Dr. Beal as outpatient.     Will remain available to re-evaluate Mr. Nino and revisit options should clinical status regress.       Tiago Arevalo, DO  Cardiothoracic Surgery Fellow

## 2023-12-23 NOTE — SUBJECTIVE & OBJECTIVE
Interval History: Increased acute phase reactants. Respiratory cx positive for Candida dubliensis. VBG with compensated respiratory alkalosis, however tolerating room air. Continues on lasix gtt. CTS to re-evaluate patient for outcomes on high-risk surgery given recent surgical procedure. Continue vancomycin until 12/24. NEW on 12/26/23. Continue diuresing.     Review of Systems   Constitutional: Negative for chills and fever.   Cardiovascular:  Negative for chest pain, cyanosis, orthopnea and syncope.   Respiratory:  Negative for hemoptysis, shortness of breath and wheezing.    Skin:  Negative for dry skin and rash.   Genitourinary:  Negative for dysuria and hematuria.     Objective:     Vital Signs (Most Recent):  Temp: 98.7 °F (37.1 °C) (12/23/23 0304)  Pulse: 87 (12/23/23 0752)  Resp: (!) 23 (12/23/23 0700)  BP: 102/61 (12/23/23 0700)  SpO2: 98 % (12/23/23 0700) Vital Signs (24h Range):  Temp:  [98 °F (36.7 °C)-98.7 °F (37.1 °C)] 98.7 °F (37.1 °C)  Pulse:  [] 87  Resp:  [19-31] 23  SpO2:  [96 %-100 %] 98 %  BP: ()/(61-79) 102/61     Weight: 67.1 kg (148 lb)  Body mass index is 20.07 kg/m².     SpO2: 98 %         Intake/Output Summary (Last 24 hours) at 12/23/2023 0847  Last data filed at 12/23/2023 0700  Gross per 24 hour   Intake 2111.88 ml   Output 3050 ml   Net -938.12 ml       Lines/Drains/Airways       Peripherally Inserted Central Catheter Line  Duration             PICC Double Lumen 11/13/23 1509 right basilic 39 days              Peripheral Intravenous Line  Duration                  Peripheral IV - Single Lumen 12/18/23 1631 18 G Anterior;Left Forearm 4 days         Peripheral IV - Single Lumen 12/18/23 2300 20 G Left Forearm 4 days                       Physical Exam  Vitals and nursing note reviewed.   Cardiovascular:      Rate and Rhythm: Normal rate and regular rhythm.      Pulses: Normal pulses.      Heart sounds: Murmur (holosystolic mitral murmur) heard.   Pulmonary:      Effort: No  respiratory distress.      Breath sounds: No wheezing or rales (scattered expiratory rales.).   Abdominal:      General: Bowel sounds are normal.   Musculoskeletal:         General: No swelling.      Right lower leg: No edema.      Left lower leg: No edema.   Skin:     Capillary Refill: Capillary refill takes 2 to 3 seconds.   Neurological:      Mental Status: He is alert and oriented to person, place, and time.            Significant Labs: All pertinent lab results from the last 24 hours have been reviewed.

## 2023-12-23 NOTE — ASSESSMENT & PLAN NOTE
48 y.o. 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 Echo with EF 50-53% severe MR and severe LA dilation. Optimized rate control with metoprolol 25 TID. Increased acute phase reactants. Scheduled for NEW on 12/26/23    Plan:  - cardiac diet   -continue IV Lasix gtt at 10.   - NEW on 12/26/23   - NPO at midnight prior to NEW   - blood cx with NGTD   - continue vancomycin (Day 4) until 12/24/23 as per ID  - pending CTS re-evaluation to explain risks of new procedure given recent surgery

## 2023-12-23 NOTE — ASSESSMENT & PLAN NOTE
Patient with Hypercapnic Respiratory failure which is Acute.  he is not on home oxygen. Supplemental oxygen was provided with 10L NC.     Signs/symptoms of respiratory failure include- tachypnea, increased work of breathing, respiratory distress, and wheezing. Contributing diagnoses includes - CHF and Pleural effusion Labs and images were reviewed. Patient Has recent ABG, which has been reviewed.     - monitor respiratory status. Currently on room air.    - NEW on 12/26/23  - famotidine 20 BID for PPI prophylaxis   - re-check BNP on 12/24

## 2023-12-23 NOTE — PLAN OF CARE
Cardiac ICU Care Plan    POC reviewed with Lorenzo Nino and family. Questions and concerns addressed. No acute events today. Pt progressing toward goals. Will continue to monitor. See below and flowsheets for full assessment and VS info.       Neuro:  [place] Coma Scale  Best Eye Response: 4-->(E4) spontaneous  Best Motor Response: 6-->(M6) obeys commands  Best Verbal Response: 5-->(V5) oriented  [place] Coma Scale Score: 15  Assessment Qualifiers: patient not sedated/intubated  Pupil PERRLA: yes    24 hr Temp:  [98 °F (36.7 °C)-98.7 °F (37.1 °C)]      CV:  Rhythm: normal sinus rhythm   DVT prophylaxis: VTE Required Core Measure: Pharmacological prophylaxis initiated/maintained    CVP (mean): 2 mmHg (12/21/23 0800)       SVO2 (%): (S) 61 % (12/21/23 2013)               Pulses  Right Radial Pulse: 2+ (normal)  Left Radial Pulse: 2+ (normal)  Right Dorsalis Pedis Pulse: 1+ (weak)  Left Dorsalis Pedis Pulse: 1+ (weak)  Right Posterior Tibial Pulse: 1+ (weak)  Left Posterior Tibial Pulse: 1+ (weak)    Resp:  Flow (L/min): 0       GI/:  GI prophylaxis: yes  Diet/Nutrition Received: 2 gram sodium, low saturated fat/low cholesterol  Last Bowel Movement: 12/22/23  Voiding Characteristics: voids spontaneously without difficulty   Intake/Output Summary (Last 24 hours) at 12/23/2023 1524  Last data filed at 12/23/2023 1500  Gross per 24 hour   Intake 2246.57 ml   Output 3300 ml   Net -1053.43 ml        Nutritional Supplement Intake: Quantity , Type:     Labs/Accuchecks:  Recent Labs   Lab 12/21/23  0404 12/22/23  0314 12/23/23  0210   WBC 13.31* 12.46 15.20*   RBC 2.99* 3.32* 3.58*   HGB 7.8* 8.7* 9.2*   HCT 24.2* 27.2* 29.0*   * 573* 627*      Recent Labs   Lab 12/18/23  2158 12/19/23  0500 12/21/23  0607 12/22/23  0314 12/23/23  0210   INR 1.1  --   --   --   --    APTT 33.7*   < > 42.9* 47.0* 45.5*    < > = values in this interval not displayed.      Recent Labs     12/23/23  0210 12/23/23  1325   * 132*    K 3.8 3.7   CO2 30* 28   CL 88* 87*   BUN 20 23*   CREATININE 0.9 1.0   ALKPHOS 91  --    ALT 14  --    AST 32  --    BILITOT 0.7  --        Recent Labs   Lab 12/18/23  1240 12/18/23  1619   CPK 83  --    TROPONINI  --  0.060*      Recent Labs     12/22/23  0743 12/22/23  0750 12/22/23  2044   PH 7.501* 7.505* 7.479*   PCO2 52.1* 50.2* 50.9*   PO2 21* 27* 27*   HCO3 40.8* 39.6* 37.8*   POCSATURATED 38 56 53   BE 18* 17* 14*       Electrolytes: Electrolytes replaced  Accuchecks: none    Gtts/LDAs:   furosemide (LASIX) 500 mg in 50 mL infusion (conc: 10 mg/mL) 10 mg/hr (12/23/23 1500)    heparin (porcine) in D5W 23 Units/kg/hr (12/23/23 1500)       Lines/Drains/Airways       Peripherally Inserted Central Catheter Line  Duration             PICC Double Lumen 11/13/23 1509 right basilic 40 days              Peripheral Intravenous Line  Duration                  Peripheral IV - Single Lumen 12/18/23 1631 18 G Anterior;Left Forearm 4 days         Peripheral IV - Single Lumen 12/18/23 2300 20 G Left Forearm 4 days                    Skin/Wounds  Bathing/Skin Care: patient refused (Bath done independently on prior shift.) (12/22/23 2303)  Wounds: Yes  Wound care consulted: Yes

## 2023-12-23 NOTE — PROGRESS NOTES
Pharmacokinetic Assessment Follow Up: IV Vancomycin    Vancomycin serum concentration assessment(s):    The random level was drawn correctly and can be used to guide therapy at this time. The measurement is below the desired definitive target range of 15 to 20 mcg/mL.    Vancomycin Regimen Plan:    Change regimen to Vancomycin 1250 mg IV every 24 hours with next serum trough concentration measured at 0645 prior to 3rd dose on 12/25    Drug levels (last 3 results):  Recent Labs   Lab Result Units 12/22/23  0314 12/23/23  0210   Vancomycin, Random ug/mL  --  11.6   Vancomycin-Trough ug/mL 29.8*  --        Pharmacy will continue to follow and monitor vancomycin.    Please contact pharmacy at extension [phone] for questions regarding this assessment.    Thank you for the consult,   Verito Butler       Patient brief summary:  Lorenzo Nino is a 48 y.o. male initiated on antimicrobial therapy with IV Vancomycin for treatment of endocarditis    Drug Allergies:   Review of patient's allergies indicates:  No Known Allergies    Actual Body Weight:   67.1 kg    Renal Function:   Estimated Creatinine Clearance: 94.2 mL/min (based on SCr of 0.9 mg/dL).    Dialysis Method (if applicable):  N/A    CBC (last 72 hours):  Recent Labs   Lab Result Units 12/21/23  0404 12/22/23  0314 12/23/23  0210   WBC K/uL 13.31* 12.46 15.20*   Hemoglobin g/dL 7.8* 8.7* 9.2*   Hematocrit % 24.2* 27.2* 29.0*   Platelets K/uL 532* 573* 627*   Gran % % 56.6 50.7 60.0   Lymph % % 19.2 22.3 20.0   Mono % % 11.5 12.6 7.0   Eosinophil % % 11.0* 12.2* 7.0   Basophil % % 0.5 0.9 1.0   Differential Method  Automated Automated Manual       Metabolic Panel (last 72 hours):  Recent Labs   Lab Result Units 12/20/23  0828 12/20/23  1722 12/20/23  2355 12/21/23  0404 12/22/23  0314 12/22/23  0743 12/22/23  1657 12/23/23  0210   Sodium mmol/L 137 135* 133* 134* 131* 133* 132* 130*   Potassium mmol/L 3.9 3.5 4.0 3.7 3.4* 3.8 3.6 3.8   Chloride mmol/L 94* 91* 89*  88* 84* 84* 85* 88*   CO2 mmol/L 30* 30* 34* 35* 32* 35* 32* 30*   Glucose mg/dL 115* 139* 105 98 105 101 122* 110   BUN mg/dL 12 12 14 13 20 18 22* 20   Creatinine mg/dL 0.8 0.8 0.8 0.8 0.9 0.9 0.9 0.9   Albumin g/dL  --   --   --  2.2* 2.3*  --   --  2.5*   Total Bilirubin mg/dL  --   --   --  1.0 0.9  --   --  0.7   Alkaline Phosphatase U/L  --   --   --  88 88  --   --  91   AST U/L  --   --   --  25 29  --   --  32   ALT U/L  --   --   --  11 12  --   --  14   Magnesium mg/dL 1.9 2.4 2.3 2.3 2.1  --   --  2.2   Phosphorus mg/dL  --  3.0 3.4  --  4.5  --   --   --        Vancomycin Administrations:  vancomycin given in the last 96 hours                     vancomycin (VANCOCIN) 1,000 mg in dextrose 5 % (D5W) 250 mL IVPB (Vial-Mate) (mg) 1,000 mg New Bag 12/21/23 1649      Restarted  0606     1,000 mg New Bag  0603     1,000 mg New Bag 12/20/23 1802    vancomycin 1,250 mg in dextrose 5 % (D5W) 250 mL IVPB (Vial-Mate) (mg) 1,250 mg New Bag 12/20/23 0507      Restarted 12/19/23 1840     1,250 mg New Bag  1718                    Microbiologic Results:  Microbiology Results (last 7 days)       Procedure Component Value Units Date/Time    Blood Culture #2 **CANNOT BE ORDERED STAT** [[id number]] Collected: 12/18/23 1619    Order Status: Completed Specimen: Blood from Peripheral, Forearm, Right Updated: 12/22/23 1812     Blood Culture, Routine No Growth to date      No Growth to date      No Growth to date      No Growth to date      No Growth to date    Blood Culture #1 **CANNOT BE ORDERED STAT** [[id number]] Collected: 12/18/23 1623    Order Status: Completed Specimen: Blood from Peripheral, Forearm, Right Updated: 12/22/23 1812     Blood Culture, Routine No Growth to date      No Growth to date      No Growth to date      No Growth to date      No Growth to date    Culture, Respiratory with Gram Stain [[id number]]  (Abnormal) Collected: 12/19/23 1506    Order Status: Completed Specimen: Respiratory from Sputum,  Expectorated Updated: 12/21/23 1353     Respiratory Culture No S aureus or Pseudomonas isolated.      CANDIDA DUBLINIENSIS  Moderate  Normal respiratory ina also present       Gram Stain (Respiratory) >10 epithelial cells per low power field     Gram Stain (Respiratory) Many WBC's     Gram Stain (Respiratory) Moderate yeast     Gram Stain (Respiratory) Moderate Gram positive rods     Gram Stain (Respiratory) Rare Gram positive cocci    Respiratory Infection Panel (PCR), Nasopharyngeal [[id number]] Collected: 12/18/23 2025    Order Status: Completed Specimen: Nasopharyngeal Swab Updated: 12/18/23 2137     Respiratory Infection Panel Source NP Swab     Adenovirus Not Detected     Coronavirus 229E, Common Cold Virus Not Detected     Coronavirus HKU1, Common Cold Virus Not Detected     Coronavirus NL63, Common Cold Virus Not Detected     Coronavirus OC43, Common Cold Virus Not Detected     Comment: The Coronavirus strains detected in this test cause the common cold.  These strains are not the COVID-19 (novel Coronavirus)strain   associated with the respiratory disease outbreak.          SARS-CoV2 (COVID-19) Qualitative PCR Not Detected     Human Metapneumovirus Not Detected     Human Rhinovirus/Enterovirus Not Detected     Influenza A (subtypes H1, H1-2009,H3) Not Detected     Influenza B Not Detected     Parainfluenza Virus 1 Not Detected     Parainfluenza Virus 2 Not Detected     Parainfluenza Virus 3 Not Detected     Parainfluenza Virus 4 Not Detected     Respiratory Syncytial Virus Not Detected     Bordetella Parapertussis (HP1204) Not Detected     Bordetella pertussis (ptxP) Not Detected     Chlamydia pneumoniae Not Detected     Mycoplasma pneumoniae Not Detected    Narrative:      For all other respiratory sources, order [id number] -  Respiratory Viral Panel by PCR    Respiratory Infection Panel (PCR), Nasopharyngeal [[id number]] Collected: 12/18/23 2018    Order Status: Canceled Specimen: Nasopharyngeal Swab      Blood culture [[id number]]     Order Status: Canceled Specimen: Blood     Blood culture [[id number]]     Order Status: Canceled Specimen: Blood     Influenza A & B by Molecular [[id number]] Collected: 12/18/23 1628    Order Status: Completed Specimen: Nasopharyngeal Swab Updated: 12/18/23 1720     Influenza A, Molecular Negative     Influenza B, Molecular Negative     Flu A & B Source Nasal swab    Influenza A & B by Molecular [[id number]]     Order Status: Canceled Specimen: Nasopharyngeal Swab

## 2023-12-23 NOTE — PT/OT/SLP EVAL
"Physical Therapy Co-Evaluation/Treatment and Discharge Note    Patient Name:  Lorenzo Nino   MRN:  [id number]  Admitting Diagnosis:  MRSA bacteremia   Recent Surgery: Procedure(s) (LRB):  Transesophageal echo (NEW) intra-procedure log documentation (N/A) 2 Days Post-Op  Admit Date: 12/18/2023  Length of Stay: 5 days    Recommendations:     Discharge Recommendations: No Therapy Indicated  Discharge Equipment Recommendations: none   Barriers to discharge: None    Appropriate transfer level with nursing staff: ambulation 3-4x/day with supervision    Assessment:     Lorenzo Nino is a 48 y.o. male admitted with a medical diagnosis of MRSA bacteremia and mitral endocarditis. Pt's PMH significant for mitral valve repair on 11/3/2023.  Pt reports they are at baseline mobility with all mobility and ADLs. Pt denies any dizziness or recent falls. Reports some SOB which has since resolved with medical management. Additionally, upon PT evaluation patient appears to be functioning at a high level with no safety concerns at this time. All questions answered within PT scope of practice. PT provided education to pt regarding importance of maintaining frequent activity and ambulating while admitted to maintain current level of mobility. Pt does not require further acute skilled therapy intervention. Please re-consult if pt experiences a change in status.    GOALS: No goals identified at al.    Plan:     During this hospitalization, patient does not require further acute PT services.  Please re-consult if situation changes.      Subjective:     RN notified prior to session. Mother present upon PT entrance into room. Patient agreeable to PT evaluation.    Chief Complaint: "I have been getting around the room fine"  Patient/Family Comments/goals: go home  Pain/Comfort:  Pain Rating 1: 0/10  Pain Rating Post-Intervention 1: 0/10    Social History:  Residence: Patient lives with their significant other in a single story house with " number of outside stair(s): none . Pt's bathroom has a tub/shower.  Equipment Owned (not using): bath bench, other (see comments) (upright walker)  Equipment Used: none  Prior level of function:  Prior to admission, patient was independent  Work: Not currently working due to diagnosis, plans to return after recovery..   Drive: yes.   Managing Medicines/Managing Home: yes.   Assistance Upon Discharge: significant other, family    Objective:     Additional staff present:  OT; OT for co-evaluation due to suspected patient need for two skilled therapists to appropriately assess patient's functional deficits as well as ensure patient safety, accommodate for limited activity tolerance, and provide appropriate, skilled assistance to maximize functional potential during evaluation.    Patient found HOB elevated with telemetry, blood pressure cuff, pulse ox (continuous), PICC line, peripheral IV upon PT entry to room.    General Precautions: Standard, fall   Orthopedic Precautions:N/A   Braces: N/A   Body mass index is 20.07 kg/m².  Oxygen Device: Room Air  Vitals: /72 (BP Location: Left arm, Patient Position: Lying)   Pulse 82   Temp 98.4 °F (36.9 °C) (Oral)   Resp (!) 22   Ht 6' (1.829 m)   Wt 67.1 kg (148 lb)   SpO2 98%   BMI 20.07 kg/m²     Exam:  Cognition:   Alert and Cooperative  Patient is oriented to Person, Place, Time, Situation  Command following: Follows multistep verbal commands  Fluency: clear/fluent  Skin Integrity: Visible skin intact  Edema: None noted   Sensation: Intact  Hearing: Intact  Vision:  Intact  Postural Assessment: no deviations noted  Coordination: grossly WFL  Range of Motion:  RUE: WNL  LUE: WNL  RLE: WNL  LLE: WNL  Strength Exam:  Bilateral Upper Extremity Strength: grossly WNL  Bilateral Lower Extremity Strength: grossly WNL    Outcome Measures:  AM-PAC 6 CLICK MOBILITY  Turning over in bed (including adjusting bedclothes, sheets and blankets)?: 4  Sitting down on and standing  up from a chair with arms (e.g., wheelchair, bedside commode, etc.): 4  Moving from lying on back to sitting on the side of the bed?: 4  Moving to and from a bed to a chair (including a wheelchair)?: 4  Need to walk in hospital room?: 4  Climbing 3-5 steps with a railing?: 4  Basic Mobility Total Score: 24     Functional Mobility:    Bed Mobility:   Supine to Sit: modified independence and HOB elevated for increased time; to LT side of bed  Scooting anteriorly to EOB to have both feet planted on floor: independence     Sitting Balance at Edge of Bed:  Static Sitting Balance: Normal : able to maintain balance against maximal resistance  Dynamic Sitting Balance: Normal : able to sit unsupported and weight shift across midline maximally  Assistance Level Required: [place]    Transfers:   Sit <> Stand Transfer: supervision with no AD. [id number] from EOB and [id number] from toliet    Standing Balance:  Static Standing Balance: Normal : able to maintain standing balance against maximal resistance  Dynamic Standing Balance: Good : able to stand independently unsupported and cross midline moderately  Assistance Level Required: Supervision  Patient used: no assistive device       Gait:   Patient ambulated: 250 ft   Patient required: supervision  Patient used:  no assistive device   Gait Pattern observed: reciprocal gait  Gait Deviation(s): decreased step length, decreased kayode, and shuffle gait  Impairments due to: decreased endurance  Portable monitor utilized  all lines remained intact throughout ambulation trial  Comments:  Pt was able to perform vertical/horizontal head turns, 180 degree turns while ambulating, and avoid hallway obstacles without LOB. Mild SOB but all VSS.    Education:  Time provided for education, counseling and discussion of health disposition in regards to patient's current status  All questions answered within PT scope of practice and to patient's satisfaction  PT role in POC to address current  functional deficits  Pt educated on proper body mechanics, safety techniques, and energy conservation with PT facilitation and cueing throughout session  Call nursing/pct to transfer to chair/use bathroom. Pt stated understanding.  Whiteboard updated with therapist name and pt's current mobility status documented above  Safe to perform step transfer to/from chair/bedside commode supervision and no AD w/ nursing/PCT present  Pt to ambulate 3-4x/day supervision and no AD with nsg/PCT in order to maintain functional mobility  Importance of OOB tolerance 8-10 hrs/day to improve lung ventilation and expansion as well as strengthen postural musculature    Patient left up in chair with all lines intact, call button in reach, RN notified, and mother and PCT present.    History:     Past Medical History:   Diagnosis Date    Hypertension        Past Surgical History:   Procedure Laterality Date    EXCLUSION, LEFT ATRIAL APPENDAGE, OPEN, AS PART OF OPEN CHEST SURGERY Left 11/3/2023    Procedure: EXCLUSION, LEFT ATRIAL APPENDAGE, OPEN, AS PART OF OPEN CHEST SURGERY;  Surgeon: Manuel Beal MD;  Location: [address];  Service: Cardiothoracic;  Laterality: Left;    INSERTION OF INTRA-AORTIC BALLOON ASSIST DEVICE Right 11/2/2023    Procedure: INSERTION, INTRA-AORTIC BALLOON PUMP;  Surgeon: Jose Vidal MD;  Location: [facility] CATH LAB;  Service: Cardiology;  Laterality: Right;    REPAIR, MITRAL VALVE, OPEN N/A 11/3/2023    Procedure: REPAIR, MITRAL VALVE, OPEN;  Surgeon: Manuel Beal MD;  Location: [facility] OR [address] [place] [address];  Service: Cardiothoracic;  Laterality: N/A;       Family History   Problem Relation Age of Onset    Amblyopia Neg Hx     Blindness Neg Hx     Cataracts Neg Hx     Glaucoma Neg Hx     Macular degeneration Neg Hx     Retinal detachment Neg Hx     Strabismus Neg Hx        Social History     Socioeconomic History    Marital status: Single   Occupational History    Occupation: associated terminal   [employer]    Tobacco Use    Smoking status: Unknown     Passive exposure: Never   Substance and Sexual Activity    Alcohol use: Yes     Alcohol/week: 1.0 standard drink of alcohol     Types: 1 Cans of beer per week    Drug use: Never    Sexual activity: Not Currently     Partners: Female     Birth control/protection: None     Social Determinants of Health     Financial Resource Strain: Low Risk  (11/1/2023)    Overall Financial Resource Strain (CARDIA)     Difficulty of Paying Living Expenses: Not hard at all   Food Insecurity: No Food Insecurity (11/1/2023)    Hunger Vital Sign     Worried About Running Out of Food in the Last Year: Never true     Ran Out of Food in the Last Year: Never true   Transportation Needs: No Transportation Needs (11/1/2023)    PRAPARE - Transportation     Lack of Transportation (Medical): No     Lack of Transportation (Non-Medical): No   Physical Activity: Unknown (11/30/2023)    Exercise Vital Sign     Days of Exercise per Week: Patient declined     Minutes of Exercise per Session: 0 min   Recent Concern: Physical Activity - Inactive (11/1/2023)    Exercise Vital Sign     Days of Exercise per Week: 0 days     Minutes of Exercise per Session: 0 min   Stress: No Stress Concern Present (11/1/2023)    [employer] - Occupational Stress Questionnaire     Feeling of Stress : Not at all   Social Connections: Socially Isolated (11/1/2023)    Social Connection and Isolation Panel [NHANES]     Frequency of Communication with Friends and Family: More than three times a week     Frequency of Social Gatherings with Friends and Family: Once a week     Attends [employer] Services: Never     Active Member of Clubs or Organizations: No     Attends Club or Organization Meetings: Never     Marital Status: Never    Housing Stability: Low Risk  (11/30/2023)    Housing Stability Vital Sign     Unable to Pay for Housing in the Last Year: No     Number of Places Lived in  the Last Year: 1     Unstable Housing in the Last Year: No       Time Tracking:     PT Received On: 12/23/23  PT Start Time: 0842     PT Stop Time: 0904  PT Total Time (min): 22 min     Billable Minutes: Evaluation 8 minutes and Therapeutic Activity 8 minutes    12/23/2023

## 2023-12-23 NOTE — PROGRESS NOTES
[facility] - [facility]  Cardiology  Progress Note    Patient Name: Lorenzo Nino  MRN: [id number]  Admission Date: 12/18/2023  Hospital Length of Stay: 5 days  Code Status: Full Code   Attending Physician: Justine Lobato MD   Primary Care Physician: Terrie, Primary Doctor  Expected Discharge Date: 12/26/2023  Principal Problem:MRSA bacteremia    Subjective:     Hospital Course:   Upon admission to the CICU on afib w/ RVR. Given 1 dose of digoxin and started on heparin gtt and diltiazem gtt. Stabilized on sinus rhythm. CTS evaluated patient and agree on continuing aggressive diuresis with lasix gtt, at the moment not indicated IABP. Pending ID evaluation for AB management. Switched to PO diltiazem due to normal rhythm. Will hold NEW at the moment due to patient with shortness of breath and increased risk of respiratory failure. EKG with Aflutter with PVCs. Patient to have daily EKGs to evaluate flutter. ID evaluated and recommend continuing Abx pending blood cultures. TTE with severe MR and severe LA dilation. No new events of chest pain, with decrease O2 demands on 4L NC . Blood cx NGTD. Respiratory cx with yeast and gram positive cocci.  At the moment with NSR. Will continue diuresing on lasix gtt once euvolemic and will transition to IV pushes once stabilized.  Blood cx with NGTD. Discontinued meropenem as per ID recs. Switched from diltiazem to metoprolol 25 TID. Increased acute phase reactants (ESR 85 / .9). H/H still decreased (Hb 9.2) with FOBT positive. Respiratory cx positive for Candida dubliensis, ID following. Tolerating room air. VBG with compensated respiratory alkalosis. Pending CTS re-evaluation to assess patient's and family concerns. Continue vancomycin until 12/24 as per ID. NEW on 12/26/23. Continue diuresing.     Interval History: Increased acute phase reactants. Respiratory cx positive for Candida dubliensis. VBG with compensated respiratory alkalosis, however tolerating  room air. Continues on lasix gtt. CTS to re-evaluate patient for outcomes on high-risk surgery given recent surgical procedure. Continue vancomycin until 12/24. NEW on 12/26/23. Continue diuresing.     Review of Systems   Constitutional: Negative for chills and fever.   Cardiovascular:  Negative for chest pain, cyanosis, orthopnea and syncope.   Respiratory:  Negative for hemoptysis, shortness of breath and wheezing.    Skin:  Negative for dry skin and rash.   Genitourinary:  Negative for dysuria and hematuria.     Objective:     Vital Signs (Most Recent):  Temp: 98.7 °F (37.1 °C) (12/23/23 0304)  Pulse: 87 (12/23/23 0752)  Resp: (!) 23 (12/23/23 0700)  BP: 102/61 (12/23/23 0700)  SpO2: 98 % (12/23/23 0700) Vital Signs (24h Range):  Temp:  [98 °F (36.7 °C)-98.7 °F (37.1 °C)] 98.7 °F (37.1 °C)  Pulse:  [] 87  Resp:  [19-31] 23  SpO2:  [96 %-100 %] 98 %  BP: ()/(61-79) 102/61     Weight: 67.1 kg (148 lb)  Body mass index is 20.07 kg/m².     SpO2: 98 %         Intake/Output Summary (Last 24 hours) at 12/23/2023 0847  Last data filed at 12/23/2023 0700  Gross per 24 hour   Intake 2111.88 ml   Output 3050 ml   Net -938.12 ml       Lines/Drains/Airways       Peripherally Inserted Central Catheter Line  Duration             PICC Double Lumen 11/13/23 1509 right basilic 39 days              Peripheral Intravenous Line  Duration                  Peripheral IV - Single Lumen 12/18/23 1631 18 G Anterior;Left Forearm 4 days         Peripheral IV - Single Lumen 12/18/23 2300 20 G Left Forearm 4 days                       Physical Exam  Vitals and nursing note reviewed.   Cardiovascular:      Rate and Rhythm: Normal rate and regular rhythm.      Pulses: Normal pulses.      Heart sounds: Murmur (holosystolic mitral murmur) heard.   Pulmonary:      Effort: No respiratory distress.      Breath sounds: No wheezing or rales (scattered expiratory rales.).   Abdominal:      General: Bowel sounds are normal.    Musculoskeletal:         General: No swelling.      Right lower leg: No edema.      Left lower leg: No edema.   Skin:     Capillary Refill: Capillary refill takes 2 to 3 seconds.   Neurological:      Mental Status: He is alert and oriented to person, place, and time.            Significant Labs: All pertinent lab results from the last 24 hours have been reviewed.    Assessment and Plan:     Brief HPI: 48 y.o. 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 Presented for 3-day dyspnea. Denies having fever or chills during this time. He states that he was not doing anything strenuous when his symptoms came on. His symptoms got progressively worse over the weekend and he decided to present yesterday. He has been on home IV Daptomycin. He follows with Dr. Mercado with Cardiology and his operation was performed by Dr. Haro.     In the ER, he was hypoxic requiring 6 L to maintain sats of 91-94%. Initial lactic acid 1.8. Creatinine 0.9. WBC 20k. Bedside Echo showed EF 45-50% with severe MR (2 jets, one posteriorly directed and one anteriorly directed), restricted posterior leaflet, possible tear in anterior leaflet. No pericardial effusion.     Patient was transferred to the CICU for AHRF 2/2 significant pulmonary edema 2/2 severe mitral regurgitation concerning for anterior leaflet tear vs posterior leaflet restriction.    * 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  48 y.o. male with a recent history of MRSA endocarditis s/p urgent mitral valve repair on 11/3/23 for acutely decompensated severe mitral regurgitation, presented with acute hypoxemic respiratory failure and reported severe mitral regurgitation on bedside echocardiogram upon admission which showed severe MR, which has changed since post-op Echo performed 11/14/23 appears to be 2 jets (one posterior and one anterior), possible tear of anterior  leaflet, posterior leaflet is restricted. Echo with EF 50-53% severe MR and severe LA dilation. Optimized rate control with metoprolol 25 TID. Increased acute phase reactants. Scheduled for NEW on 12/26/23    Plan:  - cardiac diet   -continue IV Lasix gtt at 10.   - NEW on 12/26/23   - NPO at midnight prior to NEW   - blood cx with NGTD   - continue vancomycin (Day 4) until 12/24/23 as per ID  - pending CTS re-evaluation to explain risks of new procedure given recent surgery      Anemia due to GI blood loss  Positive FOBT.     - anemia workup  - started on PPIx     HFmrEF  - Hold GDMT for now.     Acute hypoxemic respiratory failure  Patient with Hypercapnic Respiratory failure which is Acute.  he is not on home oxygen. Supplemental oxygen was provided with 10L NC.     Signs/symptoms of respiratory failure include- tachypnea, increased work of breathing, respiratory distress, and wheezing. Contributing diagnoses includes - CHF and Pleural effusion Labs and images were reviewed. Patient Has recent ABG, which has been reviewed.     - monitor respiratory status. Currently on room air.    - NEW on 12/26/23  - famotidine 20 BID for PPI prophylaxis   - re-check BNP on 12/24    A-fib with RVR  S/p afib with RVR once admitted to the CCU. On sinus rhythm.     - continue heparin gtt  - continue metoprolol 25 TID for rate control   - monitor telemetry    Endocarditis  See severe MR         VTE Risk Mitigation (From admission, onward)           Ordered     heparin 25,000 units in dextrose 5% (100 units/ml) IV bolus from bag - ADDITIONAL PRN BOLUS - 60 units/kg  As needed (PRN)        Question:  Heparin Infusion Adjustment (DO NOT MODIFY ANSWER)  Answer:  \\[email or web address]\epic\Images\Pharmacy\HeparinInfusions\heparin LOW INTENSITY nomogram for [facility] [id number].pdf    12/18/23 2127     heparin 25,000 units in dextrose 5% (100 units/ml) IV bolus from bag - ADDITIONAL PRN BOLUS - 30 units/kg  As needed (PRN)        Question:  Heparin  Infusion Adjustment (DO NOT MODIFY ANSWER)  Answer:  \\[email or web address]\epic\Images\Pharmacy\HeparinInfusions\heparin LOW INTENSITY nomogram for [facility] [id number].pdf    12/18/23 2127     heparin 25,000 units in dextrose 5% 250 mL (100 units/mL) infusion LOW INTENSITY nomogram - [facility]  Continuous        Question:  Begin at (units/kg/hr)  Answer:  12    12/18/23 2127                    Jaya Zhang MD  Cardiology  [facility] - Cardiac Intensive Care

## 2023-12-23 NOTE — PLAN OF CARE
Problem: Physical Therapy  Goal: Physical Therapy Goal  Outcome: Met    PT evaluation complete and no goals established. Pt is functioning at high level and does not required acute care physical therapy services. Education provided to ambulate in hallway 3x/day with supervision in order to maintain strength and endurance. Pt verbalized understanding. Please re-consult if functional mobility changes. Anticipate d/c to home; no needs.

## 2023-12-23 NOTE — PT/OT/SLP EVAL
"Occupational Therapy   Co- Evaluation  and  OT Discharge Note    Name: Lorenzo Nino  MRN: [id number]  Admitting Diagnosis: MRSA bacteremia  Recent Surgery: Procedure(s) (LRB):  Transesophageal echo (NEW) intra-procedure log documentation (N/A) 2 Days Post-Op    Co-treat performed due to acuity and complexity of pt's medical status with 2 skilled disciplines needed to optimize pts functional performance in ICU setting.   Recommendations:     Discharge Recommendations: No Therapy Indicated  Discharge Equipment Recommendations:  None  Barriers to discharge:  None    Assessment:     Lorenzo Nino is a 48 y.o. male with a medical diagnosis of MRSA bacteremia.  Pt tolerating Evaluation well without incident. Pt requiring Supervision for all activity this date. At this time, patient is functioning at their prior level of function and does not require further acute OT services.     Plan:     During this hospitalization, patient does not require further acute OT services.  Please re-consult if situation changes.    Plan of Care Reviewed with: patient, mother    Subjective     Chief Complaint: Per mother, " It's like day and night, he's looking so much better, he's been getting up to the restroom with the nurses"  Patient/Family Comments/goals: Receive medical treatment and return home    Occupational Profile:  Living Environment: Pt lives with S.O in a [facility] with 0 JOSE MARIA and T/S.   Previous level of function: Pt reports (I) with mobility without AE use for mobility, only using Bath bench for showering as needed.   Roles and Routines: Caretaker to self  Equipment Used at home: bath bench (owned not used: upright 4wheeled walker)  Assistance upon Discharge: Family able to assist as needed.     Pain/Comfort:  Pain Rating 1: 0/10    Patients cultural, spiritual, Anglican conflicts given the current situation: no    Objective:     Communicated with: RN prior to session.  Patient found HOB elevated with telemetry, pulse ox " (continuous), PICC line, peripheral IV, blood pressure cuff upon OT entry to room.    General Precautions: Standard, fall  Orthopedic Precautions: N/A  Braces: N/A  Respiratory Status: Room air     Occupational Performance:    Bed Mobility:    Patient completed Scooting/Bridging with supervision  Patient completed Supine to Sit with supervision    Functional Mobility/Transfers:  Patient completed Sit <> Stand Transfer with supervision  with  no assistive device   Patient completed Bed <> Chair Transfer using Step Transfer technique with supervision with no assistive device  Patient completed Toilet Transfer Step Transfer technique with supervision with  no AD  Functional Mobility: Pt walked ~ 250' with Supervision and no AD with OT managing IV pole and portable monitor    Activities of Daily Living:  Upper Body Dressing: contact guard assistance to [facility] gown to simulate jacket 2/2 to IV placement.    Cognitive/Visual Perceptual:  Cognitive/Psychosocial Skills:     -       Oriented to: Person, Place, Time, and Situation   -       Follows Commands/attention:Follows multistep  commands  -       Communication: clear/fluent  -       Safety awareness/insight to disability: intact   -       Mood/Affect/Coping skills/emotional control: Appropriate to situation    Physical Exam:  Dominant hand: -       R  Upper Extremity Range of Motion:  -       Right Upper Extremity: WFL  -       Left Upper Extremity: WFL  Upper Extremity Strength: -       Right Upper Extremity: WFL  -       Left Upper Extremity: WFL   Strength: -       Right Upper Extremity: WFL  -       Left Upper Extremity: WFL    AMPAC 6 Click ADL:  AMPAC Total Score: 24    Treatment & Education:  Role of OT and OT d/c  Educated on continued OOB activity with staff for line management and impact on maintaining functional independence.  Educated on importance of continued  progressive mobilization to maintain strength and endurance.      Patient left up in  chair with all lines intact, call button in reach, and mother  present    GOALS:   Multidisciplinary Problems       Occupational Therapy Goals       Not on file              Multidisciplinary Problems (Resolved)          Problem: Occupational Therapy    Goal Priority Disciplines Outcome Interventions   Occupational Therapy Goal   (Resolved)     OT, PT/OT Met                        History:     Past Medical History:   Diagnosis Date    Hypertension          Past Surgical History:   Procedure Laterality Date    EXCLUSION, LEFT ATRIAL APPENDAGE, OPEN, AS PART OF OPEN CHEST SURGERY Left 11/3/2023    Procedure: EXCLUSION, LEFT ATRIAL APPENDAGE, OPEN, AS PART OF OPEN CHEST SURGERY;  Surgeon: Manuel Beal MD;  Location: [facility] OR [address] [place] [address];  Service: Cardiothoracic;  Laterality: Left;    INSERTION OF INTRA-AORTIC BALLOON ASSIST DEVICE Right 11/2/2023    Procedure: INSERTION, INTRA-AORTIC BALLOON PUMP;  Surgeon: Jose Vidal MD;  Location: [facility] CATH LAB;  Service: Cardiology;  Laterality: Right;    REPAIR, MITRAL VALVE, OPEN N/A 11/3/2023    Procedure: REPAIR, MITRAL VALVE, OPEN;  Surgeon: Manuel Beal MD;  Location: [address];  Service: Cardiothoracic;  Laterality: N/A;       Time Tracking:     OT Date of Treatment: 12/23/23  OT Start Time: 0842  OT Stop Time: 0904  OT Total Time (min): 22 min    Billable Minutes:Evaluation 12  Therapeutic Activity 10    12/23/2023

## 2023-12-23 NOTE — PLAN OF CARE
Pt performing at baseline for ADLs and mobility. Continue to complete progressive mobility with nsg.    Anna Marie Albarran, OTR/L

## 2023-12-24 LAB
ALBUMIN SERPL BCP-MCNC: 2.7 G/DL (ref 3.5–5.2)
ALP SERPL-CCNC: 87 U/L (ref 55–135)
ALT SERPL W/O P-5'-P-CCNC: 18 U/L (ref 10–44)
ANION GAP SERPL CALC-SCNC: 15 MMOL/L (ref 8–16)
ANISOCYTOSIS BLD QL SMEAR: SLIGHT
APTT PPP: 44.4 SEC (ref 21–32)
AST SERPL-CCNC: 31 U/L (ref 10–40)
BASOPHILS # BLD AUTO: 0.15 K/UL (ref 0–0.2)
BASOPHILS NFR BLD: 0.8 % (ref 0–1.9)
BILIRUB SERPL-MCNC: 0.6 MG/DL (ref 0.1–1)
BNP SERPL-MCNC: 188 PG/ML (ref 0–99)
BUN SERPL-MCNC: 30 MG/DL (ref 6–20)
CALCIUM SERPL-MCNC: 9.7 MG/DL (ref 8.7–10.5)
CHLORIDE SERPL-SCNC: 87 MMOL/L (ref 95–110)
CO2 SERPL-SCNC: 29 MMOL/L (ref 23–29)
CREAT SERPL-MCNC: 1 MG/DL (ref 0.5–1.4)
DIFFERENTIAL METHOD BLD: ABNORMAL
EOSINOPHIL # BLD AUTO: 1 K/UL (ref 0–0.5)
EOSINOPHIL NFR BLD: 5.3 % (ref 0–8)
ERYTHROCYTE [DISTWIDTH] IN BLOOD BY AUTOMATED COUNT: 18.6 % (ref 11.5–14.5)
EST. GFR  (NO RACE VARIABLE): >60 ML/MIN/1.73 M^2
GIANT PLATELETS BLD QL SMEAR: PRESENT
GLUCOSE SERPL-MCNC: 187 MG/DL (ref 70–110)
HCT VFR BLD AUTO: 31.7 % (ref 40–54)
HGB BLD-MCNC: 9.9 G/DL (ref 14–18)
HYPOCHROMIA BLD QL SMEAR: ABNORMAL
IMM GRANULOCYTES # BLD AUTO: 0.56 K/UL (ref 0–0.04)
IMM GRANULOCYTES NFR BLD AUTO: 3.1 % (ref 0–0.5)
LYMPHOCYTES # BLD AUTO: 3.4 K/UL (ref 1–4.8)
LYMPHOCYTES NFR BLD: 18.8 % (ref 18–48)
MCH RBC QN AUTO: 26.1 PG (ref 27–31)
MCHC RBC AUTO-ENTMCNC: 31.2 G/DL (ref 32–36)
MCV RBC AUTO: 83 FL (ref 82–98)
MONOCYTES # BLD AUTO: 2.2 K/UL (ref 0.3–1)
MONOCYTES NFR BLD: 12 % (ref 4–15)
NEUTROPHILS # BLD AUTO: 10.9 K/UL (ref 1.8–7.7)
NEUTROPHILS NFR BLD: 60 % (ref 38–73)
NRBC BLD-RTO: 0 /100 WBC
OVALOCYTES BLD QL SMEAR: ABNORMAL
PLATELET # BLD AUTO: 644 K/UL (ref 150–450)
PLATELET BLD QL SMEAR: ABNORMAL
PMV BLD AUTO: 10.5 FL (ref 9.2–12.9)
POIKILOCYTOSIS BLD QL SMEAR: SLIGHT
POLYCHROMASIA BLD QL SMEAR: ABNORMAL
POTASSIUM SERPL-SCNC: 3.1 MMOL/L (ref 3.5–5.1)
POTASSIUM SERPL-SCNC: 3.9 MMOL/L (ref 3.5–5.1)
PROT SERPL-MCNC: 8.8 G/DL (ref 6–8.4)
RBC # BLD AUTO: 3.8 M/UL (ref 4.6–6.2)
SODIUM SERPL-SCNC: 131 MMOL/L (ref 136–145)
TARGETS BLD QL SMEAR: ABNORMAL
WBC # BLD AUTO: 18.09 K/UL (ref 3.9–12.7)

## 2023-12-24 PROCEDURE — 25000003 PHARM REV CODE 250: Performed by: INTERNAL MEDICINE

## 2023-12-24 PROCEDURE — 25000003 PHARM REV CODE 250

## 2023-12-24 PROCEDURE — 83880 ASSAY OF NATRIURETIC PEPTIDE: CPT

## 2023-12-24 PROCEDURE — 63600175 PHARM REV CODE 636 W HCPCS: Performed by: INTERNAL MEDICINE

## 2023-12-24 PROCEDURE — 25000003 PHARM REV CODE 250: Performed by: STUDENT IN AN ORGANIZED HEALTH CARE EDUCATION/TRAINING PROGRAM

## 2023-12-24 PROCEDURE — 85730 THROMBOPLASTIN TIME PARTIAL: CPT | Performed by: STUDENT IN AN ORGANIZED HEALTH CARE EDUCATION/TRAINING PROGRAM

## 2023-12-24 PROCEDURE — 63600175 PHARM REV CODE 636 W HCPCS: Performed by: STUDENT IN AN ORGANIZED HEALTH CARE EDUCATION/TRAINING PROGRAM

## 2023-12-24 PROCEDURE — 80053 COMPREHEN METABOLIC PANEL: CPT | Performed by: STUDENT IN AN ORGANIZED HEALTH CARE EDUCATION/TRAINING PROGRAM

## 2023-12-24 PROCEDURE — 85025 COMPLETE CBC W/AUTO DIFF WBC: CPT | Performed by: STUDENT IN AN ORGANIZED HEALTH CARE EDUCATION/TRAINING PROGRAM

## 2023-12-24 PROCEDURE — 94761 N-INVAS EAR/PLS OXIMETRY MLT: CPT

## 2023-12-24 PROCEDURE — 84132 ASSAY OF SERUM POTASSIUM: CPT | Performed by: INTERNAL MEDICINE

## 2023-12-24 PROCEDURE — 20000000 HC ICU ROOM

## 2023-12-24 PROCEDURE — 99231 SBSQ HOSP IP/OBS SF/LOW 25: CPT | Mod: ,,, | Performed by: INTERNAL MEDICINE

## 2023-12-24 RX ORDER — POTASSIUM CHLORIDE 29.8 MG/ML
40 INJECTION INTRAVENOUS ONCE
Status: COMPLETED | OUTPATIENT
Start: 2023-12-24 | End: 2023-12-24

## 2023-12-24 RX ORDER — POTASSIUM CHLORIDE 20 MEQ/1
40 TABLET, EXTENDED RELEASE ORAL ONCE
Status: COMPLETED | OUTPATIENT
Start: 2023-12-24 | End: 2023-12-24

## 2023-12-24 RX ADMIN — POTASSIUM CHLORIDE 40 MEQ: 1500 TABLET, EXTENDED RELEASE ORAL at 09:12

## 2023-12-24 RX ADMIN — METOPROLOL TARTRATE 25 MG: 25 TABLET, FILM COATED ORAL at 08:12

## 2023-12-24 RX ADMIN — VANCOMYCIN HYDROCHLORIDE 1250 MG: 1.25 INJECTION, POWDER, LYOPHILIZED, FOR SOLUTION INTRAVENOUS at 10:12

## 2023-12-24 RX ADMIN — POTASSIUM CHLORIDE 40 MEQ: 1500 TABLET, EXTENDED RELEASE ORAL at 12:12

## 2023-12-24 RX ADMIN — HEPARIN SODIUM 23 UNITS/KG/HR: 10000 INJECTION, SOLUTION INTRAVENOUS at 02:12

## 2023-12-24 RX ADMIN — POTASSIUM CHLORIDE 40 MEQ: 29.8 INJECTION, SOLUTION INTRAVENOUS at 12:12

## 2023-12-24 RX ADMIN — FAMOTIDINE 20 MG: 20 TABLET ORAL at 08:12

## 2023-12-24 NOTE — NURSING
Nurses Note -- 4 Eyes      12/23/2023   6;00 PM      Skin assessed during: Daily Assessment      [x] No Altered Skin Integrity Present    [x]Prevention Measures Documented      [] Yes- Altered Skin Integrity Present or Discovered   [] LDA Added if Not in Epic (Describe Wound)   [] New Altered Skin Integrity was Present on Admit and Documented in LDA   [] Wound Image Taken    Wound Care Consulted? No    Attending Nurse:  ANNA Sanon    Second RN/Staff Member:  ANNA Hunt

## 2023-12-24 NOTE — PROGRESS NOTES
[facility]  Cardiology  Progress Note    Patient Name: Lorenzo Nino  MRN: [id number]  Admission Date: 12/18/2023  Hospital Length of Stay: 6 days  Code Status: Full Code   Attending Physician: Justine Lobato MD   Primary Care Physician: Terrie, Primary Doctor  Expected Discharge Date: 12/26/2023  Principal Problem:MRSA bacteremia    Subjective:       Interval History: NAEON. Remains stable and denies pain or discomfort.   Plan continues to be for NEW on Tuesday.    Review of Systems   Constitutional: Negative for chills and fever.   Cardiovascular:  Negative for chest pain, cyanosis, orthopnea and syncope.   Respiratory:  Negative for hemoptysis, shortness of breath and wheezing.    Skin:  Negative for dry skin and rash.   Genitourinary:  Negative for dysuria and hematuria.     Objective:     Vital Signs (Most Recent):  Temp: 98.2 °F (36.8 °C) (12/24/23 0315)  Pulse: 75 (12/24/23 0940)  Resp: 19 (12/24/23 0940)  BP: 102/71 (12/24/23 0900)  SpO2: 98 % (12/24/23 0940) Vital Signs (24h Range):  Temp:  [97.8 °F (36.6 °C)-98.4 °F (36.9 °C)] 98.2 °F (36.8 °C)  Pulse:  [60-96] 75  Resp:  [13-42] 19  SpO2:  [93 %-99 %] 98 %  BP: ()/(60-78) 102/71     Weight: 67.1 kg (148 lb)  Body mass index is 20.07 kg/m².     SpO2: 98 %         Intake/Output Summary (Last 24 hours) at 12/24/2023 0959  Last data filed at 12/24/2023 0900  Gross per 24 hour   Intake 1266.51 ml   Output 2080 ml   Net -813.49 ml         Lines/Drains/Airways       Peripherally Inserted Central Catheter Line  Duration             PICC Double Lumen 11/13/23 1509 right basilic 40 days              Peripheral Intravenous Line  Duration                  Peripheral IV - Single Lumen 12/18/23 1631 18 G Anterior;Left Forearm 5 days         Peripheral IV - Single Lumen 12/18/23 2300 20 G Left Forearm 5 days                       Physical Exam  Vitals and nursing note reviewed.   Cardiovascular:      Rate and Rhythm: Normal rate and  regular rhythm.      Pulses: Normal pulses.      Heart sounds: Murmur (holosystolic mitral murmur) heard.   Pulmonary:      Effort: No respiratory distress.      Breath sounds: No wheezing or rales (scattered expiratory rales.).   Abdominal:      General: Bowel sounds are normal.   Musculoskeletal:         General: No swelling.      Right lower leg: No edema.      Left lower leg: No edema.   Skin:     Capillary Refill: Capillary refill takes 2 to 3 seconds.   Neurological:      Mental Status: He is alert and oriented to person, place, and time.            Significant Labs: All pertinent lab results from the last 24 hours have been reviewed.    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 Echo with EF 50-53% severe MR and severe LA dilation. Optimized rate control with metoprolol 25 TID. Increased acute phase reactants. Scheduled for NEW on 12/26/23    Plan:  - cardiac diet   -continue IV Lasix gtt at 10.   - NEW on 12/26/23   - NPO at midnight prior to NEW   - blood cx with NGTD   - initial plan was to vancomycin (Day 4) until 12/24/23 as per ID however given degree of MR in setting of MRSA proven endocarditis will continue abx therapy atleast until NEW is done and valvular anatomy is established.        Anemia due to GI blood loss  Positive FOBT.     - anemia workup  - started on PPIx     HFmrEF  - Hold GDMT for now given hypotension.    Acute hypoxemic respiratory  failure  Patient with Hypercapnic Respiratory failure which is Acute.  he is not on home oxygen. Supplemental oxygen was provided with 10L NC.     Signs/symptoms of respiratory failure include- tachypnea, increased work of breathing, respiratory distress, and wheezing. Contributing diagnoses includes - CHF and Pleural effusion Labs and images were reviewed. Patient Has recent ABG, which has been reviewed.     - monitor respiratory status. Currently on room air.    - NEW on 12/26/23  - famotidine 20 BID for PPI prophylaxis     A-fib with RVR  S/p afib with RVR once admitted to the CCU. On sinus rhythm.     - continue heparin gtt  - continue metoprolol 25 TID for rate control   - monitor telemetry    Endocarditis  See severe MR         VTE Risk Mitigation (From admission, onward)           Ordered     heparin 25,000 units in dextrose 5% (100 units/ml) IV bolus from bag - ADDITIONAL PRN BOLUS - 60 units/kg  As needed (PRN)        Question:  Heparin Infusion Adjustment (DO NOT MODIFY ANSWER)  Answer:  \\[facility]\epic\Images\Pharmacy\HeparinInfusions\heparin LOW INTENSITY nomogram for [facility] [id number].pdf    12/18/23 2127     heparin 25,000 units in dextrose 5% (100 units/ml) IV bolus from bag - ADDITIONAL PRN BOLUS - 30 units/kg  As needed (PRN)        Question:  Heparin Infusion Adjustment (DO NOT MODIFY ANSWER)  Answer:  \\[email or web address]\epic\Images\Pharmacy\HeparinInfusions\heparin LOW INTENSITY nomogram for [facility] [id number].pdf    12/18/23 2127     heparin 25,000 units in dextrose 5% 250 mL (100 units/mL) infusion LOW INTENSITY nomogram - [facility]  Continuous        Question:  Begin at (units/kg/hr)  Answer:  12    12/18/23 2127                    Norm Hitchcock MD  Cardiology  [facility] - Card[facility]

## 2023-12-24 NOTE — SUBJECTIVE & OBJECTIVE
Interval History: NAEON. Remains stable and denies pain or discomfort.   Plan continues to be for NEW on Tuesday.    Review of Systems   Constitutional: Negative for chills and fever.   Cardiovascular:  Negative for chest pain, cyanosis, orthopnea and syncope.   Respiratory:  Negative for hemoptysis, shortness of breath and wheezing.    Skin:  Negative for dry skin and rash.   Genitourinary:  Negative for dysuria and hematuria.     Objective:     Vital Signs (Most Recent):  Temp: 98.2 °F (36.8 °C) (12/24/23 0315)  Pulse: 75 (12/24/23 0940)  Resp: 19 (12/24/23 0940)  BP: 102/71 (12/24/23 0900)  SpO2: 98 % (12/24/23 0940) Vital Signs (24h Range):  Temp:  [97.8 °F (36.6 °C)-98.4 °F (36.9 °C)] 98.2 °F (36.8 °C)  Pulse:  [60-96] 75  Resp:  [13-42] 19  SpO2:  [93 %-99 %] 98 %  BP: ()/(60-78) 102/71     Weight: 67.1 kg (148 lb)  Body mass index is 20.07 kg/m².     SpO2: 98 %         Intake/Output Summary (Last 24 hours) at 12/24/2023 0959  Last data filed at 12/24/2023 0900  Gross per 24 hour   Intake 1266.51 ml   Output 2080 ml   Net -813.49 ml         Lines/Drains/Airways       Peripherally Inserted Central Catheter Line  Duration             PICC Double Lumen 11/13/23 1509 right basilic 40 days              Peripheral Intravenous Line  Duration                  Peripheral IV - Single Lumen 12/18/23 1631 18 G Anterior;Left Forearm 5 days         Peripheral IV - Single Lumen 12/18/23 2300 20 G Left Forearm 5 days                       Physical Exam  Vitals and nursing note reviewed.   Cardiovascular:      Rate and Rhythm: Normal rate and regular rhythm.      Pulses: Normal pulses.      Heart sounds: Murmur (holosystolic mitral murmur) heard.   Pulmonary:      Effort: No respiratory distress.      Breath sounds: No wheezing or rales (scattered expiratory rales.).   Abdominal:      General: Bowel sounds are normal.   Musculoskeletal:         General: No swelling.      Right lower leg: No edema.      Left lower leg: No  edema.   Skin:     Capillary Refill: Capillary refill takes 2 to 3 seconds.   Neurological:      Mental Status: He is alert and oriented to person, place, and time.            Significant Labs: All pertinent lab results from the last 24 hours have been reviewed.

## 2023-12-24 NOTE — PLAN OF CARE
Cardiac ICU Care Plan    POC reviewed with Lorenzo Nino and family. Questions and concerns addressed. No acute events today. Pt progressing toward goals. Will continue to monitor. See below and flowsheets for full assessment and VS info.       Neuro:  Clay Coma Scale  Best Eye Response: 4-->(E4) spontaneous  Best Motor Response: 6-->(M6) obeys commands  Best Verbal Response: 5-->(V5) oriented  [place] Coma Scale Score: 15  Assessment Qualifiers: patient not sedated/intubated, no eye obstruction present  Pupil PERRLA: yes    24 hr Temp:  [97.8 °F (36.6 °C)-98.2 °F (36.8 °C)]      CV:  Rhythm: normal sinus rhythm   DVT prophylaxis: VTE Required Core Measure: Pharmacological prophylaxis initiated/maintained    CVP (mean): 2 mmHg (12/21/23 0800)       SVO2 (%): (S) 61 % (12/21/23 2013)               Pulses  Right Radial Pulse: 2+ (normal)  Left Radial Pulse: 2+ (normal)  Right Dorsalis Pedis Pulse: 1+ (weak)  Left Dorsalis Pedis Pulse: 1+ (weak)  Right Posterior Tibial Pulse: 1+ (weak)  Left Posterior Tibial Pulse: 1+ (weak)    Resp:  Flow (L/min): 0       GI/:  GI prophylaxis: yes  Diet/Nutrition Received: 2 gram sodium, low saturated fat/low cholesterol  Last Bowel Movement: 12/22/23  Voiding Characteristics: voids spontaneously without difficulty   Intake/Output Summary (Last 24 hours) at 12/24/2023 1722  Last data filed at 12/24/2023 1700  Gross per 24 hour   Intake 1131.36 ml   Output 1930 ml   Net -798.64 ml        Nutritional Supplement Intake: Quantity , Type:     Labs/Accuchecks:  Recent Labs   Lab 12/22/23  0314 12/23/23  0210 12/24/23  1026   WBC 12.46 15.20* 18.09*   RBC 3.32* 3.58* 3.80*   HGB 8.7* 9.2* 9.9*   HCT 27.2* 29.0* 31.7*   * 627* 644*      Recent Labs   Lab 12/18/23  2158 12/19/23  0500 12/22/23  0314 12/23/23  0210 12/24/23  0410   INR 1.1  --   --   --   --    APTT 33.7*   < > 47.0* 45.5* 44.4*    < > = values in this interval not displayed.      Recent Labs     12/24/23  1026    *   K 3.1*   CO2 29   CL 87*   BUN 30*   CREATININE 1.0   ALKPHOS 87   ALT 18   AST 31   BILITOT 0.6       Recent Labs   Lab 12/18/23  1240 12/18/23  1619   CPK 83  --    TROPONINI  --  0.060*      Recent Labs     12/22/23  0743 12/22/23  0750 12/22/23  2044   PH 7.501* 7.505* 7.479*   PCO2 52.1* 50.2* 50.9*   PO2 21* 27* 27*   HCO3 40.8* 39.6* 37.8*   POCSATURATED 38 56 53   BE 18* 17* 14*       Electrolytes: Electrolytes replaced  Accuchecks: none    Gtts/LDAs:   furosemide (LASIX) 500 mg in 50 mL infusion (conc: 10 mg/mL) 10 mg/hr (12/24/23 1700)    heparin (porcine) in D5W 23 Units/kg/hr (12/24/23 1700)       Lines/Drains/Airways       Peripherally Inserted Central Catheter Line  Duration             PICC Double Lumen 11/13/23 1509 right basilic 41 days              Peripheral Intravenous Line  Duration                  Peripheral IV - Single Lumen 12/23/23 1800 20 G Anterior;Distal;Left Forearm <1 day                    Skin/Wounds  Bathing/Skin Care: linen changed;dressed/undressed (12/24/23 1000)  Wounds: No  Wound care consulted: No

## 2023-12-24 NOTE — PROGRESS NOTES
[facility] - Cardiac Intensive Care  Infectious Disease  Progress Note    Patient Name: Lorenzo Nino  MRN: [id number]  Admission Date: 12/18/2023  Length of Stay: 6 days  Attending Physician: Justine Lobato MD  Primary Care Provider: No, Primary Doctor    Isolation Status: No active isolations  Assessment/Plan:      Pulmonary  Acute hypoxemic respiratory failure  See endocarditis.       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 Reports a dry cough and chills, but denies fevers or sweats, sick contacts. RIP, flu negative. CXR with interstitial coarsening and b/l (L>R) lung opacities. Bedside Echo showed EF 45-50% with severe MR, restricted posterior leaflet, possible tear in anterior leaflet.  Started on empiric vanc/ zosyn. Then broadened with sarah.      Respi failure likely due to pulm edema and less likely multifocal pna. Symptoms have improved significantly with diuresis. RCx with normal ina. Candida in sputum likely represents an airway colonizer.  BCx NGTD.     Recommendations:  --agree with vanc. Pharm to dose for goal trough 15-20.  End date 12/24.  --plan to transition to longterm suppressive doxycycline 100 mg po bid upon completion of IV abx (OR cx +MRSA at time of bovine annuloplasty)  --discussed potential med side effects with pt. Recommend spacing for atleast 2 hrs from intake of dairy products, Ca/ Fe supplements.  --will arrange f/u in ID clinic.  --please notify ID of positive blood cx. If plan for valvular surgery please send tissue for biopsy -path/cx - to determine need for additional IV therapy.    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 I will sign off. Please contact us if you have any additional questions.    Nikia Cantrell MD  Infectious Disease  [facility] - Cardiac Intensive Care    Review of Systems   Respiratory:  Positive for shortness of breath. Negative for sputum production.    All other systems reviewed and are negative.    Physical Exam  Physical Exam  Vitals reviewed.   Constitutional:       Appearance: He is ill-appearing.   HENT:      Head: Normocephalic and atraumatic.   Eyes:      Conjunctiva/sclera: Conjunctivae normal.      Pupils: Pupils are equal, round, and reactive to light.   Cardiovascular:      Rate and Rhythm: Normal rate and regular rhythm.      Heart sounds: Murmur heard.   Pulmonary:      Effort: Pulmonary effort is normal.      Breath sounds: Rales present.   Abdominal:      General: Abdomen is flat.      Palpations: Abdomen is soft.   Musculoskeletal:      Right lower leg: No edema.      Left lower leg: No edema.      Comments: RUE PICC site with clean dressing   Skin:     Findings: No lesion or rash.   Neurological:      General: No focal deficit present.      Mental Status: He is oriented to person, place, and time.   Psychiatric:         Mood and Affect: Mood normal.         Behavior: Behavior normal.   Subjective:     Principal Problem:MRSA bacteremia    HPI: Mr. 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 Reports a dry cough and chills, but denies fevers or sweats, sick contacts.    Of note, pt was followed by ID during recent  "hospital admission 10/30-11/14. Due to prolonged bacteremia pt was managed with salvage therapy with dapto/ ceftaroline. BCx cleared on 11/11.  Plan was to continue ceftaroline until 11/19 and dapto until 12/24.     In the ED pt was afebrile, tachycardic and hypoxemic. Labs remarkable for leukocytosis. RIP, flu negative. CXR with interstitial coarsening and b/l (L>R) lung opacities. Bedside Echo showed EF 45-50% with severe MR, restricted posterior leaflet, possible tear in anterior leaflet.  Started on empiric vanc/ zosyn. Then broadened with sarah. BCx 12/18 remains ngtd.     ID consulted for: "Antibiotics recommendations. Patient on home IV daptomycin ."  No new subjective & objective note has been filed under this hospital service since the last note was generated.    "

## 2023-12-25 LAB
ALBUMIN SERPL BCP-MCNC: 2.8 G/DL (ref 3.5–5.2)
ALP SERPL-CCNC: 84 U/L (ref 55–135)
ALT SERPL W/O P-5'-P-CCNC: 18 U/L (ref 10–44)
ANION GAP SERPL CALC-SCNC: 14 MMOL/L (ref 8–16)
ANISOCYTOSIS BLD QL SMEAR: SLIGHT
APTT PPP: 51 SEC (ref 21–32)
APTT PPP: 59.9 SEC (ref 21–32)
AST SERPL-CCNC: 31 U/L (ref 10–40)
BASO STIPL BLD QL SMEAR: ABNORMAL
BASOPHILS # BLD AUTO: 0.17 K/UL (ref 0–0.2)
BASOPHILS NFR BLD: 1 % (ref 0–1.9)
BILIRUB SERPL-MCNC: 0.6 MG/DL (ref 0.1–1)
BUN SERPL-MCNC: 41 MG/DL (ref 6–20)
CALCIUM SERPL-MCNC: 10 MG/DL (ref 8.7–10.5)
CHLORIDE SERPL-SCNC: 90 MMOL/L (ref 95–110)
CO2 SERPL-SCNC: 28 MMOL/L (ref 23–29)
CREAT SERPL-MCNC: 0.9 MG/DL (ref 0.5–1.4)
DIFFERENTIAL METHOD BLD: ABNORMAL
EOSINOPHIL # BLD AUTO: 0.9 K/UL (ref 0–0.5)
EOSINOPHIL NFR BLD: 5.2 % (ref 0–8)
ERYTHROCYTE [DISTWIDTH] IN BLOOD BY AUTOMATED COUNT: 18.8 % (ref 11.5–14.5)
EST. GFR  (NO RACE VARIABLE): >60 ML/MIN/1.73 M^2
GLUCOSE SERPL-MCNC: 100 MG/DL (ref 70–110)
HCT VFR BLD AUTO: 30.4 % (ref 40–54)
HGB BLD-MCNC: 9.6 G/DL (ref 14–18)
IMM GRANULOCYTES # BLD AUTO: 0.75 K/UL (ref 0–0.04)
IMM GRANULOCYTES NFR BLD AUTO: 4.3 % (ref 0–0.5)
LYMPHOCYTES # BLD AUTO: 3.7 K/UL (ref 1–4.8)
LYMPHOCYTES NFR BLD: 21.4 % (ref 18–48)
MAGNESIUM SERPL-MCNC: 2.5 MG/DL (ref 1.6–2.6)
MCH RBC QN AUTO: 25.5 PG (ref 27–31)
MCHC RBC AUTO-ENTMCNC: 31.6 G/DL (ref 32–36)
MCV RBC AUTO: 81 FL (ref 82–98)
MONOCYTES # BLD AUTO: 2.3 K/UL (ref 0.3–1)
MONOCYTES NFR BLD: 13.4 % (ref 4–15)
NEUTROPHILS # BLD AUTO: 9.4 K/UL (ref 1.8–7.7)
NEUTROPHILS NFR BLD: 54.7 % (ref 38–73)
NRBC BLD-RTO: 0 /100 WBC
PHOSPHATE SERPL-MCNC: 4.5 MG/DL (ref 2.7–4.5)
PLATELET # BLD AUTO: 632 K/UL (ref 150–450)
PLATELET BLD QL SMEAR: ABNORMAL
PMV BLD AUTO: 10.8 FL (ref 9.2–12.9)
POLYCHROMASIA BLD QL SMEAR: ABNORMAL
POTASSIUM SERPL-SCNC: 3.6 MMOL/L (ref 3.5–5.1)
PROT SERPL-MCNC: 8.9 G/DL (ref 6–8.4)
RBC # BLD AUTO: 3.76 M/UL (ref 4.6–6.2)
SODIUM SERPL-SCNC: 132 MMOL/L (ref 136–145)
VANCOMYCIN TROUGH SERPL-MCNC: 17.4 UG/ML (ref 10–22)
WBC # BLD AUTO: 17.25 K/UL (ref 3.9–12.7)

## 2023-12-25 PROCEDURE — 80202 ASSAY OF VANCOMYCIN: CPT | Performed by: INTERNAL MEDICINE

## 2023-12-25 PROCEDURE — 63600175 PHARM REV CODE 636 W HCPCS: Performed by: STUDENT IN AN ORGANIZED HEALTH CARE EDUCATION/TRAINING PROGRAM

## 2023-12-25 PROCEDURE — 94761 N-INVAS EAR/PLS OXIMETRY MLT: CPT

## 2023-12-25 PROCEDURE — 83735 ASSAY OF MAGNESIUM: CPT | Performed by: INTERNAL MEDICINE

## 2023-12-25 PROCEDURE — 25000003 PHARM REV CODE 250

## 2023-12-25 PROCEDURE — 80053 COMPREHEN METABOLIC PANEL: CPT | Performed by: INTERNAL MEDICINE

## 2023-12-25 PROCEDURE — 63600175 PHARM REV CODE 636 W HCPCS: Performed by: INTERNAL MEDICINE

## 2023-12-25 PROCEDURE — 85730 THROMBOPLASTIN TIME PARTIAL: CPT | Mod: 91 | Performed by: INTERNAL MEDICINE

## 2023-12-25 PROCEDURE — 85730 THROMBOPLASTIN TIME PARTIAL: CPT | Performed by: STUDENT IN AN ORGANIZED HEALTH CARE EDUCATION/TRAINING PROGRAM

## 2023-12-25 PROCEDURE — 25000003 PHARM REV CODE 250: Performed by: INTERNAL MEDICINE

## 2023-12-25 PROCEDURE — 84100 ASSAY OF PHOSPHORUS: CPT | Performed by: INTERNAL MEDICINE

## 2023-12-25 PROCEDURE — 20000000 HC ICU ROOM

## 2023-12-25 PROCEDURE — 99231 SBSQ HOSP IP/OBS SF/LOW 25: CPT | Mod: ,,, | Performed by: INTERNAL MEDICINE

## 2023-12-25 PROCEDURE — 85025 COMPLETE CBC W/AUTO DIFF WBC: CPT | Performed by: INTERNAL MEDICINE

## 2023-12-25 RX ADMIN — POTASSIUM CHLORIDE 60 MEQ: 1500 TABLET, EXTENDED RELEASE ORAL at 05:12

## 2023-12-25 RX ADMIN — VANCOMYCIN HYDROCHLORIDE 1250 MG: 1.25 INJECTION, POWDER, LYOPHILIZED, FOR SOLUTION INTRAVENOUS at 09:12

## 2023-12-25 RX ADMIN — FAMOTIDINE 20 MG: 20 TABLET ORAL at 09:12

## 2023-12-25 RX ADMIN — METOPROLOL TARTRATE 25 MG: 25 TABLET, FILM COATED ORAL at 08:12

## 2023-12-25 RX ADMIN — METOPROLOL TARTRATE 25 MG: 25 TABLET, FILM COATED ORAL at 03:12

## 2023-12-25 RX ADMIN — METOPROLOL TARTRATE 25 MG: 25 TABLET, FILM COATED ORAL at 09:12

## 2023-12-25 RX ADMIN — HEPARIN SODIUM 23 UNITS/KG/HR: 10000 INJECTION, SOLUTION INTRAVENOUS at 08:12

## 2023-12-25 RX ADMIN — FAMOTIDINE 20 MG: 20 TABLET ORAL at 08:12

## 2023-12-25 NOTE — ASSESSMENT & PLAN NOTE
48 y.o. 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 Echo with EF 50-53% severe MR and severe LA dilation. Optimized rate control with metoprolol 25 TID. Increased acute phase reactants. Scheduled for NEW on 12/26/23.     Plan:  - cardiac diet   -continue IV Lasix at 10mg/h  - NEW on 12/26/23   - NPO at midnight prior to NEW   - blood cx with NGTD   - continue vancomycin until NEW on 12/26 as per ID recs.    - patient to benefit from PO doxycycline 100 BID on the long term as per ID

## 2023-12-25 NOTE — PROGRESS NOTES
[facility] - Cardiac Intensive Care  Cardiology  Progress Note    Patient Name: Lorenzo Nino  MRN: [id number]  Admission Date: 12/18/2023  Hospital Length of Stay: 7 days  Code Status: Full Code   Attending Physician: Justine Lobato MD   Primary Care Physician: Terrie, Primary Doctor  Expected Discharge Date: 12/26/2023  Principal Problem:MRSA bacteremia    Subjective:     Hospital Course:   Upon admission to the CICU on afib w/ RVR. Given 1 dose of digoxin and started on heparin gtt and diltiazem gtt. Stabilized on sinus rhythm. CTS evaluated patient and agree on continuing aggressive diuresis with lasix gtt, at the moment not indicated IABP. Pending ID evaluation for AB management. Switched to PO diltiazem due to normal rhythm. Will hold NEW at the moment due to patient with shortness of breath and increased risk of respiratory failure. EKG with Aflutter with PVCs. Patient to have daily EKGs to evaluate flutter. ID evaluated and recommend continuing Abx pending blood cultures. TTE with severe MR and severe LA dilation. No new events of chest pain, with decrease O2 demands on 4L NC . Blood cx NGTD. Respiratory cx with yeast and gram positive cocci.  At the moment with NSR. Will continue diuresing on lasix gtt once euvolemic and will transition to IV pushes once stabilized.  Blood cx with NGTD. Discontinued meropenem as per ID recs. Switched from diltiazem to metoprolol 25 TID. Increased acute phase reactants (ESR 85 / .9). H/H still decreased (Hb 9.2) with FOBT positive. Respiratory cx positive for Candida dubliensis, ID following. Tolerating room air. VBG with compensated respiratory alkalosis. Pending CTS re-evaluation to assess patient's and family concerns. Following ID recs, continue vancomycin until NEW on 12/26/23. Once vancomycin course completed, can transition to PO doxycycline 100 BID in the long term as per ID. Continue diuresing and rate control.     Interval History: improved on O2  requirements, currently on RA. Continue diuresing. NEW on 12/26 and continue vancomycin until NEW done. Patient can benefit in the parul term from PO doxycycline as per ID recs.     Review of Systems   Constitutional: Negative for chills and fever.   Cardiovascular:  Negative for chest pain, cyanosis, orthopnea and syncope.   Respiratory:  Negative for hemoptysis, shortness of breath and wheezing.    Skin:  Negative for dry skin and rash.   Genitourinary:  Negative for dysuria and hematuria.     Objective:     Vital Signs (Most Recent):  Temp: 98.6 °F (37 °C) (12/25/23 0701)  Pulse: 90 (12/25/23 0645)  Resp: (!) 9 (12/25/23 0645)  BP: 105/64 (12/25/23 0600)  SpO2: 98 % (12/25/23 0645) Vital Signs (24h Range):  Temp:  [98.6 °F (37 °C)-99 °F (37.2 °C)] 98.6 °F (37 °C)  Pulse:  [72-97] 90  Resp:  [9-38] 9  SpO2:  [97 %-100 %] 98 %  BP: ()/(54-81) 105/64     Weight: 67.1 kg (148 lb)  Body mass index is 20.07 kg/m².     SpO2: 98 %         Intake/Output Summary (Last 24 hours) at 12/25/2023 0849  Last data filed at 12/25/2023 0600  Gross per 24 hour   Intake 1467.35 ml   Output 2490 ml   Net -1022.65 ml       Lines/Drains/Airways       Peripherally Inserted Central Catheter Line  Duration             PICC Double Lumen 11/13/23 1509 right basilic 41 days              Peripheral Intravenous Line  Duration                  Peripheral IV - Single Lumen 12/23/23 1800 20 G Anterior;Distal;Left Forearm 1 day                       Physical Exam  Vitals and nursing note reviewed.   Cardiovascular:      Rate and Rhythm: Normal rate and regular rhythm.      Pulses: Normal pulses.      Heart sounds: Murmur (holosystolic mitral murmur) heard.   Pulmonary:      Effort: No respiratory distress.      Breath sounds: No wheezing or rales (scattered expiratory rales.).   Abdominal:      General: Bowel sounds are normal.   Musculoskeletal:         General: No swelling.      Right lower leg: No edema.      Left lower leg: No edema.    Skin:     Capillary Refill: Capillary refill takes 2 to 3 seconds.   Neurological:      Mental Status: He is alert and oriented to person, place, and time.            Significant Labs: All pertinent lab results from the last 24 hours have been reviewed.    Significant Imaging: CXR with pulmonary congestion on bilateral lung fields. Improved compared to admission CXR.   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 Echo with EF 50-53% severe MR and severe LA dilation. Optimized rate control with metoprolol 25 TID. Increased acute phase reactants. Scheduled for NEW on 12/26/23.     Plan:  - cardiac diet   -continue IV Lasix at 10mg/h  - NEW on 12/26/23   - NPO at midnight prior to NEW   - blood cx with NGTD   - continue vancomycin until NEW on 12/26 as per ID recs.    - patient to benefit from PO doxycycline 100 BID on the long term as per ID      Anemia due to GI blood loss  - continue PPIx     HFmrEF  - Hold GDMT for now.     Acute hypoxemic respiratory failure  Patient with Hypercapnic Respiratory failure which is Acute.  he is not on home oxygen. Supplemental oxygen was provided with 10L NC.     Signs/symptoms of respiratory failure include- tachypnea, increased work of breathing, respiratory distress, and wheezing. Contributing diagnoses includes - CHF and Pleural effusion Labs and images were reviewed. Patient Has recent ABG, which has been reviewed.     - monitor  respiratory status. Currently on room air.    - NEW on 12/26/23  - famotidine 20 BID for PPI prophylaxis     A-fib with RVR  S/p afib with RVR once admitted to the CCU. On sinus rhythm.     - continue heparin gtt  - continue metoprolol 25 TID for rate control   - monitor telemetry    Endocarditis  See severe MR         VTE Risk Mitigation (From admission, onward)           Ordered     heparin 25,000 units in dextrose 5% (100 units/ml) IV bolus from bag - ADDITIONAL PRN BOLUS - 60 units/kg  As needed (PRN)        Question:  Heparin Infusion Adjustment (DO NOT MODIFY ANSWER)  Answer:  \\[email or web address]\epic\Images\Pharmacy\HeparinInfusions\heparin LOW INTENSITY nomogram for [facility] [id number]    12/18/23 2127     heparin 25,000 units in dextrose 5% (100 units/ml) IV bolus from bag - ADDITIONAL PRN BOLUS - 30 units/kg  As needed (PRN)        Question:  Heparin Infusion Adjustment (DO NOT MODIFY ANSWER)  Answer:  \\[email or web address]\epic\Images\Pharmacy\HeparinInfusions\heparin LOW INTENSITY nomogram for [facility] [id number]    12/18/23 2127     heparin 25,000 units in dextrose 5% 250 mL (100 units/mL) infusion LOW INTENSITY nomogram - [facility]  Continuous        Question:  Begin at (units/kg/hr)  Answer:  12    12/18/23 2127                    Jaya Zhang MD  Cardiology  [facility] - Cardiac [facility]

## 2023-12-25 NOTE — SUBJECTIVE & OBJECTIVE
Interval History: improved on O2 requirements, currently on RA. Continue diuresing. NEW on 12/26 and continue vancomycin until NEW done. Patient can benefit in the parul term from PO doxycycline as per ID recs.     Review of Systems   Constitutional: Negative for chills and fever.   Cardiovascular:  Negative for chest pain, cyanosis, orthopnea and syncope.   Respiratory:  Negative for hemoptysis, shortness of breath and wheezing.    Skin:  Negative for dry skin and rash.   Genitourinary:  Negative for dysuria and hematuria.     Objective:     Vital Signs (Most Recent):  Temp: 98.6 °F (37 °C) (12/25/23 0701)  Pulse: 90 (12/25/23 0645)  Resp: (!) 9 (12/25/23 0645)  BP: 105/64 (12/25/23 0600)  SpO2: 98 % (12/25/23 0645) Vital Signs (24h Range):  Temp:  [98.6 °F (37 °C)-99 °F (37.2 °C)] 98.6 °F (37 °C)  Pulse:  [72-97] 90  Resp:  [9-38] 9  SpO2:  [97 %-100 %] 98 %  BP: ()/(54-81) 105/64     Weight: 67.1 kg (148 lb)  Body mass index is 20.07 kg/m².     SpO2: 98 %         Intake/Output Summary (Last 24 hours) at 12/25/2023 0849  Last data filed at 12/25/2023 0600  Gross per 24 hour   Intake 1467.35 ml   Output 2490 ml   Net -1022.65 ml       Lines/Drains/Airways       Peripherally Inserted Central Catheter Line  Duration             PICC Double Lumen 11/13/23 1509 right basilic 41 days              Peripheral Intravenous Line  Duration                  Peripheral IV - Single Lumen 12/23/23 1800 20 G Anterior;Distal;Left Forearm 1 day                       Physical Exam  Vitals and nursing note reviewed.   Cardiovascular:      Rate and Rhythm: Normal rate and regular rhythm.      Pulses: Normal pulses.      Heart sounds: Murmur (holosystolic mitral murmur) heard.   Pulmonary:      Effort: No respiratory distress.      Breath sounds: No wheezing or rales (scattered expiratory rales.).   Abdominal:      General: Bowel sounds are normal.   Musculoskeletal:         General: No swelling.      Right lower leg: No edema.       Left lower leg: No edema.   Skin:     Capillary Refill: Capillary refill takes 2 to 3 seconds.   Neurological:      Mental Status: He is alert and oriented to person, place, and time.            Significant Labs: All pertinent lab results from the last 24 hours have been reviewed.    Significant Imaging: CXR with pulmonary congestion on bilateral lung fields. Improved compared to admission CXR.

## 2023-12-25 NOTE — PLAN OF CARE
Problem: Adult Inpatient Plan of Care  Goal: Plan of Care Review  Outcome: Ongoing, Progressing  Goal: Patient-Specific Goal (Individualized)  Outcome: Ongoing, Progressing  Goal: Optimal Comfort and Wellbeing  Outcome: Ongoing, Progressing     Cardiac ICU Care Plan    POC reviewed with Lorenzo Nino and family. Questions and concerns addressed. No acute events today. Pt progressing toward goals. Will continue to monitor. See below and flowsheets for full assessment and VS info.       Neuro:  Clay Coma Scale  Best Eye Response: 4-->(E4) spontaneous  Best Motor Response: 6-->(M6) obeys commands  Best Verbal Response: 5-->(V5) oriented  [place] Coma Scale Score: 15  Assessment Qualifiers: patient not sedated/intubated  Pupil PERRLA: yes    24 hr Temp:  [98 °F (36.7 °C)-99 °F (37.2 °C)]      CV:  Rhythm: normal sinus rhythm   DVT prophylaxis: VTE Required Core Measure: Pharmacological prophylaxis initiated/maintained    CVP (mean): 2 mmHg (12/21/23 0800)       SVO2 (%): (S) 61 % (12/21/23 2013)               Pulses  Right Radial Pulse: 2+ (normal)  Left Radial Pulse: 2+ (normal)  Right Dorsalis Pedis Pulse: 1+ (weak)  Left Dorsalis Pedis Pulse: 1+ (weak)  Right Posterior Tibial Pulse: 1+ (weak)  Left Posterior Tibial Pulse: 1+ (weak)    Resp:  Flow (L/min): 0       GI/:  GI prophylaxis: no  Diet/Nutrition Received: 2 gram sodium, low saturated fat/low cholesterol, restrict fluids  Last Bowel Movement: 12/25/23  Voiding Characteristics: voids spontaneously without difficulty   Intake/Output Summary (Last 24 hours) at 12/25/2023 1715  Last data filed at 12/25/2023 1701  Gross per 24 hour   Intake 996.52 ml   Output 3140 ml   Net -2143.48 ml        Labs/Accuchecks:  Recent Labs   Lab 12/23/23  0210 12/24/23  1026 12/25/23  0332   WBC 15.20* 18.09* 17.25*   RBC 3.58* 3.80* 3.76*   HGB 9.2* 9.9* 9.6*   HCT 29.0* 31.7* 30.4*   * 644* 632*      Recent Labs   Lab 12/18/23  2158 12/19/23  0500 12/23/23  0216  "12/24/23 0410 12/25/23 0332   INR 1.1  --   --   --   --    APTT 33.7*   < > 45.5* 44.4* 51.0*    < > = values in this interval not displayed.      Recent Labs     12/25/23 0332   *   K 3.6   CO2 28   CL 90*   BUN 41*   CREATININE 0.9   ALKPHOS 84   ALT 18   AST 31   BILITOT 0.6     No results for input(s): "CPK", "CPKMB", "MB", "TROPONINI" in the last 168 hours.   Recent Labs     12/22/23 2044   PH 7.479*   PCO2 50.9*   PO2 27*   HCO3 37.8*   POCSATURATED 53   BE 14*       Electrolytes: N/A - electrolytes WDL  Accuchecks: none    Gtts/LDAs:   furosemide (LASIX) 500 mg in 50 mL infusion (conc: 10 mg/mL) 10 mg/hr (12/25/23 1701)    heparin (porcine) in D5W Stopped (12/25/23 1503)       Lines/Drains/Airways       Peripherally Inserted Central Catheter Line  Duration             PICC Double Lumen 11/13/23 1509 right basilic 42 days              Peripheral Intravenous Line  Duration                  Peripheral IV - Single Lumen 12/23/23 1800 20 G Anterior;Distal;Left Forearm 1 day                    Skin/Wounds  Bathing/Skin Care: (S) bath, complete;dressed/undressed;electrode patches/site rotation;linen changed;back care (12/25/23 1609)  Wounds: No  Wound care consulted: No   "

## 2023-12-25 NOTE — ASSESSMENT & PLAN NOTE
Patient with Hypercapnic Respiratory failure which is Acute.  he is not on home oxygen. Supplemental oxygen was provided with 10L NC.     Signs/symptoms of respiratory failure include- tachypnea, increased work of breathing, respiratory distress, and wheezing. Contributing diagnoses includes - CHF and Pleural effusion Labs and images were reviewed. Patient Has recent ABG, which has been reviewed.     - monitor respiratory status. Currently on room air.    - NEW on 12/26/23  - famotidine 20 BID for PPI prophylaxis

## 2023-12-25 NOTE — PROGRESS NOTES
Pharmacokinetic Assessment Follow Up: IV Vancomycin    Vancomycin serum concentration assessment(s):    The trough level was drawn correctly and can be used to guide therapy at this time. The measurement is within the desired definitive target range of 15 to 20 mcg/mL.    Vancomycin Regimen Plan:    Continue regimen to Vancomycin 1250 mg IV every 24 hours with next serum trough concentration measured at 0830, 60 minutes prior to   dose on 12/28  Draw random level sooner, if change in renal function or clinical status    Drug levels (last 3 results):  Recent Labs   Lab Result Units 12/23/23  0210 12/25/23  0812   Vancomycin, Random ug/mL 11.6  --    Vancomycin-Trough ug/mL  --  17.4       Pharmacy will continue to follow and monitor vancomycin.    Thank you for the consult,   Sushila Kirk, Pharm.D.  PGY-1 Pharmacy Resident  [id number]     Patient brief summary:  Lorenzo Nino is a 48 y.o. male initiated on antimicrobial therapy with IV Vancomycin for treatment of endocarditis      Drug Allergies:   Review of patient's allergies indicates:  No Known Allergies    Actual Body Weight:   67.1 kg    Renal Function:   Estimated Creatinine Clearance: 94.2 mL/min (based on SCr of 0.9 mg/dL).,     Dialysis Method (if applicable):  N/A    CBC (last 72 hours):  Recent Labs   Lab Result Units 12/23/23  0210 12/24/23  1026 12/25/23  0332   WBC K/uL 15.20* 18.09* 17.25*   Hemoglobin g/dL 9.2* 9.9* 9.6*   Hematocrit % 29.0* 31.7* 30.4*   Platelets K/uL 627* 644* 632*   Gran % % 60.0 60.0 54.7   Lymph % % 20.0 18.8 21.4   Mono % % 7.0 12.0 13.4   Eosinophil % % 7.0 5.3 5.2   Basophil % % 1.0 0.8 1.0   Differential Method  Manual Automated Automated       Metabolic Panel (last 72 hours):  Recent Labs   Lab Result Units 12/22/23  1657 12/23/23  0210 12/23/23  1325 12/24/23  1026 12/24/23 2016 12/25/23  0332   Sodium mmol/L 132* 130* 132* 131*  --  132*   Potassium mmol/L 3.6 3.8 3.7 3.1* 3.9 3.6   Chloride mmol/L 85* 88* 87* 87*  --   90*   CO2 mmol/L 32* 30* 28 29  --  28   Glucose mg/dL 122* 110 126* 187*  --  100   BUN mg/dL 22* 20 23* 30*  --  41*   Creatinine mg/dL 0.9 0.9 1.0 1.0  --  0.9   Albumin g/dL  --  2.5*  --  2.7*  --  2.8*   Total Bilirubin mg/dL  --  0.7  --  0.6  --  0.6   Alkaline Phosphatase U/L  --  91  --  87  --  84   AST U/L  --  32  --  31  --  31   ALT U/L  --  14  --  18  --  18   Magnesium mg/dL  --  2.2 2.3  --   --  2.5   Phosphorus mg/dL  --   --   --   --   --  4.5       Vancomycin Administrations:  vancomycin given in the last 96 hours                     vancomycin 1,250 mg in dextrose 5 % (D5W) 250 mL IVPB (Vial-Mate) (mg) 1,250 mg New Bag 12/25/23 0918     1,250 mg New Bag 12/24/23 1045    vancomycin 1.25 g in dextrose 5% 250 mL IVPB (ready to mix) ()  Restarted 12/23/23 1100     1,250 mg New Bag  0957    vancomycin (VANCOCIN) 1,000 mg in dextrose 5 % (D5W) 250 mL IVPB (Vial-Mate) (mg) 1,000 mg New Bag 12/21/23 1649                    Microbiologic Results:  Microbiology Results (last 7 days)       Procedure Component Value Units Date/Time    Blood Culture #2 **CANNOT BE ORDERED STAT** [[id number]] Collected: 12/18/23 1619    Order Status: Completed Specimen: Blood from Peripheral, Forearm, Right Updated: 12/23/23 1812     Blood Culture, Routine No growth after 5 days.    Blood Culture #1 **CANNOT BE ORDERED STAT** [[id number]] Collected: 12/18/23 1623    Order Status: Completed Specimen: Blood from Peripheral, Forearm, Right Updated: 12/23/23 1812     Blood Culture, Routine No growth after 5 days.    Culture, Respiratory with Gram Stain [[id number]]  (Abnormal) Collected: 12/19/23 1506    Order Status: Completed Specimen: Respiratory from Sputum, Expectorated Updated: 12/21/23 1353     Respiratory Culture No S aureus or Pseudomonas isolated.      CANDIDA DUBLINIENSIS  Moderate  Normal respiratory ina also present       Gram Stain (Respiratory) >10 epithelial cells per low power field     Gram Stain  (Respiratory) Many WBC's     Gram Stain (Respiratory) Moderate yeast     Gram Stain (Respiratory) Moderate Gram positive rods     Gram Stain (Respiratory) Rare Gram positive cocci    Respiratory Infection Panel (PCR), Nasopharyngeal [[id number]] Collected: 12/18/23 2025    Order Status: Completed Specimen: Nasopharyngeal Swab Updated: 12/18/23 2137     Respiratory Infection Panel Source NP Swab     Adenovirus Not Detected     Coronavirus 229E, Common Cold Virus Not Detected     Coronavirus HKU1, Common Cold Virus Not Detected     Coronavirus NL63, Common Cold Virus Not Detected     Coronavirus OC43, Common Cold Virus Not Detected     Comment: The Coronavirus strains detected in this test cause the common cold.  These strains are not the COVID-19 (novel Coronavirus)strain   associated with the respiratory disease outbreak.          SARS-CoV2 (COVID-19) Qualitative PCR Not Detected     Human Metapneumovirus Not Detected     Human Rhinovirus/Enterovirus Not Detected     Influenza A (subtypes H1, H1-2009,H3) Not Detected     Influenza B Not Detected     Parainfluenza Virus 1 Not Detected     Parainfluenza Virus 2 Not Detected     Parainfluenza Virus 3 Not Detected     Parainfluenza Virus 4 Not Detected     Respiratory Syncytial Virus Not Detected     Bordetella Parapertussis (KA7171) Not Detected     Bordetella pertussis (ptxP) Not Detected     Chlamydia pneumoniae Not Detected     Mycoplasma pneumoniae Not Detected    Narrative:      For all other respiratory sources, order [id number] -  Respiratory Viral Panel by PCR    Respiratory Infection Panel (PCR), Nasopharyngeal [[id number]] Collected: 12/18/23 2018    Order Status: Canceled Specimen: Nasopharyngeal Swab     Blood culture [[id number]]     Order Status: Canceled Specimen: Blood     Blood culture [[id number]]     Order Status: Canceled Specimen: Blood     Influenza A & B by Molecular [[id number]] Collected: 12/18/23 1628    Order Status: Completed Specimen:  Nasopharyngeal Swab Updated: 12/18/23 1720     Influenza A, Molecular Negative     Influenza B, Molecular Negative     Flu A & B Source Nasal swab    Influenza A & B by Molecular [[id number]]     Order Status: Canceled Specimen: Nasopharyngeal Swab

## 2023-12-26 ENCOUNTER — ANESTHESIA (OUTPATIENT)
Dept: MEDSURG UNIT | Facility: HOSPITAL | Age: 48
DRG: 291 | End: 2023-12-26
Payer: COMMERCIAL

## 2023-12-26 ENCOUNTER — ANESTHESIA EVENT (OUTPATIENT)
Dept: MEDSURG UNIT | Facility: HOSPITAL | Age: 48
DRG: 291 | End: 2023-12-26
Payer: COMMERCIAL

## 2023-12-26 LAB
ALBUMIN SERPL BCP-MCNC: 3 G/DL (ref 3.5–5.2)
ALP SERPL-CCNC: 88 U/L (ref 55–135)
ALT SERPL W/O P-5'-P-CCNC: 19 U/L (ref 10–44)
ANION GAP SERPL CALC-SCNC: 13 MMOL/L (ref 8–16)
ANION GAP SERPL CALC-SCNC: 15 MMOL/L (ref 8–16)
ANION GAP SERPL CALC-SCNC: 15 MMOL/L (ref 8–16)
ANISOCYTOSIS BLD QL SMEAR: SLIGHT
APTT PPP: 43.8 SEC (ref 21–32)
APTT PPP: 55.5 SEC (ref 21–32)
APTT PPP: 71.5 SEC (ref 21–32)
APTT PPP: >150 SEC (ref 21–32)
ASCENDING AORTA: 3.1 CM
AST SERPL-CCNC: 30 U/L (ref 10–40)
BASOPHILS # BLD AUTO: 0.14 K/UL (ref 0–0.2)
BASOPHILS NFR BLD: 0.8 % (ref 0–1.9)
BILIRUB SERPL-MCNC: 0.7 MG/DL (ref 0.1–1)
BNP SERPL-MCNC: 156 PG/ML (ref 0–99)
BSA FOR ECHO PROCEDURE: 1.85 M2
BUN SERPL-MCNC: 33 MG/DL (ref 6–20)
BUN SERPL-MCNC: 35 MG/DL (ref 6–20)
BUN SERPL-MCNC: 36 MG/DL (ref 6–20)
CALCIUM SERPL-MCNC: 9.7 MG/DL (ref 8.7–10.5)
CALCIUM SERPL-MCNC: 9.8 MG/DL (ref 8.7–10.5)
CALCIUM SERPL-MCNC: 9.9 MG/DL (ref 8.7–10.5)
CHLORIDE SERPL-SCNC: 89 MMOL/L (ref 95–110)
CHLORIDE SERPL-SCNC: 90 MMOL/L (ref 95–110)
CHLORIDE SERPL-SCNC: 91 MMOL/L (ref 95–110)
CO2 SERPL-SCNC: 27 MMOL/L (ref 23–29)
CO2 SERPL-SCNC: 28 MMOL/L (ref 23–29)
CO2 SERPL-SCNC: 30 MMOL/L (ref 23–29)
CREAT SERPL-MCNC: 1 MG/DL (ref 0.5–1.4)
CREAT SERPL-MCNC: 1 MG/DL (ref 0.5–1.4)
CREAT SERPL-MCNC: 1.2 MG/DL (ref 0.5–1.4)
DIFFERENTIAL METHOD BLD: ABNORMAL
EOSINOPHIL # BLD AUTO: 0.6 K/UL (ref 0–0.5)
EOSINOPHIL NFR BLD: 3.3 % (ref 0–8)
ERYTHROCYTE [DISTWIDTH] IN BLOOD BY AUTOMATED COUNT: 18.7 % (ref 11.5–14.5)
EST. GFR  (NO RACE VARIABLE): >60 ML/MIN/1.73 M^2
GLUCOSE SERPL-MCNC: 134 MG/DL (ref 70–110)
GLUCOSE SERPL-MCNC: 158 MG/DL (ref 70–110)
GLUCOSE SERPL-MCNC: 270 MG/DL (ref 70–110)
HCT VFR BLD AUTO: 32.8 % (ref 40–54)
HGB BLD-MCNC: 10.1 G/DL (ref 14–18)
IMM GRANULOCYTES # BLD AUTO: 0.7 K/UL (ref 0–0.04)
IMM GRANULOCYTES NFR BLD AUTO: 3.8 % (ref 0–0.5)
LYMPHOCYTES # BLD AUTO: 3.4 K/UL (ref 1–4.8)
LYMPHOCYTES NFR BLD: 18.5 % (ref 18–48)
MAGNESIUM SERPL-MCNC: 2.5 MG/DL (ref 1.6–2.6)
MAGNESIUM SERPL-MCNC: 2.5 MG/DL (ref 1.6–2.6)
MCH RBC QN AUTO: 25.6 PG (ref 27–31)
MCHC RBC AUTO-ENTMCNC: 30.8 G/DL (ref 32–36)
MCV RBC AUTO: 83 FL (ref 82–98)
MONOCYTES # BLD AUTO: 2.3 K/UL (ref 0.3–1)
MONOCYTES NFR BLD: 12.9 % (ref 4–15)
NEUTROPHILS # BLD AUTO: 11.1 K/UL (ref 1.8–7.7)
NEUTROPHILS NFR BLD: 60.7 % (ref 38–73)
NRBC BLD-RTO: 0 /100 WBC
PHOSPHATE SERPL-MCNC: 5 MG/DL (ref 2.7–4.5)
PLATELET # BLD AUTO: 678 K/UL (ref 150–450)
PLATELET BLD QL SMEAR: ABNORMAL
PMV BLD AUTO: 10.5 FL (ref 9.2–12.9)
POIKILOCYTOSIS BLD QL SMEAR: SLIGHT
POTASSIUM SERPL-SCNC: 3.5 MMOL/L (ref 3.5–5.1)
POTASSIUM SERPL-SCNC: 3.6 MMOL/L (ref 3.5–5.1)
POTASSIUM SERPL-SCNC: 3.6 MMOL/L (ref 3.5–5.1)
PROT SERPL-MCNC: 9.2 G/DL (ref 6–8.4)
RBC # BLD AUTO: 3.95 M/UL (ref 4.6–6.2)
SINUS: 3.6 CM
SODIUM SERPL-SCNC: 132 MMOL/L (ref 136–145)
SODIUM SERPL-SCNC: 132 MMOL/L (ref 136–145)
SODIUM SERPL-SCNC: 134 MMOL/L (ref 136–145)
STJ: 3.1 CM
WBC # BLD AUTO: 18.2 K/UL (ref 3.9–12.7)

## 2023-12-26 PROCEDURE — 80053 COMPREHEN METABOLIC PANEL: CPT | Performed by: STUDENT IN AN ORGANIZED HEALTH CARE EDUCATION/TRAINING PROGRAM

## 2023-12-26 PROCEDURE — 85730 THROMBOPLASTIN TIME PARTIAL: CPT | Performed by: STUDENT IN AN ORGANIZED HEALTH CARE EDUCATION/TRAINING PROGRAM

## 2023-12-26 PROCEDURE — D9220A PRA ANESTHESIA: ICD-10-PCS | Mod: CRNA,,, | Performed by: NURSE ANESTHETIST, CERTIFIED REGISTERED

## 2023-12-26 PROCEDURE — 20000000 HC ICU ROOM

## 2023-12-26 PROCEDURE — 93010 ELECTROCARDIOGRAM REPORT: CPT | Mod: ,,, | Performed by: INTERNAL MEDICINE

## 2023-12-26 PROCEDURE — D9220A PRA ANESTHESIA: ICD-10-PCS | Mod: ANES,,, | Performed by: ANESTHESIOLOGY

## 2023-12-26 PROCEDURE — 83735 ASSAY OF MAGNESIUM: CPT | Performed by: STUDENT IN AN ORGANIZED HEALTH CARE EDUCATION/TRAINING PROGRAM

## 2023-12-26 PROCEDURE — 84100 ASSAY OF PHOSPHORUS: CPT | Performed by: INTERNAL MEDICINE

## 2023-12-26 PROCEDURE — 37000008 HC ANESTHESIA 1ST 15 MINUTES

## 2023-12-26 PROCEDURE — 85730 THROMBOPLASTIN TIME PARTIAL: CPT | Mod: 91 | Performed by: INTERNAL MEDICINE

## 2023-12-26 PROCEDURE — 25000003 PHARM REV CODE 250: Performed by: NURSE ANESTHETIST, CERTIFIED REGISTERED

## 2023-12-26 PROCEDURE — 25000003 PHARM REV CODE 250: Performed by: INTERNAL MEDICINE

## 2023-12-26 PROCEDURE — 80048 BASIC METABOLIC PNL TOTAL CA: CPT | Mod: 91,XB | Performed by: INTERNAL MEDICINE

## 2023-12-26 PROCEDURE — 93005 ELECTROCARDIOGRAM TRACING: CPT

## 2023-12-26 PROCEDURE — D9220A PRA ANESTHESIA: Mod: ANES,,, | Performed by: ANESTHESIOLOGY

## 2023-12-26 PROCEDURE — 83735 ASSAY OF MAGNESIUM: CPT | Mod: 91 | Performed by: INTERNAL MEDICINE

## 2023-12-26 PROCEDURE — 25000003 PHARM REV CODE 250

## 2023-12-26 PROCEDURE — 37000009 HC ANESTHESIA EA ADD 15 MINS

## 2023-12-26 PROCEDURE — 63600175 PHARM REV CODE 636 W HCPCS: Performed by: STUDENT IN AN ORGANIZED HEALTH CARE EDUCATION/TRAINING PROGRAM

## 2023-12-26 PROCEDURE — 83880 ASSAY OF NATRIURETIC PEPTIDE: CPT

## 2023-12-26 PROCEDURE — 85025 COMPLETE CBC W/AUTO DIFF WBC: CPT | Performed by: STUDENT IN AN ORGANIZED HEALTH CARE EDUCATION/TRAINING PROGRAM

## 2023-12-26 PROCEDURE — 63600175 PHARM REV CODE 636 W HCPCS: Performed by: NURSE ANESTHETIST, CERTIFIED REGISTERED

## 2023-12-26 PROCEDURE — 63600175 PHARM REV CODE 636 W HCPCS: Performed by: INTERNAL MEDICINE

## 2023-12-26 PROCEDURE — 99231 SBSQ HOSP IP/OBS SF/LOW 25: CPT | Mod: ,,, | Performed by: INTERNAL MEDICINE

## 2023-12-26 PROCEDURE — D9220A PRA ANESTHESIA: Mod: CRNA,,, | Performed by: NURSE ANESTHETIST, CERTIFIED REGISTERED

## 2023-12-26 RX ORDER — FENTANYL CITRATE 50 UG/ML
25 INJECTION, SOLUTION INTRAMUSCULAR; INTRAVENOUS EVERY 5 MIN PRN
Status: CANCELLED | OUTPATIENT
Start: 2023-12-26

## 2023-12-26 RX ORDER — ACETAMINOPHEN 500 MG
1000 TABLET ORAL EVERY 6 HOURS PRN
Status: DISCONTINUED | OUTPATIENT
Start: 2023-12-26 | End: 2024-01-08 | Stop reason: HOSPADM

## 2023-12-26 RX ORDER — PROCHLORPERAZINE EDISYLATE 5 MG/ML
5 INJECTION INTRAMUSCULAR; INTRAVENOUS EVERY 30 MIN PRN
Status: CANCELLED | OUTPATIENT
Start: 2023-12-26

## 2023-12-26 RX ORDER — PHENYLEPHRINE HYDROCHLORIDE 10 MG/ML
INJECTION INTRAVENOUS
Status: DISCONTINUED | OUTPATIENT
Start: 2023-12-26 | End: 2023-12-26

## 2023-12-26 RX ORDER — LIDOCAINE HYDROCHLORIDE 20 MG/ML
INJECTION INTRAVENOUS
Status: DISCONTINUED | OUTPATIENT
Start: 2023-12-26 | End: 2023-12-26

## 2023-12-26 RX ORDER — PROMETHAZINE HYDROCHLORIDE AND CODEINE PHOSPHATE 6.25; 1 MG/5ML; MG/5ML
5 SOLUTION ORAL EVERY 4 HOURS PRN
Status: DISCONTINUED | OUTPATIENT
Start: 2023-12-26 | End: 2024-01-08 | Stop reason: HOSPADM

## 2023-12-26 RX ORDER — FUROSEMIDE 10 MG/ML
40 INJECTION INTRAMUSCULAR; INTRAVENOUS
Status: DISCONTINUED | OUTPATIENT
Start: 2023-12-26 | End: 2023-12-27

## 2023-12-26 RX ORDER — CODEINE SULFATE 15 MG/1
15 TABLET ORAL EVERY 4 HOURS PRN
Status: DISCONTINUED | OUTPATIENT
Start: 2023-12-26 | End: 2023-12-26

## 2023-12-26 RX ORDER — PROPOFOL 10 MG/ML
VIAL (ML) INTRAVENOUS
Status: DISCONTINUED | OUTPATIENT
Start: 2023-12-26 | End: 2023-12-26

## 2023-12-26 RX ADMIN — FAMOTIDINE 20 MG: 20 TABLET ORAL at 08:12

## 2023-12-26 RX ADMIN — METOPROLOL TARTRATE 25 MG: 25 TABLET, FILM COATED ORAL at 09:12

## 2023-12-26 RX ADMIN — HEPARIN SODIUM 23 UNITS/KG/HR: 10000 INJECTION, SOLUTION INTRAVENOUS at 12:12

## 2023-12-26 RX ADMIN — PHENYLEPHRINE HYDROCHLORIDE 200 MCG: 10 INJECTION INTRAVENOUS at 02:12

## 2023-12-26 RX ADMIN — POTASSIUM BICARBONATE 40 MEQ: 391 TABLET, EFFERVESCENT ORAL at 03:12

## 2023-12-26 RX ADMIN — LIDOCAINE HYDROCHLORIDE 100 MG: 20 INJECTION INTRAVENOUS at 01:12

## 2023-12-26 RX ADMIN — POTASSIUM CHLORIDE 60 MEQ: 1500 TABLET, EXTENDED RELEASE ORAL at 10:12

## 2023-12-26 RX ADMIN — GLYCOPYRROLATE 0.2 MG: 0.2 INJECTION, SOLUTION INTRAMUSCULAR; INTRAVENOUS at 01:12

## 2023-12-26 RX ADMIN — FAMOTIDINE 20 MG: 20 TABLET ORAL at 09:12

## 2023-12-26 RX ADMIN — METOPROLOL TARTRATE 25 MG: 25 TABLET, FILM COATED ORAL at 03:12

## 2023-12-26 RX ADMIN — VANCOMYCIN HYDROCHLORIDE 1250 MG: 1.25 INJECTION, POWDER, LYOPHILIZED, FOR SOLUTION INTRAVENOUS at 09:12

## 2023-12-26 RX ADMIN — POTASSIUM BICARBONATE 20 MEQ: 391 TABLET, EFFERVESCENT ORAL at 09:12

## 2023-12-26 RX ADMIN — METOPROLOL TARTRATE 25 MG: 25 TABLET, FILM COATED ORAL at 08:12

## 2023-12-26 RX ADMIN — FUROSEMIDE 40 MG: 10 INJECTION, SOLUTION INTRAVENOUS at 10:12

## 2023-12-26 RX ADMIN — SODIUM CHLORIDE: 0.9 INJECTION, SOLUTION INTRAVENOUS at 01:12

## 2023-12-26 RX ADMIN — FUROSEMIDE 40 MG: 10 INJECTION, SOLUTION INTRAVENOUS at 11:12

## 2023-12-26 RX ADMIN — PROPOFOL 100 MG: 10 INJECTION, EMULSION INTRAVENOUS at 01:12

## 2023-12-26 RX ADMIN — ACETAMINOPHEN 1000 MG: 500 TABLET ORAL at 09:12

## 2023-12-26 RX ADMIN — HEPARIN SODIUM 17 UNITS/KG/HR: 10000 INJECTION, SOLUTION INTRAVENOUS at 10:12

## 2023-12-26 NOTE — CONSULTS
[facility] [place]  NEW Consult      Lorenzo Nino  YOB: 1975  Medical Record Number:  [id number]  Attending Physician:  Justine Lobato MD   Current Principal Problem:  MRSA bacteremia    History     Cc: SOB    HPI  Mr. Lorenzo Nino is a 48 y.o. 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 Presented for 3-day dyspnea. Denies having fever or chills during this time. He states that he was not doing anything strenuous when his symptoms came on. His symptoms got progressively worse over the weekend and he decided to present yesterday. He has been on home IV Daptomycin. He follows with Dr. Mercado with Cardiology and his operation was performed by Dr. Haro.    In the ER, he was hypoxic requiring 6 L to maintain sats of 91-94%. Initial lactic acid 1.8. Creatinine 0.9. WBC 20k. Bedside Echo showed EF 45-50% with severe MR (2 jets, one posteriorly directed and one anteriorly directed), restricted posterior leaflet, possible tear in anterior leaflet. No pericardial effusion.    Patient was transferred to the CICU for AHRF 2/2 significant pulmonary edema 2/2 severe mitral regurgitation concerning for anterior leaflet tear vs posterior leaflet restriction.       Consult to NEW to evaluation of MR and endocarditis     Dysphagia or odynophagia:  No  Liver Disease, esophageal disease, or known varices:  No  Upper GI Bleeding: No  Snoring:  No  Sleep Apnea:  No  Prior neck surgery or radiation:  No  History of anesthetic difficulties:  No  Family history of anesthetic difficulties:  No  Last oral intake: yesterday before midnight  Able to move neck in all directions:  Yes    Medications - Outpatient  Prior to Admission medications    Medication Sig Start Date End Date Taking? Authorizing Provider   aspirin (ECOTRIN) 325 MG EC tablet Take 1 tablet (325 mg total) by mouth once daily. 11/14/23 11/13/24  Ava  Raquel CURRY PA-C   benzonatate (TESSALON) 100 MG capsule Take 1 capsule (100 mg total) by mouth 3 (three) times daily as needed for Cough. 12/15/23 12/25/23  Emerson Mercado MD   metoprolol tartrate (LOPRESSOR) 50 MG tablet Take 1 tablet (50 mg total) by mouth 2 (two) times daily. 11/14/23 11/13/24  Raquel Escalante PA-C   NIFEdipine (PROCARDIA-XL) 30 MG (OSM) 24 hr tablet Take 1 tablet (30 mg total) by mouth once daily. 12/15/23 12/14/24  Emerson Mercado MD       Medications - Current  Scheduled Meds:   famotidine  20 mg Oral BID    metoprolol tartrate  25 mg Oral TID    polyethylene glycol  17 g Oral Daily    potassium bicarbonate  20 mEq Oral Once    vancomycin 1,250 mg in dextrose 5 % (D5W) 250 mL IVPB (Vial-Mate)  1,250 mg Intravenous Q24H     Continuous Infusions:   furosemide (LASIX) 500 mg in 50 mL infusion (conc: 10 mg/mL) 10 mg/hr (12/26/23 0501)    heparin (porcine) in D5W 21 Units/kg/hr (12/26/23 0618)       Allergies  Review of patient's allergies indicates:  No Known Allergies  Past Medical History  Past Medical History:   Diagnosis Date    Hypertension      Past Surgical History  Past Surgical History:   Procedure Laterality Date    EXCLUSION, LEFT ATRIAL APPENDAGE, OPEN, AS PART OF OPEN CHEST SURGERY Left 11/3/2023    Procedure: EXCLUSION, LEFT ATRIAL APPENDAGE, OPEN, AS PART OF OPEN CHEST SURGERY;  Surgeon: Manuel Beal MD;  Location: [facility] OR [address] [place] [address];  Service: Cardiothoracic;  Laterality: Left;    INSERTION OF INTRA-AORTIC BALLOON ASSIST DEVICE Right 11/2/2023    Procedure: INSERTION, INTRA-AORTIC BALLOON PUMP;  Surgeon: Jose Vidal MD;  Location: [facility] CATH LAB;  Service: Cardiology;  Laterality: Right;    REPAIR, MITRAL VALVE, OPEN N/A 11/3/2023    Procedure: REPAIR, MITRAL VALVE, OPEN;  Surgeon: Manuel Beal MD;  [facility];  Service: Cardiothoracic;  Laterality: N/A;     ROS  10 point ROS performed and negative except as stated in HPI  "    Physical Examination   Vital Signs  Vitals  Temp: 98 °F (36.7 °C)  Temp Source: Oral  Pulse: 86  Heart Rate Source: Monitor, Continuous  Resp: 15  SpO2: 99 %  Pulse Oximetry Type: Continuous  Flow (L/min): 0  BP: 106/71  MAP (mmHg): 82  BP Location: Left arm  BP Method: Automatic  Patient Position: Lying  Invasive Hemodynamic Monitoring  CVP (mean): 2 mmHg  SVO2 (%): (S) 61 % 24 Hour VS Range  Temp:  [97.8 °F (36.6 °C)-98.1 °F (36.7 °C)]   Pulse:  [74-87]   Resp:  [15-39]   BP: ()/(60-79)   SpO2:  [93 %-100 %]     Intake/Output Summary (Last 24 hours) at 12/26/2023 0903  Last data filed at 12/26/2023 0529  Gross per 24 hour   Intake 562.2 ml   Output 2875 ml   Net -2312.8 ml         Physical Exam  HENT:      Head: Normocephalic and atraumatic.   Eyes:      Conjunctiva/sclera: Conjunctivae normal.   Cardiovascular:      Rate and Rhythm: Normal rate.   Pulmonary:      Effort: Pulmonary effort is normal. No respiratory distress.      Breath sounds: No wheezing.   Abdominal:      General: There is no distension.      Palpations: Abdomen is soft.      Tenderness: There is no abdominal tenderness.   Musculoskeletal:      Cervical back: Normal range of motion.      Right lower leg: Edema present.      Left lower leg: Edema present.   Neurological:      Mental Status: He is alert and oriented to person, place, and time.   Psychiatric:         Mood and Affect: Affect normal.         Data     Recent Labs   Lab 12/22/23  0314 12/23/23  0210 12/24/23  1026 12/25/23  0332 12/26/23  0731   WBC 12.46 15.20* 18.09* 17.25* 18.20*   HGB 8.7* 9.2* 9.9* 9.6* 10.1*   HCT 27.2* 29.0* 31.7* 30.4* 32.8*   * 627* 644* 632* 678*      No results for input(s): "PROTIME", "INR" in the last 168 hours.   Recent Labs   Lab 12/23/23  0210 12/23/23  1325 12/24/23  1026 12/24/23 2016 12/25/23  0332 12/26/23  0731   * 132* 131*  --  132* 134*   K 3.8 3.7 3.1* 3.9 3.6 3.5   CL 88* 87* 87*  --  90* 91*   CO2 30* 28 29  --  28 28 "   BUN 20 23* 30*  --  41* 36*   CREATININE 0.9 1.0 1.0  --  0.9 1.0   ANIONGAP 12 17* 15  --  14 15   CALCIUM 10.1 9.7 9.7  --  10.0 9.9      Assessment & Plan     NEW to evaluation of MR and endocarditis   -No absolute contraindications of esophageal stricture, tumor, perforation, laceration,or diverticulum and/or active GI bleed  -The risks, benefits & alternatives of the procedure were explained to the patient.   -The risks of transesophageal echo include but are not limited to:  Dental trauma, esophageal trauma/perforation, bleeding, laryngospasm/brochospasm, aspiration, sore throat/hoarseness, & dislodgement of the endotracheal tube/nasogastric tube (where applicable).    -The risks of ANES monitored sedation include hypotension, respiratory depression, arrhythmias, bronchospasm, & death.    -Informed consent was obtained. The patient is agreeable to proceed with the procedure and all questions and concerns addressed.    Case discussed with an attending in echocardiography lab.    Omer Lukeor, MD  [facility]   Cardiovascular Disease PGY-V

## 2023-12-26 NOTE — PROGRESS NOTES
[facility] - Cardiac Intensive Care  Cardiology  Progress Note    Patient Name: Lorenzo Nino  MRN: [id number]  Admission Date: 12/18/2023  Hospital Length of Stay: 8 days  Code Status: Full Code   Attending Physician: Justine Lobato MD   Primary Care Physician: Terrie, Primary Doctor  Expected Discharge Date: 12/30/2023  Principal Problem:MRSA bacteremia    Subjective:     Hospital Course:   Upon admission to the CICU on afib w/ RVR. Given 1 dose of digoxin and started on heparin gtt and diltiazem gtt. Stabilized on sinus rhythm. CTS evaluated patient and agree on continuing aggressive diuresis with lasix gtt, at the moment not indicated IABP. Pending ID evaluation for AB management. Switched to PO diltiazem due to normal rhythm. Will hold NEW at the moment due to patient with shortness of breath and increased risk of respiratory failure. EKG with Aflutter with PVCs. Patient to have daily EKGs to evaluate flutter. ID evaluated and recommend continuing Abx pending blood cultures. TTE with severe MR and severe LA dilation. No new events of chest pain, with decrease O2 demands on 4L NC . Blood cx NGTD. Respiratory cx with yeast and gram positive cocci.  At the moment with NSR. Will continue diuresing on lasix gtt once euvolemic and will transition to IV pushes once stabilized.  Blood cx with NGTD. Discontinued meropenem as per ID recs. Switched from diltiazem to metoprolol 25 TID. Increased acute phase reactants (ESR 85 / .9). H/H still decreased (Hb 9.2) with FOBT positive. Respiratory cx positive for Candida dubliensis, ID following. Tolerating room air. VBG with compensated respiratory alkalosis. Pending CTS re-evaluation to assess patient's and family concerns. Following ID recs, continue vancomycin until NEW on 12/26/23. Pending to consider transitioning patient to PO doxycycline once NEW performed. Continue diuresing and rate control.     Interval History: clinical improvement with decreased O2  requirements, now on RA. BNP downtrending following aggressive diuresis. Continues IV vancomycin prior to NEW as per ID recs. Considering transitioning to PO doxycycline on the long term. Started on lasix pushes.     Review of Systems   Constitutional: Negative for chills and fever.   Cardiovascular:  Negative for chest pain, cyanosis, orthopnea and syncope.   Respiratory:  Negative for hemoptysis, shortness of breath and wheezing.    Skin:  Negative for dry skin and rash.   Genitourinary:  Negative for dysuria and hematuria.     Objective:     Vital Signs (Most Recent):  Temp: 98 °F (36.7 °C) (12/26/23 0301)  Pulse: 86 (12/26/23 0601)  Resp: 15 (12/26/23 0601)  BP: 106/71 (12/26/23 0600)  SpO2: 99 % (12/26/23 0601) Vital Signs (24h Range):  Temp:  [97.8 °F (36.6 °C)-98.1 °F (36.7 °C)] 98 °F (36.7 °C)  Pulse:  [74-87] 86  Resp:  [15-39] 15  SpO2:  [93 %-100 %] 99 %  BP: ()/(60-81) 106/71     Weight: 67.1 kg (148 lb)  Body mass index is 20.07 kg/m².     SpO2: 99 %         Intake/Output Summary (Last 24 hours) at 12/26/2023 0800  Last data filed at 12/26/2023 0529  Gross per 24 hour   Intake 595.13 ml   Output 2875 ml   Net -2279.87 ml       Lines/Drains/Airways       Peripherally Inserted Central Catheter Line  Duration             PICC Double Lumen 11/13/23 1509 right basilic 42 days              Peripheral Intravenous Line  Duration                  Peripheral IV - Single Lumen 12/23/23 1800 20 G Anterior;Distal;Left Forearm 2 days                       Physical Exam  Vitals and nursing note reviewed.   Cardiovascular:      Rate and Rhythm: Normal rate and regular rhythm.      Pulses: Normal pulses.      Heart sounds: Murmur (holosystolic mitral murmur) heard.   Pulmonary:      Effort: No respiratory distress.      Breath sounds: No wheezing or rales (scattered expiratory rales.).   Abdominal:      General: Bowel sounds are normal.   Musculoskeletal:         General: No swelling.      Right lower leg: No  edema.      Left lower leg: No edema.   Skin:     Capillary Refill: Capillary refill takes 2 to 3 seconds.   Neurological:      Mental Status: He is alert and oriented to person, place, and time.            Significant Labs: All pertinent lab results from the last 24 hours have been reviewed.    Significant Imaging:  CXR with congestion in bilateral lung fields, improving in comparison to admission images.   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 Echo with EF 50-53% severe MR and severe LA dilation. Optimized rate control with metoprolol 25 TID. Increased acute phase reactants. Scheduled for NEW on 12/26/23.     Plan:  - resume cardiac diet following procedure  - switched to IV Lasix 40 BID  - NEW on 12/26/23  - blood cx with NGTD   - continue vancomycin until NEW on 12/26 as per ID recs.    - patient to benefit from PO doxycycline 100 BID on the long term as per ID      Anemia due to GI blood loss  - continue PPIx     HFmrEF  - Hold GDMT for now.     Acute hypoxemic respiratory failure  Patient with Hypercapnic Respiratory failure which is Acute.  he is not on home oxygen. Supplemental oxygen was provided with 10L NC.     Signs/symptoms of respiratory failure include- tachypnea, increased work of breathing, respiratory distress, and wheezing. Contributing diagnoses includes - CHF and Pleural effusion Labs and images were reviewed. Patient Has recent ABG, which has  been reviewed.     - monitor respiratory status. Currently on room air.    - famotidine 20 BID for PPI prophylaxis     A-fib with RVR  S/p afib with RVR once admitted to the CCU. On sinus rhythm.     - continue heparin gtt  - continue metoprolol 25 TID for rate control   - monitor telemetry    Endocarditis  See severe MR         VTE Risk Mitigation (From admission, onward)           Ordered     heparin 25,000 units in dextrose 5% (100 units/ml) IV bolus from bag - ADDITIONAL PRN BOLUS - 60 units/kg  As needed (PRN)        Question:  Heparin Infusion Adjustment (DO NOT MODIFY ANSWER)  Answer:  \\[email or web address]\epic\Images\Pharmacy\HeparinInfusions\heparin LOW INTENSITY nomogram for [facility] [id number]    12/18/23 2127     heparin 25,000 units in dextrose 5% (100 units/ml) IV bolus from bag - ADDITIONAL PRN BOLUS - 30 units/kg  As needed (PRN)        Question:  Heparin Infusion Adjustment (DO NOT MODIFY ANSWER)  Answer:  \\[email or web address] [email or web address]\epic\Images\Pharmacy\HeparinInfusions\heparin LOW INTENSITY nomogram for [facility] [id number].pdf    12/18/23 2127     heparin 25,000 units in dextrose 5% 250 mL (100 units/mL) infusion LOW INTENSITY nomogram - [facility]  Continuous        Question:  Begin at (units/kg/hr)  Answer:  12    12/18/23 2127                    Jaya Zhang MD  Cardiology  [facility] - Cardiac Intensive [facility]

## 2023-12-26 NOTE — SUBJECTIVE & OBJECTIVE
Interval History: clinical improvement with decreased O2 requirements, now on RA. BNP downtrending following aggressive diuresis. Continues IV vancomycin prior to NEW as per ID recs. Considering transitioning to PO doxycycline on the long term.     Review of Systems   Constitutional: Negative for chills and fever.   Cardiovascular:  Negative for chest pain, cyanosis, orthopnea and syncope.   Respiratory:  Negative for hemoptysis, shortness of breath and wheezing.    Skin:  Negative for dry skin and rash.   Genitourinary:  Negative for dysuria and hematuria.     Objective:     Vital Signs (Most Recent):  Temp: 98 °F (36.7 °C) (12/26/23 0301)  Pulse: 86 (12/26/23 0601)  Resp: 15 (12/26/23 0601)  BP: 106/71 (12/26/23 0600)  SpO2: 99 % (12/26/23 0601) Vital Signs (24h Range):  Temp:  [97.8 °F (36.6 °C)-98.1 °F (36.7 °C)] 98 °F (36.7 °C)  Pulse:  [74-87] 86  Resp:  [15-39] 15  SpO2:  [93 %-100 %] 99 %  BP: ()/(60-81) 106/71     Weight: 67.1 kg (148 lb)  Body mass index is 20.07 kg/m².     SpO2: 99 %         Intake/Output Summary (Last 24 hours) at 12/26/2023 0800  Last data filed at 12/26/2023 0529  Gross per 24 hour   Intake 595.13 ml   Output 2875 ml   Net -2279.87 ml       Lines/Drains/Airways       Peripherally Inserted Central Catheter Line  Duration             PICC Double Lumen 11/13/23 1509 right basilic 42 days              Peripheral Intravenous Line  Duration                  Peripheral IV - Single Lumen 12/23/23 1800 20 G Anterior;Distal;Left Forearm 2 days                       Physical Exam  Vitals and nursing note reviewed.   Cardiovascular:      Rate and Rhythm: Normal rate and regular rhythm.      Pulses: Normal pulses.      Heart sounds: Murmur (holosystolic mitral murmur) heard.   Pulmonary:      Effort: No respiratory distress.      Breath sounds: No wheezing or rales (scattered expiratory rales.).   Abdominal:      General: Bowel sounds are normal.   Musculoskeletal:         General: No  swelling.      Right lower leg: No edema.      Left lower leg: No edema.   Skin:     Capillary Refill: Capillary refill takes 2 to 3 seconds.   Neurological:      Mental Status: He is alert and oriented to person, place, and time.            Significant Labs: All pertinent lab results from the last 24 hours have been reviewed.    Significant Imaging:  CXR with congestion in bilateral lung fields, improving in comparison to admission images.

## 2023-12-26 NOTE — ASSESSMENT & PLAN NOTE
Patient with Hypercapnic Respiratory failure which is Acute.  he is not on home oxygen. Supplemental oxygen was provided with 10L NC.     Signs/symptoms of respiratory failure include- tachypnea, increased work of breathing, respiratory distress, and wheezing. Contributing diagnoses includes - CHF and Pleural effusion Labs and images were reviewed. Patient Has recent ABG, which has been reviewed.     - monitor respiratory status. Currently on room air.    - s/p NEW   - famotidine 20 BID for PPI prophylaxis

## 2023-12-26 NOTE — ASSESSMENT & PLAN NOTE
48 y.o. 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 Echo with EF 50-53% severe MR and severe LA dilation. Optimized rate control with metoprolol 25 TID. Increased acute phase reactants. Scheduled for NEW on 12/26/23.     Plan:  - resume cardiac diet following procedure  -continue IV Lasix at 10mg/h  - NEW on 12/26/23  - blood cx with NGTD   - continue vancomycin until NEW on 12/26 as per ID recs.    - patient to benefit from PO doxycycline 100 BID on the long term as per ID

## 2023-12-26 NOTE — PLAN OF CARE
No acute events overnight. Pt VSS. NPO since midnight for NEW today         Problem: Adult Inpatient Plan of Care  Goal: Plan of Care Review  Outcome: Ongoing, Progressing  Goal: Patient-Specific Goal (Individualized)  Outcome: Ongoing, Progressing  Goal: Absence of Hospital-Acquired Illness or Injury  Outcome: Ongoing, Progressing  Goal: Optimal Comfort and Wellbeing  Outcome: Ongoing, Progressing  Goal: Readiness for Transition of Care  Outcome: Ongoing, Progressing     Problem: Adjustment to Illness (Sepsis/Septic Shock)  Goal: Optimal Coping  Outcome: Ongoing, Progressing     Problem: Bleeding (Sepsis/Septic Shock)  Goal: Absence of Bleeding  Outcome: Ongoing, Progressing     Problem: Glycemic Control Impaired (Sepsis/Septic Shock)  Goal: Blood Glucose Level Within Desired Range  Outcome: Ongoing, Progressing     Problem: Infection Progression (Sepsis/Septic Shock)  Goal: Absence of Infection Signs and Symptoms  Outcome: Ongoing, Progressing     Problem: Nutrition Impaired (Sepsis/Septic Shock)  Goal: Optimal Nutrition Intake  Outcome: Ongoing, Progressing     Problem: Fluid and Electrolyte Imbalance (Acute Kidney Injury/Impairment)  Goal: Fluid and Electrolyte Balance  Outcome: Ongoing, Progressing     Problem: Oral Intake Inadequate (Acute Kidney Injury/Impairment)  Goal: Optimal Nutrition Intake  Outcome: Ongoing, Progressing     Problem: Renal Function Impairment (Acute Kidney Injury/Impairment)  Goal: Effective Renal Function  Outcome: Ongoing, Progressing     Problem: Infection  Goal: Absence of Infection Signs and Symptoms  Outcome: Ongoing, Progressing     Problem: Fall Injury Risk  Goal: Absence of Fall and Fall-Related Injury  Outcome: Ongoing, Progressing     Problem: Skin Injury Risk Increased  Goal: Skin Health and Integrity  Outcome: Ongoing, Progressing

## 2023-12-26 NOTE — TRANSFER OF CARE
Anesthesia Transfer of Care Note    Patient: Lorenzo Nino    Procedure(s) Performed: Procedure(s) (LRB):  Transesophageal echo (NEW) intra-procedure log documentation (N/A)    Patient location: ICU    Anesthesia Type: general    Transport from OR: Transported from OR on 6-10 L/min O2 by face mask with adequate spontaneous ventilation. Continuos invasive BP monitoring in transport. Continuous SpO2 monitoring in transport. Continuous ECG monitoring in transport    Post pain: adequate analgesia    Post assessment: no apparent anesthetic complications    Post vital signs: stable    Level of consciousness: awake and alert    Nausea/Vomiting: no nausea/vomiting    Complications: none    Transfer of care protocol was followed    Last vitals: Visit Vitals  BP 98/64 (BP Location: Left arm, Patient Position: Lying)   Pulse 73   Temp 36.8 °C (98.2 °F) (Oral)   Resp 16   Ht 6' (1.829 m)   Wt 67.1 kg (148 lb)   SpO2 100%   BMI 20.07 kg/m²

## 2023-12-27 PROBLEM — R10.11 RIGHT UPPER QUADRANT ABDOMINAL PAIN: Status: ACTIVE | Noted: 2023-12-27

## 2023-12-27 LAB
ANION GAP SERPL CALC-SCNC: 13 MMOL/L (ref 8–16)
ANION GAP SERPL CALC-SCNC: 13 MMOL/L (ref 8–16)
APTT PPP: 42 SEC (ref 21–32)
APTT PPP: 47.6 SEC (ref 21–32)
BUN SERPL-MCNC: 28 MG/DL (ref 6–20)
BUN SERPL-MCNC: 28 MG/DL (ref 6–20)
CALCIUM SERPL-MCNC: 9.5 MG/DL (ref 8.7–10.5)
CALCIUM SERPL-MCNC: 9.9 MG/DL (ref 8.7–10.5)
CHLORIDE SERPL-SCNC: 92 MMOL/L (ref 95–110)
CHLORIDE SERPL-SCNC: 93 MMOL/L (ref 95–110)
CO2 SERPL-SCNC: 27 MMOL/L (ref 23–29)
CO2 SERPL-SCNC: 27 MMOL/L (ref 23–29)
CREAT SERPL-MCNC: 1 MG/DL (ref 0.5–1.4)
CREAT SERPL-MCNC: 1 MG/DL (ref 0.5–1.4)
EST. GFR  (NO RACE VARIABLE): >60 ML/MIN/1.73 M^2
EST. GFR  (NO RACE VARIABLE): >60 ML/MIN/1.73 M^2
GLUCOSE SERPL-MCNC: 140 MG/DL (ref 70–110)
GLUCOSE SERPL-MCNC: 98 MG/DL (ref 70–110)
MAGNESIUM SERPL-MCNC: 2.2 MG/DL (ref 1.6–2.6)
POTASSIUM SERPL-SCNC: 4.1 MMOL/L (ref 3.5–5.1)
POTASSIUM SERPL-SCNC: 4.5 MMOL/L (ref 3.5–5.1)
SODIUM SERPL-SCNC: 132 MMOL/L (ref 136–145)
SODIUM SERPL-SCNC: 133 MMOL/L (ref 136–145)

## 2023-12-27 PROCEDURE — 99231 SBSQ HOSP IP/OBS SF/LOW 25: CPT | Mod: ,,, | Performed by: INTERNAL MEDICINE

## 2023-12-27 PROCEDURE — 87040 BLOOD CULTURE FOR BACTERIA: CPT | Mod: 59 | Performed by: INTERNAL MEDICINE

## 2023-12-27 PROCEDURE — 63600175 PHARM REV CODE 636 W HCPCS: Performed by: STUDENT IN AN ORGANIZED HEALTH CARE EDUCATION/TRAINING PROGRAM

## 2023-12-27 PROCEDURE — 25000003 PHARM REV CODE 250: Performed by: INTERNAL MEDICINE

## 2023-12-27 PROCEDURE — 83735 ASSAY OF MAGNESIUM: CPT | Performed by: INTERNAL MEDICINE

## 2023-12-27 PROCEDURE — 63600175 PHARM REV CODE 636 W HCPCS: Performed by: INTERNAL MEDICINE

## 2023-12-27 PROCEDURE — 94761 N-INVAS EAR/PLS OXIMETRY MLT: CPT

## 2023-12-27 PROCEDURE — 80048 BASIC METABOLIC PNL TOTAL CA: CPT | Performed by: INTERNAL MEDICINE

## 2023-12-27 PROCEDURE — 80048 BASIC METABOLIC PNL TOTAL CA: CPT | Mod: 91 | Performed by: INTERNAL MEDICINE

## 2023-12-27 PROCEDURE — 25000003 PHARM REV CODE 250: Performed by: STUDENT IN AN ORGANIZED HEALTH CARE EDUCATION/TRAINING PROGRAM

## 2023-12-27 PROCEDURE — 85730 THROMBOPLASTIN TIME PARTIAL: CPT | Performed by: STUDENT IN AN ORGANIZED HEALTH CARE EDUCATION/TRAINING PROGRAM

## 2023-12-27 PROCEDURE — 85730 THROMBOPLASTIN TIME PARTIAL: CPT | Mod: 91 | Performed by: INTERNAL MEDICINE

## 2023-12-27 PROCEDURE — 20000000 HC ICU ROOM

## 2023-12-27 PROCEDURE — 99291 CRITICAL CARE FIRST HOUR: CPT | Mod: ,,, | Performed by: INTERNAL MEDICINE

## 2023-12-27 PROCEDURE — 25000003 PHARM REV CODE 250

## 2023-12-27 RX ORDER — FUROSEMIDE 40 MG/1
80 TABLET ORAL 2 TIMES DAILY
Status: DISCONTINUED | OUTPATIENT
Start: 2023-12-27 | End: 2023-12-27

## 2023-12-27 RX ORDER — OXYCODONE HYDROCHLORIDE 5 MG/1
5 TABLET ORAL EVERY 6 HOURS PRN
Status: DISCONTINUED | OUTPATIENT
Start: 2023-12-27 | End: 2024-01-08 | Stop reason: HOSPADM

## 2023-12-27 RX ORDER — TALC
6 POWDER (GRAM) TOPICAL NIGHTLY PRN
Status: DISCONTINUED | OUTPATIENT
Start: 2023-12-28 | End: 2024-01-08 | Stop reason: HOSPADM

## 2023-12-27 RX ORDER — FUROSEMIDE 40 MG/1
40 TABLET ORAL ONCE
Status: DISCONTINUED | OUTPATIENT
Start: 2023-12-27 | End: 2023-12-27

## 2023-12-27 RX ORDER — FUROSEMIDE 40 MG/1
40 TABLET ORAL 2 TIMES DAILY
Status: DISCONTINUED | OUTPATIENT
Start: 2023-12-27 | End: 2023-12-27

## 2023-12-27 RX ORDER — FUROSEMIDE 80 MG/1
80 TABLET ORAL 2 TIMES DAILY
Status: DISCONTINUED | OUTPATIENT
Start: 2023-12-27 | End: 2023-12-29

## 2023-12-27 RX ORDER — FUROSEMIDE 40 MG/1
40 TABLET ORAL ONCE
Status: COMPLETED | OUTPATIENT
Start: 2023-12-27 | End: 2023-12-27

## 2023-12-27 RX ADMIN — METOPROLOL TARTRATE 25 MG: 25 TABLET, FILM COATED ORAL at 08:12

## 2023-12-27 RX ADMIN — ACETAMINOPHEN 1000 MG: 500 TABLET ORAL at 02:12

## 2023-12-27 RX ADMIN — HEPARIN SODIUM 17 UNITS/KG/HR: 10000 INJECTION, SOLUTION INTRAVENOUS at 09:12

## 2023-12-27 RX ADMIN — FUROSEMIDE 40 MG: 40 TABLET ORAL at 10:12

## 2023-12-27 RX ADMIN — ACETAMINOPHEN 1000 MG: 500 TABLET ORAL at 09:12

## 2023-12-27 RX ADMIN — FUROSEMIDE 40 MG: 40 TABLET ORAL at 12:12

## 2023-12-27 RX ADMIN — FAMOTIDINE 20 MG: 20 TABLET ORAL at 08:12

## 2023-12-27 RX ADMIN — VANCOMYCIN HYDROCHLORIDE 1250 MG: 1.25 INJECTION, POWDER, LYOPHILIZED, FOR SOLUTION INTRAVENOUS at 09:12

## 2023-12-27 RX ADMIN — FAMOTIDINE 20 MG: 20 TABLET ORAL at 09:12

## 2023-12-27 RX ADMIN — FUROSEMIDE 80 MG: 40 TABLET ORAL at 05:12

## 2023-12-27 NOTE — ASSESSMENT & PLAN NOTE
Patient with Hypercapnic Respiratory failure which is Acute.  he is not on home oxygen. Supplemental oxygen was provided with 10L NC.     Signs/symptoms of respiratory failure include- tachypnea, increased work of breathing, respiratory distress, and wheezing. Contributing diagnoses includes - CHF and Pleural effusion Labs and images were reviewed. Patient Has recent ABG, which has been reviewed.     - Currently on room air.    - famotidine 20 BID for PPI prophylaxis

## 2023-12-27 NOTE — ASSESSMENT & PLAN NOTE
48 y.o. 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 Echo with EF 50-53% severe MR and severe LA dilation. Optimized rate control with metoprolol 25 TID. Increased acute phase reactants. S/p NEW on 12/26 with severe MR with 2 jets, including posterior jet likely secondary to tear/perforation.     Plan:  - cardiac diet   - lasix 40 PO once   - continue on lasix 80 PO BID  - blood cx with NGTD   - continue on IV vancomycin as per ID recs. Pending ID re-evaluation to consider antibiotic management in the long term  - CTS will follow as outpatient

## 2023-12-27 NOTE — PROGRESS NOTES
[facility] - Cardiac Intensive Care  Cardiology  Progress Note    Patient Name: Lorenzo Nino  MRN: [id number]  Admission Date: 12/18/2023  Hospital Length of Stay: 9 days  Code Status: Full Code   Attending Physician: Justine Lobato MD   Primary Care Physician: Terrie, Primary Doctor  Expected Discharge Date: 12/31/2023  Principal Problem:MRSA bacteremia    Subjective:     Hospital Course:   Upon admission to the CICU on afib w/ RVR. Given 1 dose of digoxin and started on heparin gtt and diltiazem gtt. Stabilized on sinus rhythm. CTS evaluated patient and agree on continuing aggressive diuresis with lasix gtt, at the moment not indicated IABP. Pending ID evaluation for AB management. Switched to PO diltiazem due to normal rhythm. Will hold NEW at the moment due to patient with shortness of breath and increased risk of respiratory failure. EKG with Aflutter with PVCs. Patient to have daily EKGs to evaluate flutter. ID evaluated and recommend continuing Abx pending blood cultures. TTE with severe MR and severe LA dilation. No new events of chest pain, with decrease O2 demands on 4L NC . Blood cx NGTD. Respiratory cx with yeast and gram positive cocci.  At the moment with NSR. Will continue diuresing on lasix gtt once euvolemic and will transition to IV pushes once stabilized.  Blood cx with NGTD. Discontinued meropenem as per ID recs. Switched from diltiazem to metoprolol 25 TID. Increased acute phase reactants (ESR 85 / .9). H/H still decreased (Hb 9.2) with FOBT positive. Respiratory cx positive for Candida dubliensis, ID following. Tolerating room air. VBG with compensated respiratory alkalosis. Pending CTS re-evaluation to assess patient's and family concerns. Following ID recs, continue vancomycin until NEW on 12/26/23. S/p NEW on 12/26 with severe MR with 2 jets, including posterior jet likely secondary to tear/perforation. On IV vancomycin. CTS to follow as outpatient (Dr. Ascencio). Pending ID  re-evaluation to continue on antibiotics in the long term. Increased lasix to 40 PO once and continue 80 PO BID given UOP.     Interval History: improved respiratory status. On RA. NEW with severe MR and posterior tear/perforation. CTS to follow as outpatient (Dr. Ascencio). Talked to family on best management in the acute setting continues to be diuresing with lasix. Continue on PO diuresis. ID to re-evaluate for antibiotic management.      Review of Systems   Constitutional: Negative for chills and fever.   Cardiovascular:  Negative for chest pain, cyanosis, orthopnea and syncope.   Respiratory:  Negative for hemoptysis, shortness of breath and wheezing.    Skin:  Negative for dry skin and rash.   Genitourinary:  Negative for dysuria and hematuria.     Objective:     Vital Signs (Most Recent):  Temp: 99.1 °F (37.3 °C) (12/27/23 0301)  Pulse: 83 (12/27/23 0601)  Resp: 20 (12/27/23 0601)  BP: (!) 84/58 (12/27/23 0601)  SpO2: 100 % (12/27/23 0601) Vital Signs (24h Range):  Temp:  [98 °F (36.7 °C)-99.1 °F (37.3 °C)] 99.1 °F (37.3 °C)  Pulse:  [72-94] 83  Resp:  [16-28] 20  SpO2:  [98 %-100 %] 100 %  BP: ()/(54-76) 84/58     Weight: 68.5 kg (151 lb 0.2 oz)  Body mass index is 20.48 kg/m².     SpO2: 100 %         Intake/Output Summary (Last 24 hours) at 12/27/2023 0843  Last data filed at 12/27/2023 0601  Gross per 24 hour   Intake 1302.71 ml   Output 725 ml   Net 577.71 ml       Lines/Drains/Airways       Peripherally Inserted Central Catheter Line  Duration             PICC Double Lumen 11/13/23 1509 right basilic 43 days                       Physical Exam  Vitals and nursing note reviewed.   Cardiovascular:      Rate and Rhythm: Normal rate and regular rhythm.      Pulses: Normal pulses.      Heart sounds: Murmur (holosystolic mitral murmur) heard.   Pulmonary:      Effort: No respiratory distress.      Breath sounds: No wheezing or rales (scattered expiratory rales.).   Abdominal:      General: Bowel sounds  are normal.   Musculoskeletal:         General: No swelling.      Right lower leg: No edema.      Left lower leg: No edema.   Skin:     Capillary Refill: Capillary refill takes 2 to 3 seconds.   Neurological:      Mental Status: He is alert and oriented to person, place, and time.            Significant Labs: All pertinent lab results from the last 24 hours have been reviewed.    Significant Imaging:  CXR with improved lung fields.   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 Echo with EF 50-53% severe MR and severe LA dilation. Optimized rate control with metoprolol 25 TID. Increased acute phase reactants. S/p NEW on 12/26 with severe MR with 2 jets, including posterior jet likely secondary to tear/perforation.     Plan:  - cardiac diet   - lasix 40 PO once   - continue on lasix 80 PO BID  - blood cx with NGTD   - continue on IV vancomycin as per ID recs. Pending ID re-evaluation to consider antibiotic management in the long term  - CTS will follow as outpatient        Anemia due to GI blood loss  - continue PPIx     HFmrEF  - Hold GDMT for now.     Acute hypoxemic respiratory failure  Patient with Hypercapnic Respiratory failure which is Acute.  he is not on home oxygen. Supplemental oxygen was provided with 10L NC.     Signs/symptoms of respiratory failure include- tachypnea, increased work of breathing, respiratory distress, and wheezing. Contributing diagnoses  includes - CHF and Pleural effusion Labs and images were reviewed. Patient Has recent ABG, which has been reviewed.     - Currently on room air.    - famotidine 20 BID for PPI prophylaxis     A-fib with RVR  S/p afib with RVR once admitted to the CCU. On sinus rhythm.     - continue heparin gtt  - continue metoprolol 25 TID for rate control   - monitor telemetry    Endocarditis  See severe MR         VTE Risk Mitigation (From admission, onward)           Ordered     heparin 25,000 units in dextrose 5% (100 units/ml) IV bolus from bag - ADDITIONAL PRN BOLUS - 60 units/kg  As needed (PRN)        Question:  Heparin Infusion Adjustment (DO NOT MODIFY ANSWER)  Answer:  [email or web address]\epic\Images\Pharmacy\HeparinInfusions\heparin LOW INTENSITY nomogram for [facility] [id number]    12/18/23 2127     heparin 25,000 units in dextrose 5% (100 units/ml) IV bolus from bag - ADDITIONAL PRN BOLUS - 30 units/kg  As needed (PRN)        Question:  Heparin Infusion Adjustment (DO NOT MODIFY ANSWER)  Answer:  \\[email or web address]\epic\Images\Pharmacy\HeparinInfusions\heparin LOW INTENSITY nomogram for [facility] [id number]    12/18/23 2127     heparin 25,000 units in dextrose 5% 250 mL (100 units/mL) infusion LOW INTENSITY nomogram - [facility]  Continuous        Question:  Begin at (units/kg/hr)  Answer:  12    12/18/23 2127                    Jaya Zhang MD  Cardiology  [facility] - Cardiac Intensive Care

## 2023-12-27 NOTE — PLAN OF CARE
Problem: Adult Inpatient Plan of Care  Goal: Plan of Care Review  Outcome: Ongoing, Progressing  Goal: Patient-Specific Goal (Individualized)  Outcome: Ongoing, Progressing  Goal: Optimal Comfort and Wellbeing  Outcome: Ongoing, Progressing         Cardiac ICU Care Plan    POC reviewed with Lorenzo Nino and family. Questions and concerns addressed.. See below and flowsheets for full assessment and VS info.     15 beat run of VTACH. Electrolytes checked and replaced. Aptt critic al value greater than 150. Gtt turned off and aptt rechecked. Gtt restarted once aptt at an appropriate range. NEW performed today. Lasix gtt turned off. 40 mg lasix push given.       Neuro:  [place] Coma Scale  Best Eye Response: 4-->(E4) spontaneous  Best Motor Response: 6-->(M6) obeys commands  Best Verbal Response: 5-->(V5) oriented  Clay Coma Scale Score: 15  Assessment Qualifiers: patient not sedated/intubated  Pupil PERRLA: yes    24 hr Temp:  [97.8 °F (36.6 °C)-98.4 °F (36.9 °C)]      CV:  Rhythm: normal sinus rhythm   DVT prophylaxis: VTE Required Core Measure: Pharmacological prophylaxis initiated/maintained    CVP (mean): 2 mmHg (12/21/23 0800)       SVO2 (%): (S) 61 % (12/21/23 2013)               Pulses  Right Radial Pulse: 2+ (normal)  Left Radial Pulse: 2+ (normal)  Right Dorsalis Pedis Pulse: 1+ (weak)  Left Dorsalis Pedis Pulse: 1+ (weak)  Right Posterior Tibial Pulse: 1+ (weak)  Left Posterior Tibial Pulse: 1+ (weak)    Resp:  Flow (L/min): 0       GI/:  GI prophylaxis: yes  Diet/Nutrition Received: 2 gram sodium, low saturated fat/low cholesterol, restrict fluids  Last Bowel Movement: 12/25/23  Voiding Characteristics: voids spontaneously without difficulty   Intake/Output Summary (Last 24 hours) at 12/26/2023 1828  Last data filed at 12/26/2023 1801  Gross per 24 hour   Intake 726.41 ml   Output 1650 ml   Net -923.59 ml            Labs/Accuchecks:  Recent Labs   Lab 12/24/23  1026 12/25/23  0332 12/26/23  0731  "  WBC 18.09* 17.25* 18.20*   RBC 3.80* 3.76* 3.95*   HGB 9.9* 9.6* 10.1*   HCT 31.7* 30.4* 32.8*   * 632* 678*      Recent Labs   Lab 12/26/23  0525 12/26/23  1158 12/26/23  1454   APTT 71.5* >150.0* 55.5*      Recent Labs     12/26/23  0731 12/26/23  1158   * 132*   K 3.5 3.6   CO2 28 30*   CL 91* 89*   BUN 36* 35*   CREATININE 1.0 1.0   ALKPHOS 88  --    ALT 19  --    AST 30  --    BILITOT 0.7  --      No results for input(s): "CPK", "CPKMB", "MB", "TROPONINI" in the last 168 hours. No results for input(s): "PH", "PCO2", "PO2", "HCO3", "POCSATURATED", "BE" in the last 72 hours.    Electrolytes: Electrolytes replaced  Accuchecks: none    Gtts/LDAs:   heparin (porcine) in D5W 17 Units/kg/hr (12/26/23 1801)       Lines/Drains/Airways       Peripherally Inserted Central Catheter Line  Duration             PICC Double Lumen 11/13/23 1509 right basilic 43 days              Peripheral Intravenous Line  Duration                  Peripheral IV - Single Lumen 12/23/23 1800 20 G Anterior;Distal;Left Forearm 3 days                    Skin/Wounds  Bathing/Skin Care: bath, complete (12/25/23 1901)  Wounds: No  Wound care consulted: No   "

## 2023-12-27 NOTE — ANESTHESIA POSTPROCEDURE EVALUATION
Anesthesia Post Evaluation    Patient: Lorenzo Nino    Procedure(s) Performed: Procedure(s) (LRB):  Transesophageal echo (NEW) intra-procedure log documentation (N/A)    Final Anesthesia Type: general      Patient location during evaluation: ICU  Patient participation: Yes- Able to Participate  Level of consciousness: awake and alert  Post-procedure vital signs: reviewed and stable  Pain management: adequate  Airway patency: patent    PONV status at discharge: No PONV  Anesthetic complications: no      Cardiovascular status: hemodynamically stable  Respiratory status: unassisted, spontaneous ventilation and room air  Hydration status: euvolemic                Vitals Value Taken Time   BP 88/55 12/27/23 1205   Temp 37.3 °C (99.1 °F) 12/27/23 0301   Pulse 77 12/27/23 1230   Resp 20 12/27/23 1230   SpO2 99 % 12/27/23 1230   Vitals shown include unvalidated device data.      No case tracking events are documented in the log.      Pain/Gladis Score: Pain Rating Prior to Med Admin: 4 (12/27/2023  9:18 AM)  Pain Rating Post Med Admin: 0 (12/27/2023 10:04 AM)

## 2023-12-27 NOTE — PLAN OF CARE
Cardiac ICU Care Plan    POC reviewed with Lorenzo Nino and family. Questions and concerns addressed. No acute events today. Pt progressing toward goals. Will continue to monitor. See below and flowsheets for full assessment and VS info.       Neuro:  Clay Coma Scale  Best Eye Response: 4-->(E4) spontaneous  Best Motor Response: 6-->(M6) obeys commands  Best Verbal Response: 5-->(V5) oriented  [place] Coma Scale Score: 15  Assessment Qualifiers: patient not sedated/intubated  Pupil PERRLA: yes    24 hr Temp:  [98 °F (36.7 °C)-99.1 °F (37.3 °C)]      CV:  Rhythm: normal sinus rhythm   DVT prophylaxis: VTE Required Core Measure: Pharmacological prophylaxis initiated/maintained    CVP (mean): 2 mmHg (12/21/23 0800)       SVO2 (%): (S) 61 % (12/21/23 2013)               Pulses  Right Radial Pulse: 2+ (normal)  Left Radial Pulse: 2+ (normal)  Right Dorsalis Pedis Pulse: 1+ (weak)  Left Dorsalis Pedis Pulse: 1+ (weak)  Right Posterior Tibial Pulse: 1+ (weak)  Left Posterior Tibial Pulse: 1+ (weak)    Resp:  Flow (L/min): 0       GI/:  GI prophylaxis: yes  Diet/Nutrition Received: 2 gram sodium, low saturated fat/low cholesterol, restrict fluids (1500 mL FR)  Last Bowel Movement: 12/25/23  Voiding Characteristics: voids spontaneously without difficulty   Intake/Output Summary (Last 24 hours) at 12/27/2023 0530  Last data filed at 12/27/2023 0434  Gross per 24 hour   Intake 1325.79 ml   Output 725 ml   Net 600.79 ml        Nutritional Supplement Intake: Quantity 0 , Type:  none ordered     Labs/Accuchecks:  Recent Labs   Lab 12/24/23  1026 12/25/23  0332 12/26/23  0731   WBC 18.09* 17.25* 18.20*   RBC 3.80* 3.76* 3.95*   HGB 9.9* 9.6* 10.1*   HCT 31.7* 30.4* 32.8*   * 632* 678*      Recent Labs   Lab 12/26/23  1454 12/26/23 2018 12/27/23  0221   APTT 55.5* 43.8* 42.0*      Recent Labs     12/26/23  0731 12/26/23  1158 12/27/23  0221   *   < > 132*   K 3.5   < > 4.5   CO2 28   < > 27   CL 91*   < > 92*  "  BUN 36*   < > 28*   CREATININE 1.0   < > 1.0   ALKPHOS 88  --   --    ALT 19  --   --    AST 30  --   --    BILITOT 0.7  --   --     < > = values in this interval not displayed.     No results for input(s): "CPK", "CPKMB", "MB", "TROPONINI" in the last 168 hours. No results for input(s): "PH", "PCO2", "PO2", "HCO3", "POCSATURATED", "BE" in the last 72 hours.    Electrolytes: Electrolytes replaced  Accuchecks: none    Gtts/LDAs:   heparin (porcine) in D5W Stopped (12/27/23 0218)       Lines/Drains/Airways       Peripherally Inserted Central Catheter Line  Duration             PICC Double Lumen 11/13/23 1509 right basilic 43 days                    Skin/Wounds  Bathing/Skin Care: bath, complete (12/25/23 1901)  Wounds: No  Wound care consulted: No     Problem: Adult Inpatient Plan of Care  Goal: Plan of Care Review  Outcome: Ongoing, Progressing     "

## 2023-12-27 NOTE — SUBJECTIVE & OBJECTIVE
Interval History: improved respiratory status. On RA. NEW with severe MR and posterior tear/perforation. CTS to follow as outpatient (Dr. Ascencio). Talked to family on best management in the acute setting continues to be diuresing with lasix. Continue on PO diuresis. ID to re-evaluate for antibiotic management.      Review of Systems   Constitutional: Negative for chills and fever.   Cardiovascular:  Negative for chest pain, cyanosis, orthopnea and syncope.   Respiratory:  Negative for hemoptysis, shortness of breath and wheezing.    Skin:  Negative for dry skin and rash.   Genitourinary:  Negative for dysuria and hematuria.     Objective:     Vital Signs (Most Recent):  Temp: 99.1 °F (37.3 °C) (12/27/23 0301)  Pulse: 83 (12/27/23 0601)  Resp: 20 (12/27/23 0601)  BP: (!) 84/58 (12/27/23 0601)  SpO2: 100 % (12/27/23 0601) Vital Signs (24h Range):  Temp:  [98 °F (36.7 °C)-99.1 °F (37.3 °C)] 99.1 °F (37.3 °C)  Pulse:  [72-94] 83  Resp:  [16-28] 20  SpO2:  [98 %-100 %] 100 %  BP: ()/(54-76) 84/58     Weight: 68.5 kg (151 lb 0.2 oz)  Body mass index is 20.48 kg/m².     SpO2: 100 %         Intake/Output Summary (Last 24 hours) at 12/27/2023 0843  Last data filed at 12/27/2023 0601  Gross per 24 hour   Intake 1302.71 ml   Output 725 ml   Net 577.71 ml       Lines/Drains/Airways       Peripherally Inserted Central Catheter Line  Duration             PICC Double Lumen 11/13/23 1509 right basilic 43 days                       Physical Exam  Vitals and nursing note reviewed.   Cardiovascular:      Rate and Rhythm: Normal rate and regular rhythm.      Pulses: Normal pulses.      Heart sounds: Murmur (holosystolic mitral murmur) heard.   Pulmonary:      Effort: No respiratory distress.      Breath sounds: No wheezing or rales (scattered expiratory rales.).   Abdominal:      General: Bowel sounds are normal.   Musculoskeletal:         General: No swelling.      Right lower leg: No edema.      Left lower leg: No edema.    Skin:     Capillary Refill: Capillary refill takes 2 to 3 seconds.   Neurological:      Mental Status: He is alert and oriented to person, place, and time.            Significant Labs: All pertinent lab results from the last 24 hours have been reviewed.    Significant Imaging:  CXR with improved lung fields.

## 2023-12-28 LAB
ANION GAP SERPL CALC-SCNC: 13 MMOL/L (ref 8–16)
APTT PPP: 32.2 SEC (ref 21–32)
APTT PPP: 53.5 SEC (ref 21–32)
APTT PPP: 60.1 SEC (ref 21–32)
BNP SERPL-MCNC: 130 PG/ML (ref 0–99)
BUN SERPL-MCNC: 28 MG/DL (ref 6–20)
CALCIUM SERPL-MCNC: 10.1 MG/DL (ref 8.7–10.5)
CHLORIDE SERPL-SCNC: 94 MMOL/L (ref 95–110)
CO2 SERPL-SCNC: 26 MMOL/L (ref 23–29)
CREAT SERPL-MCNC: 1 MG/DL (ref 0.5–1.4)
EST. GFR  (NO RACE VARIABLE): >60 ML/MIN/1.73 M^2
GLUCOSE SERPL-MCNC: 105 MG/DL (ref 70–110)
MAGNESIUM SERPL-MCNC: 2.2 MG/DL (ref 1.6–2.6)
POCT GLUCOSE: 141 MG/DL (ref 70–110)
POTASSIUM SERPL-SCNC: 3.8 MMOL/L (ref 3.5–5.1)
SODIUM SERPL-SCNC: 133 MMOL/L (ref 136–145)
VANCOMYCIN TROUGH SERPL-MCNC: 16.7 UG/ML (ref 10–22)

## 2023-12-28 PROCEDURE — 25000003 PHARM REV CODE 250

## 2023-12-28 PROCEDURE — 85730 THROMBOPLASTIN TIME PARTIAL: CPT | Mod: 91 | Performed by: INTERNAL MEDICINE

## 2023-12-28 PROCEDURE — 85730 THROMBOPLASTIN TIME PARTIAL: CPT | Performed by: STUDENT IN AN ORGANIZED HEALTH CARE EDUCATION/TRAINING PROGRAM

## 2023-12-28 PROCEDURE — 94761 N-INVAS EAR/PLS OXIMETRY MLT: CPT

## 2023-12-28 PROCEDURE — 63600175 PHARM REV CODE 636 W HCPCS

## 2023-12-28 PROCEDURE — 83735 ASSAY OF MAGNESIUM: CPT | Performed by: INTERNAL MEDICINE

## 2023-12-28 PROCEDURE — 83880 ASSAY OF NATRIURETIC PEPTIDE: CPT

## 2023-12-28 PROCEDURE — 20600001 HC STEP DOWN PRIVATE ROOM

## 2023-12-28 PROCEDURE — 80048 BASIC METABOLIC PNL TOTAL CA: CPT | Performed by: INTERNAL MEDICINE

## 2023-12-28 PROCEDURE — 99231 SBSQ HOSP IP/OBS SF/LOW 25: CPT | Mod: ,,, | Performed by: INTERNAL MEDICINE

## 2023-12-28 PROCEDURE — 25000003 PHARM REV CODE 250: Performed by: INTERNAL MEDICINE

## 2023-12-28 PROCEDURE — 80202 ASSAY OF VANCOMYCIN: CPT | Performed by: INTERNAL MEDICINE

## 2023-12-28 PROCEDURE — 63600175 PHARM REV CODE 636 W HCPCS: Performed by: STUDENT IN AN ORGANIZED HEALTH CARE EDUCATION/TRAINING PROGRAM

## 2023-12-28 PROCEDURE — 25000003 PHARM REV CODE 250: Performed by: STUDENT IN AN ORGANIZED HEALTH CARE EDUCATION/TRAINING PROGRAM

## 2023-12-28 RX ORDER — FUROSEMIDE 80 MG/1
80 TABLET ORAL 2 TIMES DAILY
Qty: 60 TABLET | Refills: 11 | Status: SHIPPED | OUTPATIENT
Start: 2023-12-28 | End: 2023-12-29 | Stop reason: HOSPADM

## 2023-12-28 RX ORDER — SPIRONOLACTONE 25 MG/1
25 TABLET ORAL DAILY
Status: DISCONTINUED | OUTPATIENT
Start: 2023-12-28 | End: 2023-12-30

## 2023-12-28 RX ORDER — FAMOTIDINE 20 MG/1
20 TABLET, FILM COATED ORAL 2 TIMES DAILY
Qty: 60 TABLET | Refills: 11 | Status: SHIPPED | OUTPATIENT
Start: 2023-12-28 | End: 2024-01-12

## 2023-12-28 RX ORDER — FUROSEMIDE 10 MG/ML
80 INJECTION INTRAMUSCULAR; INTRAVENOUS DAILY
Status: DISCONTINUED | OUTPATIENT
Start: 2023-12-28 | End: 2023-12-28

## 2023-12-28 RX ORDER — METOLAZONE 2.5 MG/1
2.5 TABLET ORAL DAILY
Status: DISCONTINUED | OUTPATIENT
Start: 2023-12-28 | End: 2023-12-30

## 2023-12-28 RX ORDER — METOLAZONE 2.5 MG/1
2.5 TABLET ORAL DAILY
Status: DISCONTINUED | OUTPATIENT
Start: 2023-12-28 | End: 2023-12-28

## 2023-12-28 RX ORDER — SPIRONOLACTONE 25 MG/1
25 TABLET ORAL DAILY
Status: DISCONTINUED | OUTPATIENT
Start: 2023-12-28 | End: 2023-12-28

## 2023-12-28 RX ORDER — POTASSIUM CHLORIDE 20 MEQ/1
20 TABLET, EXTENDED RELEASE ORAL ONCE
Status: DISCONTINUED | OUTPATIENT
Start: 2023-12-28 | End: 2023-12-28

## 2023-12-28 RX ORDER — DOXYCYCLINE HYCLATE 100 MG
100 TABLET ORAL EVERY 12 HOURS
Status: CANCELLED | OUTPATIENT
Start: 2024-01-12 | End: 2024-02-11

## 2023-12-28 RX ADMIN — FUROSEMIDE 80 MG: 40 TABLET ORAL at 05:12

## 2023-12-28 RX ADMIN — HEPARIN SODIUM 19 UNITS/KG/HR: 10000 INJECTION, SOLUTION INTRAVENOUS at 03:12

## 2023-12-28 RX ADMIN — POTASSIUM CHLORIDE 40 MEQ: 1500 TABLET, EXTENDED RELEASE ORAL at 05:12

## 2023-12-28 RX ADMIN — DAPTOMYCIN: 500 INJECTION, POWDER, LYOPHILIZED, FOR SOLUTION INTRAVENOUS at 11:12

## 2023-12-28 RX ADMIN — FAMOTIDINE 20 MG: 20 TABLET ORAL at 08:12

## 2023-12-28 RX ADMIN — FAMOTIDINE 20 MG: 20 TABLET ORAL at 09:12

## 2023-12-28 RX ADMIN — FUROSEMIDE 80 MG: 40 TABLET ORAL at 11:12

## 2023-12-28 RX ADMIN — SPIRONOLACTONE 25 MG: 25 TABLET ORAL at 11:12

## 2023-12-28 RX ADMIN — METOLAZONE 2.5 MG: 2.5 TABLET ORAL at 11:12

## 2023-12-28 RX ADMIN — DAPAGLIFLOZIN 10 MG: 10 TABLET, FILM COATED ORAL at 11:12

## 2023-12-28 NOTE — PLAN OF CARE
CICU DAILY GOALS     Family/Goals of care/Code Status   Code Status: Full Code    24H Vital Sign Range  Temp:  [97.2 °F (36.2 °C)-98.9 °F (37.2 °C)]   Pulse:  [76-98]   Resp:  [0-30]   BP: ()/(52-74)   SpO2:  [96 %-100 %]      Shift Events (include procedures and significant events)   No acute events throughout shift    AWAKE RASS: Goal - RASS Goal: 0-->alert and calm  Actual - RASS ([place]keon Agitation-Sedation Scale): alert and calm    Restraint necessity: Not necessary   BREATHE SBT: Not intubated    Coordinate A & B, analgesics/sedatives Pain: managed   SAT: Not intubated   Delirium CAM-ICU: Overall CAM-ICU: Negative   Early(intubated/ Progressive (non-intubated) Mobility MOVE Screen (INTUBATED ONLY): Not intubated    Activity: Activity Management: Rolling - L1   Feeding/Nutrition Diet order: Diet/Nutrition Received: 2 gram sodium, low saturated fat/low cholesterol, restrict fluids (1500 cc FR), Specialty Diet/Nutrition Received: other (see comments)   Thrombus DVT prophylaxis: VTE Required Core Measure: Pharmacological prophylaxis initiated/maintained   HOB Elevation Head of Bed (HOB) Positioning: HOB elevated   Ulcer Prophylaxis GI: yes   Glucose control managed Glycemic Management: blood glucose monitored   Skin Skin assessed during:   Sacrum intact/not altered? Yes  Heels intact/not altered? Yes  Surgical wound? No    Check one (no altered skin or altered skin) and sub boxes:  [x] No Altered Skin Integrity Present    []Prevention Measures Documented    [] Altered Skin Integrity Present or Discovered   [] LDA present in EPIC              [] LDA added in EPIC   [] Wound Image Taken (required on admit,                   transfer/discharge and every Tuesday)    Wound Care Consulted? No    Attending Nurse:     Second RN/Staff Member:    Bowel Function no issues    Indwelling Catheter Necessity      PICC Double Lumen 11/13/23 1509 right basilic-Line Necessity Review: Longterm central access  required                                                                                                                                     De-escalation Antibiotics Yes       VS and assessment per flow sheet, patient progressing towards goals as tolerated, plan of care reviewed with  Patient and , all concerns addressed, will continue to monitor.   Problem: Adult Inpatient Plan of Care  Goal: Plan of Care Review  Outcome: Ongoing, Not Progressing  Goal: Patient-Specific Goal (Individualized)  Outcome: Ongoing, Not Progressing  Goal: Absence of Hospital-Acquired Illness or Injury  Outcome: Ongoing, Not Progressing  Goal: Optimal Comfort and Wellbeing  Outcome: Ongoing, Not Progressing  Goal: Readiness for Transition of Care  Outcome: Ongoing, Not Progressing     Problem: Adjustment to Illness (Sepsis/Septic Shock)  Goal: Optimal Coping  Outcome: Ongoing, Not Progressing     Problem: Bleeding (Sepsis/Septic Shock)  Goal: Absence of Bleeding  Outcome: Ongoing, Not Progressing     Problem: Infection Progression (Sepsis/Septic Shock)  Goal: Absence of Infection Signs and Symptoms  Outcome: Ongoing, Not Progressing     Problem: Glycemic Control Impaired (Sepsis/Septic Shock)  Goal: Blood Glucose Level Within Desired Range  Outcome: Ongoing, Not Progressing     Problem: Nutrition Impaired (Sepsis/Septic Shock)  Goal: Optimal Nutrition Intake  Outcome: Ongoing, Not Progressing     Problem: Fluid and Electrolyte Imbalance (Acute Kidney Injury/Impairment)  Goal: Fluid and Electrolyte Balance  Outcome: Ongoing, Not Progressing     Problem: Oral Intake Inadequate (Acute Kidney Injury/Impairment)  Goal: Optimal Nutrition Intake  Outcome: Ongoing, Not Progressing     Problem: Renal Function Impairment (Acute Kidney Injury/Impairment)  Goal: Effective Renal Function  Outcome: Ongoing, Not Progressing     Problem: Infection  Goal: Absence of Infection Signs and Symptoms  Outcome: Ongoing, Not Progressing     Problem: Fall Injury  Risk  Goal: Absence of Fall and Fall-Related Injury  Outcome: Ongoing, Not Progressing     Problem: Skin Injury Risk Increased  Goal: Skin Health and Integrity  Outcome: Ongoing, Not Progressing

## 2023-12-28 NOTE — PROGRESS NOTES
Therapy with vancomycin complete and/or consult discontinued by provider.  Pharmacy will sign off, please re-consult as needed.    Zoila Mabry, PharmD, BCPS  Cardiology Clinical Pharmacy Specialist  Ext. [phone]

## 2023-12-28 NOTE — ASSESSMENT & PLAN NOTE
48 y.o. 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 Echo with EF 50-53% severe MR and severe LA dilation. Optimized rate control with metoprolol 25 TID. Increased acute phase reactants. S/p NEW on 12/26 with severe MR with 2 jets, including posterior jet likely secondary to tear/perforation.     Plan:  - cardiac diet   - continue on lasix 80 PO BID  - dapaglifozin 10 daily   - spironolactone 25   - metolazone 2.5 daily   - start daptomycin 8mg/kg  - Pending CTAP as per ID recs   - blood cx with NGTD   - Continue IV daptomycin x 2 weeks and follow with PO doxycyline until new valve replaced. ID recommended intraoperative tissue cx.  - CTS will follow as outpatient

## 2023-12-28 NOTE — ASSESSMENT & PLAN NOTE
Patient complaining of right upper quadrant discomfort.  Recommend non-contrast CT abdomen to assess for additional foci of MRSA infection in he abdomen.

## 2023-12-28 NOTE — PLAN OF CARE
Cardiac ICU Care Plan    POC reviewed with Lorenzo Nino and family. Questions and concerns addressed. No acute events today. Pt progressing toward goals. Will continue to monitor. See below and flowsheets for full assessment and VS info.       Neuro:  Clay Coma Scale  Best Eye Response: 4-->(E4) spontaneous  Best Motor Response: 6-->(M6) obeys commands  Best Verbal Response: 5-->(V5) oriented  [place] Coma Scale Score: 15  Assessment Qualifiers: patient not sedated/intubated  Pupil PERRLA: yes    24 hr Temp:  [98.5 °F (36.9 °C)-98.9 °F (37.2 °C)]      CV:  Rhythm: normal sinus rhythm   DVT prophylaxis: VTE Required Core Measure: Pharmacological prophylaxis initiated/maintained    CVP (mean): 2 mmHg (12/21/23 0800)       SVO2 (%): (S) 61 % (12/21/23 2013)               Pulses  Right Radial Pulse: 2+ (normal)  Left Radial Pulse: 2+ (normal)  Right Dorsalis Pedis Pulse: 1+ (weak)  Left Dorsalis Pedis Pulse: 1+ (weak)  Right Posterior Tibial Pulse: 1+ (weak)  Left Posterior Tibial Pulse: 1+ (weak)    Resp:  Flow (L/min): 0       GI/:  GI prophylaxis: yes  Diet/Nutrition Received: 2 gram sodium, low saturated fat/low cholesterol, restrict fluids (1500 cc FR)  Last Bowel Movement: 12/25/23  Voiding Characteristics: voids spontaneously without difficulty   Intake/Output Summary (Last 24 hours) at 12/28/2023 0410  Last data filed at 12/28/2023 0401  Gross per 24 hour   Intake 2219.06 ml   Output 1050 ml   Net 1169.06 ml        Nutritional Supplement Intake: Quantity 0, Type:  none ordered     Labs/Accuchecks:  Recent Labs   Lab 12/24/23  1026 12/25/23  0332 12/26/23  0731   WBC 18.09* 17.25* 18.20*   RBC 3.80* 3.76* 3.95*   HGB 9.9* 9.6* 10.1*   HCT 31.7* 30.4* 32.8*   * 632* 678*      Recent Labs   Lab 12/26/23 2018 12/27/23  0221 12/27/23  1315   APTT 43.8* 42.0* 47.6*      Recent Labs     12/26/23  0731 12/26/23  1158 12/27/23  1211   *   < > 133*   K 3.5   < > 4.1   CO2 28   < > 27   CL 91*   < >  "93*   BUN 36*   < > 28*   CREATININE 1.0   < > 1.0   ALKPHOS 88  --   --    ALT 19  --   --    AST 30  --   --    BILITOT 0.7  --   --     < > = values in this interval not displayed.     No results for input(s): "CPK", "CPKMB", "MB", "TROPONINI" in the last 168 hours. No results for input(s): "PH", "PCO2", "PO2", "HCO3", "POCSATURATED", "BE" in the last 72 hours.    Electrolytes: No replacement orders  Accuchecks: none    Gtts/LDAs:   heparin (porcine) in D5W Stopped (12/28/23 3557)       Lines/Drains/Airways       Peripherally Inserted Central Catheter Line  Duration             PICC Double Lumen 11/13/23 1509 right basilic 44 days              Peripheral Intravenous Line  Duration                  Peripheral IV - Single Lumen 12/27/23 1745 18 G Anterior;Left;Proximal Forearm <1 day                    Skin/Wounds  Bathing/Skin Care: bath, complete;dressed/undressed;linen changed (12/27/23 1515)  Wounds: No  Wound care consulted: No     Problem: Adult Inpatient Plan of Care  Goal: Plan of Care Review  Outcome: Ongoing, Progressing     "

## 2023-12-28 NOTE — SUBJECTIVE & OBJECTIVE
Interval History: start dapaglifozin 10 daily, optimized diuresis, start daptomycin 8mg/kg x 2 weeks and follow with PO doxycyline until new valve replaced. ID recommended intraoperative tissue cx., Pending CTAP as per ID.     Review of Systems   Constitutional: Negative for chills and fever.   Cardiovascular:  Negative for chest pain, cyanosis, orthopnea and syncope.   Respiratory:  Negative for hemoptysis, shortness of breath and wheezing.    Skin:  Negative for dry skin and rash.   Genitourinary:  Negative for dysuria and hematuria.     Objective:     Vital Signs (Most Recent):  Temp: 98.7 °F (37.1 °C) (12/28/23 0700)  Pulse: 85 (12/28/23 0814)  Resp: (!) 22 (12/28/23 0814)  BP: 104/73 (12/28/23 0800)  SpO2: 98 % (12/28/23 0814) Vital Signs (24h Range):  Temp:  [98.5 °F (36.9 °C)-98.9 °F (37.2 °C)] 98.7 °F (37.1 °C)  Pulse:  [76-98] 85  Resp:  [0-30] 22  SpO2:  [96 %-100 %] 98 %  BP: ()/(52-74) 104/73     Weight: 70.4 kg (155 lb 3.3 oz)  Body mass index is 21.05 kg/m².     SpO2: 98 %         Intake/Output Summary (Last 24 hours) at 12/28/2023 1007  Last data filed at 12/28/2023 0601  Gross per 24 hour   Intake 1193.57 ml   Output 925 ml   Net 268.57 ml       Lines/Drains/Airways       Peripherally Inserted Central Catheter Line  Duration             PICC Double Lumen 11/13/23 1509 right basilic 44 days              Peripheral Intravenous Line  Duration                  Peripheral IV - Single Lumen 12/27/23 1745 18 G Anterior;Left;Proximal Forearm <1 day                       Physical Exam  Vitals and nursing note reviewed.   Cardiovascular:      Rate and Rhythm: Normal rate and regular rhythm.      Pulses: Normal pulses.      Heart sounds: Murmur (holosystolic mitral murmur) heard.   Pulmonary:      Effort: No respiratory distress.      Breath sounds: No wheezing or rales (scattered expiratory rales.).   Abdominal:      General: Bowel sounds are normal.   Musculoskeletal:         General: No swelling.       Right lower leg: No edema.      Left lower leg: No edema.   Skin:     Capillary Refill: Capillary refill takes 2 to 3 seconds.   Neurological:      Mental Status: He is alert and oriented to person, place, and time.            Significant Labs: All pertinent lab results from the last 24 hours have been reviewed.    Significant Imaging:  CXR with improved bilateral lung fields.

## 2023-12-28 NOTE — PLAN OF CARE
[facility] - Cardiac Intensive Care      HOME HEALTH ORDERS  FACE TO FACE ENCOUNTER    Patient Name: Lorenzo Nino  YOB: 1975    PCP: No, Primary Doctor   PCP Address: None  PCP Phone Number: None  PCP Fax: None    Encounter Date: 12/18/23    Admit to Home Health    Diagnoses:  Active Hospital Problems    Diagnosis  POA    *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 [R78.81, B95.62]  Yes     Priority: 1 - High    Right upper quadrant abdominal pain [R10.11]  Yes    Anemia due to GI blood loss [D50.0]  Unknown    HFmrEF [I50.41]  Unknown    Acute hypoxemic respiratory failure [J96.01]  Yes    A-fib with RVR [I48.91]  Yes    Endocarditis [I38]  Yes     Chronic      Resolved Hospital Problems    Diagnosis Date Resolved POA    Hypomagnesemia [E83.42] 12/22/2023 Unknown    Acute bacterial endocarditis [I33.0] 12/19/2023 Yes       Follow Up Appointments:  Future Appointments   Date Time Provider Department Center   1/9/2024 10:00 AM Kasey Van DO [facility] ID [address]   1/16/2024 10:00 AM WILBER Cornelius MD [facility] OPHTHAL [address]   3/11/2024  3:30 PM Emerson Mercado MD [facility] CARDIO [facility]       Allergies:Review of patient's allergies indicates:  No Known Allergies    Medications: Review discharge medications with patient and family and provide education.    Current Facility-Administered Medications   Medication Dose Route Frequency Provider Last Rate Last Admin    acetaminophen tablet 1,000 mg  1,000 mg Oral Q6H PRN Jaya Zhang MD   1,000 mg at 12/27/23 0918    dapagliflozin propanediol (Farxiga) tablet 10 mg  10 mg Oral Daily Dayo Toth MD   10 mg at 12/29/23 0826    DAPTOmycin (CUBICIN) 565 mg in sodium chloride 0.9% SolP 50 mL IVPB  8 mg/kg Intravenous Q24H Jaya Zhang MD   Stopped at 12/28/23 1155    famotidine tablet 20 mg  20 mg Oral BID Van Wu MD   20 mg at 12/29/23 0826    furosemide tablet 80 mg  80 mg Oral BID  Jaya Zhang MD   80 mg at 12/29/23 0826    heparin 25,000 units in dextrose 5% (100 units/ml) IV bolus from bag - ADDITIONAL PRN BOLUS - 30 units/kg  30 Units/kg (Adjusted) Intravenous PRN Norm Hitchcock MD   2,010 Units at 12/28/23 0432    heparin 25,000 units in dextrose 5% (100 units/ml) IV bolus from bag - ADDITIONAL PRN BOLUS - 60 units/kg  60 Units/kg (Adjusted) Intravenous PRN Norm Hitchcock MD   4,030 Units at 12/20/23 1806    heparin 25,000 units in dextrose 5% 250 mL (100 units/mL) infusion LOW INTENSITY nomogram - [facility]  0-40 Units/kg/hr (Adjusted) Intravenous Continuous Norm Hitchcock MD 12.8 mL/hr at 12/28/23 1537 19 Units/kg/hr at 12/28/23 1537    magnesium oxide tablet 800 mg  800 mg Oral PRN Fernando Ortiz MD        magnesium oxide tablet 800 mg  800 mg Oral PRN Fernando Ortiz MD        melatonin tablet 6 mg  6 mg Oral Nightly PRN Sumanth Mckeon MD        metOLazone tablet 2.5 mg  2.5 mg Oral Daily Dayo Toth MD   2.5 mg at 12/29/23 0826    oxyCODONE immediate release tablet 5 mg  5 mg Oral Q6H PRN Jaya Zhang MD        polyethylene glycol packet 17 g  17 g Oral Daily Jaya Zhang MD        potassium chloride SA CR tablet 40 mEq  40 mEq Oral PRN Fernando Ortiz MD   40 mEq at 12/28/23 0556    potassium chloride SA CR tablet 60 mEq  60 mEq Oral PRN Fernando Ortiz MD   60 mEq at 12/26/23 2210    potassium, sodium phosphates 280-160-250 mg packet 2 packet  2 packet Oral PRN Fernando Ortiz MD        potassium, sodium phosphates 280-160-250 mg packet 2 packet  2 packet Oral PRN Fernando Ortiz MD        potassium, sodium phosphates 280-160-250 mg packet 2 packet  2 packet Oral PRN Fernando Ortiz MD        promethazine-codeine 6.25-10 mg/5 ml syrup 5 mL  5 mL Oral Q4H PRN Jaya Zhang MD        senna-docusate 8.6-50 mg per tablet 1 tablet  1 tablet Oral Daily PRN Jaya Zhang MD        spironolactone tablet 25 mg  25 mg  Oral Daily Dayo Toth MD   25 mg at 12/29/23 0826     Current Discharge Medication List        START taking these medications    Details   dapagliflozin propanediol (FARXIGA) 10 mg tablet Take 1 tablet (10 mg total) by mouth once daily.  Qty: 30 tablet, Refills: 1      famotidine (PEPCID) 20 MG tablet Take 1 tablet (20 mg total) by mouth 2 (two) times daily.  Qty: 60 tablet, Refills: 11      furosemide (LASIX) 80 MG tablet Take 1 tablet (80 mg total) by mouth 2 (two) times daily.  Qty: 60 tablet, Refills: 11      sodium chloride 0.9% SolP 50 mL with DAPTOmycin 500 mg SolR 563 mg Inject 563 mg into the vein once daily. for 10 days  Qty: 1 Device, Refills: 0    Comments: To continue until 01/08/23 as per ID recs.      spironolactone (ALDACTONE) 25 MG tablet Take 1 tablet (25 mg total) by mouth once daily.  Qty: 30 tablet, Refills: 11    Comments: .           CONTINUE these medications which have NOT CHANGED    Details   aspirin (ECOTRIN) 325 MG EC tablet Take 1 tablet (325 mg total) by mouth once daily.  Qty: 30 tablet, Refills: 11      metoprolol tartrate (LOPRESSOR) 50 MG tablet Take 1 tablet (50 mg total) by mouth 2 (two) times daily.  Qty: 60 tablet, Refills: 11    Comments: .      NIFEdipine (PROCARDIA-XL) 30 MG (OSM) 24 hr tablet Take 1 tablet (30 mg total) by mouth once daily.  Qty: 30 tablet, Refills: 11    Comments: .  Associated Diagnoses: S/P mitral valve replacement           STOP taking these medications       benzonatate (TESSALON) 100 MG capsule Comments:   Reason for Stopping:                 I have seen and examined this patient within the last 30 days. My clinical findings that support the need for the home health skilled services and home bound status are the following:no   Weakness/numbness causing balance and gait disturbance due to Infection making it taxing to leave home.     Diet:   cardiac diet    Labs:  SN to perform labs:  CBC: Weekly (Mondays) for 6 month(s), CMP: Weekly (Mondays)  for 6 month(s), CMP: Weekly (Mondays) for 6 month(s) ESR: weekly (Mondays) for 6 month(s), and CRP: weekly (Mondays) for 6 month(s)    Referrals/ Consults  Physical Therapy to evaluate and treat. Evaluate for home safety and equipment needs; Establish/upgrade home exercise program. Perform / instruct on therapeutic exercises, gait training, transfer training, and Range of Motion.  Occupational Therapy to evaluate and treat. Evaluate home environment for safety and equipment needs. Perform/Instruct on transfers, ADL training, ROM, and therapeutic exercises.    Activities:   activity as tolerated    Nursing:   Agency to admit patient within 24 hours of hospital discharge unless specified on physician order or at patient request    SN to complete comprehensive assessment including routine vital signs. Instruct on disease process and s/s of complications to report to MD. Review/verify medication list sent home with the patient at time of discharge  and instruct patient/caregiver as needed. Frequency may be adjusted depending on start of care date.     Skilled nurse to perform up to 3 visits PRN for symptoms related to diagnosis    Notify MD if SBP > 160 or < 90; DBP > 90 or < 50; HR > 120 or < 50; Temp > 101; O2 < 88%    Ok to schedule additional visits based on staff availability and patient request on consecutive days within the home health episode.    When multiple disciplines ordered:    Start of Care occurs on Sunday - Wednesday schedule remaining discipline evaluations as ordered on separate consecutive days following the start of care.    Thursday SOC -schedule subsequent evaluations Friday and Monday the following week.     Friday - Saturday SOC - schedule subsequent discipline evaluations on consecutive days starting Monday of the following week.    For all post-discharge communication and subsequent orders please contact patient's primary care physician. If unable to reach primary care physician or do not  receive response within 30 minutes, please contact Cardiac ICU for clinical staff order clarification    Miscellaneous   Home Infusion Therapy:   SN to perform Infusion Therapy/Central Line Care.  Review Central Line Care & Central Line Flush with patient.    Administer (drug and dose): IV daptomycin 600mg IV daily for 14 days (end date: 01/08/23)  -  follow with PO doxycyline 100 BID until new valve replaced.                      Scrub the Hub: Prior to accessing the line, always perform a 30 second alcohol scrub  Each lumen of the central line is to be flushed at least daily with 10 mL Normal Saline and 3 mL Heparin flush (10 units/mL)  Skilled Nurse (SN) may draw blood from IV access  Blood Draw Procedure:   - Aspirate at least 5 mL of blood   - Discard   - Obtain specimen   - Change injection cap   - Flush with 20 mL Normal Saline followed by a                 3-5 mL Heparin flush (10 units/mL)  Central :   - Sterile dressing changes are done weekly and as needed.   - Use chlor-hexadine scrub to cleanse site, apply Biopatch to insertion site,       apply securement device dressing   - Injection caps are changed weekly and after EVERY lab draw.   - If sterile gauze is under dressing to control oozing,                 dressing change must be performed every 24 hours until gauze is not needed.    Home Health Aide:  Physical Therapy Weekly and Occupational Therapy Weekly    Wound Care Orders  no    I certify that this patient is confined to his home and needs physical therapy and occupational therapy.

## 2023-12-28 NOTE — PROGRESS NOTES
[facility]  Cardiology  Progress Note    Patient Name: Lorenzo Nino  MRN: [id number]  Admission Date: 12/18/2023  Hospital Length of Stay: 10 days  Code Status: Full Code   Attending Physician: Justine Lobato MD   Primary Care Physician: Terrie, Primary Doctor  Expected Discharge Date: 12/29/2023  Principal Problem:MRSA bacteremia    Subjective:     Hospital Course:   Upon admission to the CICU on afib w/ RVR. Given 1 dose of digoxin and started on heparin gtt and diltiazem gtt. Stabilized on sinus rhythm. CTS evaluated patient and agree on continuing aggressive diuresis with lasix gtt, at the moment not indicated IABP. Pending ID evaluation for AB management. Switched to PO diltiazem due to normal rhythm. Will hold NEW at the moment due to patient with shortness of breath and increased risk of respiratory failure. EKG with Aflutter with PVCs. Patient to have daily EKGs to evaluate flutter. ID evaluated and recommend continuing Abx pending blood cultures. TTE with severe MR and severe LA dilation. No new events of chest pain, with decrease O2 demands on 4L NC . Blood cx NGTD. Respiratory cx with yeast and gram positive cocci.  At the moment with NSR. Will continue diuresing on lasix gtt once euvolemic and will transition to IV pushes once stabilized.  Blood cx with NGTD. Discontinued meropenem as per ID recs. Switched from diltiazem to metoprolol 25 TID. Increased acute phase reactants (ESR 85 / .9). H/H still decreased (Hb 9.2) with FOBT positive. Respiratory cx positive for Candida dubliensis, ID following. Tolerating room air. VBG with compensated respiratory alkalosis. Pending CTS re-evaluation to assess patient's and family concerns. Following ID recs, continue vancomycin until NEW on 12/26/23. S/p NEW on 12/26 with severe MR with 2 jets, including posterior jet likely secondary to tear/perforation. On IV vancomycin. CTS to follow as outpatient (Dr. Ascencio). Pending ID  re-evaluation to continue on antibiotics in the long term. Increased lasix to 40 PO once and continue 80 PO BID given UOP. start dapaglifozin 10 daily, spironolactone 25, metolazone 2.5 daily, start daptomycin 8mg/kg x 2 weeks and follow with PO doxycyline until new valve replaced. ID recommended intraoperative tissue cx., CTAP normal.       Interval History: start dapaglifozin 10 daily, optimized diuresis, start daptomycin 8mg/kg x 2 weeks and follow with PO doxycyline until new valve replaced. ID recommended intraoperative tissue cx., Pending CTAP as per ID.     Review of Systems   Constitutional: Negative for chills and fever.   Cardiovascular:  Negative for chest pain, cyanosis, orthopnea and syncope.   Respiratory:  Negative for hemoptysis, shortness of breath and wheezing.    Skin:  Negative for dry skin and rash.   Genitourinary:  Negative for dysuria and hematuria.     Objective:     Vital Signs (Most Recent):  Temp: 98.7 °F (37.1 °C) (12/28/23 0700)  Pulse: 85 (12/28/23 0814)  Resp: (!) 22 (12/28/23 0814)  BP: 104/73 (12/28/23 0800)  SpO2: 98 % (12/28/23 0814) Vital Signs (24h Range):  Temp:  [98.5 °F (36.9 °C)-98.9 °F (37.2 °C)] 98.7 °F (37.1 °C)  Pulse:  [76-98] 85  Resp:  [0-30] 22  SpO2:  [96 %-100 %] 98 %  BP: ()/(52-74) 104/73     Weight: 70.4 kg (155 lb 3.3 oz)  Body mass index is 21.05 kg/m².     SpO2: 98 %         Intake/Output Summary (Last 24 hours) at 12/28/2023 1007  Last data filed at 12/28/2023 0601  Gross per 24 hour   Intake 1193.57 ml   Output 925 ml   Net 268.57 ml       Lines/Drains/Airways       Peripherally Inserted Central Catheter Line  Duration             PICC Double Lumen 11/13/23 1509 right basilic 44 days              Peripheral Intravenous Line  Duration                  Peripheral IV - Single Lumen 12/27/23 1745 18 G Anterior;Left;Proximal Forearm <1 day                       Physical Exam  Vitals and nursing note reviewed.   Cardiovascular:      Rate and Rhythm: Normal  rate and regular rhythm.      Pulses: Normal pulses.      Heart sounds: Murmur (holosystolic mitral murmur) heard.   Pulmonary:      Effort: No respiratory distress.      Breath sounds: No wheezing or rales (scattered expiratory rales.).   Abdominal:      General: Bowel sounds are normal.   Musculoskeletal:         General: No swelling.      Right lower leg: No edema.      Left lower leg: No edema.   Skin:     Capillary Refill: Capillary refill takes 2 to 3 seconds.   Neurological:      Mental Status: He is alert and oriented to person, place, and time.            Significant Labs: All pertinent lab results from the last 24 hours have been reviewed.    Significant Imaging:  CXR with improved bilateral lung fields.   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 Echo with EF 50-53% severe MR and severe LA dilation. Optimized rate control with metoprolol 25 TID. Increased acute phase reactants. S/p NEW on 12/26 with severe MR with 2 jets, including posterior jet likely secondary to tear/perforation.     Plan:  - cardiac diet   - continue on lasix 80 PO BID  - dapaglifozin 10 daily   - spironolactone 25   - metolazone 2.5 daily   - Pending CTAP as per ID recs   - blood cx with NGTD   - start IV daptomycin x 2 weeks and follow with PO doxycyline until new valve replaced. ID recommended intraoperative tissue cx.  - CTS will follow as outpatient        Right upper quadrant  abdominal pain  - normal CTAP     Anemia due to GI blood loss  - resolved     HFmrEF  - dapaglifozin 10 daily   - spironolactone 25   - metolazone 2.5 daily       Acute hypoxemic respiratory failure  Patient with Hypercapnic Respiratory failure which is Acute.  he is not on home oxygen. Supplemental oxygen was provided with 10L NC.     Signs/symptoms of respiratory failure include- tachypnea, increased work of breathing, respiratory distress, and wheezing. Contributing diagnoses includes - CHF and Pleural effusion Labs and images were reviewed. Patient Has recent ABG, which has been reviewed.     - Currently on room air.    - famotidine 20 BID for PPI prophylaxis   - optimized diuresis    A-fib with RVR  S/p afib with RVR once admitted to the CCU. On sinus rhythm.     - continue heparin gtt  - continue metoprolol 25 TID for rate control   - monitor telemetry    Endocarditis  See severe MR         VTE Risk Mitigation (From admission, onward)           Ordered     heparin 25,000 units in dextrose 5% (100 units/ml) IV bolus from bag - ADDITIONAL PRN BOLUS - 60 units/kg  As needed (PRN)        Question:  Heparin Infusion Adjustment (DO NOT MODIFY ANSWER)  Answer:  [email or web address]\HeparinInfusions\heparin LOW INTENSITY nomogram for [facility] [id number]    12/18/23 2127     heparin 25,000 units in dextrose 5% (100 units/ml) IV bolus from bag - ADDITIONAL PRN BOLUS - 30 units/kg  As needed (PRN)        Question:  Heparin Infusion Adjustment (DO NOT MODIFY ANSWER)  Answer:  \\[email or web address]\[email or web address]\Images\Pharmacy\HeparinInfusions\heparin LOW INTENSITY nomogram for [facility] [id number]    12/18/23 2127     heparin 25,000 units in dextrose 5% 250 mL (100 units/mL) infusion LOW INTENSITY nomogram - [facility]  Continuous        Question:  Begin at (units/kg/hr)  Answer:  12    12/18/23 2127                    Jaya Zhang MD  Cardiology  [facility]

## 2023-12-28 NOTE — PLAN OF CARE
Problem: Adult Inpatient Plan of Care  Goal: Plan of Care Review  Outcome: Ongoing, Progressing     Problem: Adult Inpatient Plan of Care  Goal: Absence of Hospital-Acquired Illness or Injury  Outcome: Ongoing, Progressing     Problem: Infection  Goal: Absence of Infection Signs and Symptoms  Outcome: Ongoing, Progressing     Problem: Fall Injury Risk  Goal: Absence of Fall and Fall-Related Injury  Outcome: Ongoing, Progressing     Problem: Skin Injury Risk Increased  Goal: Skin Health and Integrity  Outcome: Ongoing, Progressing

## 2023-12-28 NOTE — PROGRESS NOTES
[facility]  Patient is currently on service with us for Dapto and soon to be resuming care.  PICC dressing intact and changed on  .  He does not need extension on PICC bc he states someone else does it for him.  All the meds at home are .  Told him that once we get word of d/c we can deliver to the house.       Natalie Malcolm R.N.   Clinical Service Liaison   [facility]   Cell: [phone]  Office: [phone]   Fax: [phone]

## 2023-12-28 NOTE — ASSESSMENT & PLAN NOTE
Patient with Hypercapnic Respiratory failure which is Acute.  he is not on home oxygen. Supplemental oxygen was provided with 10L NC.     Signs/symptoms of respiratory failure include- tachypnea, increased work of breathing, respiratory distress, and wheezing. Contributing diagnoses includes - CHF and Pleural effusion Labs and images were reviewed. Patient Has recent ABG, which has been reviewed.     - Currently on room air.    - famotidine 20 BID for PPI prophylaxis   - optimized diuresis

## 2023-12-28 NOTE — PLAN OF CARE
[facility]  Discharge Reassessment    Primary Care Provider: No, Primary Doctor    Expected Discharge Date: 12/29/2023    Reassessment (most recent)       Discharge Reassessment - 12/28/23 1155          Discharge Reassessment    Assessment Type Discharge Planning Reassessment     Did the patient's condition or plan change since previous assessment? No     Discharge Plan discussed with: Patient;Spouse/sig other     Communicated DARIELA with patient/caregiver Yes     Discharge Plan A Home Health     Discharge Plan B Long-term acute care facility (LTAC)     DME Needed Upon Discharge  none     Transition of Care Barriers None     Why the patient remains in the hospital Requires continued medical care        Post-Acute Status    Post-Acute Authorization [facility] Infusion;Home Health     Home Health Status Pending medical clearance/testing     [facility] Status Post acute provider reviewing for acceptance   pend medical clearance                SW met with pt and girlfriend Federica at bedside and reviewed discharge plan to resume care with [facility] and [facility], of which pt and Federica voiced agreement.  SW confirmed with Katia with [facility] and Desire with [facility] that they are following and will accept pt back when medically cleared.  Discharge Plan A and Plan B have been determined by review of patient's clinical status, future medical and therapeutic needs, and coverage/benefits for post-acute care in coordination with multidisciplinary team members.  EL will continue to follow.      Humera Luong LMSW  [facility]  [id number]

## 2023-12-28 NOTE — PLAN OF CARE
Outpatient Antibiotic Therapy Plan:    Please send referral to [facility].    1) Infection: endocarditis secondary to MRSA    2) Discharge Antibiotics:    Intravenous antibiotics:  Daptomycin 600 mg IV q 24 hours     3) Therapy Duration:  14 days    Estimated end date of IV antibiotics: 01/08/23    4) Outpatient Weekly Labs:    Order the following labs to be drawn on Mondays:   CBC  CMP   CPK (when on Daptomycin)      5) Fax Lab Results to Infectious Diseases Provider: Dr. Van or Dr. Rogers    [facility] Fax Number: [phone]    6) Outpatient Infectious Diseases Follow-up    Follow-up appointment will be arranged by the ID clinic and will be found in the patient's appointments tab.    Prior to discharge, please ensure the patient's follow-up has been scheduled.    If there is still no follow-up scheduled prior to discharge, please send an EPIC message to Arlette Alaniz in Infectious Diseases.

## 2023-12-28 NOTE — PROGRESS NOTES
[facility]  Infectious Disease  Progress Note    Patient Name: Lorenzo Nino  MRN: [id number]  Admission Date: 12/18/2023  Length of Stay: 9 days  Attending Physician: Justine Lobato MD  Primary Care Provider: No, Primary Doctor    Isolation Status: No active isolations  Assessment/Plan:      Pulmonary  Acute hypoxemic respiratory failure  See endocarditis.       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 Reports a dry cough and chills, but denies fevers or sweats, sick contacts. RIP, flu negative. CXR with interstitial coarsening and b/l (L>R) lung opacities. Bedside Echo showed EF 45-50% with severe MR, restricted posterior leaflet, possible tear in anterior leaflet.  NEW performed on 12/26/23 showed dehiscence of the porcine annular repair of his valve.    Cardiology and CT surgery concerned that persistent infection may be the cause for the dehiscence.      Plan    Out of an abundance of precaution, will recommend extending IV daptomycin for another 2 weeks (8 weeks total).  Afterwards, patient should be transitioned to oral doxycycline 100 mg po bid until new valve is placed.    At the time of valve replacement surgery, please obtain intra-operative tissue cultures to definitively determine if there is still some infection present.    GI  Right upper quadrant abdominal pain  Patient complaining of right upper quadrant discomfort.  Recommend non-contrast CT abdomen to assess for additional foci of MRSA infection in he abdomen.    Critical care time spent on the evaluation and treatment of severe organ dysfunction, review of  "pertinent labs and imaging studies, discussions with consulting providers and discussions with patient/family: 70 minutes.     Anticipated Disposition: TBD    Thank you for your consult. I will follow-up with patient. Please contact us if you have any additional questions.    Javier Rogers MD  Infectious Disease  [facility]    Subjective:     Principal Problem:MRSA bacteremia    HPI: Mr. 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 Reports a dry cough and chills, but denies fevers or sweats, sick contacts.    Of note, pt was followed by ID during recent hospital admission 10/30-11/14. Due to prolonged bacteremia pt was managed with salvage therapy with dapto/ ceftaroline. BCx cleared on 11/11.  Plan was to continue ceftaroline until 11/19 and dapto until 12/24.     In the ED pt was afebrile, tachycardic and hypoxemic. Labs remarkable for leukocytosis. RIP, flu negative. CXR with interstitial coarsening and b/l (L>R) lung opacities. Bedside Echo showed EF 45-50% with severe MR, restricted posterior leaflet, possible tear in anterior leaflet.  Started on empiric vanc/ zosyn. Then broadened with sarah. BCx 12/18 remains ngtd.     ID consulted for: "Antibiotics recommendations. Patient on home IV daptomycin ."  Interval History: No adverse events.    Review of Systems   Respiratory:  Positive for shortness of breath.    Gastrointestinal:  Positive for abdominal pain.   All other systems reviewed and are negative.    Objective:     Vital Signs (Most Recent):  Temp: 98.5 °F (36.9 °C) (12/27/23 1600)  Pulse: 82 (12/27/23 1937)  Resp: 19 (12/27/23 1937)  BP: 101/62 (12/27/23 " 1937)  SpO2: 100 % (12/27/23 1937) Vital Signs (24h Range):  Temp:  [98 °F (36.7 °C)-99.1 °F (37.3 °C)] 98.5 °F (36.9 °C)  Pulse:  [75-93] 82  Resp:  [16-26] 19  SpO2:  [96 %-100 %] 100 %  BP: ()/(52-76) 101/62     Weight: 68.5 kg (151 lb 0.2 oz)  Body mass index is 20.48 kg/m².    Estimated Creatinine Clearance: 86.6 mL/min (based on SCr of 1 mg/dL).     Physical Exam  Vitals reviewed.   Constitutional:       Appearance: He is ill-appearing.   HENT:      Head: Normocephalic and atraumatic.   Eyes:      Conjunctiva/sclera: Conjunctivae normal.      Pupils: Pupils are equal, round, and reactive to light.   Cardiovascular:      Rate and Rhythm: Normal rate and regular rhythm.      Heart sounds: Murmur heard.   Pulmonary:      Effort: Pulmonary effort is normal.      Breath sounds: Rales present.   Abdominal:      General: Abdomen is flat.      Palpations: Abdomen is soft.   Musculoskeletal:      Right lower leg: No edema.      Left lower leg: No edema.      Comments: RUE PICC site with clean dressing   Skin:     Findings: No lesion or rash.   Neurological:      General: No focal deficit present.      Mental Status: He is oriented to person, place, and time.   Psychiatric:         Mood and Affect: Mood normal.         Behavior: Behavior normal.          Significant Labs:   Microbiology Results (last 7 days)       Procedure Component Value Units Date/Time    Blood culture [[id number]] Collected: 12/27/23 1744    Order Status: Sent Specimen: Blood from Peripheral, Forearm, Left Updated: 12/27/23 1757    Blood culture [[id number]] Collected: 12/27/23 1745    Order Status: Sent Specimen: Blood from Peripheral, Upper Arm, Left Updated: 12/27/23 1757    Blood Culture #2 **CANNOT BE ORDERED STAT** [[id number]] Collected: 12/18/23 1619    Order Status: Completed Specimen: Blood from Peripheral, Forearm, Right Updated: 12/23/23 1812     Blood Culture, Routine No growth after 5 days.    Blood Culture #1 **CANNOT BE ORDERED  STAT** [[id number]] Collected: 12/18/23 1623    Order Status: Completed Specimen: Blood from Peripheral, Forearm, Right Updated: 12/23/23 1812     Blood Culture, Routine No growth after 5 days.    Culture, Respiratory with Gram Stain [[id number]]  (Abnormal) Collected: 12/19/23 1506    Order Status: Completed Specimen: Respiratory from Sputum, Expectorated Updated: 12/21/23 1353     Respiratory Culture No S aureus or Pseudomonas isolated.      CANDIDA DUBLINIENSIS  Moderate  Normal respiratory ina also present       Gram Stain (Respiratory) >10 epithelial cells per low power field     Gram Stain (Respiratory) Many WBC's     Gram Stain (Respiratory) Moderate yeast     Gram Stain (Respiratory) Moderate Gram positive rods     Gram Stain (Respiratory) Rare Gram positive cocci            Significant Imaging: I have reviewed all pertinent imaging results/findings within the past 24 hours.

## 2023-12-28 NOTE — SUBJECTIVE & OBJECTIVE
Interval History: No adverse events.    Review of Systems   Respiratory:  Positive for shortness of breath.    Gastrointestinal:  Positive for abdominal pain.   All other systems reviewed and are negative.    Objective:     Vital Signs (Most Recent):  Temp: 98.5 °F (36.9 °C) (12/27/23 1600)  Pulse: 82 (12/27/23 1937)  Resp: 19 (12/27/23 1937)  BP: 101/62 (12/27/23 1937)  SpO2: 100 % (12/27/23 1937) Vital Signs (24h Range):  Temp:  [98 °F (36.7 °C)-99.1 °F (37.3 °C)] 98.5 °F (36.9 °C)  Pulse:  [75-93] 82  Resp:  [16-26] 19  SpO2:  [96 %-100 %] 100 %  BP: ()/(52-76) 101/62     Weight: 68.5 kg (151 lb 0.2 oz)  Body mass index is 20.48 kg/m².    Estimated Creatinine Clearance: 86.6 mL/min (based on SCr of 1 mg/dL).     Physical Exam  Vitals reviewed.   Constitutional:       Appearance: He is ill-appearing.   HENT:      Head: Normocephalic and atraumatic.   Eyes:      Conjunctiva/sclera: Conjunctivae normal.      Pupils: Pupils are equal, round, and reactive to light.   Cardiovascular:      Rate and Rhythm: Normal rate and regular rhythm.      Heart sounds: Murmur heard.   Pulmonary:      Effort: Pulmonary effort is normal.      Breath sounds: Rales present.   Abdominal:      General: Abdomen is flat.      Palpations: Abdomen is soft.   Musculoskeletal:      Right lower leg: No edema.      Left lower leg: No edema.      Comments: RUE PICC site with clean dressing   Skin:     Findings: No lesion or rash.   Neurological:      General: No focal deficit present.      Mental Status: He is oriented to person, place, and time.   Psychiatric:         Mood and Affect: Mood normal.         Behavior: Behavior normal.          Significant Labs:   Microbiology Results (last 7 days)       Procedure Component Value Units Date/Time    Blood culture [[id number]] Collected: 12/27/23 1744    Order Status: Sent Specimen: Blood from Peripheral, Forearm, Left Updated: 12/27/23 1757    Blood culture [[id number]] Collected: 12/27/23 1745     Order Status: Sent Specimen: Blood from Peripheral, Upper Arm, Left Updated: 12/27/23 1757    Blood Culture #2 **CANNOT BE ORDERED STAT** [[id number]] Collected: 12/18/23 1619    Order Status: Completed Specimen: Blood from Peripheral, Forearm, Right Updated: 12/23/23 1812     Blood Culture, Routine No growth after 5 days.    Blood Culture #1 **CANNOT BE ORDERED STAT** [[id number]] Collected: 12/18/23 1623    Order Status: Completed Specimen: Blood from Peripheral, Forearm, Right Updated: 12/23/23 1812     Blood Culture, Routine No growth after 5 days.    Culture, Respiratory with Gram Stain [[id number]]  (Abnormal) Collected: 12/19/23 1506    Order Status: Completed Specimen: Respiratory from Sputum, Expectorated Updated: 12/21/23 1353     Respiratory Culture No S aureus or Pseudomonas isolated.      CANDIDA DUBLINIENSIS  Moderate  Normal respiratory ina also present       Gram Stain (Respiratory) >10 epithelial cells per low power field     Gram Stain (Respiratory) Many WBC's     Gram Stain (Respiratory) Moderate yeast     Gram Stain (Respiratory) Moderate Gram positive rods     Gram Stain (Respiratory) Rare Gram positive cocci            Significant Imaging: I have reviewed all pertinent imaging results/findings within the past 24 hours.

## 2023-12-29 ENCOUNTER — TELEPHONE (OUTPATIENT)
Dept: CARDIAC REHAB | Facility: CLINIC | Age: 48
End: 2023-12-29
Payer: COMMERCIAL

## 2023-12-29 PROBLEM — R10.11 RIGHT UPPER QUADRANT ABDOMINAL PAIN: Status: RESOLVED | Noted: 2023-12-27 | Resolved: 2023-12-29

## 2023-12-29 PROBLEM — D50.0 ANEMIA DUE TO GI BLOOD LOSS: Status: RESOLVED | Noted: 2023-12-21 | Resolved: 2023-12-29

## 2023-12-29 LAB
APTT PPP: 45.5 SEC (ref 21–32)
BASOPHILS # BLD AUTO: 0.13 K/UL (ref 0–0.2)
BASOPHILS NFR BLD: 0.9 % (ref 0–1.9)
DIFFERENTIAL METHOD BLD: ABNORMAL
EOSINOPHIL # BLD AUTO: 0.3 K/UL (ref 0–0.5)
EOSINOPHIL NFR BLD: 1.8 % (ref 0–8)
ERYTHROCYTE [DISTWIDTH] IN BLOOD BY AUTOMATED COUNT: 18.8 % (ref 11.5–14.5)
HCT VFR BLD AUTO: 30.5 % (ref 40–54)
HGB BLD-MCNC: 10 G/DL (ref 14–18)
IMM GRANULOCYTES # BLD AUTO: 0.18 K/UL (ref 0–0.04)
IMM GRANULOCYTES NFR BLD AUTO: 1.2 % (ref 0–0.5)
LYMPHOCYTES # BLD AUTO: 2.7 K/UL (ref 1–4.8)
LYMPHOCYTES NFR BLD: 18.5 % (ref 18–48)
MCH RBC QN AUTO: 25.6 PG (ref 27–31)
MCHC RBC AUTO-ENTMCNC: 32.8 G/DL (ref 32–36)
MCV RBC AUTO: 78 FL (ref 82–98)
MONOCYTES # BLD AUTO: 1.6 K/UL (ref 0.3–1)
MONOCYTES NFR BLD: 10.9 % (ref 4–15)
NEUTROPHILS # BLD AUTO: 9.9 K/UL (ref 1.8–7.7)
NEUTROPHILS NFR BLD: 66.7 % (ref 38–73)
NRBC BLD-RTO: 0 /100 WBC
PLATELET # BLD AUTO: 568 K/UL (ref 150–450)
PMV BLD AUTO: 11.4 FL (ref 9.2–12.9)
RBC # BLD AUTO: 3.9 M/UL (ref 4.6–6.2)
WBC # BLD AUTO: 14.84 K/UL (ref 3.9–12.7)

## 2023-12-29 PROCEDURE — 63600175 PHARM REV CODE 636 W HCPCS

## 2023-12-29 PROCEDURE — 93010 ELECTROCARDIOGRAM REPORT: CPT | Mod: ,,, | Performed by: INTERNAL MEDICINE

## 2023-12-29 PROCEDURE — 25000003 PHARM REV CODE 250: Performed by: INTERNAL MEDICINE

## 2023-12-29 PROCEDURE — 20600001 HC STEP DOWN PRIVATE ROOM

## 2023-12-29 PROCEDURE — 85025 COMPLETE CBC W/AUTO DIFF WBC: CPT

## 2023-12-29 PROCEDURE — 93005 ELECTROCARDIOGRAM TRACING: CPT

## 2023-12-29 PROCEDURE — 99231 SBSQ HOSP IP/OBS SF/LOW 25: CPT | Mod: ,,, | Performed by: INTERNAL MEDICINE

## 2023-12-29 PROCEDURE — 93010 ELECTROCARDIOGRAM REPORT: CPT | Mod: 76,,, | Performed by: INTERNAL MEDICINE

## 2023-12-29 PROCEDURE — 85730 THROMBOPLASTIN TIME PARTIAL: CPT | Performed by: STUDENT IN AN ORGANIZED HEALTH CARE EDUCATION/TRAINING PROGRAM

## 2023-12-29 PROCEDURE — 25000003 PHARM REV CODE 250

## 2023-12-29 PROCEDURE — 25000003 PHARM REV CODE 250: Performed by: STUDENT IN AN ORGANIZED HEALTH CARE EDUCATION/TRAINING PROGRAM

## 2023-12-29 RX ORDER — ASPIRIN 81 MG/1
81 TABLET ORAL DAILY
Qty: 30 TABLET | Refills: 11 | Status: SHIPPED | OUTPATIENT
Start: 2023-12-29 | End: 2024-01-08 | Stop reason: HOSPADM

## 2023-12-29 RX ORDER — TORSEMIDE 20 MG/1
60 TABLET ORAL 2 TIMES DAILY
Qty: 180 TABLET | Refills: 11 | Status: SHIPPED | OUTPATIENT
Start: 2023-12-29 | End: 2024-01-07 | Stop reason: HOSPADM

## 2023-12-29 RX ORDER — METOLAZONE 2.5 MG/1
2.5 TABLET ORAL DAILY
Qty: 30 TABLET | Refills: 11 | Status: SHIPPED | OUTPATIENT
Start: 2023-12-30 | End: 2024-01-07 | Stop reason: HOSPADM

## 2023-12-29 RX ORDER — FUROSEMIDE 10 MG/ML
80 INJECTION INTRAMUSCULAR; INTRAVENOUS ONCE
Status: COMPLETED | OUTPATIENT
Start: 2023-12-29 | End: 2023-12-29

## 2023-12-29 RX ORDER — TORSEMIDE 20 MG/1
60 TABLET ORAL 2 TIMES DAILY
Status: DISCONTINUED | OUTPATIENT
Start: 2023-12-29 | End: 2023-12-30

## 2023-12-29 RX ORDER — TORSEMIDE 20 MG/1
60 TABLET ORAL DAILY
Status: DISCONTINUED | OUTPATIENT
Start: 2023-12-29 | End: 2023-12-29

## 2023-12-29 RX ORDER — SPIRONOLACTONE 25 MG/1
25 TABLET ORAL DAILY
Qty: 30 TABLET | Refills: 11 | Status: SHIPPED | OUTPATIENT
Start: 2023-12-29 | End: 2024-01-07 | Stop reason: HOSPADM

## 2023-12-29 RX ADMIN — DAPTOMYCIN 565 MG: 500 INJECTION, POWDER, LYOPHILIZED, FOR SOLUTION INTRAVENOUS at 10:12

## 2023-12-29 RX ADMIN — DAPAGLIFLOZIN 10 MG: 10 TABLET, FILM COATED ORAL at 08:12

## 2023-12-29 RX ADMIN — TORSEMIDE 60 MG: 20 TABLET ORAL at 05:12

## 2023-12-29 RX ADMIN — FAMOTIDINE 20 MG: 20 TABLET ORAL at 08:12

## 2023-12-29 RX ADMIN — APIXABAN 5 MG: 5 TABLET, FILM COATED ORAL at 09:12

## 2023-12-29 RX ADMIN — SPIRONOLACTONE 25 MG: 25 TABLET ORAL at 08:12

## 2023-12-29 RX ADMIN — AMIODARONE HYDROCHLORIDE 150 MG: 1.5 INJECTION, SOLUTION INTRAVENOUS at 12:12

## 2023-12-29 RX ADMIN — METOLAZONE 2.5 MG: 2.5 TABLET ORAL at 08:12

## 2023-12-29 RX ADMIN — AMIODARONE HYDROCHLORIDE 1 MG/MIN: 1.8 INJECTION, SOLUTION INTRAVENOUS at 12:12

## 2023-12-29 RX ADMIN — AMIODARONE HYDROCHLORIDE 0.5 MG/MIN: 1.8 INJECTION, SOLUTION INTRAVENOUS at 05:12

## 2023-12-29 RX ADMIN — FUROSEMIDE 80 MG: 10 INJECTION, SOLUTION INTRAVENOUS at 11:12

## 2023-12-29 RX ADMIN — FAMOTIDINE 20 MG: 20 TABLET ORAL at 09:12

## 2023-12-29 RX ADMIN — FUROSEMIDE 80 MG: 40 TABLET ORAL at 08:12

## 2023-12-29 NOTE — PLAN OF CARE
Problem: Adult Inpatient Plan of Care  Goal: Plan of Care Review  Outcome: Ongoing, Progressing  Goal: Patient-Specific Goal (Individualized)  Outcome: Ongoing, Progressing  Goal: Absence of Hospital-Acquired Illness or Injury  Outcome: Ongoing, Progressing  Goal: Optimal Comfort and Wellbeing  Outcome: Ongoing, Progressing  Goal: Readiness for Transition of Care  Outcome: Ongoing, Progressing     Problem: Adjustment to Illness (Sepsis/Septic Shock)  Goal: Optimal Coping  Outcome: Ongoing, Progressing     Problem: Bleeding (Sepsis/Septic Shock)  Goal: Absence of Bleeding  Outcome: Ongoing, Progressing     Problem: Glycemic Control Impaired (Sepsis/Septic Shock)  Goal: Blood Glucose Level Within Desired Range  Outcome: Ongoing, Progressing     Problem: Infection Progression (Sepsis/Septic Shock)  Goal: Absence of Infection Signs and Symptoms  Outcome: Ongoing, Progressing     Problem: Nutrition Impaired (Sepsis/Septic Shock)  Goal: Optimal Nutrition Intake  Outcome: Ongoing, Progressing     Problem: Fluid and Electrolyte Imbalance (Acute Kidney Injury/Impairment)  Goal: Fluid and Electrolyte Balance  Outcome: Ongoing, Progressing     Problem: Oral Intake Inadequate (Acute Kidney Injury/Impairment)  Goal: Optimal Nutrition Intake  Outcome: Ongoing, Progressing     Problem: Renal Function Impairment (Acute Kidney Injury/Impairment)  Goal: Effective Renal Function  Outcome: Ongoing, Progressing     Problem: Infection  Goal: Absence of Infection Signs and Symptoms  Outcome: Ongoing, Progressing     Problem: Fall Injury Risk  Goal: Absence of Fall and Fall-Related Injury  Outcome: Ongoing, Progressing     Problem: Skin Injury Risk Increased  Goal: Skin Health and Integrity  Outcome: Ongoing, Progressing   Plan of care discussed with patient. Patient is free of fall/trauma/injury. Denies CP, SOB, or pain/discomfort. All questions addressed. Will continue to monitor. AAOx4 and VSS. Pt back in NSR. Family at bedside.

## 2023-12-29 NOTE — CARE UPDATE
Dx: MRSA bacteremia    Goals of Care: MAP>60    Vital Signs (last 12 hours):   Temp:  [98.6 °F (37 °C)-99 °F (37.2 °C)]   Pulse:  [74-92]   Resp:  [16-38]   BP: ()/(54-71)   SpO2:  [97 %-100 %]      Neuro: AAO x4, Follows Commands, and Moves All Extremities    Cardiac: NSR    Respiratory: Room Air    Gtts: Lasix Heparin    Urine Output: Voids Spontaneously 1690 cc/shift     Labs/Accuchecks: Potassium 3.6    Skin:  All skin remains free from injury.  Patient turned Q2, mattress inflated, and bed working correctly.    Shift Events:  No acute events overnight.  See flowsheet for further assessment/details.  Family updated on current condition/plan of care, questions answered, and emotional support provided.  MD updated on current condition, vitals, labs, and gtts.  No new orders received, will continue to monitor.     
"RAPID RESPONSE NURSE CHART REVIEW        Chart Reviewed: 12/29/2023, 12:30 AM    MRN: [id number]  Bed: 323/323 A    Dx: MRSA bacteremia    Lorenzo Nino has a past medical history of Hypertension.    Last VS: BP 99/67 (BP Location: Left arm, Patient Position: Lying)   Pulse (!) 116   Temp 97.5 °F (36.4 °C) (Tympanic)   Resp 17   Ht 6' (1.829 m)   Wt 70.4 kg (155 lb 3.3 oz)   SpO2 98%   BMI 21.05 kg/m²     24H Vital Sign Range:  Temp:  [97.2 °F (36.2 °C)-98.9 °F (37.2 °C)]   Pulse:  []   Resp:  [0-30]   BP: ()/(63-79)   SpO2:  [97 %-100 %]     Level of Consciousness (AVPU): alert    Recent Labs     12/26/23  0731   WBC 18.20*   HGB 10.1*   HCT 32.8*   *       Recent Labs     12/26/23  1158 12/26/23  2018 12/27/23  0221 12/27/23  1211 12/28/23  0358   *   < > 132* 133* 133*   K 3.6   < > 4.5 4.1 3.8   CL 89*   < > 92* 93* 94*   CO2 30*   < > 27 27 26   BUN 35*   < > 28* 28* 28*   CREATININE 1.0   < > 1.0 1.0 1.0   *   < > 98 140* 105   PHOS 5.0*  --   --   --   --    MG 2.5  --   --  2.2 2.2    < > = values in this interval not displayed.        No results for input(s): "PH", "PCO2", "PO2", "HCO3", "POCSATURATED", "BE" in the last 72 hours.     OXYGEN:  Flow (L/min): 0          MEWS score: 4    -116 with BP 99/67. No additional concerns verbalized at this time. Instructed to call [phone] for further concerns or assistance.    Merlyn Lakhani RN        "
No

## 2023-12-29 NOTE — PROGRESS NOTES
[facility] - Card[facility] Stepdown  Cardiology  Progress Note    Patient Name: Lorenzo Nino  MRN: [id number]  Admission Date: 12/18/2023  Hospital Length of Stay: 11 days  Code Status: Full Code   Attending Physician: Justine Lobato MD   Primary Care Physician: Terrie, Primary Doctor  Expected Discharge Date: 12/30/2023  Principal Problem:MRSA bacteremia    Subjective:     Hospital Course:   Upon admission to the CICU on afib w/ RVR. Given 1 dose of digoxin and started on heparin gtt and diltiazem gtt. Stabilized on sinus rhythm. CTS evaluated patient and agree on continuing aggressive diuresis with lasix gtt, at the moment not indicated IABP. Pending ID evaluation for AB management. Switched to PO diltiazem due to normal rhythm. Will hold NEW at the moment due to patient with shortness of breath and increased risk of respiratory failure. EKG with Aflutter with PVCs. Patient to have daily EKGs to evaluate flutter. ID evaluated and recommend continuing Abx pending blood cultures. TTE with severe MR and severe LA dilation. No new events of chest pain, with decrease O2 demands on 4L NC . Blood cx NGTD. Respiratory cx with yeast and gram positive cocci.  At the moment with NSR. Will continue diuresing on lasix gtt once euvolemic and will transition to IV pushes once stabilized.  Blood cx with NGTD. Discontinued meropenem as per ID recs. Switched from diltiazem to metoprolol 25 TID. Increased acute phase reactants (ESR 85 / .9). H/H still decreased (Hb 9.2) with FOBT positive. Respiratory cx positive for Candida dubliensis, ID following. Tolerating room air. VBG with compensated respiratory alkalosis. Pending CTS re-evaluation to assess patient's and family concerns. Following ID recs, continue vancomycin until NEW on 12/26/23. S/p NEW on 12/26 with severe MR with 2 jets, including posterior jet likely secondary to tear/perforation. On IV vancomycin. CTS to follow as outpatient (Dr. Ascencio). Pending ID  re-evaluation to continue on antibiotics in the long term. Increased lasix to 40 PO once and continue 80 PO BID given UOP. start dapaglifozin 10 daily, spironolactone 25, metolazone 2.5 daily, start daptomycin 8mg/kg x 2 weeks and follow with PO doxycyline until new valve replaced. ID recommended intraoperative tissue cx., CTAP normal. Optimized diuresis for volume status. Off heparin gtt, started on eliquis. Continue on IV daptomycin as per ID. Once medically ready for discharge, continue with ID as outpatient.       Interval History: optimizing volume status. Given IV lasix once. Started on torsemide. Off heparin gtt. Started on eliquis. Continue on IV daptomycin as per ID.     Review of Systems   Constitutional: Negative for chills and fever.   Cardiovascular:  Negative for chest pain, cyanosis, orthopnea and syncope.   Respiratory:  Negative for hemoptysis, shortness of breath and wheezing.    Skin:  Negative for dry skin and rash.   Genitourinary:  Negative for dysuria and hematuria.     Objective:     Vital Signs (Most Recent):  Temp: 97.6 °F (36.4 °C) (12/29/23 0754)  Pulse: 106 (12/29/23 0754)  Resp: 18 (12/29/23 0754)  BP: 111/75 (12/29/23 0754)  SpO2: 99 % (12/29/23 0754) Vital Signs (24h Range):  Temp:  [97.5 °F (36.4 °C)-98.9 °F (37.2 °C)] 97.6 °F (36.4 °C)  Pulse:  [] 106  Resp:  [17-22] 18  SpO2:  [98 %-99 %] 99 %  BP: ()/(63-79) 111/75     Weight: 70.4 kg (155 lb 3.3 oz)  Body mass index is 21.05 kg/m².     SpO2: 99 %         Intake/Output Summary (Last 24 hours) at 12/29/2023 0932  Last data filed at 12/29/2023 0503  Gross per 24 hour   Intake 81.76 ml   Output 930 ml   Net -848.24 ml       Lines/Drains/Airways       Peripherally Inserted Central Catheter Line  Duration             PICC Double Lumen 11/13/23 1509 right basilic 45 days              Peripheral Intravenous Line  Duration                  Peripheral IV - Single Lumen 12/27/23 1745 18 G Anterior;Left;Proximal Forearm 1 day                        Physical Exam  Vitals and nursing note reviewed.   Cardiovascular:      Rate and Rhythm: Normal rate and regular rhythm.      Pulses: Normal pulses.      Heart sounds: Murmur (holosystolic mitral murmur) heard.   Pulmonary:      Effort: No respiratory distress.      Breath sounds: No wheezing or rales (scattered expiratory rales.).   Abdominal:      General: Bowel sounds are normal.   Musculoskeletal:         General: No swelling.      Right lower leg: No edema.      Left lower leg: No edema.   Skin:     Capillary Refill: Capillary refill takes 2 to 3 seconds.   Neurological:      Mental Status: He is alert and oriented to person, place, and time.            Significant Labs: All pertinent lab results from the last 24 hours have been reviewed.    Significant Imaging: EKG: sinus tachycardia.   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 Echo with EF 50-53% severe MR and severe LA dilation. Optimized rate control with metoprolol 25 TID. Increased acute phase reactants. S/p NEW on 12/26 with severe MR with 2 jets, including posterior jet likely secondary to tear/perforation.     Plan:  - cardiac diet   - dapaglifozin 10 daily   - spironolactone 25   - metolazone 2.5 daily   - torsemide 60 BID   - furosemide 80 IV once   - eliquis 5 BID   - continue daptomycin x 2 weeks (Home Health) and follow with PO doxycyline until new valve replaced. ID recommended  intraoperative tissue cx.  - blood cx with NGTD   - CTS will follow as outpatient    - ID will follow as outpatient  - transitional clinic on 01/02/04        HFmrEF  - dapaglifozin 10 daily   - spironolactone 25   - metolazone 2.5 daily   - torsemide 60 BID          Acute hypoxemic respiratory failure  Patient with Hypercapnic Respiratory failure which is Acute.  he is not on home oxygen. Supplemental oxygen was provided with 10L NC.     Signs/symptoms of respiratory failure include- tachypnea, increased work of breathing, respiratory distress, and wheezing. Contributing diagnoses includes - CHF and Pleural effusion Labs and images were reviewed. Patient Has recent ABG, which has been reviewed.     - Currently on room air.    - famotidine 20 BID for PPI prophylaxis   - optimized diuresis    A-fib with RVR  S/p afib with RVR once admitted to the CCU. On sinus rhythm.     - start amiodarone gtt given new episodes of a fib with RVR  - off heparin gtt  - started on eliquis 5 BID  - continue metoprolol 25 TID for rate control   - monitor telemetry    Endocarditis  See severe MR         VTE Risk Mitigation (From admission, onward)           Ordered     apixaban tablet 5 mg  2 times daily         12/29/23 2608                    Jaya Zhang MD  Cardiology  [facility] - Cardiology Stepdown

## 2023-12-29 NOTE — PLAN OF CARE
Patient card f/u scheduled 01/05 at 2:40pm            Sepideh Edwards [facility]  Case Management  [id number]

## 2023-12-29 NOTE — ASSESSMENT & PLAN NOTE
48 y.o. 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 Echo with EF 50-53% severe MR and severe LA dilation. Optimized rate control with metoprolol 25 TID. Increased acute phase reactants. S/p NEW on 12/26 with severe MR with 2 jets, including posterior jet likely secondary to tear/perforation.     Plan:  - cardiac diet   - dapaglifozin 10 daily   - spironolactone 25   - metolazone 2.5 daily   - torsemide 60 BID   - furosemide 80 IV once   - eliquis 5 BID   - continue daptomycin x 2 weeks (Home Health) and follow with PO doxycyline until new valve replaced. ID recommended intraoperative tissue cx.  - blood cx with NGTD   - CTS will follow as outpatient    - ID will follow as outpatient  - transitional clinic on 01/02/04

## 2023-12-29 NOTE — ASSESSMENT & PLAN NOTE
S/p afib with RVR once admitted to the CCU. On sinus rhythm.     - off heparin gtt  - started on eliquis 5 BID  - continue metoprolol 25 TID for rate control   - monitor telemetry

## 2023-12-29 NOTE — SUBJECTIVE & OBJECTIVE
Interval History: optimizing volume status. Given IV lasix once. Started on torsemide. Off heparin gtt. Started on eliquis. Continue on IV daptomycin as per ID.     Review of Systems   Constitutional: Negative for chills and fever.   Cardiovascular:  Negative for chest pain, cyanosis, orthopnea and syncope.   Respiratory:  Negative for hemoptysis, shortness of breath and wheezing.    Skin:  Negative for dry skin and rash.   Genitourinary:  Negative for dysuria and hematuria.     Objective:     Vital Signs (Most Recent):  Temp: 97.6 °F (36.4 °C) (12/29/23 0754)  Pulse: 106 (12/29/23 0754)  Resp: 18 (12/29/23 0754)  BP: 111/75 (12/29/23 0754)  SpO2: 99 % (12/29/23 0754) Vital Signs (24h Range):  Temp:  [97.5 °F (36.4 °C)-98.9 °F (37.2 °C)] 97.6 °F (36.4 °C)  Pulse:  [] 106  Resp:  [17-22] 18  SpO2:  [98 %-99 %] 99 %  BP: ()/(63-79) 111/75     Weight: 70.4 kg (155 lb 3.3 oz)  Body mass index is 21.05 kg/m².     SpO2: 99 %         Intake/Output Summary (Last 24 hours) at 12/29/2023 0932  Last data filed at 12/29/2023 0503  Gross per 24 hour   Intake 81.76 ml   Output 930 ml   Net -848.24 ml       Lines/Drains/Airways       Peripherally Inserted Central Catheter Line  Duration             PICC Double Lumen 11/13/23 1509 right basilic 45 days              Peripheral Intravenous Line  Duration                  Peripheral IV - Single Lumen 12/27/23 1745 18 G Anterior;Left;Proximal Forearm 1 day                       Physical Exam  Vitals and nursing note reviewed.   Cardiovascular:      Rate and Rhythm: Normal rate and regular rhythm.      Pulses: Normal pulses.      Heart sounds: Murmur (holosystolic mitral murmur) heard.   Pulmonary:      Effort: No respiratory distress.      Breath sounds: No wheezing or rales (scattered expiratory rales.).   Abdominal:      General: Bowel sounds are normal.   Musculoskeletal:         General: No swelling.      Right lower leg: No edema.      Left lower leg: No edema.    Skin:     Capillary Refill: Capillary refill takes 2 to 3 seconds.   Neurological:      Mental Status: He is alert and oriented to person, place, and time.            Significant Labs: All pertinent lab results from the last 24 hours have been reviewed.    Significant Imaging: EKG: sinus tachycardia.

## 2023-12-29 NOTE — PLAN OF CARE
12/29/23 0935   Post-Acute Status   Post-Acute Authorization [facility]   [facility] Status Set-up Complete/Auth obtained     Per Natalie with [facility] pt is all set from their standpoint and new medications will be delivered to the home.      Humera Luong LMSW  [facility]  [phone]

## 2023-12-30 PROBLEM — J96.01 ACUTE HYPOXEMIC RESPIRATORY FAILURE: Status: RESOLVED | Noted: 2023-11-02 | Resolved: 2023-12-30

## 2023-12-30 LAB
ALBUMIN SERPL BCP-MCNC: 3.2 G/DL (ref 3.5–5.2)
ALBUMIN SERPL BCP-MCNC: 3.3 G/DL (ref 3.5–5.2)
ANION GAP SERPL CALC-SCNC: 18 MMOL/L (ref 8–16)
ANION GAP SERPL CALC-SCNC: 19 MMOL/L (ref 8–16)
ANISOCYTOSIS BLD QL SMEAR: SLIGHT
BASOPHILS # BLD AUTO: 0.14 K/UL (ref 0–0.2)
BASOPHILS NFR BLD: 1.1 % (ref 0–1.9)
BUN SERPL-MCNC: 48 MG/DL (ref 6–20)
BUN SERPL-MCNC: 59 MG/DL (ref 6–20)
CALCIUM SERPL-MCNC: 10.7 MG/DL (ref 8.7–10.5)
CALCIUM SERPL-MCNC: 10.9 MG/DL (ref 8.7–10.5)
CHLORIDE SERPL-SCNC: 78 MMOL/L (ref 95–110)
CHLORIDE SERPL-SCNC: 80 MMOL/L (ref 95–110)
CO2 SERPL-SCNC: 28 MMOL/L (ref 23–29)
CO2 SERPL-SCNC: 30 MMOL/L (ref 23–29)
CREAT SERPL-MCNC: 1.8 MG/DL (ref 0.5–1.4)
CREAT SERPL-MCNC: 2 MG/DL (ref 0.5–1.4)
DIFFERENTIAL METHOD BLD: ABNORMAL
EOSINOPHIL # BLD AUTO: 0.4 K/UL (ref 0–0.5)
EOSINOPHIL NFR BLD: 3.2 % (ref 0–8)
ERYTHROCYTE [DISTWIDTH] IN BLOOD BY AUTOMATED COUNT: 18.4 % (ref 11.5–14.5)
EST. GFR  (NO RACE VARIABLE): 40.2 ML/MIN/1.73 M^2
EST. GFR  (NO RACE VARIABLE): 45.6 ML/MIN/1.73 M^2
GLUCOSE SERPL-MCNC: 123 MG/DL (ref 70–110)
GLUCOSE SERPL-MCNC: 190 MG/DL (ref 70–110)
HCT VFR BLD AUTO: 30.6 % (ref 40–54)
HGB BLD-MCNC: 10.1 G/DL (ref 14–18)
IMM GRANULOCYTES # BLD AUTO: 0.25 K/UL (ref 0–0.04)
IMM GRANULOCYTES NFR BLD AUTO: 2 % (ref 0–0.5)
LYMPHOCYTES # BLD AUTO: 1.4 K/UL (ref 1–4.8)
LYMPHOCYTES NFR BLD: 11 % (ref 18–48)
MCH RBC QN AUTO: 24.9 PG (ref 27–31)
MCHC RBC AUTO-ENTMCNC: 33 G/DL (ref 32–36)
MCV RBC AUTO: 76 FL (ref 82–98)
MONOCYTES # BLD AUTO: 2.3 K/UL (ref 0.3–1)
MONOCYTES NFR BLD: 18.1 % (ref 4–15)
NEUTROPHILS # BLD AUTO: 8.1 K/UL (ref 1.8–7.7)
NEUTROPHILS NFR BLD: 64.6 % (ref 38–73)
NRBC BLD-RTO: 0 /100 WBC
OVALOCYTES BLD QL SMEAR: ABNORMAL
PHOSPHATE SERPL-MCNC: 7 MG/DL (ref 2.7–4.5)
PHOSPHATE SERPL-MCNC: 8 MG/DL (ref 2.7–4.5)
PLATELET # BLD AUTO: 548 K/UL (ref 150–450)
PMV BLD AUTO: 10.8 FL (ref 9.2–12.9)
POIKILOCYTOSIS BLD QL SMEAR: SLIGHT
POLYCHROMASIA BLD QL SMEAR: ABNORMAL
POTASSIUM SERPL-SCNC: 2.9 MMOL/L (ref 3.5–5.1)
POTASSIUM SERPL-SCNC: 3.2 MMOL/L (ref 3.5–5.1)
RBC # BLD AUTO: 4.05 M/UL (ref 4.6–6.2)
SODIUM SERPL-SCNC: 126 MMOL/L (ref 136–145)
SODIUM SERPL-SCNC: 127 MMOL/L (ref 136–145)
SPHEROCYTES BLD QL SMEAR: ABNORMAL
WBC # BLD AUTO: 12.59 K/UL (ref 3.9–12.7)

## 2023-12-30 PROCEDURE — 20600001 HC STEP DOWN PRIVATE ROOM

## 2023-12-30 PROCEDURE — 99233 SBSQ HOSP IP/OBS HIGH 50: CPT | Mod: ,,, | Performed by: INTERNAL MEDICINE

## 2023-12-30 PROCEDURE — 25000003 PHARM REV CODE 250: Performed by: STUDENT IN AN ORGANIZED HEALTH CARE EDUCATION/TRAINING PROGRAM

## 2023-12-30 PROCEDURE — 63600175 PHARM REV CODE 636 W HCPCS: Performed by: INTERNAL MEDICINE

## 2023-12-30 PROCEDURE — 93005 ELECTROCARDIOGRAM TRACING: CPT | Performed by: INTERNAL MEDICINE

## 2023-12-30 PROCEDURE — 80069 RENAL FUNCTION PANEL: CPT | Performed by: STUDENT IN AN ORGANIZED HEALTH CARE EDUCATION/TRAINING PROGRAM

## 2023-12-30 PROCEDURE — 25000003 PHARM REV CODE 250

## 2023-12-30 PROCEDURE — 85025 COMPLETE CBC W/AUTO DIFF WBC: CPT | Performed by: STUDENT IN AN ORGANIZED HEALTH CARE EDUCATION/TRAINING PROGRAM

## 2023-12-30 PROCEDURE — 93005 ELECTROCARDIOGRAM TRACING: CPT

## 2023-12-30 PROCEDURE — 63600175 PHARM REV CODE 636 W HCPCS

## 2023-12-30 PROCEDURE — 25000003 PHARM REV CODE 250: Performed by: INTERNAL MEDICINE

## 2023-12-30 PROCEDURE — 93010 ELECTROCARDIOGRAM REPORT: CPT | Mod: ,,, | Performed by: INTERNAL MEDICINE

## 2023-12-30 RX ORDER — MIRTAZAPINE 15 MG/1
15 TABLET, ORALLY DISINTEGRATING ORAL NIGHTLY
Status: DISCONTINUED | OUTPATIENT
Start: 2023-12-30 | End: 2024-01-08 | Stop reason: HOSPADM

## 2023-12-30 RX ORDER — POTASSIUM CHLORIDE 20 MEQ/1
40 TABLET, EXTENDED RELEASE ORAL ONCE
Status: COMPLETED | OUTPATIENT
Start: 2023-12-30 | End: 2023-12-30

## 2023-12-30 RX ORDER — ALUMINUM HYDROXIDE, MAGNESIUM HYDROXIDE, AND SIMETHICONE 2400; 240; 2400 MG/30ML; MG/30ML; MG/30ML
30 SUSPENSION ORAL ONCE
Status: COMPLETED | OUTPATIENT
Start: 2023-12-30 | End: 2023-12-30

## 2023-12-30 RX ORDER — SODIUM CHLORIDE 9 MG/ML
INJECTION, SOLUTION INTRAVENOUS CONTINUOUS
Status: ACTIVE | OUTPATIENT
Start: 2023-12-30 | End: 2023-12-30

## 2023-12-30 RX ADMIN — MIRTAZAPINE 15 MG: 15 TABLET, ORALLY DISINTEGRATING ORAL at 08:12

## 2023-12-30 RX ADMIN — DAPTOMYCIN 600 MG: 500 INJECTION, POWDER, LYOPHILIZED, FOR SOLUTION INTRAVENOUS at 10:12

## 2023-12-30 RX ADMIN — AMIODARONE HYDROCHLORIDE 0.5 MG/MIN: 1.8 INJECTION, SOLUTION INTRAVENOUS at 06:12

## 2023-12-30 RX ADMIN — FAMOTIDINE 20 MG: 20 TABLET ORAL at 08:12

## 2023-12-30 RX ADMIN — AMIODARONE HYDROCHLORIDE 0.5 MG/MIN: 1.8 INJECTION, SOLUTION INTRAVENOUS at 04:12

## 2023-12-30 RX ADMIN — SPIRONOLACTONE 25 MG: 25 TABLET ORAL at 08:12

## 2023-12-30 RX ADMIN — ALUMINUM HYDROXIDE, MAGNESIUM HYDROXIDE, AND DIMETHICONE 30 ML: 400; 400; 40 SUSPENSION ORAL at 09:12

## 2023-12-30 RX ADMIN — DAPAGLIFLOZIN 10 MG: 10 TABLET, FILM COATED ORAL at 10:12

## 2023-12-30 RX ADMIN — APIXABAN 5 MG: 5 TABLET, FILM COATED ORAL at 08:12

## 2023-12-30 RX ADMIN — TORSEMIDE 60 MG: 20 TABLET ORAL at 08:12

## 2023-12-30 RX ADMIN — POTASSIUM CHLORIDE 40 MEQ: 1500 TABLET, EXTENDED RELEASE ORAL at 06:12

## 2023-12-30 RX ADMIN — METOLAZONE 2.5 MG: 2.5 TABLET ORAL at 08:12

## 2023-12-30 NOTE — ASSESSMENT & PLAN NOTE
48 y.o. 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 Echo with EF 50-53% severe MR and severe LA dilation. Optimized rate control with metoprolol 25 TID. Increased acute phase reactants. S/p NEW on 12/26 with severe MR with 2 jets, including posterior jet likely secondary to tear/perforation.     Plan:  - cardiac diet   - Hold dapaglifozin 10 daily, spironolactone 25, metolazone 2.5 daily, torsemide 60 BID and will slowly restart diuretics once FRANCESCO resolves  - continue daptomycin x 2 weeks (Home Health) and follow with PO doxycyline until new valve replaced. ID recommended intraoperative tissue cx.  - blood cx with NGTD   - CTS will follow as outpatient    - ID will follow as outpatient  - transitional clinic on 01/02/04

## 2023-12-30 NOTE — ASSESSMENT & PLAN NOTE
S/p afib with RVR once admitted to the CCU.     -On amio ggt, after 24 hours can transition to oral   - Continue eliquis 5 BID  - continue metoprolol 25 TID for rate control   - monitor telemetry

## 2023-12-30 NOTE — PLAN OF CARE
Pt maintained free from falls/trauma/injuries and skin breakdown. Pt denied pain or discomfort. Plan of care reviewed. Pt verbalized understanding. All questions and concerns addressed. Will continue to monitor.  Problem: Adult Inpatient Plan of Care  Goal: Plan of Care Review  Outcome: Ongoing, Progressing  Goal: Patient-Specific Goal (Individualized)  Outcome: Ongoing, Progressing  Goal: Absence of Hospital-Acquired Illness or Injury  Outcome: Ongoing, Progressing  Goal: Optimal Comfort and Wellbeing  Outcome: Ongoing, Progressing  Goal: Readiness for Transition of Care  Outcome: Ongoing, Progressing     Problem: Adjustment to Illness (Sepsis/Septic Shock)  Goal: Optimal Coping  Outcome: Ongoing, Progressing     Problem: Bleeding (Sepsis/Septic Shock)  Goal: Absence of Bleeding  Outcome: Ongoing, Progressing     Problem: Glycemic Control Impaired (Sepsis/Septic Shock)  Goal: Blood Glucose Level Within Desired Range  Outcome: Ongoing, Progressing     Problem: Infection Progression (Sepsis/Septic Shock)  Goal: Absence of Infection Signs and Symptoms  Outcome: Ongoing, Progressing     Problem: Nutrition Impaired (Sepsis/Septic Shock)  Goal: Optimal Nutrition Intake  Outcome: Ongoing, Progressing     Problem: Fluid and Electrolyte Imbalance (Acute Kidney Injury/Impairment)  Goal: Fluid and Electrolyte Balance  Outcome: Ongoing, Progressing     Problem: Oral Intake Inadequate (Acute Kidney Injury/Impairment)  Goal: Optimal Nutrition Intake  Outcome: Ongoing, Progressing     Problem: Renal Function Impairment (Acute Kidney Injury/Impairment)  Goal: Effective Renal Function  Outcome: Ongoing, Progressing     Problem: Infection  Goal: Absence of Infection Signs and Symptoms  Outcome: Ongoing, Progressing     Problem: Fall Injury Risk  Goal: Absence of Fall and Fall-Related Injury  Outcome: Ongoing, Progressing     Problem: Skin Injury Risk Increased  Goal: Skin Health and Integrity  Outcome: Ongoing, Progressing

## 2023-12-30 NOTE — PROGRESS NOTES
[facility]  Cardiology  Progress Note    Patient Name: Lorenzo Nino  MRN: [id number]  Admission Date: 12/18/2023  Hospital Length of Stay: 12 days  Code Status: Full Code   Attending Physician: Emerson Mercado MD   Primary Care Physician: Terrie, Primary Doctor  Expected Discharge Date: 12/30/2023  Principal Problem:MRSA bacteremia    Subjective:     Hospital Course:   Mr. Lorenzo Nino is a 48 y.o. 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 Presented for 3-day dyspnea. Patient was transferred to the CICU for AHRF 2/2 significant pulmonary edema 2/2 severe mitral regurgitation concerning for anterior leaflet tear vs posterior leaflet restriction.  Upon admission to the CICU on afib w/ RVR. Given 1 dose of digoxin and started on heparin gtt and diltiazem gtt. Stabilized on sinus rhythm. CTS evaluated patient and agree on continuing aggressive diuresis with lasix gtt, at the moment not indicated IABP.. S/p NEW on 12/26 with severe MR with 2 jets, including posterior jet likely secondary to tear/perforation. On IV vancomycin. CTS to follow as outpatient (Dr. Ascencio).  ID plan with  daptomycin 8mg/kg x 2 weeks and follow with PO doxycyline until new valve replaced. ID recommended intraoperative tissue cx. Optimized diuresis for volume status. Off heparin gtt, started on eliquis. Patient started on GDMT but developed FRANCESCO    Interval History: Patient with newly developed FRANCESCO, he reports poor oral intake as well as some abdominal discomfort and describes it as stomachs feeling upset     Review of Systems   Constitutional: Positive for decreased appetite and malaise/fatigue.   Cardiovascular:  Positive for dyspnea on exertion.   Gastrointestinal:  Positive for heartburn.   All other systems reviewed and are negative.    Objective:     Vital Signs (Most Recent):  Temp: 98 °F (36.7 °C) (12/30/23 1125)  Pulse: 90 (12/30/23  1125)  Resp: 20 (12/30/23 1125)  BP: 109/80 (12/30/23 1125)  SpO2: 97 % (12/30/23 1125) Vital Signs (24h Range):  Temp:  [97.8 °F (36.6 °C)-98.1 °F (36.7 °C)] 98 °F (36.7 °C)  Pulse:  [] 90  Resp:  [18-20] 20  SpO2:  [97 %-99 %] 97 %  BP: ()/(51-80) 109/80     Weight: 70.4 kg (155 lb 3.3 oz)  Body mass index is 21.05 kg/m².     SpO2: 97 %         Intake/Output Summary (Last 24 hours) at 12/30/2023 1259  Last data filed at 12/30/2023 0800  Gross per 24 hour   Intake 1680 ml   Output 2450 ml   Net -770 ml       Lines/Drains/Airways       Peripherally Inserted Central Catheter Line  Duration             PICC Double Lumen 11/13/23 1509 right basilic 46 days              Peripheral Intravenous Line  Duration                  Peripheral IV - Single Lumen 12/27/23 1745 18 G Anterior;Left;Proximal Forearm 2 days                       Physical Exam  Constitutional:       Appearance: He is underweight.   HENT:      Mouth/Throat:      Mouth: Mucous membranes are dry.   Neck:      Vascular: No JVD.   Cardiovascular:      Rate and Rhythm: Normal rate and regular rhythm.      Pulses: Normal pulses.      Heart sounds: Murmur heard.   Pulmonary:      Effort: Pulmonary effort is normal.      Breath sounds: Normal breath sounds.   Abdominal:      General: Abdomen is flat.      Palpations: Abdomen is soft.   Musculoskeletal:      Right lower leg: No edema.      Left lower leg: No edema.   Neurological:      General: No focal deficit present.      Mental Status: He is alert and oriented to person, place, and time.            Significant Labs: All pertinent lab results from the last 24 hours have been reviewed.      Assessment and Plan:         * 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  48 y.o. male with a recent history of MRSA endocarditis s/p urgent mitral valve repair on 11/3/23 for acutely decompensated severe mitral regurgitation, presented with acute hypoxemic respiratory failure  and reported severe mitral regurgitation on bedside echocardiogram upon admission which showed severe MR, which has changed since post-op Echo performed 11/14/23 appears to be 2 jets (one posterior and one anterior), possible tear of anterior leaflet, posterior leaflet is restricted. Echo with EF 50-53% severe MR and severe LA dilation. Optimized rate control with metoprolol 25 TID. Increased acute phase reactants. S/p NEW on 12/26 with severe MR with 2 jets, including posterior jet likely secondary to tear/perforation.     Plan:  - cardiac diet   - Hold dapaglifozin 10 daily, spironolactone 25, metolazone 2.5 daily, torsemide 60 BID and will slowly restart diuretics once FRANCESCO resolves  - continue daptomycin x 2 weeks (Home Health) and follow with PO doxycyline until new valve replaced. ID recommended intraoperative tissue cx.  - blood cx with NGTD   - CTS will follow as outpatient    - ID will follow as outpatient  - transitional clinic on 01/02/04        A-fib with RVR  S/p afib with RVR once admitted to the CCU.     -On amio ggt, after 24 hours can transition to oral   - Continue eliquis 5 BID  - continue metoprolol 25 TID for rate control   - monitor telemetry    Endocarditis  See severe MR     FRANCESCO (acute kidney injury)  Patient with FRANCESCO in the setting of poor oral intake and diuretics  -Patient had already gotten diuretics this am, will give 250ml fluids  -Avoid nephrotoxic agents         VTE Risk Mitigation (From admission, onward)           Ordered     apixaban tablet 5 mg  2 times daily         12/29/23 2250                    Maura Martinez MD  Cardiology  [facility]

## 2023-12-30 NOTE — SUBJECTIVE & OBJECTIVE
Interval History: Patient with newly developed FRANCESCO, he reports poor oral intake as well as some abdominal discomfort and describes it as stomachs feeling upset     Review of Systems   Constitutional: Positive for decreased appetite and malaise/fatigue.   Cardiovascular:  Positive for dyspnea on exertion.   Gastrointestinal:  Positive for heartburn.   All other systems reviewed and are negative.    Objective:     Vital Signs (Most Recent):  Temp: 98 °F (36.7 °C) (12/30/23 1125)  Pulse: 90 (12/30/23 1125)  Resp: 20 (12/30/23 1125)  BP: 109/80 (12/30/23 1125)  SpO2: 97 % (12/30/23 1125) Vital Signs (24h Range):  Temp:  [97.8 °F (36.6 °C)-98.1 °F (36.7 °C)] 98 °F (36.7 °C)  Pulse:  [] 90  Resp:  [18-20] 20  SpO2:  [97 %-99 %] 97 %  BP: ()/(51-80) 109/80     Weight: 70.4 kg (155 lb 3.3 oz)  Body mass index is 21.05 kg/m².     SpO2: 97 %         Intake/Output Summary (Last 24 hours) at 12/30/2023 1259  Last data filed at 12/30/2023 0800  Gross per 24 hour   Intake 1680 ml   Output 2450 ml   Net -770 ml       Lines/Drains/Airways       Peripherally Inserted Central Catheter Line  Duration             PICC Double Lumen 11/13/23 1509 right basilic 46 days              Peripheral Intravenous Line  Duration                  Peripheral IV - Single Lumen 12/27/23 1745 18 G Anterior;Left;Proximal Forearm 2 days                       Physical Exam  Constitutional:       Appearance: He is underweight.   HENT:      Mouth/Throat:      Mouth: Mucous membranes are dry.   Neck:      Vascular: No JVD.   Cardiovascular:      Rate and Rhythm: Normal rate and regular rhythm.      Pulses: Normal pulses.      Heart sounds: Murmur heard.   Pulmonary:      Effort: Pulmonary effort is normal.      Breath sounds: Normal breath sounds.   Abdominal:      General: Abdomen is flat.      Palpations: Abdomen is soft.   Musculoskeletal:      Right lower leg: No edema.      Left lower leg: No edema.   Neurological:      General: No focal  deficit present.      Mental Status: He is alert and oriented to person, place, and time.            Significant Labs: All pertinent lab results from the last 24 hours have been reviewed.

## 2023-12-30 NOTE — ASSESSMENT & PLAN NOTE
Patient with FRANCESCO in the setting of poor oral intake and diuretics  -Patient had already gotten diuretics this am, will give 250ml fluids  -Avoid nephrotoxic agents

## 2023-12-31 LAB
ALBUMIN SERPL BCP-MCNC: 3.1 G/DL (ref 3.5–5.2)
ALBUMIN SERPL BCP-MCNC: 3.3 G/DL (ref 3.5–5.2)
ALBUMIN SERPL BCP-MCNC: 3.3 G/DL (ref 3.5–5.2)
ALP SERPL-CCNC: 87 U/L (ref 55–135)
ALT SERPL W/O P-5'-P-CCNC: 21 U/L (ref 10–44)
ANION GAP SERPL CALC-SCNC: 15 MMOL/L (ref 8–16)
ANION GAP SERPL CALC-SCNC: 16 MMOL/L (ref 8–16)
ANION GAP SERPL CALC-SCNC: 18 MMOL/L (ref 8–16)
AST SERPL-CCNC: 28 U/L (ref 10–40)
BASOPHILS # BLD AUTO: 0.17 K/UL (ref 0–0.2)
BASOPHILS NFR BLD: 1.3 % (ref 0–1.9)
BILIRUB SERPL-MCNC: 0.6 MG/DL (ref 0.1–1)
BUN SERPL-MCNC: 59 MG/DL (ref 6–20)
BUN SERPL-MCNC: 67 MG/DL (ref 6–20)
BUN SERPL-MCNC: 72 MG/DL (ref 6–20)
CALCIUM SERPL-MCNC: 10.3 MG/DL (ref 8.7–10.5)
CALCIUM SERPL-MCNC: 10.5 MG/DL (ref 8.7–10.5)
CALCIUM SERPL-MCNC: 10.8 MG/DL (ref 8.7–10.5)
CHLORIDE SERPL-SCNC: 78 MMOL/L (ref 95–110)
CHLORIDE SERPL-SCNC: 80 MMOL/L (ref 95–110)
CHLORIDE SERPL-SCNC: 83 MMOL/L (ref 95–110)
CO2 SERPL-SCNC: 29 MMOL/L (ref 23–29)
CO2 SERPL-SCNC: 30 MMOL/L (ref 23–29)
CO2 SERPL-SCNC: 31 MMOL/L (ref 23–29)
CREAT SERPL-MCNC: 2.1 MG/DL (ref 0.5–1.4)
CREAT SERPL-MCNC: 2.3 MG/DL (ref 0.5–1.4)
CREAT SERPL-MCNC: 2.4 MG/DL (ref 0.5–1.4)
CREAT UR-MCNC: 69 MG/DL (ref 23–375)
DIFFERENTIAL METHOD BLD: ABNORMAL
EOSINOPHIL # BLD AUTO: 0.2 K/UL (ref 0–0.5)
EOSINOPHIL NFR BLD: 1.1 % (ref 0–8)
ERYTHROCYTE [DISTWIDTH] IN BLOOD BY AUTOMATED COUNT: 18 % (ref 11.5–14.5)
EST. GFR  (NO RACE VARIABLE): 32.3 ML/MIN/1.73 M^2
EST. GFR  (NO RACE VARIABLE): 34 ML/MIN/1.73 M^2
EST. GFR  (NO RACE VARIABLE): 37.9 ML/MIN/1.73 M^2
GLUCOSE SERPL-MCNC: 128 MG/DL (ref 70–110)
GLUCOSE SERPL-MCNC: 131 MG/DL (ref 70–110)
GLUCOSE SERPL-MCNC: 93 MG/DL (ref 70–110)
HCT VFR BLD AUTO: 30.7 % (ref 40–54)
HGB BLD-MCNC: 10.2 G/DL (ref 14–18)
IMM GRANULOCYTES # BLD AUTO: 0.29 K/UL (ref 0–0.04)
IMM GRANULOCYTES NFR BLD AUTO: 2.1 % (ref 0–0.5)
LYMPHOCYTES # BLD AUTO: 1.7 K/UL (ref 1–4.8)
LYMPHOCYTES NFR BLD: 12.7 % (ref 18–48)
MAGNESIUM SERPL-MCNC: 2.6 MG/DL (ref 1.6–2.6)
MCH RBC QN AUTO: 25.8 PG (ref 27–31)
MCHC RBC AUTO-ENTMCNC: 33.2 G/DL (ref 32–36)
MCV RBC AUTO: 78 FL (ref 82–98)
MONOCYTES # BLD AUTO: 2.5 K/UL (ref 0.3–1)
MONOCYTES NFR BLD: 18.7 % (ref 4–15)
NEUTROPHILS # BLD AUTO: 8.7 K/UL (ref 1.8–7.7)
NEUTROPHILS NFR BLD: 64.1 % (ref 38–73)
NRBC BLD-RTO: 0 /100 WBC
OSMOLALITY SERPL: 291 MOSM/KG (ref 280–300)
OSMOLALITY UR: 353 MOSM/KG (ref 50–1200)
PHOSPHATE SERPL-MCNC: 5.8 MG/DL (ref 2.7–4.5)
PHOSPHATE SERPL-MCNC: 6.6 MG/DL (ref 2.7–4.5)
PLATELET # BLD AUTO: 550 K/UL (ref 150–450)
PMV BLD AUTO: 11.3 FL (ref 9.2–12.9)
POTASSIUM SERPL-SCNC: 3 MMOL/L (ref 3.5–5.1)
PROT SERPL-MCNC: 10.3 G/DL (ref 6–8.4)
RBC # BLD AUTO: 3.95 M/UL (ref 4.6–6.2)
SODIUM SERPL-SCNC: 125 MMOL/L (ref 136–145)
SODIUM SERPL-SCNC: 125 MMOL/L (ref 136–145)
SODIUM SERPL-SCNC: 130 MMOL/L (ref 136–145)
SODIUM UR-SCNC: 12 MMOL/L (ref 20–250)
WBC # BLD AUTO: 13.51 K/UL (ref 3.9–12.7)

## 2023-12-31 PROCEDURE — 94761 N-INVAS EAR/PLS OXIMETRY MLT: CPT

## 2023-12-31 PROCEDURE — 63600175 PHARM REV CODE 636 W HCPCS

## 2023-12-31 PROCEDURE — 63600175 PHARM REV CODE 636 W HCPCS: Performed by: STUDENT IN AN ORGANIZED HEALTH CARE EDUCATION/TRAINING PROGRAM

## 2023-12-31 PROCEDURE — 93010 ELECTROCARDIOGRAM REPORT: CPT | Mod: ,,, | Performed by: INTERNAL MEDICINE

## 2023-12-31 PROCEDURE — 84300 ASSAY OF URINE SODIUM: CPT | Performed by: STUDENT IN AN ORGANIZED HEALTH CARE EDUCATION/TRAINING PROGRAM

## 2023-12-31 PROCEDURE — 93005 ELECTROCARDIOGRAM TRACING: CPT | Performed by: INTERNAL MEDICINE

## 2023-12-31 PROCEDURE — 83935 ASSAY OF URINE OSMOLALITY: CPT | Performed by: STUDENT IN AN ORGANIZED HEALTH CARE EDUCATION/TRAINING PROGRAM

## 2023-12-31 PROCEDURE — 80069 RENAL FUNCTION PANEL: CPT | Performed by: STUDENT IN AN ORGANIZED HEALTH CARE EDUCATION/TRAINING PROGRAM

## 2023-12-31 PROCEDURE — 63600175 PHARM REV CODE 636 W HCPCS: Performed by: INTERNAL MEDICINE

## 2023-12-31 PROCEDURE — 82570 ASSAY OF URINE CREATININE: CPT | Performed by: STUDENT IN AN ORGANIZED HEALTH CARE EDUCATION/TRAINING PROGRAM

## 2023-12-31 PROCEDURE — 93005 ELECTROCARDIOGRAM TRACING: CPT

## 2023-12-31 PROCEDURE — 25000003 PHARM REV CODE 250: Performed by: STUDENT IN AN ORGANIZED HEALTH CARE EDUCATION/TRAINING PROGRAM

## 2023-12-31 PROCEDURE — 99233 SBSQ HOSP IP/OBS HIGH 50: CPT | Mod: ,,, | Performed by: INTERNAL MEDICINE

## 2023-12-31 PROCEDURE — 83930 ASSAY OF BLOOD OSMOLALITY: CPT | Performed by: STUDENT IN AN ORGANIZED HEALTH CARE EDUCATION/TRAINING PROGRAM

## 2023-12-31 PROCEDURE — 80069 RENAL FUNCTION PANEL: CPT | Mod: 91 | Performed by: STUDENT IN AN ORGANIZED HEALTH CARE EDUCATION/TRAINING PROGRAM

## 2023-12-31 PROCEDURE — 25000003 PHARM REV CODE 250: Performed by: INTERNAL MEDICINE

## 2023-12-31 PROCEDURE — 25000003 PHARM REV CODE 250

## 2023-12-31 PROCEDURE — 85025 COMPLETE CBC W/AUTO DIFF WBC: CPT | Performed by: STUDENT IN AN ORGANIZED HEALTH CARE EDUCATION/TRAINING PROGRAM

## 2023-12-31 PROCEDURE — 20600001 HC STEP DOWN PRIVATE ROOM

## 2023-12-31 PROCEDURE — 80053 COMPREHEN METABOLIC PANEL: CPT | Performed by: STUDENT IN AN ORGANIZED HEALTH CARE EDUCATION/TRAINING PROGRAM

## 2023-12-31 PROCEDURE — 83735 ASSAY OF MAGNESIUM: CPT | Performed by: STUDENT IN AN ORGANIZED HEALTH CARE EDUCATION/TRAINING PROGRAM

## 2023-12-31 RX ORDER — SODIUM CHLORIDE 9 MG/ML
INJECTION, SOLUTION INTRAVENOUS CONTINUOUS
Status: ACTIVE | OUTPATIENT
Start: 2023-12-31 | End: 2023-12-31

## 2023-12-31 RX ORDER — POTASSIUM CHLORIDE 20 MEQ/1
40 TABLET, EXTENDED RELEASE ORAL ONCE
Status: COMPLETED | OUTPATIENT
Start: 2023-12-31 | End: 2023-12-31

## 2023-12-31 RX ORDER — PANTOPRAZOLE SODIUM 40 MG/1
40 TABLET, DELAYED RELEASE ORAL DAILY
Status: DISCONTINUED | OUTPATIENT
Start: 2023-12-31 | End: 2024-01-08 | Stop reason: HOSPADM

## 2023-12-31 RX ORDER — POTASSIUM CHLORIDE 7.45 MG/ML
40 INJECTION INTRAVENOUS ONCE
Status: COMPLETED | OUTPATIENT
Start: 2023-12-31 | End: 2023-12-31

## 2023-12-31 RX ORDER — AMIODARONE HYDROCHLORIDE 200 MG/1
400 TABLET ORAL 2 TIMES DAILY
Status: DISCONTINUED | OUTPATIENT
Start: 2023-12-31 | End: 2024-01-08 | Stop reason: HOSPADM

## 2023-12-31 RX ORDER — FAMOTIDINE 20 MG/1
20 TABLET, FILM COATED ORAL DAILY
Status: DISCONTINUED | OUTPATIENT
Start: 2023-12-31 | End: 2024-01-01

## 2023-12-31 RX ADMIN — PANTOPRAZOLE SODIUM 40 MG: 40 TABLET, DELAYED RELEASE ORAL at 10:12

## 2023-12-31 RX ADMIN — AMIODARONE HYDROCHLORIDE 0.5 MG/MIN: 1.8 INJECTION, SOLUTION INTRAVENOUS at 04:12

## 2023-12-31 RX ADMIN — APIXABAN 5 MG: 5 TABLET, FILM COATED ORAL at 08:12

## 2023-12-31 RX ADMIN — FAMOTIDINE 20 MG: 20 TABLET, FILM COATED ORAL at 10:12

## 2023-12-31 RX ADMIN — POTASSIUM CHLORIDE 10 MEQ: 7.46 INJECTION, SOLUTION INTRAVENOUS at 06:12

## 2023-12-31 RX ADMIN — MIRTAZAPINE 15 MG: 15 TABLET, ORALLY DISINTEGRATING ORAL at 08:12

## 2023-12-31 RX ADMIN — DOCUSATE SODIUM 50MG AND SENNOSIDES 8.6MG 1 TABLET: 8.6; 5 TABLET, FILM COATED ORAL at 10:12

## 2023-12-31 RX ADMIN — SODIUM CHLORIDE: 9 INJECTION, SOLUTION INTRAVENOUS at 03:12

## 2023-12-31 RX ADMIN — AMIODARONE HYDROCHLORIDE 400 MG: 200 TABLET ORAL at 10:12

## 2023-12-31 RX ADMIN — AMIODARONE HYDROCHLORIDE 400 MG: 200 TABLET ORAL at 08:12

## 2023-12-31 RX ADMIN — APIXABAN 5 MG: 5 TABLET, FILM COATED ORAL at 10:12

## 2023-12-31 RX ADMIN — DAPTOMYCIN 600 MG: 500 INJECTION, POWDER, LYOPHILIZED, FOR SOLUTION INTRAVENOUS at 10:12

## 2023-12-31 RX ADMIN — POTASSIUM CHLORIDE 40 MEQ: 1500 TABLET, EXTENDED RELEASE ORAL at 06:12

## 2023-12-31 NOTE — NURSING
notified of patient potassium, new orders received. Also, notified of patient had small bloody stool, no new orders received. Follow up on patient Amiodarone will be completed with day team.

## 2023-12-31 NOTE — SUBJECTIVE & OBJECTIVE
Interval History: Patient with worsening FRANCESCO likely pre renal, holding diuretics and gdmt. Giving 250ml NS. Patient continues to have low appetite    Review of Systems   Constitutional: Positive for decreased appetite and malaise/fatigue.   Cardiovascular:  Positive for dyspnea on exertion. Negative for chest pain.   All other systems reviewed and are negative.    Objective:     Vital Signs (Most Recent):  Temp: 98 °F (36.7 °C) (12/31/23 1119)  Pulse: 90 (12/31/23 1119)  Resp: 20 (12/31/23 1119)  BP: 113/81 (12/31/23 1119)  SpO2: 99 % (12/31/23 1119) Vital Signs (24h Range):  Temp:  [97.9 °F (36.6 °C)-99.8 °F (37.7 °C)] 98 °F (36.7 °C)  Pulse:  [] 90  Resp:  [18-20] 20  SpO2:  [98 %-99 %] 99 %  BP: (107-113)/(72-81) 113/81     Weight: 70.4 kg (155 lb 3.3 oz)  Body mass index is 21.05 kg/m².     SpO2: 99 %         Intake/Output Summary (Last 24 hours) at 12/31/2023 1237  Last data filed at 12/31/2023 0033  Gross per 24 hour   Intake --   Output 600 ml   Net -600 ml       Lines/Drains/Airways       Peripherally Inserted Central Catheter Line  Duration             PICC Double Lumen 11/13/23 1509 right basilic 47 days              Peripheral Intravenous Line  Duration                  Peripheral IV - Single Lumen 12/27/23 1745 18 G Anterior;Left;Proximal Forearm 3 days                       Physical Exam  Constitutional:       Appearance: He is underweight.   HENT:      Head: Normocephalic.      Mouth/Throat:      Mouth: Mucous membranes are dry.   Cardiovascular:      Rate and Rhythm: Normal rate and regular rhythm.      Heart sounds: Murmur heard.   Abdominal:      General: Abdomen is flat.      Palpations: Abdomen is soft.   Musculoskeletal:      Right lower leg: No edema.      Left lower leg: No edema.   Skin:     General: Skin is warm.   Neurological:      General: No focal deficit present.      Mental Status: He is alert and oriented to person, place, and time. Mental status is at baseline.   Psychiatric:          Mood and Affect: Mood normal.         Behavior: Behavior normal.            Significant Labs: All pertinent lab results from the last 24 hours have been reviewed.

## 2023-12-31 NOTE — PROGRESS NOTES
[facility]  Cardiology  Progress Note    Patient Name: Lorenzo Nino  MRN: [id number]  Admission Date: 12/18/2023  Hospital Length of Stay: 13 days  Code Status: Full Code   Attending Physician: Emerson Mercado MD   Primary Care Physician: Terrie, Primary Doctor  Expected Discharge Date: 12/31/2023  Principal Problem:MRSA bacteremia    Subjective:     Hospital Course:   Mr. Lorenzo Nino is a 48 y.o. 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 Presented for 3-day dyspnea. Patient was transferred to the CICU for AHRF 2/2 significant pulmonary edema 2/2 severe mitral regurgitation concerning for anterior leaflet tear vs posterior leaflet restriction.  Upon admission to the CICU on afib w/ RVR. Given 1 dose of digoxin and started on heparin gtt and diltiazem gtt. Stabilized on sinus rhythm. CTS evaluated patient and agree on continuing aggressive diuresis with lasix gtt, at the moment not indicated IABP.. S/p NEW on 12/26 with severe MR with 2 jets, including posterior jet likely secondary to tear/perforation. On IV vancomycin. CTS to follow as outpatient (Dr. Ascencio).  ID plan with  daptomycin 8mg/kg x 2 weeks and follow with PO doxycyline until new valve replaced. ID recommended intraoperative tissue cx. Optimized diuresis for volume status. Off heparin gtt, started on eliquis. Patient started on GDMT but developed FRANCESCO    Interval History: Patient with worsening FRANCESCO likely pre renal, holding diuretics and gdmt. Giving 250ml NS. Patient continues to have low appetite    Review of Systems   Constitutional: Positive for decreased appetite and malaise/fatigue.   Cardiovascular:  Positive for dyspnea on exertion. Negative for chest pain.   All other systems reviewed and are negative.    Objective:     Vital Signs (Most Recent):  Temp: 98 °F (36.7 °C) (12/31/23 1119)  Pulse: 90 (12/31/23 1119)  Resp: 20 (12/31/23 1119)  BP:  113/81 (12/31/23 1119)  SpO2: 99 % (12/31/23 1119) Vital Signs (24h Range):  Temp:  [97.9 °F (36.6 °C)-99.8 °F (37.7 °C)] 98 °F (36.7 °C)  Pulse:  [] 90  Resp:  [18-20] 20  SpO2:  [98 %-99 %] 99 %  BP: (107-113)/(72-81) 113/81     Weight: 70.4 kg (155 lb 3.3 oz)  Body mass index is 21.05 kg/m².     SpO2: 99 %         Intake/Output Summary (Last 24 hours) at 12/31/2023 1237  Last data filed at 12/31/2023 0033  Gross per 24 hour   Intake --   Output 600 ml   Net -600 ml       Lines/Drains/Airways       Peripherally Inserted Central Catheter Line  Duration             PICC Double Lumen 11/13/23 1509 right basilic 47 days              Peripheral Intravenous Line  Duration                  Peripheral IV - Single Lumen 12/27/23 1745 18 G Anterior;Left;Proximal Forearm 3 days                       Physical Exam  Constitutional:       Appearance: He is underweight.   HENT:      Head: Normocephalic.      Mouth/Throat:      Mouth: Mucous membranes are dry.   Cardiovascular:      Rate and Rhythm: Normal rate and regular rhythm.      Heart sounds: Murmur heard.   Abdominal:      General: Abdomen is flat.      Palpations: Abdomen is soft.   Musculoskeletal:      Right lower leg: No edema.      Left lower leg: No edema.   Skin:     General: Skin is warm.   Neurological:      General: No focal deficit present.      Mental Status: He is alert and oriented to person, place, and time. Mental status is at baseline.   Psychiatric:         Mood and Affect: Mood normal.         Behavior: Behavior normal.            Significant Labs: All pertinent lab results from the last 24 hours have been reviewed.      Assessment and Plan:         * 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  48 y.o. male with a recent history of MRSA endocarditis s/p urgent mitral valve repair on 11/3/23 for acutely decompensated severe mitral regurgitation, presented with acute hypoxemic respiratory failure and reported  severe mitral regurgitation on bedside echocardiogram upon admission which showed severe MR, which has changed since post-op Echo performed 11/14/23 appears to be 2 jets (one posterior and one anterior), possible tear of anterior leaflet, posterior leaflet is restricted. Echo with EF 50-53% severe MR and severe LA dilation. Optimized rate control with metoprolol 25 TID. Increased acute phase reactants. S/p NEW on 12/26 with severe MR with 2 jets, including posterior jet likely secondary to tear/perforation.     Plan:  - cardiac diet   - Hold dapaglifozin 10 daily, spironolactone 25, metolazone 2.5 daily, torsemide 60 BID and will slowly restart diuretics once FRANCESCO resolves  - continue daptomycin x 2 weeks (Home Health) and follow with PO doxycyline until new valve replaced. ID recommended intraoperative tissue cx.  - blood cx with NGTD   - CTS will follow as outpatient    - ID will follow as outpatient  - transitional clinic on 01/02/04        HFmrEF    Holding bellow due to FRANCESCO   - dapaglifozin 10 daily   - spironolactone 25   - metolazone 2.5 daily   - torsemide 60 BID          A-fib with RVR  S/p afib with RVR once admitted to the CCU. Now NSR     -On amio 400mg po bid for 14 days then 200mg daily   - Continue eliquis 5 BID  - continue metoprolol 25 TID for rate control   - monitor telemetry    Endocarditis  See severe MR     FRANCESCO (acute kidney injury)  Patient with FRANCESCO in the setting of poor oral intake and diuretics  will give another  250ml fluids and encourage PO hydration  -Avoid nephrotoxic agents         VTE Risk Mitigation (From admission, onward)           Ordered     apixaban tablet 5 mg  2 times daily         12/29/23 3202                    Maura Martinez MD  Cardiology  [facility]

## 2023-12-31 NOTE — ASSESSMENT & PLAN NOTE
S/p afib with RVR once admitted to the CCU. Now NSR     -On amio 400mg po bid for 14 days then 200mg daily   - Continue eliquis 5 BID  - continue metoprolol 25 TID for rate control   - monitor telemetry

## 2023-12-31 NOTE — PLAN OF CARE
Pt maintained free from falls/trauma/injuries and skin breakdown. Pt denied pain or discomfort. Plan of care reviewed. Pt verbalized understanding. All questions and concerns addressed. Will continue to monitor.  Problem: Adult Inpatient Plan of Care  Goal: Plan of Care Review  12/30/2023 2005 by Arleen Lawrence RN  Outcome: Ongoing, Progressing  12/30/2023 0733 by Arleen Lawrence RN  Outcome: Ongoing, Progressing  Goal: Patient-Specific Goal (Individualized)  12/30/2023 2005 by Arleen Lawrence RN  Outcome: Ongoing, Progressing  12/30/2023 0733 by Arleen Lawrence RN  Outcome: Ongoing, Progressing  Goal: Absence of Hospital-Acquired Illness or Injury  12/30/2023 2005 by Arleen Lawrence RN  Outcome: Ongoing, Progressing  12/30/2023 0733 by Arleen Lawrence RN  Outcome: Ongoing, Progressing  Goal: Optimal Comfort and Wellbeing  12/30/2023 2005 by Arleen Lawrence RN  Outcome: Ongoing, Progressing  12/30/2023 0733 by Arleen Lawrence RN  Outcome: Ongoing, Progressing  Goal: Readiness for Transition of Care  12/30/2023 2005 by Arleen Lawrence RN  Outcome: Ongoing, Progressing  12/30/2023 0733 by Arleen Lawrence RN  Outcome: Ongoing, Progressing     Problem: Adjustment to Illness (Sepsis/Septic Shock)  Goal: Optimal Coping  12/30/2023 2005 by Arleen Lawrence RN  Outcome: Ongoing, Progressing  12/30/2023 0733 by Arleen Lawrence RN  Outcome: Ongoing, Progressing     Problem: Bleeding (Sepsis/Septic Shock)  Goal: Absence of Bleeding  12/30/2023 2005 by Arleen Lawrence RN  Outcome: Ongoing, Progressing  12/30/2023 0733 by Arleen Lawrence RN  Outcome: Ongoing, Progressing     Problem: Glycemic Control Impaired (Sepsis/Septic Shock)  Goal: Blood Glucose Level Within Desired Range  12/30/2023 2005 by Arleen Lawrence RN  Outcome: Ongoing, Progressing  12/30/2023 0733 by Arleen Lawrence RN  Outcome: Ongoing, Progressing     Problem: Infection Progression (Sepsis/Septic Shock)  Goal: Absence of Infection Signs and Symptoms  12/30/2023  2005 by Arleen Lawrence RN  Outcome: Ongoing, Progressing  12/30/2023 0733 by Arleen Lawrence RN  Outcome: Ongoing, Progressing     Problem: Nutrition Impaired (Sepsis/Septic Shock)  Goal: Optimal Nutrition Intake  12/30/2023 2005 by Arleen Lawrence RN  Outcome: Ongoing, Progressing  12/30/2023 0733 by Arleen Lawrence RN  Outcome: Ongoing, Progressing     Problem: Fluid and Electrolyte Imbalance (Acute Kidney Injury/Impairment)  Goal: Fluid and Electrolyte Balance  12/30/2023 2005 by Arleen Lawrence RN  Outcome: Ongoing, Progressing  12/30/2023 0733 by Arleen Lawrence RN  Outcome: Ongoing, Progressing     Problem: Oral Intake Inadequate (Acute Kidney Injury/Impairment)  Goal: Optimal Nutrition Intake  12/30/2023 2005 by Arleen Lawrence RN  Outcome: Ongoing, Progressing  12/30/2023 0733 by Arleen Lawrence RN  Outcome: Ongoing, Progressing     Problem: Renal Function Impairment (Acute Kidney Injury/Impairment)  Goal: Effective Renal Function  12/30/2023 2005 by Arleen Lawrence RN  Outcome: Ongoing, Progressing  12/30/2023 0733 by Arleen Lawrence RN  Outcome: Ongoing, Progressing     Problem: Infection  Goal: Absence of Infection Signs and Symptoms  12/30/2023 2005 by Arleen Lawrence RN  Outcome: Ongoing, Progressing  12/30/2023 0733 by Arleen Lawrence RN  Outcome: Ongoing, Progressing     Problem: Fall Injury Risk  Goal: Absence of Fall and Fall-Related Injury  12/30/2023 2005 by Arleen Lawrence RN  Outcome: Ongoing, Progressing  12/30/2023 0733 by Arleen Lawrence RN  Outcome: Ongoing, Progressing     Problem: Skin Injury Risk Increased  Goal: Skin Health and Integrity  12/30/2023 2005 by Arleen Lawrence RN  Outcome: Ongoing, Progressing  12/30/2023 0733 by Arleen Lawrence RN  Outcome: Ongoing, Progressing

## 2023-12-31 NOTE — ASSESSMENT & PLAN NOTE
Patient with FRANCESCO in the setting of poor oral intake and diuretics  will give another  250ml fluids and encourage PO hydration  -Avoid nephrotoxic agents

## 2023-12-31 NOTE — PROGRESS NOTES
Pharmacist Renal Dose Adjustment Note    Lorenzo Nino is a 48 y.o. male being treated with the medication famotidine     Patient Data:    Vital Signs (Most Recent):  Temp: 97.9 °F (36.6 °C) (12/31/23 0721)  Pulse: 92 (12/31/23 0721)  Resp: 20 (12/31/23 0721)  BP: 107/73 (12/31/23 0721)  SpO2: 98 % (12/31/23 0721) Vital Signs (72h Range):  Temp:  [97.2 °F (36.2 °C)-99.8 °F (37.7 °C)]   Pulse:  []   Resp:  [17-25]   BP: ()/(50-80)   SpO2:  [97 %-99 %]      Recent Labs   Lab 12/30/23  0639 12/30/23  1500 12/31/23 0438   CREATININE 1.8* 2.0* 2.3*  2.4*     Serum creatinine: 2.4 mg/dL (H) 12/31/23 0438  Estimated creatinine clearance: 37.1 mL/min (A)    Per crcl, decreasing famotidine to 20mg/day     Pharmacist's Name: Ck Alvarez  Pharmacist's Extension: [phone]

## 2023-12-31 NOTE — ASSESSMENT & PLAN NOTE
Holding bellow due to FRANCESCO   - dapaglifozin 10 daily   - spironolactone 25   - metolazone 2.5 daily   - torsemide 60 BID

## 2024-01-01 LAB
ALBUMIN SERPL BCP-MCNC: 3.1 G/DL (ref 3.5–5.2)
ALBUMIN SERPL BCP-MCNC: 3.2 G/DL (ref 3.5–5.2)
ALP SERPL-CCNC: 74 U/L (ref 55–135)
ALT SERPL W/O P-5'-P-CCNC: 19 U/L (ref 10–44)
ANION GAP SERPL CALC-SCNC: 14 MMOL/L (ref 8–16)
ANION GAP SERPL CALC-SCNC: 15 MMOL/L (ref 8–16)
AST SERPL-CCNC: 30 U/L (ref 10–40)
BACTERIA BLD CULT: NORMAL
BACTERIA BLD CULT: NORMAL
BASOPHILS # BLD AUTO: 0.14 K/UL (ref 0–0.2)
BASOPHILS NFR BLD: 1.4 % (ref 0–1.9)
BILIRUB SERPL-MCNC: 0.8 MG/DL (ref 0.1–1)
BUN SERPL-MCNC: 68 MG/DL (ref 6–20)
BUN SERPL-MCNC: 68 MG/DL (ref 6–20)
CALCIUM SERPL-MCNC: 10.1 MG/DL (ref 8.7–10.5)
CALCIUM SERPL-MCNC: 10.2 MG/DL (ref 8.7–10.5)
CHLORIDE SERPL-SCNC: 84 MMOL/L (ref 95–110)
CHLORIDE SERPL-SCNC: 84 MMOL/L (ref 95–110)
CO2 SERPL-SCNC: 29 MMOL/L (ref 23–29)
CO2 SERPL-SCNC: 29 MMOL/L (ref 23–29)
CREAT SERPL-MCNC: 1.8 MG/DL (ref 0.5–1.4)
CREAT SERPL-MCNC: 2 MG/DL (ref 0.5–1.4)
DIFFERENTIAL METHOD BLD: ABNORMAL
EOSINOPHIL # BLD AUTO: 0.1 K/UL (ref 0–0.5)
EOSINOPHIL NFR BLD: 0.8 % (ref 0–8)
ERYTHROCYTE [DISTWIDTH] IN BLOOD BY AUTOMATED COUNT: 18 % (ref 11.5–14.5)
EST. GFR  (NO RACE VARIABLE): 40.2 ML/MIN/1.73 M^2
EST. GFR  (NO RACE VARIABLE): 45.6 ML/MIN/1.73 M^2
GLUCOSE SERPL-MCNC: 112 MG/DL (ref 70–110)
GLUCOSE SERPL-MCNC: 116 MG/DL (ref 70–110)
HCT VFR BLD AUTO: 30.3 % (ref 40–54)
HGB BLD-MCNC: 9.6 G/DL (ref 14–18)
IMM GRANULOCYTES # BLD AUTO: 0.2 K/UL (ref 0–0.04)
IMM GRANULOCYTES NFR BLD AUTO: 2.1 % (ref 0–0.5)
LYMPHOCYTES # BLD AUTO: 2.1 K/UL (ref 1–4.8)
LYMPHOCYTES NFR BLD: 21.6 % (ref 18–48)
MAGNESIUM SERPL-MCNC: 2.4 MG/DL (ref 1.6–2.6)
MCH RBC QN AUTO: 25.1 PG (ref 27–31)
MCHC RBC AUTO-ENTMCNC: 31.7 G/DL (ref 32–36)
MCV RBC AUTO: 79 FL (ref 82–98)
MONOCYTES # BLD AUTO: 1.6 K/UL (ref 0.3–1)
MONOCYTES NFR BLD: 16.9 % (ref 4–15)
NEUTROPHILS # BLD AUTO: 5.5 K/UL (ref 1.8–7.7)
NEUTROPHILS NFR BLD: 57.2 % (ref 38–73)
NRBC BLD-RTO: 0 /100 WBC
PHOSPHATE SERPL-MCNC: 4.4 MG/DL (ref 2.7–4.5)
PLATELET # BLD AUTO: 493 K/UL (ref 150–450)
PMV BLD AUTO: 10.7 FL (ref 9.2–12.9)
POTASSIUM SERPL-SCNC: 3 MMOL/L (ref 3.5–5.1)
POTASSIUM SERPL-SCNC: 3 MMOL/L (ref 3.5–5.1)
PROT SERPL-MCNC: 9.7 G/DL (ref 6–8.4)
RBC # BLD AUTO: 3.82 M/UL (ref 4.6–6.2)
SODIUM SERPL-SCNC: 127 MMOL/L (ref 136–145)
SODIUM SERPL-SCNC: 128 MMOL/L (ref 136–145)
WBC # BLD AUTO: 9.7 K/UL (ref 3.9–12.7)

## 2024-01-01 PROCEDURE — 20600001 HC STEP DOWN PRIVATE ROOM

## 2024-01-01 PROCEDURE — 99233 SBSQ HOSP IP/OBS HIGH 50: CPT | Mod: ,,, | Performed by: INTERNAL MEDICINE

## 2024-01-01 PROCEDURE — 85025 COMPLETE CBC W/AUTO DIFF WBC: CPT | Performed by: STUDENT IN AN ORGANIZED HEALTH CARE EDUCATION/TRAINING PROGRAM

## 2024-01-01 PROCEDURE — 25000003 PHARM REV CODE 250: Performed by: INTERNAL MEDICINE

## 2024-01-01 PROCEDURE — 83735 ASSAY OF MAGNESIUM: CPT | Performed by: STUDENT IN AN ORGANIZED HEALTH CARE EDUCATION/TRAINING PROGRAM

## 2024-01-01 PROCEDURE — 25000003 PHARM REV CODE 250

## 2024-01-01 PROCEDURE — 80069 RENAL FUNCTION PANEL: CPT | Performed by: STUDENT IN AN ORGANIZED HEALTH CARE EDUCATION/TRAINING PROGRAM

## 2024-01-01 PROCEDURE — 80053 COMPREHEN METABOLIC PANEL: CPT | Performed by: STUDENT IN AN ORGANIZED HEALTH CARE EDUCATION/TRAINING PROGRAM

## 2024-01-01 PROCEDURE — 63600175 PHARM REV CODE 636 W HCPCS: Performed by: STUDENT IN AN ORGANIZED HEALTH CARE EDUCATION/TRAINING PROGRAM

## 2024-01-01 PROCEDURE — 63600175 PHARM REV CODE 636 W HCPCS: Performed by: INTERNAL MEDICINE

## 2024-01-01 PROCEDURE — 93005 ELECTROCARDIOGRAM TRACING: CPT

## 2024-01-01 PROCEDURE — 93010 ELECTROCARDIOGRAM REPORT: CPT | Mod: ,,, | Performed by: INTERNAL MEDICINE

## 2024-01-01 PROCEDURE — 93005 ELECTROCARDIOGRAM TRACING: CPT | Performed by: INTERNAL MEDICINE

## 2024-01-01 PROCEDURE — 25000003 PHARM REV CODE 250: Performed by: STUDENT IN AN ORGANIZED HEALTH CARE EDUCATION/TRAINING PROGRAM

## 2024-01-01 RX ORDER — POTASSIUM CHLORIDE 7.45 MG/ML
10 INJECTION INTRAVENOUS
Status: COMPLETED | OUTPATIENT
Start: 2024-01-01 | End: 2024-01-01

## 2024-01-01 RX ORDER — POTASSIUM CHLORIDE 20 MEQ/1
20 TABLET, EXTENDED RELEASE ORAL ONCE
Status: COMPLETED | OUTPATIENT
Start: 2024-01-01 | End: 2024-01-01

## 2024-01-01 RX ORDER — METOPROLOL SUCCINATE 25 MG/1
25 TABLET, EXTENDED RELEASE ORAL DAILY
Status: DISCONTINUED | OUTPATIENT
Start: 2024-01-01 | End: 2024-01-06

## 2024-01-01 RX ADMIN — AMIODARONE HYDROCHLORIDE 400 MG: 200 TABLET ORAL at 09:01

## 2024-01-01 RX ADMIN — PANTOPRAZOLE SODIUM 40 MG: 40 TABLET, DELAYED RELEASE ORAL at 09:01

## 2024-01-01 RX ADMIN — POTASSIUM BICARBONATE 40 MEQ: 391 TABLET, EFFERVESCENT ORAL at 09:01

## 2024-01-01 RX ADMIN — FAMOTIDINE 20 MG: 20 TABLET, FILM COATED ORAL at 09:01

## 2024-01-01 RX ADMIN — POTASSIUM CHLORIDE 10 MEQ: 7.46 INJECTION, SOLUTION INTRAVENOUS at 07:01

## 2024-01-01 RX ADMIN — POTASSIUM CHLORIDE 10 MEQ: 7.46 INJECTION, SOLUTION INTRAVENOUS at 10:01

## 2024-01-01 RX ADMIN — METOPROLOL SUCCINATE 25 MG: 25 TABLET, EXTENDED RELEASE ORAL at 12:01

## 2024-01-01 RX ADMIN — MIRTAZAPINE 15 MG: 15 TABLET, ORALLY DISINTEGRATING ORAL at 08:01

## 2024-01-01 RX ADMIN — APIXABAN 5 MG: 5 TABLET, FILM COATED ORAL at 08:01

## 2024-01-01 RX ADMIN — AMIODARONE HYDROCHLORIDE 400 MG: 200 TABLET ORAL at 08:01

## 2024-01-01 RX ADMIN — POTASSIUM CHLORIDE 10 MEQ: 7.46 INJECTION, SOLUTION INTRAVENOUS at 08:01

## 2024-01-01 RX ADMIN — DAPTOMYCIN 600 MG: 500 INJECTION, POWDER, LYOPHILIZED, FOR SOLUTION INTRAVENOUS at 11:01

## 2024-01-01 RX ADMIN — POTASSIUM CHLORIDE 10 MEQ: 7.46 INJECTION, SOLUTION INTRAVENOUS at 09:01

## 2024-01-01 RX ADMIN — POTASSIUM CHLORIDE 20 MEQ: 1500 TABLET, EXTENDED RELEASE ORAL at 07:01

## 2024-01-01 RX ADMIN — APIXABAN 5 MG: 5 TABLET, FILM COATED ORAL at 09:01

## 2024-01-01 NOTE — SUBJECTIVE & OBJECTIVE
Interval History: Patient reports trying to eat and drink more but continues to endorse lack of appetite. Cr with mild improvement. Will continue to hold diuretics today. Had some streaks of blood on stool but hemoglobin stable     Review of Systems   Constitutional: Positive for decreased appetite and malaise/fatigue.   Cardiovascular:  Negative for chest pain, claudication, dyspnea on exertion and leg swelling.   Respiratory:  Negative for cough and shortness of breath.      Objective:     Vital Signs (Most Recent):  Temp: 99.1 °F (37.3 °C) (01/01/24 1118)  Pulse: 87 (01/01/24 1127)  Resp: 18 (01/01/24 1118)  BP: 117/77 (01/01/24 1118)  SpO2: 100 % (01/01/24 1118) Vital Signs (24h Range):  Temp:  [97.8 °F (36.6 °C)-99.1 °F (37.3 °C)] 99.1 °F (37.3 °C)  Pulse:  [] 87  Resp:  [17-20] 18  SpO2:  [98 %-100 %] 100 %  BP: (100-117)/(64-77) 117/77     Weight: 70.4 kg (155 lb 3.3 oz)  Body mass index is 21.05 kg/m².     SpO2: 100 %         Intake/Output Summary (Last 24 hours) at 1/1/2024 1136  Last data filed at 1/1/2024 0125  Gross per 24 hour   Intake 300 ml   Output 1200 ml   Net -900 ml       Lines/Drains/Airways       Peripherally Inserted Central Catheter Line  Duration             PICC Double Lumen 11/13/23 1509 right basilic 48 days              Peripheral Intravenous Line  Duration                  Peripheral IV - Single Lumen 12/27/23 1745 18 G Anterior;Left;Proximal Forearm 4 days                       Physical Exam  Constitutional:       Appearance: Normal appearance.   Cardiovascular:      Rate and Rhythm: Normal rate and regular rhythm.      Pulses: Normal pulses.      Heart sounds: Normal heart sounds.   Pulmonary:      Effort: Pulmonary effort is normal.      Breath sounds: Normal breath sounds.   Abdominal:      General: Abdomen is flat.      Palpations: Abdomen is soft.   Musculoskeletal:      Right lower leg: No edema.      Left lower leg: No edema.   Skin:     General: Skin is warm.    Neurological:      General: No focal deficit present.      Mental Status: He is alert and oriented to person, place, and time.            Significant Labs: All pertinent lab results from the last 24 hours have been reviewed.    Significant Imaging: All pertinent imaging results from the last 24 hours have been reviewed.

## 2024-01-01 NOTE — ASSESSMENT & PLAN NOTE
Patient with FRANCESCO in the setting of poor oral intake and diuretics  -Encourage PO hydration  -Avoid nephrotoxic agents

## 2024-01-01 NOTE — ASSESSMENT & PLAN NOTE
S/p afib with RVR once admitted to the CCU. Now NSR     -On amio 400mg po bid for 14 days then 200mg daily   - Continue eliquis 5 BID  - continue metoprolol 25 mg daily   - monitor telemetry

## 2024-01-01 NOTE — PROGRESS NOTES
[facility]  Cardiology  Progress Note    Patient Name: Lorenzo Nino  MRN: [id number]  Admission Date: 12/18/2023  Hospital Length of Stay: 14 days  Code Status: Full Code   Attending Physician: Emerson Mercado MD   Primary Care Physician: Terrie, Primary Doctor  Expected Discharge Date: 12/31/2023  Principal Problem:MRSA bacteremia    Subjective:     Hospital Course:   Mr. Lorenzo Nino is a 48 y.o. 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 Presented for 3-day dyspnea. Patient was transferred to the CICU for AHRF 2/2 significant pulmonary edema 2/2 severe mitral regurgitation concerning for anterior leaflet tear vs posterior leaflet restriction.  Upon admission to the CICU on afib w/ RVR. Given 1 dose of digoxin and started on heparin gtt and diltiazem gtt. Stabilized on sinus rhythm. CTS evaluated patient and agree on continuing aggressive diuresis with lasix gtt, at the moment not indicated IABP.. S/p NEW on 12/26 with severe MR with 2 jets, including posterior jet likely secondary to tear/perforation. On IV vancomycin. CTS to follow as outpatient (Dr. Ascencio).  ID plan with  daptomycin 8mg/kg x 2 weeks and follow with PO doxycyline until new valve replaced. ID recommended intraoperative tissue cx. Optimized diuresis for volume status. Off heparin gtt, started on eliquis. Patient started on GDMT but developed FRANCESCO    Interval History: Patient reports trying to eat and drink more but continues to endorse lack of appetite. Cr with mild improvement. Will continue to hold diuretics today. Had some streaks of blood on stool but hemoglobin stable     Review of Systems   Constitutional: Positive for decreased appetite and malaise/fatigue.   Cardiovascular:  Negative for chest pain, claudication, dyspnea on exertion and leg swelling.   Respiratory:  Negative for cough and shortness of breath.      Objective:     Vital Signs  (Most Recent):  Temp: 99.1 °F (37.3 °C) (01/01/24 1118)  Pulse: 87 (01/01/24 1127)  Resp: 18 (01/01/24 1118)  BP: 117/77 (01/01/24 1118)  SpO2: 100 % (01/01/24 1118) Vital Signs (24h Range):  Temp:  [97.8 °F (36.6 °C)-99.1 °F (37.3 °C)] 99.1 °F (37.3 °C)  Pulse:  [] 87  Resp:  [17-20] 18  SpO2:  [98 %-100 %] 100 %  BP: (100-117)/(64-77) 117/77     Weight: 70.4 kg (155 lb 3.3 oz)  Body mass index is 21.05 kg/m².     SpO2: 100 %         Intake/Output Summary (Last 24 hours) at 1/1/2024 1136  Last data filed at 1/1/2024 0125  Gross per 24 hour   Intake 300 ml   Output 1200 ml   Net -900 ml       Lines/Drains/Airways       Peripherally Inserted Central Catheter Line  Duration             PICC Double Lumen 11/13/23 1509 right basilic 48 days              Peripheral Intravenous Line  Duration                  Peripheral IV - Single Lumen 12/27/23 1745 18 G Anterior;Left;Proximal Forearm 4 days                       Physical Exam  Constitutional:       Appearance: Normal appearance.   Cardiovascular:      Rate and Rhythm: Normal rate and regular rhythm.      Pulses: Normal pulses.      Heart sounds: Normal heart sounds.   Pulmonary:      Effort: Pulmonary effort is normal.      Breath sounds: Normal breath sounds.   Abdominal:      General: Abdomen is flat.      Palpations: Abdomen is soft.   Musculoskeletal:      Right lower leg: No edema.      Left lower leg: No edema.   Skin:     General: Skin is warm.   Neurological:      General: No focal deficit present.      Mental Status: He is alert and oriented to person, place, and time.            Significant Labs: All pertinent lab results from the last 24 hours have been reviewed.    Significant Imaging: All pertinent imaging results from the last 24 hours have been reviewed.  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 male with a recent history of MRSA endocarditis s/p urgent mitral valve repair  on 11/3/23 for acutely decompensated severe mitral regurgitation, presented with acute hypoxemic respiratory failure and reported severe mitral regurgitation on bedside echocardiogram upon admission which showed severe MR, which has changed since post-op Echo performed 11/14/23 appears to be 2 jets (one posterior and one anterior), possible tear of anterior leaflet, posterior leaflet is restricted. Echo with EF 50-53% severe MR and severe LA dilation. Optimized rate control with metoprolol 25 TID. Increased acute phase reactants. S/p NEW on 12/26 with severe MR with 2 jets, including posterior jet likely secondary to tear/perforation.     Plan:  - cardiac diet   - Hold dapaglifozin 10 daily, spironolactone 25, metolazone 2.5 daily, torsemide 60 BID and will slowly restart diuretics once FRANCESCO resolves  - continue daptomycin x 2 weeks (Home Health) and follow with PO doxycyline until new valve replaced. ID recommended intraoperative tissue cx.  - blood cx with NGTD   - CTS will follow as outpatient    - ID will follow as outpatient  - transitional clinic on 01/02/04      HFmrEF  Holding bellow due to FRANCESCO   - dapaglifozin 10 daily   - spironolactone 25   - metolazone 2.5 daily   - torsemide 60 BID      A-fib with RVR  S/p afib with RVR once admitted to the CCU. Now NSR   -On amio 400mg po bid for 14 days then 200mg daily   - Continue eliquis 5 BID  - continue metoprolol 25 mg daily   - monitor telemetry    Endocarditis  See severe MR     FRANCESCO (acute kidney injury)  Patient with FRANCESCO in the setting of poor oral intake and diuretics  -Encourage PO hydration  -Avoid nephrotoxic agents         VTE Risk Mitigation (From admission, onward)           Ordered     apixaban tablet 5 mg  2 times daily         12/29/23 6946                    Maura Martinez MD  Cardiology  [facility]

## 2024-01-02 ENCOUNTER — DOCUMENTATION ONLY (OUTPATIENT)
Dept: CARDIOLOGY | Facility: CLINIC | Age: 49
End: 2024-01-02
Payer: COMMERCIAL

## 2024-01-02 DIAGNOSIS — R06.02 SOB (SHORTNESS OF BREATH): Primary | ICD-10-CM

## 2024-01-02 DIAGNOSIS — Z95.2 H/O MITRAL VALVE REPLACEMENT: Primary | ICD-10-CM

## 2024-01-02 LAB
ALBUMIN SERPL BCP-MCNC: 3 G/DL (ref 3.5–5.2)
ALP SERPL-CCNC: 71 U/L (ref 55–135)
ALT SERPL W/O P-5'-P-CCNC: 19 U/L (ref 10–44)
ANION GAP SERPL CALC-SCNC: 14 MMOL/L (ref 8–16)
AST SERPL-CCNC: 37 U/L (ref 10–40)
BASOPHILS # BLD AUTO: 0.11 K/UL (ref 0–0.2)
BASOPHILS NFR BLD: 1.3 % (ref 0–1.9)
BILIRUB SERPL-MCNC: 0.6 MG/DL (ref 0.1–1)
BUN SERPL-MCNC: 58 MG/DL (ref 6–20)
CALCIUM SERPL-MCNC: 9.9 MG/DL (ref 8.7–10.5)
CHLORIDE SERPL-SCNC: 88 MMOL/L (ref 95–110)
CO2 SERPL-SCNC: 27 MMOL/L (ref 23–29)
CREAT SERPL-MCNC: 1.5 MG/DL (ref 0.5–1.4)
DIFFERENTIAL METHOD BLD: ABNORMAL
EOSINOPHIL # BLD AUTO: 0.2 K/UL (ref 0–0.5)
EOSINOPHIL NFR BLD: 2.4 % (ref 0–8)
ERYTHROCYTE [DISTWIDTH] IN BLOOD BY AUTOMATED COUNT: 17.7 % (ref 11.5–14.5)
EST. GFR  (NO RACE VARIABLE): 56.7 ML/MIN/1.73 M^2
GLUCOSE SERPL-MCNC: 106 MG/DL (ref 70–110)
HCT VFR BLD AUTO: 28.1 % (ref 40–54)
HGB BLD-MCNC: 9 G/DL (ref 14–18)
IMM GRANULOCYTES # BLD AUTO: 0.16 K/UL (ref 0–0.04)
IMM GRANULOCYTES NFR BLD AUTO: 1.9 % (ref 0–0.5)
LYMPHOCYTES # BLD AUTO: 2.3 K/UL (ref 1–4.8)
LYMPHOCYTES NFR BLD: 26.6 % (ref 18–48)
MAGNESIUM SERPL-MCNC: 2.1 MG/DL (ref 1.6–2.6)
MCH RBC QN AUTO: 25.4 PG (ref 27–31)
MCHC RBC AUTO-ENTMCNC: 32 G/DL (ref 32–36)
MCV RBC AUTO: 79 FL (ref 82–98)
MONOCYTES # BLD AUTO: 1.2 K/UL (ref 0.3–1)
MONOCYTES NFR BLD: 14.2 % (ref 4–15)
NEUTROPHILS # BLD AUTO: 4.6 K/UL (ref 1.8–7.7)
NEUTROPHILS NFR BLD: 53.6 % (ref 38–73)
NRBC BLD-RTO: 0 /100 WBC
PLATELET # BLD AUTO: 463 K/UL (ref 150–450)
PMV BLD AUTO: 11.1 FL (ref 9.2–12.9)
POTASSIUM SERPL-SCNC: 3.7 MMOL/L (ref 3.5–5.1)
PROT SERPL-MCNC: 9 G/DL (ref 6–8.4)
RBC # BLD AUTO: 3.54 M/UL (ref 4.6–6.2)
SODIUM SERPL-SCNC: 129 MMOL/L (ref 136–145)
WBC # BLD AUTO: 8.61 K/UL (ref 3.9–12.7)

## 2024-01-02 PROCEDURE — 80053 COMPREHEN METABOLIC PANEL: CPT | Performed by: STUDENT IN AN ORGANIZED HEALTH CARE EDUCATION/TRAINING PROGRAM

## 2024-01-02 PROCEDURE — 25000003 PHARM REV CODE 250: Performed by: INTERNAL MEDICINE

## 2024-01-02 PROCEDURE — 83735 ASSAY OF MAGNESIUM: CPT | Performed by: STUDENT IN AN ORGANIZED HEALTH CARE EDUCATION/TRAINING PROGRAM

## 2024-01-02 PROCEDURE — 99233 SBSQ HOSP IP/OBS HIGH 50: CPT | Mod: ,,, | Performed by: INTERNAL MEDICINE

## 2024-01-02 PROCEDURE — 63600175 PHARM REV CODE 636 W HCPCS: Performed by: INTERNAL MEDICINE

## 2024-01-02 PROCEDURE — 20600001 HC STEP DOWN PRIVATE ROOM

## 2024-01-02 PROCEDURE — 36415 COLL VENOUS BLD VENIPUNCTURE: CPT | Performed by: STUDENT IN AN ORGANIZED HEALTH CARE EDUCATION/TRAINING PROGRAM

## 2024-01-02 PROCEDURE — 85025 COMPLETE CBC W/AUTO DIFF WBC: CPT | Performed by: STUDENT IN AN ORGANIZED HEALTH CARE EDUCATION/TRAINING PROGRAM

## 2024-01-02 PROCEDURE — 25000003 PHARM REV CODE 250: Performed by: STUDENT IN AN ORGANIZED HEALTH CARE EDUCATION/TRAINING PROGRAM

## 2024-01-02 PROCEDURE — 25000003 PHARM REV CODE 250

## 2024-01-02 RX ORDER — HYDRALAZINE HYDROCHLORIDE 10 MG/1
10 TABLET, FILM COATED ORAL EVERY 8 HOURS
Status: DISCONTINUED | OUTPATIENT
Start: 2024-01-02 | End: 2024-01-05

## 2024-01-02 RX ADMIN — APIXABAN 5 MG: 5 TABLET, FILM COATED ORAL at 08:01

## 2024-01-02 RX ADMIN — DAPTOMYCIN 600 MG: 500 INJECTION, POWDER, LYOPHILIZED, FOR SOLUTION INTRAVENOUS at 08:01

## 2024-01-02 RX ADMIN — METOPROLOL SUCCINATE 25 MG: 25 TABLET, EXTENDED RELEASE ORAL at 08:01

## 2024-01-02 RX ADMIN — HYDRALAZINE HYDROCHLORIDE 10 MG: 10 TABLET, FILM COATED ORAL at 04:01

## 2024-01-02 RX ADMIN — AMIODARONE HYDROCHLORIDE 400 MG: 200 TABLET ORAL at 08:01

## 2024-01-02 RX ADMIN — PANTOPRAZOLE SODIUM 40 MG: 40 TABLET, DELAYED RELEASE ORAL at 08:01

## 2024-01-02 RX ADMIN — MIRTAZAPINE 15 MG: 15 TABLET, ORALLY DISINTEGRATING ORAL at 08:01

## 2024-01-02 NOTE — PROGRESS NOTES
"[facility]) nurse navigator notified of [facility] candidate in need of education and introduction to 4-6 week program.      PT aao x 3 while lying in bed. Introduced self to pt as [facility] nurse navigator.     Patient given "[facility] Home Care Guide" which includes "Daily weight and symptom tracker".  Encouraged pt to review information.      Reviewed the following key points of [facility] program with PT :              1.) Take your medications as directed. Call Ms Neva if any health Care Professional changes your Heart/Fluid medications.               2.) Weight yourself daily. Daily Dry Weights. Upon waking ,empty your bladder, weigh with as little clothes as possible before eating/drinking. Record weight in Symptom Tracker and compare these weights for fluid gain. Weight gain overnight of 3-4 lbs is Fluid, also a gain of 5 lbs in 3 days is Fluid as well. Call US!              3.) Follow low salt and limited fluid diet. Salt/Sodium Restriction 7972-7907 mg, see page 4. Sodium makes you hold onto Fluid. High Sodium Foods;Deli Meat/Cheese, Sausages/Hot Dogs, Fast Food/Restaurant Food, Anything in a box, bottle or can. Cook with Fresh or Frozen ingredients and use seasonings that are labeled NO SALT. Check Portion Sizes, Salt is reported for 1 portion. A can may contain 2-3 portions. Fluid Restriction 1.5 -2 Liters/Day, see page 6, measure all of your Fluid, the milk in your cereal, broth in your soup and ice cream because anything that melts at room temp is a liquid.              4.) Stop smoking and start exercising. Brisk walking is good, don't walk like you are going to the [employer] and DON"T WALK IN THE HEAT! Start low and increase as tolerated. Remember if you don't use it you lose it.              5.) Go to your appointments and call your team. Have your weight and BP/P ready when we call to do the Phone Check ins and call us if you have fluid gain or " questions         Pt reminded to follow Symptom tracker and to call at the onset of symptoms according to tracker.     Reviewed plan for follow up once discharged to include phone calls, in person and virtual visits to assist pt optimizing their heart failure medication regimen and encouraging healthy lifestyle modifications.  Reminded pt that program will assist them over the next 4-6 weeks and then patient will be transferred to long term care provider .  Reminded pt how to contact [facility] navigator via phone and or via [employer].     Pt instructed appointment will be printed on hospital discharge paperwork.     Pt also reminded RN will call 48-72 hours after discharge to check on them.     PT can verbalize read back of some of the  information given.  Encouraged pt to read over information often and contact team with any questions or concerns.    PT states he has a leaky valve, that's why he is in the Hospital. PT needs mid AM appts like 1000 or 1030.

## 2024-01-02 NOTE — PLAN OF CARE
Recommendations    1) Continue Cardiac IDDSI Level 6 diet  2) Continue boost breeze TID  3) RD following    Goals: Meet % een/epn by next RD f/u  Nutrition Goal Status: new  Communication of RD Recs: other (comment) (POC)

## 2024-01-02 NOTE — ASSESSMENT & PLAN NOTE
Current Cr 1.5, Baseline 1.0 on admission; Likely 2/2 poor PO intake and initiation of GDMT and diuretics    Plan:  - Will hold GDMT and diuretics until improvement of FRANCESCO  - Trend Cr/ Serial BMPs  - Strict I&Os, close monitoring of UOP  - Replace electrolytes PRN, goal K/Phos/Mg 4/3/2  - Avoid nephrotoxic agents when feasible (NSAIDs, ACEi/ARB, IV radiocontrast, gadolinium, etc.)  - Avoid Fleet's and Brown Bomb Enemas given their propensity to induce hypermagnesemia  - Renally dose all meds to eGFR  - Goal MAP > 65 mmHg  - No acute indications for RRT at this time

## 2024-01-02 NOTE — SUBJECTIVE & OBJECTIVE
Interval History: DESIREE. Reports improvement in his appetite, believes the Mirtazapine is helping. Minimum blood streaks seen in bowel movements this morning. Improvement in renal function. Still holding GDMT and diuretics. Adding Hydralazine for BP control.     Review of Systems   Constitutional: Positive for decreased appetite (Improving) and malaise/fatigue.   Cardiovascular:  Negative for chest pain, claudication, dyspnea on exertion and leg swelling.   Respiratory:  Negative for cough and shortness of breath.      Objective:     Vital Signs (Most Recent):  Temp: 97.7 °F (36.5 °C) (01/02/24 1130)  Pulse: 66 (01/02/24 1130)  Resp: 18 (01/02/24 1130)  BP: 101/67 (01/02/24 1130)  SpO2: 100 % (01/02/24 1130) Vital Signs (24h Range):  Temp:  [97.5 °F (36.4 °C)-99 °F (37.2 °C)] 97.7 °F (36.5 °C)  Pulse:  [66-79] 66  Resp:  [18-20] 18  SpO2:  [98 %-100 %] 100 %  BP: (100-109)/(58-73) 101/67     Weight: 70.4 kg (155 lb 3.3 oz)  Body mass index is 21.05 kg/m².     SpO2: 100 %         Intake/Output Summary (Last 24 hours) at 1/2/2024 1157  Last data filed at 1/2/2024 1059  Gross per 24 hour   Intake 1322 ml   Output 1810 ml   Net -488 ml         Lines/Drains/Airways       Peripherally Inserted Central Catheter Line  Duration             PICC Double Lumen 11/13/23 1509 right basilic 49 days              Peripheral Intravenous Line  Duration                  Peripheral IV - Single Lumen 12/27/23 1745 18 G Anterior;Left;Proximal Forearm 5 days                       Physical Exam  Constitutional:       Appearance: Normal appearance.   HENT:      Right Ear: External ear normal.      Left Ear: External ear normal.   Eyes:      General:         Right eye: No discharge.         Left eye: No discharge.      Extraocular Movements: Extraocular movements intact.   Cardiovascular:      Rate and Rhythm: Normal rate and regular rhythm.      Pulses: Normal pulses.      Heart sounds: Normal heart sounds.   Pulmonary:      Effort:  Pulmonary effort is normal.      Breath sounds: Normal breath sounds.      Comments: Shallow breaths  Abdominal:      General: Abdomen is flat.      Palpations: Abdomen is soft.   Musculoskeletal:      Right lower leg: No edema.      Left lower leg: No edema.   Skin:     General: Skin is warm.      Coloration: Skin is not jaundiced or pale.   Neurological:      General: No focal deficit present.      Mental Status: He is alert and oriented to person, place, and time.            Significant Labs: All pertinent lab results from the last 24 hours have been reviewed.    Significant Imaging: All pertinent imaging results from the last 24 hours have been reviewed.

## 2024-01-02 NOTE — ASSESSMENT & PLAN NOTE
48 y.o. 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 Echo with EF 50-53% severe MR and severe LA dilation. Optimized rate control with metoprolol 25 TID. Increased acute phase reactants. S/p NEW on 12/26 with severe MR with 2 jets, including posterior jet likely secondary to tear/perforation.     Plan:  - cardiac diet   - Hold dapaglifozin 10 daily, spironolactone 25, metolazone 2.5 daily, torsemide 60 BID and will slowly restart diuretics once FRANCESCO resolves  - continue daptomycin x 2 weeks (Home Health; started 12/30/23) and follow with PO doxycyline until new valve replaced. ID recommended intraoperative tissue cx.  - blood cx with NGTD   - CTS will follow as outpatient    - ID will follow as outpatient  - Will reach out to SW about transitional care clinic appt

## 2024-01-02 NOTE — ASSESSMENT & PLAN NOTE
Will continue to hold the following until improvement of renal function:   - dapaglifozin 10 daily   - spironolactone 25   - metolazone 2.5 daily   - torsemide 60 BID

## 2024-01-02 NOTE — SUBJECTIVE & OBJECTIVE
Interval History: DESIREE. Reports improvement in his appetite, believes the Mirtazapine is helping. Minimum blood streaks seen in bowel movements this morning. Improvement in renal function. Still currently holding GDMT and diuretics.     Review of Systems   Constitutional: Positive for decreased appetite (Improving) and malaise/fatigue.   Cardiovascular:  Negative for chest pain, claudication, dyspnea on exertion and leg swelling.   Respiratory:  Negative for cough and shortness of breath.      Objective:     Vital Signs (Most Recent):  Temp: 97.5 °F (36.4 °C) (01/02/24 0822)  Pulse: 67 (01/02/24 1105)  Resp: 18 (01/02/24 0822)  BP: 102/73 (01/02/24 0822)  SpO2: 100 % (01/02/24 0822) Vital Signs (24h Range):  Temp:  [97.5 °F (36.4 °C)-99 °F (37.2 °C)] 97.5 °F (36.4 °C)  Pulse:  [67-79] 67  Resp:  [18-20] 18  SpO2:  [98 %-100 %] 100 %  BP: (100-109)/(58-73) 102/73     Weight: 70.4 kg (155 lb 3.3 oz)  Body mass index is 21.05 kg/m².     SpO2: 100 %         Intake/Output Summary (Last 24 hours) at 1/2/2024 1127  Last data filed at 1/2/2024 1059  Gross per 24 hour   Intake 1322 ml   Output 1810 ml   Net -488 ml         Lines/Drains/Airways       Peripherally Inserted Central Catheter Line  Duration             PICC Double Lumen 11/13/23 1509 right basilic 49 days              Peripheral Intravenous Line  Duration                  Peripheral IV - Single Lumen 12/27/23 1745 18 G Anterior;Left;Proximal Forearm 5 days                       Physical Exam  Constitutional:       Appearance: Normal appearance.   HENT:      Right Ear: External ear normal.      Left Ear: External ear normal.   Eyes:      General:         Right eye: No discharge.         Left eye: No discharge.      Extraocular Movements: Extraocular movements intact.   Cardiovascular:      Rate and Rhythm: Normal rate and regular rhythm.      Pulses: Normal pulses.      Heart sounds: Normal heart sounds.   Pulmonary:      Effort: Pulmonary effort is normal.       Breath sounds: Normal breath sounds.      Comments: Shallow breaths  Abdominal:      General: Abdomen is flat.      Palpations: Abdomen is soft.   Musculoskeletal:      Right lower leg: No edema.      Left lower leg: No edema.   Skin:     General: Skin is warm.      Coloration: Skin is not jaundiced or pale.   Neurological:      General: No focal deficit present.      Mental Status: He is alert and oriented to person, place, and time.            Significant Labs: All pertinent lab results from the last 24 hours have been reviewed.    Significant Imaging: All pertinent imaging results from the last 24 hours have been reviewed.

## 2024-01-02 NOTE — PROGRESS NOTES
[facility]  Cardiology  Progress Note    Patient Name: Lorenzo Nino  MRN: [id number]  Admission Date: 12/18/2023  Hospital Length of Stay: 15 days  Code Status: Full Code   Attending Physician: Emerson Mercado MD   Primary Care Physician: Terrie, Primary Doctor  Expected Discharge Date: 1/3/2024  Principal Problem:MRSA bacteremia    Subjective:     Hospital Course:   Mr. Lorenzo Nino is a 48 y.o. 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 Presented for 3-day dyspnea. Patient was transferred to the CICU for AHRF 2/2 significant pulmonary edema 2/2 severe mitral regurgitation concerning for anterior leaflet tear vs posterior leaflet restriction.  Upon admission to the CICU on afib w/ RVR. Given 1 dose of digoxin and started on heparin gtt and diltiazem gtt. Stabilized on sinus rhythm. CTS evaluated patient and agree on continuing aggressive diuresis with lasix gtt, at the moment not indicated IABP.. S/p NEW on 12/26 with severe MR with 2 jets, including posterior jet likely secondary to tear/perforation. On IV vancomycin. CTS to follow as outpatient (Dr. Ascencio).  ID plan with  daptomycin 8mg/kg x 2 weeks and follow with PO doxycyline until new valve replaced. ID recommended intraoperative tissue cx. Optimized diuresis for volume status. Off heparin gtt, started on eliquis. Patient started on GDMT but developed FRANCESCO    Interval History: NAEON. Reports improvement in his appetite, believes the Mirtazapine is helping. Minimum blood streaks seen in bowel movements this morning. Improvement in renal function. Still holding GDMT and diuretics. Adding Hydralazine for BP control.     Review of Systems   Constitutional: Positive for decreased appetite (Improving) and malaise/fatigue.   Cardiovascular:  Negative for chest pain, claudication, dyspnea on exertion and leg swelling.   Respiratory:  Negative for cough and shortness of  breath.      Objective:     Vital Signs (Most Recent):  Temp: 97.7 °F (36.5 °C) (01/02/24 1130)  Pulse: 66 (01/02/24 1130)  Resp: 18 (01/02/24 1130)  BP: 101/67 (01/02/24 1130)  SpO2: 100 % (01/02/24 1130) Vital Signs (24h Range):  Temp:  [97.5 °F (36.4 °C)-99 °F (37.2 °C)] 97.7 °F (36.5 °C)  Pulse:  [66-79] 66  Resp:  [18-20] 18  SpO2:  [98 %-100 %] 100 %  BP: (100-109)/(58-73) 101/67     Weight: 70.4 kg (155 lb 3.3 oz)  Body mass index is 21.05 kg/m².     SpO2: 100 %         Intake/Output Summary (Last 24 hours) at 1/2/2024 1157  Last data filed at 1/2/2024 1059  Gross per 24 hour   Intake 1322 ml   Output 1810 ml   Net -488 ml         Lines/Drains/Airways       Peripherally Inserted Central Catheter Line  Duration             PICC Double Lumen 11/13/23 1509 right basilic 49 days              Peripheral Intravenous Line  Duration                  Peripheral IV - Single Lumen 12/27/23 1745 18 G Anterior;Left;Proximal Forearm 5 days                       Physical Exam  Constitutional:       Appearance: Normal appearance.   HENT:      Right Ear: External ear normal.      Left Ear: External ear normal.   Eyes:      General:         Right eye: No discharge.         Left eye: No discharge.      Extraocular Movements: Extraocular movements intact.   Cardiovascular:      Rate and Rhythm: Normal rate and regular rhythm.      Pulses: Normal pulses.      Heart sounds: Normal heart sounds.   Pulmonary:      Effort: Pulmonary effort is normal.      Breath sounds: Normal breath sounds.      Comments: Shallow breaths  Abdominal:      General: Abdomen is flat.      Palpations: Abdomen is soft.   Musculoskeletal:      Right lower leg: No edema.      Left lower leg: No edema.   Skin:     General: Skin is warm.      Coloration: Skin is not jaundiced or pale.   Neurological:      General: No focal deficit present.      Mental Status: He is alert and oriented to person, place, and time.            Significant Labs: All pertinent lab  results from the last 24 hours have been reviewed.    Significant Imaging: All pertinent imaging results from the last 24 hours have been reviewed.  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 Echo with EF 50-53% severe MR and severe LA dilation. Optimized rate control with metoprolol 25 TID. Increased acute phase reactants. S/p NEW on 12/26 with severe MR with 2 jets, including posterior jet likely secondary to tear/perforation.     Plan:  - cardiac diet   - Hold dapaglifozin 10 daily, spironolactone 25, metolazone 2.5 daily, torsemide 60 BID and will slowly restart diuretics once FRANCESCO resolves  - continue daptomycin x 2 weeks (Home Health; started 12/30/23) and follow with PO doxycyline until new valve replaced. ID recommended intraoperative tissue cx.  - blood cx with NGTD   - CTS will follow as outpatient    - ID will follow as outpatient  - Will reach out to  about transitional care clinic appt         HFmrEF  Will continue to hold the following until improvement of renal function:   - dapaglifozin 10 daily   - spironolactone 25   - metolazone 2.5 daily   - torsemide 60 BID          A-fib with RVR  S/p afib with RVR once admitted to the CCU. Now NSR     -On amio 400mg po bid for 14 days then 200mg daily   - Continue eliquis 5 BID  - continue metoprolol 25 mg daily   - monitor telemetry    Endocarditis  See severe MR     FRANCESCO (acute kidney injury)  Current Cr 1.5,  Baseline 1.0 on admission; Likely 2/2 poor PO intake and initiation of GDMT and diuretics    Plan:  - Will hold GDMT and diuretics until improvement of FRANCESCO  - Trend Cr/ Serial BMPs  - Strict I&Os, close monitoring of UOP  - Replace electrolytes PRN, goal K/Phos/Mg 4/3/2  - Avoid nephrotoxic agents when feasible (NSAIDs, ACEi/ARB, IV radiocontrast, gadolinium, etc.)  - Avoid Fleet's and Brown Bomb Enemas given their propensity to induce hypermagnesemia  - Renally dose all meds to eGFR  - Goal MAP > 65 mmHg  - No acute indications for RRT at this time           VTE Risk Mitigation (From admission, onward)           Ordered     apixaban tablet 5 mg  2 times daily         12/29/23 0953                    Tisha Cruz MD  Cardiology  [facility]

## 2024-01-02 NOTE — PLAN OF CARE
Plan of care discussed with patient. Patient is free of fall/trauma/injury. Denies CP, SOB, or pain/discomfort. IV Daptomycin administered today. All questions addressed. Will continue to monitor.

## 2024-01-02 NOTE — PROGRESS NOTES
[facility]  Adult Nutrition  Progress Note    SUMMARY       Recommendations    1) Continue Cardiac IDDSI Level 6 diet  2) Continue boost breeze TID  3) RD following    Goals: Meet % een/epn by next RD f/u  Nutrition Goal Status: new  Communication of RD Recs: other (comment) (POC)    Assessment and Plan  Nutrition Problem  Altered nutrition related lab values     Related to (etiology):   Physiological condition     Signs and Symptoms (as evidenced by):   BUN: 58, creatinine: 1.5, GFR: 56.7     Interventions/Recommendations (treatment strategy):  Collaboration with other providers  [facility]    Nutrition Diagnosis Status:   New      Reason for Assessment    Reason For Assessment: length of stay  Diagnosis: infection/sepsis (MRSA)  Relevant Medical History: FRANCESCO, Afib, HF  Interdisciplinary Rounds: did not attend  General Information Comments: LOS. Per MD note pt reports improved appetite since Mirtazapine added. Noted with Improvement in renal function per MD. Unclear on pt's intake at this time d/t not recorded/ time restraints and chart review. Noted with wt flux. RD to follow up.     Nutrition Discharge Planning: adequate intake    Nutrition Risk Screen    Nutrition Risk Screen: no indicators present    Nutrition/Diet History    Spiritual, Cultural Beliefs, Confucianism Practices, Values that Affect Care: no  Food Allergies: NKFA    Anthropometrics    Temp: 97.8 °F (36.6 °C)  Height Method: Stated  Height: 6' (182.9 cm)  Height (inches): 72 in  Weight Method: Bed Scale  Weight: 70.3 kg (155 lb)  Weight (lb): 155 lb  Ideal Body Weight (IBW), Male: 178 lb  % Ideal Body Weight, Male (lb): 87.08 %  BMI (Calculated): 21  BMI Grade: 18.5-24.9 - normal       Lab/Procedures/Meds    Pertinent Labs Reviewed: reviewed  Pertinent Labs Comments: H/h: 9/28.1, mcv: 79, mch: 25.4, na: 129, bun: 58, creatinine: 1.5, gfr: 56.7  Pertinent Medications Reviewed: reviewed  Pertinent Medications Comments: Abx,  pantoprazole, polyethylene glycol, senna-docusate      Estimated/Assessed Needs    Weight Used For Calorie Calculations: 70.3 kg (155 lb)  Energy Calorie Requirements (kcal): 0755-9391 (20-25 kcal/kg)  Energy Need Method: Kcal/kg  Protein Requirements: 84 (1.2 g/kg)  Weight Used For Protein Calculations: 70.3 kg (155 lb)     Estimated Fluid Requirement Method: RDA Method  RDA Method (mL): 1406         Nutrition Prescription Ordered    Current Diet Order: IDDSI Level 6  Oral Nutrition Supplement: Boost Breeze TID    Evaluation of Received Nutrient/Fluid Intake    I/O: -13.8 L since admit  Comments: LBM 12/31  Tolerance: tolerating  % Intake of Estimated Energy Needs: 0 - 25 %  % Meal Intake: Other: unclear at this time    Nutrition Risk    Level of Risk/Frequency of Follow-up: low (f/u 1x/week)     Monitor and Evaluation    Food and Nutrient Intake: energy intake, food and beverage intake  Food and Nutrient Adminstration: diet order  Physical Activity and Function: nutrition-related ADLs and IADLs  Anthropometric Measurements: height/length, weight, weight change, body mass index  Biochemical Data, Medical Tests and Procedures: electrolyte and renal panel, gastrointestinal profile, glucose/endocrine profile, inflammatory profile, lipid profile     Nutrition Follow-Up    RD Follow-up?: Yes    Yadira Donaldson RD, LDN

## 2024-01-02 NOTE — PLAN OF CARE
Problem: Adult Inpatient Plan of Care  Goal: Plan of Care Review  Outcome: Ongoing, Progressing  Flowsheets (Taken 1/2/2024 8126)  Plan of Care Reviewed With: patient  Goal: Patient-Specific Goal (Individualized)  Outcome: Ongoing, Progressing  Goal: Absence of Hospital-Acquired Illness or Injury  Outcome: Ongoing, Progressing  Goal: Optimal Comfort and Wellbeing  Outcome: Ongoing, Progressing  Goal: Readiness for Transition of Care  Outcome: Ongoing, Progressing     Problem: Adjustment to Illness (Sepsis/Septic Shock)  Goal: Optimal Coping  Outcome: Ongoing, Progressing     Problem: Bleeding (Sepsis/Septic Shock)  Goal: Absence of Bleeding  Outcome: Ongoing, Progressing     Problem: Glycemic Control Impaired (Sepsis/Septic Shock)  Goal: Blood Glucose Level Within Desired Range  Outcome: Ongoing, Progressing     Problem: Infection Progression (Sepsis/Septic Shock)  Goal: Absence of Infection Signs and Symptoms  Outcome: Ongoing, Progressing     Problem: Nutrition Impaired (Sepsis/Septic Shock)  Goal: Optimal Nutrition Intake  Outcome: Ongoing, Progressing     Problem: Fluid and Electrolyte Imbalance (Acute Kidney Injury/Impairment)  Goal: Fluid and Electrolyte Balance  Outcome: Ongoing, Progressing

## 2024-01-03 PROBLEM — E87.6 HYPOKALEMIA: Status: ACTIVE | Noted: 2024-01-03

## 2024-01-03 LAB
ALBUMIN SERPL BCP-MCNC: 2.9 G/DL (ref 3.5–5.2)
ALBUMIN SERPL BCP-MCNC: 2.9 G/DL (ref 3.5–5.2)
ALBUMIN SERPL BCP-MCNC: 3 G/DL (ref 3.5–5.2)
ALP SERPL-CCNC: 66 U/L (ref 55–135)
ALT SERPL W/O P-5'-P-CCNC: 17 U/L (ref 10–44)
ANION GAP SERPL CALC-SCNC: 12 MMOL/L (ref 8–16)
ANION GAP SERPL CALC-SCNC: 12 MMOL/L (ref 8–16)
ANION GAP SERPL CALC-SCNC: 13 MMOL/L (ref 8–16)
ANION GAP SERPL CALC-SCNC: 15 MMOL/L (ref 8–16)
AST SERPL-CCNC: 28 U/L (ref 10–40)
BASOPHILS # BLD AUTO: 0.1 K/UL (ref 0–0.2)
BASOPHILS # BLD AUTO: 0.1 K/UL (ref 0–0.2)
BASOPHILS NFR BLD: 0.9 % (ref 0–1.9)
BASOPHILS NFR BLD: 1 % (ref 0–1.9)
BILIRUB SERPL-MCNC: 0.5 MG/DL (ref 0.1–1)
BUN SERPL-MCNC: 35 MG/DL (ref 6–20)
BUN SERPL-MCNC: 36 MG/DL (ref 6–20)
BUN SERPL-MCNC: 39 MG/DL (ref 6–20)
BUN SERPL-MCNC: 46 MG/DL (ref 6–20)
CALCIUM SERPL-MCNC: 9.1 MG/DL (ref 8.7–10.5)
CALCIUM SERPL-MCNC: 9.2 MG/DL (ref 8.7–10.5)
CALCIUM SERPL-MCNC: 9.3 MG/DL (ref 8.7–10.5)
CALCIUM SERPL-MCNC: 9.4 MG/DL (ref 8.7–10.5)
CHLORIDE SERPL-SCNC: 86 MMOL/L (ref 95–110)
CHLORIDE SERPL-SCNC: 91 MMOL/L (ref 95–110)
CO2 SERPL-SCNC: 27 MMOL/L (ref 23–29)
CO2 SERPL-SCNC: 28 MMOL/L (ref 23–29)
CREAT SERPL-MCNC: 1.3 MG/DL (ref 0.5–1.4)
CREAT SERPL-MCNC: 1.6 MG/DL (ref 0.5–1.4)
DIFFERENTIAL METHOD BLD: ABNORMAL
DIFFERENTIAL METHOD BLD: ABNORMAL
EOSINOPHIL # BLD AUTO: 0.2 K/UL (ref 0–0.5)
EOSINOPHIL # BLD AUTO: 0.5 K/UL (ref 0–0.5)
EOSINOPHIL NFR BLD: 2.4 % (ref 0–8)
EOSINOPHIL NFR BLD: 4 % (ref 0–8)
ERYTHROCYTE [DISTWIDTH] IN BLOOD BY AUTOMATED COUNT: 17.4 % (ref 11.5–14.5)
ERYTHROCYTE [DISTWIDTH] IN BLOOD BY AUTOMATED COUNT: 17.4 % (ref 11.5–14.5)
EST. GFR  (NO RACE VARIABLE): 52.5 ML/MIN/1.73 M^2
EST. GFR  (NO RACE VARIABLE): >60 ML/MIN/1.73 M^2
GLUCOSE SERPL-MCNC: 108 MG/DL (ref 70–110)
GLUCOSE SERPL-MCNC: 128 MG/DL (ref 70–110)
GLUCOSE SERPL-MCNC: 130 MG/DL (ref 70–110)
GLUCOSE SERPL-MCNC: 151 MG/DL (ref 70–110)
HCT VFR BLD AUTO: 24.5 % (ref 40–54)
HCT VFR BLD AUTO: 26.1 % (ref 40–54)
HGB BLD-MCNC: 7.9 G/DL (ref 14–18)
HGB BLD-MCNC: 8.5 G/DL (ref 14–18)
IMM GRANULOCYTES # BLD AUTO: 0.15 K/UL (ref 0–0.04)
IMM GRANULOCYTES # BLD AUTO: 0.24 K/UL (ref 0–0.04)
IMM GRANULOCYTES NFR BLD AUTO: 1.5 % (ref 0–0.5)
IMM GRANULOCYTES NFR BLD AUTO: 2.1 % (ref 0–0.5)
LYMPHOCYTES # BLD AUTO: 2 K/UL (ref 1–4.8)
LYMPHOCYTES # BLD AUTO: 3.2 K/UL (ref 1–4.8)
LYMPHOCYTES NFR BLD: 19.3 % (ref 18–48)
LYMPHOCYTES NFR BLD: 27.5 % (ref 18–48)
MAGNESIUM SERPL-MCNC: 1.8 MG/DL (ref 1.6–2.6)
MCH RBC QN AUTO: 26.2 PG (ref 27–31)
MCH RBC QN AUTO: 26.3 PG (ref 27–31)
MCHC RBC AUTO-ENTMCNC: 32.2 G/DL (ref 32–36)
MCHC RBC AUTO-ENTMCNC: 32.6 G/DL (ref 32–36)
MCV RBC AUTO: 81 FL (ref 82–98)
MCV RBC AUTO: 81 FL (ref 82–98)
MONOCYTES # BLD AUTO: 1.2 K/UL (ref 0.3–1)
MONOCYTES # BLD AUTO: 1.3 K/UL (ref 0.3–1)
MONOCYTES NFR BLD: 10.9 % (ref 4–15)
MONOCYTES NFR BLD: 11.4 % (ref 4–15)
NEUTROPHILS # BLD AUTO: 6.3 K/UL (ref 1.8–7.7)
NEUTROPHILS # BLD AUTO: 6.6 K/UL (ref 1.8–7.7)
NEUTROPHILS NFR BLD: 54.6 % (ref 38–73)
NEUTROPHILS NFR BLD: 64.4 % (ref 38–73)
NRBC BLD-RTO: 0 /100 WBC
NRBC BLD-RTO: 0 /100 WBC
PHOSPHATE SERPL-MCNC: 1.9 MG/DL (ref 2.7–4.5)
PHOSPHATE SERPL-MCNC: 3.2 MG/DL (ref 2.7–4.5)
PLATELET # BLD AUTO: 373 K/UL (ref 150–450)
PLATELET # BLD AUTO: 409 K/UL (ref 150–450)
PMV BLD AUTO: 10.7 FL (ref 9.2–12.9)
PMV BLD AUTO: 11.2 FL (ref 9.2–12.9)
POTASSIUM SERPL-SCNC: 2.6 MMOL/L (ref 3.5–5.1)
POTASSIUM SERPL-SCNC: 2.8 MMOL/L (ref 3.5–5.1)
POTASSIUM SERPL-SCNC: 2.8 MMOL/L (ref 3.5–5.1)
POTASSIUM SERPL-SCNC: 3 MMOL/L (ref 3.5–5.1)
PROT SERPL-MCNC: 8.4 G/DL (ref 6–8.4)
RBC # BLD AUTO: 3.02 M/UL (ref 4.6–6.2)
RBC # BLD AUTO: 3.23 M/UL (ref 4.6–6.2)
SODIUM SERPL-SCNC: 129 MMOL/L (ref 136–145)
SODIUM SERPL-SCNC: 130 MMOL/L (ref 136–145)
SODIUM SERPL-SCNC: 130 MMOL/L (ref 136–145)
SODIUM SERPL-SCNC: 131 MMOL/L (ref 136–145)
WBC # BLD AUTO: 10.17 K/UL (ref 3.9–12.7)
WBC # BLD AUTO: 11.51 K/UL (ref 3.9–12.7)

## 2024-01-03 PROCEDURE — 93005 ELECTROCARDIOGRAM TRACING: CPT

## 2024-01-03 PROCEDURE — 80069 RENAL FUNCTION PANEL: CPT | Performed by: STUDENT IN AN ORGANIZED HEALTH CARE EDUCATION/TRAINING PROGRAM

## 2024-01-03 PROCEDURE — 80048 BASIC METABOLIC PNL TOTAL CA: CPT | Mod: XB

## 2024-01-03 PROCEDURE — 25000003 PHARM REV CODE 250: Performed by: STUDENT IN AN ORGANIZED HEALTH CARE EDUCATION/TRAINING PROGRAM

## 2024-01-03 PROCEDURE — 63600175 PHARM REV CODE 636 W HCPCS: Performed by: INTERNAL MEDICINE

## 2024-01-03 PROCEDURE — 80053 COMPREHEN METABOLIC PANEL: CPT | Performed by: STUDENT IN AN ORGANIZED HEALTH CARE EDUCATION/TRAINING PROGRAM

## 2024-01-03 PROCEDURE — 80069 RENAL FUNCTION PANEL: CPT | Mod: 91 | Performed by: INTERNAL MEDICINE

## 2024-01-03 PROCEDURE — 25000003 PHARM REV CODE 250

## 2024-01-03 PROCEDURE — 63600175 PHARM REV CODE 636 W HCPCS: Performed by: STUDENT IN AN ORGANIZED HEALTH CARE EDUCATION/TRAINING PROGRAM

## 2024-01-03 PROCEDURE — 93010 ELECTROCARDIOGRAM REPORT: CPT | Mod: ,,, | Performed by: INTERNAL MEDICINE

## 2024-01-03 PROCEDURE — 83735 ASSAY OF MAGNESIUM: CPT | Performed by: STUDENT IN AN ORGANIZED HEALTH CARE EDUCATION/TRAINING PROGRAM

## 2024-01-03 PROCEDURE — 20600001 HC STEP DOWN PRIVATE ROOM

## 2024-01-03 PROCEDURE — 25000003 PHARM REV CODE 250: Performed by: INTERNAL MEDICINE

## 2024-01-03 PROCEDURE — 85025 COMPLETE CBC W/AUTO DIFF WBC: CPT | Performed by: STUDENT IN AN ORGANIZED HEALTH CARE EDUCATION/TRAINING PROGRAM

## 2024-01-03 PROCEDURE — 85025 COMPLETE CBC W/AUTO DIFF WBC: CPT | Mod: 91 | Performed by: INTERNAL MEDICINE

## 2024-01-03 RX ORDER — POTASSIUM CHLORIDE 7.45 MG/ML
10 INJECTION INTRAVENOUS
Status: COMPLETED | OUTPATIENT
Start: 2024-01-03 | End: 2024-01-03

## 2024-01-03 RX ORDER — MAGNESIUM SULFATE HEPTAHYDRATE 40 MG/ML
2 INJECTION, SOLUTION INTRAVENOUS ONCE
Status: COMPLETED | OUTPATIENT
Start: 2024-01-03 | End: 2024-01-03

## 2024-01-03 RX ORDER — SPIRONOLACTONE 25 MG/1
25 TABLET ORAL DAILY
Status: DISCONTINUED | OUTPATIENT
Start: 2024-01-03 | End: 2024-01-08 | Stop reason: HOSPADM

## 2024-01-03 RX ORDER — POTASSIUM CHLORIDE 20 MEQ/1
40 TABLET, EXTENDED RELEASE ORAL ONCE
Status: COMPLETED | OUTPATIENT
Start: 2024-01-03 | End: 2024-01-03

## 2024-01-03 RX ORDER — TORSEMIDE 10 MG/1
10 TABLET ORAL DAILY
Status: DISCONTINUED | OUTPATIENT
Start: 2024-01-03 | End: 2024-01-08 | Stop reason: HOSPADM

## 2024-01-03 RX ORDER — POTASSIUM CHLORIDE 750 MG/1
50 CAPSULE, EXTENDED RELEASE ORAL ONCE
Status: COMPLETED | OUTPATIENT
Start: 2024-01-03 | End: 2024-01-03

## 2024-01-03 RX ADMIN — AMIODARONE HYDROCHLORIDE 400 MG: 200 TABLET ORAL at 09:01

## 2024-01-03 RX ADMIN — APIXABAN 5 MG: 5 TABLET, FILM COATED ORAL at 08:01

## 2024-01-03 RX ADMIN — POTASSIUM CHLORIDE 40 MEQ: 1500 TABLET, EXTENDED RELEASE ORAL at 06:01

## 2024-01-03 RX ADMIN — POLYETHYLENE GLYCOL 3350 17 G: 17 POWDER, FOR SOLUTION ORAL at 09:01

## 2024-01-03 RX ADMIN — MIRTAZAPINE 15 MG: 15 TABLET, ORALLY DISINTEGRATING ORAL at 08:01

## 2024-01-03 RX ADMIN — POTASSIUM CHLORIDE 10 MEQ: 7.46 INJECTION, SOLUTION INTRAVENOUS at 04:01

## 2024-01-03 RX ADMIN — MAGNESIUM SULFATE HEPTAHYDRATE 2 G: 40 INJECTION, SOLUTION INTRAVENOUS at 03:01

## 2024-01-03 RX ADMIN — POTASSIUM CHLORIDE 10 MEQ: 7.46 INJECTION, SOLUTION INTRAVENOUS at 07:01

## 2024-01-03 RX ADMIN — POTASSIUM CHLORIDE 10 MEQ: 7.46 INJECTION, SOLUTION INTRAVENOUS at 06:01

## 2024-01-03 RX ADMIN — POTASSIUM CHLORIDE 10 MEQ: 7.46 INJECTION, SOLUTION INTRAVENOUS at 05:01

## 2024-01-03 RX ADMIN — METOPROLOL SUCCINATE 25 MG: 25 TABLET, EXTENDED RELEASE ORAL at 09:01

## 2024-01-03 RX ADMIN — SPIRONOLACTONE 25 MG: 25 TABLET ORAL at 10:01

## 2024-01-03 RX ADMIN — DAPTOMYCIN 600 MG: 500 INJECTION, POWDER, LYOPHILIZED, FOR SOLUTION INTRAVENOUS at 09:01

## 2024-01-03 RX ADMIN — APIXABAN 5 MG: 5 TABLET, FILM COATED ORAL at 09:01

## 2024-01-03 RX ADMIN — PANTOPRAZOLE SODIUM 40 MG: 40 TABLET, DELAYED RELEASE ORAL at 09:01

## 2024-01-03 RX ADMIN — POTASSIUM BICARBONATE 40 MEQ: 391 TABLET, EFFERVESCENT ORAL at 03:01

## 2024-01-03 RX ADMIN — POTASSIUM CHLORIDE 10 MEQ: 7.46 INJECTION, SOLUTION INTRAVENOUS at 03:01

## 2024-01-03 RX ADMIN — AMIODARONE HYDROCHLORIDE 400 MG: 200 TABLET ORAL at 08:01

## 2024-01-03 RX ADMIN — HYDRALAZINE HYDROCHLORIDE 10 MG: 10 TABLET, FILM COATED ORAL at 11:01

## 2024-01-03 RX ADMIN — POTASSIUM CHLORIDE 50 MEQ: 10 CAPSULE, COATED, EXTENDED RELEASE ORAL at 11:01

## 2024-01-03 RX ADMIN — TORSEMIDE 10 MG: 10 TABLET ORAL at 03:01

## 2024-01-03 NOTE — SUBJECTIVE & OBJECTIVE
Interval History: NAEON. Appetite continues to improve. BM becoming more form. Renal function back to normal. Slowly adding GDMT back to medication regimen.     Review of Systems   Constitutional: Positive for decreased appetite (Improving) and malaise/fatigue.   Cardiovascular:  Negative for chest pain, claudication, dyspnea on exertion and leg swelling.   Respiratory:  Negative for cough and shortness of breath.      Objective:     Vital Signs (Most Recent):  Temp: 98.1 °F (36.7 °C) (01/03/24 1101)  Pulse: 69 (01/03/24 1125)  Resp: 18 (01/03/24 1101)  BP: (!) 91/55 (01/03/24 1101)  SpO2: 98 % (01/03/24 1101) Vital Signs (24h Range):  Temp:  [97.7 °F (36.5 °C)-98.4 °F (36.9 °C)] 98.1 °F (36.7 °C)  Pulse:  [66-77] 69  Resp:  [16-18] 18  SpO2:  [98 %-100 %] 98 %  BP: ()/(55-71) 91/55     Weight: 70.3 kg (155 lb)  Body mass index is 21.02 kg/m².     SpO2: 98 %         Intake/Output Summary (Last 24 hours) at 1/3/2024 1426  Last data filed at 1/3/2024 1334  Gross per 24 hour   Intake 809 ml   Output 1100 ml   Net -291 ml         Lines/Drains/Airways       Peripherally Inserted Central Catheter Line  Duration             PICC Double Lumen 11/13/23 1509 right basilic 50 days              Peripheral Intravenous Line  Duration                  Peripheral IV - Single Lumen 12/27/23 1745 18 G Anterior;Left;Proximal Forearm 6 days                       Physical Exam  Constitutional:       General: He is not in acute distress.     Appearance: Normal appearance. He is not ill-appearing.   HENT:      Right Ear: External ear normal.      Left Ear: External ear normal.   Eyes:      General:         Right eye: No discharge.         Left eye: No discharge.      Extraocular Movements: Extraocular movements intact.   Cardiovascular:      Pulses: Normal pulses.      Heart sounds: Normal heart sounds.   Pulmonary:      Effort: Pulmonary effort is normal.      Breath sounds: Normal breath sounds.      Comments: Shallow  breaths  Abdominal:      General: Abdomen is flat.      Palpations: Abdomen is soft.   Musculoskeletal:      Right lower leg: No edema.      Left lower leg: No edema.   Skin:     General: Skin is warm.      Coloration: Skin is not jaundiced or pale.   Neurological:      General: No focal deficit present.      Mental Status: He is alert and oriented to person, place, and time.   Psychiatric:         Mood and Affect: Mood normal.         Behavior: Behavior normal.            Significant Labs: All pertinent lab results from the last 24 hours have been reviewed.    Significant Imaging: All pertinent imaging results from the last 24 hours have been reviewed.

## 2024-01-03 NOTE — NURSING
Nurses Note -- 4 Eyes      1/3/2024   1:50 PM      Skin assessed during: Q Shift Change      [x] No Altered Skin Integrity Present    [x]Prevention Measures Documented      [] Yes- Altered Skin Integrity Present or Discovered   [] LDA Added if Not in Epic (Describe Wound)   [] New Altered Skin Integrity was Present on Admit and Documented in LDA   [] Wound Image Taken    Wound Care Consulted? No    Attending Nurse:  Renetta Albarran LPN    Second RN/Staff Member:  Aissatou Hightower RN

## 2024-01-03 NOTE — PLAN OF CARE
[facility]  Discharge Reassessment    Primary Care Provider: No, Primary Doctor    Expected Discharge Date: 1/4/2024    Reassessment (most recent)       Discharge Reassessment - 01/03/24 1534          Discharge Reassessment    Assessment Type Discharge Planning Reassessment     Did the patient's condition or plan change since previous assessment? No     Discharge Plan discussed with: Patient;Parent(s)     Communicated DARIELA with patient/caregiver Yes     Discharge Plan A Home Health     Discharge Plan B Home Health     DME Needed Upon Discharge  none     Transition of Care Barriers None     Why the patient remains in the hospital Requires continued medical care        Post-Acute Status    Post-Acute Authorization Home Health     Home Health Status Pending medical clearance/testing                 EL met with pt and mother Jolie at bedside after being informed by Jolie of plan for pt to stay at her house at [address] [place] [address] for a few weeks after discharge due to pt's girlfriend/primary caretaker having surgery.  EL relayed this to Shital with [facility] who took the new address and confirmed it would not be a problem for pt to be seen by a different office temporarily.  SW relayed this back to pt's mother.  Will continue to follow.    Discharge Plan A and Plan B have been determined by review of patient's clinical status, future medical and therapeutic needs, and coverage/benefits for post-acute care in coordination with multidisciplinary team members.      Humera Luong, LENARD  [facility]  [phone]

## 2024-01-03 NOTE — ASSESSMENT & PLAN NOTE
Current Cr 1.5, Baseline 1.0 on admission; Likely 2/2 poor PO intake and initiation of GDMT and diuretics.    Plan:  - Resolving. Slowly adding back diuretics.   - Trend Cr/ Serial BMPs  - Strict I&Os, close monitoring of UOP  - Replace electrolytes PRN, goal K/Phos/Mg 4/3/2  - Avoid nephrotoxic agents when feasible (NSAIDs, ACEi/ARB, IV radiocontrast, gadolinium, etc.)  - Avoid Fleet's and Brown Bomb Enemas given their propensity to induce hypermagnesemia  - Renally dose all meds to eGFR  - Goal MAP > 65 mmHg  - No acute indications for RRT at this time

## 2024-01-03 NOTE — CONSULTS
NIAS consulted to evaluate patient's DL PICC line due to purple lumen not flushing or drawing back blood.  NIAS able to flush purple lumen and drawback blood.  Due to being able to flush and drawback blood on purple lumen, additional IV access not needed at this time.  Patient's nurse notified.

## 2024-01-03 NOTE — ASSESSMENT & PLAN NOTE
48 y.o. 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 Echo with EF 50-53% severe MR and severe LA dilation. Optimized rate control with metoprolol 25 TID. Increased acute phase reactants. S/p NEW on 12/26 with severe MR with 2 jets, including posterior jet likely secondary to tear/perforation.     Plan:  - cardiac diet   - Restarting Spirinolactone and Torsemide, continue to hold dapagliflozin and metolazone.   - continue daptomycin x 2 weeks (Home Health; started 12/30/23) and follow with PO doxycyline until new valve replaced. ID recommended intraoperative tissue cx.  - blood cx with NGTD   - CTS will follow as outpatient    - ID will follow as outpatient  - Will reach out to  about transitional care clinic appt

## 2024-01-03 NOTE — PLAN OF CARE
Problem: Adult Inpatient Plan of Care  Goal: Plan of Care Review  Outcome: Ongoing, Progressing  Flowsheets (Taken 1/3/2024 0209)  Plan of Care Reviewed With: patient  Goal: Patient-Specific Goal (Individualized)  Outcome: Ongoing, Progressing  Goal: Absence of Hospital-Acquired Illness or Injury  Outcome: Ongoing, Progressing  Goal: Optimal Comfort and Wellbeing  Outcome: Ongoing, Progressing  Goal: Readiness for Transition of Care  Outcome: Ongoing, Progressing     Problem: Adjustment to Illness (Sepsis/Septic Shock)  Goal: Optimal Coping  Outcome: Ongoing, Progressing     Problem: Bleeding (Sepsis/Septic Shock)  Goal: Absence of Bleeding  Outcome: Ongoing, Progressing     Problem: Glycemic Control Impaired (Sepsis/Septic Shock)  Goal: Blood Glucose Level Within Desired Range  Outcome: Ongoing, Progressing     Problem: Infection Progression (Sepsis/Septic Shock)  Goal: Absence of Infection Signs and Symptoms  Outcome: Ongoing, Progressing     Problem: Nutrition Impaired (Sepsis/Septic Shock)  Goal: Optimal Nutrition Intake  Outcome: Ongoing, Progressing     Problem: Fluid and Electrolyte Imbalance (Acute Kidney Injury/Impairment)  Goal: Fluid and Electrolyte Balance  Outcome: Ongoing, Progressing     Problem: Oral Intake Inadequate (Acute Kidney Injury/Impairment)  Goal: Optimal Nutrition Intake  Outcome: Ongoing, Progressing     Problem: Renal Function Impairment (Acute Kidney Injury/Impairment)  Goal: Effective Renal Function  Outcome: Ongoing, Progressing     Problem: Infection  Goal: Absence of Infection Signs and Symptoms  Outcome: Ongoing, Progressing     Problem: Fall Injury Risk  Goal: Absence of Fall and Fall-Related Injury  Outcome: Ongoing, Progressing     Problem: Skin Injury Risk Increased  Goal: Skin Health and Integrity  Outcome: Ongoing, Progressing

## 2024-01-03 NOTE — PROGRESS NOTES
[facility]  Cardiology  Progress Note    Patient Name: Lorenzo Nino  MRN: [id number]  Admission Date: 12/18/2023  Hospital Length of Stay: 16 days  Code Status: Full Code   Attending Physician: Emerson Mercado MD   Primary Care Physician: Terrie, Primary Doctor  Expected Discharge Date: 1/4/2024  Principal Problem:MRSA bacteremia    Subjective:     Hospital Course:   Mr. Lorenzo Nino is a 48 y.o. 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 Presented for 3-day dyspnea. Patient was transferred to the CICU for AHRF 2/2 significant pulmonary edema 2/2 severe mitral regurgitation concerning for anterior leaflet tear vs posterior leaflet restriction.  Upon admission to the CICU on afib w/ RVR. Given 1 dose of digoxin and started on heparin gtt and diltiazem gtt. Stabilized on sinus rhythm. CTS evaluated patient and agree on continuing aggressive diuresis with lasix gtt, at the moment not indicated IABP.. S/p NEW on 12/26 with severe MR with 2 jets, including posterior jet likely secondary to tear/perforation. On IV vancomycin. CTS to follow as outpatient (Dr. Ascencio).  ID plan with  daptomycin 8mg/kg x 2 weeks and follow with PO doxycyline until new valve replaced. ID recommended intraoperative tissue cx. Optimized diuresis for volume status. Off heparin gtt, started on eliquis. Patient started on GDMT but developed FRANCESCO    Interval History: NAEON. Appetite continues to improve. BM becoming more form. Renal function back to normal. Slowly adding GDMT back to medication regimen.     Review of Systems   Constitutional: Positive for decreased appetite (Improving) and malaise/fatigue.   Cardiovascular:  Negative for chest pain, claudication, dyspnea on exertion and leg swelling.   Respiratory:  Negative for cough and shortness of breath.      Objective:     Vital Signs (Most Recent):  Temp: 98.1 °F (36.7 °C) (01/03/24  1101)  Pulse: 69 (01/03/24 1125)  Resp: 18 (01/03/24 1101)  BP: (!) 91/55 (01/03/24 1101)  SpO2: 98 % (01/03/24 1101) Vital Signs (24h Range):  Temp:  [97.7 °F (36.5 °C)-98.4 °F (36.9 °C)] 98.1 °F (36.7 °C)  Pulse:  [66-77] 69  Resp:  [16-18] 18  SpO2:  [98 %-100 %] 98 %  BP: ()/(55-71) 91/55     Weight: 70.3 kg (155 lb)  Body mass index is 21.02 kg/m².     SpO2: 98 %         Intake/Output Summary (Last 24 hours) at 1/3/2024 1426  Last data filed at 1/3/2024 1334  Gross per 24 hour   Intake 809 ml   Output 1100 ml   Net -291 ml         Lines/Drains/Airways       Peripherally Inserted Central Catheter Line  Duration             PICC Double Lumen 11/13/23 1509 right basilic 50 days              Peripheral Intravenous Line  Duration                  Peripheral IV - Single Lumen 12/27/23 1745 18 G Anterior;Left;Proximal Forearm 6 days                       Physical Exam  Constitutional:       General: He is not in acute distress.     Appearance: Normal appearance. He is not ill-appearing.   HENT:      Right Ear: External ear normal.      Left Ear: External ear normal.   Eyes:      General:         Right eye: No discharge.         Left eye: No discharge.      Extraocular Movements: Extraocular movements intact.   Cardiovascular:      Pulses: Normal pulses.      Heart sounds: Normal heart sounds.   Pulmonary:      Effort: Pulmonary effort is normal.      Breath sounds: Normal breath sounds.      Comments: Shallow breaths  Abdominal:      General: Abdomen is flat.      Palpations: Abdomen is soft.   Musculoskeletal:      Right lower leg: No edema.      Left lower leg: No edema.   Skin:     General: Skin is warm.      Coloration: Skin is not jaundiced or pale.   Neurological:      General: No focal deficit present.      Mental Status: He is alert and oriented to person, place, and time.   Psychiatric:         Mood and Affect: Mood normal.         Behavior: Behavior normal.            Significant Labs: All pertinent  lab results from the last 24 hours have been reviewed.    Significant Imaging: All pertinent imaging results from the last 24 hours have been reviewed.  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 Echo with EF 50-53% severe MR and severe LA dilation. Optimized rate control with metoprolol 25 TID. Increased acute phase reactants. S/p NEW on 12/26 with severe MR with 2 jets, including posterior jet likely secondary to tear/perforation.     Plan:  - cardiac diet   - Restarting Spirinolactone and Torsemide, continue to hold dapagliflozin and metolazone.   - continue daptomycin x 2 weeks (Home Health; started 12/30/23) and follow with PO doxycyline until new valve replaced. ID recommended intraoperative tissue cx.  - blood cx with NGTD   - CTS will follow as outpatient    - ID will follow as outpatient  - Will reach out to  about transitional care clinic appt         Hypokalemia  K 2.6 >> 2.8 despite ~60 meq of K.     - Replete PRN for K 4.0     HFmrEF  - See Severe MR    A-fib with RVR  S/p afib with RVR once admitted to the CCU. Now NSR     -On amio 400mg po bid for 14 days then 200mg daily   - Continue eliquis 5 BID  - continue metoprolol 25 mg daily   - monitor telemetry    Endocarditis  See severe MR     FRANCESCO (acute kidney injury)  Current Cr 1.5, Baseline 1.0 on admission; Likely 2/2 poor PO intake and initiation of GDMT and diuretics.    Plan:  - Resolving. Slowly  adding back diuretics.   - Trend Cr/ Serial BMPs  - Strict I&Os, close monitoring of UOP  - Replace electrolytes PRN, goal K/Phos/Mg 4/3/2  - Avoid nephrotoxic agents when feasible (NSAIDs, ACEi/ARB, IV radiocontrast, gadolinium, etc.)  - Avoid Fleet's and Brown Bomb Enemas given their propensity to induce hypermagnesemia  - Renally dose all meds to eGFR  - Goal MAP > 65 mmHg  - No acute indications for RRT at this time           VTE Risk Mitigation (From admission, onward)           Ordered     apixaban tablet 5 mg  2 times daily         12/29/23 5449                    Tisha Cruz MD  Cardiology  [facility]

## 2024-01-04 ENCOUNTER — ANESTHESIA EVENT (OUTPATIENT)
Dept: ENDOSCOPY | Facility: HOSPITAL | Age: 49
DRG: 291 | End: 2024-01-04
Payer: COMMERCIAL

## 2024-01-04 PROBLEM — K92.2 GI BLEEDING: Status: ACTIVE | Noted: 2024-01-04

## 2024-01-04 LAB
ALBUMIN SERPL BCP-MCNC: 2.9 G/DL (ref 3.5–5.2)
ALP SERPL-CCNC: 66 U/L (ref 55–135)
ALT SERPL W/O P-5'-P-CCNC: 17 U/L (ref 10–44)
ANION GAP SERPL CALC-SCNC: 10 MMOL/L (ref 8–16)
ANISOCYTOSIS BLD QL SMEAR: SLIGHT
ANISOCYTOSIS BLD QL SMEAR: SLIGHT
AST SERPL-CCNC: 26 U/L (ref 10–40)
BASOPHILS # BLD AUTO: 0.1 K/UL (ref 0–0.2)
BASOPHILS NFR BLD: 0 % (ref 0–1.9)
BASOPHILS NFR BLD: 0.9 % (ref 0–1.9)
BILIRUB SERPL-MCNC: 0.4 MG/DL (ref 0.1–1)
BUN SERPL-MCNC: 36 MG/DL (ref 6–20)
CALCIUM SERPL-MCNC: 9.2 MG/DL (ref 8.7–10.5)
CHLORIDE SERPL-SCNC: 95 MMOL/L (ref 95–110)
CO2 SERPL-SCNC: 28 MMOL/L (ref 23–29)
CREAT SERPL-MCNC: 1.3 MG/DL (ref 0.5–1.4)
DACRYOCYTES BLD QL SMEAR: ABNORMAL
DIFFERENTIAL METHOD BLD: ABNORMAL
DIFFERENTIAL METHOD BLD: ABNORMAL
EOSINOPHIL # BLD AUTO: 0.6 K/UL (ref 0–0.5)
EOSINOPHIL NFR BLD: 5 % (ref 0–8)
EOSINOPHIL NFR BLD: 5.4 % (ref 0–8)
ERYTHROCYTE [DISTWIDTH] IN BLOOD BY AUTOMATED COUNT: 17.5 % (ref 11.5–14.5)
ERYTHROCYTE [DISTWIDTH] IN BLOOD BY AUTOMATED COUNT: 17.6 % (ref 11.5–14.5)
EST. GFR  (NO RACE VARIABLE): >60 ML/MIN/1.73 M^2
GIANT PLATELETS BLD QL SMEAR: PRESENT
GLUCOSE SERPL-MCNC: 91 MG/DL (ref 70–110)
HCT VFR BLD AUTO: 24.3 % (ref 40–54)
HCT VFR BLD AUTO: 24.8 % (ref 40–54)
HGB BLD-MCNC: 7.7 G/DL (ref 14–18)
HGB BLD-MCNC: 7.9 G/DL (ref 14–18)
HYPOCHROMIA BLD QL SMEAR: ABNORMAL
IMM GRANULOCYTES # BLD AUTO: 0.2 K/UL (ref 0–0.04)
IMM GRANULOCYTES # BLD AUTO: ABNORMAL K/UL (ref 0–0.04)
IMM GRANULOCYTES NFR BLD AUTO: 1.8 % (ref 0–0.5)
IMM GRANULOCYTES NFR BLD AUTO: ABNORMAL % (ref 0–0.5)
LYMPHOCYTES # BLD AUTO: 3.2 K/UL (ref 1–4.8)
LYMPHOCYTES NFR BLD: 21 % (ref 18–48)
LYMPHOCYTES NFR BLD: 29.5 % (ref 18–48)
MAGNESIUM SERPL-MCNC: 2 MG/DL (ref 1.6–2.6)
MCH RBC QN AUTO: 25.7 PG (ref 27–31)
MCH RBC QN AUTO: 26.3 PG (ref 27–31)
MCHC RBC AUTO-ENTMCNC: 31.7 G/DL (ref 32–36)
MCHC RBC AUTO-ENTMCNC: 31.9 G/DL (ref 32–36)
MCV RBC AUTO: 81 FL (ref 82–98)
MCV RBC AUTO: 83 FL (ref 82–98)
MONOCYTES # BLD AUTO: 1.2 K/UL (ref 0.3–1)
MONOCYTES NFR BLD: 11 % (ref 4–15)
MONOCYTES NFR BLD: 6 % (ref 4–15)
MYELOCYTES NFR BLD MANUAL: 2 %
NEUTROPHILS # BLD AUTO: 5.6 K/UL (ref 1.8–7.7)
NEUTROPHILS NFR BLD: 51.4 % (ref 38–73)
NEUTROPHILS NFR BLD: 66 % (ref 38–73)
NRBC BLD-RTO: 0 /100 WBC
NRBC BLD-RTO: 0 /100 WBC
OVALOCYTES BLD QL SMEAR: ABNORMAL
PLATELET # BLD AUTO: 357 K/UL (ref 150–450)
PLATELET # BLD AUTO: 362 K/UL (ref 150–450)
PLATELET BLD QL SMEAR: ABNORMAL
PLATELET BLD QL SMEAR: ABNORMAL
PMV BLD AUTO: 11.2 FL (ref 9.2–12.9)
PMV BLD AUTO: 9.8 FL (ref 9.2–12.9)
POIKILOCYTOSIS BLD QL SMEAR: SLIGHT
POLYCHROMASIA BLD QL SMEAR: ABNORMAL
POLYCHROMASIA BLD QL SMEAR: ABNORMAL
POTASSIUM SERPL-SCNC: 3.7 MMOL/L (ref 3.5–5.1)
PROT SERPL-MCNC: 7.9 G/DL (ref 6–8.4)
RBC # BLD AUTO: 3 M/UL (ref 4.6–6.2)
RBC # BLD AUTO: 3 M/UL (ref 4.6–6.2)
SCHISTOCYTES BLD QL SMEAR: ABNORMAL
SCHISTOCYTES BLD QL SMEAR: PRESENT
SODIUM SERPL-SCNC: 133 MMOL/L (ref 136–145)
SPHEROCYTES BLD QL SMEAR: ABNORMAL
TARGETS BLD QL SMEAR: ABNORMAL
WBC # BLD AUTO: 10.69 K/UL (ref 3.9–12.7)
WBC # BLD AUTO: 10.91 K/UL (ref 3.9–12.7)

## 2024-01-04 PROCEDURE — 99233 SBSQ HOSP IP/OBS HIGH 50: CPT | Mod: ,,, | Performed by: INTERNAL MEDICINE

## 2024-01-04 PROCEDURE — 85007 BL SMEAR W/DIFF WBC COUNT: CPT | Performed by: STUDENT IN AN ORGANIZED HEALTH CARE EDUCATION/TRAINING PROGRAM

## 2024-01-04 PROCEDURE — 25000003 PHARM REV CODE 250: Performed by: INTERNAL MEDICINE

## 2024-01-04 PROCEDURE — 63600175 PHARM REV CODE 636 W HCPCS: Performed by: STUDENT IN AN ORGANIZED HEALTH CARE EDUCATION/TRAINING PROGRAM

## 2024-01-04 PROCEDURE — 80053 COMPREHEN METABOLIC PANEL: CPT | Performed by: STUDENT IN AN ORGANIZED HEALTH CARE EDUCATION/TRAINING PROGRAM

## 2024-01-04 PROCEDURE — 25000003 PHARM REV CODE 250: Performed by: STUDENT IN AN ORGANIZED HEALTH CARE EDUCATION/TRAINING PROGRAM

## 2024-01-04 PROCEDURE — 20600001 HC STEP DOWN PRIVATE ROOM

## 2024-01-04 PROCEDURE — 93010 ELECTROCARDIOGRAM REPORT: CPT | Mod: ,,, | Performed by: INTERNAL MEDICINE

## 2024-01-04 PROCEDURE — 25000003 PHARM REV CODE 250

## 2024-01-04 PROCEDURE — 85027 COMPLETE CBC AUTOMATED: CPT | Performed by: STUDENT IN AN ORGANIZED HEALTH CARE EDUCATION/TRAINING PROGRAM

## 2024-01-04 PROCEDURE — 85025 COMPLETE CBC W/AUTO DIFF WBC: CPT | Performed by: STUDENT IN AN ORGANIZED HEALTH CARE EDUCATION/TRAINING PROGRAM

## 2024-01-04 PROCEDURE — 63600175 PHARM REV CODE 636 W HCPCS: Performed by: INTERNAL MEDICINE

## 2024-01-04 PROCEDURE — 83735 ASSAY OF MAGNESIUM: CPT | Performed by: STUDENT IN AN ORGANIZED HEALTH CARE EDUCATION/TRAINING PROGRAM

## 2024-01-04 PROCEDURE — 99222 1ST HOSP IP/OBS MODERATE 55: CPT | Mod: ,,, | Performed by: INTERNAL MEDICINE

## 2024-01-04 PROCEDURE — 93005 ELECTROCARDIOGRAM TRACING: CPT

## 2024-01-04 RX ORDER — POLYETHYLENE GLYCOL 3350, SODIUM SULFATE ANHYDROUS, SODIUM BICARBONATE, SODIUM CHLORIDE, POTASSIUM CHLORIDE 236; 22.74; 6.74; 5.86; 2.97 G/4L; G/4L; G/4L; G/4L; G/4L
4000 POWDER, FOR SOLUTION ORAL ONCE
Status: COMPLETED | OUTPATIENT
Start: 2024-01-04 | End: 2024-01-04

## 2024-01-04 RX ORDER — ONDANSETRON 2 MG/ML
4 INJECTION INTRAMUSCULAR; INTRAVENOUS EVERY 6 HOURS PRN
Status: DISCONTINUED | OUTPATIENT
Start: 2024-01-04 | End: 2024-01-08 | Stop reason: HOSPADM

## 2024-01-04 RX ADMIN — METOPROLOL SUCCINATE 25 MG: 25 TABLET, EXTENDED RELEASE ORAL at 08:01

## 2024-01-04 RX ADMIN — POLYETHYLENE GLYCOL 3350, SODIUM SULFATE ANHYDROUS, SODIUM BICARBONATE, SODIUM CHLORIDE, POTASSIUM CHLORIDE 4000 ML: 236; 22.74; 6.74; 5.86; 2.97 POWDER, FOR SOLUTION ORAL at 06:01

## 2024-01-04 RX ADMIN — HYDRALAZINE HYDROCHLORIDE 10 MG: 10 TABLET, FILM COATED ORAL at 08:01

## 2024-01-04 RX ADMIN — ONDANSETRON 4 MG: 2 INJECTION INTRAMUSCULAR; INTRAVENOUS at 08:01

## 2024-01-04 RX ADMIN — SPIRONOLACTONE 25 MG: 25 TABLET ORAL at 08:01

## 2024-01-04 RX ADMIN — TORSEMIDE 10 MG: 10 TABLET ORAL at 08:01

## 2024-01-04 RX ADMIN — AMIODARONE HYDROCHLORIDE 400 MG: 200 TABLET ORAL at 08:01

## 2024-01-04 RX ADMIN — HYDRALAZINE HYDROCHLORIDE 10 MG: 10 TABLET, FILM COATED ORAL at 03:01

## 2024-01-04 RX ADMIN — PANTOPRAZOLE SODIUM 40 MG: 40 TABLET, DELAYED RELEASE ORAL at 08:01

## 2024-01-04 RX ADMIN — DAPTOMYCIN 600 MG: 500 INJECTION, POWDER, LYOPHILIZED, FOR SOLUTION INTRAVENOUS at 09:01

## 2024-01-04 RX ADMIN — MIRTAZAPINE 15 MG: 15 TABLET, ORALLY DISINTEGRATING ORAL at 08:01

## 2024-01-04 NOTE — SUBJECTIVE & OBJECTIVE
Past Medical History:   Diagnosis Date    Hypertension        Past Surgical History:   Procedure Laterality Date    ECHOCARDIOGRAM,TRANSESOPHAGEAL N/A 12/26/2023    Procedure: Transesophageal echo (NEW) intra-procedure log documentation;  Surgeon: Provider, [facility];  Location: [facility] EP LAB;  Service: Cardiology;  Laterality: N/A;    EXCLUSION, LEFT ATRIAL APPENDAGE, OPEN, AS PART OF OPEN CHEST SURGERY Left 11/3/2023    Procedure: EXCLUSION, LEFT ATRIAL APPENDAGE, OPEN, AS PART OF OPEN CHEST SURGERY;  Surgeon: Manuel Beal MD;  Location: [facility] OR [address] [place] [address];  Service: Cardiothoracic;  Laterality: Left;    INSERTION OF INTRA-AORTIC BALLOON ASSIST DEVICE Right 11/2/2023    Procedure: INSERTION, INTRA-AORTIC BALLOON PUMP;  Surgeon: Jose Vidal MD;  Location: [facility] CATH LAB;  Service: Cardiology;  Laterality: Right;    REPAIR, MITRAL VALVE, OPEN N/A 11/3/2023    Procedure: REPAIR, MITRAL VALVE, OPEN;  Surgeon: Manuel Beal MD;  Location: [facility] OR [address] [place] [address];  Service: Cardiothoracic;  Laterality: N/A;       Review of patient's allergies indicates:  No Known Allergies  Family History    None       Tobacco Use    Smoking status: Unknown     Passive exposure: Never    Smokeless tobacco: Not on file   Substance and Sexual Activity    Alcohol use: Yes     Alcohol/week: 1.0 standard drink of alcohol     Types: 1 Cans of beer per week    Drug use: Never    Sexual activity: Not Currently     Partners: Female     Birth control/protection: None     Review of Systems   Constitutional:  Negative for activity change, appetite change and fatigue.   Respiratory:  Negative for cough, chest tightness, shortness of breath and stridor.    Cardiovascular:  Negative for chest pain.   Gastrointestinal:  Negative for abdominal distention, abdominal pain, constipation, diarrhea, nausea and vomiting.   Endocrine: Negative for cold intolerance, heat intolerance, polydipsia, polyphagia and polyuria.   Neurological:  Negative  for dizziness, syncope, weakness, light-headedness and headaches.     Objective:     Vital Signs (Most Recent):  Temp: 97.9 °F (36.6 °C) (01/04/24 0822)  Pulse: 66 (01/04/24 0822)  Resp: 20 (01/04/24 0822)  BP: (!) 93/55 (01/04/24 0822)  SpO2: 100 % (01/04/24 0822) Vital Signs (24h Range):  Temp:  [97.5 °F (36.4 °C)-98.4 °F (36.9 °C)] 97.9 °F (36.6 °C)  Pulse:  [61-76] 66  Resp:  [16-20] 20  SpO2:  [98 %-100 %] 100 %  BP: (85-94)/(54-59) 93/55     Weight: 70.3 kg (155 lb) (01/02/24 1741)  Body mass index is 21.02 kg/m².      Intake/Output Summary (Last 24 hours) at 1/4/2024 0906  Last data filed at 1/4/2024 0535  Gross per 24 hour   Intake 949 ml   Output 1800 ml   Net -851 ml       Lines/Drains/Airways       Peripherally Inserted Central Catheter Line  Duration             PICC Double Lumen 11/13/23 1509 right basilic 51 days                     Physical Exam  Constitutional:       Appearance: Normal appearance.   Cardiovascular:      Rate and Rhythm: Normal rate and regular rhythm.      Pulses: Normal pulses.   Pulmonary:      Effort: Pulmonary effort is normal. No respiratory distress.      Breath sounds: Normal breath sounds. No wheezing or rales.   Abdominal:      General: Abdomen is flat. Bowel sounds are normal.      Palpations: Abdomen is soft.      Tenderness: There is no abdominal tenderness. There is no guarding or rebound.   Musculoskeletal:         General: Normal range of motion.      Cervical back: Normal range of motion and neck supple.   Skin:     General: Skin is warm.   Neurological:      Mental Status: He is alert.   Psychiatric:         Mood and Affect: Mood normal.         Behavior: Behavior normal.          Significant Labs:  CBC:   Recent Labs   Lab 01/03/24  0412 01/03/24 2038 01/04/24  0517   WBC 10.17 11.51 10.69   HGB 8.5* 7.9* 7.7*   HCT 26.1* 24.5* 24.3*    373 357     CMP:   Recent Labs   Lab 01/04/24  0517   GLU 91   CALCIUM 9.2   ALBUMIN 2.9*   PROT 7.9   *   K 3.7    CO2 28   CL 95   BUN 36*   CREATININE 1.3   ALKPHOS 66   ALT 17   AST 26   BILITOT 0.4       Significant Imaging:  Imaging results within the past 24 hours have been reviewed.

## 2024-01-04 NOTE — ANESTHESIA PREPROCEDURE EVALUATION
[facility]  Anesthesia Pre-Operative Evaluation         Patient Name: Lorenzo Nino  YOB: 1975  MRN: [id number]    SUBJECTIVE:     Pre-operative evaluation for Procedure(s) (LRB):  EGD (ESOPHAGOGASTRODUODENOSCOPY) (N/A)  COLONOSCOPY (N/A)     01/04/2024    Lorenzo Nino is a 48 y.o. male w/ a significant PMHx of bacterial endocarditis and severe MR s/p urgent repair w/ porcine annuloplasty (11/3/23) complicated by MRSA endocarditis and bacteremia who is admitted to CICU for AHRF 2/2 significant pulmonary edema 2/2 severe mitral regurgitation concerning for anterior leaflet tear vs posterior leaflet restriction. Hospital course complicated by A-fib RVR for which he was started on digoxin, heparin gtt, and diltiazem gtt. He has since converted into NSR. H/H trending down with hematochezia    Patient now presents for the above procedure(s).    Echo Summary  Results for orders placed during the hospital encounter of 12/18/23    Echo    Interpretation Summary    Left Ventricle: The left ventricle is normal in size. Normal wall thickness. Regional wall motion abnormalities present. There is low normal systolic function with a visually estimated ejection fraction of 50 - 55%. There is normal diastolic function.    Right Ventricle: Normal right ventricular cavity size. Wall thickness is normal. Right ventricle wall motion  is normal. Systolic function is normal.    Th left atrium is severely dilated.    Mitral Valve: The mitral valve is repaired with a bovine pericardial annuloplasty band. There is flail motion of a small part of valvular tissue, perhaps from the posterior leaflet, that leads to eccentric MR directed somewhat laterally. There is severe regurgitation.    Tricuspid Valve: There is mild regurgitation.    Pulmonary Artery: The estimated pulmonary artery systolic pressure is 51 mmHg.    IVC/SVC: Elevated venous pressure at 15 mmHg.       Prev airway: None  documented.    LDA:   PICC Double Lumen 11/13/23 1509 right basilic (Active)   Line Necessity Review Longterm central access required 12/31/23 1756   Verification by X-ray Yes 12/31/23 1756   Site Assessment No warmth;No swelling;No redness;No drainage 01/03/24 2000   Extremity Assessment Distal to IV No warmth;No swelling;No redness;No abnormal discoloration 01/03/24 2000   Line Securement Device Secured with sutureless device 01/03/24 2000   Dressing Type CHG impregnated dressing/sponge 01/03/24 2000   Dressing Status Clean;Dry;Intact 01/03/24 2000   Dressing Intervention Integrity maintained 01/03/24 2000   Date on Dressing 01/02/24 01/03/24 2000   Dressing Due to be Changed 01/01/24 01/02/24 1600   Distal Patency/Care flushed w/o difficulty;blood return present 01/03/24 2000   Proximal 1 Patency/Care flushed w/o difficulty 01/03/24 2000   Waveform Not being transduced 12/28/23 2000   Number of days: 51       Drips: None documented.      Patient Active Problem List   Diagnosis    Septic shock    FRANCESCO (acute kidney injury)    Thrombocytopenia    Elevated transaminase level    Endocarditis    Elevated troponin    A-fib with RVR    Heart failure with mid-range ejection fraction (HFmEF)    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    Severe mitral regurgitation    Rupture of chordae tendineae    Surgical wound present    Transient hyperglycemia post procedure    S/P MVR (mitral valve repair)    Subretinal hemorrhage, right    HFmrEF    Hypokalemia    GI bleeding       Review of patient's allergies indicates:  No Known Allergies    Current Inpatient Medications:   amiodarone  400 mg Oral BID    DAPTOmycin (CUBICIN) IV (PEDS and ADULTS)  600 mg Intravenous Q24H    hydrALAZINE  10 mg Oral Q8H    metoprolol succinate  25 mg Oral Daily    mirtazapine  15 mg Oral Nightly    pantoprazole  40 mg Oral Daily    polyethylene glycol  4,000 mL Oral Once    polyethylene glycol  17 g Oral Daily     spironolactone  25 mg Oral Daily    torsemide  10 mg Oral Daily       No current facility-administered medications on file prior to encounter.     Current Outpatient Medications on File Prior to Encounter   Medication Sig Dispense Refill    metoprolol tartrate (LOPRESSOR) 50 MG tablet Take 1 tablet (50 mg total) by mouth 2 (two) times daily. 60 tablet 11       Past Surgical History:   Procedure Laterality Date    ECHOCARDIOGRAM,TRANSESOPHAGEAL N/A 12/26/2023    Procedure: Transesophageal echo (NEW) intra-procedure log documentation;  Surgeon: Provider, [facility];  Location: [facility] EP LAB;  Service: Cardiology;  Laterality: N/A;    EXCLUSION, LEFT ATRIAL APPENDAGE, OPEN, AS PART OF OPEN CHEST SURGERY Left 11/3/2023    Procedure: EXCLUSION, LEFT ATRIAL APPENDAGE, OPEN, AS PART OF OPEN CHEST SURGERY;  Surgeon: Manuel Beal MD;  Location: [facility] OR [address] [place] [address];  Service: Cardiothoracic;  Laterality: Left;    INSERTION OF INTRA-AORTIC BALLOON ASSIST DEVICE Right 11/2/2023    Procedure: INSERTION, INTRA-AORTIC BALLOON PUMP;  Surgeon: Jose Vidal MD;  Location: [facility] CATH LAB;  Service: Cardiology;  Laterality: Right;    REPAIR, MITRAL VALVE, OPEN N/A 11/3/2023    Procedure: REPAIR, MITRAL VALVE, OPEN;  Surgeon: Manuel Beal MD;  Location: [facility] OR [address] [place] [address];  Service: Cardiothoracic;  Laterality: N/A;       Social History:  Tobacco Use: Unknown (12/27/2023)    Patient History     Smoking Tobacco Use: Unknown     Smokeless Tobacco Use: Unknown     Passive Exposure: Never      Alcohol Use: Heavy Drinker (11/1/2023)    AUDIT-C     Frequency of Alcohol Consumption: 2-4 times a month     Average Number of Drinks: 10 or more     Frequency of Binge Drinking: Weekly        OBJECTIVE:     Vital Signs Range (Last 24H):  Temp:  [36.4 °C (97.5 °F)-36.9 °C (98.4 °F)]   Pulse:  [61-74]   Resp:  [16-20]   BP: (85-94)/(54-59)   SpO2:  [100 %]       Significant Labs:  Lab Results   Component Value Date    WBC 10.91  01/04/2024    HGB 7.9 (L) 01/04/2024    HCT 24.8 (L) 01/04/2024     01/04/2024    TRIG 370 (H) 10/31/2023    ALT 17 01/04/2024    AST 26 01/04/2024     (L) 01/04/2024    K 3.7 01/04/2024    CL 95 01/04/2024    CREATININE 1.3 01/04/2024    BUN 36 (H) 01/04/2024    CO2 28 01/04/2024    INR 1.1 12/18/2023    HGBA1C 5.8 (H) 11/06/2023       Diagnostic Studies: No relevant studies.    EKG:   Results for orders placed or performed during the hospital encounter of 12/18/23   EKG 12-lead    Collection Time: 01/03/24  5:11 AM    Narrative    Test Reason : I38,    Vent. Rate : 072 BPM     Atrial Rate : 072 BPM     P-R Int : 178 ms          QRS Dur : 098 ms      QT Int : 502 ms       P-R-T Axes : 079 061 062 degrees     QTc Int : 549 ms    Normal sinus rhythm  Minimal voltage criteria for LVH, may be normal variant ( [place] product )  Nonspecific T wave abnormality  Prolonged QT  Abnormal ECG  When compared with ECG of 01-JAN-2024 23:44,  No significant change was found  Confirmed by LAEX LEE MD ([id number]) on 1/4/2024 12:04:33 AM    Referred By: [email or web address]   SELF           Confirmed By:ALEX LEE MD       2D ECHO:  TTE:  Results for orders placed or performed during the hospital encounter of 12/18/23   Echo   Result Value Ref Range    BSA 1.85 m2    LVOT stroke volume 92.48 cm3    LVIDd 5.11 3.5 - 6.0 cm    LV Systolic Volume 31.52 mL    LV Systolic Volume Index 16.9 mL/m2    LVIDs 2.87 2.1 - 4.0 cm    LV Diastolic Volume 124.23 mL    LV Diastolic Volume Index 66.43 mL/m2    IVS 0.80 0.6 - 1.1 cm    LVOT diameter 2.18 cm    LVOT area 3.7 cm2    FS 44 28 - 44 %    Left Ventricle Relative Wall Thickness 0.34 cm    Posterior Wall 0.87 0.6 - 1.1 cm    LV mass 148.89 g    LV Mass Index 80 g/m2    MV Peak E Jabier 1.86 m/s    TDI LATERAL 0.11 m/s    TDI SEPTAL 0.07 m/s    E/E' ratio 20.67 m/s    MV Peak A Jabier 0.79 m/s    TR Max Jabier 3.02 m/s    E/A ratio 2.35     E wave deceleration time 246.91 msec    LV SEPTAL E/E'  RATIO 26.57 m/s    LA Volume Index 85.2 mL/m2    LV LATERAL E/E' RATIO 16.91 m/s    LA volume 159.32 cm3    LVOT peak brittany 1.51 m/s    RVDD 3.76 cm    TAPSE 1.12 cm    LA size 5.13 cm    Left Atrium Minor Axis 7.28 cm    Left Atrium Major Axis 6.69 cm    LA volume (mod) 139.35 cm3    LA WIDTH 5.24 cm    LA Volume Index (Mod) 74.5 mL/m2    RA Major Axis 5.45 cm    RA Width 3.20 cm    AV mean gradient 7 mmHg    AV peak gradient 12 mmHg    Ao peak brittany 1.76 m/s    Ao VTI 29.53 cm    LVOT peak VTI 24.79 cm    AV valve area 3.13 cm²    AV Velocity Ratio 0.86     AV index (prosthetic) 0.84     FATUMA by Velocity Ratio 3.20 cm²    Mr max brittany 0.05 m/s    MV mean gradient 6 mmHg    MV peak gradient 20 mmHg    MV stenosis pressure 1/2 time 71.60 ms    MV valve area p 1/2 method 3.07 cm2    MV valve area by continuity eq 2.16 cm2    MV VTI 42.87 cm    Triscuspid Valve Regurgitation Peak Gradient 36 mmHg    Sinus 3.19 cm    STJ 2.75 cm    Ascending aorta 3.28 cm    Mean e' 0.09 m/s    ZLVIDS -0.90     ZLVIDD -0.20     TV resting pulmonary artery pressure 51 mmHg    RV TB RVSP 18 mmHg    Est. RA pres 15 mmHg    Mitral Valve Heart Rate 88 bpm    Narrative      Left Ventricle: The left ventricle is normal in size. Normal wall   thickness. Regional wall motion abnormalities present. There is low normal   systolic function with a visually estimated ejection fraction of 50 - 55%.   There is normal diastolic function.    Right Ventricle: Normal right ventricular cavity size. Wall thickness   is normal. Right ventricle wall motion  is normal. Systolic function is   normal.    Th left atrium is severely dilated.    Mitral Valve: The mitral valve is repaired with a bovine pericardial   annuloplasty band. There is flail motion of a small part of valvular   tissue, perhaps from the posterior leaflet, that leads to eccentric MR   directed somewhat laterally. There is severe regurgitation.    Tricuspid Valve: There is mild regurgitation.     Pulmonary Artery: The estimated pulmonary artery systolic pressure is   51 mmHg.    IVC/SVC: Elevated venous pressure at 15 mmHg.         NEW:  Results for orders placed or performed during the hospital encounter of 12/18/23   Transesophageal echo (NEW)   Result Value Ref Range    BSA 1.85 m2    Sinus 3.6 cm    STJ 3.1 cm    Ascending aorta 3.1 cm    Narrative      NEW for evaluation of mitral valve.    Left Ventricle: The left ventricle is normal in size. Normal wall   thickness. Normal wall motion. There is low normal systolic function with   a visually estimated ejection fraction of 50 - 55%.    Right Ventricle: Normal right ventricular cavity size. Wall thickness   is normal. Right ventricle wall motion  is normal. Systolic function is   normal.    Aortic Valve: There is no mass/vegetation present.    Tricuspid Valve: There is no mass/vegetation present.    Severe mitral reguritaiton    Mitral Valve: Status post bovine pericardial annuloplasty band.   Dehiscense of band from 10 o'clock to 4 o'clock. Two regurgitant jets   noted. First jet due to failure of coaptation at A2-P2 secondary to   anuloplasty band dehiscence. Second jet from a perforation on the medial   aspect of P2, possibly at the site of prior triangular resection.         ASSESSMENT/PLAN:           Pre-op Assessment    I have reviewed the Patient Summary Reports.     I have reviewed the Nursing Notes.    I have reviewed the Medications.     Review of Systems  Anesthesia Hx:  No problems with previous Anesthesia   History of prior surgery of interest to airway management or planning: heart surgery.         Denies Family Hx of Anesthesia complications.    Denies Personal Hx of Anesthesia complications.                    Social:  Alcohol Use       Hematology/Oncology:  Hematology Normal   Oncology Normal    -- Denies Anemia:                                  EENT/Dental:  EENT/Dental Normal           Cardiovascular:      Denies Hypertension. Valvular  problems/Murmurs, MR   Denies CAD.    Dysrhythmias atrial fibrillation  CHF    no hyperlipidemia                       Hypertension         Pulmonary:  Pulmonary Normal   Denies COPD.  Denies Asthma.                    Renal/:  Renal/ Normal  Denies Chronic Renal Disease.        Kidney Function/Disease             Hepatic/GI:  Hepatic/GI Normal     Denies GERD.             Musculoskeletal:  Musculoskeletal Normal Denies Arthritis.               Neurological:  Neurology Normal   Denies CVA.    Denies Seizures.                                Endocrine:  Endocrine Normal Denies Diabetes.           Dermatological:  Skin Normal    Psych:  Psychiatric Normal  Denies Psychiatric History.                  Physical Exam  General: Well nourished, Cooperative, Alert and Oriented  Able to lay flat on room air.  Airway:  Mallampati: II / II  Mouth Opening: Normal  TM Distance: Normal  Tongue: Normal  Neck ROM: Normal ROM    Dental:  Dentures  4-6 teeth remain bottom row, nothing on top  Chest/Lungs:  Clear to auscultation, Normal Respiratory Rate    Heart:  Rate: Normal  Rhythm: Regular Rhythm  Sounds: Normal        Anesthesia Plan  Type of Anesthesia, risks & benefits discussed:    Anesthesia Type: Gen ETT, Gen Natural Airway, MAC, Gen Supraglottic Airway  Intra-op Monitoring Plan: Standard ASA Monitors  Post Op Pain Control Plan: multimodal analgesia and IV/PO Opioids PRN  Induction:  IV  Airway Plan: Direct, Post-Induction  Informed Consent: Informed consent signed with the Patient and all parties understand the risks and agree with anesthesia plan.  All questions answered.   ASA Score: 4  Day of Surgery Review of History & Physical: H&P Update referred to the surgeon/provider.    Ready For Surgery From Anesthesia Perspective.     .

## 2024-01-04 NOTE — PLAN OF CARE
Problem: Adult Inpatient Plan of Care  Goal: Plan of Care Review  Outcome: Ongoing, Progressing  Goal: Patient-Specific Goal (Individualized)  Outcome: Ongoing, Progressing  Goal: Optimal Comfort and Wellbeing  Outcome: Ongoing, Progressing  Goal: Readiness for Transition of Care  Outcome: Ongoing, Progressing     Problem: Bleeding (Sepsis/Septic Shock)  Goal: Absence of Bleeding  Outcome: Ongoing, Progressing     Problem: Glycemic Control Impaired (Sepsis/Septic Shock)  Goal: Blood Glucose Level Within Desired Range  Outcome: Ongoing, Progressing     Problem: Nutrition Impaired (Sepsis/Septic Shock)  Goal: Optimal Nutrition Intake  Outcome: Ongoing, Progressing     Problem: Fluid and Electrolyte Imbalance (Acute Kidney Injury/Impairment)  Goal: Fluid and Electrolyte Balance  Outcome: Ongoing, Progressing     Problem: Infection  Goal: Absence of Infection Signs and Symptoms  Outcome: Ongoing, Progressing     Problem: Fall Injury Risk  Goal: Absence of Fall and Fall-Related Injury  Outcome: Ongoing, Progressing    Pt AAOx4, VSS, & breathing unlabored. Bed in lowest position, locked, and call light in reach. Family remains at bedside. Plan of care and medications discussed with pt. Pt has no further questions at this time. Will continue plan of care.

## 2024-01-04 NOTE — HPI
48 years old male patient with ongoing medical history of MRSA bacteremia with mitral valve endocarditis s/p mitral valve repair 11/3/2023 is being consulted to gastroenterology for GI bleeding (BRBPR).   The patient is being seen and examined on the bedside, alert and oriented, denied any nausea, vomiting diarrhea and abdominal pain.The patient stated bright red blood per rectum started since yesterday again and he had one episode last week. He had 1 bowel movement in the morning it contained mild blood. The hematochezia was not associated with fever, chills or abdominal pain. The patient denied any NSAIDs use, no history of hemorrhoids, never had any EGD and colonoscopy. The patient also reported never had any melena, hematemesis or hematochezia in the past. The patient was recently being treated with heparin and digoxin for Afib with RVR, and later on was switched to Eliquis on 12/29.  Hbg trend 10.1, 10.0, 9.6, 9.0, 8.5, 7.9, 7.7, HCT 25%, MCV 87, BUN trend 36, 39, 46, 58 with normal liver enzymes.  Previous record showed the patient had iron deficiency anemia with iron 31, transferrin 213, saturated iron 10, he also underwent CT abdomen and pelvis this time results are following.   CT abdomen and Pelvis 12/28/2023 Liver: Normal in size and contour.  Subcentimeter right hepatic lobe hypodensity, too small to characterize.Gallbladder: No calcified gallstones.Bile Ducts: No evidence of dilated ducts.Pancreas: No mass or peripancreatic fat stranding.Spleen,Stomach and duodenum are Unremarkable. Extensive colonic diverticulosis.    CT abdomen and pelvis 10/30/2023 Small hiatal hernia and questionable distal esophageal wall thickening. Wall thickening of the transverse and proximal descending colon, presumably on the basis of nondistention although correlation for symptoms of colitis advised.     Past medical history is significant for HFrEF (45-50%) and mitral valve repair after mitral valve endocarditis.Currently  the patient is being treated for CHF exacerbation secondary to severe mitral valve regurgitation and cardiology was planning to repair the valve again.

## 2024-01-04 NOTE — ASSESSMENT & PLAN NOTE
S/p afib with RVR once admitted to the CCU. Now NSR     -On amio 400mg po bid for 14 days then 200mg daily   - Holding eliquis 5 BID for EGD/Colonoscopy  - continue metoprolol 25 mg daily   - monitor telemetry

## 2024-01-04 NOTE — CONSULTS
[facility]  Gastroenterology  Consult Note    Patient Name: Lorenzo Nino  MRN: [id number]  Admission Date: 12/18/2023  Hospital Length of Stay: 17 days  Code Status: Full Code   Attending Provider: Emerson Mercado MD   Consulting Provider: Nathanael Conde MD  Primary Care Physician: No, Primary Doctor  Principal Problem:MRSA bacteremia    Inpatient consult to Gastroenterology  Consult performed by: Nathanael Conde MD  Consult ordered by: Maura Ochoa MD        Subjective:     HPI:  48 years old male patient with ongoing medical history of MRSA bacteremia with mitral valve endocarditis s/p mitral valve repair 11/3/2023 is being consulted to gastroenterology for GI bleeding (BRBPR).   The patient is being seen and examined on the bedside, alert and oriented, denied any nausea, vomiting diarrhea and abdominal pain.The patient stated bright red blood per rectum started since yesterday again and he had one episode last week. He had 1 bowel movement in the morning it contained mild blood. The hematochezia was not associated with fever, chills or abdominal pain. The patient denied any NSAIDs use, no history of hemorrhoids, never had any EGD and colonoscopy. The patient also reported never had any melena, hematemesis or hematochezia in the past. The patient was recently being treated with heparin and digoxin for Afib with RVR, and later on was switched to Eliquis on 12/29.  Hbg trend 10.1, 10.0, 9.6, 9.0, 8.5, 7.9, 7.7, HCT 25%, MCV 87, BUN trend 36, 39, 46, 58 with normal liver enzymes.  Previous record showed the patient had iron deficiency anemia with iron 31, transferrin 213, saturated iron 10, he also underwent CT abdomen and pelvis this time results are following.   CT abdomen and Pelvis 12/28/2023 Liver: Normal in size and contour.  Subcentimeter right hepatic lobe hypodensity, too small to characterize.Gallbladder: No calcified gallstones.Bile Ducts: No evidence of dilated  ducts.Pancreas: No mass or peripancreatic fat stranding.Spleen,Stomach and duodenum are Unremarkable. Extensive colonic diverticulosis.    CT abdomen and pelvis 10/30/2023 Small hiatal hernia and questionable distal esophageal wall thickening. Wall thickening of the transverse and proximal descending colon, presumably on the basis of nondistention although correlation for symptoms of colitis advised.     Past medical history is significant for HFrEF (45-50%) and mitral valve repair after mitral valve endocarditis.Currently the patient is being treated for CHF exacerbation secondary to severe mitral valve regurgitation and cardiology was planning to repair the valve again.       Past Medical History:   Diagnosis Date    Hypertension        Past Surgical History:   Procedure Laterality Date    ECHOCARDIOGRAM,TRANSESOPHAGEAL N/A 12/26/2023    Procedure: Transesophageal echo (NEW) intra-procedure log documentation;  Surgeon: David, [facility];  Location: [facility] EP LAB;  Service: Cardiology;  Laterality: N/A;    EXCLUSION, LEFT ATRIAL APPENDAGE, OPEN, AS PART OF OPEN CHEST SURGERY Left 11/3/2023    Procedure: EXCLUSION, LEFT ATRIAL APPENDAGE, OPEN, AS PART OF OPEN CHEST SURGERY;  Surgeon: Manuel Beal MD;  Location: [facility] OR [address] [place] [address];  Service: Cardiothoracic;  Laterality: Left;    INSERTION OF INTRA-AORTIC BALLOON ASSIST DEVICE Right 11/2/2023    Procedure: INSERTION, INTRA-AORTIC BALLOON PUMP;  Surgeon: Jose Vidal MD;  Location: [facility] CATH LAB;  Service: Cardiology;  Laterality: Right;    REPAIR, MITRAL VALVE, OPEN N/A 11/3/2023    Procedure: REPAIR, MITRAL VALVE, OPEN;  Surgeon: Manuel Beal MD;  Location: [facility] OR [address] [place] [address];  Service: Cardiothoracic;  Laterality: N/A;       Review of patient's allergies indicates:  No Known Allergies  Family History    None       Tobacco Use    Smoking status: Unknown     Passive exposure: Never    Smokeless tobacco: Not on file   Substance and Sexual  Activity    Alcohol use: Yes     Alcohol/week: 1.0 standard drink of alcohol     Types: 1 Cans of beer per week    Drug use: Never    Sexual activity: Not Currently     Partners: Female     Birth control/protection: None     Review of Systems   Constitutional:  Negative for activity change, appetite change and fatigue.   Respiratory:  Negative for cough, chest tightness, shortness of breath and stridor.    Cardiovascular:  Negative for chest pain.   Gastrointestinal:  Negative for abdominal distention, abdominal pain, constipation, diarrhea, nausea and vomiting.   Endocrine: Negative for cold intolerance, heat intolerance, polydipsia, polyphagia and polyuria.   Neurological:  Negative for dizziness, syncope, weakness, light-headedness and headaches.     Objective:     Vital Signs (Most Recent):  Temp: 97.9 °F (36.6 °C) (01/04/24 0822)  Pulse: 66 (01/04/24 0822)  Resp: 20 (01/04/24 0822)  BP: (!) 93/55 (01/04/24 0822)  SpO2: 100 % (01/04/24 0822) Vital Signs (24h Range):  Temp:  [97.5 °F (36.4 °C)-98.4 °F (36.9 °C)] 97.9 °F (36.6 °C)  Pulse:  [61-76] 66  Resp:  [16-20] 20  SpO2:  [98 %-100 %] 100 %  BP: (85-94)/(54-59) 93/55     Weight: 70.3 kg (155 lb) (01/02/24 1741)  Body mass index is 21.02 kg/m².      Intake/Output Summary (Last 24 hours) at 1/4/2024 0906  Last data filed at 1/4/2024 0535  Gross per 24 hour   Intake 949 ml   Output 1800 ml   Net -851 ml       Lines/Drains/Airways       Peripherally Inserted Central Catheter Line  Duration             PICC Double Lumen 11/13/23 1509 right basilic 51 days                     Physical Exam  Constitutional:       Appearance: Normal appearance.   Cardiovascular:      Rate and Rhythm: Normal rate and regular rhythm.      Pulses: Normal pulses.   Pulmonary:      Effort: Pulmonary effort is normal. No respiratory distress.      Breath sounds: Normal breath sounds. No wheezing or rales.   Abdominal:      General: Abdomen is flat. Bowel sounds are normal and equal in all  quadrants      Palpations: Abdomen is soft.      Tenderness: There is no abdominal tenderness. There is no guarding or rebound.   Rectal exam, prolapsed internal hemorrhoids.   Musculoskeletal:         General: Normal range of motion.      Cervical back: Normal range of motion and neck supple.   Skin:     General: Skin is warm.   Neurological:      Mental Status: He is alert.   Psychiatric:         Mood and Affect: Mood normal.         Behavior: Behavior normal.          Significant Labs:  CBC:   Recent Labs   Lab 01/03/24  0412 01/03/24  2038 01/04/24  0517   WBC 10.17 11.51 10.69   HGB 8.5* 7.9* 7.7*   HCT 26.1* 24.5* 24.3*    373 357     CMP:   Recent Labs   Lab 01/04/24  0517   GLU 91   CALCIUM 9.2   ALBUMIN 2.9*   PROT 7.9   *   K 3.7   CO2 28   CL 95   BUN 36*   CREATININE 1.3   ALKPHOS 66   ALT 17   AST 26   BILITOT 0.4       Significant Imaging:  Imaging results within the past 24 hours have been reviewed.  Assessment/Plan:     GI  GI bleeding  48 years old male patient with on going medical history of MRSA bacteremia with mitral valve endocarditis is being consulted to gastroenterology for hematochezia.  Being followed by cardiology and ID, recently started on Daptomycin  The patient was recently being treated with heparin and digoxin for Afib with RVR, and later on was switched to Eliquis on 12/29.  Hbg trend 10.1, 10.0, 9.6, 9.0, 8.5, 7.9, 7.7, HCT 25%, MCV 87, BUN trend 36, 39, 46, 58 with normal liver enzymes.  Previous record showed the patient had iron deficiency anemia with iron 31, transferrin 213, saturated iron 10  Denied any NSAIDs use, BC powder use, Never had EGD an colonoscopy      Recommendations  Continue to check Hbg, transfuse if needed to keep target Hbg>7  Avoid NSAIDs, and anti-coagulation unless contraindicated further input from cardiology.  Continue PPI  We will perform EGD and colonoscopy tomorrow, updated the family regarding the procedure.   Clear liquid diet for now,  NPO after midnight and we will start bowel prep in the morning          Thank you for your consult. I will follow-up with patient. Please contact us if you have any additional questions.    Nathanael Conde MD  Gastroenterology  [facility] - [facility]

## 2024-01-04 NOTE — ASSESSMENT & PLAN NOTE
Having multiple episodes of bowel movements with bloody streaks. H/H trending downward. Has been ongoing since October 2023. On Eliquis 5mg BID. No previous endoscopic intervention. GI Consulted for concern for LGIB    - NPO at midnight for EGD and Colonoscopy tomorrow; Clear liquid diet until tonight  - Hold all NSAIDs and anticoagulants, unless contraindicated  - PO Protonix 40mg administered   - Please correct any coagulopathy with platelets and FFP for goal of platelets >50K and INR <2.0  - Please notify GI team if there is significant change in patient's clinical status

## 2024-01-04 NOTE — PLAN OF CARE
Patient has been tolerating NPO diet overnight. No new complaints. Vitals have been stable. Oriented x4, bed in lowest position, and call light within reach.  Problem: Adult Inpatient Plan of Care  Goal: Plan of Care Review  Outcome: Ongoing, Progressing  Goal: Patient-Specific Goal (Individualized)  Outcome: Ongoing, Progressing  Goal: Absence of Hospital-Acquired Illness or Injury  Outcome: Ongoing, Progressing  Goal: Optimal Comfort and Wellbeing  Outcome: Ongoing, Progressing  Goal: Readiness for Transition of Care  Outcome: Ongoing, Progressing     Problem: Adjustment to Illness (Sepsis/Septic Shock)  Goal: Optimal Coping  Outcome: Ongoing, Progressing

## 2024-01-04 NOTE — ASSESSMENT & PLAN NOTE
48 years old male patient with on going medical history of MRSA bacteremia with mitral valve endocarditis is being consulted to gastroenterology for hematochezia.  Being followed by cardiology and ID, recently started on Daptomycin  The patient was recently being treated with heparin and digoxin for Afib with RVR, and later on was switched to Eliquis on 12/29.  Hbg trend 10.1, 10.0, 9.6, 9.0, 8.5, 7.9, 7.7, HCT 25%, MCV 87, BUN trend 36, 39, 46, 58 with normal liver enzymes.  Previous record showed the patient had iron deficiency anemia with iron 31, transferrin 213, saturated iron 10  Denied any NSAIDs use, BC powder use, Never had EGD an colonoscopy      Recommendations  Continue to check Hbg, transfuse if needed to keep target Hbg>7  Avoid NSAIDs, and anti-coagulation unless contraindicated further input from cardiology.  Continue PPI  We will perform EGD and colonoscopy tomorrow, updated the family regarding the procedure.

## 2024-01-04 NOTE — PROGRESS NOTES
[facility]  Cardiology  Progress Note    Patient Name: Lorenzo Nino  MRN: [id number]  Admission Date: 12/18/2023  Hospital Length of Stay: 17 days  Code Status: Full Code   Attending Physician: Emerson Mercado MD   Primary Care Physician: Terrie, Primary Doctor  Expected Discharge Date: 1/6/2024  Principal Problem:MRSA bacteremia    Subjective:     Hospital Course:   Mr. Lorenzo Nino is a 48 y.o. 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 Presented for 3-day dyspnea. Patient was transferred to the CICU for AHRF 2/2 significant pulmonary edema 2/2 severe mitral regurgitation concerning for anterior leaflet tear vs posterior leaflet restriction.  Upon admission to the CICU on afib w/ RVR. Given 1 dose of digoxin and started on heparin gtt and diltiazem gtt. Stabilized on sinus rhythm. CTS evaluated patient and agree on continuing aggressive diuresis with lasix gtt, at the moment not indicated IABP.. S/p NEW on 12/26 with severe MR with 2 jets, including posterior jet likely secondary to tear/perforation. On IV vancomycin. CTS to follow as outpatient (Dr. Ascencio).  ID plan with  daptomycin 8mg/kg x 2 weeks and follow with PO doxycyline until new valve replaced. ID recommended intraoperative tissue cx. Optimized diuresis for volume status. Off heparin gtt, started on eliquis. Patient started on GDMT but developed FRANCESCO so GDMT were held. Renal function improved and diuretics were slowly added back on. He reported some bright red bloody bowel movements with hemoglobin trending downwards, concerning for a lower GI bleed. GI was consulted for recommendations. Plan for colonoscopy and EGD while inpatient     Interval History: NAEON. He reports doing better this morning. H/H continues to trend downward. GI consulted with plans for EGD and Colonoscopy tomorrow.     Review of Systems   Constitutional: Positive for decreased  appetite (Improving) and malaise/fatigue.   Cardiovascular:  Negative for chest pain, claudication, dyspnea on exertion and leg swelling.   Respiratory:  Negative for cough and shortness of breath.      Objective:     Vital Signs (Most Recent):  Temp: 97.9 °F (36.6 °C) (01/04/24 0822)  Pulse: 66 (01/04/24 0822)  Resp: 20 (01/04/24 0822)  BP: (!) 93/55 (01/04/24 0822)  SpO2: 100 % (01/04/24 0822) Vital Signs (24h Range):  Temp:  [97.5 °F (36.4 °C)-98.4 °F (36.9 °C)] 97.9 °F (36.6 °C)  Pulse:  [61-74] 66  Resp:  [16-20] 20  SpO2:  [100 %] 100 %  BP: (85-94)/(54-59) 93/55     Weight: 70.3 kg (155 lb)  Body mass index is 21.02 kg/m².     SpO2: 100 %         Intake/Output Summary (Last 24 hours) at 1/4/2024 1103  Last data filed at 1/4/2024 0535  Gross per 24 hour   Intake 831 ml   Output 1400 ml   Net -569 ml         Lines/Drains/Airways       Peripherally Inserted Central Catheter Line  Duration             PICC Double Lumen 11/13/23 1509 right basilic 51 days                       Physical Exam  Constitutional:       General: He is not in acute distress.     Appearance: Normal appearance. He is ill-appearing.   HENT:      Head: Normocephalic.      Right Ear: External ear normal.      Left Ear: External ear normal.   Eyes:      General: No scleral icterus.        Right eye: No discharge.         Left eye: No discharge.      Extraocular Movements: Extraocular movements intact.   Cardiovascular:      Pulses: Normal pulses.      Heart sounds: Normal heart sounds.   Pulmonary:      Effort: Pulmonary effort is normal.      Breath sounds: Normal breath sounds.      Comments: Shallow breaths  Abdominal:      General: Abdomen is flat.      Palpations: Abdomen is soft.   Musculoskeletal:      Right lower leg: No edema.      Left lower leg: No edema.   Skin:     General: Skin is warm.      Coloration: Skin is not jaundiced or pale.   Neurological:      General: No focal deficit present.      Mental Status: He is alert and  oriented to person, place, and time.   Psychiatric:         Mood and Affect: Mood normal.         Behavior: Behavior normal.            Significant Labs: All pertinent lab results from the last 24 hours have been reviewed.    Significant Imaging: All pertinent imaging results from the last 24 hours have been reviewed.  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 Echo with EF 50-53% severe MR and severe LA dilation. Optimized rate control with metoprolol 25 TID. Increased acute phase reactants. S/p NEW on 12/26 with severe MR with 2 jets, including posterior jet likely secondary to tear/perforation.     Plan:  - cardiac diet   - Restarting Spirinolactone and Torsemide, continue to hold dapagliflozin and metolazone.   - continue daptomycin x 2 weeks (Home Health; started 12/30/23) and follow with PO doxycyline until new valve replaced. ID recommended intraoperative tissue cx.  - blood cx with NGTD   - CTS will follow as outpatient    - ID will follow as outpatient  - Will reach out to  about transitional care clinic appt         GI bleeding  Having multiple episodes of bowel movements with bloody streaks. H/H trending downward. Has been ongoing since October 2023. On Eliquis 5mg BID. No previous endoscopic intervention. GI Consulted for concern for LGIB    - NPO at midnight for EGD and Colonoscopy tomorrow; Clear liquid diet until tonight  - Hold all NSAIDs and  anticoagulants, unless contraindicated  - PO Protonix 40mg administered   - Please correct any coagulopathy with platelets and FFP for goal of platelets >50K and INR <2.0  - Please notify GI team if there is significant change in patient's clinical status    Hypokalemia  K 2.6 >> 2.8 despite ~60 meq of K; Resolving, Now WNL     - Replete PRN for K 4.0     HFmrEF  - See Severe MR    A-fib with RVR  S/p afib with RVR once admitted to the CCU. Now NSR     -On amio 400mg po bid for 14 days then 200mg daily   - Holding eliquis 5 BID for EGD/Colonoscopy  - continue metoprolol 25 mg daily   - monitor telemetry    Endocarditis  See severe MR     FRANCESCO (acute kidney injury)  Current Cr 1.5, Baseline 1.0 on admission; Likely 2/2 poor PO intake and initiation of GDMT and diuretics.    Plan:  - Resolving. Slowly adding back diuretics.   - Trend Cr/ Serial BMPs  - Strict I&Os, close monitoring of UOP  - Replace electrolytes PRN, goal K/Phos/Mg 4/3/2  - Avoid nephrotoxic agents when feasible (NSAIDs, ACEi/ARB, IV radiocontrast, gadolinium, etc.)  - Avoid Fleet's and Brown Bomb Enemas given their propensity to induce hypermagnesemia  - Renally dose all meds to eGFR  - Goal MAP > 65 mmHg  - No acute indications for RRT at this time           VTE Risk Mitigation (From admission, onward)      None            Tisha Cruz MD  Cardiology  [facility]

## 2024-01-04 NOTE — SUBJECTIVE & OBJECTIVE
Interval History: NAEON. He reports doing better this morning. H/H continues to trend downward. GI consulted with plans for EGD and Colonoscopy tomorrow.     Review of Systems   Constitutional: Positive for decreased appetite (Improving) and malaise/fatigue.   Cardiovascular:  Negative for chest pain, claudication, dyspnea on exertion and leg swelling.   Respiratory:  Negative for cough and shortness of breath.      Objective:     Vital Signs (Most Recent):  Temp: 97.9 °F (36.6 °C) (01/04/24 0822)  Pulse: 66 (01/04/24 0822)  Resp: 20 (01/04/24 0822)  BP: (!) 93/55 (01/04/24 0822)  SpO2: 100 % (01/04/24 0822) Vital Signs (24h Range):  Temp:  [97.5 °F (36.4 °C)-98.4 °F (36.9 °C)] 97.9 °F (36.6 °C)  Pulse:  [61-74] 66  Resp:  [16-20] 20  SpO2:  [100 %] 100 %  BP: (85-94)/(54-59) 93/55     Weight: 70.3 kg (155 lb)  Body mass index is 21.02 kg/m².     SpO2: 100 %         Intake/Output Summary (Last 24 hours) at 1/4/2024 1103  Last data filed at 1/4/2024 0535  Gross per 24 hour   Intake 831 ml   Output 1400 ml   Net -569 ml         Lines/Drains/Airways       Peripherally Inserted Central Catheter Line  Duration             PICC Double Lumen 11/13/23 1509 right basilic 51 days                       Physical Exam  Constitutional:       General: He is not in acute distress.     Appearance: Normal appearance. He is ill-appearing.   HENT:      Head: Normocephalic.      Right Ear: External ear normal.      Left Ear: External ear normal.   Eyes:      General: No scleral icterus.        Right eye: No discharge.         Left eye: No discharge.      Extraocular Movements: Extraocular movements intact.   Cardiovascular:      Pulses: Normal pulses.      Heart sounds: Normal heart sounds.   Pulmonary:      Effort: Pulmonary effort is normal.      Breath sounds: Normal breath sounds.      Comments: Shallow breaths  Abdominal:      General: Abdomen is flat.      Palpations: Abdomen is soft.   Musculoskeletal:      Right lower leg: No  edema.      Left lower leg: No edema.   Skin:     General: Skin is warm.      Coloration: Skin is not jaundiced or pale.   Neurological:      General: No focal deficit present.      Mental Status: He is alert and oriented to person, place, and time.   Psychiatric:         Mood and Affect: Mood normal.         Behavior: Behavior normal.            Significant Labs: All pertinent lab results from the last 24 hours have been reviewed.    Significant Imaging: All pertinent imaging results from the last 24 hours have been reviewed.

## 2024-01-05 ENCOUNTER — ANESTHESIA (OUTPATIENT)
Dept: ENDOSCOPY | Facility: HOSPITAL | Age: 49
DRG: 291 | End: 2024-01-05
Payer: COMMERCIAL

## 2024-01-05 LAB
ABO + RH BLD: NORMAL
ALBUMIN SERPL BCP-MCNC: 2.9 G/DL (ref 3.5–5.2)
ALP SERPL-CCNC: 64 U/L (ref 55–135)
ALT SERPL W/O P-5'-P-CCNC: 20 U/L (ref 10–44)
ANION GAP SERPL CALC-SCNC: 12 MMOL/L (ref 8–16)
ANISOCYTOSIS BLD QL SMEAR: SLIGHT
ANISOCYTOSIS BLD QL SMEAR: SLIGHT
AST SERPL-CCNC: 31 U/L (ref 10–40)
BASOPHILS # BLD AUTO: 0.07 K/UL (ref 0–0.2)
BASOPHILS # BLD AUTO: 0.08 K/UL (ref 0–0.2)
BASOPHILS NFR BLD: 0.7 % (ref 0–1.9)
BASOPHILS NFR BLD: 0.8 % (ref 0–1.9)
BILIRUB SERPL-MCNC: 0.5 MG/DL (ref 0.1–1)
BLD GP AB SCN CELLS X3 SERPL QL: NORMAL
BLD PROD TYP BPU: NORMAL
BLOOD UNIT EXPIRATION DATE: NORMAL
BLOOD UNIT TYPE CODE: 5100
BLOOD UNIT TYPE: NORMAL
BUN SERPL-MCNC: 26 MG/DL (ref 6–20)
CALCIUM SERPL-MCNC: 9.4 MG/DL (ref 8.7–10.5)
CHLORIDE SERPL-SCNC: 94 MMOL/L (ref 95–110)
CO2 SERPL-SCNC: 30 MMOL/L (ref 23–29)
CODING SYSTEM: NORMAL
CREAT SERPL-MCNC: 1.3 MG/DL (ref 0.5–1.4)
CROSSMATCH INTERPRETATION: NORMAL
DACRYOCYTES BLD QL SMEAR: ABNORMAL
DIFFERENTIAL METHOD BLD: ABNORMAL
DIFFERENTIAL METHOD BLD: ABNORMAL
DISPENSE STATUS: NORMAL
EOSINOPHIL # BLD AUTO: 0.3 K/UL (ref 0–0.5)
EOSINOPHIL # BLD AUTO: 0.5 K/UL (ref 0–0.5)
EOSINOPHIL NFR BLD: 3.6 % (ref 0–8)
EOSINOPHIL NFR BLD: 4.5 % (ref 0–8)
ERYTHROCYTE [DISTWIDTH] IN BLOOD BY AUTOMATED COUNT: 18.1 % (ref 11.5–14.5)
ERYTHROCYTE [DISTWIDTH] IN BLOOD BY AUTOMATED COUNT: 18.6 % (ref 11.5–14.5)
EST. GFR  (NO RACE VARIABLE): >60 ML/MIN/1.73 M^2
FERRITIN SERPL-MCNC: 407 NG/ML (ref 20–300)
FOLATE SERPL-MCNC: 8.9 NG/ML (ref 4–24)
GLUCOSE SERPL-MCNC: 87 MG/DL (ref 70–110)
HCT VFR BLD AUTO: 23.4 % (ref 40–54)
HCT VFR BLD AUTO: 26.8 % (ref 40–54)
HGB BLD-MCNC: 7.4 G/DL (ref 14–18)
HGB BLD-MCNC: 8.5 G/DL (ref 14–18)
HYPOCHROMIA BLD QL SMEAR: ABNORMAL
IMM GRANULOCYTES # BLD AUTO: 0.07 K/UL (ref 0–0.04)
IMM GRANULOCYTES # BLD AUTO: 0.15 K/UL (ref 0–0.04)
IMM GRANULOCYTES NFR BLD AUTO: 0.8 % (ref 0–0.5)
IMM GRANULOCYTES NFR BLD AUTO: 1.4 % (ref 0–0.5)
IRON SERPL-MCNC: 30 UG/DL (ref 45–160)
LYMPHOCYTES # BLD AUTO: 3.6 K/UL (ref 1–4.8)
LYMPHOCYTES # BLD AUTO: 3.7 K/UL (ref 1–4.8)
LYMPHOCYTES NFR BLD: 34.9 % (ref 18–48)
LYMPHOCYTES NFR BLD: 39 % (ref 18–48)
MAGNESIUM SERPL-MCNC: 2 MG/DL (ref 1.6–2.6)
MCH RBC QN AUTO: 26 PG (ref 27–31)
MCH RBC QN AUTO: 26.9 PG (ref 27–31)
MCHC RBC AUTO-ENTMCNC: 31.6 G/DL (ref 32–36)
MCHC RBC AUTO-ENTMCNC: 31.7 G/DL (ref 32–36)
MCV RBC AUTO: 82 FL (ref 82–98)
MCV RBC AUTO: 85 FL (ref 82–98)
MONOCYTES # BLD AUTO: 1 K/UL (ref 0.3–1)
MONOCYTES # BLD AUTO: 1.1 K/UL (ref 0.3–1)
MONOCYTES NFR BLD: 10.5 % (ref 4–15)
MONOCYTES NFR BLD: 11.3 % (ref 4–15)
NEUTROPHILS # BLD AUTO: 4.1 K/UL (ref 1.8–7.7)
NEUTROPHILS # BLD AUTO: 5.1 K/UL (ref 1.8–7.7)
NEUTROPHILS NFR BLD: 44.5 % (ref 38–73)
NEUTROPHILS NFR BLD: 48 % (ref 38–73)
NRBC BLD-RTO: 0 /100 WBC
NRBC BLD-RTO: 0 /100 WBC
NUM UNITS TRANS PACKED RBC: NORMAL
OVALOCYTES BLD QL SMEAR: ABNORMAL
OVALOCYTES BLD QL SMEAR: ABNORMAL
PHOSPHATE SERPL-MCNC: 3.7 MG/DL (ref 2.7–4.5)
PLATELET # BLD AUTO: 319 K/UL (ref 150–450)
PLATELET # BLD AUTO: 328 K/UL (ref 150–450)
PLATELET BLD QL SMEAR: ABNORMAL
PLATELET BLD QL SMEAR: ABNORMAL
PMV BLD AUTO: 11 FL (ref 9.2–12.9)
PMV BLD AUTO: 11.1 FL (ref 9.2–12.9)
POIKILOCYTOSIS BLD QL SMEAR: SLIGHT
POIKILOCYTOSIS BLD QL SMEAR: SLIGHT
POLYCHROMASIA BLD QL SMEAR: ABNORMAL
POTASSIUM SERPL-SCNC: 3.2 MMOL/L (ref 3.5–5.1)
PROT SERPL-MCNC: 7.8 G/DL (ref 6–8.4)
RBC # BLD AUTO: 2.85 M/UL (ref 4.6–6.2)
RBC # BLD AUTO: 3.16 M/UL (ref 4.6–6.2)
SATURATED IRON: 8 % (ref 20–50)
SCHISTOCYTES BLD QL SMEAR: ABNORMAL
SCHISTOCYTES BLD QL SMEAR: PRESENT
SODIUM SERPL-SCNC: 136 MMOL/L (ref 136–145)
SPECIMEN OUTDATE: NORMAL
SPHEROCYTES BLD QL SMEAR: ABNORMAL
TOTAL IRON BINDING CAPACITY: 371 UG/DL (ref 250–450)
TOXIC GRANULES BLD QL SMEAR: PRESENT
TRANSFERRIN SERPL-MCNC: 251 MG/DL (ref 200–375)
VIT B12 SERPL-MCNC: 282 PG/ML (ref 210–950)
WBC # BLD AUTO: 10.72 K/UL (ref 3.9–12.7)
WBC # BLD AUTO: 9.15 K/UL (ref 3.9–12.7)

## 2024-01-05 PROCEDURE — 93005 ELECTROCARDIOGRAM TRACING: CPT

## 2024-01-05 PROCEDURE — 99231 SBSQ HOSP IP/OBS SF/LOW 25: CPT | Mod: ,,, | Performed by: INTERNAL MEDICINE

## 2024-01-05 PROCEDURE — 88305 TISSUE EXAM BY PATHOLOGIST: CPT | Performed by: PATHOLOGY

## 2024-01-05 PROCEDURE — 99900035 HC TECH TIME PER 15 MIN (STAT)

## 2024-01-05 PROCEDURE — 37000009 HC ANESTHESIA EA ADD 15 MINS: Performed by: INTERNAL MEDICINE

## 2024-01-05 PROCEDURE — 82607 VITAMIN B-12: CPT | Performed by: STUDENT IN AN ORGANIZED HEALTH CARE EDUCATION/TRAINING PROGRAM

## 2024-01-05 PROCEDURE — 45381 COLONOSCOPY SUBMUCOUS NJX: CPT | Performed by: INTERNAL MEDICINE

## 2024-01-05 PROCEDURE — 45380 COLONOSCOPY AND BIOPSY: CPT | Performed by: INTERNAL MEDICINE

## 2024-01-05 PROCEDURE — 27201012 HC FORCEPS, HOT/COLD, DISP: Performed by: INTERNAL MEDICINE

## 2024-01-05 PROCEDURE — 0DJ08ZZ INSPECTION OF UPPER INTESTINAL TRACT, VIA NATURAL OR ARTIFICIAL OPENING ENDOSCOPIC: ICD-10-PCS | Performed by: INTERNAL MEDICINE

## 2024-01-05 PROCEDURE — 25000003 PHARM REV CODE 250: Performed by: INTERNAL MEDICINE

## 2024-01-05 PROCEDURE — 84100 ASSAY OF PHOSPHORUS: CPT | Performed by: STUDENT IN AN ORGANIZED HEALTH CARE EDUCATION/TRAINING PROGRAM

## 2024-01-05 PROCEDURE — 0DBN8ZX EXCISION OF SIGMOID COLON, VIA NATURAL OR ARTIFICIAL OPENING ENDOSCOPIC, DIAGNOSTIC: ICD-10-PCS | Performed by: INTERNAL MEDICINE

## 2024-01-05 PROCEDURE — 43235 EGD DIAGNOSTIC BRUSH WASH: CPT | Mod: 51,,, | Performed by: INTERNAL MEDICINE

## 2024-01-05 PROCEDURE — 63600175 PHARM REV CODE 636 W HCPCS: Performed by: NURSE ANESTHETIST, CERTIFIED REGISTERED

## 2024-01-05 PROCEDURE — P9016 RBC LEUKOCYTES REDUCED: HCPCS | Performed by: STUDENT IN AN ORGANIZED HEALTH CARE EDUCATION/TRAINING PROGRAM

## 2024-01-05 PROCEDURE — 25000003 PHARM REV CODE 250: Performed by: NURSE ANESTHETIST, CERTIFIED REGISTERED

## 2024-01-05 PROCEDURE — 20600001 HC STEP DOWN PRIVATE ROOM

## 2024-01-05 PROCEDURE — 63600175 PHARM REV CODE 636 W HCPCS: Performed by: INTERNAL MEDICINE

## 2024-01-05 PROCEDURE — 93010 ELECTROCARDIOGRAM REPORT: CPT | Mod: ,,, | Performed by: INTERNAL MEDICINE

## 2024-01-05 PROCEDURE — D9220A PRA ANESTHESIA: Mod: CRNA,,, | Performed by: NURSE ANESTHETIST, CERTIFIED REGISTERED

## 2024-01-05 PROCEDURE — 25000003 PHARM REV CODE 250

## 2024-01-05 PROCEDURE — 63600175 PHARM REV CODE 636 W HCPCS: Performed by: STUDENT IN AN ORGANIZED HEALTH CARE EDUCATION/TRAINING PROGRAM

## 2024-01-05 PROCEDURE — 86920 COMPATIBILITY TEST SPIN: CPT | Performed by: STUDENT IN AN ORGANIZED HEALTH CARE EDUCATION/TRAINING PROGRAM

## 2024-01-05 PROCEDURE — 85025 COMPLETE CBC W/AUTO DIFF WBC: CPT | Performed by: STUDENT IN AN ORGANIZED HEALTH CARE EDUCATION/TRAINING PROGRAM

## 2024-01-05 PROCEDURE — 86850 RBC ANTIBODY SCREEN: CPT | Performed by: STUDENT IN AN ORGANIZED HEALTH CARE EDUCATION/TRAINING PROGRAM

## 2024-01-05 PROCEDURE — 80053 COMPREHEN METABOLIC PANEL: CPT | Performed by: STUDENT IN AN ORGANIZED HEALTH CARE EDUCATION/TRAINING PROGRAM

## 2024-01-05 PROCEDURE — 82746 ASSAY OF FOLIC ACID SERUM: CPT | Performed by: STUDENT IN AN ORGANIZED HEALTH CARE EDUCATION/TRAINING PROGRAM

## 2024-01-05 PROCEDURE — 45381 COLONOSCOPY SUBMUCOUS NJX: CPT | Mod: ,,, | Performed by: INTERNAL MEDICINE

## 2024-01-05 PROCEDURE — 25000003 PHARM REV CODE 250: Performed by: STUDENT IN AN ORGANIZED HEALTH CARE EDUCATION/TRAINING PROGRAM

## 2024-01-05 PROCEDURE — 94761 N-INVAS EAR/PLS OXIMETRY MLT: CPT

## 2024-01-05 PROCEDURE — 37000008 HC ANESTHESIA 1ST 15 MINUTES: Performed by: INTERNAL MEDICINE

## 2024-01-05 PROCEDURE — 43235 EGD DIAGNOSTIC BRUSH WASH: CPT | Performed by: INTERNAL MEDICINE

## 2024-01-05 PROCEDURE — 83735 ASSAY OF MAGNESIUM: CPT | Performed by: STUDENT IN AN ORGANIZED HEALTH CARE EDUCATION/TRAINING PROGRAM

## 2024-01-05 PROCEDURE — 45380 COLONOSCOPY AND BIOPSY: CPT | Mod: ,,, | Performed by: INTERNAL MEDICINE

## 2024-01-05 PROCEDURE — D9220A PRA ANESTHESIA: Mod: ANES,,, | Performed by: ANESTHESIOLOGY

## 2024-01-05 PROCEDURE — 83540 ASSAY OF IRON: CPT | Performed by: STUDENT IN AN ORGANIZED HEALTH CARE EDUCATION/TRAINING PROGRAM

## 2024-01-05 PROCEDURE — 82728 ASSAY OF FERRITIN: CPT | Performed by: STUDENT IN AN ORGANIZED HEALTH CARE EDUCATION/TRAINING PROGRAM

## 2024-01-05 PROCEDURE — 88305 TISSUE EXAM BY PATHOLOGIST: CPT | Mod: 26,,, | Performed by: PATHOLOGY

## 2024-01-05 RX ORDER — OXYCODONE HYDROCHLORIDE 5 MG/1
5 TABLET ORAL
Status: CANCELLED | OUTPATIENT
Start: 2024-01-05

## 2024-01-05 RX ORDER — HYDROMORPHONE HYDROCHLORIDE 1 MG/ML
0.2 INJECTION, SOLUTION INTRAMUSCULAR; INTRAVENOUS; SUBCUTANEOUS EVERY 5 MIN PRN
Status: CANCELLED | OUTPATIENT
Start: 2024-01-05

## 2024-01-05 RX ORDER — HYDROCODONE BITARTRATE AND ACETAMINOPHEN 500; 5 MG/1; MG/1
TABLET ORAL
Status: DISCONTINUED | OUTPATIENT
Start: 2024-01-05 | End: 2024-01-08 | Stop reason: HOSPADM

## 2024-01-05 RX ORDER — HALOPERIDOL 5 MG/ML
0.5 INJECTION INTRAMUSCULAR EVERY 10 MIN PRN
Status: CANCELLED | OUTPATIENT
Start: 2024-01-05

## 2024-01-05 RX ORDER — PHENYLEPHRINE HYDROCHLORIDE 10 MG/ML
INJECTION INTRAVENOUS
Status: DISCONTINUED | OUTPATIENT
Start: 2024-01-05 | End: 2024-01-05

## 2024-01-05 RX ORDER — PROPOFOL 10 MG/ML
VIAL (ML) INTRAVENOUS
Status: DISCONTINUED | OUTPATIENT
Start: 2024-01-05 | End: 2024-01-05

## 2024-01-05 RX ORDER — FUROSEMIDE 10 MG/ML
60 INJECTION INTRAMUSCULAR; INTRAVENOUS ONCE
Status: COMPLETED | OUTPATIENT
Start: 2024-01-05 | End: 2024-01-05

## 2024-01-05 RX ORDER — LIDOCAINE HYDROCHLORIDE 20 MG/ML
INJECTION INTRAVENOUS
Status: DISCONTINUED | OUTPATIENT
Start: 2024-01-05 | End: 2024-01-05

## 2024-01-05 RX ADMIN — HYDRALAZINE HYDROCHLORIDE 10 MG: 10 TABLET, FILM COATED ORAL at 12:01

## 2024-01-05 RX ADMIN — AMIODARONE HYDROCHLORIDE 400 MG: 200 TABLET ORAL at 08:01

## 2024-01-05 RX ADMIN — FUROSEMIDE 60 MG: 10 INJECTION, SOLUTION INTRAMUSCULAR; INTRAVENOUS at 08:01

## 2024-01-05 RX ADMIN — PHENYLEPHRINE HYDROCHLORIDE 200 MCG: 10 INJECTION INTRAVENOUS at 01:01

## 2024-01-05 RX ADMIN — LIDOCAINE HYDROCHLORIDE 200 MG: 20 INJECTION INTRAVENOUS at 01:01

## 2024-01-05 RX ADMIN — PROPOFOL 70 MG: 10 INJECTION, EMULSION INTRAVENOUS at 01:01

## 2024-01-05 RX ADMIN — HYDRALAZINE HYDROCHLORIDE 10 MG: 10 TABLET, FILM COATED ORAL at 09:01

## 2024-01-05 RX ADMIN — PANTOPRAZOLE SODIUM 40 MG: 40 TABLET, DELAYED RELEASE ORAL at 09:01

## 2024-01-05 RX ADMIN — SODIUM CHLORIDE: 0.9 INJECTION, SOLUTION INTRAVENOUS at 01:01

## 2024-01-05 RX ADMIN — PHENYLEPHRINE HYDROCHLORIDE 300 MCG: 10 INJECTION INTRAVENOUS at 01:01

## 2024-01-05 RX ADMIN — AMIODARONE HYDROCHLORIDE 400 MG: 200 TABLET ORAL at 09:01

## 2024-01-05 RX ADMIN — MIRTAZAPINE 15 MG: 15 TABLET, ORALLY DISINTEGRATING ORAL at 08:01

## 2024-01-05 RX ADMIN — TORSEMIDE 10 MG: 10 TABLET ORAL at 09:01

## 2024-01-05 RX ADMIN — DAPTOMYCIN 600 MG: 500 INJECTION, POWDER, LYOPHILIZED, FOR SOLUTION INTRAVENOUS at 09:01

## 2024-01-05 RX ADMIN — SPIRONOLACTONE 25 MG: 25 TABLET ORAL at 09:01

## 2024-01-05 RX ADMIN — METOPROLOL SUCCINATE 25 MG: 25 TABLET, EXTENDED RELEASE ORAL at 09:01

## 2024-01-05 RX ADMIN — PHENYLEPHRINE HYDROCHLORIDE 100 MCG: 10 INJECTION INTRAVENOUS at 01:01

## 2024-01-05 NOTE — PROGRESS NOTES
[facility]  Cardiology  Progress Note    Patient Name: Lorenzo Nino  MRN: [id number]  Admission Date: 12/18/2023  Hospital Length of Stay: 18 days  Code Status: Full Code   Attending Physician: Justine Lobato MD   Primary Care Physician: Terrie, Primary Doctor  Expected Discharge Date: 1/6/2024  Principal Problem:MRSA bacteremia    Subjective:     Hospital Course:   Mr. Lorenzo Nino is a 48 y.o. 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 Presented for 3-day dyspnea. Patient was transferred to the CICU for AHRF 2/2 significant pulmonary edema 2/2 severe mitral regurgitation concerning for anterior leaflet tear vs posterior leaflet restriction.  Upon admission to the CICU on afib w/ RVR. Given 1 dose of digoxin and started on heparin gtt and diltiazem gtt. Stabilized on sinus rhythm. CTS evaluated patient and agree on continuing aggressive diuresis with lasix gtt, at the moment not indicated IABP.. S/p NEW on 12/26 with severe MR with 2 jets, including posterior jet likely secondary to tear/perforation. On IV vancomycin. CTS to follow as outpatient (Dr. Ascencio).  ID plan with  daptomycin 8mg/kg x 2 weeks and follow with PO doxycyline until new valve replaced. ID recommended intraoperative tissue cx. Optimized diuresis for volume status. Off heparin gtt, started on eliquis. Patient started on GDMT but developed FRANCESCO so GDMT were held. Renal function improved and diuretics were slowly added back on. He reported some bright red bloody bowel movements with hemoglobin trending downwards, concerning for a lower GI bleed. GI was consulted for recommendations. Plan for colonoscopy and EGD while inpatient     Interval History: Patient with colonoscopy today with large mass concerning for malignancy with friable tissue that's unable to be removed by endoscopy.      Review of Systems   Constitutional: Positive for decreased  appetite.   Cardiovascular:  Negative for chest pain and dyspnea on exertion.   Respiratory:  Negative for shortness of breath.    Gastrointestinal:  Negative for abdominal pain and constipation.     Objective:     Vital Signs (Most Recent):  Temp: 97.5 °F (36.4 °C) (01/05/24 1452)  Pulse: 66 (01/05/24 1452)  Resp: 14 (01/05/24 1452)  BP: (!) 99/54 (01/05/24 1452)  SpO2: (!) 94 % (01/05/24 1452) Vital Signs (24h Range):  Temp:  [97.5 °F (36.4 °C)-98.1 °F (36.7 °C)] 97.5 °F (36.4 °C)  Pulse:  [62-74] 66  Resp:  [14-23] 14  SpO2:  [94 %-100 %] 94 %  BP: ()/(45-67) 99/54     Weight: 70.3 kg (155 lb)  Body mass index is 21.02 kg/m².     SpO2: (!) 94 %         Intake/Output Summary (Last 24 hours) at 1/5/2024 1453  Last data filed at 1/5/2024 1345  Gross per 24 hour   Intake 2250 ml   Output --   Net 2250 ml       Lines/Drains/Airways       Peripherally Inserted Central Catheter Line  Duration             PICC Double Lumen 11/13/23 1509 right basilic 52 days                       Physical Exam  Constitutional:       Appearance: Normal appearance.   Cardiovascular:      Rate and Rhythm: Normal rate and regular rhythm.      Pulses: Normal pulses.      Heart sounds: Normal heart sounds.   Pulmonary:      Effort: Pulmonary effort is normal.      Breath sounds: Normal breath sounds.   Abdominal:      General: Abdomen is flat.      Palpations: Abdomen is soft.   Musculoskeletal:      Right lower leg: No edema.      Left lower leg: No edema.   Skin:     General: Skin is warm.   Neurological:      General: No focal deficit present.      Mental Status: He is alert and oriented to person, place, and time.            Significant Labs: All pertinent lab results from the last 24 hours have been reviewed.      Assessment and Plan:         * 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  48 y.o. male with a recent history of MRSA endocarditis s/p urgent mitral valve repair on 11/3/23 for acutely  decompensated severe mitral regurgitation, presented with acute hypoxemic respiratory failure and reported severe mitral regurgitation on bedside echocardiogram upon admission which showed severe MR, which has changed since post-op Echo performed 11/14/23 appears to be 2 jets (one posterior and one anterior), possible tear of anterior leaflet, posterior leaflet is restricted. Echo with EF 50-53% severe MR and severe LA dilation. Optimized rate control with metoprolol 25 TID. Increased acute phase reactants. S/p NEW on 12/26 with severe MR with 2 jets, including posterior jet likely secondary to tear/perforation.     Plan:  - cardiac diet   - Continue spironolactone 25mg and torsemide 10mg daily   - continue daptomycin x 2 weeks (Home Health; started 12/30/23) and follow with PO doxycyline until new valve replaced. ID recommended intraoperative tissue cx.  - blood cx with NGTD   - CTS will follow as outpatient    - ID will follow as outpatient  - Will reach out to  about transitional care clinic appt         GI bleeding  Having multiple episodes of bowel movements with bloody streaks. H/H trending downward. Has been ongoing since October 2023. On Eliquis 5mg BID. No previous endoscopic intervention. GI Consulted for concern for LGIB. Patient underwent EGD/colonoscopy and found to have friable large mass in colon    - Hold all NSAIDs and anticoagulants, unless contraindicated  - PO Protonix 40mg administered   - 1 units RBC ordered   - Please correct any coagulopathy with platelets and FFP for goal of platelets >50K and INR <2.0  -Path pending      Hypokalemia  K 2.6 >> 2.8 despite ~60 meq of K; Resolving, Now WNL     - Replete PRN for K 4.0     HFmrEF  - See Severe MR    A-fib with RVR  S/p afib with RVR once admitted to the CCU. Now NSR     -On amio 400mg po bid for 14 days then 200mg daily   - continue metoprolol 25 mg daily   - monitor telemetry    - Holding eliquis 5 BID since friable mass found in colonoscopy      Endocarditis  See severe MR     FRANCESCO (acute kidney injury)  Current Cr 1.5, Baseline 1.0 on admission; Likely 2/2 poor PO intake and initiation of GDMT and diuretics.    Plan:  - Resolved  - Trend Cr/ Serial BMPs  - Strict I&Os, close monitoring of UOP  - Replace electrolytes PRN, goal K/Phos/Mg 4/3/2  - Avoid nephrotoxic agents when feasible (NSAIDs,  IV radiocontrast, gadolinium, etc.)  - Renally dose all meds to eGFR            VTE Risk Mitigation (From admission, onward)      None            Maura Martinez MD  Cardiology  [facility]

## 2024-01-05 NOTE — TREATMENT PLAN
GI Post Procedure Treatment Plan  Procedure Performed: EGD/Colonoscopy    Impression:    - Normal esophagus.                          - Normal stomach.                          - Normal examined duodenum.                          - No specimens collected.     Impression:    - Diverticulosis in the entire examined colon.                          - Non obstructing large tumor in the sigmoid colon                          with stigamata of recent bleeding. Biopsied.                          Tattooed.                          - The examination was otherwise normal on direct                          and retroflexion views.     Recommendation:                                 - Hematochezia likely secondary to oozing from                          sigmod colon mass with stigmata of recent bleeding.                          - [facility].                          - Resume previous diet today.                          - Await pathology results. Will discuss with pt once resulted.                         - Repeat colonoscopy (date not yet determined) for                          surveillance.     - We will sign-off.    Suyapa Paulino MD  Gastroenterology, PGY-4

## 2024-01-05 NOTE — ANESTHESIA POSTPROCEDURE EVALUATION
Anesthesia Post Evaluation    Patient: Lorenzo Nino    Procedure(s) Performed: Procedure(s) (LRB):  EGD (ESOPHAGOGASTRODUODENOSCOPY) (N/A)  COLONOSCOPY (N/A)    Final Anesthesia Type: general      Patient location during evaluation: PACU  Patient participation: Yes- Able to Participate  Level of consciousness: awake and alert  Post-procedure vital signs: reviewed and stable  Pain management: adequate  Airway patency: patent    PONV status: None or treated.  Anesthetic complications: no      Cardiovascular status: hemodynamically stable  Respiratory status: spontaneous ventilation  Hydration status: euvolemic  Follow-up not needed.              Vitals Value Taken Time   BP 99/54 01/05/24 1452   Temp 36.4 °C (97.5 °F) 01/05/24 1452   Pulse 61 01/05/24 1519   Resp 14 01/05/24 1452   SpO2 94 % 01/05/24 1452         Event Time   Out of Recovery 14:46:00         Pain/Gladis Score: Gladis Score: 10 (1/5/2024  2:15 PM)

## 2024-01-05 NOTE — SUBJECTIVE & OBJECTIVE
Interval History: Patient with colonoscopy today with large mass concerning for malignancy with friable tissue that's unable to be removed by endoscopy.      Review of Systems   Constitutional: Positive for decreased appetite.   Cardiovascular:  Negative for chest pain and dyspnea on exertion.   Respiratory:  Negative for shortness of breath.    Gastrointestinal:  Negative for abdominal pain and constipation.     Objective:     Vital Signs (Most Recent):  Temp: 97.5 °F (36.4 °C) (01/05/24 1452)  Pulse: 66 (01/05/24 1452)  Resp: 14 (01/05/24 1452)  BP: (!) 99/54 (01/05/24 1452)  SpO2: (!) 94 % (01/05/24 1452) Vital Signs (24h Range):  Temp:  [97.5 °F (36.4 °C)-98.1 °F (36.7 °C)] 97.5 °F (36.4 °C)  Pulse:  [62-74] 66  Resp:  [14-23] 14  SpO2:  [94 %-100 %] 94 %  BP: ()/(45-67) 99/54     Weight: 70.3 kg (155 lb)  Body mass index is 21.02 kg/m².     SpO2: (!) 94 %         Intake/Output Summary (Last 24 hours) at 1/5/2024 1453  Last data filed at 1/5/2024 1345  Gross per 24 hour   Intake 2250 ml   Output --   Net 2250 ml       Lines/Drains/Airways       Peripherally Inserted Central Catheter Line  Duration             PICC Double Lumen 11/13/23 1509 right basilic 52 days                       Physical Exam  Constitutional:       Appearance: Normal appearance.   Cardiovascular:      Rate and Rhythm: Normal rate and regular rhythm.      Pulses: Normal pulses.      Heart sounds: Normal heart sounds.   Pulmonary:      Effort: Pulmonary effort is normal.      Breath sounds: Normal breath sounds.   Abdominal:      General: Abdomen is flat.      Palpations: Abdomen is soft.   Musculoskeletal:      Right lower leg: No edema.      Left lower leg: No edema.   Skin:     General: Skin is warm.   Neurological:      General: No focal deficit present.      Mental Status: He is alert and oriented to person, place, and time.            Significant Labs: All pertinent lab results from the last 24 hours have been reviewed.

## 2024-01-05 NOTE — NURSING TRANSFER
Nursing Transfer Note      1/5/2024   2:36 PM      Reason patient is being transferred: post-procedure    Transfer To: [id number]    Transfer via stretcher    Transfer with cardiac monitoring    Transported by transport    Telemetry: Box Number [id number]  Order for Tele Monitor? Yes    Medicines sent: none    Any special needs or follow-up needed: routine    Patient belongings transferred with patient: Yes    Chart send with patient: Yes    Notified: mother

## 2024-01-05 NOTE — PLAN OF CARE
Problem: Adult Inpatient Plan of Care  Goal: Plan of Care Review  Outcome: Ongoing, Progressing  Goal: Patient-Specific Goal (Individualized)  Outcome: Ongoing, Progressing  Goal: Absence of Hospital-Acquired Illness or Injury  Outcome: Ongoing, Progressing  Goal: Optimal Comfort and Wellbeing  Outcome: Ongoing, Progressing  Goal: Readiness for Transition of Care  Outcome: Ongoing, Progressing     Problem: Adjustment to Illness (Sepsis/Septic Shock)  Goal: Optimal Coping  Outcome: Ongoing, Progressing

## 2024-01-05 NOTE — TRANSFER OF CARE
Anesthesia Transfer of Care Note    Patient: Lorenzo Nino    Procedure(s) Performed: Procedure(s) (LRB):  EGD (ESOPHAGOGASTRODUODENOSCOPY) (N/A)  COLONOSCOPY (N/A)    Patient location: PACU    Anesthesia Type: general and MAC    Transport from OR: Transported from OR on room air with adequate spontaneous ventilation    Post pain: adequate analgesia    Post assessment: no apparent anesthetic complications and tolerated procedure well    Post vital signs: stable    Level of consciousness: sedated    Nausea/Vomiting: no nausea/vomiting    Complications: none    Transfer of care protocol was followed      Last vitals: Visit Vitals  BP (!) 90/52   Pulse 67   Temp 36.4 °C (97.5 °F) (Temporal)   Resp 16   Ht 6' (1.829 m)   Wt 70.3 kg (155 lb)   SpO2 100%   BMI 21.02 kg/m²

## 2024-01-05 NOTE — ANESTHESIA RELEASE NOTE
Anesthesia Release from PACU Note    Patient: Lorenzo Nino    Procedure(s) Performed: Procedure(s) (LRB):  EGD (ESOPHAGOGASTRODUODENOSCOPY) (N/A)  COLONOSCOPY (N/A)    Anesthesia type: MAC    Post pain: Adequate analgesia    Post assessment: no apparent anesthetic complications    Last Vitals: Visit Vitals  BP (!) 88/57   Pulse 62   Temp 36.5 °C (97.7 °F) (Temporal)   Resp 20   Ht 6' (1.829 m)   Wt 70.3 kg (155 lb)   SpO2 100%   BMI 21.02 kg/m²       Post vital signs: stable    Level of consciousness: awake, alert , and oriented    Nausea/Vomiting: no nausea/no vomiting    Complications: none    Airway Patency: patent    Respiratory: unassisted    Cardiovascular: stable and blood pressure at baseline    Hydration: euvolemic

## 2024-01-05 NOTE — ASSESSMENT & PLAN NOTE
Current Cr 1.5, Baseline 1.0 on admission; Likely 2/2 poor PO intake and initiation of GDMT and diuretics.    Plan:  - Resolved  - Trend Cr/ Serial BMPs  - Strict I&Os, close monitoring of UOP  - Replace electrolytes PRN, goal K/Phos/Mg 4/3/2  - Avoid nephrotoxic agents when feasible (NSAIDs,  IV radiocontrast, gadolinium, etc.)  - Renally dose all meds to eGFR

## 2024-01-05 NOTE — PLAN OF CARE
Problem: Adult Inpatient Plan of Care  Goal: Plan of Care Review  Outcome: Ongoing, Progressing  Goal: Patient-Specific Goal (Individualized)  Outcome: Ongoing, Progressing  Goal: Absence of Hospital-Acquired Illness or Injury  Outcome: Ongoing, Progressing  Goal: Optimal Comfort and Wellbeing  Outcome: Ongoing, Progressing  Goal: Readiness for Transition of Care  Outcome: Ongoing, Progressing     Problem: Adjustment to Illness (Sepsis/Septic Shock)  Goal: Optimal Coping  Outcome: Ongoing, Progressing     Problem: Nutrition Impaired (Sepsis/Septic Shock)  Goal: Optimal Nutrition Intake  Outcome: Ongoing, Progressing

## 2024-01-05 NOTE — ASSESSMENT & PLAN NOTE
Having multiple episodes of bowel movements with bloody streaks. H/H trending downward. Has been ongoing since October 2023. On Eliquis 5mg BID. No previous endoscopic intervention. GI Consulted for concern for LGIB. Patient underwent EGD/colonoscopy and found to have friable large mass in colon    - Hold all NSAIDs and anticoagulants, unless contraindicated  - PO Protonix 40mg administered   - 1 units RBC ordered   - Please correct any coagulopathy with platelets and FFP for goal of platelets >50K and INR <2.0  -Path pending

## 2024-01-05 NOTE — ASSESSMENT & PLAN NOTE
S/p afib with RVR once admitted to the CCU. Now NSR     -On amio 400mg po bid for 14 days then 200mg daily   - continue metoprolol 25 mg daily   - monitor telemetry    - Holding eliquis 5 BID since friable mass found in colonoscopy

## 2024-01-05 NOTE — ASSESSMENT & PLAN NOTE
48 y.o. 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 Echo with EF 50-53% severe MR and severe LA dilation. Optimized rate control with metoprolol 25 TID. Increased acute phase reactants. S/p NEW on 12/26 with severe MR with 2 jets, including posterior jet likely secondary to tear/perforation.     Plan:  - cardiac diet   - Continue spironolactone 25mg and torsemide 10mg daily   - continue daptomycin x 2 weeks (Home Health; started 12/30/23) and follow with PO doxycyline until new valve replaced. ID recommended intraoperative tissue cx.  - blood cx with NGTD   - CTS will follow as outpatient    - ID will follow as outpatient  - Will reach out to  about transitional care clinic appt

## 2024-01-05 NOTE — PROVATION PATIENT INSTRUCTIONS
Discharge Summary/Instructions after an Endoscopic Procedure  Patient Name: Lorenzo Nino  Patient MRN: [id number]  Patient YOB: 1975  Friday, January 5, 2024  Fadia Ellsworth MD  Dear patient,  As a result of recent federal legislation (The Federal Cures Act), you may   receive lab or pathology results from your procedure in your [facility]   account before your physician is able to contact you. Your physician or   their representative will relay the results to you with their   recommendations at their soonest availability.  Thank you,  RESTRICTIONS:  During your procedure today, you received medications for sedation.  These   medications may affect your judgment, balance and coordination.  Therefore,   for 24 hours, you have the following restrictions:   - DO NOT drive a car, operate machinery, make legal/financial decisions,   sign important papers or drink alcohol.    ACTIVITY:  Today: no heavy lifting, straining or running due to procedural   sedation/anesthesia.  The following day: return to full activity including work.  DIET:  Eat and drink normally unless instructed otherwise.     TREATMENT FOR COMMON SIDE EFFECTS:  - Mild abdominal pain, nausea, belching, bloating or excessive gas:  rest,   eat lightly and use a heating pad.  - Sore Throat: treat with throat lozenges and/or gargle with warm salt   water.  - Because air was used during the procedure, expelling large amounts of air   from your rectum or belching is normal.  - If a bowel prep was taken, you may not have a bowel movement for 1-3 days.    This is normal.  SYMPTOMS TO WATCH FOR AND REPORT TO YOUR PHYSICIAN:  1. Abdominal pain or bloating, other than gas cramps.  2. Chest pain.  3. Back pain.  4. Signs of infection such as: chills or fever occurring within 24 hours   after the procedure.  5. Rectal bleeding, which would show as bright red, maroon, or black stools.   (A tablespoon of blood from the rectum is not serious, especially  if   hemorrhoids are present.)  6. Vomiting.  7. Weakness or dizziness.  GO DIRECTLY TO THE NEAREST EMERGENCY ROOM IF YOU HAVE ANY OF THE FOLLOWING:      Difficulty breathing              Chills and/or fever over 101 F   Persistent vomiting and/or vomiting blood   Severe abdominal pain   Severe chest pain   Black, tarry stools   Bleeding- more than one tablespoon   Any other symptom or condition that you feel may need urgent attention  Your doctor recommends these additional instructions:  If any biopsies were taken, your doctors clinic will contact you in 1 to 2   weeks with any results.  - Hematochezia likely secondary to oozing from sigmod colon mass with   stigmata of recent bleeding.   - Return patient to [facility] for [facility] care.   - Resume previous diet today.   - Await pathology results.   - Repeat colonoscopy (date not yet determined) for surveillance.  For questions, problems or results please call your physician - Fadia Ellsworth MD at Work:  [phone].  [facility] [place] [facility], EMERGENCY ROOM PHONE NUMBER: [phone]  IF A COMPLICATION OR EMERGENCY SITUATION ARISES AND YOU ARE UNABLE TO REACH   YOUR PHYSICIAN - GO DIRECTLY TO THE EMERGENCY ROOM.  Fadia Ellsworth MD  1/5/2024 2:00:20 PM  This report has been verified and signed electronically.  Dear patient,  As a result of recent federal legislation (The Federal Cures Act), you may   receive lab or pathology results from your procedure in your [facility]   account before your physician is able to contact you. Your physician or   their representative will relay the results to you with their   recommendations at their soonest availability.  Thank you,  PROVATION

## 2024-01-05 NOTE — H&P
Short Stay Endoscopy History and Physical    PCP - No, Primary Doctor    Procedure - EGD and colonoscopy   ASA - per anesthesia  Mallampati - per anesthesia  History of Anesthesia problems - no  Family history Anesthesia problems - no     HPI:  Lorenzo Nino a 48 y.o. male here for evaluation of hematochezia.     ROS:  Constitutional: No fevers, chills, No weight loss  ENT: No allergies  CV: No chest pain  Pulm: No shortness of breath  GI: see HPI  Derm: No rash    Medical History:  has a past medical history of Hypertension.    Surgical History:  has a past surgical history that includes Insertion of intra-aortic balloon assist device (Right, 11/2/2023); repair, mitral valve, open (N/A, 11/3/2023); exclusion, left atrial appendage, open, as part of open chest surgery (Left, 11/3/2023); and echocardiogram,transesophageal (N/A, 12/26/2023).    Family History: family history is not on file.. Otherwise no colon cancer, inflammatory bowel disease, or GI malignancies.    Social History:  reports current alcohol use of about 1.0 standard drink of alcohol per week. He reports that he does not use drugs.    Review of patient's allergies indicates:  No Known Allergies    Medications:   Medications Prior to Admission   Medication Sig Dispense Refill Last Dose    metoprolol tartrate (LOPRESSOR) 50 MG tablet Take 1 tablet (50 mg total) by mouth 2 (two) times daily. 60 tablet 11     [DISCONTINUED] aspirin (ECOTRIN) 325 MG EC tablet Take 1 tablet (325 mg total) by mouth once daily. 30 tablet 11     [DISCONTINUED] benzonatate (TESSALON) 100 MG capsule Take 1 capsule (100 mg total) by mouth 3 (three) times daily as needed for Cough. 30 capsule 0     [DISCONTINUED] NIFEdipine (PROCARDIA-XL) 30 MG (OSM) 24 hr tablet Take 1 tablet (30 mg total) by mouth once daily. 30 tablet 11          Objective Findings:    Vital Signs: see nursing notes    Physical Exam:  General Appearance: Well appearing in no acute distress  Eyes:    No  scleral icterus  ENT: Neck supple  Lungs: CTA anteriorly  Heart:  S1, S2 normal, no murmurs heard  Abdomen: Soft, non tender, non distended with positive bowel sounds. No hepatosplenomegaly, ascites, or mass  Extremities: no edema  Skin: No rash      Labs:  Lab Results   Component Value Date    WBC 10.72 01/05/2024    HGB 7.4 (L) 01/05/2024    HCT 23.4 (L) 01/05/2024     01/05/2024    TRIG 370 (H) 10/31/2023    ALT 20 01/05/2024    AST 31 01/05/2024     01/05/2024    K 3.2 (L) 01/05/2024    CL 94 (L) 01/05/2024    CREATININE 1.3 01/05/2024    BUN 26 (H) 01/05/2024    CO2 30 (H) 01/05/2024    INR 1.1 12/18/2023    HGBA1C 5.8 (H) 11/06/2023         Assessment:   Lorenzo Nino is a 48 y.o. male presenting today for an EGD/colonoscopy for evaluation of GI bleeding.       Plan:   -Proceed with scheduled procedure today. I have explained the risks and benefits of endoscopy procedures to the patient including but not limited to bleeding, perforation, infection, and death.  -Evaluation and consent for anesthesia portion of this procedure completed by anesthesia team.         Devin Aggarwal MD, PGY-VI  Gastroenterology Fellow  [facility]

## 2024-01-05 NOTE — NURSING
1231: Manual BP checked, called CCU resident, aware of MAP 63, okay with sending patient for EGD and Colonoscopy, pt asymptomatic, RN for endoscopy made aware. Plan for patient to get 1 unit PRBC after arriving back to CSU. Wife going with patient off floor.  .  1455: Patient back from GI lab, CCU team made aware, pending blood consent    1730: PRBC transfusion started at this time.

## 2024-01-05 NOTE — PROVATION PATIENT INSTRUCTIONS
Discharge Summary/Instructions after an Endoscopic Procedure  Patient Name: Lorenzo Nino  Patient MRN: [id number]  Patient YOB: 1975  Friday, January 5, 2024  Fadia Ellsworth MD  Dear patient,  As a result of recent federal legislation (The Federal Cures Act), you may   receive lab or pathology results from your procedure in your [facility]   account before your physician is able to contact you. Your physician or   their representative will relay the results to you with their   recommendations at their soonest availability.  Thank you,  RESTRICTIONS:  During your procedure today, you received medications for sedation.  These   medications may affect your judgment, balance and coordination.  Therefore,   for 24 hours, you have the following restrictions:   - DO NOT drive a car, operate machinery, make legal/financial decisions,   sign important papers or drink alcohol.    ACTIVITY:  Today: no heavy lifting, straining or running due to procedural   sedation/anesthesia.  The following day: return to full activity including work.  DIET:  Eat and drink normally unless instructed otherwise.     TREATMENT FOR COMMON SIDE EFFECTS:  - Mild abdominal pain, nausea, belching, bloating or excessive gas:  rest,   eat lightly and use a heating pad.  - Sore Throat: treat with throat lozenges and/or gargle with warm salt   water.  - Because air was used during the procedure, expelling large amounts of air   from your rectum or belching is normal.  - If a bowel prep was taken, you may not have a bowel movement for 1-3 days.    This is normal.  SYMPTOMS TO WATCH FOR AND REPORT TO YOUR PHYSICIAN:  1. Abdominal pain or bloating, other than gas cramps.  2. Chest pain.  3. Back pain.  4. Signs of infection such as: chills or fever occurring within 24 hours   after the procedure.  5. Rectal bleeding, which would show as bright red, maroon, or black stools.   (A tablespoon of blood from the rectum is not serious, especially  if   hemorrhoids are present.)  6. Vomiting.  7. Weakness or dizziness.  GO DIRECTLY TO THE NEAREST EMERGENCY ROOM IF YOU HAVE ANY OF THE FOLLOWING:      Difficulty breathing              Chills and/or fever over 101 F   Persistent vomiting and/or vomiting blood   Severe abdominal pain   Severe chest pain   Black, tarry stools   Bleeding- more than one tablespoon   Any other symptom or condition that you feel may need urgent attention  Your doctor recommends these additional instructions:  If any biopsies were taken, your doctors clinic will contact you in 1 to 2   weeks with any results.  - Return patient to [facility] for [facility] care.   - NPO.   - Perform a colonoscopy today.  For questions, problems or results please call your physician - Fadia Ellsworth MD at Work:  [phone].  [facility], EMERGENCY ROOM PHONE NUMBER: [phone]  IF A COMPLICATION OR EMERGENCY SITUATION ARISES AND YOU ARE UNABLE TO REACH   YOUR PHYSICIAN - GO DIRECTLY TO THE EMERGENCY ROOM.  Fadia Ellsworth MD  1/5/2024 1:58:08 PM  This report has been verified and signed electronically.  Dear patient,  As a result of recent federal legislation (The Federal Cures Act), you may   receive lab or pathology results from your procedure in your [facility]   account before your physician is able to contact you. Your physician or   their representative will relay the results to you with their   recommendations at their soonest availability.  Thank you,  PROVATION

## 2024-01-06 LAB
ALBUMIN SERPL BCP-MCNC: 2.7 G/DL (ref 3.5–5.2)
ALP SERPL-CCNC: 63 U/L (ref 55–135)
ALT SERPL W/O P-5'-P-CCNC: 21 U/L (ref 10–44)
ANION GAP SERPL CALC-SCNC: 9 MMOL/L (ref 8–16)
ANISOCYTOSIS BLD QL SMEAR: SLIGHT
AST SERPL-CCNC: 28 U/L (ref 10–40)
BASOPHILS # BLD AUTO: 0.07 K/UL (ref 0–0.2)
BASOPHILS # BLD AUTO: 0.07 K/UL (ref 0–0.2)
BASOPHILS NFR BLD: 0.6 % (ref 0–1.9)
BASOPHILS NFR BLD: 0.7 % (ref 0–1.9)
BILIRUB SERPL-MCNC: 0.6 MG/DL (ref 0.1–1)
BUN SERPL-MCNC: 17 MG/DL (ref 6–20)
CALCIUM SERPL-MCNC: 8.4 MG/DL (ref 8.7–10.5)
CHLORIDE SERPL-SCNC: 94 MMOL/L (ref 95–110)
CO2 SERPL-SCNC: 33 MMOL/L (ref 23–29)
CREAT SERPL-MCNC: 1.4 MG/DL (ref 0.5–1.4)
DIFFERENTIAL METHOD BLD: ABNORMAL
DIFFERENTIAL METHOD BLD: ABNORMAL
EOSINOPHIL # BLD AUTO: 0.3 K/UL (ref 0–0.5)
EOSINOPHIL # BLD AUTO: 0.4 K/UL (ref 0–0.5)
EOSINOPHIL NFR BLD: 3 % (ref 0–8)
EOSINOPHIL NFR BLD: 3.6 % (ref 0–8)
ERYTHROCYTE [DISTWIDTH] IN BLOOD BY AUTOMATED COUNT: 18.5 % (ref 11.5–14.5)
ERYTHROCYTE [DISTWIDTH] IN BLOOD BY AUTOMATED COUNT: 18.5 % (ref 11.5–14.5)
EST. GFR  (NO RACE VARIABLE): >60 ML/MIN/1.73 M^2
GLUCOSE SERPL-MCNC: 90 MG/DL (ref 70–110)
HCT VFR BLD AUTO: 24.7 % (ref 40–54)
HCT VFR BLD AUTO: 26.9 % (ref 40–54)
HGB BLD-MCNC: 8 G/DL (ref 14–18)
HGB BLD-MCNC: 8.4 G/DL (ref 14–18)
HYPOCHROMIA BLD QL SMEAR: ABNORMAL
IMM GRANULOCYTES # BLD AUTO: 0.09 K/UL (ref 0–0.04)
IMM GRANULOCYTES # BLD AUTO: 0.11 K/UL (ref 0–0.04)
IMM GRANULOCYTES NFR BLD AUTO: 0.8 % (ref 0–0.5)
IMM GRANULOCYTES NFR BLD AUTO: 1 % (ref 0–0.5)
LYMPHOCYTES # BLD AUTO: 3.9 K/UL (ref 1–4.8)
LYMPHOCYTES # BLD AUTO: 4.1 K/UL (ref 1–4.8)
LYMPHOCYTES NFR BLD: 35.2 % (ref 18–48)
LYMPHOCYTES NFR BLD: 38.6 % (ref 18–48)
MCH RBC QN AUTO: 26.6 PG (ref 27–31)
MCH RBC QN AUTO: 26.8 PG (ref 27–31)
MCHC RBC AUTO-ENTMCNC: 31.2 G/DL (ref 32–36)
MCHC RBC AUTO-ENTMCNC: 32.4 G/DL (ref 32–36)
MCV RBC AUTO: 83 FL (ref 82–98)
MCV RBC AUTO: 85 FL (ref 82–98)
MONOCYTES # BLD AUTO: 1 K/UL (ref 0.3–1)
MONOCYTES # BLD AUTO: 1.1 K/UL (ref 0.3–1)
MONOCYTES NFR BLD: 10.7 % (ref 4–15)
MONOCYTES NFR BLD: 9.2 % (ref 4–15)
NEUTROPHILS # BLD AUTO: 4.9 K/UL (ref 1.8–7.7)
NEUTROPHILS # BLD AUTO: 5.7 K/UL (ref 1.8–7.7)
NEUTROPHILS NFR BLD: 45.6 % (ref 38–73)
NEUTROPHILS NFR BLD: 51 % (ref 38–73)
NRBC BLD-RTO: 0 /100 WBC
NRBC BLD-RTO: 0 /100 WBC
OVALOCYTES BLD QL SMEAR: ABNORMAL
PLATELET # BLD AUTO: 300 K/UL (ref 150–450)
PLATELET # BLD AUTO: 310 K/UL (ref 150–450)
PMV BLD AUTO: 10.6 FL (ref 9.2–12.9)
PMV BLD AUTO: 11.4 FL (ref 9.2–12.9)
POIKILOCYTOSIS BLD QL SMEAR: SLIGHT
POLYCHROMASIA BLD QL SMEAR: ABNORMAL
POTASSIUM SERPL-SCNC: 3 MMOL/L (ref 3.5–5.1)
PROT SERPL-MCNC: 6.9 G/DL (ref 6–8.4)
RBC # BLD AUTO: 2.99 M/UL (ref 4.6–6.2)
RBC # BLD AUTO: 3.16 M/UL (ref 4.6–6.2)
SODIUM SERPL-SCNC: 136 MMOL/L (ref 136–145)
SPHEROCYTES BLD QL SMEAR: ABNORMAL
WBC # BLD AUTO: 10.69 K/UL (ref 3.9–12.7)
WBC # BLD AUTO: 11.12 K/UL (ref 3.9–12.7)

## 2024-01-06 PROCEDURE — 25000003 PHARM REV CODE 250: Performed by: INTERNAL MEDICINE

## 2024-01-06 PROCEDURE — 25000003 PHARM REV CODE 250: Performed by: STUDENT IN AN ORGANIZED HEALTH CARE EDUCATION/TRAINING PROGRAM

## 2024-01-06 PROCEDURE — 63600175 PHARM REV CODE 636 W HCPCS: Performed by: INTERNAL MEDICINE

## 2024-01-06 PROCEDURE — 25000003 PHARM REV CODE 250

## 2024-01-06 PROCEDURE — 93005 ELECTROCARDIOGRAM TRACING: CPT

## 2024-01-06 PROCEDURE — 99231 SBSQ HOSP IP/OBS SF/LOW 25: CPT | Mod: ,,, | Performed by: INTERNAL MEDICINE

## 2024-01-06 PROCEDURE — 93010 ELECTROCARDIOGRAM REPORT: CPT | Mod: ,,, | Performed by: INTERNAL MEDICINE

## 2024-01-06 PROCEDURE — 85025 COMPLETE CBC W/AUTO DIFF WBC: CPT | Performed by: STUDENT IN AN ORGANIZED HEALTH CARE EDUCATION/TRAINING PROGRAM

## 2024-01-06 PROCEDURE — 80053 COMPREHEN METABOLIC PANEL: CPT | Performed by: STUDENT IN AN ORGANIZED HEALTH CARE EDUCATION/TRAINING PROGRAM

## 2024-01-06 PROCEDURE — 20600001 HC STEP DOWN PRIVATE ROOM

## 2024-01-06 RX ORDER — POTASSIUM CHLORIDE 20 MEQ/1
40 TABLET, EXTENDED RELEASE ORAL
Status: DISCONTINUED | OUTPATIENT
Start: 2024-01-06 | End: 2024-01-06

## 2024-01-06 RX ORDER — POTASSIUM CHLORIDE 20 MEQ/1
40 TABLET, EXTENDED RELEASE ORAL
Status: COMPLETED | OUTPATIENT
Start: 2024-01-06 | End: 2024-01-06

## 2024-01-06 RX ADMIN — TORSEMIDE 10 MG: 10 TABLET ORAL at 08:01

## 2024-01-06 RX ADMIN — SPIRONOLACTONE 25 MG: 25 TABLET ORAL at 08:01

## 2024-01-06 RX ADMIN — POTASSIUM CHLORIDE 40 MEQ: 1500 TABLET, EXTENDED RELEASE ORAL at 10:01

## 2024-01-06 RX ADMIN — METOPROLOL SUCCINATE 12.5 MG: 25 TABLET, EXTENDED RELEASE ORAL at 08:01

## 2024-01-06 RX ADMIN — PANTOPRAZOLE SODIUM 40 MG: 40 TABLET, DELAYED RELEASE ORAL at 08:01

## 2024-01-06 RX ADMIN — POTASSIUM CHLORIDE 40 MEQ: 1500 TABLET, EXTENDED RELEASE ORAL at 08:01

## 2024-01-06 RX ADMIN — AMIODARONE HYDROCHLORIDE 400 MG: 200 TABLET ORAL at 08:01

## 2024-01-06 RX ADMIN — MIRTAZAPINE 15 MG: 15 TABLET, ORALLY DISINTEGRATING ORAL at 08:01

## 2024-01-06 RX ADMIN — DAPTOMYCIN 600 MG: 500 INJECTION, POWDER, LYOPHILIZED, FOR SOLUTION INTRAVENOUS at 09:01

## 2024-01-06 RX ADMIN — POLYETHYLENE GLYCOL 3350 17 G: 17 POWDER, FOR SOLUTION ORAL at 08:01

## 2024-01-06 NOTE — PROGRESS NOTES
[facility] - Cardiology Stepdown  Cardiology  Progress Note    Patient Name: Lorenzo Nino  MRN: [id number]  Admission Date: 12/18/2023  Hospital Length of Stay: 19 days  Code Status: Full Code   Attending Physician: Justine Lobato MD   Primary Care Physician: Terrie, Primary Doctor  Expected Discharge Date: 1/6/2024  Principal Problem:MRSA bacteremia    Subjective:     Hospital Course:   Mr. Lorenzo Nino is a 48 y.o. 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 Presented for 3-day dyspnea. Patient was transferred to the CICU for AHRF 2/2 significant pulmonary edema 2/2 severe mitral regurgitation concerning for anterior leaflet tear vs posterior leaflet restriction.  Upon admission to the CICU on afib w/ RVR. Given 1 dose of digoxin and started on heparin gtt and diltiazem gtt. Stabilized on sinus rhythm. CTS evaluated patient and agree on continuing aggressive diuresis with lasix gtt, at the moment not indicated IABP.. S/p NEW on 12/26 with severe MR with 2 jets, including posterior jet likely secondary to tear/perforation. On IV vancomycin. CTS to follow as outpatient (Dr. Ascencio).  ID plan with  daptomycin 8mg/kg x 2 weeks and follow with PO doxycyline until new valve replaced. ID recommended intraoperative tissue cx. Optimized diuresis for volume status. Off heparin gtt, started on eliquis. Patient started on GDMT but developed FRANCESCO so GDMT were held. Renal function improved and diuretics were slowly added back on. He reported some bright red bloody bowel movements with hemoglobin trending downwards, concerning for a lower GI bleed. GI was consulted for recommendations. Plan for colonoscopy and EGD while inpatient     Interval History: NAEON. Appetite improving. BP consistently low (MAP <65) so Metoprolol was discontinued.     Review of Systems   Constitutional: Positive for decreased appetite. Negative for diaphoresis.    Cardiovascular:  Negative for chest pain and dyspnea on exertion.   Respiratory:  Negative for shortness of breath.    Gastrointestinal:  Negative for abdominal pain and constipation.     Objective:     Vital Signs (Most Recent):  Temp: 97.3 °F (36.3 °C) (01/06/24 1221)  Pulse: 68 (01/06/24 1413)  Resp: 18 (01/06/24 1221)  BP: (!) 97/52 (01/06/24 1221)  SpO2: 99 % (01/06/24 1221) Vital Signs (24h Range):  Temp:  [97 °F (36.1 °C)-98.5 °F (36.9 °C)] 97.3 °F (36.3 °C)  Pulse:  [61-79] 68  Resp:  [14-18] 18  SpO2:  [94 %-100 %] 99 %  BP: (77-99)/(49-56) 97/52     Weight: 70.3 kg (155 lb)  Body mass index is 21.02 kg/m².     SpO2: 99 %         Intake/Output Summary (Last 24 hours) at 1/6/2024 1424  Last data filed at 1/6/2024 0941  Gross per 24 hour   Intake 760.17 ml   Output 1475 ml   Net -714.83 ml         Lines/Drains/Airways       Peripherally Inserted Central Catheter Line  Duration             PICC Double Lumen 11/13/23 1509 right basilic 53 days                       Physical Exam  Constitutional:       Appearance: Normal appearance.   HENT:      Head: Normocephalic.      Right Ear: External ear normal.      Left Ear: External ear normal.   Eyes:      General: No scleral icterus.        Right eye: No discharge.         Left eye: No discharge.      Extraocular Movements: Extraocular movements intact.   Cardiovascular:      Rate and Rhythm: Normal rate and regular rhythm.      Pulses: Normal pulses.      Heart sounds: Normal heart sounds.   Pulmonary:      Effort: Pulmonary effort is normal.      Breath sounds: Normal breath sounds.   Abdominal:      General: Abdomen is flat.      Palpations: Abdomen is soft.   Musculoskeletal:      Right lower leg: No edema.      Left lower leg: No edema.   Skin:     General: Skin is warm.   Neurological:      General: No focal deficit present.      Mental Status: He is alert and oriented to person, place, and time.            Significant Labs: All pertinent lab results from the  last 24 hours have been reviewed.      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 Echo with EF 50-53% severe MR and severe LA dilation. Optimized rate control with metoprolol 25 TID. Increased acute phase reactants. S/p NEW on 12/26 with severe MR with 2 jets, including posterior jet likely secondary to tear/perforation.     Plan:  - cardiac diet   - Start Farxiga today. Holding Metoprolol in the setting of hypotension  - Continue spironolactone 25mg and torsemide 10mg daily;  - continue daptomycin x 2 weeks ([facility]; started 12/30/23) and follow with PO doxycyline until new valve replaced. ID recommended intraoperative tissue cx.  - blood cx with NGTD   - CTS will follow as outpatient    - ID will follow as outpatient  - Will reach out to  about transitional care clinic appt         GI bleeding  Having multiple episodes of bowel movements with bloody streaks. H/H trending downward. Has been ongoing since October 2023. On Eliquis 5mg BID. No previous endoscopic intervention. GI Consulted for concern for LGIB. Patient underwent EGD/colonoscopy and found to have friable large mass in colon    - Hold all NSAIDs and anticoagulants, unless contraindicated  - PO Protonix 40mg administered   - 1 units RBC ordered   - Please correct any coagulopathy with platelets and FFP for goal of platelets >50K and INR <2.0  -Path pending      Hypokalemia  Persistently hypokalemic  despite replenishment    - Replete PRN for K 4.0   - Will likely need PO Potassium at discharge    HFmrEF  - See Severe MR    A-fib with RVR  S/p afib with RVR once admitted to the CCU. Now NSR     -On amio 400mg po bid for 14 days then 200mg daily   - Holding Metoprolol in the setting of persistent hypotension  - monitor telemetry    - Holding eliquis 5 BID since friable mass found in colonoscopy     Endocarditis  See severe MR     FRANCESCO (acute kidney injury)  Current Cr 1.5, Baseline 1.0 on admission; Likely 2/2 poor PO intake and initiation of GDMT and diuretics.    Plan:  - Resolved  - Trend Cr/ Serial BMPs  - Strict I&Os, close monitoring of UOP  - Replace electrolytes PRN, goal K/Phos/Mg 4/3/2  - Avoid nephrotoxic agents when feasible (NSAIDs,  IV radiocontrast, gadolinium, etc.)  - Renally dose all meds to eGFR            VTE Risk Mitigation (From admission, onward)      None            Tisha Cruz MD  Cardiology  [facility] - Cardiology Stepdown

## 2024-01-06 NOTE — SUBJECTIVE & OBJECTIVE
Interval History: NAEON. Appetite improving. BP consistently low (MAP <65) so Metoprolol was discontinued.     Review of Systems   Constitutional: Positive for decreased appetite. Negative for diaphoresis.   Cardiovascular:  Negative for chest pain and dyspnea on exertion.   Respiratory:  Negative for shortness of breath.    Gastrointestinal:  Negative for abdominal pain and constipation.     Objective:     Vital Signs (Most Recent):  Temp: 97.3 °F (36.3 °C) (01/06/24 1221)  Pulse: 68 (01/06/24 1413)  Resp: 18 (01/06/24 1221)  BP: (!) 97/52 (01/06/24 1221)  SpO2: 99 % (01/06/24 1221) Vital Signs (24h Range):  Temp:  [97 °F (36.1 °C)-98.5 °F (36.9 °C)] 97.3 °F (36.3 °C)  Pulse:  [61-79] 68  Resp:  [14-18] 18  SpO2:  [94 %-100 %] 99 %  BP: (77-99)/(49-56) 97/52     Weight: 70.3 kg (155 lb)  Body mass index is 21.02 kg/m².     SpO2: 99 %         Intake/Output Summary (Last 24 hours) at 1/6/2024 1424  Last data filed at 1/6/2024 0941  Gross per 24 hour   Intake 760.17 ml   Output 1475 ml   Net -714.83 ml         Lines/Drains/Airways       Peripherally Inserted Central Catheter Line  Duration             PICC Double Lumen 11/13/23 1509 right basilic 53 days                       Physical Exam  Constitutional:       Appearance: Normal appearance.   HENT:      Head: Normocephalic.      Right Ear: External ear normal.      Left Ear: External ear normal.   Eyes:      General: No scleral icterus.        Right eye: No discharge.         Left eye: No discharge.      Extraocular Movements: Extraocular movements intact.   Cardiovascular:      Rate and Rhythm: Normal rate and regular rhythm.      Pulses: Normal pulses.      Heart sounds: Normal heart sounds.   Pulmonary:      Effort: Pulmonary effort is normal.      Breath sounds: Normal breath sounds.   Abdominal:      General: Abdomen is flat.      Palpations: Abdomen is soft.   Musculoskeletal:      Right lower leg: No edema.      Left lower leg: No edema.   Skin:     General:  Skin is warm.   Neurological:      General: No focal deficit present.      Mental Status: He is alert and oriented to person, place, and time.            Significant Labs: All pertinent lab results from the last 24 hours have been reviewed.

## 2024-01-06 NOTE — ASSESSMENT & PLAN NOTE
Persistently hypokalemic despite replenishment    - Replete PRN for K 4.0   - Will likely need PO Potassium at discharge

## 2024-01-06 NOTE — ASSESSMENT & PLAN NOTE
48 y.o. 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 Echo with EF 50-53% severe MR and severe LA dilation. Optimized rate control with metoprolol 25 TID. Increased acute phase reactants. S/p NEW on 12/26 with severe MR with 2 jets, including posterior jet likely secondary to tear/perforation.     Plan:  - cardiac diet   - Start Farxiga today. Holding Metoprolol in the setting of hypotension  - Continue spironolactone 25mg and torsemide 10mg daily;  - continue daptomycin x 2 weeks ([facility]; started 12/30/23) and follow with PO doxycyline until new valve replaced. ID recommended intraoperative tissue cx.  - blood cx with NGTD   - CTS will follow as outpatient    - ID will follow as outpatient  - Will reach out to SW about transitional care clinic appt

## 2024-01-06 NOTE — ASSESSMENT & PLAN NOTE
S/p afib with RVR once admitted to the CCU. Now NSR     -On amio 400mg po bid for 14 days then 200mg daily   - Holding Metoprolol in the setting of persistent hypotension  - monitor telemetry    - Holding eliquis 5 BID since friable mass found in colonoscopy

## 2024-01-06 NOTE — PLAN OF CARE
Pt free of falls and injury. Pt AAOx4. Fall precautions remain in place.  Pt remains on room air. Sats 98-99%. NSR on telemetry. Plan of care reviewed with pt. Continued to montior intake and output. Monitored CBC post blood transfusion. Pt resting comfortably with no complaints of pain. Pt denies chest pain, headache, and SOB. No acute distress noted, plan of care continues.      Problem: Adult Inpatient Plan of Care  Goal: Plan of Care Review  Outcome: Ongoing, Progressing  Goal: Patient-Specific Goal (Individualized)  Outcome: Ongoing, Progressing  Goal: Absence of Hospital-Acquired Illness or Injury  Outcome: Ongoing, Progressing  Goal: Optimal Comfort and Wellbeing  Outcome: Ongoing, Progressing  Goal: Readiness for Transition of Care  Outcome: Ongoing, Progressing     Problem: Adjustment to Illness (Sepsis/Septic Shock)  Goal: Optimal Coping  Outcome: Ongoing, Progressing     Problem: Bleeding (Sepsis/Septic Shock)  Goal: Absence of Bleeding  Outcome: Ongoing, Progressing     Problem: Glycemic Control Impaired (Sepsis/Septic Shock)  Goal: Blood Glucose Level Within Desired Range  Outcome: Ongoing, Progressing     Problem: Infection Progression (Sepsis/Septic Shock)  Goal: Absence of Infection Signs and Symptoms  Outcome: Ongoing, Progressing     Problem: Nutrition Impaired (Sepsis/Septic Shock)  Goal: Optimal Nutrition Intake  Outcome: Ongoing, Progressing     Problem: Fluid and Electrolyte Imbalance (Acute Kidney Injury/Impairment)  Goal: Fluid and Electrolyte Balance  Outcome: Ongoing, Progressing     Problem: Oral Intake Inadequate (Acute Kidney Injury/Impairment)  Goal: Optimal Nutrition Intake  Outcome: Ongoing, Progressing     Problem: Renal Function Impairment (Acute Kidney Injury/Impairment)  Goal: Effective Renal Function  Outcome: Ongoing, Progressing     Problem: Infection  Goal: Absence of Infection Signs and Symptoms  Outcome: Ongoing, Progressing     Problem: Fall Injury Risk  Goal: Absence of  Fall and Fall-Related Injury  Outcome: Ongoing, Progressing     Problem: Skin Injury Risk Increased  Goal: Skin Health and Integrity  Outcome: Ongoing, Progressing

## 2024-01-07 PROBLEM — D50.9 IRON DEFICIENCY ANEMIA: Status: ACTIVE | Noted: 2024-01-07

## 2024-01-07 LAB
ALBUMIN SERPL BCP-MCNC: 2.6 G/DL (ref 3.5–5.2)
ALP SERPL-CCNC: 70 U/L (ref 55–135)
ALT SERPL W/O P-5'-P-CCNC: 25 U/L (ref 10–44)
ANION GAP SERPL CALC-SCNC: 7 MMOL/L (ref 8–16)
ANION GAP SERPL CALC-SCNC: 8 MMOL/L (ref 8–16)
AST SERPL-CCNC: 31 U/L (ref 10–40)
BASOPHILS # BLD AUTO: 0.1 K/UL (ref 0–0.2)
BASOPHILS NFR BLD: 0.9 % (ref 0–1.9)
BILIRUB SERPL-MCNC: 0.4 MG/DL (ref 0.1–1)
BUN SERPL-MCNC: 11 MG/DL (ref 6–20)
BUN SERPL-MCNC: 13 MG/DL (ref 6–20)
CALCIUM SERPL-MCNC: 8.2 MG/DL (ref 8.7–10.5)
CALCIUM SERPL-MCNC: 8.6 MG/DL (ref 8.7–10.5)
CHLORIDE SERPL-SCNC: 101 MMOL/L (ref 95–110)
CHLORIDE SERPL-SCNC: 99 MMOL/L (ref 95–110)
CO2 SERPL-SCNC: 27 MMOL/L (ref 23–29)
CO2 SERPL-SCNC: 29 MMOL/L (ref 23–29)
CREAT SERPL-MCNC: 1.1 MG/DL (ref 0.5–1.4)
CREAT SERPL-MCNC: 1.2 MG/DL (ref 0.5–1.4)
DIFFERENTIAL METHOD BLD: ABNORMAL
EOSINOPHIL # BLD AUTO: 0.5 K/UL (ref 0–0.5)
EOSINOPHIL NFR BLD: 4.1 % (ref 0–8)
ERYTHROCYTE [DISTWIDTH] IN BLOOD BY AUTOMATED COUNT: 18.6 % (ref 11.5–14.5)
EST. GFR  (NO RACE VARIABLE): >60 ML/MIN/1.73 M^2
EST. GFR  (NO RACE VARIABLE): >60 ML/MIN/1.73 M^2
GLUCOSE SERPL-MCNC: 104 MG/DL (ref 70–110)
GLUCOSE SERPL-MCNC: 116 MG/DL (ref 70–110)
HCT VFR BLD AUTO: 25.9 % (ref 40–54)
HGB BLD-MCNC: 8.1 G/DL (ref 14–18)
IMM GRANULOCYTES # BLD AUTO: 0.12 K/UL (ref 0–0.04)
IMM GRANULOCYTES NFR BLD AUTO: 1.1 % (ref 0–0.5)
LYMPHOCYTES # BLD AUTO: 4 K/UL (ref 1–4.8)
LYMPHOCYTES NFR BLD: 36.3 % (ref 18–48)
MCH RBC QN AUTO: 26.3 PG (ref 27–31)
MCHC RBC AUTO-ENTMCNC: 31.3 G/DL (ref 32–36)
MCV RBC AUTO: 84 FL (ref 82–98)
MONOCYTES # BLD AUTO: 1.1 K/UL (ref 0.3–1)
MONOCYTES NFR BLD: 10.4 % (ref 4–15)
NEUTROPHILS # BLD AUTO: 5.2 K/UL (ref 1.8–7.7)
NEUTROPHILS NFR BLD: 47.2 % (ref 38–73)
NRBC BLD-RTO: 0 /100 WBC
PLATELET # BLD AUTO: 287 K/UL (ref 150–450)
PMV BLD AUTO: 10.7 FL (ref 9.2–12.9)
POTASSIUM SERPL-SCNC: 2.6 MMOL/L (ref 3.5–5.1)
POTASSIUM SERPL-SCNC: 3.2 MMOL/L (ref 3.5–5.1)
PROT SERPL-MCNC: 6.8 G/DL (ref 6–8.4)
RBC # BLD AUTO: 3.08 M/UL (ref 4.6–6.2)
SODIUM SERPL-SCNC: 135 MMOL/L (ref 136–145)
SODIUM SERPL-SCNC: 136 MMOL/L (ref 136–145)
WBC # BLD AUTO: 10.91 K/UL (ref 3.9–12.7)

## 2024-01-07 PROCEDURE — 93010 ELECTROCARDIOGRAM REPORT: CPT | Mod: ,,, | Performed by: INTERNAL MEDICINE

## 2024-01-07 PROCEDURE — 63600175 PHARM REV CODE 636 W HCPCS

## 2024-01-07 PROCEDURE — 25000003 PHARM REV CODE 250: Performed by: INTERNAL MEDICINE

## 2024-01-07 PROCEDURE — 99231 SBSQ HOSP IP/OBS SF/LOW 25: CPT | Mod: ,,, | Performed by: INTERNAL MEDICINE

## 2024-01-07 PROCEDURE — 80053 COMPREHEN METABOLIC PANEL: CPT | Performed by: STUDENT IN AN ORGANIZED HEALTH CARE EDUCATION/TRAINING PROGRAM

## 2024-01-07 PROCEDURE — 80048 BASIC METABOLIC PNL TOTAL CA: CPT | Mod: XB

## 2024-01-07 PROCEDURE — 93005 ELECTROCARDIOGRAM TRACING: CPT

## 2024-01-07 PROCEDURE — 85025 COMPLETE CBC W/AUTO DIFF WBC: CPT | Performed by: STUDENT IN AN ORGANIZED HEALTH CARE EDUCATION/TRAINING PROGRAM

## 2024-01-07 PROCEDURE — 20600001 HC STEP DOWN PRIVATE ROOM

## 2024-01-07 PROCEDURE — 63600175 PHARM REV CODE 636 W HCPCS: Performed by: INTERNAL MEDICINE

## 2024-01-07 PROCEDURE — 25000003 PHARM REV CODE 250: Performed by: STUDENT IN AN ORGANIZED HEALTH CARE EDUCATION/TRAINING PROGRAM

## 2024-01-07 PROCEDURE — 25000003 PHARM REV CODE 250

## 2024-01-07 RX ORDER — POTASSIUM CHLORIDE 20 MEQ/1
40 TABLET, EXTENDED RELEASE ORAL ONCE
Status: DISCONTINUED | OUTPATIENT
Start: 2024-01-07 | End: 2024-01-07

## 2024-01-07 RX ORDER — AMIODARONE HYDROCHLORIDE 200 MG/1
400 TABLET ORAL 2 TIMES DAILY
Qty: 120 TABLET | Refills: 11 | Status: SHIPPED | OUTPATIENT
Start: 2024-01-07 | End: 2024-01-12

## 2024-01-07 RX ORDER — SPIRONOLACTONE 25 MG/1
25 TABLET ORAL DAILY
Qty: 30 TABLET | Refills: 11 | Status: SHIPPED | OUTPATIENT
Start: 2024-01-07 | End: 2024-02-21

## 2024-01-07 RX ORDER — SODIUM FERRIC GLUCONATE COMPLEX IN SUCROSE 12.5 MG/ML
62.5 INJECTION INTRAVENOUS ONCE
Status: COMPLETED | OUTPATIENT
Start: 2024-01-07 | End: 2024-01-07

## 2024-01-07 RX ORDER — POTASSIUM CHLORIDE 20 MEQ/1
40 TABLET, EXTENDED RELEASE ORAL 4 TIMES DAILY
Status: COMPLETED | OUTPATIENT
Start: 2024-01-07 | End: 2024-01-07

## 2024-01-07 RX ORDER — POTASSIUM CHLORIDE 20 MEQ/1
40 TABLET, EXTENDED RELEASE ORAL 4 TIMES DAILY
Qty: 240 TABLET | Refills: 0 | Status: SHIPPED | OUTPATIENT
Start: 2024-01-07 | End: 2024-01-12

## 2024-01-07 RX ORDER — DOXYCYCLINE 100 MG/1
100 CAPSULE ORAL 2 TIMES DAILY
Qty: 60 CAPSULE | Refills: 0 | Status: SHIPPED | OUTPATIENT
Start: 2024-01-09 | End: 2024-01-09 | Stop reason: SDUPTHER

## 2024-01-07 RX ORDER — MIRTAZAPINE 15 MG/1
15 TABLET, ORALLY DISINTEGRATING ORAL NIGHTLY
Qty: 30 TABLET | Refills: 11 | Status: SHIPPED | OUTPATIENT
Start: 2024-01-07 | End: 2025-01-06

## 2024-01-07 RX ORDER — TORSEMIDE 10 MG/1
10 TABLET ORAL DAILY
Qty: 30 TABLET | Refills: 11 | Status: SHIPPED | OUTPATIENT
Start: 2024-01-07 | End: 2025-01-06

## 2024-01-07 RX ADMIN — SODIUM FERRIC GLUCONATE COMPLEX IN SUCROSE 62.5 MG: 12.5 INJECTION INTRAVENOUS at 02:01

## 2024-01-07 RX ADMIN — AMIODARONE HYDROCHLORIDE 400 MG: 200 TABLET ORAL at 08:01

## 2024-01-07 RX ADMIN — AMIODARONE HYDROCHLORIDE 400 MG: 200 TABLET ORAL at 09:01

## 2024-01-07 RX ADMIN — POTASSIUM CHLORIDE 40 MEQ: 1500 TABLET, EXTENDED RELEASE ORAL at 12:01

## 2024-01-07 RX ADMIN — DAPAGLIFLOZIN 10 MG: 10 TABLET, FILM COATED ORAL at 09:01

## 2024-01-07 RX ADMIN — DAPTOMYCIN 600 MG: 500 INJECTION, POWDER, LYOPHILIZED, FOR SOLUTION INTRAVENOUS at 09:01

## 2024-01-07 RX ADMIN — MIRTAZAPINE 15 MG: 15 TABLET, ORALLY DISINTEGRATING ORAL at 08:01

## 2024-01-07 RX ADMIN — PANTOPRAZOLE SODIUM 40 MG: 40 TABLET, DELAYED RELEASE ORAL at 09:01

## 2024-01-07 RX ADMIN — POTASSIUM CHLORIDE 40 MEQ: 1500 TABLET, EXTENDED RELEASE ORAL at 09:01

## 2024-01-07 RX ADMIN — TORSEMIDE 10 MG: 10 TABLET ORAL at 09:01

## 2024-01-07 RX ADMIN — POLYETHYLENE GLYCOL 3350 17 G: 17 POWDER, FOR SOLUTION ORAL at 09:01

## 2024-01-07 RX ADMIN — POTASSIUM CHLORIDE 40 MEQ: 1500 TABLET, EXTENDED RELEASE ORAL at 05:01

## 2024-01-07 RX ADMIN — SPIRONOLACTONE 25 MG: 25 TABLET ORAL at 09:01

## 2024-01-07 NOTE — PLAN OF CARE
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]  Yes    A-fib with RVR [I48.91]  Yes    Endocarditis [I38]  Yes     Chronic    FRANCESCO (acute kidney injury) [N17.9]  Yes      Resolved Hospital Problems    Diagnosis Date Resolved POA    Right upper quadrant abdominal pain [R10.11] 12/29/2023 Yes    Anemia due to GI blood loss [D50.0] 12/29/2023 Unknown    Hypomagnesemia [E83.42] 12/22/2023 Unknown    Acute bacterial endocarditis [I33.0] 12/19/2023 Yes    Acute hypoxemic respiratory failure [J96.01] 12/30/2023 Yes       Follow Up Appointments:  Future Appointments   Date Time Provider Department Center   1/9/2024 10:00 AM Kasey Van DO [facility] ID [facility]   1/11/2024 10:00 AM LAB, APPOINTMENT [facility] LAB VNP [facility]   1/11/2024 10:30 AM Mala Hooks PA-C [facility]   1/11/2024 10:30 AM [facility] HEART FAILURE NURSE [facility]   1/16/2024 10:00 AM WILBER Cornelius MD [facility] OPHTHAL [facility]   1/30/2024  3:00 PM Cheryl Duarte PA-C [facility] CARDIO [facility]   1/31/2024 11:00 AM CV [facility] ECHO/EKG SBPH TAM [facility]   2/1/2024  3:00 PM Manuel Beal MD [facility] CARDVAS [facility]   3/11/2024  3:30 PM Emerson Mercado MD [facility] CARDIO [facility]       Allergies:Review of patient's allergies indicates:  No Known Allergies    Medications: Review discharge medications with patient and family and provide education.    Current Facility-Administered Medications   Medication Dose Route Frequency Provider Last Rate Last Admin    0.9%   NaCl infusion (for blood administration)   Intravenous Q24H PRN Maura Ochoa MD        acetaminophen tablet 1,000 mg  1,000 mg Oral Q6H PRN Jaya Zhang MD   1,000 mg at 12/27/23 0918    amiodarone tablet 400 mg  400 mg Oral BID Maura Ochoa MD   400 mg at 01/07/24 0915    dapagliflozin propanediol (Farxiga) tablet 10 mg  10 mg Oral Daily Tisha Pete MD   10 mg at 01/07/24 0915    DAPTOmycin (CUBICIN) 600 mg in sodium chloride 0.9% SolP 50 mL IVPB  600 mg Intravenous Q24H Justine Lobato MD   Stopped at 01/07/24 0950    ferric gluconate 62.5 mg/5 mL injection 62.5 mg  62.5 mg Intravenous Once Tisha Pete MD        melatonin tablet 6 mg  6 mg Oral Nightly PRN Sumanth Mckeon MD        mirtazapine disintegrating tablet 15 mg  15 mg Oral Nightly Maura Ochoa MD   15 mg at 01/06/24 2000    ondansetron injection 4 mg  4 mg Intravenous Q6H PRN Arden Brunner MD   4 mg at 01/04/24 2054    oxyCODONE immediate release tablet 5 mg  5 mg Oral Q6H PRN Jaya Zhang MD        pantoprazole EC tablet 40 mg  40 mg Oral Daily Maura Ochoa MD   40 mg at 01/07/24 0916    polyethylene glycol packet 17 g  17 g Oral Daily Jaya Zhang MD   17 g at 01/07/24 0916    potassium chloride SA CR tablet 40 mEq  40 mEq Oral QID Dayo Toth MD   40 mEq at 01/07/24 1235    promethazine-codeine 6.25-10 mg/5 ml syrup 5 mL  5 mL Oral Q4H PRN Jaya Zhang MD        senna-docusate 8.6-50 mg per tablet 1 tablet  1 tablet Oral Daily PRN Jaya Zhang MD   1 tablet at 12/31/23 1000    spironolactone tablet 25 mg  25 mg Oral Daily Maura Ochoa MD   25 mg at 01/07/24 0916    torsemide tablet 10 mg  10 mg Oral Daily Maura Ochoa MD   10 mg at 01/07/24 0916     Current Discharge Medication List        START taking these medications    Details   apixaban (ELIQUIS) 5 mg Tab Take 1  tablet (5 mg total) by mouth 2 (two) times daily.  Qty: 60 tablet, Refills: 11    Associated Diagnoses: Atrial fibrillation, unspecified type      !! dapagliflozin propanediol (FARXIGA) 10 mg tablet Take 1 tablet (10 mg total) by mouth once daily.  Qty: 30 tablet, Refills: 1      !! dapagliflozin propanediol (FARXIGA) 10 mg tablet Take 1 tablet (10 mg total) by mouth once daily.  Qty: 30 tablet, Refills: 0      doxycycline (VIBRAMYCIN) 100 MG Cap Take 1 capsule (100 mg total) by mouth 2 (two) times daily.  Qty: 60 capsule, Refills: 0      famotidine (PEPCID) 20 MG tablet Take 1 tablet (20 mg total) by mouth 2 (two) times daily.  Qty: 60 tablet, Refills: 11      potassium chloride SA (K-DUR,KLOR-CON) 20 MEQ tablet Take 2 tablets (40 mEq total) by mouth 4 (four) times daily.  Qty: 240 tablet, Refills: 0      sodium chloride 0.9% SolP 50 mL with DAPTOmycin 500 mg SolR 600 mg Inject 600 mg into the vein once daily. for 13 days  Qty: 13 Doses/Fill, Refills: 0    Associated Diagnoses: Endocarditis      !! spironolactone (ALDACTONE) 25 MG tablet Take 1 tablet (25 mg total) by mouth once daily.  Qty: 30 tablet, Refills: 11    Comments: .      !! spironolactone (ALDACTONE) 25 MG tablet Take 1 tablet (25 mg total) by mouth once daily.  Qty: 30 tablet, Refills: 11    Comments: .      !! torsemide (DEMADEX) 10 MG Tab Take 1 tablet (10 mg total) by mouth once daily.  Qty: 30 tablet, Refills: 11      !! torsemide (DEMADEX) 20 MG Tab Take 3 tablets (60 mg total) by mouth 2 (two) times a day.  Qty: 180 tablet, Refills: 11    Comments: .  Associated Diagnoses: Acute pulmonary edema       !! - Potential duplicate medications found. Please discuss with provider.        CONTINUE these medications which have CHANGED    Details   aspirin (ECOTRIN) 81 MG EC tablet Take 1 tablet (81 mg total) by mouth once daily.  Qty: 30 tablet, Refills: 11    Associated Diagnoses: S/P MVR (mitral valve repair)           CONTINUE these medications which  have NOT CHANGED    Details   metoprolol tartrate (LOPRESSOR) 50 MG tablet Take 1 tablet (50 mg total) by mouth 2 (two) times daily.  Qty: 60 tablet, Refills: 11    Comments: .           STOP taking these medications       benzonatate (TESSALON) 100 MG capsule Comments:   Reason for Stopping:         NIFEdipine (PROCARDIA-XL) 30 MG (OSM) 24 hr tablet Comments:   Reason for Stopping:                 I have seen and examined this patient within the last 30 days. My clinical findings that support the need for the home health skilled services and home bound status are the following:no   Weakness/numbness causing balance and gait disturbance due to Heart Failure and Weakness/Debility making it taxing to leave home.  Medical restrictions requiring assistance of another human to leave home due to  Dyspnea on exertion (SOB) and Fluid/volume overload.     Diet:   cardiac diet    Labs:  SN to perform labs:  CBC: Twice a week; 6 week(s) and BMP: Twice a week; 6 week(s) and     Referrals/ Consults  Aide to provide assistance with personal care, ADLs, and vital signs.    Activities:   activity as tolerated    Nursing:   Agency to admit patient within 24 hours of hospital discharge unless specified on physician order or at patient request    SN to complete comprehensive assessment including routine vital signs. Instruct on disease process and s/s of complications to report to MD. Review/verify medication list sent home with the patient at time of discharge  and instruct patient/caregiver as needed. Frequency may be adjusted depending on start of care date.     Skilled nurse to perform up to 3 visits PRN for symptoms related to diagnosis    Notify MD if SBP > 160 or < 80; DBP > 90 or < 50; HR > 120 or < 50; Temp > 101; O2 < 88%; Other: Patient tends to run low. If BP is low, ask patient to drink fluids and then re-check. If BP does not improve, please send to the ED.     Ok to schedule additional visits based on staff availability  and patient request on consecutive days within the home health episode.    When multiple disciplines ordered:    Start of Care occurs on Sunday - Wednesday schedule remaining discipline evaluations as ordered on separate consecutive days following the start of care.    Thursday SOC -schedule subsequent evaluations Friday and Monday the following week.     Friday - Saturday SOC - schedule subsequent discipline evaluations on consecutive days starting Monday of the following week.    For all post-discharge communication and subsequent orders please contact patient's Transitional Cardiology Care physician. If unable to reach primary care physician or do not receive response within 30 minutes, please contact [facility] for clinical staff order clarification    Miscellaneous   Routine Skin for Bedridden Patients: Instruct patient/caregiver to apply moisture barrier cream to all skin folds and wet areas in perineal area daily and after baths and all bowel movements.  Heart Failure:      SN to instruct on the following:    Instruct on the definition of CHF.   Instruct on the signs/sympoms of CHF to be reported.   Instruct on and monitor daily weights.   Instruct on factors that cause exacerbation.   Instruct on action, dose, schedule, and side effects of medications.   Instruct on diet as prescribed.   Instruct on activity allowed.   Instruct on life-style modifications for life long management of CHF   SN to assess compliance with daily weights, diet, medications, fluid retention,    safety precautions, activities permitted and life-style modifications.   Additional 1-2 SN visits per week as needed for signs and symptoms     of CHF exacerbation.      For Weight Gain > 2-3 lbs in 1 day or 4-6 lbs over 1 week notify PCP:  CHF DIURETIC SLIDING SCALE     Skilled nurse visits daily to instruct and monitor medication adherence until target weight. Then resume prior order frequency.     If weight gain exceeds 5 lbs  over target weight, call MD  If weight gain of 3-4 lbs over target weight, then:     Increase Torsemide - Current dose (mg/day) to   double dose  and frequency of twice daily until target weight achieved or maximum 3 days.     If already on max oral daily dose, see IV diuretic instructions.    After 3 days of increased oral diuretic dose, get BMP. If patient is on increased oral diuretic dose greater than 5 days, repeat BMP at day 7 of increased dose.     Potassium supplementation   Scr > 1.5 mg/dl  Scr  1.5 mg/dl    K < 3.0 - NOTIFY MD and 40 mEq bid  40 mEq tid   K- 3.1-3.3  20 mEq bid  20 mEq tid    K 3.4-3.7  10 mEq bid  10 mEq tid      If target weight not reached after 5 days of increased oral diuretic, proceed to IV diuretic with daily patient contact to include face-to-face visit and telephone encounters.       Home Health Aide:  Nursing Weekly and Home Health Aide Weekly    Wound Care Orders  no    I certify that this patient is confined to his home and needs intermittent skilled nursing care.

## 2024-01-07 NOTE — PLAN OF CARE
Met with patient to review discharge recommendation of home health and patient is agreeable to plan.    Patient/family provided list of facilities in-network with patient's payor plan. Providers that are owned, operated, or affiliated with [facility] are included on the list.     Notified that referral sent to below listed facilities from in-network list based on proximity to home/family support:   [facility]   2.  3.  4.  5. (can send more than 5)    Patient/family instructed to identify preference.    Preferred Facility: (if more than 1, listed in order of descending preference)  [facility]     If an additional preferred facility not listed above is identified, additional referral to be sent. If above facilities unable to accept, will send additional referrals to in-network providers.      Cata Jane LMSW

## 2024-01-07 NOTE — PROGRESS NOTES
[facility] - Cardiology Stepdown  Cardiology  Progress Note    Patient Name: Lorenzo Nino  MRN: [id number]  Admission Date: 12/18/2023  Hospital Length of Stay: 20 days  Code Status: Full Code   Attending Physician: Justine Lobato MD   Primary Care Physician: Terrie, Primary Doctor  Expected Discharge Date: 1/7/2024  Principal Problem:MRSA bacteremia    Subjective:     Hospital Course:   Mr. Lorenzo Nino is a 48 y.o. 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 Presented for 3-day dyspnea. Patient was transferred to the CICU for AHRF 2/2 significant pulmonary edema 2/2 severe mitral regurgitation concerning for anterior leaflet tear vs posterior leaflet restriction.  Upon admission to the CICU on afib w/ RVR. Given 1 dose of digoxin and started on heparin gtt and diltiazem gtt. Stabilized on sinus rhythm. CTS evaluated patient and agree on continuing aggressive diuresis with lasix gtt, at the moment not indicated IABP.. S/p NEW on 12/26 with severe MR with 2 jets, including posterior jet likely secondary to tear/perforation. On IV vancomycin. CTS to follow as outpatient (Dr. Ascencio).  ID plan with  daptomycin 8mg/kg x 2 weeks and follow with PO doxycyline until new valve replaced. ID recommended intraoperative tissue cx. Optimized diuresis for volume status. Off heparin gtt, started on eliquis. Patient started on GDMT but developed FRANCESCO so GDMT were held. Renal function improved and diuretics were slowly added back on. He reported some bright red bloody bowel movements with hemoglobin trending downwards, concerning for a lower GI bleed. GI was consulted for recommendations. Colonoscopy and EGD performed while inpatient. His colonoscopy was concerning for non obstructing large tumor in the sigmoid colon with stigamata of recent bleeding. Mass was biopsied and tattoeed. Patient remained stable throughout hospital stay and on 12/8, CCU team  felt patient was stable for discharge home.     Interval History: NAEON. Has no acute complaints. SOB improving. Appetite improving. H/H stable. Continues to Soft Bps.     Review of Systems   Constitutional: Positive for decreased appetite. Negative for diaphoresis.   Cardiovascular:  Negative for chest pain and dyspnea on exertion.   Respiratory:  Negative for shortness of breath.    Gastrointestinal:  Negative for abdominal pain and constipation.     Objective:     Vital Signs (Most Recent):  Temp: 97 °F (36.1 °C) (01/07/24 1138)  Pulse: 73 (01/07/24 1138)  Resp: 16 (01/07/24 1138)  BP: (!) 93/52 (01/07/24 1138)  SpO2: 99 % (01/07/24 1138) Vital Signs (24h Range):  Temp:  [97 °F (36.1 °C)-98.7 °F (37.1 °C)] 97 °F (36.1 °C)  Pulse:  [62-82] 73  Resp:  [14-18] 16  SpO2:  [99 %-100 %] 99 %  BP: (90-99)/(51-62) 93/52     Weight: 70.3 kg (155 lb)  Body mass index is 21.02 kg/m².     SpO2: 99 %         Intake/Output Summary (Last 24 hours) at 1/7/2024 1312  Last data filed at 1/7/2024 0717  Gross per 24 hour   Intake 2129 ml   Output 800 ml   Net 1329 ml         Lines/Drains/Airways       Peripherally Inserted Central Catheter Line  Duration             PICC Double Lumen 11/13/23 1509 right basilic 54 days                       Physical Exam  Constitutional:       Appearance: Normal appearance.   HENT:      Head: Normocephalic.      Right Ear: External ear normal.      Left Ear: External ear normal.   Eyes:      General: No scleral icterus.        Right eye: No discharge.         Left eye: No discharge.      Extraocular Movements: Extraocular movements intact.   Cardiovascular:      Rate and Rhythm: Normal rate and regular rhythm.      Pulses: Normal pulses.      Heart sounds: Normal heart sounds.   Pulmonary:      Effort: Pulmonary effort is normal.      Breath sounds: Normal breath sounds.   Abdominal:      General: Abdomen is flat.      Palpations: Abdomen is soft.   Musculoskeletal:      Right lower leg: No edema.       Left lower leg: No edema.   Skin:     General: Skin is warm.   Neurological:      General: No focal deficit present.      Mental Status: He is alert and oriented to person, place, and time.            Significant Labs: All pertinent lab results from the last 24 hours have been reviewed.      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 Echo with EF 50-53% severe MR and severe LA dilation. Optimized rate control with metoprolol 25 TID. Increased acute phase reactants. S/p NEW on 12/26 with severe MR with 2 jets, including posterior jet likely secondary to tear/perforation.     Plan:  - cardiac diet   - Continue Farxiga. Holding Metoprolol in the setting of hypotension  - Continue spironolactone 25mg and torsemide 10mg daily;  - continue daptomycin x 2 weeks (Home Health; started 12/30/23) and follow with PO doxycyline until new valve replaced. ID recommended intraoperative tissue cx.  - blood cx with NGTD   - CTS will follow as outpatient    - ID will follow as outpatient  - Will reach out to  about transitional care clinic appt         Iron deficiency anemia  Admit with hemoglobin 10 Baseline 10      Lab Results   Component Value Date    IRON 30 (L) 01/05/2024    TIBC 371 01/05/2024    FERRITIN 407 (H) 01/05/2024     Lab Results   Component Value Date    FOLATE 8.9 01/05/2024     Lab Results   Component Value Date    [id number] 282 01/05/2024     No results found for:  ""RETICCTPCT"    Likely secondary to acute blood loss vs anemia of chronic disease    Plan:   -   Lab Results   Component Value Date    HGB 8.1 (L) 01/07/2024     - Daily CBC   - Iron 30, Ferritin 407, Folate WNL, Vit B12 WNL  - Will give one dose of ferric gluconate today  - Transfuse hgb <7      GI bleeding  Having multiple episodes of bowel movements with bloody streaks. H/H trending downward. Has been ongoing since October 2023. On Eliquis 5mg BID. No previous endoscopic intervention. GI Consulted for concern for LGIB. Patient underwent EGD/colonoscopy and found to have friable large mass in colon    - Hold all NSAIDs and anticoagulants, unless contraindicated  - PO Protonix 40mg administered   - 1 units RBC ordered   - Please correct any coagulopathy with platelets and FFP for goal of platelets >50K and INR <2.0  -Path pending      Hypokalemia  Persistently hypokalemic despite replenishment    - Replete PRN for K 4.0   - Will likely need PO Potassium at discharge    HFmrEF  - See Severe MR    A-fib with RVR  S/p afib with RVR once admitted to the CCU. Now NSR     -On amio 400mg po bid for 14 days then 200mg daily   - Holding Metoprolol in the setting of persistent hypotension  - monitor telemetry    - Holding eliquis 5 BID since friable mass found in colonoscopy     Endocarditis  See severe MR     FRANCESCO (acute kidney injury)  Current Cr 1.5, Baseline 1.0 on admission; Likely 2/2 poor PO intake and initiation of GDMT and diuretics.    Plan:  - Resolved  - Trend Cr/ Serial BMPs  - Strict I&Os, close monitoring of UOP  - Replace electrolytes PRN, goal K/Phos/Mg 4/3/2  - Avoid nephrotoxic agents when feasible (NSAIDs,  IV radiocontrast, gadolinium, etc.)  - Renally dose all meds to eGFR            VTE Risk Mitigation (From admission, onward)      None            Tisha Cruz MD  Cardiology  [facility] - Cardiology Step[facility]    "

## 2024-01-07 NOTE — ASSESSMENT & PLAN NOTE
48 y.o. 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 Echo with EF 50-53% severe MR and severe LA dilation. Optimized rate control with metoprolol 25 TID. Increased acute phase reactants. S/p NEW on 12/26 with severe MR with 2 jets, including posterior jet likely secondary to tear/perforation.     Plan:  - cardiac diet   - Continue Farxiga. Holding Metoprolol in the setting of hypotension  - Continue spironolactone 25mg and torsemide 10mg daily;  - continue daptomycin x 2 weeks (Home Health; started 12/30/23) and follow with PO doxycyline until new valve replaced. ID recommended intraoperative tissue cx.  - blood cx with NGTD   - CTS will follow as outpatient    - ID will follow as outpatient  - Will reach out to  about transitional care clinic appt

## 2024-01-07 NOTE — ASSESSMENT & PLAN NOTE
"Admit with hemoglobin 10 Baseline 10      Lab Results   Component Value Date    IRON 30 (L) 01/05/2024    TIBC 371 01/05/2024    FERRITIN 407 (H) 01/05/2024     Lab Results   Component Value Date    FOLATE 8.9 01/05/2024     Lab Results   Component Value Date    [id number] 282 01/05/2024     No results found for: "RETICCTPCT"    Likely secondary to acute blood loss vs anemia of chronic disease    Plan:   -   Lab Results   Component Value Date    HGB 8.1 (L) 01/07/2024     - Daily CBC   - Iron 30, Ferritin 407, Folate WNL, Vit B12 WNL  - Will give one dose of ferric gluconate today  - Transfuse hgb <7    "

## 2024-01-07 NOTE — PLAN OF CARE
01/07/24 1546   Post-Acute Status   Post-Acute Authorization Home Health   Home Health Status Referrals Sent   Patient choice form signed by patient/caregiver List with quality metrics by geographic area provided;List from System Post-Acute Care   Discharge Plan   Discharge Plan A Home Health   Discharge Plan B Home     Home Health referral sent to [facility] as indicated by weekday .   Pt. will not need home infusion as abx will be completed 01/08/24.     Discharge Plan A and Plan B have been determined by review of patient's clinical status, future medical and therapeutic needs, and coverage/benefits for post-acute care in coordination with multidisciplinary team members.     Cata Jane LMSW

## 2024-01-07 NOTE — PLAN OF CARE
Problem: Adult Inpatient Plan of Care  Goal: Plan of Care Review  Outcome: Ongoing, Progressing  Goal: Patient-Specific Goal (Individualized)  Outcome: Ongoing, Progressing  Goal: Absence of Hospital-Acquired Illness or Injury  Outcome: Ongoing, Progressing  Goal: Optimal Comfort and Wellbeing  Outcome: Ongoing, Progressing  Goal: Readiness for Transition of Care  Outcome: Ongoing, Progressing     Problem: Adjustment to Illness (Sepsis/Septic Shock)  Goal: Optimal Coping  Outcome: Ongoing, Progressing     Problem: Bleeding (Sepsis/Septic Shock)  Goal: Absence of Bleeding  Outcome: Ongoing, Progressing     Problem: Fluid and Electrolyte Imbalance (Acute Kidney Injury/Impairment)  Goal: Fluid and Electrolyte Balance  Outcome: Ongoing, Progressing

## 2024-01-08 ENCOUNTER — TELEPHONE (OUTPATIENT)
Dept: CARDIOLOGY | Facility: CLINIC | Age: 49
End: 2024-01-08
Payer: COMMERCIAL

## 2024-01-08 ENCOUNTER — TELEPHONE (OUTPATIENT)
Dept: GASTROENTEROLOGY | Facility: CLINIC | Age: 49
End: 2024-01-08
Payer: COMMERCIAL

## 2024-01-08 VITALS
DIASTOLIC BLOOD PRESSURE: 63 MMHG | TEMPERATURE: 98 F | BODY MASS INDEX: 20.99 KG/M2 | HEART RATE: 75 BPM | RESPIRATION RATE: 20 BRPM | SYSTOLIC BLOOD PRESSURE: 99 MMHG | WEIGHT: 155 LBS | HEIGHT: 72 IN | OXYGEN SATURATION: 98 %

## 2024-01-08 LAB
ALBUMIN SERPL BCP-MCNC: 2.6 G/DL (ref 3.5–5.2)
ALP SERPL-CCNC: 66 U/L (ref 55–135)
ALT SERPL W/O P-5'-P-CCNC: 28 U/L (ref 10–44)
ANION GAP SERPL CALC-SCNC: 6 MMOL/L (ref 8–16)
AST SERPL-CCNC: 30 U/L (ref 10–40)
BASOPHILS # BLD AUTO: 0.06 K/UL (ref 0–0.2)
BASOPHILS NFR BLD: 0.6 % (ref 0–1.9)
BILIRUB SERPL-MCNC: 0.4 MG/DL (ref 0.1–1)
BUN SERPL-MCNC: 17 MG/DL (ref 6–20)
CALCIUM SERPL-MCNC: 8.9 MG/DL (ref 8.7–10.5)
CHLORIDE SERPL-SCNC: 105 MMOL/L (ref 95–110)
CO2 SERPL-SCNC: 26 MMOL/L (ref 23–29)
CREAT SERPL-MCNC: 1.2 MG/DL (ref 0.5–1.4)
DIFFERENTIAL METHOD BLD: ABNORMAL
EOSINOPHIL # BLD AUTO: 0.5 K/UL (ref 0–0.5)
EOSINOPHIL NFR BLD: 5.1 % (ref 0–8)
ERYTHROCYTE [DISTWIDTH] IN BLOOD BY AUTOMATED COUNT: 18.6 % (ref 11.5–14.5)
EST. GFR  (NO RACE VARIABLE): >60 ML/MIN/1.73 M^2
GLUCOSE SERPL-MCNC: 88 MG/DL (ref 70–110)
HCT VFR BLD AUTO: 25.1 % (ref 40–54)
HGB BLD-MCNC: 8.2 G/DL (ref 14–18)
IMM GRANULOCYTES # BLD AUTO: 0.11 K/UL (ref 0–0.04)
IMM GRANULOCYTES NFR BLD AUTO: 1.1 % (ref 0–0.5)
LYMPHOCYTES # BLD AUTO: 3 K/UL (ref 1–4.8)
LYMPHOCYTES NFR BLD: 31.4 % (ref 18–48)
MCH RBC QN AUTO: 26.8 PG (ref 27–31)
MCHC RBC AUTO-ENTMCNC: 32.7 G/DL (ref 32–36)
MCV RBC AUTO: 82 FL (ref 82–98)
MONOCYTES # BLD AUTO: 0.9 K/UL (ref 0.3–1)
MONOCYTES NFR BLD: 9.1 % (ref 4–15)
NEUTROPHILS # BLD AUTO: 5.1 K/UL (ref 1.8–7.7)
NEUTROPHILS NFR BLD: 52.7 % (ref 38–73)
NRBC BLD-RTO: 0 /100 WBC
PLATELET # BLD AUTO: 257 K/UL (ref 150–450)
PMV BLD AUTO: 9.9 FL (ref 9.2–12.9)
POTASSIUM SERPL-SCNC: 4.3 MMOL/L (ref 3.5–5.1)
PROT SERPL-MCNC: 6.9 G/DL (ref 6–8.4)
RBC # BLD AUTO: 3.06 M/UL (ref 4.6–6.2)
SODIUM SERPL-SCNC: 137 MMOL/L (ref 136–145)
WBC # BLD AUTO: 9.66 K/UL (ref 3.9–12.7)

## 2024-01-08 PROCEDURE — 80053 COMPREHEN METABOLIC PANEL: CPT | Performed by: STUDENT IN AN ORGANIZED HEALTH CARE EDUCATION/TRAINING PROGRAM

## 2024-01-08 PROCEDURE — 25000003 PHARM REV CODE 250: Performed by: STUDENT IN AN ORGANIZED HEALTH CARE EDUCATION/TRAINING PROGRAM

## 2024-01-08 PROCEDURE — 93005 ELECTROCARDIOGRAM TRACING: CPT

## 2024-01-08 PROCEDURE — 93010 ELECTROCARDIOGRAM REPORT: CPT | Mod: ,,, | Performed by: INTERNAL MEDICINE

## 2024-01-08 PROCEDURE — 99239 HOSP IP/OBS DSCHRG MGMT >30: CPT | Mod: ,,, | Performed by: INTERNAL MEDICINE

## 2024-01-08 PROCEDURE — 25000003 PHARM REV CODE 250

## 2024-01-08 PROCEDURE — 85025 COMPLETE CBC W/AUTO DIFF WBC: CPT | Performed by: INTERNAL MEDICINE

## 2024-01-08 PROCEDURE — 63600175 PHARM REV CODE 636 W HCPCS: Performed by: INTERNAL MEDICINE

## 2024-01-08 PROCEDURE — 25000003 PHARM REV CODE 250: Performed by: INTERNAL MEDICINE

## 2024-01-08 RX ADMIN — AMIODARONE HYDROCHLORIDE 400 MG: 200 TABLET ORAL at 09:01

## 2024-01-08 RX ADMIN — DAPAGLIFLOZIN 10 MG: 10 TABLET, FILM COATED ORAL at 09:01

## 2024-01-08 RX ADMIN — POLYETHYLENE GLYCOL 3350 17 G: 17 POWDER, FOR SOLUTION ORAL at 09:01

## 2024-01-08 RX ADMIN — TORSEMIDE 10 MG: 10 TABLET ORAL at 09:01

## 2024-01-08 RX ADMIN — SPIRONOLACTONE 25 MG: 25 TABLET ORAL at 09:01

## 2024-01-08 RX ADMIN — PANTOPRAZOLE SODIUM 40 MG: 40 TABLET, DELAYED RELEASE ORAL at 09:01

## 2024-01-08 RX ADMIN — DAPTOMYCIN 600 MG: 500 INJECTION, POWDER, LYOPHILIZED, FOR SOLUTION INTRAVENOUS at 10:01

## 2024-01-08 NOTE — TELEPHONE ENCOUNTER
----- Message from Mayte Smyth sent at 1/8/2024  1:08 PM CST -----  Regarding: Sooner appt  Contact: pt  Type:  Sooner Appointment Request     Caller is requesting a sooner appointment.  Caller declined first available appointment listed below.  Caller will not accept being placed on the waitlist and is requesting a message be sent to doctor.     Name of Caller: Pt  When is the first available appointment? 2/8/2024  Symptoms: Gastrointestinal hemorrhage, unspecified gastrointestinal hemorrhage type [K92.2..  Would the patient rather a call back or a response via [facility]? Call back  Best Call Back Number: [phone]  Additional Information: Referral on file.

## 2024-01-08 NOTE — DISCHARGE SUMMARY
[facility] - Cardiology Stepdown  Cardiology  Discharge Summary      Patient Name: Lorenzo Nino  MRN: [id number]  Admission Date: 12/18/2023  Hospital Length of Stay: 21 days  Discharge Date and Time:  01/08/2024 2:33 PM  Attending Physician: Terrie att. providers found    Discharging Provider: Tisha Cruz MD  Primary Care Physician: Terrie, Primary Doctor    HPI:   Mr. Lorenzo Nino is a 48 y.o. 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 Presented for 3-day dyspnea. Denies having fever or chills during this time. He states that he was not doing anything strenuous when his symptoms came on. His symptoms got progressively worse over the weekend and he decided to present yesterday. He has been on home IV Daptomycin. He follows with Dr. Mercado with Cardiology and his operation was performed by Dr. Haro.    In the ER, he was hypoxic requiring 6 L to maintain sats of 91-94%. Initial lactic acid 1.8. Creatinine 0.9. WBC 20k. Bedside Echo showed EF 45-50% with severe MR (2 jets, one posteriorly directed and one anteriorly directed), restricted posterior leaflet, possible tear in anterior leaflet. No pericardial effusion.    Patient was transferred to the CICU for AHRF 2/2 significant pulmonary edema 2/2 severe mitral regurgitation concerning for anterior leaflet tear vs posterior leaflet restriction.    Procedure(s) (LRB):  EGD (ESOPHAGOGASTRODUODENOSCOPY) (N/A)  COLONOSCOPY (N/A)     Indwelling Lines/Drains at time of discharge:  Lines/Drains/Airways       Peripherally Inserted Central Catheter Line  Duration             PICC Double Lumen 11/13/23 1509 right basilic 55 days                    Hospital Course:  Mr. Lorenzo Nino is a 48 y.o. 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 Presented for 3-day dyspnea. Patient was transferred to the CICU for AHRF 2/2 significant  pulmonary edema 2/2 severe mitral regurgitation concerning for anterior leaflet tear vs posterior leaflet restriction.  Upon admission to the CICU on afib w/ RVR. Given 1 dose of digoxin and started on heparin gtt and diltiazem gtt. Stabilized on sinus rhythm. CTS evaluated patient and agree on continuing aggressive diuresis with lasix gtt, at the moment not indicated IABP.. S/p NEW on 12/26 with severe MR with 2 jets, including posterior jet likely secondary to tear/perforation. On IV vancomycin. CTS to follow as outpatient (Dr. Ascencio).  ID plan with  daptomycin 8mg/kg x 2 weeks and follow with PO doxycyline until new valve replaced. ID recommended intraoperative tissue cx. Optimized diuresis for volume status. Off heparin gtt, started on eliquis. Patient started on GDMT but developed FRANCESCO so GDMT were held. Renal function improved and diuretics were slowly added back on. He reported some bright red bloody bowel movements with hemoglobin trending downwards, concerning for a lower GI bleed. GI was consulted for recommendations. Colonoscopy and EGD performed while inpatient. His colonoscopy was concerning for non obstructing large tumor in the sigmoid colon with stigamata of recent bleeding. Mass was biopsied and tattoeed. Patient remained stable throughout hospital stay and on 12/8, CCU team felt patient was stable for discharge home. His medications were reviewed and reconciled prior to discharge.   All ambulatory appointments were scheduled or requested.     Goals of Care Treatment Preferences:  Code Status: Full Code    Physical Exam  Vitals and nursing note reviewed.   Constitutional:       General: He is not in acute distress.     Appearance: Normal appearance. He is not diaphoretic.   HENT:      Head: Normocephalic and atraumatic.      Mouth/Throat:      Mouth: Mucous membranes are moist.   Eyes:      General: No scleral icterus.        Right eye: No discharge.         Left eye: No discharge.      Extraocular  Movements: Extraocular movements intact.   Neck:      Vascular: No carotid bruit, hepatojugular reflux or JVD.   Cardiovascular:      Rate and Rhythm: Normal rate.      Pulses: Normal pulses.   Pulmonary:      Effort: Pulmonary effort is normal. No respiratory distress.   Abdominal:      General: Abdomen is flat.      Palpations: Abdomen is soft.      Tenderness: There is no abdominal tenderness.   Musculoskeletal:      Right lower leg: No edema.      Left lower leg: No edema.   Skin:     General: Skin is warm and dry.      Capillary Refill: Capillary refill takes less than 2 seconds.      Findings: No rash.   Neurological:      General: No focal deficit present.      Mental Status: He is alert and oriented to person, place, and time.   Psychiatric:         Mood and Affect: Mood normal.         Thought Content: Thought content normal.          Consults:   Consults (From admission, onward)          Status Ordering Provider     Inpatient consult to Gastroenterology  Once        Provider:  (Not yet assigned)    Completed EPIFANIO POWELL     Inpatient consult to Midline team  Once        Provider:  (Not yet assigned)    Completed SHARYN DIAZ     Inpatient consult to Infectious Diseases  Once        Provider:  (Not yet assigned)    Completed ANN DAVE     Inpatient consult to Infectious Diseases  Once        Provider:  (Not yet assigned)    Completed NGA ENCARNACION     Inpatient consult to Cardiothoracic Surgery  Once        Provider:  (Not yet assigned)    Completed KEILA BROWN     Inpatient consult to Cardiology  Once        Provider:  (Not yet assigned)    Completed KEILA BROWN            Significant Diagnostic Studies: N/A    Pending Diagnostic Studies:       Procedure Component Value Units Date/Time    APTT [[id number]] Collected: 12/19/23 1840    Order Status: Sent Lab Status: In process Updated: 12/19/23 1841    Specimen: Blood     EKG 12-lead [[id number]]     Order  Status: Sent Lab Status: No result     EKG 12-lead [[id number]]     Order Status: Sent Lab Status: No result     EKG 12-lead [[id number]]     Order Status: Sent Lab Status: No result     EKG 12-lead [[id number]]     Order Status: Sent Lab Status: No result     EKG 12-lead [[id number]]     Order Status: Sent Lab Status: No result     EKG 12-lead [[id number]]     Order Status: Sent Lab Status: No result     EKG 12-lead [[id number]]     Order Status: Sent Lab Status: No result     EKG 12-lead [[id number]]     Order Status: Sent Lab Status: No result     Specimen to Pathology, Surgery Gastrointestinal tract [[id number]] Collected: 01/05/24 1346    Order Status: Sent Lab Status: In process Updated: 01/05/24 1426    Specimen: Tissue             Final Active Diagnoses:    Diagnosis Date Noted POA    PRINCIPAL PROBLEM:  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 [R78.81, B95.62] 11/03/2023 Yes    Iron deficiency anemia [D50.9] 01/07/2024 Yes    GI bleeding [K92.2] 01/04/2024 Yes    Hypokalemia [E87.6] 01/03/2024 No    HFmrEF [I50.41] 12/19/2023 Yes    A-fib with RVR [I48.91] 11/01/2023 Yes    Endocarditis [I38] 10/31/2023 Yes     Chronic    FRANCESCO (acute kidney injury) [N17.9] 10/30/2023 Yes      Problems Resolved During this Admission:    Diagnosis Date Noted Date Resolved POA    Right upper quadrant abdominal pain [R10.11] 12/27/2023 12/29/2023 Yes    Anemia due to GI blood loss [D50.0] 12/21/2023 12/29/2023 Unknown    Hypomagnesemia [E83.42] 12/20/2023 12/22/2023 Unknown    Acute bacterial endocarditis [I33.0] 11/03/2023 12/19/2023 Yes    Acute hypoxemic respiratory failure [J96.01] 11/02/2023 12/30/2023 Yes     No new Assessment & Plan notes have been filed under this hospital service since the last note was generated.  Service: Cardiology      Discharged Condition: good    Disposition: Home-Health Care Svc    Follow Up:   Follow-up Information       OffVerito coleman NP. Go on  1/12/2024.    Specialty: Family Medicine  Why: Follow up 1/12 at 8:30am  Contact information:  [address] [place] [address]  [phone]                           Patient Instructions:      Ambulatory referral/consult to Infectious Disease   Standing Status: Future   Referral Priority: Routine Referral Type: Consultation   Referral Reason: Specialty Services Required   Requested Specialty: Infectious Diseases   Number of Visits Requested: 1     Ambulatory referral/consult to Cardiothoracic Surgery   Standing Status: Future   Referral Priority: Routine Referral Type: Consultation   Referral Reason: Specialty Services Required   Requested Specialty: Cardiothoracic Surgery   Number of Visits Requested: 1     Ambulatory referral/consult to Cardiology   Standing Status: Future   Referral Priority: Routine Referral Type: Consultation   Referral Reason: Specialty Services Required   Requested Specialty: Cardiology   Number of Visits Requested: 1     Ambulatory referral/consult to Heart Failure Transitional Care Clinic   Standing Status: Future   Referral Priority: Routine Referral Type: Consultation   Referral Reason: Specialty Services Required   Requested Specialty: Cardiology   Number of Visits Requested: 1     Ambulatory referral/consult to Gastroenterology   Standing Status: Future   Referral Priority: Routine Referral Type: Consultation   Referral Reason: Specialty Services Required   Referred to Provider: ETHAN PATRICK Requested Specialty: Gastroenterology   Number of Visits Requested: 1     Medications:  Reconciled Home Medications:      Medication List        START taking these medications      amiodarone 200 MG Tab  Commonly known as: PACERONE  Take 2 tablets (400 mg total) by mouth 2 (two) times daily.     dapagliflozin propanediol 10 mg tablet  Commonly known as: Farxiga  Take 1 tablet (10 mg total) by mouth once daily.     doxycycline 100 MG Cap  Commonly known as: VIBRAMYCIN  Take 1 capsule  (100 mg total) by mouth 2 (two) times daily.  Start taking on: January 9, 2024     famotidine 20 MG tablet  Commonly known as: PEPCID  Take 1 tablet (20 mg total) by mouth 2 (two) times daily.     mirtazapine 15 MG disintegrating tablet  Commonly known as: REMERON SOL-TAB  Dissolve 1 tablet (15 mg total) by mouth nightly.     potassium chloride SA 20 MEQ tablet  Commonly known as: K-DUR,KLOR-CON  Take 2 tablets (40 mEq total) by mouth 4 (four) times daily.     sodium chloride 0.9% SolP 50 mL with DAPTOmycin 500 mg SolR 600 mg  Inject 600 mg into the vein once daily. for 13 days     spironolactone 25 MG tablet  Commonly known as: ALDACTONE  Take 1 tablet (25 mg total) by mouth once daily.     torsemide 10 MG Tab  Commonly known as: DEMADEX  Take 1 tablet (10 mg total) by mouth once daily.            CONTINUE taking these medications      metoprolol tartrate 50 MG tablet  Commonly known as: LOPRESSOR  Take 1 tablet (50 mg total) by mouth 2 (two) times daily.            STOP taking these medications      aspirin 325 MG EC tablet  Commonly known as: ECOTRIN     benzonatate 100 MG capsule  Commonly known as: TESSALON     NIFEdipine 30 MG (OSM) 24 hr tablet  Commonly known as: PROCARDIA-XL              Time spent on the discharge of patient: 20 minutes    Tisha Cruz MD  Cardiology  [facility] - Cardiology Stepdown

## 2024-01-08 NOTE — TELEPHONE ENCOUNTER
Call from PT's Mother who asks to reschedule PT's appt. As he will be D/C from the Hospital today. Mother transferred to Arlette hernandez MA.

## 2024-01-08 NOTE — PLAN OF CARE
[facility] - Cardiology Stepdown  Discharge Final Note    Primary Care Provider: No, Primary Doctor    Expected Discharge Date: 1/8/2024    Final Discharge Note (most recent)       Final Note - 01/08/24 0927          Final Note    Assessment Type Final Discharge Note     Anticipated Discharge Disposition Home-Health Care Svc        Post-Acute Status    Post-Acute Authorization Home Health     Home Health Status Set-up Complete/Auth obtained                 Per CCU team pt has completed IV abx and therefore they are no longer needed at home.  SW confirmed with Shital with [facility] that pt has been accepted by their [place] office.      Humera Luong, LENARD  [facility]  [id number]    Future Appointments   Date Time Provider Department Center   1/9/2024 10:00 AM Kasey Van DO [facility] ID [facility]   1/11/2024 10:00 AM LAB, APPOINTMENT [facility] LAB VNP [facility]   1/11/2024 10:30 AM Mala Hooks PA-C [facility]   1/11/2024 10:30 AM [facility] HEART FAILURE NURSE [facility]   1/12/2024  8:30 AM Verito Miller NP [facility]   1/16/2024 10:00 AM WILBER Cornelius MD [facility] OPHTHAL [facility]   1/30/2024  3:00 PM Cheryl Duarte PA-C [facility] CARDIO [facility]   1/31/2024 11:00 AM CV SBPH ECHO/EKG [facility] TAM [facility]   2/1/2024  3:00 PM Manuel Beal MD [facility] CARDVAS [facility]   3/11/2024  3:30 PM Emerson Mercado MD [facility] CARDIO [facility]       Contact Info       Verito Miller, NP   Specialty: Family Medicine    [address] [place] [address]   Phone: [phone]       Next Steps: Go on 1/12/2024    Instructions: Follow up 1/12 at 8:30am

## 2024-01-08 NOTE — NURSING
1415-Pt discharge per md orders. Pt verbalize complete understanding and follow up appointment. Pt was given a copy of home  medications.  Address all issues prior d/c . Pt left w/ all of____ valuables. Pt voice no concerns. Transport notified of discharge. Tele box place in dirty bin by .

## 2024-01-09 ENCOUNTER — TELEPHONE (OUTPATIENT)
Dept: CARDIOLOGY | Facility: CLINIC | Age: 49
End: 2024-01-09
Payer: COMMERCIAL

## 2024-01-09 ENCOUNTER — TELEPHONE (OUTPATIENT)
Dept: GASTROENTEROLOGY | Facility: CLINIC | Age: 49
End: 2024-01-09
Payer: COMMERCIAL

## 2024-01-09 ENCOUNTER — OFFICE VISIT (OUTPATIENT)
Dept: INFECTIOUS DISEASES | Facility: CLINIC | Age: 49
End: 2024-01-09
Payer: COMMERCIAL

## 2024-01-09 DIAGNOSIS — B95.62 MRSA BACTEREMIA: ICD-10-CM

## 2024-01-09 DIAGNOSIS — R78.81 MRSA BACTEREMIA: ICD-10-CM

## 2024-01-09 DIAGNOSIS — K63.89 COLONIC MASS: Primary | ICD-10-CM

## 2024-01-09 LAB
FINAL PATHOLOGIC DIAGNOSIS: NORMAL
GROSS: NORMAL
Lab: NORMAL

## 2024-01-09 PROCEDURE — 1111F DSCHRG MED/CURRENT MED MERGE: CPT | Mod: CPTII,95,, | Performed by: STUDENT IN AN ORGANIZED HEALTH CARE EDUCATION/TRAINING PROGRAM

## 2024-01-09 PROCEDURE — 99213 OFFICE O/P EST LOW 20 MIN: CPT | Mod: 95,,, | Performed by: STUDENT IN AN ORGANIZED HEALTH CARE EDUCATION/TRAINING PROGRAM

## 2024-01-09 RX ORDER — ONDANSETRON 4 MG/1
4 TABLET, FILM COATED ORAL DAILY PRN
Qty: 30 TABLET | Refills: 0 | Status: SHIPPED | OUTPATIENT
Start: 2024-01-09 | End: 2024-02-08

## 2024-01-09 RX ORDER — DOXYCYCLINE 100 MG/1
100 CAPSULE ORAL 2 TIMES DAILY
Qty: 60 CAPSULE | Refills: 2 | Status: SHIPPED | OUTPATIENT
Start: 2024-01-09 | End: 2024-04-08

## 2024-01-09 NOTE — TELEPHONE ENCOUNTER
----- Message from Fadia Ellsworth MD sent at 1/8/2024  3:31 PM CST -----  Regarding: RE: Sooner appt  Contact: pt  As soon as the path results are in I will get him the appropriate follow up.  He does not need to see us in clinic.  He will either need AES or CRS based on the path  ----- Message -----  From: Mery Serrato MA  Sent: 1/8/2024   2:55 PM CST  To: Fadia Ellsworth MD  Subject: FW: Sooner appt                                  Hi,     Patient was scoped on 1/5 and the results are still pending. Does he need an urgent appointment?  ----- Message -----  From: Mayte Smyth  Sent: 1/8/2024   1:10 PM CST  To: Jodee Loaiza Staff  Subject: Sooner appt                                      Type:  Sooner Appointment Request     Caller is requesting a sooner appointment.  Caller declined first available appointment listed below.  Caller will not accept being placed on the waitlist and is requesting a message be sent to doctor.     Name of Caller: Pt  When is the first available appointment? 2/8/2024  Symptoms: Gastrointestinal hemorrhage, unspecified gastrointestinal hemorrhage type [K92.2..  Would the patient rather a call back or a response via [facility]? Call back  Best Call Back Number: [phone]  Additional Information: Referral on file.

## 2024-01-09 NOTE — TELEPHONE ENCOUNTER
MA spoke with the patient and his mother. They are aware Dr. Ellsworth is waiting for the path results and will refer him to the appropriate provider. They verbalized understanding.

## 2024-01-09 NOTE — PROGRESS NOTES
The patient location is: [place]   The chief complaint leading to consultation is: MV endocarditis, hospital follow up      Visit type: audiovisual    Face to Face time with patient: 20 minutes  30 minutes of total time spent on the encounter, which includes face to face time and non-face to face time preparing to see the patient (eg, review of tests), Obtaining and/or reviewing separately obtained history, Documenting clinical information in the electronic or other health record, Independently interpreting results (not separately reported) and communicating results to the patient/family/caregiver, or Care coordination (not separately reported).     Each patient to whom he or she provides medical services by telemedicine is:  (1) informed of the relationship between the physician and patient and the respective role of any other health care provider with respect to management of the patient; and (2) notified that he or she may decline to receive medical services by telemedicine and may withdraw from such care at any time.        INFECTIOUS DISEASE CLINIC  01/09/2024     Subjective:      Chief Complaint: MV endocarditis, hospital follow up    History of Present Illness:    Mr. 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     Original end date of daptomycin was 12/24, now extended to 1/8/24 (total 8 weeks.). Planned to transition to PO doxycycline until new valve is placed. Per cardiology and CTS, persistent infection may be the cause of valve dehiscence.     Today patient states that he has completed the daptomycin and his line has been removed. He just started the doxycycline this morning, so far no issues. Has appointment with CT surgery on 2/1.     Review of Systems   Constitutional: Negative for chills,  fever and malaise/fatigue.   Cardiovascular:  Negative for chest pain.   Respiratory:  Negative for shortness of breath.    Skin:  Negative for rash.   Musculoskeletal:  Negative for joint pain.   Gastrointestinal:  Negative for abdominal pain, diarrhea, nausea and vomiting.         Past Medical History:   Diagnosis Date    Hypertension      Past Surgical History:   Procedure Laterality Date    ECHOCARDIOGRAM,TRANSESOPHAGEAL N/A 12/26/2023    Procedure: Transesophageal echo (NEW) intra-procedure log documentation;  Surgeon: Provider, [facility];  Location: [facility] EP LAB;  Service: Cardiology;  Laterality: N/A;    EXCLUSION, LEFT ATRIAL APPENDAGE, OPEN, AS PART OF OPEN CHEST SURGERY Left 11/3/2023    Procedure: EXCLUSION, LEFT ATRIAL APPENDAGE, OPEN, AS PART OF OPEN CHEST SURGERY;  Surgeon: Manuel Beal MD;  Location: [address];  Service: Cardiothoracic;  Laterality: Left;    INSERTION OF INTRA-AORTIC BALLOON ASSIST DEVICE Right 11/2/2023    Procedure: INSERTION, INTRA-AORTIC BALLOON PUMP;  Surgeon: Jose Vidal MD;  Location: [facility] CATH LAB;  Service: Cardiology;  Laterality: Right;    REPAIR, MITRAL VALVE, OPEN N/A 11/3/2023    Procedure: REPAIR, MITRAL VALVE, OPEN;  Surgeon: Manuel Beal MD;  Location: [facility] OR [address] [place] [address];  Service: Cardiothoracic;  Laterality: N/A;     Family History   Problem Relation Age of Onset    Amblyopia Neg Hx     Blindness Neg Hx     Cataracts Neg Hx     Glaucoma Neg Hx     Macular degeneration Neg Hx     Retinal detachment Neg Hx     Strabismus Neg Hx      Social History     Tobacco Use    Smoking status: Unknown     Passive exposure: Never   Substance Use Topics    Alcohol use: Yes     Alcohol/week: 1.0 standard drink of alcohol     Types: 1 Cans of beer per week    Drug use: Never       Review of patient's allergies indicates:  No Known Allergies      Objective:   VS (24h): There were no vitals filed for this visit. - virtual visit    Virtual visit  Exam:  Physical Exam  Constitutional:       General: He is not in acute distress.     Appearance: Normal appearance. He is not ill-appearing.   HENT:      Head: Normocephalic and atraumatic.   Eyes:      Extraocular Movements: Extraocular movements intact.      Conjunctiva/sclera: Conjunctivae normal.   Musculoskeletal:      Cervical back: Normal range of motion.   Neurological:      Mental Status: He is alert and oriented to person, place, and time.   Psychiatric:         Mood and Affect: Mood normal.         Behavior: Behavior normal.         Thought Content: Thought content normal.             Immunization History   Administered Date(s) Administered    Hepatitis A, Adult 09/16/2005    Td - PF (ADULT) 09/16/2005         Assessment & Plan:     1. 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

## 2024-01-09 NOTE — TELEPHONE ENCOUNTER
[facility] Note    Pathology results from the biopsies of the patient's colon mass show a villous adenoma.  I will refer the patient to colon and rectal surgery to evaluate for resection.    Fadia Ellsworth MD

## 2024-01-09 NOTE — TELEPHONE ENCOUNTER
"Heart Failure Transitional [facility]) hospital discharge 48-72 hour phone follow up completed.     Most Recent Hospital Discharge Date: 1-8-2023  Last admission Diagnosis/chief complaint: Dyspnea    TCC RN Navigator spoke with PT and his Mother    Current Patient reported weight: 133.8 lbs    Current Patient reported blood pressure and heart rate:     Pt reports the following:  []  Shortness of Breath with Activity  []  Shortness of Breath at rest   []  Fatigue  []  Edema   [] Chest pain or tightness  [] Weight Increase since discharge  [x] None of the above    Medications:   Discharge medication reviewed with pt.  Pt reports having medication list available and has all medications at home for use per list. Has meds    Education:   Confirmed pt received "Home Care Guide for Heart Failure Patients" while admitted. Has HCG  Reviewed key points as listed below.     Recommend 2 -3 gram sodium restriction and 1500 cc-2000 cc fluid restriction.  Encourage physical activity with graded exercise program.  Requested patient to weigh themselves daily, and to notify us if their weight increases by more than 3 lbs in 1 day or 5 lbs in 3 days.   Reminded patient to use "Daily weight and symptom tracker" to record and guide patient on when and how to call HFTCC. PT may also use symptom tracker if no scale available  Pt reports being in the GREEN(color) Zone. If in yellow/red, reminded that they should be calling HFTCC today or now.     Watch for these Signs and Symptoms: If any of these occur, contact HFTCC immediately:   Increase in shortness of breath with movement   Increase in swelling in your legs and ankles   Weight gain of more than 3 pounds in a day or 5 pounds in 3 days.   Difficulty breathing when you are lying down   Worsening fatigue or tiredness   Stomach bloating, a full feeling or a loss of appetite   Increased coughing--especially when you are lying down      Pt was able to verbalize back to RN in their " own words correct diet/fluid restrictions, necessity for exercise, warning signs and symptoms, when and how to contact their TCC team.      Pt educated on follow-up plan while in HFTCC program to include:   Week 1 -  F/u appt with Provider and RN (Date) 1-16-24 @ 0900 with 0830 labs   Week 2-5 - In person/ Virtual/ phone call check ins    Week 5-7 - Pt will discharge from HFT[facility] and transition to longterm care provider (Cardiology/PCP/ Advanced Heart Failure).      Patient active on myChart? Yes      Pt given the following contact information for ease of communication: [phone] (Mon-Fri, 8a-5p) & for urgent issues on the weekend to page the Heart Transplant MD on call.  Pt also encouraged utilize [facility] messaging as well.      Will follow up with pt at first clinic visit and RN navigator available for pt questions, issues or concerns.     Messaged PATRICIO Mercado MA to please change this PT's appt from 1-17 to the above times on 1-16.

## 2024-01-10 ENCOUNTER — LAB VISIT (OUTPATIENT)
Dept: LAB | Facility: HOSPITAL | Age: 49
End: 2024-01-10
Attending: PHYSICIAN ASSISTANT
Payer: COMMERCIAL

## 2024-01-10 DIAGNOSIS — I33.0 ACUTE AND SUBACUTE BACTERIAL ENDOCARDITIS: Primary | ICD-10-CM

## 2024-01-10 LAB
ANION GAP SERPL CALC-SCNC: 14 MMOL/L (ref 8–16)
BASOPHILS # BLD AUTO: 0.07 K/UL (ref 0–0.2)
BASOPHILS NFR BLD: 0.8 % (ref 0–1.9)
BUN SERPL-MCNC: 19 MG/DL (ref 6–20)
CALCIUM SERPL-MCNC: 8.9 MG/DL (ref 8.7–10.5)
CHLORIDE SERPL-SCNC: 106 MMOL/L (ref 95–110)
CO2 SERPL-SCNC: 19 MMOL/L (ref 23–29)
CREAT SERPL-MCNC: 1.4 MG/DL (ref 0.5–1.4)
DIFFERENTIAL METHOD BLD: ABNORMAL
EOSINOPHIL # BLD AUTO: 0.4 K/UL (ref 0–0.5)
EOSINOPHIL NFR BLD: 4.8 % (ref 0–8)
ERYTHROCYTE [DISTWIDTH] IN BLOOD BY AUTOMATED COUNT: 18.9 % (ref 11.5–14.5)
EST. GFR  (NO RACE VARIABLE): >60 ML/MIN/1.73 M^2
GLUCOSE SERPL-MCNC: 115 MG/DL (ref 70–110)
HCT VFR BLD AUTO: 29.1 % (ref 40–54)
HGB BLD-MCNC: 9 G/DL (ref 14–18)
IMM GRANULOCYTES # BLD AUTO: 0.11 K/UL (ref 0–0.04)
IMM GRANULOCYTES NFR BLD AUTO: 1.2 % (ref 0–0.5)
LYMPHOCYTES # BLD AUTO: 2.1 K/UL (ref 1–4.8)
LYMPHOCYTES NFR BLD: 23.3 % (ref 18–48)
MCH RBC QN AUTO: 27 PG (ref 27–31)
MCHC RBC AUTO-ENTMCNC: 30.9 G/DL (ref 32–36)
MCV RBC AUTO: 87 FL (ref 82–98)
MONOCYTES # BLD AUTO: 0.9 K/UL (ref 0.3–1)
MONOCYTES NFR BLD: 10.4 % (ref 4–15)
NEUTROPHILS # BLD AUTO: 5.3 K/UL (ref 1.8–7.7)
NEUTROPHILS NFR BLD: 59.5 % (ref 38–73)
NRBC BLD-RTO: 0 /100 WBC
PLATELET # BLD AUTO: 274 K/UL (ref 150–450)
PMV BLD AUTO: 11.5 FL (ref 9.2–12.9)
POTASSIUM SERPL-SCNC: 4.3 MMOL/L (ref 3.5–5.1)
RBC # BLD AUTO: 3.33 M/UL (ref 4.6–6.2)
SODIUM SERPL-SCNC: 139 MMOL/L (ref 136–145)
WBC # BLD AUTO: 8.96 K/UL (ref 3.9–12.7)

## 2024-01-10 PROCEDURE — 80048 BASIC METABOLIC PNL TOTAL CA: CPT | Performed by: INTERNAL MEDICINE

## 2024-01-10 PROCEDURE — G0180 MD CERTIFICATION HHA PATIENT: HCPCS | Mod: ,,, | Performed by: INTERNAL MEDICINE

## 2024-01-10 PROCEDURE — 36415 COLL VENOUS BLD VENIPUNCTURE: CPT | Performed by: INTERNAL MEDICINE

## 2024-01-10 PROCEDURE — 85025 COMPLETE CBC W/AUTO DIFF WBC: CPT | Performed by: INTERNAL MEDICINE

## 2024-01-11 ENCOUNTER — PATIENT MESSAGE (OUTPATIENT)
Dept: ELECTROPHYSIOLOGY | Facility: CLINIC | Age: 49
End: 2024-01-11
Payer: COMMERCIAL

## 2024-01-11 ENCOUNTER — TELEPHONE (OUTPATIENT)
Dept: CARDIOLOGY | Facility: CLINIC | Age: 49
End: 2024-01-11
Payer: COMMERCIAL

## 2024-01-11 NOTE — TELEPHONE ENCOUNTER
Called pt to reschedule follow up apt to tomorrow at 3:30 PM. No answer. Left voicemail requesting call back to confirm.

## 2024-01-12 ENCOUNTER — OFFICE VISIT (OUTPATIENT)
Dept: CARDIOLOGY | Facility: CLINIC | Age: 49
End: 2024-01-12
Payer: COMMERCIAL

## 2024-01-12 ENCOUNTER — LAB VISIT (OUTPATIENT)
Dept: LAB | Facility: HOSPITAL | Age: 49
End: 2024-01-12
Payer: COMMERCIAL

## 2024-01-12 ENCOUNTER — TELEPHONE (OUTPATIENT)
Dept: SURGERY | Facility: CLINIC | Age: 49
End: 2024-01-12
Payer: COMMERCIAL

## 2024-01-12 VITALS
SYSTOLIC BLOOD PRESSURE: 116 MMHG | DIASTOLIC BLOOD PRESSURE: 80 MMHG | BODY MASS INDEX: 18.75 KG/M2 | OXYGEN SATURATION: 100 % | WEIGHT: 138.44 LBS | HEIGHT: 72 IN | HEART RATE: 84 BPM

## 2024-01-12 DIAGNOSIS — B95.62 MRSA BACTEREMIA: Primary | ICD-10-CM

## 2024-01-12 DIAGNOSIS — D36.9 TUBULAR ADENOMA: ICD-10-CM

## 2024-01-12 DIAGNOSIS — Z98.890 S/P MVR (MITRAL VALVE REPAIR): ICD-10-CM

## 2024-01-12 DIAGNOSIS — R78.81 MRSA BACTEREMIA: Primary | ICD-10-CM

## 2024-01-12 DIAGNOSIS — I50.22 HEART FAILURE WITH MID-RANGE EJECTION FRACTION (HFMEF): ICD-10-CM

## 2024-01-12 DIAGNOSIS — I48.0 PAROXYSMAL ATRIAL FIBRILLATION: ICD-10-CM

## 2024-01-12 DIAGNOSIS — I33.0 ACUTE AND SUBACUTE BACTERIAL ENDOCARDITIS: ICD-10-CM

## 2024-01-12 DIAGNOSIS — I38 ENDOCARDITIS, UNSPECIFIED CHRONICITY, UNSPECIFIED ENDOCARDITIS TYPE: Chronic | ICD-10-CM

## 2024-01-12 LAB
ANION GAP SERPL CALC-SCNC: 10 MMOL/L (ref 8–16)
BASOPHILS # BLD AUTO: 0.1 K/UL (ref 0–0.2)
BASOPHILS NFR BLD: 1 % (ref 0–1.9)
BUN SERPL-MCNC: 21 MG/DL (ref 6–20)
CALCIUM SERPL-MCNC: 9.5 MG/DL (ref 8.7–10.5)
CHLORIDE SERPL-SCNC: 104 MMOL/L (ref 95–110)
CO2 SERPL-SCNC: 22 MMOL/L (ref 23–29)
CREAT SERPL-MCNC: 1.4 MG/DL (ref 0.5–1.4)
DIFFERENTIAL METHOD BLD: ABNORMAL
EOSINOPHIL # BLD AUTO: 0.5 K/UL (ref 0–0.5)
EOSINOPHIL NFR BLD: 4.8 % (ref 0–8)
ERYTHROCYTE [DISTWIDTH] IN BLOOD BY AUTOMATED COUNT: 19.5 % (ref 11.5–14.5)
EST. GFR  (NO RACE VARIABLE): >60 ML/MIN/1.73 M^2
GLUCOSE SERPL-MCNC: 113 MG/DL (ref 70–110)
HCT VFR BLD AUTO: 33.5 % (ref 40–54)
HGB BLD-MCNC: 9.9 G/DL (ref 14–18)
IMM GRANULOCYTES # BLD AUTO: 0.08 K/UL (ref 0–0.04)
IMM GRANULOCYTES NFR BLD AUTO: 0.8 % (ref 0–0.5)
LYMPHOCYTES # BLD AUTO: 3.2 K/UL (ref 1–4.8)
LYMPHOCYTES NFR BLD: 33.2 % (ref 18–48)
MCH RBC QN AUTO: 27 PG (ref 27–31)
MCHC RBC AUTO-ENTMCNC: 29.6 G/DL (ref 32–36)
MCV RBC AUTO: 91 FL (ref 82–98)
MONOCYTES # BLD AUTO: 0.9 K/UL (ref 0.3–1)
MONOCYTES NFR BLD: 9 % (ref 4–15)
NEUTROPHILS # BLD AUTO: 4.9 K/UL (ref 1.8–7.7)
NEUTROPHILS NFR BLD: 51.2 % (ref 38–73)
NRBC BLD-RTO: 0 /100 WBC
PLATELET # BLD AUTO: 304 K/UL (ref 150–450)
PMV BLD AUTO: 11.1 FL (ref 9.2–12.9)
POTASSIUM SERPL-SCNC: 4.1 MMOL/L (ref 3.5–5.1)
RBC # BLD AUTO: 3.67 M/UL (ref 4.6–6.2)
SODIUM SERPL-SCNC: 136 MMOL/L (ref 136–145)
WBC # BLD AUTO: 9.53 K/UL (ref 3.9–12.7)

## 2024-01-12 PROCEDURE — 3074F SYST BP LT 130 MM HG: CPT | Mod: CPTII,S$GLB,, | Performed by: INTERNAL MEDICINE

## 2024-01-12 PROCEDURE — 1159F MED LIST DOCD IN RCRD: CPT | Mod: CPTII,S$GLB,, | Performed by: INTERNAL MEDICINE

## 2024-01-12 PROCEDURE — 99999 PR PBB SHADOW E&M-EST. PATIENT-LVL IV: CPT | Mod: PBBFAC,,, | Performed by: INTERNAL MEDICINE

## 2024-01-12 PROCEDURE — 3008F BODY MASS INDEX DOCD: CPT | Mod: CPTII,S$GLB,, | Performed by: INTERNAL MEDICINE

## 2024-01-12 PROCEDURE — 3079F DIAST BP 80-89 MM HG: CPT | Mod: CPTII,S$GLB,, | Performed by: INTERNAL MEDICINE

## 2024-01-12 PROCEDURE — 99214 OFFICE O/P EST MOD 30 MIN: CPT | Mod: S$GLB,,, | Performed by: INTERNAL MEDICINE

## 2024-01-12 PROCEDURE — 85025 COMPLETE CBC W/AUTO DIFF WBC: CPT

## 2024-01-12 PROCEDURE — 80048 BASIC METABOLIC PNL TOTAL CA: CPT

## 2024-01-12 PROCEDURE — 1111F DSCHRG MED/CURRENT MED MERGE: CPT | Mod: CPTII,S$GLB,, | Performed by: INTERNAL MEDICINE

## 2024-01-12 RX ORDER — AMIODARONE HYDROCHLORIDE 200 MG/1
400 TABLET ORAL DAILY
Qty: 60 TABLET | Refills: 11 | Status: SHIPPED | OUTPATIENT
Start: 2024-01-12 | End: 2025-01-11

## 2024-01-12 RX ORDER — POTASSIUM CHLORIDE 20 MEQ/1
40 TABLET, EXTENDED RELEASE ORAL DAILY
Qty: 240 TABLET | Refills: 0
Start: 2024-01-12

## 2024-01-12 NOTE — TELEPHONE ENCOUNTER
----- Message from Hillary Quinones sent at 1/12/2024 10:49 AM CST -----  Regarding: Appt  Contact: [phone]  RESCHEDULE  Pt Mother  is calling to reschedule his/ appt. Pt has appt schedule on 1/18 and would like to reschedule appt for 1/16 at 11:00 am if possible  pls call pt at  [phone] (home)

## 2024-01-13 ENCOUNTER — PATIENT MESSAGE (OUTPATIENT)
Dept: CARDIOTHORACIC SURGERY | Facility: CLINIC | Age: 49
End: 2024-01-13
Payer: COMMERCIAL

## 2024-01-15 ENCOUNTER — LAB VISIT (OUTPATIENT)
Dept: LAB | Facility: HOSPITAL | Age: 49
End: 2024-01-15
Payer: COMMERCIAL

## 2024-01-15 DIAGNOSIS — I33.0 ACUTE AND SUBACUTE BACTERIAL ENDOCARDITIS: Primary | ICD-10-CM

## 2024-01-15 LAB
ANION GAP SERPL CALC-SCNC: 10 MMOL/L (ref 8–16)
BUN SERPL-MCNC: 26 MG/DL (ref 6–20)
CALCIUM SERPL-MCNC: 8.9 MG/DL (ref 8.7–10.5)
CHLORIDE SERPL-SCNC: 105 MMOL/L (ref 95–110)
CO2 SERPL-SCNC: 22 MMOL/L (ref 23–29)
CREAT SERPL-MCNC: 1.5 MG/DL (ref 0.5–1.4)
ERYTHROCYTE [DISTWIDTH] IN BLOOD BY AUTOMATED COUNT: 18.7 % (ref 11.5–14.5)
EST. GFR  (NO RACE VARIABLE): 57 ML/MIN/1.73 M^2
GLUCOSE SERPL-MCNC: 138 MG/DL (ref 70–110)
HCT VFR BLD AUTO: 30.7 % (ref 40–54)
HGB BLD-MCNC: 9.5 G/DL (ref 14–18)
MCH RBC QN AUTO: 26.5 PG (ref 27–31)
MCHC RBC AUTO-ENTMCNC: 30.9 G/DL (ref 32–36)
MCV RBC AUTO: 86 FL (ref 82–98)
PLATELET # BLD AUTO: 336 K/UL (ref 150–450)
PMV BLD AUTO: 11.2 FL (ref 9.2–12.9)
POTASSIUM SERPL-SCNC: 3.5 MMOL/L (ref 3.5–5.1)
RBC # BLD AUTO: 3.58 M/UL (ref 4.6–6.2)
SODIUM SERPL-SCNC: 137 MMOL/L (ref 136–145)
WBC # BLD AUTO: 8.43 K/UL (ref 3.9–12.7)

## 2024-01-15 PROCEDURE — 80048 BASIC METABOLIC PNL TOTAL CA: CPT

## 2024-01-15 PROCEDURE — 85027 COMPLETE CBC AUTOMATED: CPT

## 2024-01-15 NOTE — PROGRESS NOTES
Subjective:   Chief Complaint: Severe MR No chief complaint on file.  Last Clinic Visit: 12/15/2023    History of Present Illness: Lorenzo Nino is a 48 y.o.  gentleman with history of bacterial endocarditis and severe mitral regurgitation s/p repair 11/03/2023, with subsequent dehiscence of ring and readmission who presents for follow up.    Interval History: Shortly after we saw him last month he developed recurrent shortness of breath, weight gain, and was readmitted for congestive heart failure and hypoxic respiratory failure.  Workup subsequently revealed dehiscence of annular ring.  He was still on antibiotics at time of initial presentation continued during hospital course, no fever or recurrent cultures were present.  Evaluated by CT surgery and felt that he would benefit from better preoperative rehabilitation prior to reconsideration of additional surgery, plan  discharge with suppressive antibiotics and GDMT until adequate nutritional status could be obtained.  He had during this hospitalization acute kidney injury from titration of GDMT and diuretics, also had GI bleeding, and workup ultimately remarkable for large mass in colon.  Pathology ultimately revealed tubular adenoma.  He also had recurrent episodes of atrial fibrillation during this hospitalization now sinus on amiodarone.  Blood pressures at home running in the 100-120 range, denies any hypoxia, no lower extremity edema, no orthopnea.  Weight since discharge 134, 135, 135, 136.      12/15/2023  He did not have any known medical issues prior to presenting with shortness of breath, fever, and fatigue for several days on 10/31/2023.  He was subsequently found to have severe mitral regurgitation with bacterial endocarditis, initially thought to be involvement of aortic valve however was confirmed not to be involved on subsequent NEW.  Hospital course was complicated by acute hypoxic respiratory failure requiring intubation, along with  thrombocytopenia, ultimately had successful repair on 11/03/2023.  Postoperatively sternum well healed, and will be cleared for cardiac rehab around the end of December. He was started on nifedipine metoprolol during hospitalization and has continued, checking his blood pressure on a daily basis and reports systolics in the 90s on a regular basis with some lightheadedness.  No fevers at home, no shortness of breath, no orthopnea, no dyspnea on exertion.  He does have some fatigue which is still present postoperatively.  Echocardiogram post repair did show that EF was still mildly depressed.   Still on IV antibiotics scheduled a run-through the 26th.  He does have a dry cough and has had this for the past several days.    Dx:  Bacterial endocarditis-severe mitral regurgitation  -S/p 11/3/23 Mitral valve repair with triangular resection of P2 and placement of a freehand-created bovine pericardial annuloplasty band using a 40 mm [employer]onic Simuplus sizer as a template  -S/p Resection of left atrial appendage   MRSA endocarditis  Large Tubular Adenoma with GI bleeding  Atrial fibrillation  Malnutrition from acute illness    Suppressive antibiotics  Medications:  Outpatient Encounter Medications as of 1/12/2024   Medication Sig Dispense Refill    doxycycline (VIBRAMYCIN) 100 MG Cap Take 1 capsule (100 mg total) by mouth 2 (two) times daily. 60 capsule 2    mirtazapine (REMERON SOL-TAB) 15 MG disintegrating tablet Dissolve 1 tablet (15 mg total) by mouth nightly. 30 tablet 11    ondansetron (ZOFRAN) 4 MG tablet Take 1 tablet (4 mg total) by mouth daily as needed for Nausea. 30 tablet 0    spironolactone (ALDACTONE) 25 MG tablet Take 1 tablet (25 mg total) by mouth once daily. 30 tablet 11    torsemide (DEMADEX) 10 MG Tab Take 1 tablet (10 mg total) by mouth once daily. 30 tablet 11    [DISCONTINUED] amiodarone (PACERONE) 200 MG Tab Take 2 tablets (400 mg total) by mouth 2 (two) times daily. 120 tablet 11    [DISCONTINUED]  dapagliflozin propanediol (FARXIGA) 10 mg tablet Take 1 tablet (10 mg total) by mouth once daily. 30 tablet 0    [DISCONTINUED] famotidine (PEPCID) 20 MG tablet Take 1 tablet (20 mg total) by mouth 2 (two) times daily. 60 tablet 11    [DISCONTINUED] metoprolol tartrate (LOPRESSOR) 50 MG tablet Take 1 tablet (50 mg total) by mouth 2 (two) times daily. 60 tablet 11    [DISCONTINUED] potassium chloride SA (K-DUR,KLOR-CON) 20 MEQ tablet Take 2 tablets (40 mEq total) by mouth 4 (four) times daily. 240 tablet 0    amiodarone (PACERONE) 200 MG Tab Take 2 tablets (400 mg total) by mouth once daily. 60 tablet 11    dapagliflozin propanediol (FARXIGA) 10 mg tablet Take 1 tablet (10 mg total) by mouth once daily. 30 tablet 11    potassium chloride SA (K-DUR,KLOR-CON) 20 MEQ tablet Take 2 tablets (40 mEq total) by mouth once daily. 240 tablet 0    [] sodium chloride 0.9% SolP 50 mL with DAPTOmycin 500 mg SolR 600 mg Inject 600 mg into the vein once daily. for 13 days 13 Doses/Fill 0     No facility-administered encounter medications on file as of 2024.     Social History:  Lorenzo reports current alcohol use of about 1.0 standard drink of alcohol per week. He reports that he does not use drugs.  Worked doing some manual labor in [employer]    Objective:   /80   Pulse 84   Ht 6' (1.829 m)   Wt 62.8 kg (138 lb 7.2 oz)   SpO2 100%   BMI 18.78 kg/m²     Physical Exam   Constitutional: He does not appear ill. No distress.   HENT:   Head: Normocephalic and atraumatic.   Mouth/Throat: Mucous membranes are moist.   Cardiovascular: Normal rate, regular rhythm and normal pulses. Exam reveals no gallop and no friction rub.   Murmur heard.  Pulmonary/Chest: Effort normal and breath sounds normal. No stridor. No respiratory distress. He has no wheezes. He has no rhonchi. He has no rales. He exhibits no tenderness.   Abdominal: Normal appearance.   Musculoskeletal:      Right lower leg: No edema.      Left lower leg: No  edema.   Neurological: He is alert.   Skin: Skin is warm.      EKG:  My independent visualization of most recent EKG is Normal sinus rhythm, diffuse T-wave inversions    NEW:  12/26/2023    NEW for evaluation of mitral valve.    Left Ventricle: The left ventricle is normal in size. Normal wall thickness. Normal wall motion. There is low normal systolic function with a visually estimated ejection fraction of 50 - 55%.    Right Ventricle: Normal right ventricular cavity size. Wall thickness is normal. Right ventricle wall motion  is normal. Systolic function is normal.    Aortic Valve: There is no mass/vegetation present.    Tricuspid Valve: There is no mass/vegetation present.    Severe mitral reguritaiton    Mitral Valve: Status post bovine pericardial annuloplasty band. Dehiscense of band from 10 o'clock to 4 o'clock. Two regurgitant jets noted. First jet due to failure of coaptation at A2-P2 secondary to anuloplasty band dehiscence. Second jet from a perforation on the medial aspect of P2, possibly at the site of prior triangular resection.    TTE:  12/19/2023    Left Ventricle: The left ventricle is normal in size. Normal wall thickness. Regional wall motion abnormalities present. There is low normal systolic function with a visually estimated ejection fraction of 50 - 55%. There is normal diastolic function.    Right Ventricle: Normal right ventricular cavity size. Wall thickness is normal. Right ventricle wall motion  is normal. Systolic function is normal.    Th left atrium is severely dilated.    Mitral Valve: The mitral valve is repaired with a bovine pericardial annuloplasty band. There is flail motion of a small part of valvular tissue, perhaps from the posterior leaflet, that leads to eccentric MR directed somewhat laterally. There is severe regurgitation.    Tricuspid Valve: There is mild regurgitation.    Pulmonary Artery: The estimated pulmonary artery systolic pressure is 51 mmHg.    IVC/SVC: Elevated  venous pressure at 15 mmHg.     11/14/23    Left Ventricle: The left ventricle is normal in size. Ventricular mass is normal. Normal wall thickness. There is concentric remodeling. Normal wall motion. See diagram for wall motion findings. There is mildly reduced systolic function with a visually estimated ejection fraction of 40 - 45%. Diastolic function cannot be reliably determined in the presence of mitral valve ring/replacement.    Right Ventricle: Normal right ventricular cavity size. Wall thickness is normal. Right ventricle wall motion  is normal. Systolic function is normal.    Left Atrium: Left atrium is severely dilated.    Mitral Valve: The mitral valve is repaired by bovine pericardial annuloplasty band. The mean pressure gradient across the mitral valve is 2 mmHg at a heart rate of 70 bpm. There is no significant regurgitation.    Pulmonary Artery: The estimated pulmonary artery systolic pressure is 24 mmHg.    IVC/SVC: Normal venous pressure at 3 mmHg.    Pericardium: There is a moderate posterior effusion with maximal diameter 1.6cm.     11/02/2023     Left Ventricle: The left ventricle is normal in size. Ventricular mass is normal. Normal wall thickness. Global hypokinesis present. There is mildly reduced systolic function with a visually estimated ejection fraction of 40 - 45%.    Right Ventricle: Normal right ventricular cavity size. Wall thickness is normal. Right ventricle wall motion  is normal. Systolic function is normal.    Left Atrium: Left atrium is severely dilated.    Right Atrium: Right atrium is dilated.    Aortic Valve: RCC is thickended with mild prolapse.  Cannot exclude RCC vegetation. The remaining leaflets have normal mobility. There is trace aortic regurgitation.    Mitral Valve: Both leaflets are thickened.Cannot exclude anterior leaflet vegetation coating this leaflet. There is prolapse of the posterior mitral leaflet. Flail posterior leaflet. There is a large mobile echogenic  mass present on the posterior leaflet consistent with vegetation. The mean pressure gradient across the mitral valve is 6 mmHg at a heart rate of 124 bpm. There is severe regurgitation with an anteromedial eccentrically directed jet.    Tricuspid Valve: There is mild regurgitation.    Pulmonary Artery: The estimated pulmonary artery systolic pressure is 31 mmHg.    IVC/SVC: Normal venous pressure at 3 mmHg.     Lipids:       Renal:  Recent Labs   Lab 01/12/24  0932   Creatinine 1.4   Potassium 4.1   CO2 22 L   BUN 21 H       Liver:  Recent Labs   Lab 01/08/24  0400   AST 30   ALT 28       Assessment:     1. 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    2. S/P MVR (mitral valve repair)    3. Heart failure with mid-range ejection fraction (HFmEF)    4. Endocarditis, unspecified chronicity, unspecified endocarditis type    5. Paroxysmal atrial fibrillation    6. Tubular adenoma      Plan:   1. 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  Overall appears to be currently euvolemic, weight has trended up 2 lb over the course the last week, but this maybe equalization at home, no lower extremity edema, no orthopnea, no worsening dyspnea on exertion.  From an afterload reduction standpoint blood pressures in the 100s to 120s systolic.  He had issues with acute kidney injury during hospitalization that only normalized just prior to discharge, and BUN trending up on most recent set of blood work thus hesitant to titrate up any of his GDMT at this time.  He is scheduled to see Mala Hooks next week who will also be able to assist in fine tuning titrating his diuretic and GDMT regimen.  Some discrepancy between his discharge medication rec and what he is currently taking, reports that Dr. Manuela ALEXANDRE told him not to take any metoprolol at discharge.  Has been taking spironolactone 25 mg daily, torsemide 10 mg daily, potassium 40 mEq twice daily, Farxiga, and  amiodarone 400 mg twice daily.  - Ambulatory referral/consult to Cardiology    2. S/P MVR (mitral valve repair)      3. Heart failure with mid-range ejection fraction (HFmEF)  With low normal blood pressure, EF mildly decreased, goal of GDMT is much to reduce afterload in the setting of severe MR, and blood pressure low normal thus holding off on further uptitration given that he did have recent acute kidney injury when we tried to up titrate his GDMT were aggressively.  Will have him follow with heart failure clinic next week for further uptitration and diuretic regimen modification.    4. Endocarditis, unspecified chronicity, unspecified endocarditis type  Continue suppressive doxycycline until definitive valve management.    5. Paroxysmal atrial fibrillation  No recurrent episodes of atrial fibrillation has finished his 10 g load but was still discharged on 400 twice daily, he has significant nidus for recurrence of this arrhythmia will down titrate amiodarone to 400 mg daily.    6. Tubular adenoma  Will discuss with Dr. Dunn, He is high surgical risk, but do not feel that he is a candidate for mitral valve replacement until management of tubular adenoma given this is a potential source of recurrent endocarditis, as well as a source of GI bleeding in the setting of anticoagulation perioperatively.  Thus if tubular adenoma can be addressed in the least invasive but complete  manner possible to optimize him for future MVR, this would be the best course of action.    Follow up in 1m with NP, 2m with me.      Emerson Mercado MD [facility]

## 2024-01-17 ENCOUNTER — TELEPHONE (OUTPATIENT)
Dept: GASTROENTEROLOGY | Facility: CLINIC | Age: 49
End: 2024-01-17
Payer: COMMERCIAL

## 2024-01-17 NOTE — LETTER
January 17, 2024      [facility]  [address]   [place] [address]  Phone: [phone]  Fax: [phone]       Patient: Lorenzo Nino   YOB: 1975      To Whom It May Concern:    Lorenzo Nino  was admitted to  [facility] for a total of twenty-one days. He was seen by the inpatient Gastroenterology team and a colonoscopy was performed. A  colon mass was found during the colonoscopy. He has since been referred to Colon and Rectal surgery team to be evaluated for removal of the mass. This will most likely require him to take a leave from work. If I can be of further assistance, please do not hesitate to contact me.    Sincerely,    Fadia Ellsworth M.D.

## 2024-01-18 ENCOUNTER — OFFICE VISIT (OUTPATIENT)
Dept: CARDIOLOGY | Facility: CLINIC | Age: 49
End: 2024-01-18
Payer: COMMERCIAL

## 2024-01-18 ENCOUNTER — TELEPHONE (OUTPATIENT)
Dept: CARDIOLOGY | Facility: CLINIC | Age: 49
End: 2024-01-18
Payer: COMMERCIAL

## 2024-01-18 ENCOUNTER — OFFICE VISIT (OUTPATIENT)
Dept: SURGERY | Facility: CLINIC | Age: 49
End: 2024-01-18
Payer: COMMERCIAL

## 2024-01-18 ENCOUNTER — LAB VISIT (OUTPATIENT)
Dept: LAB | Facility: HOSPITAL | Age: 49
End: 2024-01-18
Payer: COMMERCIAL

## 2024-01-18 VITALS
RESPIRATION RATE: 18 BRPM | BODY MASS INDEX: 18.4 KG/M2 | DIASTOLIC BLOOD PRESSURE: 85 MMHG | SYSTOLIC BLOOD PRESSURE: 130 MMHG | WEIGHT: 138.88 LBS | HEIGHT: 73 IN | HEART RATE: 85 BPM

## 2024-01-18 VITALS
HEIGHT: 72 IN | DIASTOLIC BLOOD PRESSURE: 89 MMHG | WEIGHT: 139.31 LBS | OXYGEN SATURATION: 99 % | SYSTOLIC BLOOD PRESSURE: 127 MMHG | HEART RATE: 87 BPM | BODY MASS INDEX: 18.87 KG/M2

## 2024-01-18 DIAGNOSIS — I50.41 ACUTE COMBINED SYSTOLIC AND DIASTOLIC HEART FAILURE: Primary | ICD-10-CM

## 2024-01-18 DIAGNOSIS — I38 ENDOCARDITIS, UNSPECIFIED CHRONICITY, UNSPECIFIED ENDOCARDITIS TYPE: Chronic | ICD-10-CM

## 2024-01-18 DIAGNOSIS — K63.89 COLONIC MASS: ICD-10-CM

## 2024-01-18 DIAGNOSIS — I38 CHRONIC DIASTOLIC HEART FAILURE DUE TO VALVULAR DISEASE: ICD-10-CM

## 2024-01-18 DIAGNOSIS — B95.62 MRSA BACTEREMIA: ICD-10-CM

## 2024-01-18 DIAGNOSIS — R78.81 MRSA BACTEREMIA: ICD-10-CM

## 2024-01-18 DIAGNOSIS — I48.0 PAROXYSMAL ATRIAL FIBRILLATION: ICD-10-CM

## 2024-01-18 DIAGNOSIS — I50.32 CHRONIC DIASTOLIC HEART FAILURE DUE TO VALVULAR DISEASE: ICD-10-CM

## 2024-01-18 DIAGNOSIS — K92.2 GASTROINTESTINAL HEMORRHAGE, UNSPECIFIED GASTROINTESTINAL HEMORRHAGE TYPE: ICD-10-CM

## 2024-01-18 DIAGNOSIS — R06.02 SOB (SHORTNESS OF BREATH): ICD-10-CM

## 2024-01-18 LAB
ALBUMIN SERPL BCP-MCNC: 3.4 G/DL (ref 3.5–5.2)
ALP SERPL-CCNC: 95 U/L (ref 55–135)
ALT SERPL W/O P-5'-P-CCNC: 55 U/L (ref 10–44)
ANION GAP SERPL CALC-SCNC: 11 MMOL/L (ref 8–16)
AST SERPL-CCNC: 46 U/L (ref 10–40)
BASOPHILS # BLD AUTO: 0.07 K/UL (ref 0–0.2)
BASOPHILS NFR BLD: 0.8 % (ref 0–1.9)
BILIRUB SERPL-MCNC: 0.6 MG/DL (ref 0.1–1)
BNP SERPL-MCNC: 197 PG/ML (ref 0–99)
BUN SERPL-MCNC: 28 MG/DL (ref 6–20)
CALCIUM SERPL-MCNC: 9.8 MG/DL (ref 8.7–10.5)
CHLORIDE SERPL-SCNC: 105 MMOL/L (ref 95–110)
CO2 SERPL-SCNC: 22 MMOL/L (ref 23–29)
CREAT SERPL-MCNC: 1.5 MG/DL (ref 0.5–1.4)
DIFFERENTIAL METHOD BLD: ABNORMAL
EOSINOPHIL # BLD AUTO: 0.1 K/UL (ref 0–0.5)
EOSINOPHIL NFR BLD: 1.4 % (ref 0–8)
ERYTHROCYTE [DISTWIDTH] IN BLOOD BY AUTOMATED COUNT: 19.1 % (ref 11.5–14.5)
EST. GFR  (NO RACE VARIABLE): 56.7 ML/MIN/1.73 M^2
GLUCOSE SERPL-MCNC: 170 MG/DL (ref 70–110)
HCT VFR BLD AUTO: 36.3 % (ref 40–54)
HGB BLD-MCNC: 11 G/DL (ref 14–18)
IMM GRANULOCYTES # BLD AUTO: 0.06 K/UL (ref 0–0.04)
IMM GRANULOCYTES NFR BLD AUTO: 0.7 % (ref 0–0.5)
LYMPHOCYTES # BLD AUTO: 1.9 K/UL (ref 1–4.8)
LYMPHOCYTES NFR BLD: 20.6 % (ref 18–48)
MAGNESIUM SERPL-MCNC: 2.2 MG/DL (ref 1.6–2.6)
MCH RBC QN AUTO: 26.9 PG (ref 27–31)
MCHC RBC AUTO-ENTMCNC: 30.3 G/DL (ref 32–36)
MCV RBC AUTO: 89 FL (ref 82–98)
MONOCYTES # BLD AUTO: 0.7 K/UL (ref 0.3–1)
MONOCYTES NFR BLD: 7.2 % (ref 4–15)
NEUTROPHILS # BLD AUTO: 6.3 K/UL (ref 1.8–7.7)
NEUTROPHILS NFR BLD: 69.3 % (ref 38–73)
NRBC BLD-RTO: 0 /100 WBC
PHOSPHATE SERPL-MCNC: 4.6 MG/DL (ref 2.7–4.5)
PLATELET # BLD AUTO: 351 K/UL (ref 150–450)
PMV BLD AUTO: 11.2 FL (ref 9.2–12.9)
POTASSIUM SERPL-SCNC: 4.8 MMOL/L (ref 3.5–5.1)
PROT SERPL-MCNC: 8.3 G/DL (ref 6–8.4)
RBC # BLD AUTO: 4.09 M/UL (ref 4.6–6.2)
SODIUM SERPL-SCNC: 138 MMOL/L (ref 136–145)
WBC # BLD AUTO: 9.06 K/UL (ref 3.9–12.7)

## 2024-01-18 PROCEDURE — 45330 DIAGNOSTIC SIGMOIDOSCOPY: CPT | Mod: ,,, | Performed by: COLON & RECTAL SURGERY

## 2024-01-18 PROCEDURE — 36415 COLL VENOUS BLD VENIPUNCTURE: CPT

## 2024-01-18 PROCEDURE — 84100 ASSAY OF PHOSPHORUS: CPT

## 2024-01-18 PROCEDURE — 3008F BODY MASS INDEX DOCD: CPT | Mod: CPTII,S$GLB,, | Performed by: COLON & RECTAL SURGERY

## 2024-01-18 PROCEDURE — 3075F SYST BP GE 130 - 139MM HG: CPT | Mod: CPTII,S$GLB,, | Performed by: COLON & RECTAL SURGERY

## 2024-01-18 PROCEDURE — 80053 COMPREHEN METABOLIC PANEL: CPT

## 2024-01-18 PROCEDURE — 1111F DSCHRG MED/CURRENT MED MERGE: CPT | Mod: CPTII,S$GLB,, | Performed by: COLON & RECTAL SURGERY

## 2024-01-18 PROCEDURE — 99204 OFFICE O/P NEW MOD 45 MIN: CPT | Mod: 25,S$GLB,, | Performed by: COLON & RECTAL SURGERY

## 2024-01-18 PROCEDURE — 83735 ASSAY OF MAGNESIUM: CPT

## 2024-01-18 PROCEDURE — 85025 COMPLETE CBC W/AUTO DIFF WBC: CPT

## 2024-01-18 PROCEDURE — 83880 ASSAY OF NATRIURETIC PEPTIDE: CPT

## 2024-01-18 PROCEDURE — 3079F DIAST BP 80-89 MM HG: CPT | Mod: CPTII,S$GLB,,

## 2024-01-18 PROCEDURE — 3074F SYST BP LT 130 MM HG: CPT | Mod: CPTII,S$GLB,,

## 2024-01-18 PROCEDURE — 3008F BODY MASS INDEX DOCD: CPT | Mod: CPTII,S$GLB,,

## 2024-01-18 PROCEDURE — 99999 PR PBB SHADOW E&M-EST. PATIENT-LVL IV: CPT | Mod: PBBFAC,,, | Performed by: COLON & RECTAL SURGERY

## 2024-01-18 PROCEDURE — 3079F DIAST BP 80-89 MM HG: CPT | Mod: CPTII,S$GLB,, | Performed by: COLON & RECTAL SURGERY

## 2024-01-18 PROCEDURE — 99999 PR PBB SHADOW E&M-EST. PATIENT-LVL IV: CPT | Mod: PBBFAC,,,

## 2024-01-18 PROCEDURE — 99214 OFFICE O/P EST MOD 30 MIN: CPT | Mod: S$GLB,,,

## 2024-01-18 PROCEDURE — 1111F DSCHRG MED/CURRENT MED MERGE: CPT | Mod: CPTII,S$GLB,,

## 2024-01-18 PROCEDURE — 1160F RVW MEDS BY RX/DR IN RCRD: CPT | Mod: CPTII,S$GLB,,

## 2024-01-18 PROCEDURE — 1159F MED LIST DOCD IN RCRD: CPT | Mod: CPTII,S$GLB,,

## 2024-01-18 RX ORDER — METOPROLOL SUCCINATE 25 MG/1
25 TABLET, EXTENDED RELEASE ORAL DAILY
Qty: 30 TABLET | Refills: 11 | Status: SHIPPED | OUTPATIENT
Start: 2024-01-18 | End: 2025-01-17

## 2024-01-18 NOTE — TELEPHONE ENCOUNTER
Letter regarding cardiac surgery status uploaded to patient's chart and sent via My Chart message.

## 2024-01-18 NOTE — PROGRESS NOTES
CRS Office Visit    SUBJECTIVE:     Chief Complaint: Colonic mass    History of Present Illness:  Patient is a 48 y.o. male hx of MRSA endocarditis s/p Mitral valve repair with porcine valve 11/3/23, afib w/ RVR, presents with colonic mass found on colonoscopy completed for GI bleed. MR. Nino was recently admitted for dyspnea in December and found to have tear of porcine mitral valve resulting in severe mitral regurgitation. CTS consulted and plan for outpatient follow up. During that hospitalization he developed a fib with RVR and was treated with heparin drip and transitioned to eliquis. He developed GI bleeding and underwent EGD and colonsocopy. He was found to have a large nonobstructing sigmoid mass. Path consistent with villous adenoma without dysplasia.     Mass found incidentally during this case. No change in bowel habits, abdominal pain, weight loss, blood in stool prior to this episode.  Family hx in maternal great grandmother of colon cancer.         Review of patient's allergies indicates:  No Known Allergies    Past Medical History:   Diagnosis Date    Hypertension      Past Surgical History:   Procedure Laterality Date    COLONOSCOPY N/A 1/5/2024    Procedure: COLONOSCOPY;  Surgeon: Fadia Ellsworth MD;  Location: [address]);  Service: Endoscopy;  Laterality: N/A;    ECHOCARDIOGRAM,TRANSESOPHAGEAL N/A 12/26/2023    Procedure: Transesophageal echo (NEW) intra-procedure log documentation;  Surgeon: Puja Hernandez Diagnostic;  Location: [facility] EP LAB;  Service: Cardiology;  Laterality: N/A;    ESOPHAGOGASTRODUODENOSCOPY N/A 1/5/2024    Procedure: EGD (ESOPHAGOGASTRODUODENOSCOPY);  Surgeon: Fadia Ellsworth MD;  Location: [address]);  Service: Endoscopy;  Laterality: N/A;    EXCLUSION, LEFT ATRIAL APPENDAGE, OPEN, AS PART OF OPEN CHEST SURGERY Left 11/3/2023    Procedure: EXCLUSION, LEFT ATRIAL APPENDAGE, OPEN, AS PART OF OPEN CHEST SURGERY;  Surgeon: Manuel Beal MD;   Location: [facility] OR [address] [place] [address];  Service: Cardiothoracic;  Laterality: Left;    INSERTION OF INTRA-AORTIC BALLOON ASSIST DEVICE Right 11/2/2023    Procedure: INSERTION, INTRA-AORTIC BALLOON PUMP;  Surgeon: Jose Vidal MD;  Location: [facility] CATH LAB;  Service: Cardiology;  Laterality: Right;    REPAIR, MITRAL VALVE, OPEN N/A 11/3/2023    Procedure: REPAIR, MITRAL VALVE, OPEN;  Surgeon: Manuel Beal MD;  Location: [facility] OR [address] [place] [address];  Service: Cardiothoracic;  Laterality: N/A;     Family History   Problem Relation Age of Onset    Amblyopia Neg Hx     Blindness Neg Hx     Cataracts Neg Hx     Glaucoma Neg Hx     Macular degeneration Neg Hx     Retinal detachment Neg Hx     Strabismus Neg Hx      Social History     Tobacco Use    Smoking status: Unknown     Passive exposure: Never   Substance Use Topics    Alcohol use: Yes     Alcohol/week: 1.0 standard drink of alcohol     Types: 1 Cans of beer per week    Drug use: Never        Review of Systems:  Constitutional: no fever or chills  Respiratory: no cough or shortness of breath  Cardiovascular: Dyspnea, no pain  Gastrointestinal: no nausea or vomiting, no abdominal pain, no further blood in stool  Musculoskeletal: no arthralgias or myalgias  Neurological: no seizures or tremors    OBJECTIVE:     Vital Signs (Most Recent)  Pulse: 85 (01/18/24 1120)  Resp: 18 (01/18/24 1120)  BP: 130/85 (01/18/24 1120)    Physical Exam:  General: White male in NAD sitting in chair in clinic  Neuro: aaox4 maex4 perrl  Respiratory: resps even unlabored  Cardiac: cap refill <2 sec  Abdomen: soft, nondistended, nontender  Extremities: Warm dry and intact  Anorectal: flex sig completed see provation report for imaging    ASSESSMENT/PLAN:     Diagnoses and all orders for this visit:    Gastrointestinal hemorrhage, unspecified gastrointestinal hemorrhage type  -     Ambulatory referral/consult to Gastroenterology    Colonic mass  -     Ambulatory referral/consult to Colorectal  Surgery     47yo male hx of MRSA endocarditis s/p Mitral valve repair with porcine valve 11/3/23, afib w/ RVR, presents with colonic mass found on colonoscopy completed for GI bleed. Path consistent with villous adenoma without dysplasia. Pictures from colonscopy and flex sig today appear it may be amenable to ESD/EMR. Look of polyp appears this may just be a large villous adenoma, no dimpling or inflammation to suggest cancer. This would be best if possible given his mitral valve issues. Will plan for discussion with cardiology and timing of ESD/EMR will be based on that conversation.

## 2024-01-18 NOTE — TELEPHONE ENCOUNTER
[facility] contacted pt to give lab results and JACINTA recommendations as follows:    JACINTA and Dr. Mercado recommend pt start Toprol 25mg once daily.Pt mother inquired if pt metoprolol tartrate could be cut in half.  JACINTA does not recommend above action. She is Rxing Toprol which is long acting and metoprolol tartrate is a short acting formulation. Pt and mother verbalized understanding. Pt requests medication be sent to [facility] on [facility] for .

## 2024-01-18 NOTE — PROGRESS NOTES
HF T[facility] Provider Note (Initial Clinic) Consult Note    Date of original referral: 1/2/24  Age: 48 y.o.  Gender: male  Ethnicity: White  Type of Congestive Heart Failure: Diastolic   Etiology: Valvular   Enrolled in Infusion suite: no    Diagnostic Labs:   EKG - 01/08/2024  CXR - 01/05/2024  ECHO - 12/19/2023  Stress test -   Stress echo -   Pharmacologic stress -   Cardiac catheterization - 11/03/2023   Cardiac MRI -     Lab Results   Component Value Date     01/15/2024     01/12/2024    K 3.5 01/15/2024    K 4.1 01/12/2024     01/15/2024     01/12/2024    CO2 22 (L) 01/15/2024    CO2 22 (L) 01/12/2024     (H) 01/15/2024     (H) 01/12/2024    BUN 26 (H) 01/15/2024    BUN 21 (H) 01/12/2024    CREATININE 1.5 (H) 01/15/2024    CREATININE 1.4 01/12/2024    CALCIUM 8.9 01/15/2024    CALCIUM 9.5 01/12/2024    PROT 6.9 01/08/2024    PROT 6.8 01/07/2024    ALBUMIN 2.6 (L) 01/08/2024    ALBUMIN 2.6 (L) 01/07/2024    BILITOT 0.4 01/08/2024    BILITOT 0.4 01/07/2024    ALKPHOS 66 01/08/2024    ALKPHOS 70 01/07/2024    AST 30 01/08/2024    AST 31 01/07/2024    ALT 28 01/08/2024    ALT 25 01/07/2024    ANIONGAP 10 01/15/2024    ANIONGAP 10 01/12/2024       Lab Results   Component Value Date    WBC 8.43 01/15/2024    WBC 9.53 01/12/2024    RBC 3.58 (L) 01/15/2024    RBC 3.67 (L) 01/12/2024    HGB 9.5 (L) 01/15/2024    HGB 9.9 (L) 01/12/2024    HCT 30.7 (L) 01/15/2024    HCT 33.5 (L) 01/12/2024    MCV 86 01/15/2024    MCV 91 01/12/2024    MCH 26.5 (L) 01/15/2024    MCH 27.0 01/12/2024    MCHC 30.9 (L) 01/15/2024    MCHC 29.6 (L) 01/12/2024    RDW 18.7 (H) 01/15/2024    RDW 19.5 (H) 01/12/2024     01/15/2024     01/12/2024    MPV 11.2 01/15/2024    MPV 11.1 01/12/2024    IMMGR 0.8 (H) 01/12/2024    IMMGR 1.2 (H) 01/10/2024    IGABS 0.08 (H) 01/12/2024    IGABS 0.11 (H) 01/10/2024    LYMPH 3.2 01/12/2024    LYMPH 33.2 01/12/2024    MONO 0.9 01/12/2024    MONO 9.0 01/12/2024    EOS  0.5 01/12/2024    EOS 0.4 01/10/2024    BASO 0.10 01/12/2024    BASO 0.07 01/10/2024    NRBC 0 01/12/2024    NRBC 0 01/10/2024    GRAN 4.9 01/12/2024    GRAN 51.2 01/12/2024    EOSINOPHIL 4.8 01/12/2024    EOSINOPHIL 4.8 01/10/2024    BASOPHIL 1.0 01/12/2024    BASOPHIL 0.8 01/10/2024    PLTEST Appears normal 01/05/2024    PLTEST Appears normal 01/05/2024    ANISO Slight 01/06/2024    ANISO Slight 01/05/2024    HYPO Occasional 01/06/2024    HYPO Occasional 01/05/2024       Lab Results   Component Value Date     (H) 12/28/2023     (H) 12/26/2023    MG 2.0 01/05/2024    MG 2.0 01/04/2024    PHOS 3.7 01/05/2024    PHOS 1.9 (L) 01/03/2024    TROPONINI 0.060 (H) 12/18/2023    TROPONINI 5.857 (H) 11/02/2023    HGBA1C 5.8 (H) 11/06/2023       Lab Results   Component Value Date    IRON 30 (L) 01/05/2024    TIBC 371 01/05/2024    FERRITIN 407 (H) 01/05/2024    TRIG 370 (H) 10/31/2023    COLORU Yellow 10/30/2023    APPEARANCEUA Clear 10/30/2023    PHUR 6.0 10/30/2023    SPECGRAV >1.030 (A) 10/30/2023    PROTEINUA 1+ (A) 10/30/2023    GLUCUA Negative 10/30/2023    KETONESU Negative 10/30/2023    BILIRUBINUA Negative 10/30/2023    OCCULTUA 3+ (A) 10/30/2023    NITRITE Positive (A) 10/30/2023    LEUKOCYTESUR Trace (A) 10/30/2023       No implanted cardiac devices    Current Outpatient Medications on File Prior to Visit   Medication Sig Dispense Refill    amiodarone (PACERONE) 200 MG Tab Take 2 tablets (400 mg total) by mouth once daily. 60 tablet 11    dapagliflozin propanediol (FARXIGA) 10 mg tablet Take 1 tablet (10 mg total) by mouth once daily. 30 tablet 11    doxycycline (VIBRAMYCIN) 100 MG Cap Take 1 capsule (100 mg total) by mouth 2 (two) times daily. 60 capsule 2    mirtazapine (REMERON SOL-TAB) 15 MG disintegrating tablet Dissolve 1 tablet (15 mg total) by mouth nightly. 30 tablet 11    ondansetron (ZOFRAN) 4 MG tablet Take 1 tablet (4 mg total) by mouth daily as needed for Nausea. 30 tablet 0    potassium  chloride SA (K-DUR,KLOR-CON) 20 MEQ tablet Take 2 tablets (40 mEq total) by mouth once daily. 240 tablet 0    spironolactone (ALDACTONE) 25 MG tablet Take 1 tablet (25 mg total) by mouth once daily. 30 tablet 11    torsemide (DEMADEX) 10 MG Tab Take 1 tablet (10 mg total) by mouth once daily. 30 tablet 11     No current facility-administered medications on file prior to visit.         HPI:  Patient denies appreciable SOB with ADL since hospital discharge and pace normal    Patient sleeps on 1 number of pillows   Patient wakes up SOB, has to get out of bed, associated cough- denies   Palpitations - denies   Dizzy, light-headed, pre-syncope or syncope- denies   Since discharge frequency of performing weights, home weight and weight change- performing daily weights. Weight stable 134-136lbs   Other information felt pertinent to HPI: Mr. 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 On ED presentation he was hypoxic requiring 6 L to maintain sats of 91-94%. Initial lactic acid 1.8. Creatinine 0.9. WBC 20k. Bedside Echo showed EF 45-50% with severe MR (2 jets, one posteriorly directed and one anteriorly directed), restricted posterior leaflet, possible tear in anterior leaflet. No pericardial effusion. He was transferred to CICU for AHRF 2/2 significant pulmonary edema 2/2 severe mitral regurgitation concerning for anterior leaflet tear vs posterior leaflet restriction. Upon admission to the CICU in AF RVR. Given 1 dose of digoxin and started on heparin gtt and diltiazem gtt. Stabilized on sinus rhythm. CTS evaluated patient and agree on continuing aggressive diuresis with lasix gtt, at the moment not indicated IABP. Underwent NEW on 12/26 which showed severe MR with 2 jets, including posterior jet likely secondary to tear/perforation. CTS  to follow as outpatient (Dr. Ascencio). ID plan with daptomycin 8mg/kg x 2 weeks and follow with PO doxycyline until new valve replaced. ID recommended intraoperative tissue cx. Optimized diuresis for volume status. Heparin transitioned to eliquis. He reported some bright red bloody bowel movements with Hgb trending downwards, concerning for a lower GI bleed. GI was consulted for recommendations. Colonoscopy and EGD performed while inpatient. Colonoscopy revealed non obstructing large tumor in the sigmoid colon with stigamata of recent bleeding. Mass was biopsied and tattoeed. Patient remained stable throughout hospital stay and on 12/8, CCU team felt patient was stable for discharge home.      PHYSICAL:   Vitals:    01/18/24 1328   BP: 127/89   Pulse: 87      @[id number](3)@    JVD: no   Heart rhythm: regular  Cardiac murmur: Yes - 4/6 systolic murmur across precordium    S3: no  S4: no  Lungs: clear  Hepatojugular reflux: no  Edema: no      Echo 12/19/23:    Left Ventricle: The left ventricle is normal in size. Normal wall thickness. Regional wall motion abnormalities present. There is low normal systolic function with a visually estimated ejection fraction of 50 - 55%. There is normal diastolic function.    Right Ventricle: Normal right ventricular cavity size. Wall thickness is normal. Right ventricle wall motion  is normal. Systolic function is normal.    Th left atrium is severely dilated.    Mitral Valve: The mitral valve is repaired with a bovine pericardial annuloplasty band. There is flail motion of a small part of valvular tissue, perhaps from the posterior leaflet, that leads to eccentric MR directed somewhat laterally. There is severe regurgitation.    Tricuspid Valve: There is mild regurgitation.    Pulmonary Artery: The estimated pulmonary artery systolic pressure is 51 mmHg.    IVC/SVC: Elevated venous pressure at 15 mmHg.    ASSESSMENT: Valvular HF    PLAN:      Patient Instructions:   Instruct the  patient to notify this clinic if HH, a physician or an advanced care provider wants to change medication one of their HF medications   Activity and Diet restrictions:   Recommend 2-3 gram sodium restriction and 1500cc- 2000cc fluid restriction.  Encourage physical activity with graded exercise program.  Requested patient to weigh themselves daily, and to notify us if their weight increases by more than 3 lbs in 1 day or 5 lbs in 3 days.    Assigned dry weight on home scale: 134-136lbs  Medication changes (include current dose and changed dose): NYHA Class II symptoms. Slight uptrend in Cr (1.2>1.5) with no appreciable fluid on exam. BNP stable. Continue current diuretic regimen for now and encouraged to get an additional glass of water to avoid overdiuresis. Discussed with Dr. Mercado. Start toprol 25mg once daily for additional HR control.  Upcoming labs and date anticipated: weekly labs through home health for close monitoring of renal function/electrolytes. RTC in 2 weeks or sooner prn. Close f/u scheduled with CTS for flail MV with severe MR    Mala Hooks PA-C

## 2024-01-21 NOTE — PROVATION PATIENT INSTRUCTIONS
Discharge Summary/Instructions after an Endoscopic Procedure  Patient Name: Lorenzo Nino  Patient MRN: [id number]  Patient YOB: 1975  Thursday, January 18, 2024  Mayur Dunn MD  Dear patient,  As a result of recent federal legislation (The Federal Cures Act), you may   receive lab or pathology results from your procedure in your [facility]   account before your physician is able to contact you. Your physician or   their representative will relay the results to you with their   recommendations at their soonest availability.  Thank you,  RESTRICTIONS:  During your procedure today, you received medications for sedation.  These   medications may affect your judgment, balance and coordination.  Therefore,   for 24 hours, you have the following restrictions:   - DO NOT drive a car, operate machinery, make legal/financial decisions,   sign important papers or drink alcohol.    ACTIVITY:  Today: no heavy lifting, straining or running due to procedural   sedation/anesthesia.  The following day: return to full activity including work.  DIET:  Eat and drink normally unless instructed otherwise.     TREATMENT FOR COMMON SIDE EFFECTS:  - Mild abdominal pain, nausea, belching, bloating or excessive gas:  rest,   eat lightly and use a heating pad.  - Sore Throat: treat with throat lozenges and/or gargle with warm salt   water.  - Because air was used during the procedure, expelling large amounts of air   from your rectum or belching is normal.  - If a bowel prep was taken, you may not have a bowel movement for 1-3 days.    This is normal.  SYMPTOMS TO WATCH FOR AND REPORT TO YOUR PHYSICIAN:  1. Abdominal pain or bloating, other than gas cramps.  2. Chest pain.  3. Back pain.  4. Signs of infection such as: chills or fever occurring within 24 hours   after the procedure.  5. Rectal bleeding, which would show as bright red, maroon, or black stools.   (A tablespoon of blood from the rectum is not serious,  especially if   hemorrhoids are present.)  6. Vomiting.  7. Weakness or dizziness.  GO DIRECTLY TO THE NEAREST EMERGENCY ROOM IF YOU HAVE ANY OF THE FOLLOWING:      Difficulty breathing              Chills and/or fever over 101 F   Persistent vomiting and/or vomiting blood   Severe abdominal pain   Severe chest pain   Black, tarry stools   Bleeding- more than one tablespoon   Any other symptom or condition that you feel may need urgent attention  Your doctor recommends these additional instructions:  If any biopsies were taken, your doctors clinic will contact you in 1 to 2   weeks with any results.  - The patient will be observed post-procedure, until all discharge criteria   are met.   - Discharge patient to home (ambulatory).   - Resume regular diet indefinitely.   - Continue present medications.   - Repeat flexible sigmoidoscopy (date not yet determined) for retreatment.  For questions, problems or results please call your physician - Mayur Dunn MD at Work:  [phone].  [facility], EMERGENCY ROOM PHONE NUMBER: [phone]  IF A COMPLICATION OR EMERGENCY SITUATION ARISES AND YOU ARE UNABLE TO REACH   YOUR PHYSICIAN - GO DIRECTLY TO THE EMERGENCY ROOM.  Mayur Dunn MD  1/21/2024 10:30:12 AM  This report has been verified and signed electronically.  Dear patient,  As a result of recent federal legislation (The Federal Cures Act), you may   receive lab or pathology results from your procedure in your [facility]   account before your physician is able to contact you. Your physician or   their representative will relay the results to you with their   recommendations at their soonest availability.  Thank you,  PROVATION

## 2024-01-22 ENCOUNTER — LAB VISIT (OUTPATIENT)
Dept: LAB | Facility: HOSPITAL | Age: 49
End: 2024-01-22
Attending: INTERNAL MEDICINE
Payer: COMMERCIAL

## 2024-01-22 DIAGNOSIS — R78.81 BACTEREMIA: Primary | ICD-10-CM

## 2024-01-22 DIAGNOSIS — K92.2 ACUTE GASTROINTESTINAL HEMORRHAGE: ICD-10-CM

## 2024-01-22 DIAGNOSIS — I38 VALVULAR ENDOCARDITIS: ICD-10-CM

## 2024-01-22 LAB
ANION GAP SERPL CALC-SCNC: 13 MMOL/L (ref 8–16)
BNP SERPL-MCNC: 237 PG/ML (ref 0–99)
BUN SERPL-MCNC: 29 MG/DL (ref 6–20)
CALCIUM SERPL-MCNC: 8.9 MG/DL (ref 8.7–10.5)
CHLORIDE SERPL-SCNC: 103 MMOL/L (ref 95–110)
CO2 SERPL-SCNC: 22 MMOL/L (ref 23–29)
CREAT SERPL-MCNC: 1.3 MG/DL (ref 0.5–1.4)
ERYTHROCYTE [DISTWIDTH] IN BLOOD BY AUTOMATED COUNT: 18.7 % (ref 11.5–14.5)
EST. GFR  (NO RACE VARIABLE): >60 ML/MIN/1.73 M^2
GLUCOSE SERPL-MCNC: 79 MG/DL (ref 70–110)
HCT VFR BLD AUTO: 33.8 % (ref 40–54)
HGB BLD-MCNC: 10.1 G/DL (ref 14–18)
INR PPP: 1 (ref 0.8–1.2)
MAGNESIUM SERPL-MCNC: 2 MG/DL (ref 1.6–2.6)
MCH RBC QN AUTO: 26.4 PG (ref 27–31)
MCHC RBC AUTO-ENTMCNC: 29.9 G/DL (ref 32–36)
MCV RBC AUTO: 89 FL (ref 82–98)
PHOSPHATE SERPL-MCNC: 4.1 MG/DL (ref 2.7–4.5)
PLATELET # BLD AUTO: 311 K/UL (ref 150–450)
PMV BLD AUTO: 11.5 FL (ref 9.2–12.9)
POTASSIUM SERPL-SCNC: 3.8 MMOL/L (ref 3.5–5.1)
PROTHROMBIN TIME: 10.7 SEC (ref 9–12.5)
RBC # BLD AUTO: 3.82 M/UL (ref 4.6–6.2)
SODIUM SERPL-SCNC: 138 MMOL/L (ref 136–145)
WBC # BLD AUTO: 8.5 K/UL (ref 3.9–12.7)

## 2024-01-22 PROCEDURE — 99499 UNLISTED E&M SERVICE: CPT | Mod: ,,, | Performed by: INTERNAL MEDICINE

## 2024-01-22 PROCEDURE — 85027 COMPLETE CBC AUTOMATED: CPT | Performed by: INTERNAL MEDICINE

## 2024-01-22 PROCEDURE — 84100 ASSAY OF PHOSPHORUS: CPT | Performed by: INTERNAL MEDICINE

## 2024-01-22 PROCEDURE — 36415 COLL VENOUS BLD VENIPUNCTURE: CPT | Performed by: INTERNAL MEDICINE

## 2024-01-22 PROCEDURE — 85610 PROTHROMBIN TIME: CPT | Performed by: INTERNAL MEDICINE

## 2024-01-22 PROCEDURE — 83735 ASSAY OF MAGNESIUM: CPT | Performed by: INTERNAL MEDICINE

## 2024-01-22 PROCEDURE — 80048 BASIC METABOLIC PNL TOTAL CA: CPT | Performed by: INTERNAL MEDICINE

## 2024-01-22 PROCEDURE — 83880 ASSAY OF NATRIURETIC PEPTIDE: CPT | Performed by: INTERNAL MEDICINE

## 2024-01-24 ENCOUNTER — TELEPHONE (OUTPATIENT)
Dept: CARDIOLOGY | Facility: CLINIC | Age: 49
End: 2024-01-24
Payer: COMMERCIAL

## 2024-01-24 NOTE — TELEPHONE ENCOUNTER
"[facility]) weekly phone follow up / triage call completed.     TCC RN Navigator spoke with PT's Mother Jolie    Current Patient reported weight: 140 lbs   Patient Goal Weight: (134-136)  Recent Patient reported blood pressure and heart rate:  Not reported    Pt reports the following:  []  Shortness of Breath with Activity  []  Shortness of Breath at rest   []  Fatigue  []  Edema   [] Chest pain or tightness  [] Weight Increase since discharge  [x] None of the above    Pt reports using "Daily weight and symptom tracker".    Pt reports being in the GREEN(color) Zone. If in yellow/red, reminded that they should be calling HFT[facility] today or now.     Medications:   Medication compliance reviewed with pt.  Pt reports having medication list available and has all medications at home for use per list. YES    Education:   Confirmed pt still has "Heart [facility] Clinic Home Care Guide"  . YES    Reminded of key points as listed below.     Recommend 2 -3 gram sodium restriction and 1500 cc-2000 cc fluid restriction.  Encourage physical activity with graded exercise program.  Requested patient to weigh themselves daily, and to notify us if their weight increases by more than 3 lbs in 1 day or 5 lbs in 3 days.   Reminded to use "Daily weight and symptom tracker".  Even if pt does not have a scale, to use symptom tracker.       Watch for these Signs and Symptoms: If any of these occur, contact HFT[facility] immediately:   Increase in shortness of breath with movement   Increase in swelling in your legs and ankles   Weight gain of more than 3 pounds in a day or 5 pounds in 3 days.   Difficulty breathing when you are lying down   Worsening fatigue or tiredness   Stomach bloating, a full feeling or a loss of appetite   Increased coughing--especially when you are lying down      Pt was able to verbalize back to RN in their own words correct diet/fluid restrictions, necessity for exercise, " "warning signs and symptoms, when and how to contact their [facility] team.      Pt reminded of upcoming appointment.  Feb 1st with NAKIA Neva PT reports they will attend. PT has labs the Monday before will see if Ms Hooks wants them changed to Thursday      Pt reminded of how and when to contact [facility]:  [phone] (Mon-Fri, 8a-5p) & for urgent issues on the weekend to page the Heart Transplant MD on call.  Pt also encouraged utilize [facility] messaging as well.      Pt  verbalized understanding and in agreement of plan.       Will follow up with pt at next clinic visit and RN navigator available for pt questions, issues or concerns.   Jolie, PT's Mother, calls to report that her son gained 5 lbs this week. He had to have dental work and get fitted for new dentures and he has been eating a lot better. Jolie feels certain that it is real weight he has gained since he has none of the S/S in the "Tracker". PT was able to go to the grocery store with his son and push him in the cart the whole time and he had no problems. Advised he to make sure to do Daily Dry Wts, keep to his Fluid and Salt/Sodium restriction and to call us if she notices any of the S/S. [facility] and [facility] hours #. Jolie felt that all was OK but wanted Ms. Hooks to know she also said that PT's BNP went up from 197 to 237 and she will make sure the  nurse sends any future labs to [facility]. Next labs are on Monday 1-29 but Jolie says we can change the date if needed.    JACINTA Hooks's reply;  Yes labs look good, not a significant change in fluid lab and kidney function improved from last     Call to Miss Dave to advise her of JACINTA's comments above. Miss Dave can verbalize understanding and gives her thanks.  "

## 2024-01-29 ENCOUNTER — TELEPHONE (OUTPATIENT)
Dept: CARDIOTHORACIC SURGERY | Facility: CLINIC | Age: 49
End: 2024-01-29
Payer: COMMERCIAL

## 2024-01-29 ENCOUNTER — PATIENT MESSAGE (OUTPATIENT)
Dept: CARDIOLOGY | Facility: CLINIC | Age: 49
End: 2024-01-29
Payer: COMMERCIAL

## 2024-01-29 PROBLEM — A41.9 SEPTIC SHOCK: Status: RESOLVED | Noted: 2023-10-30 | Resolved: 2024-01-29

## 2024-01-29 PROBLEM — R65.21 SEPTIC SHOCK: Status: RESOLVED | Noted: 2023-10-30 | Resolved: 2024-01-29

## 2024-01-29 NOTE — TELEPHONE ENCOUNTER
Returned call and provided number to heart failure cardiology team and advised she should contact that team for notification of weight and medication management questions. Arlette verbalized understanding.       ----- Message from Elodia Maza sent at 1/29/2024 10:47 AM CST -----  Regarding: Home Health  Contact: Arlette paulino/[facility] [phone]  Home health nurse Arlette with [facility] calling to report patient being at 146.4 outside of current weight parameters. Please call to advise on next steps in plan of care.        18-Nov-2018

## 2024-01-30 ENCOUNTER — OFFICE VISIT (OUTPATIENT)
Dept: OPHTHALMOLOGY | Facility: CLINIC | Age: 49
End: 2024-01-30
Payer: COMMERCIAL

## 2024-01-30 ENCOUNTER — HOSPITAL ENCOUNTER (OUTPATIENT)
Dept: CARDIOLOGY | Facility: HOSPITAL | Age: 49
Discharge: HOME | End: 2024-01-30
Attending: THORACIC SURGERY (CARDIOTHORACIC VASCULAR SURGERY)
Payer: COMMERCIAL

## 2024-01-30 VITALS
WEIGHT: 139 LBS | DIASTOLIC BLOOD PRESSURE: 88 MMHG | HEART RATE: 65 BPM | BODY MASS INDEX: 18.83 KG/M2 | SYSTOLIC BLOOD PRESSURE: 128 MMHG | HEIGHT: 72 IN

## 2024-01-30 DIAGNOSIS — H35.61 SUBRETINAL HEMORRHAGE, RIGHT: Primary | ICD-10-CM

## 2024-01-30 DIAGNOSIS — Z95.2 H/O MITRAL VALVE REPLACEMENT: ICD-10-CM

## 2024-01-30 DIAGNOSIS — H35.89 RETINAL MACULAR ATROPHY: ICD-10-CM

## 2024-01-30 LAB
ASCENDING AORTA: 2.86 CM
AV INDEX (PROSTH): 1.03
AV MEAN GRADIENT: 3 MMHG
AV PEAK GRADIENT: 5 MMHG
AV VALVE AREA BY VELOCITY RATIO: 3.57 CM²
AV VALVE AREA: 3.94 CM²
AV VELOCITY RATIO: 0.93
BSA FOR ECHO PROCEDURE: 1.79 M2
CV ECHO LV RWT: 0.41 CM
DOP CALC AO PEAK VEL: 1.17 M/S
DOP CALC AO VTI: 21.08 CM
DOP CALC LVOT AREA: 3.8 CM2
DOP CALC LVOT DIAMETER: 2.21 CM
DOP CALC LVOT PEAK VEL: 1.09 M/S
DOP CALC LVOT STROKE VOLUME: 83.12 CM3
DOP CALC MV VTI: 28.35 CM
DOP CALCLVOT PEAK VEL VTI: 21.68 CM
E WAVE DECELERATION TIME: 257.12 MSEC
E/A RATIO: 4.23
E/E' RATIO: 18.2 M/S
ECHO LV POSTERIOR WALL: 0.96 CM (ref 0.6–1.1)
FRACTIONAL SHORTENING: 33 % (ref 28–44)
INTERVENTRICULAR SEPTUM: 0.99 CM (ref 0.6–1.1)
IVRT: 59.94 MSEC
LA MAJOR: 6.59 CM
LA MINOR: 6.57 CM
LA WIDTH: 5.29 CM
LEFT ATRIUM SIZE: 5.54 CM
LEFT ATRIUM VOLUME INDEX MOD: 60.8 ML/M2
LEFT ATRIUM VOLUME INDEX: 89.6 ML/M2
LEFT ATRIUM VOLUME MOD: 111.28 CM3
LEFT ATRIUM VOLUME: 163.91 CM3
LEFT INTERNAL DIMENSION IN SYSTOLE: 3.11 CM (ref 2.1–4)
LEFT VENTRICLE DIASTOLIC VOLUME INDEX: 54.03 ML/M2
LEFT VENTRICLE DIASTOLIC VOLUME: 98.88 ML
LEFT VENTRICLE MASS INDEX: 85 G/M2
LEFT VENTRICLE SYSTOLIC VOLUME INDEX: 20.9 ML/M2
LEFT VENTRICLE SYSTOLIC VOLUME: 38.32 ML
LEFT VENTRICULAR INTERNAL DIMENSION IN DIASTOLE: 4.63 CM (ref 3.5–6)
LEFT VENTRICULAR MASS: 155.05 G
LV LATERAL E/E' RATIO: 12.13 M/S
LV SEPTAL E/E' RATIO: 36.4 M/S
MV MEAN GRADIENT: 1 MMHG
MV PEAK A VEL: 0.43 M/S
MV PEAK E VEL: 1.82 M/S
MV PEAK GRADIENT: 4 MMHG
MV STENOSIS PRESSURE HALF TIME: 74.56 MS
MV VALVE AREA BY CONTINUITY EQUATION: 2.93 CM2
MV VALVE AREA P 1/2 METHOD: 2.95 CM2
PISA MRMAX VEL: 0.06 M/S
PISA TR MAX VEL: 3.49 M/S
RA MAJOR: 5.12 CM
RA PRESSURE ESTIMATED: 3 MMHG
RA WIDTH: 3.34 CM
RIGHT VENTRICULAR END-DIASTOLIC DIMENSION: 3.79 CM
RV TB RVSP: 6 MMHG
SINUS: 3.07 CM
STJ: 2.67 CM
TDI LATERAL: 0.15 M/S
TDI SEPTAL: 0.05 M/S
TDI: 0.1 M/S
TR MAX PG: 49 MMHG
TRICUSPID ANNULAR PLANE SYSTOLIC EXCURSION: 1.46 CM
TV REST PULMONARY ARTERY PRESSURE: 52 MMHG
Z-SCORE OF LEFT VENTRICULAR DIMENSION IN END DIASTOLE: -0.91
Z-SCORE OF LEFT VENTRICULAR DIMENSION IN END SYSTOLE: -0.05

## 2024-01-30 PROCEDURE — 92014 COMPRE OPH EXAM EST PT 1/>: CPT | Mod: S$GLB,,, | Performed by: OPHTHALMOLOGY

## 2024-01-30 PROCEDURE — 92134 CPTRZ OPH DX IMG PST SGM RTA: CPT | Mod: S$GLB,,, | Performed by: OPHTHALMOLOGY

## 2024-01-30 PROCEDURE — 93306 TTE W/DOPPLER COMPLETE: CPT

## 2024-01-30 PROCEDURE — 1159F MED LIST DOCD IN RCRD: CPT | Mod: CPTII,S$GLB,, | Performed by: OPHTHALMOLOGY

## 2024-01-30 PROCEDURE — 93306 TTE W/DOPPLER COMPLETE: CPT | Mod: 26,,, | Performed by: INTERNAL MEDICINE

## 2024-01-30 PROCEDURE — 99999 PR PBB SHADOW E&M-EST. PATIENT-LVL III: CPT | Mod: PBBFAC,,, | Performed by: OPHTHALMOLOGY

## 2024-01-30 PROCEDURE — 92201 OPSCPY EXTND RTA DRAW UNI/BI: CPT | Mod: S$GLB,,, | Performed by: OPHTHALMOLOGY

## 2024-01-30 PROCEDURE — 1160F RVW MEDS BY RX/DR IN RCRD: CPT | Mod: CPTII,S$GLB,, | Performed by: OPHTHALMOLOGY

## 2024-01-30 NOTE — PROGRESS NOTES
HPI     1 MONTH   FOLLOW UP      Additional comments: SUBRETINAL HEMORRHAGE  OD   OCT    BLURRED VA   OTC  READERS FOR  SMALLER PRINT +2.50  SLANTED VA IN OD   ( PARTIAL )            Comments    DLS: 12/23  SR/IR hemorrhage OD  Recent MVR for SBE 11/3/23    No eyedrops    Pt here for 3 week OCT, no dilate.  Pt states he still has a central blur   in VA OD.  Pt denies flashes, floaters or eye pain OU.             HPI    DLS: 11/21/23    SR/IR hemorrhage OD  Recent MVR for SBE 11/3/23    No eyedrops    Pt here for 3 week OCT, no dilate.  Pt states he still has a central blur   in VA OD.  Pt denies flashes, floaters or eye pain OU.   Last edited by Saundra La MA on 12/12/2023 10:31 AM.          OCT - OD SR hemorrhage, IR hemorrhage improving.  Central atrophy as expected      A/P    SR/IR hemorrhage OD  Recent MVR for SBE  11/3/23    Improving from inpatient photos    No tx today  Central atrophy with limit Va  Will monitor for CNVM        4 months OCT and dilate

## 2024-01-30 NOTE — PROGRESS NOTES
HF TCC Provider Note (Follow-up) Consult Note      HPI:     Patient denies appreciable SOB since last visit and pace normal               Patient sleeps on 1 number of pillows              Patient wakes up SOB, has to get out of bed, associated cough- denies              Palpitations - denies              Dizzy, light-headed, pre-syncope or syncope- denies              Since discharge frequency of performing weights, home weight and weight change- performing daily weights. Weight uptrending on home scale, though has had improvement in appetite. Last visit 136lbs, uptrending to now 145-147lbs               Other information felt pertinent to HPI: Mr. 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 On ED presentation he was hypoxic requiring 6 L to maintain sats of 91-94%. Initial lactic acid 1.8. Creatinine 0.9. WBC 20k. Bedside Echo showed EF 45-50% with severe MR (2 jets, one posteriorly directed and one anteriorly directed), restricted posterior leaflet, possible tear in anterior leaflet. No pericardial effusion. He was transferred to CICU for AHRF 2/2 significant pulmonary edema 2/2 severe mitral regurgitation concerning for anterior leaflet tear vs posterior leaflet restriction. Upon admission to the CICU in AF RVR. Given 1 dose of digoxin and started on heparin gtt and diltiazem gtt. Stabilized on sinus rhythm. CTS evaluated patient and agree on continuing aggressive diuresis with lasix gtt, at the moment not indicated IABP. Underwent NEW on 12/26 which showed severe MR with 2 jets, including posterior jet likely secondary to tear/perforation. CTS to follow as outpatient (Dr. Ascencio). ID plan with daptomycin 8mg/kg x 2 weeks and follow with PO doxycyline until new valve replaced. ID recommended intraoperative tissue cx. Optimized  diuresis for volume status. Heparin transitioned to eliquis. He reported some bright red bloody bowel movements with Hgb trending downwards, concerning for a lower GI bleed. GI was consulted for recommendations. Colonoscopy and EGD performed while inpatient. Colonoscopy revealed non obstructing large tumor in the sigmoid colon with stigamata of recent bleeding. Mass was biopsied and tattoeed. Patient remained stable throughout hospital stay and on 12/8, CCU team felt patient was stable for discharge home.     2/1/24: Last visit we started toprol 25mg daily. Today he has no acute complaints. Denies worsening SOB/BLE edema. Does report a 10lb weight increase on home scale over the past 2 weeks, but previously weighed 170lbs at baseline prior to recent admissions and endorses significant improvement in appetite.      PHYSICAL:   Vitals:    02/01/24 1403   BP: 114/73   Pulse: 73      @[id number](3)@    JVD: 1cm above clavicle  Heart rhythm: regular  Cardiac murmur: Yes - 4/6 systolic murmur across precordium    S3: no  S4: no  Lungs: clear  Hepatojugular reflux: yes, couple cm  Edema: no      Lab Results   Component Value Date     01/29/2024     01/29/2024    K 3.8 01/29/2024    K 3.8 01/29/2024    MG 1.9 01/29/2024     01/29/2024     01/29/2024    CO2 23 01/29/2024    CO2 23 01/29/2024    BUN 26 (H) 01/29/2024    BUN 26 (H) 01/29/2024    CREATININE 1.3 01/29/2024    CREATININE 1.3 01/29/2024    GLU 94 01/29/2024    GLU 94 01/29/2024    CALCIUM 8.9 01/29/2024    CALCIUM 8.9 01/29/2024    AST 32 01/29/2024    ALT 34 01/29/2024    ALBUMIN 3.0 (L) 01/29/2024    PROT 7.1 01/29/2024    BILITOT 0.5 01/29/2024     Lab Results   Component Value Date     (H) 01/29/2024     (H) 01/25/2024     (H) 01/22/2024       ASSESSMENT: Valvular HF     PLAN:      Patient Instructions:   Instruct the patient to notify this clinic if HH, a physician or an advanced care provider wants to change  medication one of their HF medications   Activity and Diet restrictions:   Recommend 2-3 gram sodium restriction and 1500cc- 2000cc fluid restriction.  Encourage physical activity with graded exercise program.  Requested patient to weigh themselves daily, and to notify us if their weight increases by more than 3 lbs in 1 day or 5 lbs in 3 days.    Assigned dry weight on home scale: 134-136lbs on hospital discharge. Weight uptrending to 147lbs on home scale today. Estimated dry weight ~143lbs.   Medication changes (include current dose and changed dose): NYHA Class I-II symptoms. Mild fluid volume on exam. Increase torsemide from 10mg daily to 20mg daily for now. Repeat labs tomorrow through  with phone check in Monday to assess trend in weight.     RD education provided during visit.    Upcoming labs and date anticipated: weekly labs through home health for close monitoring of renal function/electrolytes. RTC in 2 weeks or sooner prn. Close f/u scheduled with CTS for flail MV with severe MR     Mala Hooks PA-C

## 2024-01-31 ENCOUNTER — EXTERNAL HOME HEALTH (OUTPATIENT)
Dept: HOME HEALTH SERVICES | Facility: HOSPITAL | Age: 49
End: 2024-01-31
Payer: COMMERCIAL

## 2024-02-01 ENCOUNTER — OFFICE VISIT (OUTPATIENT)
Dept: CARDIOTHORACIC SURGERY | Facility: CLINIC | Age: 49
End: 2024-02-01
Payer: COMMERCIAL

## 2024-02-01 ENCOUNTER — PATIENT MESSAGE (OUTPATIENT)
Dept: OPHTHALMOLOGY | Facility: CLINIC | Age: 49
End: 2024-02-01
Payer: COMMERCIAL

## 2024-02-01 ENCOUNTER — NUTRITION (OUTPATIENT)
Dept: TRANSPLANT | Facility: CLINIC | Age: 49
End: 2024-02-01
Payer: COMMERCIAL

## 2024-02-01 ENCOUNTER — OFFICE VISIT (OUTPATIENT)
Dept: CARDIOLOGY | Facility: CLINIC | Age: 49
End: 2024-02-01
Payer: COMMERCIAL

## 2024-02-01 ENCOUNTER — PATIENT MESSAGE (OUTPATIENT)
Dept: SURGERY | Facility: CLINIC | Age: 49
End: 2024-02-01
Payer: COMMERCIAL

## 2024-02-01 VITALS
OXYGEN SATURATION: 99 % | DIASTOLIC BLOOD PRESSURE: 64 MMHG | WEIGHT: 151.56 LBS | BODY MASS INDEX: 20.53 KG/M2 | HEART RATE: 66 BPM | HEIGHT: 72 IN | SYSTOLIC BLOOD PRESSURE: 101 MMHG

## 2024-02-01 VITALS
SYSTOLIC BLOOD PRESSURE: 114 MMHG | DIASTOLIC BLOOD PRESSURE: 73 MMHG | BODY MASS INDEX: 20.69 KG/M2 | OXYGEN SATURATION: 99 % | WEIGHT: 152.75 LBS | HEART RATE: 73 BPM | HEIGHT: 72 IN

## 2024-02-01 DIAGNOSIS — I34.0 MITRAL VALVE INSUFFICIENCY, UNSPECIFIED ETIOLOGY: ICD-10-CM

## 2024-02-01 DIAGNOSIS — B95.62 MRSA BACTEREMIA: ICD-10-CM

## 2024-02-01 DIAGNOSIS — I50.22 HEART FAILURE WITH MID-RANGE EJECTION FRACTION (HFMEF): Primary | ICD-10-CM

## 2024-02-01 DIAGNOSIS — I50.32 CHRONIC DIASTOLIC HEART FAILURE DUE TO VALVULAR DISEASE: Primary | ICD-10-CM

## 2024-02-01 DIAGNOSIS — Z98.890 S/P MVR (MITRAL VALVE REPAIR): Primary | ICD-10-CM

## 2024-02-01 DIAGNOSIS — R78.81 MRSA BACTEREMIA: ICD-10-CM

## 2024-02-01 DIAGNOSIS — I48.0 PAROXYSMAL ATRIAL FIBRILLATION: ICD-10-CM

## 2024-02-01 DIAGNOSIS — I38 ENDOCARDITIS, UNSPECIFIED CHRONICITY, UNSPECIFIED ENDOCARDITIS TYPE: ICD-10-CM

## 2024-02-01 DIAGNOSIS — I38 CHRONIC DIASTOLIC HEART FAILURE DUE TO VALVULAR DISEASE: Primary | ICD-10-CM

## 2024-02-01 PROCEDURE — 3078F DIAST BP <80 MM HG: CPT | Mod: CPTII,S$GLB,,

## 2024-02-01 PROCEDURE — 99214 OFFICE O/P EST MOD 30 MIN: CPT | Mod: S$GLB,,,

## 2024-02-01 PROCEDURE — 3074F SYST BP LT 130 MM HG: CPT | Mod: CPTII,S$GLB,, | Performed by: THORACIC SURGERY (CARDIOTHORACIC VASCULAR SURGERY)

## 2024-02-01 PROCEDURE — 97802 MEDICAL NUTRITION INDIV IN: CPT | Mod: S$GLB,,,

## 2024-02-01 PROCEDURE — 3008F BODY MASS INDEX DOCD: CPT | Mod: CPTII,S$GLB,,

## 2024-02-01 PROCEDURE — 1160F RVW MEDS BY RX/DR IN RCRD: CPT | Mod: CPTII,S$GLB,,

## 2024-02-01 PROCEDURE — 99999 PR PBB SHADOW E&M-EST. PATIENT-LVL IV: CPT | Mod: PBBFAC,,,

## 2024-02-01 PROCEDURE — 99999 PR PBB SHADOW E&M-EST. PATIENT-LVL III: CPT | Mod: PBBFAC,,, | Performed by: THORACIC SURGERY (CARDIOTHORACIC VASCULAR SURGERY)

## 2024-02-01 PROCEDURE — 3078F DIAST BP <80 MM HG: CPT | Mod: CPTII,S$GLB,, | Performed by: THORACIC SURGERY (CARDIOTHORACIC VASCULAR SURGERY)

## 2024-02-01 PROCEDURE — 1159F MED LIST DOCD IN RCRD: CPT | Mod: CPTII,S$GLB,,

## 2024-02-01 PROCEDURE — 99024 POSTOP FOLLOW-UP VISIT: CPT | Mod: S$GLB,,, | Performed by: THORACIC SURGERY (CARDIOTHORACIC VASCULAR SURGERY)

## 2024-02-01 PROCEDURE — 1111F DSCHRG MED/CURRENT MED MERGE: CPT | Mod: CPTII,S$GLB,,

## 2024-02-01 PROCEDURE — 3074F SYST BP LT 130 MM HG: CPT | Mod: CPTII,S$GLB,,

## 2024-02-01 NOTE — PROGRESS NOTES
TRANSPLANT NUTRITIONAL ASSESSMENT    Referring Provider: Adriana Adame MD     Reason for Visit: HF diet education    Age: 48 y.o.  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    Severe mitral regurgitation    Rupture of chordae tendineae    Surgical wound present    Transient hyperglycemia post procedure    S/P MVR (mitral valve repair)    Subretinal hemorrhage, right    HFmrEF    Hypokalemia    GI bleeding    Iron deficiency anemia    Retinal macular atrophy     Past Medical History:   Diagnosis Date    Hypertension      Lab Results   Component Value Date    GLU 94 01/29/2024    GLU 94 01/29/2024    K 3.8 01/29/2024    K 3.8 01/29/2024    PHOS 4.1 01/22/2024    MG 1.9 01/29/2024    TRIG 370 (H) 10/31/2023    ALBUMIN 3.0 (L) 01/29/2024    AMMONIA 30 10/30/2023    HGBA1C 5.8 (H) 11/06/2023    CALCIUM 8.9 01/29/2024    CALCIUM 8.9 01/29/2024     Other Pertinent Labs: waiting on  labs    Current Outpatient Medications   Medication Sig    amiodarone (PACERONE) 200 MG Tab Take 2 tablets (400 mg total) by mouth once daily.    dapagliflozin propanediol (FARXIGA) 10 mg tablet Take 1 tablet (10 mg total) by mouth once daily.    doxycycline (VIBRAMYCIN) 100 MG Cap Take 1 capsule (100 mg total) by mouth 2 (two) times daily.    metoprolol succinate (TOPROL-XL) 25 MG 24 hr tablet Take 1 tablet (25 mg total) by mouth once daily.    mirtazapine (REMERON SOL-TAB) 15 MG disintegrating tablet Dissolve 1 tablet (15 mg total) by mouth nightly.    ondansetron (ZOFRAN) 4 MG tablet Take 1 tablet (4 mg total) by mouth daily as needed for Nausea.    potassium chloride SA (K-DUR,KLOR-CON) 20 MEQ tablet Take 2 tablets (40 mEq total) by mouth once daily.     spironolactone (ALDACTONE) 25 MG tablet Take 1 tablet (25 mg total) by mouth once daily.    torsemide (DEMADEX) 10 MG Tab Take 1 tablet (10 mg total) by mouth once daily.     No current facility-administered medications for this visit.     Allergies: Patient has no known allergies.    Ht Readings from Last 1 Encounters:   02/01/24 6' (1.829 m)     Wt Readings from Last 1 Encounters:   02/01/24 69.3 kg (152 lb 12.5 oz)      BMI: 20.72 kg/m²     Usual Weight: 63 kg (77 kg before hospital encounter)  Weight Change/Time: up 6 kg from 1/18  Current Diet: mary anne, 2000ml fluid res  Appetite/Current Intake: good   Exercise/Physical Activity: sedentary   Nutritional/Herbal Supplements: none   Potential Food/Medication Interactions:Aldactone: avoid high K+ foods  Chewing/Swallowing Problems: none  Symptoms: none  Assessment of Lab Values: waiting on home health labs   Support System: family     Estimated Kcal Need: 2016 kcals (32 kcal/kg CBW)  Estimated Protein Need: 50g (0.8 g/kg UBW)    Nutritional History: pt has 3 meals per day. Breakfast is chocolate milk, cookies crisp or fruity reggie. Lunch is frozen chicken keena. Dinner is fried chicken or fish (pt states he uses no Na arce batter). Pt drinks 3000ml of fluid per day and 3-4 servings of chips (bbq/sour cream)     Nutritional Diagnoses  Problem: excessive fluid intake  Etiology: cardiac dysfunction   Symptoms: nutr hist     Educational Need? yes  Barriers: none identified  Discussed with: patient and father  Interventions: Patient taught nutrition information regarding HF diet education   .  Pt was educated on how to read nutrition labels to understand food has natural sodium present, serving sizes, and reduced Na does not always mean the product does not have low Na . Recc'd of 600mg Na per meal. Pt recc drink closer to 2000ml per day. pt given contact info if questions or needs arise.    Goals/Recommendations: diet adherence and limit fluid intake  Actions Taken:  instruct/provide written information  Strategies Used: problem solving, goal setting, motivational interviewing  Patient and/or family comprehend instructions: yes , adherence expected  Outcome: Verbalizes understanding  Monitoring: Contact information provided, will f/u in clinic and communicate with the care team as needed.     Counseling Time: 15 minutes

## 2024-02-01 NOTE — PATIENT INSTRUCTIONS
Estimated dry weight currently ~143lbs.    Take 20mg (2 tablets) of torsemide tomorrow morning.    Will call tomorrow with lab results.     Pending labs, tentative plan will be to take 20mg (2 tablets) of torsemide in the morning for the next 3 days until back to dry weight. Plan will be for phone check in Monday to reassess weight trend over the weekend.

## 2024-02-01 NOTE — PROGRESS NOTES
Subjective:      Patient ID: Lorenzo Nino is a 48 y.o. male.    Chief Complaint: No chief complaint on file.      HPI: Lorenzo Nino is a 48 y.o. male who presents to clinic for evaluation of mitral insufficiency. 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 Beal, embolus to R eye (treated with dapto/ceftaroline), HFmrEF (45-50%). Admitted 12/18-1/8 for ADHF, found to have dehiscence of porcine annular repair of MV on NEW. Presented to ED for shortness of breath, weight gain, and was readmitted for congestive heart failure and hypoxic respiratory failure.  Workup subsequently revealed dehiscence of annular ring.  He was still on antibiotics at time of initial presentation continued during hospital course, no fever or recurrent cultures were present.  Evaluated by Dr Beal, who felt that he would benefit from better preoperative rehabilitation prior to reconsideration of additional surgery, plan  discharge with suppressive antibiotics and GDMT until adequate nutritional status could be obtained.  He had during this hospitalization acute kidney injury from titration of GDMT and diuretics, also had GI bleeding, and workup ultimately remarkable for large mass in colon.  Pathology ultimately revealed tubular adenoma.  He also had recurrent episodes of atrial fibrillation during this hospitalization now sinus on amiodarone.  ID following, original end date of daptomycin was 12/24, now extended to 1/8/24 (total 8 weeks.) and to transition to PO doxycycline until new valve is placed for persistent infection may be the cause of valve dehiscence.    Today patient states that he is doing well. Denies fatigue, SOB, palpitations, LE swelling. He report seeing cardiologist prior to this visit, who recommended strict fluid restriction and increase torsemide dose to achieve euvolemic status. Patient is compliant with oral doxy.     Current medications  Reviewed  Current Outpatient Medications on File Prior to Visit   Medication Sig Dispense Refill    amiodarone (PACERONE) 200 MG Tab Take 2 tablets (400 mg total) by mouth once daily. 60 tablet 11    dapagliflozin propanediol (FARXIGA) 10 mg tablet Take 1 tablet (10 mg total) by mouth once daily. 30 tablet 11    doxycycline (VIBRAMYCIN) 100 MG Cap Take 1 capsule (100 mg total) by mouth 2 (two) times daily. 60 capsule 2    metoprolol succinate (TOPROL-XL) 25 MG 24 hr tablet Take 1 tablet (25 mg total) by mouth once daily. 30 tablet 11    mirtazapine (REMERON SOL-TAB) 15 MG disintegrating tablet Dissolve 1 tablet (15 mg total) by mouth nightly. 30 tablet 11    ondansetron (ZOFRAN) 4 MG tablet Take 1 tablet (4 mg total) by mouth daily as needed for Nausea. 30 tablet 0    potassium chloride SA (K-DUR,KLOR-CON) 20 MEQ tablet Take 2 tablets (40 mEq total) by mouth once daily. 240 tablet 0    spironolactone (ALDACTONE) 25 MG tablet Take 1 tablet (25 mg total) by mouth once daily. 30 tablet 11    torsemide (DEMADEX) 10 MG Tab Take 1 tablet (10 mg total) by mouth once daily. 30 tablet 11     No current facility-administered medications on file prior to visit.       Review of Systems   Constitutional:  Positive for fatigue. Negative for activity change, appetite change and fever.   HENT:  Negative for nosebleeds.    Respiratory:  Positive for shortness of breath. Negative for cough.    Cardiovascular:  Negative for chest pain, palpitations and leg swelling.   Gastrointestinal:  Negative for abdominal distention, abdominal pain and nausea.   Genitourinary:  Negative for frequency.   Musculoskeletal:  Negative for arthralgias and myalgias.   Skin:  Negative for rash.   Neurological:  Negative for dizziness and numbness.   Hematological:  Does not bruise/bleed easily.     Objective:   Physical Exam  Constitutional:       Appearance: Normal appearance.   HENT:      Head: Normocephalic and atraumatic.   Eyes:      Extraocular  Movements: Extraocular movements intact.   Cardiovascular:      Rate and Rhythm: Normal rate.      Heart sounds: Murmur heard.   Pulmonary:      Effort: Pulmonary effort is normal.      Breath sounds: Normal breath sounds.   Abdominal:      General: Abdomen is flat.      Palpations: Abdomen is soft.   Musculoskeletal:         General: Normal range of motion.      Cervical back: Normal range of motion.   Skin:     General: Skin is warm and dry.      Capillary Refill: Capillary refill takes less than 2 seconds.      Coloration: Skin is not pale.   Neurological:      General: No focal deficit present.      Mental Status: He is alert.         Diagnotic Results: reviewed   Echo 1/30    Left Ventricle: The left ventricle is normal in size. Normal wall thickness. Regional wall motion abnormalities present. There is normal systolic function with a visually estimated ejection fraction of 60 - 65%. Diastolic function cannot be reliably determined in the presence of mitral valve disease.    Right Ventricle: Normal right ventricular cavity size. Wall thickness is normal. Right ventricle wall motion  is normal. Systolic function is normal.    Left Atrium: Left atrium is severely dilated.    Mitral Valve: The mitral valve is repaired with a bovine pericardial annuloplasty band. There is flail motion of a small part of valvular tissue, perhaps from the posterior leaflet, that leads to severe eccentric MR directed somewhat laterally.    Tricuspid Valve: There is mild to moderate regurgitation.    IVC/SVC: Normal venous pressure at 3 mmHg.    NEW 12/26/23    NEW for evaluation of mitral valve.    Left Ventricle: The left ventricle is normal in size. Normal wall thickness. Normal wall motion. There is low normal systolic function with a visually estimated ejection fraction of 50 - 55%.    Right Ventricle: Normal right ventricular cavity size. Wall thickness is normal. Right ventricle wall motion  is normal. Systolic function is  normal.    Aortic Valve: There is no mass/vegetation present.    Tricuspid Valve: There is no mass/vegetation present.    Severe mitral reguritaiton    Mitral Valve: Status post bovine pericardial annuloplasty band. Dehiscense of band from 10 o'clock to 4 o'clock. Two regurgitant jets noted. First jet due to failure of coaptation at A2-P2 secondary to anuloplasty band dehiscence. Second jet from a perforation on the medial aspect of P2, possibly at the site of prior triangular resection.  Assessment:   S/p gold valve repair   Severe Mitral insufficiency   Plan:     CTS Attending Note:    I have personally taken the history and examined this patient and agree with the SIMEON's note as stated above.  48-year-old gentleman that underwent urgent mitral valve repair in early November.  He had endocarditis, and was being managed medically until decompensated and required intubation.  Mitral valve repair was performed using a pericardial band as he was actively infected at the time of surgery.  It took some time for his blood cultures to clear postoperatively.  He returned in December with heart failure and an echo demonstrated recurrent severe MR.  I think it likely that his persistent infection resulted in further damage to his mitral valve.  He came to clinic today, and is asymptomatic on medical therapy.  I recommended that we sit tight for now, and I will plan to see him back in 6 months with a repeat echo.  At some point he will require reoperation, but if we can get further out from his initial surgery and from his acute illness the operative risk would be less.

## 2024-02-05 ENCOUNTER — TELEPHONE (OUTPATIENT)
Dept: CARDIOLOGY | Facility: CLINIC | Age: 49
End: 2024-02-05
Payer: COMMERCIAL

## 2024-02-06 NOTE — TELEPHONE ENCOUNTER
February 5, 2024@ 1424: RN spoke with t who reported his weight is 147.6 lbs after increase in torsemide. Pt denies SOB, BLEE, orthopnea, cough. JACINTA notified that pt weight is unchanged with no sxs, pt labs back with Cr. At 1.7 K+ 4.0 BNP down trending.     Feb 5, 2024@1530: JACINTA recommends pt return to torsemide at 10 mg daily.     Feb 5, 2024@1543: Pt informed to resume torsemide 10 mg daily, RN reviewed HF sxs to contact [facility], pt able to verbalize clinic contact information.

## 2024-02-07 ENCOUNTER — PATIENT MESSAGE (OUTPATIENT)
Dept: CARDIOLOGY | Facility: CLINIC | Age: 49
End: 2024-02-07
Payer: COMMERCIAL

## 2024-02-12 ENCOUNTER — DOCUMENT SCAN (OUTPATIENT)
Dept: HOME HEALTH SERVICES | Facility: HOSPITAL | Age: 49
End: 2024-02-12
Payer: COMMERCIAL

## 2024-02-12 NOTE — PROGRESS NOTES
HF TCC Provider Note (Follow-up) Consult Note      HPI:     Patient denies appreciable SOB since last visit and pace normal               Patient sleeps on 1 number of pillows              Patient wakes up SOB, has to get out of bed, associated cough- denies              Palpitations - denies              Dizzy, light-headed, pre-syncope or syncope- denies              Since discharge frequency of performing weights, home weight and weight change- performing daily weights. Weight uptrending on home scale since discharge, though has had improvement in appetite. Weight was 136lbs after recent discharge, now 149-150lbs over the past week. Reports prior to admission his baseline weight was ~155lbs              Other information felt pertinent to HPI: Mr. 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 On ED presentation he was hypoxic requiring 6 L to maintain sats of 91-94%. Initial lactic acid 1.8. Creatinine 0.9. WBC 20k. Bedside Echo showed EF 45-50% with severe MR (2 jets, one posteriorly directed and one anteriorly directed), restricted posterior leaflet, possible tear in anterior leaflet. No pericardial effusion. He was transferred to CICU for AHRF 2/2 significant pulmonary edema 2/2 severe mitral regurgitation concerning for anterior leaflet tear vs posterior leaflet restriction. Upon admission to the CICU in AF RVR. Given 1 dose of digoxin and started on heparin gtt and diltiazem gtt. Stabilized on sinus rhythm. CTS evaluated patient and agree on continuing aggressive diuresis with lasix gtt, at the moment not indicated IABP. Underwent NEW on 12/26 which showed severe MR with 2 jets, including posterior jet likely secondary to tear/perforation. CTS to follow as outpatient (Dr. Ascencio). ID plan with daptomycin 8mg/kg x 2 weeks and  follow with PO doxycyline until new valve replaced. ID recommended intraoperative tissue cx. Optimized diuresis for volume status. Heparin transitioned to eliquis. He reported some bright red bloody bowel movements with Hgb trending downwards, concerning for a lower GI bleed. GI was consulted for recommendations. Colonoscopy and EGD performed while inpatient. Colonoscopy revealed non obstructing large tumor in the sigmoid colon with stigamata of recent bleeding. Mass was biopsied and tattoeed. Patient remained stable throughout hospital stay and on 12/8, CCU team felt patient was stable for discharge home.     2/1/24: Last visit we started toprol 25mg daily. Today he has no acute complaints. Denies worsening SOB/BLE edema. Does report a 10lb weight increase on home scale over the past 2 weeks, but previously weighed 170lbs at baseline prior to recent admissions and endorses significant improvement in appetite.     2/15/24: Last visit we increased torsemide to 20mg daily x 3 days. Cr uptrended with increased torsemide doses without downtrend in weight. Otherwise denies appreciable SOB/BLE edema.      PHYSICAL:   Vitals:    02/15/24 1342   BP: 115/75   Pulse: 68        @[id number](3)@    JVD: prominent pulse   Heart rhythm: regular  Cardiac murmur: Yes - 4/6 systolic murmur across precordium    S3: no  S4: no  Lungs: clear  Hepatojugular reflux: no  Edema: no      Lab Results   Component Value Date     02/15/2024     02/15/2024    K 3.6 02/15/2024    K 3.6 02/15/2024    MG 2.2 02/15/2024     02/15/2024     02/15/2024    CO2 24 02/15/2024    CO2 24 02/15/2024    BUN 27 (H) 02/15/2024    BUN 27 (H) 02/15/2024    CREATININE 1.5 (H) 02/15/2024    CREATININE 1.5 (H) 02/15/2024    GLU 86 02/15/2024    GLU 86 02/15/2024    CALCIUM 9.0 02/15/2024    CALCIUM 9.0 02/15/2024    AST 25 02/15/2024    ALT 22 02/15/2024    ALBUMIN 3.3 (L) 02/15/2024    PROT 8.1 02/15/2024    BILITOT 0.4 02/15/2024     Lab  Results   Component Value Date     (H) 02/15/2024     (H) 02/12/2024     (H) 02/08/2024       ASSESSMENT: Valvular HF     PLAN:      Patient Instructions:   Instruct the patient to notify this clinic if HH, a physician or an advanced care provider wants to change medication one of their HF medications   Activity and Diet restrictions:   Recommend 2-3 gram sodium restriction and 1500cc- 2000cc fluid restriction.  Encourage physical activity with graded exercise program.  Requested patient to weigh themselves daily, and to notify us if their weight increases by more than 3 lbs in 1 day or 5 lbs in 3 days.    Assigned dry weight on home scale: 134-136lbs on hospital discharge. Previously weighed 155lbs at baseline prior to admission. Currently 149-150lbs   Medication changes (include current dose and changed dose): NYHA Class I-II symptoms. Well compensated on exam. Suspect uptrend in BNP 2/2 bump in Cr as no appreciable fluid volume on exam and otherwise asymptomatic. Will continue weekly lab monitoring and phone check ins to bridge back to Dr. Mercado. Continue torsemide 10mg daily.     Upcoming labs and date anticipated: weekly labs through home health for close monitoring of renal function/electrolytes with RTC prn. Close f/u scheduled with CTS for flail MV with severe MR     Mlaa Hooks PA-C

## 2024-02-14 ENCOUNTER — DOCUMENT SCAN (OUTPATIENT)
Dept: HOME HEALTH SERVICES | Facility: HOSPITAL | Age: 49
End: 2024-02-14
Payer: COMMERCIAL

## 2024-02-15 ENCOUNTER — OFFICE VISIT (OUTPATIENT)
Dept: CARDIOLOGY | Facility: CLINIC | Age: 49
End: 2024-02-15
Payer: COMMERCIAL

## 2024-02-15 ENCOUNTER — PATIENT MESSAGE (OUTPATIENT)
Dept: SURGERY | Facility: CLINIC | Age: 49
End: 2024-02-15
Payer: COMMERCIAL

## 2024-02-15 VITALS
BODY MASS INDEX: 20.8 KG/M2 | SYSTOLIC BLOOD PRESSURE: 115 MMHG | WEIGHT: 153.56 LBS | HEART RATE: 68 BPM | OXYGEN SATURATION: 98 % | HEIGHT: 72 IN | DIASTOLIC BLOOD PRESSURE: 75 MMHG

## 2024-02-15 DIAGNOSIS — I48.0 PAROXYSMAL ATRIAL FIBRILLATION: ICD-10-CM

## 2024-02-15 DIAGNOSIS — I50.32 CHRONIC DIASTOLIC HEART FAILURE DUE TO VALVULAR DISEASE: Primary | ICD-10-CM

## 2024-02-15 DIAGNOSIS — B95.62 MRSA BACTEREMIA: ICD-10-CM

## 2024-02-15 DIAGNOSIS — K63.89 MASS OF COLON: Primary | ICD-10-CM

## 2024-02-15 DIAGNOSIS — R78.81 MRSA BACTEREMIA: ICD-10-CM

## 2024-02-15 DIAGNOSIS — I38 CHRONIC DIASTOLIC HEART FAILURE DUE TO VALVULAR DISEASE: Primary | ICD-10-CM

## 2024-02-15 DIAGNOSIS — I38 ENDOCARDITIS, UNSPECIFIED CHRONICITY, UNSPECIFIED ENDOCARDITIS TYPE: ICD-10-CM

## 2024-02-15 PROCEDURE — 1159F MED LIST DOCD IN RCRD: CPT | Mod: CPTII,S$GLB,,

## 2024-02-15 PROCEDURE — 99214 OFFICE O/P EST MOD 30 MIN: CPT | Mod: S$GLB,,,

## 2024-02-15 PROCEDURE — 3078F DIAST BP <80 MM HG: CPT | Mod: CPTII,S$GLB,,

## 2024-02-15 PROCEDURE — 3074F SYST BP LT 130 MM HG: CPT | Mod: CPTII,S$GLB,,

## 2024-02-15 PROCEDURE — 99999 PR PBB SHADOW E&M-EST. PATIENT-LVL IV: CPT | Mod: PBBFAC,,,

## 2024-02-15 PROCEDURE — 1160F RVW MEDS BY RX/DR IN RCRD: CPT | Mod: CPTII,S$GLB,,

## 2024-02-15 PROCEDURE — 3008F BODY MASS INDEX DOCD: CPT | Mod: CPTII,S$GLB,,

## 2024-02-15 NOTE — PATIENT INSTRUCTIONS
Will plan for weekly phone check ins/lab reviews to bridge back to Dr. Mercado with in person visits as needed.    Notify us ([phone]) with any shortness of breath, swelling, or continued weight gain on home scale 3lbs in 1 day or 5lbs in 1 week.

## 2024-02-20 ENCOUNTER — PATIENT MESSAGE (OUTPATIENT)
Dept: SURGERY | Facility: CLINIC | Age: 49
End: 2024-02-20
Payer: COMMERCIAL

## 2024-02-21 ENCOUNTER — TELEPHONE (OUTPATIENT)
Dept: CARDIOLOGY | Facility: CLINIC | Age: 49
End: 2024-02-21
Payer: COMMERCIAL

## 2024-02-21 ENCOUNTER — PATIENT MESSAGE (OUTPATIENT)
Dept: CARDIOLOGY | Facility: CLINIC | Age: 49
End: 2024-02-21
Payer: COMMERCIAL

## 2024-02-21 NOTE — TELEPHONE ENCOUNTER
----- Message from Marisabel Garcia RN sent at 2/20/2024 12:10 PM CST -----  Dr. Mercado,          This patient is scheduled for surgery (endoscopic mucosal lesion resection-GI tract) on 2/27/2024 with Dr. Dunn. This will last approximately 180 minutes under general anesthesia. Requesting cardiac optimization prior to this procedure. Please advise. Will await your response.                                         Thank you,                                                    ANNA Petit BSN                                        [facility]

## 2024-02-21 NOTE — TELEPHONE ENCOUNTER
"[facility]) weekly phone follow up / triage call completed.     TCC RN Navigator spoke with pt, Mr. Ventura    Current Patient reported weight: 152 lbs   Patient Goal Weight: (149-152 lbs)  Recent Patient reported blood pressure and heart rate:  SBP: 120s    Pt reports the following:  []  Shortness of Breath with Activity  []  Shortness of Breath at rest   []  Fatigue  []  Edema   [] Chest pain or tightness  [] Weight Increase since discharge  [x] None of the above    Pt reports using "Daily weight and symptom tracker".    Pt reports being in the GREEN Zone. If in yellow/red, reminded that they should be calling [facility] today or now.     Medications:   Medication compliance reviewed with pt.  Pt reports having medication list available and has all medications at home for use per list.   Pt instructed by General Cardiologist to stop taking spironolactone until other wise instructed. Pt verbalized understanding.    Education:   Confirmed pt still has "Heart Failure Transitional Care Clinic Home Care Guide"  .     Reminded of key points as listed below.     Recommend 2 -3 gram sodium restriction and 1500 cc-2000 cc fluid restriction.  Encourage physical activity with graded exercise program.  Requested patient to weigh themselves daily, and to notify us if their weight increases by more than 3 lbs in 1 day or 5 lbs in 3 days.   Reminded to use "Daily weight and symptom tracker".  Even if pt does not have a scale, to use symptom tracker.       Watch for these Signs and Symptoms: If any of these occur[facility]:   Increase in shortness of breath with movement   Increase in swelling in your legs and ankles   Weight gain of more than 3 pounds in a day or 5 pounds in 3 days.   Difficulty breathing when you are lying down   Worsening fatigue or tiredness   Stomach bloating, a full feeling or a loss of appetite   Increased coughing--especially when you are lying down      Pt " was able to verbalize back to RN in their own words correct diet/fluid restrictions, necessity for exercise, warning signs and symptoms, when and how to contact their [facility] team.      Pt reminded of upcoming appointment.  PT reports they will attend. Per PA-C clinic will continue to monitor ordered lab work until f/u with Dr. Mercado. Reminder created for weekly lab result check ins.       Pt reminded of how and when to contact [facility]:  [phone] (Mon-Fri, 8a-5p) & for urgent issues on the weekend to page the Heart Transplant MD on call.  Pt also encouraged utilize [facility] messaging as well.      Pt  verbalized understanding and in agreement of plan.       Will follow up with pt at next clinic visit and RN navigator available for pt questions, issues or concerns.

## 2024-02-21 NOTE — TELEPHONE ENCOUNTER
Mr. Nino is a 49 y/o gentleman with complicated history of endocarditis, severe MR, Mitral valve repair and then dehiscence, recurrent admission with severe MR, deconditioning, who was recently found to have adenoma in colon after GI bleeding, and referred for pre-op clearance/optimization prior to resection.    I most recently saw him at the end of January, and he has since followed with our heart failure transitional clinic as well.  He is certainly moderate-high risk, however he is medically optimized and was euvolemic on current dose of diuretic and heart failure therapy.     His creatinine has been trending up slightly over the past month, but still CKD III, I will likely be stopping spironolactone.    Would avoid significant afterload during procedure as able given he will be sensitive to this and risk of flash pulmonary edema. Similarly would be judicious with IV fluids.      In relation to this specific surgery, Revised Cardiac Risk Index (RCRI) factors include history of congestive heart failure (I.e. pulmonary edema, rales, S3, or CXR with pulmonary vascular redistribution) which gives gives him a 6.0% risk of major adverse cardiac events (MACE). However the most recent validation cohort of the RCRI risk factors include asymptomatic postoperative troponin elevation defined as perioperative myocardial infarction (MI) as the majority of their MACE. The risk of clinically diagnosed MI or cardiac arrest within 30 days of surgery is more accurately represented by the WHITE score, which gives this patient a risk of 0.6%.  As of my last evaluation Mr. Nino was medically optimized, and no additional preoperative testing indicated. Please proceed on risk benefit basis.     Continue beta-blockers perioperatively.    Emerson Mercado MD, FACC [facility]

## 2024-02-21 NOTE — ANESTHESIA PAT ROS NOTE
02/21/2024  Lorenzo Nino is a 48 y.o., male.      Pre-op Assessment          Review of Systems  Anesthesia Hx:  No problems with previous Anesthesia   History of prior surgery of interest to airway management or planning: heart surgery. Previous anesthesia: General EGD 1/5/24 REPAIR, MITRAL VALVE, OPEN (Chest)  EXCLUSION, LEFT ATRIAL APPENDAGE, OPEN, AS PART OF OPEN CHEST SURGERY (Left: Chest)  11/3/23 with general anesthesia.  Procedure performed at an [facility].      Airway issues documented on chart review include videolaryngoscope used     Denies Family Hx of Anesthesia complications.    Denies Personal Hx of Anesthesia complications.                    Social:  Non-Smoker, No Alcohol Use       Hematology/Oncology:    Oncology Normal                Hematology Comments: MRSA BACTEREMIA                    EENT/Dental:  EENT/Dental Normal           Cardiovascular:     Hypertension       Denies Angina. 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  Denies Recent URI.                 Renal/:  Chronic Renal Disease, CKD                Hepatic/GI:        MASS OF COLON          Musculoskeletal:  Musculoskeletal Normal                Neurological:  Neurology Normal                                      Endocrine:  Diabetes           Psych:  Psychiatric Normal                         Anesthesia Assessment: Preoperative EQUATION    Planned Procedure: Procedure(s) (LRB):  RESECTION, MUCOSAL LESION, GI TRACT, ENDOSCOPIC (N/A)  Requested Anesthesia Type:Monitor Anesthesia Care  Surgeon: Mayur Dunn  MD JÚNIOR  Service: Colon and Rectal  Known or anticipated Date of Surgery:2/27/2024    Surgeon notes: reviewed    Electronic QUestionnaire Assessment completed via nurse interview with patient.        Triage considerations:     The patient has no apparent active cardiac condition (No unstable coronary Syndrome such as severe unstable angina or recent [<1 month] myocardial infarction, decompensated CHF, severe valvular   disease or significant arrhythmia)    Previous anesthesia records:GETA and No problems    Able to lay flat on room air.  Airway:  Mallampati: II / II  Mouth Opening: Normal  TM Distance: Normal  Tongue: Normal  Neck ROM: Normal ROM     Dental:  Dentures  4-6 teeth remain bottom row, nothing on top    Airway Placement Date: 11/02/23 Placement Time: 0855 Method of Intubation: Glidescope Inserted by: MD Staff/Resident Name(s): Dr Bullock, Dr Caballero Airway Device: Endotracheal Tube Airway Device Size: 8.0 Style: Cuffed Placement Verified By: Colorimetric EtCO2 device Findings Post-Intubation: Positive EtCO2 Depth of Insertion (cm): 24 Secured at: Lips Breath Sounds: Equal Bilateral Insertion attempts (enter comment if more than 2 attempts): 1      Last PCP note: within 3 months , within [facility]   Subspecialty notes: Cardiology: General, Infectious Disease, OPTHALMOLOGY    Other important co-morbidities: A FIB, CHF, DM2, HTN, and CKD 3, HX BACTERIAL ENDOCARDITIS, SEVERE MITRAL VALVE REGURGITATION       Tests already available:  Available tests,  within 3 months , within [facility] .     2/19/24  PHOSPHORUS, BNP, MAGNESIUM, CMP, CBC    1/30/24  ECHO-EF 60-65%  Summary         Left Ventricle: The left ventricle is normal in size. Normal wall thickness. Regional wall motion abnormalities present. There is normal systolic function with a visually estimated ejection fraction of 60 - 65%. Diastolic function cannot be reliably determined in the presence of mitral valve disease.    Right Ventricle: Normal right  ventricular cavity size. Wall thickness is normal. Right ventricle wall motion  is normal. Systolic function is normal.    Left Atrium: Left atrium is severely dilated.    Mitral Valve: The mitral valve is repaired with a bovine pericardial annuloplasty band. There is flail motion of a small part of valvular tissue, perhaps from the posterior leaflet, that leads to severe eccentric MR directed somewhat laterally.    Tricuspid Valve: There is mild to moderate regurgitation.    IVC/SVC: Normal venous pressure at 3 mmHg.     1//8/24  EKG    1/5/24   CHEST XRAY                Instructions given. (See in Nurse's note)    Optimization:  Anesthesia Preop Clinic Assessment  NOT Indicated           Sub-specialist consult indicated:   [facility] CARDIOLOGY      Plan:    Testing:  None Needed        Consultation: PATIENTS CARDIOLOGIST FOR STATEMENT OF OPTIMIZATION     Patient  has previously scheduled Medical Appointment: NOT AT THIS TIME    Navigation:               Consults scheduled.               SEE 2/21 CARDIOLOGY NOTE ENCOUNTER PER DR. DIAZ:  Would avoid significant afterload during procedure as able given he will be sensitive to this and risk of flash pulmonary edema. Similarly would be judicious with IV fluids.       In relation to this specific surgery, Revised Cardiac Risk Index (RCRI) factors include history of congestive heart failure (I.e. pulmonary edema, rales, S3, or CXR with pulmonary vascular redistribution) which gives gives him a 6.0% risk of major adverse cardiac events (MACE). However the most recent validation cohort of the RCRI risk factors include asymptomatic postoperative troponin elevation defined as perioperative myocardial infarction (MI) as the majority of their MACE. The risk of clinically diagnosed MI or cardiac arrest within 30 days of surgery is more accurately represented by the WHITE score, which gives this patient a risk of 0.6%.  As of my last evaluation Mr. Nino was medically  optimized, and no additional preoperative testing indicated. Please proceed on risk benefit basis.      Continue beta-blockers perioperatively.     Emerson Mercado MD, FACC [facility]

## 2024-02-23 ENCOUNTER — ANESTHESIA EVENT (OUTPATIENT)
Dept: SURGERY | Facility: HOSPITAL | Age: 49
End: 2024-02-23
Payer: COMMERCIAL

## 2024-02-26 ENCOUNTER — TELEPHONE (OUTPATIENT)
Dept: SURGERY | Facility: CLINIC | Age: 49
End: 2024-02-26
Payer: COMMERCIAL

## 2024-02-26 ENCOUNTER — PATIENT MESSAGE (OUTPATIENT)
Dept: CARDIOLOGY | Facility: CLINIC | Age: 49
End: 2024-02-26
Payer: COMMERCIAL

## 2024-02-26 DIAGNOSIS — I38 CHRONIC DIASTOLIC HEART FAILURE DUE TO VALVULAR DISEASE: Primary | ICD-10-CM

## 2024-02-26 DIAGNOSIS — I50.32 CHRONIC DIASTOLIC HEART FAILURE DUE TO VALVULAR DISEASE: Primary | ICD-10-CM

## 2024-02-27 ENCOUNTER — EXTERNAL HOME HEALTH (OUTPATIENT)
Dept: HOME HEALTH SERVICES | Facility: HOSPITAL | Age: 49
End: 2024-02-27
Payer: COMMERCIAL

## 2024-02-27 ENCOUNTER — HOSPITAL ENCOUNTER (OUTPATIENT)
Facility: HOSPITAL | Age: 49
Discharge: HOME | End: 2024-02-27
Attending: COLON & RECTAL SURGERY | Admitting: COLON & RECTAL SURGERY
Payer: COMMERCIAL

## 2024-02-27 ENCOUNTER — ANESTHESIA (OUTPATIENT)
Dept: SURGERY | Facility: HOSPITAL | Age: 49
End: 2024-02-27
Payer: COMMERCIAL

## 2024-02-27 VITALS
TEMPERATURE: 98 F | DIASTOLIC BLOOD PRESSURE: 68 MMHG | SYSTOLIC BLOOD PRESSURE: 99 MMHG | RESPIRATION RATE: 19 BRPM | BODY MASS INDEX: 21.32 KG/M2 | OXYGEN SATURATION: 97 % | WEIGHT: 157.44 LBS | HEART RATE: 63 BPM | HEIGHT: 72 IN

## 2024-02-27 DIAGNOSIS — K63.5 COLON POLYP: ICD-10-CM

## 2024-02-27 PROCEDURE — 71000015 HC POSTOP RECOV 1ST HR: Performed by: COLON & RECTAL SURGERY

## 2024-02-27 PROCEDURE — 27201028 HC NEEDLE, SCLERO: Performed by: COLON & RECTAL SURGERY

## 2024-02-27 PROCEDURE — 88305 TISSUE EXAM BY PATHOLOGIST: CPT | Mod: 26,,, | Performed by: PATHOLOGY

## 2024-02-27 PROCEDURE — 27201042 HC RETRIEVAL NET: Performed by: COLON & RECTAL SURGERY

## 2024-02-27 PROCEDURE — D9220A PRA ANESTHESIA: Mod: CRNA,,, | Performed by: STUDENT IN AN ORGANIZED HEALTH CARE EDUCATION/TRAINING PROGRAM

## 2024-02-27 PROCEDURE — 94761 N-INVAS EAR/PLS OXIMETRY MLT: CPT

## 2024-02-27 PROCEDURE — D9220A PRA ANESTHESIA: Mod: ANES,,, | Performed by: ANESTHESIOLOGY

## 2024-02-27 PROCEDURE — 25000003 PHARM REV CODE 250: Performed by: STUDENT IN AN ORGANIZED HEALTH CARE EDUCATION/TRAINING PROGRAM

## 2024-02-27 PROCEDURE — 36000706: Performed by: COLON & RECTAL SURGERY

## 2024-02-27 PROCEDURE — 37000009 HC ANESTHESIA EA ADD 15 MINS: Performed by: COLON & RECTAL SURGERY

## 2024-02-27 PROCEDURE — 27201037 HC PRESSURE MONITORING SET UP

## 2024-02-27 PROCEDURE — 45385 COLONOSCOPY W/LESION REMOVAL: CPT | Mod: ,,, | Performed by: COLON & RECTAL SURGERY

## 2024-02-27 PROCEDURE — 71000033 HC RECOVERY, INTIAL HOUR: Performed by: COLON & RECTAL SURGERY

## 2024-02-27 PROCEDURE — 27201089 HC SNARE, DISP (ANY): Performed by: COLON & RECTAL SURGERY

## 2024-02-27 PROCEDURE — 36000707: Performed by: COLON & RECTAL SURGERY

## 2024-02-27 PROCEDURE — 88305 TISSUE EXAM BY PATHOLOGIST: CPT | Performed by: PATHOLOGY

## 2024-02-27 PROCEDURE — 37000008 HC ANESTHESIA 1ST 15 MINUTES: Performed by: COLON & RECTAL SURGERY

## 2024-02-27 PROCEDURE — 63600175 PHARM REV CODE 636 W HCPCS: Performed by: STUDENT IN AN ORGANIZED HEALTH CARE EDUCATION/TRAINING PROGRAM

## 2024-02-27 PROCEDURE — 25000003 PHARM REV CODE 250: Performed by: NURSE PRACTITIONER

## 2024-02-27 RX ORDER — LIDOCAINE HYDROCHLORIDE 20 MG/ML
INJECTION, SOLUTION EPIDURAL; INFILTRATION; INTRACAUDAL; PERINEURAL
Status: DISCONTINUED | OUTPATIENT
Start: 2024-02-27 | End: 2024-02-27

## 2024-02-27 RX ORDER — PROPOFOL 10 MG/ML
VIAL (ML) INTRAVENOUS
Status: DISCONTINUED | OUTPATIENT
Start: 2024-02-27 | End: 2024-02-27

## 2024-02-27 RX ORDER — MIDAZOLAM HYDROCHLORIDE 1 MG/ML
INJECTION, SOLUTION INTRAMUSCULAR; INTRAVENOUS
Status: DISCONTINUED | OUTPATIENT
Start: 2024-02-27 | End: 2024-02-27

## 2024-02-27 RX ORDER — KETAMINE HCL IN 0.9 % NACL 50 MG/5 ML
SYRINGE (ML) INTRAVENOUS
Status: DISCONTINUED | OUTPATIENT
Start: 2024-02-27 | End: 2024-02-27

## 2024-02-27 RX ORDER — ONDANSETRON HYDROCHLORIDE 2 MG/ML
INJECTION, SOLUTION INTRAVENOUS
Status: DISCONTINUED | OUTPATIENT
Start: 2024-02-27 | End: 2024-02-27

## 2024-02-27 RX ORDER — FENTANYL CITRATE 50 UG/ML
INJECTION, SOLUTION INTRAMUSCULAR; INTRAVENOUS
Status: DISCONTINUED | OUTPATIENT
Start: 2024-02-27 | End: 2024-02-27

## 2024-02-27 RX ORDER — LIDOCAINE HYDROCHLORIDE 10 MG/ML
1 INJECTION, SOLUTION EPIDURAL; INFILTRATION; INTRACAUDAL; PERINEURAL ONCE
Status: COMPLETED | OUTPATIENT
Start: 2024-02-27 | End: 2024-02-27

## 2024-02-27 RX ORDER — ONDANSETRON HYDROCHLORIDE 2 MG/ML
4 INJECTION, SOLUTION INTRAVENOUS ONCE AS NEEDED
Status: DISCONTINUED | OUTPATIENT
Start: 2024-02-27 | End: 2024-02-27 | Stop reason: HOSPADM

## 2024-02-27 RX ORDER — ROCURONIUM BROMIDE 10 MG/ML
INJECTION, SOLUTION INTRAVENOUS
Status: DISCONTINUED | OUTPATIENT
Start: 2024-02-27 | End: 2024-02-27

## 2024-02-27 RX ORDER — DEXMEDETOMIDINE HYDROCHLORIDE 100 UG/ML
INJECTION, SOLUTION INTRAVENOUS
Status: DISCONTINUED | OUTPATIENT
Start: 2024-02-27 | End: 2024-02-27

## 2024-02-27 RX ORDER — EPHEDRINE SULFATE 50 MG/ML
INJECTION, SOLUTION INTRAVENOUS
Status: DISCONTINUED | OUTPATIENT
Start: 2024-02-27 | End: 2024-02-27

## 2024-02-27 RX ORDER — HYDROMORPHONE HYDROCHLORIDE 1 MG/ML
0.2 INJECTION, SOLUTION INTRAMUSCULAR; INTRAVENOUS; SUBCUTANEOUS EVERY 5 MIN PRN
Status: DISCONTINUED | OUTPATIENT
Start: 2024-02-27 | End: 2024-02-27 | Stop reason: HOSPADM

## 2024-02-27 RX ORDER — MUPIROCIN 20 MG/G
OINTMENT TOPICAL
Status: DISPENSED | OUTPATIENT
Start: 2024-02-27

## 2024-02-27 RX ORDER — DROPERIDOL 2.5 MG/ML
0.62 INJECTION, SOLUTION INTRAMUSCULAR; INTRAVENOUS ONCE AS NEEDED
Status: DISCONTINUED | OUTPATIENT
Start: 2024-02-27 | End: 2024-02-27 | Stop reason: HOSPADM

## 2024-02-27 RX ORDER — EPINEPHRINE 1 MG/ML
INJECTION, SOLUTION, CONCENTRATE INTRAVENOUS
Status: DISCONTINUED | OUTPATIENT
Start: 2024-02-27 | End: 2024-02-27

## 2024-02-27 RX ORDER — SODIUM CHLORIDE 9 MG/ML
INJECTION, SOLUTION INTRAVENOUS CONTINUOUS
Status: ACTIVE | OUTPATIENT
Start: 2024-02-27

## 2024-02-27 RX ADMIN — ROCURONIUM BROMIDE 50 MG: 10 INJECTION INTRAVENOUS at 09:02

## 2024-02-27 RX ADMIN — DEXMEDETOMIDINE 20 MCG: 200 INJECTION, SOLUTION INTRAVENOUS at 09:02

## 2024-02-27 RX ADMIN — LIDOCAINE HYDROCHLORIDE 2 MG: 10 INJECTION, SOLUTION EPIDURAL; INFILTRATION; INTRACAUDAL; PERINEURAL at 07:02

## 2024-02-27 RX ADMIN — Medication 25 MG: at 09:02

## 2024-02-27 RX ADMIN — EPINEPHRINE 5 MCG: 1 INJECTION, SOLUTION, CONCENTRATE INTRAVENOUS at 09:02

## 2024-02-27 RX ADMIN — LIDOCAINE HYDROCHLORIDE 100 MG: 20 INJECTION, SOLUTION EPIDURAL; INFILTRATION; INTRACAUDAL at 09:02

## 2024-02-27 RX ADMIN — EPHEDRINE SULFATE 10 MG: 50 INJECTION INTRAVENOUS at 09:02

## 2024-02-27 RX ADMIN — MIDAZOLAM 1 MG: 1 INJECTION INTRAMUSCULAR; INTRAVENOUS at 08:02

## 2024-02-27 RX ADMIN — PROPOFOL 20 MG: 10 INJECTION, EMULSION INTRAVENOUS at 09:02

## 2024-02-27 RX ADMIN — MIDAZOLAM 1 MG: 1 INJECTION INTRAMUSCULAR; INTRAVENOUS at 09:02

## 2024-02-27 RX ADMIN — EPINEPHRINE 10 MCG: 1 INJECTION, SOLUTION, CONCENTRATE INTRAVENOUS at 09:02

## 2024-02-27 RX ADMIN — MUPIROCIN: 20 OINTMENT TOPICAL at 07:02

## 2024-02-27 RX ADMIN — PROPOFOL 30 MG: 10 INJECTION, EMULSION INTRAVENOUS at 09:02

## 2024-02-27 RX ADMIN — FENTANYL CITRATE 100 MCG: 50 INJECTION INTRAMUSCULAR; INTRAVENOUS at 09:02

## 2024-02-27 RX ADMIN — PROPOFOL 40 MG: 10 INJECTION, EMULSION INTRAVENOUS at 09:02

## 2024-02-27 RX ADMIN — EPINEPHRINE 0.02 MCG/KG/MIN: 1 INJECTION INTRAMUSCULAR; INTRAVENOUS; SUBCUTANEOUS at 09:02

## 2024-02-27 RX ADMIN — SODIUM CHLORIDE: 9 INJECTION, SOLUTION INTRAVENOUS at 07:02

## 2024-02-27 RX ADMIN — ONDANSETRON 4 MG: 2 INJECTION INTRAMUSCULAR; INTRAVENOUS at 09:02

## 2024-02-27 RX ADMIN — SUGAMMADEX 200 MG: 100 INJECTION, SOLUTION INTRAVENOUS at 09:02

## 2024-02-27 RX ADMIN — GLYCOPYRROLATE 0.2 MG: 0.2 INJECTION INTRAMUSCULAR; INTRAVENOUS at 09:02

## 2024-02-27 NOTE — CARE UPDATE
Discharge instructions physically and verbally given to patient and patient's family. Family confirmed MD spoke to them postoperatively regarding procedural findings, POC, and discharge instructions. VSS. All patient belongings with patient. Patient transported off of unit with family member via wheelchair. Care relinquished.

## 2024-02-27 NOTE — OP NOTE
ROMIE READ  [id number]  [id number]    OPERATIVE NOTE:    DATE OF OPERATION: 02/27/2024    PREOPERATIVE DIAGNOSIS: Sigmoid colon polyp    POSTOPERATIVE DIAGNOSIS: Sigmoid colon polyp    PROCEDURE PERFORMED: Sigmoidoscopy with snare polypectomy and submucosal injection of SPOT for tumor localization.    ATTENDING SURGEON: Mayur Dunn MD    RESIDENT/FELLOW SURGEON: Romie Rocha MD    ANESTHESIA: GETA    ESTIMATED BLOOD LOSS: <10 mL    FINDINGS:  1. 4cm polyp in the mid sigmoid colon - semi-pedunculated.  Completely removed - piecemeal.     SPECIMENS:   Sigmoid colon polyp - head of polyp (and fragments). 2. Sigmoid colon polyp - stalk    COMPLICATIONS:None    DISPOSITION: PACU    CONDITION: good    INDICATION FOR PROCEDURE:  Romie Read is a 48 y.o. male with chronic diastolic heart failure, who was found to have a large sigmoid colon polyp on colonoscopy done for a lower GI bleed on 5 Jan 2024.     DESCRIPTION OF PROCEDURE:   After obtaining informed consent the patient was taken the operating room and placed in the supine position.  He underwent general endotracheal anesthesia and then was placed left lateral.  He was padded and secured appropriately.  A preoperative time-out was performed.  A colonoscope was inserted via the anus to the descending colon and then withdrawn slowly.  In the mid sigmoid colon there was a very than 4 cm polyp with a pedicle created by chronic traction.  Using a cautery snare the head of the polyp was transected and retrieved this then allowed for us to get a snare on the stalk which was then transected using a hot snare.  The specimen was also retrieved using a Dahl net.  An image of the base was saved and placed in the media file.  There was excellent hemostasis.  Spot was injected submucosally in 3 quadrants 5 cm distal to the lesion.  The patient was taken to the recovery room in stable condition.    This procedure was not performed to treat cancer       Mayur CALLEJAS  MD Shaun  Colon & Rectal Surgery  [facility]  [address] [place] [address]

## 2024-02-27 NOTE — H&P
CRS Preop H&P    SUBJECTIVE:     Chief Complaint: Colonic mass    History of Present Illness:  Patient is a 48 y.o. male hx of MRSA endocarditis s/p Mitral valve repair with porcine valve 11/3/23, afib w/ RVR, presents with colonic mass found on colonoscopy completed for GI bleed. MR. Nino was recently admitted for dyspnea in December and found to have tear of porcine mitral valve resulting in severe mitral regurgitation. CTS consulted and plan for outpatient follow up. During that hospitalization he developed a fib with RVR and was treated with heparin drip and transitioned to eliquis. He developed GI bleeding and underwent EGD and colonsocopy. He was found to have a large nonobstructing sigmoid mass. Path consistent with villous adenoma without dysplasia.     Mass found incidentally during this case. No change in bowel habits, abdominal pain, weight loss, blood in stool prior to this episode.  Family hx in maternal great grandmother of colon cancer.     2/27/24: Seeing cardiology regularly. No further optimization recommended. Ready for his procedure    Review of patient's allergies indicates:  No Known Allergies    Past Medical History:   Diagnosis Date    Hypertension      Past Surgical History:   Procedure Laterality Date    COLONOSCOPY N/A 1/5/2024    Procedure: COLONOSCOPY;  Surgeon: Fadia Ellsworth MD;  Location: [facility] ([address] [place] [address]);  Service: Endoscopy;  Laterality: N/A;    ECHOCARDIOGRAM,TRANSESOPHAGEAL N/A 12/26/2023    Procedure: Transesophageal echo (NEW) intra-procedure log documentation;  Surgeon: Provider, [facility];  Location: [facility] EP LAB;  Service: Cardiology;  Laterality: N/A;    ESOPHAGOGASTRODUODENOSCOPY N/A 1/5/2024    Procedure: EGD (ESOPHAGOGASTRODUODENOSCOPY);  Surgeon: Fadia Ellsworth MD;  Location: [facility] TITO (2ND FLR);  Service: Endoscopy;  Laterality: N/A;    EXCLUSION, LEFT ATRIAL APPENDAGE, OPEN, AS PART OF OPEN CHEST SURGERY Left 11/3/2023    Procedure: EXCLUSION, LEFT  ATRIAL APPENDAGE, OPEN, AS PART OF OPEN CHEST SURGERY;  Surgeon: Manuel Beal MD;  Location: [facility] OR [address] [place] [address];  Service: Cardiothoracic;  Laterality: Left;    INSERTION OF INTRA-AORTIC BALLOON ASSIST DEVICE Right 11/2/2023    Procedure: INSERTION, INTRA-AORTIC BALLOON PUMP;  Surgeon: Jose Vidal MD;  Location: [facility] CATH LAB;  Service: Cardiology;  Laterality: Right;    REPAIR, MITRAL VALVE, OPEN N/A 11/3/2023    Procedure: REPAIR, MITRAL VALVE, OPEN;  Surgeon: Manuel Beal MD;  Location: [facility] OR [address] [place] [address];  Service: Cardiothoracic;  Laterality: N/A;     Family History   Problem Relation Age of Onset    Amblyopia Neg Hx     Blindness Neg Hx     Cataracts Neg Hx     Glaucoma Neg Hx     Macular degeneration Neg Hx     Retinal detachment Neg Hx     Strabismus Neg Hx      Social History     Tobacco Use    Smoking status: Unknown     Passive exposure: Never   Substance Use Topics    Alcohol use: Yes     Alcohol/week: 1.0 standard drink of alcohol     Types: 1 Cans of beer per week    Drug use: Never        Review of Systems:  Constitutional: no fever or chills  Respiratory: no cough or shortness of breath  Cardiovascular: Dyspnea, no pain  Gastrointestinal: no nausea or vomiting, no abdominal pain, no further blood in stool  Musculoskeletal: no arthralgias or myalgias  Neurological: no seizures or tremors    OBJECTIVE:     Vital Signs (Most Recent)  Temp: 98.1 °F (36.7 °C) (02/27/24 0754)  Pulse: 69 (02/27/24 0754)  Resp: 18 (02/27/24 0754)  BP: 120/79 (02/27/24 0754)  SpO2: 100 % (02/27/24 0754)    Physical Exam:  General: White male in NAD sitting in chair in clinic  Neuro: aaox4 maex4 perrl  Respiratory: resps even unlabored  Cardiac: cap refill <2 sec  Abdomen: soft, nondistended, nontender  Extremities: Warm dry and intact  Anorectal: flex sig completed see provation report for imaging    ASSESSMENT/PLAN:     Diagnoses and all orders for this visit:    Gastrointestinal hemorrhage, unspecified  gastrointestinal hemorrhage type  -     Ambulatory referral/consult to Gastroenterology    Colonic mass  -     Ambulatory referral/consult to Colorectal Surgery     49yo male hx of MRSA endocarditis s/p Mitral valve repair with porcine valve 11/3/23, afib w/ RVR, presents with colonic mass found on colonoscopy completed for GI bleed. Path consistent with villous adenoma without dysplasia. Pictures from colonscopy and flex sig appear it may be amenable to ESD/EMR. Look of polyp appears this may just be a large villous adenoma, no dimpling or inflammation to suggest cancer. This would be best if possible given his mitral valve issues.     No further optimization per cardiology. Risks/benefits of ESD/EMR discussed in depth today, all questions answered, informed consent obtained. Forms signed. Plan to proceed to the OR today as scheduled for ESD/EMR.    Lorenzo Rocha MD  Colon & Rectal Surgery Fellow

## 2024-02-27 NOTE — NURSING TRANSFER
Nursing Transfer Note      2/27/2024   11:35 AM    Reason patient is being transferred: post-procedure    Transfer To: [facility]    Transfer via stretcher    Transfer with RN    Transported by RN    Order for Tele Monitor? Yes    Medicines sent: none    Any special needs or follow-up needed: routine    Patient belongings transferred with patient: No    Chart send with patient: Yes    Notified: spouse

## 2024-02-27 NOTE — BRIEF OP NOTE
[facility] - Surgery ([facility])  Brief Operative Note    Surgery Date: 2/27/2024     Surgeon(s) and Role:     * Mayur Dunn MD - Primary     * Lorenzo Rocha MD - Fellow    Assisting Surgeon: None    Pre-op Diagnosis:  Mass of colon [K63.89]    Post-op Diagnosis:  Post-Op Diagnosis Codes:     * Mass of colon [K63.89]    Procedure(s) (LRB):  COLONOSCOPY, WITH POLYPECTOMY USING SNARE (N/A)  COLONOSCOPY, WITH DIRECTED SUBMUCOSAL INJECTION    Anesthesia: Monitor Anesthesia Care    Operative Findings: see op note for full details. Large distal sigmoid polyp on a stalk. Resected in two pieces with 27mm hot snare. Tattoo placed 5cm distal in 3 quadrants      Estimated Blood Loss: * No values recorded between 2/27/2024  9:18 AM and 2/27/2024  9:51 AM *         Specimens:   Specimen (24h ago, onward)       Start     Ordered    02/27/24 0952  Specimen to Pathology, Surgery Other  Once        Comments: Pre-op Diagnosis: Mass of colon [K63.89]Procedure(s):RESECTION, MUCOSAL LESION, GI TRACT, ENDOSCOPIC Number of specimens: 2Name of specimens: 1. Sigmoid- head of polyp- permanent2. Sigmoid- stalk of polyp- permanent     References:    Click here for ordering Quick Tip   Question Answer Comment   Procedure Type: Other    Specimen Class: Known or suspected malignancy    Which provider would you like to cc? MAYUR DUNN    Release to patient Immediate        02/27/24 0951                      Discharge Note    OUTCOME: Patient tolerated treatment/procedure well without complication and is now ready for discharge.    DISPOSITION: Home or Self Care    FINAL DIAGNOSIS:  Colon polyp    FOLLOWUP: In clinic    DISCHARGE INSTRUCTIONS:    Discharge Procedure Orders   Diet Adult Regular     Notify your health care provider if you experience any of the following:  temperature >100.4     Notify your health care provider if you experience any of the following:  persistent nausea and vomiting or diarrhea     Notify your health  care provider if you experience any of the following:  severe uncontrolled pain     Activity as tolerated

## 2024-02-27 NOTE — TRANSFER OF CARE
Anesthesia Transfer of Care Note    Patient: Lorenzo Nino    Procedure(s) Performed: Procedure(s) (LRB):  COLONOSCOPY, WITH POLYPECTOMY USING SNARE (N/A)  COLONOSCOPY, WITH DIRECTED SUBMUCOSAL INJECTION    Patient location: PACU    Anesthesia Type: general    Transport from OR: Transported from OR on 6-10 L/min O2 by face mask with adequate spontaneous ventilation    Post pain: adequate analgesia    Post assessment: no apparent anesthetic complications and tolerated procedure well    Post vital signs: stable    Level of consciousness: awake, alert and oriented    Nausea/Vomiting: no nausea/vomiting    Complications: none    Transfer of care protocol was followedComments: Bedside report to PACU RN, opportunity for questions given.       Last vitals: Visit Vitals  /60 (BP Location: Right arm, Patient Position: Lying)   Pulse 71   Temp 36.5 °C (97.7 °F) (Temporal)   Resp 16   Ht 6' (1.829 m)   Wt 71.4 kg (157 lb 6.5 oz)   SpO2 95%   BMI 21.35 kg/m²

## 2024-02-27 NOTE — ANESTHESIA PREPROCEDURE EVALUATION
02/27/2024  Lorenzo Nino is a 48 y.o., male.      Pre-op Assessment          Review of Systems  Anesthesia Hx:  No problems with previous Anesthesia                Cardiovascular:  Exercise tolerance: poor    Denies Hypertension. Valvular problems/Murmurs, MR   Denies CAD.       CHF     HALL             S/p MRSA endocarditis, led to mitral valve replacement/repair(?) no MOD TR and severe MR on last echo I can see. Gets some HALL.                                 Pulmonary:     Denies Asthma.     Denies Sleep Apnea.                Renal/:   Denies Chronic Renal Disease.                Hepatic/GI:   Denies PUD.   Denies GERD. Denies Liver Disease.            Neurological:    Denies CVA.    Denies Seizures.                                Endocrine:  Denies Diabetes. Denies Hypothyroidism.              Physical Exam  General: Alert    Airway:  Mallampati: I   Mouth Opening: Normal  TM Distance: Normal  Tongue: Normal  Neck ROM: Normal ROM    Dental:  Caps / Implants        Anesthesia Plan  Type of Anesthesia, risks & benefits discussed:    Anesthesia Type: Gen ETT  Intra-op Monitoring Plan: Standard ASA Monitors  Post Op Pain Control Plan: multimodal analgesia and IV/PO Opioids PRN  Induction:  IV  Airway Plan: Direct  Informed Consent: Informed consent signed with the Patient and all parties understand the risks and agree with anesthesia plan.  All questions answered.   ASA Score: 4  Anesthesia Plan Notes:       Consider a line, consider pressors when see how does with induction anethesia          Ready For Surgery From Anesthesia Perspective.     .

## 2024-02-27 NOTE — PROGRESS NOTES
Syed CHAVEZ was at bedside, stated he was okay with pt's BP. Pt asymptomatic. WCTM BP until pt is closer to baseline. No new orders.

## 2024-02-27 NOTE — ANESTHESIA PROCEDURE NOTES
Intubation    Date/Time: 2/27/2024 9:10 AM    Performed by: Marilee Sahu CRNA  Authorized by: Melvin Lynch MD    Intubation:     Induction:  Intravenous    Intubated:  Postinduction    Mask Ventilation:  Easy with oral airway    Attempts:  1    Attempted By:  CRNA    Method of Intubation:  Video laryngoscopy    Blade:  Lieberman 3    Laryngeal View Grade: Grade I - full view of cords      Difficult Airway Encountered?: No      Complications:  None    Airway Device:  Oral endotracheal tube    Airway Device Size:  7.5    Tube secured:  23    Secured at:  The lips    Placement Verified By:  Capnometry    Complicating Factors:  None    Findings Post-Intubation:  BS equal bilateral and atraumatic/condition of teeth unchanged

## 2024-02-28 ENCOUNTER — PATIENT MESSAGE (OUTPATIENT)
Dept: CARDIOLOGY | Facility: CLINIC | Age: 49
End: 2024-02-28
Payer: COMMERCIAL

## 2024-02-28 ENCOUNTER — TELEPHONE (OUTPATIENT)
Dept: CARDIOLOGY | Facility: CLINIC | Age: 49
End: 2024-02-28
Payer: COMMERCIAL

## 2024-02-28 NOTE — TELEPHONE ENCOUNTER
[facility] RN contact pt to review labs as follows:    Pt Cr is stable at 1.7, electrolytes WNL and BNP unchanged at 487. Pt verbalzied understanding of plan of care. [facility] will contact pt with lab results next week.

## 2024-02-28 NOTE — ANESTHESIA POSTPROCEDURE EVALUATION
Anesthesia Post Evaluation    Patient: Lorenzo Nino    Procedure(s) Performed: Procedure(s) (LRB):  COLONOSCOPY, WITH POLYPECTOMY USING SNARE (N/A)  COLONOSCOPY, WITH DIRECTED SUBMUCOSAL INJECTION    Final Anesthesia Type: general      Patient location during evaluation: PACU  Patient participation: Yes- Able to Participate  Level of consciousness: awake  Post-procedure vital signs: reviewed and stable  Pain management: adequate  Airway patency: patent    PONV status at discharge: No PONV  Anesthetic complications: no      Cardiovascular status: blood pressure returned to baseline  Respiratory status: unassisted  Hydration status: euvolemic  Follow-up not needed.              Vitals Value Taken Time   BP 99/68 02/27/24 1200   Temp 36.6 °C (97.8 °F) 02/27/24 1200   Pulse 63 02/27/24 1200   Resp 19 02/27/24 1200   SpO2 97 % 02/27/24 1200         Event Time   Out of Recovery 11:34:00         Pain/Gladis Score: Gladis Score: 9 (2/27/2024 10:30 AM)

## 2024-02-29 ENCOUNTER — LAB VISIT (OUTPATIENT)
Dept: LAB | Facility: HOSPITAL | Age: 49
End: 2024-02-29
Attending: INTERNAL MEDICINE
Payer: COMMERCIAL

## 2024-02-29 DIAGNOSIS — I50.30 HEART FAILURE, DIASTOLIC: Primary | ICD-10-CM

## 2024-02-29 LAB
ANION GAP SERPL CALC-SCNC: 10 MMOL/L (ref 8–16)
BASOPHILS # BLD AUTO: 0.08 K/UL (ref 0–0.2)
BASOPHILS NFR BLD: 0.8 % (ref 0–1.9)
BUN SERPL-MCNC: 24 MG/DL (ref 6–20)
CALCIUM SERPL-MCNC: 8.8 MG/DL (ref 8.7–10.5)
CHLORIDE SERPL-SCNC: 104 MMOL/L (ref 95–110)
CO2 SERPL-SCNC: 28 MMOL/L (ref 23–29)
CREAT SERPL-MCNC: 2 MG/DL (ref 0.5–1.4)
DIFFERENTIAL METHOD BLD: ABNORMAL
EOSINOPHIL # BLD AUTO: 0.2 K/UL (ref 0–0.5)
EOSINOPHIL NFR BLD: 2 % (ref 0–8)
ERYTHROCYTE [DISTWIDTH] IN BLOOD BY AUTOMATED COUNT: 18.5 % (ref 11.5–14.5)
EST. GFR  (NO RACE VARIABLE): 40 ML/MIN/1.73 M^2
GLUCOSE SERPL-MCNC: 85 MG/DL (ref 70–110)
HCT VFR BLD AUTO: 33.7 % (ref 40–54)
HGB BLD-MCNC: 10.1 G/DL (ref 14–18)
IMM GRANULOCYTES # BLD AUTO: 0.05 K/UL (ref 0–0.04)
IMM GRANULOCYTES NFR BLD AUTO: 0.5 % (ref 0–0.5)
LYMPHOCYTES # BLD AUTO: 3.4 K/UL (ref 1–4.8)
LYMPHOCYTES NFR BLD: 31.8 % (ref 18–48)
MCH RBC QN AUTO: 25.4 PG (ref 27–31)
MCHC RBC AUTO-ENTMCNC: 30 G/DL (ref 32–36)
MCV RBC AUTO: 85 FL (ref 82–98)
MONOCYTES # BLD AUTO: 1.4 K/UL (ref 0.3–1)
MONOCYTES NFR BLD: 12.8 % (ref 4–15)
NEUTROPHILS # BLD AUTO: 5.5 K/UL (ref 1.8–7.7)
NEUTROPHILS NFR BLD: 52.1 % (ref 38–73)
NRBC BLD-RTO: 0 /100 WBC
PLATELET # BLD AUTO: 258 K/UL (ref 150–450)
PMV BLD AUTO: 12.3 FL (ref 9.2–12.9)
POTASSIUM SERPL-SCNC: 4 MMOL/L (ref 3.5–5.1)
RBC # BLD AUTO: 3.97 M/UL (ref 4.6–6.2)
SODIUM SERPL-SCNC: 142 MMOL/L (ref 136–145)
WBC # BLD AUTO: 10.59 K/UL (ref 3.9–12.7)

## 2024-02-29 PROCEDURE — 85025 COMPLETE CBC W/AUTO DIFF WBC: CPT | Performed by: INTERNAL MEDICINE

## 2024-02-29 PROCEDURE — 36415 COLL VENOUS BLD VENIPUNCTURE: CPT | Performed by: INTERNAL MEDICINE

## 2024-02-29 PROCEDURE — 80048 BASIC METABOLIC PNL TOTAL CA: CPT | Performed by: INTERNAL MEDICINE

## 2024-03-04 ENCOUNTER — PATIENT MESSAGE (OUTPATIENT)
Dept: CARDIOLOGY | Facility: CLINIC | Age: 49
End: 2024-03-04
Payer: COMMERCIAL

## 2024-03-04 LAB
FINAL PATHOLOGIC DIAGNOSIS: NORMAL
GROSS: NORMAL
Lab: NORMAL

## 2024-03-12 ENCOUNTER — PATIENT MESSAGE (OUTPATIENT)
Dept: CARDIOLOGY | Facility: CLINIC | Age: 49
End: 2024-03-12
Payer: COMMERCIAL

## 2024-03-12 ENCOUNTER — DOCUMENTATION ONLY (OUTPATIENT)
Dept: CARDIOLOGY | Facility: CLINIC | Age: 49
End: 2024-03-12
Payer: COMMERCIAL

## 2024-03-19 ENCOUNTER — DOCUMENT SCAN (OUTPATIENT)
Dept: HOME HEALTH SERVICES | Facility: HOSPITAL | Age: 49
End: 2024-03-19
Payer: COMMERCIAL

## 2024-03-20 ENCOUNTER — PATIENT MESSAGE (OUTPATIENT)
Dept: CARDIOLOGY | Facility: CLINIC | Age: 49
End: 2024-03-20
Payer: COMMERCIAL

## 2024-03-26 ENCOUNTER — PATIENT MESSAGE (OUTPATIENT)
Dept: CARDIOLOGY | Facility: CLINIC | Age: 49
End: 2024-03-26
Payer: COMMERCIAL

## 2024-04-01 ENCOUNTER — OFFICE VISIT (OUTPATIENT)
Dept: CARDIOLOGY | Facility: CLINIC | Age: 49
End: 2024-04-01
Payer: COMMERCIAL

## 2024-04-01 VITALS
HEART RATE: 64 BPM | DIASTOLIC BLOOD PRESSURE: 70 MMHG | WEIGHT: 164.25 LBS | SYSTOLIC BLOOD PRESSURE: 109 MMHG | BODY MASS INDEX: 22.25 KG/M2 | HEIGHT: 72 IN

## 2024-04-01 DIAGNOSIS — I38 ENDOCARDITIS, UNSPECIFIED CHRONICITY, UNSPECIFIED ENDOCARDITIS TYPE: Chronic | ICD-10-CM

## 2024-04-01 DIAGNOSIS — I38 CHRONIC DIASTOLIC HEART FAILURE DUE TO VALVULAR DISEASE: Primary | ICD-10-CM

## 2024-04-01 DIAGNOSIS — I50.32 CHRONIC DIASTOLIC HEART FAILURE DUE TO VALVULAR DISEASE: Primary | ICD-10-CM

## 2024-04-01 DIAGNOSIS — I50.41 ACUTE COMBINED SYSTOLIC AND DIASTOLIC HEART FAILURE: ICD-10-CM

## 2024-04-01 DIAGNOSIS — R78.81 MRSA BACTEREMIA: ICD-10-CM

## 2024-04-01 DIAGNOSIS — D50.9 IRON DEFICIENCY ANEMIA, UNSPECIFIED IRON DEFICIENCY ANEMIA TYPE: ICD-10-CM

## 2024-04-01 DIAGNOSIS — K92.2 GASTROINTESTINAL HEMORRHAGE, UNSPECIFIED GASTROINTESTINAL HEMORRHAGE TYPE: ICD-10-CM

## 2024-04-01 DIAGNOSIS — B95.62 MRSA BACTEREMIA: ICD-10-CM

## 2024-04-01 DIAGNOSIS — Z98.890 S/P MVR (MITRAL VALVE REPAIR): ICD-10-CM

## 2024-04-01 DIAGNOSIS — I48.0 PAROXYSMAL ATRIAL FIBRILLATION: ICD-10-CM

## 2024-04-01 DIAGNOSIS — I34.0 SEVERE MITRAL REGURGITATION: ICD-10-CM

## 2024-04-01 PROBLEM — N17.9 AKI (ACUTE KIDNEY INJURY): Status: RESOLVED | Noted: 2023-10-30 | Resolved: 2024-04-01

## 2024-04-01 PROCEDURE — 99999 PR PBB SHADOW E&M-EST. PATIENT-LVL III: CPT | Mod: PBBFAC,,, | Performed by: INTERNAL MEDICINE

## 2024-04-01 PROCEDURE — 99214 OFFICE O/P EST MOD 30 MIN: CPT | Mod: S$GLB,,, | Performed by: INTERNAL MEDICINE

## 2024-04-01 RX ORDER — AMIODARONE HYDROCHLORIDE 200 MG/1
200 TABLET ORAL DAILY
Qty: 90 TABLET | Refills: 3 | Status: SHIPPED | OUTPATIENT
Start: 2024-04-01 | End: 2025-04-01

## 2024-04-01 NOTE — PROGRESS NOTES
Subjective:   Chief Complaint: Severe MR Congestive Heart Failure and Endocarditis  Last Clinic Visit:1/12/2024    History of Present Illness: Lorenzo Nino is a 48 y.o.  gentleman with history of bacterial endocarditis and severe mitral regurgitation s/p repair 11/03/2023, with subsequent dehiscence of ring and readmission who presents for follow up.    Interval History:  He successfully had teeth replaced, and has been steadily gaining weight over the course of the past 3 months, denies any orthopnea, no lower extremity edema, no worsening dyspnea on exertion.  Currently has dyspnea on exertion at approximately 2 blocks, and 2 flights of stairs.  Avoiding salt, eating some chicken, fish, occasional vegetables, but could improve nutritional content slightly.  Denies any palpitations, no syncope, no presyncope, no lightheadedness.  Blood pressure continues to run low normal.  Continuing to take amiodarone 400 mg daily, and suppressive antibiotics for which he has to avoid sun, currently on 10 mg daily torsemide.  Recent lab work with creatinine 1.7, BNP still elevated but trending down.  Denies any recurrent GI bleeding.  Successfully had resection of tubular adenoma, without event.    1/12/2024  Shortly after we saw him last month he developed recurrent shortness of breath, weight gain, and was readmitted for congestive heart failure and hypoxic respiratory failure.  Workup subsequently revealed dehiscence of annular ring.  He was still on antibiotics at time of initial presentation continued during hospital course, no fever or recurrent cultures were present.  Evaluated by CT surgery and felt that he would benefit from better preoperative rehabilitation prior to reconsideration of additional surgery, plan  discharge with suppressive antibiotics and GDMT until adequate nutritional status could be obtained.  He had during this hospitalization acute kidney injury from titration of GDMT and diuretics, also had GI  bleeding, and workup ultimately remarkable for large mass in colon.  Pathology ultimately revealed tubular adenoma.  He also had recurrent episodes of atrial fibrillation during this hospitalization now sinus on amiodarone.  Blood pressures at home running in the 100-120 range, denies any hypoxia, no lower extremity edema, no orthopnea.  Weight since discharge 134, 135, 135, 136.      12/15/2023  He did not have any known medical issues prior to presenting with shortness of breath, fever, and fatigue for several days on 10/31/2023.  He was subsequently found to have severe mitral regurgitation with bacterial endocarditis, initially thought to be involvement of aortic valve however was confirmed not to be involved on subsequent NEW.  Hospital course was complicated by acute hypoxic respiratory failure requiring intubation, along with thrombocytopenia, ultimately had successful repair on 11/03/2023.  Postoperatively sternum well healed, and will be cleared for cardiac rehab around the end of December. He was started on nifedipine metoprolol during hospitalization and has continued, checking his blood pressure on a daily basis and reports systolics in the 90s on a regular basis with some lightheadedness.  No fevers at home, no shortness of breath, no orthopnea, no dyspnea on exertion.  He does have some fatigue which is still present postoperatively.  Echocardiogram post repair did show that EF was still mildly depressed.   Still on IV antibiotics scheduled a run-through the 26th.  He does have a dry cough and has had this for the past several days.    Dx:  Bacterial endocarditis-severe mitral regurgitation  -S/p 11/3/23 Mitral valve repair with triangular resection of P2 and placement of a freehand-created bovine pericardial annuloplasty band using a 40 mm [employer] Simuplus sizer as a template  -S/p Resection of left atrial appendage   MRSA endocarditis  Large Tubular Adenoma with GI bleeding  Atrial  fibrillation  Malnutrition from acute illness  Suppressive antibiotics    Medications:  Outpatient Encounter Medications as of 4/1/2024   Medication Sig Dispense Refill    dapagliflozin propanediol (FARXIGA) 10 mg tablet Take 1 tablet (10 mg total) by mouth once daily. 30 tablet 11    doxycycline (VIBRAMYCIN) 100 MG Cap Take 1 capsule (100 mg total) by mouth 2 (two) times daily. 60 capsule 2    metoprolol succinate (TOPROL-XL) 25 MG 24 hr tablet Take 1 tablet (25 mg total) by mouth once daily. 30 tablet 11    mirtazapine (REMERON SOL-TAB) 15 MG disintegrating tablet Dissolve 1 tablet (15 mg total) by mouth nightly. 30 tablet 11    potassium chloride SA (K-DUR,KLOR-CON) 20 MEQ tablet Take 2 tablets (40 mEq total) by mouth once daily. 240 tablet 0    [DISCONTINUED] amiodarone (PACERONE) 200 MG Tab Take 2 tablets (400 mg total) by mouth once daily. 60 tablet 11    amiodarone (PACERONE) 200 MG Tab Take 1 tablet (200 mg total) by mouth once daily. 90 tablet 3    torsemide (DEMADEX) 10 MG Tab Take 1 tablet (10 mg total) by mouth once daily. 30 tablet 11     Facility-Administered Encounter Medications as of 4/1/2024   Medication Dose Route Frequency Provider Last Rate Last Admin    0.9%  NaCl infusion   Intravenous Continuous Adriana Black NP 70 mL/hr at 02/27/24 0755 New Bag at 02/27/24 0755    mupirocin 2 % ointment   Nasal On Call Procedure Adriana Black NP   Given at 02/27/24 0756     Social History:  Lorenzo reports current alcohol use of about 1.0 standard drink of alcohol per week. He reports that he does not use drugs.  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    Objective:   /70   Pulse 64   Ht 6' (1.829 m)   Wt 74.5 kg (164 lb 3.9 oz)   BMI 22.28 kg/m²     Physical Exam   Constitutional: He does not appear ill. No distress.   HENT:   Head: Normocephalic and atraumatic.   Mouth/Throat: Mucous membranes are moist.   Cardiovascular: Normal rate, regular rhythm and normal pulses. Exam  reveals no gallop and no friction rub.   Murmur heard.  Pulmonary/Chest: Effort normal and breath sounds normal. No stridor. No respiratory distress. He has no wheezes. He has no rhonchi. He has no rales. He exhibits no tenderness.   Abdominal: Normal appearance.   Musculoskeletal:      Right lower leg: No edema.      Left lower leg: No edema.   Neurological: He is alert.   Skin: Skin is warm.      EKG:  My independent visualization of most recent EKG is Normal sinus rhythm, diffuse T-wave inversions    NEW:  12/26/2023    NEW for evaluation of mitral valve.    Left Ventricle: The left ventricle is normal in size. Normal wall thickness. Normal wall motion. There is low normal systolic function with a visually estimated ejection fraction of 50 - 55%.    Right Ventricle: Normal right ventricular cavity size. Wall thickness is normal. Right ventricle wall motion  is normal. Systolic function is normal.    Aortic Valve: There is no mass/vegetation present.    Tricuspid Valve: There is no mass/vegetation present.    Severe mitral reguritaiton    Mitral Valve: Status post bovine pericardial annuloplasty band. Dehiscense of band from 10 o'clock to 4 o'clock. Two regurgitant jets noted. First jet due to failure of coaptation at A2-P2 secondary to anuloplasty band dehiscence. Second jet from a perforation on the medial aspect of P2, possibly at the site of prior triangular resection.    TTE:  01/30/2024     Left Ventricle: The left ventricle is normal in size. Normal wall thickness. Regional wall motion abnormalities present. There is normal systolic function with a visually estimated ejection fraction of 60 - 65%. Diastolic function cannot be reliably determined in the presence of mitral valve disease.    Right Ventricle: Normal right ventricular cavity size. Wall thickness is normal. Right ventricle wall motion  is normal. Systolic function is normal.    Left Atrium: Left atrium is severely dilated.    Mitral Valve: The  mitral valve is repaired with a bovine pericardial annuloplasty band. There is flail motion of a small part of valvular tissue, perhaps from the posterior leaflet, that leads to severe eccentric MR directed somewhat laterally.    Tricuspid Valve: There is mild to moderate regurgitation.    IVC/SVC: Normal venous pressure at 3 mmHg.     12/19/2023    Left Ventricle: The left ventricle is normal in size. Normal wall thickness. Regional wall motion abnormalities present. There is low normal systolic function with a visually estimated ejection fraction of 50 - 55%. There is normal diastolic function.    Right Ventricle: Normal right ventricular cavity size. Wall thickness is normal. Right ventricle wall motion  is normal. Systolic function is normal.    Th left atrium is severely dilated.    Mitral Valve: The mitral valve is repaired with a bovine pericardial annuloplasty band. There is flail motion of a small part of valvular tissue, perhaps from the posterior leaflet, that leads to eccentric MR directed somewhat laterally. There is severe regurgitation.    Tricuspid Valve: There is mild regurgitation.    Pulmonary Artery: The estimated pulmonary artery systolic pressure is 51 mmHg.    IVC/SVC: Elevated venous pressure at 15 mmHg.     11/14/23    Left Ventricle: The left ventricle is normal in size. Ventricular mass is normal. Normal wall thickness. There is concentric remodeling. Normal wall motion. See diagram for wall motion findings. There is mildly reduced systolic function with a visually estimated ejection fraction of 40 - 45%. Diastolic function cannot be reliably determined in the presence of mitral valve ring/replacement.    Right Ventricle: Normal right ventricular cavity size. Wall thickness is normal. Right ventricle wall motion  is normal. Systolic function is normal.    Left Atrium: Left atrium is severely dilated.    Mitral Valve: The mitral valve is repaired by bovine pericardial annuloplasty band. The  mean pressure gradient across the mitral valve is 2 mmHg at a heart rate of 70 bpm. There is no significant regurgitation.    Pulmonary Artery: The estimated pulmonary artery systolic pressure is 24 mmHg.    IVC/SVC: Normal venous pressure at 3 mmHg.    Pericardium: There is a moderate posterior effusion with maximal diameter 1.6cm.     11/02/2023     Left Ventricle: The left ventricle is normal in size. Ventricular mass is normal. Normal wall thickness. Global hypokinesis present. There is mildly reduced systolic function with a visually estimated ejection fraction of 40 - 45%.    Right Ventricle: Normal right ventricular cavity size. Wall thickness is normal. Right ventricle wall motion  is normal. Systolic function is normal.    Left Atrium: Left atrium is severely dilated.    Right Atrium: Right atrium is dilated.    Aortic Valve: RCC is thickended with mild prolapse.  Cannot exclude RCC vegetation. The remaining leaflets have normal mobility. There is trace aortic regurgitation.    Mitral Valve: Both leaflets are thickened.Cannot exclude anterior leaflet vegetation coating this leaflet. There is prolapse of the posterior mitral leaflet. Flail posterior leaflet. There is a large mobile echogenic mass present on the posterior leaflet consistent with vegetation. The mean pressure gradient across the mitral valve is 6 mmHg at a heart rate of 124 bpm. There is severe regurgitation with an anteromedial eccentrically directed jet.    Tricuspid Valve: There is mild regurgitation.    Pulmonary Artery: The estimated pulmonary artery systolic pressure is 31 mmHg.    IVC/SVC: Normal venous pressure at 3 mmHg.     Lipids:       Renal:  Recent Labs   Lab 03/11/24  1221   Creatinine 1.7 H   Potassium 4.2   CO2 26   BUN 24 H       Liver:  Recent Labs   Lab 02/26/24  1539   AST 36   ALT 34       Assessment:     1. Chronic diastolic heart failure due to valvular disease    2. S/P MVR (mitral valve repair)    3. HFmrEF    4.  Endocarditis, unspecified chronicity, unspecified endocarditis type    5. Severe mitral regurgitation    6. 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    7. Paroxysmal atrial fibrillation    8. Iron deficiency anemia, unspecified iron deficiency anemia type    9. Gastrointestinal hemorrhage, unspecified gastrointestinal hemorrhage type      Plan:   1. Chronic diastolic heart failure due to valvular disease  Will continue metoprolol 25 mg, Farxiga 10 mg, torsemide at current dose, appears to be relatively euvolemic with gradual weight gain over the course the past 3 months secondary to improve nutritional status.  Have discussed adding other GDMT in the past, with low blood pressure, preserved EF, and no heart failure symptoms at this time holding off.  Watching diuretic dosing closely as some suspicion of prerenal azotemia and intravascular volume depletion in the past.  - Echo; Future  - Basic metabolic panel; Future  - BNP; Future  - amiodarone (PACERONE) 200 MG Tab; Take 1 tablet (200 mg total) by mouth once daily.  Dispense: 90 tablet; Refill: 3    2. S/P MVR (mitral valve repair)  Will repeat echocardiogram confirm that he has preserved LV dimensions as well as LV function.  - Echo; Future  - Basic metabolic panel; Future  - BNP; Future  - amiodarone (PACERONE) 200 MG Tab; Take 1 tablet (200 mg total) by mouth once daily.  Dispense: 90 tablet; Refill: 3    3. HFmrEF      4. Endocarditis, unspecified chronicity, unspecified endocarditis type      5. Severe mitral regurgitation      6. 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      7. Paroxysmal atrial fibrillation  Stopped anticoagulation due to GI bleeding, had amiodarone continued when we saw him last, we down titrated, will down titrate amiodarone further, will discuss with GI for safety of resumption of anticoagulation    8. Iron deficiency anemia, unspecified iron deficiency anemia  type      9. Gastrointestinal hemorrhage, unspecified gastrointestinal hemorrhage type  As above, will discuss with GI for resumption of anticoagulation      Follow-up in 3 months.      Emerson Mercado MD [facility]

## 2024-04-01 NOTE — Clinical Note
I assume the answer is yes, but just wanted to confirm that you feel this gentleman is safe to resume anticoagulation after adenoma resection?  Chanelle

## 2024-04-04 ENCOUNTER — TELEPHONE (OUTPATIENT)
Dept: CARDIOLOGY | Facility: CLINIC | Age: 49
End: 2024-04-04
Payer: COMMERCIAL

## 2024-04-04 NOTE — TELEPHONE ENCOUNTER
Called pt and spoke with significant other ( Ms Osullivan)- They will call [facility] and see if they can get echo done there.    Pepmin, April T  [facility] Staff  Good Morning,    Appt:4/8/2024    In regards to patient appointment for an Echo the facility is out of network with the patient's insurance [facility]. The test will not be covered. Please have to patient contact his insurance to get a list of in network facilities.    Thank you,    April  Pre-Service  [phone]   Spoke w/pt. She remembers and wishes she did not agree to it because she is in pain every day where her ovaries used to be. She takes it almost every day because it hurts every day. She has appt. to see you tomorrow. I let her know you would not be refilling until early March.

## 2024-04-08 ENCOUNTER — PATIENT MESSAGE (OUTPATIENT)
Dept: CARDIOLOGY | Facility: CLINIC | Age: 49
End: 2024-04-08
Payer: COMMERCIAL

## 2024-04-08 ENCOUNTER — HOSPITAL ENCOUNTER (OUTPATIENT)
Dept: CARDIOLOGY | Facility: HOSPITAL | Age: 49
Discharge: HOME | End: 2024-04-08
Attending: INTERNAL MEDICINE
Payer: COMMERCIAL

## 2024-04-08 VITALS
HEIGHT: 72 IN | HEART RATE: 60 BPM | BODY MASS INDEX: 22.25 KG/M2 | SYSTOLIC BLOOD PRESSURE: 118 MMHG | WEIGHT: 164.25 LBS | DIASTOLIC BLOOD PRESSURE: 86 MMHG

## 2024-04-08 DIAGNOSIS — I38 CHRONIC DIASTOLIC HEART FAILURE DUE TO VALVULAR DISEASE: ICD-10-CM

## 2024-04-08 DIAGNOSIS — I50.32 CHRONIC DIASTOLIC HEART FAILURE DUE TO VALVULAR DISEASE: ICD-10-CM

## 2024-04-08 DIAGNOSIS — Z98.890 S/P MVR (MITRAL VALVE REPAIR): ICD-10-CM

## 2024-04-08 PROBLEM — K92.2 GI BLEEDING: Status: RESOLVED | Noted: 2024-01-04 | Resolved: 2024-04-08

## 2024-04-08 LAB
ASCENDING AORTA: 2.99 CM
AV INDEX (PROSTH): 0.87
AV MEAN GRADIENT: 3 MMHG
AV PEAK GRADIENT: 5 MMHG
AV VALVE AREA BY VELOCITY RATIO: 3.48 CM²
AV VALVE AREA: 3.67 CM²
AV VELOCITY RATIO: 0.82
BSA FOR ECHO PROCEDURE: 1.95 M2
CV ECHO LV RWT: 0.31 CM
DOP CALC AO PEAK VEL: 1.13 M/S
DOP CALC AO VTI: 21.31 CM
DOP CALC LVOT AREA: 4.2 CM2
DOP CALC LVOT DIAMETER: 2.32 CM
DOP CALC LVOT PEAK VEL: 0.93 M/S
DOP CALC LVOT STROKE VOLUME: 78.17 CM3
DOP CALC MV VTI: 21.67 CM
DOP CALCLVOT PEAK VEL VTI: 18.5 CM
E WAVE DECELERATION TIME: 332.08 MSEC
E/A RATIO: 5.26
ECHO LV POSTERIOR WALL: 0.86 CM (ref 0.6–1.1)
EJECTION FRACTION: 60 %
FRACTIONAL SHORTENING: 35 % (ref 28–44)
HR MV ECHO: 59 BPM
INTERVENTRICULAR SEPTUM: 0.76 CM (ref 0.6–1.1)
IVRT: 57.09 MSEC
LA MAJOR: 6.61 CM
LA MINOR: 6.33 CM
LA WIDTH: 5.23 CM
LEFT ATRIUM SIZE: 5.62 CM
LEFT ATRIUM VOLUME INDEX MOD: 52.2 ML/M2
LEFT ATRIUM VOLUME INDEX: 82.4 ML/M2
LEFT ATRIUM VOLUME MOD: 102.28 CM3
LEFT ATRIUM VOLUME: 161.57 CM3
LEFT INTERNAL DIMENSION IN SYSTOLE: 3.63 CM (ref 2.1–4)
LEFT VENTRICLE DIASTOLIC VOLUME INDEX: 77.74 ML/M2
LEFT VENTRICLE DIASTOLIC VOLUME: 152.38 ML
LEFT VENTRICLE MASS INDEX: 85 G/M2
LEFT VENTRICLE SYSTOLIC VOLUME INDEX: 28.4 ML/M2
LEFT VENTRICLE SYSTOLIC VOLUME: 55.6 ML
LEFT VENTRICULAR INTERNAL DIMENSION IN DIASTOLE: 5.58 CM (ref 3.5–6)
LEFT VENTRICULAR MASS: 166.59 G
MV A" WAVE DURATION": 9.13 MSEC
MV MEAN GRADIENT: 3 MMHG
MV PEAK A VEL: 0.31 M/S
MV PEAK E VEL: 1.63 M/S
MV PEAK GRADIENT: 3 MMHG
MV STENOSIS PRESSURE HALF TIME: 96.3 MS
MV VALVE AREA BY CONTINUITY EQUATION: 3.61 CM2
MV VALVE AREA P 1/2 METHOD: 2.28 CM2
OHS LV EJECTION FRACTION SIMPSONS BIPLANE MOD: 58 %
PISA MRMAX VEL: 0.04 M/S
PISA TR MAX VEL: 4.36 M/S
PULM VEIN S/D RATIO: 0.21
PV PEAK D VEL: 1 M/S
PV PEAK S VEL: 0.21 M/S
RA MAJOR: 5.93 CM
RA PRESSURE ESTIMATED: 3 MMHG
RA WIDTH: 3.52 CM
RIGHT VENTRICULAR END-DIASTOLIC DIMENSION: 3.85 CM
RV TB RVSP: 7 MMHG
SINUS: 3.36 CM
STJ: 2.93 CM
TR MAX PG: 76 MMHG
TRICUSPID ANNULAR PLANE SYSTOLIC EXCURSION: 1.35 CM
TV REST PULMONARY ARTERY PRESSURE: 79 MMHG
Z-SCORE OF LEFT VENTRICULAR DIMENSION IN END DIASTOLE: -0.05
Z-SCORE OF LEFT VENTRICULAR DIMENSION IN END SYSTOLE: 0.4

## 2024-04-08 PROCEDURE — 93306 TTE W/DOPPLER COMPLETE: CPT | Mod: 26,,, | Performed by: INTERNAL MEDICINE

## 2024-04-08 PROCEDURE — 93306 TTE W/DOPPLER COMPLETE: CPT

## 2024-04-08 RX ORDER — DOXYCYCLINE 100 MG/1
100 CAPSULE ORAL 2 TIMES DAILY
Qty: 60 CAPSULE | Refills: 2 | Status: SHIPPED | OUTPATIENT
Start: 2024-04-08 | End: 2024-07-07

## 2024-05-06 RX ORDER — POTASSIUM CHLORIDE 20 MEQ/1
40 TABLET, EXTENDED RELEASE ORAL DAILY
Qty: 90 TABLET | Refills: 3 | Status: SHIPPED | OUTPATIENT
Start: 2024-05-06

## 2024-05-06 NOTE — TELEPHONE ENCOUNTER
----- Message from Kathy Cleaning sent at 5/6/2024  9:01 AM CDT -----  Regarding: medication clarification  Pls call [facility] at [phone] to clarify directions of pt's potassium chloride SA (K-DUR,KLOR-CON) 20 MEQ tablet.  He has always taken 1 pill bid and the new rx she just picked up states to take 1 tab bid for 7 days then take 1 tab once a day and they are confused.    Pt of Dr. Mercado  LOV 4/1/24    Thank you

## 2024-06-18 DIAGNOSIS — Z98.890 S/P MVR (MITRAL VALVE REPAIR): Primary | ICD-10-CM

## 2024-06-18 DIAGNOSIS — I34.0 MITRAL VALVE INSUFFICIENCY, UNSPECIFIED ETIOLOGY: ICD-10-CM

## 2024-06-22 ENCOUNTER — HOSPITAL ENCOUNTER (OUTPATIENT)
Facility: HOSPITAL | Age: 49
Discharge: HOME | End: 2024-06-23
Attending: EMERGENCY MEDICINE | Admitting: STUDENT IN AN ORGANIZED HEALTH CARE EDUCATION/TRAINING PROGRAM
Payer: COMMERCIAL

## 2024-06-22 DIAGNOSIS — R00.2 PALPITATION: ICD-10-CM

## 2024-06-22 DIAGNOSIS — R07.9 CHEST PAIN: ICD-10-CM

## 2024-06-22 DIAGNOSIS — I48.92 ATRIAL FLUTTER, UNSPECIFIED TYPE: Primary | ICD-10-CM

## 2024-06-22 DIAGNOSIS — R06.02 SOB (SHORTNESS OF BREATH): ICD-10-CM

## 2024-06-22 DIAGNOSIS — I48.92 ATRIAL FLUTTER: ICD-10-CM

## 2024-06-22 PROBLEM — D50.9 IRON DEFICIENCY ANEMIA: Chronic | Status: ACTIVE | Noted: 2024-01-07

## 2024-06-22 PROBLEM — Z98.890 S/P MVR (MITRAL VALVE REPAIR): Chronic | Status: ACTIVE | Noted: 2023-11-09

## 2024-06-22 PROBLEM — I27.20 PULMONARY HYPERTENSION: Chronic | Status: ACTIVE | Noted: 2024-06-22

## 2024-06-22 PROBLEM — I50.32 CHRONIC DIASTOLIC HEART FAILURE: Chronic | Status: ACTIVE | Noted: 2024-06-22

## 2024-06-22 PROBLEM — N18.30 STAGE 3 CHRONIC KIDNEY DISEASE: Chronic | Status: ACTIVE | Noted: 2024-06-22

## 2024-06-22 LAB
ALBUMIN SERPL BCP-MCNC: 3.2 G/DL (ref 3.5–5.2)
ALP SERPL-CCNC: 79 U/L (ref 55–135)
ALT SERPL W/O P-5'-P-CCNC: 38 U/L (ref 10–44)
ANION GAP SERPL CALC-SCNC: 11 MMOL/L (ref 8–16)
AST SERPL-CCNC: 37 U/L (ref 10–40)
BASOPHILS # BLD AUTO: 0.04 K/UL (ref 0–0.2)
BASOPHILS NFR BLD: 0.4 % (ref 0–1.9)
BILIRUB SERPL-MCNC: 0.4 MG/DL (ref 0.1–1)
BNP SERPL-MCNC: 615 PG/ML (ref 0–99)
BUN SERPL-MCNC: 24 MG/DL (ref 6–20)
CALCIUM SERPL-MCNC: 9.4 MG/DL (ref 8.7–10.5)
CHLORIDE SERPL-SCNC: 107 MMOL/L (ref 95–110)
CO2 SERPL-SCNC: 24 MMOL/L (ref 23–29)
CREAT SERPL-MCNC: 1.6 MG/DL (ref 0.5–1.4)
DIFFERENTIAL METHOD BLD: ABNORMAL
EOSINOPHIL # BLD AUTO: 0.2 K/UL (ref 0–0.5)
EOSINOPHIL NFR BLD: 1.9 % (ref 0–8)
ERYTHROCYTE [DISTWIDTH] IN BLOOD BY AUTOMATED COUNT: 19.1 % (ref 11.5–14.5)
EST. GFR  (NO RACE VARIABLE): 52.5 ML/MIN/1.73 M^2
GLUCOSE SERPL-MCNC: 93 MG/DL (ref 70–110)
HCT VFR BLD AUTO: 36.9 % (ref 40–54)
HGB BLD-MCNC: 11.2 G/DL (ref 14–18)
IMM GRANULOCYTES # BLD AUTO: 0.04 K/UL (ref 0–0.04)
IMM GRANULOCYTES NFR BLD AUTO: 0.4 % (ref 0–0.5)
LYMPHOCYTES # BLD AUTO: 1.6 K/UL (ref 1–4.8)
LYMPHOCYTES NFR BLD: 17.8 % (ref 18–48)
MCH RBC QN AUTO: 23.4 PG (ref 27–31)
MCHC RBC AUTO-ENTMCNC: 30.4 G/DL (ref 32–36)
MCV RBC AUTO: 77 FL (ref 82–98)
MONOCYTES # BLD AUTO: 1.2 K/UL (ref 0.3–1)
MONOCYTES NFR BLD: 13 % (ref 4–15)
NEUTROPHILS # BLD AUTO: 6 K/UL (ref 1.8–7.7)
NEUTROPHILS NFR BLD: 66.5 % (ref 38–73)
NRBC BLD-RTO: 0 /100 WBC
PLATELET # BLD AUTO: 304 K/UL (ref 150–450)
PMV BLD AUTO: 11.7 FL (ref 9.2–12.9)
POTASSIUM SERPL-SCNC: 3.8 MMOL/L (ref 3.5–5.1)
PROT SERPL-MCNC: 7.8 G/DL (ref 6–8.4)
RBC # BLD AUTO: 4.78 M/UL (ref 4.6–6.2)
SODIUM SERPL-SCNC: 142 MMOL/L (ref 136–145)
TSH SERPL DL<=0.005 MIU/L-ACNC: 1.3 UIU/ML (ref 0.4–4)
WBC # BLD AUTO: 9.02 K/UL (ref 3.9–12.7)

## 2024-06-22 PROCEDURE — G0378 HOSPITAL OBSERVATION PER HR: HCPCS

## 2024-06-22 PROCEDURE — 93005 ELECTROCARDIOGRAM TRACING: CPT

## 2024-06-22 PROCEDURE — 94761 N-INVAS EAR/PLS OXIMETRY MLT: CPT

## 2024-06-22 PROCEDURE — 84443 ASSAY THYROID STIM HORMONE: CPT | Performed by: EMERGENCY MEDICINE

## 2024-06-22 PROCEDURE — 99285 EMERGENCY DEPT VISIT HI MDM: CPT | Mod: 25

## 2024-06-22 PROCEDURE — 93010 ELECTROCARDIOGRAM REPORT: CPT | Mod: 76,,, | Performed by: INTERNAL MEDICINE

## 2024-06-22 PROCEDURE — 85025 COMPLETE CBC W/AUTO DIFF WBC: CPT | Performed by: EMERGENCY MEDICINE

## 2024-06-22 PROCEDURE — 83880 ASSAY OF NATRIURETIC PEPTIDE: CPT | Performed by: EMERGENCY MEDICINE

## 2024-06-22 PROCEDURE — 80053 COMPREHEN METABOLIC PANEL: CPT | Performed by: EMERGENCY MEDICINE

## 2024-06-22 RX ORDER — GLUCAGON 1 MG
1 KIT INJECTION
Status: DISCONTINUED | OUTPATIENT
Start: 2024-06-23 | End: 2024-06-23 | Stop reason: HOSPADM

## 2024-06-22 RX ORDER — IBUPROFEN 200 MG
24 TABLET ORAL
Status: DISCONTINUED | OUTPATIENT
Start: 2024-06-23 | End: 2024-06-23 | Stop reason: HOSPADM

## 2024-06-22 RX ORDER — DOXYCYCLINE HYCLATE 100 MG
100 TABLET ORAL EVERY 12 HOURS
Status: DISCONTINUED | OUTPATIENT
Start: 2024-06-23 | End: 2024-06-23 | Stop reason: HOSPADM

## 2024-06-22 RX ORDER — ALUMINUM HYDROXIDE, MAGNESIUM HYDROXIDE, AND SIMETHICONE 1200; 120; 1200 MG/30ML; MG/30ML; MG/30ML
30 SUSPENSION ORAL 4 TIMES DAILY PRN
Status: DISCONTINUED | OUTPATIENT
Start: 2024-06-23 | End: 2024-06-23 | Stop reason: HOSPADM

## 2024-06-22 RX ORDER — POLYETHYLENE GLYCOL 3350 17 G/17G
17 POWDER, FOR SOLUTION ORAL DAILY PRN
Status: DISCONTINUED | OUTPATIENT
Start: 2024-06-23 | End: 2024-06-23 | Stop reason: HOSPADM

## 2024-06-22 RX ORDER — TALC
6 POWDER (GRAM) TOPICAL NIGHTLY PRN
Status: DISCONTINUED | OUTPATIENT
Start: 2024-06-23 | End: 2024-06-23 | Stop reason: HOSPADM

## 2024-06-22 RX ORDER — AMIODARONE HYDROCHLORIDE 200 MG/1
200 TABLET ORAL DAILY
Status: DISCONTINUED | OUTPATIENT
Start: 2024-06-23 | End: 2024-06-23 | Stop reason: HOSPADM

## 2024-06-22 RX ORDER — IBUPROFEN 200 MG
16 TABLET ORAL
Status: DISCONTINUED | OUTPATIENT
Start: 2024-06-23 | End: 2024-06-23 | Stop reason: HOSPADM

## 2024-06-22 RX ORDER — ENOXAPARIN SODIUM 100 MG/ML
40 INJECTION SUBCUTANEOUS EVERY 24 HOURS
Status: DISCONTINUED | OUTPATIENT
Start: 2024-06-22 | End: 2024-06-23

## 2024-06-22 RX ORDER — MIRTAZAPINE 15 MG/1
15 TABLET, ORALLY DISINTEGRATING ORAL NIGHTLY
Status: DISCONTINUED | OUTPATIENT
Start: 2024-06-22 | End: 2024-06-23 | Stop reason: HOSPADM

## 2024-06-22 RX ORDER — ACETAMINOPHEN 325 MG/1
650 TABLET ORAL EVERY 4 HOURS PRN
Status: DISCONTINUED | OUTPATIENT
Start: 2024-06-23 | End: 2024-06-23 | Stop reason: HOSPADM

## 2024-06-22 RX ORDER — ONDANSETRON 8 MG/1
8 TABLET, ORALLY DISINTEGRATING ORAL EVERY 8 HOURS PRN
Status: DISCONTINUED | OUTPATIENT
Start: 2024-06-23 | End: 2024-06-22

## 2024-06-22 RX ORDER — POTASSIUM CHLORIDE 20 MEQ/1
40 TABLET, EXTENDED RELEASE ORAL DAILY
Status: DISCONTINUED | OUTPATIENT
Start: 2024-06-23 | End: 2024-06-23 | Stop reason: HOSPADM

## 2024-06-22 RX ORDER — ACETAMINOPHEN 325 MG/1
650 TABLET ORAL EVERY 8 HOURS PRN
Status: DISCONTINUED | OUTPATIENT
Start: 2024-06-23 | End: 2024-06-22

## 2024-06-22 RX ORDER — METOPROLOL SUCCINATE 25 MG/1
25 TABLET, EXTENDED RELEASE ORAL DAILY
Status: DISCONTINUED | OUTPATIENT
Start: 2024-06-23 | End: 2024-06-23 | Stop reason: HOSPADM

## 2024-06-22 RX ORDER — NALOXONE HCL 0.4 MG/ML
0.02 VIAL (ML) INJECTION
Status: DISCONTINUED | OUTPATIENT
Start: 2024-06-23 | End: 2024-06-23 | Stop reason: HOSPADM

## 2024-06-22 RX ORDER — TORSEMIDE 10 MG/1
10 TABLET ORAL DAILY
Status: DISCONTINUED | OUTPATIENT
Start: 2024-06-23 | End: 2024-06-23 | Stop reason: HOSPADM

## 2024-06-22 RX ORDER — SIMETHICONE 80 MG
1 TABLET,CHEWABLE ORAL 4 TIMES DAILY PRN
Status: DISCONTINUED | OUTPATIENT
Start: 2024-06-23 | End: 2024-06-23 | Stop reason: HOSPADM

## 2024-06-22 RX ORDER — SODIUM CHLORIDE 0.9 % (FLUSH) 0.9 %
1-10 SYRINGE (ML) INJECTION EVERY 12 HOURS PRN
Status: DISCONTINUED | OUTPATIENT
Start: 2024-06-23 | End: 2024-06-23 | Stop reason: HOSPADM

## 2024-06-22 NOTE — ED PROVIDER NOTES
Encounter Date: 6/22/2024       History     Chief Complaint   Patient presents with    Palpitations     States apple watch keeps telling him he is in afib. Denies Chest pain or SOB.      Lorenzo Nino is a 48-year-old male with a past medical history of AFib with RVR, endocarditis s/p MVR repair here for palpitations.  Patient states his Apple watch keeps telling him that he is in AFib.  He had a history of AFib previously which resolved.  He has been in sinus rhythm.  Denies recent chest pain, shortness of breath.  No lightheadedness, does not feel dizzy.  No recent GI losses.          Review of patient's allergies indicates:  No Known Allergies  Past Medical History:   Diagnosis Date    Hypertension      Past Surgical History:   Procedure Laterality Date    COLONOSCOPY N/A 1/5/2024    Procedure: COLONOSCOPY;  Surgeon: Fadia Ellsworth MD;  Location: [facility] ENDO (2ND FLR);  Service: Endoscopy;  Laterality: N/A;    COLONOSCOPY, WITH DIRECTED SUBMUCOSAL INJECTION  2/27/2024    Procedure: COLONOSCOPY, WITH DIRECTED SUBMUCOSAL INJECTION;  Surgeon: Mayur Dunn MD;  Location: [facility] OR [facility] FLR;  Service: Colon and Rectal;;    COLONOSCOPY, WITH POLYPECTOMY USING SNARE N/A 2/27/2024    Procedure: COLONOSCOPY, WITH POLYPECTOMY USING SNARE;  Surgeon: Mayur Dunn MD;  Location: [facility] OR [facility] FLR;  Service: Colon and Rectal;  Laterality: N/A;    ECHOCARDIOGRAM,TRANSESOPHAGEAL N/A 12/26/2023    Procedure: Transesophageal echo (NEW) intra-procedure log documentation;  Surgeon: Provider, [facility] Diagnostic;  Location: [facility] EP LAB;  Service: Cardiology;  Laterality: N/A;    ESOPHAGOGASTRODUODENOSCOPY N/A 1/5/2024    Procedure: EGD (ESOPHAGOGASTRODUODENOSCOPY);  Surgeon: Fadia Ellsworth MD;  Location: [facility] ENDO (2ND FLR);  Service: Endoscopy;  Laterality: N/A;    EXCLUSION, LEFT ATRIAL APPENDAGE, OPEN, AS PART OF OPEN CHEST SURGERY Left 11/3/2023    Procedure: EXCLUSION, LEFT ATRIAL APPENDAGE, OPEN, AS PART OF OPEN  CHEST SURGERY;  Surgeon: Manuel Beal MD;  Location: [facility] OR [address] [place] [address];  Service: Cardiothoracic;  Laterality: Left;    INSERTION OF INTRA-AORTIC BALLOON ASSIST DEVICE Right 11/2/2023    Procedure: INSERTION, INTRA-AORTIC BALLOON PUMP;  Surgeon: Jose Vidal MD;  Location: [facility] CATH LAB;  Service: Cardiology;  Laterality: Right;    REPAIR, MITRAL VALVE, OPEN N/A 11/3/2023    Procedure: REPAIR, MITRAL VALVE, OPEN;  Surgeon: Manuel Beal MD;  Location: [facility] OR [address] [place] [address];  Service: Cardiothoracic;  Laterality: N/A;     Family History   Problem Relation Name Age of Onset    Amblyopia Neg Hx      Blindness Neg Hx      Cataracts Neg Hx      Glaucoma Neg Hx      Macular degeneration Neg Hx      Retinal detachment Neg Hx      Strabismus Neg Hx       Social History     Tobacco Use    Smoking status: Unknown     Passive exposure: Never   Substance Use Topics    Alcohol use: Yes     Alcohol/week: 1.0 standard drink of alcohol     Types: 1 Cans of beer per week    Drug use: Never     Review of Systems    Physical Exam     Initial Vitals [06/22/24 1755]   BP Pulse Resp Temp SpO2   129/82 96 18 97.9 °F (36.6 °C) 98 %      MAP       --         Physical Exam    Nursing note and vitals reviewed.  Constitutional: He appears well-developed and well-nourished. No distress.   HENT:   Mouth/Throat: Oropharynx is clear and moist.   Eyes: Conjunctivae are normal.   Neck: Neck supple.   Cardiovascular:  Normal rate, regular rhythm and intact distal pulses.           Pulmonary/Chest: Breath sounds normal. He has no wheezes. He has no rales.   Abdominal: Abdomen is soft. Bowel sounds are normal. There is no abdominal tenderness.   Musculoskeletal:         General: No edema.      Cervical back: Neck supple.     Lymphadenopathy:     He has no cervical adenopathy.   Neurological: He is alert and oriented to person, place, and time.   Skin: No rash noted.   Psychiatric: He has a normal mood and affect.         ED Course    Procedures  Labs Reviewed   COMPREHENSIVE METABOLIC PANEL - Abnormal; Notable for the following components:       Result Value    BUN 24 (*)     Creatinine 1.6 (*)     Albumin 3.2 (*)     eGFR 52.5 (*)     All other components within normal limits   CBC W/ AUTO DIFFERENTIAL - Abnormal; Notable for the following components:    Hemoglobin 11.2 (*)     Hematocrit 36.9 (*)     MCV 77 (*)     MCH 23.4 (*)     MCHC 30.4 (*)     RDW 19.1 (*)     Mono # 1.2 (*)     Lymph % 17.8 (*)     All other components within normal limits   B-TYPE NATRIURETIC PEPTIDE - Abnormal; Notable for the following components:     (*)     All other components within normal limits    Narrative:     ADD ON BNP PER LEONILA VÁZQUEZ MD;#[id number]   06/22/2024  22:28    TSH   TSH    Narrative:     ADD ON TSH PER DR LEONILA VÁZQUEZ/ORDER# [id number] @ 20:32   B-TYPE NATRIURETIC PEPTIDE   TOXICOLOGY SCREEN, URINE, RANDOM (COMPLIANCE)     EKG Readings: (Independently Interpreted)   EKG:  A flutter at 89 with a variable block.  LVH, prolonged .      ECG Results              EKG 12-lead (Final result)        Collection Time Result Time QRS Duration OHS QTC Calculation    06/22/24 20:28:36 06/23/24 09:53:33 94 528                     Final result by Interface, Lab In [facility] (06/23/24 09:53:37)                   Narrative:    Test Reason :     Vent. Rate : 089 BPM     Atrial Rate : 214 BPM     P-R Int : 000 ms          QRS Dur : 094 ms      QT Int : 434 ms       P-R-T Axes : 000 046 063 degrees     QTc Int : 528 ms    Atrial flutter with variable A-V block  Nonspecific ST abnormality  Prolonged QT  Abnormal ECG  When compared with ECG of 22-JUN-2024 20:28,  No significant change was found  Confirmed by Ashanti Peter MD ([id number]) on 6/23/2024 9:53:31 AM    Referred By: [email or web address]   SELF           Confirmed By:Ashanti Peter MD                                     EKG 12-lead (Final result)        Collection Time Result Time QRS Duration OHS QTC  Calculation    06/22/24 20:28:14 06/23/24 09:53:41 94 505                     Final result by Interface, Lab In [facility] (06/23/24 09:53:48)                   Narrative:    Test Reason :     Vent. Rate : 080 BPM     Atrial Rate : 214 BPM     P-R Int : 000 ms          QRS Dur : 094 ms      QT Int : 438 ms       P-R-T Axes : 000 049 070 degrees     QTc Int : 505 ms    Atrial flutter with variable A-V block  Prolonged QT  Abnormal ECG  When compared with ECG of 22-JUN-2024 20:07,  No significant change was found  Confirmed by Ashanti Peter MD ([id number]) on 6/23/2024 9:53:38 AM    Referred By: [email or web address]   SELF           Confirmed By:Ashanti Peter MD                                     EKG 12-lead (Final result)        Collection Time Result Time QRS Duration OHS QTC Calculation    06/22/24 20:07:31 06/23/24 09:55:30 90 509                     Final result by Interface, Lab In [facility] (06/23/24 09:55:35)                   Narrative:    Test Reason : R00.2,    Vent. Rate : 086 BPM     Atrial Rate : 234 BPM     P-R Int : 000 ms          QRS Dur : 090 ms      QT Int : 426 ms       P-R-T Axes : 000 058 080 degrees     QTc Int : 509 ms    Atrial flutter with variable A-V block  Nonspecific ST abnormality  Prolonged QT  Abnormal ECG  When compared with ECG of 22-JUN-2024 17:58,  No significant change was found  Confirmed by Mian MORENO MD ([id number]) on 6/23/2024 9:55:29 AM    Referred By: [email or web address]   SELF           Confirmed By:Mian MORENO MD                                     Repeat EKG 12-lead (Final result)        Collection Time Result Time QRS Duration OHS QTC Calculation    06/22/24 17:58:22 06/23/24 10:01:27 94 498                     Final result by Interface, Lab In [facility] (06/23/24 10:01:31)                   Narrative:    Test Reason : R00.2,    Vent. Rate : 089 BPM     Atrial Rate : 258 BPM     P-R Int : 000 ms          QRS Dur : 094 ms      QT Int : 410 ms       P-R-T Axes : 000 039 061 degrees     QTc Int : 498  "ms    Atrial flutter with variable A-V block  Minimal voltage criteria for LVH, may be normal variant ( Marshall product )  Prolonged QT  Abnormal ECG  When compared with ECG of 08-JAN-2024 05:33,  Atrial flutter has replaced Sinus rhythm  Confirmed by Mian MORENO MD ([id number]) on 6/23/2024 10:01:21 AM    Referred By: [email or web address]   SELF           Confirmed By:Mian MORENO MD                                  Imaging Results              X-Ray Chest AP Portable (Final result)  Result time 06/22/24 23:12:53      Final result by Carlos Brantley MD (06/22/24 23:12:53)                   Impression:      Borderline cardiomegaly and operative changes.  No acute process identified on this single view.      Electronically signed by: Carlos Brantley MD  Date:    06/22/2024  Time:    23:12               Narrative:    EXAMINATION:  XR CHEST AP PORTABLE    CLINICAL HISTORY:  Provided history is "  Shortness of breath".    TECHNIQUE:  One view of the chest.    COMPARISON:  01/05/2024.    FINDINGS:  Cardiac wires overlie the chest.  Cardiomediastinal silhouette is stable.  Postoperative changes of prior median sternotomy.  No confluent area of consolidation.  No sizable pleural effusion.  No pneumothorax.                                    X-Rays:   Independently Interpreted Readings:   Chest X-Ray: No infiltrates.  No acute abnormalities. Cardiomegaly present.     Medications   amiodarone tablet 200 mg (200 mg Oral Given 6/23/24 0940)   doxycycline tablet 100 mg (100 mg Oral Given 6/23/24 0940)   metoprolol succinate (TOPROL-XL) 24 hr tablet 25 mg (25 mg Oral Not Given 6/23/24 0900)   mirtazapine disintegrating tablet 15 mg (15 mg Oral Given 6/23/24 0101)   potassium chloride SA CR tablet 40 mEq (40 mEq Oral Given 6/23/24 0940)   torsemide tablet 10 mg (10 mg Oral Given 6/23/24 0940)   sodium chloride 0.9% flush 1-10 mL (has no administration in time range)   melatonin tablet 6 mg (has no administration in time range)   polyethylene " glycol packet 17 g (has no administration in time range)   simethicone chewable tablet 80 mg (has no administration in time range)   aluminum-magnesium hydroxide-simethicone 200-200-20 mg/5 mL suspension 30 mL (has no administration in time range)   acetaminophen tablet 650 mg (has no administration in time range)   naloxone 0.4 mg/mL injection 0.02 mg (has no administration in time range)   glucose chewable tablet 16 g (has no administration in time range)   glucose chewable tablet 24 g (has no administration in time range)   glucagon (human recombinant) injection 1 mg (has no administration in time range)   apixaban tablet 5 mg (5 mg Oral Given 6/23/24 1115)     Medical Decision Making  Emergent evaluation of 58-year-old male presenting today for abnormal rhythm found on Apple watch.  Reports occasional palpitations but otherwise is fairly asymptomatic.    Vital signs stable.  Overall well-appearing, no acute distress.    Differential diagnosis includes not limited to electrolyte derangement, AFib, a flutter.     Plan for labs, EKG, cardiac monitoring    Labs reviewed with stable CKD.  Hemoglobin stable.  TSH pending.    Discussed with Cardiology, recommending admission to Hospital Medicine for continued telemetry monitoring and EP consult tomorrow for new onset atrial flutter    Amount and/or Complexity of Data Reviewed  Labs: ordered. Decision-making details documented in ED Course.  Radiology: ordered.  ECG/medicine tests: ordered and independent interpretation performed.  Discussion of management or test interpretation with external provider(s): cardiology    Risk  Decision regarding hospitalization.                                      Clinical Impression:  Final diagnoses:  [R00.2] Palpitation  [R06.02] SOB (shortness of breath)  [I48.92] Atrial flutter, unspecified type (Primary)          ED Disposition Condition    Observation Stable                Jolene Perea MD  06/23/24 0283

## 2024-06-23 VITALS
SYSTOLIC BLOOD PRESSURE: 103 MMHG | BODY MASS INDEX: 23.05 KG/M2 | HEIGHT: 72 IN | DIASTOLIC BLOOD PRESSURE: 66 MMHG | OXYGEN SATURATION: 98 % | RESPIRATION RATE: 18 BRPM | HEART RATE: 64 BPM | WEIGHT: 170.19 LBS | TEMPERATURE: 98 F

## 2024-06-23 LAB
OHS QRS DURATION: 90 MS
OHS QRS DURATION: 94 MS
OHS QTC CALCULATION: 483 MS
OHS QTC CALCULATION: 498 MS
OHS QTC CALCULATION: 505 MS
OHS QTC CALCULATION: 509 MS
OHS QTC CALCULATION: 528 MS

## 2024-06-23 PROCEDURE — 63600175 PHARM REV CODE 636 W HCPCS: Performed by: STUDENT IN AN ORGANIZED HEALTH CARE EDUCATION/TRAINING PROGRAM

## 2024-06-23 PROCEDURE — G0378 HOSPITAL OBSERVATION PER HR: HCPCS

## 2024-06-23 PROCEDURE — 25000003 PHARM REV CODE 250: Performed by: STUDENT IN AN ORGANIZED HEALTH CARE EDUCATION/TRAINING PROGRAM

## 2024-06-23 PROCEDURE — 93005 ELECTROCARDIOGRAM TRACING: CPT

## 2024-06-23 PROCEDURE — 96372 THER/PROPH/DIAG INJ SC/IM: CPT | Performed by: STUDENT IN AN ORGANIZED HEALTH CARE EDUCATION/TRAINING PROGRAM

## 2024-06-23 PROCEDURE — 93010 ELECTROCARDIOGRAM REPORT: CPT | Mod: ,,, | Performed by: INTERNAL MEDICINE

## 2024-06-23 RX ADMIN — ENOXAPARIN SODIUM 40 MG: 40 INJECTION SUBCUTANEOUS at 01:06

## 2024-06-23 RX ADMIN — MIRTAZAPINE 15 MG: 15 TABLET, ORALLY DISINTEGRATING ORAL at 01:06

## 2024-06-23 RX ADMIN — APIXABAN 5 MG: 5 TABLET, FILM COATED ORAL at 11:06

## 2024-06-23 RX ADMIN — POTASSIUM CHLORIDE 40 MEQ: 1500 TABLET, EXTENDED RELEASE ORAL at 09:06

## 2024-06-23 RX ADMIN — TORSEMIDE 10 MG: 10 TABLET ORAL at 09:06

## 2024-06-23 RX ADMIN — DOXYCYCLINE HYCLATE 100 MG: 100 TABLET, COATED ORAL at 09:06

## 2024-06-23 RX ADMIN — AMIODARONE HYDROCHLORIDE 200 MG: 200 TABLET ORAL at 09:06

## 2024-06-23 NOTE — CONSULTS
[facility] - Cardiology Stepdown  Cardiology  Consult Note    Patient Name: Lorenzo Nino  MRN: [id number]  Admission Date: 6/22/2024  Hospital Length of Stay: 0 days  Code Status: Full Code   Attending Provider: Angela Alexis MD   Consulting Provider: Thomas Urbina DO  Primary Care Physician: Terrie, Primary Doctor  Principal Problem:New onset atrial flutter    Patient information was obtained from patient and ER records.     Inpatient consult to Cardiology  Consult performed by: Thomas Urbina DO  Consult ordered by: Jaya Carlisle MD        Subjective:     Chief Complaint:  A-flutter     HPI:   48-year-old male with a history of bacterial endocarditis and severe mitral regurgitation s/p repair on 11/3/23 complicated by subsequent dehiscence of ring now on chronic antibiotic suppression (doxycycline), diastolic CHF, atrial fibrillation (not on anticoagulation, pulmonary HTN, CKD stage 3, and history of GI hemorrhage 2/2 polyps who presented with palpitations.  Patient reports that he noticed that he was having palpitations on 06/19 and was told by his Apple watch that he was in AFib.  The palpitations were resolve spontaneously the next day.  On 06/22, the palpitations returned and was told by his Apple watch again that he was in AFib.  He denies any chest pain, dizziness, shortness of breath, lightheadedness, presyncope, lower extremity edema, orthopnea, PND.  In the ED, EKG was significant for Atrial Flutter with variable AV block and a prolonged QTC at 05:28 and heart rate of 89.  BNP was elevated at 615.  Chest x-ray showed borderline cardiomegaly.  Cardiology was consulted for new onset atrial flutter.    Past Medical History:   Diagnosis Date    Hypertension        Past Surgical History:   Procedure Laterality Date    COLONOSCOPY N/A 1/5/2024    Procedure: COLONOSCOPY;  Surgeon: Fadia Ellsworth MD;  Location: [facility] ([address] [place] [address]);  Service: Endoscopy;  Laterality: N/A;    COLONOSCOPY, WITH DIRECTED  SUBMUCOSAL INJECTION  2/27/2024    Procedure: COLONOSCOPY, WITH DIRECTED SUBMUCOSAL INJECTION;  Surgeon: Mayur Dunn MD;  Location: [facility] OR [facility] FLR;  Service: Colon and Rectal;;    COLONOSCOPY, WITH POLYPECTOMY USING SNARE N/A 2/27/2024    Procedure: COLONOSCOPY, WITH POLYPECTOMY USING SNARE;  Surgeon: Mayur Dunn MD;  [facility];  Service: Colon and Rectal;  Laterality: N/A;    ECHOCARDIOGRAM,TRANSESOPHAGEAL N/A 12/26/2023    Procedure: Transesophageal echo (NEW) intra-procedure log documentation;  Surgeon: Provider, [facility] Diagnostic;  Location: [facility] EP LAB;  Service: Cardiology;  Laterality: N/A;    ESOPHAGOGASTRODUODENOSCOPY N/A 1/5/2024    Procedure: EGD (ESOPHAGOGASTRODUODENOSCOPY);  Surgeon: Fadia Ellsworth MD;  Location: [facility] ENDO (2ND FLR);  Service: Endoscopy;  Laterality: N/A;    EXCLUSION, LEFT ATRIAL APPENDAGE, OPEN, AS PART OF OPEN CHEST SURGERY Left 11/3/2023    Procedure: EXCLUSION, LEFT ATRIAL APPENDAGE, OPEN, AS PART OF OPEN CHEST SURGERY;  Surgeon: Manuel Beal MD;  Location: [facility] OR [facility] FLR;  Service: Cardiothoracic;  Laterality: Left;    INSERTION OF INTRA-AORTIC BALLOON ASSIST DEVICE Right 11/2/2023    Procedure: INSERTION, INTRA-AORTIC BALLOON PUMP;  Surgeon: Jose Vidal MD;  Location: [facility] CATH LAB;  Service: Cardiology;  Laterality: Right;    REPAIR, MITRAL VALVE, OPEN N/A 11/3/2023    Procedure: REPAIR, MITRAL VALVE, OPEN;  Surgeon: Manuel Beal MD;  Location: [facility] OR 2ND FLR;  Service: Cardiothoracic;  Laterality: N/A;       Review of patient's allergies indicates:  No Known Allergies    Current Facility-Administered Medications on File Prior to Encounter   Medication    0.9%  NaCl infusion    mupirocin 2 % ointment     Current Outpatient Medications on File Prior to Encounter   Medication Sig    amiodarone (PACERONE) 200 MG Tab Take 1 tablet (200 mg total) by mouth once daily.    dapagliflozin propanediol (FARXIGA) 10 mg tablet  Take 1 tablet (10 mg total) by mouth once daily.    doxycycline (VIBRAMYCIN) 100 MG Cap Take 1 capsule (100 mg total) by mouth 2 (two) times daily.    metoprolol succinate (TOPROL-XL) 25 MG 24 hr tablet Take 1 tablet (25 mg total) by mouth once daily.    mirtazapine (REMERON SOL-TAB) 15 MG disintegrating tablet Dissolve 1 tablet (15 mg total) by mouth nightly.    potassium chloride SA (K-DUR,KLOR-CON) 20 MEQ tablet Take 2 tablets (40 mEq total) by mouth once daily.    torsemide (DEMADEX) 10 MG Tab Take 1 tablet (10 mg total) by mouth once daily.     Family History    None       Tobacco Use    Smoking status: Unknown     Passive exposure: Never    Smokeless tobacco: Not on file   Substance and Sexual Activity    Alcohol use: Yes     Alcohol/week: 1.0 standard drink of alcohol     Types: 1 Cans of beer per week    Drug use: Never    Sexual activity: Not Currently     Partners: Female     Birth control/protection: None     Review of Systems   Constitutional: Negative for chills and fever.   Cardiovascular:  Positive for palpitations. Negative for chest pain, leg swelling, orthopnea and paroxysmal nocturnal dyspnea.   Gastrointestinal:  Negative for abdominal pain and nausea.   Neurological:  Negative for dizziness and light-headedness.   All other systems reviewed and are negative.    Objective:     Vital Signs (Most Recent):  Temp: 98 °F (36.7 °C) (06/23/24 0730)  Pulse: 62 (06/23/24 0730)  Resp: 18 (06/23/24 0730)  BP: 105/72 (06/23/24 0730)  SpO2: 97 % (06/23/24 0730) Vital Signs (24h Range):  Temp:  [97.9 °F (36.6 °C)-98.8 °F (37.1 °C)] 98 °F (36.7 °C)  Pulse:  [] 62  Resp:  [12-19] 18  SpO2:  [97 %-100 %] 97 %  BP: ()/(60-82) 105/72     Weight: 77.2 kg (170 lb 3.1 oz)  Body mass index is 23.08 kg/m².    SpO2: 97 %       No intake or output data in the 24 hours ending 06/23/24 1012    Lines/Drains/Airways       Peripherally Inserted Central Catheter Line  Duration             PICC Double Lumen 11/13/23  1509 right basilic 222 days              Peripheral Intravenous Line  Duration                  Peripheral IV - Single Lumen 06/22/24 2306 20 G Right Antecubital <1 day                     Physical Exam  Vitals and nursing note reviewed.   Constitutional:       Appearance: Normal appearance.   HENT:      Head: Atraumatic.      Right Ear: External ear normal.      Left Ear: External ear normal.   Eyes:      Extraocular Movements: Extraocular movements intact.   Cardiovascular:      Rate and Rhythm: Normal rate and regular rhythm.      Heart sounds: Murmur heard.      High-pitched blowing holosystolic murmur is present with a grade of 3/6 at the apex.   Pulmonary:      Effort: Pulmonary effort is normal.      Breath sounds: Normal breath sounds.   Musculoskeletal:         General: Normal range of motion.      Right lower leg: No edema.      Left lower leg: No edema.   Skin:     General: Skin is warm and dry.   Neurological:      General: No focal deficit present.      Mental Status: He is alert and oriented to person, place, and time.   Psychiatric:         Mood and Affect: Mood normal.         Behavior: Behavior normal.          Significant Labs: All pertinent lab results from the last 24 hours have been reviewed.    Significant Imaging:  All pertinent imaging results from the last 24 hours have been reviewed.  Assessment and Plan:     * New onset atrial flutter  48-year-old male with a history of bacterial endocarditis and severe mitral regurg s/p repair complicated by dehiscence of the ring and atrial fibrillation on metoprolol and amiodarone at home who was admitted for new onset atrial flutter.  Initial EKG showed a flutter with variable AV block and prolonged QTC at 528 with heart rate of 89.  Patient was previously on Eliquis but was stopped due to GI bleed 2/2 polyps back in February now s/p polypectomy.  Patient converted to normal sinus rhythm this morning on EKG and QTC improved to 483.  He has a  [id number] score of 2.  Echo from April 2024 showed EF of 55 to 70%.    Recommendations:  - patient now back to normal sinus rhythm on repeat EKG this morning  - recommending starting patient on Eliquis  - continue home amiodarone , metoprolol and torsemide  - no indications for repeat echo  - follow up with Dr. Mercado outpatient          VTE Risk Mitigation (From admission, onward)           Ordered     enoxaparin injection 40 mg  Daily         06/22/24 2318     IP VTE HIGH RISK PATIENT  Once         06/22/24 2318     Place sequential compression device  Until discontinued         06/22/24 2318                    Thank you for your consult. I will sign off. Please contact us if you have any additional questions.    Thomas Urbina,   Cardiology   [facility] - Cardiology Stepdown

## 2024-06-23 NOTE — PROGRESS NOTES
Dc instructions reviewed with pt, pt verbalizes understanding, copy given to pt. Pt escorted off unit with family.

## 2024-06-23 NOTE — DISCHARGE SUMMARY
[facility] - Cardiology [facility] Medicine  Discharge Summary      Patient Name: Lorenzo Nino  MRN: [id number]  CARLEEN: [id number]  Patient Class: OP- Observation  Admission Date: 6/22/2024  Hospital Length of Stay: 0 days  Discharge Date and Time:  06/23/2024 11:27 AM  Attending Physician: Angela Alexis MD   Discharging Provider: Angela Alexis MD  Primary Care Provider: Terrie, Primary Doctor  [facility] Medicine Team: [facility] MED  Angela Alexis MD  Primary Care Team: [facility] MED     HPI:   Lorenzo Nino is a 48 y.o. male with history of bacterial endocarditis and severe mitral regurgitation s/p repair 11/03/2023 complicated by subsequent dehiscence of ring now on chronic antibiotic suppression, diastolic CHF, history of AFib, pulm HTN, CKD 3, history of GI hemorrhage due to polyp who presents today with palpitations.    He reports that on Wednesday, he felt some palpitations and his Apple watch told him that he was in AFib.  He went to bed, and this resolved by the morning.  This afternoon, his palpitations returned.  He was Apple watch told him that he was in AFib again.  He is relatively asymptomatic as denies any shortness for breath, chest pain, nausea, dizziness, or lower extremity edema associated with this.  He presents today as he was concerned due to his cardiac history.    Noted to be in new atrial flutter with a normal rate in the ED.  Cardiology consulted and recommended admission with EP consultation in the morning for possible cardioversion if indicated.    * No surgery found *      Hospital Course:   Presents with complaints of palpitations on presentation. EKG shows new onset atrial flutter with controlled rate.  He has a history of paroxysmal atrial fibrillation, history of endocarditis and mitral valve regurgitation s/p ring repair. TSH WNL. Appears euvolemic. Cardiology consulted. Repeat EKG on 6/23 with NSR. Continue amiodarone, metoprolol and torsemide. Recommend starting eliquis 5  mg bid. No signs of CHF. OK to dc home from cards stand point and follow up with Tressa Mercado and Emigdio. Patient has no c/o of palpitations, lightheadedness, CP or SOB now. Patient is currently medically and HDS. He is being d/c home. Plan of care discussed with patient, verbalized understanding. All questions were answered.       Goals of Care Treatment Preferences:  Code Status: Full Code      Consults:   Consults (From admission, onward)          Status Ordering Provider     Inpatient consult to Cardiology  Once        Provider:  (Not yet assigned)    ANN Evans            No new Assessment & Plan notes have been filed under this hospital service since the last note was generated.  Service: Hospital Medicine    Final Active Diagnoses:    Diagnosis Date Noted POA    PRINCIPAL PROBLEM:  New onset atrial flutter [I48.92] 06/22/2024 Yes    Chronic diastolic heart failure [I50.32] 06/22/2024 Yes     Chronic    Pulmonary hypertension [I27.20] 06/22/2024 Yes     Chronic    Stage 3 chronic kidney disease [N18.30] 06/22/2024 Yes     Chronic    Microcytic anemia [D50.9] 01/07/2024 Yes     Chronic    S/P MVR (mitral valve repair) [Z98.890] 11/09/2023 Not Applicable     Chronic      Problems Resolved During this Admission:       Discharged Condition: stable    Disposition: Home or Self Care    Follow Up:   Follow-up Information       Emerson Mercado MD Follow up.    Specialties: Cardiovascular Disease, Cardiology  Contact information:  [address]  [facility] [address]  [phone]               Manuel Beal MD .    Specialties: Cardiothoracic Surgery, Transplant  Contact information:  [address] [facility] [address]  [phone]                           Patient Instructions:   No discharge procedures on file.    Significant Diagnostic Studies: N/A    Pending Diagnostic Studies:       None           Medications:  Reconciled Home Medications:      Medication List         START taking these medications      apixaban 5 mg Tab  Commonly known as: ELIQUIS  Take 1 tablet (5 mg total) by mouth 2 (two) times daily.            CONTINUE taking these medications      amiodarone 200 MG Tab  Commonly known as: PACERONE  Take 1 tablet (200 mg total) by mouth once daily.     dapagliflozin propanediol 10 mg tablet  Commonly known as: Farxiga  Take 1 tablet (10 mg total) by mouth once daily.     doxycycline 100 MG Cap  Commonly known as: VIBRAMYCIN  Take 1 capsule (100 mg total) by mouth 2 (two) times daily.     metoprolol succinate 25 MG 24 hr tablet  Commonly known as: TOPROL-XL  Take 1 tablet (25 mg total) by mouth once daily.     mirtazapine 15 MG disintegrating tablet  Commonly known as: REMERON SOL-TAB  Dissolve 1 tablet (15 mg total) by mouth nightly.     potassium chloride SA 20 MEQ tablet  Commonly known as: K-DUR,KLOR-CON  Take 2 tablets (40 mEq total) by mouth once daily.     torsemide 10 MG Tab  Commonly known as: DEMADEX  Take 1 tablet (10 mg total) by mouth once daily.              Indwelling Lines/Drains at time of discharge:   Lines/Drains/Airways       None                   Time spent on the discharge of patient: 35 minutes         Angela Alexis MD  Department of Hospital Medicine  [facility] - Cardiology Stepdown

## 2024-06-23 NOTE — NURSING
06/23/24 0012 06/23/24 0020 06/23/24 0042   Vital Signs   Temp 98.8 °F (37.1 °C)  --   --    Pulse 81 106 75   Heart Rate Source Monitor  --  Monitor   Resp 16  --   --    SpO2 98 %  --   --    Device (Oxygen Therapy) room air  --   --    /76  --   --    MAP (mmHg) 92  --   --    ECG   Rhythm  --   --  atrial rhythm   Atrial Rhythm  --  atrial fibrillation;atrial flutter atrial flutter   OR Interval (Sec)  --  0 0.12   QRS Interval (Sec)  --  0.08 0.07   QT Interval (Sec)  --  0.32 0.18   QTc Interval (Sec)  --  0.42 0.21     Patient arrived to [facility] from ED with plans for EP consult and possible cardioversion for new onset atrial flutter. Patient is AAOX4, RR even and unlabored, ambulates with steady gait, and vitals wnl. Rate is controlled at rest but patient becomes tachycardic with ambulation. Denies any shortness of breath, chest pain, or lightheadedness. Gave patient education on NPO status starting at 0200. Patient verbalized understanding. Call light within reach and bed in lowest position. Suction and oxygen flowmeter set up in room. Care on going.

## 2024-06-23 NOTE — ASSESSMENT & PLAN NOTE
48-year-old male with a history of bacterial endocarditis and severe mitral regurg s/p repair complicated by dehiscence of the ring and atrial fibrillation on metoprolol and amiodarone at home who was admitted for new onset atrial flutter.  Initial EKG showed a flutter with variable AV block and prolonged QTC at 528 with heart rate of 89.  Patient was previously on Eliquis but was stopped due to GI bleed 2/2 polyps back in February now s/p polypectomy.  Patient converted to normal sinus rhythm this morning on EKG and QTC improved to 483.  He has a XHH1OT3-IQGW score of 2.  Echo from April 2024 showed EF of 55 to 70%.    Recommendations:  - patient now back to normal sinus rhythm on repeat EKG this morning  - recommending starting patient on Eliquis  - no indications for repeat echo  - follow up with Dr. Mercado outpatient

## 2024-06-23 NOTE — ASSESSMENT & PLAN NOTE
48-year-old male with a history of bacterial endocarditis and severe mitral regurg s/p repair complicated by dehiscence of the ring and atrial fibrillation on metoprolol and amiodarone at home who was admitted for new onset atrial flutter.  Initial EKG showed a flutter with variable AV block and prolonged QTC at 528 with heart rate of 89.  Patient was previously on Eliquis but was stopped due to GI bleed 2/2 polyps back in February now s/p polypectomy.  Patient converted to normal sinus rhythm this morning on EKG and QTC improved to 483.  He has a GSM3ZF1-GQUT score of 2.  Echo from April 2024 showed EF of 55 to 70%.    Recommendations:  - patient now back to normal sinus rhythm on repeat EKG this morning  - recommending starting patient on Eliquis  - continue home amiodarone , metoprolol and torsemide  - no indications for repeat echo  - follow up with Dr. Rogelio stark

## 2024-06-23 NOTE — ASSESSMENT & PLAN NOTE
History of diastolic CHF, with most recent EF 55-70% per TTE 4/2024. Continue home Metoprolol and Torsemide.

## 2024-06-23 NOTE — ED NOTES
Telemetry Verification   Patient placed on Telemetry Box  Verified with War Room  Box # [id number]   Monitor Tech War room   Rate 84   Rhythm A fib

## 2024-06-23 NOTE — PLAN OF CARE
[facility] - Cardiology Stepdown  Discharge Final Note    Primary Care Provider: No, Primary Doctor    Expected Discharge Date: 6/23/2024    Final Discharge Note (most recent)       Final Note - 06/23/24 1343          Final Note    Assessment Type Final Discharge Note (P)      Anticipated Discharge Disposition Home or Self Care (P)      What phone number can be called within the next 1-3 days to see how you are doing after discharge? [phone] (P)      Hospital Resources/Appts/Education Provided Provided patient/caregiver with written discharge plan information;Provided education on problems/symptoms using teachback;Appointments scheduled and added to AVS (P)         Post-Acute Status    Post-Acute Authorization Other (P)      Other Status No Post-Acute Service Needs (P)      Discharge Delays None known at this time (P)                      Important Message from Medicare             Contact Info       Emerson Mercado MD   Specialty: Cardiovascular Disease, Cardiology    [address] [facility] [address]   Phone: [phone]       Next Steps: Follow up    Manuel Beal MD   Specialty: Cardiothoracic Surgery, Transplant    [address] [facility] [address]   Phone: [phone]       Next Steps: Follow up          Pt. discharged home with Self-Care. After review of pt's medical record, no discharge needs identified at this time.     Cata Jane LMSW

## 2024-06-23 NOTE — ASSESSMENT & PLAN NOTE
Presents with complaints of palpitations, and EKG shows new onset atrial flutter with controlled rate.  He has a history of paroxysmal atrial fibrillation, not surprising given his history of endocarditis and mitral valve regurgitation s/p ring repair. TSH WNL. Appears euvolemic. Check UDS. Cardiology consulted; plan to evaluate by EP tomorrow with possible cardioversion if indicated. Continue home Metoprolol and Amiodarone.     Of note, not on chronic anticoagulation due to history of GI bleeding from sigmoid polyp. This has since been removed by colorectal surgery.  Denies any melena or hematochezia.  Appreciate Cardiology recs regarding anticoagulation; for now, will give ppx Lovenox.

## 2024-06-23 NOTE — NURSING
Nurses Note -- 4 Eyes      6/23/2024         Skin assessed during: Admit      [x] No Altered Skin Integrity Present    []Prevention Measures Documented      [] Yes- Altered Skin Integrity Present or Discovered   [] LDA Added if Not in Epic (Describe Wound)   [] New Altered Skin Integrity was Present on Admit and Documented in LDA   [] Wound Image Taken  [] Already in LDA    Wound Care Consulted? No    Attending Nurse:  BARTOLO MAO RN     Second RN/Staff Member:  ANNA Santillan

## 2024-06-23 NOTE — HPI
Lorenzo Nino is a 48 y.o. male with history of bacterial endocarditis and severe mitral regurgitation s/p repair 11/03/2023 complicated by subsequent dehiscence of ring now on chronic antibiotic suppression, diastolic CHF, history of AFib, pulm HTN, CKD 3, history of GI hemorrhage due to polyp who presents today with palpitations.    He reports that on Wednesday, he felt some palpitations and his Apple watch told him that he was in AFib.  He went to bed, and this resolved by the morning.  This afternoon, his palpitations returned.  He was Apple watch told him that he was in AFib again.  He is relatively asymptomatic as denies any shortness for breath, chest pain, nausea, dizziness, or lower extremity edema associated with this.  He presents today as he was concerned due to his cardiac history.    Noted to be in new atrial flutter with a normal rate in the ED.  Cardiology consulted and recommended admission with EP consultation in the morning for possible cardioversion if indicated.

## 2024-06-23 NOTE — HPI
48-year-old male with a history of bacterial endocarditis and severe mitral regurgitation s/p repair on 11/3/23 complicated by subsequent dehiscence of ring now on chronic antibiotic suppression (doxycycline), diastolic CHF, atrial fibrillation (not on anticoagulation, pulmonary HTN, CKD stage 3, and history of GI hemorrhage 2/2 polyps who presented with palpitations.  Patient reports that he noticed that he was having palpitations on 06/19 and was told by his Apple watch that he was in AFib.  The palpitations were resolve spontaneously the next day.  On 06/22, the palpitations returned and was told by his Apple watch again that he was in AFib.  He denies any chest pain, dizziness, shortness of breath, lightheadedness, presyncope, lower extremity edema, orthopnea, PND.  In the ED, EKG was significant for Atrial Flutter with variable AV block and a prolonged QTC at 05:28 and heart rate of 89.  BNP was elevated at 615.  Chest x-ray showed borderline cardiomegaly.  Cardiology was consulted for new onset atrial flutter.

## 2024-06-23 NOTE — H&P
[facility] - Cardiology [facility] Medicine  History & Physical    Patient Name: Lorenzo Nino  MRN: [id number]  Patient Class: OP- Observation  Admission Date: 6/22/2024  Attending Physician: Angela Alexis MD   Primary Care Provider: Terrie Primary Doctor         Patient information was obtained from patient and past medical records.     Subjective:     Principal Problem:New onset atrial flutter    Chief Complaint:   Chief Complaint   Patient presents with    Palpitations     States apple watch keeps telling him he is in afib. Denies Chest pain or SOB.         HPI: Lorenzo Nino is a 48 y.o. male with history of bacterial endocarditis and severe mitral regurgitation s/p repair 11/03/2023 complicated by subsequent dehiscence of ring now on chronic antibiotic suppression, diastolic CHF, history of AFib, pulm HTN, CKD 3, history of GI hemorrhage due to polyp who presents today with palpitations.    He reports that on Wednesday, he felt some palpitations and his Apple watch told him that he was in AFib.  He went to bed, and this resolved by the morning.  This afternoon, his palpitations returned.  He was Apple watch told him that he was in AFib again.  He is relatively asymptomatic as denies any shortness for breath, chest pain, nausea, dizziness, or lower extremity edema associated with this.  He presents today as he was concerned due to his cardiac history.    Noted to be in new atrial flutter with a normal rate in the ED.  Cardiology consulted and recommended admission with EP consultation in the morning for possible cardioversion if indicated.    Past Medical History:   Diagnosis Date    Hypertension        Past Surgical History:   Procedure Laterality Date    COLONOSCOPY N/A 1/5/2024    Procedure: COLONOSCOPY;  Surgeon: Fadia Ellsworth MD;  Location: [address]);  Service: Endoscopy;  Laterality: N/A;    COLONOSCOPY, WITH DIRECTED SUBMUCOSAL INJECTION  2/27/2024    Procedure: COLONOSCOPY, WITH  DIRECTED SUBMUCOSAL INJECTION;  Surgeon: Mayur Dunn MD;  [facility];  Service: Colon and Rectal;;    COLONOSCOPY, WITH POLYPECTOMY USING SNARE N/A 2/27/2024    Procedure: COLONOSCOPY, WITH POLYPECTOMY USING SNARE;  Surgeon: Mayur Dunn MD;  [facility];  Service: Colon and Rectal;  Laterality: N/A;    ECHOCARDIOGRAM,TRANSESOPHAGEAL N/A 12/26/2023    Procedure: Transesophageal echo (NEW) intra-procedure log documentation;  Surgeon: David, [facility];  Location: [facility] EP LAB;  Service: Cardiology;  Laterality: N/A;    ESOPHAGOGASTRODUODENOSCOPY N/A 1/5/2024    Procedure: EGD (ESOPHAGOGASTRODUODENOSCOPY);  Surgeon: Fadia Ellsworth MD;  Location: [facility] ENDO ([address] [place] [address]);  Service: Endoscopy;  Laterality: N/A;    EXCLUSION, LEFT ATRIAL APPENDAGE, OPEN, AS PART OF OPEN CHEST SURGERY Left 11/3/2023    Procedure: EXCLUSION, LEFT ATRIAL APPENDAGE, OPEN, AS PART OF OPEN CHEST SURGERY;  Surgeon: Manuel Beal MD;  [facility];  Service: Cardiothoracic;  Laterality: Left;    INSERTION OF INTRA-AORTIC BALLOON ASSIST DEVICE Right 11/2/2023    Procedure: INSERTION, INTRA-AORTIC BALLOON PUMP;  Surgeon: Jose Vidal MD;  Location: [facility] CATH LAB;  Service: Cardiology;  Laterality: Right;    REPAIR, MITRAL VALVE, OPEN N/A 11/3/2023    Procedure: REPAIR, MITRAL VALVE, OPEN;  Surgeon: Manuel Beal MD;  [facility];  Service: Cardiothoracic;  Laterality: N/A;       Review of patient's allergies indicates:  No Known Allergies    Current Facility-Administered Medications on File Prior to Encounter   Medication    0.9%  NaCl infusion    mupirocin 2 % ointment     Current Outpatient Medications on File Prior to Encounter   Medication Sig    amiodarone (PACERONE) 200 MG Tab Take 1 tablet (200 mg total) by mouth once daily.    dapagliflozin propanediol (FARXIGA) 10 mg tablet Take 1 tablet (10 mg total) by mouth once daily.    doxycycline  (VIBRAMYCIN) 100 MG Cap Take 1 capsule (100 mg total) by mouth 2 (two) times daily.    metoprolol succinate (TOPROL-XL) 25 MG 24 hr tablet Take 1 tablet (25 mg total) by mouth once daily.    mirtazapine (REMERON SOL-TAB) 15 MG disintegrating tablet Dissolve 1 tablet (15 mg total) by mouth nightly.    potassium chloride SA (K-DUR,KLOR-CON) 20 MEQ tablet Take 2 tablets (40 mEq total) by mouth once daily.    torsemide (DEMADEX) 10 MG Tab Take 1 tablet (10 mg total) by mouth once daily.     Family History    None       Tobacco Use    Smoking status: Unknown     Passive exposure: Never    Smokeless tobacco: Not on file   Substance and Sexual Activity    Alcohol use: Yes     Alcohol/week: 1.0 standard drink of alcohol     Types: 1 Cans of beer per week    Drug use: Never    Sexual activity: Not Currently     Partners: Female     Birth control/protection: None     Review of Systems   Constitutional:         All pertinent other ROS noted in HPI   All other systems reviewed and are negative.    Objective:     Vital Signs (Most Recent):  Temp: 98.8 °F (37.1 °C) (06/23/24 0012)  Pulse: 81 (06/23/24 0012)  Resp: 16 (06/23/24 0012)  BP: 121/76 (06/23/24 0012)  SpO2: 98 % (06/23/24 0012) Vital Signs (24h Range):  Temp:  [97.9 °F (36.6 °C)-98.8 °F (37.1 °C)] 98.8 °F (37.1 °C)  Pulse:  [53-96] 81  Resp:  [12-19] 16  SpO2:  [97 %-100 %] 98 %  BP: (106-129)/(72-82) 121/76     Weight: 74.4 kg (164 lb)  Body mass index is 22.24 kg/m².     Physical Exam  Vitals and nursing note reviewed.   Constitutional:       General: He is not in acute distress.     Appearance: He is well-developed. He is not ill-appearing or diaphoretic.   HENT:      Head: Normocephalic and atraumatic.   Eyes:      General: No scleral icterus.     Conjunctiva/sclera: Conjunctivae normal.   Neck:      Vascular: No JVD.   Cardiovascular:      Rate and Rhythm: Normal rate and regular rhythm.   Pulmonary:      Effort: Pulmonary effort is normal. No respiratory distress.    Musculoskeletal:      Right lower leg: No edema.      Left lower leg: No edema.   Skin:     Coloration: Skin is not jaundiced or pale.   Neurological:      Mental Status: He is alert and oriented to person, place, and time.      Motor: No abnormal muscle tone.   Psychiatric:         Mood and Affect: Mood normal.         Behavior: Behavior normal.                Significant Labs: All pertinent labs within the past 24 hours have been reviewed.  CBC:   Recent Labs   Lab 06/22/24 1931   WBC 9.02   HGB 11.2*   HCT 36.9*        CMP:   Recent Labs   Lab 06/22/24 1931      K 3.8      CO2 24   GLU 93   BUN 24*   CREATININE 1.6*   CALCIUM 9.4   PROT 7.8   ALBUMIN 3.2*   BILITOT 0.4   ALKPHOS 79   AST 37   ALT 38   ANIONGAP 11       Significant Imaging: I have reviewed all pertinent imaging results/findings within the past 24 hours.  Assessment/Plan:     * New onset atrial flutter  Presents with complaints of palpitations, and EKG shows new onset atrial flutter with controlled rate.  He has a history of paroxysmal atrial fibrillation, not surprising given his history of endocarditis and mitral valve regurgitation s/p ring repair. TSH WNL. Appears euvolemic. Check UDS. Cardiology consulted; plan to evaluate by EP tomorrow with possible cardioversion if indicated. Continue home Metoprolol and Amiodarone.     Of note, not on chronic anticoagulation due to history of GI bleeding from sigmoid polyp. This has since been removed by colorectal surgery.  Denies any melena or hematochezia.  Appreciate Cardiology recs regarding anticoagulation; for now, will give ppx Lovenox.       Stage 3 chronic kidney disease  Renal function remains around baseline. Avoid nephrotoxic agents as able, and renally dose all meds as applicable.    Pulmonary hypertension  Currently appears euvolemic. Continue home Torsemide.       Chronic diastolic heart failure  History of diastolic CHF, with most recent EF 55-70% per TTE 4/2024.  Continue home Metoprolol and Torsemide.       Microcytic anemia  Currently stable without indication for transfusion.  Monitor.    S/P MVR (mitral valve repair)  S/p annular ring repair for mitral regurgitation, complicated by dehiscence.  Severe mitral regurgitation noted on TTE 4/2024. Continue home GDMT and prophylactic doxycycline.         VTE Risk Mitigation (From admission, onward)           Ordered     enoxaparin injection 40 mg  Daily         06/22/24 2318     IP VTE HIGH RISK PATIENT  Once         06/22/24 2318     Place sequential compression device  Until discontinued         06/22/24 2318                       On 06/23/2024, patient should be placed in hospital observation services under my care.    Advance Care Planning   Patient is full code on discussion with him.  Patient's surrogate decision maker is his significant other.            Jaya Carlisle MD  Department of Hospital Medicine  [facility] - Cardiology Stepdown

## 2024-06-23 NOTE — ED TRIAGE NOTES
Lorenzo Nino, a 48 y.o. male presents to the ED w/ complaint of heart palpatations, and SOB. Hx: artifical heart valve.    Triage note:  Chief Complaint   Patient presents with    Palpitations     States apple watch keeps telling him he is in afib. Denies Chest pain or SOB.      Review of patient's allergies indicates:  No Known Allergies  Past Medical History:   Diagnosis Date    Hypertension        Patient identifiers verified and correct for   LOC: The patient is awake, alert and aware of environment with an appropriate affect, the patient is oriented x 3 and speaking appropriately.   APPEARANCE: Patient appears comfortable and in no acute distress, patient is clean and well groomed.  SKIN: The skin is warm and dry, color consistent with ethnicity, patient has normal skin turgor and moist mucus membranes, skin intact, no breakdown or bruising noted.   MUSCULOSKELETAL: Patient moving all extremities spontaneously, no swelling noted.  RESPIRATORY: Airway is open and patent, respirations are spontaneous, patient has a normal effort and rate, no accessory muscle use noted, pt placed on continuous pulse ox with O2 sats noted at 97% on room air.  CARDIAC: Pt placed on cardiac monitor. Patient has a normal rate and regular rhythm, no edema noted, capillary refill < 3 seconds.   GASTRO: Soft and non tender to palpation, no distention noted, normoactive bowel sounds present in all four quadrants. Pt states bowel movements have been regular.  : Pt denies any pain or frequency with urination.  NEURO: Pt opens eyes spontaneously, behavior appropriate to situation, follows commands, facial expression symmetrical.

## 2024-06-23 NOTE — SUBJECTIVE & OBJECTIVE
Past Medical History:   Diagnosis Date    Hypertension        Past Surgical History:   Procedure Laterality Date    COLONOSCOPY N/A 1/5/2024    Procedure: COLONOSCOPY;  Surgeon: Fadia Ellsworth MD;  Location: [facility] ENDO (2ND FLR);  Service: Endoscopy;  Laterality: N/A;    COLONOSCOPY, WITH DIRECTED SUBMUCOSAL INJECTION  2/27/2024    Procedure: COLONOSCOPY, WITH DIRECTED SUBMUCOSAL INJECTION;  Surgeon: Mayur Dunn MD;  Location: [facility] OR [facility] FLR;  Service: Colon and Rectal;;    COLONOSCOPY, WITH POLYPECTOMY USING SNARE N/A 2/27/2024    Procedure: COLONOSCOPY, WITH POLYPECTOMY USING SNARE;  Surgeon: Mayur Dunn MD;  [facility];  Service: Colon and Rectal;  Laterality: N/A;    ECHOCARDIOGRAM,TRANSESOPHAGEAL N/A 12/26/2023    Procedure: Transesophageal echo (NEW) intra-procedure log documentation;  Surgeon: Provider, [facility];  Location: [facility] EP LAB;  Service: Cardiology;  Laterality: N/A;    ESOPHAGOGASTRODUODENOSCOPY N/A 1/5/2024    Procedure: EGD (ESOPHAGOGASTRODUODENOSCOPY);  Surgeon: Fadia Ellsworth MD;  Location: [facility] ENDO (2ND FLR);  Service: Endoscopy;  Laterality: N/A;    EXCLUSION, LEFT ATRIAL APPENDAGE, OPEN, AS PART OF OPEN CHEST SURGERY Left 11/3/2023    Procedure: EXCLUSION, LEFT ATRIAL APPENDAGE, OPEN, AS PART OF OPEN CHEST SURGERY;  Surgeon: Manuel Beal MD;  [facility];  Service: Cardiothoracic;  Laterality: Left;    INSERTION OF INTRA-AORTIC BALLOON ASSIST DEVICE Right 11/2/2023    Procedure: INSERTION, INTRA-AORTIC BALLOON PUMP;  Surgeon: Jose Vidal MD;  Location: [facility] CATH LAB;  Service: Cardiology;  Laterality: Right;    REPAIR, MITRAL VALVE, OPEN N/A 11/3/2023    Procedure: REPAIR, MITRAL VALVE, OPEN;  Surgeon: Manuel Beal MD;  [facility];  Service: Cardiothoracic;  Laterality: N/A;       Review of patient's allergies indicates:  No Known Allergies    Current Facility-Administered Medications on  File Prior to Encounter   Medication    0.9%  NaCl infusion    mupirocin 2 % ointment     Current Outpatient Medications on File Prior to Encounter   Medication Sig    amiodarone (PACERONE) 200 MG Tab Take 1 tablet (200 mg total) by mouth once daily.    dapagliflozin propanediol (FARXIGA) 10 mg tablet Take 1 tablet (10 mg total) by mouth once daily.    doxycycline (VIBRAMYCIN) 100 MG Cap Take 1 capsule (100 mg total) by mouth 2 (two) times daily.    metoprolol succinate (TOPROL-XL) 25 MG 24 hr tablet Take 1 tablet (25 mg total) by mouth once daily.    mirtazapine (REMERON SOL-TAB) 15 MG disintegrating tablet Dissolve 1 tablet (15 mg total) by mouth nightly.    potassium chloride SA (K-DUR,KLOR-CON) 20 MEQ tablet Take 2 tablets (40 mEq total) by mouth once daily.    torsemide (DEMADEX) 10 MG Tab Take 1 tablet (10 mg total) by mouth once daily.     Family History    None       Tobacco Use    Smoking status: Unknown     Passive exposure: Never    Smokeless tobacco: Not on file   Substance and Sexual Activity    Alcohol use: Yes     Alcohol/week: 1.0 standard drink of alcohol     Types: 1 Cans of beer per week    Drug use: Never    Sexual activity: Not Currently     Partners: Female     Birth control/protection: None     Review of Systems   Constitutional: Negative for chills and fever.   Cardiovascular:  Positive for palpitations. Negative for chest pain, leg swelling, orthopnea and paroxysmal nocturnal dyspnea.   Gastrointestinal:  Negative for abdominal pain and nausea.   Neurological:  Negative for dizziness and light-headedness.   All other systems reviewed and are negative.    Objective:     Vital Signs (Most Recent):  Temp: 98 °F (36.7 °C) (06/23/24 0730)  Pulse: 62 (06/23/24 0730)  Resp: 18 (06/23/24 0730)  BP: 105/72 (06/23/24 0730)  SpO2: 97 % (06/23/24 0730) Vital Signs (24h Range):  Temp:  [97.9 °F (36.6 °C)-98.8 °F (37.1 °C)] 98 °F (36.7 °C)  Pulse:  [] 62  Resp:  [12-19] 18  SpO2:  [97 %-100 %] 97  %  BP: ()/(60-82) 105/72     Weight: 77.2 kg (170 lb 3.1 oz)  Body mass index is 23.08 kg/m².    SpO2: 97 %       No intake or output data in the 24 hours ending 06/23/24 1012    Lines/Drains/Airways       Peripherally Inserted Central Catheter Line  Duration             PICC Double Lumen 11/13/23 1509 right basilic 222 days              Peripheral Intravenous Line  Duration                  Peripheral IV - Single Lumen 06/22/24 2306 20 G Right Antecubital <1 day                     Physical Exam  Vitals and nursing note reviewed.   Constitutional:       Appearance: Normal appearance.   HENT:      Head: Atraumatic.      Right Ear: External ear normal.      Left Ear: External ear normal.   Eyes:      Extraocular Movements: Extraocular movements intact.   Cardiovascular:      Rate and Rhythm: Normal rate and regular rhythm.      Heart sounds: Murmur heard.      High-pitched blowing holosystolic murmur is present with a grade of 3/6 at the apex.   Pulmonary:      Effort: Pulmonary effort is normal.      Breath sounds: Normal breath sounds.   Musculoskeletal:         General: Normal range of motion.      Right lower leg: No edema.      Left lower leg: No edema.   Skin:     General: Skin is warm and dry.   Neurological:      General: No focal deficit present.      Mental Status: He is alert and oriented to person, place, and time.   Psychiatric:         Mood and Affect: Mood normal.         Behavior: Behavior normal.          Significant Labs: All pertinent lab results from the last 24 hours have been reviewed.    Significant Imaging:  All pertinent imaging results from the last 24 hours have been reviewed.

## 2024-06-23 NOTE — SUBJECTIVE & OBJECTIVE
Past Medical History:   Diagnosis Date    Hypertension        Past Surgical History:   Procedure Laterality Date    COLONOSCOPY N/A 1/5/2024    Procedure: COLONOSCOPY;  Surgeon: Fadia Ellsworth MD;  Location: [facility] ENDO (2ND FLR);  Service: Endoscopy;  Laterality: N/A;    COLONOSCOPY, WITH DIRECTED SUBMUCOSAL INJECTION  2/27/2024    Procedure: COLONOSCOPY, WITH DIRECTED SUBMUCOSAL INJECTION;  Surgeon: Mayur Dunn MD;  Location: [facility] OR [facility] FLR;  Service: Colon and Rectal;;    COLONOSCOPY, WITH POLYPECTOMY USING SNARE N/A 2/27/2024    Procedure: COLONOSCOPY, WITH POLYPECTOMY USING SNARE;  Surgeon: Mayur Dunn MD;  [facility];  Service: Colon and Rectal;  Laterality: N/A;    ECHOCARDIOGRAM,TRANSESOPHAGEAL N/A 12/26/2023    Procedure: Transesophageal echo (NEW) intra-procedure log documentation;  Surgeon: Provider, [facility];  Location: [facility] EP LAB;  Service: Cardiology;  Laterality: N/A;    ESOPHAGOGASTRODUODENOSCOPY N/A 1/5/2024    Procedure: EGD (ESOPHAGOGASTRODUODENOSCOPY);  Surgeon: Fadia Ellsworth MD;  Location: [facility] ENDO (2ND FLR);  Service: Endoscopy;  Laterality: N/A;    EXCLUSION, LEFT ATRIAL APPENDAGE, OPEN, AS PART OF OPEN CHEST SURGERY Left 11/3/2023    Procedure: EXCLUSION, LEFT ATRIAL APPENDAGE, OPEN, AS PART OF OPEN CHEST SURGERY;  Surgeon: Manuel Beal MD;  [facility];  Service: Cardiothoracic;  Laterality: Left;    INSERTION OF INTRA-AORTIC BALLOON ASSIST DEVICE Right 11/2/2023    Procedure: INSERTION, INTRA-AORTIC BALLOON PUMP;  Surgeon: Jose Vidal MD;  Location: [facility] CATH LAB;  Service: Cardiology;  Laterality: Right;    REPAIR, MITRAL VALVE, OPEN N/A 11/3/2023    Procedure: REPAIR, MITRAL VALVE, OPEN;  Surgeon: Manuel Beal MD;  [facility];  Service: Cardiothoracic;  Laterality: N/A;       Review of patient's allergies indicates:  No Known Allergies    Current Facility-Administered Medications on  File Prior to Encounter   Medication    0.9%  NaCl infusion    mupirocin 2 % ointment     Current Outpatient Medications on File Prior to Encounter   Medication Sig    amiodarone (PACERONE) 200 MG Tab Take 1 tablet (200 mg total) by mouth once daily.    dapagliflozin propanediol (FARXIGA) 10 mg tablet Take 1 tablet (10 mg total) by mouth once daily.    doxycycline (VIBRAMYCIN) 100 MG Cap Take 1 capsule (100 mg total) by mouth 2 (two) times daily.    metoprolol succinate (TOPROL-XL) 25 MG 24 hr tablet Take 1 tablet (25 mg total) by mouth once daily.    mirtazapine (REMERON SOL-TAB) 15 MG disintegrating tablet Dissolve 1 tablet (15 mg total) by mouth nightly.    potassium chloride SA (K-DUR,KLOR-CON) 20 MEQ tablet Take 2 tablets (40 mEq total) by mouth once daily.    torsemide (DEMADEX) 10 MG Tab Take 1 tablet (10 mg total) by mouth once daily.     Family History    None       Tobacco Use    Smoking status: Unknown     Passive exposure: Never    Smokeless tobacco: Not on file   Substance and Sexual Activity    Alcohol use: Yes     Alcohol/week: 1.0 standard drink of alcohol     Types: 1 Cans of beer per week    Drug use: Never    Sexual activity: Not Currently     Partners: Female     Birth control/protection: None     Review of Systems   Constitutional:         All pertinent other ROS noted in HPI   All other systems reviewed and are negative.    Objective:     Vital Signs (Most Recent):  Temp: 98.8 °F (37.1 °C) (06/23/24 0012)  Pulse: 81 (06/23/24 0012)  Resp: 16 (06/23/24 0012)  BP: 121/76 (06/23/24 0012)  SpO2: 98 % (06/23/24 0012) Vital Signs (24h Range):  Temp:  [97.9 °F (36.6 °C)-98.8 °F (37.1 °C)] 98.8 °F (37.1 °C)  Pulse:  [53-96] 81  Resp:  [12-19] 16  SpO2:  [97 %-100 %] 98 %  BP: (106-129)/(72-82) 121/76     Weight: 74.4 kg (164 lb)  Body mass index is 22.24 kg/m².     Physical Exam  Vitals and nursing note reviewed.   Constitutional:       General: He is not in acute distress.     Appearance: He is  well-developed. He is not ill-appearing or diaphoretic.   HENT:      Head: Normocephalic and atraumatic.   Eyes:      General: No scleral icterus.     Conjunctiva/sclera: Conjunctivae normal.   Neck:      Vascular: No JVD.   Cardiovascular:      Rate and Rhythm: Normal rate and regular rhythm.   Pulmonary:      Effort: Pulmonary effort is normal. No respiratory distress.   Musculoskeletal:      Right lower leg: No edema.      Left lower leg: No edema.   Skin:     Coloration: Skin is not jaundiced or pale.   Neurological:      Mental Status: He is alert and oriented to person, place, and time.      Motor: No abnormal muscle tone.   Psychiatric:         Mood and Affect: Mood normal.         Behavior: Behavior normal.                Significant Labs: All pertinent labs within the past 24 hours have been reviewed.  CBC:   Recent Labs   Lab 06/22/24 1931   WBC 9.02   HGB 11.2*   HCT 36.9*        CMP:   Recent Labs   Lab 06/22/24 1931      K 3.8      CO2 24   GLU 93   BUN 24*   CREATININE 1.6*   CALCIUM 9.4   PROT 7.8   ALBUMIN 3.2*   BILITOT 0.4   ALKPHOS 79   AST 37   ALT 38   ANIONGAP 11       Significant Imaging: I have reviewed all pertinent imaging results/findings within the past 24 hours.

## 2024-06-23 NOTE — HOSPITAL COURSE
Presents with complaints of palpitations on presentation. EKG shows new onset atrial flutter with controlled rate.  He has a history of paroxysmal atrial fibrillation, history of endocarditis and mitral valve regurgitation s/p ring repair. TSH WNL. Appears euvolemic. Cardiology consulted. Repeat EKG on 6/23 with NSR. Continue amiodarone, metoprolol and torsemide. Recommend starting eliquis 5 mg bid. No signs of CHF. OK to dc home from cards stand point and follow up with Tressa Mercado and Emigdio. Patient has no c/o of palpitations, lightheadedness, CP or SOB now. Patient is currently medically and HDS. He is being d/c home. Plan of care discussed with patient, verbalized understanding. All questions were answered.

## 2024-06-23 NOTE — ASSESSMENT & PLAN NOTE
Renal function remains around baseline. Avoid nephrotoxic agents as able, and renally dose all meds as applicable.   None

## 2024-06-23 NOTE — ASSESSMENT & PLAN NOTE
S/p annular ring repair for mitral regurgitation, complicated by dehiscence.  Severe mitral regurgitation noted on TTE 4/2024. Continue home GDMT and prophylactic doxycycline.

## 2024-06-24 ENCOUNTER — PATIENT MESSAGE (OUTPATIENT)
Dept: CARDIOLOGY | Facility: CLINIC | Age: 49
End: 2024-06-24
Payer: COMMERCIAL

## 2024-06-24 ENCOUNTER — TELEPHONE (OUTPATIENT)
Dept: CARDIOLOGY | Facility: CLINIC | Age: 49
End: 2024-06-24
Payer: COMMERCIAL

## 2024-06-24 DIAGNOSIS — I34.0 SEVERE MITRAL REGURGITATION: ICD-10-CM

## 2024-06-24 DIAGNOSIS — Z98.890 S/P MVR (MITRAL VALVE REPAIR): Primary | ICD-10-CM

## 2024-06-25 ENCOUNTER — TELEPHONE (OUTPATIENT)
Dept: CARDIOTHORACIC SURGERY | Facility: CLINIC | Age: 49
End: 2024-06-25
Payer: COMMERCIAL

## 2024-06-27 ENCOUNTER — PATIENT OUTREACH (OUTPATIENT)
Dept: ADMINISTRATIVE | Facility: CLINIC | Age: 49
End: 2024-06-27
Payer: COMMERCIAL

## 2024-06-27 NOTE — PROGRESS NOTES
C3 nurse spoke with Lorenzo Nino for a TCC post hospital discharge follow up call. The patient reports does not have a scheduled HOSFU appointment. C3 nurse was unable to schedule HOSFU appointment for Non-[facility] PCP. Patient advised to contact their PCP to schedule a HOSPFU within 5-7 days.       No PCP. Declined Home NP visit. Declined phone number to get an [facility] PCP.

## 2024-07-05 ENCOUNTER — HOSPITAL ENCOUNTER (OUTPATIENT)
Dept: CARDIOLOGY | Facility: HOSPITAL | Age: 49
Discharge: HOME | End: 2024-07-05
Attending: INTERNAL MEDICINE
Payer: COMMERCIAL

## 2024-07-05 VITALS — BODY MASS INDEX: 23.03 KG/M2 | HEIGHT: 72 IN | WEIGHT: 170 LBS

## 2024-07-05 DIAGNOSIS — I34.0 SEVERE MITRAL REGURGITATION: ICD-10-CM

## 2024-07-05 DIAGNOSIS — Z98.890 S/P MVR (MITRAL VALVE REPAIR): ICD-10-CM

## 2024-07-05 LAB
ASCENDING AORTA: 3.55 CM
AV INDEX (PROSTH): 0.64
AV MEAN GRADIENT: 4 MMHG
AV PEAK GRADIENT: 6 MMHG
AV VALVE AREA BY VELOCITY RATIO: 2.32 CM²
AV VALVE AREA: 2.37 CM²
AV VELOCITY RATIO: 0.62
BSA FOR ECHO PROCEDURE: 1.98 M2
CV ECHO LV RWT: 0.2 CM
DOP CALC AO PEAK VEL: 1.22 M/S
DOP CALC AO VTI: 30.05 CM
DOP CALC LVOT AREA: 3.7 CM2
DOP CALC LVOT DIAMETER: 2.18 CM
DOP CALC LVOT PEAK VEL: 0.76 M/S
DOP CALC LVOT STROKE VOLUME: 71.37 CM3
DOP CALCLVOT PEAK VEL VTI: 19.13 CM
E WAVE DECELERATION TIME: 534.74 MSEC
E/A RATIO: 3.68
E/E' RATIO: 13.13 M/S
ECHO LV POSTERIOR WALL: 0.64 CM (ref 0.6–1.1)
FRACTIONAL SHORTENING: 39 % (ref 28–44)
INTERVENTRICULAR SEPTUM: 0.88 CM (ref 0.6–1.1)
LA MAJOR: 5.95 CM
LA MINOR: 6.01 CM
LA WIDTH: 5.6 CM
LEFT ATRIUM SIZE: 5.35 CM
LEFT ATRIUM VOLUME INDEX MOD: 74.4 ML/M2
LEFT ATRIUM VOLUME INDEX: 76.5 ML/M2
LEFT ATRIUM VOLUME MOD: 148.03 CM3
LEFT ATRIUM VOLUME: 152.28 CM3
LEFT INTERNAL DIMENSION IN SYSTOLE: 3.82 CM (ref 2.1–4)
LEFT VENTRICLE DIASTOLIC VOLUME INDEX: 100.54 ML/M2
LEFT VENTRICLE DIASTOLIC VOLUME: 200.07 ML
LEFT VENTRICLE MASS INDEX: 95 G/M2
LEFT VENTRICLE SYSTOLIC VOLUME INDEX: 31.5 ML/M2
LEFT VENTRICLE SYSTOLIC VOLUME: 62.66 ML
LEFT VENTRICULAR INTERNAL DIMENSION IN DIASTOLE: 6.28 CM (ref 3.5–6)
LEFT VENTRICULAR MASS: 189.36 G
LV LATERAL E/E' RATIO: 9.44 M/S
LV SEPTAL E/E' RATIO: 21.57 M/S
MV PEAK A VEL: 0.41 M/S
MV PEAK E VEL: 1.51 M/S
MV STENOSIS PRESSURE HALF TIME: 155.07 MS
MV VALVE AREA P 1/2 METHOD: 1.42 CM2
PISA MRMAX VEL: 0.05 M/S
PISA TR MAX VEL: 3.56 M/S
RA MAJOR: 5.17 CM
RA PRESSURE ESTIMATED: 3 MMHG
RA WIDTH: 3.6 CM
RIGHT VENTRICLE DIASTOLIC BASEL DIMENSION: 3.9 CM
RV TB RVSP: 7 MMHG
SINUS: 3.47 CM
STJ: 2.51 CM
TDI LATERAL: 0.16 M/S
TDI SEPTAL: 0.07 M/S
TDI: 0.12 M/S
TR MAX PG: 51 MMHG
TRICUSPID ANNULAR PLANE SYSTOLIC EXCURSION: 1.75 CM
TV REST PULMONARY ARTERY PRESSURE: 54 MMHG
Z-SCORE OF LEFT VENTRICULAR DIMENSION IN END DIASTOLE: 0.84
Z-SCORE OF LEFT VENTRICULAR DIMENSION IN END SYSTOLE: 0.59

## 2024-07-05 PROCEDURE — 93306 TTE W/DOPPLER COMPLETE: CPT

## 2024-07-05 PROCEDURE — 93306 TTE W/DOPPLER COMPLETE: CPT | Mod: 26,,, | Performed by: INTERNAL MEDICINE

## 2024-07-09 RX ORDER — DOXYCYCLINE 100 MG/1
100 CAPSULE ORAL 2 TIMES DAILY
Qty: 60 CAPSULE | Refills: 2 | Status: SHIPPED | OUTPATIENT
Start: 2024-07-09

## 2024-07-11 ENCOUNTER — OFFICE VISIT (OUTPATIENT)
Dept: CARDIOLOGY | Facility: CLINIC | Age: 49
End: 2024-07-11
Payer: COMMERCIAL

## 2024-07-11 ENCOUNTER — OFFICE VISIT (OUTPATIENT)
Dept: CARDIOTHORACIC SURGERY | Facility: CLINIC | Age: 49
End: 2024-07-11
Payer: COMMERCIAL

## 2024-07-11 VITALS
BODY MASS INDEX: 23.23 KG/M2 | DIASTOLIC BLOOD PRESSURE: 77 MMHG | HEIGHT: 72 IN | SYSTOLIC BLOOD PRESSURE: 117 MMHG | HEART RATE: 54 BPM | WEIGHT: 171.5 LBS

## 2024-07-11 VITALS
DIASTOLIC BLOOD PRESSURE: 72 MMHG | BODY MASS INDEX: 23.17 KG/M2 | SYSTOLIC BLOOD PRESSURE: 105 MMHG | OXYGEN SATURATION: 100 % | HEART RATE: 54 BPM | HEIGHT: 72 IN | WEIGHT: 171.06 LBS

## 2024-07-11 DIAGNOSIS — I34.0 SEVERE MITRAL REGURGITATION: ICD-10-CM

## 2024-07-11 DIAGNOSIS — B95.62 MRSA BACTEREMIA: Primary | ICD-10-CM

## 2024-07-11 DIAGNOSIS — R78.81 MRSA BACTEREMIA: Primary | ICD-10-CM

## 2024-07-11 DIAGNOSIS — I48.0 PAROXYSMAL ATRIAL FIBRILLATION: ICD-10-CM

## 2024-07-11 DIAGNOSIS — I34.0 MITRAL VALVE INSUFFICIENCY, UNSPECIFIED ETIOLOGY: Primary | ICD-10-CM

## 2024-07-11 DIAGNOSIS — I50.41 ACUTE COMBINED SYSTOLIC AND DIASTOLIC HEART FAILURE: ICD-10-CM

## 2024-07-11 DIAGNOSIS — I48.0 PAROXYSMAL ATRIAL FIBRILLATION: Primary | ICD-10-CM

## 2024-07-11 DIAGNOSIS — I48.92 NEW ONSET ATRIAL FLUTTER: ICD-10-CM

## 2024-07-11 DIAGNOSIS — I27.20 PULMONARY HYPERTENSION: Chronic | ICD-10-CM

## 2024-07-11 DIAGNOSIS — I50.32 CHRONIC DIASTOLIC HEART FAILURE: Chronic | ICD-10-CM

## 2024-07-11 DIAGNOSIS — Z98.890 S/P MVR (MITRAL VALVE REPAIR): ICD-10-CM

## 2024-07-11 DIAGNOSIS — Z98.890 S/P MVR (MITRAL VALVE REPAIR): Chronic | ICD-10-CM

## 2024-07-11 PROCEDURE — 1159F MED LIST DOCD IN RCRD: CPT | Mod: CPTII,S$GLB,, | Performed by: THORACIC SURGERY (CARDIOTHORACIC VASCULAR SURGERY)

## 2024-07-11 PROCEDURE — 3074F SYST BP LT 130 MM HG: CPT | Mod: CPTII,S$GLB,, | Performed by: INTERNAL MEDICINE

## 2024-07-11 PROCEDURE — 3078F DIAST BP <80 MM HG: CPT | Mod: CPTII,S$GLB,, | Performed by: INTERNAL MEDICINE

## 2024-07-11 PROCEDURE — 3008F BODY MASS INDEX DOCD: CPT | Mod: CPTII,S$GLB,, | Performed by: INTERNAL MEDICINE

## 2024-07-11 PROCEDURE — 3074F SYST BP LT 130 MM HG: CPT | Mod: CPTII,S$GLB,, | Performed by: THORACIC SURGERY (CARDIOTHORACIC VASCULAR SURGERY)

## 2024-07-11 PROCEDURE — 99214 OFFICE O/P EST MOD 30 MIN: CPT | Mod: S$GLB,,, | Performed by: THORACIC SURGERY (CARDIOTHORACIC VASCULAR SURGERY)

## 2024-07-11 PROCEDURE — 1159F MED LIST DOCD IN RCRD: CPT | Mod: CPTII,S$GLB,, | Performed by: INTERNAL MEDICINE

## 2024-07-11 PROCEDURE — 3078F DIAST BP <80 MM HG: CPT | Mod: CPTII,S$GLB,, | Performed by: THORACIC SURGERY (CARDIOTHORACIC VASCULAR SURGERY)

## 2024-07-11 PROCEDURE — 99999 PR PBB SHADOW E&M-EST. PATIENT-LVL IV: CPT | Mod: PBBFAC,,, | Performed by: INTERNAL MEDICINE

## 2024-07-11 PROCEDURE — 99214 OFFICE O/P EST MOD 30 MIN: CPT | Mod: S$GLB,,, | Performed by: INTERNAL MEDICINE

## 2024-07-11 PROCEDURE — 99999 PR PBB SHADOW E&M-EST. PATIENT-LVL III: CPT | Mod: PBBFAC,,, | Performed by: THORACIC SURGERY (CARDIOTHORACIC VASCULAR SURGERY)

## 2024-07-11 PROCEDURE — 3008F BODY MASS INDEX DOCD: CPT | Mod: CPTII,S$GLB,, | Performed by: THORACIC SURGERY (CARDIOTHORACIC VASCULAR SURGERY)

## 2024-07-11 NOTE — PROGRESS NOTES
Subjective:      Patient ID: Lorenzo Nino is a 48 y.o. male.    Chief Complaint: No chief complaint on file.      HPI:  Lorenzo Nino is a 48 y.o. male who presents to clinic for evaluation of mitral insufficiency. 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 Beal, embolus to R eye (treated with dapto/ceftaroline), HFmrEF (45-50%). Admitted 12/18-1/8 for ADHF, found to have dehiscence of porcine annular repair of MV on NEW. Presented to ED for shortness of breath, weight gain, and was readmitted for congestive heart failure and hypoxic respiratory failure.  Workup subsequently revealed dehiscence of annular ring.  He was still on antibiotics at time of initial presentation continued during hospital course, no fever or recurrent cultures were present.  Evaluated by Dr Beal, who felt that he would benefit from better preoperative rehabilitation prior to reconsideration of additional surgery, plan  discharge with suppressive antibiotics and GDMT until adequate nutritional status could be obtained.  He had during this hospitalization acute kidney injury from titration of GDMT and diuretics, also had GI bleeding, and workup ultimately remarkable for large mass in colon.  Pathology ultimately revealed tubular adenoma.  He also had recurrent episodes of atrial fibrillation during this hospitalization now sinus on amiodarone.  ID following, original end date of daptomycin was 12/24, now extended to 1/8/24 (total 8 weeks.) and to transition to PO doxycycline until new valve is placed for persistent infection may be the cause of valve dehiscence.    He presents today with repeat ECHO, recent admission for afib with cardioversion and started Eliquis. He saw Dr. Mercado today for the same complaint.    Current medications Reviewed    Review of Systems   Constitutional:  Negative for activity change and fatigue.   Respiratory:  Negative for shortness of  breath.    Cardiovascular:  Negative for chest pain, palpitations and leg swelling.   Gastrointestinal:  Negative for abdominal pain, diarrhea and vomiting.   Endocrine: Negative for polydipsia, polyphagia and polyuria.   Genitourinary:  Negative for dysuria.   Musculoskeletal:  Negative for gait problem.   Skin:  Negative for rash.   Allergic/Immunologic: Negative for immunocompromised state.   Neurological:  Negative for dizziness, syncope and weakness.   Hematological:  Does not bruise/bleed easily.   Psychiatric/Behavioral:  Negative for behavioral problems.      Objective:   Physical Exam  Constitutional:       Appearance: Normal appearance.   HENT:      Head: Normocephalic.   Eyes:      Pupils: Pupils are equal, round, and reactive to light.   Cardiovascular:      Rate and Rhythm: Normal rate and regular rhythm.      Pulses: Normal pulses.   Pulmonary:      Effort: Pulmonary effort is normal.   Abdominal:      General: Abdomen is flat. Bowel sounds are normal.      Palpations: Abdomen is soft.   Musculoskeletal:         General: Normal range of motion.   Skin:     General: Skin is warm and dry.      Comments: MSI CDI   Neurological:      General: No focal deficit present.      Mental Status: He is alert.         Diagnotic Results:  ECHO:    Left Ventricle: The left ventricle is moderately dilated. Normal wall thickness. Normal wall motion. There is normal systolic function with a visually estimated ejection fraction of 55 - 60%. Diastolic function cannot be reliably determined in the presence of mitral valve disease and mitral valve ring/replacement.    Right Ventricle: Normal right ventricular cavity size. Wall thickness is normal. Right ventricle wall motion  is normal. Systolic function is normal.    Left Atrium: Left atrium is severely dilated.    Right Atrium: Normal right atrial size.    Mitral Valve: The mitral valve is repaired by annular ring 40 mm [employer]. There is mild bileaflet sclerosis. There  is severe regurgitation with a posterolateral eccentriccally directed and a central  jet. There appears to be a perforation in Anterior leaflets (clip #96 to 96- see below).Torn tissue is seen in this area in LA but its proximal attacment is not well seen. Consider NEW if clinically indicated. Mean gradient is 2.5 mmHg at HR o 53 bpm.    Tricuspid Valve: There is mild to moderate regurgitation with a centrally directed jet.    Pulmonic Valve: There is mild regurgitation.    Aorta: Aortic root is normal in size measuring 3.47 cm. Ascending aorta is normal measuring 3.55 cm.    Pulmonary Artery: There is moderate pulmonary hypertension. The estimated pulmonary artery systolic pressure is 54 mmHg.    IVC/SVC: Normal venous pressure at 3 mmHg.    Echo 1/30    Left Ventricle: The left ventricle is normal in size. Normal wall thickness. Regional wall motion abnormalities present. There is normal systolic function with a visually estimated ejection fraction of 60 - 65%. Diastolic function cannot be reliably determined in the presence of mitral valve disease.    Right Ventricle: Normal right ventricular cavity size. Wall thickness is normal. Right ventricle wall motion  is normal. Systolic function is normal.    Left Atrium: Left atrium is severely dilated.    Mitral Valve: The mitral valve is repaired with a bovine pericardial annuloplasty band. There is flail motion of a small part of valvular tissue, perhaps from the posterior leaflet, that leads to severe eccentric MR directed somewhat laterally.    Tricuspid Valve: There is mild to moderate regurgitation.    IVC/SVC: Normal venous pressure at 3 mmHg.     NEW 12/26/23    NEW for evaluation of mitral valve.    Left Ventricle: The left ventricle is normal in size. Normal wall thickness. Normal wall motion. There is low normal systolic function with a visually estimated ejection fraction of 50 - 55%.    Right Ventricle: Normal right ventricular cavity size. Wall thickness  is normal. Right ventricle wall motion  is normal. Systolic function is normal.    Aortic Valve: There is no mass/vegetation present.    Tricuspid Valve: There is no mass/vegetation present.    Severe mitral reguritaiton    Mitral Valve: Status post bovine pericardial annuloplasty band. Dehiscense of band from 10 o'clock to 4 o'clock. Two regurgitant jets noted. First jet due to failure of coaptation at A2-P2 secondary to anuloplasty band dehiscence. Second jet from a perforation on the medial aspect of P2, possibly at the site of prior triangular resection.  Assessment:   S/p Mvr  Mitral Valve Regurgitation  Plan:     CTS Attending Note:    I have personally taken the history and examined this patient and agree with the SIMEON's note as stated above.  48-year-old male who underwent mitral valve repair last winter after acute decompensation secondary to endocarditis.  The valve was actively infected at the time of surgery.  Antibiotics were continued, and eventually the infection cleared, but not before additional damage was done to the valve.  He now has severe mitral regurgitation.  I discussed his situation with him and his mother in detail.  His functional status is much improved over the last several months.  He does feel like his improvement has plateaued.  He continues to have fatigue and dyspnea on exertion.  He has echo findings concerning for progressive intolerance of his mitral regurgitation, most notably increase in left ventricular size.  I recommended reoperation, with mitral valve replacement.  He agreed with this. We discussed the risks and benefits of the proposed procedure, including but not limited to death, stroke, respiratory complications, renal complications, arrythmia, need for permanent pacemaker, and infection. We discussed the STS predicted risk. His questions have been answered, and he wishes to proceed. After a discussion of the advantages and disadvantages of tissue and mechanical  prostheses, he indicated that he desires a mechanical valve.

## 2024-07-11 NOTE — PROGRESS NOTES
Subjective:   Chief Complaint: Severe MR Congestive Heart Failure  Last Clinic Visit: 4/1/2024    History of Present Illness: Lorenzo Nino is a 48 y.o.  gentleman with history of bacterial endocarditis and severe mitral regurgitation s/p repair 11/03/2023, with subsequent dehiscence of ring and readmission who presents for follow up.    Interval History:  Over the past 3 months he is doing okay, but had an admission with atrial flutter, requiring cardioversion, and being placed on amiodarone.  NYHA class 2-3 symptoms, able to walk up 1 flight of stairs at a time, and a few blocks before stopping, but not back to baseline level of tolerance.  He denies any acute volume overload currently, no swelling, no orthopnea, no lightheadedness, no recurrent palpitations after most recent episode.  Weight has been relatively stable in the 170 range.  All of his teeth were successfully fixed tolerating a diet well, but stomach is still upset on chronic suppressive antibiotics.  Blood pressure low normal.  Denies any bleeding on Eliquis.  Currently on torsemide 10 mg daily without change.  Most recent echocardiogram with progressive LV dilation.    4/1/2024  He successfully had teeth replaced, and has been steadily gaining weight over the course of the past 3 months, denies any orthopnea, no lower extremity edema, no worsening dyspnea on exertion.  Currently has dyspnea on exertion at approximately 2 blocks, and 2 flights of stairs.  Avoiding salt, eating some chicken, fish, occasional vegetables, but could improve nutritional content slightly.  Denies any palpitations, no syncope, no presyncope, no lightheadedness.  Blood pressure continues to run low normal.  Continuing to take amiodarone 400 mg daily, and suppressive antibiotics for which he has to avoid sun, currently on 10 mg daily torsemide.  Recent lab work with creatinine 1.7, BNP still elevated but trending down.  Denies any recurrent GI bleeding.  Successfully had  resection of tubular adenoma, without event.    1/12/2024  Shortly after we saw him last month he developed recurrent shortness of breath, weight gain, and was readmitted for congestive heart failure and hypoxic respiratory failure.  Workup subsequently revealed dehiscence of annular ring.  He was still on antibiotics at time of initial presentation continued during hospital course, no fever or recurrent cultures were present.  Evaluated by CT surgery and felt that he would benefit from better preoperative rehabilitation prior to reconsideration of additional surgery, plan  discharge with suppressive antibiotics and GDMT until adequate nutritional status could be obtained.  He had during this hospitalization acute kidney injury from titration of GDMT and diuretics, also had GI bleeding, and workup ultimately remarkable for large mass in colon.  Pathology ultimately revealed tubular adenoma.  He also had recurrent episodes of atrial fibrillation during this hospitalization now sinus on amiodarone.  Blood pressures at home running in the 100-120 range, denies any hypoxia, no lower extremity edema, no orthopnea.  Weight since discharge 134, 135, 135, 136.      12/15/2023  He did not have any known medical issues prior to presenting with shortness of breath, fever, and fatigue for several days on 10/31/2023.  He was subsequently found to have severe mitral regurgitation with bacterial endocarditis, initially thought to be involvement of aortic valve however was confirmed not to be involved on subsequent NEW.  Hospital course was complicated by acute hypoxic respiratory failure requiring intubation, along with thrombocytopenia, ultimately had successful repair on 11/03/2023.  Postoperatively sternum well healed, and will be cleared for cardiac rehab around the end of December. He was started on nifedipine metoprolol during hospitalization and has continued, checking his blood pressure on a daily basis and reports  systolics in the 90s on a regular basis with some lightheadedness.  No fevers at home, no shortness of breath, no orthopnea, no dyspnea on exertion.  He does have some fatigue which is still present postoperatively.  Echocardiogram post repair did show that EF was still mildly depressed.   Still on IV antibiotics scheduled a run-through the 26th.  He does have a dry cough and has had this for the past several days.    Dx:  Bacterial endocarditis-severe mitral regurgitation  -S/p 11/3/23 Mitral valve repair with triangular resection of P2 and placement of a freehand-created bovine pericardial annuloplasty band using a 40 mm Medtronic Simuplus sizer as a template  -S/p Resection of left atrial appendage   MRSA endocarditis  Large Tubular Adenoma with GI bleeding  Atrial fibrillation  Malnutrition from acute illness  Suppressive antibiotics  Atrial flutter on amiodarone    Medications:  Outpatient Encounter Medications as of 7/11/2024   Medication Sig Dispense Refill    amiodarone (PACERONE) 200 MG Tab Take 1 tablet (200 mg total) by mouth once daily. 90 tablet 3    apixaban (ELIQUIS) 5 mg Tab Take 1 tablet (5 mg total) by mouth 2 (two) times daily. 60 tablet 1    dapagliflozin propanediol (FARXIGA) 10 mg tablet Take 1 tablet (10 mg total) by mouth once daily. 30 tablet 11    doxycycline (VIBRAMYCIN) 100 MG Cap Take 1 capsule by mouth twice daily 60 capsule 2    metoprolol succinate (TOPROL-XL) 25 MG 24 hr tablet Take 1 tablet (25 mg total) by mouth once daily. 30 tablet 11    mirtazapine (REMERON SOL-TAB) 15 MG disintegrating tablet Dissolve 1 tablet (15 mg total) by mouth nightly. 30 tablet 11    potassium chloride SA (K-DUR,KLOR-CON) 20 MEQ tablet Take 2 tablets (40 mEq total) by mouth once daily. 90 tablet 3    torsemide (DEMADEX) 10 MG Tab Take 1 tablet (10 mg total) by mouth once daily. 30 tablet 11    [DISCONTINUED] doxycycline (VIBRAMYCIN) 100 MG Cap Take 1 capsule (100 mg total) by mouth 2 (two) times daily.  60 capsule 2     Facility-Administered Encounter Medications as of 7/11/2024   Medication Dose Route Frequency Provider Last Rate Last Admin    0.9%  NaCl infusion   Intravenous Continuous Adriana Black NP 70 mL/hr at 02/27/24 0755 New Bag at 02/27/24 0755    mupirocin 2 % ointment   Nasal On Call Procedure Adriana Black NP   Given at 02/27/24 0756     Social History:  Lorenzo reports current alcohol use of about 1.0 standard drink of alcohol per week. He reports that he does not use drugs.  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    Objective:   /77   Pulse (!) 54   Ht 6' (1.829 m)   Wt 77.8 kg (171 lb 8.3 oz)   BMI 23.26 kg/m²     Physical Exam   Constitutional: He does not appear ill. No distress.   HENT:   Head: Normocephalic and atraumatic.   Mouth/Throat: Mucous membranes are moist.   Cardiovascular: Normal rate, regular rhythm and normal pulses. Exam reveals no gallop and no friction rub.   Murmur heard.  Pulmonary/Chest: Effort normal and breath sounds normal. No stridor. No respiratory distress. He has no wheezes. He has no rhonchi. He has no rales. He exhibits no tenderness.   Abdominal: Normal appearance.   Musculoskeletal:      Right lower leg: No edema.      Left lower leg: No edema.   Neurological: He is alert.   Skin: Skin is warm.      EKG:  My independent visualization of most recent EKG is Normal sinus rhythm, diffuse T-wave inversions    NEW:  12/26/2023    NEW for evaluation of mitral valve.    Left Ventricle: The left ventricle is normal in size. Normal wall thickness. Normal wall motion. There is low normal systolic function with a visually estimated ejection fraction of 50 - 55%.    Right Ventricle: Normal right ventricular cavity size. Wall thickness is normal. Right ventricle wall motion  is normal. Systolic function is normal.    Aortic Valve: There is no mass/vegetation present.    Tricuspid Valve: There is no mass/vegetation present.    Severe mitral  reguritaiton    Mitral Valve: Status post bovine pericardial annuloplasty band. Dehiscense of band from 10 o'clock to 4 o'clock. Two regurgitant jets noted. First jet due to failure of coaptation at A2-P2 secondary to anuloplasty band dehiscence. Second jet from a perforation on the medial aspect of P2, possibly at the site of prior triangular resection.    TTE:  07/05/2024    Left Ventricle: The left ventricle is moderately dilated. Normal wall thickness. Normal wall motion. There is normal systolic function with a visually estimated ejection fraction of 55 - 60%. Diastolic function cannot be reliably determined in the presence of mitral valve disease and mitral valve ring/replacement.    Right Ventricle: Normal right ventricular cavity size. Wall thickness is normal. Right ventricle wall motion  is normal. Systolic function is normal.    Left Atrium: Left atrium is severely dilated.    Right Atrium: Normal right atrial size.    Mitral Valve: The mitral valve is repaired by annular ring 40 mm Medtronic. There is mild bileaflet sclerosis. There is severe regurgitation with a posterolateral eccentriccally directed and a central  jet. There appears to be a perforation in Anterior leaflets (clip #96 to 96- see below).Torn tissue is seen in this area in LA but its proximal attacment is not well seen. Consider NEW if clinically indicated. Mean gradient is 2.5 mmHg at HR o 53 bpm.    Tricuspid Valve: There is mild to moderate regurgitation with a centrally directed jet.    Pulmonic Valve: There is mild regurgitation.    Aorta: Aortic root is normal in size measuring 3.47 cm. Ascending aorta is normal measuring 3.55 cm.    Pulmonary Artery: There is moderate pulmonary hypertension. The estimated pulmonary artery systolic pressure is 54 mmHg.    IVC/SVC: Normal venous pressure at 3 mmHg.  1)  Mitral valve repair with triangular resection of P2 and placement of a freehand-created bovine pericardial annuloplasty band using a  40 mm Medtronic Simuplus sizer as a template- for MV endocarditis.   2)  Resection of left atrial appendage    01/30/2024     Left Ventricle: The left ventricle is normal in size. Normal wall thickness. Regional wall motion abnormalities present. There is normal systolic function with a visually estimated ejection fraction of 60 - 65%. Diastolic function cannot be reliably determined in the presence of mitral valve disease.    Right Ventricle: Normal right ventricular cavity size. Wall thickness is normal. Right ventricle wall motion  is normal. Systolic function is normal.    Left Atrium: Left atrium is severely dilated.    Mitral Valve: The mitral valve is repaired with a bovine pericardial annuloplasty band. There is flail motion of a small part of valvular tissue, perhaps from the posterior leaflet, that leads to severe eccentric MR directed somewhat laterally.    Tricuspid Valve: There is mild to moderate regurgitation.    IVC/SVC: Normal venous pressure at 3 mmHg.     12/19/2023    Left Ventricle: The left ventricle is normal in size. Normal wall thickness. Regional wall motion abnormalities present. There is low normal systolic function with a visually estimated ejection fraction of 50 - 55%. There is normal diastolic function.    Right Ventricle: Normal right ventricular cavity size. Wall thickness is normal. Right ventricle wall motion  is normal. Systolic function is normal.    Th left atrium is severely dilated.    Mitral Valve: The mitral valve is repaired with a bovine pericardial annuloplasty band. There is flail motion of a small part of valvular tissue, perhaps from the posterior leaflet, that leads to eccentric MR directed somewhat laterally. There is severe regurgitation.    Tricuspid Valve: There is mild regurgitation.    Pulmonary Artery: The estimated pulmonary artery systolic pressure is 51 mmHg.    IVC/SVC: Elevated venous pressure at 15 mmHg.     11/14/23    Left Ventricle: The left ventricle  is normal in size. Ventricular mass is normal. Normal wall thickness. There is concentric remodeling. Normal wall motion. See diagram for wall motion findings. There is mildly reduced systolic function with a visually estimated ejection fraction of 40 - 45%. Diastolic function cannot be reliably determined in the presence of mitral valve ring/replacement.    Right Ventricle: Normal right ventricular cavity size. Wall thickness is normal. Right ventricle wall motion  is normal. Systolic function is normal.    Left Atrium: Left atrium is severely dilated.    Mitral Valve: The mitral valve is repaired by bovine pericardial annuloplasty band. The mean pressure gradient across the mitral valve is 2 mmHg at a heart rate of 70 bpm. There is no significant regurgitation.    Pulmonary Artery: The estimated pulmonary artery systolic pressure is 24 mmHg.    IVC/SVC: Normal venous pressure at 3 mmHg.    Pericardium: There is a moderate posterior effusion with maximal diameter 1.6cm.     11/02/2023     Left Ventricle: The left ventricle is normal in size. Ventricular mass is normal. Normal wall thickness. Global hypokinesis present. There is mildly reduced systolic function with a visually estimated ejection fraction of 40 - 45%.    Right Ventricle: Normal right ventricular cavity size. Wall thickness is normal. Right ventricle wall motion  is normal. Systolic function is normal.    Left Atrium: Left atrium is severely dilated.    Right Atrium: Right atrium is dilated.    Aortic Valve: RCC is thickended with mild prolapse.  Cannot exclude RCC vegetation. The remaining leaflets have normal mobility. There is trace aortic regurgitation.    Mitral Valve: Both leaflets are thickened.Cannot exclude anterior leaflet vegetation coating this leaflet. There is prolapse of the posterior mitral leaflet. Flail posterior leaflet. There is a large mobile echogenic mass present on the posterior leaflet consistent with vegetation. The mean  pressure gradient across the mitral valve is 6 mmHg at a heart rate of 124 bpm. There is severe regurgitation with an anteromedial eccentrically directed jet.    Tricuspid Valve: There is mild regurgitation.    Pulmonary Artery: The estimated pulmonary artery systolic pressure is 31 mmHg.    IVC/SVC: Normal venous pressure at 3 mmHg.     Lipids:       Renal:  Recent Labs   Lab 06/22/24 1931   Creatinine 1.6 H   Potassium 3.8   CO2 24   BUN 24 H     Liver:  Recent Labs   Lab 06/22/24 1931   AST 37   ALT 38     Assessment:     1. 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    2. Pulmonary hypertension    3. New onset atrial flutter    4. Paroxysmal atrial fibrillation    5. Chronic diastolic heart failure    6. HFmrEF      Plan:   1. 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  Will discuss with Dr. Beal who he is seeing this afternoon.  He still has symptoms NYHA class 2-3, able to walk 2-3 blocks before stopping but only 1 flight of stairs.  Not back to prior baseline level of exercise tolerance.  Issues with progressive LV dilation on echocardiogram, and recurrent issues with atrial arrhythmias all related to severe MR.  Nutritional status much improved from initial evaluation, and dental issues now resolved.  I believe this is our window.    2. Pulmonary hypertension  Secondary to severe MR, volume status relatively stable, continue torsemide 10 mg daily.    3. New onset atrial flutter  Prior paroxysmal atrial fibrillation, prior GI bleeding related to non malignant adenoma, now resolved hemoglobin stable, continue Eliquis.  Continue amiodarone.  He had been on amiodarone prior for AFib as well reduced dose to 200 mg low dose.  Given possibility of stopping this in the future have not performed surveillance testing - TSH PFTs.  LFTs within normal limits.    4. Paroxysmal atrial fibrillation  As above    5. Chronic diastolic heart  failure  Euvolemic continue torsemide    6. HFmrEF      Follow-up in 3 months, will depend upon plan with CT surgery as well.      Emerson Mercado MD [facility]

## 2024-07-11 NOTE — LETTER
July 12, 2024        Emerson Mercado MD  [address]  [facility] [address]             [facility] - Cardiovasc Surg 2nd Fl  [address]  [facility] [address]  Phone: [phone]   Patient: Lorenzo Nino   MR Number: [id number]   YOB: 1975   Date of Visit: 7/11/2024       Dear Dr. Mercado:    Thank you for referring Lorenzo Nino to me for evaluation. Below are the relevant portions of my assessment and plan of care.            If you have questions, please do not hesitate to call me. I look forward to following Lorenzo along with you.    Sincerely,      Manuel Beal MD           CC  No Recipients

## 2024-07-11 NOTE — Clinical Note
Gene,  This gentleman is seeing you this afternoon. I think this is our window, he had all his teeth fixed, nutritional status much improved, and over the past 3 months has had issues with new atrial flutter/prior AFib, and progressive LV dilation on echocardiogram.  Let me know what you think?  Thanks so much, Emerson

## 2024-07-16 DIAGNOSIS — I34.0 MITRAL VALVE INSUFFICIENCY, UNSPECIFIED ETIOLOGY: ICD-10-CM

## 2024-07-16 DIAGNOSIS — Z01.818 PRE-OP TESTING: ICD-10-CM

## 2024-07-16 DIAGNOSIS — Z98.890 S/P MVR (MITRAL VALVE REPAIR): Primary | ICD-10-CM

## 2024-07-25 ENCOUNTER — TELEPHONE (OUTPATIENT)
Dept: CARDIOTHORACIC SURGERY | Facility: CLINIC | Age: 49
End: 2024-07-25
Payer: MEDICAID

## 2024-07-25 NOTE — TELEPHONE ENCOUNTER
Called pt to review pre-op testing appointments which have been scheduled for August 8. Also reviewed pt's medications. Pt will need to take last dose of Eliquis on August 4 and last dose of Farxiga on August 8. Pt verbalized understanding of all information.

## 2024-07-26 ENCOUNTER — PATIENT MESSAGE (OUTPATIENT)
Dept: CARDIOTHORACIC SURGERY | Facility: CLINIC | Age: 49
End: 2024-07-26
Payer: COMMERCIAL

## 2024-08-02 ENCOUNTER — TELEPHONE (OUTPATIENT)
Dept: CARDIOTHORACIC SURGERY | Facility: CLINIC | Age: 49
End: 2024-08-02
Payer: COMMERCIAL

## 2024-08-02 NOTE — TELEPHONE ENCOUNTER
Called and notified pt that Sunday, August 4, is his last day to take Eliquis, per Dr. Beal, in preparation for surgery with Dr. Beal, which pt verbalized understanding to.

## 2024-08-05 ENCOUNTER — ANESTHESIA EVENT (OUTPATIENT)
Dept: SURGERY | Facility: HOSPITAL | Age: 49
End: 2024-08-05
Payer: MEDICAID

## 2024-08-08 ENCOUNTER — HOSPITAL ENCOUNTER (OUTPATIENT)
Dept: PULMONOLOGY | Facility: CLINIC | Age: 49
Discharge: HOME | End: 2024-08-08
Payer: MEDICAID

## 2024-08-08 ENCOUNTER — HOSPITAL ENCOUNTER (OUTPATIENT)
Dept: RADIOLOGY | Facility: HOSPITAL | Age: 49
Discharge: HOME | End: 2024-08-08
Attending: THORACIC SURGERY (CARDIOTHORACIC VASCULAR SURGERY)
Payer: MEDICAID

## 2024-08-08 ENCOUNTER — DOCUMENTATION ONLY (OUTPATIENT)
Dept: CARDIOTHORACIC SURGERY | Facility: CLINIC | Age: 49
End: 2024-08-08
Payer: MEDICAID

## 2024-08-08 ENCOUNTER — OFFICE VISIT (OUTPATIENT)
Dept: CARDIOTHORACIC SURGERY | Facility: CLINIC | Age: 49
End: 2024-08-08
Payer: MEDICAID

## 2024-08-08 ENCOUNTER — HOSPITAL ENCOUNTER (OUTPATIENT)
Dept: CARDIOLOGY | Facility: CLINIC | Age: 49
Discharge: HOME | End: 2024-08-08
Payer: MEDICAID

## 2024-08-08 ENCOUNTER — HOSPITAL ENCOUNTER (OUTPATIENT)
Dept: VASCULAR SURGERY | Facility: CLINIC | Age: 49
Discharge: HOME | End: 2024-08-08
Attending: THORACIC SURGERY (CARDIOTHORACIC VASCULAR SURGERY)
Payer: MEDICAID

## 2024-08-08 VITALS
OXYGEN SATURATION: 99 % | DIASTOLIC BLOOD PRESSURE: 72 MMHG | HEIGHT: 72 IN | BODY MASS INDEX: 23.59 KG/M2 | SYSTOLIC BLOOD PRESSURE: 114 MMHG | HEART RATE: 58 BPM | WEIGHT: 174.19 LBS

## 2024-08-08 DIAGNOSIS — I34.0 MITRAL VALVE INSUFFICIENCY, UNSPECIFIED ETIOLOGY: ICD-10-CM

## 2024-08-08 DIAGNOSIS — R78.81 MRSA BACTEREMIA: ICD-10-CM

## 2024-08-08 DIAGNOSIS — Z98.890 S/P MVR (MITRAL VALVE REPAIR): ICD-10-CM

## 2024-08-08 DIAGNOSIS — R74.01 ELEVATED TRANSAMINASE LEVEL: ICD-10-CM

## 2024-08-08 DIAGNOSIS — Z01.818 PRE-OP TESTING: ICD-10-CM

## 2024-08-08 DIAGNOSIS — B95.62 MRSA BACTEREMIA: ICD-10-CM

## 2024-08-08 DIAGNOSIS — I34.0 SEVERE MITRAL REGURGITATION: Primary | ICD-10-CM

## 2024-08-08 LAB
DLCO SINGLE BREATH LLN: 26.08
DLCO SINGLE BREATH PRE REF: 41.2 %
DLCO SINGLE BREATH REF: 33.01
DLCOC SBVA LLN: 3.24
DLCOC SBVA REF: 4.38
DLCOC SINGLE BREATH LLN: 26.08
DLCOC SINGLE BREATH REF: 33.01
DLCOCSBVAULN: 5.52
DLCOCSINGLEBREATHULN: 39.93
DLCOSINGLEBREATHULN: 39.93
DLCOSINGLEBREATHZSCORE: -4.6
DLCOVA LLN: 3.24
DLCOVA PRE REF: 64.2 %
DLCOVA PRE: 2.81 ML/(MIN*MMHG*L) (ref 3.24–5.52)
DLCOVA REF: 4.38
DLCOVAULN: 5.52
FEF 25 75 LLN: 2.43
FEF 25 75 PRE REF: 52.3 %
FEF 25 75 REF: 4.14
FEV05 LLN: 2.05
FEV05 REF: 3.19
FEV1 FVC LLN: 68
FEV1 FVC PRE REF: 97.1 %
FEV1 FVC REF: 79
FEV1 LLN: 3.27
FEV1 PRE REF: 65.8 %
FEV1 REF: 4.19
FEV1FVCZSCORE: -0.37
FEV1ZSCORE: -2.52
FVC LLN: 4.18
FVC PRE REF: 67.4 %
FVC REF: 5.35
FVCZSCORE: -2.47
IVC PRE: 3.52 L (ref 4.18–6.53)
IVC SINGLE BREATH LLN: 4.18
IVC SINGLE BREATH PRE REF: 65.9 %
IVC SINGLE BREATH REF: 5.35
IVCSINGLEBREATHULN: 6.53
MVV LLN: 134
MVV PRE REF: 84.6 %
MVV REF: 158
OHS QRS DURATION: 100 MS
OHS QTC CALCULATION: 469 MS
PEF LLN: 7.88
PEF PRE REF: 80.4 %
PEF REF: 10.33
PHYSICIAN COMMENT: ABNORMAL
PRE DLCO: 13.62 ML/(MIN*MMHG) (ref 26.08–39.93)
PRE FEF 25 75: 2.17 L/S (ref 2.43–5.85)
PRE FET 100: 6.41 SEC
PRE FEV05 REF: 68.4 %
PRE FEV1 FVC: 76.51 % (ref 67.92–88.07)
PRE FEV1: 2.76 L (ref 3.27–5.07)
PRE FEV5: 2.18 L (ref 2.05–4.32)
PRE FVC: 3.6 L (ref 4.18–6.53)
PRE MVV: 133.81 L/MIN (ref 134.4–181.83)
PRE PEF: 8.3 L/S (ref 7.88–12.78)
VA PRE: 4.84 L (ref 7.38–7.38)
VA SINGLE BREATH LLN: 7.38
VA SINGLE BREATH PRE REF: 65.5 %
VA SINGLE BREATH REF: 7.38
VASINGLEBREATHULN: 7.38

## 2024-08-08 PROCEDURE — 99499 UNLISTED E&M SERVICE: CPT | Mod: S$PBB,,, | Performed by: THORACIC SURGERY (CARDIOTHORACIC VASCULAR SURGERY)

## 2024-08-08 PROCEDURE — 99999 PR PBB SHADOW E&M-EST. PATIENT-LVL IV: CPT | Mod: PBBFAC,,, | Performed by: THORACIC SURGERY (CARDIOTHORACIC VASCULAR SURGERY)

## 2024-08-08 PROCEDURE — 94729 DIFFUSING CAPACITY: CPT | Mod: PBBFAC | Performed by: INTERNAL MEDICINE

## 2024-08-08 PROCEDURE — 71250 CT THORAX DX C-: CPT | Mod: 26,,, | Performed by: RADIOLOGY

## 2024-08-08 PROCEDURE — 93005 ELECTROCARDIOGRAM TRACING: CPT | Mod: PBBFAC | Performed by: INTERNAL MEDICINE

## 2024-08-08 PROCEDURE — 93880 EXTRACRANIAL BILAT STUDY: CPT | Mod: 26,S$PBB,, | Performed by: SURGERY

## 2024-08-08 PROCEDURE — 71046 X-RAY EXAM CHEST 2 VIEWS: CPT | Mod: 26,,, | Performed by: RADIOLOGY

## 2024-08-08 PROCEDURE — 71250 CT THORAX DX C-: CPT | Mod: TC

## 2024-08-08 PROCEDURE — 71046 X-RAY EXAM CHEST 2 VIEWS: CPT | Mod: TC

## 2024-08-08 PROCEDURE — 94010 BREATHING CAPACITY TEST: CPT | Mod: PBBFAC | Performed by: INTERNAL MEDICINE

## 2024-08-08 PROCEDURE — 93880 EXTRACRANIAL BILAT STUDY: CPT | Mod: PBBFAC | Performed by: SURGERY

## 2024-08-08 PROCEDURE — 99214 OFFICE O/P EST MOD 30 MIN: CPT | Mod: PBBFAC,25 | Performed by: THORACIC SURGERY (CARDIOTHORACIC VASCULAR SURGERY)

## 2024-08-08 RX ORDER — ATORVASTATIN CALCIUM 40 MG/1
40 TABLET, FILM COATED ORAL NIGHTLY
OUTPATIENT
Start: 2024-08-08

## 2024-08-08 RX ORDER — OXYCODONE HYDROCHLORIDE 5 MG/1
5 TABLET ORAL EVERY 4 HOURS PRN
OUTPATIENT
Start: 2024-08-08

## 2024-08-08 RX ORDER — PROPOFOL 10 MG/ML
0-50 INJECTION, EMULSION INTRAVENOUS CONTINUOUS
OUTPATIENT
Start: 2024-08-08

## 2024-08-08 RX ORDER — FAMOTIDINE 20 MG/1
20 TABLET, FILM COATED ORAL 2 TIMES DAILY
OUTPATIENT
Start: 2024-08-08

## 2024-08-08 RX ORDER — MUPIROCIN 20 MG/G
1 OINTMENT TOPICAL 2 TIMES DAILY
OUTPATIENT
Start: 2024-08-08 | End: 2024-08-13

## 2024-08-08 RX ORDER — IPRATROPIUM BROMIDE AND ALBUTEROL SULFATE 2.5; .5 MG/3ML; MG/3ML
3 SOLUTION RESPIRATORY (INHALATION) EVERY 4 HOURS PRN
OUTPATIENT
Start: 2024-08-08 | End: 2024-08-09

## 2024-08-08 RX ORDER — OXYCODONE HYDROCHLORIDE 10 MG/1
10 TABLET ORAL EVERY 4 HOURS PRN
OUTPATIENT
Start: 2024-08-08

## 2024-08-08 RX ORDER — POLYETHYLENE GLYCOL 3350 17 G/17G
17 POWDER, FOR SOLUTION ORAL DAILY
OUTPATIENT
Start: 2024-08-08

## 2024-08-08 RX ORDER — POTASSIUM CHLORIDE 20 MEQ/1
20 TABLET, EXTENDED RELEASE ORAL EVERY 12 HOURS
OUTPATIENT
Start: 2024-08-08

## 2024-08-08 RX ORDER — ONDANSETRON HYDROCHLORIDE 2 MG/ML
4 INJECTION, SOLUTION INTRAVENOUS EVERY 12 HOURS PRN
OUTPATIENT
Start: 2024-08-08

## 2024-08-08 RX ORDER — METOCLOPRAMIDE HYDROCHLORIDE 5 MG/ML
5 INJECTION INTRAMUSCULAR; INTRAVENOUS EVERY 6 HOURS PRN
OUTPATIENT
Start: 2024-08-08

## 2024-08-08 RX ORDER — FENTANYL CITRATE 50 UG/ML
25 INJECTION, SOLUTION INTRAMUSCULAR; INTRAVENOUS
OUTPATIENT
Start: 2024-08-08 | End: 2024-08-13

## 2024-08-08 RX ORDER — ASPIRIN 300 MG/1
300 SUPPOSITORY RECTAL ONCE AS NEEDED
OUTPATIENT
Start: 2024-08-08 | End: 2036-01-05

## 2024-08-08 RX ORDER — SODIUM CHLORIDE 9 MG/ML
INJECTION, SOLUTION INTRAVENOUS CONTINUOUS
OUTPATIENT
Start: 2024-08-08

## 2024-08-08 RX ORDER — CEFAZOLIN SODIUM 2 G/50ML
2 SOLUTION INTRAVENOUS
OUTPATIENT
Start: 2024-08-08

## 2024-08-08 RX ORDER — FENTANYL CITRATE 50 UG/ML
50 INJECTION, SOLUTION INTRAMUSCULAR; INTRAVENOUS
OUTPATIENT
Start: 2024-08-14

## 2024-08-08 RX ORDER — CEFAZOLIN SODIUM 2 G/50ML
2 SOLUTION INTRAVENOUS
OUTPATIENT
Start: 2024-08-08 | End: 2024-08-10

## 2024-08-08 RX ORDER — BISACODYL 10 MG/1
10 SUPPOSITORY RECTAL DAILY PRN
OUTPATIENT
Start: 2024-08-08

## 2024-08-08 RX ORDER — METOPROLOL TARTRATE 25 MG/1
25 TABLET, FILM COATED ORAL
OUTPATIENT
Start: 2024-08-08

## 2024-08-08 RX ORDER — LIDOCAINE HYDROCHLORIDE 10 MG/ML
1 INJECTION, SOLUTION EPIDURAL; INFILTRATION; INTRACAUDAL; PERINEURAL
OUTPATIENT
Start: 2024-08-08

## 2024-08-08 RX ORDER — IPRATROPIUM BROMIDE AND ALBUTEROL SULFATE 2.5; .5 MG/3ML; MG/3ML
3 SOLUTION RESPIRATORY (INHALATION) EVERY 4 HOURS
OUTPATIENT
Start: 2024-08-08 | End: 2024-08-09

## 2024-08-08 RX ORDER — ACETAMINOPHEN 325 MG/1
650 TABLET ORAL EVERY 4 HOURS PRN
OUTPATIENT
Start: 2024-08-08

## 2024-08-08 RX ORDER — MAGNESIUM SULFATE HEPTAHYDRATE 40 MG/ML
2 INJECTION, SOLUTION INTRAVENOUS
OUTPATIENT
Start: 2024-08-08

## 2024-08-08 RX ORDER — MUPIROCIN 20 MG/G
1 OINTMENT TOPICAL
OUTPATIENT
Start: 2024-08-08

## 2024-08-08 RX ORDER — NAPROXEN SODIUM 220 MG/1
81 TABLET, FILM COATED ORAL ONCE
OUTPATIENT
Start: 2024-08-08 | End: 2024-08-08

## 2024-08-08 RX ORDER — POTASSIUM CHLORIDE 14.9 MG/ML
20 INJECTION INTRAVENOUS
OUTPATIENT
Start: 2024-08-08

## 2024-08-08 RX ORDER — NAPROXEN SODIUM 220 MG/1
81 TABLET, FILM COATED ORAL DAILY
OUTPATIENT
Start: 2024-08-08

## 2024-08-08 RX ORDER — DEXTROSE MONOHYDRATE, SODIUM CHLORIDE, AND POTASSIUM CHLORIDE 50; 1.49; 4.5 G/1000ML; G/1000ML; G/1000ML
INJECTION, SOLUTION INTRAVENOUS CONTINUOUS
OUTPATIENT
Start: 2024-08-08

## 2024-08-08 RX ORDER — FENTANYL CITRATE 50 UG/ML
25 INJECTION, SOLUTION INTRAMUSCULAR; INTRAVENOUS
OUTPATIENT
Start: 2024-08-08

## 2024-08-08 RX ORDER — SODIUM CHLORIDE 0.9 % (FLUSH) 0.9 %
10 SYRINGE (ML) INJECTION
OUTPATIENT
Start: 2024-08-08

## 2024-08-08 RX ORDER — POTASSIUM CHLORIDE 14.9 MG/ML
60 INJECTION INTRAVENOUS
OUTPATIENT
Start: 2024-08-08

## 2024-08-08 RX ORDER — MAGNESIUM SULFATE/D5W 2 G/50 ML
4 INTRAVENOUS SOLUTION, PIGGYBACK (ML) INTRAVENOUS
OUTPATIENT
Start: 2024-08-08

## 2024-08-08 RX ORDER — FAMOTIDINE 10 MG/ML
20 INJECTION INTRAVENOUS 2 TIMES DAILY
OUTPATIENT
Start: 2024-08-08

## 2024-08-08 RX ORDER — ALBUMIN HUMAN 50 G/1000ML
25 SOLUTION INTRAVENOUS ONCE AS NEEDED
OUTPATIENT
Start: 2024-08-08 | End: 2036-01-05

## 2024-08-08 RX ORDER — POTASSIUM CHLORIDE 29.8 MG/ML
40 INJECTION INTRAVENOUS
OUTPATIENT
Start: 2024-08-08

## 2024-08-08 RX ORDER — DOCUSATE SODIUM 100 MG/1
100 CAPSULE, LIQUID FILLED ORAL 2 TIMES DAILY
OUTPATIENT
Start: 2024-08-08

## 2024-08-08 RX ORDER — ASPIRIN 325 MG
325 TABLET, DELAYED RELEASE (ENTERIC COATED) ORAL DAILY
OUTPATIENT
Start: 2024-08-08

## 2024-08-08 NOTE — H&P (VIEW-ONLY)
Subjective:      Patient ID: Lorenzo Nino is a 48 y.o. male.    Chief Complaint: No chief complaint on file.      HPI:  Lorenzo Nino is a 48 y.o. male who presents for pre-op re-do MVR. 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 Beal, embolus to R eye (treated with dapto/ceftaroline), HFmrEF (45-50%). Admitted 12/18-1/8 for ADHF, found to have dehiscence of porcine annular repair of MV on NEW. Presented to ED for shortness of breath, weight gain, and was readmitted for congestive heart failure and hypoxic respiratory failure.  Workup subsequently revealed dehiscence of annular ring.  He was still on antibiotics at time of initial presentation continued during hospital course, no fever or recurrent cultures were present.  Evaluated by Dr Beal, who felt that he would benefit from better preoperative rehabilitation prior to reconsideration of additional surgery, plan  discharge with suppressive antibiotics and GDMT until adequate nutritional status could be obtained.  He had during this hospitalization acute kidney injury from titration of GDMT and diuretics, also had GI bleeding, and workup ultimately remarkable for large mass in colon.  Pathology ultimately revealed tubular adenoma.  He also had recurrent episodes of atrial fibrillation during this hospitalization now sinus on amiodarone.  ID following, original end date of daptomycin was 12/24, now extended to 1/8/24 (total 8 weeks.) and to transition to PO doxycycline until new valve is placed for persistent infection may be the cause of valve dehiscence.     Had a recent admission for afib with cardioversion and started Eliquis.     FEV1 65.8  DLCO 41.2    Current Outpatient Medications   Medication Instructions    amiodarone (PACERONE) 200 mg, Oral, Daily    apixaban (ELIQUIS) 5 mg, Oral, 2 times daily    dapagliflozin propanediol (FARXIGA) 10 mg, Oral, Daily    doxycycline (VIBRAMYCIN) 100  mg, Oral, 2 times daily    metoprolol succinate (TOPROL-XL) 25 mg, Oral, Daily    mirtazapine (REMERON SOL-TAB) 15 MG disintegrating tablet Dissolve 1 tablet (15 mg total) by mouth nightly.    potassium chloride SA (K-DUR,KLOR-CON) 20 MEQ tablet 40 mEq, Oral, Daily    torsemide (DEMADEX) 10 mg, Oral, Daily       Family and social history reviewed    Review of patient's allergies indicates:  No Known Allergies  Past Medical History:   Diagnosis Date    Hypertension      Past Surgical History:   Procedure Laterality Date    COLONOSCOPY N/A 1/5/2024    Procedure: COLONOSCOPY;  Surgeon: Fadia Ellsworth MD;  Location: [facility] ENDO ([address] [place] [address]);  Service: Endoscopy;  Laterality: N/A;    COLONOSCOPY, WITH DIRECTED SUBMUCOSAL INJECTION  2/27/2024    Procedure: COLONOSCOPY, WITH DIRECTED SUBMUCOSAL INJECTION;  Surgeon: Mayur Dunn MD;  [facility];  Service: Colon and Rectal;;    COLONOSCOPY, WITH POLYPECTOMY USING SNARE N/A 2/27/2024    Procedure: COLONOSCOPY, WITH POLYPECTOMY USING SNARE;  Surgeon: Mayur Dunn MD;  [facility];  Service: Colon and Rectal;  Laterality: N/A;    ECHOCARDIOGRAM,TRANSESOPHAGEAL N/A 12/26/2023    Procedure: Transesophageal echo (NEW) intra-procedure log documentation;  Surgeon: Provider, [facility];  Location: [facility] EP LAB;  Service: Cardiology;  Laterality: N/A;    ESOPHAGOGASTRODUODENOSCOPY N/A 1/5/2024    Procedure: EGD (ESOPHAGOGASTRODUODENOSCOPY);  Surgeon: Fadia Ellsworth MD;  Location: [facility] ENDO ([address] [place] [address]);  Service: Endoscopy;  Laterality: N/A;    EXCLUSION, LEFT ATRIAL APPENDAGE, OPEN, AS PART OF OPEN CHEST SURGERY Left 11/3/2023    Procedure: EXCLUSION, LEFT ATRIAL APPENDAGE, OPEN, AS PART OF OPEN CHEST SURGERY;  Surgeon: Manuel Beal MD;  [facility];  Service: Cardiothoracic;  Laterality: Left;    INSERTION OF INTRA-AORTIC BALLOON ASSIST DEVICE Right 11/2/2023    Procedure: INSERTION, INTRA-AORTIC BALLOON PUMP;   Surgeon: Jose Vidal MD;  Location: [facility] CATH LAB;  Service: Cardiology;  Laterality: Right;    REPAIR, MITRAL VALVE, OPEN N/A 11/3/2023    Procedure: REPAIR, MITRAL VALVE, OPEN;  Surgeon: Manuel Beal MD;  Location: [facility] OR [facility] FLR;  Service: Cardiothoracic;  Laterality: N/A;     Family History    None       Social History     Socioeconomic History    Marital status: Single   Occupational History    Occupation: associated terminal  [employer]    Tobacco Use    Smoking status: Unknown     Passive exposure: Never   Substance and Sexual Activity    Alcohol use: Yes     Alcohol/week: 1.0 standard drink of alcohol     Types: 1 Cans of beer per week    Drug use: Never    Sexual activity: Not Currently     Partners: Female     Birth control/protection: None     Social Determinants of Health     Financial Resource Strain: Patient Declined (1/9/2024)    Overall Financial Resource Strain (CARDIA)     Difficulty of Paying Living Expenses: Patient declined   Food Insecurity: Patient Declined (1/9/2024)    Hunger Vital Sign     Worried About Running Out of Food in the Last Year: Patient declined     Ran Out of Food in the Last Year: Patient declined   Transportation Needs: Patient Declined (1/9/2024)    PRAPARE - Transportation     Lack of Transportation (Medical): Patient declined     Lack of Transportation (Non-Medical): Patient declined   Physical Activity: Unknown (1/9/2024)    Exercise Vital Sign     Days of Exercise per Week: Patient declined     Minutes of Exercise per Session: 0 min   Recent Concern: Physical Activity - Inactive (11/1/2023)    Exercise Vital Sign     Days of Exercise per Week: 0 days     Minutes of Exercise per Session: 0 min   Stress: No Stress Concern Present (11/1/2023)    [employer] [place] [employer] - Occupational Stress Questionnaire     Feeling of Stress : Not at all   Housing Stability: Patient Declined (1/9/2024)    Housing Stability Vital Sign     Unable to Pay  for Housing in the Last Year: Patient declined     Number of Places Lived in the Last Year: 1     Unstable Housing in the Last Year: Patient declined       Current medications Reviewed    Review of Systems   Constitutional:  Positive for activity change and fatigue.   HENT:  Negative for nosebleeds.    Eyes:  Negative for visual disturbance.   Respiratory:  Positive for shortness of breath.    Cardiovascular:  Negative for chest pain.   Gastrointestinal:  Negative for nausea.   Musculoskeletal:  Negative for gait problem.   Skin:  Negative for color change.   Neurological:  Negative for seizures.   Hematological:  Does not bruise/bleed easily.   Psychiatric/Behavioral:  Negative for sleep disturbance.      Objective:   Physical Exam  Vitals reviewed.   Constitutional:       General: He is not in acute distress.     Appearance: He is well-developed. He is not diaphoretic.   HENT:      Head: Normocephalic and atraumatic.   Eyes:      Pupils: Pupils are equal, round, and reactive to light.   Neck:      Vascular: No JVD.   Cardiovascular:      Rate and Rhythm: Normal rate.   Pulmonary:      Effort: Pulmonary effort is normal. No respiratory distress.   Musculoskeletal:         General: Normal range of motion.      Cervical back: Normal range of motion.   Skin:     Coloration: Skin is not pale.   Neurological:      General: No focal deficit present.      Mental Status: He is alert.   Psychiatric:         Speech: Speech normal.         Behavior: Behavior normal.         Thought Content: Thought content normal.         Judgment: Judgment normal.         Diagnostic Results: reviewed   CT and carotids reviewed     TTE 7/5/24    Left Ventricle: The left ventricle is moderately dilated. Normal wall thickness. Normal wall motion. There is normal systolic function with a visually estimated ejection fraction of 55 - 60%. Diastolic function cannot be reliably determined in the presence of mitral valve disease and mitral valve  ring/replacement.    Right Ventricle: Normal right ventricular cavity size. Wall thickness is normal. Right ventricle wall motion  is normal. Systolic function is normal.    Left Atrium: Left atrium is severely dilated.    Right Atrium: Normal right atrial size.    Mitral Valve: The mitral valve is repaired by annular ring 40 mm Medtronic. There is mild bileaflet sclerosis. There is severe regurgitation with a posterolateral eccentriccally directed and a central  jet. There appears to be a perforation in Anterior leaflets (clip #96 to 96- see below).Torn tissue is seen in this area in LA but its proximal attacment is not well seen. Consider NEW if clinically indicated. Mean gradient is 2.5 mmHg at HR o 53 bpm.    Tricuspid Valve: There is mild to moderate regurgitation with a centrally directed jet.    Pulmonic Valve: There is mild regurgitation.    Aorta: Aortic root is normal in size measuring 3.47 cm. Ascending aorta is normal measuring 3.55 cm.    Pulmonary Artery: There is moderate pulmonary hypertension. The estimated pulmonary artery systolic pressure is 54 mmHg.    IVC/SVC: Normal venous pressure at 3 mmHg.     1)  Mitral valve repair with triangular resection of P2 and placement of a freehand-created bovine pericardial annuloplasty band using a 40 mm Medtronic Simuplus sizer as a template- for MV endocarditis.   2)  Resection of left atrial appendage      Assessment:   MR s/p MVR   Plan:     CTS Attending Note:    I have personally taken the history and examined this patient and agree with the SIMEON's note as stated above.  48-year-old status post mitral valve repair during active endocarditis.  Unfortunately, the infection progressed with valve destruction before ostensibly clearing.  He now has severe mitral regurgitation.  We plan reoperation with mechanical replacement.  He and his mother agreed with this.  I discussed with them again that given his pulmonary function tests and his overall condition that  he may require ventilation for several days.  I also discussed that dialysis may be indicated given his baseline renal disease and volume overload status.  They indicated understanding.  His questions have been answered, and he wishes to proceed.

## 2024-08-09 ENCOUNTER — TELEPHONE (OUTPATIENT)
Dept: CARDIOTHORACIC SURGERY | Facility: CLINIC | Age: 49
End: 2024-08-09
Payer: MEDICAID

## 2024-08-11 NOTE — ANESTHESIA PREPROCEDURE EVALUATION
[facility]  Anesthesia Pre-Operative Evaluation         Patient Name: Lorenzo Nino  YOB: 1975  MRN: [id number]    SUBJECTIVE:     Pre-operative evaluation for Procedure(s) (LRB):  REPLACEMENT, MITRAL VALVE (N/A)     08/11/2024    Lorenzo Nino is a 48 y.o. 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 He now presents with severe MR, mod pHTN (PASP 54), CKD3 (baseline Cr 1.6-2.0), and Afib (amiodarone, metoprolol, Eliquis).    Patient now presents for the above procedure(s).      Echo 7/2024    Left Ventricle: The left ventricle is moderately dilated. Normal wall thickness. Normal wall motion. There is normal systolic function with a visually estimated ejection fraction of 55 - 60%. Diastolic function cannot be reliably determined in the presence of mitral valve disease and mitral valve ring/replacement.    Right Ventricle: Normal right ventricular cavity size. Wall thickness is normal. Right ventricle wall motion  is normal. Systolic function is normal.    Left Atrium: Left atrium is severely dilated.    Right Atrium: Normal right atrial size.    Mitral Valve: The mitral valve is repaired by annular ring 40 mm Medtronic. There is mild bileaflet sclerosis. There is severe regurgitation with a posterolateral eccentriccally directed and a central  jet. There appears to be a perforation in Anterior leaflets (clip #96 to 96- see below).Torn tissue is seen in this area in LA but its proximal attacment is not well seen. Consider NEW if clinically indicated. Mean gradient is 2.5 mmHg at HR o 53 bpm.    Tricuspid Valve: There is mild to moderate regurgitation with a centrally directed jet.    Pulmonic Valve: There is mild regurgitation.    Aorta: Aortic root is normal in size measuring 3.47 cm. Ascending aorta is normal measuring 3.55 cm.    Pulmonary Artery: There is moderate pulmonary  hypertension. The estimated pulmonary artery systolic pressure is 54 mmHg.    IVC/SVC: Normal venous pressure at 3 mmHg.    1)  Mitral valve repair with triangular resection of P2 and placement of a freehand-created bovine pericardial annuloplasty band using a 40 mm Medtronic Simuplus sizer as a template- for MV endocarditis.  2)  Resection of left atrial appendage      Prev airway:   Placement Date 11/02/23; Placement Time 0855; Method of Intubation Glidescope; Inserted by MD; Staff/Resident Names Dr Bullock, Dr Caballero; Airway Device Endotracheal Tube; Airway Device Size 8.0; Style Cuffed; Placement Verified by Colorimetric EtCO2 device; Intubation Findings Positive EtCO2; Depth of Insertion 24; Securement Lips; Breath Sounds Equal Bilateral; Insertion Attempts 1; Removal Date 11/05/23; Removal Time 0400       Patient Active Problem List   Diagnosis    Thrombocytopenia    Elevated transaminase level    Endocarditis    Elevated troponin    A-fib with RVR    Heart failure with mid-range ejection fraction (HFmEF)    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    Severe mitral regurgitation    Rupture of chordae tendineae    Surgical wound present    Transient hyperglycemia post procedure    S/P MVR (mitral valve repair)    Subretinal hemorrhage, right    HFmrEF    Hypokalemia    Microcytic anemia    Retinal macular atrophy    Colon polyp    New onset atrial flutter    Chronic diastolic heart failure    Pulmonary hypertension    Stage 3 chronic kidney disease    Pre-op testing       Review of patient's allergies indicates:  No Known Allergies    Current Inpatient Medications:      Current Facility-Administered Medications on File Prior to Encounter   Medication Dose Route Frequency Provider Last Rate Last Admin    0.9%  NaCl infusion   Intravenous Continuous Adriana Black NP 70 mL/hr at 02/27/24 0755 New Bag at 02/27/24 0755    mupirocin 2 % ointment   Nasal On Call Procedure  Adriana Black NP   Given at 02/27/24 0756     Current Outpatient Medications on File Prior to Encounter   Medication Sig Dispense Refill    amiodarone (PACERONE) 200 MG Tab Take 1 tablet (200 mg total) by mouth once daily. 90 tablet 3    apixaban (ELIQUIS) 5 mg Tab Take 1 tablet (5 mg total) by mouth 2 (two) times daily. (Patient not taking: Reported on 8/8/2024) 60 tablet 1    dapagliflozin propanediol (FARXIGA) 10 mg tablet Take 1 tablet (10 mg total) by mouth once daily. 30 tablet 11    doxycycline (VIBRAMYCIN) 100 MG Cap Take 1 capsule by mouth twice daily 60 capsule 2    metoprolol succinate (TOPROL-XL) 25 MG 24 hr tablet Take 1 tablet (25 mg total) by mouth once daily. 30 tablet 11    mirtazapine (REMERON SOL-TAB) 15 MG disintegrating tablet Dissolve 1 tablet (15 mg total) by mouth nightly. 30 tablet 11    potassium chloride SA (K-DUR,KLOR-CON) 20 MEQ tablet Take 2 tablets (40 mEq total) by mouth once daily. 90 tablet 3    torsemide (DEMADEX) 10 MG Tab Take 1 tablet (10 mg total) by mouth once daily. 30 tablet 11       Past Surgical History:   Procedure Laterality Date    COLONOSCOPY N/A 1/5/2024    Procedure: COLONOSCOPY;  Surgeon: Fadia Ellsworth MD;  Location: [facility] ([address] [place] [address]);  Service: Endoscopy;  Laterality: N/A;    COLONOSCOPY, WITH DIRECTED SUBMUCOSAL INJECTION  2/27/2024    Procedure: COLONOSCOPY, WITH DIRECTED SUBMUCOSAL INJECTION;  Surgeon: Mayur Dunn MD;  [facility];  Service: Colon and Rectal;;    COLONOSCOPY, WITH POLYPECTOMY USING SNARE N/A 2/27/2024    Procedure: COLONOSCOPY, WITH POLYPECTOMY USING SNARE;  Surgeon: Mayur Dunn MD;  [facility];  Service: Colon and Rectal;  Laterality: N/A;    ECHOCARDIOGRAM,TRANSESOPHAGEAL N/A 12/26/2023    Procedure: Transesophageal echo (NEW) intra-procedure log documentation;  Surgeon: Provider, Dosc [facility];  Location: [facility] EP LAB;  Service: Cardiology;  Laterality: N/A;     ESOPHAGOGASTRODUODENOSCOPY N/A 1/5/2024    Procedure: EGD (ESOPHAGOGASTRODUODENOSCOPY);  Surgeon: Fadia Ellsworth MD;  Location: [facility] ([address] [place] [address]);  Service: Endoscopy;  Laterality: N/A;    EXCLUSION, LEFT ATRIAL APPENDAGE, OPEN, AS PART OF OPEN CHEST SURGERY Left 11/3/2023    Procedure: EXCLUSION, LEFT ATRIAL APPENDAGE, OPEN, AS PART OF OPEN CHEST SURGERY;  Surgeon: Manuel Beal MD;  Location: [facility] OR [address] [place] [address];  Service: Cardiothoracic;  Laterality: Left;    INSERTION OF INTRA-AORTIC BALLOON ASSIST DEVICE Right 11/2/2023    Procedure: INSERTION, INTRA-AORTIC BALLOON PUMP;  Surgeon: Jose Vidal MD;  Location: [facility] CATH LAB;  Service: Cardiology;  Laterality: Right;    REPAIR, MITRAL VALVE, OPEN N/A 11/3/2023    Procedure: REPAIR, MITRAL VALVE, OPEN;  Surgeon: Manuel Beal MD;  Location: [facility] OR [address] [place] [address];  Service: Cardiothoracic;  Laterality: N/A;       Social History     Socioeconomic History    Marital status: Single   Occupational History    Occupation: associated terminal  [employer]    Tobacco Use    Smoking status: Unknown     Passive exposure: Never   Substance and Sexual Activity    Alcohol use: Yes     Alcohol/week: 1.0 standard drink of alcohol     Types: 1 Cans of beer per week    Drug use: Never    Sexual activity: Not Currently     Partners: Female     Birth control/protection: None     Social Determinants of Health     Financial Resource Strain: Patient Declined (1/9/2024)    Overall Financial Resource Strain (CARDIA)     Difficulty of Paying Living Expenses: Patient declined   Food Insecurity: Patient Declined (1/9/2024)    Hunger Vital Sign     Worried About Running Out of Food in the Last Year: Patient declined     Ran Out of Food in the Last Year: Patient declined   Transportation Needs: Patient Declined (1/9/2024)    PRAPARE - Transportation     Lack of Transportation (Medical): Patient declined     Lack of Transportation (Non-Medical): Patient declined   Physical  Activity: Unknown (1/9/2024)    Exercise Vital Sign     Days of Exercise per Week: Patient declined     Minutes of Exercise per Session: 0 min   Recent Concern: Physical Activity - Inactive (11/1/2023)    Exercise Vital Sign     Days of Exercise per Week: 0 days     Minutes of Exercise per Session: 0 min   Stress: No Stress Concern Present (11/1/2023)    [employer] - Occupational Stress Questionnaire     Feeling of Stress : Not at all   Housing Stability: Patient Declined (1/9/2024)    Housing Stability Vital Sign     Unable to Pay for Housing in the Last Year: Patient declined     Number of Places Lived in the Last Year: 1     Unstable Housing in the Last Year: Patient declined       OBJECTIVE:     Vital Signs Range (Last 24H):         Significant Labs:  Lab Results   Component Value Date    WBC 12.73 (H) 08/08/2024    HGB 11.4 (L) 08/08/2024    HCT 40.3 08/08/2024     08/08/2024    TRIG 370 (H) 10/31/2023    ALT 47 (H) 08/08/2024    AST 45 (H) 08/08/2024     08/08/2024    K 3.9 08/08/2024     08/08/2024    CREATININE 1.8 (H) 08/08/2024    BUN 28 (H) 08/08/2024    CO2 24 08/08/2024    TSH 1.303 06/22/2024    INR 1.0 08/08/2024    HGBA1C 5.4 08/08/2024       Diagnostic Studies: No relevant studies.    EKG:   Results for orders placed or performed during the hospital encounter of 08/08/24   EKG 12-lead    Collection Time: 08/08/24 11:37 AM   Result Value Ref Range    QRS Duration 100 ms    OHS QTC Calculation 469 ms    Narrative    Test Reason : Z98.890,I34.0,Z01.818,    Vent. Rate : 058 BPM     Atrial Rate : 058 BPM     P-R Int : 176 ms          QRS Dur : 100 ms      QT Int : 478 ms       P-R-T Axes : 064 060 072 degrees     QTc Int : 469 ms    Sinus bradycardia  Possible Inferior infarct ,age undetermined  Abnormal ECG  When compared with ECG of 23-JUN-2024 09:07,  Nonspecific T wave abnormality no longer evident in Anterior leads  Confirmed by HERBERTH GREENBERG MD  ([id number]) on 8/8/2024 12:39:37 PM    Referred By: HERBERTH BENNETT           Confirmed By:HERBERTH GREENBERG MD       2D ECHO:  TTE:  Results for orders placed or performed during the hospital encounter of 07/05/24   Echo   Result Value Ref Range    BSA 1.98 m2    Est. RA pres 3 mmHg    LVOT stroke volume 71.37 cm3    LVIDd 6.28 (A) 3.5 - 6.0 cm    LV Systolic Volume 62.66 mL    LV Systolic Volume Index 31.5 mL/m2    LVIDs 3.82 2.1 - 4.0 cm    LV Diastolic Volume 200.07 mL    LV Diastolic Volume Index 100.54 mL/m2    IVS 0.88 0.6 - 1.1 cm    LVOT diameter 2.18 cm    LVOT area 3.7 cm2    FS 39 28 - 44 %    Left Ventricle Relative Wall Thickness 0.20 cm    Posterior Wall 0.64 0.6 - 1.1 cm    LV mass 189.36 g    LV Mass Index 95 g/m2    MV Peak E Jabier 1.51 m/s    TDI LATERAL 0.16 m/s    TDI SEPTAL 0.07 m/s    E/E' ratio 13.13 m/s    MV Peak A Jabier 0.41 m/s    TR Max Jabier 3.56 m/s    E/A ratio 3.68     E wave deceleration time 534.74 msec    LV SEPTAL E/E' RATIO 21.57 m/s    LA Volume Index 76.5 mL/m2    LV LATERAL E/E' RATIO 9.44 m/s    LA volume 152.28 cm3    LVOT peak jabier 0.76 m/s    RV- molina basal diam 3.9 cm    TAPSE 1.75 cm    LA size 5.35 cm    Left Atrium Minor Axis 6.01 cm    Left Atrium Major Axis 5.95 cm    LA volume (mod) 148.03 cm3    LA WIDTH 5.60 cm    LA Volume Index (Mod) 74.4 mL/m2    RA Major Axis 5.17 cm    RA Width 3.60 cm    AV mean gradient 4 mmHg    AV peak gradient 6 mmHg    Ao peak jabier 1.22 m/s    Ao VTI 30.05 cm    LVOT peak VTI 19.13 cm    AV valve area 2.37 cm²    AV Velocity Ratio 0.62     AV index (prosthetic) 0.64     FATUMA by Velocity Ratio 2.32 cm²    Mr max jabier 0.05 m/s    MV stenosis pressure 1/2 time 155.07 ms    MV valve area p 1/2 method 1.42 cm2    TV resting pulmonary artery pressure 54 mmHg    RV TB RVSP 7 mmHg    Triscuspid Valve Regurgitation Peak Gradient 51 mmHg    Sinus 3.47 cm    STJ 2.51 cm    Ascending aorta 3.55 cm    Mean e' 0.12 m/s    ZLVIDS 0.59     ZLVIDD 0.84     Narrative     Images from the original result were not included.      Left Ventricle: The left ventricle is moderately dilated. Normal wall   thickness. Normal wall motion. There is normal systolic function with a   visually estimated ejection fraction of 55 - 60%. Diastolic function   cannot be reliably determined in the presence of mitral valve disease and   mitral valve ring/replacement.    Right Ventricle: Normal right ventricular cavity size. Wall thickness   is normal. Right ventricle wall motion  is normal. Systolic function is   normal.    Left Atrium: Left atrium is severely dilated.    Right Atrium: Normal right atrial size.    Mitral Valve: The mitral valve is repaired by annular ring 40 mm   Medtronic. There is mild bileaflet sclerosis. There is severe   regurgitation with a posterolateral eccentriccally directed and a central    jet. There appears to be a perforation in Anterior leaflets (clip #96 to   96- see below).Torn tissue is seen in this area in LA but its proximal   attacment is not well seen. Consider NEW if clinically indicated. Mean   gradient is 2.5 mmHg at HR o 53 bpm.    Tricuspid Valve: There is mild to moderate regurgitation with a   centrally directed jet.    Pulmonic Valve: There is mild regurgitation.    Aorta: Aortic root is normal in size measuring 3.47 cm. Ascending aorta   is normal measuring 3.55 cm.    Pulmonary Artery: There is moderate pulmonary hypertension. The   estimated pulmonary artery systolic pressure is 54 mmHg.    IVC/SVC: Normal venous pressure at 3 mmHg.    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   2)  Resection of left atrial appendage          NEW:  Results for orders placed or performed during the hospital encounter of 12/18/23   Transesophageal echo (NEW)   Result Value Ref Range    BSA 1.85 m2    Sinus 3.6 cm     STJ 3.1 cm    Ascending aorta 3.1 cm    Narrative      NEW for evaluation of mitral valve.    Left Ventricle: The left ventricle is normal in size. Normal wall   thickness. Normal wall motion. There is low normal systolic function with   a visually estimated ejection fraction of 50 - 55%.    Right Ventricle: Normal right ventricular cavity size. Wall thickness   is normal. Right ventricle wall motion  is normal. Systolic function is   normal.    Aortic Valve: There is no mass/vegetation present.    Tricuspid Valve: There is no mass/vegetation present.    Severe mitral reguritaiton    Mitral Valve: Status post bovine pericardial annuloplasty band.   Dehiscense of band from 10 o'clock to 4 o'clock. Two regurgitant jets   noted. First jet due to failure of coaptation at A2-P2 secondary to   anuloplasty band dehiscence. Second jet from a perforation on the medial   aspect of P2, possibly at the site of prior triangular resection.         ASSESSMENT/PLAN:           Pre-op Assessment    I have reviewed the Patient Summary Reports.     I have reviewed the Nursing Notes. I have reviewed the NPO Status.   I have reviewed the Medications.     Review of Systems  Anesthesia Hx:  No problems with previous Anesthesia   History of prior surgery of interest to airway management or planning:          Denies Family Hx of Anesthesia complications.    Denies Personal Hx of Anesthesia complications.                    Social:  Non-Smoker, No Alcohol Use       Hematology/Oncology:       -- Denies Anemia:                  Denies Current/Recent Cancer                Cardiovascular:     Hypertension Valvular problems/Murmurs, MR   Denies CAD.    Dysrhythmias atrial fibrillation  CHF    no hyperlipidemia                             Pulmonary:    Denies COPD.  Denies Asthma.     Denies Sleep Apnea.                Renal/:  Chronic Renal Disease, CKD                Hepatic/GI:      Denies GERD. Denies Liver Disease.             Musculoskeletal:  Denies Arthritis.               Neurological:    Denies CVA. Denies Neuromuscular Disease.   Denies Seizures.          Denies Chronic Pain Syndrome                         Endocrine:  Denies Diabetes.   Denies Hyperthyroidism.       Denies Obesity / BMI > 30  Psych:   denies anxiety denies depression                Physical Exam  General: Well nourished    Airway:  Mallampati: III / II  Mouth Opening: Normal  TM Distance: Normal  Tongue: Normal  Neck ROM: Normal ROM    Chest/Lungs:  Normal Respiratory Rate    Heart:  Rate: Normal        Anesthesia Plan  Type of Anesthesia, risks & benefits discussed:    Anesthesia Type: Gen ETT  Intra-op Monitoring Plan: Standard ASA Monitors, Art Line, Central Line and NEW  Post Op Pain Control Plan: multimodal analgesia  Induction:  IV  Airway Plan: Video, Post-Induction  Informed Consent: Informed consent signed with the Patient and all parties understand the risks and agree with anesthesia plan.  All questions answered.   ASA Score: 4  Day of Surgery Review of History & Physical: H&P Update referred to the surgeon/provider.  Anesthesia Plan Notes: NPO confirmed.  Very high risk for redo operation, Severe MR with Pulm HTN  Cr elevated, planning to place trialysis line in OR   Discussed methadone and postop vent / sedation  Plan autologous blood.  Discussed case and plan in detail with surgeon    Ready For Surgery From Anesthesia Perspective.     .

## 2024-08-12 ENCOUNTER — ANESTHESIA (OUTPATIENT)
Dept: SURGERY | Facility: HOSPITAL | Age: 49
End: 2024-08-12
Payer: MEDICAID

## 2024-08-12 ENCOUNTER — HOSPITAL ENCOUNTER (INPATIENT)
Facility: HOSPITAL | Age: 49
LOS: 11 days | Discharge: HOME | DRG: 219 | End: 2024-08-23
Attending: THORACIC SURGERY (CARDIOTHORACIC VASCULAR SURGERY) | Admitting: THORACIC SURGERY (CARDIOTHORACIC VASCULAR SURGERY)
Payer: MEDICAID

## 2024-08-12 VITALS — RESPIRATION RATE: 12 BRPM

## 2024-08-12 DIAGNOSIS — Z98.890 S/P MVR (MITRAL VALVE REPAIR): Primary | ICD-10-CM

## 2024-08-12 DIAGNOSIS — Z98.890 HISTORY OF HEART SURGERY: ICD-10-CM

## 2024-08-12 DIAGNOSIS — I38 ENDOCARDITIS, UNSPECIFIED CHRONICITY, UNSPECIFIED ENDOCARDITIS TYPE: ICD-10-CM

## 2024-08-12 DIAGNOSIS — I34.0 SEVERE MITRAL REGURGITATION: ICD-10-CM

## 2024-08-12 DIAGNOSIS — R73.9 STRESS HYPERGLYCEMIA: ICD-10-CM

## 2024-08-12 DIAGNOSIS — Z95.2 S/P MITRAL VALVE REPLACEMENT: Primary | ICD-10-CM

## 2024-08-12 DIAGNOSIS — I48.3 TYPICAL ATRIAL FLUTTER: ICD-10-CM

## 2024-08-12 DIAGNOSIS — Z91.89 AT RISK FOR PROLONGED QT INTERVAL SYNDROME: ICD-10-CM

## 2024-08-12 DIAGNOSIS — I49.9 ARRHYTHMIA: ICD-10-CM

## 2024-08-12 LAB
ABO + RH BLD: ABNORMAL
ALBUMIN SERPL BCP-MCNC: 2.2 G/DL (ref 3.5–5.2)
ALLENS TEST: ABNORMAL
ALP SERPL-CCNC: 49 U/L (ref 55–135)
ALT SERPL W/O P-5'-P-CCNC: 29 U/L (ref 10–44)
ANION GAP SERPL CALC-SCNC: 14 MMOL/L (ref 8–16)
ANISOCYTOSIS BLD QL SMEAR: SLIGHT
APTT PPP: 37.1 SEC (ref 21–32)
AST SERPL-CCNC: 55 U/L (ref 10–40)
BASOPHILS # BLD AUTO: 0.05 K/UL (ref 0–0.2)
BASOPHILS NFR BLD: 0.2 % (ref 0–1.9)
BILIRUB SERPL-MCNC: 0.5 MG/DL (ref 0.1–1)
BLD GP AB SCN CELLS X3 SERPL QL: ABNORMAL
BUN SERPL-MCNC: 15 MG/DL (ref 6–20)
CA-I BLDV-SCNC: 1.12 MMOL/L (ref 1.06–1.42)
CALCIUM SERPL-MCNC: 8.6 MG/DL (ref 8.7–10.5)
CHLORIDE SERPL-SCNC: 111 MMOL/L (ref 95–110)
CO2 SERPL-SCNC: 19 MMOL/L (ref 23–29)
CREAT SERPL-MCNC: 1.5 MG/DL (ref 0.5–1.4)
DELSYS: ABNORMAL
DIFFERENTIAL METHOD BLD: ABNORMAL
EOSINOPHIL # BLD AUTO: 0 K/UL (ref 0–0.5)
EOSINOPHIL NFR BLD: 0 % (ref 0–8)
ERYTHROCYTE [DISTWIDTH] IN BLOOD BY AUTOMATED COUNT: 18.7 % (ref 11.5–14.5)
ERYTHROCYTE [SEDIMENTATION RATE] IN BLOOD BY WESTERGREN METHOD: 18 MM/H
ERYTHROCYTE [SEDIMENTATION RATE] IN BLOOD BY WESTERGREN METHOD: 22 MM/H
EST. GFR  (NO RACE VARIABLE): 56.7 ML/MIN/1.73 M^2
FIBRINOGEN PPP-MCNC: 213 MG/DL (ref 182–400)
FIO2: 100
FIO2: 40
FIO2: 50
GLUCOSE SERPL-MCNC: 148 MG/DL (ref 70–110)
GLUCOSE SERPL-MCNC: 167 MG/DL (ref 70–110)
GLUCOSE SERPL-MCNC: 182 MG/DL (ref 70–110)
GLUCOSE SERPL-MCNC: 207 MG/DL (ref 70–110)
GLUCOSE SERPL-MCNC: 213 MG/DL (ref 70–110)
GLUCOSE SERPL-MCNC: 229 MG/DL (ref 70–110)
GLUCOSE SERPL-MCNC: 238 MG/DL (ref 70–110)
GLUCOSE SERPL-MCNC: 242 MG/DL (ref 70–110)
GLUCOSE SERPL-MCNC: 246 MG/DL (ref 70–110)
GRAM STN SPEC: NORMAL
GRAM STN SPEC: NORMAL
HCO3 UR-SCNC: 17.4 MMOL/L (ref 24–28)
HCO3 UR-SCNC: 17.9 MMOL/L (ref 24–28)
HCO3 UR-SCNC: 17.9 MMOL/L (ref 24–28)
HCO3 UR-SCNC: 18.6 MMOL/L (ref 24–28)
HCO3 UR-SCNC: 18.8 MMOL/L (ref 24–28)
HCO3 UR-SCNC: 18.9 MMOL/L (ref 24–28)
HCO3 UR-SCNC: 18.9 MMOL/L (ref 24–28)
HCO3 UR-SCNC: 19.4 MMOL/L (ref 24–28)
HCO3 UR-SCNC: 19.7 MMOL/L (ref 24–28)
HCO3 UR-SCNC: 20.5 MMOL/L (ref 24–28)
HCO3 UR-SCNC: 20.6 MMOL/L (ref 24–28)
HCO3 UR-SCNC: 20.7 MMOL/L (ref 24–28)
HCO3 UR-SCNC: 20.9 MMOL/L (ref 24–28)
HCO3 UR-SCNC: 21 MMOL/L (ref 24–28)
HCO3 UR-SCNC: 21.9 MMOL/L (ref 24–28)
HCO3 UR-SCNC: 23.5 MMOL/L (ref 24–28)
HCO3 UR-SCNC: 24.3 MMOL/L (ref 24–28)
HCT VFR BLD AUTO: 29.6 % (ref 40–54)
HCT VFR BLD CALC: 22 %PCV (ref 36–54)
HCT VFR BLD CALC: 23 %PCV (ref 36–54)
HCT VFR BLD CALC: 24 %PCV (ref 36–54)
HCT VFR BLD CALC: 24 %PCV (ref 36–54)
HCT VFR BLD CALC: 26 %PCV (ref 36–54)
HCT VFR BLD CALC: 28 %PCV (ref 36–54)
HCT VFR BLD CALC: 32 %PCV (ref 36–54)
HGB BLD-MCNC: 8.5 G/DL (ref 14–18)
HYPOCHROMIA BLD QL SMEAR: ABNORMAL
IMM GRANULOCYTES # BLD AUTO: 0.47 K/UL (ref 0–0.04)
IMM GRANULOCYTES NFR BLD AUTO: 2 % (ref 0–0.5)
INR PPP: 1.2 (ref 0.8–1.2)
LDH SERPL L TO P-CCNC: 10.77 MMOL/L (ref 0.36–1.25)
LDH SERPL L TO P-CCNC: 4.04 MMOL/L (ref 0.36–1.25)
LDH SERPL L TO P-CCNC: 7.06 MMOL/L (ref 0.36–1.25)
LDH SERPL L TO P-CCNC: 7.93 MMOL/L (ref 0.36–1.25)
LDH SERPL L TO P-CCNC: 8.01 MMOL/L (ref 0.36–1.25)
LDH SERPL L TO P-CCNC: 8.1 MMOL/L (ref 0.36–1.25)
LDH SERPL L TO P-CCNC: 8.13 MMOL/L (ref 0.36–1.25)
LDH SERPL L TO P-CCNC: 8.3 MMOL/L (ref 0.36–1.25)
LDH SERPL L TO P-CCNC: 8.72 MMOL/L (ref 0.36–1.25)
LDH SERPL L TO P-CCNC: 9.06 MMOL/L (ref 0.36–1.25)
LDH SERPL L TO P-CCNC: 9.26 MMOL/L (ref 0.36–1.25)
LDH SERPL L TO P-CCNC: 9.26 MMOL/L (ref 0.36–1.25)
LYMPHOCYTES # BLD AUTO: 1.4 K/UL (ref 1–4.8)
LYMPHOCYTES NFR BLD: 6.3 % (ref 18–48)
MAGNESIUM SERPL-MCNC: 2.8 MG/DL (ref 1.6–2.6)
MCH RBC QN AUTO: 22.5 PG (ref 27–31)
MCHC RBC AUTO-ENTMCNC: 28.7 G/DL (ref 32–36)
MCV RBC AUTO: 78 FL (ref 82–98)
MODE: ABNORMAL
MONOCYTES # BLD AUTO: 2.1 K/UL (ref 0.3–1)
MONOCYTES NFR BLD: 9.2 % (ref 4–15)
NEUTROPHILS # BLD AUTO: 18.9 K/UL (ref 1.8–7.7)
NEUTROPHILS NFR BLD: 82.3 % (ref 38–73)
NRBC BLD-RTO: 0 /100 WBC
OVALOCYTES BLD QL SMEAR: ABNORMAL
PCO2 BLDA: 30.7 MMHG (ref 35–45)
PCO2 BLDA: 31.4 MMHG (ref 35–45)
PCO2 BLDA: 32.6 MMHG (ref 35–45)
PCO2 BLDA: 32.6 MMHG (ref 35–45)
PCO2 BLDA: 32.8 MMHG (ref 35–45)
PCO2 BLDA: 33.3 MMHG (ref 35–45)
PCO2 BLDA: 35.3 MMHG (ref 35–45)
PCO2 BLDA: 35.3 MMHG (ref 35–45)
PCO2 BLDA: 35.7 MMHG (ref 35–45)
PCO2 BLDA: 36.9 MMHG (ref 35–45)
PCO2 BLDA: 37.1 MMHG (ref 35–45)
PCO2 BLDA: 37.9 MMHG (ref 35–45)
PCO2 BLDA: 39.6 MMHG (ref 35–45)
PCO2 BLDA: 40.6 MMHG (ref 35–45)
PCO2 BLDA: 41.4 MMHG (ref 35–45)
PCO2 BLDA: 41.9 MMHG (ref 35–45)
PCO2 BLDA: 42.2 MMHG (ref 35–45)
PEEP: 5
PH SMN: 7.3 [PH] (ref 7.35–7.45)
PH SMN: 7.31 [PH] (ref 7.35–7.45)
PH SMN: 7.32 [PH] (ref 7.35–7.45)
PH SMN: 7.32 [PH] (ref 7.35–7.45)
PH SMN: 7.33 [PH] (ref 7.35–7.45)
PH SMN: 7.34 [PH] (ref 7.35–7.45)
PH SMN: 7.35 [PH] (ref 7.35–7.45)
PH SMN: 7.36 [PH] (ref 7.35–7.45)
PH SMN: 7.36 [PH] (ref 7.35–7.45)
PH SMN: 7.37 [PH] (ref 7.35–7.45)
PH SMN: 7.37 [PH] (ref 7.35–7.45)
PH SMN: 7.38 [PH] (ref 7.35–7.45)
PH SMN: 7.38 [PH] (ref 7.35–7.45)
PH SMN: 7.4 [PH] (ref 7.35–7.45)
PH SMN: 7.41 [PH] (ref 7.35–7.45)
PHOSPHATE SERPL-MCNC: 2.9 MG/DL (ref 2.7–4.5)
PIP: 21
PIP: 22
PIP: 24
PLATELET # BLD AUTO: 98 K/UL (ref 150–450)
PLATELET BLD QL SMEAR: ABNORMAL
PMV BLD AUTO: ABNORMAL FL (ref 9.2–12.9)
PO2 BLDA: 125 MMHG (ref 80–100)
PO2 BLDA: 146 MMHG (ref 80–100)
PO2 BLDA: 160 MMHG (ref 80–100)
PO2 BLDA: 167 MMHG (ref 80–100)
PO2 BLDA: 167 MMHG (ref 80–100)
PO2 BLDA: 168 MMHG (ref 80–100)
PO2 BLDA: 173 MMHG (ref 80–100)
PO2 BLDA: 213 MMHG (ref 80–100)
PO2 BLDA: 230 MMHG (ref 80–100)
PO2 BLDA: 230 MMHG (ref 80–100)
PO2 BLDA: 288 MMHG (ref 80–100)
PO2 BLDA: 339 MMHG (ref 80–100)
PO2 BLDA: 342 MMHG (ref 80–100)
PO2 BLDA: 344 MMHG (ref 80–100)
PO2 BLDA: 376 MMHG (ref 80–100)
PO2 BLDA: 53 MMHG (ref 40–60)
PO2 BLDA: 91 MMHG (ref 80–100)
POC BE: -1 MMOL/L
POC BE: -1 MMOL/L
POC BE: -3 MMOL/L
POC BE: -4 MMOL/L
POC BE: -5 MMOL/L
POC BE: -5 MMOL/L
POC BE: -6 MMOL/L
POC BE: -7 MMOL/L
POC BE: -8 MMOL/L
POC BE: -8 MMOL/L
POC IONIZED CALCIUM: 0.95 MMOL/L (ref 1.06–1.42)
POC IONIZED CALCIUM: 0.95 MMOL/L (ref 1.06–1.42)
POC IONIZED CALCIUM: 0.96 MMOL/L (ref 1.06–1.42)
POC IONIZED CALCIUM: 0.97 MMOL/L (ref 1.06–1.42)
POC IONIZED CALCIUM: 1.03 MMOL/L (ref 1.06–1.42)
POC IONIZED CALCIUM: 1.1 MMOL/L (ref 1.06–1.42)
POC IONIZED CALCIUM: 1.12 MMOL/L (ref 1.06–1.42)
POC IONIZED CALCIUM: 1.13 MMOL/L (ref 1.06–1.42)
POC IONIZED CALCIUM: 1.15 MMOL/L (ref 1.06–1.42)
POC IONIZED CALCIUM: 1.15 MMOL/L (ref 1.06–1.42)
POC IONIZED CALCIUM: 1.17 MMOL/L (ref 1.06–1.42)
POC IONIZED CALCIUM: 1.57 MMOL/L (ref 1.06–1.42)
POC SATURATED O2: 100 % (ref 95–100)
POC SATURATED O2: 85 % (ref 95–100)
POC SATURATED O2: 97 % (ref 95–100)
POC SATURATED O2: 99 % (ref 95–100)
POC TCO2: 18 MMOL/L (ref 23–27)
POC TCO2: 19 MMOL/L (ref 23–27)
POC TCO2: 19 MMOL/L (ref 23–27)
POC TCO2: 20 MMOL/L (ref 23–27)
POC TCO2: 20 MMOL/L (ref 24–29)
POC TCO2: 21 MMOL/L (ref 23–27)
POC TCO2: 22 MMOL/L (ref 23–27)
POC TCO2: 23 MMOL/L (ref 23–27)
POC TCO2: 25 MMOL/L (ref 23–27)
POC TCO2: 26 MMOL/L (ref 23–27)
POCT GLUCOSE: 115 MG/DL (ref 70–110)
POCT GLUCOSE: 117 MG/DL (ref 70–110)
POCT GLUCOSE: 128 MG/DL (ref 70–110)
POCT GLUCOSE: 129 MG/DL (ref 70–110)
POCT GLUCOSE: 131 MG/DL (ref 70–110)
POCT GLUCOSE: 136 MG/DL (ref 70–110)
POCT GLUCOSE: 141 MG/DL (ref 70–110)
POCT GLUCOSE: 141 MG/DL (ref 70–110)
POCT GLUCOSE: 143 MG/DL (ref 70–110)
POIKILOCYTOSIS BLD QL SMEAR: SLIGHT
POLYCHROMASIA BLD QL SMEAR: ABNORMAL
POTASSIUM BLD-SCNC: 3.2 MMOL/L (ref 3.5–5.1)
POTASSIUM BLD-SCNC: 3.4 MMOL/L (ref 3.5–5.1)
POTASSIUM BLD-SCNC: 3.5 MMOL/L (ref 3.5–5.1)
POTASSIUM BLD-SCNC: 3.6 MMOL/L (ref 3.5–5.1)
POTASSIUM BLD-SCNC: 3.6 MMOL/L (ref 3.5–5.1)
POTASSIUM BLD-SCNC: 3.8 MMOL/L (ref 3.5–5.1)
POTASSIUM BLD-SCNC: 3.8 MMOL/L (ref 3.5–5.1)
POTASSIUM BLD-SCNC: 3.9 MMOL/L (ref 3.5–5.1)
POTASSIUM BLD-SCNC: 4 MMOL/L (ref 3.5–5.1)
POTASSIUM BLD-SCNC: 4.1 MMOL/L (ref 3.5–5.1)
POTASSIUM BLD-SCNC: 4.2 MMOL/L (ref 3.5–5.1)
POTASSIUM BLD-SCNC: 4.3 MMOL/L (ref 3.5–5.1)
POTASSIUM BLD-SCNC: 4.3 MMOL/L (ref 3.5–5.1)
POTASSIUM BLD-SCNC: 4.4 MMOL/L (ref 3.5–5.1)
POTASSIUM BLD-SCNC: 4.7 MMOL/L (ref 3.5–5.1)
POTASSIUM BLD-SCNC: 4.8 MMOL/L (ref 3.5–5.1)
POTASSIUM SERPL-SCNC: 4.1 MMOL/L (ref 3.5–5.1)
POTASSIUM SERPL-SCNC: 4.4 MMOL/L (ref 3.5–5.1)
PROT SERPL-MCNC: 4.6 G/DL (ref 6–8.4)
PROTHROMBIN TIME: 13.5 SEC (ref 9–12.5)
RBC # BLD AUTO: 3.78 M/UL (ref 4.6–6.2)
SAMPLE: ABNORMAL
SITE: ABNORMAL
SODIUM BLD-SCNC: 140 MMOL/L (ref 136–145)
SODIUM BLD-SCNC: 141 MMOL/L (ref 136–145)
SODIUM BLD-SCNC: 141 MMOL/L (ref 136–145)
SODIUM BLD-SCNC: 142 MMOL/L (ref 136–145)
SODIUM BLD-SCNC: 143 MMOL/L (ref 136–145)
SODIUM BLD-SCNC: 144 MMOL/L (ref 136–145)
SODIUM BLD-SCNC: 144 MMOL/L (ref 136–145)
SODIUM BLD-SCNC: 145 MMOL/L (ref 136–145)
SODIUM BLD-SCNC: 146 MMOL/L (ref 136–145)
SODIUM SERPL-SCNC: 144 MMOL/L (ref 136–145)
SP02: 100
SP02: 99
SP02: 99
SPECIMEN OUTDATE: ABNORMAL
SPHEROCYTES BLD QL SMEAR: ABNORMAL
TARGETS BLD QL SMEAR: ABNORMAL
VT: 520
WBC # BLD AUTO: 22.98 K/UL (ref 3.9–12.7)

## 2024-08-12 PROCEDURE — 83735 ASSAY OF MAGNESIUM: CPT | Performed by: PHYSICIAN ASSISTANT

## 2024-08-12 PROCEDURE — 86922 COMPATIBILITY TEST ANTIGLOB: CPT

## 2024-08-12 PROCEDURE — 85014 HEMATOCRIT: CPT

## 2024-08-12 PROCEDURE — 63600175 PHARM REV CODE 636 W HCPCS: Mod: JZ,JG

## 2024-08-12 PROCEDURE — 27100088 HC CELL SAVER

## 2024-08-12 PROCEDURE — 25000003 PHARM REV CODE 250: Performed by: STUDENT IN AN ORGANIZED HEALTH CARE EDUCATION/TRAINING PROGRAM

## 2024-08-12 PROCEDURE — 94640 AIRWAY INHALATION TREATMENT: CPT

## 2024-08-12 PROCEDURE — 82803 BLOOD GASES ANY COMBINATION: CPT

## 2024-08-12 PROCEDURE — 87116 MYCOBACTERIA CULTURE: CPT | Performed by: THORACIC SURGERY (CARDIOTHORACIC VASCULAR SURGERY)

## 2024-08-12 PROCEDURE — 25000003 PHARM REV CODE 250

## 2024-08-12 PROCEDURE — 36000713 HC OR TIME LEV V EA ADD 15 MIN: Performed by: THORACIC SURGERY (CARDIOTHORACIC VASCULAR SURGERY)

## 2024-08-12 PROCEDURE — 83605 ASSAY OF LACTIC ACID: CPT

## 2024-08-12 PROCEDURE — P9045 ALBUMIN (HUMAN), 5%, 250 ML: HCPCS | Mod: JZ,JG | Performed by: PHYSICIAN ASSISTANT

## 2024-08-12 PROCEDURE — 27201037 HC PRESSURE MONITORING SET UP

## 2024-08-12 PROCEDURE — 86901 BLOOD TYPING SEROLOGIC RH(D): CPT | Performed by: PHYSICIAN ASSISTANT

## 2024-08-12 PROCEDURE — P9017 PLASMA 1 DONOR FRZ W/IN 8 HR: HCPCS

## 2024-08-12 PROCEDURE — 27200966 HC CLOSED SUCTION SYSTEM

## 2024-08-12 PROCEDURE — 27000175 HC ADULT BYPASS PUMP

## 2024-08-12 PROCEDURE — 27000191 HC C-V MONITORING

## 2024-08-12 PROCEDURE — 30233N1 TRANSFUSION OF NONAUTOLOGOUS RED BLOOD CELLS INTO PERIPHERAL VEIN, PERCUTANEOUS APPROACH: ICD-10-PCS | Performed by: THORACIC SURGERY (CARDIOTHORACIC VASCULAR SURGERY)

## 2024-08-12 PROCEDURE — 87070 CULTURE OTHR SPECIMN AEROBIC: CPT | Performed by: THORACIC SURGERY (CARDIOTHORACIC VASCULAR SURGERY)

## 2024-08-12 PROCEDURE — C1751 CATH, INF, PER/CENT/MIDLINE: HCPCS | Performed by: ANESTHESIOLOGY

## 2024-08-12 PROCEDURE — P9045 ALBUMIN (HUMAN), 5%, 250 ML: HCPCS | Mod: JZ,JG

## 2024-08-12 PROCEDURE — 85384 FIBRINOGEN ACTIVITY: CPT | Performed by: STUDENT IN AN ORGANIZED HEALTH CARE EDUCATION/TRAINING PROGRAM

## 2024-08-12 PROCEDURE — 63600175 PHARM REV CODE 636 W HCPCS: Performed by: STUDENT IN AN ORGANIZED HEALTH CARE EDUCATION/TRAINING PROGRAM

## 2024-08-12 PROCEDURE — 82330 ASSAY OF CALCIUM: CPT

## 2024-08-12 PROCEDURE — 99900035 HC TECH TIME PER 15 MIN (STAT)

## 2024-08-12 PROCEDURE — 87205 SMEAR GRAM STAIN: CPT | Performed by: THORACIC SURGERY (CARDIOTHORACIC VASCULAR SURGERY)

## 2024-08-12 PROCEDURE — 85730 THROMBOPLASTIN TIME PARTIAL: CPT | Performed by: PHYSICIAN ASSISTANT

## 2024-08-12 PROCEDURE — 37799 UNLISTED PX VASCULAR SURGERY: CPT

## 2024-08-12 PROCEDURE — 86902 BLOOD TYPE ANTIGEN DONOR EA: CPT | Performed by: PHYSICIAN ASSISTANT

## 2024-08-12 PROCEDURE — 33530 CORONARY ARTERY BYPASS/REOP: CPT | Mod: ,,, | Performed by: THORACIC SURGERY (CARDIOTHORACIC VASCULAR SURGERY)

## 2024-08-12 PROCEDURE — 99223 1ST HOSP IP/OBS HIGH 75: CPT | Mod: 25,,, | Performed by: ANESTHESIOLOGY

## 2024-08-12 PROCEDURE — 63600175 PHARM REV CODE 636 W HCPCS: Performed by: PHYSICIAN ASSISTANT

## 2024-08-12 PROCEDURE — 82330 ASSAY OF CALCIUM: CPT | Performed by: STUDENT IN AN ORGANIZED HEALTH CARE EDUCATION/TRAINING PROGRAM

## 2024-08-12 PROCEDURE — 27800595 HC HEART VALVES

## 2024-08-12 PROCEDURE — 99223 1ST HOSP IP/OBS HIGH 75: CPT | Mod: ,,,

## 2024-08-12 PROCEDURE — 27200678 HC TRANSDUCER MONITOR KIT TRIPLE: Performed by: ANESTHESIOLOGY

## 2024-08-12 PROCEDURE — 85025 COMPLETE CBC W/AUTO DIFF WBC: CPT | Performed by: PHYSICIAN ASSISTANT

## 2024-08-12 PROCEDURE — 99900026 HC AIRWAY MAINTENANCE (STAT)

## 2024-08-12 PROCEDURE — 94002 VENT MGMT INPAT INIT DAY: CPT

## 2024-08-12 PROCEDURE — 94761 N-INVAS EAR/PLS OXIMETRY MLT: CPT | Mod: XB

## 2024-08-12 PROCEDURE — C1729 CATH, DRAINAGE: HCPCS | Performed by: THORACIC SURGERY (CARDIOTHORACIC VASCULAR SURGERY)

## 2024-08-12 PROCEDURE — 37000009 HC ANESTHESIA EA ADD 15 MINS: Performed by: THORACIC SURGERY (CARDIOTHORACIC VASCULAR SURGERY)

## 2024-08-12 PROCEDURE — 27200953 HC CARDIOPLEGIA SYSTEM

## 2024-08-12 PROCEDURE — 36620 INSERTION CATHETER ARTERY: CPT | Mod: 59,GC,, | Performed by: ANESTHESIOLOGY

## 2024-08-12 PROCEDURE — 5A1935Z RESPIRATORY VENTILATION, LESS THAN 24 CONSECUTIVE HOURS: ICD-10-PCS | Performed by: THORACIC SURGERY (CARDIOTHORACIC VASCULAR SURGERY)

## 2024-08-12 PROCEDURE — 87206 SMEAR FLUORESCENT/ACID STAI: CPT | Performed by: THORACIC SURGERY (CARDIOTHORACIC VASCULAR SURGERY)

## 2024-08-12 PROCEDURE — 93010 ELECTROCARDIOGRAM REPORT: CPT | Mod: ,,, | Performed by: INTERNAL MEDICINE

## 2024-08-12 PROCEDURE — 84132 ASSAY OF SERUM POTASSIUM: CPT

## 2024-08-12 PROCEDURE — 88305 TISSUE EXAM BY PATHOLOGIST: CPT | Mod: 26,,, | Performed by: PATHOLOGY

## 2024-08-12 PROCEDURE — 84100 ASSAY OF PHOSPHORUS: CPT | Performed by: PHYSICIAN ASSISTANT

## 2024-08-12 PROCEDURE — 87102 FUNGUS ISOLATION CULTURE: CPT | Performed by: THORACIC SURGERY (CARDIOTHORACIC VASCULAR SURGERY)

## 2024-08-12 PROCEDURE — 80053 COMPREHEN METABOLIC PANEL: CPT | Performed by: STUDENT IN AN ORGANIZED HEALTH CARE EDUCATION/TRAINING PROGRAM

## 2024-08-12 PROCEDURE — 37000008 HC ANESTHESIA 1ST 15 MINUTES: Performed by: THORACIC SURGERY (CARDIOTHORACIC VASCULAR SURGERY)

## 2024-08-12 PROCEDURE — 84295 ASSAY OF SERUM SODIUM: CPT

## 2024-08-12 PROCEDURE — 27100171 HC OXYGEN HIGH FLOW UP TO 24 HOURS

## 2024-08-12 PROCEDURE — 27202828 HC DILATOR KIT, VASCULAR

## 2024-08-12 PROCEDURE — 27100026 HC SHUNT SENSOR, TERUMO

## 2024-08-12 PROCEDURE — 0BH17EZ INSERTION OF ENDOTRACHEAL AIRWAY INTO TRACHEA, VIA NATURAL OR ARTIFICIAL OPENING: ICD-10-PCS | Performed by: STUDENT IN AN ORGANIZED HEALTH CARE EDUCATION/TRAINING PROGRAM

## 2024-08-12 PROCEDURE — 27201423 OPTIME MED/SURG SUP & DEVICES STERILE SUPPLY: Performed by: THORACIC SURGERY (CARDIOTHORACIC VASCULAR SURGERY)

## 2024-08-12 PROCEDURE — 84132 ASSAY OF SERUM POTASSIUM: CPT | Performed by: PHYSICIAN ASSISTANT

## 2024-08-12 PROCEDURE — 27000445 HC TEMPORARY PACEMAKER LEADS

## 2024-08-12 PROCEDURE — 25000003 PHARM REV CODE 250: Performed by: PHYSICIAN ASSISTANT

## 2024-08-12 PROCEDURE — 93005 ELECTROCARDIOGRAM TRACING: CPT

## 2024-08-12 PROCEDURE — 25000003 PHARM REV CODE 250: Performed by: THORACIC SURGERY (CARDIOTHORACIC VASCULAR SURGERY)

## 2024-08-12 PROCEDURE — 27100025 HC TUBING, SET FLUID WARMER: Performed by: ANESTHESIOLOGY

## 2024-08-12 PROCEDURE — 63600175 PHARM REV CODE 636 W HCPCS

## 2024-08-12 PROCEDURE — 87075 CULTR BACTERIA EXCEPT BLOOD: CPT | Performed by: THORACIC SURGERY (CARDIOTHORACIC VASCULAR SURGERY)

## 2024-08-12 PROCEDURE — 36556 INSERT NON-TUNNEL CV CATH: CPT | Mod: 59,GC,, | Performed by: ANESTHESIOLOGY

## 2024-08-12 PROCEDURE — 33430 REPLACEMENT OF MITRAL VALVE: CPT | Mod: ,,, | Performed by: THORACIC SURGERY (CARDIOTHORACIC VASCULAR SURGERY)

## 2024-08-12 PROCEDURE — 82565 ASSAY OF CREATININE: CPT

## 2024-08-12 PROCEDURE — 27100021 HC MULTIPORT INFUSION MANIFOLD: Performed by: ANESTHESIOLOGY

## 2024-08-12 PROCEDURE — 20000000 HC ICU ROOM

## 2024-08-12 PROCEDURE — 88305 TISSUE EXAM BY PATHOLOGIST: CPT | Performed by: PATHOLOGY

## 2024-08-12 PROCEDURE — 27202608 HC CANNULA, MISC

## 2024-08-12 PROCEDURE — 5A1221Z PERFORMANCE OF CARDIAC OUTPUT, CONTINUOUS: ICD-10-PCS | Performed by: THORACIC SURGERY (CARDIOTHORACIC VASCULAR SURGERY)

## 2024-08-12 PROCEDURE — 02RG0JZ REPLACEMENT OF MITRAL VALVE WITH SYNTHETIC SUBSTITUTE, OPEN APPROACH: ICD-10-PCS | Performed by: THORACIC SURGERY (CARDIOTHORACIC VASCULAR SURGERY)

## 2024-08-12 PROCEDURE — 85610 PROTHROMBIN TIME: CPT | Performed by: PHYSICIAN ASSISTANT

## 2024-08-12 PROCEDURE — 86900 BLOOD TYPING SEROLOGIC ABO: CPT | Performed by: PHYSICIAN ASSISTANT

## 2024-08-12 PROCEDURE — 25000242 PHARM REV CODE 250 ALT 637 W/ HCPCS: Performed by: PHYSICIAN ASSISTANT

## 2024-08-12 PROCEDURE — 36000712 HC OR TIME LEV V 1ST 15 MIN: Performed by: THORACIC SURGERY (CARDIOTHORACIC VASCULAR SURGERY)

## 2024-08-12 PROCEDURE — 85520 HEPARIN ASSAY: CPT

## 2024-08-12 DEVICE — IMPLANTABLE DEVICE: Type: IMPLANTABLE DEVICE | Site: HEART | Status: FUNCTIONAL

## 2024-08-12 RX ORDER — ALBUMIN HUMAN 50 G/1000ML
25 SOLUTION INTRAVENOUS ONCE
Status: COMPLETED | OUTPATIENT
Start: 2024-08-12 | End: 2024-08-12

## 2024-08-12 RX ORDER — PROTAMINE SULFATE 10 MG/ML
INJECTION, SOLUTION INTRAVENOUS
Status: DISCONTINUED | OUTPATIENT
Start: 2024-08-12 | End: 2024-08-12

## 2024-08-12 RX ORDER — FENTANYL CITRATE 50 UG/ML
INJECTION, SOLUTION INTRAMUSCULAR; INTRAVENOUS
Status: DISCONTINUED | OUTPATIENT
Start: 2024-08-12 | End: 2024-08-12

## 2024-08-12 RX ORDER — DEXTROSE MONOHYDRATE, SODIUM CHLORIDE, AND POTASSIUM CHLORIDE 50; 1.49; 4.5 G/1000ML; G/1000ML; G/1000ML
INJECTION, SOLUTION INTRAVENOUS CONTINUOUS
Status: DISCONTINUED | OUTPATIENT
Start: 2024-08-12 | End: 2024-08-15

## 2024-08-12 RX ORDER — MIDAZOLAM HYDROCHLORIDE 1 MG/ML
INJECTION INTRAMUSCULAR; INTRAVENOUS
Status: DISCONTINUED | OUTPATIENT
Start: 2024-08-12 | End: 2024-08-12

## 2024-08-12 RX ORDER — ACETAMINOPHEN 500 MG
1000 TABLET ORAL
Status: COMPLETED | OUTPATIENT
Start: 2024-08-12 | End: 2024-08-12

## 2024-08-12 RX ORDER — HYDROCODONE BITARTRATE AND ACETAMINOPHEN 500; 5 MG/1; MG/1
TABLET ORAL
Status: DISCONTINUED | OUTPATIENT
Start: 2024-08-12 | End: 2024-08-12

## 2024-08-12 RX ORDER — DOXYCYCLINE HYCLATE 100 MG
100 TABLET ORAL EVERY 12 HOURS
Status: DISCONTINUED | OUTPATIENT
Start: 2024-08-12 | End: 2024-08-13

## 2024-08-12 RX ORDER — MUPIROCIN 20 MG/G
1 OINTMENT TOPICAL 2 TIMES DAILY
Status: COMPLETED | OUTPATIENT
Start: 2024-08-12 | End: 2024-08-17

## 2024-08-12 RX ORDER — ASPIRIN 325 MG
325 TABLET ORAL DAILY
Status: DISCONTINUED | OUTPATIENT
Start: 2024-08-12 | End: 2024-08-13

## 2024-08-12 RX ORDER — MUPIROCIN 20 MG/G
1 OINTMENT TOPICAL
Status: COMPLETED | OUTPATIENT
Start: 2024-08-12 | End: 2024-08-12

## 2024-08-12 RX ORDER — MAGNESIUM SULFATE HEPTAHYDRATE 40 MG/ML
2 INJECTION, SOLUTION INTRAVENOUS
Status: DISCONTINUED | OUTPATIENT
Start: 2024-08-12 | End: 2024-08-22

## 2024-08-12 RX ORDER — NAPROXEN SODIUM 220 MG/1
81 TABLET, FILM COATED ORAL DAILY
Status: DISCONTINUED | OUTPATIENT
Start: 2024-08-12 | End: 2024-08-12

## 2024-08-12 RX ORDER — NOREPINEPHRINE BITARTRATE/D5W 4MG/250ML
0-3 PLASTIC BAG, INJECTION (ML) INTRAVENOUS CONTINUOUS
Status: DISCONTINUED | OUTPATIENT
Start: 2024-08-12 | End: 2024-08-13

## 2024-08-12 RX ORDER — SODIUM CHLORIDE 9 MG/ML
INJECTION, SOLUTION INTRAVENOUS CONTINUOUS
Status: DISCONTINUED | OUTPATIENT
Start: 2024-08-12 | End: 2024-08-12

## 2024-08-12 RX ORDER — ASPIRIN 300 MG/1
300 SUPPOSITORY RECTAL ONCE AS NEEDED
Status: DISCONTINUED | OUTPATIENT
Start: 2024-08-12 | End: 2024-08-12

## 2024-08-12 RX ORDER — METOCLOPRAMIDE HYDROCHLORIDE 5 MG/ML
5 INJECTION INTRAMUSCULAR; INTRAVENOUS EVERY 6 HOURS PRN
Status: DISCONTINUED | OUTPATIENT
Start: 2024-08-12 | End: 2024-08-19

## 2024-08-12 RX ORDER — OXYCODONE HYDROCHLORIDE 5 MG/1
5 TABLET ORAL EVERY 4 HOURS PRN
Status: DISCONTINUED | OUTPATIENT
Start: 2024-08-12 | End: 2024-08-20

## 2024-08-12 RX ORDER — DEXMEDETOMIDINE HYDROCHLORIDE 4 UG/ML
0-1.4 INJECTION, SOLUTION INTRAVENOUS CONTINUOUS
Status: DISCONTINUED | OUTPATIENT
Start: 2024-08-12 | End: 2024-08-13

## 2024-08-12 RX ORDER — POTASSIUM CHLORIDE 14.9 MG/ML
20 INJECTION INTRAVENOUS
Status: DISCONTINUED | OUTPATIENT
Start: 2024-08-12 | End: 2024-08-15

## 2024-08-12 RX ORDER — OXYCODONE HYDROCHLORIDE 10 MG/1
10 TABLET ORAL EVERY 4 HOURS PRN
Status: DISCONTINUED | OUTPATIENT
Start: 2024-08-12 | End: 2024-08-19

## 2024-08-12 RX ORDER — MAGNESIUM SULFATE HEPTAHYDRATE 40 MG/ML
4 INJECTION, SOLUTION INTRAVENOUS
Status: DISCONTINUED | OUTPATIENT
Start: 2024-08-12 | End: 2024-08-22

## 2024-08-12 RX ORDER — FENTANYL CITRATE 50 UG/ML
50 INJECTION, SOLUTION INTRAMUSCULAR; INTRAVENOUS
Status: DISCONTINUED | OUTPATIENT
Start: 2024-08-18 | End: 2024-08-13

## 2024-08-12 RX ORDER — LIDOCAINE HYDROCHLORIDE 10 MG/ML
1 INJECTION, SOLUTION EPIDURAL; INFILTRATION; INTRACAUDAL; PERINEURAL
Status: DISCONTINUED | OUTPATIENT
Start: 2024-08-12 | End: 2024-08-12

## 2024-08-12 RX ORDER — CEFAZOLIN SODIUM 1 G/3ML
INJECTION, POWDER, FOR SOLUTION INTRAMUSCULAR; INTRAVENOUS
Status: DISCONTINUED | OUTPATIENT
Start: 2024-08-12 | End: 2024-08-12

## 2024-08-12 RX ORDER — FENTANYL CITRATE 50 UG/ML
25 INJECTION, SOLUTION INTRAMUSCULAR; INTRAVENOUS
Status: DISCONTINUED | OUTPATIENT
Start: 2024-08-12 | End: 2024-08-13

## 2024-08-12 RX ORDER — INDOMETHACIN 25 MG/1
50 CAPSULE ORAL ONCE
Status: COMPLETED | OUTPATIENT
Start: 2024-08-12 | End: 2024-08-12

## 2024-08-12 RX ORDER — ALBUMIN HUMAN 50 G/1000ML
25 SOLUTION INTRAVENOUS ONCE AS NEEDED
Status: COMPLETED | OUTPATIENT
Start: 2024-08-12 | End: 2024-08-12

## 2024-08-12 RX ORDER — SODIUM CHLORIDE 0.9 % (FLUSH) 0.9 %
10 SYRINGE (ML) INJECTION
Status: DISCONTINUED | OUTPATIENT
Start: 2024-08-12 | End: 2024-08-23 | Stop reason: HOSPADM

## 2024-08-12 RX ORDER — DEXAMETHASONE SODIUM PHOSPHATE 4 MG/ML
INJECTION, SOLUTION INTRA-ARTICULAR; INTRALESIONAL; INTRAMUSCULAR; INTRAVENOUS; SOFT TISSUE
Status: DISCONTINUED | OUTPATIENT
Start: 2024-08-12 | End: 2024-08-12

## 2024-08-12 RX ORDER — ALBUMIN HUMAN 50 G/1000ML
SOLUTION INTRAVENOUS
Status: COMPLETED
Start: 2024-08-12 | End: 2024-08-12

## 2024-08-12 RX ORDER — HEPARIN SODIUM 1000 [USP'U]/ML
INJECTION, SOLUTION INTRAVENOUS; SUBCUTANEOUS
Status: DISCONTINUED | OUTPATIENT
Start: 2024-08-12 | End: 2024-08-12

## 2024-08-12 RX ORDER — BISACODYL 10 MG/1
10 SUPPOSITORY RECTAL DAILY PRN
Status: DISCONTINUED | OUTPATIENT
Start: 2024-08-12 | End: 2024-08-23 | Stop reason: HOSPADM

## 2024-08-12 RX ORDER — POTASSIUM CHLORIDE 29.8 MG/ML
40 INJECTION INTRAVENOUS
Status: DISCONTINUED | OUTPATIENT
Start: 2024-08-12 | End: 2024-08-15

## 2024-08-12 RX ORDER — TRANEXAMIC ACID 100 MG/ML
INJECTION, SOLUTION INTRAVENOUS
Status: DISCONTINUED | OUTPATIENT
Start: 2024-08-12 | End: 2024-08-12

## 2024-08-12 RX ORDER — LIDOCAINE HYDROCHLORIDE 20 MG/ML
INJECTION, SOLUTION EPIDURAL; INFILTRATION; INTRACAUDAL; PERINEURAL
Status: DISCONTINUED | OUTPATIENT
Start: 2024-08-12 | End: 2024-08-12

## 2024-08-12 RX ORDER — PROPOFOL 10 MG/ML
VIAL (ML) INTRAVENOUS
Status: DISCONTINUED | OUTPATIENT
Start: 2024-08-12 | End: 2024-08-12

## 2024-08-12 RX ORDER — IPRATROPIUM BROMIDE AND ALBUTEROL SULFATE 2.5; .5 MG/3ML; MG/3ML
3 SOLUTION RESPIRATORY (INHALATION) EVERY 4 HOURS PRN
Status: ACTIVE | OUTPATIENT
Start: 2024-08-12 | End: 2024-08-13

## 2024-08-12 RX ORDER — ONDANSETRON 2 MG/ML
INJECTION INTRAMUSCULAR; INTRAVENOUS
Status: DISCONTINUED | OUTPATIENT
Start: 2024-08-12 | End: 2024-08-12

## 2024-08-12 RX ORDER — ACETAMINOPHEN 325 MG/1
650 TABLET ORAL EVERY 4 HOURS PRN
Status: DISCONTINUED | OUTPATIENT
Start: 2024-08-12 | End: 2024-08-12

## 2024-08-12 RX ORDER — KETAMINE HCL IN 0.9 % NACL 50 MG/5 ML
SYRINGE (ML) INTRAVENOUS
Status: DISCONTINUED | OUTPATIENT
Start: 2024-08-12 | End: 2024-08-12

## 2024-08-12 RX ORDER — METOPROLOL TARTRATE 25 MG/1
25 TABLET, FILM COATED ORAL
Status: DISCONTINUED | OUTPATIENT
Start: 2024-08-12 | End: 2024-08-12

## 2024-08-12 RX ORDER — ACETAMINOPHEN 500 MG
1000 TABLET ORAL EVERY 8 HOURS
Status: DISCONTINUED | OUTPATIENT
Start: 2024-08-12 | End: 2024-08-22

## 2024-08-12 RX ORDER — IPRATROPIUM BROMIDE AND ALBUTEROL SULFATE 2.5; .5 MG/3ML; MG/3ML
3 SOLUTION RESPIRATORY (INHALATION) EVERY 4 HOURS
Status: COMPLETED | OUTPATIENT
Start: 2024-08-12 | End: 2024-08-13

## 2024-08-12 RX ORDER — POLYETHYLENE GLYCOL 3350 17 G/17G
17 POWDER, FOR SOLUTION ORAL DAILY
Status: DISCONTINUED | OUTPATIENT
Start: 2024-08-12 | End: 2024-08-23 | Stop reason: HOSPADM

## 2024-08-12 RX ORDER — DOCUSATE SODIUM 100 MG/1
100 CAPSULE, LIQUID FILLED ORAL 2 TIMES DAILY
Status: DISCONTINUED | OUTPATIENT
Start: 2024-08-12 | End: 2024-08-15

## 2024-08-12 RX ORDER — ONDANSETRON HYDROCHLORIDE 2 MG/ML
4 INJECTION, SOLUTION INTRAVENOUS EVERY 12 HOURS PRN
Status: DISCONTINUED | OUTPATIENT
Start: 2024-08-12 | End: 2024-08-23 | Stop reason: HOSPADM

## 2024-08-12 RX ORDER — MIRTAZAPINE 15 MG/1
15 TABLET, ORALLY DISINTEGRATING ORAL NIGHTLY
Status: DISCONTINUED | OUTPATIENT
Start: 2024-08-12 | End: 2024-08-13

## 2024-08-12 RX ORDER — TRANEXAMIC ACID 100 MG/ML
INJECTION, SOLUTION INTRAVENOUS CONTINUOUS PRN
Status: DISCONTINUED | OUTPATIENT
Start: 2024-08-12 | End: 2024-08-12

## 2024-08-12 RX ORDER — PROPOFOL 10 MG/ML
0-50 INJECTION, EMULSION INTRAVENOUS CONTINUOUS
Status: DISCONTINUED | OUTPATIENT
Start: 2024-08-12 | End: 2024-08-13

## 2024-08-12 RX ORDER — NAPROXEN SODIUM 220 MG/1
81 TABLET, FILM COATED ORAL ONCE
Status: DISCONTINUED | OUTPATIENT
Start: 2024-08-12 | End: 2024-08-12

## 2024-08-12 RX ORDER — ROCURONIUM BROMIDE 10 MG/ML
INJECTION, SOLUTION INTRAVENOUS
Status: DISCONTINUED | OUTPATIENT
Start: 2024-08-12 | End: 2024-08-12

## 2024-08-12 RX ORDER — FAMOTIDINE 10 MG/ML
20 INJECTION INTRAVENOUS 2 TIMES DAILY
Status: DISCONTINUED | OUTPATIENT
Start: 2024-08-12 | End: 2024-08-14

## 2024-08-12 RX ORDER — ONDANSETRON HYDROCHLORIDE 2 MG/ML
INJECTION, SOLUTION INTRAVENOUS
Status: DISCONTINUED | OUTPATIENT
Start: 2024-08-12 | End: 2024-08-12

## 2024-08-12 RX ORDER — POTASSIUM CHLORIDE 14.9 MG/ML
60 INJECTION INTRAVENOUS
Status: DISCONTINUED | OUTPATIENT
Start: 2024-08-12 | End: 2024-08-15

## 2024-08-12 RX ADMIN — ROCURONIUM BROMIDE 20 MG: 10 INJECTION, SOLUTION INTRAVENOUS at 08:08

## 2024-08-12 RX ADMIN — ROCURONIUM BROMIDE 50 MG: 10 INJECTION, SOLUTION INTRAVENOUS at 11:08

## 2024-08-12 RX ADMIN — SODIUM CHLORIDE, SODIUM GLUCONATE, SODIUM ACETATE, POTASSIUM CHLORIDE, MAGNESIUM CHLORIDE, SODIUM PHOSPHATE, DIBASIC, AND POTASSIUM PHOSPHATE: .53; .5; .37; .037; .03; .012; .00082 INJECTION, SOLUTION INTRAVENOUS at 07:08

## 2024-08-12 RX ADMIN — ROCURONIUM BROMIDE 80 MG: 10 INJECTION, SOLUTION INTRAVENOUS at 07:08

## 2024-08-12 RX ADMIN — IPRATROPIUM BROMIDE AND ALBUTEROL SULFATE 3 ML: 2.5; .5 SOLUTION RESPIRATORY (INHALATION) at 07:08

## 2024-08-12 RX ADMIN — CEFAZOLIN 2 G: 330 INJECTION, POWDER, FOR SOLUTION INTRAMUSCULAR; INTRAVENOUS at 08:08

## 2024-08-12 RX ADMIN — DEXAMETHASONE SODIUM PHOSPHATE 4 MG: 4 INJECTION INTRA-ARTICULAR; INTRALESIONAL; INTRAMUSCULAR; INTRAVENOUS; SOFT TISSUE at 08:08

## 2024-08-12 RX ADMIN — DEXMEDETOMIDINE HYDROCHLORIDE 0.2 MCG/KG/HR: 4 INJECTION INTRAVENOUS at 08:08

## 2024-08-12 RX ADMIN — LIDOCAINE HYDROCHLORIDE 100 MG: 20 INJECTION, SOLUTION EPIDURAL; INFILTRATION; INTRACAUDAL at 07:08

## 2024-08-12 RX ADMIN — VASOPRESSIN 0.04 UNITS/MIN: 20 INJECTION INTRAVENOUS at 11:08

## 2024-08-12 RX ADMIN — PROPOFOL 40 MCG/KG/MIN: 10 INJECTION, EMULSION INTRAVENOUS at 05:08

## 2024-08-12 RX ADMIN — EPINEPHRINE 0.04 MCG/KG/MIN: 1 INJECTION INTRAMUSCULAR; INTRAVENOUS; SUBCUTANEOUS at 07:08

## 2024-08-12 RX ADMIN — PROPOFOL 100 MG: 10 INJECTION, EMULSION INTRAVENOUS at 07:08

## 2024-08-12 RX ADMIN — ACETAMINOPHEN 1000 MG: 500 TABLET ORAL at 06:08

## 2024-08-12 RX ADMIN — DOXYCYCLINE HYCLATE 100 MG: 100 TABLET, COATED ORAL at 08:08

## 2024-08-12 RX ADMIN — Medication 30 MG: at 07:08

## 2024-08-12 RX ADMIN — PROTAMINE SULFATE 380 MG: 10 INJECTION, SOLUTION INTRAVENOUS at 01:08

## 2024-08-12 RX ADMIN — Medication 20 MG: at 08:08

## 2024-08-12 RX ADMIN — ACETAMINOPHEN 1000 MG: 500 TABLET ORAL at 09:08

## 2024-08-12 RX ADMIN — MIRTAZAPINE 15 MG: 15 TABLET, ORALLY DISINTEGRATING ORAL at 08:08

## 2024-08-12 RX ADMIN — MIDAZOLAM 2 MG: 1 INJECTION INTRAMUSCULAR; INTRAVENOUS at 06:08

## 2024-08-12 RX ADMIN — LIDOCAINE HYDROCHLORIDE 100 MG: 20 INJECTION, SOLUTION EPIDURAL; INFILTRATION; INTRACAUDAL at 09:08

## 2024-08-12 RX ADMIN — CEFAZOLIN 2 G: 330 INJECTION, POWDER, FOR SOLUTION INTRAMUSCULAR; INTRAVENOUS at 12:08

## 2024-08-12 RX ADMIN — CALCIUM CHLORIDE 500 MG: 100 INJECTION, SOLUTION INTRAVENOUS at 12:08

## 2024-08-12 RX ADMIN — ALBUMIN (HUMAN) 25 G: 12.5 INJECTION, SOLUTION INTRAVENOUS at 03:08

## 2024-08-12 RX ADMIN — HEPARIN SODIUM 40000 UNITS: 1000 INJECTION, SOLUTION INTRAVENOUS; SUBCUTANEOUS at 10:08

## 2024-08-12 RX ADMIN — ROCURONIUM BROMIDE 20 MG: 10 INJECTION, SOLUTION INTRAVENOUS at 12:08

## 2024-08-12 RX ADMIN — IPRATROPIUM BROMIDE AND ALBUTEROL SULFATE 3 ML: 2.5; .5 SOLUTION RESPIRATORY (INHALATION) at 11:08

## 2024-08-12 RX ADMIN — SODIUM CHLORIDE: 0.9 INJECTION, SOLUTION INTRAVENOUS at 06:08

## 2024-08-12 RX ADMIN — ALBUMIN (HUMAN) 25 G: 12.5 INJECTION, SOLUTION INTRAVENOUS at 09:08

## 2024-08-12 RX ADMIN — FENTANYL CITRATE 100 MCG: 50 INJECTION, SOLUTION INTRAMUSCULAR; INTRAVENOUS at 07:08

## 2024-08-12 RX ADMIN — NOREPINEPHRINE BITARTRATE 0.08 MCG/KG/MIN: 1 INJECTION, SOLUTION, CONCENTRATE INTRAVENOUS at 07:08

## 2024-08-12 RX ADMIN — SUGAMMADEX 400 MG: 100 INJECTION, SOLUTION INTRAVENOUS at 03:08

## 2024-08-12 RX ADMIN — FAMOTIDINE 20 MG: 10 INJECTION, SOLUTION INTRAVENOUS at 08:08

## 2024-08-12 RX ADMIN — ASPIRIN 325 MG ORAL TABLET 325 MG: 325 PILL ORAL at 07:08

## 2024-08-12 RX ADMIN — SODIUM BICARBONATE 50 MEQ: 84 INJECTION, SOLUTION INTRAVENOUS at 09:08

## 2024-08-12 RX ADMIN — ACETAMINOPHEN 1000 MG: 500 TABLET ORAL at 04:08

## 2024-08-12 RX ADMIN — FENTANYL CITRATE 100 MCG: 50 INJECTION, SOLUTION INTRAMUSCULAR; INTRAVENOUS at 12:08

## 2024-08-12 RX ADMIN — TRANEXAMIC ACID 1 MG/KG/HR: 100 INJECTION INTRAVENOUS at 07:08

## 2024-08-12 RX ADMIN — METHADONE HYDROCHLORIDE 16 MG: 10 INJECTION, SOLUTION INTRAMUSCULAR; INTRAVENOUS; SUBCUTANEOUS at 07:08

## 2024-08-12 RX ADMIN — FENTANYL CITRATE 25 MCG: 50 INJECTION INTRAMUSCULAR; INTRAVENOUS at 08:08

## 2024-08-12 RX ADMIN — ONDANSETRON 4 MG: 2 INJECTION INTRAMUSCULAR; INTRAVENOUS at 08:08

## 2024-08-12 RX ADMIN — SODIUM CHLORIDE: 9 INJECTION, SOLUTION INTRAVENOUS at 06:08

## 2024-08-12 RX ADMIN — PROPOFOL 30 MCG/KG/MIN: 10 INJECTION, EMULSION INTRAVENOUS at 10:08

## 2024-08-12 RX ADMIN — ALBUMIN HUMAN 25 G: 50 SOLUTION INTRAVENOUS at 04:08

## 2024-08-12 RX ADMIN — ALBUMIN (HUMAN) 25 G: 12.5 INJECTION, SOLUTION INTRAVENOUS at 05:08

## 2024-08-12 RX ADMIN — SODIUM CHLORIDE, SODIUM GLUCONATE, SODIUM ACETATE, POTASSIUM CHLORIDE, MAGNESIUM CHLORIDE, SODIUM PHOSPHATE, DIBASIC, AND POTASSIUM PHOSPHATE: .53; .5; .37; .037; .03; .012; .00082 INJECTION, SOLUTION INTRAVENOUS at 12:08

## 2024-08-12 RX ADMIN — MUPIROCIN 1 G: 20 OINTMENT TOPICAL at 08:08

## 2024-08-12 RX ADMIN — ROCURONIUM BROMIDE 30 MG: 10 INJECTION, SOLUTION INTRAVENOUS at 09:08

## 2024-08-12 RX ADMIN — DEXTROSE, SODIUM CHLORIDE, AND POTASSIUM CHLORIDE: 5; .45; .15 INJECTION INTRAVENOUS at 03:08

## 2024-08-12 RX ADMIN — CEFAZOLIN 2 G: 2 INJECTION, POWDER, FOR SOLUTION INTRAMUSCULAR; INTRAVENOUS at 08:08

## 2024-08-12 RX ADMIN — FENTANYL CITRATE 25 MCG: 50 INJECTION INTRAMUSCULAR; INTRAVENOUS at 07:08

## 2024-08-12 RX ADMIN — NOREPINEPHRINE BITARTRATE 0.08 MCG/KG/MIN: 4 INJECTION, SOLUTION INTRAVENOUS at 05:08

## 2024-08-12 RX ADMIN — LIDOCAINE HYDROCHLORIDE 100 MG: 20 INJECTION, SOLUTION EPIDURAL; INFILTRATION; INTRACAUDAL at 12:08

## 2024-08-12 RX ADMIN — FENTANYL CITRATE 25 MCG: 50 INJECTION INTRAMUSCULAR; INTRAVENOUS at 03:08

## 2024-08-12 RX ADMIN — OXYCODONE HYDROCHLORIDE 10 MG: 10 TABLET ORAL at 08:08

## 2024-08-12 RX ADMIN — CALCIUM CHLORIDE 500 MG: 100 INJECTION, SOLUTION INTRAVENOUS at 01:08

## 2024-08-12 RX ADMIN — CALCIUM CHLORIDE 500 MG: 100 INJECTION, SOLUTION INTRAVENOUS at 02:08

## 2024-08-12 RX ADMIN — IPRATROPIUM BROMIDE AND ALBUTEROL SULFATE 3 ML: 2.5; .5 SOLUTION RESPIRATORY (INHALATION) at 03:08

## 2024-08-12 RX ADMIN — MUPIROCIN 1 G: 20 OINTMENT TOPICAL at 06:08

## 2024-08-12 RX ADMIN — SODIUM CHLORIDE 4 UNITS/HR: 9 INJECTION, SOLUTION INTRAVENOUS at 10:08

## 2024-08-12 RX ADMIN — ALBUMIN (HUMAN) 25 G: 12.5 INJECTION, SOLUTION INTRAVENOUS at 04:08

## 2024-08-12 RX ADMIN — TRANEXAMIC ACID 1000 MG: 100 INJECTION, SOLUTION INTRAVENOUS at 08:08

## 2024-08-12 RX ADMIN — ROCURONIUM BROMIDE 50 MG: 10 INJECTION, SOLUTION INTRAVENOUS at 08:08

## 2024-08-12 NOTE — BRIEF OP NOTE
[facility] - Surgery ([facility])  Cardiothoracic Surgery  Operative Note    SUMMARY     Date of Procedure: 8/12/2024     Procedure: Procedure(s) (LRB):    1)  REPLACEMENT, MITRAL VALVE with a 33 mm [employer]omed[employer] standard mechanical prosthesis  38097    2)  REDO STERNOTOMY 70163    Surgeons and Role:     * Manuel Beal MD - Primary     * Tiago Arevalo DO - Fellow     * Bette Bolaños MD - Fellow    Assisting Surgeon: None    Pre-Operative Diagnosis: Mitral valve insufficiency, unspecified etiology [I34.0]  S/P MVR (mitral valve repair) [Z98.890]    Post-Operative Diagnosis: Post-Op Diagnosis Codes:     * Mitral valve insufficiency, unspecified etiology [I34.0]     * S/P MVR (mitral valve repair) [Z98.890]    Anesthesia: General    Operative Findings (including complications, if any): Perforation of anterior leaflet and dehiscence of annuloplasty band    Estimated Blood Loss (EBL): 100 mL           Implants:   Implant Name Type Inv. Item Serial No.  Lot No. LRB No. Used Action   VALVE HEART MITRAL STD 30MM - [id number]  VALVE HEART MITRAL STD 30MM [id number]  N/A 1 Implanted       Specimens:   Specimen (24h ago, onward)       Start     Ordered    08/12/24 1200  Specimen to Pathology, Surgery Cardiovascular  Once        Comments: Pre-op Diagnosis: Mitral valve insufficiency, unspecified etiology [I34.0]S/P MVR (mitral valve repair) [Z98.890]Procedure(s):REPLACEMENT, MITRAL VALVEREDO STERNOTOMY Number of specimens: 2Name of specimens: 1. MITRAL VALVE LEAFLET- PERMANENT     References:    Click here for ordering Quick Tip   Question Answer Comment   Procedure Type: Cardiovascular    Specimen Class: Routine/Screening    Release to patient Immediate        08/12/24 7455                   Attestation:  I was present and scrubbed for the procedure.

## 2024-08-12 NOTE — ANESTHESIA PROCEDURE NOTES
Arterial    Diagnosis: Mitral regurgitation    Patient location during procedure: done in OR  Timeout: 8/12/2024 7:02 AM  Procedure end time: 8/12/2024 7:05 AM    Staffing  Authorizing Provider: Sunday Posada MD  Performing Provider: Jaya Boyd MD    Staffing  Performed by: Jaya Boyd MD  Authorized by: Sunday Posada MD    Anesthesiologist was present at the time of the procedure.    Preanesthetic Checklist  Completed: patient identified, IV checked, site marked, risks and benefits discussed, surgical consent, monitors and equipment checked, pre-op evaluation, timeout performed and anesthesia consent givenArterial  Skin Prep: chlorhexidine gluconate  Local Infiltration: lidocaine  Orientation: left  Location: radial    Catheter Size: 20 G  Catheter placement by Ultrasound guidance. Heme positive aspiration all ports.   Vessel Caliber: patent  Needle advanced into vessel with real time Ultrasound guidance.Insertion Attempts: 1  Assessment  Dressing: secured with tape and tegaderm  Patient: Tolerated well

## 2024-08-12 NOTE — ASSESSMENT & PLAN NOTE
Lorenzo Nino is a 48 y.o. 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 He presents to the SICU s/p resternotomy and mechanical MVR with Dr. Beal on 8/12.      Neuro/Psych:     - Sedation: propofol; fentanyl pushes prn while intubated    - Pain:     - Scheduled Tylenol 1000mg Q8H   - PRN Oxycodone 5mg/10mg    - Psych: home med mirtazipine 15 qd             Cardiac:     - S/p redo MVR with Dr. Beal    - BP Goal: MAP 60-80    - Inotropes/Pressors: epi 0.06 and levo 0.1; wean levo and leave epi at 0.04    - Anti-HTNs: will start when appropriate    - Rhythm: paced at 80 (native rhythm sinus beau 40 bpm)   -- Home amiodarone 200mg qd   -- Will resume eliquis 5 BID as appropriate, held periop    - Beta Blocker: will start when appropriate   -- Home medication toprol XL 25 qd    - Statin: n/a      Pulmonary:     - Intubated    - Goal SpO2 >92%    - Will wean ventilator support as tolerated to extubate    - Chest Tubes x 4 (2 Meds & 2 Pleural)    - ABGs Q1H until improving, then PRN      Renal:    - Trend BUN/Cr     - Maintain Watson, record strict Is/Os    Recent Labs   Lab 08/08/24  1231   BUN 28*   CREATININE 1.8*         FEN / GI:     - Daily CMP, PRN K/Mag/Phos per protocol     - Replace electrolytes as needed    - Nutrition: NPO pending extubation    - Bowel Regimen: Miralax, docusate      ID:     - Aebrile    - Abx: Complete perioperative cefazolin 2g Q8H x 5 doses     - Continue PO Doxy 100mg BID until OR Cx results    Recent Labs   Lab 08/08/24  1231   WBC 12.73*         Heme/Onc:     - Hgb 11.4 pre-operatively    - CBC daily    - ASA 325mg daily    Recent Labs   Lab 08/08/24  1231   HGB 11.4*      APTT 32.1*   INR 1.0         Endocrine:     - CTS Goal -140    - HgbA1c: 5.4    - Endocrinology consulted for insulin management      PPx:   Feeding: NPO, will advance as tolerated following  extubation  Analgesia/Sedation: propofol, fentanyl while intubated   Thromboembolic Prevention: SCDs  HOB >30: Yes  Stress Ulcer: Yes - famotidine 20mg IV BID  Glucose Control: Yes, insulin management per Endocrinology     Lines/Drains/Airway:   ETT    Art Line: Left radial   Central Line x2: RIJ trialysis and Quad lumen   Watson   Chest Tubes: 4 (2 Mediastinal and 2 pleural)    Pacing Wires: Temporary V pacing wires      Dispo/Code Status/Palliative:     - Continue SICU Care    - Full Code

## 2024-08-12 NOTE — Clinical Note
The ECG showed atrial flutter and paced rhythm. The patient has a temporary pacemaker. The pacemaker rate was 50 bpm. The pacemaker output was 10 mA. The pacemaker output was 10 mV.Epicardial, V-pacing

## 2024-08-12 NOTE — Clinical Note
The generator pocket was irrigated with Vancomycin 1G/500mL NS. I performed a history and physical exam of the patient and discussed their management with the resident and /or advanced care provider. I reviewed the resident and /or ACP's note and agree with the documented findings and plan of care. My medical decision making and observations are found above.  rt knee swollen, cool, not tender, nl N/V exam.

## 2024-08-12 NOTE — ASSESSMENT & PLAN NOTE
Endocrinology consulted for BG management.   BG goal 110-140 (CTS protocol)    - Start IV insulin infusion protocol  - Requires intensive BG monitoring.   - BG checks q1hr  - Hypoglycemia protocol in place    - Notify endocrine if patient becomes hypokalemic (K < 3.3) and/or is not responding to replacement.   - Notify endocrine if patient requiring > 20 units/hr.      ** Please notify Endocrine for any change and/or advance in diet**  ** Please call Endocrine for any BG related issues **    Discharge Planning:   TBD. Please notify endocrinology prior to discharge.

## 2024-08-12 NOTE — Clinical Note
The lead thresholds were tested. 2 1/1 yo female who is in foster care presents with c/o fevers since yesterday, cough and congestion, post tussive emesis. 1-year 10-month-old female with past medical history of autism presenting with fever and URI symptoms starting last night.  Tmax 102.7 taken on the forehead.  Has been getting Tylenol, Motrin at home..  Patient has had productive cough and nasal congestion since last night.  Has had multiple episodes of nonbloody nonbilious posttussive emesis.  Patient has had decreased p.o. intake with 3 wet diapers so far today.  Patient continues to make wet tears.  Patient has also been complaining of throat pain and left ear pain.  Denies abdominal pain, diarrhea, constipation, rash.  Vaccines up-to-date, no allergies, no meds.  Of note mom is foster mom and has had patient first past 3 weeks so knowledge of past medical history is limited.

## 2024-08-12 NOTE — NURSING
Nurses Note -- 4 Eyes      8/12/2024   5:55 PM      Skin assessed during: Admit      [x] No Altered Skin Integrity Present    [x]Prevention Measures Documented      [] Yes- Altered Skin Integrity Present or Discovered   [] LDA Added if Not in Epic (Describe Wound)   [] New Altered Skin Integrity was Present on Admit and Documented in LDA   [] Wound Image Taken    Wound Care Consulted? No    Attending Nurse: Ada Marinelli RN/Staff Member:  ada/ny

## 2024-08-12 NOTE — ANESTHESIA PROCEDURE NOTES
Intubation    Date/Time: 8/12/2024 7:11 AM    Performed by: Fernando Pink MD  Authorized by: Sunday Posada MD    Intubation:     Induction:  Intravenous    Intubated:  Postinduction    Mask Ventilation:  Easy mask    Attempts:  1    Attempted By:  Resident anesthesiologist    Method of Intubation:  Video laryngoscopy    Blade:  Lieberman 3    Laryngeal View Grade: Grade I - full view of cords      Difficult Airway Encountered?: No      Complications:  None    Airway Device:  Oral endotracheal tube    Airway Device Size:  7.5    Style/Cuff Inflation:  Cuffed (inflated to minimal occlusive pressure)    Tube secured:  23    Secured at:  The lips    Placement Verified By:  Capnometry    Complicating Factors:  None    Findings Post-Intubation:  BS equal bilateral and atraumatic/condition of teeth unchanged

## 2024-08-12 NOTE — HPI
Reason for Consult: Management of Hyperglycemia     Surgical Procedure and Date: Mitral valve replacement and sternotomy 08/12/2024      Diabetes diagnosis year: history of pre-diabetes per chart review in November 2023- now resolved    Lab Results   Component Value Date    HGBA1C 5.4 08/08/2024       HPI:   Patient is a 48 y.o. 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 Now with severe MR, mod pHTN (PASP 54), CKD3 (baseline Cr 1.6-2.0), and Afib (amiodarone, metoprolol, Eliquis). He presented to the SICU s/p resternotomy and mechanical MVR with Dr. Beal. Endo consulted for BG management.

## 2024-08-12 NOTE — SUBJECTIVE & OBJECTIVE
Interval HPI:   No acute events. Patient in room 41257/72363 A. Blood glucose improving. BG above goal on current insulin regimen (IIP). Steroid use- None. Day of Surgery  Renal function- Abnormal    Lab Results   Component Value Date    CREATININE 1.8 (H) 08/08/2024     Vasopressors-  Epinephrine  and Norepinephrine     Endocrine will continue to follow and manage insulin orders inpatient.     Diet NPO Except for: Sips with Medication     Eating:   NPO  Nausea: N/A  Hypoglycemia and intervention: No  Fever: No  TPN and/or TF: No  If yes, type of TF/TPN and rate: N/A    PMH, PSH, FH, SH updated and reviewed     ROS:  Unable to obtain due to: Sedation and Intubation      Current Medications and/or Treatments Impacting Glycemic Control  Immunotherapy:    Immunosuppressants       None          Steroids:   Hormones (From admission, onward)      None          Pressors:    Autonomic Drugs (From admission, onward)      Start     Stop Route Frequency Ordered    08/12/24 1545  NORepinephrine 4 mg in dextrose 5% 250 mL infusion (premix)        Question Answer Comment   Begin at (in mcg/kg/min): 0.02    Titrate by: (in mcg/kg/min (RANGE PREFERRED) 0.02 - 0.2    Titrate interval: (in minutes) (RANGE PREFERRED) 2 - 5    Titrate to maintain: (MAP or SBP) MAP    Titrate to keep MAP within the range or GREATER than (if single number): (in mmHg) OTHER    Other 60-80    Maximum dose: (in mcg/kg/min) 0.2        -- IV Continuous 08/12/24 1447    08/12/24 1445  EPINEPHrine 5 mg in dextrose 5% 250 mL infusion (premix)        Question Answer Comment   Begin at (in mcg/kg/min): 0.02    Titrate by: (in mcg/kg/min) 0.02    Titrate interval: (in minutes) 5    Titrate to maintain: (SBP or MAP or Cardiac Index) MAP    Greater than: (in mmHg) 65    Cardiac index greater than: (in L/min) 2.2    Maximum dose: (in mcg/kg/min) 2        -- IV Continuous 08/12/24 1437          Hyperglycemia/Diabetes Medications:   Antihyperglycemics (From admission,  onward)      Start     Stop Route Frequency Ordered    08/12/24 1445  insulin regular in 0.9 % NaCl 100 unit/100 mL (1 unit/mL) infusion        Question Answer Comment   Insulin rate changes (DO NOT MODIFY ANSWER) [email or web address] [email or web address]    Enter initial dose (Units/hr): 1        -- IV Continuous 08/12/24 1437             PHYSICAL EXAMINATION:  Vitals:    08/12/24 1546   BP:    Pulse: 80   Resp: (!) 22   Temp:      Body mass index is 23.62 kg/m².     Physical Exam   Constitutional: Well developed, obese, NAD.  ENT: External ears no masses with nose patent  Neck: Supple; trachea midline  Cardiovascular: Normal heart sounds, no LE edema. DP +2 bilaterally.  Lungs: Normal effort; lungs anterior bilaterally clear to auscultation.  Intubated on a ventilator.  Abdomen: Soft, no masses, no hernias.  Hypoactive BS noted.  MS: No clubbing or cyanosis of nails noted; unable to assess gait.  Skin: Mid-sternal incision with telfa island dressing, CDI.  CT x 2.  LLE wrapped with ACE wrap.  Psychiatric: KRISTINA  Neurological: KRISTINA  Foot: Nails in good condition, no amputations noted

## 2024-08-12 NOTE — ANESTHESIA PROCEDURE NOTES
NEW    Diagnosis: Severe mitral regurgitation  Patient location during procedure: OR  Surgery related to: MItral valve replacement  Exam type: Final    Staffing  Performed: anesthesiologist and fellow     Anesthesiologist: Sunday Posada MD        Anesthesiologist Present  Yes      Setup & Induction  Patient preparation: bite block inserted  Probe Insertion: easy  Exam: completeDoppler Echo: 2D, 3D, color flow mapping, continuous wave Doppler and pulse wave Doppler.  Exam     Left Heart  Left Atruim: dilated and severly enlarged (men >5.2; women >4.7)  Appendage velocity (cm/s): N/A - ROMAN previously surgically removed.    Left Ventricle: 6.11 cm, mildy abnormal (men 6.0-6.3; women 5.3-5.6)  LV Wall Thickness (posterior wall):1.2 cm, mildly abnormal (men 1.1-1.3 cm; women 1.0-1.2 cm)    LVAD:no  Estimated Ejection Fraction: 45-54% mild  Regional Wall Abnormalities: no RWMA        Left Ventricle Diastolic Function    Lateral E': 13.2 cm/s  S Velocity: 74.1, blunted  D Velocity: 110 cm/s  AR Velocity: 25.5 cm/s    Right Heart  Right Ventricle: normal  Right Ventricle Function: normal    Intra Atrial Septum  PFO: no shunt by color flow doppler  no IAS aneurysm  no lipomatous hypertrophy  no Atrial Septal Defect (Asd)    Right Ventricle  Size: normal, Free Wall Thickness: normal    Aortic Valve:  Stenosis: none.  Morphology: trileaflet    Regurgitation: no aortic valve regurgitation      Mitral Valve:   Jet Description: severe and eccentric    Tricuspid Valve:  Morphology: normal  Regurgitation: moderate    Pulmonic Valve:  Morphology:normal  Regurgitation(color flow): mild    Great Vessels  Ascending Aorta Diameter: 2.7 cm Descending Aorta Diameter: 2 cm  IABP: no  Descending Aorta Atherosclerosis: 1=nl-min dz  Aorta    Descending aorta IABP: no    Effusions none    SummaryFindings discussed with surgeon.    Other Findings   Intraoperative NEW performed at request of Dr. Beal for mitral valve replacement due to  severe MR.    OG tube passed into stomach and suctioned prior to placement of probe.   Bite block placed and probe inserted without difficulty.    Baseline Exam:    Infusions: epinephrine 0.04mcg/kg/min, norepinephrine 0.04mcg/kg/min    Left Atrium: Severely dilated. Surgically absent left atrial appendage..  Left Ventricle: Mildly dilated. Systolic function is qualitatively low-normal with a visually estimated EF of 50%. There are no regional wall motion abnormalities.   Right Ventricle: Systolic function is qualitatively normal.  Aortic Valve: The aortic valve is trileaflet. There is no stenosis. There is no insufficiency.  Mitral Valve: There is a previously placed mitral annuloplasty band which appears to have partially dehisced. There is severe regurgitation with an large, eccentric, anteriorly-directed jet. Vena contracta 1.8cm.   Tricuspid Valve: The tricuspid valve appears structurally normal. There is moderate regurgitation.  Pulmonic Valve: There is mild insufficiency.   Aorta: The visualized aorta is normal size. There is no significant atherosclerotic disease.  PFO: No evidence of PFO via color flow doppler.  Effusions: No pleural or pericardial effusions.      Post-Bypass Exam:    Patient now s/p mitral valve replacement with a 33mm [employer] standard bileaflet mechanical valve.    Infusions: epinephrine 0.04mcg/kg/min, norepinephrine 0.02mcg/kg/min    Left ventricular function is qualitatively mildly reduced to low-normal with a visually estimated EF of 45%.  Right ventricular function remains qualitatively normal.  There has been interval replacement of the mitral valve as noted above. No paravalvular leak identified. No residual regurgitation. There is no significant stenosis with a mean gradient of 4mmHg via continuous wave doppler. Appropriate convergent washing jets are noted.   No changes noted to the aortic, tricuspid, or pulmonic valves.  Aorta: No dissection or additional aortic pathology  noted following cannula removal.   PFO: No PFO via color flow doppler.   Effusions: No pericardial effusion.     Probe removed atraumatically. No dental or oropharyngeal damage noted.

## 2024-08-12 NOTE — SUBJECTIVE & OBJECTIVE
Follow-up For: Procedure(s) (LRB):  REPLACEMENT, MITRAL VALVE (N/A)  REDO STERNOTOMY (N/A)    Post-Operative Day: Day of Surgery     Past Medical History:   Diagnosis Date    Hypertension        Past Surgical History:   Procedure Laterality Date    COLONOSCOPY N/A 1/5/2024    Procedure: COLONOSCOPY;  Surgeon: Fadia Ellsworth MD;  Location: [facility] ENDO (2ND FLR);  Service: Endoscopy;  Laterality: N/A;    COLONOSCOPY, WITH DIRECTED SUBMUCOSAL INJECTION  2/27/2024    Procedure: COLONOSCOPY, WITH DIRECTED SUBMUCOSAL INJECTION;  Surgeon: Mayur Dunn MD;  Location: [facility] OR [facility] FLR;  Service: Colon and Rectal;;    COLONOSCOPY, WITH POLYPECTOMY USING SNARE N/A 2/27/2024    Procedure: COLONOSCOPY, WITH POLYPECTOMY USING SNARE;  Surgeon: Mayur Dunn MD;  [facility];  Service: Colon and Rectal;  Laterality: N/A;    ECHOCARDIOGRAM,TRANSESOPHAGEAL N/A 12/26/2023    Procedure: Transesophageal echo (NEW) intra-procedure log documentation;  Surgeon: Provider, [facility];  Location: [facility] EP LAB;  Service: Cardiology;  Laterality: N/A;    ESOPHAGOGASTRODUODENOSCOPY N/A 1/5/2024    Procedure: EGD (ESOPHAGOGASTRODUODENOSCOPY);  Surgeon: Fadia Ellsworth MD;  Location: [facility] ENDO (2ND FLR);  Service: Endoscopy;  Laterality: N/A;    EXCLUSION, LEFT ATRIAL APPENDAGE, OPEN, AS PART OF OPEN CHEST SURGERY Left 11/3/2023    Procedure: EXCLUSION, LEFT ATRIAL APPENDAGE, OPEN, AS PART OF OPEN CHEST SURGERY;  Surgeon: Manuel Beal MD;  [facility];  Service: Cardiothoracic;  Laterality: Left;    INSERTION OF INTRA-AORTIC BALLOON ASSIST DEVICE Right 11/2/2023    Procedure: INSERTION, INTRA-AORTIC BALLOON PUMP;  Surgeon: Jose Vidal MD;  Location: [facility] CATH LAB;  Service: Cardiology;  Laterality: Right;    REPAIR, MITRAL VALVE, OPEN N/A 11/3/2023    Procedure: REPAIR, MITRAL VALVE, OPEN;  Surgeon: Manuel Beal MD;  [facility];  Service: Cardiothoracic;   Laterality: N/A;       Review of patient's allergies indicates:  No Known Allergies    Family History    None       Tobacco Use    Smoking status: Unknown     Passive exposure: Never    Smokeless tobacco: Not on file   Substance and Sexual Activity    Alcohol use: Yes     Alcohol/week: 1.0 standard drink of alcohol     Types: 1 Cans of beer per week    Drug use: Never    Sexual activity: Not Currently     Partners: Female     Birth control/protection: None      Review of Systems   Reason unable to perform ROS: intubated/sedated.     Objective:     Vital Signs (Most Recent):  Temp: 97.7 °F (36.5 °C) (08/12/24 1515)  Pulse: 80 (08/12/24 1515)  Resp: (!) 22 (08/12/24 1515)  BP: 118/79 (08/12/24 0622)  SpO2: 100 % (08/12/24 1515) Vital Signs (24h Range):  Temp:  [97.7 °F (36.5 °C)-98.2 °F (36.8 °C)] 97.7 °F (36.5 °C)  Pulse:  [63-80] 80  Resp:  [12-22] 22  SpO2:  [100 %] 100 %  BP: (118)/(79) 118/79     Weight: 79 kg (174 lb 2.6 oz)  Body mass index is 23.62 kg/m².      Intake/Output Summary (Last 24 hours) at 8/12/2024 1527  Last data filed at 8/12/2024 1500  Gross per 24 hour   Intake 4274 ml   Output 690 ml   Net 3584 ml          Physical Exam  Constitutional:       Appearance: Normal appearance. He is not ill-appearing.      Comments: Sedated   HENT:      Mouth/Throat:      Comments: OGT  Neck:      Comments: RIJ quad lumen CVC, trialysis cath  Cardiovascular:      Pulses: Normal pulses.      Comments: Midline sternotomy c/d/i  Paced at 90bpm  V wires in place  4x ChT, 2x medistinal and 2x pleural with SS output  L radial zander      Pulmonary:      Effort: Pulmonary effort is normal.      Comments: Intubated  Abdominal:      General: There is no distension.      Palpations: Abdomen is soft.      Tenderness: There is no abdominal tenderness.   Genitourinary:     Comments: Watson catheter in place draining clear yellow urine    Musculoskeletal:      Right lower leg: No edema.      Left lower leg: No edema.   Skin:      "General: Skin is warm and dry.   Neurological:      General: No focal deficit present.            Vents:  Vent Mode: A/C (08/12/24 1511)  Ventilator Initiated: Yes (08/12/24 1507)  Set Rate: 22 BPM (08/12/24 1511)  Vt Set: 520 mL (08/12/24 1511)  PEEP/CPAP: 5 cmH20 (08/12/24 1511)  Oxygen Concentration (%): 80 (08/12/24 1515)  Peak Airway Pressure: 21 cmH20 (08/12/24 1511)  Plateau Pressure: 0 cmH20 (08/12/24 1511)  Total Ve: 11.9 L/m (08/12/24 1511)  Negative Inspiratory Force (cm H2O): 0 (08/12/24 1511)  F/VT Ratio<105 (RSBI): (!) 40.82 (08/12/24 1511)    Lines/Drains/Airways       Central Venous Catheter Line  Duration                  Hemodialysis Catheter 08/12/24 0735 right internal jugular <1 day    Percutaneous Central Line - Quad Lumen  08/12/24 0806 Internal Jugular Right <1 day              Drain  Duration                  NG/OG Tube 08/12/24 1455 [place] sump 18 Fr. Center mouth <1 day         Urethral Catheter 08/12/24 0719 Silicone;Non-latex;Temperature probe 16 Fr. <1 day         Y Chest Tube 1 and 2 08/12/24 0931 1 Right Mediastinal 19 Fr. 2 Left Mediastinal 19 Fr. <1 day         Y Chest Tube 3 and 4 08/12/24 1245 3 Right Pleural 19 Fr. 4 Left Pleural 19 Fr. <1 day              Airway  Duration                  Airway - Non-Surgical 08/12/24 0711 <1 day              Arterial Line  Duration             Arterial Line 08/12/24 0702 Left Radial <1 day              Line  Duration                  Pacer Wires 08/12/24 0931 <1 day              Peripheral Intravenous Line  Duration                  Peripheral IV - Single Lumen 08/12/24 0618 18 G Left;Posterior Forearm <1 day         Peripheral IV - Single Lumen 08/12/24 0740 16 G 1 1/4 in Right;Posterior Forearm <1 day                    Significant Labs:    CBC/Anemia Profile:  Recent Labs   Lab 08/12/24  1209 08/12/24  1237 08/12/24  1511   HCT 28* 26* 28*        Chemistries:  No results for input(s): "NA", "K", "CL", "CO2", "BUN", "CREATININE", "CALCIUM", " ""ALBUMIN", "PROT", "BILITOT", "ALKPHOS", "ALT", "AST", "GLUCOSE", "MG", "PHOS" in the last 48 hours.      Significant Imaging: I have reviewed all pertinent imaging results/findings within the past 24 hours.  "

## 2024-08-12 NOTE — PROGRESS NOTES
Autotransfusion/Rapid Infusion Record:      08/12/2024  Autotransfusionist:  Gómez Pugh    Surgeons and Role:     * Manuel Beal MD - Primary     * Tiago Arevalo DO - Fellow     * Bette Bolaños MD - Fellow  Anesthesiologist:  Sunday Posada MD    Past Medical History:   Diagnosis Date    Hypertension        Procedure(s) (LRB):  REPLACEMENT, MITRAL VALVE (N/A)  REDO STERNOTOMY (N/A)     2:48 PM    Equipment:    Cell Saver     R.I.S.  : [employer] Model: CATSmart or CATSplus : [place]   Model: [id number]     Serial number: [id number]   Serial number:    Disposable lot #: [id number]   Disposable lot #:      Were extra cardiotomies used for cell saver?  no   if yes, #:      Solutions:  Anticoagulant: ACD-A   Expiration date: 8/25 Volume used: 1000   Wash solution: 0.9% NaCl   Expiration date: 7/26 Volume used: 5872     Cell saver checklist  Time completed:           [x]   Circuit assembled correctly     [x]   Cell saver powered and operational     [x]   Vacuum connected, functional, adjust to max -150mmHg     [x]   Anticoagulant drip rate adjusted     [x]   Transfer bag properly labeled with patient name, expiration time, volume,       anticoagulant, OR number, and initials     [x]   Cell saver disinfected after use (completed at end of case)       Cell Saver volumes:    Total volume processed:     3324 mL     Total volume pRBCs recovered     949 mL     Volume pRBCs infused     949 mL         RIS checklist   Time completed:  []   RIS circuit assembled correctly     []   RIS power and operational     []   RIS disinfected after use (completed at end of case)       RIS volumes:    Total volume infused:    (see anesthesia record for blood       product information)    mL       Additional comments:

## 2024-08-12 NOTE — H&P
[facility]  Critical Care - Surgery  History & Physical    Patient Name: Lorenzo Nino  MRN: [id number]  Admission Date: 8/12/2024  Code Status: Prior  Attending Physician: Manuel Beal MD   Primary Care Provider: No, Primary Doctor   Principal Problem: Severe mitral regurgitation    Subjective:     HPI:  Lorenzo Nino is a 48 y.o. 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 He now presents with severe MR, mod pHTN (PASP 54), CKD3 (baseline Cr 1.6-2.0), and Afib (amiodarone, metoprolol, Eliquis).     He presents to the SICU s/p resternotomy and mechanical MVR with Dr. Beal. On admission, they are intubated, sedated with propofol, and in stable condition. Inotropic and vasopressor requirements upon admission are 0.06 mcg/kg/min of epinephrine, 0.1 mcg/kg/min of norepinephrine to maintain blood pressure at a MAP 60-80 and SBP < 140. Central access includes a RIJ trialysis and RIJ quad lumen. Arterial access includes a Left arterial line. He also has 2x pleural and 2x mediastinal chest tubes with serosanguinous output.    Intraoperatively, the patient received 3 of crystalloid, 900 of cell saver, 2 FFP. Urine output intraoperatively was recorded as 600cc. Intra-op he required significant vasopressor and inotropic support while on bypass, however requirements tapered once  from bypass. Pre-operative echocardiogram was notable for severe MR and severely dilated left atrium. Post-operative echo demonstrated no residual disease, normal RV function, and EF 40-45%.    Immediate post-operative plans include hemodynamic stabilization and weaning of cardiac and respiratory support. Plan to wean vasopressors and inotropes as tolerated, wean ventilator support with goal of early extubation, and closely monitor fluid status with strict Is/Os and continued fluid resuscitation as  needed.      Hospital/ICU Course:  No notes on file    Follow-up For: Procedure(s) (LRB):  REPLACEMENT, MITRAL VALVE (N/A)  REDO STERNOTOMY (N/A)    Post-Operative Day: Day of Surgery     Past Medical History:   Diagnosis Date    Hypertension        Past Surgical History:   Procedure Laterality Date    COLONOSCOPY N/A 1/5/2024    Procedure: COLONOSCOPY;  Surgeon: Fadia Ellsworth MD;  Location: [facility] ENDO (2ND FLR);  Service: Endoscopy;  Laterality: N/A;    COLONOSCOPY, WITH DIRECTED SUBMUCOSAL INJECTION  2/27/2024    Procedure: COLONOSCOPY, WITH DIRECTED SUBMUCOSAL INJECTION;  Surgeon: Mayur Dunn MD;  Location: [facility] OR 2ND FLR;  Service: Colon and Rectal;;    COLONOSCOPY, WITH POLYPECTOMY USING SNARE N/A 2/27/2024    Procedure: COLONOSCOPY, WITH POLYPECTOMY USING SNARE;  Surgeon: Mayur Dunn MD;  [facility];  Service: Colon and Rectal;  Laterality: N/A;    ECHOCARDIOGRAM,TRANSESOPHAGEAL N/A 12/26/2023    Procedure: Transesophageal echo (NEW) intra-procedure log documentation;  Surgeon: Provider, [facility];  Location: [facility] EP LAB;  Service: Cardiology;  Laterality: N/A;    ESOPHAGOGASTRODUODENOSCOPY N/A 1/5/2024    Procedure: EGD (ESOPHAGOGASTRODUODENOSCOPY);  Surgeon: Fadia Ellsworth MD;  Location: [facility] ENDO (2ND FLR);  Service: Endoscopy;  Laterality: N/A;    EXCLUSION, LEFT ATRIAL APPENDAGE, OPEN, AS PART OF OPEN CHEST SURGERY Left 11/3/2023    Procedure: EXCLUSION, LEFT ATRIAL APPENDAGE, OPEN, AS PART OF OPEN CHEST SURGERY;  Surgeon: Manuel Beal MD;  [facility];  Service: Cardiothoracic;  Laterality: Left;    INSERTION OF INTRA-AORTIC BALLOON ASSIST DEVICE Right 11/2/2023    Procedure: INSERTION, INTRA-AORTIC BALLOON PUMP;  Surgeon: Jose Vidal MD;  Location: [facility] CATH LAB;  Service: Cardiology;  Laterality: Right;    REPAIR, MITRAL VALVE, OPEN N/A 11/3/2023    Procedure: REPAIR, MITRAL VALVE, OPEN;  Surgeon: Manuel Beal MD;   Location: [facility] OR [address] [place] [address];  Service: Cardiothoracic;  Laterality: N/A;       Review of patient's allergies indicates:  No Known Allergies    Family History    None       Tobacco Use    Smoking status: Unknown     Passive exposure: Never    Smokeless tobacco: Not on file   Substance and Sexual Activity    Alcohol use: Yes     Alcohol/week: 1.0 standard drink of alcohol     Types: 1 Cans of beer per week    Drug use: Never    Sexual activity: Not Currently     Partners: Female     Birth control/protection: None      Review of Systems   Reason unable to perform ROS: intubated/sedated.     Objective:     Vital Signs (Most Recent):  Temp: 97.7 °F (36.5 °C) (08/12/24 1515)  Pulse: 80 (08/12/24 1515)  Resp: (!) 22 (08/12/24 1515)  BP: 118/79 (08/12/24 0622)  SpO2: 100 % (08/12/24 1515) Vital Signs (24h Range):  Temp:  [97.7 °F (36.5 °C)-98.2 °F (36.8 °C)] 97.7 °F (36.5 °C)  Pulse:  [63-80] 80  Resp:  [12-22] 22  SpO2:  [100 %] 100 %  BP: (118)/(79) 118/79     Weight: 79 kg (174 lb 2.6 oz)  Body mass index is 23.62 kg/m².      Intake/Output Summary (Last 24 hours) at 8/12/2024 1527  Last data filed at 8/12/2024 1500  Gross per 24 hour   Intake 4274 ml   Output 690 ml   Net 3584 ml          Physical Exam  Constitutional:       Appearance: Normal appearance. He is not ill-appearing.      Comments: Sedated   HENT:      Mouth/Throat:      Comments: OGT  Neck:      Comments: RIJ quad lumen CVC, trialysis cath  Cardiovascular:      Pulses: Normal pulses.      Comments: Midline sternotomy c/d/i  Paced at 90bpm  V wires in place  4x ChT, 2x medistinal and 2x pleural with SS output  L radial zander      Pulmonary:      Effort: Pulmonary effort is normal.      Comments: Intubated  Abdominal:      General: There is no distension.      Palpations: Abdomen is soft.      Tenderness: There is no abdominal tenderness.   Genitourinary:     Comments: Watson catheter in place draining clear yellow urine    Musculoskeletal:      Right lower leg:  No edema.      Left lower leg: No edema.   Skin:     General: Skin is warm and dry.   Neurological:      General: No focal deficit present.            Vents:  Vent Mode: A/C (08/12/24 1511)  Ventilator Initiated: Yes (08/12/24 1507)  Set Rate: 22 BPM (08/12/24 1511)  Vt Set: 520 mL (08/12/24 1511)  PEEP/CPAP: 5 cmH20 (08/12/24 1511)  Oxygen Concentration (%): 80 (08/12/24 1515)  Peak Airway Pressure: 21 cmH20 (08/12/24 1511)  Plateau Pressure: 0 cmH20 (08/12/24 1511)  Total Ve: 11.9 L/m (08/12/24 1511)  Negative Inspiratory Force (cm H2O): 0 (08/12/24 1511)  F/VT Ratio<105 (RSBI): (!) 40.82 (08/12/24 1511)    Lines/Drains/Airways       Central Venous Catheter Line  Duration                  Hemodialysis Catheter 08/12/24 0735 right internal jugular <1 day    Percutaneous Central Line - Quad Lumen  08/12/24 0806 Internal Jugular Right <1 day              Drain  Duration                  NG/OG Tube 08/12/24 1455 [place] sump 18 Fr. Center mouth <1 day         Urethral Catheter 08/12/24 0719 Silicone;Non-latex;Temperature probe 16 Fr. <1 day         Y Chest Tube 1 and 2 08/12/24 0931 1 Right Mediastinal 19 Fr. 2 Left Mediastinal 19 Fr. <1 day         Y Chest Tube 3 and 4 08/12/24 1245 3 Right Pleural 19 Fr. 4 Left Pleural 19 Fr. <1 day              Airway  Duration                  Airway - Non-Surgical 08/12/24 0711 <1 day              Arterial Line  Duration             Arterial Line 08/12/24 0702 Left Radial <1 day              Line  Duration                  Pacer Wires 08/12/24 0931 <1 day              Peripheral Intravenous Line  Duration                  Peripheral IV - Single Lumen 08/12/24 0618 18 G Left;Posterior Forearm <1 day         Peripheral IV - Single Lumen 08/12/24 0740 16 G 1 1/4 in Right;Posterior Forearm <1 day                    Significant Labs:    CBC/Anemia Profile:  Recent Labs   Lab 08/12/24  1209 08/12/24  1237 08/12/24  1511   HCT 28* 26* 28*        Chemistries:  No results for input(s):  ""NA", "K", "CL", "CO2", "BUN", "CREATININE", "CALCIUM", "ALBUMIN", "PROT", "BILITOT", "ALKPHOS", "ALT", "AST", "GLUCOSE", "MG", "PHOS" in the last 48 hours.      Significant Imaging: I have reviewed all pertinent imaging results/findings within the past 24 hours.  Assessment/Plan:     Cardiac/Vascular  * Severe mitral regurgitation  Lorenzo Nino is a 48 y.o. 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 He presents to the SICU s/p resternotomy and mechanical MVR with Dr. Beal on 8/12.      Neuro/Psych:     - Sedation: propofol; fentanyl pushes prn while intubated    - Pain:     - Scheduled Tylenol 1000mg Q8H   - PRN Oxycodone 5mg/10mg    - Psych: home med mirtazipine 15 qd             Cardiac:     - S/p redo MVR with Dr. Beal    - BP Goal: MAP 60-80    - Inotropes/Pressors: epi 0.06 and levo 0.1; wean levo and leave epi at 0.04    - Anti-HTNs: will start when appropriate    - Rhythm: paced at 80 (native rhythm sinus beau 40 bpm)   -- Home amiodarone 200mg qd   -- Will resume eliquis 5 BID as appropriate, held periop    - Beta Blocker: will start when appropriate   -- Home medication toprol XL 25 qd    - Statin: n/a      Pulmonary:     - Intubated    - Goal SpO2 >92%    - Will wean ventilator support as tolerated to extubate    - Chest Tubes x 4 (2 Meds & 2 Pleural)    - ABGs Q1H until improving, then PRN      Renal:    - Trend BUN/Cr     - Maintain Watson, record strict Is/Os    Recent Labs   Lab 08/08/24  1231   BUN 28*   CREATININE 1.8*         FEN / GI:     - Daily CMP, PRN K/Mag/Phos per protocol     - Replace electrolytes as needed    - Nutrition: NPO pending extubation    - Bowel Regimen: Miralax, docusate      ID:     - Aebrile    - Abx: Complete perioperative cefazolin 2g Q8H x 5 doses     - Continue PO Doxy 100mg BID until OR Cx results    Recent Labs   Lab 08/08/24  1231   WBC 12.73*         " Heme/Onc:     - Hgb 11.4 pre-operatively    - CBC daily    - ASA 325mg daily    Recent Labs   Lab 08/08/24  1231   HGB 11.4*      APTT 32.1*   INR 1.0         Endocrine:     - CTS Goal -140    - HgbA1c: 5.4    - Endocrinology consulted for insulin management      PPx:   Feeding: NPO, will advance as tolerated following extubation  Analgesia/Sedation: propofol, fentanyl while intubated   Thromboembolic Prevention: SCDs  HOB >30: Yes  Stress Ulcer: Yes - famotidine 20mg IV BID  Glucose Control: Yes, insulin management per Endocrinology     Lines/Drains/Airway:   ETT    Art Line: Left radial   Central Line x2: RIJ trialysis and Quad lumen   Watson   Chest Tubes: 4 (2 Mediastinal and 2 pleural)    Pacing Wires: Temporary V pacing wires      Dispo/Code Status/Palliative:     - Continue SICU Care    - Full Code           Critical care was time spent personally by me on the following activities: development of treatment plan with patient or surrogate and bedside caregivers, discussions with consultants, evaluation of patient's response to treatment, examination of patient, ordering and performing treatments and interventions, ordering and review of laboratory studies, ordering and review of radiographic studies, pulse oximetry, re-evaluation of patient's condition.  This critical care time did not overlap with that of any other provider or involve time for any procedures.     Dony Ortega MD  Critical Care - Surgery  [facility]

## 2024-08-12 NOTE — PLAN OF CARE
Plan of care reviewed with pts family. Epi 0.04mcg/kg/min, levo 0.07mcg/kg/min for map 60-80. Pt followed commands and moved all extremities. Vent settings AC 50% peep 5. V paced at 80. Continue q1 ABG/lactic until lactic trends down. Current lactic 9. 1500cc albumin given

## 2024-08-12 NOTE — Clinical Note
The lead was inserted under fluorscopic guidance, thresholds were tested and was sutured in place. A new lead was attached to the right atrium.

## 2024-08-12 NOTE — CONSULTS
[facility]  Endocrinology  Diabetes Consult Note    Consult Requested by: Manuel Beal MD   Reason for admit: Severe mitral regurgitation    HISTORY OF PRESENT ILLNESS:  Reason for Consult: Management of Hyperglycemia     Surgical Procedure and Date: Mitral valve replacement and sternotomy 08/12/2024      Diabetes diagnosis year: history of pre-diabetes per chart review in November 2023- now resolved    Lab Results   Component Value Date    HGBA1C 5.4 08/08/2024       HPI:   Patient is a 48 y.o. 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 Now with severe MR, mod pHTN (PASP 54), CKD3 (baseline Cr 1.6-2.0), and Afib (amiodarone, metoprolol, Eliquis). He presented to the SICU s/p resternotomy and mechanical MVR with Dr. Beal. Endo consulted for BG management.      Interval HPI:   No acute events. Patient in room 46919/12066 A. Blood glucose improving. BG above goal on current insulin regimen (IIP). Steroid use- None. Day of Surgery  Renal function- Abnormal    Lab Results   Component Value Date    CREATININE 1.8 (H) 08/08/2024     Vasopressors-  Epinephrine  and Norepinephrine     Endocrine will continue to follow and manage insulin orders inpatient.     Diet NPO Except for: Sips with Medication     Eating:   NPO  Nausea: N/A  Hypoglycemia and intervention: No  Fever: No  TPN and/or TF: No  If yes, type of TF/TPN and rate: N/A    PMH, PSH, FH, SH updated and reviewed     ROS:  Unable to obtain due to: Sedation and Intubation      Current Medications and/or Treatments Impacting Glycemic Control  Immunotherapy:    Immunosuppressants       None          Steroids:   Hormones (From admission, onward)      None          Pressors:    Autonomic Drugs (From admission, onward)      Start     Stop Route Frequency Ordered    08/12/24 8181  NORepinephrine 4 mg in dextrose 5% 250 mL infusion (premix)         Question Answer Comment   Begin at (in mcg/kg/min): 0.02    Titrate by: (in mcg/kg/min (RANGE PREFERRED) 0.02 - 0.2    Titrate interval: (in minutes) (RANGE PREFERRED) 2 - 5    Titrate to maintain: (MAP or SBP) MAP    Titrate to keep MAP within the range or GREATER than (if single number): (in mmHg) OTHER    Other 60-80    Maximum dose: (in mcg/kg/min) 0.2        -- IV Continuous 08/12/24 1447    08/12/24 1445  EPINEPHrine 5 mg in dextrose 5% 250 mL infusion (premix)        Question Answer Comment   Begin at (in mcg/kg/min): 0.02    Titrate by: (in mcg/kg/min) 0.02    Titrate interval: (in minutes) 5    Titrate to maintain: (SBP or MAP or Cardiac Index) MAP    Greater than: (in mmHg) 65    Cardiac index greater than: (in L/min) 2.2    Maximum dose: (in mcg/kg/min) 2        -- IV Continuous 08/12/24 1437          Hyperglycemia/Diabetes Medications:   Antihyperglycemics (From admission, onward)      Start     Stop Route Frequency Ordered    08/12/24 1445  insulin regular in 0.9 % NaCl 100 unit/100 mL (1 unit/mL) infusion        Question Answer Comment   Insulin rate changes (DO NOT MODIFY ANSWER) [email or web address] [id number]    Enter initial dose (Units/hr): 1        -- IV Continuous 08/12/24 1437             PHYSICAL EXAMINATION:  Vitals:    08/12/24 1546   BP:    Pulse: 80   Resp: (!) 22   Temp:      Body mass index is 23.62 kg/m².     Physical Exam   Constitutional: Well developed, obese, NAD.  ENT: External ears no masses with nose patent  Neck: Supple; trachea midline  Cardiovascular: Normal heart sounds, no LE edema. DP +2 bilaterally.  Lungs: Normal effort; lungs anterior bilaterally clear to auscultation.  Intubated on a ventilator.  Abdomen: Soft, no masses, no hernias.  Hypoactive BS noted.  MS: No clubbing or cyanosis of nails noted; unable to assess gait.  Skin: Mid-sternal incision with telfa island dressing, CDI.  CT x 2.  LLE wrapped with ACE  "wrap.  Psychiatric: KRISTINA  Neurological: KRISTINA  Foot: Nails in good condition, no amputations noted      Labs Reviewed and Include   No results for input(s): "GLU", "CALCIUM", "ALBUMIN", "PROT", "NA", "K", "CO2", "CL", "BUN", "CREATININE", "ALKPHOS", "ALT", "AST", "BILITOT" in the last 24 hours.  Lab Results   Component Value Date    WBC 22.98 (H) 08/12/2024    HGB 8.5 (L) 08/12/2024    HCT 28 (L) 08/12/2024    MCV 78 (L) 08/12/2024    PLT 98 (L) 08/12/2024     No results for input(s): "TSH", "FREET4" in the last 168 hours.  Lab Results   Component Value Date    HGBA1C 5.4 08/08/2024       Nutritional status:   Body mass index is 23.62 kg/m².  Lab Results   Component Value Date    ALBUMIN 3.6 08/08/2024    ALBUMIN 3.2 (L) 06/22/2024    ALBUMIN 3.1 (L) 02/26/2024     No results found for: "PREALBUMIN"    Estimated Creatinine Clearance: 54.5 mL/min (A) (based on SCr of 1.8 mg/dL (H)).    Accu-Checks  Recent Labs     08/12/24  1505 08/12/24  1552   POCTGLUCOSE 143* 136*        ASSESSMENT and PLAN    Cardiac/Vascular  * Severe mitral regurgitation  S/p mitral valve replacement and sternotomy 08/12/2024   Managed by primary team  Optimize blood glucose control        Renal/  Stage 3 chronic kidney disease  Titrate insulin slowly to avoid hypoglycemia as the risk of hypoglycemia increases with decreased creatinine clearance.        Endocrine  Stress hyperglycemia  Endocrinology consulted for BG management.   BG goal 110-140 (CTS protocol)    - Start IV insulin infusion protocol  - Requires intensive BG monitoring.   - BG checks q1hr  - Hypoglycemia protocol in place    - Notify endocrine if patient becomes hypokalemic (K < 3.3) and/or is not responding to replacement.   - Notify endocrine if patient requiring > 20 units/hr.      ** Please notify Endocrine for any change and/or advance in diet**  ** Please call Endocrine for any BG related issues **    Discharge Planning:   TBD. Please notify endocrinology prior to " discharge.        Rosie Lehman PA-C  Endocrinology  [facility]

## 2024-08-12 NOTE — NURSING
Pt admitted to SICU per anesthesia. CTS at bedside upon arrival. Map 55-60. 500cc albumin ordered. See chart for all orders received and implemented. See flow sheet for all vitals    All skin intact with no breakdown noted

## 2024-08-12 NOTE — HPI
Lorenzo Nino is a 48 y.o. 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 He now presents with severe MR, mod pHTN (PASP 54), CKD3 (baseline Cr 1.6-2.0), and Afib (amiodarone, metoprolol, Eliquis).     He presents to the SICU s/p resternotomy and mechanical MVR with Dr. Beal. On admission, they are intubated, sedated with propofol, and in stable condition. Inotropic and vasopressor requirements upon admission are 0.06 mcg/kg/min of epinephrine, 0.1 mcg/kg/min of norepinephrine to maintain blood pressure at a MAP 60-80 and SBP < 140. Central access includes a RIJ trialysis and RIJ quad lumen. Arterial access includes a Left arterial line. He also has 2x pleural and 2x mediastinal chest tubes with serosanguinous output.    Intraoperatively, the patient received 3 of crystalloid, 900 of cell saver, 2 FFP. Urine output intraoperatively was recorded as 600cc. Intra-op he required significant vasopressor and inotropic support while on bypass, however requirements tapered once  from bypass. Pre-operative echocardiogram was notable for severe MR and severely dilated left atrium. Post-operative echo demonstrated no residual disease, normal RV function, and EF 40-45%.    Immediate post-operative plans include hemodynamic stabilization and weaning of cardiac and respiratory support. Plan to wean vasopressors and inotropes as tolerated, wean ventilator support with goal of early extubation, and closely monitor fluid status with strict Is/Os and continued fluid resuscitation as needed.

## 2024-08-12 NOTE — ANESTHESIA PROCEDURE NOTES
RIJ Quad    Diagnosis: MR  Patient location during procedure: done in OR  Procedure Urgency: Routine  Procedure start time: 8/12/2024 7:20 AM  Timeout: 8/12/2024 7:20 AM  Procedure end time: 8/12/2024 7:40 AM      Staffing  Authorizing Provider: Sunday Posada MD  Performing Provider: Jaya Boyd MD    Staffing  Performed by: Jaya Boyd MD  Authorized by: Sunday Posada MD    Anesthesiologist was present at the time of the procedure.  Preanesthetic Checklist  Completed: patient identified, IV checked, site marked, risks and benefits discussed, surgical consent, monitors and equipment checked, pre-op evaluation, timeout performed and anesthesia consent given  Indication   Indication: hemodynamic monitoring, vascular access, med administration     Anesthesia   general anesthesia    Central Line   Skin Prep: skin prepped with ChloraPrep, skin prep agent completely dried prior to procedure  Sterile Barriers Followed: Yes    All five maximal barriers used- gloves, gown, cap, mask, and large sterile sheet    hand hygiene performed prior to central venous catheter insertion  Location: right internal jugular.   Catheter type: quad lumen  Catheter Size: 8.5 Fr  Ultrasound: vascular probe with ultrasound   Vessel Caliber: patent  Needle advanced into vessel with real time Ultrasound guidance.  sterile gel and probe cover used in ultrasound-guided central venous catheter insertion   Manometry: Venous cannualation confirmed by visual estimation of blood vessel pressure using manometry.  Insertion Attempts: 1   Securement:line sutured, chlorhexidine patch, sterile dressing applied and blood return through all ports    Post-Procedure        Guidewire  Guidewire removed intact, verified with nurse.

## 2024-08-12 NOTE — TRANSFER OF CARE
Anesthesia Transfer of Care Note    Patient: Lorenzo Nino    Procedure(s) Performed: Procedure(s) (LRB):  REPLACEMENT, MITRAL VALVE (N/A)  REDO STERNOTOMY (N/A)    Patient location: ICU    Anesthesia Type: general    Transport from OR: Transported from OR intubated on 100% O2  with adequate ventilation controlled by transport ventilator    Post pain: adequate analgesia    Post assessment: no apparent anesthetic complications    Post vital signs: stable    Level of consciousness: awake and alert    Nausea/Vomiting: no nausea/vomiting    Complications: none    Transfer of care protocol was followed      Last vitals: Visit Vitals  /79 (BP Location: Right arm, Patient Position: Lying)   Pulse 80   Temp 36.5 °C (97.7 °F) (Oral)   Resp (!) 22   Ht 6' (1.829 m)   Wt 79 kg (174 lb 2.6 oz)   SpO2 100%   BMI 23.62 kg/m²

## 2024-08-12 NOTE — ANESTHESIA PROCEDURE NOTES
[facility] Trial[facility]    Diagnosis: MR  Patient location during procedure: done in OR  Procedure Urgency: Routine  Procedure start time: 8/12/2024 7:20 AM  Timeout: 8/12/2024 7:20 AM  Procedure end time: 8/12/2024 7:40 AM      Staffing  Authorizing Provider: Sunday Posada MD  Performing Provider: Jaya Boyd MD    Staffing  Performed by: Jaya Boyd MD  Authorized by: Sunday Posada MD    Anesthesiologist was present at the time of the procedure.  Preanesthetic Checklist  Completed: patient identified, IV checked, site marked, risks and benefits discussed, surgical consent, monitors and equipment checked, pre-op evaluation, timeout performed and anesthesia consent given  Indication   Indication: hemodynamic monitoring, vascular access, med administration     Anesthesia   general anesthesia    Central Line   Skin Prep: skin prepped with ChloraPrep, skin prep agent completely dried prior to procedure  Sterile Barriers Followed: Yes    All five maximal barriers used- gloves, gown, cap, mask, and large sterile sheet    hand hygiene performed prior to central venous catheter insertion  Location: right internal jugular.   Catheter type: triple lumen  Catheter Size: 12 Fr  Ultrasound: vascular probe with ultrasound   Vessel Caliber: patent  Needle advanced into vessel with real time Ultrasound guidance.  sterile gel and probe cover used in ultrasound-guided central venous catheter insertion   Manometry: Venous cannualation confirmed by visual estimation of blood vessel pressure using manometry.  Insertion Attempts: 1   Securement:line sutured, chlorhexidine patch, sterile dressing applied and blood return through all ports    Post-Procedure        Guidewire  Guidewire removed intact, verified with nurse.

## 2024-08-12 NOTE — ASSESSMENT & PLAN NOTE
S/p mitral valve replacement and sternotomy 08/12/2024   Managed by primary team  Optimize blood glucose control

## 2024-08-13 LAB
ACID FAST MOD KINY STN SPEC: NORMAL
ALBUMIN SERPL BCP-MCNC: 3.7 G/DL (ref 3.5–5.2)
ALLENS TEST: ABNORMAL
ALP SERPL-CCNC: 36 U/L (ref 55–135)
ALT SERPL W/O P-5'-P-CCNC: 23 U/L (ref 10–44)
ANION GAP SERPL CALC-SCNC: 15 MMOL/L (ref 8–16)
APTT PPP: 33.9 SEC (ref 21–32)
APTT PPP: 35.3 SEC (ref 21–32)
APTT PPP: 40.1 SEC (ref 21–32)
AST SERPL-CCNC: 67 U/L (ref 10–40)
BASOPHILS # BLD AUTO: 0.01 K/UL (ref 0–0.2)
BASOPHILS NFR BLD: 0.1 % (ref 0–1.9)
BILIRUB SERPL-MCNC: 0.6 MG/DL (ref 0.1–1)
BUN SERPL-MCNC: 12 MG/DL (ref 6–20)
CALCIUM SERPL-MCNC: 8.8 MG/DL (ref 8.7–10.5)
CHLORIDE SERPL-SCNC: 112 MMOL/L (ref 95–110)
CK SERPL-CCNC: 1202 U/L (ref 20–200)
CO2 SERPL-SCNC: 19 MMOL/L (ref 23–29)
CREAT SERPL-MCNC: 1.4 MG/DL (ref 0.5–1.4)
DELSYS: ABNORMAL
DIFFERENTIAL METHOD BLD: ABNORMAL
EOSINOPHIL # BLD AUTO: 0 K/UL (ref 0–0.5)
EOSINOPHIL NFR BLD: 0 % (ref 0–8)
ERYTHROCYTE [DISTWIDTH] IN BLOOD BY AUTOMATED COUNT: 18.6 % (ref 11.5–14.5)
ERYTHROCYTE [SEDIMENTATION RATE] IN BLOOD BY WESTERGREN METHOD: 16 MM/H
ERYTHROCYTE [SEDIMENTATION RATE] IN BLOOD BY WESTERGREN METHOD: 18 MM/H
EST. GFR  (NO RACE VARIABLE): >60 ML/MIN/1.73 M^2
FIO2: 30
FIO2: 40
GLUCOSE SERPL-MCNC: 122 MG/DL (ref 70–110)
HCO3 UR-SCNC: 18.6 MMOL/L (ref 24–28)
HCO3 UR-SCNC: 19.7 MMOL/L (ref 24–28)
HCO3 UR-SCNC: 21 MMOL/L (ref 24–28)
HCO3 UR-SCNC: 22.1 MMOL/L (ref 24–28)
HCO3 UR-SCNC: 23.2 MMOL/L (ref 24–28)
HCO3 UR-SCNC: 24.7 MMOL/L (ref 24–28)
HCT VFR BLD AUTO: 22.7 % (ref 40–54)
HCT VFR BLD CALC: 21 %PCV (ref 36–54)
HCT VFR BLD CALC: 21 %PCV (ref 36–54)
HCT VFR BLD CALC: 22 %PCV (ref 36–54)
HCT VFR BLD CALC: 22 %PCV (ref 36–54)
HCT VFR BLD CALC: 24 %PCV (ref 36–54)
HCT VFR BLD CALC: 25 %PCV (ref 36–54)
HGB BLD-MCNC: 6.8 G/DL (ref 14–18)
IMM GRANULOCYTES # BLD AUTO: 0.08 K/UL (ref 0–0.04)
IMM GRANULOCYTES NFR BLD AUTO: 0.7 % (ref 0–0.5)
INR PPP: 1.1 (ref 0.8–1.2)
LDH SERPL L TO P-CCNC: 2.62 MMOL/L (ref 0.36–1.25)
LDH SERPL L TO P-CCNC: 3.56 MMOL/L (ref 0.36–1.25)
LDH SERPL L TO P-CCNC: 4.53 MMOL/L (ref 0.36–1.25)
LDH SERPL L TO P-CCNC: 5.41 MMOL/L (ref 0.36–1.25)
LDH SERPL L TO P-CCNC: 6.81 MMOL/L (ref 0.36–1.25)
LDH SERPL L TO P-CCNC: 7.65 MMOL/L (ref 0.36–1.25)
LYMPHOCYTES # BLD AUTO: 0.8 K/UL (ref 1–4.8)
LYMPHOCYTES NFR BLD: 7.1 % (ref 18–48)
MAGNESIUM SERPL-MCNC: 2.4 MG/DL (ref 1.6–2.6)
MCH RBC QN AUTO: 23.2 PG (ref 27–31)
MCHC RBC AUTO-ENTMCNC: 30 G/DL (ref 32–36)
MCV RBC AUTO: 78 FL (ref 82–98)
MODE: ABNORMAL
MONOCYTES # BLD AUTO: 1.7 K/UL (ref 0.3–1)
MONOCYTES NFR BLD: 14.4 % (ref 4–15)
MYCOBACTERIUM SPEC QL CULT: NORMAL
NEUTROPHILS # BLD AUTO: 9.1 K/UL (ref 1.8–7.7)
NEUTROPHILS NFR BLD: 77.7 % (ref 38–73)
NRBC BLD-RTO: 0 /100 WBC
OHS QRS DURATION: 174 MS
OHS QRS DURATION: 180 MS
OHS QTC CALCULATION: 585 MS
OHS QTC CALCULATION: 602 MS
PCO2 BLDA: 29.2 MMHG (ref 35–45)
PCO2 BLDA: 29.8 MMHG (ref 35–45)
PCO2 BLDA: 29.9 MMHG (ref 35–45)
PCO2 BLDA: 30.3 MMHG (ref 35–45)
PCO2 BLDA: 35.1 MMHG (ref 35–45)
PCO2 BLDA: 37.5 MMHG (ref 35–45)
PEEP: 5
PH SMN: 7.4 [PH] (ref 7.35–7.45)
PH SMN: 7.43 [PH] (ref 7.35–7.45)
PH SMN: 7.43 [PH] (ref 7.35–7.45)
PH SMN: 7.44 [PH] (ref 7.35–7.45)
PH SMN: 7.45 [PH] (ref 7.35–7.45)
PH SMN: 7.48 [PH] (ref 7.35–7.45)
PHOSPHATE SERPL-MCNC: 2.1 MG/DL (ref 2.7–4.5)
PIP: 20
PIP: 21
PLATELET # BLD AUTO: 86 K/UL (ref 150–450)
PMV BLD AUTO: ABNORMAL FL (ref 9.2–12.9)
PO2 BLDA: 139 MMHG (ref 80–100)
PO2 BLDA: 144 MMHG (ref 80–100)
PO2 BLDA: 161 MMHG (ref 80–100)
PO2 BLDA: 163 MMHG (ref 80–100)
PO2 BLDA: 164 MMHG (ref 80–100)
PO2 BLDA: 171 MMHG (ref 80–100)
POC BE: -1 MMOL/L
POC BE: -1 MMOL/L
POC BE: -3 MMOL/L
POC BE: -5 MMOL/L
POC BE: -6 MMOL/L
POC BE: 0 MMOL/L
POC IONIZED CALCIUM: 1.09 MMOL/L (ref 1.06–1.42)
POC IONIZED CALCIUM: 1.13 MMOL/L (ref 1.06–1.42)
POC IONIZED CALCIUM: 1.14 MMOL/L (ref 1.06–1.42)
POC IONIZED CALCIUM: 1.15 MMOL/L (ref 1.06–1.42)
POC IONIZED CALCIUM: 1.16 MMOL/L (ref 1.06–1.42)
POC IONIZED CALCIUM: 1.18 MMOL/L (ref 1.06–1.42)
POC SATURATED O2: 100 % (ref 95–100)
POC SATURATED O2: 99 % (ref 95–100)
POC TCO2: 19 MMOL/L (ref 23–27)
POC TCO2: 21 MMOL/L (ref 23–27)
POC TCO2: 22 MMOL/L (ref 23–27)
POC TCO2: 23 MMOL/L (ref 23–27)
POC TCO2: 24 MMOL/L (ref 23–27)
POC TCO2: 26 MMOL/L (ref 23–27)
POCT GLUCOSE: 107 MG/DL (ref 70–110)
POCT GLUCOSE: 109 MG/DL (ref 70–110)
POCT GLUCOSE: 113 MG/DL (ref 70–110)
POCT GLUCOSE: 116 MG/DL (ref 70–110)
POCT GLUCOSE: 117 MG/DL (ref 70–110)
POCT GLUCOSE: 118 MG/DL (ref 70–110)
POCT GLUCOSE: 124 MG/DL (ref 70–110)
POCT GLUCOSE: 127 MG/DL (ref 70–110)
POCT GLUCOSE: 127 MG/DL (ref 70–110)
POCT GLUCOSE: 131 MG/DL (ref 70–110)
POCT GLUCOSE: 133 MG/DL (ref 70–110)
POCT GLUCOSE: 86 MG/DL (ref 70–110)
POCT GLUCOSE: 92 MG/DL (ref 70–110)
POTASSIUM BLD-SCNC: 3.3 MMOL/L (ref 3.5–5.1)
POTASSIUM BLD-SCNC: 3.6 MMOL/L (ref 3.5–5.1)
POTASSIUM BLD-SCNC: 3.6 MMOL/L (ref 3.5–5.1)
POTASSIUM BLD-SCNC: 3.7 MMOL/L (ref 3.5–5.1)
POTASSIUM BLD-SCNC: 3.7 MMOL/L (ref 3.5–5.1)
POTASSIUM BLD-SCNC: 3.8 MMOL/L (ref 3.5–5.1)
POTASSIUM SERPL-SCNC: 3.8 MMOL/L (ref 3.5–5.1)
POTASSIUM SERPL-SCNC: 3.8 MMOL/L (ref 3.5–5.1)
POTASSIUM SERPL-SCNC: 4.6 MMOL/L (ref 3.5–5.1)
PROT SERPL-MCNC: 5.5 G/DL (ref 6–8.4)
PROTHROMBIN TIME: 12.4 SEC (ref 9–12.5)
RBC # BLD AUTO: 2.93 M/UL (ref 4.6–6.2)
SAMPLE: ABNORMAL
SITE: ABNORMAL
SODIUM BLD-SCNC: 146 MMOL/L (ref 136–145)
SODIUM BLD-SCNC: 147 MMOL/L (ref 136–145)
SODIUM BLD-SCNC: 148 MMOL/L (ref 136–145)
SODIUM SERPL-SCNC: 146 MMOL/L (ref 136–145)
SP02: 100
VT: 500
VT: 520
WBC # BLD AUTO: 11.7 K/UL (ref 3.9–12.7)

## 2024-08-13 PROCEDURE — 99223 1ST HOSP IP/OBS HIGH 75: CPT | Mod: ,,, | Performed by: STUDENT IN AN ORGANIZED HEALTH CARE EDUCATION/TRAINING PROGRAM

## 2024-08-13 PROCEDURE — 82803 BLOOD GASES ANY COMBINATION: CPT

## 2024-08-13 PROCEDURE — 84132 ASSAY OF SERUM POTASSIUM: CPT

## 2024-08-13 PROCEDURE — 97165 OT EVAL LOW COMPLEX 30 MIN: CPT

## 2024-08-13 PROCEDURE — 36430 TRANSFUSION BLD/BLD COMPNT: CPT

## 2024-08-13 PROCEDURE — C9248 INJ, CLEVIDIPINE BUTYRATE: HCPCS

## 2024-08-13 PROCEDURE — 97161 PT EVAL LOW COMPLEX 20 MIN: CPT

## 2024-08-13 PROCEDURE — 82330 ASSAY OF CALCIUM: CPT

## 2024-08-13 PROCEDURE — 25000003 PHARM REV CODE 250

## 2024-08-13 PROCEDURE — 85014 HEMATOCRIT: CPT

## 2024-08-13 PROCEDURE — 85730 THROMBOPLASTIN TIME PARTIAL: CPT | Performed by: PHYSICIAN ASSISTANT

## 2024-08-13 PROCEDURE — 27100171 HC OXYGEN HIGH FLOW UP TO 24 HOURS

## 2024-08-13 PROCEDURE — 82550 ASSAY OF CK (CPK): CPT

## 2024-08-13 PROCEDURE — 94640 AIRWAY INHALATION TREATMENT: CPT

## 2024-08-13 PROCEDURE — 63600175 PHARM REV CODE 636 W HCPCS

## 2024-08-13 PROCEDURE — 82800 BLOOD PH: CPT

## 2024-08-13 PROCEDURE — 84295 ASSAY OF SERUM SODIUM: CPT

## 2024-08-13 PROCEDURE — 99900017 HC EXTUBATION W/PARAMETERS (STAT)

## 2024-08-13 PROCEDURE — 84100 ASSAY OF PHOSPHORUS: CPT | Performed by: PHYSICIAN ASSISTANT

## 2024-08-13 PROCEDURE — 94799 UNLISTED PULMONARY SVC/PX: CPT

## 2024-08-13 PROCEDURE — 84132 ASSAY OF SERUM POTASSIUM: CPT | Mod: 91 | Performed by: PHYSICIAN ASSISTANT

## 2024-08-13 PROCEDURE — 94761 N-INVAS EAR/PLS OXIMETRY MLT: CPT | Mod: XB

## 2024-08-13 PROCEDURE — 27200966 HC CLOSED SUCTION SYSTEM

## 2024-08-13 PROCEDURE — 63600175 PHARM REV CODE 636 W HCPCS: Performed by: PHYSICIAN ASSISTANT

## 2024-08-13 PROCEDURE — 99232 SBSQ HOSP IP/OBS MODERATE 35: CPT | Mod: ,,,

## 2024-08-13 PROCEDURE — 93005 ELECTROCARDIOGRAM TRACING: CPT

## 2024-08-13 PROCEDURE — 82565 ASSAY OF CREATININE: CPT

## 2024-08-13 PROCEDURE — 97530 THERAPEUTIC ACTIVITIES: CPT

## 2024-08-13 PROCEDURE — 99900035 HC TECH TIME PER 15 MIN (STAT)

## 2024-08-13 PROCEDURE — 97535 SELF CARE MNGMENT TRAINING: CPT

## 2024-08-13 PROCEDURE — 85610 PROTHROMBIN TIME: CPT | Performed by: PHYSICIAN ASSISTANT

## 2024-08-13 PROCEDURE — 25000242 PHARM REV CODE 250 ALT 637 W/ HCPCS: Performed by: PHYSICIAN ASSISTANT

## 2024-08-13 PROCEDURE — 85025 COMPLETE CBC W/AUTO DIFF WBC: CPT | Performed by: PHYSICIAN ASSISTANT

## 2024-08-13 PROCEDURE — 99233 SBSQ HOSP IP/OBS HIGH 50: CPT | Mod: ,,, | Performed by: ANESTHESIOLOGY

## 2024-08-13 PROCEDURE — 85730 THROMBOPLASTIN TIME PARTIAL: CPT | Mod: 91 | Performed by: THORACIC SURGERY (CARDIOTHORACIC VASCULAR SURGERY)

## 2024-08-13 PROCEDURE — 25000003 PHARM REV CODE 250: Performed by: PHYSICIAN ASSISTANT

## 2024-08-13 PROCEDURE — 83735 ASSAY OF MAGNESIUM: CPT | Performed by: PHYSICIAN ASSISTANT

## 2024-08-13 PROCEDURE — P9016 RBC LEUKOCYTES REDUCED: HCPCS

## 2024-08-13 PROCEDURE — 85730 THROMBOPLASTIN TIME PARTIAL: CPT | Mod: 91

## 2024-08-13 PROCEDURE — 83605 ASSAY OF LACTIC ACID: CPT

## 2024-08-13 PROCEDURE — 20000000 HC ICU ROOM

## 2024-08-13 PROCEDURE — 37799 UNLISTED PX VASCULAR SURGERY: CPT

## 2024-08-13 PROCEDURE — 25000003 PHARM REV CODE 250: Performed by: STUDENT IN AN ORGANIZED HEALTH CARE EDUCATION/TRAINING PROGRAM

## 2024-08-13 PROCEDURE — 93010 ELECTROCARDIOGRAM REPORT: CPT | Mod: ,,, | Performed by: INTERNAL MEDICINE

## 2024-08-13 PROCEDURE — 99900026 HC AIRWAY MAINTENANCE (STAT)

## 2024-08-13 PROCEDURE — 80053 COMPREHEN METABOLIC PANEL: CPT | Performed by: STUDENT IN AN ORGANIZED HEALTH CARE EDUCATION/TRAINING PROGRAM

## 2024-08-13 PROCEDURE — 94150 VITAL CAPACITY TEST: CPT

## 2024-08-13 RX ORDER — HYDROCODONE BITARTRATE AND ACETAMINOPHEN 500; 5 MG/1; MG/1
TABLET ORAL
Status: DISCONTINUED | OUTPATIENT
Start: 2024-08-13 | End: 2024-08-23 | Stop reason: HOSPADM

## 2024-08-13 RX ORDER — OXYCODONE HYDROCHLORIDE 5 MG/1
5 TABLET ORAL ONCE
Status: COMPLETED | OUTPATIENT
Start: 2024-08-13 | End: 2024-08-13

## 2024-08-13 RX ORDER — GLUCAGON 1 MG
1 KIT INJECTION
Status: DISCONTINUED | OUTPATIENT
Start: 2024-08-13 | End: 2024-08-15

## 2024-08-13 RX ORDER — HEPARIN SODIUM 10000 [USP'U]/100ML
400 INJECTION, SOLUTION INTRAVENOUS CONTINUOUS
Status: DISCONTINUED | OUTPATIENT
Start: 2024-08-13 | End: 2024-08-13

## 2024-08-13 RX ORDER — INSULIN ASPART 100 [IU]/ML
0-10 INJECTION, SOLUTION INTRAVENOUS; SUBCUTANEOUS EVERY 6 HOURS PRN
Status: DISCONTINUED | OUTPATIENT
Start: 2024-08-13 | End: 2024-08-13

## 2024-08-13 RX ORDER — HEPARIN SODIUM 10000 [USP'U]/100ML
800 INJECTION, SOLUTION INTRAVENOUS CONTINUOUS
Status: DISCONTINUED | OUTPATIENT
Start: 2024-08-13 | End: 2024-08-14

## 2024-08-13 RX ORDER — ASPIRIN 81 MG/1
81 TABLET ORAL DAILY
Status: CANCELLED | OUTPATIENT
Start: 2024-08-14

## 2024-08-13 RX ORDER — DOXYCYCLINE HYCLATE 100 MG
100 TABLET ORAL EVERY 12 HOURS
Status: DISCONTINUED | OUTPATIENT
Start: 2024-08-13 | End: 2024-08-13

## 2024-08-13 RX ORDER — WARFARIN SODIUM 5 MG/1
5 TABLET ORAL DAILY
Status: DISCONTINUED | OUTPATIENT
Start: 2024-08-13 | End: 2024-08-15

## 2024-08-13 RX ORDER — INSULIN ASPART 100 [IU]/ML
0-10 INJECTION, SOLUTION INTRAVENOUS; SUBCUTANEOUS EVERY 4 HOURS PRN
Status: DISCONTINUED | OUTPATIENT
Start: 2024-08-13 | End: 2024-08-15

## 2024-08-13 RX ORDER — MIRTAZAPINE 15 MG/1
15 TABLET, ORALLY DISINTEGRATING ORAL NIGHTLY
Status: DISCONTINUED | OUTPATIENT
Start: 2024-08-13 | End: 2024-08-23 | Stop reason: HOSPADM

## 2024-08-13 RX ORDER — HEPARIN SODIUM 10000 [USP'U]/100ML
400 INJECTION, SOLUTION INTRAVENOUS CONTINUOUS
Status: CANCELLED | OUTPATIENT
Start: 2024-08-13

## 2024-08-13 RX ORDER — HYDROMORPHONE HYDROCHLORIDE 1 MG/ML
0.5 INJECTION, SOLUTION INTRAMUSCULAR; INTRAVENOUS; SUBCUTANEOUS EVERY 4 HOURS PRN
Status: DISCONTINUED | OUTPATIENT
Start: 2024-08-13 | End: 2024-08-16

## 2024-08-13 RX ORDER — ASPIRIN 81 MG/1
81 TABLET ORAL DAILY
Status: DISCONTINUED | OUTPATIENT
Start: 2024-08-14 | End: 2024-08-23 | Stop reason: HOSPADM

## 2024-08-13 RX ADMIN — MUPIROCIN 1 G: 20 OINTMENT TOPICAL at 08:08

## 2024-08-13 RX ADMIN — POTASSIUM CHLORIDE 20 MEQ: 200 INJECTION, SOLUTION INTRAVENOUS at 10:08

## 2024-08-13 RX ADMIN — WARFARIN SODIUM 5 MG: 5 TABLET ORAL at 06:08

## 2024-08-13 RX ADMIN — POLYETHYLENE GLYCOL 3350 17 G: 17 POWDER, FOR SOLUTION ORAL at 09:08

## 2024-08-13 RX ADMIN — POTASSIUM CHLORIDE 20 MEQ: 200 INJECTION, SOLUTION INTRAVENOUS at 04:08

## 2024-08-13 RX ADMIN — ACETAMINOPHEN 1000 MG: 500 TABLET ORAL at 05:08

## 2024-08-13 RX ADMIN — MIRTAZAPINE 15 MG: 15 TABLET, ORALLY DISINTEGRATING ORAL at 08:08

## 2024-08-13 RX ADMIN — DOCUSATE SODIUM 100 MG: 100 CAPSULE, LIQUID FILLED ORAL at 08:08

## 2024-08-13 RX ADMIN — ACETAMINOPHEN 1000 MG: 500 TABLET ORAL at 09:08

## 2024-08-13 RX ADMIN — OXYCODONE 5 MG: 5 TABLET ORAL at 09:08

## 2024-08-13 RX ADMIN — SODIUM PHOSPHATE, MONOBASIC, MONOHYDRATE AND SODIUM PHOSPHATE, DIBASIC, ANHYDROUS 20.01 MMOL: 142; 276 INJECTION, SOLUTION INTRAVENOUS at 06:08

## 2024-08-13 RX ADMIN — IPRATROPIUM BROMIDE AND ALBUTEROL SULFATE 3 ML: 2.5; .5 SOLUTION RESPIRATORY (INHALATION) at 03:08

## 2024-08-13 RX ADMIN — IPRATROPIUM BROMIDE AND ALBUTEROL SULFATE 3 ML: 2.5; .5 SOLUTION RESPIRATORY (INHALATION) at 07:08

## 2024-08-13 RX ADMIN — IPRATROPIUM BROMIDE AND ALBUTEROL SULFATE 3 ML: 2.5; .5 SOLUTION RESPIRATORY (INHALATION) at 11:08

## 2024-08-13 RX ADMIN — ASPIRIN 325 MG ORAL TABLET 325 MG: 325 PILL ORAL at 07:08

## 2024-08-13 RX ADMIN — CLEVIPIDINE 4 MG/HR: 0.5 EMULSION INTRAVENOUS at 08:08

## 2024-08-13 RX ADMIN — CEFAZOLIN 2 G: 2 INJECTION, POWDER, FOR SOLUTION INTRAMUSCULAR; INTRAVENOUS at 11:08

## 2024-08-13 RX ADMIN — OXYCODONE 5 MG: 5 TABLET ORAL at 07:08

## 2024-08-13 RX ADMIN — CEFAZOLIN 2 G: 2 INJECTION, POWDER, FOR SOLUTION INTRAMUSCULAR; INTRAVENOUS at 07:08

## 2024-08-13 RX ADMIN — OXYCODONE 5 MG: 5 TABLET ORAL at 01:08

## 2024-08-13 RX ADMIN — DAPTOMYCIN 700 MG: 350 INJECTION, POWDER, LYOPHILIZED, FOR SOLUTION INTRAVENOUS at 01:08

## 2024-08-13 RX ADMIN — FAMOTIDINE 20 MG: 10 INJECTION, SOLUTION INTRAVENOUS at 08:08

## 2024-08-13 RX ADMIN — CEFAZOLIN 2 G: 2 INJECTION, POWDER, FOR SOLUTION INTRAMUSCULAR; INTRAVENOUS at 03:08

## 2024-08-13 RX ADMIN — DOXYCYCLINE HYCLATE 100 MG: 100 TABLET, COATED ORAL at 09:08

## 2024-08-13 RX ADMIN — HEPARIN SODIUM 400 UNITS/HR: 10000 INJECTION, SOLUTION INTRAVENOUS at 11:08

## 2024-08-13 RX ADMIN — OXYCODONE 5 MG: 5 TABLET ORAL at 11:08

## 2024-08-13 RX ADMIN — OXYCODONE HYDROCHLORIDE 10 MG: 10 TABLET ORAL at 04:08

## 2024-08-13 RX ADMIN — EPINEPHRINE 0.04 MCG/KG/MIN: 1 INJECTION INTRAMUSCULAR; INTRAVENOUS; SUBCUTANEOUS at 06:08

## 2024-08-13 NOTE — HPI
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 Beal, embolus to R eye (treated with dapto/ceftaroline), HFmrEF (45-50%) presented due to CHF exacerbation symptoms. He was also found to have atrial fibrillation s/p cardioversion and started on anticoagulation. Had resternotomy and mechanical MVR done on 08/12/2024. Has completed eight week course of Daptomycin and prophylactic Doxycycline since 11/2023. Infectious disease consulted for further management.

## 2024-08-13 NOTE — ASSESSMENT & PLAN NOTE
Lorenzo Nino is a 48 y.o. 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 He presents to the SICU s/p resternotomy and mechanical MVR with Dr. Beal on 8/12.      Neuro/Psych:     - Sedation: precedex    - Pain:     - Scheduled Tylenol 1000mg Q8H   - PRN Oxycodone 5mg/10mg    - Psych: home med mirtazipine 15 qd             Cardiac:     - S/p redo MVR with Dr. Beal    - BP Goal: MAP 60-80    - Inotropes/Pressors: levo weaned to off. Wean epi from 0.04 as tolerated    - Anti-HTNs: will start when appropriate    - Rhythm: paced at 80 (native rhythm junctional 40 bpm)   -- Will resume home amiodarone 200mg qd as appropriate   -- Will resume eliquis 5 BID as appropriate, held periop    - Beta Blocker: will start when appropriate   -- Home medication toprol XL 25 qd    - Statin: n/a      Pulmonary:     - Goal SpO2 >92%    - Will wean ventilator support as tolerated to extubate   - SBT w/ parameters this AM    - Chest Tubes x 4 (2 Meds & 2 Pleural)    - ABGs Q1H until improving, then PRN      Renal:    - Baseline Cr 1.6-2    - Trend BUN/Cr     - Maintain Watson, record strict Is/Os    Recent Labs   Lab 08/08/24  1231 08/12/24  1510 08/13/24  0306   BUN 28* 15 12   CREATININE 1.8* 1.5* 1.4         FEN / GI:     - Daily CMP, PRN K/Mag/Phos per protocol     - Replace electrolytes as needed    - Nutrition: NPO pending extubation, CLD 2hr following    - Bowel Regimen: Miralax, docusate      ID:     - Aebrile    - Abx: Complete perioperative cefazolin 2g Q8H x 5 doses     - Continue PO Doxy 100mg BID, end date TBD   - F/u OR cultures    Recent Labs   Lab 08/08/24  1231 08/12/24  1510 08/13/24  0306   WBC 12.73* 22.98* 11.70         Heme/Onc:     - Hgb 11.4 pre-operatively  Products:   - 2 FFP intra-op     - S/p 1u pRBC postop 8/13    - CBC daily    - ASA 325mg daily    Recent Labs   Lab 08/08/24  1231 08/12/24  1510  08/13/24  0306   HGB 11.4* 8.5* 6.8*    98* 86*   APTT 32.1* 37.1* 33.9*   INR 1.0 1.2 1.1         Endocrine:     - CTS Goal -140    - HgbA1c: 5.4    - Endocrinology consulted for insulin management      PPx:   Feeding: NPO, will advance as tolerated following extubation  Analgesia/Sedation: precedex/oxy 5/10, fentanyl while intubated PRN  Thromboembolic Prevention: SCDs  HOB >30: Yes  Stress Ulcer: Yes - famotidine 20mg IV BID  Glucose Control: Yes, insulin management per Endocrinology     Lines/Drains/Airway:   ETT    Art Line: Left radial   Central Line x2: RIJ trialysis and Quad lumen   Watson   Chest Tubes: 4 (2 Mediastinal and 2 pleural)    Pacing Wires: Temporary V pacing wires      Dispo/Code Status/Palliative:     - Continue SICU Care    - Full Code

## 2024-08-13 NOTE — CONSULTS
[facility] - [facility] Int[facility]  Infectious Disease  Consult Note    Patient Name: Lorenzo Nino  MRN: [id number]  Admission Date: 8/12/2024  Hospital Length of Stay: 1 days  Attending Physician: Manuel Beal MD  Primary Care Provider: No, Primary Doctor     Isolation Status: No active isolations    Patient information was obtained from patient, relative(s), past medical records, ER records, and primary team.      Inpatient consult to Infectious Diseases  Consult performed by: Nuvia Wu MD  Consult ordered by: Esperanza Lantigua MD        Assessment/Plan:     Cardiac/Vascular  Endocarditis  Recommendations:  - History of MRSA endocarditis s/p eight week course of Daptomycin IV and prophylactic Doxycycline  - S/p MVR on 08/12/2024  - Stop Doxycycline for now and continue Daptomycin IV 10 mcg/kg  - Monitor CK and CMP while on Daptomycin  - Mitral Anular Band culture pending        Thank you for your consult. I will follow-up with patient. Please contact us if you have any additional questions.    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 Beal, embolus to R eye (treated with dapto/ceftaroline), HFmrEF (45-50%) presented due to CHF exacerbation symptoms. He was also found to have atrial fibrillation s/p cardioversion and started on anticoagulation. Had resternotomy and mechanical MVR done on 08/12/2024. Has completed eight week course of Daptomycin and prophylactic Doxycycline since 11/2023. Infectious disease consulted for further management.    Interval History/Significant Events: Remained intubated overnight. Follows commands and moves all 4 off sedation. Levo weaned off with lactate appropriately downtrending. Received 1u pRBC for HgB 6.8. Remains on epi 0.04, precedex at 0.2. Pending  weaning parameters and extubation this AM.    Follow-up For: Procedure(s) (LRB):  REPLACEMENT, MITRAL VALVE (N/A)  REDO STERNOTOMY (N/A)    Post-Operative Day: 1 Day Post-Op    Objective:     Vital Signs (Most Recent):  Temp: 98.8 °F (37.1 °C) (08/13/24 1100)  Pulse: 79 (08/13/24 1127)  Resp: 18 (08/13/24 1149)  BP: 113/64 (08/13/24 1100)  SpO2: 97 % (08/13/24 1127) Vital Signs (24h Range):  Temp:  [94 °F (34.4 °C)-99 °F (37.2 °C)] 98.8 °F (37.1 °C)  Pulse:  [75-80] 79  Resp:  [18-31] 18  SpO2:  [87 %-100 %] 97 %  BP: ()/(52-65) 113/64  Arterial Line BP: ()/(42-65) 122/57     Weight: 86.5 kg (190 lb 11.2 oz)  Body mass index is 25.86 kg/m².      Intake/Output Summary (Last 24 hours) at 8/13/2024 1301  Last data filed at 8/13/2024 1100  Gross per 24 hour   Intake 5488.98 ml   Output 2040 ml   Net 3448.98 ml          Physical Exam  Constitutional:       Appearance: Normal appearance. He is not ill-appearing.   HENT:      Mouth/Throat:      Comments: OGT  Neck:      Comments: RIJ quad lumen CVC, trialysis cath  Cardiovascular:      Pulses: Normal pulses.      Comments: Midline sternotomy c/d/i        Pulmonary:      Effort: Pulmonary effort is normal.   Abdominal:      General: There is no distension.      Palpations: Abdomen is soft.      Tenderness: There is no abdominal tenderness.   Genitourinary:     Comments: Watson catheter in place draining clear yellow urine    Musculoskeletal:      Right lower leg: No edema.      Left lower leg: No edema.   Skin:     General: Skin is warm and dry.   Neurological:      General: No focal deficit present.            Vents:  Vent Mode: Spont (08/13/24 0715)  Ventilator Initiated: Yes (08/12/24 1507)  Set Rate: 16 BPM (08/13/24 0529)  Vt Set: 500 mL (08/13/24 0529)  Pressure Support: 10 cmH20 (08/13/24 0715)  PEEP/CPAP: 5 cmH20 (08/13/24 0715)  Oxygen Concentration (%): 40 (08/13/24 0715)  Peak Airway Pressure: 15 cmH20 (08/13/24 0715)  Plateau Pressure: 0 cmH20  (08/13/24 0715)  Total Ve: 9.35 L/m (08/13/24 0715)  Negative Inspiratory Force (cm H2O): 0 (08/13/24 0715)  F/VT Ratio<105 (RSBI): (!) 49.18 (08/13/24 0715)    Lines/Drains/Airways       Central Venous Catheter Line  Duration                  Hemodialysis Catheter 08/12/24 0735 right internal jugular 1 day    Percutaneous Central Line - Quad Lumen  08/12/24 0806 Internal Jugular Right 1 day              Drain  Duration                  Urethral Catheter 08/12/24 0719 Silicone;Non-latex;Temperature probe 16 Fr. 1 day         Y Chest Tube 1 and 2 08/12/24 0931 1 Right Mediastinal 19 Fr. 2 Left Mediastinal 19 Fr. 1 day         Y Chest Tube 3 and 4 08/12/24 1245 3 Right Pleural 19 Fr. 4 Left Pleural 19 Fr. 1 day              Airway  Duration                  Airway - Non-Surgical 08/12/24 0711 1 day              Arterial Line  Duration             Arterial Line 08/12/24 0702 Left Radial 1 day              Line  Duration                  Pacer Wires 08/12/24 0931 1 day              Peripheral Intravenous Line  Duration                  Peripheral IV - Single Lumen 08/12/24 0618 18 G Left;Posterior Forearm 1 day         Peripheral IV - Single Lumen 08/12/24 0740 16 G 1 1/4 in Right;Posterior Forearm 1 day                    Significant Labs:    CBC/Anemia Profile:  Recent Labs   Lab 08/12/24  1510 08/12/24  1511 08/13/24 0306 08/13/24  0329 08/13/24  0529 08/13/24  0723   WBC 22.98*  --  11.70  --   --   --    HGB 8.5*  --  6.8*  --   --   --    HCT 29.6*   < > 22.7* 22* 24* 25*   PLT 98*  --  86*  --   --   --    MCV 78*  --  78*  --   --   --    RDW 18.7*  --  18.6*  --   --   --     < > = values in this interval not displayed.        Chemistries:  Recent Labs   Lab 08/12/24  1510 08/12/24  2017 08/13/24  0306 08/13/24  0833     --  146*  --    K 4.4 4.1 3.8 3.8   *  --  112*  --    CO2 19*  --  19*  --    BUN 15  --  12  --    CREATININE 1.5*  --  1.4  --    CALCIUM 8.6*  --  8.8  --    ALBUMIN 2.2*  --   3.7  --    PROT 4.6*  --  5.5*  --    BILITOT 0.5  --  0.6  --    ALKPHOS 49*  --  36*  --    ALT 29  --  23  --    AST 55*  --  67*  --    MG 2.8*  --  2.4  --    PHOS 2.9  --  2.1*  --        Significant Imaging:  I have reviewed all pertinent imaging results/findings within the past 24 hours.  Review of Systems   Cardiovascular:  Positive for chest pain.   All other systems reviewed and are negative.

## 2024-08-13 NOTE — PLAN OF CARE
[facility] - [facility] Int[facility]  Initial Discharge Assessment       Primary Care Provider: No, Primary Doctor    Admission Diagnosis: Mitral valve insufficiency, unspecified etiology [I34.0]  S/P MVR (mitral valve repair) [Z98.890]  Severe mitral regurgitation [I34.0]    Admission Date: 8/12/2024  Expected Discharge Date:     Transition of Care Barriers: Underinsured    Payor: MEDICAID / Plan: [employer] / Product Type: Managed Medicaid /     Extended Emergency Contact Information  Primary Emergency Contact: Federica Shelley  Mobile Phone: [phone]  Relation: Significant other  Secondary Emergency Contact: Jolie Briscoe  Mobile Phone: [phone]  Relation: Mother   needed? No    Discharge Plan A: Home, Home with family  Discharge Plan B: Home, Home with family      [facility] [address] [address] [place] [address]  [address]  [place] [address]  Phone: [phone] Fax: [phone]      Initial Assessment (most recent)       Adult Discharge Assessment - 08/13/24 1004          Discharge Assessment    Assessment Type Discharge Planning Assessment     Confirmed/corrected address, phone number and insurance Yes     Confirmed Demographics Correct on Facesheet     Source of Information patient     Communicated DARIELA with patient/caregiver Date not available/Unable to determine     People in Home significant other     Do you expect to return to your current living situation? Yes     Do you have help at home or someone to help you manage your care at home? Yes     Prior to hospitilization cognitive status: No Deficits     Current cognitive status: No Deficits     Walking or Climbing Stairs Difficulty no     Home Accessibility not wheelchair accessible     Home Layout Able to live on 1st floor     Equipment Currently Used at Home none     Readmission within 30 days? No     Patient currently being followed by outpatient case management? Unable to determine  (comments)     Do you currently have service(s) that help you manage your care at home? No     Do you take prescription medications? Yes     Do you have prescription coverage? Yes     Do you have any problems affording any of your prescribed medications? No     Is the patient taking medications as prescribed? yes     Who is going to help you get home at discharge? FAMILY     How do you get to doctors appointments? car, drives self;family or friend will provide     Are you on dialysis? No     Do you take coumadin? No   ELIQUIS    Discharge Plan A Home;Home with family     Discharge Plan B Home;Home with family     DME Needed Upon Discharge  none     Discharge Plan discussed with: Patient;Parent(s)     Transition of Care Barriers Underinsured        Physical Activity    On average, how many days per week do you engage in moderate to strenuous exercise (like a brisk walk)? 0 days     On average, how many minutes do you engage in exercise at this level? 0 min        Financial Resource Strain    How hard is it for you to pay for the very basics like food, housing, medical care, and heating? Not hard at all        Housing Stability    In the last 12 months, was there a time when you were not able to pay the mortgage or rent on time? No     At any time in the past 12 months, were you homeless or living in a shelter (including now)? No        Transportation Needs    Has the lack of transportation kept you from medical appointments, meetings, work or from getting things needed for daily living? No        Food Insecurity    Within the past 12 months, you worried that your food would run out before you got the money to buy more. Never true     Within the past 12 months, the food you bought just didn't last and you didn't have money to get more. Never true        Stress    Do you feel stress - tense, restless, nervous, or anxious, or unable to sleep at night because your mind is troubled all the time - these days? Patient  declined        Social Isolation    How often do you feel lonely or isolated from those around you?  Patient declined        Alcohol Use    Q1: How often do you have a drink containing alcohol? Patient declined     Q2: How many drinks containing alcohol do you have on a typical day when you are drinking? Patient declined     Q3: How often do you have six or more drinks on one occasion? Patient declined        Utilities    In the past 12 months has the electric, gas, oil, or water company threatened to shut off services in your home? No        Health Literacy    How often do you need to have someone help you when you read instructions, pamphlets, or other written material from your doctor or pharmacy? Rarely                   Patient lives with his S.O. paramjit 1 story house he is not on dialysis and does not take coumadin (ELIQUIS) he has a ride home   Patient will need a PCP  Discharge Plan A home and Plan B home health have been determined by review of patient's clinical status, future medical and therapeutic needs, and coverage/benefits for post-acute care in coordination with multidisciplinary team members.

## 2024-08-13 NOTE — ANESTHESIA POSTPROCEDURE EVALUATION
Anesthesia Post Evaluation    Patient: Lorenzo Nino    Procedure(s) Performed: Procedure(s) (LRB):  REPLACEMENT, MITRAL VALVE (N/A)  REDO STERNOTOMY (N/A)    Final Anesthesia Type: general      Patient location during evaluation: ICU  Patient participation: Yes- Able to Participate  Level of consciousness: awake and alert  Post-procedure vital signs: reviewed and stable  Pain management: adequate  Airway patency: patent    PONV status at discharge: No PONV  Anesthetic complications: no      Cardiovascular status: blood pressure returned to baseline  Respiratory status: ETT  Hydration status: euvolemic  Follow-up not needed.              Vitals Value Taken Time   /55 08/13/24 0502   Temp 37.2 °C (98.9 °F) 08/13/24 0400   Pulse 79 08/13/24 0557   Resp 13 08/13/24 0409   SpO2 100 % 08/13/24 0557   Vitals shown include unfiled device data.      No case tracking events are documented in the log.      Pain/Gladis Score: Pain Rating Prior to Med Admin: 5 (8/13/2024  5:07 AM)  Pain Rating Post Med Admin: 0 (8/12/2024  5:30 PM)

## 2024-08-13 NOTE — PT/OT/SLP EVAL
Physical Therapy Co-Evaluation and Co-Treatment    Patient Name:  Lorenzo Nino   MRN:  [id number]  Admit Date: 8/12/2024  Admitting Diagnosis:  Severe mitral regurgitation   Length of Stay: 1 days  Recent Surgery: Procedure(s) (LRB):  REPLACEMENT, MITRAL VALVE (N/A)  REDO STERNOTOMY (N/A) 1 Day Post-Op    Recommendations:     Discharge Recommendations: Low Intensity Therapy  Discharge Equipment Recommendations: none   Barriers to discharge: None    Appropriate transfer level with nursing staff: bed <> chair with assist x 1    Plan:     During this hospitalization, patient to be seen 5 x/week to address the identified rehab impairments via gait training, therapeutic activities, therapeutic exercises, neuromuscular re-education and progress towards the established goals.  Plan of Care Expires:  09/12/24  Plan of Care Reviewed with: patient, family    Assessment     Lorenzo Nino is a 48 y.o. male admitted with a medical diagnosis of Severe mitral regurgitation. Pt tolerated initial evaluation well today. Patient is s/p mitral valve replacement on 8/12/2024. Pt demonstrated good functional strength and tolerance to upright activity on this date, requiring no physical assist for transfers and ambulation. VS remained stable throughout session. Pt did have generalized unsteadiness when standing requiring CGA throughout ambulation trial for patient safety. Pt with decreased overall endurance, however, and only able to ambulate a total of ~6 ft to bedside chair on this date. Pt will continue to benefit from skilled PT services during this hospital admit to continue to improve transfer ability and efficiency as well as continue to progress pt's ambulation distance and cardiopulmonary endurance to maximize pt's functional independence and return to PLOF.     Problem List: weakness, impaired endurance, impaired self care skills, impaired functional mobility, gait instability, impaired balance, pain, decreased safety  awareness, impaired cardiopulmonary response to activity, impaired skin.  Rehab Prognosis: Good; patient would benefit from acute skilled PT services to address these deficits and reach maximum level of function.      Goals:   Multidisciplinary Problems       Physical Therapy Goals          Problem: Physical Therapy    Goal Priority Disciplines Outcome Goal Variances Interventions   Physical Therapy Goal     PT, PT/OT Progressing     Description: Goals to be met by: 2024     Patient will increase functional independence with mobility by performin. Sit to stand transfer with Modified [place]  2. Gait  x 250 feet with Supervision using physician approved AD prn.   3. Lower extremity exercise program x30 reps per handout, with independence  4. Recite 3/3 sternal precautions and remain complaint with precautions throughout session with no verbal cues                         Subjective     RN notified prior to session. Parents present upon PT entrance into room. Patient agreeable to PT evaluation.    Chief Complaint: No chief complaint on file.  Patient/Family Comments/goals: pt reporting no complaints  Pain/Comfort:  Pain Rating 1: 3/10  Location - Orientation 1: lower  Location 1: back  Pain Addressed 1: Reposition, Distraction, Nurse notified  Pain Rating Post-Intervention 1: 6/10 (increased with mobility)    Social History:  Residence: Patient lives with their significant other in a single story house with number of outside stair(s): 0 JOSE MARIA . Pt's bathroom has a tub/shower.  Equipment Owned (not using): none  Equipment Used: none  Prior level of function:  Prior to admission, patient was independent  Work: not currently.   Drive: no.   Managing Medicines/Managing Home: yes.   Hobbies: fishing  Assistance Upon Discharge: significant other and family    Objective:     Additional staff present: OT; OT for co-evaluation due to suspected patient need for two skilled therapists to appropriately assess  patient's functional deficits as well as ensure patient safety, accommodate for limited activity tolerance, and provide appropriate, skilled assistance to maximize functional potential during evaluation.    Patient found HOB elevated with: telemetry, blood pressure cuff, pulse ox (continuous), external pacer, oxygen, peripheral IV, Trialysis, arterial line, ritchie catheter, chest tube     General Precautions: Standard, Cardiac fall, sternal   Orthopedic Precautions:N/A   Braces: N/A   Body mass index is 25.86 kg/m².  Oxygen Device: Nasal Cannula 1L  Vitals: /64   Pulse 79   Temp 98.8 °F (37.1 °C) (Oral)   Resp 16   Ht 6' (1.829 m)   Wt 86.5 kg (190 lb 11.2 oz)   SpO2 95%   BMI 25.86 kg/m²     Exams:  Cognition:   Alert and Cooperative   Patient is oriented to Person, Place, Time, Situation  Command following: Follows multistep verbal commands  Fluency: clear/fluent  Hearing: Intact  Vision:  Intact  Skin Integrity: Visible skin intact  Postural Assessment: slouched posture and rounded shoulders  Physical Exam:    Left UE Left LE Right UE Right LE   Edema absent absent absent absent   ROM AROM WFL AROM WFL AROM WFL AROM WFL   Strength within normal limits within normal limits within normal limits within normal limits   Sensation intact to light touch intact to light touch intact to light touch intact to light touch   Coordination normal normal normal normal     Outcome Measures:  AM-PAC 6 CLICK MOBILITY  Turning over in bed (including adjusting bedclothes, sheets and blankets)?: 3  Sitting down on and standing up from a chair with arms (e.g., wheelchair, bedside commode, etc.): 3  Moving from lying on back to sitting on the side of the bed?: 3  Moving to and from a bed to a chair (including a wheelchair)?: 3  Need to walk in hospital room?: 3  Climbing 3-5 steps with a railing?: 3  Basic Mobility Total Score: 18     Functional Mobility:    Bed Mobility:   Supine to Sit: stand by assistance with HOB  elevated; to Lt side of bed  Scooting anteriorly to EOB to have both feet planted on floor: stand by assistance     Sitting Balance at Edge of Bed:  Static Sitting Balance: Good- : able to accept minimal resistance  Dynamic Sitting Balance: Good- : able to sit unsupported and weight shift across midline minimally  Assistance Level Required: Stand-by Assistance  Time: 6 minutes  Postural deviations noted: slouched posture and rounded shoulders  Comments: Pt with no c/o dizziness while EOB. MAP WNL    Transfers:   Sit <> Stand Transfer: stand by assistance with no AD. [id number] from EOB  Bed <> Chair Transfer Step Transfer technique: contact guard assistance with no AD. Chair on the Lt    Standing Balance:  Static Standing Balance: Good- : able to maintain standing balance against minimal resistance  Dynamic Standing Balance: Good- : able to stand independently unsupported and weight shift across midline minimally  Assistance Level Required: Contact Guard Assistance  Patient used: no assistive device   Comments: no c/o dizziness in standing. VSS.      Gait:   Patient ambulated: ~6 ft   Patient required: contact guard assistance  Patient used:  no assistive device   Gait Pattern observed: reciprocal gait  Gait Deviation(s): decreased step length, wide base of support, decreased weight shift, decreased foot clearance, and decreased kayode  Impairments due to: pain and decreased endurance  all lines remained intact throughout ambulation trial  Comments: Patient with no overt LOB throughout. Reported back pain increased from 3/10 to 6/10 with transfer to bedside chair    Education:  Time provided for education, counseling and discussion of health disposition in regards to patient's current status  All questions answered within PT scope of practice and to patient's satisfaction  PT role in POC to address current functional deficits  Pt educated on proper body mechanics, safety techniques, and energy conservation with PT  facilitation and cueing throughout session  Call nursing/pct to transfer to chair/use bathroom. Pt stated understanding.  Whiteboard updated with therapist name and pt's current mobility status documented above  Safe to perform step transfer to/from chair/bedside commode assist x 1 and HHA prn w/ nursing/PCT present  Importance of OOB tolerance prn hrs/day to improve lung ventilation and expansion as well as strengthen postural musculature  Sternal Precautions: patient able to voice 3/3 precautions without assistance. Patient able to maintain precautions throughout session with min verbal cues.    Patient left up in chair with all lines intact, call button in reach, RN notified, and family present.    History:     Past Medical History:   Diagnosis Date    Hypertension        Past Surgical History:   Procedure Laterality Date    COLONOSCOPY N/A 1/5/2024    Procedure: COLONOSCOPY;  Surgeon: Fadia Ellsworth MD;  [facility]  Service: Endoscopy;  Laterality: N/A;    COLONOSCOPY, WITH DIRECTED SUBMUCOSAL INJECTION  2/27/2024    Procedure: COLONOSCOPY, WITH DIRECTED SUBMUCOSAL INJECTION;  Surgeon: Mayur Dunn MD;  [facility];  Service: Colon and Rectal;;    COLONOSCOPY, WITH POLYPECTOMY USING SNARE N/A 2/27/2024    Procedure: COLONOSCOPY, WITH POLYPECTOMY USING SNARE;  Surgeon: Mayur Dunn MD;  [facility];  Service: Colon and Rectal;  Laterality: N/A;    ECHOCARDIOGRAM,TRANSESOPHAGEAL N/A 12/26/2023    Procedure: Transesophageal echo (NEW) intra-procedure log documentation;  Surgeon: Provider, [facility];  Location: [facility] EP LAB;  Service: Cardiology;  Laterality: N/A;    ESOPHAGOGASTRODUODENOSCOPY N/A 1/5/2024    Procedure: EGD (ESOPHAGOGASTRODUODENOSCOPY);  Surgeon: Fadia Ellsworth MD;  Location: [facility] ENDO (2ND FLR);  Service: Endoscopy;  Laterality: N/A;    EXCLUSION, LEFT ATRIAL APPENDAGE, OPEN, AS PART OF OPEN CHEST SURGERY Left 11/3/2023     Procedure: EXCLUSION, LEFT ATRIAL APPENDAGE, OPEN, AS PART OF OPEN CHEST SURGERY;  Surgeon: Manuel Beal MD;  [facility];  Service: Cardiothoracic;  Laterality: Left;    INSERTION OF INTRA-AORTIC BALLOON ASSIST DEVICE Right 11/2/2023    Procedure: INSERTION, INTRA-AORTIC BALLOON PUMP;  Surgeon: Jose Vidal MD;  Location: [facility] CATH LAB;  Service: Cardiology;  Laterality: Right;    MITRAL VALVE REPLACEMENT N/A 8/12/2024    Procedure: REPLACEMENT, MITRAL VALVE;  Surgeon: Manuel Beal MD;  [facility];  Service: Cardiothoracic;  Laterality: N/A;    REPAIR, MITRAL VALVE, OPEN N/A 11/3/2023    Procedure: REPAIR, MITRAL VALVE, OPEN;  Surgeon: Manuel Beal MD;  [facility];  Service: Cardiothoracic;  Laterality: N/A;    STERNOTOMY N/A 8/12/2024    Procedure: REDO STERNOTOMY;  Surgeon: Manuel Beal MD;  Location: [facility] OR [address] [place] [address];  Service: Cardiothoracic;  Laterality: N/A;       Family History   Problem Relation Name Age of Onset    Amblyopia Neg Hx      Blindness Neg Hx      Cataracts Neg Hx      Glaucoma Neg Hx      Macular degeneration Neg Hx      Retinal detachment Neg Hx      Strabismus Neg Hx         Social History     Socioeconomic History    Marital status: Single   Occupational History    Occupation: associated terminal  [employer]    Tobacco Use    Smoking status: Unknown     Passive exposure: Never   Substance and Sexual Activity    Alcohol use: Yes     Alcohol/week: 1.0 standard drink of alcohol     Types: 1 Cans of beer per week    Drug use: Never    Sexual activity: Not Currently     Partners: Female     Birth control/protection: None     Social Determinants of Health     Financial Resource Strain: Low Risk  (8/13/2024)    Overall Financial Resource Strain (CARDIA)     Difficulty of Paying Living Expenses: Not hard at all   Food Insecurity: No Food Insecurity (8/13/2024)    Hunger Vital Sign     Worried About Running Out of  Food in the Last Year: Never true     Ran Out of Food in the Last Year: Never true   Transportation Needs: No Transportation Needs (8/13/2024)    TRANSPORTATION NEEDS     Transportation : No   Physical Activity: Inactive (8/13/2024)    Exercise Vital Sign     Days of Exercise per Week: 0 days     Minutes of Exercise per Session: 0 min   Stress: Patient Declined (8/13/2024)    [employer] [place] [employer] - Occupational Stress Questionnaire     Feeling of Stress : Patient declined   Housing Stability: Low Risk  (8/13/2024)    Housing Stability Vital Sign     Unable to Pay for Housing in the Last Year: No     Homeless in the Last Year: No       Time Tracking:     PT Received On: 08/13/24  PT Start Time: 1315     PT Stop Time: 1336  PT Total Time (min): 21 min     Billable Minutes: Evaluation 10 minutes and Therapeutic Activity 11 minutes    8/13/2024

## 2024-08-13 NOTE — PLAN OF CARE
Patient with stable vital signs. Ventricular wires with pacer attached, with paced rhythm at 80. Patient on 2 L nasal cannula with o2 sats 96%. Patient with family at bedside. Patient and family updated on plan of care for the day per Dr. Beal and Dr. Lantigua. Dr. Lantigua updated on patient's assessments, vital signs, urine output, and lab results throughout the day. Dr. Wilson updated after sign off on patient's aptt, cvp, vital signs, and urine output in the evening. Will continue to monitor patient.

## 2024-08-13 NOTE — RESPIRATORY THERAPY
Pt was extubated with MD ANNA at bedside. Pt placed on 1L NC. Pt was able to state his name. No respiratory distress noted at this time.

## 2024-08-13 NOTE — PROGRESS NOTES
[facility]ensive [facility]  Critical Care - Surgery  Progress Note    Patient Name: Lorenzo Nino  MRN: [id number]  Admission Date: 8/12/2024  Hospital Length of Stay: 1 days  Code Status: Prior  Attending Provider: Manuel Beal MD  Primary Care Provider: No, Primary Doctor   Principal Problem: Severe mitral regurgitation    Subjective:     Hospital/ICU Course:  No notes on file    Interval History/Significant Events: Remained intubated overnight. Follows commands and moves all 4 off sedation. Levo weaned off with lactate appropriately downtrending. Received 1u pRBC for HgB 6.8. Remains on epi 0.04, precedex at 0.2. Pending weaning parameters and extubation this AM.    Follow-up For: Procedure(s) (LRB):  REPLACEMENT, MITRAL VALVE (N/A)  REDO STERNOTOMY (N/A)    Post-Operative Day: 1 Day Post-Op    Objective:     Vital Signs (Most Recent):  Temp: 98.9 °F (37.2 °C) (08/13/24 0400)  Pulse: 79 (08/13/24 0615)  Resp: (!) 22 (08/13/24 0409)  BP: (!) 98/54 (08/13/24 0600)  SpO2: 100 % (08/13/24 0615) Vital Signs (24h Range):  Temp:  [94 °F (34.4 °C)-99 °F (37.2 °C)] 98.9 °F (37.2 °C)  Pulse:  [75-80] 79  Resp:  [12-31] 22  SpO2:  [87 %-100 %] 100 %  BP: ()/(52-59) 98/54  Arterial Line BP: ()/(42-64) 112/54     Weight: 86.5 kg (190 lb 11.2 oz)  Body mass index is 25.86 kg/m².      Intake/Output Summary (Last 24 hours) at 8/13/2024 0643  Last data filed at 8/13/2024 0600  Gross per 24 hour   Intake 7677.88 ml   Output 1920 ml   Net 5757.88 ml          Physical Exam  Constitutional:       Appearance: Normal appearance. He is not ill-appearing.      Comments: Sedated   HENT:      Mouth/Throat:      Comments: OGT  Neck:      Comments: RIJ quad lumen CVC, trialysis cath  Cardiovascular:      Pulses: Normal pulses.      Comments: Midline sternotomy c/d/i  Paced at 90bpm  V wires in place  4x ChT, 2x medistinal and 2x pleural with SS output  L radial zander      Pulmonary:      Effort: Pulmonary effort  is normal.      Comments: Intubated  Abdominal:      General: There is no distension.      Palpations: Abdomen is soft.      Tenderness: There is no abdominal tenderness.   Genitourinary:     Comments: Watson catheter in place draining clear yellow urine    Musculoskeletal:      Right lower leg: No edema.      Left lower leg: No edema.   Skin:     General: Skin is warm and dry.   Neurological:      General: No focal deficit present.            Vents:  Vent Mode: A/C (08/13/24 0529)  Ventilator Initiated: Yes (08/12/24 1507)  Set Rate: 16 BPM (08/13/24 0529)  Vt Set: 500 mL (08/13/24 0529)  PEEP/CPAP: 5 cmH20 (08/13/24 0529)  Oxygen Concentration (%): 40 (08/13/24 0615)  Peak Airway Pressure: 21 cmH20 (08/13/24 0529)  Plateau Pressure: 0 cmH20 (08/13/24 0529)  Total Ve: 10.6 L/m (08/13/24 0529)  Negative Inspiratory Force (cm H2O): 0 (08/13/24 0529)  F/VT Ratio<105 (RSBI): (!) 37.66 (08/13/24 0324)    Lines/Drains/Airways       Central Venous Catheter Line  Duration                  Hemodialysis Catheter 08/12/24 0735 right internal jugular <1 day    Percutaneous Central Line - Quad Lumen  08/12/24 0806 Internal Jugular Right <1 day              Drain  Duration                  NG/OG Tube 08/12/24 1455 [place] sump 18 Fr. Center mouth <1 day         Urethral Catheter 08/12/24 0719 Silicone;Non-latex;Temperature probe 16 Fr. <1 day         Y Chest Tube 1 and 2 08/12/24 0931 1 Right Mediastinal 19 Fr. 2 Left Mediastinal 19 Fr. <1 day         Y Chest Tube 3 and 4 08/12/24 1245 3 Right Pleural 19 Fr. 4 Left Pleural 19 Fr. <1 day              Airway  Duration                  Airway - Non-Surgical 08/12/24 0711 <1 day              Arterial Line  Duration             Arterial Line 08/12/24 0702 Left Radial <1 day              Line  Duration                  Pacer Wires 08/12/24 0931 <1 day              Peripheral Intravenous Line  Duration                  Peripheral IV - Single Lumen 08/12/24 0618 18 G Left;Posterior Forearm  1 day         Peripheral IV - Single Lumen 08/12/24 0740 16 G 1 1/4 in Right;Posterior Forearm <1 day                    Significant Labs:    CBC/Anemia Profile:  Recent Labs   Lab 08/12/24  1510 08/12/24  1511 08/13/24 0306 08/13/24 0329 08/13/24  0529   WBC 22.98*  --  11.70  --   --    HGB 8.5*  --  6.8*  --   --    HCT 29.6*   < > 22.7* 22* 24*   PLT 98*  --  86*  --   --    MCV 78*  --  78*  --   --    RDW 18.7*  --  18.6*  --   --     < > = values in this interval not displayed.        Chemistries:  Recent Labs   Lab 08/12/24  1510 08/12/24 2017 08/13/24  0306     --  146*   K 4.4 4.1 3.8   *  --  112*   CO2 19*  --  19*   BUN 15  --  12   CREATININE 1.5*  --  1.4   CALCIUM 8.6*  --  8.8   ALBUMIN 2.2*  --  3.7   PROT 4.6*  --  5.5*   BILITOT 0.5  --  0.6   ALKPHOS 49*  --  36*   ALT 29  --  23   AST 55*  --  67*   MG 2.8*  --  2.4   PHOS 2.9  --  2.1*       Significant Imaging:  I have reviewed all pertinent imaging results/findings within the past 24 hours.  Assessment/Plan:     Cardiac/Vascular  * Severe mitral regurgitation  Lorenzo Nino is a 48 y.o. 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 He presents to the SICU s/p resternotomy and mechanical MVR with Dr. Beal on 8/12.      Neuro/Psych:     - Sedation: precedex    - Pain:     - Scheduled Tylenol 1000mg Q8H   - PRN Oxycodone 5mg/10mg    - Psych: home med mirtazipine 15 qd             Cardiac:     - S/p redo MVR with Dr. Beal    - BP Goal: MAP 60-80    - Inotropes/Pressors: levo weaned to off. Wean epi from 0.04 as tolerated    - Anti-HTNs: will start when appropriate    - Rhythm: paced at 80 (native rhythm junctional 40 bpm)   -- Will resume home amiodarone 200mg qd as appropriate   -- Will resume eliquis 5 BID as appropriate, held periop    - Beta Blocker: will start when appropriate   -- Home medication toprol XL 25  qd    - Statin: n/a      Pulmonary:     - Goal SpO2 >92%    - Will wean ventilator support as tolerated to extubate   - SBT w/ parameters this AM    - Chest Tubes x 4 (2 Meds & 2 Pleural)    - ABGs Q1H until improving, then PRN      Renal:    - Baseline Cr 1.6-2    - Trend BUN/Cr     - Maintain Watson, record strict Is/Os    Recent Labs   Lab 08/08/24  1231 08/12/24  1510 08/13/24  0306   BUN 28* 15 12   CREATININE 1.8* 1.5* 1.4         FEN / GI:     - Daily CMP, PRN K/Mag/Phos per protocol     - Replace electrolytes as needed    - Nutrition: NPO pending extubation, CLD 2hr following    - Bowel Regimen: Miralax, docusate      ID:     - Aebrile    - Abx: Complete perioperative cefazolin 2g Q8H x 5 doses     - Continue PO Doxy 100mg BID, end date TBD   - F/u OR cultures    Recent Labs   Lab 08/08/24  1231 08/12/24  1510 08/13/24  0306   WBC 12.73* 22.98* 11.70         Heme/Onc:     - Hgb 11.4 pre-operatively  Products:   - 2 FFP intra-op     - S/p 1u pRBC postop 8/13    - CBC daily    - ASA 325mg daily    Recent Labs   Lab 08/08/24  1231 08/12/24  1510 08/13/24  0306   HGB 11.4* 8.5* 6.8*    98* 86*   APTT 32.1* 37.1* 33.9*   INR 1.0 1.2 1.1         Endocrine:     - CTS Goal -140    - HgbA1c: 5.4    - Endocrinology consulted for insulin management      PPx:   Feeding: NPO, will advance as tolerated following extubation  Analgesia/Sedation: precedex/oxy 5/10, fentanyl while intubated PRN  Thromboembolic Prevention: SCDs  HOB >30: Yes  Stress Ulcer: Yes - famotidine 20mg IV BID  Glucose Control: Yes, insulin management per Endocrinology     Lines/Drains/Airway:   ETT    Art Line: Left radial   Central Line x2: RIJ trialysis and Quad lumen   Watson   Chest Tubes: 4 (2 Mediastinal and 2 pleural)    Pacing Wires: Temporary V pacing wires      Dispo/Code Status/Palliative:     - Continue SICU Care    - Full Code           Esperanza Lantigua MD  Critical Care - Surgery  [facility]

## 2024-08-13 NOTE — PROGRESS NOTES
[facility]  Endocrinology  Progress Note    Admit Date: 8/12/2024     Reason for Consult: Management of Hyperglycemia     Surgical Procedure and Date: Mitral valve replacement and sternotomy 08/12/2024      Diabetes diagnosis year: history of pre-diabetes per chart review in November 2023- now resolved    Lab Results   Component Value Date    HGBA1C 5.4 08/08/2024       HPI:   Patient is a 48 y.o. 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 Now with severe MR, mod pHTN (PASP 54), CKD3 (baseline Cr 1.6-2.0), and Afib (amiodarone, metoprolol, Eliquis). He presented to the SICU s/p resternotomy and mechanical MVR with Dr. Beal. Eden consulted for BG management.      Interval HPI:   No acute events overnight. Patient in room 07464/56959 A. Blood glucose improving. BG at and below goal on current insulin regimen (IIP). Steroid use- None. 1 Day Post-Op  Renal function- Normal   Lab Results   Component Value Date    CREATININE 1.4 08/13/2024     Vasopressors-  None     Endocrine will continue to follow and manage insulin orders inpatient.     Diet Clear Liquid Fluid - 1500mL     Eating:   <25%  Nausea: No  Hypoglycemia and intervention: No  Fever: No  TPN and/or TF: No  If yes, type of TF/TPN and rate: N/A    /64   Pulse 79   Temp 98.8 °F (37.1 °C) (Oral)   Resp 16   Ht 6' (1.829 m)   Wt 86.5 kg (190 lb 11.2 oz)   SpO2 95%   BMI 25.86 kg/m²     Labs Reviewed and Include    Recent Labs   Lab 08/13/24  0306 08/13/24  0833   *  --    CALCIUM 8.8  --    ALBUMIN 3.7  --    PROT 5.5*  --    *  --    K 3.8 3.8   CO2 19*  --    *  --    BUN 12  --    CREATININE 1.4  --    ALKPHOS 36*  --    ALT 23  --    AST 67*  --    BILITOT 0.6  --      Lab Results   Component Value Date    WBC 11.70 08/13/2024    HGB 6.8 (L) 08/13/2024    HCT 25 (L) 08/13/2024    MCV 78 (L) 08/13/2024    PLT 86 (L)  "08/13/2024     No results for input(s): "TSH", "FREET4" in the last 168 hours.  Lab Results   Component Value Date    HGBA1C 5.4 08/08/2024       Nutritional status:   Body mass index is 25.86 kg/m².  Lab Results   Component Value Date    ALBUMIN 3.7 08/13/2024    ALBUMIN 2.2 (L) 08/12/2024    ALBUMIN 3.6 08/08/2024     No results found for: "PREALBUMIN"    Estimated Creatinine Clearance: 70.1 mL/min (based on SCr of 1.4 mg/dL).    Accu-Checks  Recent Labs     08/13/24  0123 08/13/24  0220 08/13/24  0328 08/13/24  0406 08/13/24  0501 08/13/24  0558 08/13/24  0720 08/13/24  0758 08/13/24  0831 08/13/24  1055   POCTGLUCOSE 131* 116* 127* 127* 118* 109 92 86 113* 124*       Current Medications and/or Treatments Impacting Glycemic Control  Immunotherapy:    Immunosuppressants       None          Steroids:   Hormones (From admission, onward)      None          Pressors:    Autonomic Drugs (From admission, onward)      Start     Stop Route Frequency Ordered    08/12/24 1445  EPINEPHrine 5 mg in dextrose 5% 250 mL infusion (premix)        Question Answer Comment   Begin at (in mcg/kg/min): 0.02    Titrate by: (in mcg/kg/min) 0.02    Titrate interval: (in minutes) 5    Titrate to maintain: (SBP or MAP or Cardiac Index) MAP    Greater than: (in mmHg) 65    Cardiac index greater than: (in L/min) 2.2    Maximum dose: (in mcg/kg/min) 2        -- IV Continuous 08/12/24 1437          Hyperglycemia/Diabetes Medications:   Antihyperglycemics (From admission, onward)      Start     Stop Route Frequency Ordered    08/13/24 0835  insulin aspart U-100 pen 0-10 Units         -- SubQ Every 4 hours PRN 08/13/24 0735            ASSESSMENT and PLAN    Cardiac/Vascular  * Severe mitral regurgitation  S/p mitral valve replacement and sternotomy 08/12/2024   Managed by primary team  Optimize blood glucose control        Renal/  Stage 3 chronic kidney disease  Titrate insulin slowly to avoid hypoglycemia as the risk of hypoglycemia increases " with decreased creatinine clearance.        Endocrine  Stress hyperglycemia  BG goal 110-140 (CTS protocol)  Previous history of pre-diabetes. BG at and below goal on IIP    - Discontinue IV insulin infusion protocol   - Start Novolog (Insulin Aspart) prn for BG excursions [facility] SSI (150/25)  - BG checks AC/HS  - Hypoglycemia protocol in place  - If blood glucose greater than 300, please ask patient not to eat food or drink anything other than water until correctional insulin has brought it back below 250    ** Please notify Endocrine for any change and/or advance in diet**  ** Please call Endocrine for any BG related issues **    Discharge Planning:   TBD. Please notify endocrinology prior to discharge.          Rosei Lehman PA-C  Endocrinology  [facility]

## 2024-08-13 NOTE — NURSING
Notified Dr. Wislon of patient's cvp of 20, when we placed patient back in bed. No new orders received.

## 2024-08-13 NOTE — NURSING
SICU PLAN OF CARE    Dx: Severe mitral regurgitation    Goals of Care: Extubate    Vital Signs (last 12 hours):   Temp:  [94 °F (34.4 °C)-99 °F (37.2 °C)]   Pulse:  [75-79]   Resp:  [20-28]   BP: ()/(52-59)   SpO2:  [100 %]   Arterial Line BP: ()/(42-64)      Neuro: Follows commands , Moves all extremities spontaneously , Intubated , and Sedated    Cardiac: normal sinus rhythm  V paced at 80, sensitivity of 2, jolie amps 10, underlying rhythm junctional rhythm in 40s.    Respiratory: Ventilator; Mode: Spontaneous, 40% FiO2, 5 PEEP    Gtts: Epinephrine , Precedex, and Insulin    Urine Output: Urethral Catheter  600 mL/shift    Chest tube Drains: 105mL/shift, M= 30, P=75    Diet: NPO     Restraints stable     Labs/Accuchecks: BG q1hr, ABGs q2hrs, Daily labs    Skin: Patient turned q2h, bony prominences protected, and mattress inflated/working correctly.   Dilip Score: 13. If Dilip Score is 16 or less, complete 4EYES note each shift.    Shift Events:  500 Albumin, 1 PRBC, Lactate trended down throughout shift.  See flowsheet for further assessment/details.  Family updated on current condition/plan of care, questions answered, and emotional support provided.  MD updated on current condition, vitals, labs, and gtts.        Nurses Note -- 4 Eyes  8/13/2024   6:06 AM      Skin assessed during: Q Shift      [x] No Altered Skin Integrity Present    []Prevention Measures Documented      [] Yes- Altered Skin Integrity Present or Discovered   [] LDA Added if Not in Epic (Describe Wound)   [] New Altered Skin Integrity was Present on Admit and Documented in LDA   [] Wound Image Taken    Wound Care Consulted? No    Attending Nurse:  Gomez Marinelli RN/Staff Member:  Osmin

## 2024-08-13 NOTE — NURSING
Ventilator weaning parameters performed per respiratory therapistIndira at bedside per Dr. Lantigua order. Patient is awake, following commands. Patient passed all weaning parameters and extubation orders placed for extubation. Will continue to monitor patient.

## 2024-08-13 NOTE — SUBJECTIVE & OBJECTIVE
"Interval HPI:   No acute events overnight. Patient in room 26102/27721 A. Blood glucose improving. BG at and below goal on current insulin regimen (IIP). Steroid use- None. 1 Day Post-Op  Renal function- Normal   Lab Results   Component Value Date    CREATININE 1.4 08/13/2024     Vasopressors-  None     Endocrine will continue to follow and manage insulin orders inpatient.     Diet Clear Liquid Fluid - 1500mL     Eating:   <25%  Nausea: No  Hypoglycemia and intervention: No  Fever: No  TPN and/or TF: No  If yes, type of TF/TPN and rate: N/A    /64   Pulse 79   Temp 98.8 °F (37.1 °C) (Oral)   Resp 16   Ht 6' (1.829 m)   Wt 86.5 kg (190 lb 11.2 oz)   SpO2 95%   BMI 25.86 kg/m²     Labs Reviewed and Include    Recent Labs   Lab 08/13/24  0306 08/13/24  0833   *  --    CALCIUM 8.8  --    ALBUMIN 3.7  --    PROT 5.5*  --    *  --    K 3.8 3.8   CO2 19*  --    *  --    BUN 12  --    CREATININE 1.4  --    ALKPHOS 36*  --    ALT 23  --    AST 67*  --    BILITOT 0.6  --      Lab Results   Component Value Date    WBC 11.70 08/13/2024    HGB 6.8 (L) 08/13/2024    HCT 25 (L) 08/13/2024    MCV 78 (L) 08/13/2024    PLT 86 (L) 08/13/2024     No results for input(s): "TSH", "FREET4" in the last 168 hours.  Lab Results   Component Value Date    HGBA1C 5.4 08/08/2024       Nutritional status:   Body mass index is 25.86 kg/m².  Lab Results   Component Value Date    ALBUMIN 3.7 08/13/2024    ALBUMIN 2.2 (L) 08/12/2024    ALBUMIN 3.6 08/08/2024     No results found for: "PREALBUMIN"    Estimated Creatinine Clearance: 70.1 mL/min (based on SCr of 1.4 mg/dL).    Accu-Checks  Recent Labs     08/13/24  0123 08/13/24  0220 08/13/24  0328 08/13/24  0406 08/13/24  0501 08/13/24  0558 08/13/24  0720 08/13/24  0758 08/13/24  0831 08/13/24  1055   POCTGLUCOSE 131* 116* 127* 127* 118* 109 92 86 113* 124*       Current Medications and/or Treatments Impacting Glycemic Control  Immunotherapy:    Immunosuppressants  "      None          Steroids:   Hormones (From admission, onward)      None          Pressors:    Autonomic Drugs (From admission, onward)      Start     Stop Route Frequency Ordered    08/12/24 1445  EPINEPHrine 5 mg in dextrose 5% 250 mL infusion (premix)        Question Answer Comment   Begin at (in mcg/kg/min): 0.02    Titrate by: (in mcg/kg/min) 0.02    Titrate interval: (in minutes) 5    Titrate to maintain: (SBP or MAP or Cardiac Index) MAP    Greater than: (in mmHg) 65    Cardiac index greater than: (in L/min) 2.2    Maximum dose: (in mcg/kg/min) 2        -- IV Continuous 08/12/24 1437          Hyperglycemia/Diabetes Medications:   Antihyperglycemics (From admission, onward)      Start     Stop Route Frequency Ordered    08/13/24 0835  insulin aspart U-100 pen 0-10 Units         -- SubQ Every 4 hours PRN 08/13/24 0735

## 2024-08-13 NOTE — ASSESSMENT & PLAN NOTE
BG goal 110-140 (CTS protocol)  Previous history of pre-diabetes. BG at and below goal on IIP    - Discontinue IV insulin infusion protocol   - Start Novolog (Insulin Aspart) prn for BG excursions [facility] (150/25)  - BG checks AC/HS  - Hypoglycemia protocol in place  - If blood glucose greater than 300, please ask patient not to eat food or drink anything other than water until correctional insulin has brought it back below 250    ** Please notify Endocrine for any change and/or advance in diet**  ** Please call Endocrine for any BG related issues **    Discharge Planning:   TBD. Please notify endocrinology prior to discharge.

## 2024-08-13 NOTE — PT/OT/SLP EVAL
Occupational Therapy   Evaluation/Treatment    Name: Lorenzo Nino  MRN: [id number]  Admitting Diagnosis: Severe mitral regurgitation  Recent Surgery: Procedure(s) (LRB):  REPLACEMENT, MITRAL VALVE (N/A)  REDO STERNOTOMY (N/A) 1 Day Post-Op    Recommendations:     Discharge Recommendations: Low Intensity Therapy  Discharge Equipment Recommendations:  none  Barriers to discharge:  None    Assessment:     Lorenzo Nino is a 48 y.o. male with a medical diagnosis of Severe mitral regurgitation. Performance deficits affecting function: weakness, impaired endurance, impaired self care skills, impaired functional mobility, gait instability, impaired balance, pain, decreased safety awareness, impaired cardiopulmonary response to activity, impaired skin. Patient cleared for therapy and educated on sternal precautions prior to mobility. Patient transferred to bedside chair. Patient participated in g/h tasks up in chair. All VSS throughout session. Patient would benefit from continued skilled acute OT 5x/wk to improve functional mobility, increase independence with ADLs, and address established goals. Recommending low intensity therapy once medically appropriate for discharge to increase maximal independence, reduce burden of care, and ensure safety.     Rehab Prognosis: Good; patient would benefit from acute skilled OT services to address these deficits and reach maximum level of function.       Plan:     Patient to be seen 5 x/week to address the above listed problems via self-care/home management, therapeutic activities, therapeutic exercises  Plan of Care Expires: 09/13/24  Plan of Care Reviewed with: patient, family    Subjective     Chief Complaint: none noted  Patient/Family Comments/goals: patient agreed to therapy    Occupational Profile:  Living Environment: patient lives with his significant other in a [facility] with no JOSE MARIA to enter. Patient has a tub shower combo with a bath bench, but does not use it. Patient was  independent with ADLs and functional mobility PTA. Patient does not drive. Patient enjoys fishing. Patient is not working. Patient has a four wheeled upright walker, but does not use it.     Pain/Comfort:  Pain Rating 1: 3/10  Location - Side 1: Bilateral  Location - Orientation 1: lower  Location 1: back  Pain Addressed 1: Reposition, Distraction, Nurse notified  Pain Rating Post-Intervention 1: 6/10    Patients cultural, spiritual, Yazdanism conflicts given the current situation: no    Objective:     Communicated with: NSEMIL prior to session.  Patient found HOB elevated with telemetry, blood pressure cuff, pulse ox (continuous), external pacer, oxygen, peripheral IV, Trialysis, arterial line, ritchie catheter, chest tube upon OT entry to room.    General Precautions: Standard, fall, sternal  Orthopedic Precautions: N/A  Braces: N/A  Respiratory Status: Nasal cannula, flow 1 L/min    Occupational Performance:    Bed Mobility:    Patient completed Scooting/Bridging with stand by assistance anteriorly to EOB sitting EOB  Patient completed Supine to Sit with stand by assistance with HOB elevated     Functional Mobility/Transfers:  Patient completed Sit <> Stand Transfer with stand by assistance  with  no assistive device   Patient completed Bed > Chair Transfer using functional ambulation technique with stand step pivot technique with contact guard assistance with hand-held assist    Activities of Daily Living:  Grooming: stand by assistance oral care and washing face sitting in bedside chair   Upper Body Dressing: total assistance Donning back gown due to lines sitting EOB  Lower Body Dressing: total assistance Donning socks prior to mobility    Cognitive/Visual Perceptual:  Cognitive/Psychosocial Skills:     -       Oriented to: Person, Place, Time, and Situation   -       Follows Commands/attention:Follows multistep  commands  -       Communication: clear/fluent  -       Memory: No Deficits noted  -       Safety  awareness/insight to disability: intact   -       Mood/Affect/Coping skills/emotional control: Appropriate to situation  Visual/Perceptual:      -Intact      Physical Exam:  Upper Extremity Range of Motion:     -       Right Upper Extremity: WFL  -       Left Upper Extremity: WFL  Upper Extremity Strength:  Did not assess due to sternal precautions   Strength:    -       Right Upper Extremity: WFL  -       Left Upper Extremity: WFL  Fine Motor Coordination:    -       Intact  Gross motor coordination:   WFL    AMPAC 6 Click ADL:  AMPAC Total Score: 15    Treatment & Education:  Role of OT and POC  ADL retraining  Functional mobility training  Safety  Discharge planning  Importance EOB/OOB activity  Sternal precaution education    Co-treatment performed due to patient's multiple deficits requiring two skilled therapists to appropriately and safely assess patient's strength and endurance while facilitating functional tasks in addition to accommodating for patient's activity tolerance.     Patient left up in chair with all lines intact, call button in reach, nurse notified, family present, and all needs met.     GOALS:   Multidisciplinary Problems       Occupational Therapy Goals          Problem: Occupational Therapy    Goal Priority Disciplines Outcome Interventions   Occupational Therapy Goal     OT, PT/OT Progressing    Description: Goals to be met by: 9/6/2024     Patient will increase functional independence with ADLs by performing:    UE Dressing with Set-up Assistance.  LE Dressing with Supervision.  Grooming while standing at sink with Modified [place].  Toileting from toilet with Modified [place] for hygiene and clothing management.   Supine to sit with Stand-by Assistance.  Stand pivot transfers with Modified [place].  Toilet transfer to toilet with Modified [place].                         History:     Past Medical History:   Diagnosis Date    Hypertension          Past Surgical  History:   Procedure Laterality Date    COLONOSCOPY N/A 1/5/2024    Procedure: COLONOSCOPY;  Surgeon: Fadia Ellsworth MD;  Location: [facility] ENDO (2ND FLR);  Service: Endoscopy;  Laterality: N/A;    COLONOSCOPY, WITH DIRECTED SUBMUCOSAL INJECTION  2/27/2024    Procedure: COLONOSCOPY, WITH DIRECTED SUBMUCOSAL INJECTION;  Surgeon: Mayur Dunn MD;  [facility];  Service: Colon and Rectal;;    COLONOSCOPY, WITH POLYPECTOMY USING SNARE N/A 2/27/2024    Procedure: COLONOSCOPY, WITH POLYPECTOMY USING SNARE;  Surgeon: Mayur Dunn MD;  [facility];  Service: Colon and Rectal;  Laterality: N/A;    ECHOCARDIOGRAM,TRANSESOPHAGEAL N/A 12/26/2023    Procedure: Transesophageal echo (NEW) intra-procedure log documentation;  Surgeon: Provider, Dos Diagnostic;  Location: [facility] EP LAB;  Service: Cardiology;  Laterality: N/A;    ESOPHAGOGASTRODUODENOSCOPY N/A 1/5/2024    Procedure: EGD (ESOPHAGOGASTRODUODENOSCOPY);  Surgeon: Fadia Ellsworth MD;  Location: [facility] ENDO (2ND FLR);  Service: Endoscopy;  Laterality: N/A;    EXCLUSION, LEFT ATRIAL APPENDAGE, OPEN, AS PART OF OPEN CHEST SURGERY Left 11/3/2023    Procedure: EXCLUSION, LEFT ATRIAL APPENDAGE, OPEN, AS PART OF OPEN CHEST SURGERY;  Surgeon: Manuel Beal MD;  [facility];  Service: Cardiothoracic;  Laterality: Left;    INSERTION OF INTRA-AORTIC BALLOON ASSIST DEVICE Right 11/2/2023    Procedure: INSERTION, INTRA-AORTIC BALLOON PUMP;  Surgeon: Jose Vidal MD;  Location: [facility] CATH LAB;  Service: Cardiology;  Laterality: Right;    MITRAL VALVE REPLACEMENT N/A 8/12/2024    Procedure: REPLACEMENT, MITRAL VALVE;  Surgeon: Manuel Beal MD;  [facility];  Service: Cardiothoracic;  Laterality: N/A;    REPAIR, MITRAL VALVE, OPEN N/A 11/3/2023    Procedure: REPAIR, MITRAL VALVE, OPEN;  Surgeon: Manuel Beal MD;  [facility];  Service: Cardiothoracic;  Laterality: N/A;     STERNOTOMY N/A 8/12/2024    Procedure: REDO STERNOTOMY;  Surgeon: Manuel Beal MD;  Location: [facility] OR [address] [place] [address];  Service: Cardiothoracic;  Laterality: N/A;       Time Tracking:     OT Date of Treatment: 08/13/24  OT Start Time: 1318  OT Stop Time: 1343  OT Total Time (min): 25 min    Billable Minutes:Evaluation 10  Self Care/Home Management 15    8/13/2024

## 2024-08-13 NOTE — SUBJECTIVE & OBJECTIVE
Interval History/Significant Events: Remained intubated overnight. Follows commands and moves all 4 off sedation. Levo weaned off with lactate appropriately downtrending. Received 1u pRBC for HgB 6.8. Remains on epi 0.04, precedex at 0.2. Pending weaning parameters and extubation this AM.    Follow-up For: Procedure(s) (LRB):  REPLACEMENT, MITRAL VALVE (N/A)  REDO STERNOTOMY (N/A)    Post-Operative Day: 1 Day Post-Op    Objective:     Vital Signs (Most Recent):  Temp: 98.8 °F (37.1 °C) (08/13/24 1100)  Pulse: 79 (08/13/24 1127)  Resp: 18 (08/13/24 1149)  BP: 113/64 (08/13/24 1100)  SpO2: 97 % (08/13/24 1127) Vital Signs (24h Range):  Temp:  [94 °F (34.4 °C)-99 °F (37.2 °C)] 98.8 °F (37.1 °C)  Pulse:  [75-80] 79  Resp:  [18-31] 18  SpO2:  [87 %-100 %] 97 %  BP: ()/(52-65) 113/64  Arterial Line BP: ()/(42-65) 122/57     Weight: 86.5 kg (190 lb 11.2 oz)  Body mass index is 25.86 kg/m².      Intake/Output Summary (Last 24 hours) at 8/13/2024 1301  Last data filed at 8/13/2024 1100  Gross per 24 hour   Intake 5488.98 ml   Output 2040 ml   Net 3448.98 ml          Physical Exam  Constitutional:       Appearance: Normal appearance. He is not ill-appearing.   HENT:      Mouth/Throat:      Comments: OGT  Neck:      Comments: RIJ quad lumen CVC, trialysis cath  Cardiovascular:      Pulses: Normal pulses.      Comments: Midline sternotomy c/d/i        Pulmonary:      Effort: Pulmonary effort is normal.   Abdominal:      General: There is no distension.      Palpations: Abdomen is soft.      Tenderness: There is no abdominal tenderness.   Genitourinary:     Comments: Watson catheter in place draining clear yellow urine    Musculoskeletal:      Right lower leg: No edema.      Left lower leg: No edema.   Skin:     General: Skin is warm and dry.   Neurological:      General: No focal deficit present.            Vents:  Vent Mode: Spont (08/13/24 0715)  Ventilator Initiated: Yes (08/12/24 1507)  Set Rate: 16 BPM (08/13/24  0529)  Vt Set: 500 mL (08/13/24 0529)  Pressure Support: 10 cmH20 (08/13/24 0715)  PEEP/CPAP: 5 cmH20 (08/13/24 0715)  Oxygen Concentration (%): 40 (08/13/24 0715)  Peak Airway Pressure: 15 cmH20 (08/13/24 0715)  Plateau Pressure: 0 cmH20 (08/13/24 0715)  Total Ve: 9.35 L/m (08/13/24 0715)  Negative Inspiratory Force (cm H2O): 0 (08/13/24 0715)  F/VT Ratio<105 (RSBI): (!) 49.18 (08/13/24 0715)    Lines/Drains/Airways       Central Venous Catheter Line  Duration                  Hemodialysis Catheter 08/12/24 0735 right internal jugular 1 day    Percutaneous Central Line - Quad Lumen  08/12/24 0806 Internal Jugular Right 1 day              Drain  Duration                  Urethral Catheter 08/12/24 0719 Silicone;Non-latex;Temperature probe 16 Fr. 1 day         Y Chest Tube 1 and 2 08/12/24 0931 1 Right Mediastinal 19 Fr. 2 Left Mediastinal 19 Fr. 1 day         Y Chest Tube 3 and 4 08/12/24 1245 3 Right Pleural 19 Fr. 4 Left Pleural 19 Fr. 1 day              Airway  Duration                  Airway - Non-Surgical 08/12/24 0711 1 day              Arterial Line  Duration             Arterial Line 08/12/24 0702 Left Radial 1 day              Line  Duration                  Pacer Wires 08/12/24 0931 1 day              Peripheral Intravenous Line  Duration                  Peripheral IV - Single Lumen 08/12/24 0618 18 G Left;Posterior Forearm 1 day         Peripheral IV - Single Lumen 08/12/24 0740 16 G 1 1/4 in Right;Posterior Forearm 1 day                    Significant Labs:    CBC/Anemia Profile:  Recent Labs   Lab 08/12/24  1510 08/12/24  1511 08/13/24  0306 08/13/24  0329 08/13/24  0529 08/13/24  0723   WBC 22.98*  --  11.70  --   --   --    HGB 8.5*  --  6.8*  --   --   --    HCT 29.6*   < > 22.7* 22* 24* 25*   PLT 98*  --  86*  --   --   --    MCV 78*  --  78*  --   --   --    RDW 18.7*  --  18.6*  --   --   --     < > = values in this interval not displayed.        Chemistries:  Recent Labs   Lab 08/12/24  8021  08/12/24 2017 08/13/24  0306 08/13/24  0833     --  146*  --    K 4.4 4.1 3.8 3.8   *  --  112*  --    CO2 19*  --  19*  --    BUN 15  --  12  --    CREATININE 1.5*  --  1.4  --    CALCIUM 8.6*  --  8.8  --    ALBUMIN 2.2*  --  3.7  --    PROT 4.6*  --  5.5*  --    BILITOT 0.5  --  0.6  --    ALKPHOS 49*  --  36*  --    ALT 29  --  23  --    AST 55*  --  67*  --    MG 2.8*  --  2.4  --    PHOS 2.9  --  2.1*  --        Significant Imaging:  I have reviewed all pertinent imaging results/findings within the past 24 hours.  Review of Systems   Cardiovascular:  Positive for chest pain.   All other systems reviewed and are negative.

## 2024-08-13 NOTE — PLAN OF CARE
Post-Acute Therapy Recommendation: low intensity     Evaluation completed today. PT goals appropriate.    Please continue Progressive Mobility Protocol as appropriate.    Appropriate transfer level with nursing staff: bed <> chair with assist x 1    2024      Problem: Physical Therapy  Goal: Physical Therapy Goal  Description: Goals to be met by: 2024     Patient will increase functional independence with mobility by performin. Sit to stand transfer with Modified [place]  2. Gait  x 250 feet with Supervision using physician approved AD prn.   3. Lower extremity exercise program x30 reps per handout, with independence  4. Recite 3/3 sternal precautions and remain complaint with precautions throughout session with no verbal cues    Outcome: Progressing

## 2024-08-13 NOTE — PLAN OF CARE
Problem: Occupational Therapy  Goal: Occupational Therapy Goal  Description: Goals to be met by: 9/6/2024     Patient will increase functional independence with ADLs by performing:    UE Dressing with Set-up Assistance.  LE Dressing with Supervision.  Grooming while standing at sink with Modified [place].  Toileting from toilet with Modified [place] for hygiene and clothing management.   Supine to sit with Stand-by Assistance.  Stand pivot transfers with Modified [place].  Toilet transfer to toilet with Modified [place].    Outcome: Progressing   Patient's goals are set.

## 2024-08-13 NOTE — ASSESSMENT & PLAN NOTE
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

## 2024-08-14 ENCOUNTER — PATIENT MESSAGE (OUTPATIENT)
Dept: CARDIOLOGY | Facility: CLINIC | Age: 49
End: 2024-08-14
Payer: MEDICAID

## 2024-08-14 LAB
ALBUMIN SERPL BCP-MCNC: 3.3 G/DL (ref 3.5–5.2)
ALP SERPL-CCNC: 43 U/L (ref 55–135)
ALT SERPL W/O P-5'-P-CCNC: 22 U/L (ref 10–44)
ANION GAP SERPL CALC-SCNC: 8 MMOL/L (ref 8–16)
ANION GAP SERPL CALC-SCNC: 9 MMOL/L (ref 8–16)
APTT PPP: 50.6 SEC (ref 21–32)
APTT PPP: 57.5 SEC (ref 21–32)
APTT PPP: 69.6 SEC (ref 21–32)
APTT PPP: 76.5 SEC (ref 21–32)
APTT PPP: 79.3 SEC (ref 21–32)
AST SERPL-CCNC: 70 U/L (ref 10–40)
BASOPHILS # BLD AUTO: 0.03 K/UL (ref 0–0.2)
BASOPHILS NFR BLD: 0.2 % (ref 0–1.9)
BILIRUB SERPL-MCNC: 0.6 MG/DL (ref 0.1–1)
BUN SERPL-MCNC: 13 MG/DL (ref 6–20)
BUN SERPL-MCNC: 14 MG/DL (ref 6–20)
CALCIUM SERPL-MCNC: 8.5 MG/DL (ref 8.7–10.5)
CALCIUM SERPL-MCNC: 8.6 MG/DL (ref 8.7–10.5)
CHLORIDE SERPL-SCNC: 107 MMOL/L (ref 95–110)
CHLORIDE SERPL-SCNC: 110 MMOL/L (ref 95–110)
CK SERPL-CCNC: 946 U/L (ref 20–200)
CO2 SERPL-SCNC: 25 MMOL/L (ref 23–29)
CO2 SERPL-SCNC: 26 MMOL/L (ref 23–29)
CREAT SERPL-MCNC: 1.2 MG/DL (ref 0.5–1.4)
CREAT SERPL-MCNC: 1.3 MG/DL (ref 0.5–1.4)
DIFFERENTIAL METHOD BLD: ABNORMAL
EOSINOPHIL # BLD AUTO: 0 K/UL (ref 0–0.5)
EOSINOPHIL NFR BLD: 0.1 % (ref 0–8)
ERYTHROCYTE [DISTWIDTH] IN BLOOD BY AUTOMATED COUNT: 19.1 % (ref 11.5–14.5)
EST. GFR  (NO RACE VARIABLE): >60 ML/MIN/1.73 M^2
EST. GFR  (NO RACE VARIABLE): >60 ML/MIN/1.73 M^2
FINAL PATHOLOGIC DIAGNOSIS: NORMAL
GLUCOSE SERPL-MCNC: 101 MG/DL (ref 70–110)
GLUCOSE SERPL-MCNC: 140 MG/DL (ref 70–110)
GROSS: NORMAL
HCT VFR BLD AUTO: 25.7 % (ref 40–54)
HGB BLD-MCNC: 8 G/DL (ref 14–18)
IMM GRANULOCYTES # BLD AUTO: 0.11 K/UL (ref 0–0.04)
IMM GRANULOCYTES NFR BLD AUTO: 0.7 % (ref 0–0.5)
INR PPP: 1.2 (ref 0.8–1.2)
LYMPHOCYTES # BLD AUTO: 1.3 K/UL (ref 1–4.8)
LYMPHOCYTES NFR BLD: 8.6 % (ref 18–48)
Lab: NORMAL
MAGNESIUM SERPL-MCNC: 2.2 MG/DL (ref 1.6–2.6)
MAGNESIUM SERPL-MCNC: 2.4 MG/DL (ref 1.6–2.6)
MCH RBC QN AUTO: 23.9 PG (ref 27–31)
MCHC RBC AUTO-ENTMCNC: 31.1 G/DL (ref 32–36)
MCV RBC AUTO: 77 FL (ref 82–98)
MONOCYTES # BLD AUTO: 1.3 K/UL (ref 0.3–1)
MONOCYTES NFR BLD: 8.6 % (ref 4–15)
NEUTROPHILS # BLD AUTO: 12.6 K/UL (ref 1.8–7.7)
NEUTROPHILS NFR BLD: 81.8 % (ref 38–73)
NRBC BLD-RTO: 0 /100 WBC
PHOSPHATE SERPL-MCNC: 2.7 MG/DL (ref 2.7–4.5)
PHOSPHATE SERPL-MCNC: 3.1 MG/DL (ref 2.7–4.5)
PLATELET # BLD AUTO: 89 K/UL (ref 150–450)
PMV BLD AUTO: ABNORMAL FL (ref 9.2–12.9)
POCT GLUCOSE: 103 MG/DL (ref 70–110)
POCT GLUCOSE: 111 MG/DL (ref 70–110)
POCT GLUCOSE: 111 MG/DL (ref 70–110)
POCT GLUCOSE: 179 MG/DL (ref 70–110)
POTASSIUM SERPL-SCNC: 3.2 MMOL/L (ref 3.5–5.1)
POTASSIUM SERPL-SCNC: 3.7 MMOL/L (ref 3.5–5.1)
POTASSIUM SERPL-SCNC: 4.1 MMOL/L (ref 3.5–5.1)
POTASSIUM SERPL-SCNC: 4.4 MMOL/L (ref 3.5–5.1)
PROT SERPL-MCNC: 5.6 G/DL (ref 6–8.4)
PROTHROMBIN TIME: 12.7 SEC (ref 9–12.5)
RBC # BLD AUTO: 3.35 M/UL (ref 4.6–6.2)
SODIUM SERPL-SCNC: 142 MMOL/L (ref 136–145)
SODIUM SERPL-SCNC: 143 MMOL/L (ref 136–145)
WBC # BLD AUTO: 15.42 K/UL (ref 3.9–12.7)

## 2024-08-14 PROCEDURE — 85025 COMPLETE CBC W/AUTO DIFF WBC: CPT | Performed by: PHYSICIAN ASSISTANT

## 2024-08-14 PROCEDURE — 82550 ASSAY OF CK (CPK): CPT | Performed by: THORACIC SURGERY (CARDIOTHORACIC VASCULAR SURGERY)

## 2024-08-14 PROCEDURE — 25000003 PHARM REV CODE 250

## 2024-08-14 PROCEDURE — 80048 BASIC METABOLIC PNL TOTAL CA: CPT | Mod: XB | Performed by: THORACIC SURGERY (CARDIOTHORACIC VASCULAR SURGERY)

## 2024-08-14 PROCEDURE — 27000221 HC OXYGEN, UP TO 24 HOURS

## 2024-08-14 PROCEDURE — 20000000 HC ICU ROOM

## 2024-08-14 PROCEDURE — 99223 1ST HOSP IP/OBS HIGH 75: CPT | Mod: ,,, | Performed by: STUDENT IN AN ORGANIZED HEALTH CARE EDUCATION/TRAINING PROGRAM

## 2024-08-14 PROCEDURE — 83735 ASSAY OF MAGNESIUM: CPT | Performed by: PHYSICIAN ASSISTANT

## 2024-08-14 PROCEDURE — 63600175 PHARM REV CODE 636 W HCPCS

## 2024-08-14 PROCEDURE — 80053 COMPREHEN METABOLIC PANEL: CPT | Performed by: STUDENT IN AN ORGANIZED HEALTH CARE EDUCATION/TRAINING PROGRAM

## 2024-08-14 PROCEDURE — 83735 ASSAY OF MAGNESIUM: CPT | Mod: 91 | Performed by: THORACIC SURGERY (CARDIOTHORACIC VASCULAR SURGERY)

## 2024-08-14 PROCEDURE — 63600175 PHARM REV CODE 636 W HCPCS: Performed by: PHYSICIAN ASSISTANT

## 2024-08-14 PROCEDURE — 99900035 HC TECH TIME PER 15 MIN (STAT)

## 2024-08-14 PROCEDURE — 84100 ASSAY OF PHOSPHORUS: CPT | Mod: 91 | Performed by: THORACIC SURGERY (CARDIOTHORACIC VASCULAR SURGERY)

## 2024-08-14 PROCEDURE — 25000003 PHARM REV CODE 250: Performed by: PHYSICIAN ASSISTANT

## 2024-08-14 PROCEDURE — 94761 N-INVAS EAR/PLS OXIMETRY MLT: CPT

## 2024-08-14 PROCEDURE — 97530 THERAPEUTIC ACTIVITIES: CPT

## 2024-08-14 PROCEDURE — 99232 SBSQ HOSP IP/OBS MODERATE 35: CPT | Mod: ,,, | Performed by: ANESTHESIOLOGY

## 2024-08-14 PROCEDURE — 97110 THERAPEUTIC EXERCISES: CPT

## 2024-08-14 PROCEDURE — 99233 SBSQ HOSP IP/OBS HIGH 50: CPT | Mod: ,,, | Performed by: STUDENT IN AN ORGANIZED HEALTH CARE EDUCATION/TRAINING PROGRAM

## 2024-08-14 PROCEDURE — 85730 THROMBOPLASTIN TIME PARTIAL: CPT | Mod: 91 | Performed by: THORACIC SURGERY (CARDIOTHORACIC VASCULAR SURGERY)

## 2024-08-14 PROCEDURE — 97535 SELF CARE MNGMENT TRAINING: CPT

## 2024-08-14 PROCEDURE — 84100 ASSAY OF PHOSPHORUS: CPT | Performed by: PHYSICIAN ASSISTANT

## 2024-08-14 PROCEDURE — 94760 N-INVAS EAR/PLS OXIMETRY 1: CPT

## 2024-08-14 PROCEDURE — 99232 SBSQ HOSP IP/OBS MODERATE 35: CPT | Mod: ,,,

## 2024-08-14 PROCEDURE — 25000003 PHARM REV CODE 250: Performed by: STUDENT IN AN ORGANIZED HEALTH CARE EDUCATION/TRAINING PROGRAM

## 2024-08-14 PROCEDURE — 84132 ASSAY OF SERUM POTASSIUM: CPT | Mod: 91 | Performed by: PHYSICIAN ASSISTANT

## 2024-08-14 PROCEDURE — 85610 PROTHROMBIN TIME: CPT | Performed by: PHYSICIAN ASSISTANT

## 2024-08-14 RX ORDER — FAMOTIDINE 20 MG/1
20 TABLET, FILM COATED ORAL 2 TIMES DAILY
Status: DISCONTINUED | OUTPATIENT
Start: 2024-08-14 | End: 2024-08-23 | Stop reason: HOSPADM

## 2024-08-14 RX ORDER — FUROSEMIDE 10 MG/ML
40 INJECTION INTRAMUSCULAR; INTRAVENOUS EVERY 8 HOURS
Status: DISCONTINUED | OUTPATIENT
Start: 2024-08-14 | End: 2024-08-14

## 2024-08-14 RX ORDER — HEPARIN SODIUM 10000 [USP'U]/100ML
1000 INJECTION, SOLUTION INTRAVENOUS CONTINUOUS
Status: DISCONTINUED | OUTPATIENT
Start: 2024-08-14 | End: 2024-08-15

## 2024-08-14 RX ORDER — FUROSEMIDE 10 MG/ML
20 INJECTION INTRAMUSCULAR; INTRAVENOUS 2 TIMES DAILY
Status: DISCONTINUED | OUTPATIENT
Start: 2024-08-14 | End: 2024-08-22

## 2024-08-14 RX ORDER — FUROSEMIDE 10 MG/ML
20 INJECTION INTRAMUSCULAR; INTRAVENOUS 2 TIMES DAILY
Status: DISCONTINUED | OUTPATIENT
Start: 2024-08-14 | End: 2024-08-14

## 2024-08-14 RX ORDER — FUROSEMIDE 10 MG/ML
40 INJECTION INTRAMUSCULAR; INTRAVENOUS 2 TIMES DAILY
Status: DISCONTINUED | OUTPATIENT
Start: 2024-08-14 | End: 2024-08-14

## 2024-08-14 RX ADMIN — DOCUSATE SODIUM 100 MG: 100 CAPSULE, LIQUID FILLED ORAL at 09:08

## 2024-08-14 RX ADMIN — INSULIN ASPART 1 UNITS: 100 INJECTION, SOLUTION INTRAVENOUS; SUBCUTANEOUS at 08:08

## 2024-08-14 RX ADMIN — MUPIROCIN 1 G: 20 OINTMENT TOPICAL at 09:08

## 2024-08-14 RX ADMIN — MIRTAZAPINE 15 MG: 15 TABLET, ORALLY DISINTEGRATING ORAL at 08:08

## 2024-08-14 RX ADMIN — DOCUSATE SODIUM 100 MG: 100 CAPSULE, LIQUID FILLED ORAL at 08:08

## 2024-08-14 RX ADMIN — OXYCODONE 5 MG: 5 TABLET ORAL at 06:08

## 2024-08-14 RX ADMIN — ACETAMINOPHEN 1000 MG: 500 TABLET ORAL at 02:08

## 2024-08-14 RX ADMIN — FAMOTIDINE 20 MG: 20 TABLET ORAL at 08:08

## 2024-08-14 RX ADMIN — FAMOTIDINE 20 MG: 20 TABLET ORAL at 09:08

## 2024-08-14 RX ADMIN — POLYETHYLENE GLYCOL 3350 17 G: 17 POWDER, FOR SOLUTION ORAL at 09:08

## 2024-08-14 RX ADMIN — POTASSIUM CHLORIDE 40 MEQ: 29.8 INJECTION, SOLUTION INTRAVENOUS at 09:08

## 2024-08-14 RX ADMIN — POTASSIUM CHLORIDE 20 MEQ: 200 INJECTION, SOLUTION INTRAVENOUS at 05:08

## 2024-08-14 RX ADMIN — ACETAMINOPHEN 1000 MG: 500 TABLET ORAL at 09:08

## 2024-08-14 RX ADMIN — SODIUM PHOSPHATE, MONOBASIC, MONOHYDRATE AND SODIUM PHOSPHATE, DIBASIC, ANHYDROUS 15 MMOL: 142; 276 INJECTION, SOLUTION INTRAVENOUS at 07:08

## 2024-08-14 RX ADMIN — HYDROMORPHONE HYDROCHLORIDE 0.5 MG: 0.5 INJECTION, SOLUTION INTRAMUSCULAR; INTRAVENOUS; SUBCUTANEOUS at 06:08

## 2024-08-14 RX ADMIN — CEFAZOLIN 2 G: 2 INJECTION, POWDER, FOR SOLUTION INTRAMUSCULAR; INTRAVENOUS at 03:08

## 2024-08-14 RX ADMIN — MUPIROCIN 1 G: 20 OINTMENT TOPICAL at 08:08

## 2024-08-14 RX ADMIN — DAPTOMYCIN 700 MG: 350 INJECTION, POWDER, LYOPHILIZED, FOR SOLUTION INTRAVENOUS at 12:08

## 2024-08-14 RX ADMIN — ACETAMINOPHEN 1000 MG: 500 TABLET ORAL at 06:08

## 2024-08-14 RX ADMIN — WARFARIN SODIUM 5 MG: 5 TABLET ORAL at 05:08

## 2024-08-14 RX ADMIN — FUROSEMIDE 20 MG: 10 INJECTION, SOLUTION INTRAVENOUS at 06:08

## 2024-08-14 RX ADMIN — FUROSEMIDE 40 MG: 10 INJECTION, SOLUTION INTRAVENOUS at 09:08

## 2024-08-14 RX ADMIN — ASPIRIN 81 MG: 81 TABLET, COATED ORAL at 09:08

## 2024-08-14 RX ADMIN — HEPARIN SODIUM 1000 UNITS/HR: 10000 INJECTION, SOLUTION INTRAVENOUS at 05:08

## 2024-08-14 NOTE — CONSULTS
[facility]  Cardiac Electrophysiology  Consult Note    Admission Date: 8/12/2024  Code Status: Prior   Attending Provider: Manuel Beal MD  Consulting Provider: Bianca Henry MD  Principal Problem:Severe mitral regurgitation    Inpatient consult to Electrophysiology  Consult performed by: Bianca Henry MD  Consult ordered by: Luca Spain MD  Reason for consult: Bradycardia        Subjective:     Chief Complaint:  Bradycardia    HPI:   Lorenzo Nino is a 48 y.o. 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 S/p MV replacement with mechanical valve 8/12/24. EP consulted or bradycardia.     Patient s/p mechanical MV replacement on 8/12/24 following valvular destruction after previous MV repair in the s/o active MRSA endocarditis. In the procedure, ventricular pacing wires placed, rate set at 80 bpm. Following the procedure, team has noted bradycardia with rate 30 when pacing suddenly terminated while checking for underlying rhythm.     Patient reports feeling well overall. He denies pre-syncope, syncope, chest pain, palpitations, SOB at rest, HALL, RICHELLE, orthopnea. He has a new diagnosis of afib/flutter that developed in the s/o endocarditis/valvular disease, denies sxs currently.     ECG V paced, rate 80. Baseline ECG (8/8) sinus bradycardia, rate 58.     Past Medical History:   Diagnosis Date    Hypertension        Past Surgical History:   Procedure Laterality Date    COLONOSCOPY N/A 1/5/2024    Procedure: COLONOSCOPY;  Surgeon: Fadia Ellsworth MD;  Location: [facility] ([address] [place] [address]);  Service: Endoscopy;  Laterality: N/A;    COLONOSCOPY, WITH DIRECTED SUBMUCOSAL INJECTION  2/27/2024    Procedure: COLONOSCOPY, WITH DIRECTED SUBMUCOSAL INJECTION;  Surgeon: Mayur Dunn MD;  Location: [facility] OR [address] [place] [address];  Service: Colon and Rectal;;    COLONOSCOPY, WITH  POLYPECTOMY USING SNARE N/A 2/27/2024    Procedure: COLONOSCOPY, WITH POLYPECTOMY USING SNARE;  Surgeon: Mayur Dunn MD;  [facility];  Service: Colon and Rectal;  Laterality: N/A;    ECHOCARDIOGRAM,TRANSESOPHAGEAL N/A 12/26/2023    Procedure: Transesophageal echo (NEW) intra-procedure log documentation;  Surgeon: David[facility];  Location: [facility] EP LAB;  Service: Cardiology;  Laterality: N/A;    ESOPHAGOGASTRODUODENOSCOPY N/A 1/5/2024    Procedure: EGD (ESOPHAGOGASTRODUODENOSCOPY);  Surgeon: Fadia Ellsworth MD;  Location: [facility] ENDO (2ND FLR);  Service: Endoscopy;  Laterality: N/A;    EXCLUSION, LEFT ATRIAL APPENDAGE, OPEN, AS PART OF OPEN CHEST SURGERY Left 11/3/2023    Procedure: EXCLUSION, LEFT ATRIAL APPENDAGE, OPEN, AS PART OF OPEN CHEST SURGERY;  Surgeon: Manuel Beal MD;  [facility];  Service: Cardiothoracic;  Laterality: Left;    INSERTION OF INTRA-AORTIC BALLOON ASSIST DEVICE Right 11/2/2023    Procedure: INSERTION, INTRA-AORTIC BALLOON PUMP;  Surgeon: Jose Vidal MD;  Location: [facility] CATH LAB;  Service: Cardiology;  Laterality: Right;    MITRAL VALVE REPLACEMENT N/A 8/12/2024    Procedure: REPLACEMENT, MITRAL VALVE;  Surgeon: Manuel Beal MD;  [facility];  Service: Cardiothoracic;  Laterality: N/A;    REPAIR, MITRAL VALVE, OPEN N/A 11/3/2023    Procedure: REPAIR, MITRAL VALVE, OPEN;  Surgeon: Manuel Beal MD;  Location: [facility] OR [address] [place] [address];  Service: Cardiothoracic;  Laterality: N/A;    STERNOTOMY N/A 8/12/2024    Procedure: REDO STERNOTOMY;  Surgeon: Manuel Beal MD;  [facility];  Service: Cardiothoracic;  Laterality: N/A;       Review of patient's allergies indicates:  No Known Allergies    Current Facility-Administered Medications on File Prior to Encounter   Medication    0.9%  NaCl infusion    mupirocin 2 % ointment     Current Outpatient Medications on File Prior to Encounter    Medication Sig    doxycycline (VIBRAMYCIN) 100 MG Cap Take 1 capsule by mouth twice daily    metoprolol succinate (TOPROL-XL) 25 MG 24 hr tablet Take 1 tablet (25 mg total) by mouth once daily.    mirtazapine (REMERON SOL-TAB) 15 MG disintegrating tablet Dissolve 1 tablet (15 mg total) by mouth nightly.    potassium chloride SA (K-DUR,KLOR-CON) 20 MEQ tablet Take 2 tablets (40 mEq total) by mouth once daily.    torsemide (DEMADEX) 10 MG Tab Take 1 tablet (10 mg total) by mouth once daily.    amiodarone (PACERONE) 200 MG Tab Take 1 tablet (200 mg total) by mouth once daily.    apixaban (ELIQUIS) 5 mg Tab Take 1 tablet (5 mg total) by mouth 2 (two) times daily. (Patient not taking: Reported on 8/8/2024)    dapagliflozin propanediol (FARXIGA) 10 mg tablet Take 1 tablet (10 mg total) by mouth once daily.     Family History    None       Tobacco Use    Smoking status: Unknown     Passive exposure: Never    Smokeless tobacco: Not on file   Substance and Sexual Activity    Alcohol use: Yes     Alcohol/week: 1.0 standard drink of alcohol     Types: 1 Cans of beer per week    Drug use: Never    Sexual activity: Not Currently     Partners: Female     Birth control/protection: None     Review of Systems   Constitutional: Negative for chills and fever.   Cardiovascular:  Negative for chest pain, dyspnea on exertion, irregular heartbeat, leg swelling, near-syncope, orthopnea, palpitations and syncope.   Respiratory:  Negative for shortness of breath.    Gastrointestinal:  Negative for abdominal pain.   Genitourinary:  Negative for dysuria.   Neurological:  Negative for dizziness and light-headedness.     Objective:     Vital Signs (Most Recent):  Temp: 97.8 °F (36.6 °C) (08/14/24 1501)  Pulse: 79 (08/14/24 1530)  Resp: (!) 25 (08/14/24 1530)  BP: (!) 105/57 (08/14/24 1501)  SpO2: 96 % (08/14/24 1530) Vital Signs (24h Range):  Temp:  [97.8 °F (36.6 °C)-99.6 °F (37.6 °C)] 97.8 °F (36.6 °C)  Pulse:  [79-81] 79  Resp:  [11-31]  25  SpO2:  [92 %-99 %] 96 %  BP: ()/(53-79) 105/57  Arterial Line BP: ()/(42-70) 95/46       Weight: 86.5 kg (190 lb 11.2 oz)  Body mass index is 25.86 kg/m².    SpO2: 96 %        Physical Exam  Constitutional:       Appearance: Normal appearance.   HENT:      Mouth/Throat:      Mouth: Mucous membranes are moist.   Eyes:      Extraocular Movements: Extraocular movements intact.      Conjunctiva/sclera: Conjunctivae normal.   Cardiovascular:      Rate and Rhythm: Normal rate and regular rhythm.      Pulses: Normal pulses.      Comments: Mechanical mitral valve  Pulmonary:      Effort: Pulmonary effort is normal.   Abdominal:      General: Abdomen is flat.      Palpations: Abdomen is soft.   Musculoskeletal:      Cervical back: Normal range of motion.      Right lower leg: No edema.      Left lower leg: No edema.   Skin:     General: Skin is warm and dry.   Neurological:      General: No focal deficit present.      Mental Status: He is alert and oriented to person, place, and time.            Significant Labs: CMP:   Recent Labs   Lab 08/13/24  0306 08/13/24  0833 08/14/24  0255 08/14/24  0821 08/14/24  1421   *  --  143  --  142   K 3.8   < > 4.4 4.1 3.7   *  --  110  --  107   CO2 19*  --  25  --  26   *  --  101  --  140*   BUN 12  --  13  --  14   CREATININE 1.4  --  1.2  --  1.3   CALCIUM 8.8  --  8.6*  --  8.5*   PROT 5.5*  --  5.6*  --   --    ALBUMIN 3.7  --  3.3*  --   --    BILITOT 0.6  --  0.6  --   --    ALKPHOS 36*  --  43*  --   --    AST 67*  --  70*  --   --    ALT 23  --  22  --   --    ANIONGAP 15  --  8  --  9    < > = values in this interval not displayed.   , CBC:   Recent Labs   Lab 08/13/24  0306 08/13/24  0329 08/14/24  0255   WBC 11.70  --  15.42*   HGB 6.8*  --  8.0*   HCT 22.7*   < > 25.7*   PLT 86*  --  89*    < > = values in this interval not displayed.   , and INR:   Recent Labs   Lab 08/13/24  0306 08/14/24  0255   INR 1.1 1.2       Significant Imaging:  Echocardiogram: Transthoracic echo (TTE) complete (Cupid Only):   Results for orders placed or performed during the hospital encounter of 07/05/24   Echo   Result Value Ref Range    BSA 1.98 m2    Est. RA pres 3 mmHg    LVOT stroke volume 71.37 cm3    LVIDd 6.28 (A) 3.5 - 6.0 cm    LV Systolic Volume 62.66 mL    LV Systolic Volume Index 31.5 mL/m2    LVIDs 3.82 2.1 - 4.0 cm    LV Diastolic Volume 200.07 mL    LV Diastolic Volume Index 100.54 mL/m2    IVS 0.88 0.6 - 1.1 cm    LVOT diameter 2.18 cm    LVOT area 3.7 cm2    FS 39 28 - 44 %    Left Ventricle Relative Wall Thickness 0.20 cm    Posterior Wall 0.64 0.6 - 1.1 cm    LV mass 189.36 g    LV Mass Index 95 g/m2    MV Peak E Jabier 1.51 m/s    TDI LATERAL 0.16 m/s    TDI SEPTAL 0.07 m/s    E/E' ratio 13.13 m/s    MV Peak A Jabier 0.41 m/s    TR Max Jabier 3.56 m/s    E/A ratio 3.68     E wave deceleration time 534.74 msec    LV SEPTAL E/E' RATIO 21.57 m/s    LA Volume Index 76.5 mL/m2    LV LATERAL E/E' RATIO 9.44 m/s    LA volume 152.28 cm3    LVOT peak jabier 0.76 m/s    RV- molina basal diam 3.9 cm    TAPSE 1.75 cm    LA size 5.35 cm    Left Atrium Minor Axis 6.01 cm    Left Atrium Major Axis 5.95 cm    LA volume (mod) 148.03 cm3    LA WIDTH 5.60 cm    LA Volume Index (Mod) 74.4 mL/m2    RA Major Axis 5.17 cm    RA Width 3.60 cm    AV mean gradient 4 mmHg    AV peak gradient 6 mmHg    Ao peak jabier 1.22 m/s    Ao VTI 30.05 cm    LVOT peak VTI 19.13 cm    AV valve area 2.37 cm²    AV Velocity Ratio 0.62     AV index (prosthetic) 0.64     FATUMA by Velocity Ratio 2.32 cm²    Mr max jabier 0.05 m/s    MV stenosis pressure 1/2 time 155.07 ms    MV valve area p 1/2 method 1.42 cm2    TV resting pulmonary artery pressure 54 mmHg    RV TB RVSP 7 mmHg    Triscuspid Valve Regurgitation Peak Gradient 51 mmHg    Sinus 3.47 cm    STJ 2.51 cm    Ascending aorta 3.55 cm    Mean e' 0.12 m/s    ZLVIDS 0.59     ZLVIDD 0.84     Narrative    Images from the original result were not included.      Left  Ventricle: The left ventricle is moderately dilated. Normal wall   thickness. Normal wall motion. There is normal systolic function with a   visually estimated ejection fraction of 55 - 60%. Diastolic function   cannot be reliably determined in the presence of mitral valve disease and   mitral valve ring/replacement.    Right Ventricle: Normal right ventricular cavity size. Wall thickness   is normal. Right ventricle wall motion  is normal. Systolic function is   normal.    Left Atrium: Left atrium is severely dilated.    Right Atrium: Normal right atrial size.    Mitral Valve: The mitral valve is repaired by annular ring 40 mm   [employer]. There is mild bileaflet sclerosis. There is severe   regurgitation with a posterolateral eccentriccally directed and a central    jet. There appears to be a perforation in Anterior leaflets (clip #96 to   96- see below).Torn tissue is seen in this area in LA but its proximal   attacment is not well seen. Consider NEW if clinically indicated. Mean   gradient is 2.5 mmHg at HR o 53 bpm.    Tricuspid Valve: There is mild to moderate regurgitation with a   centrally directed jet.    Pulmonic Valve: There is mild regurgitation.    Aorta: Aortic root is normal in size measuring 3.47 cm. Ascending aorta   is normal measuring 3.55 cm.    Pulmonary Artery: There is moderate pulmonary hypertension. The   estimated pulmonary artery systolic pressure is 54 mmHg.    IVC/SVC: Normal venous pressure at 3 mmHg.    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   2)  Resection of left atrial appendage                  Assessment and Plan:     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  Patient with h/o MRSA mitral valve endocarditis, s/p mechanical mitral valve replacement 8/12/24. Ventricular pacing  wires placed in the procedure. EP consulted after team noted bradycardia with rate 30 when pacing was stopped. This is however an appropriate physiologic response when overdrive pacing at 80 bpm. By overdrive pacing, the sinus node is 'asleep' and not actively pacing, as the pacer has taken over at a higher rate. When pacing is suddenly terminated, it takes time for the sinus node to respond, as moments before it was not in use. Therefore, pacing should not be suddenly terminated from a high rate, one that far exceeds the patient's intrinsic rate.     Recommendations:  - Recommend slowly weaning the pacer, allowing the patient's intrinsic rhythm to come through, can start at 60 bpm and go down from there  - Tele  - K>4, Mg>2    EP will sign off, please call with additional questions or concerns.      Bianca Henry MD  Cardiac Electrophysiology  [facility]

## 2024-08-14 NOTE — PROGRESS NOTES
[facility]  Infectious Disease  Progress Note    Patient Name: Lorenzo Nino  MRN: [id number]  Admission Date: 8/12/2024  Length of Stay: 2 days  Attending Physician: Manuel Beal MD  Primary Care Provider: No, Primary Doctor    Isolation Status: No active isolations  Assessment/Plan:      Cardiac/Vascular  Endocarditis  Recommendations:  - History of MRSA endocarditis s/p eight week course of Daptomycin IV and prophylactic Doxycycline  - S/p MVR on 08/12/2024  - Continue Daptomycin IV 10 mcg/kg daily  - Monitor CK and CMP while on Daptomycin  - Mitral Anular Band culture NGTD, can discontinue antibiotics if final results are negative        Anticipated Disposition: TBD    Thank you for your consult. I will follow-up with patient. Please contact us if you have any additional questions.    Nuvia Wu MD - PGY4  Infectious Disease  [facility]    Subjective:     Principal Problem:Severe mitral regurgitation    HPI: 48 year old male with PMH MRSA endocarditis of mitral valve s/p mitral valve repair on 11/3/23 by Dr. Beal, embolus to R eye (treated with dapto/ceftaroline), HFmrEF (45-50%) presented due to CHF exacerbation symptoms. He was also found to have atrial fibrillation s/p cardioversion and started on anticoagulation. Had resternotomy and mechanical MVR done on 08/12/2024. Has completed eight week course of Daptomycin and prophylactic Doxycycline since 11/2023. Infectious disease consulted for further management.  Interval History/Significant Events: Remained intubated overnight. Follows commands and moves all 4 off sedation. Levo weaned off with lactate appropriately downtrending. Received 1u pRBC for HgB 6.8. Remains on epi 0.04, precedex at 0.2. Pending weaning parameters and extubation this AM.    Follow-up For: Procedure(s) (LRB):  REPLACEMENT, MITRAL VALVE (N/A)  REDO STERNOTOMY (N/A)    Post-Operative Day: 1 Day Post-Op    Objective:     Vital  Signs (Most Recent):  Temp: 98.4 °F (36.9 °C) (08/14/24 1101)  Pulse: 79 (08/14/24 1200)  Resp: (!) 26 (08/14/24 1200)  BP: 105/60 (08/14/24 1200)  SpO2: 96 % (08/14/24 1200) Vital Signs (24h Range):  Temp:  [98.4 °F (36.9 °C)-99.6 °F (37.6 °C)] 98.4 °F (36.9 °C)  Pulse:  [79-97] 79  Resp:  [11-31] 26  SpO2:  [92 %-99 %] 96 %  BP: ()/(53-79) 105/60  Arterial Line BP: ()/(42-70) 96/48     Weight: 86.5 kg (190 lb 11.2 oz)  Body mass index is 25.86 kg/m².      Intake/Output Summary (Last 24 hours) at 8/14/2024 1249  Last data filed at 8/14/2024 1201  Gross per 24 hour   Intake 475.59 ml   Output 3035 ml   Net -2559.41 ml          Physical Exam  Constitutional:       Appearance: Normal appearance. He is not ill-appearing.   HENT:      Mouth/Throat:      Comments: OGT  Neck:      Comments: RIJ quad lumen CVC, trialysis cath  Cardiovascular:      Pulses: Normal pulses.      Comments: Midline sternotomy c/d/i        Pulmonary:      Effort: Pulmonary effort is normal.   Abdominal:      General: There is no distension.      Palpations: Abdomen is soft.      Tenderness: There is no abdominal tenderness.   Genitourinary:     Comments: Watson catheter in place draining clear yellow urine    Musculoskeletal:      Right lower leg: No edema.      Left lower leg: No edema.   Skin:     General: Skin is warm and dry.   Neurological:      General: No focal deficit present.            Vents:  Vent Mode: Spont (08/13/24 0715)  Ventilator Initiated: Yes (08/12/24 1507)  Set Rate: 16 BPM (08/13/24 0529)  Vt Set: 500 mL (08/13/24 0529)  Pressure Support: 10 cmH20 (08/13/24 0715)  PEEP/CPAP: 5 cmH20 (08/13/24 0715)  Oxygen Concentration (%): 40 (08/13/24 0715)  Peak Airway Pressure: 15 cmH20 (08/13/24 0715)  Plateau Pressure: 0 cmH20 (08/13/24 0715)  Total Ve: 9.35 L/m (08/13/24 0715)  Negative Inspiratory Force (cm H2O): 0 (08/13/24 0715)  F/VT Ratio<105 (RSBI): (!) 49.18 (08/13/24 0715)    Lines/Drains/Airways       Central  Venous Catheter Line  Duration             Percutaneous Central Line - Quad Lumen  08/12/24 0806 Internal Jugular Right 2 days    Trialysis (Dialysis) Catheter 08/12/24 0900 right internal jugular 2 days              Drain  Duration                  Urethral Catheter 08/12/24 0719 Silicone;Non-latex;Temperature probe 16 Fr. 2 days         Y Chest Tube 1 and 2 08/12/24 0931 1 Right Mediastinal 19 Fr. 2 Left Mediastinal 19 Fr. 2 days         Y Chest Tube 3 and 4 08/12/24 1245 3 Right Pleural 19 Fr. 4 Left Pleural 19 Fr. 2 days              Arterial Line  Duration             Arterial Line 08/12/24 0702 Left Radial 2 days              Line  Duration                  Pacer Wires 08/12/24 0931 2 days              Peripheral Intravenous Line  Duration                  Peripheral IV - Single Lumen 08/12/24 0618 18 G Left;Posterior Forearm 2 days         Peripheral IV - Single Lumen 08/12/24 0740 16 G 1 1/4 in Right;Posterior Forearm 2 days                    Significant Labs:    CBC/Anemia Profile:  Recent Labs   Lab 08/12/24  1510 08/12/24  1511 08/13/24 0306 08/13/24 0329 08/13/24  0529 08/13/24  0723 08/14/24 0255   WBC 22.98*  --  11.70  --   --   --  15.42*   HGB 8.5*  --  6.8*  --   --   --  8.0*   HCT 29.6*   < > 22.7*   < > 24* 25* 25.7*   PLT 98*  --  86*  --   --   --  89*   MCV 78*  --  78*  --   --   --  77*   RDW 18.7*  --  18.6*  --   --   --  19.1*    < > = values in this interval not displayed.        Chemistries:  Recent Labs   Lab 08/12/24  1510 08/12/24 2017 08/13/24  0306 08/13/24  0833 08/13/24 2007 08/14/24  0255 08/14/24  0821     --  146*  --   --  143  --    K 4.4   < > 3.8   < > 4.6 4.4 4.1   *  --  112*  --   --  110  --    CO2 19*  --  19*  --   --  25  --    BUN 15  --  12  --   --  13  --    CREATININE 1.5*  --  1.4  --   --  1.2  --    CALCIUM 8.6*  --  8.8  --   --  8.6*  --    ALBUMIN 2.2*  --  3.7  --   --  3.3*  --    PROT 4.6*  --  5.5*  --   --  5.6*  --    BILITOT 0.5   --  0.6  --   --  0.6  --    ALKPHOS 49*  --  36*  --   --  43*  --    ALT 29  --  23  --   --  22  --    AST 55*  --  67*  --   --  70*  --    MG 2.8*  --  2.4  --   --  2.4  --    PHOS 2.9  --  2.1*  --   --  3.1  --     < > = values in this interval not displayed.       Significant Imaging:  I have reviewed all pertinent imaging results/findings within the past 24 hours.  Review of Systems   All other systems reviewed and are negative.

## 2024-08-14 NOTE — NURSING
SICU PLAN OF CARE    Dx: Severe mitral regurgitation    Goals of Care: MAP 60-80    Vital Signs (last 12 hours):   Temp:  [98.4 °F (36.9 °C)-99.6 °F (37.6 °C)]   Pulse:  [79-81]   Resp:  [11-30]   BP: (110-135)/(58-72)   SpO2:  [92 %-99 %]   Arterial Line BP: ()/(45-67)      Neuro: AAOx4, Follows commands , and Moves all extremities spontaneously     Cardiac: V paced at 80, sens 2, 10 jolie amps, underlying junctional in 30s    Respiratory:  1L Nasal Cannula     Gtts: Heparin    Urine Output: Urethral Catheter  1,215 mL/shift      Drains: Chest Tube, total output 70mL/shift    Diet: Cardiac        Labs/Accuchecks: BG ACHS, Daily Labs,     Skin: Patient turned q2h, bony prominences protected, and mattress inflated/working correctly.   Dilip Score: 17. If Dilip Score is 16 or less, complete 4EYES note each shift.    Shift Events:  no acute onset events, out of bed to chair this AM.  See flowsheet for further assessment/details.  Family updated on current condition/plan of care, questions answered, and emotional support provided.  MD updated on current condition, vitals, labs, and gtts.        Nurses Note -- 4 Eyes  8/14/2024   6:24 AM      Skin assessed during: Q Shift      [x] No Altered Skin Integrity Present    []Prevention Measures Documented      [] Yes- Altered Skin Integrity Present or Discovered   [] LDA Added if Not in Epic (Describe Wound)   [] New Altered Skin Integrity was Present on Admit and Documented in LDA   [] Wound Image Taken    Wound Care Consulted? No    Attending Nurse:  Gomez Marinelli RN/Staff Member:  Osmin

## 2024-08-14 NOTE — OP NOTE
DATE OF PROCEDURE:  8/12/2024     ATTENDING SURGEON:  Manuel Beal M.D.    ASSISTANT: Tiago Arevalo DO, (Cardiothoracic Resident)     PREOPERATIVE DIAGNOSES:  1.  Severe mitral regurgitation  2.  Previous mitral valve repair  3.  Previous endocarditis     POSTOPERATIVE DIAGNOSES:  1.  Severe mitral regurgitation  2.  Previous mitral valve repair  3.  Previous endocarditis     OPERATION PERFORMED:      1)  Mitral valve replacement with a 33 mm [employer] standard mechanical prosthesis.    2)  Redo sternotomy     ANESTHESIA:  General endotracheal.     ESTIMATED BLOOD LOSS:  100 mL.     BRIEF HISTORY:  Mr. Nino is a 48-year-old gentleman who initially presented with endocarditis in late 2023.  Medical management was attempted but failed and he developed acute severe mitral regurgitation and required intubation.  He underwent urgent mitral valve repair.  He was actively infected at the time.  He recovered from the operation, but it took several weeks with the infection to clear.  He developed recurrent mitral regurgitation.  He has improved since his hospitalization, although is symptomatic for his mitral regurgitation.  He now presents for mitral valve replacement.      PROCEDURE IN DETAIL:  After obtaining informed and written consent, the patient   was brought to the Operating Room and placed on the operating table in supine   position.  After induction of adequate general endotracheal anesthesia, the   patient's chest, abdomen, pelvis and bilateral lower extremities were prepped   and draped in the usual sterile fashion. A wire was placed in the right common femoral artery using Seldinger technique. Its position in the descending aorta was confirmed using NEW. We then turned our attention to the chest, where the patient's previous upper midline   skin incision was reopened.  Dissection was carried down to the level of the   sternum.  The wires were divided, but not removed.  The oscillating saw was  then   used to divide the anterior table of the sternum.  The wires were removed, and   the posterior table of the sternum was divided using the straight Stephens scissors.    Once the sternum had been safely traversed, the mammary retractor was placed   and the retrosternal adhesions were taken down first on the left and then on the   right using the electrocautery device.  The sternal retractor was then placed,   and we began the intrapericardial portion of the dissection.  Not surprisingly, the   adhesions were at least moderate.  We began at the diaphragmatic surface of the   heart, and carefully worked our way down to the inferior vena cava.  We then   proceeded along the right atrial free wall.  The right atrial free wall was   fairly densely adherent to the pericardium.  Eventually, we were able to work our way to  the superior vena cava.  We   were able to expose the anterior surface of the superior vena cava.  We were able to expose the inferior vena cava.  We then   turned our attention to the aorta.  We were able to dissect distally   until we reached the origin of the innominate artery.  At this time, we had   adequate exposure for cannulation.  The patient was systemically heparinized.     Cannulation sutures were placed in   the aorta and in the superior vena cava.  The aortic cannula was inserted,   followed by the venous.  An IVC cannula was also placed.  An antegrade cardioplegia catheter was placed.  The patient was then put on   cardiopulmonary bypass.  Once on bypass, the aorta was  from the pulmonary artery.  The aortic crossclamp was applied and   the heart was arrested using cold blood enhanced antegrade cardioplegia.  A   prompt electromechanical arrest was achieved.  While the cardioplegia was being   administered, circumferential control was obtained over the superior vena cava and the inferior vena cava.    Once the cardioplegia was all in, a right atriotomy was made.  The coronary  sinus catheter was inserted under direct vision and secured using a 4-0 Prolene suture pursestring.  The fossa ovalis was identified and incised.  The incision was carried out onto the dome of the left atrium.  The Bolaños  retractor was used for exposure.  The mitral valve was evaluated.  The findings were consistent with healed endocarditis.  There was a perforation of the anterior leaflet.  There was tissue loss in the P2 segment of the posterior leaflet.  There was partial dehiscence of the bovine pericardial annuloplasty band.  The annuloplasty band an annular sutures were removed.  The band was sent for culture. The anterior leaflet was partially resected and used for exposure as   multiple pledgeted 2-0 Ethibond sutures were placed along the anterior annulus.    The anterior leaflet was eventually resected in its entirety.      We were able   to preserve the majority of the posterior leaflet as we completed circumferential   coverage of the mitral orifice using pledgeted 2-0 Ethibond sutures.  Once the   sutures were all in, the annulus was sized and a 33 mm valve was selected.  The   valve was brought up, the sutures were passed through the sewing ring and it was   slid into position.  It seated nicely.  The sutures were tied and then cut.    The interatrial septum was then closed in a single layer using running 4-0 Prolene   suture.  The dome of the left atrium was closed in a single layer using running 4-0 Prolene suture.  Prior to closing the left atrium, de-airing maneuvers were performed.    Once the left atrium was closed, the hot shot was given retrograde.  Additional   de-airing maneuvers were performed and the aortic cross-clamp was removed.  We then completed closure of the right atrial free wall using a running 4-0 Prolene suture.  The   patient was subsequently weaned from cardiopulmonary bypass.  The patient did   separate easily from bypass.  Once off bypass, all surgical sites were   inspected.   There was good hemostasis.  The valve was evaluated using NEW and   appeared to be functioning properly.  The test dose of protamine was   administered and this was well tolerated.  The total dose was then given.    care home through the total dose, all cannulas were removed.  All surgical sites   were again inspected, again there was good hemostasis.  Ventricular pacing wires   were placed.  Drains were placed.  After again confirming adequate hemostasis,   the patient's chest was closed using #6 stainless steel wires to reapproximate   the sternum.  The overlying soft tissues were reapproximated using absorbable   suture material.  The patient's chest was washed and dried and a dry dressing   was applied.  The patient tolerated the procedure well, there were no   complications.  At the conclusion of the case, sponge and instrument counts were   correct.

## 2024-08-14 NOTE — PT/OT/SLP PROGRESS
Physical Therapy Co-Treatment    Patient Name:  Lorenzo Nino   MRN:  [id number]  Admitting Diagnosis:  Severe mitral regurgitation   Recent Surgery: Procedure(s) (LRB):  REPLACEMENT, MITRAL VALVE (N/A)  REDO STERNOTOMY (N/A) 2 Days Post-Op  Admit Date: 8/12/2024  Length of Stay: 2 days    Recommendations:     Discharge Recommendations: Low Intensity Therapy  Discharge Equipment Recommendations: none   Barriers to discharge: None    Appropriate transfer level with nursing staff: in-room ambulation with CGA    Plan:     During this hospitalization, patient to be seen 5 x/week to address the identified rehab impairments via gait training, therapeutic activities, therapeutic exercises, neuromuscular re-education and progress towards the established goals.  Plan of Care Expires:  09/12/24  Plan of Care Reviewed with: patient, family    Assessment     Lorenzo Nino is a 48 y.o. male admitted with a medical diagnosis of Severe mitral regurgitation. Pt tolerated treatment session well today. Session today focused on increasing activity performed as well as assessing cardiopulmonary response to activity to ensure appropriate progression of mobility fro d/c home. He showed good progress in this area as he ambulated an increased distance with appropriate cardiopulmonary response to mobility with appropriate HR and BP response. He was primarily limited by pain in chest tube site on this date. Pt will continue to benefit from skilled PT services during this hospital admit to continue to improve transfer ability and efficiency as well as continue to progress pt's cardiopulmonary endurance to maximize pt's functional independence and return to PLOF.    Problem List: weakness, impaired endurance, impaired self care skills, gait instability, impaired functional mobility, impaired balance, pain, impaired skin, decreased upper extremity function, impaired cardiopulmonary response to activity.  Rehab Prognosis: Good; patient would  benefit from acute skilled PT services to address these deficits and reach maximum level of function.      Goals:   Multidisciplinary Problems       Physical Therapy Goals          Problem: Physical Therapy    Goal Priority Disciplines Outcome Goal Variances Interventions   Physical Therapy Goal     PT, PT/OT Progressing     Description: Goals to be met by: 2024     Patient will increase functional independence with mobility by performin. Sit to stand transfer with Modified [place]  2. Gait  x 250 feet with Supervision using physician approved AD prn.   3. Lower extremity exercise program x30 reps per handout, with independence  4. Recite 3/3 sternal precautions and remain complaint with precautions throughout session with no verbal cues                         Subjective     RN notified prior to session. Mother present upon PT entrance into room. Patient agreeable to PT evaluation.    Chief Complaint: c/o pain in back and chest tube site  Patient/Family Comments/goals: get stronger  Pain/Comfort:  Pain Rating 1: 3/10  Location 1: back  Pain Addressed 1: Distraction, Reposition  Pain Rating Post-Intervention 1: 3/10    Objective:     Additional staff present: OT; Pt also with impaired cardiopulmonary stability requiring two skilled therapists to safely mobilize pt to highest potential as well as ensure medical stability with mobility.i    Patient found up in chair with: telemetry, arterial line, central line, external pacer, ritchie catheter, Trialysis, chest tube   Cognition:   Alert and Cooperative  Patient is oriented to Person, Place, Time, Situation  Command following: Follows multistep verbal commands  Fluency: clear/fluent  General Precautions: Standard, Cardiac fall, sternal   Orthopedic Precautions:N/A   Braces: N/A   Body mass index is 25.86 kg/m².  Oxygen Device: Nasal Cannula 2L  Vitals: BP (!) 105/57   Pulse 79   Temp 97.8 °F (36.6 °C) (Oral)   Resp (!) 25   Ht 6' (1.829 m)   Wt 86.5  kg (190 lb 11.2 oz)   SpO2 96%   BMI 25.86 kg/m²     Outcome Measures:  AM-PAC 6 CLICK MOBILITY  Turning over in bed (including adjusting bedclothes, sheets and blankets)?: 3  Sitting down on and standing up from a chair with arms (e.g., wheelchair, bedside commode, etc.): 3  Moving from lying on back to sitting on the side of the bed?: 3  Moving to and from a bed to a chair (including a wheelchair)?: 3  Need to walk in hospital room?: 3  Climbing 3-5 steps with a railing?: 3  Basic Mobility Total Score: 18     Functional Mobility:    Bed Mobility:   Pt found/returned to bedside chair    Transfers:   Sit <> Stand Transfer: stand by assistance with no AD. [id number] from bedside chair    Standing Balance:  Static Standing Balance: Good- : able to maintain standing balance against minimal resistance  Dynamic Standing Balance: Good- : able to stand independently unsupported and weight shift across midline minimally  Assistance Level Required: Stand-by Assistance  Patient used: no assistive device   Time: 5 minutes  Postural deviations noted: slouched posture and rounded shoulders  Comments: Time in unsupported standing focused on cardiopulmonary tolerance to unsupported standing as well as strength/endurance to perform dynamic balance activity safely. No UE support needed throughout and pt able to complete balance challenges with anterior reaches inside TAY with no LOB while performing standing balance task at sink. PT facilitating appropriate balance response as OT assisted with ADLs.      Gait:   Patient ambulated: 14 ft + standing task  + 32 ft   Patient required: stand by assistance  Patient used:  no assistive device   Gait Pattern observed: reciprocal gait  Gait Deviation(s): decreased step length, wide base of support, decreased weight shift, decreased foot clearance, flexed posture, decreased kayode, and shuffle gait  Impairments due to: pain and decreased endurance  all lines remained intact throughout  ambulation trial  Comments: Patient required cues for upright posture and pacing  to increase independence and safety. Patient required cues ~ 50% of the time. Pt with no overt LOB throughout but SBA for safety provided. Pt's gait trial limited due to chest tube pain as well as increased SOB noted. Despite this all VSS.    Education:  Time provided for education, counseling and discussion of health disposition in regards to patient's current status  All questions answered within PT scope of practice and to patient's satisfaction  PT role in POC to address current functional deficits  Pt educated on proper body mechanics, safety techniques, and energy conservation with PT facilitation and cueing throughout session  Call nursing/pct to transfer to chair/use bathroom. Pt stated understanding.  Whiteboard updated with therapist name and pt's current mobility status documented above  Safe to perform step transfer to/from chair/bedside commode CGA and HHA w/ nursing/PCT present  Importance of OOB tolerance prn hrs/day to improve lung ventilation and expansion as well as strengthen postural musculature  Sternal Precautions: patient able to voice 3/3 precautions without assistance. Patient able to maintain precautions throughout session with min verbal cues.    Patient left up in chair with all lines intact, call button in reach, and RN notified.    Time Tracking:     PT Received On: 08/14/24  PT Start Time: 0914     PT Stop Time: 0930  PT Total Time (min): 16 min     Billable Minutes:   Therapeutic Exercise 16 minutes    Treatment Type: Treatment  PT/PTA: PT       8/14/2024

## 2024-08-14 NOTE — ASSESSMENT & PLAN NOTE
BG goal 110-140 (CTS protocol)  Previous history of pre-diabetes, now resolved.     - Continue Novolog (Insulin Aspart) prn for BG excursions MDC SSI (150/25)  - BG checks AC/HS  - Hypoglycemia protocol in place    ** Please notify Endocrine for any change and/or advance in diet**  ** Please call Endocrine for any BG related issues **    Discharge Planning:   TBD. Please notify endocrinology prior to discharge.

## 2024-08-14 NOTE — ASSESSMENT & PLAN NOTE
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

## 2024-08-14 NOTE — PT/OT/SLP PROGRESS
Occupational Therapy   Treatment    Name: Lorenzo Nino  MRN: [id number]  Admitting Diagnosis:  Severe mitral regurgitation  2 Days Post-Op    Recommendations:     Discharge Recommendations: Low Intensity Therapy  Discharge Equipment Recommendations:  none  Barriers to discharge:  None    Assessment:     Lorenzo Nino is a 48 y.o. male with a medical diagnosis of Severe mitral regurgitation. Performance deficits affecting function are weakness, impaired endurance, impaired self care skills, impaired functional mobility, gait instability, impaired balance, pain, decreased safety awareness, impaired cardiopulmonary response to activity, impaired skin. Patient cleared for therapy and up in chair upon entering room. Patient ambulated to sink in bathroom for g/h tasks and ambulated in room prior to returning to bedside chair. All VSS throughout session. Nurse aware. Patient would benefit from continued skilled acute OT 5x/wk to improve functional mobility, increase independence with ADLs, and address established goals. Recommending low intensity therapy once medically appropriate for discharge to increase maximal independence, reduce burden of care, and ensure safety.     Rehab Prognosis:  Good; patient would benefit from acute skilled OT services to address these deficits and reach maximum level of function.       Plan:     Patient to be seen 5 x/week to address the above listed problems via self-care/home management, therapeutic activities, therapeutic exercises  Plan of Care Expires: 09/13/24  Plan of Care Reviewed with: patient, family    Subjective     Chief Complaint: back pain and then stomach pain after g/h task at sink  Patient/Family Comments/goals: patient agreed to therapy  Pain/Comfort:  Pain Rating 1: 3/10  Location - Side 1: Bilateral  Location - Orientation 1: generalized  Location 1: back  Pain Addressed 1: Reposition, Distraction  Pain Rating Post-Intervention 1: 3/10  Pain Rating 2:  (patient did  not rate)  Location - Side 2: Bilateral  Location - Orientation 2: generalized  Location 2: abdomen  Pain Addressed 2: Reposition, Distraction  Pain Rating Post-Intervention 2:  (patient did not rate, but indicated some pain after g/h task at sink)    Objective:     Communicated with: NSG prior to session.  Patient found up in chair with telemetry, blood pressure cuff, pulse ox (continuous), external pacer, oxygen, peripheral IV, Trialysis, arterial line, ritchie catheter, chest tube upon OT entry to room.    General Precautions: Standard, fall, sternal    Orthopedic Precautions:N/A  Braces: N/A  Respiratory Status: Nasal cannula, flow 2 L/min     Occupational Performance:     Functional Mobility/Transfers:  Patient completed Sit <> Stand Transfer with stand by assistance  with  no assistive device   Functional Mobility: Patient ambulated bedside chair>sink for g/h tasks with no AD and SBA; patient then ambulated around room for assessing household mobility distance prior to returning to bedside chair with SBA and no AD.     Activities of Daily Living:  Grooming: stand by assistance oral care and washing face standing at sink with SBA for balance also.       [facility] 6 Click ADL: 16    Treatment & Education:  Role of OT and POC  ADL retraining  Functional mobility training  Safety  Importance EOB/OOB activity  Reviewed sternal precautions prior to mobility    Co-treatment performed due to patient's multiple deficits requiring two skilled therapists to appropriately and safely assess patient's strength and endurance while facilitating functional tasks in addition to accommodating for patient's activity tolerance.     Patient left up in chair with all lines intact, call button in reach, and all needs met.     GOALS:   Multidisciplinary Problems       Occupational Therapy Goals          Problem: Occupational Therapy    Goal Priority Disciplines Outcome Interventions   Occupational Therapy Goal     OT, PT/OT Progressing     Description: Goals to be met by: 9/6/2024     Patient will increase functional independence with ADLs by performing:    UE Dressing with Set-up Assistance.  LE Dressing with Supervision.  Grooming while standing at sink with Modified [place].  Toileting from toilet with Modified [place] for hygiene and clothing management.   Supine to sit with Stand-by Assistance.  Stand pivot transfers with Modified [place].  Toilet transfer to toilet with Modified [place].                         Time Tracking:     OT Date of Treatment: 08/14/24  OT Start Time: 0914  OT Stop Time: 0930  OT Total Time (min): 16 min    Billable Minutes:Self Care/Home Management 16               8/14/2024

## 2024-08-14 NOTE — ASSESSMENT & PLAN NOTE
Lorenzo Nino is a 48 y.o. 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 He presents to the SICU s/p resternotomy and mechanical MVR with Dr. Beal on 8/12.      Neuro/Psych:     - Sedation: off    - Pain:     - Scheduled Tylenol 1000mg Q8H   - PRN Oxycodone 5mg/10mg    - Psych: home med mirtazipine 15 qd             Cardiac:     - S/p redo MVR with Dr. Beal    - BP Goal: MAP 60-80    - Inotropes/Pressors: Weaned off    - Anti-HTNs: weaned off cleviprex, will continue to monitor if needed    - Rhythm: paced at 80 (native rhythm junctional 20-30 bpm)   -- Will resume home amiodarone 200mg qd as appropriate   -- Will resume eliquis 5 BID as appropriate, held periop    - Beta Blocker: held in the setting of native bradycardia   -- Home medication toprol XL 25 qd    - Statin: n/a      Pulmonary:     - Goal SpO2 >92%    - Chest Tubes x 4 (2 Meds & 2 Pleural)   - consider removal of meds 8/14     Renal:    - Baseline Cr 1.6-2    - Trend BUN/Cr     - Maintain [place], record strict Is/Os    Recent Labs   Lab 08/12/24  1510 08/13/24  0306 08/14/24  0255   BUN 15 12 13   CREATININE 1.5* 1.4 1.2         FEN / GI:     - Daily CMP, PRN K/Mag/Phos per protocol     - Replace electrolytes as needed    - Nutrition: advanced to cardiac regular diet    - Bowel Regimen: Miralax, docusate      ID:     - Aebrile    - Abx:   - ID consulted, appreciate recs. Daptomycin postoperatively pending OR culture results.  - Completed perioperative cefazolin 2g Q8H x 5 doses   - F/u OR cultures    Recent Labs   Lab 08/12/24  1510 08/13/24  0306 08/14/24  0255   WBC 22.98* 11.70 15.42*         Heme/Onc:     - Hgb 11.4 pre-operatively  Products:   - 2 FFP intra-op     - S/p 1u pRBC postop 8/13    - Heparin gtt initiated for mechanical valve thromboprophylaxis bridge - goal PTT 60-80    - Warfarin 5mg initiated 8/14 for mechanical valve  thromboprophylaxis    - Daily CBC, PTT, PT/INR    - ASA 325mg daily    Recent Labs   Lab 08/12/24  1510 08/13/24  0306 08/13/24  1055 08/13/24  1724 08/14/24  0131 08/14/24  0255   HGB 8.5* 6.8*  --   --   --  8.0*   PLT 98* 86*  --   --   --  89*   APTT 37.1* 33.9*   < > 40.1* 57.5* 50.6*   INR 1.2 1.1  --   --   --  1.2    < > = values in this interval not displayed.         Endocrine:     - CTS Goal -140    - HgbA1c: 5.4    - Novolog (Insulin Aspart) prn for BG excursions MDC SSI (150/25)    - BG checks AC/HS    - Endocrinology consulted for insulin management      PPx:   Feeding: Cardiac regular diet  Analgesia/Sedation: n/a/sched tylenol, oxy 5/10 and dilaudid 0.5 PRN  Thromboembolic Prevention: SCDs, heparin gtt/warfarin  HOB >30: Yes  Stress Ulcer: Yes - famotidine 20mg IV BID  Glucose Control: Yes, insulin management per Endocrinology     Lines/Drains/Airway:   Art Line: Left radial   Central Line x2: RIJ trialysis (d/c 8/14) and Quad lumen   [place]   Chest Tubes: 4 (2 Mediastinal (d/c 8/14)and 2 pleural)    Pacing Wires: Temporary V pacing wires      Dispo/Code Status/Palliative:     - Continue SICU Care    - Full Code

## 2024-08-14 NOTE — ASSESSMENT & PLAN NOTE
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 Ventricular pacing wires placed in the procedure. EP consulted after team noted bradycardia with rate 30 when pacing was stopped. This is however an appropriate physiologic response when overdrive pacing at 80 bpm. By overdrive pacing, the sinus node is 'asleep' and not actively pacing, as the pacer has taken over at a higher rate. When pacing is suddenly terminated, it takes time for the sinus node to respond, as moments before it was not in use. Therefore, pacing should not be suddenly terminated from a high rate, one that far exceeds the patient's intrinsic rate.     Recommendations:  - Recommend slowly weaning the pacer, allowing the patient's intrinsic rhythm to come through, can start at 60 bpm and go down from there  - Tele  - K>4, Mg>2    EP will sign off, please call with additional questions or concerns.

## 2024-08-14 NOTE — HPI
Lorenzo Nino is a 48 y.o. 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 S/p MV replacement with mechanical valve 8/12/24. EP re-consulted for bradycardia.     Patient s/p mechanical MV replacement on 8/12/24 following valvular destruction after previous MV repair in the s/o active MRSA endocarditis. In the procedure, ventricular pacing wires placed, rate set at 80 bpm. Following the procedure, primary team c/f bradycardia with rate 30 when pacing suddenly terminated while checking for underlying rhythm, and EP first consulted 8/14. At that time patient was being overdrive paced at 80bpm with incorrect weaning, unable to assess true intrinsic rhythm. Recommended slow weaning and since, temp pacer weaned, now pacing at 50bpm.     Telemetry reviewed, since pacer weaned to 50bmp, rhythm Aflutter, likely 4:1, that has persisted unchanged. He does have a h/o AF/Aflutter following initial valve repair, first noted on ECG 11-12/2023. Failure to capture noted at 4mA.     Patient reports feeling well overall. He denies pre-syncope, syncope, chest pain, palpitations, SOB at rest, HALL, RICHELLE, orthopnea. He has a new diagnosis of afib/flutter that developed in the s/o endocarditis/valvular disease, denies sxs currently.

## 2024-08-14 NOTE — PROGRESS NOTES
[facility]  Critical Care - Surgery  Progress Note    Patient Name: Lorenzo Nino  MRN: [id number]  Admission Date: 8/12/2024  Hospital Length of Stay: 2 days  Code Status: Prior  Attending Provider: Manuel Beal MD  Primary Care Provider: No, Primary Doctor   Principal Problem: Severe mitral regurgitation    Subjective:     Hospital/ICU Course:  No notes on file    Interval History/Significant Events: NAEON. Underlying rhythm 20-30s junctional, remains V paced at 80. On clevi overnight which has been weaned off. Satting appropriately on RA and autodiuresing to -1L/24hr. Up to chair this morning.    Follow-up For: Procedure(s) (LRB):  REPLACEMENT, MITRAL VALVE (N/A)  REDO STERNOTOMY (N/A)    Post-Operative Day: 2 Days Post-Op    Objective:     Vital Signs (Most Recent):  Temp: 98.4 °F (36.9 °C) (08/14/24 0319)  Pulse: 79 (08/14/24 0600)  Resp: 13 (08/14/24 0600)  BP: 112/61 (08/14/24 0500)  SpO2: (!) 92 % (08/14/24 0600) Vital Signs (24h Range):  Temp:  [98.2 °F (36.8 °C)-99.6 °F (37.6 °C)] 98.4 °F (36.9 °C)  Pulse:  [79-97] 79  Resp:  [11-30] 13  SpO2:  [92 %-100 %] 92 %  BP: (105-139)/(57-79) 112/61  Arterial Line BP: ()/(45-70) 128/59     Weight: 86.5 kg (190 lb 11.2 oz)  Body mass index is 25.86 kg/m².      Intake/Output Summary (Last 24 hours) at 8/14/2024 0603  Last data filed at 8/14/2024 0500  Gross per 24 hour   Intake 1164.03 ml   Output 2210 ml   Net -1045.97 ml          Physical Exam  Constitutional:       Appearance: Normal appearance. He is not ill-appearing.   Neck:      Comments: RIJ quad lumen CVC, trialysis cath  Cardiovascular:      Pulses: Normal pulses.      Comments: Midline sternotomy c/d/i  Paced at 80bpm  V wires in place  4x ChT, 2x medistinal and 2x pleural with SS output  L radial zander      Pulmonary:      Effort: Pulmonary effort is normal. No respiratory distress.   Abdominal:      General: There is no distension.      Palpations: Abdomen is soft.       Tenderness: There is no abdominal tenderness.   Genitourinary:     Comments: Watson catheter in place draining clear yellow urine    Musculoskeletal:      Right lower leg: No edema.      Left lower leg: No edema.   Skin:     General: Skin is warm and dry.   Neurological:      General: No focal deficit present.            Vents:  Vent Mode: Spont (08/13/24 0715)  Ventilator Initiated: Yes (08/12/24 1507)  Set Rate: 16 BPM (08/13/24 0529)  Vt Set: 500 mL (08/13/24 0529)  Pressure Support: 10 cmH20 (08/13/24 0715)  PEEP/CPAP: 5 cmH20 (08/13/24 0715)  Oxygen Concentration (%): 40 (08/13/24 0715)  Peak Airway Pressure: 15 cmH20 (08/13/24 0715)  Plateau Pressure: 0 cmH20 (08/13/24 0715)  Total Ve: 9.35 L/m (08/13/24 0715)  Negative Inspiratory Force (cm H2O): 0 (08/13/24 0715)  F/VT Ratio<105 (RSBI): (!) 49.18 (08/13/24 0715)    Lines/Drains/Airways       Central Venous Catheter Line  Duration                  Hemodialysis Catheter 08/12/24 0735 right internal jugular 1 day    Percutaneous Central Line - Quad Lumen  08/12/24 0806 Internal Jugular Right 1 day              Drain  Duration                  Urethral Catheter 08/12/24 0719 Silicone;Non-latex;Temperature probe 16 Fr. 1 day         Y Chest Tube 1 and 2 08/12/24 0931 1 Right Mediastinal 19 Fr. 2 Left Mediastinal 19 Fr. 1 day         Y Chest Tube 3 and 4 08/12/24 1245 3 Right Pleural 19 Fr. 4 Left Pleural 19 Fr. 1 day              Arterial Line  Duration             Arterial Line 08/12/24 0702 Left Radial 1 day              Line  Duration                  Pacer Wires 08/12/24 0931 1 day              Peripheral Intravenous Line  Duration                  Peripheral IV - Single Lumen 08/12/24 0618 18 G Left;Posterior Forearm 1 day         Peripheral IV - Single Lumen 08/12/24 0740 16 G 1 1/4 in Right;Posterior Forearm 1 day                    Significant Labs:    CBC/Anemia Profile:  Recent Labs   Lab 08/12/24  1510 08/12/24  1511 08/13/24  0306 08/13/24  0329  08/13/24  0529 08/13/24 0723 08/14/24 0255   WBC 22.98*  --  11.70  --   --   --  15.42*   HGB 8.5*  --  6.8*  --   --   --  8.0*   HCT 29.6*   < > 22.7*   < > 24* 25* 25.7*   PLT 98*  --  86*  --   --   --  89*   MCV 78*  --  78*  --   --   --  77*   RDW 18.7*  --  18.6*  --   --   --  19.1*    < > = values in this interval not displayed.        Chemistries:  Recent Labs   Lab 08/12/24  1510 08/12/24 2017 08/13/24  0306 08/13/24  0833 08/13/24 2007 08/14/24 0255     --  146*  --   --  143   K 4.4   < > 3.8 3.8 4.6 4.4   *  --  112*  --   --  110   CO2 19*  --  19*  --   --  25   BUN 15  --  12  --   --  13   CREATININE 1.5*  --  1.4  --   --  1.2   CALCIUM 8.6*  --  8.8  --   --  8.6*   ALBUMIN 2.2*  --  3.7  --   --  3.3*   PROT 4.6*  --  5.5*  --   --  5.6*   BILITOT 0.5  --  0.6  --   --  0.6   ALKPHOS 49*  --  36*  --   --  43*   ALT 29  --  23  --   --  22   AST 55*  --  67*  --   --  70*   MG 2.8*  --  2.4  --   --  2.4   PHOS 2.9  --  2.1*  --   --  3.1    < > = values in this interval not displayed.     Significant Imaging:  I have reviewed all pertinent imaging results/findings within the past 24 hours.  Assessment/Plan:     Cardiac/Vascular  * Severe mitral regurgitation  Lorenzo Nino is a 48 y.o. 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 He presents to the SICU s/p resternotomy and mechanical MVR with Dr. Beal on 8/12.      Neuro/Psych:     - Sedation: off    - Pain:     - Scheduled Tylenol 1000mg Q8H   - PRN Oxycodone 5mg/10mg    - Psych: home med mirtazipine 15 qd             Cardiac:     - S/p redo MVR with Dr. Beal    - BP Goal: MAP 60-80    - Inotropes/Pressors: Weaned off    - Anti-HTNs: weaned off cleviprex, will continue to monitor if needed    - Rhythm: paced at 80 (native rhythm junctional 20-30 bpm)   -- Will resume home amiodarone 200mg qd as  appropriate   -- Will resume eliquis 5 BID as appropriate, held periop    - Beta Blocker: held in the setting of native bradycardia   -- Home medication toprol XL 25 qd    - Statin: n/a      Pulmonary:     - Goal SpO2 >92%    - Chest Tubes x 4 (2 Meds & 2 Pleural)   - consider removal of meds 8/14     Renal:    - Baseline Cr 1.6-2    - Trend BUN/Cr     - Maintain Watson, record strict Is/Os    Recent Labs   Lab 08/12/24  1510 08/13/24  0306 08/14/24  0255   BUN 15 12 13   CREATININE 1.5* 1.4 1.2         FEN / GI:     - Daily CMP, PRN K/Mag/Phos per protocol     - Replace electrolytes as needed    - Nutrition: advanced to cardiac regular diet    - Bowel Regimen: Miralax, docusate      ID:     - Aebrile    - Abx:   - ID consulted, appreciate recs. Daptomycin postoperatively pending OR culture results.  - Completed perioperative cefazolin 2g Q8H x 5 doses   - F/u OR cultures    Recent Labs   Lab 08/12/24  1510 08/13/24  0306 08/14/24  0255   WBC 22.98* 11.70 15.42*         Heme/Onc:     - Hgb 11.4 pre-operatively  Products:   - 2 FFP intra-op     - S/p 1u pRBC postop 8/13    - Heparin gtt initiated for mechanical valve thromboprophylaxis bridge - goal PTT 60-80    - Warfarin 5mg initiated 8/14 for mechanical valve thromboprophylaxis    - Daily CBC, PTT, PT/INR    - ASA 325mg daily    Recent Labs   Lab 08/12/24  1510 08/13/24  0306 08/13/24  1055 08/13/24  1724 08/14/24  0131 08/14/24  0255   HGB 8.5* 6.8*  --   --   --  8.0*   PLT 98* 86*  --   --   --  89*   APTT 37.1* 33.9*   < > 40.1* 57.5* 50.6*   INR 1.2 1.1  --   --   --  1.2    < > = values in this interval not displayed.         Endocrine:     - CTS Goal -140    - HgbA1c: 5.4    - Novolog (Insulin Aspart) prn for BG excursions MDC SSI (150/25)    - BG checks AC/HS    - Endocrinology consulted for insulin management      PPx:   Feeding: Cardiac regular diet  Analgesia/Sedation: n/a/sched tylenol, oxy 5/10 and dilaudid 0.5 PRN  Thromboembolic Prevention:  SCDs, heparin gtt/warfarin  HOB >30: Yes  Stress Ulcer: Yes - famotidine 20mg IV BID  Glucose Control: Yes, insulin management per Endocrinology     Lines/Drains/Airway:   Art Line: Left radial   Central Line x2: RIJ trialysis (d/c 8/14) and Quad lumen   Watson   Chest Tubes: 4 (2 Mediastinal (d/c 8/14)and 2 pleural)    Pacing Wires: Temporary V pacing wires      Dispo/Code Status/Palliative:     - Continue SICU Care    - Full Code             Esperanza Lantigua MD  Critical Care - Surgery  [facility]

## 2024-08-14 NOTE — SUBJECTIVE & OBJECTIVE
Interval History/Significant Events: NAEON. Underlying rhythm 20-30s junctional, remains V paced at 80. On clevi overnight which has been weaned off. Satting appropriately on RA and autodiuresing to -1L/24hr. Up to chair this morning.    Follow-up For: Procedure(s) (LRB):  REPLACEMENT, MITRAL VALVE (N/A)  REDO STERNOTOMY (N/A)    Post-Operative Day: 2 Days Post-Op    Objective:     Vital Signs (Most Recent):  Temp: 98.4 °F (36.9 °C) (08/14/24 0319)  Pulse: 79 (08/14/24 0600)  Resp: 13 (08/14/24 0600)  BP: 112/61 (08/14/24 0500)  SpO2: (!) 92 % (08/14/24 0600) Vital Signs (24h Range):  Temp:  [98.2 °F (36.8 °C)-99.6 °F (37.6 °C)] 98.4 °F (36.9 °C)  Pulse:  [79-97] 79  Resp:  [11-30] 13  SpO2:  [92 %-100 %] 92 %  BP: (105-139)/(57-79) 112/61  Arterial Line BP: ()/(45-70) 128/59     Weight: 86.5 kg (190 lb 11.2 oz)  Body mass index is 25.86 kg/m².      Intake/Output Summary (Last 24 hours) at 8/14/2024 0603  Last data filed at 8/14/2024 0500  Gross per 24 hour   Intake 1164.03 ml   Output 2210 ml   Net -1045.97 ml          Physical Exam  Constitutional:       Appearance: Normal appearance. He is not ill-appearing.   Neck:      Comments: RIJ quad lumen CVC, trialysis cath  Cardiovascular:      Pulses: Normal pulses.      Comments: Midline sternotomy c/d/i  Paced at 80bpm  V wires in place  4x ChT, 2x medistinal and 2x pleural with SS output  L radial zander      Pulmonary:      Effort: Pulmonary effort is normal. No respiratory distress.   Abdominal:      General: There is no distension.      Palpations: Abdomen is soft.      Tenderness: There is no abdominal tenderness.   Genitourinary:     Comments: Watson catheter in place draining clear yellow urine    Musculoskeletal:      Right lower leg: No edema.      Left lower leg: No edema.   Skin:     General: Skin is warm and dry.   Neurological:      General: No focal deficit present.            Vents:  Vent Mode: Spont (08/13/24 0715)  Ventilator Initiated: Yes  (08/12/24 1507)  Set Rate: 16 BPM (08/13/24 0529)  Vt Set: 500 mL (08/13/24 0529)  Pressure Support: 10 cmH20 (08/13/24 0715)  PEEP/CPAP: 5 cmH20 (08/13/24 0715)  Oxygen Concentration (%): 40 (08/13/24 0715)  Peak Airway Pressure: 15 cmH20 (08/13/24 0715)  Plateau Pressure: 0 cmH20 (08/13/24 0715)  Total Ve: 9.35 L/m (08/13/24 0715)  Negative Inspiratory Force (cm H2O): 0 (08/13/24 0715)  F/VT Ratio<105 (RSBI): (!) 49.18 (08/13/24 0715)    Lines/Drains/Airways       Central Venous Catheter Line  Duration                  Hemodialysis Catheter 08/12/24 0735 right internal jugular 1 day    Percutaneous Central Line - Quad Lumen  08/12/24 0806 Internal Jugular Right 1 day              Drain  Duration                  Urethral Catheter 08/12/24 0719 Silicone;Non-latex;Temperature probe 16 Fr. 1 day         Y Chest Tube 1 and 2 08/12/24 0931 1 Right Mediastinal 19 Fr. 2 Left Mediastinal 19 Fr. 1 day         Y Chest Tube 3 and 4 08/12/24 1245 3 Right Pleural 19 Fr. 4 Left Pleural 19 Fr. 1 day              Arterial Line  Duration             Arterial Line 08/12/24 0702 Left Radial 1 day              Line  Duration                  Pacer Wires 08/12/24 0931 1 day              Peripheral Intravenous Line  Duration                  Peripheral IV - Single Lumen 08/12/24 0618 18 G Left;Posterior Forearm 1 day         Peripheral IV - Single Lumen 08/12/24 0740 16 G 1 1/4 in Right;Posterior Forearm 1 day                    Significant Labs:    CBC/Anemia Profile:  Recent Labs   Lab 08/12/24  1510 08/12/24  1511 08/13/24  0306 08/13/24  0329 08/13/24  0529 08/13/24  0723 08/14/24  0255   WBC 22.98*  --  11.70  --   --   --  15.42*   HGB 8.5*  --  6.8*  --   --   --  8.0*   HCT 29.6*   < > 22.7*   < > 24* 25* 25.7*   PLT 98*  --  86*  --   --   --  89*   MCV 78*  --  78*  --   --   --  77*   RDW 18.7*  --  18.6*  --   --   --  19.1*    < > = values in this interval not displayed.        Chemistries:  Recent Labs   Lab  08/12/24  1510 08/12/24 2017 08/13/24  0306 08/13/24  0833 08/13/24 2007 08/14/24  0255     --  146*  --   --  143   K 4.4   < > 3.8 3.8 4.6 4.4   *  --  112*  --   --  110   CO2 19*  --  19*  --   --  25   BUN 15  --  12  --   --  13   CREATININE 1.5*  --  1.4  --   --  1.2   CALCIUM 8.6*  --  8.8  --   --  8.6*   ALBUMIN 2.2*  --  3.7  --   --  3.3*   PROT 4.6*  --  5.5*  --   --  5.6*   BILITOT 0.5  --  0.6  --   --  0.6   ALKPHOS 49*  --  36*  --   --  43*   ALT 29  --  23  --   --  22   AST 55*  --  67*  --   --  70*   MG 2.8*  --  2.4  --   --  2.4   PHOS 2.9  --  2.1*  --   --  3.1    < > = values in this interval not displayed.     Significant Imaging:  I have reviewed all pertinent imaging results/findings within the past 24 hours.

## 2024-08-14 NOTE — PROGRESS NOTES
[facility]  Endocrinology  Progress Note    Admit Date: 8/12/2024     Reason for Consult: Management of Hyperglycemia     Surgical Procedure and Date: Mitral valve replacement and sternotomy 08/12/2024      Diabetes diagnosis year: history of pre-diabetes per chart review in November 2023- now resolved    Lab Results   Component Value Date    HGBA1C 5.4 08/08/2024       HPI:   Patient is a 48 y.o. 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 Now with severe MR, mod pHTN (PASP 54), CKD3 (baseline Cr 1.6-2.0), and Afib (amiodarone, metoprolol, Eliquis). He presented to the SICU s/p resternotomy and mechanical MVR with Dr. Beal. [place] consulted for BG management.      Interval HPI:   No acute events overnight. Patient in room 85881/55222 A. Blood glucose stable. BG at and below goal on current insulin regimen (SSI ). Steroid use- None. 2 Days Post-Op  Renal function- Normal   Lab Results   Component Value Date    CREATININE 1.2 08/14/2024     Vasopressors-  None     Endocrine will continue to follow and manage insulin orders inpatient.     Diet Cardiac Fluid - 1500mL     Eating:   <25%  Nausea: No  Hypoglycemia and intervention: No  Fever: No  TPN and/or TF: No  If yes, type of TF/TPN and rate: N/A    BP (!) 105/57   Pulse 79   Temp 98.4 °F (36.9 °C) (Oral)   Resp 19   Ht 6' (1.829 m)   Wt 86.5 kg (190 lb 11.2 oz)   SpO2 95%   BMI 25.86 kg/m²     Labs Reviewed and Include    Recent Labs   Lab 08/14/24  0255 08/14/24  0821     --    CALCIUM 8.6*  --    ALBUMIN 3.3*  --    PROT 5.6*  --      --    K 4.4 4.1   CO2 25  --      --    BUN 13  --    CREATININE 1.2  --    ALKPHOS 43*  --    ALT 22  --    AST 70*  --    BILITOT 0.6  --      Lab Results   Component Value Date    WBC 15.42 (H) 08/14/2024    HGB 8.0 (L) 08/14/2024    HCT 25.7 (L) 08/14/2024    MCV 77 (L) 08/14/2024    PLT 89  "(L) 08/14/2024     No results for input(s): "TSH", "FREET4" in the last 168 hours.  Lab Results   Component Value Date    HGBA1C 5.4 08/08/2024       Nutritional status:   Body mass index is 25.86 kg/m².  Lab Results   Component Value Date    ALBUMIN 3.3 (L) 08/14/2024    ALBUMIN 3.7 08/13/2024    ALBUMIN 2.2 (L) 08/12/2024     No results found for: "PREALBUMIN"    Estimated Creatinine Clearance: 81.7 mL/min (based on SCr of 1.2 mg/dL).    Accu-Checks  Recent Labs     08/13/24  0501 08/13/24  0558 08/13/24  0720 08/13/24  0758 08/13/24  0831 08/13/24  1055 08/13/24  1724 08/13/24  2006 08/14/24  0820 08/14/24  1100   POCTGLUCOSE 118* 109 92 86 113* 124* 107 117* 103 111*       Current Medications and/or Treatments Impacting Glycemic Control  Immunotherapy:    Immunosuppressants       None          Steroids:   Hormones (From admission, onward)      None          Pressors:    Autonomic Drugs (From admission, onward)      Start     Stop Route Frequency Ordered    08/12/24 1445  EPINEPHrine 5 mg in dextrose 5% 250 mL infusion (premix)        Question Answer Comment   Begin at (in mcg/kg/min): 0.02    Titrate by: (in mcg/kg/min) 0.02    Titrate interval: (in minutes) 5    Titrate to maintain: (SBP or MAP or Cardiac Index) MAP    Greater than: (in mmHg) 65    Cardiac index greater than: (in L/min) 2.2    Maximum dose: (in mcg/kg/min) 2        -- IV Continuous 08/12/24 1437          Hyperglycemia/Diabetes Medications:   Antihyperglycemics (From admission, onward)      Start     Stop Route Frequency Ordered    08/13/24 0835  insulin aspart U-100 pen 0-10 Units         -- SubQ Every 4 hours PRN 08/13/24 0735            ASSESSMENT and PLAN    Cardiac/Vascular  * Severe mitral regurgitation  S/p mitral valve replacement and sternotomy 08/12/2024   Managed by primary team  Optimize blood glucose control        Renal/  Stage 3 chronic kidney disease  Titrate insulin slowly to avoid hypoglycemia as the risk of hypoglycemia " increases with decreased creatinine clearance.        Endocrine  Stress hyperglycemia  BG goal 110-140 (CTS protocol)  Previous history of pre-diabetes, now resolved.     - Continue Novolog (Insulin Aspart) prn for BG excursions MDC SSI (150/25)  - BG checks AC/HS  - Hypoglycemia protocol in place    ** Please notify Endocrine for any change and/or advance in diet**  ** Please call Endocrine for any BG related issues **    Discharge Planning:   TBD. Please notify endocrinology prior to discharge.          Rosie Lehman PA-C  Endocrinology  [facility]

## 2024-08-14 NOTE — SUBJECTIVE & OBJECTIVE
Past Medical History:   Diagnosis Date    Hypertension        Past Surgical History:   Procedure Laterality Date    COLONOSCOPY N/A 1/5/2024    Procedure: COLONOSCOPY;  Surgeon: Fadia Ellsworth MD;  Location: [facility] ENDO (2ND FLR);  Service: Endoscopy;  Laterality: N/A;    COLONOSCOPY, WITH DIRECTED SUBMUCOSAL INJECTION  2/27/2024    Procedure: COLONOSCOPY, WITH DIRECTED SUBMUCOSAL INJECTION;  Surgeon: Mayur Dunn MD;  Location: [facility] OR [facility] FLR;  Service: Colon and Rectal;;    COLONOSCOPY, WITH POLYPECTOMY USING SNARE N/A 2/27/2024    Procedure: COLONOSCOPY, WITH POLYPECTOMY USING SNARE;  Surgeon: Mayur Dunn MD;  Location: [facility] OR [facility] FLR;  Service: Colon and Rectal;  Laterality: N/A;    ECHOCARDIOGRAM,TRANSESOPHAGEAL N/A 12/26/2023    Procedure: Transesophageal echo (NEW) intra-procedure log documentation;  Surgeon: Provider, [facility];  Location: [facility] EP LAB;  Service: Cardiology;  Laterality: N/A;    ESOPHAGOGASTRODUODENOSCOPY N/A 1/5/2024    Procedure: EGD (ESOPHAGOGASTRODUODENOSCOPY);  Surgeon: Fadia Ellsworth MD;  Location: [facility] ENDO (2ND FLR);  Service: Endoscopy;  Laterality: N/A;    EXCLUSION, LEFT ATRIAL APPENDAGE, OPEN, AS PART OF OPEN CHEST SURGERY Left 11/3/2023    Procedure: EXCLUSION, LEFT ATRIAL APPENDAGE, OPEN, AS PART OF OPEN CHEST SURGERY;  Surgeon: Manuel Beal MD;  [facility];  Service: Cardiothoracic;  Laterality: Left;    INSERTION OF INTRA-AORTIC BALLOON ASSIST DEVICE Right 11/2/2023    Procedure: INSERTION, INTRA-AORTIC BALLOON PUMP;  Surgeon: Jose Vidal MD;  Location: [facility] CATH LAB;  Service: Cardiology;  Laterality: Right;    MITRAL VALVE REPLACEMENT N/A 8/12/2024    Procedure: REPLACEMENT, MITRAL VALVE;  Surgeon: Manuel Beal MD;  Location: [facility] OR [facility] FLR;  Service: Cardiothoracic;  Laterality: N/A;    REPAIR, MITRAL VALVE, OPEN N/A 11/3/2023    Procedure: REPAIR, MITRAL VALVE, OPEN;  Surgeon: Manuel Beal MD;   Location: [address];  Service: Cardiothoracic;  Laterality: N/A;    STERNOTOMY N/A 8/12/2024    Procedure: REDO STERNOTOMY;  Surgeon: Manuel Beal MD;  Location: [address];  Service: Cardiothoracic;  Laterality: N/A;       Review of patient's allergies indicates:  No Known Allergies    Current Facility-Administered Medications on File Prior to Encounter   Medication    0.9%  NaCl infusion    mupirocin 2 % ointment     Current Outpatient Medications on File Prior to Encounter   Medication Sig    doxycycline (VIBRAMYCIN) 100 MG Cap Take 1 capsule by mouth twice daily    metoprolol succinate (TOPROL-XL) 25 MG 24 hr tablet Take 1 tablet (25 mg total) by mouth once daily.    mirtazapine (REMERON SOL-TAB) 15 MG disintegrating tablet Dissolve 1 tablet (15 mg total) by mouth nightly.    potassium chloride SA (K-DUR,KLOR-CON) 20 MEQ tablet Take 2 tablets (40 mEq total) by mouth once daily.    torsemide (DEMADEX) 10 MG Tab Take 1 tablet (10 mg total) by mouth once daily.    amiodarone (PACERONE) 200 MG Tab Take 1 tablet (200 mg total) by mouth once daily.    apixaban (ELIQUIS) 5 mg Tab Take 1 tablet (5 mg total) by mouth 2 (two) times daily. (Patient not taking: Reported on 8/8/2024)    dapagliflozin propanediol (FARXIGA) 10 mg tablet Take 1 tablet (10 mg total) by mouth once daily.     Family History    None       Tobacco Use    Smoking status: Unknown     Passive exposure: Never    Smokeless tobacco: Not on file   Substance and Sexual Activity    Alcohol use: Yes     Alcohol/week: 1.0 standard drink of alcohol     Types: 1 Cans of beer per week    Drug use: Never    Sexual activity: Not Currently     Partners: Female     Birth control/protection: None     Review of Systems   Constitutional: Negative for chills and fever.   Cardiovascular:  Negative for chest pain, dyspnea on exertion, irregular heartbeat, leg swelling, near-syncope, orthopnea, palpitations and syncope.   Respiratory:  Negative for  shortness of breath.    Gastrointestinal:  Negative for abdominal pain.   Genitourinary:  Negative for dysuria.   Neurological:  Negative for dizziness and light-headedness.     Objective:     Vital Signs (Most Recent):  Temp: 97.8 °F (36.6 °C) (08/14/24 1501)  Pulse: 79 (08/14/24 1530)  Resp: (!) 25 (08/14/24 1530)  BP: (!) 105/57 (08/14/24 1501)  SpO2: 96 % (08/14/24 1530) Vital Signs (24h Range):  Temp:  [97.8 °F (36.6 °C)-99.6 °F (37.6 °C)] 97.8 °F (36.6 °C)  Pulse:  [79-81] 79  Resp:  [11-31] 25  SpO2:  [92 %-99 %] 96 %  BP: ()/(53-79) 105/57  Arterial Line BP: ()/(42-70) 95/46       Weight: 86.5 kg (190 lb 11.2 oz)  Body mass index is 25.86 kg/m².    SpO2: 96 %        Physical Exam  Constitutional:       Appearance: Normal appearance.   HENT:      Mouth/Throat:      Mouth: Mucous membranes are moist.   Eyes:      Extraocular Movements: Extraocular movements intact.      Conjunctiva/sclera: Conjunctivae normal.   Cardiovascular:      Rate and Rhythm: Normal rate and regular rhythm.      Pulses: Normal pulses.      Comments: Mechanical mitral valve  Pulmonary:      Effort: Pulmonary effort is normal.   Abdominal:      General: Abdomen is flat.      Palpations: Abdomen is soft.   Musculoskeletal:      Cervical back: Normal range of motion.      Right lower leg: No edema.      Left lower leg: No edema.   Skin:     General: Skin is warm and dry.   Neurological:      General: No focal deficit present.      Mental Status: He is alert and oriented to person, place, and time.            Significant Labs: CMP:   Recent Labs   Lab 08/13/24  0306 08/13/24  0833 08/14/24  0255 08/14/24  0821 08/14/24  1421   *  --  143  --  142   K 3.8   < > 4.4 4.1 3.7   *  --  110  --  107   CO2 19*  --  25  --  26   *  --  101  --  140*   BUN 12  --  13  --  14   CREATININE 1.4  --  1.2  --  1.3   CALCIUM 8.8  --  8.6*  --  8.5*   PROT 5.5*  --  5.6*  --   --    ALBUMIN 3.7  --  3.3*  --   --    BILITOT  0.6  --  0.6  --   --    ALKPHOS 36*  --  43*  --   --    AST 67*  --  70*  --   --    ALT 23  --  22  --   --    ANIONGAP 15  --  8  --  9    < > = values in this interval not displayed.   , CBC:   Recent Labs   Lab 08/13/24  0306 08/13/24  0329 08/14/24 0255   WBC 11.70  --  15.42*   HGB 6.8*  --  8.0*   HCT 22.7*   < > 25.7*   PLT 86*  --  89*    < > = values in this interval not displayed.   , and INR:   Recent Labs   Lab 08/13/24 0306 08/14/24 0255   INR 1.1 1.2       Significant Imaging: Echocardiogram: Transthoracic echo (TTE) complete (Cupid Only):   Results for orders placed or performed during the hospital encounter of 07/05/24   Echo   Result Value Ref Range    BSA 1.98 m2    Est. RA pres 3 mmHg    LVOT stroke volume 71.37 cm3    LVIDd 6.28 (A) 3.5 - 6.0 cm    LV Systolic Volume 62.66 mL    LV Systolic Volume Index 31.5 mL/m2    LVIDs 3.82 2.1 - 4.0 cm    LV Diastolic Volume 200.07 mL    LV Diastolic Volume Index 100.54 mL/m2    IVS 0.88 0.6 - 1.1 cm    LVOT diameter 2.18 cm    LVOT area 3.7 cm2    FS 39 28 - 44 %    Left Ventricle Relative Wall Thickness 0.20 cm    Posterior Wall 0.64 0.6 - 1.1 cm    LV mass 189.36 g    LV Mass Index 95 g/m2    MV Peak E Jabier 1.51 m/s    TDI LATERAL 0.16 m/s    TDI SEPTAL 0.07 m/s    E/E' ratio 13.13 m/s    MV Peak A Jabier 0.41 m/s    TR Max Jabier 3.56 m/s    E/A ratio 3.68     E wave deceleration time 534.74 msec    LV SEPTAL E/E' RATIO 21.57 m/s    LA Volume Index 76.5 mL/m2    LV LATERAL E/E' RATIO 9.44 m/s    LA volume 152.28 cm3    LVOT peak jabier 0.76 m/s    RV- molina basal diam 3.9 cm    TAPSE 1.75 cm    LA size 5.35 cm    Left Atrium Minor Axis 6.01 cm    Left Atrium Major Axis 5.95 cm    LA volume (mod) 148.03 cm3    LA WIDTH 5.60 cm    LA Volume Index (Mod) 74.4 mL/m2    RA Major Axis 5.17 cm    RA Width 3.60 cm    AV mean gradient 4 mmHg    AV peak gradient 6 mmHg    Ao peak jabier 1.22 m/s    Ao VTI 30.05 cm    LVOT peak VTI 19.13 cm    AV valve area 2.37 cm²    AV  Velocity Ratio 0.62     AV index (prosthetic) 0.64     FATUMA by Velocity Ratio 2.32 cm²    Mr max brittany 0.05 m/s    MV stenosis pressure 1/2 time 155.07 ms    MV valve area p 1/2 method 1.42 cm2    TV resting pulmonary artery pressure 54 mmHg    RV TB RVSP 7 mmHg    Triscuspid Valve Regurgitation Peak Gradient 51 mmHg    Sinus 3.47 cm    STJ 2.51 cm    Ascending aorta 3.55 cm    Mean e' 0.12 m/s    ZLVIDS 0.59     ZLVIDD 0.84     Narrative    Images from the original result were not included.      Left Ventricle: The left ventricle is moderately dilated. Normal wall   thickness. Normal wall motion. There is normal systolic function with a   visually estimated ejection fraction of 55 - 60%. Diastolic function   cannot be reliably determined in the presence of mitral valve disease and   mitral valve ring/replacement.    Right Ventricle: Normal right ventricular cavity size. Wall thickness   is normal. Right ventricle wall motion  is normal. Systolic function is   normal.    Left Atrium: Left atrium is severely dilated.    Right Atrium: Normal right atrial size.    Mitral Valve: The mitral valve is repaired by annular ring 40 mm   Medtronic. There is mild bileaflet sclerosis. There is severe   regurgitation with a posterolateral eccentriccally directed and a central    jet. There appears to be a perforation in Anterior leaflets (clip #96 to   96- see below).Torn tissue is seen in this area in LA but its proximal   attacment is not well seen. Consider NEW if clinically indicated. Mean   gradient is 2.5 mmHg at HR o 53 bpm.    Tricuspid Valve: There is mild to moderate regurgitation with a   centrally directed jet.    Pulmonic Valve: There is mild regurgitation.    Aorta: Aortic root is normal in size measuring 3.47 cm. Ascending aorta   is normal measuring 3.55 cm.    Pulmonary Artery: There is moderate pulmonary hypertension. The   estimated pulmonary artery systolic pressure is 54 mmHg.    IVC/SVC: Normal venous pressure  at 3 mmHg.    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   2)  Resection of left atrial appendage

## 2024-08-14 NOTE — SUBJECTIVE & OBJECTIVE
Interval History/Significant Events: Remained intubated overnight. Follows commands and moves all 4 off sedation. Levo weaned off with lactate appropriately downtrending. Received 1u pRBC for HgB 6.8. Remains on epi 0.04, precedex at 0.2. Pending weaning parameters and extubation this AM.    Follow-up For: Procedure(s) (LRB):  REPLACEMENT, MITRAL VALVE (N/A)  REDO STERNOTOMY (N/A)    Post-Operative Day: 1 Day Post-Op    Objective:     Vital Signs (Most Recent):  Temp: 98.4 °F (36.9 °C) (08/14/24 1101)  Pulse: 79 (08/14/24 1200)  Resp: (!) 26 (08/14/24 1200)  BP: 105/60 (08/14/24 1200)  SpO2: 96 % (08/14/24 1200) Vital Signs (24h Range):  Temp:  [98.4 °F (36.9 °C)-99.6 °F (37.6 °C)] 98.4 °F (36.9 °C)  Pulse:  [79-97] 79  Resp:  [11-31] 26  SpO2:  [92 %-99 %] 96 %  BP: ()/(53-79) 105/60  Arterial Line BP: ()/(42-70) 96/48     Weight: 86.5 kg (190 lb 11.2 oz)  Body mass index is 25.86 kg/m².      Intake/Output Summary (Last 24 hours) at 8/14/2024 1249  Last data filed at 8/14/2024 1201  Gross per 24 hour   Intake 475.59 ml   Output 3035 ml   Net -2559.41 ml          Physical Exam  Constitutional:       Appearance: Normal appearance. He is not ill-appearing.   HENT:      Mouth/Throat:      Comments: OGT  Neck:      Comments: RIJ quad lumen CVC, trialysis cath  Cardiovascular:      Pulses: Normal pulses.      Comments: Midline sternotomy c/d/i        Pulmonary:      Effort: Pulmonary effort is normal.   Abdominal:      General: There is no distension.      Palpations: Abdomen is soft.      Tenderness: There is no abdominal tenderness.   Genitourinary:     Comments: Watson catheter in place draining clear yellow urine    Musculoskeletal:      Right lower leg: No edema.      Left lower leg: No edema.   Skin:     General: Skin is warm and dry.   Neurological:      General: No focal deficit present.            Vents:  Vent Mode: Spont (08/13/24 0715)  Ventilator Initiated: Yes (08/12/24 1507)  Set Rate: 16 BPM  (08/13/24 0529)  Vt Set: 500 mL (08/13/24 0529)  Pressure Support: 10 cmH20 (08/13/24 0715)  PEEP/CPAP: 5 cmH20 (08/13/24 0715)  Oxygen Concentration (%): 40 (08/13/24 0715)  Peak Airway Pressure: 15 cmH20 (08/13/24 0715)  Plateau Pressure: 0 cmH20 (08/13/24 0715)  Total Ve: 9.35 L/m (08/13/24 0715)  Negative Inspiratory Force (cm H2O): 0 (08/13/24 0715)  F/VT Ratio<105 (RSBI): (!) 49.18 (08/13/24 0715)    Lines/Drains/Airways       Central Venous Catheter Line  Duration             Percutaneous Central Line - Quad Lumen  08/12/24 0806 Internal Jugular Right 2 days    Trialysis (Dialysis) Catheter 08/12/24 0900 right internal jugular 2 days              Drain  Duration                  Urethral Catheter 08/12/24 0719 Silicone;Non-latex;Temperature probe 16 Fr. 2 days         Y Chest Tube 1 and 2 08/12/24 0931 1 Right Mediastinal 19 Fr. 2 Left Mediastinal 19 Fr. 2 days         Y Chest Tube 3 and 4 08/12/24 1245 3 Right Pleural 19 Fr. 4 Left Pleural 19 Fr. 2 days              Arterial Line  Duration             Arterial Line 08/12/24 0702 Left Radial 2 days              Line  Duration                  Pacer Wires 08/12/24 0931 2 days              Peripheral Intravenous Line  Duration                  Peripheral IV - Single Lumen 08/12/24 0618 18 G Left;Posterior Forearm 2 days         Peripheral IV - Single Lumen 08/12/24 0740 16 G 1 1/4 in Right;Posterior Forearm 2 days                    Significant Labs:    CBC/Anemia Profile:  Recent Labs   Lab 08/12/24  1510 08/12/24  1511 08/13/24  0306 08/13/24  0329 08/13/24  0529 08/13/24  0723 08/14/24  0255   WBC 22.98*  --  11.70  --   --   --  15.42*   HGB 8.5*  --  6.8*  --   --   --  8.0*   HCT 29.6*   < > 22.7*   < > 24* 25* 25.7*   PLT 98*  --  86*  --   --   --  89*   MCV 78*  --  78*  --   --   --  77*   RDW 18.7*  --  18.6*  --   --   --  19.1*    < > = values in this interval not displayed.        Chemistries:  Recent Labs   Lab 08/12/24  1510 08/12/24  2017  08/13/24  0306 08/13/24  0833 08/13/24 2007 08/14/24  0255 08/14/24  0821     --  146*  --   --  143  --    K 4.4   < > 3.8   < > 4.6 4.4 4.1   *  --  112*  --   --  110  --    CO2 19*  --  19*  --   --  25  --    BUN 15  --  12  --   --  13  --    CREATININE 1.5*  --  1.4  --   --  1.2  --    CALCIUM 8.6*  --  8.8  --   --  8.6*  --    ALBUMIN 2.2*  --  3.7  --   --  3.3*  --    PROT 4.6*  --  5.5*  --   --  5.6*  --    BILITOT 0.5  --  0.6  --   --  0.6  --    ALKPHOS 49*  --  36*  --   --  43*  --    ALT 29  --  23  --   --  22  --    AST 55*  --  67*  --   --  70*  --    MG 2.8*  --  2.4  --   --  2.4  --    PHOS 2.9  --  2.1*  --   --  3.1  --     < > = values in this interval not displayed.       Significant Imaging:  I have reviewed all pertinent imaging results/findings within the past 24 hours.  Review of Systems   All other systems reviewed and are negative.

## 2024-08-14 NOTE — PLAN OF CARE
SICU PLAN OF CARE NOTE    Dx: Severe mitral regurgitation    Shift Events: Patient OOBTC throughout shift. CVP 11. D/C ritchie and trialysis per orders. 40 IVP lasix given in AM, low pressures following but increased during shift.     Goals of Care: Map 60-80  CVP Q4H, Q6H aptt    Neuro: AAO x4, Follows Commands, and Moves All Extremities    Vital Signs: /68   Pulse 79   Temp 97.8 °F (36.6 °C) (Oral)   Resp 18   Ht 6' (1.829 m)   Wt 86.5 kg (190 lb 11.2 oz)   SpO2 97%   BMI 25.86 kg/m²     Respiratory: 1L Nasal Cannula    Diet: Cardiac Diet    Gtts: Heparin @1000 units     Urine Output: Ritchie 1175 cc/shift   Ritchie removed at 1600 , due to void by 2200    Drains:  Pleural Chest Tube  30ml/shift  Medial Chest Tube  40ml/shift       Labs/Accuchecks: AC/HS    Skin: Patient able to turn/move independently, no evidence of new skin breakdown throughout shift. Mid-sternal Incision GABRIEL with steri strips, CDI.

## 2024-08-14 NOTE — SUBJECTIVE & OBJECTIVE
"Interval HPI:   No acute events overnight. Patient in room 76444/81240 A. Blood glucose stable. BG at and below goal on current insulin regimen (SSI ). Steroid use- None. 2 Days Post-Op  Renal function- Normal   Lab Results   Component Value Date    CREATININE 1.2 08/14/2024     Vasopressors-  None     Endocrine will continue to follow and manage insulin orders inpatient.     Diet Cardiac Fluid - 1500mL     Eating:   <25%  Nausea: No  Hypoglycemia and intervention: No  Fever: No  TPN and/or TF: No  If yes, type of TF/TPN and rate: N/A    BP (!) 105/57   Pulse 79   Temp 98.4 °F (36.9 °C) (Oral)   Resp 19   Ht 6' (1.829 m)   Wt 86.5 kg (190 lb 11.2 oz)   SpO2 95%   BMI 25.86 kg/m²     Labs Reviewed and Include    Recent Labs   Lab 08/14/24  0255 08/14/24  0821     --    CALCIUM 8.6*  --    ALBUMIN 3.3*  --    PROT 5.6*  --      --    K 4.4 4.1   CO2 25  --      --    BUN 13  --    CREATININE 1.2  --    ALKPHOS 43*  --    ALT 22  --    AST 70*  --    BILITOT 0.6  --      Lab Results   Component Value Date    WBC 15.42 (H) 08/14/2024    HGB 8.0 (L) 08/14/2024    HCT 25.7 (L) 08/14/2024    MCV 77 (L) 08/14/2024    PLT 89 (L) 08/14/2024     No results for input(s): "TSH", "FREET4" in the last 168 hours.  Lab Results   Component Value Date    HGBA1C 5.4 08/08/2024       Nutritional status:   Body mass index is 25.86 kg/m².  Lab Results   Component Value Date    ALBUMIN 3.3 (L) 08/14/2024    ALBUMIN 3.7 08/13/2024    ALBUMIN 2.2 (L) 08/12/2024     No results found for: "PREALBUMIN"    Estimated Creatinine Clearance: 81.7 mL/min (based on SCr of 1.2 mg/dL).    Accu-Checks  Recent Labs     08/13/24  0501 08/13/24  0558 08/13/24  0720 08/13/24  0758 08/13/24  0831 08/13/24  1055 08/13/24  1724 08/13/24  2006 08/14/24  0820 08/14/24  1100   POCTGLUCOSE 118* 109 92 86 113* 124* 107 117* 103 111*       Current Medications and/or Treatments Impacting Glycemic Control  Immunotherapy:    Immunosuppressants  "      None          Steroids:   Hormones (From admission, onward)      None          Pressors:    Autonomic Drugs (From admission, onward)      Start     Stop Route Frequency Ordered    08/12/24 1445  EPINEPHrine 5 mg in dextrose 5% 250 mL infusion (premix)        Question Answer Comment   Begin at (in mcg/kg/min): 0.02    Titrate by: (in mcg/kg/min) 0.02    Titrate interval: (in minutes) 5    Titrate to maintain: (SBP or MAP or Cardiac Index) MAP    Greater than: (in mmHg) 65    Cardiac index greater than: (in L/min) 2.2    Maximum dose: (in mcg/kg/min) 2        -- IV Continuous 08/12/24 1437          Hyperglycemia/Diabetes Medications:   Antihyperglycemics (From admission, onward)      Start     Stop Route Frequency Ordered    08/13/24 0835  insulin aspart U-100 pen 0-10 Units         -- SubQ Every 4 hours PRN 08/13/24 0735

## 2024-08-15 LAB
ALBUMIN SERPL BCP-MCNC: 2.9 G/DL (ref 3.5–5.2)
ALP SERPL-CCNC: 51 U/L (ref 55–135)
ALT SERPL W/O P-5'-P-CCNC: 15 U/L (ref 10–44)
ANION GAP SERPL CALC-SCNC: 11 MMOL/L (ref 8–16)
ANION GAP SERPL CALC-SCNC: 11 MMOL/L (ref 8–16)
APTT PPP: 76.3 SEC (ref 21–32)
APTT PPP: 76.5 SEC (ref 21–32)
AST SERPL-CCNC: 48 U/L (ref 10–40)
BACTERIA SPEC AEROBE CULT: NO GROWTH
BASOPHILS # BLD AUTO: 0.03 K/UL (ref 0–0.2)
BASOPHILS NFR BLD: 0.2 % (ref 0–1.9)
BILIRUB SERPL-MCNC: 0.9 MG/DL (ref 0.1–1)
BUN SERPL-MCNC: 14 MG/DL (ref 6–20)
BUN SERPL-MCNC: 15 MG/DL (ref 6–20)
CALCIUM SERPL-MCNC: 8.5 MG/DL (ref 8.7–10.5)
CALCIUM SERPL-MCNC: 8.8 MG/DL (ref 8.7–10.5)
CHLORIDE SERPL-SCNC: 105 MMOL/L (ref 95–110)
CHLORIDE SERPL-SCNC: 106 MMOL/L (ref 95–110)
CK SERPL-CCNC: 697 U/L (ref 20–200)
CO2 SERPL-SCNC: 25 MMOL/L (ref 23–29)
CO2 SERPL-SCNC: 25 MMOL/L (ref 23–29)
CREAT SERPL-MCNC: 1.1 MG/DL (ref 0.5–1.4)
CREAT SERPL-MCNC: 1.2 MG/DL (ref 0.5–1.4)
DIFFERENTIAL METHOD BLD: ABNORMAL
EOSINOPHIL # BLD AUTO: 0.1 K/UL (ref 0–0.5)
EOSINOPHIL NFR BLD: 1 % (ref 0–8)
ERYTHROCYTE [DISTWIDTH] IN BLOOD BY AUTOMATED COUNT: 19.3 % (ref 11.5–14.5)
EST. GFR  (NO RACE VARIABLE): >60 ML/MIN/1.73 M^2
EST. GFR  (NO RACE VARIABLE): >60 ML/MIN/1.73 M^2
GLUCOSE SERPL-MCNC: 106 MG/DL (ref 70–110)
GLUCOSE SERPL-MCNC: 97 MG/DL (ref 70–110)
HCT VFR BLD AUTO: 26.5 % (ref 40–54)
HGB BLD-MCNC: 7.9 G/DL (ref 14–18)
IMM GRANULOCYTES # BLD AUTO: 0.09 K/UL (ref 0–0.04)
IMM GRANULOCYTES NFR BLD AUTO: 0.7 % (ref 0–0.5)
INR PPP: 3.2 (ref 0.8–1.2)
LYMPHOCYTES # BLD AUTO: 1.6 K/UL (ref 1–4.8)
LYMPHOCYTES NFR BLD: 12 % (ref 18–48)
MAGNESIUM SERPL-MCNC: 2.2 MG/DL (ref 1.6–2.6)
MAGNESIUM SERPL-MCNC: 2.3 MG/DL (ref 1.6–2.6)
MCH RBC QN AUTO: 23 PG (ref 27–31)
MCHC RBC AUTO-ENTMCNC: 29.8 G/DL (ref 32–36)
MCV RBC AUTO: 77 FL (ref 82–98)
MONOCYTES # BLD AUTO: 0.9 K/UL (ref 0.3–1)
MONOCYTES NFR BLD: 6.9 % (ref 4–15)
NEUTROPHILS # BLD AUTO: 10.5 K/UL (ref 1.8–7.7)
NEUTROPHILS NFR BLD: 79.2 % (ref 38–73)
NRBC BLD-RTO: 0 /100 WBC
PHOSPHATE SERPL-MCNC: 2.4 MG/DL (ref 2.7–4.5)
PHOSPHATE SERPL-MCNC: 3 MG/DL (ref 2.7–4.5)
PLATELET # BLD AUTO: 94 K/UL (ref 150–450)
PMV BLD AUTO: ABNORMAL FL (ref 9.2–12.9)
POCT GLUCOSE: 104 MG/DL (ref 70–110)
POCT GLUCOSE: 105 MG/DL (ref 70–110)
POTASSIUM SERPL-SCNC: 3.5 MMOL/L (ref 3.5–5.1)
POTASSIUM SERPL-SCNC: 3.6 MMOL/L (ref 3.5–5.1)
POTASSIUM SERPL-SCNC: 3.7 MMOL/L (ref 3.5–5.1)
POTASSIUM SERPL-SCNC: 3.8 MMOL/L (ref 3.5–5.1)
PROT SERPL-MCNC: 5.7 G/DL (ref 6–8.4)
PROTHROMBIN TIME: 32.4 SEC (ref 9–12.5)
RBC # BLD AUTO: 3.43 M/UL (ref 4.6–6.2)
SODIUM SERPL-SCNC: 141 MMOL/L (ref 136–145)
SODIUM SERPL-SCNC: 142 MMOL/L (ref 136–145)
WBC # BLD AUTO: 13.25 K/UL (ref 3.9–12.7)

## 2024-08-15 PROCEDURE — 84100 ASSAY OF PHOSPHORUS: CPT | Performed by: PHYSICIAN ASSISTANT

## 2024-08-15 PROCEDURE — 25000003 PHARM REV CODE 250

## 2024-08-15 PROCEDURE — 25000003 PHARM REV CODE 250: Performed by: PHYSICIAN ASSISTANT

## 2024-08-15 PROCEDURE — 99232 SBSQ HOSP IP/OBS MODERATE 35: CPT | Mod: ,,, | Performed by: NURSE PRACTITIONER

## 2024-08-15 PROCEDURE — 97535 SELF CARE MNGMENT TRAINING: CPT

## 2024-08-15 PROCEDURE — 27000221 HC OXYGEN, UP TO 24 HOURS

## 2024-08-15 PROCEDURE — 84132 ASSAY OF SERUM POTASSIUM: CPT | Performed by: PHYSICIAN ASSISTANT

## 2024-08-15 PROCEDURE — 63600175 PHARM REV CODE 636 W HCPCS

## 2024-08-15 PROCEDURE — 85025 COMPLETE CBC W/AUTO DIFF WBC: CPT | Performed by: PHYSICIAN ASSISTANT

## 2024-08-15 PROCEDURE — 80053 COMPREHEN METABOLIC PANEL: CPT | Performed by: STUDENT IN AN ORGANIZED HEALTH CARE EDUCATION/TRAINING PROGRAM

## 2024-08-15 PROCEDURE — 99232 SBSQ HOSP IP/OBS MODERATE 35: CPT | Mod: ,,, | Performed by: ANESTHESIOLOGY

## 2024-08-15 PROCEDURE — 80048 BASIC METABOLIC PNL TOTAL CA: CPT | Mod: XB | Performed by: THORACIC SURGERY (CARDIOTHORACIC VASCULAR SURGERY)

## 2024-08-15 PROCEDURE — 94761 N-INVAS EAR/PLS OXIMETRY MLT: CPT

## 2024-08-15 PROCEDURE — 25000003 PHARM REV CODE 250: Performed by: STUDENT IN AN ORGANIZED HEALTH CARE EDUCATION/TRAINING PROGRAM

## 2024-08-15 PROCEDURE — 85610 PROTHROMBIN TIME: CPT | Performed by: PHYSICIAN ASSISTANT

## 2024-08-15 PROCEDURE — 99900035 HC TECH TIME PER 15 MIN (STAT)

## 2024-08-15 PROCEDURE — 63600175 PHARM REV CODE 636 W HCPCS: Performed by: PHYSICIAN ASSISTANT

## 2024-08-15 PROCEDURE — 97530 THERAPEUTIC ACTIVITIES: CPT

## 2024-08-15 PROCEDURE — 82550 ASSAY OF CK (CPK): CPT | Performed by: STUDENT IN AN ORGANIZED HEALTH CARE EDUCATION/TRAINING PROGRAM

## 2024-08-15 PROCEDURE — 97110 THERAPEUTIC EXERCISES: CPT

## 2024-08-15 PROCEDURE — 85730 THROMBOPLASTIN TIME PARTIAL: CPT | Mod: 91 | Performed by: THORACIC SURGERY (CARDIOTHORACIC VASCULAR SURGERY)

## 2024-08-15 PROCEDURE — 85730 THROMBOPLASTIN TIME PARTIAL: CPT | Performed by: THORACIC SURGERY (CARDIOTHORACIC VASCULAR SURGERY)

## 2024-08-15 PROCEDURE — 20000000 HC ICU ROOM

## 2024-08-15 PROCEDURE — 83735 ASSAY OF MAGNESIUM: CPT | Performed by: PHYSICIAN ASSISTANT

## 2024-08-15 PROCEDURE — 83735 ASSAY OF MAGNESIUM: CPT | Mod: 91 | Performed by: THORACIC SURGERY (CARDIOTHORACIC VASCULAR SURGERY)

## 2024-08-15 PROCEDURE — 84100 ASSAY OF PHOSPHORUS: CPT | Mod: 91 | Performed by: THORACIC SURGERY (CARDIOTHORACIC VASCULAR SURGERY)

## 2024-08-15 RX ORDER — SODIUM,POTASSIUM PHOSPHATES 280-250MG
2 POWDER IN PACKET (EA) ORAL
Status: DISCONTINUED | OUTPATIENT
Start: 2024-08-15 | End: 2024-08-22

## 2024-08-15 RX ORDER — WARFARIN 1 MG/1
1 TABLET ORAL DAILY
Status: DISCONTINUED | OUTPATIENT
Start: 2024-08-15 | End: 2024-08-17

## 2024-08-15 RX ORDER — TAMSULOSIN HYDROCHLORIDE 0.4 MG/1
0.4 CAPSULE ORAL DAILY
Status: DISCONTINUED | OUTPATIENT
Start: 2024-08-15 | End: 2024-08-23 | Stop reason: HOSPADM

## 2024-08-15 RX ORDER — POTASSIUM CHLORIDE 20 MEQ/1
40 TABLET, EXTENDED RELEASE ORAL ONCE
Status: COMPLETED | OUTPATIENT
Start: 2024-08-15 | End: 2024-08-15

## 2024-08-15 RX ORDER — AMOXICILLIN 250 MG
1 CAPSULE ORAL 2 TIMES DAILY
Status: DISCONTINUED | OUTPATIENT
Start: 2024-08-15 | End: 2024-08-23 | Stop reason: HOSPADM

## 2024-08-15 RX ORDER — SODIUM,POTASSIUM PHOSPHATES 280-250MG
2 POWDER IN PACKET (EA) ORAL
Status: CANCELLED | OUTPATIENT
Start: 2024-08-15

## 2024-08-15 RX ORDER — AMOXICILLIN 250 MG
1 CAPSULE ORAL 2 TIMES DAILY
Status: CANCELLED | OUTPATIENT
Start: 2024-08-15

## 2024-08-15 RX ORDER — POTASSIUM CHLORIDE 20 MEQ/1
20 TABLET, EXTENDED RELEASE ORAL ONCE
Status: COMPLETED | OUTPATIENT
Start: 2024-08-15 | End: 2024-08-15

## 2024-08-15 RX ADMIN — MIRTAZAPINE 15 MG: 15 TABLET, ORALLY DISINTEGRATING ORAL at 08:08

## 2024-08-15 RX ADMIN — POTASSIUM CHLORIDE 20 MEQ: 1500 TABLET, EXTENDED RELEASE ORAL at 06:08

## 2024-08-15 RX ADMIN — OXYCODONE 5 MG: 5 TABLET ORAL at 08:08

## 2024-08-15 RX ADMIN — FUROSEMIDE 20 MG: 10 INJECTION, SOLUTION INTRAVENOUS at 09:08

## 2024-08-15 RX ADMIN — SENNOSIDES AND DOCUSATE SODIUM 1 TABLET: 50; 8.6 TABLET ORAL at 08:08

## 2024-08-15 RX ADMIN — FAMOTIDINE 20 MG: 20 TABLET ORAL at 09:08

## 2024-08-15 RX ADMIN — ACETAMINOPHEN 1000 MG: 500 TABLET ORAL at 09:08

## 2024-08-15 RX ADMIN — DAPTOMYCIN 700 MG: 350 INJECTION, POWDER, LYOPHILIZED, FOR SOLUTION INTRAVENOUS at 12:08

## 2024-08-15 RX ADMIN — TAMSULOSIN HYDROCHLORIDE 0.4 MG: 0.4 CAPSULE ORAL at 09:08

## 2024-08-15 RX ADMIN — ACETAMINOPHEN 1000 MG: 500 TABLET ORAL at 05:08

## 2024-08-15 RX ADMIN — OXYCODONE 5 MG: 5 TABLET ORAL at 10:08

## 2024-08-15 RX ADMIN — MUPIROCIN 1 G: 20 OINTMENT TOPICAL at 09:08

## 2024-08-15 RX ADMIN — WARFARIN SODIUM 1 MG: 1 TABLET ORAL at 06:08

## 2024-08-15 RX ADMIN — ASPIRIN 81 MG: 81 TABLET, COATED ORAL at 09:08

## 2024-08-15 RX ADMIN — POTASSIUM CHLORIDE 40 MEQ: 1500 TABLET, EXTENDED RELEASE ORAL at 09:08

## 2024-08-15 RX ADMIN — MUPIROCIN 1 G: 20 OINTMENT TOPICAL at 08:08

## 2024-08-15 RX ADMIN — FAMOTIDINE 20 MG: 20 TABLET ORAL at 08:08

## 2024-08-15 RX ADMIN — SODIUM PHOSPHATE, MONOBASIC, MONOHYDRATE AND SODIUM PHOSPHATE, DIBASIC, ANHYDROUS 15 MMOL: 142; 276 INJECTION, SOLUTION INTRAVENOUS at 05:08

## 2024-08-15 RX ADMIN — ACETAMINOPHEN 1000 MG: 500 TABLET ORAL at 02:08

## 2024-08-15 RX ADMIN — POTASSIUM CHLORIDE 20 MEQ: 200 INJECTION, SOLUTION INTRAVENOUS at 05:08

## 2024-08-15 RX ADMIN — SENNOSIDES AND DOCUSATE SODIUM 1 TABLET: 50; 8.6 TABLET ORAL at 09:08

## 2024-08-15 RX ADMIN — POLYETHYLENE GLYCOL 3350 17 G: 17 POWDER, FOR SOLUTION ORAL at 09:08

## 2024-08-15 RX ADMIN — FUROSEMIDE 20 MG: 10 INJECTION, SOLUTION INTRAVENOUS at 06:08

## 2024-08-15 RX ADMIN — POTASSIUM BICARBONATE 50 MEQ: 978 TABLET, EFFERVESCENT ORAL at 09:08

## 2024-08-15 NOTE — PLAN OF CARE
[facility]  Discharge Reassessment    Primary Care Provider: No, Primary Doctor    Expected Discharge Date: 8/19/2024    Reassessment (most recent)       Discharge Reassessment - 08/15/24 1522          Discharge Reassessment    Assessment Type Discharge Planning Reassessment     Discharge Plan A Home;Home with family     Discharge Plan B Home Health     DME Needed Upon Discharge  other (see comments)   TBD    Transition of Care Barriers Underinsured     Why the patient remains in the hospital Requires continued medical care                     Discharge Plan A and Plan B have been determined by review of patient's clinical status, future medical and therapeutic needs, and coverage/benefits for post-acute care in coordination with multidisciplinary team members.      Martin Conroy LCSW  Case Management [facility]

## 2024-08-15 NOTE — PROGRESS NOTES
[facility]  Critical Care - Surgery  Progress Note    Patient Name: Lorenzo Nino  MRN: [id number]  Admission Date: 8/12/2024  Hospital Length of Stay: 3 days  Code Status: Prior  Attending Provider: Manuel Beal MD  Primary Care Provider: No, Primary Doctor   Principal Problem: Endocarditis    Subjective:     Hospital/ICU Course:  No notes on file    Interval History/Significant Events: NAEON. Native rhythm 40s junctional with a/w decrease in BP when off pacing. INR 3.2 this AM after warfarin 5/5. OR cxs NGTD.    Follow-up For: Procedure(s) (LRB):  REPLACEMENT, MITRAL VALVE (N/A)  REDO STERNOTOMY (N/A)    Post-Operative Day: 3 Days Post-Op    Objective:     Vital Signs (Most Recent):  Temp: 98.9 °F (37.2 °C) (08/15/24 0300)  Pulse: 79 (08/15/24 0615)  Resp: 14 (08/15/24 0615)  BP: 108/61 (08/15/24 0600)  SpO2: (!) 93 % (08/15/24 0615) Vital Signs (24h Range):  Temp:  [97.8 °F (36.6 °C)-99.1 °F (37.3 °C)] 98.9 °F (37.2 °C)  Pulse:  [44-80] 79  Resp:  [11-31] 14  SpO2:  [92 %-100 %] 93 %  BP: ()/(53-68) 108/61  Arterial Line BP: ()/(42-69) 100/48     Weight: 83 kg (182 lb 15.7 oz)  Body mass index is 24.82 kg/m².      Intake/Output Summary (Last 24 hours) at 8/15/2024 0646  Last data filed at 8/15/2024 0600  Gross per 24 hour   Intake 1778.42 ml   Output 2960 ml   Net -1181.58 ml          Physical Exam  Constitutional:       Appearance: Normal appearance. He is not ill-appearing.   Neck:      Comments: RIJ quad lumen CVC  Cardiovascular:      Pulses: Normal pulses.      Comments: Midline sternotomy c/d/i  Paced at 80bpm  V wires in place  4x ChT, 2x medistinal and 2x pleural with SS output  L radial zander      Pulmonary:      Effort: Pulmonary effort is normal. No respiratory distress.   Abdominal:      General: There is no distension.      Palpations: Abdomen is soft.      Tenderness: There is no abdominal tenderness.   Musculoskeletal:      Right lower leg: No edema.       Left lower leg: No edema.   Skin:     General: Skin is warm and dry.   Neurological:      General: No focal deficit present.            Vents:  Vent Mode: Spont (08/13/24 0715)  Ventilator Initiated: Yes (08/12/24 1507)  Set Rate: 16 BPM (08/13/24 0529)  Vt Set: 500 mL (08/13/24 0529)  Pressure Support: 10 cmH20 (08/13/24 0715)  PEEP/CPAP: 5 cmH20 (08/13/24 0715)  Oxygen Concentration (%): 40 (08/13/24 0715)  Peak Airway Pressure: 15 cmH20 (08/13/24 0715)  Plateau Pressure: 0 cmH20 (08/13/24 0715)  Total Ve: 9.35 L/m (08/13/24 0715)  Negative Inspiratory Force (cm H2O): 0 (08/13/24 0715)  F/VT Ratio<105 (RSBI): (!) 49.18 (08/13/24 0715)    Lines/Drains/Airways       Central Venous Catheter Line  Duration             Percutaneous Central Line - Quad Lumen  08/12/24 0806 Internal Jugular Right 2 days              Drain  Duration                  Y Chest Tube 1 and 2 08/12/24 0931 1 Right Mediastinal 19 Fr. 2 Left Mediastinal 19 Fr. 2 days         Y Chest Tube 3 and 4 08/12/24 1245 3 Right Pleural 19 Fr. 4 Left Pleural 19 Fr. 2 days              Arterial Line  Duration             Arterial Line 08/12/24 0702 Left Radial 2 days              Line  Duration                  Pacer Wires 08/12/24 0931 2 days              Peripheral Intravenous Line  Duration                  Peripheral IV - Single Lumen 08/12/24 0618 18 G Left;Posterior Forearm 3 days         Peripheral IV - Single Lumen 08/12/24 0740 16 G 1 1/4 in Right;Posterior Forearm 2 days                    Significant Labs:    CBC/Anemia Profile:  Recent Labs   Lab 08/13/24  0723 08/14/24  0255 08/15/24  0300   WBC  --  15.42* 13.25*   HGB  --  8.0* 7.9*   HCT 25* 25.7* 26.5*   PLT  --  89* 94*   MCV  --  77* 77*   RDW  --  19.1* 19.3*        Chemistries:  Recent Labs   Lab 08/14/24  0255 08/14/24  0821 08/14/24  1421 08/14/24  2026 08/15/24  0300     --  142  --  142   K 4.4   < > 3.7 3.2* 3.7     --  107  --  106   CO2 25  --  26  --  25   BUN 13  --   14  --  14   CREATININE 1.2  --  1.3  --  1.1   CALCIUM 8.6*  --  8.5*  --  8.5*   ALBUMIN 3.3*  --   --   --  2.9*   PROT 5.6*  --   --   --  5.7*   BILITOT 0.6  --   --   --  0.9   ALKPHOS 43*  --   --   --  51*   ALT 22  --   --   --  15   AST 70*  --   --   --  48*   MG 2.4  --  2.2  --  2.3   PHOS 3.1  --  2.7  --  2.4*    < > = values in this interval not displayed.       Significant Imaging:  I have reviewed all pertinent imaging results/findings within the past 24 hours.  Assessment/Plan:     Cardiac/Vascular  Severe mitral regurgitation  Lorenzo Nino is a 48 y.o. 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 He presents to the SICU s/p resternotomy and mechanical MVR with Dr. Beal on 8/12.      Neuro/Psych:     - Sedation: off    - Pain:     - Scheduled Tylenol 1000mg Q8H   - PRN Oxycodone 5mg/10mg    - Psych: home med mirtazipine 15 qd             Cardiac:     - S/p redo MVR with Dr. Beal    - BP Goal: MAP 60-80    - Inotropes/Pressors: Weaned off    - Anti-HTNs: weaned off cleviprex, will continue to monitor if needed    - Rhythm: paced at 80 (native rhythm junctional 40 bpm)   -- EP consulted and will re engage given hemodynamically significant bradycardia   -- home amiodarone 200mg qd held in the setting of native bradycardia    - Beta Blocker: held in the setting of native bradycardia   -- Home medication toprol XL 25 qd    - Statin: n/a      Pulmonary:     - Goal SpO2 >92%    - Chest Tubes x 4 (2 Meds & 2 Pleural)   - consider removal of meds 8/15     Renal:    - Baseline Cr 1.6-2    - Trend BUN/Cr         - Lasix 20 BID with appropriate diuresis    Recent Labs   Lab 08/14/24  0255 08/14/24  1421 08/15/24  0300   BUN 13 14 14   CREATININE 1.2 1.3 1.1           FEN / GI:     - Daily CMP, PRN K/Mag/Phos per protocol     - Replace electrolytes as needed    - Nutrition: advanced to cardiac  regular diet    - Bowel Regimen: Miralax, docusate      ID:     - Aebrile    - Abx:   - ID consulted, appreciate recs. Daptomycin postoperatively pending OR culture results.  - Completed perioperative cefazolin 2g Q8H x 5 doses   - F/u OR cultures, NGTD    Recent Labs   Lab 08/13/24  0306 08/14/24  0255 08/15/24  0300   WBC 11.70 15.42* 13.25*           Heme/Onc:     - Hgb 11.4 pre-operatively  Products:   - 2 FFP intra-op     - S/p 1u pRBC postop 8/13    - Heparin gtt initiated for mechanical valve thromboprophylaxis bridge - goal PTT 60-80   - Discontinue 8/15 given therapeutic on warfarin    - Warfarin 5mg initiated 8/14 for mechanical valve thromboprophylaxis - goal INR 2.5-3.5    - Daily CBC, PTT, PT/INR    - ASA 325mg daily    Recent Labs   Lab 08/13/24  0306 08/13/24  1055 08/14/24  0255 08/14/24  0821 08/14/24  2026 08/15/24  0227 08/15/24  0300 08/15/24  0458   HGB 6.8*  --  8.0*  --   --   --  7.9*  --    PLT 86*  --  89*  --   --   --  94*  --    APTT 33.9*   < > 50.6*   < > 69.6* 76.5*  --  76.3*   INR 1.1  --  1.2  --   --   --   --  3.2*    < > = values in this interval not displayed.           Endocrine:     - CTS Goal -140    - HgbA1c: 5.4    - Novolog (Insulin Aspart) prn for BG excursions MDC SSI (150/25)    - BG checks AC/HS    - Endocrinology consulted for insulin management      PPx:   Feeding: Cardiac regular diet  Analgesia/Sedation: n/a/sched tylenol, oxy 5/10 and dilaudid 0.5 PRN  Thromboembolic Prevention: SCDs, heparin gtt (d/c)/warfarin  HOB >30: Yes  Stress Ulcer: Yes - famotidine 20mg IV BID  Glucose Control: Yes, insulin management per Endocrinology     Lines/Drains/Airway:   Art Line: Left radial (d/c 8/15)   Central Line x2: Quad lumen (d/c 8/15)   Chest Tubes: 4 (2 Mediastinal (d/c 8/15) and 2 pleural)    Pacing Wires: Temporary V pacing wires      Dispo/Code Status/Palliative:     - Continue SICU Care    - Full Code           Esperanza Lantigua MD  Critical Care - Surgery  [facility]

## 2024-08-15 NOTE — ASSESSMENT & PLAN NOTE
Lorenzo Nino is a 48 y.o. 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 He presents to the SICU s/p resternotomy and mechanical MVR with Dr. Beal on 8/12.      Neuro/Psych:     - Sedation: off    - Pain:     - Scheduled Tylenol 1000mg Q8H   - PRN Oxycodone 5mg/10mg    - Psych: home med mirtazipine 15 qd             Cardiac:     - S/p redo MVR with Dr. Beal    - BP Goal: MAP 60-80    - Inotropes/Pressors: Weaned off    - Anti-HTNs: weaned off cleviprex, will continue to monitor if needed    - Rhythm: paced at 80 (native rhythm junctional 40 bpm)   -- EP consulted and will re engage given hemodynamically significant bradycardia   -- home amiodarone 200mg qd held in the setting of native bradycardia    - Beta Blocker: held in the setting of native bradycardia   -- Home medication toprol XL 25 qd    - Statin: n/a      Pulmonary:     - Goal SpO2 >92%    - Chest Tubes x 4 (2 Meds & 2 Pleural)   - consider removal of meds 8/15     Renal:    - Baseline Cr 1.6-2    - Trend BUN/Cr         - Lasix 20 BID with appropriate diuresis    Recent Labs   Lab 08/14/24  0255 08/14/24  1421 08/15/24  0300   BUN 13 14 14   CREATININE 1.2 1.3 1.1           FEN / GI:     - Daily CMP, PRN K/Mag/Phos per protocol     - Replace electrolytes as needed    - Nutrition: advanced to cardiac regular diet    - Bowel Regimen: Miralax, docusate      ID:     - Aebrile    - Abx:   - ID consulted, appreciate recs. Daptomycin postoperatively pending OR culture results.  - Completed perioperative cefazolin 2g Q8H x 5 doses   - F/u OR cultures, NGTD    Recent Labs   Lab 08/13/24  0306 08/14/24  0255 08/15/24  0300   WBC 11.70 15.42* 13.25*           Heme/Onc:     - Hgb 11.4 pre-operatively  Products:   - 2 FFP intra-op     - S/p 1u pRBC postop 8/13    - Heparin gtt initiated for mechanical valve thromboprophylaxis bridge - goal PTT 60-80   -  Discontinue 8/15 given therapeutic on warfarin    - Warfarin 5mg initiated 8/14 for mechanical valve thromboprophylaxis - goal INR 2.5-3.5    - Daily CBC, PTT, PT/INR    - ASA 325mg daily    Recent Labs   Lab 08/13/24  0306 08/13/24  1055 08/14/24  0255 08/14/24  0821 08/14/24  2026 08/15/24  0227 08/15/24  0300 08/15/24  0458   HGB 6.8*  --  8.0*  --   --   --  7.9*  --    PLT 86*  --  89*  --   --   --  94*  --    APTT 33.9*   < > 50.6*   < > 69.6* 76.5*  --  76.3*   INR 1.1  --  1.2  --   --   --   --  3.2*    < > = values in this interval not displayed.           Endocrine:     - CTS Goal -140    - HgbA1c: 5.4    - Novolog (Insulin Aspart) prn for BG excursions MDC SSI (150/25)    - BG checks AC/HS    - Endocrinology consulted for insulin management      PPx:   Feeding: Cardiac regular diet  Analgesia/Sedation: n/a/sched tylenol, oxy 5/10 and dilaudid 0.5 PRN  Thromboembolic Prevention: SCDs, heparin gtt (d/c)/warfarin  HOB >30: Yes  Stress Ulcer: Yes - famotidine 20mg IV BID  Glucose Control: Yes, insulin management per Endocrinology     Lines/Drains/Airway:   Art Line: Left radial (d/c 8/15)   Central Line x2: Quad lumen (d/c 8/15)   Chest Tubes: 4 (2 Mediastinal (d/c 8/15) and 2 pleural)    Pacing Wires: Temporary V pacing wires      Dispo/Code Status/Palliative:     - Continue SICU Care    - Full Code

## 2024-08-15 NOTE — SUBJECTIVE & OBJECTIVE
Interval History/Significant Events: NAEON. Native rhythm 40s junctional with a/w decrease in BP when off pacing. INR 3.2 this AM after warfarin 5/5. OR cxs NGTD.    Follow-up For: Procedure(s) (LRB):  REPLACEMENT, MITRAL VALVE (N/A)  REDO STERNOTOMY (N/A)    Post-Operative Day: 3 Days Post-Op    Objective:     Vital Signs (Most Recent):  Temp: 98.9 °F (37.2 °C) (08/15/24 0300)  Pulse: 79 (08/15/24 0615)  Resp: 14 (08/15/24 0615)  BP: 108/61 (08/15/24 0600)  SpO2: (!) 93 % (08/15/24 0615) Vital Signs (24h Range):  Temp:  [97.8 °F (36.6 °C)-99.1 °F (37.3 °C)] 98.9 °F (37.2 °C)  Pulse:  [44-80] 79  Resp:  [11-31] 14  SpO2:  [92 %-100 %] 93 %  BP: ()/(53-68) 108/61  Arterial Line BP: ()/(42-69) 100/48     Weight: 83 kg (182 lb 15.7 oz)  Body mass index is 24.82 kg/m².      Intake/Output Summary (Last 24 hours) at 8/15/2024 0646  Last data filed at 8/15/2024 0600  Gross per 24 hour   Intake 1778.42 ml   Output 2960 ml   Net -1181.58 ml          Physical Exam  Constitutional:       Appearance: Normal appearance. He is not ill-appearing.   Neck:      Comments: RIJ quad lumen CVC  Cardiovascular:      Pulses: Normal pulses.      Comments: Midline sternotomy c/d/i  Paced at 80bpm  V wires in place  4x ChT, 2x medistinal and 2x pleural with SS output  L radial zander      Pulmonary:      Effort: Pulmonary effort is normal. No respiratory distress.   Abdominal:      General: There is no distension.      Palpations: Abdomen is soft.      Tenderness: There is no abdominal tenderness.   Musculoskeletal:      Right lower leg: No edema.      Left lower leg: No edema.   Skin:     General: Skin is warm and dry.   Neurological:      General: No focal deficit present.            Vents:  Vent Mode: Spont (08/13/24 0715)  Ventilator Initiated: Yes (08/12/24 1507)  Set Rate: 16 BPM (08/13/24 0529)  Vt Set: 500 mL (08/13/24 0529)  Pressure Support: 10 cmH20 (08/13/24 0715)  PEEP/CPAP: 5 cmH20 (08/13/24 0715)  Oxygen  Concentration (%): 40 (08/13/24 0715)  Peak Airway Pressure: 15 cmH20 (08/13/24 0715)  Plateau Pressure: 0 cmH20 (08/13/24 0715)  Total Ve: 9.35 L/m (08/13/24 0715)  Negative Inspiratory Force (cm H2O): 0 (08/13/24 0715)  F/VT Ratio<105 (RSBI): (!) 49.18 (08/13/24 0715)    Lines/Drains/Airways       Central Venous Catheter Line  Duration             Percutaneous Central Line - Quad Lumen  08/12/24 0806 Internal Jugular Right 2 days              Drain  Duration                  Y Chest Tube 1 and 2 08/12/24 0931 1 Right Mediastinal 19 Fr. 2 Left Mediastinal 19 Fr. 2 days         Y Chest Tube 3 and 4 08/12/24 1245 3 Right Pleural 19 Fr. 4 Left Pleural 19 Fr. 2 days              Arterial Line  Duration             Arterial Line 08/12/24 0702 Left Radial 2 days              Line  Duration                  Pacer Wires 08/12/24 0931 2 days              Peripheral Intravenous Line  Duration                  Peripheral IV - Single Lumen 08/12/24 0618 18 G Left;Posterior Forearm 3 days         Peripheral IV - Single Lumen 08/12/24 0740 16 G 1 1/4 in Right;Posterior Forearm 2 days                    Significant Labs:    CBC/Anemia Profile:  Recent Labs   Lab 08/13/24  0723 08/14/24  0255 08/15/24  0300   WBC  --  15.42* 13.25*   HGB  --  8.0* 7.9*   HCT 25* 25.7* 26.5*   PLT  --  89* 94*   MCV  --  77* 77*   RDW  --  19.1* 19.3*        Chemistries:  Recent Labs   Lab 08/14/24  0255 08/14/24  0821 08/14/24  1421 08/14/24 2026 08/15/24  0300     --  142  --  142   K 4.4   < > 3.7 3.2* 3.7     --  107  --  106   CO2 25  --  26  --  25   BUN 13  --  14  --  14   CREATININE 1.2  --  1.3  --  1.1   CALCIUM 8.6*  --  8.5*  --  8.5*   ALBUMIN 3.3*  --   --   --  2.9*   PROT 5.6*  --   --   --  5.7*   BILITOT 0.6  --   --   --  0.9   ALKPHOS 43*  --   --   --  51*   ALT 22  --   --   --  15   AST 70*  --   --   --  48*   MG 2.4  --  2.2  --  2.3   PHOS 3.1  --  2.7  --  2.4*    < > = values in this interval not displayed.        Significant Imaging:  I have reviewed all pertinent imaging results/findings within the past 24 hours.

## 2024-08-15 NOTE — PT/OT/SLP PROGRESS
Physical Therapy Treatment    Patient Name:  Lorenzo Nino   MRN:  [id number]  Admitting Diagnosis:  Endocarditis   Recent Surgery: Procedure(s) (LRB):  REPLACEMENT, MITRAL VALVE (N/A)  REDO STERNOTOMY (N/A) 3 Days Post-Op  Admit Date: 8/12/2024  Length of Stay: 3 days    Recommendations:     Discharge Recommendations: Low Intensity Therapy  Discharge Equipment Recommendations: none   Barriers to discharge: None    Appropriate transfer level with nursing staff: ambulation with CGA    Plan:     During this hospitalization, patient to be seen 5 x/week to address the identified rehab impairments via gait training, therapeutic activities, therapeutic exercises, neuromuscular re-education and progress towards the established goals.  Plan of Care Expires:  09/12/24  Plan of Care Reviewed with: patient, spouse    Assessment     Lorenzo Nino is a 48 y.o. male admitted with a medical diagnosis of Endocarditis. Pt tolerated treatment session well today. Pt was able to achieve goals of treatment session today which were progressing his cardiopulmonary endurance via ambulation training. He did this by ambulating an increased distance with decreased physical assist needed to maintain balance. He did endorse some SOB with activity and rated activity as 6/10 on modified LADAN RPE showing adequate cardiopulmonary challenge with exercise today. Pt remains paced at 60 bpm throughout session. Pt will continue to benefit from skilled PT services during this hospital admit to continue to improve transfer ability and efficiency as well as continue to progress pt's cardiopulmonary endurance to maximize pt's functional independence and return to PLOF.    Problem List: weakness, impaired endurance, impaired functional mobility, impaired self care skills, gait instability, impaired balance, decreased coordination, impaired cardiopulmonary response to activity, impaired skin, pain.  Rehab Prognosis: Good; patient would benefit from acute  skilled PT services to address these deficits and reach maximum level of function.      Goals:   Multidisciplinary Problems       Physical Therapy Goals          Problem: Physical Therapy    Goal Priority Disciplines Outcome Goal Variances Interventions   Physical Therapy Goal     PT, PT/OT Progressing     Description: Goals to be met by: 2024     Patient will increase functional independence with mobility by performin. Sit to stand transfer with Modified [place]  2. Gait  x 250 feet with Supervision using physician approved AD prn.   3. Lower extremity exercise program x30 reps per handout, with independence  4. Recite 3/3 sternal precautions and remain complaint with precautions throughout session with no verbal cues                         Subjective     RN notified prior to session. Mother present upon PT entrance into room. Patient agreeable to PT evaluation.    Chief Complaint: pt with c/o tailbone and chest pain  Patient/Family Comments/goals: decrease pain  Pain/Comfort:  Pain Rating 1: 5/10  Location - Side 1: Bilateral  Location - Orientation 1: generalized  Location 1: chest  Pain Addressed 1: Reposition, Distraction  Pain Rating Post-Intervention 1: 5/10    Objective:     Additional staff present: N/A    Patient found up in chair with: telemetry, blood pressure cuff, pulse ox (continuous), oxygen, external pacer, central line, peripheral IV   Cognition:   Alert and Cooperative  Patient is oriented to Person, Place, Time, Situation  Command following: Follows multistep verbal commands  Fluency: clear/fluent  General Precautions: Standard, Cardiac fall, sternal   Orthopedic Precautions:N/A   Braces: N/A   Body mass index is 24.82 kg/m².  Oxygen Device: Nasal Cannula 0.5L  Vitals: BP (!) 105/52   Pulse 60   Temp 99.5 °F (37.5 °C) (Oral)   Resp 20   Ht 6' (1.829 m)   Wt 83 kg (182 lb 15.7 oz)   SpO2 (!) 94%   BMI 24.82 kg/m²     Outcome Measures:  AM-PAC 6 CLICK MOBILITY  Turning over  in bed (including adjusting bedclothes, sheets and blankets)?: 3  Sitting down on and standing up from a chair with arms (e.g., wheelchair, bedside commode, etc.): 3  Moving from lying on back to sitting on the side of the bed?: 3  Moving to and from a bed to a chair (including a wheelchair)?: 3  Need to walk in hospital room?: 3  Climbing 3-5 steps with a railing?: 3  Basic Mobility Total Score: 18     Functional Mobility:    Bed Mobility:   Pt found/returned to bedside chair    Transfers:   Sit <> Stand Transfer: stand by assistance with no AD. [id number] from bedside chair    Standing Balance:  Static Standing Balance: Good- : able to maintain standing balance against minimal resistance  Dynamic Standing Balance: Good- : able to stand independently unsupported and weight shift across midline minimally  Assistance Level Required: Stand-by Assistance  Patient used: no assistive device   Comments: no c/o dizziness in standing      Gait:   Patient ambulated: 112 ft   Patient required: contact guard assistance  Patient used:  no assistive device   Gait Pattern observed: reciprocal gait  Gait Deviation(s): decreased step length, decreased foot clearance, flexed posture, decreased kayode, and shuffle gait  Impairments due to: pain and decreased endurance  Portable Supplemental O2 0.L utilized  Portable monitor utilized  all lines remained intact throughout ambulation trial  Chair follow for patient safety  Comments: Patient required cues for stride length and upright posture to increase independence and safety. Patient required cues ~ 25% of the time. Pt with no overt LOB throughout    Education:  Time provided for education, counseling and discussion of health disposition in regards to patient's current status  All questions answered within PT scope of practice and to patient's satisfaction  PT role in POC to address current functional deficits  Pt educated on proper body mechanics, safety techniques, and energy  conservation with PT facilitation and cueing throughout session  Call nursing/pct to transfer to chair/use bathroom. Pt stated understanding.  Whiteboard updated with therapist name and pt's current mobility status documented above  Safe to perform step transfer to/from chair/bedside commode SBA and no AD w/ nursing/PCT present  Pt to ambulate 3-4x/day SBA and no AD with nsg/family in order to maintain functional mobility  Importance of OOB tolerance prn hrs/day to improve lung ventilation and expansion as well as strengthen postural musculature  Sternal Precautions: patient able to voice 3/3 precautions without assistance. Patient able to maintain precautions throughout session with min verbal cues.    Patient left up in chair with all lines intact, call button in reach, RN notified, and mother present.    Time Tracking:     PT Received On: 08/15/24  PT Start Time: 1120     PT Stop Time: 1137  PT Total Time (min): 17 min     Billable Minutes:   Therapeutic Exercise 15 minutes    Treatment Type: Treatment  PT/PTA: PT       8/15/2024

## 2024-08-15 NOTE — PROGRESS NOTES
"[facility] - [facility]  Endocrinology  Progress Note    Admit Date: 2024     Reason for Consult: Management of Hyperglycemia     Surgical Procedure and Date: Mitral valve replacement and sternotomy 2024      Diabetes diagnosis year: history of pre-diabetes per chart review in 2023- now resolved    Lab Results   Component Value Date    HGBA1C 5.4 2024       HPI:   Patient is a 48 y.o. 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 Now with severe MR, mod pHTN (PASP 54), CKD3 (baseline Cr 1.6-2.0), and Afib (amiodarone, metoprolol, Eliquis). He presented to the SICU s/p resternotomy and mechanical MVR with Dr. Beal. [place] consulted for BG management.      Interval HPI:   Overnight events: NAEON. Remains in SICU. BG stable without prn SQ insulin requirements. POD 3. Diet Cardiac Fluid - 1500mL    Eatin%  Nausea: No  Hypoglycemia and intervention: No  Fever: No  TPN and/or TF: No  If yes, type of TF/TPN and rate: n/a    BP (!) 114/55   Pulse 60   Temp 99.5 °F (37.5 °C)   Resp 17   Ht 6' (1.829 m)   Wt 83 kg (182 lb 15.7 oz)   SpO2 98%   BMI 24.82 kg/m²     Labs Reviewed and Include    Recent Labs   Lab 08/15/24  0300 08/15/24  0757   GLU 97  --    CALCIUM 8.5*  --    ALBUMIN 2.9*  --    PROT 5.7*  --      --    K 3.7 3.6   CO2 25  --      --    BUN 14  --    CREATININE 1.1  --    ALKPHOS 51*  --    ALT 15  --    AST 48*  --    BILITOT 0.9  --      Lab Results   Component Value Date    WBC 13.25 (H) 08/15/2024    HGB 7.9 (L) 08/15/2024    HCT 26.5 (L) 08/15/2024    MCV 77 (L) 08/15/2024    PLT 94 (L) 08/15/2024     No results for input(s): "TSH", "FREET4" in the last 168 hours.  Lab Results   Component Value Date    HGBA1C 5.4 2024       Nutritional status:   Body mass index is 24.82 kg/m².  Lab Results   Component Value Date    ALBUMIN 2.9 (L) 08/15/2024    ALBUMIN " "3.3 (L) 08/14/2024    ALBUMIN 3.7 08/13/2024     No results found for: "PREALBUMIN"    Estimated Creatinine Clearance: 89.2 mL/min (based on SCr of 1.1 mg/dL).    Accu-Checks  Recent Labs     08/13/24  0831 08/13/24  1055 08/13/24  1724 08/13/24 2006 08/14/24  0820 08/14/24  1100 08/14/24  1736 08/14/24  2027 08/15/24  0757 08/15/24  1141   POCTGLUCOSE 113* 124* 107 117* 103 111* 111* 179* 105 104       Current Medications and/or Treatments Impacting Glycemic Control  Immunotherapy:    Immunosuppressants       None          Steroids:   Hormones (From admission, onward)      None          Pressors:    Autonomic Drugs (From admission, onward)      None          Hyperglycemia/Diabetes Medications:   Antihyperglycemics (From admission, onward)      Start     Stop Route Frequency Ordered    08/13/24 0835  insulin aspart U-100 pen 0-10 Units         -- SubQ Every 4 hours PRN 08/13/24 0735            ASSESSMENT and PLAN    Cardiac/Vascular  * Endocarditis  Managed per primary team          Severe mitral regurgitation  S/p mitral valve replacement and sternotomy 08/12/2024   Managed by primary team  Optimize blood glucose control        Renal/  Stage 3 chronic kidney disease  Titrate insulin slowly to avoid hypoglycemia as the risk of hypoglycemia increases with decreased creatinine clearance.        Endocrine  Stress hyperglycemia  BG goal 110-140 (CTS protocol)  Previous history of pre-diabetes, now resolved.     - Discontinue Novolog (Insulin Aspart) prn for BG excursions [facility] SSI (150/25) and BG checks AC/HS  - Recommend continuing to monitor BG with AM labs     Endocrine will sign off at this time. Patient with no hx of DM and no insulin needs while on diet. Please reconsult if needed.         Danielle Matthews, NP  Endocrinology  [facility] - Surgical Intensive Care  "

## 2024-08-15 NOTE — PLAN OF CARE
SICU PLAN OF CARE NOTE    Dx: Endocarditis    Goals of Care: MAP 60-80    Neuro: AAO x4, Follows Commands, and Moves All Extremities    Vital Signs: BP (!) 105/58   Pulse 60   Temp 98.7 °F (37.1 °C) (Oral)   Resp 15   Ht 6' (1.829 m)   Wt 83 kg (182 lb 15.7 oz)   SpO2 100%   BMI 24.82 kg/m²     Respiratory: 1L Nasal Cannula    Cardiac: Ventricular paced @ 60    Diet: Cardiac Diet 1500ml Fluid Restriction    Urine Output: Voids Spontaneously 725 cc/shift    Drains:  Right Pleural Chest Tube 50 cc/shift  Left Mediastinal chest tube 10 cc/shift    Labs/Accuchecks: AC/HS.    Skin: Patient able to turn/move independently, no evidence of new skin breakdown throughout shift. Mid-sternal incision GABRIEL with steri strips, chest tubes dressing changed CDI.

## 2024-08-15 NOTE — SUBJECTIVE & OBJECTIVE
"Interval HPI:   Overnight events: NAEON. Remains in SICU. BG stable without prn SQ insulin requirements. POD 3. Diet Cardiac Fluid - 1500mL    Eatin%  Nausea: No  Hypoglycemia and intervention: No  Fever: No  TPN and/or TF: No  If yes, type of TF/TPN and rate: n/a    BP (!) 114/55   Pulse 60   Temp 99.5 °F (37.5 °C)   Resp 17   Ht 6' (1.829 m)   Wt 83 kg (182 lb 15.7 oz)   SpO2 98%   BMI 24.82 kg/m²     Labs Reviewed and Include    Recent Labs   Lab 08/15/24  0300 08/15/24  0757   GLU 97  --    CALCIUM 8.5*  --    ALBUMIN 2.9*  --    PROT 5.7*  --      --    K 3.7 3.6   CO2 25  --      --    BUN 14  --    CREATININE 1.1  --    ALKPHOS 51*  --    ALT 15  --    AST 48*  --    BILITOT 0.9  --      Lab Results   Component Value Date    WBC 13.25 (H) 08/15/2024    HGB 7.9 (L) 08/15/2024    HCT 26.5 (L) 08/15/2024    MCV 77 (L) 08/15/2024    PLT 94 (L) 08/15/2024     No results for input(s): "TSH", "FREET4" in the last 168 hours.  Lab Results   Component Value Date    HGBA1C 5.4 2024       Nutritional status:   Body mass index is 24.82 kg/m².  Lab Results   Component Value Date    ALBUMIN 2.9 (L) 08/15/2024    ALBUMIN 3.3 (L) 2024    ALBUMIN 3.7 2024     No results found for: "PREALBUMIN"    Estimated Creatinine Clearance: 89.2 mL/min (based on SCr of 1.1 mg/dL).    Accu-Checks  Recent Labs     24  0831 24  1055 24  1724 24  0820 24  1100 24  1736 24  2027 08/15/24  0757 08/15/24  1141   POCTGLUCOSE 113* 124* 107 117* 103 111* 111* 179* 105 104       Current Medications and/or Treatments Impacting Glycemic Control  Immunotherapy:    Immunosuppressants       None          Steroids:   Hormones (From admission, onward)      None          Pressors:    Autonomic Drugs (From admission, onward)      None          Hyperglycemia/Diabetes Medications:   Antihyperglycemics (From admission, onward)      Start     Stop Route Frequency " Ordered    08/13/24 0835  insulin aspart U-100 pen 0-10 Units         -- SubQ Every 4 hours PRN 08/13/24 0762

## 2024-08-15 NOTE — PLAN OF CARE
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 I will follow-up with patient. Please contact us if you have any additional questions.     Nuvia Wu MD - PGY4  Infectious Disease  [facility] - Surgical Intensive Care

## 2024-08-15 NOTE — PLAN OF CARE
SICU PLAN OF CARE    Dx: Endocarditis    Goals of Care: MAP 60-80    Vital Signs (last 12 hours):   Temp:  [97.8 °F (36.6 °C)-99.1 °F (37.3 °C)]   Pulse:  [79-80]   Resp:  [11-31]   BP: (103-130)/(56-68)   SpO2:  [92 %-99 %]   Arterial Line BP: ()/(44-62)      Neuro: AAOx4, Follows commands , and Moves all extremities spontaneously     Cardiac: ventricular paced @ 80    Respiratory:  1L Nasal Cannula     Gtts: Heparin @ 1000 units    Urine Output: Voids Spontaneously  1025 mL/shift    Drains: Chest Tube, total output 90mL/shift    Diet: Cardiac  and 1500mL Fluid Restriction    Skin:  Midline incision GABRIEL with steri strips intact.  Patient moves independently, bony prominences protected, and mattress inflated/working correctly.     Shift Events:  No acute events overnight. Patient denies pain. OOBTC standby assist.    See flowsheet for further assessment/details. Family updated on current condition/plan of care, questions answered, and emotional support provided. MD updated on current condition, vitals, labs, and gtts.

## 2024-08-15 NOTE — CARE UPDATE
EP Care Update    Telemetry reviewed. Patient overdrive paced at 80bpm. At about 0600 this morning, team turn down pacer from 80 -> 40bpm over time frame of about 1 minute. Without slowly weaning, his intrinsic rate was not given the time needed to catch up and his pacer was immediately turned back up to 80 bpm (again, overdrive pacing).     Recommendations:  - Turn pacer down to 60bpm and leave it there. Do not drop it further or turn it back up to 80bpm.   - This afternoon, can drop pacer to 50bpm. Tomorrow morning, drop it to 40bpm.  - By slowly weaning the pacer, you will allow for his sinus node to  and take over pacing    AAndrew Henry  Cardiovascular Disease fellow, PGY-4  [facility]

## 2024-08-15 NOTE — ASSESSMENT & PLAN NOTE
BG goal 110-140 (CTS protocol)  Previous history of pre-diabetes, now resolved.     - Discontinue Novolog (Insulin Aspart) prn for BG excursions [facility] SSI (150/25) and BG checks AC/HS  - Recommend continuing to monitor BG with AM labs     Endocrine will sign off at this time. Patient with no hx of DM and no insulin needs while on diet. Please reconsult if needed.

## 2024-08-15 NOTE — PT/OT/SLP PROGRESS
Occupational Therapy   Treatment    Name: Lorenzo Nino  MRN: [id number]  Admitting Diagnosis:  Endocarditis  3 Days Post-Op    Recommendations:     Discharge Recommendations: Low Intensity Therapy  Discharge Equipment Recommendations:  none  Barriers to discharge:  None    Assessment:     Lorenzo Nino is a 48 y.o. male with a medical diagnosis of Endocarditis.  He presents with impaired ADL and mobility performance deficits. Pt found upright in chair and agreeable for therapy. Session focused on ADLs and toileting needs in restroom today. Pt able to recall 3/3 sternal precaution and is making great progress in activity tolerance.  Patient continues to demonstrate the need for low intensity therapy on a scheduled basis exhibited by decreased independence with self-care and functional mobility   Performance deficits affecting function are weakness, gait instability, impaired balance, impaired endurance, impaired self care skills, impaired functional mobility, decreased upper extremity function, decreased lower extremity function, impaired cardiopulmonary response to activity.     Rehab Prognosis:  Good; patient would benefit from acute skilled OT services to address these deficits and reach maximum level of function.       Plan:     Patient to be seen 5 x/week to address the above listed problems via self-care/home management, therapeutic activities, therapeutic exercises  Plan of Care Expires: 09/13/24  Plan of Care Reviewed with: patient, spouse    Subjective     Chief Complaint: 5/10 chest pain near incision  Patient/Family Comments/goals: I walked earlier!  Pain/Comfort:  Pain Rating 1: 5/10  Location - Orientation 1: generalized  Location 1: chest  Pain Addressed 1: Distraction, Reposition, Cessation of Activity    Objective:     Communicated with: RN prior to session.  Patient found up in chair with telemetry, pulse ox (continuous), central line, external pacer, Trialysis, chest tube upon OT entry to  room.    General Precautions: Standard, fall, sternal    Orthopedic Precautions:N/A  Braces: N/A  Respiratory Status: Room air     Occupational Performance:     Functional Mobility/Transfers:  Patient completed Sit <> Stand Transfer with contact guard assistance  with  no assistive device   Patient completed Toilet Transfer Step Transfer technique with stand by assistance with  no AD  Functional Mobility: Pt mobilized in room and into restroom for standing ADLs at sink ~5 minutes followed by toileting in restroom.     Activities of Daily Living:  Grooming: stand by assistance washing face and brushing teeth standing at sink  Upper Body Dressing: minimum assistance donning gown   Toileting: stand by assistance for seated pericare      [facility] 6 Click ADL: 17    Treatment & Education:  Pt educated on role of occupational therapy, POC, and safety during ADLs and functional mobility. Pt and OT discussed importance of safe, continued mobility to optimize daily living skills. Pt verbalized understanding.     White board updated during session. Pt given instruction to call for medical staff/nurse for assistance.       Patient left up in chair with all lines intact, call button in reach, RN notified, and RN and parents present    GOALS:   Multidisciplinary Problems       Occupational Therapy Goals          Problem: Occupational Therapy    Goal Priority Disciplines Outcome Interventions   Occupational Therapy Goal     OT, PT/OT Progressing    Description: Goals to be met by: 9/6/2024     Patient will increase functional independence with ADLs by performing:    UE Dressing with Set-up Assistance.  LE Dressing with Supervision.  Grooming while standing at sink with Modified [place].  Toileting from toilet with Modified [place] for hygiene and clothing management.   Supine to sit with Stand-by Assistance.  Stand pivot transfers with Modified [place].  Toilet transfer to toilet with Modified [place].                          Time Tracking:     OT Date of Treatment: 08/15/24  OT Start Time: 1228  OT Stop Time: 1251  OT Total Time (min): 23 min    Billable Minutes:Self Care/Home Management 13 min  Therapeutic Activity 10 min    OT/GABRIEL: OT          8/15/2024

## 2024-08-16 LAB
ALBUMIN SERPL BCP-MCNC: 2.9 G/DL (ref 3.5–5.2)
ALP SERPL-CCNC: 52 U/L (ref 55–135)
ALT SERPL W/O P-5'-P-CCNC: 14 U/L (ref 10–44)
ANION GAP SERPL CALC-SCNC: 16 MMOL/L (ref 8–16)
APTT PPP: 62.9 SEC (ref 21–32)
AST SERPL-CCNC: 34 U/L (ref 10–40)
BASOPHILS # BLD AUTO: 0.02 K/UL (ref 0–0.2)
BASOPHILS NFR BLD: 0.2 % (ref 0–1.9)
BILIRUB SERPL-MCNC: 0.7 MG/DL (ref 0.1–1)
BLD PROD TYP BPU: NORMAL
BLOOD UNIT EXPIRATION DATE: NORMAL
BLOOD UNIT TYPE CODE: 5100
BLOOD UNIT TYPE CODE: 9500
BLOOD UNIT TYPE CODE: 9500
BLOOD UNIT TYPE: NORMAL
BUN SERPL-MCNC: 16 MG/DL (ref 6–20)
CALCIUM SERPL-MCNC: 8.9 MG/DL (ref 8.7–10.5)
CHLORIDE SERPL-SCNC: 103 MMOL/L (ref 95–110)
CO2 SERPL-SCNC: 22 MMOL/L (ref 23–29)
CODING SYSTEM: NORMAL
CREAT SERPL-MCNC: 1.1 MG/DL (ref 0.5–1.4)
CROSSMATCH INTERPRETATION: NORMAL
DIFFERENTIAL METHOD BLD: ABNORMAL
DISPENSE STATUS: NORMAL
EOSINOPHIL # BLD AUTO: 0.3 K/UL (ref 0–0.5)
EOSINOPHIL NFR BLD: 3.2 % (ref 0–8)
ERYTHROCYTE [DISTWIDTH] IN BLOOD BY AUTOMATED COUNT: 19.5 % (ref 11.5–14.5)
EST. GFR  (NO RACE VARIABLE): >60 ML/MIN/1.73 M^2
GLUCOSE SERPL-MCNC: 84 MG/DL (ref 70–110)
HCT VFR BLD AUTO: 27.4 % (ref 40–54)
HGB BLD-MCNC: 8 G/DL (ref 14–18)
IMM GRANULOCYTES # BLD AUTO: 0.07 K/UL (ref 0–0.04)
IMM GRANULOCYTES NFR BLD AUTO: 0.7 % (ref 0–0.5)
INR PPP: 4.3 (ref 0.8–1.2)
LYMPHOCYTES # BLD AUTO: 1.1 K/UL (ref 1–4.8)
LYMPHOCYTES NFR BLD: 11.1 % (ref 18–48)
MAGNESIUM SERPL-MCNC: 2.3 MG/DL (ref 1.6–2.6)
MCH RBC QN AUTO: 22.7 PG (ref 27–31)
MCHC RBC AUTO-ENTMCNC: 29.2 G/DL (ref 32–36)
MCV RBC AUTO: 78 FL (ref 82–98)
MONOCYTES # BLD AUTO: 1 K/UL (ref 0.3–1)
MONOCYTES NFR BLD: 9.4 % (ref 4–15)
NEUTROPHILS # BLD AUTO: 7.6 K/UL (ref 1.8–7.7)
NEUTROPHILS NFR BLD: 75.4 % (ref 38–73)
NRBC BLD-RTO: 0 /100 WBC
NUM UNITS TRANS FFP: NORMAL
NUM UNITS TRANS PACKED RBC: NORMAL
PHOSPHATE SERPL-MCNC: 2.6 MG/DL (ref 2.7–4.5)
PLATELET # BLD AUTO: 125 K/UL (ref 150–450)
PMV BLD AUTO: 12.2 FL (ref 9.2–12.9)
POTASSIUM SERPL-SCNC: 3.9 MMOL/L (ref 3.5–5.1)
PROT SERPL-MCNC: 6.1 G/DL (ref 6–8.4)
PROTHROMBIN TIME: 43 SEC (ref 9–12.5)
RBC # BLD AUTO: 3.53 M/UL (ref 4.6–6.2)
SODIUM SERPL-SCNC: 141 MMOL/L (ref 136–145)
WBC # BLD AUTO: 10.1 K/UL (ref 3.9–12.7)

## 2024-08-16 PROCEDURE — 84100 ASSAY OF PHOSPHORUS: CPT | Performed by: PHYSICIAN ASSISTANT

## 2024-08-16 PROCEDURE — 80053 COMPREHEN METABOLIC PANEL: CPT | Performed by: STUDENT IN AN ORGANIZED HEALTH CARE EDUCATION/TRAINING PROGRAM

## 2024-08-16 PROCEDURE — 25000003 PHARM REV CODE 250: Performed by: STUDENT IN AN ORGANIZED HEALTH CARE EDUCATION/TRAINING PROGRAM

## 2024-08-16 PROCEDURE — 25000003 PHARM REV CODE 250

## 2024-08-16 PROCEDURE — 25000003 PHARM REV CODE 250: Performed by: PHYSICIAN ASSISTANT

## 2024-08-16 PROCEDURE — 99232 SBSQ HOSP IP/OBS MODERATE 35: CPT | Mod: ,,, | Performed by: ANESTHESIOLOGY

## 2024-08-16 PROCEDURE — 83735 ASSAY OF MAGNESIUM: CPT | Performed by: PHYSICIAN ASSISTANT

## 2024-08-16 PROCEDURE — 85730 THROMBOPLASTIN TIME PARTIAL: CPT | Performed by: PHYSICIAN ASSISTANT

## 2024-08-16 PROCEDURE — 63600175 PHARM REV CODE 636 W HCPCS

## 2024-08-16 PROCEDURE — 99233 SBSQ HOSP IP/OBS HIGH 50: CPT | Mod: ,,, | Performed by: STUDENT IN AN ORGANIZED HEALTH CARE EDUCATION/TRAINING PROGRAM

## 2024-08-16 PROCEDURE — 97535 SELF CARE MNGMENT TRAINING: CPT

## 2024-08-16 PROCEDURE — 99900035 HC TECH TIME PER 15 MIN (STAT)

## 2024-08-16 PROCEDURE — 97116 GAIT TRAINING THERAPY: CPT

## 2024-08-16 PROCEDURE — 20000000 HC ICU ROOM

## 2024-08-16 PROCEDURE — 85025 COMPLETE CBC W/AUTO DIFF WBC: CPT | Performed by: PHYSICIAN ASSISTANT

## 2024-08-16 PROCEDURE — 85610 PROTHROMBIN TIME: CPT | Performed by: PHYSICIAN ASSISTANT

## 2024-08-16 PROCEDURE — 94761 N-INVAS EAR/PLS OXIMETRY MLT: CPT

## 2024-08-16 RX ORDER — POTASSIUM CHLORIDE 20 MEQ/1
20 TABLET, EXTENDED RELEASE ORAL ONCE
Status: COMPLETED | OUTPATIENT
Start: 2024-08-16 | End: 2024-08-16

## 2024-08-16 RX ADMIN — ACETAMINOPHEN 1000 MG: 500 TABLET ORAL at 02:08

## 2024-08-16 RX ADMIN — POLYETHYLENE GLYCOL 3350 17 G: 17 POWDER, FOR SOLUTION ORAL at 09:08

## 2024-08-16 RX ADMIN — MUPIROCIN 1 G: 20 OINTMENT TOPICAL at 09:08

## 2024-08-16 RX ADMIN — MIRTAZAPINE 15 MG: 15 TABLET, ORALLY DISINTEGRATING ORAL at 09:08

## 2024-08-16 RX ADMIN — POTASSIUM CHLORIDE 20 MEQ: 1500 TABLET, EXTENDED RELEASE ORAL at 06:08

## 2024-08-16 RX ADMIN — FAMOTIDINE 20 MG: 20 TABLET ORAL at 09:08

## 2024-08-16 RX ADMIN — ACETAMINOPHEN 1000 MG: 500 TABLET ORAL at 09:08

## 2024-08-16 RX ADMIN — ASPIRIN 81 MG: 81 TABLET, COATED ORAL at 09:08

## 2024-08-16 RX ADMIN — SENNOSIDES AND DOCUSATE SODIUM 1 TABLET: 50; 8.6 TABLET ORAL at 09:08

## 2024-08-16 RX ADMIN — TAMSULOSIN HYDROCHLORIDE 0.4 MG: 0.4 CAPSULE ORAL at 09:08

## 2024-08-16 RX ADMIN — DAPTOMYCIN 700 MG: 350 INJECTION, POWDER, LYOPHILIZED, FOR SOLUTION INTRAVENOUS at 12:08

## 2024-08-16 RX ADMIN — FUROSEMIDE 20 MG: 10 INJECTION, SOLUTION INTRAVENOUS at 09:08

## 2024-08-16 RX ADMIN — WARFARIN SODIUM 1 MG: 1 TABLET ORAL at 05:08

## 2024-08-16 RX ADMIN — ACETAMINOPHEN 1000 MG: 500 TABLET ORAL at 06:08

## 2024-08-16 RX ADMIN — POTASSIUM & SODIUM PHOSPHATES POWDER PACK 280-160-250 MG 2 PACKET: 280-160-250 PACK at 06:08

## 2024-08-16 RX ADMIN — FUROSEMIDE 20 MG: 10 INJECTION, SOLUTION INTRAVENOUS at 05:08

## 2024-08-16 NOTE — SUBJECTIVE & OBJECTIVE
Interval History/Significant Events: NAEON. Intrinsic rate/rhythm 40s with 3:1 block. He is hemodynamically normal and asymptomatic from this. INR elevated to 4.2 this am.    Follow-up For: Procedure(s) (LRB):  REPLACEMENT, MITRAL VALVE (N/A)  REDO STERNOTOMY (N/A)    Post-Operative Day: 4 Days Post-Op    Objective:     Vital Signs (Most Recent):  Temp: 98.3 °F (36.8 °C) (08/16/24 0701)  Pulse: 61 (08/16/24 0830)  Resp: 19 (08/16/24 0830)  BP: (!) 120/58 (08/16/24 0830)  SpO2: 97 % (08/16/24 0830) Vital Signs (24h Range):  Temp:  [98.2 °F (36.8 °C)-99.5 °F (37.5 °C)] 98.3 °F (36.8 °C)  Pulse:  [59-61] 61  Resp:  [12-42] 19  SpO2:  [92 %-100 %] 97 %  BP: ()/(49-67) 120/58  Arterial Line BP: (155)/(58) 155/58     Weight: 81.5 kg (179 lb 10.8 oz)  Body mass index is 24.37 kg/m².      Intake/Output Summary (Last 24 hours) at 8/16/2024 0916  Last data filed at 8/16/2024 0855  Gross per 24 hour   Intake 553.83 ml   Output 2350 ml   Net -1796.17 ml          Physical Exam  Constitutional:       Appearance: Normal appearance. He is not ill-appearing.   Cardiovascular:      Pulses: Normal pulses.      Comments: Midline sternotomy c/d/i  Paced at 80bpm  V wires in place  4x ChT, 2x medistinal and 2x pleural with SS output      Pulmonary:      Effort: Pulmonary effort is normal. No respiratory distress.   Abdominal:      General: There is no distension.      Palpations: Abdomen is soft.      Tenderness: There is no abdominal tenderness.   Musculoskeletal:      Right lower leg: No edema.      Left lower leg: No edema.   Skin:     General: Skin is warm and dry.   Neurological:      General: No focal deficit present.            Vents:  Vent Mode: Spont (08/13/24 0715)  Ventilator Initiated: Yes (08/12/24 1507)  Set Rate: 16 BPM (08/13/24 0529)  Vt Set: 500 mL (08/13/24 0529)  Pressure Support: 10 cmH20 (08/13/24 0715)  PEEP/CPAP: 5 cmH20 (08/13/24 0715)  Oxygen Concentration (%): 40 (08/13/24 0715)  Peak Airway Pressure: 15  cmH20 (08/13/24 0715)  Plateau Pressure: 0 cmH20 (08/13/24 0715)  Total Ve: 9.35 L/m (08/13/24 0715)  Negative Inspiratory Force (cm H2O): 0 (08/13/24 0715)  F/VT Ratio<105 (RSBI): (!) 49.18 (08/13/24 0715)    Lines/Drains/Airways       Drain  Duration                  Y Chest Tube 1 and 2 08/12/24 0931 1 Right Mediastinal 19 Fr. 2 Left Mediastinal 19 Fr. 3 days         Y Chest Tube 3 and 4 08/12/24 1245 3 Right Pleural 19 Fr. 4 Left Pleural 19 Fr. 3 days              Line  Duration                  Pacer Wires 08/12/24 0931 3 days              Peripheral Intravenous Line  Duration                  Peripheral IV - Single Lumen 08/12/24 0618 18 G Left;Posterior Forearm 4 days         Peripheral IV - Single Lumen 08/16/24 0445 20 G Posterior;Right Hand <1 day                    Significant Labs:    CBC/Anemia Profile:  Recent Labs   Lab 08/15/24  0300 08/16/24 0443   WBC 13.25* 10.10   HGB 7.9* 8.0*   HCT 26.5* 27.4*   PLT 94* 125*   MCV 77* 78*   RDW 19.3* 19.5*        Chemistries:  Recent Labs   Lab 08/15/24  0300 08/15/24  0757 08/15/24  1623 08/15/24  2026 08/16/24  0443     --  141  --  141   K 3.7   < > 3.8 3.5 3.9     --  105  --  103   CO2 25  --  25  --  22*   BUN 14  --  15  --  16   CREATININE 1.1  --  1.2  --  1.1   CALCIUM 8.5*  --  8.8  --  8.9   ALBUMIN 2.9*  --   --   --  2.9*   PROT 5.7*  --   --   --  6.1   BILITOT 0.9  --   --   --  0.7   ALKPHOS 51*  --   --   --  52*   ALT 15  --   --   --  14   AST 48*  --   --   --  34   MG 2.3  --  2.2  --  2.3   PHOS 2.4*  --  3.0  --  2.6*    < > = values in this interval not displayed.     Significant Imaging:  I have reviewed all pertinent imaging results/findings within the past 24 hours.

## 2024-08-16 NOTE — SUBJECTIVE & OBJECTIVE
Interval History: feeling improved today. Still having poor appetite that he relates to antibiotic use. Denies any other new symptoms.     Review of Systems   Constitutional:  Positive for fatigue. Negative for fever.   Gastrointestinal:  Positive for abdominal pain and nausea.     Objective:     Vital Signs (Most Recent):  Temp: 98.8 °F (37.1 °C) (08/16/24 1101)  Pulse: (!) 50 (08/16/24 1245)  Resp: (!) 23 (08/16/24 1245)  BP: (!) 119/58 (08/16/24 1245)  SpO2: 98 % (08/16/24 1245) Vital Signs (24h Range):  Temp:  [98.2 °F (36.8 °C)-99.5 °F (37.5 °C)] 98.8 °F (37.1 °C)  Pulse:  [50-61] 50  Resp:  [12-37] 23  SpO2:  [94 %-100 %] 98 %  BP: ()/(49-65) 119/58     Weight: 81.5 kg (179 lb 10.8 oz)  Body mass index is 24.37 kg/m².    Estimated Creatinine Clearance: 89.2 mL/min (based on SCr of 1.1 mg/dL).     Physical Exam  Vitals and nursing note reviewed.   Constitutional:       Appearance: He is ill-appearing. He is not toxic-appearing.   HENT:      Head: Normocephalic.      Mouth/Throat:      Mouth: Mucous membranes are moist.      Pharynx: Oropharynx is clear.   Pulmonary:      Effort: Pulmonary effort is normal. No respiratory distress.   Abdominal:      General: There is no distension.      Palpations: Abdomen is soft.   Skin:     Comments: Sternal dressing clean and dry   Neurological:      General: No focal deficit present.      Mental Status: He is alert and oriented to person, place, and time.   Psychiatric:         Mood and Affect: Mood normal.         Behavior: Behavior normal.          Significant Labs:   Microbiology Results (last 7 days)       Procedure Component Value Units Date/Time    Culture, Anaerobe [[id number]] Collected: 08/12/24 1238    Order Status: Completed Specimen: Wound from Heart Updated: 08/16/24 0912     Anaerobic Culture Culture in progress    Narrative:      MITRAL ANULAR BAND    Aerobic culture [[id number]] Collected: 08/12/24 1238    Order Status: Completed Specimen: Wound from  Heart Updated: 08/15/24 1303     Aerobic Bacterial Culture No growth    Narrative:      MITRAL ANULAR BAND    AFB Culture & Smear [[id number]] Collected: 08/12/24 1238    Order Status: Completed Specimen: Wound from Heart Updated: 08/13/24 2127     AFB Culture & Smear Culture in progress     AFB CULTURE STAIN No acid fast bacilli seen.    Narrative:      MITRAL ANULAR BAND    Gram stain [[id number]] Collected: 08/12/24 1238    Order Status: Completed Specimen: Wound from Heart Updated: 08/12/24 2216     Gram Stain Result Rare WBC's      No organisms seen    Narrative:      MITRAL ANULAR BAND    Fungus culture [[id number]] Collected: 08/12/24 1238    Order Status: Sent Specimen: Wound from Heart Updated: 08/12/24 1253            Significant Imaging: I have reviewed all pertinent imaging results/findings within the past 24 hours.

## 2024-08-16 NOTE — PROGRESS NOTES
[facility]  Critical Care - Surgery  Progress Note    Patient Name: Lorenzo Nino  MRN: [id number]  Admission Date: 8/12/2024  Hospital Length of Stay: 4 days  Code Status: Prior  Attending Provider: Manuel Beal MD  Primary Care Provider: No, Primary Doctor   Principal Problem: Endocarditis    Subjective:     Hospital/ICU Course:  No notes on file    Interval History/Significant Events: NAEON. Intrinsic rate/rhythm 40s with 3:1 block. He is hemodynamically normal and asymptomatic from this. INR elevated to 4.2 this am.    Follow-up For: Procedure(s) (LRB):  REPLACEMENT, MITRAL VALVE (N/A)  REDO STERNOTOMY (N/A)    Post-Operative Day: 4 Days Post-Op    Objective:     Vital Signs (Most Recent):  Temp: 98.3 °F (36.8 °C) (08/16/24 0701)  Pulse: 61 (08/16/24 0830)  Resp: 19 (08/16/24 0830)  BP: (!) 120/58 (08/16/24 0830)  SpO2: 97 % (08/16/24 0830) Vital Signs (24h Range):  Temp:  [98.2 °F (36.8 °C)-99.5 °F (37.5 °C)] 98.3 °F (36.8 °C)  Pulse:  [59-61] 61  Resp:  [12-42] 19  SpO2:  [92 %-100 %] 97 %  BP: ()/(49-67) 120/58  Arterial Line BP: (155)/(58) 155/58     Weight: 81.5 kg (179 lb 10.8 oz)  Body mass index is 24.37 kg/m².      Intake/Output Summary (Last 24 hours) at 8/16/2024 0916  Last data filed at 8/16/2024 0855  Gross per 24 hour   Intake 553.83 ml   Output 2350 ml   Net -1796.17 ml          Physical Exam  Constitutional:       Appearance: Normal appearance. He is not ill-appearing.   Cardiovascular:      Pulses: Normal pulses.      Comments: Midline sternotomy c/d/i  Paced at 80bpm  V wires in place  4x ChT, 2x medistinal and 2x pleural with SS output      Pulmonary:      Effort: Pulmonary effort is normal. No respiratory distress.   Abdominal:      General: There is no distension.      Palpations: Abdomen is soft.      Tenderness: There is no abdominal tenderness.   Musculoskeletal:      Right lower leg: No edema.      Left lower leg: No edema.   Skin:     General: Skin  is warm and dry.   Neurological:      General: No focal deficit present.            Vents:  Vent Mode: Spont (08/13/24 0715)  Ventilator Initiated: Yes (08/12/24 1507)  Set Rate: 16 BPM (08/13/24 0529)  Vt Set: 500 mL (08/13/24 0529)  Pressure Support: 10 cmH20 (08/13/24 0715)  PEEP/CPAP: 5 cmH20 (08/13/24 0715)  Oxygen Concentration (%): 40 (08/13/24 0715)  Peak Airway Pressure: 15 cmH20 (08/13/24 0715)  Plateau Pressure: 0 cmH20 (08/13/24 0715)  Total Ve: 9.35 L/m (08/13/24 0715)  Negative Inspiratory Force (cm H2O): 0 (08/13/24 0715)  F/VT Ratio<105 (RSBI): (!) 49.18 (08/13/24 0715)    Lines/Drains/Airways       Drain  Duration                  Y Chest Tube 1 and 2 08/12/24 0931 1 Right Mediastinal 19 Fr. 2 Left Mediastinal 19 Fr. 3 days         Y Chest Tube 3 and 4 08/12/24 1245 3 Right Pleural 19 Fr. 4 Left Pleural 19 Fr. 3 days              Line  Duration                  Pacer Wires 08/12/24 0931 3 days              Peripheral Intravenous Line  Duration                  Peripheral IV - Single Lumen 08/12/24 0618 18 G Left;Posterior Forearm 4 days         Peripheral IV - Single Lumen 08/16/24 0445 20 G Posterior;Right Hand <1 day                    Significant Labs:    CBC/Anemia Profile:  Recent Labs   Lab 08/15/24  0300 08/16/24  0443   WBC 13.25* 10.10   HGB 7.9* 8.0*   HCT 26.5* 27.4*   PLT 94* 125*   MCV 77* 78*   RDW 19.3* 19.5*        Chemistries:  Recent Labs   Lab 08/15/24  0300 08/15/24  0757 08/15/24  1623 08/15/24  2026 08/16/24  0443     --  141  --  141   K 3.7   < > 3.8 3.5 3.9     --  105  --  103   CO2 25  --  25  --  22*   BUN 14  --  15  --  16   CREATININE 1.1  --  1.2  --  1.1   CALCIUM 8.5*  --  8.8  --  8.9   ALBUMIN 2.9*  --   --   --  2.9*   PROT 5.7*  --   --   --  6.1   BILITOT 0.9  --   --   --  0.7   ALKPHOS 51*  --   --   --  52*   ALT 15  --   --   --  14   AST 48*  --   --   --  34   MG 2.3  --  2.2  --  2.3   PHOS 2.4*  --  3.0  --  2.6*    < > = values in this  interval not displayed.     Significant Imaging:  I have reviewed all pertinent imaging results/findings within the past 24 hours.  Assessment/Plan:     Cardiac/Vascular  Severe mitral regurgitation  Lorenzo Nino is a 48 y.o. 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 He presents to the SICU s/p resternotomy and mechanical MVR with Dr. Beal on 8/12.      Neuro/Psych:     - Sedation: off    - Pain:     - Scheduled Tylenol 1000mg Q8H   - PRN Oxycodone 5mg/10mg    - Psych: home med mirtazipine 15 qd             Cardiac:     - S/p redo MVR with Dr. Beal    - BP Goal: MAP 60-80    - Inotropes/Pressors: Weaned off    - Anti-HTNs: weaned off cleviprex    - Rhythm: paced at 50 (native rhythm 3:1 block 40 bpm)   -- EP consulted, recommend slow wean of pacing   -- home amiodarone 200mg qd held in the setting of native bradycardia    - Beta Blocker: held in the setting of native bradycardia   -- Home medication toprol XL 25 qd    - Statin: n/a      Pulmonary:     - Goal SpO2 >92%    - Chest Tubes x 4 (2 Meds & 2 Pleural)   - removal of meds 8/16     Renal:    - Baseline Cr 1.6-2    - Trend BUN/Cr         - Lasix 20 BID with appropriate diuresis    Recent Labs   Lab 08/15/24  0300 08/15/24  1623 08/16/24  0443   BUN 14 15 16   CREATININE 1.1 1.2 1.1         FEN / GI:     - Daily CMP, PRN K/Mag/Phos per protocol    - Replace electrolytes as needed    - Nutrition: advanced to cardiac regular diet    - Bowel Regimen: Miralax, docusate      ID:     - Aebrile    - Abx:   - ID consulted, appreciate recs. Daptomycin postoperatively pending OR culture results.  - Completed perioperative cefazolin 2g Q8H x 5 doses   - F/u OR cultures, NGTD    Recent Labs   Lab 08/14/24  0255 08/15/24  0300 08/16/24  0443   WBC 15.42* 13.25* 10.10         Heme/Onc:     - Hgb 11.4 pre-operatively  Products:   - 2 FFP intra-op     - S/p 1u  pRBC postop 8/13    - Heparin gtt initiated for mechanical valve thromboprophylaxis bridge - goal PTT 60-80   - Discontinue 8/15 given therapeutic on warfarin    - Warfarin 5mg initiated 8/14 for mechanical valve thromboprophylaxis - goal INR 2.5-3.5    - Daily CBC, PTT, PT/INR    - ASA 325mg daily    Recent Labs   Lab 08/14/24  0255 08/14/24  0821 08/15/24  0227 08/15/24  0300 08/15/24  0458 08/16/24  0443   HGB 8.0*  --   --  7.9*  --  8.0*   PLT 89*  --   --  94*  --  125*   APTT 50.6*   < > 76.5*  --  76.3* 62.9*   INR 1.2  --   --   --  3.2* 4.3*    < > = values in this interval not displayed.         Endocrine:     - CTS Goal -140    - HgbA1c: 5.4    - Novolog (Insulin Aspart) prn for BG excursions MDC SSI (150/25)    - BG checks AC/HS    - Endocrinology consulted for insulin management      PPx:   Feeding: Cardiac regular diet  Analgesia/Sedation: n/a/sched tylenol, oxy 5/10 and dilaudid 0.5 PRN  Thromboembolic Prevention: SCDs, warfarin  HOB >30: Yes  Stress Ulcer: Yes - famotidine 20mg PO BID  Glucose Control: Yes, insulin management per Endocrinology     Lines/Drains/Airway:   Chest Tubes: 4 (2 Mediastinal (d/c 8/16) and 2 pleural)    Pacing Wires: Temporary V pacing wires      Dispo/Code Status/Palliative:     - Continue SICU Care    - Full Code           Esperanza Lantigua MD  Critical Care - Surgery  [facility]

## 2024-08-16 NOTE — H&P
[facility]  Critical Care - Surgery  History & Physical    Patient Name: Lorenzo Nino  MRN: [id number]  Admission Date: 8/12/2024  Code Status: Prior  Attending Physician: Manuel Beal MD   Primary Care Provider: No, Primary Doctor   Principal Problem: Endocarditis    Subjective:     HPI:  Lorenzo Nino is a 48 y.o. 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 He now presents with severe MR, mod pHTN (PASP 54), CKD3 (baseline Cr 1.6-2.0), and Afib (amiodarone, metoprolol, Eliquis).     He presents to the SICU s/p resternotomy and mechanical MVR with Dr. Beal. On admission, they are intubated, sedated with propofol, and in stable condition. Inotropic and vasopressor requirements upon admission are 0.06 mcg/kg/min of epinephrine, 0.1 mcg/kg/min of norepinephrine to maintain blood pressure at a MAP 60-80 and SBP < 140. Central access includes a RIJ trialysis and RIJ quad lumen. Arterial access includes a Left arterial line. He also has 2x pleural and 2x mediastinal chest tubes with serosanguinous output.    Intraoperatively, the patient received 3 of crystalloid, 900 of cell saver, 2 FFP. Urine output intraoperatively was recorded as 600cc. Intra-op he required significant vasopressor and inotropic support while on bypass, however requirements tapered once  from bypass. Pre-operative echocardiogram was notable for severe MR and severely dilated left atrium. Post-operative echo demonstrated no residual disease, normal RV function, and EF 40-45%.    Immediate post-operative plans include hemodynamic stabilization and weaning of cardiac and respiratory support. Plan to wean vasopressors and inotropes as tolerated, wean ventilator support with goal of early extubation, and closely monitor fluid status with strict Is/Os and continued fluid resuscitation as  needed.      Hospital/ICU Course:  No notes on file    Interval History/Significant Events: NAEON. Intrinsic rate/rhythm 40s with 3:1 block. He is hemodynamically normal and asymptomatic from this. INR elevated to 4.2 this am.    Follow-up For: Procedure(s) (LRB):  REPLACEMENT, MITRAL VALVE (N/A)  REDO STERNOTOMY (N/A)    Post-Operative Day: 4 Days Post-Op    Objective:     Vital Signs (Most Recent):  Temp: 98.3 °F (36.8 °C) (08/16/24 0701)  Pulse: 61 (08/16/24 0830)  Resp: 19 (08/16/24 0830)  BP: (!) 120/58 (08/16/24 0830)  SpO2: 97 % (08/16/24 0830) Vital Signs (24h Range):  Temp:  [98.2 °F (36.8 °C)-99.5 °F (37.5 °C)] 98.3 °F (36.8 °C)  Pulse:  [59-61] 61  Resp:  [12-42] 19  SpO2:  [92 %-100 %] 97 %  BP: ()/(49-67) 120/58  Arterial Line BP: (155)/(58) 155/58     Weight: 81.5 kg (179 lb 10.8 oz)  Body mass index is 24.37 kg/m².      Intake/Output Summary (Last 24 hours) at 8/16/2024 0916  Last data filed at 8/16/2024 0855  Gross per 24 hour   Intake 553.83 ml   Output 2350 ml   Net -1796.17 ml          Physical Exam  Constitutional:       Appearance: Normal appearance. He is not ill-appearing.   Cardiovascular:      Pulses: Normal pulses.      Comments: Midline sternotomy c/d/i  Paced at 80bpm  V wires in place  4x ChT, 2x medistinal and 2x pleural with SS output      Pulmonary:      Effort: Pulmonary effort is normal. No respiratory distress.   Abdominal:      General: There is no distension.      Palpations: Abdomen is soft.      Tenderness: There is no abdominal tenderness.   Musculoskeletal:      Right lower leg: No edema.      Left lower leg: No edema.   Skin:     General: Skin is warm and dry.   Neurological:      General: No focal deficit present.            Vents:  Vent Mode: Spont (08/13/24 0715)  Ventilator Initiated: Yes (08/12/24 1507)  Set Rate: 16 BPM (08/13/24 0529)  Vt Set: 500 mL (08/13/24 0529)  Pressure Support: 10 cmH20 (08/13/24 0715)  PEEP/CPAP: 5 cmH20 (08/13/24 0715)  Oxygen  Concentration (%): 40 (08/13/24 0715)  Peak Airway Pressure: 15 cmH20 (08/13/24 0715)  Plateau Pressure: 0 cmH20 (08/13/24 0715)  Total Ve: 9.35 L/m (08/13/24 0715)  Negative Inspiratory Force (cm H2O): 0 (08/13/24 0715)  F/VT Ratio<105 (RSBI): (!) 49.18 (08/13/24 0715)    Lines/Drains/Airways       Drain  Duration                  Y Chest Tube 1 and 2 08/12/24 0931 1 Right Mediastinal 19 Fr. 2 Left Mediastinal 19 Fr. 3 days         Y Chest Tube 3 and 4 08/12/24 1245 3 Right Pleural 19 Fr. 4 Left Pleural 19 Fr. 3 days              Line  Duration                  Pacer Wires 08/12/24 0931 3 days              Peripheral Intravenous Line  Duration                  Peripheral IV - Single Lumen 08/12/24 0618 18 G Left;Posterior Forearm 4 days         Peripheral IV - Single Lumen 08/16/24 0445 20 G Posterior;Right Hand <1 day                    Significant Labs:    CBC/Anemia Profile:  Recent Labs   Lab 08/15/24  0300 08/16/24  0443   WBC 13.25* 10.10   HGB 7.9* 8.0*   HCT 26.5* 27.4*   PLT 94* 125*   MCV 77* 78*   RDW 19.3* 19.5*        Chemistries:  Recent Labs   Lab 08/15/24  0300 08/15/24  0757 08/15/24  1623 08/15/24  2026 08/16/24  0443     --  141  --  141   K 3.7   < > 3.8 3.5 3.9     --  105  --  103   CO2 25  --  25  --  22*   BUN 14  --  15  --  16   CREATININE 1.1  --  1.2  --  1.1   CALCIUM 8.5*  --  8.8  --  8.9   ALBUMIN 2.9*  --   --   --  2.9*   PROT 5.7*  --   --   --  6.1   BILITOT 0.9  --   --   --  0.7   ALKPHOS 51*  --   --   --  52*   ALT 15  --   --   --  14   AST 48*  --   --   --  34   MG 2.3  --  2.2  --  2.3   PHOS 2.4*  --  3.0  --  2.6*    < > = values in this interval not displayed.     Significant Imaging:  I have reviewed all pertinent imaging results/findings within the past 24 hours.  Assessment/Plan:     Cardiac/Vascular  Severe mitral regurgitation  Lorenzo Nino is a 48 y.o. male w/ a significant PMHx of endocarditis s/p MV repair and ROMAN resection 11/2023 during active  infection which subsequently led to valve destruction. He presents to the SICU s/p resternotomy and mechanical MVR with Dr. Beal on 8/12.      Neuro/Psych:     - Sedation: off    - Pain:     - Scheduled Tylenol 1000mg Q8H   - PRN Oxycodone 5mg/10mg    - Psych: home med mirtazipine 15 qd             Cardiac:     - S/p redo MVR with Dr. Beal    - BP Goal: MAP 60-80    - Inotropes/Pressors: Weaned off    - Anti-HTNs: weaned off cleviprex    - Rhythm: paced at 50 (native rhythm 3:1 block 40 bpm)   -- EP consulted, recommend slow wean of pacing   -- home amiodarone 200mg qd held in the setting of native bradycardia    - Beta Blocker: held in the setting of native bradycardia   -- Home medication toprol XL 25 qd    - Statin: n/a      Pulmonary:     - Goal SpO2 >92%    - Chest Tubes x 4 (2 Meds & 2 Pleural)   - removal of meds 8/16     Renal:    - Baseline Cr 1.6-2    - Trend BUN/Cr         - Lasix 20 BID with appropriate diuresis    Recent Labs   Lab 08/15/24  0300 08/15/24  1623 08/16/24  0443   BUN 14 15 16   CREATININE 1.1 1.2 1.1         FEN / GI:     - Daily CMP, PRN K/Mag/Phos per protocol    - Replace electrolytes as needed    - Nutrition: advanced to cardiac regular diet    - Bowel Regimen: Miralax, docusate      ID:     - Aebrile    - Abx:   - ID consulted, appreciate recs. Daptomycin postoperatively pending OR culture results.  - Completed perioperative cefazolin 2g Q8H x 5 doses   - F/u OR cultures, NGTD    Recent Labs   Lab 08/14/24  0255 08/15/24  0300 08/16/24  0443   WBC 15.42* 13.25* 10.10         Heme/Onc:     - Hgb 11.4 pre-operatively  Products:   - 2 FFP intra-op     - S/p 1u pRBC postop 8/13    - Heparin gtt initiated for mechanical valve thromboprophylaxis bridge - goal PTT 60-80   - Discontinue 8/15 given therapeutic on warfarin    - Warfarin 5mg initiated 8/14 for mechanical valve thromboprophylaxis - goal INR 2.5-3.5    - Daily CBC, PTT, PT/INR    - ASA 325mg daily    Recent Labs   Lab  08/14/24  0255 08/14/24  0821 08/15/24  0227 08/15/24  0300 08/15/24  0458 08/16/24  0443   HGB 8.0*  --   --  7.9*  --  8.0*   PLT 89*  --   --  94*  --  125*   APTT 50.6*   < > 76.5*  --  76.3* 62.9*   INR 1.2  --   --   --  3.2* 4.3*    < > = values in this interval not displayed.         Endocrine:     - CTS Goal -140    - HgbA1c: 5.4    - Novolog (Insulin Aspart) prn for BG excursions MDC SSI (150/25)    - BG checks AC/HS    - Endocrinology consulted for insulin management      PPx:   Feeding: Cardiac regular diet  Analgesia/Sedation: n/a/sched tylenol, oxy 5/10 and dilaudid 0.5 PRN  Thromboembolic Prevention: SCDs, warfarin  HOB >30: Yes  Stress Ulcer: Yes - famotidine 20mg PO BID  Glucose Control: Yes, insulin management per Endocrinology     Lines/Drains/Airway:   Chest Tubes: 4 (2 Mediastinal (d/c 8/16) and 2 pleural)    Pacing Wires: Temporary V pacing wires      Dispo/Code Status/Palliative:     - Continue SICU Care    - Full Code        Esperanza Lantigua MD  Critical Care - Surgery  [facility]

## 2024-08-16 NOTE — PLAN OF CARE
SICU PLAN OF CARE    Dx: Endocarditis    Goals of Care: MAP 60-80    Vital Signs (last 12 hours):   Temp:  [98.2 °F (36.8 °C)-99 °F (37.2 °C)]   Pulse:  [59-61]   Resp:  [12-30]   BP: ()/(51-65)   SpO2:  [94 %-100 %]      Neuro: AAOx4, Follows commands , and Moves all extremities spontaneously     Cardiac:  V paced @ 60    Respiratory: Room Air    Gtts: N/A    Urine Output: Voids Spontaneously  1300 mL/shift    Drains: Chest Tube, total output 100mL/shift    Diet: Cardiac  and 1500mL Fluid Restriction    Skin:  Midline incision GABRIEL with steri strips CDI. Patient turns independently, bony prominences protected, and mattress inflated/working correctly.     Shift Events:  No acute events this shift. Patient slept well overnight, OOBTC for day shift, sternal precautions maintained. Oxy 5 given once.      See flowsheet for further assessment/details. Family updated on current condition/plan of care, questions answered, and emotional support provided. MD updated on current condition, vitals, labs, and gtts.

## 2024-08-16 NOTE — PT/OT/SLP PROGRESS
Occupational Therapy   Co-Treatment    Name: Lorenzo Nino  MRN: [id number]  Admitting Diagnosis:  Endocarditis  4 Days Post-Op    Recommendations:     Discharge Recommendations: Low Intensity Therapy  Discharge Equipment Recommendations:  none  Barriers to discharge:  None    Assessment:     Lorenzo Nino is a 48 y.o. male with a medical diagnosis of Endocarditis.  He presents cooperative and motivated. Performance deficits affecting function are weakness, gait instability, decreased upper extremity function, impaired cardiopulmonary response to activity, impaired endurance, impaired balance, impaired self care skills, impaired functional mobility, decreased coordination, pain. Pt benefits from co-treatment with PT to accommodate pt's activity tolerance and progression with therapy.      Rehab Prognosis:  Good; patient would benefit from acute skilled OT services to address these deficits and reach maximum level of function.       Plan:     Patient to be seen 5 x/week to address the above listed problems via self-care/home management, therapeutic activities, therapeutic exercises  Plan of Care Expires: 09/13/24  Plan of Care Reviewed with: patient    Subjective     Chief Complaint: denies  Patient/Family Comments/goals: to get better and go home  Pain/Comfort:  Pain Rating 1: 2/10  Location - Side 1: Bilateral  Location - Orientation 1: generalized  Location 1: sternal  Pain Addressed 1: Reposition, Distraction  Pain Rating Post-Intervention 1: 2/10    Objective:     Communicated with: RN prior to session.  Patient found up in chair with blood pressure cuff, pulse ox (continuous), telemetry, chest tube, external pacer, peripheral IV upon OT entry to room.    General Precautions: Standard, fall, sternal    Orthopedic Precautions:N/A  Braces: N/A  Respiratory Status: Room air     Occupational Performance:     Bed Mobility:    NT as pt UIC     Functional Mobility/Transfers:  Patient completed Sit <> Stand Transfer  with stand by assistance  with  no assistive device   Functional Mobility: SBA to/from bathroom and in hallway to simulate HH distances    Activities of Daily Living:  Grooming: independence for tasks with SBA provided for static standing  Upper Body Dressing: minimum assistance    Lower Body Dressing: minimum assistance        AMPAC 6 Click ADL: 20    Treatment & Education:  Pt ed on OT POC  Pt recalled 3/3 sternal precautions  Pt ed on PLB and pacing activities 2* easy fatigability  Pt ed on ROM ex's daily for increased overall strength and endurance to reduce risk of hospital acquired weakness  Pt ed on safety with ADL and fall risk  Reinforced use of call button to contact nursing staff for assistance with mobility    Patient left up in chair with all lines intact, call button in reach, RN notified, and spouse present    GOALS:   Multidisciplinary Problems       Occupational Therapy Goals          Problem: Occupational Therapy    Goal Priority Disciplines Outcome Interventions   Occupational Therapy Goal     OT, PT/OT Progressing    Description: Goals to be met by: 9/6/2024     Patient will increase functional independence with ADLs by performing:    UE Dressing with Set-up Assistance.  LE Dressing with Supervision.  Grooming while standing at sink with Modified [place].  Toileting from toilet with Modified [place] for hygiene and clothing management.   Supine to sit with Stand-by Assistance.  Stand pivot transfers with Modified [place].  Toilet transfer to toilet with Modified [place].                         Time Tracking:     OT Date of Treatment: 08/16/24  OT Start Time: 0813  OT Stop Time: 0825  OT Total Time (min): 12 min    Billable Minutes:Self Care/Home Management 12               8/16/2024

## 2024-08-16 NOTE — PLAN OF CARE
SICU PLAN OF CARE NOTE    Dx: Endocarditis      Goals of Care: MAP 60-80    Neuro: AAO x4, Follows Commands, and Moves All Extremities    Vital Signs: BP (!) 114/53   Pulse (!) 50   Temp 99 °F (37.2 °C) (Oral)   Resp 18   Ht 6' (1.829 m)   Wt 81.5 kg (179 lb 10.8 oz)   SpO2 (!) 94%   BMI 24.37 kg/m²     Respiratory: Room Air    Diet: Cardiac Diet    Urine Output: Voids Spontaneously 1105 cc/shift    Drains: Chest Tube, total output 30 cc /  shift     Labs/Accuchecks: AC/HS    Skin: Patient able to turn/move independently, no evidence of new skin breakdown throughout shift. Mid sternal incision GABRIEL with steri strips, CDI.

## 2024-08-16 NOTE — ASSESSMENT & PLAN NOTE
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 He was readmitted 12/2023 for anterior leaflet tear. He completed course of daptomycin 1/8/24 and then started on suppressive doxycycline until admission for mitral valve replacement 8/12. Mitral annular band culture, no growth finalized. Pathology does demonstrate acute endocarditis, however histopathologic evidence of bacteria within valve tissue in the setting of negative tissue culture likely represents killed organisms and is not an indication to prolong antibiotic therapy.     Recommendations  - stop daptomycin in the setting of negative final cultures   - will sign off, please call with questions

## 2024-08-16 NOTE — PT/OT/SLP PROGRESS
Physical Therapy Co-Treatment    Patient Name:  Lorenzo Nino   MRN:  [id number]  Admitting Diagnosis:  Endocarditis   Recent Surgery: Procedure(s) (LRB):  REPLACEMENT, MITRAL VALVE (N/A)  REDO STERNOTOMY (N/A) 4 Days Post-Op  Admit Date: 8/12/2024  Length of Stay: 4 days    Recommendations:     Discharge Recommendations:  Low Intensity Therapy    Discharge Equipment Recommendations: none   Barriers to discharge: None    Plan:     During this hospitalization, patient to be seen 5 x/week to address the identified rehab impairments via gait training, therapeutic activities, therapeutic exercises, neuromuscular re-education and progress towards the established goals.  Plan of Care Expires:  09/12/24  Plan of Care Reviewed with: patient, family    Assessment:     Lorenzo Nino is a 48 y.o. male admitted with a medical diagnosis of Endocarditis. Pt found upright in chair, agreeable to therapy session. Pt able to recall 3/3 sternal precautions with no cues for compliance required. Pt initially required no physical assistance during gait trial but with some instability with fatigue during second half of trial requiring CGA for safety. Pt did not increase distance gait trained from previous session 2/2 fatigue. Pt remains limited in functional mobility due to decreased endurance, decreased activity tolerance and impaired balance. Patient would benefit from skilled therapy services to maximize safety and independence, increase activity tolerance, decrease fall risk, decrease caregiver burden, improve QOL, improve patient's functional mobility, and decrease risk of contractures and pressure sores.  Patient continues to demonstrate the need for low intensity therapy on a scheduled basis exhibited by decreased independence with functional mobility    Problem List: weakness, impaired endurance, impaired self care skills, impaired functional mobility, gait instability, impaired balance, decreased safety awareness, pain,  impaired cardiopulmonary response to activity, decreased upper extremity function, decreased lower extremity function.  Rehab Prognosis: Good; patient would benefit from acute skilled PT services to address these deficits and reach maximum level of function.      Goals:   Multidisciplinary Problems       Physical Therapy Goals          Problem: Physical Therapy    Goal Priority Disciplines Outcome Goal Variances Interventions   Physical Therapy Goal     PT, PT/OT Progressing     Description: Goals to be met by: 2024     Patient will increase functional independence with mobility by performin. Sit to stand transfer with Modified [place]  2. Gait  x 250 feet with Supervision using physician approved AD prn.   3. Lower extremity exercise program x30 reps per handout, with independence  4. Recite 3/3 sternal precautions and remain complaint with precautions throughout session with no verbal cues                         Subjective     RN notified prior to session. Family present upon PT entrance into room. Patient agreeable to PT treatment session.    Chief Complaint: fatigue  Patient/Family Comments/goals: none stated  Pain/Comfort:  Pain Rating 1: 2/10  Location - Orientation 1: generalized  Location 1: sternal  Pain Addressed 1: Reposition, Distraction      Objective:     Additional staff present: OT; OT for cotx due to pt's multiple medical comorbidities and functional deficits req'ing two skilled therapists to appropriately maximize functional potential by progressing pt's musculoskeletal strength and endurance, facilitating neuromuscular postural balance and control ,and accommodating for patient's impaired cardiopulmonary tolerance to activities.     Patient found up in chair with: blood pressure cuff, telemetry, pulse ox (continuous), external pacer, chest tube, peripheral IV   Cognition:   Alert, Cooperative, and Flat affect  Patient is oriented to Person, Place, Time, Situation  General  Precautions: Standard, Cardiac fall, sternal   Orthopedic Precautions:N/A   Braces: N/A   Body mass index is 24.37 kg/m².  Oxygen Device: Room Air  Vitals: BP (!) 122/57   Pulse (!) 50   Temp 98.3 °F (36.8 °C) (Oral)   Resp (!) 30   Ht 6' (1.829 m)   Wt 81.5 kg (179 lb 10.8 oz)   SpO2 (!) 94%   BMI 24.37 kg/m²     Outcome Measures:  AM-PAC 6 CLICK MOBILITY  Turning over in bed (including adjusting bedclothes, sheets and blankets)?: 3  Sitting down on and standing up from a chair with arms (e.g., wheelchair, bedside commode, etc.): 3  Moving from lying on back to sitting on the side of the bed?: 3  Moving to and from a bed to a chair (including a wheelchair)?: 3  Need to walk in hospital room?: 3  Climbing 3-5 steps with a railing?: 3  Basic Mobility Total Score: 18     Functional Mobility:    Bed Mobility:   Pt found/returned to bedside chair    Transfers:   Sit <> Stand Transfer: stand by assistance with no assistive device     Balance:  Standing:  Static: stand by assistance  Dynamic: stand by assistance and contact guard assistance      Gait:  Patient ambulated: 112'    Patient required:  stand by assistance with occasional contact guard assistance required  Patient used:  no assistive device   Gait Deviation(s): occasional unsteady gait, decreased step length, narrow base of support, decreased kayode, and decreased arm swing  Portable monitor utilized  all lines remained intact throughout ambulation trial  Nurse present for vital sign monitoring  Comments: Patient required cues for upright stance and wide TAY. Pt with increasing unsteadiness during second half of trial with fatigue.     Education:  Time provided for education, counseling and discussion of health disposition in regards to patient's current status  All questions answered within PT scope of practice and to patient's satisfaction  PT role in POC to address current functional deficits  Call nursing/pct to transfer to chair/use bathroom. Pt  stated understanding.    Patient left up in chair with all lines intact, call button in reach, RN notified, and family present.    Time Tracking:     PT Received On: 08/16/24  PT Start Time: 0813     PT Stop Time: 0826  PT Total Time (min): 13 min     Billable Minutes:   Gait Training 10 minutes       PT/PTA: PT       8/16/2024

## 2024-08-16 NOTE — ASSESSMENT & PLAN NOTE
Lorenzo Nino is a 48 y.o. male w/ a significant PMHx of endocarditis s/p MV repair and ROAMN resection 11/2023 during active infection which subsequently led to valve destruction. He presents to the SICU s/p resternotomy and mechanical MVR with Dr. Beal on 8/12.      Neuro/Psych:     - Sedation: off    - Pain:     - Scheduled Tylenol 1000mg Q8H   - PRN Oxycodone 5mg/10mg    - Psych: home med mirtazipine 15 qd             Cardiac:     - S/p redo MVR with Dr. Beal    - BP Goal: MAP 60-80    - Inotropes/Pressors: Weaned off    - Anti-HTNs: weaned off cleviprex    - Rhythm: paced at 50 (native rhythm 3:1 block 40 bpm)   -- EP consulted, recommend slow wean of pacing   -- home amiodarone 200mg qd held in the setting of native bradycardia    - Beta Blocker: held in the setting of native bradycardia   -- Home medication toprol XL 25 qd    - Statin: n/a      Pulmonary:     - Goal SpO2 >92%    - Chest Tubes x 4 (2 Meds & 2 Pleural)   - removal of meds 8/16     Renal:    - Baseline Cr 1.6-2    - Trend BUN/Cr         - Lasix 20 BID with appropriate diuresis    Recent Labs   Lab 08/15/24  0300 08/15/24  1623 08/16/24  0443   BUN 14 15 16   CREATININE 1.1 1.2 1.1         FEN / GI:     - Daily CMP, PRN K/Mag/Phos per protocol    - Replace electrolytes as needed    - Nutrition: advanced to cardiac regular diet    - Bowel Regimen: Miralax, docusate      ID:     - Aebrile    - Abx:   - ID consulted, appreciate recs. Daptomycin postoperatively pending OR culture results.  - Completed perioperative cefazolin 2g Q8H x 5 doses   - F/u OR cultures, NGTD    Recent Labs   Lab 08/14/24  0255 08/15/24  0300 08/16/24  0443   WBC 15.42* 13.25* 10.10         Heme/Onc:     - Hgb 11.4 pre-operatively  Products:   - 2 FFP intra-op     - S/p 1u pRBC postop 8/13    - Heparin gtt initiated for mechanical valve thromboprophylaxis bridge - goal PTT 60-80   - Discontinue 8/15 given therapeutic on warfarin    - Warfarin 5mg initiated 8/14 for  mechanical valve thromboprophylaxis - goal INR 2.5-3.5    - Daily CBC, PTT, PT/INR    - ASA 325mg daily    Recent Labs   Lab 08/14/24  0255 08/14/24  0821 08/15/24  0227 08/15/24  0300 08/15/24  0458 08/16/24  0443   HGB 8.0*  --   --  7.9*  --  8.0*   PLT 89*  --   --  94*  --  125*   APTT 50.6*   < > 76.5*  --  76.3* 62.9*   INR 1.2  --   --   --  3.2* 4.3*    < > = values in this interval not displayed.         Endocrine:     - CTS Goal -140    - HgbA1c: 5.4    - Novolog (Insulin Aspart) prn for BG excursions [facility] SSI (150/25)    - BG checks AC/HS    - Endocrinology consulted for insulin management      PPx:   Feeding: Cardiac regular diet  Analgesia/Sedation: n/a/sched tylenol, oxy 5/10 and dilaudid 0.5 PRN  Thromboembolic Prevention: SCDs, warfarin  HOB >30: Yes  Stress Ulcer: Yes - famotidine 20mg PO BID  Glucose Control: Yes, insulin management per Endocrinology     Lines/Drains/Airway:   Chest Tubes: 4 (2 Mediastinal (d/c 8/16) and 2 pleural)    Pacing Wires: Temporary V pacing wires      Dispo/Code Status/Palliative:     - Continue SICU Care    - Full Code

## 2024-08-16 NOTE — PROGRESS NOTES
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 He was readmitted 12/2023 for anterior leaflet tear. He completed course of daptomycin 1/8/24 and then started on suppressive doxycycline until admission for mitral valve replacement 8/12. Mitral annular band culture, no growth finalized. Pathology does demonstrate acute endocarditis, however histopathologic evidence of bacteria within valve tissue in the setting of negative tissue culture likely represents killed organisms and is not an indication to prolong antibiotic therapy.     Recommendations  - stop daptomycin in the setting of negative final cultures   - will sign off, please call with questions       Above discussed with primary team.     Time: 50 minutes   50% of time spent on face-to-face counseling and coordination of care. Counseling included review of test results, diagnosis, and treatment plan with patient and/or family.  I have reviewed hospital notes from CTS service and other specialty providers as well as outside medical records. I have also reviewed CBC, CMP/BMP,  cultures and imaging with my interpretation as documented. Patient is high risk of morbidity, on antibiotics requiring intensive monitoring for toxicity.     Anticipated Disposition: TBD    Thank you for your consult. I will sign off. Please contact us if you have any additional questions.    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 Beal, embolus to R eye (treated with dapto/ceftaroline), HFmrEF (45-50%) presented due to CHF exacerbation symptoms. He was also found to have atrial fibrillation s/p cardioversion and started on anticoagulation. Had resternotomy and mechanical MVR done on 08/12/2024. Has completed eight week course of Daptomycin and prophylactic Doxycycline since 11/2023. Infectious disease consulted for further management.  Interval History: feeling improved today. Still having poor appetite that he relates to antibiotic use. Denies any other new symptoms.     Review of Systems   Constitutional:  Positive for fatigue. Negative for fever.   Gastrointestinal:  Positive for abdominal pain and nausea.     Objective:     Vital Signs (Most Recent):  Temp: 98.8 °F (37.1 °C) (08/16/24 1101)  Pulse: (!) 50 (08/16/24 1245)  Resp: (!) 23 (08/16/24 1245)  BP: (!) 119/58 (08/16/24 1245)  SpO2: 98 % (08/16/24 1245) Vital Signs (24h Range):  Temp:  [98.2 °F (36.8 °C)-99.5 °F (37.5 °C)] 98.8 °F (37.1 °C)  Pulse:  [50-61] 50  Resp:  [12-37] 23  SpO2:  [94 %-100 %] 98 %  BP: ()/(49-65) 119/58     Weight: 81.5 kg (179 lb 10.8 oz)  Body mass index is 24.37 kg/m².    Estimated Creatinine Clearance: 89.2 mL/min (based on SCr of 1.1 mg/dL).     Physical Exam  Vitals and nursing note reviewed.   Constitutional:       Appearance: He is ill-appearing. He is not toxic-appearing.   HENT:      Head: Normocephalic.      Mouth/Throat:      Mouth: Mucous membranes are moist.      Pharynx: Oropharynx is clear.   Pulmonary:      Effort: Pulmonary effort is normal. No respiratory distress.   Abdominal:      General: There is no distension.      Palpations: Abdomen is soft.   Skin:     Comments: Sternal dressing clean and dry   Neurological:      General: No focal deficit present.      Mental Status: He is alert and oriented to  person, place, and time.   Psychiatric:         Mood and Affect: Mood normal.         Behavior: Behavior normal.          Significant Labs:   Microbiology Results (last 7 days)       Procedure Component Value Units Date/Time    Culture, Anaerobe [[id number]] Collected: 08/12/24 1238    Order Status: Completed Specimen: Wound from Heart Updated: 08/16/24 0912     Anaerobic Culture Culture in progress    Narrative:      MITRAL ANULAR BAND    Aerobic culture [[id number]] Collected: 08/12/24 1238    Order Status: Completed Specimen: Wound from Heart Updated: 08/15/24 1303     Aerobic Bacterial Culture No growth    Narrative:      MITRAL ANULAR BAND    AFB Culture & Smear [[id number]] Collected: 08/12/24 1238    Order Status: Completed Specimen: Wound from Heart Updated: 08/13/24 2127     AFB Culture & Smear Culture in progress     AFB CULTURE STAIN No acid fast bacilli seen.    Narrative:      MITRAL ANULAR BAND    Gram stain [[id number]] Collected: 08/12/24 1238    Order Status: Completed Specimen: Wound from Heart Updated: 08/12/24 2216     Gram Stain Result Rare WBC's      No organisms seen    Narrative:      MITRAL ANULAR BAND    Fungus culture [[id number]] Collected: 08/12/24 1238    Order Status: Sent Specimen: Wound from Heart Updated: 08/12/24 1253            Significant Imaging: I have reviewed all pertinent imaging results/findings within the past 24 hours.

## 2024-08-17 LAB
ALBUMIN SERPL BCP-MCNC: 2.5 G/DL (ref 3.5–5.2)
ALP SERPL-CCNC: 55 U/L (ref 55–135)
ALT SERPL W/O P-5'-P-CCNC: 10 U/L (ref 10–44)
ANION GAP SERPL CALC-SCNC: 10 MMOL/L (ref 8–16)
APTT PPP: 63.3 SEC (ref 21–32)
AST SERPL-CCNC: 23 U/L (ref 10–40)
BASOPHILS # BLD AUTO: 0.03 K/UL (ref 0–0.2)
BASOPHILS NFR BLD: 0.4 % (ref 0–1.9)
BILIRUB SERPL-MCNC: 0.6 MG/DL (ref 0.1–1)
BUN SERPL-MCNC: 15 MG/DL (ref 6–20)
CALCIUM SERPL-MCNC: 8.7 MG/DL (ref 8.7–10.5)
CHLORIDE SERPL-SCNC: 103 MMOL/L (ref 95–110)
CO2 SERPL-SCNC: 27 MMOL/L (ref 23–29)
CREAT SERPL-MCNC: 1.1 MG/DL (ref 0.5–1.4)
DIFFERENTIAL METHOD BLD: ABNORMAL
EOSINOPHIL # BLD AUTO: 0.3 K/UL (ref 0–0.5)
EOSINOPHIL NFR BLD: 4 % (ref 0–8)
ERYTHROCYTE [DISTWIDTH] IN BLOOD BY AUTOMATED COUNT: 19.8 % (ref 11.5–14.5)
EST. GFR  (NO RACE VARIABLE): >60 ML/MIN/1.73 M^2
GLUCOSE SERPL-MCNC: 101 MG/DL (ref 70–110)
HCT VFR BLD AUTO: 24.9 % (ref 40–54)
HGB BLD-MCNC: 7.8 G/DL (ref 14–18)
IMM GRANULOCYTES # BLD AUTO: 0.1 K/UL (ref 0–0.04)
IMM GRANULOCYTES NFR BLD AUTO: 1.3 % (ref 0–0.5)
INR PPP: 5.7 (ref 0.8–1.2)
LYMPHOCYTES # BLD AUTO: 1.5 K/UL (ref 1–4.8)
LYMPHOCYTES NFR BLD: 19.4 % (ref 18–48)
MAGNESIUM SERPL-MCNC: 2.1 MG/DL (ref 1.6–2.6)
MCH RBC QN AUTO: 23.1 PG (ref 27–31)
MCHC RBC AUTO-ENTMCNC: 31.3 G/DL (ref 32–36)
MCV RBC AUTO: 74 FL (ref 82–98)
MONOCYTES # BLD AUTO: 1.2 K/UL (ref 0.3–1)
MONOCYTES NFR BLD: 15.1 % (ref 4–15)
NEUTROPHILS # BLD AUTO: 4.6 K/UL (ref 1.8–7.7)
NEUTROPHILS NFR BLD: 59.8 % (ref 38–73)
NRBC BLD-RTO: 0 /100 WBC
OHS QRS DURATION: 80 MS
OHS QTC CALCULATION: 541 MS
PHOSPHATE SERPL-MCNC: 3.6 MG/DL (ref 2.7–4.5)
PLATELET # BLD AUTO: 164 K/UL (ref 150–450)
PMV BLD AUTO: 11.8 FL (ref 9.2–12.9)
POTASSIUM SERPL-SCNC: 3.2 MMOL/L (ref 3.5–5.1)
PROT SERPL-MCNC: 5.8 G/DL (ref 6–8.4)
PROTHROMBIN TIME: 56.2 SEC (ref 9–12.5)
RBC # BLD AUTO: 3.38 M/UL (ref 4.6–6.2)
SODIUM SERPL-SCNC: 140 MMOL/L (ref 136–145)
WBC # BLD AUTO: 7.68 K/UL (ref 3.9–12.7)

## 2024-08-17 PROCEDURE — 85610 PROTHROMBIN TIME: CPT | Performed by: PHYSICIAN ASSISTANT

## 2024-08-17 PROCEDURE — 99232 SBSQ HOSP IP/OBS MODERATE 35: CPT | Mod: ,,, | Performed by: ANESTHESIOLOGY

## 2024-08-17 PROCEDURE — 84100 ASSAY OF PHOSPHORUS: CPT | Performed by: PHYSICIAN ASSISTANT

## 2024-08-17 PROCEDURE — 25000003 PHARM REV CODE 250: Performed by: STUDENT IN AN ORGANIZED HEALTH CARE EDUCATION/TRAINING PROGRAM

## 2024-08-17 PROCEDURE — 25000003 PHARM REV CODE 250: Performed by: PHYSICIAN ASSISTANT

## 2024-08-17 PROCEDURE — 63600175 PHARM REV CODE 636 W HCPCS

## 2024-08-17 PROCEDURE — 25000003 PHARM REV CODE 250

## 2024-08-17 PROCEDURE — 85025 COMPLETE CBC W/AUTO DIFF WBC: CPT | Performed by: PHYSICIAN ASSISTANT

## 2024-08-17 PROCEDURE — 94761 N-INVAS EAR/PLS OXIMETRY MLT: CPT

## 2024-08-17 PROCEDURE — 83735 ASSAY OF MAGNESIUM: CPT | Performed by: PHYSICIAN ASSISTANT

## 2024-08-17 PROCEDURE — 85730 THROMBOPLASTIN TIME PARTIAL: CPT | Performed by: PHYSICIAN ASSISTANT

## 2024-08-17 PROCEDURE — 20000000 HC ICU ROOM

## 2024-08-17 PROCEDURE — 80053 COMPREHEN METABOLIC PANEL: CPT | Performed by: STUDENT IN AN ORGANIZED HEALTH CARE EDUCATION/TRAINING PROGRAM

## 2024-08-17 PROCEDURE — 94799 UNLISTED PULMONARY SVC/PX: CPT

## 2024-08-17 RX ORDER — POTASSIUM CHLORIDE 20 MEQ/1
40 TABLET, EXTENDED RELEASE ORAL
Status: COMPLETED | OUTPATIENT
Start: 2024-08-17 | End: 2024-08-17

## 2024-08-17 RX ADMIN — MUPIROCIN 1 G: 20 OINTMENT TOPICAL at 08:08

## 2024-08-17 RX ADMIN — SENNOSIDES AND DOCUSATE SODIUM 1 TABLET: 50; 8.6 TABLET ORAL at 08:08

## 2024-08-17 RX ADMIN — ACETAMINOPHEN 1000 MG: 500 TABLET ORAL at 09:08

## 2024-08-17 RX ADMIN — OXYCODONE 5 MG: 5 TABLET ORAL at 09:08

## 2024-08-17 RX ADMIN — POTASSIUM CHLORIDE 40 MEQ: 1500 TABLET, EXTENDED RELEASE ORAL at 08:08

## 2024-08-17 RX ADMIN — ACETAMINOPHEN 1000 MG: 500 TABLET ORAL at 06:08

## 2024-08-17 RX ADMIN — FAMOTIDINE 20 MG: 20 TABLET ORAL at 08:08

## 2024-08-17 RX ADMIN — POLYETHYLENE GLYCOL 3350 17 G: 17 POWDER, FOR SOLUTION ORAL at 08:08

## 2024-08-17 RX ADMIN — MIRTAZAPINE 15 MG: 15 TABLET, ORALLY DISINTEGRATING ORAL at 08:08

## 2024-08-17 RX ADMIN — ASPIRIN 81 MG: 81 TABLET, COATED ORAL at 08:08

## 2024-08-17 RX ADMIN — OXYCODONE HYDROCHLORIDE 10 MG: 10 TABLET ORAL at 06:08

## 2024-08-17 RX ADMIN — FUROSEMIDE 20 MG: 10 INJECTION, SOLUTION INTRAVENOUS at 05:08

## 2024-08-17 RX ADMIN — ACETAMINOPHEN 1000 MG: 500 TABLET ORAL at 02:08

## 2024-08-17 RX ADMIN — FUROSEMIDE 20 MG: 10 INJECTION, SOLUTION INTRAVENOUS at 08:08

## 2024-08-17 RX ADMIN — POTASSIUM CHLORIDE 40 MEQ: 1500 TABLET, EXTENDED RELEASE ORAL at 06:08

## 2024-08-17 RX ADMIN — TAMSULOSIN HYDROCHLORIDE 0.4 MG: 0.4 CAPSULE ORAL at 08:08

## 2024-08-17 NOTE — NURSING
Notified Jose CHAVEZ of MAP greater than goal of 60-80. MD stated MAP less than 90 is ok. Updated order parameters to follow.

## 2024-08-17 NOTE — PROGRESS NOTES
[facility] - [facility]  Critical Care - Surgery  Progress Note    Patient Name: Lorenzo Nino  MRN: [id number]  Admission Date: 8/12/2024  Hospital Length of Stay: 5 days  Code Status: Prior  Attending Provider: Manuel Beal MD  Primary Care Provider: No, Primary Doctor   Principal Problem: Endocarditis    Subjective:     Hospital/ICU Course:  No notes on file    Interval History/Significant Events: Patient is doing well, NAEON, Aox3, paced at 50 with 3:1 block. Taken off pacing 8/17 am, with 30 as backup.     Follow-up For: Procedure(s) (LRB):  REPLACEMENT, MITRAL VALVE (N/A)  REDO STERNOTOMY (N/A)    Post-Operative Day: 5 Days Post-Op    Objective:     Vital Signs (Most Recent):  Temp: 98.7 °F (37.1 °C) (08/17/24 0315)  Pulse: (!) 50 (08/17/24 0515)  Resp: 18 (08/17/24 0515)  BP: (!) 102/52 (08/17/24 0515)  SpO2: 98 % (08/17/24 0515) Vital Signs (24h Range):  Temp:  [98.3 °F (36.8 °C)-99.1 °F (37.3 °C)] 98.7 °F (37.1 °C)  Pulse:  [49-61] 50  Resp:  [14-37] 18  SpO2:  [93 %-100 %] 98 %  BP: ()/(49-67) 102/52     Weight: 80 kg (176 lb 5.9 oz)  Body mass index is 23.92 kg/m².      Intake/Output Summary (Last 24 hours) at 8/17/2024 0542  Last data filed at 8/17/2024 0305  Gross per 24 hour   Intake 517.87 ml   Output 2115 ml   Net -1597.13 ml          Physical Exam  Constitutional:       Appearance: Normal appearance. He is not ill-appearing.   Cardiovascular:      Pulses: Normal pulses.      Comments: Midline sternotomy c/d/i  Paced at 80bpm  V wires in place  4x ChT, 2x medistinal and 2x pleural with SS output      Pulmonary:      Effort: Pulmonary effort is normal. No respiratory distress.   Abdominal:      General: There is no distension.      Palpations: Abdomen is soft.      Tenderness: There is no abdominal tenderness.   Musculoskeletal:      Right lower leg: No edema.      Left lower leg: No edema.   Skin:     General: Skin is warm and dry.   Neurological:      General: No focal  deficit present.            Vents:  Vent Mode: Spont (08/13/24 0715)  Ventilator Initiated: Yes (08/12/24 1507)  Set Rate: 16 BPM (08/13/24 0529)  Vt Set: 500 mL (08/13/24 0529)  Pressure Support: 10 cmH20 (08/13/24 0715)  PEEP/CPAP: 5 cmH20 (08/13/24 0715)  Oxygen Concentration (%): 40 (08/13/24 0715)  Peak Airway Pressure: 15 cmH20 (08/13/24 0715)  Plateau Pressure: 0 cmH20 (08/13/24 0715)  Total Ve: 9.35 L/m (08/13/24 0715)  Negative Inspiratory Force (cm H2O): 0 (08/13/24 0715)  F/VT Ratio<105 (RSBI): (!) 49.18 (08/13/24 0715)    Lines/Drains/Airways       Drain  Duration                  Y Chest Tube 3 and 4 08/12/24 1245 3 Right Pleural 19 Fr. 4 Left Pleural 19 Fr. 4 days              Line  Duration                  Pacer Wires 08/12/24 0931 4 days              Peripheral Intravenous Line  Duration                  Peripheral IV - Single Lumen 08/12/24 0618 18 G Left;Posterior Forearm 4 days         Peripheral IV - Single Lumen 08/16/24 0445 20 G Posterior;Right Hand 1 day                    Significant Labs:    CBC/Anemia Profile:  Recent Labs   Lab 08/16/24 0443 08/17/24  0321   WBC 10.10 7.68   HGB 8.0* 7.8*   HCT 27.4* 24.9*   * 164   MCV 78* 74*   RDW 19.5* 19.8*        Chemistries:  Recent Labs   Lab 08/15/24  1623 08/15/24  2026 08/16/24  0443 08/17/24  0321     --  141 140   K 3.8 3.5 3.9 3.2*     --  103 103   CO2 25  --  22* 27   BUN 15  --  16 15   CREATININE 1.2  --  1.1 1.1   CALCIUM 8.8  --  8.9 8.7   ALBUMIN  --   --  2.9* 2.5*   PROT  --   --  6.1 5.8*   BILITOT  --   --  0.7 0.6   ALKPHOS  --   --  52* 55   ALT  --   --  14 10   AST  --   --  34 23   MG 2.2  --  2.3 2.1   PHOS 3.0  --  2.6* 3.6       All pertinent labs within the past 24 hours have been reviewed.    Significant Imaging:  I have reviewed all pertinent imaging results/findings within the past 24 hours.  Assessment/Plan:     Cardiac/Vascular  Severe mitral regurgitation  Lorenzo Mica is a 48 y.o. male w/ a  significant PMHx of endocarditis s/p MV repair and ROMAN resection 11/2023 during active infection which subsequently led to valve destruction. He presents to the SICU s/p resternotomy and mechanical MVR with Dr. Beal on 8/12.      Neuro/Psych:     - Sedation: off    - Pain:     - Scheduled Tylenol 1000mg Q8H   - PRN Oxycodone 5mg/10mg    - Psych: home med mirtazipine 15 qd             Cardiac:     - S/p redo MVR with Dr. Beal    - BP Goal: MAP 60-80    - Inotropes/Pressors: Weaned off    - Anti-HTNs: weaned off cleviprex    - Rhythm: paced at 30 (native rhythm 3:1 block 40 bpm)   -- EP consulted, recommend slow wean of pacing   -- home amiodarone 200mg qd held in the setting of native bradycardia    - Beta Blocker: held in the setting of native bradycardia   -- Home medication toprol XL 25 qd    - Statin: n/a      Pulmonary:     - Goal SpO2 >92%    - Chest Tubes x 2 (2 Pleural)   - removed of 2 meds 8/16     Renal:    - Baseline Cr 1.6-2    - Trend BUN/Cr         - Lasix 20 BID with appropriate diuresis    Recent Labs   Lab 08/15/24  1623 08/16/24  0443 08/17/24  0321   BUN 15 16 15   CREATININE 1.2 1.1 1.1           FEN / GI:     - Daily CMP, PRN K/Mag/Phos per protocol    - Replace electrolytes as needed    - Nutrition: advanced to cardiac regular diet    - Bowel Regimen: Miralax, docusate      ID:     - Aebrile    - Abx:   - ID consulted, recommended dc Daptomycin in setting of neg cultures, signed off 8/16.  - Completed perioperative cefazolin 2g Q8H x 5 doses   - F/u OR cultures, NGTD    Recent Labs   Lab 08/15/24  0300 08/16/24  0443 08/17/24  0321   WBC 13.25* 10.10 7.68           Heme/Onc:     - Hgb 11.4 pre-operatively  Products:   - 2 FFP intra-op     - S/p 1u pRBC postop 8/13    - Heparin gtt initiated for mechanical valve thromboprophylaxis bridge - goal PTT 60-80   - Discontinue 8/15 given therapeutic on warfarin    - Warfarin 5mg initiated 8/14 for mechanical valve thromboprophylaxis - goal INR  2.5-3.5    - Warfarin 5 dc'd 8/14, switched to Warfarin 1  - supratherapeutic 8/17    - Daily CBC, PTT, PT/INR    - ASA 325mg daily - dc'd 8/13    Recent Labs   Lab 08/15/24  0300 08/15/24  0458 08/16/24  0443 08/17/24  0321   HGB 7.9*  --  8.0* 7.8*   PLT 94*  --  125* 164   APTT  --  76.3* 62.9* 63.3*   INR  --  3.2* 4.3* 5.7*           Endocrine:     - CTS Goal -140    - HgbA1c: 5.4    - Novolog (Insulin Aspart) prn for BG excursions [facility] SSI (150/25)    - BG checks AC/HS    - Endocrinology consulted for insulin management      PPx:   Feeding: Cardiac regular diet  Analgesia/Sedation: n/a/sched tylenol, oxy 5/10 and dilaudid 0.5 PRN  Thromboembolic Prevention: SCDs, warfarin  HOB >30: Yes  Stress Ulcer: Yes - famotidine 20mg PO BID  Glucose Control: Yes, insulin management per Endocrinology     Lines/Drains/Airway:   Chest Tubes: 2 (previously 4) (2 Mediastinal (d/c 8/16) and 2 pleural)    Pacing Wires: Temporary V pacing wires      Dispo/Code Status/Palliative:     - Continue SICU Care    - Full Code         Amanda Benitez MD  Critical Care - Surgery  [facility] - [facility]

## 2024-08-17 NOTE — ASSESSMENT & PLAN NOTE
Lorenzo Nino is a 48 y.o. 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 He presents to the SICU s/p resternotomy and mechanical MVR with Dr. Beal on 8/12.      Neuro/Psych:     - Sedation: off    - Pain:     - Scheduled Tylenol 1000mg Q8H   - PRN Oxycodone 5mg/10mg    - Psych: home med mirtazipine 15 qd             Cardiac:     - S/p redo MVR with Dr. Beal    - BP Goal: MAP 60-80    - Inotropes/Pressors: Weaned off    - Anti-HTNs: weaned off cleviprex    - Rhythm: paced at 30 (native rhythm 3:1 block 40 bpm)   -- EP consulted, recommend slow wean of pacing   -- home amiodarone 200mg qd held in the setting of native bradycardia    - Beta Blocker: held in the setting of native bradycardia   -- Home medication toprol XL 25 qd    - Statin: n/a      Pulmonary:     - Goal SpO2 >92%    - Chest Tubes x 2 (2 Pleural)   - removed of 2 meds 8/16     Renal:    - Baseline Cr 1.6-2    - Trend BUN/Cr         - Lasix 20 BID with appropriate diuresis    Recent Labs   Lab 08/15/24  1623 08/16/24  0443 08/17/24  0321   BUN 15 16 15   CREATININE 1.2 1.1 1.1           FEN / GI:     - Daily CMP, PRN K/Mag/Phos per protocol    - Replace electrolytes as needed    - Nutrition: advanced to cardiac regular diet    - Bowel Regimen: Miralax, docusate      ID:     - Aebrile    - Abx:   - ID consulted, recommended dc Daptomycin in setting of neg cultures, signed off 8/16.  - Completed perioperative cefazolin 2g Q8H x 5 doses   - F/u OR cultures, NGTD    Recent Labs   Lab 08/15/24  0300 08/16/24  0443 08/17/24  0321   WBC 13.25* 10.10 7.68           Heme/Onc:     - Hgb 11.4 pre-operatively  Products:   - 2 FFP intra-op     - S/p 1u pRBC postop 8/13    - Heparin gtt initiated for mechanical valve thromboprophylaxis bridge - goal PTT 60-80   - Discontinue 8/15 given therapeutic on warfarin    - Warfarin 5mg initiated 8/14 for  mechanical valve thromboprophylaxis - goal INR 2.5-3.5    - Warfarin 5 dc'd 8/14, switched to Warfarin 1  - supratherapeutic 8/17    - Daily CBC, PTT, PT/INR    - ASA 325mg daily - dc'd 8/13    Recent Labs   Lab 08/15/24  0300 08/15/24  0458 08/16/24  0443 08/17/24  0321   HGB 7.9*  --  8.0* 7.8*   PLT 94*  --  125* 164   APTT  --  76.3* 62.9* 63.3*   INR  --  3.2* 4.3* 5.7*           Endocrine:     - CTS Goal -140    - HgbA1c: 5.4    - Novolog (Insulin Aspart) prn for BG excursions MDC SSI (150/25)    - BG checks AC/HS    - Endocrinology consulted for insulin management      PPx:   Feeding: Cardiac regular diet  Analgesia/Sedation: n/a/sched tylenol, oxy 5/10 and dilaudid 0.5 PRN  Thromboembolic Prevention: SCDs, warfarin  HOB >30: Yes  Stress Ulcer: Yes - famotidine 20mg PO BID  Glucose Control: Yes, insulin management per Endocrinology     Lines/Drains/Airway:   Chest Tubes: 2 (previously 4) (2 Mediastinal (d/c 8/16) and 2 pleural)    Pacing Wires: Temporary V pacing wires      Dispo/Code Status/Palliative:     - Continue SICU Care    - Full Code

## 2024-08-17 NOTE — PLAN OF CARE
SICU PLAN OF CARE    Dx: Endocarditis    Goals of Care: MAP 60-80    Vital Signs (last 12 hours):   Temp:  [98.7 °F (37.1 °C)-99 °F (37.2 °C)]   Pulse:  [45-51]   Resp:  [12-44]   BP: ()/(51-62)   SpO2:  [93 %-99 %]      Neuro: AAOx4, Follows commands , and Moves all extremities spontaneously     Cardiac: V paced @ 50 bpm    Respiratory: Room Air    Urine Output: Voids Spontaneously  1075 mL/shift    Drains: Chest Tube, total output 40mL/shift    Diet: Cardiac  and 1500mL Fluid Restriction    Skin:  No new breakdown noted.  Patient turned q2h, bony prominences protected, and mattress inflated/working correctly.   Dilip Score: 20. If Dilip Score is 16 or less, complete 4EYES note each shift.    Shift Events:  Backup rate dropped to 30 bpm in AM. Pt's intrinsic heart rate in 40s. Pt V paced at 50 bpm. Ambulated in hallway with standby assistance. Appetite improving.      See flowsheet for further assessment/details.  Family updated on current condition/plan of care, questions answered, and emotional support provided.  MD updated on current condition, vitals, labs, and gtts.

## 2024-08-17 NOTE — PLAN OF CARE
SICU PLAN OF CARE    Dx: Endocarditis    Goals of Care: MAP 60-90    Vital Signs (last 12 hours):   Temp:  [98.7 °F (37.1 °C)-99.1 °F (37.3 °C)]   Pulse:  [49-52]   Resp:  [15-28]   BP: (103-141)/(49-67)   SpO2:  [93 %-100 %]      Neuro: AAOx4, Follows commands , and Moves all extremities spontaneously     Cardiac:  V paced @ 50    Respiratory: Room Air    Gtts: N/A    Urine Output: Voids Spontaneously  1100 mL/shift    Drains: Chest Tube, total output 60mL/shift    Diet: Cardiac      Labs/Accuchecks: Daily labs    Skin:  Midline incision GABRIEL with steri strips CDI.  Patient turns independently, bony prominences protected, and waffle mattress inflated and working correctly. Dilip Score: 21.    Shift Events:  No acute events this shift.

## 2024-08-17 NOTE — NURSING
JÚNIOR Ortega MD at bedside to assess patient. Pacer settings changed to backup rate of 30. Patient's current HR at 48 BPM, blood pressure stable. Bedside alarms adjusted, activated and audible.

## 2024-08-17 NOTE — SUBJECTIVE & OBJECTIVE
Interval History/Significant Events: Patient is doing well, NAEON, Aox3, paced at 50 with 3:1 block. Taken off pacing 8/17 am, with 30 as backup.     Follow-up For: Procedure(s) (LRB):  REPLACEMENT, MITRAL VALVE (N/A)  REDO STERNOTOMY (N/A)    Post-Operative Day: 5 Days Post-Op    Objective:     Vital Signs (Most Recent):  Temp: 98.7 °F (37.1 °C) (08/17/24 0315)  Pulse: (!) 50 (08/17/24 0515)  Resp: 18 (08/17/24 0515)  BP: (!) 102/52 (08/17/24 0515)  SpO2: 98 % (08/17/24 0515) Vital Signs (24h Range):  Temp:  [98.3 °F (36.8 °C)-99.1 °F (37.3 °C)] 98.7 °F (37.1 °C)  Pulse:  [49-61] 50  Resp:  [14-37] 18  SpO2:  [93 %-100 %] 98 %  BP: ()/(49-67) 102/52     Weight: 80 kg (176 lb 5.9 oz)  Body mass index is 23.92 kg/m².      Intake/Output Summary (Last 24 hours) at 8/17/2024 0542  Last data filed at 8/17/2024 0305  Gross per 24 hour   Intake 517.87 ml   Output 2115 ml   Net -1597.13 ml          Physical Exam  Constitutional:       Appearance: Normal appearance. He is not ill-appearing.   Cardiovascular:      Pulses: Normal pulses.      Comments: Midline sternotomy c/d/i  Paced at 80bpm  V wires in place  4x ChT, 2x medistinal and 2x pleural with SS output      Pulmonary:      Effort: Pulmonary effort is normal. No respiratory distress.   Abdominal:      General: There is no distension.      Palpations: Abdomen is soft.      Tenderness: There is no abdominal tenderness.   Musculoskeletal:      Right lower leg: No edema.      Left lower leg: No edema.   Skin:     General: Skin is warm and dry.   Neurological:      General: No focal deficit present.            Vents:  Vent Mode: Spont (08/13/24 0715)  Ventilator Initiated: Yes (08/12/24 1507)  Set Rate: 16 BPM (08/13/24 0529)  Vt Set: 500 mL (08/13/24 0529)  Pressure Support: 10 cmH20 (08/13/24 0715)  PEEP/CPAP: 5 cmH20 (08/13/24 0715)  Oxygen Concentration (%): 40 (08/13/24 0715)  Peak Airway Pressure: 15 cmH20 (08/13/24 0715)  Plateau Pressure: 0 cmH20 (08/13/24  0715)  Total Ve: 9.35 L/m (08/13/24 0715)  Negative Inspiratory Force (cm H2O): 0 (08/13/24 0715)  F/VT Ratio<105 (RSBI): (!) 49.18 (08/13/24 0715)    Lines/Drains/Airways       Drain  Duration                  Y Chest Tube 3 and 4 08/12/24 1245 3 Right Pleural 19 Fr. 4 Left Pleural 19 Fr. 4 days              Line  Duration                  Pacer Wires 08/12/24 0931 4 days              Peripheral Intravenous Line  Duration                  Peripheral IV - Single Lumen 08/12/24 0618 18 G Left;Posterior Forearm 4 days         Peripheral IV - Single Lumen 08/16/24 0445 20 G Posterior;Right Hand 1 day                    Significant Labs:    CBC/Anemia Profile:  Recent Labs   Lab 08/16/24 0443 08/17/24  0321   WBC 10.10 7.68   HGB 8.0* 7.8*   HCT 27.4* 24.9*   * 164   MCV 78* 74*   RDW 19.5* 19.8*        Chemistries:  Recent Labs   Lab 08/15/24  1623 08/15/24  2026 08/16/24 0443 08/17/24  0321     --  141 140   K 3.8 3.5 3.9 3.2*     --  103 103   CO2 25  --  22* 27   BUN 15  --  16 15   CREATININE 1.2  --  1.1 1.1   CALCIUM 8.8  --  8.9 8.7   ALBUMIN  --   --  2.9* 2.5*   PROT  --   --  6.1 5.8*   BILITOT  --   --  0.7 0.6   ALKPHOS  --   --  52* 55   ALT  --   --  14 10   AST  --   --  34 23   MG 2.2  --  2.3 2.1   PHOS 3.0  --  2.6* 3.6       All pertinent labs within the past 24 hours have been reviewed.    Significant Imaging:  I have reviewed all pertinent imaging results/findings within the past 24 hours.

## 2024-08-18 LAB
ALBUMIN SERPL BCP-MCNC: 2.7 G/DL (ref 3.5–5.2)
ALP SERPL-CCNC: 73 U/L (ref 55–135)
ALT SERPL W/O P-5'-P-CCNC: 11 U/L (ref 10–44)
ANION GAP SERPL CALC-SCNC: 11 MMOL/L (ref 8–16)
APTT PPP: 67.5 SEC (ref 21–32)
APTT PPP: 67.5 SEC (ref 21–32)
AST SERPL-CCNC: 27 U/L (ref 10–40)
BASOPHILS # BLD AUTO: 0.03 K/UL (ref 0–0.2)
BASOPHILS NFR BLD: 0.4 % (ref 0–1.9)
BILIRUB SERPL-MCNC: 0.7 MG/DL (ref 0.1–1)
BUN SERPL-MCNC: 13 MG/DL (ref 6–20)
CALCIUM SERPL-MCNC: 9 MG/DL (ref 8.7–10.5)
CHLORIDE SERPL-SCNC: 101 MMOL/L (ref 95–110)
CO2 SERPL-SCNC: 26 MMOL/L (ref 23–29)
CREAT SERPL-MCNC: 1.1 MG/DL (ref 0.5–1.4)
DIFFERENTIAL METHOD BLD: ABNORMAL
EOSINOPHIL # BLD AUTO: 0.3 K/UL (ref 0–0.5)
EOSINOPHIL NFR BLD: 3.9 % (ref 0–8)
ERYTHROCYTE [DISTWIDTH] IN BLOOD BY AUTOMATED COUNT: 19.8 % (ref 11.5–14.5)
EST. GFR  (NO RACE VARIABLE): >60 ML/MIN/1.73 M^2
GLUCOSE SERPL-MCNC: 107 MG/DL (ref 70–110)
HCT VFR BLD AUTO: 25.5 % (ref 40–54)
HGB BLD-MCNC: 8.1 G/DL (ref 14–18)
IMM GRANULOCYTES # BLD AUTO: 0.16 K/UL (ref 0–0.04)
IMM GRANULOCYTES NFR BLD AUTO: 2 % (ref 0–0.5)
INR PPP: 6.3 (ref 0.8–1.2)
INR PPP: 6.6 (ref 0.8–1.2)
LYMPHOCYTES # BLD AUTO: 1.9 K/UL (ref 1–4.8)
LYMPHOCYTES NFR BLD: 23.5 % (ref 18–48)
MAGNESIUM SERPL-MCNC: 2 MG/DL (ref 1.6–2.6)
MCH RBC QN AUTO: 23.1 PG (ref 27–31)
MCHC RBC AUTO-ENTMCNC: 31.8 G/DL (ref 32–36)
MCV RBC AUTO: 73 FL (ref 82–98)
MONOCYTES # BLD AUTO: 1.1 K/UL (ref 0.3–1)
MONOCYTES NFR BLD: 13.5 % (ref 4–15)
NEUTROPHILS # BLD AUTO: 4.6 K/UL (ref 1.8–7.7)
NEUTROPHILS NFR BLD: 56.7 % (ref 38–73)
NRBC BLD-RTO: 0 /100 WBC
PHOSPHATE SERPL-MCNC: 3.8 MG/DL (ref 2.7–4.5)
PLATELET # BLD AUTO: 223 K/UL (ref 150–450)
PMV BLD AUTO: 11 FL (ref 9.2–12.9)
POTASSIUM SERPL-SCNC: 3.6 MMOL/L (ref 3.5–5.1)
PROT SERPL-MCNC: 6.1 G/DL (ref 6–8.4)
PROTHROMBIN TIME: 61.8 SEC (ref 9–12.5)
PROTHROMBIN TIME: 63.9 SEC (ref 9–12.5)
RBC # BLD AUTO: 3.51 M/UL (ref 4.6–6.2)
SODIUM SERPL-SCNC: 138 MMOL/L (ref 136–145)
WBC # BLD AUTO: 8.16 K/UL (ref 3.9–12.7)

## 2024-08-18 PROCEDURE — 85610 PROTHROMBIN TIME: CPT | Performed by: PHYSICIAN ASSISTANT

## 2024-08-18 PROCEDURE — 99232 SBSQ HOSP IP/OBS MODERATE 35: CPT | Mod: ,,, | Performed by: ANESTHESIOLOGY

## 2024-08-18 PROCEDURE — 83735 ASSAY OF MAGNESIUM: CPT | Performed by: PHYSICIAN ASSISTANT

## 2024-08-18 PROCEDURE — 85610 PROTHROMBIN TIME: CPT | Mod: 91 | Performed by: THORACIC SURGERY (CARDIOTHORACIC VASCULAR SURGERY)

## 2024-08-18 PROCEDURE — 25000003 PHARM REV CODE 250

## 2024-08-18 PROCEDURE — 84100 ASSAY OF PHOSPHORUS: CPT | Performed by: PHYSICIAN ASSISTANT

## 2024-08-18 PROCEDURE — 85025 COMPLETE CBC W/AUTO DIFF WBC: CPT | Performed by: PHYSICIAN ASSISTANT

## 2024-08-18 PROCEDURE — 85730 THROMBOPLASTIN TIME PARTIAL: CPT | Performed by: PHYSICIAN ASSISTANT

## 2024-08-18 PROCEDURE — 80053 COMPREHEN METABOLIC PANEL: CPT | Performed by: STUDENT IN AN ORGANIZED HEALTH CARE EDUCATION/TRAINING PROGRAM

## 2024-08-18 PROCEDURE — 20000000 HC ICU ROOM

## 2024-08-18 PROCEDURE — 94761 N-INVAS EAR/PLS OXIMETRY MLT: CPT

## 2024-08-18 PROCEDURE — 63600175 PHARM REV CODE 636 W HCPCS

## 2024-08-18 PROCEDURE — 25000003 PHARM REV CODE 250: Performed by: PHYSICIAN ASSISTANT

## 2024-08-18 PROCEDURE — 25000003 PHARM REV CODE 250: Performed by: STUDENT IN AN ORGANIZED HEALTH CARE EDUCATION/TRAINING PROGRAM

## 2024-08-18 RX ORDER — POTASSIUM CHLORIDE 20 MEQ/1
40 TABLET, EXTENDED RELEASE ORAL ONCE
Status: COMPLETED | OUTPATIENT
Start: 2024-08-18 | End: 2024-08-18

## 2024-08-18 RX ADMIN — TAMSULOSIN HYDROCHLORIDE 0.4 MG: 0.4 CAPSULE ORAL at 09:08

## 2024-08-18 RX ADMIN — ACETAMINOPHEN 1000 MG: 500 TABLET ORAL at 02:08

## 2024-08-18 RX ADMIN — MIRTAZAPINE 15 MG: 15 TABLET, ORALLY DISINTEGRATING ORAL at 08:08

## 2024-08-18 RX ADMIN — ACETAMINOPHEN 1000 MG: 500 TABLET ORAL at 06:08

## 2024-08-18 RX ADMIN — FUROSEMIDE 20 MG: 10 INJECTION, SOLUTION INTRAVENOUS at 08:08

## 2024-08-18 RX ADMIN — POLYETHYLENE GLYCOL 3350 17 G: 17 POWDER, FOR SOLUTION ORAL at 09:08

## 2024-08-18 RX ADMIN — FAMOTIDINE 20 MG: 20 TABLET ORAL at 09:08

## 2024-08-18 RX ADMIN — SENNOSIDES AND DOCUSATE SODIUM 1 TABLET: 50; 8.6 TABLET ORAL at 08:08

## 2024-08-18 RX ADMIN — SENNOSIDES AND DOCUSATE SODIUM 1 TABLET: 50; 8.6 TABLET ORAL at 09:08

## 2024-08-18 RX ADMIN — FAMOTIDINE 20 MG: 20 TABLET ORAL at 08:08

## 2024-08-18 RX ADMIN — OXYCODONE HYDROCHLORIDE 10 MG: 10 TABLET ORAL at 10:08

## 2024-08-18 RX ADMIN — ACETAMINOPHEN 1000 MG: 500 TABLET ORAL at 10:08

## 2024-08-18 RX ADMIN — FUROSEMIDE 20 MG: 10 INJECTION, SOLUTION INTRAVENOUS at 05:08

## 2024-08-18 RX ADMIN — ASPIRIN 81 MG: 81 TABLET, COATED ORAL at 09:08

## 2024-08-18 RX ADMIN — POTASSIUM CHLORIDE 40 MEQ: 1500 TABLET, EXTENDED RELEASE ORAL at 07:08

## 2024-08-18 NOTE — PLAN OF CARE
Problem: Adult Inpatient Plan of Care  Goal: Plan of Care Review  Outcome: Progressing  Goal: Patient-Specific Goal (Individualized)  Outcome: Progressing  Goal: Absence of Hospital-Acquired Illness or Injury  Outcome: Progressing  Goal: Optimal Comfort and Wellbeing  Outcome: Progressing  Goal: Readiness for Transition of Care  Outcome: Progressing     Problem: Infection  Goal: Absence of Infection Signs and Symptoms  Outcome: Progressing     Problem: Cardiovascular Surgery  Goal: Improved Activity Tolerance  Outcome: Progressing  Goal: Optimal Coping with Heart Surgery  Outcome: Progressing  Goal: Absence of Bleeding  Outcome: Progressing  Goal: Effective Bowel Elimination  Outcome: Progressing  Goal: Effective Cardiac Function  Outcome: Progressing  Goal: Optimal Cerebral Tissue Perfusion  Outcome: Progressing  Goal: Fluid and Electrolyte Balance  Outcome: Progressing  Goal: Blood Glucose Level Within Targeted Range  Outcome: Progressing  Goal: Absence of Infection Signs and Symptoms  Outcome: Progressing  Goal: Anesthesia/Sedation Recovery  Outcome: Progressing  Goal: Acceptable Pain Control  Outcome: Progressing  Goal: Nausea and Vomiting Relief  Outcome: Progressing  Goal: Effective Urinary Elimination  Outcome: Progressing  Goal: Effective Oxygenation and Ventilation  Outcome: Progressing     Problem: Wound  Goal: Optimal Coping  Outcome: Progressing  Goal: Optimal Functional Ability  Outcome: Progressing  Goal: Absence of Infection Signs and Symptoms  Outcome: Progressing  Goal: Improved Oral Intake  Outcome: Progressing  Goal: Optimal Pain Control and Function  Outcome: Progressing  Goal: Skin Health and Integrity  Outcome: Progressing  Goal: Optimal Wound Healing  Outcome: Progressing     Problem: Fall Injury Risk  Goal: Absence of Fall and Fall-Related Injury  Outcome: Progressing     Problem: Skin Injury Risk Increased  Goal: Skin Health and Integrity  Outcome: Progressing

## 2024-08-18 NOTE — PROGRESS NOTES
[facility] - [facility] Intensive Care  Critical Care - Surgery  Progress Note    Patient Name: Lorenzo Nino  MRN: [id number]  Admission Date: 8/12/2024  Hospital Length of Stay: 6 days  Code Status: Prior  Attending Provider: Manuel Beal MD  Primary Care Provider: No, Primary Doctor   Principal Problem: Endocarditis    Subjective:     Hospital/ICU Course:  No notes on file    Interval History/Significant Events: Patient is doing well, paced at 50 still with a 3:1 block. INR 6.6 from 5.7. No acute events overnight.     Follow-up For: Procedure(s) (LRB):  REPLACEMENT, MITRAL VALVE (N/A)  REDO STERNOTOMY (N/A)    Post-Operative Day: 6 Days Post-Op    Objective:     Vital Signs (Most Recent):  Temp: 98.7 °F (37.1 °C) (08/18/24 0307)  Pulse: (!) 50 (08/18/24 0515)  Resp: 14 (08/18/24 0515)  BP: 117/63 (08/18/24 0502)  SpO2: 96 % (08/18/24 0515) Vital Signs (24h Range):  Temp:  [97.8 °F (36.6 °C)-99.2 °F (37.3 °C)] 98.7 °F (37.1 °C)  Pulse:  [45-55] 50  Resp:  [12-44] 14  SpO2:  [93 %-97 %] 96 %  BP: ()/(51-63) 117/63     Weight: 76.9 kg (169 lb 8.5 oz)  Body mass index is 22.99 kg/m².      Intake/Output Summary (Last 24 hours) at 8/18/2024 0636  Last data filed at 8/18/2024 0503  Gross per 24 hour   Intake 500 ml   Output 1815 ml   Net -1315 ml          Physical Exam  Constitutional:       Appearance: Normal appearance. He is not ill-appearing.   Cardiovascular:      Pulses: Normal pulses.      Comments: Midline sternotomy c/d/i  Paced at 80bpm  V wires in place  2x ChT, 2x pleural with SS output      Pulmonary:      Effort: Pulmonary effort is normal. No respiratory distress.   Abdominal:      General: There is no distension.      Palpations: Abdomen is soft.      Tenderness: There is no abdominal tenderness.   Musculoskeletal:      Right lower leg: No edema.      Left lower leg: No edema.   Skin:     General: Skin is warm and dry.   Neurological:      General: No focal deficit present.             Vents:  Vent Mode: Spont (08/13/24 0715)  Ventilator Initiated: Yes (08/12/24 1507)  Set Rate: 16 BPM (08/13/24 0529)  Vt Set: 500 mL (08/13/24 0529)  Pressure Support: 10 cmH20 (08/13/24 0715)  PEEP/CPAP: 5 cmH20 (08/13/24 0715)  Oxygen Concentration (%): 40 (08/13/24 0715)  Peak Airway Pressure: 15 cmH20 (08/13/24 0715)  Plateau Pressure: 0 cmH20 (08/13/24 0715)  Total Ve: 9.35 L/m (08/13/24 0715)  Negative Inspiratory Force (cm H2O): 0 (08/13/24 0715)  F/VT Ratio<105 (RSBI): (!) 49.18 (08/13/24 0715)    Lines/Drains/Airways       Drain  Duration                  Y Chest Tube 3 and 4 08/12/24 1245 3 Right Pleural 19 Fr. 4 Left Pleural 19 Fr. 5 days              Line  Duration                  Pacer Wires 08/12/24 0931 5 days              Peripheral Intravenous Line  Duration                  Peripheral IV - Single Lumen 08/16/24 0445 20 G Posterior;Right Hand 2 days         Peripheral IV - Single Lumen 08/17/24 1440 20 G Anterior;Right Forearm <1 day                    Significant Labs:    CBC/Anemia Profile:  Recent Labs   Lab 08/17/24  0321 08/18/24  0237   WBC 7.68 8.16   HGB 7.8* 8.1*   HCT 24.9* 25.5*    223   MCV 74* 73*   RDW 19.8* 19.8*        Chemistries:  Recent Labs   Lab 08/17/24  0321 08/18/24  0237    138   K 3.2* 3.6    101   CO2 27 26   BUN 15 13   CREATININE 1.1 1.1   CALCIUM 8.7 9.0   ALBUMIN 2.5* 2.7*   PROT 5.8* 6.1   BILITOT 0.6 0.7   ALKPHOS 55 73   ALT 10 11   AST 23 27   MG 2.1 2.0   PHOS 3.6 3.8       All pertinent labs within the past 24 hours have been reviewed.    Significant Imaging:  I have reviewed all pertinent imaging results/findings within the past 24 hours.  Assessment/Plan:     Cardiac/Vascular  Severe mitral regurgitation  Lorenzo Nino is a 48 y.o. 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 He presents to the SICU s/p resternotomy and  mechanical MVR with Dr. Beal on 8/12.      Neuro/Psych:     - Sedation: off    - Pain:     - Scheduled Tylenol 1000mg Q8H   - PRN Oxycodone 5mg/10mg    - Psych: home med mirtazipine 15 qd             Cardiac:     - S/p redo MVR with Dr. Beal    - BP Goal: MAP 60-80    - Inotropes/Pressors: Weaned off    - Anti-HTNs: weaned off cleviprex    - Rhythm: paced at 50 (native rhythm 3:1 block 40 bpm)   -- EP consulted, recommend slow wean of pacing   -- home amiodarone 200mg qd held in the setting of native bradycardia    - Beta Blocker: held in the setting of native bradycardia   -- Home medication toprol XL 25 qd    - Statin: n/a      Pulmonary:     - Goal SpO2 >92%    - Chest Tubes x 2 (2 Pleural)   - removed of 2 meds 8/16     Renal:    - Baseline Cr 1.6-2    - Trend BUN/Cr         - Lasix 20 BID with appropriate diuresis    Recent Labs   Lab 08/16/24  0443 08/17/24  0321 08/18/24  0237   BUN 16 15 13   CREATININE 1.1 1.1 1.1           FEN / GI:     - Daily CMP, PRN K/Mag/Phos per protocol    - Replace electrolytes as needed    - Nutrition: cardiac regular diet    - Bowel Regimen: Miralax, docusate      ID:     - Aebrile    - Abx:   - ID consulted, recommended dc Daptomycin in setting of neg cultures, signed off 8/16.  - Completed perioperative cefazolin 2g Q8H x 5 doses   - F/u OR cultures, NGTD    Recent Labs   Lab 08/16/24  0443 08/17/24  0321 08/18/24  0237   WBC 10.10 7.68 8.16           Heme/Onc:     - Hgb 11.4 pre-operatively  Products:   - 2 FFP intra-op     - S/p 1u pRBC postop 8/13    - Heparin gtt initiated for mechanical valve thromboprophylaxis bridge - goal PTT 60-80   - Discontinue 8/15 given therapeutic on warfarin    - Warfarin 5mg initiated 8/14 for mechanical valve thromboprophylaxis - goal INR 2.5-3.5    - Warfarin 5 dc'd 8/14, switched to Warfarin 1  - supratherapeutic 8/17 and 8/18    - Daily CBC, PTT, PT/INR    - ASA 325mg daily - dc'd 8/13    - ASA 81 daily     Recent Labs   Lab  08/16/24  0443 08/17/24  0321 08/18/24  0237 08/18/24  0331   HGB 8.0* 7.8* 8.1*  --    * 164 223  --    APTT 62.9* 63.3* 67.5*  67.5*  --    INR 4.3* 5.7* 6.3* 6.6*           Endocrine:     - CTS Goal -140    - HgbA1c: 5.4    - Novolog (Insulin Aspart) prn for BG excursions MDC SSI (150/25)    - BG checks AC/HS    - Endocrinology consulted for insulin management      PPx:   Feeding: Cardiac regular diet  Analgesia/Sedation: n/a/sched tylenol, oxy 5/10   Thromboembolic Prevention: SCDs, warfarin held 8/17 and 8/18  HOB >30: Yes  Stress Ulcer: Yes - famotidine 20mg PO BID  Glucose Control: Yes, insulin management per Endocrinology     Lines/Drains/Airway:   Chest Tubes: 2 (previously 4) (2 Mediastinal (d/c 8/16) and 2 pleural)    Pacing Wires: Temporary V pacing wires      Dispo/Code Status/Palliative:     - Continue SICU Care    - Full Code         Amanda Benitez MD  Critical Care - Surgery  [facility] - [facility]

## 2024-08-18 NOTE — PLAN OF CARE
SICU PLAN OF CARE    Dx: Endocarditis    Goals of Care: MAP 60-80    Vital Signs (last 12 hours):   Temp:  [97.9 °F (36.6 °C)-98.7 °F (37.1 °C)]   Pulse:  [50-52]   Resp:  [13-40]   BP: ()/(50-63)   SpO2:  [93 %-98 %]      Neuro: AAOx4, Follows commands , and Moves all extremities spontaneously     Cardiac: V paced at 50 bpm. Underlying HR in 40s.     Respiratory: Room Air    Urine Output: Voids Spontaneously  1050 mL/shift + 1 occurence    Drains: Chest Tube, total output 120mL/shift    Diet: Cardiac  and 1500mL Fluid Restriction    Skin:  No new breakdown noted.  Patient turned q2h, bony prominences protected, and mattress inflated/working correctly.   Dilip Score: 20. If Dilip Score is 16 or less, complete 4EYES note each shift.    Shift Events:  No acute events during shift. NPO @ 0000.      See flowsheet for further assessment/details.  Family updated on current condition/plan of care, questions answered, and emotional support provided.  MD updated on current condition, vitals, labs, and gtts.

## 2024-08-18 NOTE — NURSING
Potassium level 3.6 on am labs. Offered patient po potassium per prn order. Patient refused stating that he could not take the effervescent tabs and needed it in pill form. Notified Dr. Montelongo. Dr. Montelongo states she will change order.

## 2024-08-18 NOTE — SUBJECTIVE & OBJECTIVE
Interval History/Significant Events: Patient is doing well, paced at 50 still with a 3:1 block. INR 6.6 from 5.7. No acute events overnight.     Follow-up For: Procedure(s) (LRB):  REPLACEMENT, MITRAL VALVE (N/A)  REDO STERNOTOMY (N/A)    Post-Operative Day: 6 Days Post-Op    Objective:     Vital Signs (Most Recent):  Temp: 98.7 °F (37.1 °C) (08/18/24 0307)  Pulse: (!) 50 (08/18/24 0515)  Resp: 14 (08/18/24 0515)  BP: 117/63 (08/18/24 0502)  SpO2: 96 % (08/18/24 0515) Vital Signs (24h Range):  Temp:  [97.8 °F (36.6 °C)-99.2 °F (37.3 °C)] 98.7 °F (37.1 °C)  Pulse:  [45-55] 50  Resp:  [12-44] 14  SpO2:  [93 %-97 %] 96 %  BP: ()/(51-63) 117/63     Weight: 76.9 kg (169 lb 8.5 oz)  Body mass index is 22.99 kg/m².      Intake/Output Summary (Last 24 hours) at 8/18/2024 0636  Last data filed at 8/18/2024 0503  Gross per 24 hour   Intake 500 ml   Output 1815 ml   Net -1315 ml          Physical Exam  Constitutional:       Appearance: Normal appearance. He is not ill-appearing.   Cardiovascular:      Pulses: Normal pulses.      Comments: Midline sternotomy c/d/i  Paced at 80bpm  V wires in place  2x ChT, 2x pleural with SS output      Pulmonary:      Effort: Pulmonary effort is normal. No respiratory distress.   Abdominal:      General: There is no distension.      Palpations: Abdomen is soft.      Tenderness: There is no abdominal tenderness.   Musculoskeletal:      Right lower leg: No edema.      Left lower leg: No edema.   Skin:     General: Skin is warm and dry.   Neurological:      General: No focal deficit present.            Vents:  Vent Mode: Spont (08/13/24 0715)  Ventilator Initiated: Yes (08/12/24 1507)  Set Rate: 16 BPM (08/13/24 0529)  Vt Set: 500 mL (08/13/24 0529)  Pressure Support: 10 cmH20 (08/13/24 0715)  PEEP/CPAP: 5 cmH20 (08/13/24 0715)  Oxygen Concentration (%): 40 (08/13/24 0715)  Peak Airway Pressure: 15 cmH20 (08/13/24 0715)  Plateau Pressure: 0 cmH20 (08/13/24 0715)  Total Ve: 9.35 L/m  (08/13/24 0715)  Negative Inspiratory Force (cm H2O): 0 (08/13/24 0715)  F/VT Ratio<105 (RSBI): (!) 49.18 (08/13/24 0715)    Lines/Drains/Airways       Drain  Duration                  Y Chest Tube 3 and 4 08/12/24 1245 3 Right Pleural 19 Fr. 4 Left Pleural 19 Fr. 5 days              Line  Duration                  Pacer Wires 08/12/24 0931 5 days              Peripheral Intravenous Line  Duration                  Peripheral IV - Single Lumen 08/16/24 0445 20 G Posterior;Right Hand 2 days         Peripheral IV - Single Lumen 08/17/24 1440 20 G Anterior;Right Forearm <1 day                    Significant Labs:    CBC/Anemia Profile:  Recent Labs   Lab 08/17/24 0321 08/18/24  0237   WBC 7.68 8.16   HGB 7.8* 8.1*   HCT 24.9* 25.5*    223   MCV 74* 73*   RDW 19.8* 19.8*        Chemistries:  Recent Labs   Lab 08/17/24 0321 08/18/24  0237    138   K 3.2* 3.6    101   CO2 27 26   BUN 15 13   CREATININE 1.1 1.1   CALCIUM 8.7 9.0   ALBUMIN 2.5* 2.7*   PROT 5.8* 6.1   BILITOT 0.6 0.7   ALKPHOS 55 73   ALT 10 11   AST 23 27   MG 2.1 2.0   PHOS 3.6 3.8       All pertinent labs within the past 24 hours have been reviewed.    Significant Imaging:  I have reviewed all pertinent imaging results/findings within the past 24 hours.

## 2024-08-18 NOTE — ASSESSMENT & PLAN NOTE
Lorenzo Nino is a 48 y.o. 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 He presents to the SICU s/p resternotomy and mechanical MVR with Dr. Beal on 8/12.      Neuro/Psych:     - Sedation: off    - Pain:     - Scheduled Tylenol 1000mg Q8H   - PRN Oxycodone 5mg/10mg    - Psych: home med mirtazipine 15 qd             Cardiac:     - S/p redo MVR with Dr. Beal    - BP Goal: MAP 60-80    - Inotropes/Pressors: Weaned off    - Anti-HTNs: weaned off cleviprex    - Rhythm: paced at 50 (native rhythm 3:1 block 40 bpm)   -- EP consulted, recommend slow wean of pacing   -- home amiodarone 200mg qd held in the setting of native bradycardia    - Beta Blocker: held in the setting of native bradycardia   -- Home medication toprol XL 25 qd    - Statin: n/a      Pulmonary:     - Goal SpO2 >92%    - Chest Tubes x 2 (2 Pleural)   - removed of 2 meds 8/16     Renal:    - Baseline Cr 1.6-2    - Trend BUN/Cr         - Lasix 20 BID with appropriate diuresis    Recent Labs   Lab 08/16/24  0443 08/17/24  0321 08/18/24  0237   BUN 16 15 13   CREATININE 1.1 1.1 1.1           FEN / GI:     - Daily CMP, PRN K/Mag/Phos per protocol    - Replace electrolytes as needed    - Nutrition: cardiac regular diet    - Bowel Regimen: Miralax, docusate      ID:     - Aebrile    - Abx:   - ID consulted, recommended dc Daptomycin in setting of neg cultures, signed off 8/16.  - Completed perioperative cefazolin 2g Q8H x 5 doses   - F/u OR cultures, NGTD    Recent Labs   Lab 08/16/24  0443 08/17/24  0321 08/18/24  0237   WBC 10.10 7.68 8.16           Heme/Onc:     - Hgb 11.4 pre-operatively  Products:   - 2 FFP intra-op     - S/p 1u pRBC postop 8/13    - Heparin gtt initiated for mechanical valve thromboprophylaxis bridge - goal PTT 60-80   - Discontinue 8/15 given therapeutic on warfarin    - Warfarin 5mg initiated 8/14 for mechanical valve  thromboprophylaxis - goal INR 2.5-3.5    - Warfarin 5 dc'd 8/14, switched to Warfarin 1  - supratherapeutic 8/17 and 8/18    - Daily CBC, PTT, PT/INR    - ASA 325mg daily - dc'd 8/13    - ASA 81 daily     Recent Labs   Lab 08/16/24  0443 08/17/24  0321 08/18/24  0237 08/18/24  0331   HGB 8.0* 7.8* 8.1*  --    * 164 223  --    APTT 62.9* 63.3* 67.5*  67.5*  --    INR 4.3* 5.7* 6.3* 6.6*           Endocrine:     - CTS Goal -140    - HgbA1c: 5.4    - Novolog (Insulin Aspart) prn for BG excursions MDC SSI (150/25)    - BG checks AC/HS    - Endocrinology consulted for insulin management      PPx:   Feeding: Cardiac regular diet  Analgesia/Sedation: n/a/sched tylenol, oxy 5/10   Thromboembolic Prevention: SCDs, warfarin held 8/17 and 8/18  HOB >30: Yes  Stress Ulcer: Yes - famotidine 20mg PO BID  Glucose Control: Yes, insulin management per Endocrinology     Lines/Drains/Airway:   Chest Tubes: 2 (previously 4) (2 Mediastinal (d/c 8/16) and 2 pleural)    Pacing Wires: Temporary V pacing wires      Dispo/Code Status/Palliative:     - Continue SICU Care    - Full Code

## 2024-08-19 ENCOUNTER — ANESTHESIA EVENT (OUTPATIENT)
Dept: MEDSURG UNIT | Facility: HOSPITAL | Age: 49
End: 2024-08-19
Payer: MEDICAID

## 2024-08-19 ENCOUNTER — ANESTHESIA (OUTPATIENT)
Dept: MEDSURG UNIT | Facility: HOSPITAL | Age: 49
End: 2024-08-19
Payer: MEDICAID

## 2024-08-19 PROBLEM — I48.3 TYPICAL ATRIAL FLUTTER: Status: ACTIVE | Noted: 2024-06-22

## 2024-08-19 LAB
ALBUMIN SERPL BCP-MCNC: 2.6 G/DL (ref 3.5–5.2)
ALBUMIN SERPL BCP-MCNC: 2.7 G/DL (ref 3.5–5.2)
ALP SERPL-CCNC: 79 U/L (ref 55–135)
ALP SERPL-CCNC: 81 U/L (ref 55–135)
ALT SERPL W/O P-5'-P-CCNC: 14 U/L (ref 10–44)
ALT SERPL W/O P-5'-P-CCNC: 15 U/L (ref 10–44)
ANION GAP SERPL CALC-SCNC: 10 MMOL/L (ref 8–16)
ANION GAP SERPL CALC-SCNC: 11 MMOL/L (ref 8–16)
APTT PPP: 65.4 SEC (ref 21–32)
AST SERPL-CCNC: 30 U/L (ref 10–40)
AST SERPL-CCNC: 42 U/L (ref 10–40)
BACTERIA SPEC ANAEROBE CULT: NORMAL
BASOPHILS # BLD AUTO: 0.04 K/UL (ref 0–0.2)
BASOPHILS NFR BLD: 0.5 % (ref 0–1.9)
BILIRUB SERPL-MCNC: 0.6 MG/DL (ref 0.1–1)
BILIRUB SERPL-MCNC: 0.6 MG/DL (ref 0.1–1)
BSA FOR ECHO PROCEDURE: 1.95 M2
BUN SERPL-MCNC: 15 MG/DL (ref 6–20)
BUN SERPL-MCNC: 15 MG/DL (ref 6–20)
CALCIUM SERPL-MCNC: 8.7 MG/DL (ref 8.7–10.5)
CALCIUM SERPL-MCNC: 9.1 MG/DL (ref 8.7–10.5)
CHLORIDE SERPL-SCNC: 101 MMOL/L (ref 95–110)
CHLORIDE SERPL-SCNC: 99 MMOL/L (ref 95–110)
CO2 SERPL-SCNC: 24 MMOL/L (ref 23–29)
CO2 SERPL-SCNC: 27 MMOL/L (ref 23–29)
CREAT SERPL-MCNC: 1 MG/DL (ref 0.5–1.4)
CREAT SERPL-MCNC: 1.1 MG/DL (ref 0.5–1.4)
DIFFERENTIAL METHOD BLD: ABNORMAL
EOSINOPHIL # BLD AUTO: 0.3 K/UL (ref 0–0.5)
EOSINOPHIL NFR BLD: 4 % (ref 0–8)
ERYTHROCYTE [DISTWIDTH] IN BLOOD BY AUTOMATED COUNT: 19.8 % (ref 11.5–14.5)
EST. GFR  (NO RACE VARIABLE): >60 ML/MIN/1.73 M^2
EST. GFR  (NO RACE VARIABLE): >60 ML/MIN/1.73 M^2
GLUCOSE SERPL-MCNC: 102 MG/DL (ref 70–110)
GLUCOSE SERPL-MCNC: 102 MG/DL (ref 70–110)
HCT VFR BLD AUTO: 25.7 % (ref 40–54)
HGB BLD-MCNC: 8.1 G/DL (ref 14–18)
IMM GRANULOCYTES # BLD AUTO: 0.28 K/UL (ref 0–0.04)
IMM GRANULOCYTES NFR BLD AUTO: 3.3 % (ref 0–0.5)
INR PPP: 3.7 (ref 0.8–1.2)
INR PPP: 4.5 (ref 0.8–1.2)
LYMPHOCYTES # BLD AUTO: 1.7 K/UL (ref 1–4.8)
LYMPHOCYTES NFR BLD: 20.3 % (ref 18–48)
MAGNESIUM SERPL-MCNC: 1.9 MG/DL (ref 1.6–2.6)
MAGNESIUM SERPL-MCNC: 1.9 MG/DL (ref 1.6–2.6)
MCH RBC QN AUTO: 23 PG (ref 27–31)
MCHC RBC AUTO-ENTMCNC: 31.5 G/DL (ref 32–36)
MCV RBC AUTO: 73 FL (ref 82–98)
MONOCYTES # BLD AUTO: 1.2 K/UL (ref 0.3–1)
MONOCYTES NFR BLD: 14.5 % (ref 4–15)
NEUTROPHILS # BLD AUTO: 4.9 K/UL (ref 1.8–7.7)
NEUTROPHILS NFR BLD: 57.4 % (ref 38–73)
NRBC BLD-RTO: 0 /100 WBC
PHOSPHATE SERPL-MCNC: 3.9 MG/DL (ref 2.7–4.5)
PHOSPHATE SERPL-MCNC: 4.2 MG/DL (ref 2.7–4.5)
PLATELET # BLD AUTO: 293 K/UL (ref 150–450)
PMV BLD AUTO: 11.1 FL (ref 9.2–12.9)
POTASSIUM SERPL-SCNC: 3.7 MMOL/L (ref 3.5–5.1)
POTASSIUM SERPL-SCNC: 4.6 MMOL/L (ref 3.5–5.1)
PROT SERPL-MCNC: 6.2 G/DL (ref 6–8.4)
PROT SERPL-MCNC: 6.3 G/DL (ref 6–8.4)
PROTHROMBIN TIME: 37.4 SEC (ref 9–12.5)
PROTHROMBIN TIME: 45.1 SEC (ref 9–12.5)
RBC # BLD AUTO: 3.52 M/UL (ref 4.6–6.2)
SODIUM SERPL-SCNC: 136 MMOL/L (ref 136–145)
SODIUM SERPL-SCNC: 136 MMOL/L (ref 136–145)
WBC # BLD AUTO: 8.46 K/UL (ref 3.9–12.7)

## 2024-08-19 PROCEDURE — 25000003 PHARM REV CODE 250: Performed by: NURSE ANESTHETIST, CERTIFIED REGISTERED

## 2024-08-19 PROCEDURE — 99233 SBSQ HOSP IP/OBS HIGH 50: CPT | Mod: ,,, | Performed by: INTERNAL MEDICINE

## 2024-08-19 PROCEDURE — D9220A PRA ANESTHESIA: Mod: CRNA,,, | Performed by: NURSE ANESTHETIST, CERTIFIED REGISTERED

## 2024-08-19 PROCEDURE — 25000003 PHARM REV CODE 250: Performed by: STUDENT IN AN ORGANIZED HEALTH CARE EDUCATION/TRAINING PROGRAM

## 2024-08-19 PROCEDURE — 37000009 HC ANESTHESIA EA ADD 15 MINS: Performed by: INTERNAL MEDICINE

## 2024-08-19 PROCEDURE — 80053 COMPREHEN METABOLIC PANEL: CPT | Mod: 91 | Performed by: THORACIC SURGERY (CARDIOTHORACIC VASCULAR SURGERY)

## 2024-08-19 PROCEDURE — 37000008 HC ANESTHESIA 1ST 15 MINUTES: Performed by: INTERNAL MEDICINE

## 2024-08-19 PROCEDURE — 25000003 PHARM REV CODE 250

## 2024-08-19 PROCEDURE — 94761 N-INVAS EAR/PLS OXIMETRY MLT: CPT

## 2024-08-19 PROCEDURE — 80053 COMPREHEN METABOLIC PANEL: CPT | Performed by: STUDENT IN AN ORGANIZED HEALTH CARE EDUCATION/TRAINING PROGRAM

## 2024-08-19 PROCEDURE — 99291 CRITICAL CARE FIRST HOUR: CPT | Mod: 24,,, | Performed by: STUDENT IN AN ORGANIZED HEALTH CARE EDUCATION/TRAINING PROGRAM

## 2024-08-19 PROCEDURE — 84100 ASSAY OF PHOSPHORUS: CPT | Mod: 91 | Performed by: PHYSICIAN ASSISTANT

## 2024-08-19 PROCEDURE — 83735 ASSAY OF MAGNESIUM: CPT | Performed by: THORACIC SURGERY (CARDIOTHORACIC VASCULAR SURGERY)

## 2024-08-19 PROCEDURE — 92960 CARDIOVERSION ELECTRIC EXT: CPT | Performed by: INTERNAL MEDICINE

## 2024-08-19 PROCEDURE — 20000000 HC ICU ROOM

## 2024-08-19 PROCEDURE — 25000003 PHARM REV CODE 250: Performed by: PHYSICIAN ASSISTANT

## 2024-08-19 PROCEDURE — 85730 THROMBOPLASTIN TIME PARTIAL: CPT | Performed by: THORACIC SURGERY (CARDIOTHORACIC VASCULAR SURGERY)

## 2024-08-19 PROCEDURE — 92960 CARDIOVERSION ELECTRIC EXT: CPT | Mod: ,,, | Performed by: INTERNAL MEDICINE

## 2024-08-19 PROCEDURE — 85025 COMPLETE CBC W/AUTO DIFF WBC: CPT | Performed by: THORACIC SURGERY (CARDIOTHORACIC VASCULAR SURGERY)

## 2024-08-19 PROCEDURE — 63600175 PHARM REV CODE 636 W HCPCS: Performed by: PHYSICIAN ASSISTANT

## 2024-08-19 PROCEDURE — 84100 ASSAY OF PHOSPHORUS: CPT | Performed by: THORACIC SURGERY (CARDIOTHORACIC VASCULAR SURGERY)

## 2024-08-19 PROCEDURE — D9220A PRA ANESTHESIA: Mod: ANES,,, | Performed by: ANESTHESIOLOGY

## 2024-08-19 PROCEDURE — 85610 PROTHROMBIN TIME: CPT | Performed by: THORACIC SURGERY (CARDIOTHORACIC VASCULAR SURGERY)

## 2024-08-19 PROCEDURE — 63600175 PHARM REV CODE 636 W HCPCS: Performed by: NURSE ANESTHETIST, CERTIFIED REGISTERED

## 2024-08-19 PROCEDURE — 85610 PROTHROMBIN TIME: CPT | Mod: 91

## 2024-08-19 PROCEDURE — 83735 ASSAY OF MAGNESIUM: CPT | Mod: 91 | Performed by: PHYSICIAN ASSISTANT

## 2024-08-19 PROCEDURE — 63600175 PHARM REV CODE 636 W HCPCS

## 2024-08-19 RX ORDER — PHENYLEPHRINE HYDROCHLORIDE 10 MG/ML
INJECTION INTRAVENOUS
Status: DISCONTINUED | OUTPATIENT
Start: 2024-08-19 | End: 2024-08-19

## 2024-08-19 RX ORDER — WARFARIN 1 MG/1
1 TABLET ORAL DAILY
Status: DISCONTINUED | OUTPATIENT
Start: 2024-08-19 | End: 2024-08-20

## 2024-08-19 RX ORDER — LIDOCAINE HYDROCHLORIDE 20 MG/ML
INJECTION INTRAVENOUS
Status: DISCONTINUED | OUTPATIENT
Start: 2024-08-19 | End: 2024-08-19

## 2024-08-19 RX ORDER — POTASSIUM CHLORIDE 20 MEQ/1
40 TABLET, EXTENDED RELEASE ORAL ONCE
Status: COMPLETED | OUTPATIENT
Start: 2024-08-19 | End: 2024-08-19

## 2024-08-19 RX ORDER — PROPOFOL 10 MG/ML
VIAL (ML) INTRAVENOUS
Status: DISCONTINUED | OUTPATIENT
Start: 2024-08-19 | End: 2024-08-19

## 2024-08-19 RX ADMIN — WARFARIN SODIUM 1 MG: 1 TABLET ORAL at 05:08

## 2024-08-19 RX ADMIN — PHENYLEPHRINE HYDROCHLORIDE 100 MCG: 10 INJECTION INTRAVENOUS at 01:08

## 2024-08-19 RX ADMIN — TAMSULOSIN HYDROCHLORIDE 0.4 MG: 0.4 CAPSULE ORAL at 08:08

## 2024-08-19 RX ADMIN — FAMOTIDINE 20 MG: 20 TABLET ORAL at 08:08

## 2024-08-19 RX ADMIN — SENNOSIDES AND DOCUSATE SODIUM 1 TABLET: 50; 8.6 TABLET ORAL at 08:08

## 2024-08-19 RX ADMIN — ACETAMINOPHEN 1000 MG: 500 TABLET ORAL at 02:08

## 2024-08-19 RX ADMIN — MIRTAZAPINE 15 MG: 15 TABLET, ORALLY DISINTEGRATING ORAL at 08:08

## 2024-08-19 RX ADMIN — OXYCODONE 5 MG: 5 TABLET ORAL at 08:08

## 2024-08-19 RX ADMIN — FUROSEMIDE 20 MG: 10 INJECTION, SOLUTION INTRAVENOUS at 08:08

## 2024-08-19 RX ADMIN — POLYETHYLENE GLYCOL 3350 17 G: 17 POWDER, FOR SOLUTION ORAL at 08:08

## 2024-08-19 RX ADMIN — POTASSIUM CHLORIDE 40 MEQ: 1500 TABLET, EXTENDED RELEASE ORAL at 06:08

## 2024-08-19 RX ADMIN — PROPOFOL 50 MG: 10 INJECTION, EMULSION INTRAVENOUS at 01:08

## 2024-08-19 RX ADMIN — FUROSEMIDE 20 MG: 10 INJECTION, SOLUTION INTRAVENOUS at 05:08

## 2024-08-19 RX ADMIN — LIDOCAINE HYDROCHLORIDE 100 MG: 20 INJECTION INTRAVENOUS at 01:08

## 2024-08-19 RX ADMIN — ASPIRIN 81 MG: 81 TABLET, COATED ORAL at 08:08

## 2024-08-19 RX ADMIN — MAGNESIUM SULFATE HEPTAHYDRATE 2 G: 40 INJECTION, SOLUTION INTRAVENOUS at 10:08

## 2024-08-19 RX ADMIN — ACETAMINOPHEN 1000 MG: 500 TABLET ORAL at 09:08

## 2024-08-19 RX ADMIN — SODIUM CHLORIDE: 0.9 INJECTION, SOLUTION INTRAVENOUS at 01:08

## 2024-08-19 RX ADMIN — ACETAMINOPHEN 1000 MG: 500 TABLET ORAL at 06:08

## 2024-08-19 RX ADMIN — PROPOFOL 30 MG: 10 INJECTION, EMULSION INTRAVENOUS at 01:08

## 2024-08-19 NOTE — ASSESSMENT & PLAN NOTE
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 Ventricular pacing wires placed in the procedure. EP consulted for bradycardia, requiring temp pacing wires. Patient also has a h/o Afib/flutter in the s/o valvular disease, first noted 11-12/2023.     Telemetry and ECGs reviewed. Patient in Aflutter, likely 4:1. Patient still has b/l chest tubes in place following his procedure. In order to cardiovert, will need to be able to tolerate anticoagulation. Recent INR 4.5, likely prohibitive.     Recommendations:  - Plan for NEW/DCCV once chest tubes are removed, patient safe for anticoagulation from surgical standpoint, and INR within acceptable range  - Continue temporary pacing. Will re-assess following cardioversion, at which time intrinsic rhythm can be assessed and can make determination on need for PPM  - Please make NPO MN and inform EP after above parameters are met  - Tele  - K>4, Mg>2

## 2024-08-19 NOTE — CONSULTS
[facility], [place]  NEW Consult      Lorenzo Nino  YOB: 1975  Medical Record Number:  [id number]  Attending Physician:  Manuel Beal MD   Current Principal Problem:  Endocarditis    History     Lorenzo Nino is a 48 y.o. 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 S/p MV replacement with mechanical valve 8/12/24. Telemetry and ECGs reviewed. Patient in Aflutter, likely 4:1. Consulted for NEW/DCCV    Anticoagulant/antiplatelets: warfarin      Dysphagia or odynophagia:  No  Liver Disease, esophageal disease, or known varices:  No  Upper GI Bleeding: No  Snoring:  No  Sleep Apnea:  No  Prior neck surgery or radiation:  No  History of anesthetic difficulties:  No  Family history of anesthetic difficulties:  No  Last oral intake: yesterday before midnight  Able to move neck in all directions:  Yes    Medications - Outpatient  Prior to Admission medications    Medication Sig Start Date End Date Taking? Authorizing Provider   doxycycline (VIBRAMYCIN) 100 MG Cap Take 1 capsule by mouth twice daily 7/9/24  Yes Emerson Mercado MD   metoprolol succinate (TOPROL-XL) 25 MG 24 hr tablet Take 1 tablet (25 mg total) by mouth once daily. 1/18/24 1/17/25 Yes Mala Hooks PA-C   mirtazapine (REMERON SOL-TAB) 15 MG disintegrating tablet Dissolve 1 tablet (15 mg total) by mouth nightly. 1/7/24 1/6/25 Yes Tisha Pete MD   potassium chloride SA (K-DUR,KLOR-CON) 20 MEQ tablet Take 2 tablets (40 mEq total) by mouth once daily. 5/6/24  Yes Emerson Mercado MD   torsemide (DEMADEX) 10 MG Tab Take 1 tablet (10 mg total) by mouth once daily. 1/7/24 1/6/25 Yes Tisha Pete MD   amiodarone (PACERONE) 200 MG Tab Take 1 tablet (200 mg total) by mouth once daily. 4/1/24 4/1/25  Emerson Mercado MD   apixaban (ELIQUIS) 5 mg Tab Take 1 tablet (5 mg total)  by mouth 2 (two) times daily.  Patient not taking: Reported on 8/8/2024 6/23/24   Angela Alexis MD   dapagliflozin propanediol (FARXIGA) 10 mg tablet Take 1 tablet (10 mg total) by mouth once daily. 1/12/24   Emerson Mercado MD       Medications - Current  Scheduled Meds:   acetaminophen  1,000 mg Oral Q8H    aspirin  81 mg Oral Daily    famotidine  20 mg Oral BID    furosemide (LASIX) injection  20 mg Intravenous BID    mirtazapine  15 mg Oral Nightly    polyethylene glycol  17 g Oral Daily    senna-docusate 8.6-50 mg  1 tablet Oral BID    tamsulosin  0.4 mg Oral Daily     Continuous Infusions:    Allergies  Review of patient's allergies indicates:  Not on File  Past Medical History  Past Medical History:   Diagnosis Date    Hypertension      Past Surgical History  Past Surgical History:   Procedure Laterality Date    COLONOSCOPY N/A 1/5/2024    Procedure: COLONOSCOPY;  Surgeon: Fadia Ellsworth MD;  Location: [facility] ENDO ([address] [place] [address]);  Service: Endoscopy;  Laterality: N/A;    COLONOSCOPY, WITH DIRECTED SUBMUCOSAL INJECTION  2/27/2024    Procedure: COLONOSCOPY, WITH DIRECTED SUBMUCOSAL INJECTION;  Surgeon: Mayur Dunn MD;  [facility];  Service: Colon and Rectal;;    COLONOSCOPY, WITH POLYPECTOMY USING SNARE N/A 2/27/2024    Procedure: COLONOSCOPY, WITH POLYPECTOMY USING SNARE;  Surgeon: Mayur Dunn MD;  [facility];  Service: Colon and Rectal;  Laterality: N/A;    ECHOCARDIOGRAM,TRANSESOPHAGEAL N/A 12/26/2023    Procedure: Transesophageal echo (NEW) intra-procedure log documentation;  Surgeon: Provider, [facility];  Location: [facility] EP LAB;  Service: Cardiology;  Laterality: N/A;    ESOPHAGOGASTRODUODENOSCOPY N/A 1/5/2024    Procedure: EGD (ESOPHAGOGASTRODUODENOSCOPY);  Surgeon: Fadia Ellsworth MD;  [facility]  Service: Endoscopy;  Laterality: N/A;    EXCLUSION, LEFT ATRIAL APPENDAGE, OPEN, AS PART OF OPEN CHEST SURGERY Left 11/3/2023     Procedure: EXCLUSION, LEFT ATRIAL APPENDAGE, OPEN, AS PART OF OPEN CHEST SURGERY;  Surgeon: Manuel Beal MD;  [facility];  Service: Cardiothoracic;  Laterality: Left;    INSERTION OF INTRA-AORTIC BALLOON ASSIST DEVICE Right 11/2/2023    Procedure: INSERTION, INTRA-AORTIC BALLOON PUMP;  Surgeon: Jsoe Vidal MD;  Location: [facility] CATH LAB;  Service: Cardiology;  Laterality: Right;    MITRAL VALVE REPLACEMENT N/A 8/12/2024    Procedure: REPLACEMENT, MITRAL VALVE;  Surgeon: Manuel Beal MD;  [facility];  Service: Cardiothoracic;  Laterality: N/A;    REPAIR, MITRAL VALVE, OPEN N/A 11/3/2023    Procedure: REPAIR, MITRAL VALVE, OPEN;  Surgeon: Manuel Beal MD;  [facility];  Service: Cardiothoracic;  Laterality: N/A;    STERNOTOMY N/A 8/12/2024    Procedure: REDO STERNOTOMY;  Surgeon: Manuel Beal MD;  [facility];  Service: Cardiothoracic;  Laterality: N/A;     ROS  10 point ROS performed and negative except as stated in HPI     Physical Examination   Vital Signs  Vitals  Temp: 98 °F (36.7 °C)  Temp Source: Oral  Pulse: (!) 50  Heart Rate Source: Monitor, Continuous  Resp: (!) 26  SpO2: 97 %  Pulse Oximetry Type: Continuous  Flow (L/min) (Oxygen Therapy): 1  Oxygen Concentration (%): 40  BP: (!) 110/55  MAP (mmHg): 75  BP Location: Right arm  BP Method: Automatic  Patient Position: Lying  Invasive Hemodynamic Monitoring  CVP (mean): 8 mmHg 24 Hour VS Range  Temp:  [98 °F (36.7 °C)-99 °F (37.2 °C)]   Pulse:  [50-58]   Resp:  [13-37]   BP: (105-139)/(51-63)   SpO2:  [89 %-98 %]     Intake/Output Summary (Last 24 hours) at 8/19/2024 1308  Last data filed at 8/19/2024 1100  Gross per 24 hour   Intake 539.47 ml   Output 985 ml   Net -445.53 ml         Physical Exam  Constitutional:       Appearance: Normal appearance. He is obese.   Eyes:      Pupils: Pupils are equal, round, and reactive to light.   Neck:      Vascular: No JVD.    Cardiovascular:      Rate and Rhythm: Normal rate and regular rhythm.      Pulses: Normal pulses.      Heart sounds: Normal heart sounds.      Comments: Mechanical click   Midline sternotomy c/d/i  Pulmonary:      Effort: Pulmonary effort is normal.      Breath sounds: Normal breath sounds.   Abdominal:      Palpations: Abdomen is soft.   Musculoskeletal:      Right lower leg: No edema.      Left lower leg: No edema.   Skin:     General: Skin is warm.   Neurological:      General: No focal deficit present.      Mental Status: He is alert and oriented to person, place, and time.   Psychiatric:         Mood and Affect: Mood normal.         Behavior: Behavior normal.         Data     Recent Labs   Lab 08/15/24  0300 08/16/24  0443 08/17/24  0321 08/18/24  0237 08/19/24  0352   WBC 13.25* 10.10 7.68 8.16 8.46   HGB 7.9* 8.0* 7.8* 8.1* 8.1*   HCT 26.5* 27.4* 24.9* 25.5* 25.7*   PLT 94* 125* 164 223 293      Recent Labs   Lab 08/17/24  0321 08/18/24  0237 08/18/24  0331 08/19/24  0352 08/19/24  1201   INR 5.7* 6.3* 6.6* 4.5* 3.7*      Recent Labs   Lab 08/16/24  0443 08/17/24  0321 08/18/24  0237 08/19/24  0309 08/19/24  0352    140 138 136 136   K 3.9 3.2* 3.6 4.6 3.7    103 101 101 99   CO2 22* 27 26 24 27   BUN 16 15 13 15 15   CREATININE 1.1 1.1 1.1 1.0 1.1   ANIONGAP 16 10 11 11 10   CALCIUM 8.9 8.7 9.0 8.7 9.1      Assessment & Plan     NEW for ROMAN assessment prior to cardioversion  -No absolute contraindications of esophageal stricture, tumor, perforation, laceration,or diverticulum and/or active GI bleed  -The risks, benefits & alternatives of the procedure were explained to the patient.   -The risks of transesophageal echo include but are not limited to:  Dental trauma, esophageal trauma/perforation, bleeding, laryngospasm/brochospasm, aspiration, sore throat/hoarseness, & dislodgement of the endotracheal tube/nasogastric tube (where applicable).    -The risks of ANES monitored sedation include  hypotension, respiratory depression, arrhythmias, bronchospasm, & death.    -Informed consent was obtained. The patient is agreeable to proceed with the procedure and all questions and concerns addressed.    Case discussed with an attending in echocardiography lab.    Maura Martinez MD  [facility]   Cardiovascular Disease PGY-V

## 2024-08-19 NOTE — SUBJECTIVE & OBJECTIVE
Past Medical History:   Diagnosis Date    Hypertension        Past Surgical History:   Procedure Laterality Date    COLONOSCOPY N/A 1/5/2024    Procedure: COLONOSCOPY;  Surgeon: Fadia Ellsworth MD;  Location: [facility] ENDO (2ND FLR);  Service: Endoscopy;  Laterality: N/A;    COLONOSCOPY, WITH DIRECTED SUBMUCOSAL INJECTION  2/27/2024    Procedure: COLONOSCOPY, WITH DIRECTED SUBMUCOSAL INJECTION;  Surgeon: Mayur Dunn MD;  Location: [facility] OR [facility] FLR;  Service: Colon and Rectal;;    COLONOSCOPY, WITH POLYPECTOMY USING SNARE N/A 2/27/2024    Procedure: COLONOSCOPY, WITH POLYPECTOMY USING SNARE;  Surgeon: Mayur Dunn MD;  Location: [facility] OR [facility] FLR;  Service: Colon and Rectal;  Laterality: N/A;    ECHOCARDIOGRAM,TRANSESOPHAGEAL N/A 12/26/2023    Procedure: Transesophageal echo (NEW) intra-procedure log documentation;  Surgeon: Provider, [facility];  Location: [facility] EP LAB;  Service: Cardiology;  Laterality: N/A;    ESOPHAGOGASTRODUODENOSCOPY N/A 1/5/2024    Procedure: EGD (ESOPHAGOGASTRODUODENOSCOPY);  Surgeon: Fadia Ellsworth MD;  Location: [facility] ENDO (2ND FLR);  Service: Endoscopy;  Laterality: N/A;    EXCLUSION, LEFT ATRIAL APPENDAGE, OPEN, AS PART OF OPEN CHEST SURGERY Left 11/3/2023    Procedure: EXCLUSION, LEFT ATRIAL APPENDAGE, OPEN, AS PART OF OPEN CHEST SURGERY;  Surgeon: Manuel Beal MD;  [facility];  Service: Cardiothoracic;  Laterality: Left;    INSERTION OF INTRA-AORTIC BALLOON ASSIST DEVICE Right 11/2/2023    Procedure: INSERTION, INTRA-AORTIC BALLOON PUMP;  Surgeon: Jose Vidal MD;  Location: [facility] CATH LAB;  Service: Cardiology;  Laterality: Right;    MITRAL VALVE REPLACEMENT N/A 8/12/2024    Procedure: REPLACEMENT, MITRAL VALVE;  Surgeon: Manuel Beal MD;  Location: [facility] OR [facility] FLR;  Service: Cardiothoracic;  Laterality: N/A;    REPAIR, MITRAL VALVE, OPEN N/A 11/3/2023    Procedure: REPAIR, MITRAL VALVE, OPEN;  Surgeon: Manuel Beal MD;   Location: [address];  Service: Cardiothoracic;  Laterality: N/A;    STERNOTOMY N/A 8/12/2024    Procedure: REDO STERNOTOMY;  Surgeon: Manuel Beal MD;  Location: [address];  Service: Cardiothoracic;  Laterality: N/A;       Review of patient's allergies indicates:  Not on File    Current Facility-Administered Medications on File Prior to Encounter   Medication    0.9%  NaCl infusion    mupirocin 2 % ointment     Current Outpatient Medications on File Prior to Encounter   Medication Sig    doxycycline (VIBRAMYCIN) 100 MG Cap Take 1 capsule by mouth twice daily    metoprolol succinate (TOPROL-XL) 25 MG 24 hr tablet Take 1 tablet (25 mg total) by mouth once daily.    mirtazapine (REMERON SOL-TAB) 15 MG disintegrating tablet Dissolve 1 tablet (15 mg total) by mouth nightly.    potassium chloride SA (K-DUR,KLOR-CON) 20 MEQ tablet Take 2 tablets (40 mEq total) by mouth once daily.    torsemide (DEMADEX) 10 MG Tab Take 1 tablet (10 mg total) by mouth once daily.    amiodarone (PACERONE) 200 MG Tab Take 1 tablet (200 mg total) by mouth once daily.    apixaban (ELIQUIS) 5 mg Tab Take 1 tablet (5 mg total) by mouth 2 (two) times daily. (Patient not taking: Reported on 8/8/2024)    dapagliflozin propanediol (FARXIGA) 10 mg tablet Take 1 tablet (10 mg total) by mouth once daily.     Family History    None       Tobacco Use    Smoking status: Unknown     Passive exposure: Never    Smokeless tobacco: Not on file   Substance and Sexual Activity    Alcohol use: Yes     Alcohol/week: 1.0 standard drink of alcohol     Types: 1 Cans of beer per week    Drug use: Never    Sexual activity: Not Currently     Partners: Female     Birth control/protection: None     Review of Systems   Constitutional: Negative for chills, fever and malaise/fatigue.   Cardiovascular:  Negative for chest pain, dyspnea on exertion, irregular heartbeat, orthopnea, palpitations and syncope.   Respiratory:  Negative for shortness of breath.     Gastrointestinal:  Negative for abdominal pain.   Neurological:  Negative for light-headedness.     Objective:     Vital Signs (Most Recent):  Temp: 98.9 °F (37.2 °C) (08/19/24 0755)  Pulse: (!) 50 (08/19/24 0815)  Resp: 17 (08/19/24 0815)  BP: (!) 105/59 (08/19/24 0700)  SpO2: 96 % (08/19/24 0815) Vital Signs (24h Range):  Temp:  [98.7 °F (37.1 °C)-99 °F (37.2 °C)] 98.9 °F (37.2 °C)  Pulse:  [50-58] 50  Resp:  [13-37] 17  SpO2:  [89 %-98 %] 96 %  BP: ()/(50-63) 105/59       Weight: 74.9 kg (165 lb 2 oz)  Body mass index is 22.39 kg/m².    SpO2: 96 %        Physical Exam  Constitutional:       Appearance: Normal appearance.   HENT:      Head: Normocephalic.      Mouth/Throat:      Mouth: Mucous membranes are moist.   Eyes:      Extraocular Movements: Extraocular movements intact.      Conjunctiva/sclera: Conjunctivae normal.   Cardiovascular:      Rate and Rhythm: Normal rate and regular rhythm.      Pulses: Normal pulses.      Comments: Mechanical mitral valve  Pulmonary:      Effort: Pulmonary effort is normal.   Abdominal:      Palpations: Abdomen is soft.   Musculoskeletal:         General: Normal range of motion.      Cervical back: Normal range of motion.      Right lower leg: No edema.      Left lower leg: No edema.   Skin:     General: Skin is warm.   Neurological:      General: No focal deficit present.      Mental Status: He is alert and oriented to person, place, and time.            Significant Labs: EP:   Recent Labs   Lab 08/18/24  0237 08/18/24  0331 08/19/24  0309 08/19/24  0352     --  136 136   K 3.6  --  4.6 3.7     --  101 99   CO2 26  --  24 27     --  102 102   BUN 13  --  15 15   CREATININE 1.1  --  1.0 1.1   CALCIUM 9.0  --  8.7 9.1   PROT 6.1  --  6.3 6.2   ALBUMIN 2.7*  --  2.7* 2.6*   BILITOT 0.7  --  0.6 0.6   ALKPHOS 73  --  79 81   AST 27  --  42* 30   ALT 11  --  14 15   ANIONGAP 11  --  11 10   WBC 8.16  --   --  8.46   HGB 8.1*  --   --  8.1*   HCT 25.5*   --   --  25.7*     --   --  293   INR 6.3* 6.6*  --  4.5*       Significant Imaging: Echocardiogram: Transthoracic echo (TTE) complete (Cupid Only):   Results for orders placed or performed during the hospital encounter of 07/05/24   Echo   Result Value Ref Range    BSA 1.98 m2    Est. RA pres 3 mmHg    LVOT stroke volume 71.37 cm3    LVIDd 6.28 (A) 3.5 - 6.0 cm    LV Systolic Volume 62.66 mL    LV Systolic Volume Index 31.5 mL/m2    LVIDs 3.82 2.1 - 4.0 cm    LV Diastolic Volume 200.07 mL    LV Diastolic Volume Index 100.54 mL/m2    IVS 0.88 0.6 - 1.1 cm    LVOT diameter 2.18 cm    LVOT area 3.7 cm2    FS 39 28 - 44 %    Left Ventricle Relative Wall Thickness 0.20 cm    Posterior Wall 0.64 0.6 - 1.1 cm    LV mass 189.36 g    LV Mass Index 95 g/m2    MV Peak E Jabier 1.51 m/s    TDI LATERAL 0.16 m/s    TDI SEPTAL 0.07 m/s    E/E' ratio 13.13 m/s    MV Peak A Jabier 0.41 m/s    TR Max Jabier 3.56 m/s    E/A ratio 3.68     E wave deceleration time 534.74 msec    LV SEPTAL E/E' RATIO 21.57 m/s    LA Volume Index 76.5 mL/m2    LV LATERAL E/E' RATIO 9.44 m/s    LA volume 152.28 cm3    LVOT peak jabier 0.76 m/s    RV- molina basal diam 3.9 cm    TAPSE 1.75 cm    LA size 5.35 cm    Left Atrium Minor Axis 6.01 cm    Left Atrium Major Axis 5.95 cm    LA volume (mod) 148.03 cm3    LA WIDTH 5.60 cm    LA Volume Index (Mod) 74.4 mL/m2    RA Major Axis 5.17 cm    RA Width 3.60 cm    AV mean gradient 4 mmHg    AV peak gradient 6 mmHg    Ao peak jabier 1.22 m/s    Ao VTI 30.05 cm    LVOT peak VTI 19.13 cm    AV valve area 2.37 cm²    AV Velocity Ratio 0.62     AV index (prosthetic) 0.64     FATUMA by Velocity Ratio 2.32 cm²    Mr max jabier 0.05 m/s    MV stenosis pressure 1/2 time 155.07 ms    MV valve area p 1/2 method 1.42 cm2    TV resting pulmonary artery pressure 54 mmHg    RV TB RVSP 7 mmHg    Triscuspid Valve Regurgitation Peak Gradient 51 mmHg    Sinus 3.47 cm    STJ 2.51 cm    Ascending aorta 3.55 cm    Mean e' 0.12 m/s    ZLVIDS 0.59      ZLVIDD 0.84     Narrative    Images from the original result were not included.      Left Ventricle: The left ventricle is moderately dilated. Normal wall   thickness. Normal wall motion. There is normal systolic function with a   visually estimated ejection fraction of 55 - 60%. Diastolic function   cannot be reliably determined in the presence of mitral valve disease and   mitral valve ring/replacement.    Right Ventricle: Normal right ventricular cavity size. Wall thickness   is normal. Right ventricle wall motion  is normal. Systolic function is   normal.    Left Atrium: Left atrium is severely dilated.    Right Atrium: Normal right atrial size.    Mitral Valve: The mitral valve is repaired by annular ring 40 mm   [employer]tronic. There is mild bileaflet sclerosis. There is severe   regurgitation with a posterolateral eccentriccally directed and a central    jet. There appears to be a perforation in Anterior leaflets (clip #96 to   96- see below).Torn tissue is seen in this area in LA but its proximal   attacment is not well seen. Consider NEW if clinically indicated. Mean   gradient is 2.5 mmHg at HR o 53 bpm.    Tricuspid Valve: There is mild to moderate regurgitation with a   centrally directed jet.    Pulmonic Valve: There is mild regurgitation.    Aorta: Aortic root is normal in size measuring 3.47 cm. Ascending aorta   is normal measuring 3.55 cm.    Pulmonary Artery: There is moderate pulmonary hypertension. The   estimated pulmonary artery systolic pressure is 54 mmHg.    IVC/SVC: Normal venous pressure at 3 mmHg.    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   2)  Resection of left atrial appendage         EOAE (evoked otoacoustic emission)

## 2024-08-19 NOTE — PLAN OF CARE
CM met with the patient and his mother to discuss post acute care CM explained that after his PPM is placed the patient will be discharged CM explained due to the fact the the patient has a mechanical valve he will have to take COUMADIN/WARFARIN  for as long as he has this valve CM further explained that he needed to be monitored to establish a safe effective dose CM explained the dangers of no follow up and no monitoring   Patient and his mother stated that the patient does not have a PCP but has been followed by Dr LUÍS Mercado cardiologist @ [facility]   Patient and his mother then told CM that as of 08/31/24 the patient no longer had Medicaid   CM stressed that they needed to find alternative insurance as soon as possible and to go on [employer] to find affordable insurance CM explained that that they needed to ascertain if the insurance provided post acute/outpatient services   CM informed the team of the above  CM WCTM

## 2024-08-19 NOTE — PLAN OF CARE
SICU PLAN OF CARE    Dx: Endocarditis    Goals of Care: MAP 60-80    Vital Signs (last 12 hours):   Temp:  [98 °F (36.7 °C)-98.9 °F (37.2 °C)]   Pulse:  [50-52]   Resp:  [13-28]   BP: (105-127)/(55-60)   SpO2:  [94 %-98 %]      Neuro: AAOx4, Follows commands , and Moves all extremities spontaneously     Cardiac:  V paced @ 50    Respiratory: Room Air    Gtts: N/A    Urine Output: Voids Spontaneously  775 mL/shift    Drains: Chest tube 40mL/shift    Diet: Cardiac  and 1500mL Fluid Restriction - NPO @ midnight     Skin:  Midline incision GABRIEL with steri strips CDI. Patient turns independently, protective foams in place, bony prominences protected, and mattress inflated and working correctly.     Shift Events:  Chest tube removed at bedside by MD. 20 mg Lasix given x2. Patient went to procedural area for NEW and cardioversion. Plans for pacemaker placement tomorrow.    Family updated on current condition/plan of care, questions answered, and emotional support provided. MD updated on current condition, vitals, labs, and gtts.

## 2024-08-19 NOTE — TRANSFER OF CARE
Anesthesia Transfer of Care Note    Patient: Lorenzo Nino    Procedure(s) Performed: Procedure(s) (LRB):  Cardioversion or Defibrillation (N/A)  Transesophageal echo (NEW) intra-procedure log documentation (N/A)    Patient location: ICU    Anesthesia Type: general    Transport from OR: Transported from OR on room air with adequate spontaneous ventilation    Post pain: adequate analgesia    Post assessment: no apparent anesthetic complications and tolerated procedure well    Post vital signs: stable    Level of consciousness: awake, alert and oriented    Nausea/Vomiting: no nausea/vomiting    Complications: none    Transfer of care protocol was followed      Last vitals: Visit Vitals  BP (!) 101/61   Pulse (!) 50   Temp 36.7 °C (98 °F) (Oral)   Resp (!) 26   Ht 6' (1.829 m)   Wt 74.8 kg (165 lb)   SpO2 97%   BMI 22.38 kg/m²

## 2024-08-19 NOTE — ASSESSMENT & PLAN NOTE
Lorenzo Nino is a 48 y.o. 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 He presents to the SICU s/p resternotomy and mechanical MVR with Dr. Beal on 8/12.      Neuro/Psych:     - Sedation: none    - Pain:     - Scheduled Tylenol 1000mg Q8H   - PRN Oxycodone 5mg    - Psych: home med mirtazipine 15 qd             Cardiac:     - S/p redo MVR with Dr. Beal    - BP Goal: MAP 60-80    - Inotropes/Pressors: none    - Anti-HTNs: none    - Rhythm: paced at 50 (native rhythm 3:1 block 40 bpm)   -- EP consulted, recommend slow wean of pacing; will reach out again for continued bradycardia/arrhythmia   -- home amiodarone 200mg qd held in the setting of native bradycardia    - Beta Blocker: held in the setting of native bradycardia   -- Home medication toprol XL 25 qd    - Statin: n/a      Pulmonary:     - Goal SpO2 >92%    - Chest Tubes x 2 (2 Pleural)   - removed of 2 meds 8/16     Renal:    - Baseline Cr 1.6-2    - Trend BUN/Cr         - Lasix 20 BID with appropriate diuresis    Recent Labs   Lab 08/18/24  0237 08/19/24  0309 08/19/24  0352   BUN 13 15 15   CREATININE 1.1 1.0 1.1           FEN / GI:     - Daily CMP, PRN K/Mag/Phos per protocol    - Replace electrolytes as needed    - Nutrition: cardiac regular diet    - Bowel Regimen: Miralax, docusate      ID:     - Aebrile    - Abx:   - ID consulted, recommended dc Daptomycin in setting of neg cultures, signed off 8/16.  - Completed perioperative cefazolin 2g Q8H x 5 doses   - F/u OR cultures, NGTD    Recent Labs   Lab 08/17/24  0321 08/18/24  0237 08/19/24  0352   WBC 7.68 8.16 8.46           Heme/Onc:     - Hgb 11.4 pre-operatively  Products:   - 2 FFP intra-op     - S/p 1u pRBC postop 8/13    - Heparin gtt initiated for mechanical valve thromboprophylaxis bridge - goal PTT 60-80   - Discontinue 8/15 given therapeutic on warfarin    - Warfarin 5mg initiated  8/14 for mechanical valve thromboprophylaxis - goal INR 2.5-3.5    - Warfarin 5 dc'd 8/14, switched to Warfarin 1  - supratherapeutic 8/17, 8/18, 8/19    - Daily CBC, PTT, PT/INR    - ASA 325mg daily - dc'd 8/13    - ASA 81 daily     Recent Labs   Lab 08/17/24  0321 08/18/24  0237 08/18/24  0331 08/19/24  0352   HGB 7.8* 8.1*  --  8.1*    223  --  293   APTT 63.3* 67.5*  67.5*  --  65.4*   INR 5.7* 6.3* 6.6* 4.5*           Endocrine:     - CTS Goal -140    - HgbA1c: 5.4    - Novolog (Insulin Aspart) prn for BG excursions [facility] SSI (150/25)    - BG checks AC/HS    - Endocrinology consulted for insulin management      PPx:   Feeding: Cardiac regular diet  Analgesia/Sedation: n/a/sched tylenol, oxy 5  Thromboembolic Prevention: SCDs, warfarin (titrate dose for INR 2.5-3.5)  HOB >30: Yes  Stress Ulcer: Yes - famotidine 20mg PO BID  Glucose Control: Yes, insulin management per Endocrinology     Lines/Drains/Airway:   Chest Tubes: 2 pleural   Pacing Wires: Temporary V pacing wires      Dispo/Code Status/Palliative:     - Continue SICU Care    - Full Code

## 2024-08-19 NOTE — SUBJECTIVE & OBJECTIVE
Interval History/Significant Events: NAEON. Patient remains in ICU due to bradycardia/3:1 arrhythmia requiring pacing. Will discuss pacemaker with EP today. INR now 4.5 from 6.6 yesterday. Chest tube remains in place due to supra therapeutic INR. Will discuss removal today as well as warfarin dose. Remain NPO for possible EP intervention today.    Follow-up For: Procedure(s) (LRB):  REPLACEMENT, MITRAL VALVE (N/A)  REDO STERNOTOMY (N/A)    Post-Operative Day: 7 Days Post-Op    Objective:     Vital Signs (Most Recent):  Temp: 99 °F (37.2 °C) (08/19/24 0313)  Pulse: (!) 50 (08/19/24 0530)  Resp: (!) 28 (08/19/24 0530)  BP: (!) 108/58 (08/19/24 0500)  SpO2: 96 % (08/19/24 0530) Vital Signs (24h Range):  Temp:  [97.9 °F (36.6 °C)-99 °F (37.2 °C)] 99 °F (37.2 °C)  Pulse:  [50-58] 50  Resp:  [13-40] 28  SpO2:  [89 %-98 %] 96 %  BP: ()/(50-63) 108/58     Weight: 74.9 kg (165 lb 2 oz)  Body mass index is 22.39 kg/m².      Intake/Output Summary (Last 24 hours) at 8/19/2024 0533  Last data filed at 8/18/2024 1945  Gross per 24 hour   Intake 400 ml   Output 1420 ml   Net -1020 ml          Physical Exam  Constitutional:       Appearance: Normal appearance. He is not ill-appearing.   HENT:      Head: Normocephalic and atraumatic.      Mouth/Throat:      Mouth: Mucous membranes are moist.      Pharynx: Oropharynx is clear.   Eyes:      Extraocular Movements: Extraocular movements intact.      Conjunctiva/sclera: Conjunctivae normal.   Cardiovascular:      Pulses: Normal pulses.      Comments: Midline sternotomy c/d/i  Paced at 50bpm  V wires in place  2x ChT, 2x pleural with SS output      Pulmonary:      Effort: Pulmonary effort is normal. No respiratory distress.   Abdominal:      General: There is no distension.      Palpations: Abdomen is soft.      Tenderness: There is no abdominal tenderness.   Musculoskeletal:      Cervical back: Normal range of motion. No rigidity.      Right lower leg: No edema.      Left lower  leg: No edema.   Skin:     General: Skin is warm and dry.   Neurological:      General: No focal deficit present.      Mental Status: He is oriented to person, place, and time. Mental status is at baseline.   Psychiatric:         Mood and Affect: Mood normal.         Behavior: Behavior normal.            Vents:  Vent Mode: Spont (08/13/24 0715)  Ventilator Initiated: Yes (08/12/24 1507)  Set Rate: 16 BPM (08/13/24 0529)  Vt Set: 500 mL (08/13/24 0529)  Pressure Support: 10 cmH20 (08/13/24 0715)  PEEP/CPAP: 5 cmH20 (08/13/24 0715)  Oxygen Concentration (%): 40 (08/13/24 0715)  Peak Airway Pressure: 15 cmH20 (08/13/24 0715)  Plateau Pressure: 0 cmH20 (08/13/24 0715)  Total Ve: 9.35 L/m (08/13/24 0715)  Negative Inspiratory Force (cm H2O): 0 (08/13/24 0715)  F/VT Ratio<105 (RSBI): (!) 49.18 (08/13/24 0715)    Lines/Drains/Airways       Drain  Duration                  Y Chest Tube 3 and 4 08/12/24 1245 3 Right Pleural 19 Fr. 4 Left Pleural 19 Fr. 6 days              Line  Duration                  Pacer Wires 08/12/24 0931 6 days              Peripheral Intravenous Line  Duration                  Peripheral IV - Single Lumen 08/16/24 0445 20 G Posterior;Right Hand 3 days         Peripheral IV - Single Lumen 08/17/24 1440 20 G Anterior;Right Forearm 1 day                    Significant Labs:    CBC/Anemia Profile:  Recent Labs   Lab 08/18/24  0237 08/19/24  0352   WBC 8.16 8.46   HGB 8.1* 8.1*   HCT 25.5* 25.7*    293   MCV 73* 73*   RDW 19.8* 19.8*        Chemistries:  Recent Labs   Lab 08/18/24  0237 08/19/24  0309 08/19/24  0352    136 136   K 3.6 4.6 3.7    101 99   CO2 26 24 27   BUN 13 15 15   CREATININE 1.1 1.0 1.1   CALCIUM 9.0 8.7 9.1   ALBUMIN 2.7* 2.7* 2.6*   PROT 6.1 6.3 6.2   BILITOT 0.7 0.6 0.6   ALKPHOS 73 79 81   ALT 11 14 15   AST 27 42* 30   MG 2.0 1.9 1.9   PHOS 3.8 4.2 3.9       All pertinent labs within the past 24 hours have been reviewed.    Significant Imaging:  I have reviewed  all pertinent imaging results/findings within the past 24 hours.

## 2024-08-19 NOTE — PT/OT/SLP PROGRESS
Physical Therapy Missed Treatment Note      Patient Name:  Lorenzo Nino   MRN:  [id number]  Admitting Diagnosis:  Endocarditis   Recent Surgery: Procedure(s) (LRB):  Cardioversion or Defibrillation (N/A)  Transesophageal echo (NEW) intra-procedure log documentation (N/A) Day of Surgery  Admit Date: 8/12/2024  Length of Stay: 7 days    Patient not seen today due to  (pt with plans to go to IR for cardioversion this date). Lorenzo Nino's plan of care and PT goals reviewed on this date and remain appropriate. Will follow-up for progressive mobility pending continued medical stability and patient participation.    8/19/2024

## 2024-08-19 NOTE — PLAN OF CARE
[facility]  Discharge Reassessment    Primary Care Provider: No, Primary Doctor    Expected Discharge Date: 8/23/2024    Reassessment (most recent)       Discharge Reassessment - 08/19/24 1054          Discharge Reassessment    Assessment Type Discharge Planning Reassessment     Communicated DARIELA with patient/caregiver Date not available/Unable to determine     Discharge Plan A Home;Home with family;Home Health     Discharge Plan B Skilled Nursing Facility     DME Needed Upon Discharge  other (see comments)   TBD    Transition of Care Barriers Underinsured     Why the patient remains in the hospital Requires continued medical care                     Discharge Plan A and Plan B have been determined by review of patient's clinical status, future medical and therapeutic needs, and coverage/benefits for post-acute care in coordination with multidisciplinary team members.      Martin Conroy LCSW  Case Management [facility]

## 2024-08-19 NOTE — PT/OT/SLP PROGRESS
Occupational Therapy      Patient Name:  Lorenzo Nino   MRN:  [id number]    Patient not seen today secondary to going for cardioversion  . Will follow-up 8/20/24.    8/19/2024

## 2024-08-19 NOTE — PROGRESS NOTES
[facility]  Critical Care - Surgery  Progress Note    Patient Name: Lorenzo Nino  MRN: [id number]  Admission Date: 8/12/2024  Hospital Length of Stay: 7 days  Code Status: Full Code  Attending Provider: Manuel Beal MD  Primary Care Provider: Terrie, Primary Doctor   Principal Problem: Endocarditis    Subjective:     Hospital/ICU Course:  No notes on file    Interval History/Significant Events: NAEON. Patient remains in ICU due to bradycardia/3:1 arrhythmia requiring pacing. Will discuss pacemaker with EP today. INR now 4.5 from 6.6 yesterday. Chest tube remains in place due to supra therapeutic INR. Will discuss removal today as well as warfarin dose. Remain NPO for possible EP intervention today.    Follow-up For: Procedure(s) (LRB):  REPLACEMENT, MITRAL VALVE (N/A)  REDO STERNOTOMY (N/A)    Post-Operative Day: 7 Days Post-Op    Objective:     Vital Signs (Most Recent):  Temp: 99 °F (37.2 °C) (08/19/24 0313)  Pulse: (!) 50 (08/19/24 0530)  Resp: (!) 28 (08/19/24 0530)  BP: (!) 108/58 (08/19/24 0500)  SpO2: 96 % (08/19/24 0530) Vital Signs (24h Range):  Temp:  [97.9 °F (36.6 °C)-99 °F (37.2 °C)] 99 °F (37.2 °C)  Pulse:  [50-58] 50  Resp:  [13-40] 28  SpO2:  [89 %-98 %] 96 %  BP: ()/(50-63) 108/58     Weight: 74.9 kg (165 lb 2 oz)  Body mass index is 22.39 kg/m².      Intake/Output Summary (Last 24 hours) at 8/19/2024 0533  Last data filed at 8/18/2024 1945  Gross per 24 hour   Intake 400 ml   Output 1420 ml   Net -1020 ml          Physical Exam  Constitutional:       Appearance: Normal appearance. He is not ill-appearing.   HENT:      Head: Normocephalic and atraumatic.      Mouth/Throat:      Mouth: Mucous membranes are moist.      Pharynx: Oropharynx is clear.   Eyes:      Extraocular Movements: Extraocular movements intact.      Conjunctiva/sclera: Conjunctivae normal.   Cardiovascular:      Pulses: Normal pulses.      Comments: Midline sternotomy c/d/i  Paced at 50bpm  V wires  in place  2x ChT, 2x pleural with SS output      Pulmonary:      Effort: Pulmonary effort is normal. No respiratory distress.   Abdominal:      General: There is no distension.      Palpations: Abdomen is soft.      Tenderness: There is no abdominal tenderness.   Musculoskeletal:      Cervical back: Normal range of motion. No rigidity.      Right lower leg: No edema.      Left lower leg: No edema.   Skin:     General: Skin is warm and dry.   Neurological:      General: No focal deficit present.      Mental Status: He is oriented to person, place, and time. Mental status is at baseline.   Psychiatric:         Mood and Affect: Mood normal.         Behavior: Behavior normal.            Vents:  Vent Mode: Spont (08/13/24 0715)  Ventilator Initiated: Yes (08/12/24 1507)  Set Rate: 16 BPM (08/13/24 0529)  Vt Set: 500 mL (08/13/24 0529)  Pressure Support: 10 cmH20 (08/13/24 0715)  PEEP/CPAP: 5 cmH20 (08/13/24 0715)  Oxygen Concentration (%): 40 (08/13/24 0715)  Peak Airway Pressure: 15 cmH20 (08/13/24 0715)  Plateau Pressure: 0 cmH20 (08/13/24 0715)  Total Ve: 9.35 L/m (08/13/24 0715)  Negative Inspiratory Force (cm H2O): 0 (08/13/24 0715)  F/VT Ratio<105 (RSBI): (!) 49.18 (08/13/24 0715)    Lines/Drains/Airways       Drain  Duration                  Y Chest Tube 3 and 4 08/12/24 1245 3 Right Pleural 19 Fr. 4 Left Pleural 19 Fr. 6 days              Line  Duration                  Pacer Wires 08/12/24 0931 6 days              Peripheral Intravenous Line  Duration                  Peripheral IV - Single Lumen 08/16/24 0445 20 G Posterior;Right Hand 3 days         Peripheral IV - Single Lumen 08/17/24 1440 20 G Anterior;Right Forearm 1 day                    Significant Labs:    CBC/Anemia Profile:  Recent Labs   Lab 08/18/24  0237 08/19/24  0352   WBC 8.16 8.46   HGB 8.1* 8.1*   HCT 25.5* 25.7*    293   MCV 73* 73*   RDW 19.8* 19.8*        Chemistries:  Recent Labs   Lab 08/18/24  0237 08/19/24  0309 08/19/24  035     136 136   K 3.6 4.6 3.7    101 99   CO2 26 24 27   BUN 13 15 15   CREATININE 1.1 1.0 1.1   CALCIUM 9.0 8.7 9.1   ALBUMIN 2.7* 2.7* 2.6*   PROT 6.1 6.3 6.2   BILITOT 0.7 0.6 0.6   ALKPHOS 73 79 81   ALT 11 14 15   AST 27 42* 30   MG 2.0 1.9 1.9   PHOS 3.8 4.2 3.9       All pertinent labs within the past 24 hours have been reviewed.    Significant Imaging:  I have reviewed all pertinent imaging results/findings within the past 24 hours.  Assessment/Plan:     Cardiac/Vascular  Severe mitral regurgitation  Lorenzo Nino is a 48 y.o. 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 He presents to the SICU s/p resternotomy and mechanical MVR with Dr. Beal on 8/12.      Neuro/Psych:     - Sedation: none    - Pain:     - Scheduled Tylenol 1000mg Q8H   - PRN Oxycodone 5mg    - Psych: home med mirtazipine 15 qd             Cardiac:     - S/p redo MVR with Dr. Beal    - BP Goal: MAP 60-80    - Inotropes/Pressors: none    - Anti-HTNs: none    - Rhythm: paced at 50 (native rhythm 3:1 block 40 bpm)   -- EP consulted, recommend slow wean of pacing; will reach out again for continued bradycardia/arrhythmia   -- home amiodarone 200mg qd held in the setting of native bradycardia    - Beta Blocker: held in the setting of native bradycardia   -- Home medication toprol XL 25 qd    - Statin: n/a      Pulmonary:     - Goal SpO2 >92%    - Chest Tubes x 2 (2 Pleural)   - removed of 2 meds 8/16     Renal:    - Baseline Cr 1.6-2    - Trend BUN/Cr         - Lasix 20 BID with appropriate diuresis    Recent Labs   Lab 08/18/24  0237 08/19/24  0309 08/19/24  0352   BUN 13 15 15   CREATININE 1.1 1.0 1.1           FEN / GI:     - Daily CMP, PRN K/Mag/Phos per protocol    - Replace electrolytes as needed    - Nutrition: cardiac regular diet    - Bowel Regimen: Miralax, docusate      ID:     - Aebrile    - Abx:   - ID  consulted, recommended dc Daptomycin in setting of neg cultures, signed off 8/16.  - Completed perioperative cefazolin 2g Q8H x 5 doses   - F/u OR cultures, NGTD    Recent Labs   Lab 08/17/24  0321 08/18/24  0237 08/19/24  0352   WBC 7.68 8.16 8.46           Heme/Onc:     - Hgb 11.4 pre-operatively  Products:   - 2 FFP intra-op     - S/p 1u pRBC postop 8/13    - Heparin gtt initiated for mechanical valve thromboprophylaxis bridge - goal PTT 60-80   - Discontinue 8/15 given therapeutic on warfarin    - Warfarin 5mg initiated 8/14 for mechanical valve thromboprophylaxis - goal INR 2.5-3.5    - Warfarin 5 dc'd 8/14, switched to Warfarin 1  - supratherapeutic 8/17, 8/18, 8/19    - Daily CBC, PTT, PT/INR    - ASA 325mg daily - dc'd 8/13    - ASA 81 daily     Recent Labs   Lab 08/17/24  0321 08/18/24  0237 08/18/24  0331 08/19/24  0352   HGB 7.8* 8.1*  --  8.1*    223  --  293   APTT 63.3* 67.5*  67.5*  --  65.4*   INR 5.7* 6.3* 6.6* 4.5*           Endocrine:     - CTS Goal -140    - HgbA1c: 5.4    - Novolog (Insulin Aspart) prn for BG excursions [facility] SSI (150/25)    - BG checks AC/HS    - Endocrinology consulted for insulin management      PPx:   Feeding: Cardiac regular diet  Analgesia/Sedation: n/a/sched tylenol, oxy 5  Thromboembolic Prevention: SCDs, warfarin (titrate dose for INR 2.5-3.5)  HOB >30: Yes  Stress Ulcer: Yes - famotidine 20mg PO BID  Glucose Control: Yes, insulin management per Endocrinology     Lines/Drains/Airway:   Chest Tubes: 2 pleural   Pacing Wires: Temporary V pacing wires      Dispo/Code Status/Palliative:     - Continue SICU Care    - Full Code        Critical care was time spent personally by me on the following activities: development of treatment plan with patient or surrogate and bedside caregivers, discussions with consultants, evaluation of patient's response to treatment, examination of patient, ordering and performing treatments and interventions, ordering and review of  laboratory studies, ordering and review of radiographic studies, pulse oximetry, re-evaluation of patient's condition.  This critical care time did not overlap with that of any other provider or involve time for any procedures.     Dony Ortega MD  Critical Care - Surgery  [facility]

## 2024-08-19 NOTE — NURSING
Patient delivered to procedural area by transport techs x2 and RN on portable monitoring. Patient handoff given to anesthesia who assumed care.

## 2024-08-19 NOTE — PLAN OF CARE
11:41 AM  The SW contacted the [facility] to schedule this patient a hospital follow-up visit. The representative placed Coumadin/ Coumadin monitoring in the appointment notes.     The patient's appointment has been placed on the AVS for review.     Martin Conroy LCSW  Case Management [facility]

## 2024-08-19 NOTE — CONSULTS
[facility]  Cardiac Electrophysiology  Consult Note    Admission Date: 8/12/2024  Code Status: Full Code   Attending Provider: Manuel Beal MD  Consulting Provider: Bianca Henry MD  Principal Problem:Endocarditis    Inpatient consult to Electrophysiology  Consult performed by: Bianca Henry MD  Consult ordered by: Dony Ortega MD  Reason for consult: Bradycardia        Subjective:     Chief Complaint:  Bradycardia     HPI:   Lorenzo Nino is a 48 y.o. 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 S/p MV replacement with mechanical valve 8/12/24. EP re-consulted for bradycardia.     Patient s/p mechanical MV replacement on 8/12/24 following valvular destruction after previous MV repair in the s/o active MRSA endocarditis. In the procedure, ventricular pacing wires placed, rate set at 80 bpm. Following the procedure, primary team c/f bradycardia with rate 30 when pacing suddenly terminated while checking for underlying rhythm, and EP first consulted 8/14. At that time patient was being overdrive paced at 80bpm with incorrect weaning, unable to assess true intrinsic rhythm. Recommended slow weaning and since, temp pacer weaned, now pacing at 50bpm.     Telemetry reviewed, since pacer weaned to 50bmp. Rhythm Aflutter, likely 4:1, that has persisted unchanged. He does have a h/o AF/Aflutter following initial valve repair, first noted on ECG 11-12/2023. Failure to capture noted at 4mA.     Patient reports feeling well overall. He denies pre-syncope, syncope, chest pain, palpitations, SOB at rest, HALL, RICHELLE, orthopnea. He has a new diagnosis of afib/flutter that developed in the s/o endocarditis/valvular disease, denies sxs currently.     Past Medical History:   Diagnosis Date    Hypertension        Past Surgical History:   Procedure Laterality Date    COLONOSCOPY  N/A 1/5/2024    Procedure: COLONOSCOPY;  Surgeon: Fadia Ellsworth MD;  Location: [facility] ENDO (2ND FLR);  Service: Endoscopy;  Laterality: N/A;    COLONOSCOPY, WITH DIRECTED SUBMUCOSAL INJECTION  2/27/2024    Procedure: COLONOSCOPY, WITH DIRECTED SUBMUCOSAL INJECTION;  Surgeon: Mayur Dunn MD;  Location: [facility] OR [facility] FLR;  Service: Colon and Rectal;;    COLONOSCOPY, WITH POLYPECTOMY USING SNARE N/A 2/27/2024    Procedure: COLONOSCOPY, WITH POLYPECTOMY USING SNARE;  Surgeon: Mayur Dunn MD;  Location: [facility] OR [facility] FLR;  Service: Colon and Rectal;  Laterality: N/A;    ECHOCARDIOGRAM,TRANSESOPHAGEAL N/A 12/26/2023    Procedure: Transesophageal echo (NEW) intra-procedure log documentation;  Surgeon: [facility];  Location: [facility] EP LAB;  Service: Cardiology;  Laterality: N/A;    ESOPHAGOGASTRODUODENOSCOPY N/A 1/5/2024    Procedure: EGD (ESOPHAGOGASTRODUODENOSCOPY);  Surgeon: Fadia Ellsworth MD;  Location: [facility] ENDO (2ND FLR);  Service: Endoscopy;  Laterality: N/A;    EXCLUSION, LEFT ATRIAL APPENDAGE, OPEN, AS PART OF OPEN CHEST SURGERY Left 11/3/2023    Procedure: EXCLUSION, LEFT ATRIAL APPENDAGE, OPEN, AS PART OF OPEN CHEST SURGERY;  Surgeon: Manuel Beal MD;  [facility];  Service: Cardiothoracic;  Laterality: Left;    INSERTION OF INTRA-AORTIC BALLOON ASSIST DEVICE Right 11/2/2023    Procedure: INSERTION, INTRA-AORTIC BALLOON PUMP;  Surgeon: Jose Vidal MD;  Location: [facility] CATH LAB;  Service: Cardiology;  Laterality: Right;    MITRAL VALVE REPLACEMENT N/A 8/12/2024    Procedure: REPLACEMENT, MITRAL VALVE;  Surgeon: Manuel Beal MD;  [facility];  Service: Cardiothoracic;  Laterality: N/A;    REPAIR, MITRAL VALVE, OPEN N/A 11/3/2023    Procedure: REPAIR, MITRAL VALVE, OPEN;  Surgeon: Manuel Beal MD;  [facility];  Service: Cardiothoracic;  Laterality: N/A;    STERNOTOMY N/A 8/12/2024    Procedure: REDO  STERNOTOMY;  Surgeon: Manuel Beal MD;  Location: [facility] OR [address] [place] [address];  Service: Cardiothoracic;  Laterality: N/A;       Review of patient's allergies indicates:  Not on File    Current Facility-Administered Medications on File Prior to Encounter   Medication    0.9%  NaCl infusion    mupirocin 2 % ointment     Current Outpatient Medications on File Prior to Encounter   Medication Sig    doxycycline (VIBRAMYCIN) 100 MG Cap Take 1 capsule by mouth twice daily    metoprolol succinate (TOPROL-XL) 25 MG 24 hr tablet Take 1 tablet (25 mg total) by mouth once daily.    mirtazapine (REMERON SOL-TAB) 15 MG disintegrating tablet Dissolve 1 tablet (15 mg total) by mouth nightly.    potassium chloride SA (K-DUR,KLOR-CON) 20 MEQ tablet Take 2 tablets (40 mEq total) by mouth once daily.    torsemide (DEMADEX) 10 MG Tab Take 1 tablet (10 mg total) by mouth once daily.    amiodarone (PACERONE) 200 MG Tab Take 1 tablet (200 mg total) by mouth once daily.    apixaban (ELIQUIS) 5 mg Tab Take 1 tablet (5 mg total) by mouth 2 (two) times daily. (Patient not taking: Reported on 8/8/2024)    dapagliflozin propanediol (FARXIGA) 10 mg tablet Take 1 tablet (10 mg total) by mouth once daily.     Family History    None       Tobacco Use    Smoking status: Unknown     Passive exposure: Never    Smokeless tobacco: Not on file   Substance and Sexual Activity    Alcohol use: Yes     Alcohol/week: 1.0 standard drink of alcohol     Types: 1 Cans of beer per week    Drug use: Never    Sexual activity: Not Currently     Partners: Female     Birth control/protection: None     Review of Systems   Constitutional: Negative for chills, fever and malaise/fatigue.   Cardiovascular:  Negative for chest pain, dyspnea on exertion, irregular heartbeat, orthopnea, palpitations and syncope.   Respiratory:  Negative for shortness of breath.    Gastrointestinal:  Negative for abdominal pain.   Neurological:  Negative for light-headedness.     Objective:      Vital Signs (Most Recent):  Temp: 98.9 °F (37.2 °C) (08/19/24 0755)  Pulse: (!) 50 (08/19/24 0815)  Resp: 17 (08/19/24 0815)  BP: (!) 105/59 (08/19/24 0700)  SpO2: 96 % (08/19/24 0815) Vital Signs (24h Range):  Temp:  [98.7 °F (37.1 °C)-99 °F (37.2 °C)] 98.9 °F (37.2 °C)  Pulse:  [50-58] 50  Resp:  [13-37] 17  SpO2:  [89 %-98 %] 96 %  BP: ()/(50-63) 105/59       Weight: 74.9 kg (165 lb 2 oz)  Body mass index is 22.39 kg/m².    SpO2: 96 %        Physical Exam  Constitutional:       Appearance: Normal appearance.   HENT:      Head: Normocephalic.      Mouth/Throat:      Mouth: Mucous membranes are moist.   Eyes:      Extraocular Movements: Extraocular movements intact.      Conjunctiva/sclera: Conjunctivae normal.   Cardiovascular:      Rate and Rhythm: Normal rate and regular rhythm.      Pulses: Normal pulses.      Comments: Mechanical mitral valve  Pulmonary:      Effort: Pulmonary effort is normal.   Abdominal:      Palpations: Abdomen is soft.   Musculoskeletal:         General: Normal range of motion.      Cervical back: Normal range of motion.      Right lower leg: No edema.      Left lower leg: No edema.   Skin:     General: Skin is warm.   Neurological:      General: No focal deficit present.      Mental Status: He is alert and oriented to person, place, and time.            Significant Labs: EP:   Recent Labs   Lab 08/18/24  0237 08/18/24  0331 08/19/24  0309 08/19/24  0352     --  136 136   K 3.6  --  4.6 3.7     --  101 99   CO2 26  --  24 27     --  102 102   BUN 13  --  15 15   CREATININE 1.1  --  1.0 1.1   CALCIUM 9.0  --  8.7 9.1   PROT 6.1  --  6.3 6.2   ALBUMIN 2.7*  --  2.7* 2.6*   BILITOT 0.7  --  0.6 0.6   ALKPHOS 73  --  79 81   AST 27  --  42* 30   ALT 11  --  14 15   ANIONGAP 11  --  11 10   WBC 8.16  --   --  8.46   HGB 8.1*  --   --  8.1*   HCT 25.5*  --   --  25.7*     --   --  293   INR 6.3* 6.6*  --  4.5*       Significant Imaging: Echocardiogram:  Transthoracic echo (TTE) complete ([facility] Only):   Results for orders placed or performed during the hospital encounter of 07/05/24   Echo   Result Value Ref Range    BSA 1.98 m2    Est. RA pres 3 mmHg    LVOT stroke volume 71.37 cm3    LVIDd 6.28 (A) 3.5 - 6.0 cm    LV Systolic Volume 62.66 mL    LV Systolic Volume Index 31.5 mL/m2    LVIDs 3.82 2.1 - 4.0 cm    LV Diastolic Volume 200.07 mL    LV Diastolic Volume Index 100.54 mL/m2    IVS 0.88 0.6 - 1.1 cm    LVOT diameter 2.18 cm    LVOT area 3.7 cm2    FS 39 28 - 44 %    Left Ventricle Relative Wall Thickness 0.20 cm    Posterior Wall 0.64 0.6 - 1.1 cm    LV mass 189.36 g    LV Mass Index 95 g/m2    MV Peak E Jabier 1.51 m/s    TDI LATERAL 0.16 m/s    TDI SEPTAL 0.07 m/s    E/E' ratio 13.13 m/s    MV Peak A Jabier 0.41 m/s    TR Max Jabier 3.56 m/s    E/A ratio 3.68     E wave deceleration time 534.74 msec    LV SEPTAL E/E' RATIO 21.57 m/s    LA Volume Index 76.5 mL/m2    LV LATERAL E/E' RATIO 9.44 m/s    LA volume 152.28 cm3    LVOT peak jabier 0.76 m/s    RV- molina basal diam 3.9 cm    TAPSE 1.75 cm    LA size 5.35 cm    Left Atrium Minor Axis 6.01 cm    Left Atrium Major Axis 5.95 cm    LA volume (mod) 148.03 cm3    LA WIDTH 5.60 cm    LA Volume Index (Mod) 74.4 mL/m2    RA Major Axis 5.17 cm    RA Width 3.60 cm    AV mean gradient 4 mmHg    AV peak gradient 6 mmHg    Ao peak jabier 1.22 m/s    Ao VTI 30.05 cm    LVOT peak VTI 19.13 cm    AV valve area 2.37 cm²    AV Velocity Ratio 0.62     AV index (prosthetic) 0.64     FATUMA by Velocity Ratio 2.32 cm²    Mr max jabier 0.05 m/s    MV stenosis pressure 1/2 time 155.07 ms    MV valve area p 1/2 method 1.42 cm2    TV resting pulmonary artery pressure 54 mmHg    RV TB RVSP 7 mmHg    Triscuspid Valve Regurgitation Peak Gradient 51 mmHg    Sinus 3.47 cm    STJ 2.51 cm    Ascending aorta 3.55 cm    Mean e' 0.12 m/s    ZLVIDS 0.59     ZLVIDD 0.84     Narrative    Images from the original result were not included.      Left Ventricle: The  left ventricle is moderately dilated. Normal wall   thickness. Normal wall motion. There is normal systolic function with a   visually estimated ejection fraction of 55 - 60%. Diastolic function   cannot be reliably determined in the presence of mitral valve disease and   mitral valve ring/replacement.    Right Ventricle: Normal right ventricular cavity size. Wall thickness   is normal. Right ventricle wall motion  is normal. Systolic function is   normal.    Left Atrium: Left atrium is severely dilated.    Right Atrium: Normal right atrial size.    Mitral Valve: The mitral valve is repaired by annular ring 40 mm   [employer]tr[employer]. There is mild bileaflet sclerosis. There is severe   regurgitation with a posterolateral eccentriccally directed and a central    jet. There appears to be a perforation in Anterior leaflets (clip #96 to   96- see below).Torn tissue is seen in this area in LA but its proximal   attacment is not well seen. Consider NEW if clinically indicated. Mean   gradient is 2.5 mmHg at HR o 53 bpm.    Tricuspid Valve: There is mild to moderate regurgitation with a   centrally directed jet.    Pulmonic Valve: There is mild regurgitation.    Aorta: Aortic root is normal in size measuring 3.47 cm. Ascending aorta   is normal measuring 3.55 cm.    Pulmonary Artery: There is moderate pulmonary hypertension. The   estimated pulmonary artery systolic pressure is 54 mmHg.    IVC/SVC: Normal venous pressure at 3 mmHg.    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   2)  Resection of left atrial appendage            Assessment and Plan:     Typical atrial flutter  Patient with h/o MRSA mitral valve endocarditis, s/p mechanical mitral valve replacement 8/12/24. Ventricular pacing wires placed in the procedure. EP consulted for bradycardia, requiring temp  pacing wires. Patient also has a h/o Afib/flutter in the s/o valvular disease, first noted 11-12/2023.     Telemetry and ECGs reviewed. Patient in Aflutter, likely 4:1. Patient still has b/l chest tubes in place following his procedure. In order to cardiovert, will need to be able to tolerate anticoagulation. Recent INR 4.5, likely prohibitive.     Recommendations:  - Plan for NEW/DCCV once chest tubes are removed, patient safe for anticoagulation from surgical standpoint, and INR within acceptable range  - Continue temporary pacing. Will re-assess following cardioversion, at which time intrinsic rhythm can be assessed and can make determination on need for PPM  - Please make NPO MN and inform EP after above parameters are met  - Tele  - K>4, Mg>2    Discussed and staffed with EP attending. Will continue to follow.    Bianca Henry MD  Cardiac Electrophysiology  [facility]

## 2024-08-20 ENCOUNTER — ANESTHESIA EVENT (OUTPATIENT)
Dept: MEDSURG UNIT | Facility: HOSPITAL | Age: 49
End: 2024-08-20
Payer: MEDICAID

## 2024-08-20 ENCOUNTER — ANESTHESIA (OUTPATIENT)
Dept: MEDSURG UNIT | Facility: HOSPITAL | Age: 49
End: 2024-08-20
Payer: MEDICAID

## 2024-08-20 PROBLEM — I44.2 COMPLETE HEART BLOCK: Status: ACTIVE | Noted: 2024-08-20

## 2024-08-20 LAB
ABO + RH BLD: ABNORMAL
ALBUMIN SERPL BCP-MCNC: 2.7 G/DL (ref 3.5–5.2)
ALP SERPL-CCNC: 87 U/L (ref 55–135)
ALT SERPL W/O P-5'-P-CCNC: 17 U/L (ref 10–44)
ANION GAP SERPL CALC-SCNC: 10 MMOL/L (ref 8–16)
APTT PPP: 75.2 SEC (ref 21–32)
AST SERPL-CCNC: 32 U/L (ref 10–40)
BASOPHILS # BLD AUTO: 0.02 K/UL (ref 0–0.2)
BASOPHILS NFR BLD: 0.2 % (ref 0–1.9)
BILIRUB SERPL-MCNC: 0.5 MG/DL (ref 0.1–1)
BLD GP AB SCN CELLS X3 SERPL QL: ABNORMAL
BLOOD GROUP ANTIBODIES SERPL: NORMAL
BUN SERPL-MCNC: 15 MG/DL (ref 6–20)
CALCIUM SERPL-MCNC: 9 MG/DL (ref 8.7–10.5)
CHLORIDE SERPL-SCNC: 101 MMOL/L (ref 95–110)
CO2 SERPL-SCNC: 26 MMOL/L (ref 23–29)
CREAT SERPL-MCNC: 1.2 MG/DL (ref 0.5–1.4)
DIFFERENTIAL METHOD BLD: ABNORMAL
EOSINOPHIL # BLD AUTO: 0.4 K/UL (ref 0–0.5)
EOSINOPHIL NFR BLD: 4.1 % (ref 0–8)
ERYTHROCYTE [DISTWIDTH] IN BLOOD BY AUTOMATED COUNT: 20 % (ref 11.5–14.5)
EST. GFR  (NO RACE VARIABLE): >60 ML/MIN/1.73 M^2
GLUCOSE SERPL-MCNC: 101 MG/DL (ref 70–110)
HCT VFR BLD AUTO: 24.9 % (ref 40–54)
HGB BLD-MCNC: 7.9 G/DL (ref 14–18)
IMM GRANULOCYTES # BLD AUTO: 0.28 K/UL (ref 0–0.04)
IMM GRANULOCYTES NFR BLD AUTO: 3.1 % (ref 0–0.5)
INR PPP: 3.7 (ref 0.8–1.2)
INR PPP: 3.9 (ref 0.8–1.2)
INR PPP: 4 (ref 0.8–1.2)
LYMPHOCYTES # BLD AUTO: 1.8 K/UL (ref 1–4.8)
LYMPHOCYTES NFR BLD: 19.2 % (ref 18–48)
MAGNESIUM SERPL-MCNC: 2.3 MG/DL (ref 1.6–2.6)
MCH RBC QN AUTO: 23.3 PG (ref 27–31)
MCHC RBC AUTO-ENTMCNC: 31.7 G/DL (ref 32–36)
MCV RBC AUTO: 74 FL (ref 82–98)
MONOCYTES # BLD AUTO: 1.3 K/UL (ref 0.3–1)
MONOCYTES NFR BLD: 14.3 % (ref 4–15)
NEUTROPHILS # BLD AUTO: 5.4 K/UL (ref 1.8–7.7)
NEUTROPHILS NFR BLD: 59.1 % (ref 38–73)
NRBC BLD-RTO: 0 /100 WBC
PHOSPHATE SERPL-MCNC: 3.8 MG/DL (ref 2.7–4.5)
PLATELET # BLD AUTO: 344 K/UL (ref 150–450)
PMV BLD AUTO: 10.4 FL (ref 9.2–12.9)
POTASSIUM SERPL-SCNC: 3.8 MMOL/L (ref 3.5–5.1)
PROT SERPL-MCNC: 6.4 G/DL (ref 6–8.4)
PROTHROMBIN TIME: 37.7 SEC (ref 9–12.5)
PROTHROMBIN TIME: 39.2 SEC (ref 9–12.5)
PROTHROMBIN TIME: 40 SEC (ref 9–12.5)
RBC # BLD AUTO: 3.39 M/UL (ref 4.6–6.2)
SODIUM SERPL-SCNC: 137 MMOL/L (ref 136–145)
SPECIMEN OUTDATE: ABNORMAL
WBC # BLD AUTO: 9.1 K/UL (ref 3.9–12.7)

## 2024-08-20 PROCEDURE — 86870 RBC ANTIBODY IDENTIFICATION: CPT | Performed by: THORACIC SURGERY (CARDIOTHORACIC VASCULAR SURGERY)

## 2024-08-20 PROCEDURE — 85025 COMPLETE CBC W/AUTO DIFF WBC: CPT | Performed by: THORACIC SURGERY (CARDIOTHORACIC VASCULAR SURGERY)

## 2024-08-20 PROCEDURE — 25000003 PHARM REV CODE 250

## 2024-08-20 PROCEDURE — 80053 COMPREHEN METABOLIC PANEL: CPT | Performed by: THORACIC SURGERY (CARDIOTHORACIC VASCULAR SURGERY)

## 2024-08-20 PROCEDURE — 20000000 HC ICU ROOM

## 2024-08-20 PROCEDURE — 97535 SELF CARE MNGMENT TRAINING: CPT

## 2024-08-20 PROCEDURE — 86901 BLOOD TYPING SEROLOGIC RH(D): CPT | Performed by: THORACIC SURGERY (CARDIOTHORACIC VASCULAR SURGERY)

## 2024-08-20 PROCEDURE — 84100 ASSAY OF PHOSPHORUS: CPT | Performed by: THORACIC SURGERY (CARDIOTHORACIC VASCULAR SURGERY)

## 2024-08-20 PROCEDURE — 85730 THROMBOPLASTIN TIME PARTIAL: CPT | Performed by: THORACIC SURGERY (CARDIOTHORACIC VASCULAR SURGERY)

## 2024-08-20 PROCEDURE — 85610 PROTHROMBIN TIME: CPT | Mod: 91

## 2024-08-20 PROCEDURE — 97110 THERAPEUTIC EXERCISES: CPT

## 2024-08-20 PROCEDURE — 99223 1ST HOSP IP/OBS HIGH 75: CPT | Mod: ,,, | Performed by: STUDENT IN AN ORGANIZED HEALTH CARE EDUCATION/TRAINING PROGRAM

## 2024-08-20 PROCEDURE — 94761 N-INVAS EAR/PLS OXIMETRY MLT: CPT

## 2024-08-20 PROCEDURE — 83735 ASSAY OF MAGNESIUM: CPT | Performed by: THORACIC SURGERY (CARDIOTHORACIC VASCULAR SURGERY)

## 2024-08-20 PROCEDURE — 25000003 PHARM REV CODE 250: Performed by: PHYSICIAN ASSISTANT

## 2024-08-20 PROCEDURE — 63600175 PHARM REV CODE 636 W HCPCS

## 2024-08-20 PROCEDURE — 25000003 PHARM REV CODE 250: Performed by: STUDENT IN AN ORGANIZED HEALTH CARE EDUCATION/TRAINING PROGRAM

## 2024-08-20 PROCEDURE — 85610 PROTHROMBIN TIME: CPT | Performed by: THORACIC SURGERY (CARDIOTHORACIC VASCULAR SURGERY)

## 2024-08-20 PROCEDURE — 99233 SBSQ HOSP IP/OBS HIGH 50: CPT | Mod: ,,, | Performed by: SURGERY

## 2024-08-20 RX ORDER — OXYCODONE HYDROCHLORIDE 5 MG/1
5 TABLET ORAL EVERY 8 HOURS PRN
Status: DISCONTINUED | OUTPATIENT
Start: 2024-08-20 | End: 2024-08-23 | Stop reason: HOSPADM

## 2024-08-20 RX ORDER — POTASSIUM CHLORIDE 20 MEQ/1
20 TABLET, EXTENDED RELEASE ORAL ONCE
Status: COMPLETED | OUTPATIENT
Start: 2024-08-20 | End: 2024-08-20

## 2024-08-20 RX ADMIN — OXYCODONE 5 MG: 5 TABLET ORAL at 09:08

## 2024-08-20 RX ADMIN — ACETAMINOPHEN 1000 MG: 500 TABLET ORAL at 09:08

## 2024-08-20 RX ADMIN — SENNOSIDES AND DOCUSATE SODIUM 1 TABLET: 50; 8.6 TABLET ORAL at 08:08

## 2024-08-20 RX ADMIN — FUROSEMIDE 20 MG: 10 INJECTION, SOLUTION INTRAVENOUS at 08:08

## 2024-08-20 RX ADMIN — FAMOTIDINE 20 MG: 20 TABLET ORAL at 09:08

## 2024-08-20 RX ADMIN — FUROSEMIDE 20 MG: 10 INJECTION, SOLUTION INTRAVENOUS at 05:08

## 2024-08-20 RX ADMIN — FAMOTIDINE 20 MG: 20 TABLET ORAL at 08:08

## 2024-08-20 RX ADMIN — ACETAMINOPHEN 1000 MG: 500 TABLET ORAL at 01:08

## 2024-08-20 RX ADMIN — POTASSIUM CHLORIDE 20 MEQ: 1500 TABLET, EXTENDED RELEASE ORAL at 05:08

## 2024-08-20 RX ADMIN — MIRTAZAPINE 15 MG: 15 TABLET, ORALLY DISINTEGRATING ORAL at 09:08

## 2024-08-20 RX ADMIN — POLYETHYLENE GLYCOL 3350 17 G: 17 POWDER, FOR SOLUTION ORAL at 08:08

## 2024-08-20 RX ADMIN — TAMSULOSIN HYDROCHLORIDE 0.4 MG: 0.4 CAPSULE ORAL at 08:08

## 2024-08-20 RX ADMIN — ASPIRIN 81 MG: 81 TABLET, COATED ORAL at 08:08

## 2024-08-20 NOTE — PROGRESS NOTES
[facility]  Critical Care - Surgery  Progress Note    Patient Name: Lorenzo Nino  MRN: [id number]  Admission Date: 8/12/2024  Hospital Length of Stay: 8 days  Code Status: Full Code  Attending Provider: Manuel Beal MD  Primary Care Provider: No, Primary Doctor   Principal Problem: Endocarditis    Subjective:     Hospital/ICU Course:  No notes on file    Interval History/Significant Events: NAEON. Pt status post NEW with DCCV yesterday. Rhythm normal but still bradycardic and requiring pacing. NPO for PPM placement with EP today. INR at 4 this AM.     Follow-up For: Procedure(s) (LRB):  Cardioversion or Defibrillation (N/A)  Transesophageal echo (NEW) intra-procedure log documentation (N/A)    Post-Operative Day: 1 Day Post-Op    Objective:     Vital Signs (Most Recent):  Temp: 98.4 °F (36.9 °C) (08/20/24 0300)  Pulse: (!) 50 (08/20/24 0515)  Resp: 20 (08/20/24 0515)  BP: (!) 112/56 (08/20/24 0500)  SpO2: 99 % (08/20/24 0515) Vital Signs (24h Range):  Temp:  [97.9 °F (36.6 °C)-98.9 °F (37.2 °C)] 98.4 °F (36.9 °C)  Pulse:  [49-52] 50  Resp:  [13-27] 20  SpO2:  [94 %-100 %] 99 %  BP: (104-130)/(53-89) 112/56     Weight: 74.8 kg (165 lb)  Body mass index is 22.38 kg/m².      Intake/Output Summary (Last 24 hours) at 8/20/2024 0537  Last data filed at 8/19/2024 2128  Gross per 24 hour   Intake 286.51 ml   Output 1240 ml   Net -953.49 ml          Physical Exam  Constitutional:       Appearance: Normal appearance. He is not ill-appearing.   HENT:      Head: Normocephalic and atraumatic.      Mouth/Throat:      Mouth: Mucous membranes are moist.      Pharynx: Oropharynx is clear.   Eyes:      Extraocular Movements: Extraocular movements intact.      Conjunctiva/sclera: Conjunctivae normal.   Cardiovascular:      Pulses: Normal pulses.      Comments: Midline sternotomy c/d/i  Paced at 50bpm  V wires in place        Pulmonary:      Effort: Pulmonary effort is normal. No respiratory distress.    Abdominal:      General: There is no distension.      Palpations: Abdomen is soft.      Tenderness: There is no abdominal tenderness.   Musculoskeletal:      Cervical back: Normal range of motion. No rigidity.      Right lower leg: No edema.      Left lower leg: No edema.   Skin:     General: Skin is warm and dry.   Neurological:      General: No focal deficit present.      Mental Status: He is oriented to person, place, and time. Mental status is at baseline.   Psychiatric:         Mood and Affect: Mood normal.         Behavior: Behavior normal.            Vents:  Vent Mode: Spont (08/13/24 0715)  Ventilator Initiated: Yes (08/12/24 1507)  Set Rate: 16 BPM (08/13/24 0529)  Vt Set: 500 mL (08/13/24 0529)  Pressure Support: 10 cmH20 (08/13/24 0715)  PEEP/CPAP: 5 cmH20 (08/13/24 0715)  Oxygen Concentration (%): 40 (08/13/24 0715)  Peak Airway Pressure: 15 cmH20 (08/13/24 0715)  Plateau Pressure: 0 cmH20 (08/13/24 0715)  Total Ve: 9.35 L/m (08/13/24 0715)  Negative Inspiratory Force (cm H2O): 0 (08/13/24 0715)  F/VT Ratio<105 (RSBI): (!) 49.18 (08/13/24 0715)    Lines/Drains/Airways       Line  Duration                  Pacer Wires 08/12/24 0931 7 days              Peripheral Intravenous Line  Duration                  Peripheral IV - Single Lumen 08/16/24 0445 20 G Posterior;Right Hand 4 days         Peripheral IV - Single Lumen 08/19/24 1202 22 G Anterior;Right Forearm <1 day                    Significant Labs:    CBC/Anemia Profile:  Recent Labs   Lab 08/19/24  0352 08/20/24  0256   WBC 8.46 9.10   HGB 8.1* 7.9*   HCT 25.7* 24.9*    344   MCV 73* 74*   RDW 19.8* 20.0*        Chemistries:  Recent Labs   Lab 08/19/24  0309 08/19/24  0352 08/20/24  0256    136 137   K 4.6 3.7 3.8    99 101   CO2 24 27 26   BUN 15 15 15   CREATININE 1.0 1.1 1.2   CALCIUM 8.7 9.1 9.0   ALBUMIN 2.7* 2.6* 2.7*   PROT 6.3 6.2 6.4   BILITOT 0.6 0.6 0.5   ALKPHOS 79 81 87   ALT 14 15 17   AST 42* 30 32   MG 1.9 1.9 2.3    PHOS 4.2 3.9 3.8       All pertinent labs within the past 24 hours have been reviewed.    Significant Imaging:  I have reviewed all pertinent imaging results/findings within the past 24 hours.  Assessment/Plan:     Cardiac/Vascular  Severe mitral regurgitation  Lorenzo Nnio is a 48 y.o. 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 He presents to the SICU s/p resternotomy and mechanical MVR with Dr. Beal on 8/12.      Neuro/Psych:     - Sedation: none    - Pain:     - Scheduled Tylenol 1000mg Q8H   - PRN Oxycodone 5mg    - Psych: home med mirtazipine 15 qd             Cardiac:     - S/p redo MVR with Dr. Beal    - BP Goal: MAP 60-80    - Inotropes/Pressors: none    - Anti-HTNs: none    - Rhythm: paced at 50 (native rhythm 3:1 block 40 bpm)   -- EP consulted, recommend slow wean of pacing; will reach out again for continued bradycardia/arrhythmia   -- home amiodarone 200mg qd held in the setting of native bradycardia    - Beta Blocker: held in the setting of native bradycardia   -- Home medication toprol XL 25 qd    - Statin: n/a    - 08/19: NEW with DCCV by EP. Remained bradycardic following procedure   - PPM placement by EP planned 08/20      Pulmonary:     - Goal SpO2 >92%; satting well on RA    - Chest Tubes dc'ed     Renal:    - Baseline Cr 1.6-2    - Trend BUN/Cr         - Lasix 20 BID with appropriate diuresis    Recent Labs   Lab 08/19/24  0309 08/19/24  0352 08/20/24  0256   BUN 15 15 15   CREATININE 1.0 1.1 1.2           FEN / GI:     - Daily CMP, PRN K/Mag/Phos per protocol    - Replace electrolytes as needed    - Nutrition: cardiac regular diet    - Bowel Regimen: Miralax, docusate      ID:     - Aebrile    - Abx:   - ID consulted, recommended dc Daptomycin in setting of neg cultures, signed off 8/16.  - Completed perioperative cefazolin 2g Q8H x 5 doses   - F/u OR cultures, NGTD    Recent  Labs   Lab 08/18/24  0237 08/19/24  0352 08/20/24  0256   WBC 8.16 8.46 9.10           Heme/Onc:     - Hgb 11.4 pre-operatively  Products:   - 2 FFP intra-op     - S/p 1u pRBC postop 8/13    - Heparin gtt initiated for mechanical valve thromboprophylaxis bridge - goal PTT 60-80   - Discontinue 8/15 given therapeutic on warfarin    - Warfarin 5mg initiated 8/14 for mechanical valve thromboprophylaxis - goal INR 2.5-3.5    - Warfarin 5 dc'd 8/14, switched to Warfarin 1  - supratherapeutic 8/17, 8/18, 8/19    - Daily CBC, PTT, PT/INR    - ASA 325mg daily - dc'd 8/13    - ASA 81mg daily     Recent Labs   Lab 08/18/24  0237 08/18/24  0331 08/19/24  0352 08/19/24  1201 08/20/24  0256   HGB 8.1*  --  8.1*  --  7.9*     --  293  --  344   APTT 67.5*  67.5*  --  65.4*  --  75.2*   INR 6.3*   < > 4.5* 3.7* 4.0*    < > = values in this interval not displayed.           Endocrine:     - CTS Goal -140    - HgbA1c: 5.4    - Novolog (Insulin Aspart) prn for BG excursions MDC SSI (150/25)    - BG checks AC/HS    - Endocrinology consulted for insulin management      PPx:   Feeding: NPO for PPM  Analgesia/Sedation: sched tylenol, oxy 5  Thromboembolic Prevention: SCDs, warfarin (titrate dose for INR 2.5-3.5)  HOB >30: Yes  Stress Ulcer: Yes - famotidine 20mg PO BID  Glucose Control: Yes, insulin management per Endocrinology     Lines/Drains/Airway:   Pacing Wires: Temporary V pacing wires      Dispo/Code Status/Palliative:     - Continue SICU Care    - Full Code           Critical care was time spent personally by me on the following activities: development of treatment plan with patient or surrogate and bedside caregivers, discussions with consultants, evaluation of patient's response to treatment, examination of patient, ordering and performing treatments and interventions, ordering and review of laboratory studies, ordering and review of radiographic studies, pulse oximetry, re-evaluation of patient's condition.   This critical care time did not overlap with that of any other provider or involve time for any procedures.     Dony Ortega MD  Critical Care - Surgery  [facility]

## 2024-08-20 NOTE — PT/OT/SLP PROGRESS
Physical Therapy Co-Treatment and Discharge    Patient Name:  Lorenzo Nino   MRN:  [id number]  Admitting Diagnosis:  Endocarditis   Recent Surgery: Procedure(s) (LRB):  Cardioversion or Defibrillation (N/A)  Transesophageal echo (NEW) intra-procedure log documentation (N/A) 1 Day Post-Op  Admit Date: 8/12/2024  Length of Stay: 8 days    Recommendations:     Discharge Recommendations: No Therapy Indicated  Discharge Equipment Recommendations: none   Barriers to discharge: None    Appropriate transfer level with nursing staff: ambulation 3-4x/day with supervision    Plan:     During this hospitalization, patient to be seen 5 x/week to address the identified rehab impairments via gait training, therapeutic activities, therapeutic exercises, neuromuscular re-education and progress towards the established goals.  Plan of Care Expires:  09/12/24  Plan of Care Reviewed with: patient, family    Assessment     Lorenzo Nino is a 48 y.o. male admitted with a medical diagnosis of Endocarditis. Pt tolerated treatment session well today. Pt remains paced at 50 bpm and is waiting a PPM placement at this time. Despite being paced at 50 bpm, he showed good progression of his functional strength as well as endurance today. He was able to ambulate community distances safely and without assistance today with no rest breaks required. He rated his ambulation today as a 4/10 on modified LADAN RPE. Educated pt on walking program including ambulating 3-4x/day, self pacing via LADAN RPE (4-6/10), and maintenance of sternal precautions. Due to pt's performance today he is safely functioning at a high level and has made satisfactory achievements by meeting goals. Therefore, pt no longer qualifies for acute PT services. D/c from services on this date.     Problem List: impaired endurance, impaired skin, impaired cardiopulmonary response to activity.  Rehab Prognosis: Good; patient would benefit from acute skilled PT services to address these  deficits and reach maximum level of function.      Goals:   Multidisciplinary Problems       Physical Therapy Goals       Not on file              Multidisciplinary Problems (Resolved)          Problem: Physical Therapy    Goal Priority Disciplines Outcome Goal Variances Interventions   Physical Therapy Goal   (Resolved)     PT, PT/OT Met     Description: Goals to be met by: 2024     Patient will increase functional independence with mobility by performin. Sit to stand transfer with Modified [place]  2. Gait  x 250 feet with Supervision using physician approved AD prn.   3. Lower extremity exercise program x30 reps per handout, with independence  4. Recite 3/3 sternal precautions and remain complaint with precautions throughout session with no verbal cues                         Subjective     RN notified prior to session. Mother present upon PT entrance into room. Patient agreeable to PT evaluation.    Chief Complaint: pt with no complaints  Patient/Family Comments/goals: get PPM and get stronger  Pain/Comfort:  Pain Rating 1: 0/10  Pain Rating Post-Intervention 1: 0/10    Objective:     Additional staff present: OT; Pt also with impaired cardiopulmonary stability requiring two skilled therapists to safely mobilize pt to highest potential as well as ensure medical stability with mobility.    Patient found up in chair with: telemetry, blood pressure cuff, pulse ox (continuous), external pacer   Cognition:   Alert and Cooperative  Patient is oriented to Person, Place, Time, Situation  Command following: Follows multistep verbal commands  Fluency: clear/fluent  General Precautions: Standard, Cardiac fall, sternal   Orthopedic Precautions:N/A   Braces: N/A   Body mass index is 22.38 kg/m².  Oxygen Device: Room Air  Vitals: /60   Pulse (!) 50   Temp 98.5 °F (36.9 °C) (Oral)   Resp 16   Ht 6' (1.829 m)   Wt 74.8 kg (165 lb)   SpO2 96%   BMI 22.38 kg/m²     Outcome Measures:  AM-PAC 6 CLICK  MOBILITY  Turning over in bed (including adjusting bedclothes, sheets and blankets)?: 4  Sitting down on and standing up from a chair with arms (e.g., wheelchair, bedside commode, etc.): 4  Moving from lying on back to sitting on the side of the bed?: 4  Moving to and from a bed to a chair (including a wheelchair)?: 4  Need to walk in hospital room?: 4  Climbing 3-5 steps with a railing?: 3  Basic Mobility Total Score: 23     Functional Mobility:    Bed Mobility:   Pt found/returned to bedside chair    Transfers:   Sit <> Stand Transfer: supervision with no AD. [id number] from bedside chair    Standing Balance:  Static Standing Balance: Normal : able to maintain standing balance against maximal resistance  Dynamic Standing Balance: Good : able to stand independently unsupported and cross midline moderately  Assistance Level Required: Supervision  Comments: Pt able to respond to with hip and ankle strategy to internal perturbations with appropriate anticipatory and reactive responses with no LOB while reaching outside TAY including picking objects off of ground and placing them on shelves above head      Gait:   Patient ambulated: 350 ft   Patient required: supervision  Patient used:  no assistive device   Gait Pattern observed: reciprocal gait  Gait Deviation(s): steady gait, decreased step length, and decreased arm swing  Impairments due to: decreased endurance  Portable monitor utilized  all lines remained intact throughout ambulation trial  Comments: Pt was able to perform vertical/horizontal head turns, 180 degree turns while ambulating, and avoid hallway obstacles without LOB.    Education:  Time provided for education, counseling and discussion of health disposition in regards to patient's current status  All questions answered within PT scope of practice and to patient's satisfaction  PT role in POC to address current functional deficits  Pt educated on proper body mechanics, safety techniques, and energy  conservation with PT facilitation and cueing throughout session  Whiteboard updated with therapist name and pt's current mobility status documented above  Safe to perform step transfer to/from chair/bedside commode supervision and no AD w/ nursing/PCT present  Pt to ambulate 3-4x/day supervision and no AD with nsg/family in order to maintain functional mobility  Importance of OOB tolerance 8-10 hrs/day to improve lung ventilation and expansion as well as strengthen postural musculature  Sternal Precautions: patient able to voice 3/3 precautions without assistance. Patient able to maintain precautions throughout session with no verbal cues.    Patient left up in chair with all lines intact, call button in reach, and mother present.    Time Tracking:     PT Received On: 08/20/24  PT Start Time: 1305     PT Stop Time: 1317  PT Total Time (min): 12 min     Billable Minutes:   Therapeutic Exercise 12 minutes    Treatment Type: Treatment  PT/PTA: PT       8/20/2024

## 2024-08-20 NOTE — SUBJECTIVE & OBJECTIVE
Interval History:   - NAEON, asymptomatic  - Tele V paced at 50bpm    Objective:     Vital Signs (Most Recent):  Temp: 98.4 °F (36.9 °C) (08/20/24 0300)  Pulse: (!) 50 (08/20/24 0815)  Resp: 20 (08/20/24 0800)  BP: 119/60 (08/20/24 0800)  SpO2: 98 % (08/20/24 0815) Vital Signs (24h Range):  Temp:  [97.9 °F (36.6 °C)-98.7 °F (37.1 °C)] 98.4 °F (36.9 °C)  Pulse:  [49-52] 50  Resp:  [13-27] 20  SpO2:  [95 %-100 %] 98 %  BP: (104-130)/(53-89) 119/60     Weight: 74.8 kg (165 lb)  Body mass index is 22.38 kg/m².     SpO2: 98 %        Physical Exam  Constitutional:       Appearance: Normal appearance.   HENT:      Head: Normocephalic and atraumatic.      Mouth/Throat:      Mouth: Mucous membranes are moist.   Eyes:      Extraocular Movements: Extraocular movements intact.      Conjunctiva/sclera: Conjunctivae normal.   Cardiovascular:      Rate and Rhythm: Normal rate and regular rhythm.      Pulses: Normal pulses.   Pulmonary:      Effort: Pulmonary effort is normal.   Abdominal:      General: Abdomen is flat.      Palpations: Abdomen is soft.   Musculoskeletal:      Right lower leg: No edema.      Left lower leg: No edema.   Skin:     General: Skin is warm and dry.   Neurological:      General: No focal deficit present.      Mental Status: He is alert and oriented to person, place, and time.            Significant Labs: EP:   Recent Labs   Lab 08/19/24  0309 08/19/24  0352 08/19/24  0352 08/19/24  1201 08/20/24  0256    136  --   --  137   K 4.6 3.7  --   --  3.8    99  --   --  101   CO2 24 27  --   --  26    102  --   --  101   BUN 15 15  --   --  15   CREATININE 1.0 1.1  --   --  1.2   CALCIUM 8.7 9.1  --   --  9.0   PROT 6.3 6.2  --   --  6.4   ALBUMIN 2.7* 2.6*  --   --  2.7*   BILITOT 0.6 0.6  --   --  0.5   ALKPHOS 79 81  --   --  87   AST 42* 30  --   --  32   ALT 14 15  --   --  17   ANIONGAP 11 10  --   --  10   WBC  --  8.46  --   --  9.10   HGB  --  8.1*  --   --  7.9*   HCT  --  25.7*   <  ">  --  24.9*   PLT  --  293  --   --  344   INR  --  4.5*  --  3.7* 4.0*    < > = values in this interval not displayed.    and Blood Culture: No results for input(s): "LABBLOO" in the last 48 hours.    Significant Imaging: Echocardiogram: Transthoracic echo (TTE) complete (Cupid Only):   Results for orders placed or performed during the hospital encounter of 07/05/24   Echo   Result Value Ref Range    BSA 1.98 m2    Est. RA pres 3 mmHg    LVOT stroke volume 71.37 cm3    LVIDd 6.28 (A) 3.5 - 6.0 cm    LV Systolic Volume 62.66 mL    LV Systolic Volume Index 31.5 mL/m2    LVIDs 3.82 2.1 - 4.0 cm    LV Diastolic Volume 200.07 mL    LV Diastolic Volume Index 100.54 mL/m2    IVS 0.88 0.6 - 1.1 cm    LVOT diameter 2.18 cm    LVOT area 3.7 cm2    FS 39 28 - 44 %    Left Ventricle Relative Wall Thickness 0.20 cm    Posterior Wall 0.64 0.6 - 1.1 cm    LV mass 189.36 g    LV Mass Index 95 g/m2    MV Peak E Jabier 1.51 m/s    TDI LATERAL 0.16 m/s    TDI SEPTAL 0.07 m/s    E/E' ratio 13.13 m/s    MV Peak A Jabier 0.41 m/s    TR Max Jabier 3.56 m/s    E/A ratio 3.68     E wave deceleration time 534.74 msec    LV SEPTAL E/E' RATIO 21.57 m/s    LA Volume Index 76.5 mL/m2    LV LATERAL E/E' RATIO 9.44 m/s    LA volume 152.28 cm3    LVOT peak jabier 0.76 m/s    RV- molina basal diam 3.9 cm    TAPSE 1.75 cm    LA size 5.35 cm    Left Atrium Minor Axis 6.01 cm    Left Atrium Major Axis 5.95 cm    LA volume (mod) 148.03 cm3    LA WIDTH 5.60 cm    LA Volume Index (Mod) 74.4 mL/m2    RA Major Axis 5.17 cm    RA Width 3.60 cm    AV mean gradient 4 mmHg    AV peak gradient 6 mmHg    Ao peak jabier 1.22 m/s    Ao VTI 30.05 cm    LVOT peak VTI 19.13 cm    AV valve area 2.37 cm²    AV Velocity Ratio 0.62     AV index (prosthetic) 0.64     FATUMA by Velocity Ratio 2.32 cm²    Mr max jabier 0.05 m/s    MV stenosis pressure 1/2 time 155.07 ms    MV valve area p 1/2 method 1.42 cm2    TV resting pulmonary artery pressure 54 mmHg    RV TB RVSP 7 mmHg    Triscuspid Valve " Regurgitation Peak Gradient 51 mmHg    Sinus 3.47 cm    STJ 2.51 cm    Ascending aorta 3.55 cm    Mean e' 0.12 m/s    ZLVIDS 0.59     ZLVIDD 0.84     Narrative    Images from the original result were not included.      Left Ventricle: The left ventricle is moderately dilated. Normal wall   thickness. Normal wall motion. There is normal systolic function with a   visually estimated ejection fraction of 55 - 60%. Diastolic function   cannot be reliably determined in the presence of mitral valve disease and   mitral valve ring/replacement.    Right Ventricle: Normal right ventricular cavity size. Wall thickness   is normal. Right ventricle wall motion  is normal. Systolic function is   normal.    Left Atrium: Left atrium is severely dilated.    Right Atrium: Normal right atrial size.    Mitral Valve: The mitral valve is repaired by annular ring 40 mm   [employer]tr[employer]. There is mild bileaflet sclerosis. There is severe   regurgitation with a posterolateral eccentriccally directed and a central    jet. There appears to be a perforation in Anterior leaflets (clip #96 to   96- see below).Torn tissue is seen in this area in LA but its proximal   attacment is not well seen. Consider NEW if clinically indicated. Mean   gradient is 2.5 mmHg at HR o 53 bpm.    Tricuspid Valve: There is mild to moderate regurgitation with a   centrally directed jet.    Pulmonic Valve: There is mild regurgitation.    Aorta: Aortic root is normal in size measuring 3.47 cm. Ascending aorta   is normal measuring 3.55 cm.    Pulmonary Artery: There is moderate pulmonary hypertension. The   estimated pulmonary artery systolic pressure is 54 mmHg.    IVC/SVC: Normal venous pressure at 3 mmHg.    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   2)  Resection of left atrial appendage

## 2024-08-20 NOTE — SUBJECTIVE & OBJECTIVE
Interval History/Significant Events: NAEON. Pt status post NEW with DCCV yesterday. Rhythm normal but still bradycardic and requiring pacing. NPO for PPM placement with EP today. INR at 4 this AM.     Follow-up For: Procedure(s) (LRB):  Cardioversion or Defibrillation (N/A)  Transesophageal echo (NEW) intra-procedure log documentation (N/A)    Post-Operative Day: 1 Day Post-Op    Objective:     Vital Signs (Most Recent):  Temp: 98.4 °F (36.9 °C) (08/20/24 0300)  Pulse: (!) 50 (08/20/24 0515)  Resp: 20 (08/20/24 0515)  BP: (!) 112/56 (08/20/24 0500)  SpO2: 99 % (08/20/24 0515) Vital Signs (24h Range):  Temp:  [97.9 °F (36.6 °C)-98.9 °F (37.2 °C)] 98.4 °F (36.9 °C)  Pulse:  [49-52] 50  Resp:  [13-27] 20  SpO2:  [94 %-100 %] 99 %  BP: (104-130)/(53-89) 112/56     Weight: 74.8 kg (165 lb)  Body mass index is 22.38 kg/m².      Intake/Output Summary (Last 24 hours) at 8/20/2024 0537  Last data filed at 8/19/2024 2128  Gross per 24 hour   Intake 286.51 ml   Output 1240 ml   Net -953.49 ml          Physical Exam  Constitutional:       Appearance: Normal appearance. He is not ill-appearing.   HENT:      Head: Normocephalic and atraumatic.      Mouth/Throat:      Mouth: Mucous membranes are moist.      Pharynx: Oropharynx is clear.   Eyes:      Extraocular Movements: Extraocular movements intact.      Conjunctiva/sclera: Conjunctivae normal.   Cardiovascular:      Pulses: Normal pulses.      Comments: Midline sternotomy c/d/i  Paced at 50bpm  V wires in place        Pulmonary:      Effort: Pulmonary effort is normal. No respiratory distress.   Abdominal:      General: There is no distension.      Palpations: Abdomen is soft.      Tenderness: There is no abdominal tenderness.   Musculoskeletal:      Cervical back: Normal range of motion. No rigidity.      Right lower leg: No edema.      Left lower leg: No edema.   Skin:     General: Skin is warm and dry.   Neurological:      General: No focal deficit present.      Mental  Status: He is oriented to person, place, and time. Mental status is at baseline.   Psychiatric:         Mood and Affect: Mood normal.         Behavior: Behavior normal.            Vents:  Vent Mode: Spont (08/13/24 0715)  Ventilator Initiated: Yes (08/12/24 1507)  Set Rate: 16 BPM (08/13/24 0529)  Vt Set: 500 mL (08/13/24 0529)  Pressure Support: 10 cmH20 (08/13/24 0715)  PEEP/CPAP: 5 cmH20 (08/13/24 0715)  Oxygen Concentration (%): 40 (08/13/24 0715)  Peak Airway Pressure: 15 cmH20 (08/13/24 0715)  Plateau Pressure: 0 cmH20 (08/13/24 0715)  Total Ve: 9.35 L/m (08/13/24 0715)  Negative Inspiratory Force (cm H2O): 0 (08/13/24 0715)  F/VT Ratio<105 (RSBI): (!) 49.18 (08/13/24 0715)    Lines/Drains/Airways       Line  Duration                  Pacer Wires 08/12/24 0931 7 days              Peripheral Intravenous Line  Duration                  Peripheral IV - Single Lumen 08/16/24 0445 20 G Posterior;Right Hand 4 days         Peripheral IV - Single Lumen 08/19/24 1202 22 G Anterior;Right Forearm <1 day                    Significant Labs:    CBC/Anemia Profile:  Recent Labs   Lab 08/19/24  0352 08/20/24  0256   WBC 8.46 9.10   HGB 8.1* 7.9*   HCT 25.7* 24.9*    344   MCV 73* 74*   RDW 19.8* 20.0*        Chemistries:  Recent Labs   Lab 08/19/24  0309 08/19/24  0352 08/20/24  0256    136 137   K 4.6 3.7 3.8    99 101   CO2 24 27 26   BUN 15 15 15   CREATININE 1.0 1.1 1.2   CALCIUM 8.7 9.1 9.0   ALBUMIN 2.7* 2.6* 2.7*   PROT 6.3 6.2 6.4   BILITOT 0.6 0.6 0.5   ALKPHOS 79 81 87   ALT 14 15 17   AST 42* 30 32   MG 1.9 1.9 2.3   PHOS 4.2 3.9 3.8       All pertinent labs within the past 24 hours have been reviewed.    Significant Imaging:  I have reviewed all pertinent imaging results/findings within the past 24 hours.

## 2024-08-20 NOTE — ASSESSMENT & PLAN NOTE
Lorenzo Nino is a 48 y.o. 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 He presents to the SICU s/p resternotomy and mechanical MVR with Dr. Beal on 8/12.      Neuro/Psych:     - Sedation: none    - Pain:     - Scheduled Tylenol 1000mg Q8H   - PRN Oxycodone 5mg    - Psych: home med mirtazipine 15 qd             Cardiac:     - S/p redo MVR with Dr. Beal    - BP Goal: MAP 60-80    - Inotropes/Pressors: none    - Anti-HTNs: none    - Rhythm: paced at 50 (native rhythm 3:1 block 40 bpm)   -- EP consulted, recommend slow wean of pacing; will reach out again for continued bradycardia/arrhythmia   -- home amiodarone 200mg qd held in the setting of native bradycardia    - Beta Blocker: held in the setting of native bradycardia   -- Home medication toprol XL 25 qd    - Statin: n/a    - 08/19: NEW with DCCV by EP. Remained bradycardic following procedure   - PPM placement by EP planned 08/20      Pulmonary:     - Goal SpO2 >92%; satting well on RA    - Chest Tubes dc'ed     Renal:    - Baseline Cr 1.6-2    - Trend BUN/Cr         - Lasix 20 BID with appropriate diuresis    Recent Labs   Lab 08/19/24  0309 08/19/24  0352 08/20/24  0256   BUN 15 15 15   CREATININE 1.0 1.1 1.2           FEN / GI:     - Daily CMP, PRN K/Mag/Phos per protocol    - Replace electrolytes as needed    - Nutrition: cardiac regular diet    - Bowel Regimen: Miralax, docusate      ID:     - Aebrile    - Abx:   - ID consulted, recommended dc Daptomycin in setting of neg cultures, signed off 8/16.  - Completed perioperative cefazolin 2g Q8H x 5 doses   - F/u OR cultures, NGTD    Recent Labs   Lab 08/18/24  0237 08/19/24  0352 08/20/24  0256   WBC 8.16 8.46 9.10           Heme/Onc:     - Hgb 11.4 pre-operatively  Products:   - 2 FFP intra-op     - S/p 1u pRBC postop 8/13    - Heparin gtt initiated for mechanical valve thromboprophylaxis bridge -  goal PTT 60-80   - Discontinue 8/15 given therapeutic on warfarin    - Warfarin 5mg initiated 8/14 for mechanical valve thromboprophylaxis - goal INR 2.5-3.5    - Warfarin 5 dc'd 8/14, switched to Warfarin 1  - supratherapeutic 8/17, 8/18, 8/19    - Daily CBC, PTT, PT/INR    - ASA 325mg daily - dc'd 8/13    - ASA 81mg daily     Recent Labs   Lab 08/18/24  0237 08/18/24  0331 08/19/24  0352 08/19/24  1201 08/20/24  0256   HGB 8.1*  --  8.1*  --  7.9*     --  293  --  344   APTT 67.5*  67.5*  --  65.4*  --  75.2*   INR 6.3*   < > 4.5* 3.7* 4.0*    < > = values in this interval not displayed.           Endocrine:     - CTS Goal -140    - HgbA1c: 5.4    - Novolog (Insulin Aspart) prn for BG excursions MDC SSI (150/25)    - BG checks AC/HS    - Endocrinology consulted for insulin management      PPx:   Feeding: NPO for PPM  Analgesia/Sedation: sched tylenol, oxy 5  Thromboembolic Prevention: SCDs, warfarin (titrate dose for INR 2.5-3.5)  HOB >30: Yes  Stress Ulcer: Yes - famotidine 20mg PO BID  Glucose Control: Yes, insulin management per Endocrinology     Lines/Drains/Airway:   Pacing Wires: Temporary V pacing wires      Dispo/Code Status/Palliative:     - Continue SICU Care    - Full Code

## 2024-08-20 NOTE — ASSESSMENT & PLAN NOTE
Patient with h/o MRSA mitral valve endocarditis, s/p mechanical mitral valve replacement and ventricular pacing wires placement 8/14. Patient also has a h/o Afib/flutter in the s/o valvular disease, first noted 11-12/2023. Patient noted to be in Aflutter, likely 4:1. S/p DCCV 8/19 with return to intrinsic rhythm. Patient's rhythm notably complete heart block that has sustained since valve replacement.     Chest tubes removed prior to cardioversion. INR elevated, though improved on repeat, on Warfarin.     Epicardial ventricular pacing wires in place. Threshold this AM 3.5 (4.0 yesterday).     Recommendations:  - Plan for DC PPM placement today, 8/20  - Hold heparin products  - Ok to continue Warfarin  - NPO  - Tele  - K>4, Mg>2

## 2024-08-20 NOTE — PLAN OF CARE
Date of service: 8/19/2024    Please link this note to the resident's progress note. This note serves as the attestation.    In brief, Lorenzo Nino is a 48 year-old man with a history of CKD stage 3, afib on eliquis, pulmonary hypertension and endocarditis s/p MV repair and ROMAN resection in 2023 who underwent redo sternotomy and MVR on 8/12/2024. He was admitted sedated and intubated on epi and levo.    Critical Care issues in this patient include:    Severe mitral regurgitation   * Status post MVR. Hold coumadin today given INR is 4.5 this morning. Place remaining chest tube to water seal. Consider removing today.  Continue scheduled lasix. Continue aspirin.    Typical atrial flutter   * EP following. Considering cardioverting today. Will follow up with team.    Patient remains in the ICU due to bradycardia/atrial flutter. Discussions between primary team and EP in re: cardioversion. Keep NPO for now. Continue bowel regimen. Working with therapy. Continue GI prophylaxis per protocol. Continue ICU care.    Critical care time spent: 36 min    Critical care was time spent personally by me on the following activities: development of treatment plan with patient or surrogate and bedside caregivers, discussions with consultants, evaluation of patient's response to treatment, examination of patient, ordering and performing treatments and interventions, ordering and review of laboratory studies, ordering and review of radiographic studies, pulse oximetry, re-evaluation of patient's condition.  This critical care time did not overlap with that of any other provider or involve time for any procedures.      Vira Costello MD, FACS  General Surgery and Surgical Critical Care  [facility]

## 2024-08-20 NOTE — PT/OT/SLP PROGRESS
Occupational Therapy      Patient Name:  Lorenzo Nino   MRN:  [id number]    Patient not seen today secondary to awaiting PPM placement . Will follow-up 8/21/24.    8/20/2024

## 2024-08-20 NOTE — ANESTHESIA PREPROCEDURE EVALUATION
[facility]  Anesthesia Pre-Operative Evaluation         Patient Name: Lorenzo Nino  YOB: 1975  MRN: [id number]    SUBJECTIVE:     Pre-operative evaluation for Procedure(s) (LRB):  INSERTION, CARDIAC PACEMAKER, DUAL CHAMBER (Chest) - AFL, DUAL PPM LBAP, mdt, [place], [place], [place] [id number]      08/20/2024    Lorenzo Nino is a 48 y.o. 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    Patient now presents for the above procedure(s).        Prev airway:   Placement Date 11/02/23; Placement Time 0855; Method of Intubation Glidescope; Inserted by MD; Staff/Resident Names Dr Bullock, Dr Caballero; Airway Device Endotracheal Tube; Airway Device Size 8.0; Style Cuffed; Placement Verified by Colorimetric EtCO2 device; Intubation Findings Positive EtCO2; Depth of Insertion 24; Securement Lips; Breath Sounds Equal Bilateral; Insertion Attempts 1; Removal Date 11/05/23; Removal Time 0400       Patient Active Problem List   Diagnosis    Thrombocytopenia    Elevated transaminase level    Endocarditis    Elevated troponin    A-fib with RVR    Heart failure with mid-range ejection fraction (HFmEF)    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    Severe mitral regurgitation    Rupture of chordae tendineae    Surgical wound present    Stress hyperglycemia    S/P MVR (mitral valve repair)    Subretinal hemorrhage, right    HFmrEF    Hypokalemia    Microcytic anemia    Retinal macular atrophy    Colon polyp    Typical atrial flutter    Chronic diastolic heart failure    Pulmonary hypertension    Stage 3 chronic kidney disease    Pre-op testing    Complete heart block       Review of patient's allergies indicates:  Not on File    Current Inpatient Medications:   acetaminophen  1,000 mg Oral Q8H    aspirin   81 mg Oral Daily    famotidine  20 mg Oral BID    furosemide (LASIX) injection  20 mg Intravenous BID    mirtazapine  15 mg Oral Nightly    polyethylene glycol  17 g Oral Daily    senna-docusate 8.6-50 mg  1 tablet Oral BID    tamsulosin  0.4 mg Oral Daily       Current Facility-Administered Medications on File Prior to Encounter   Medication Dose Route Frequency Provider Last Rate Last Admin    0.9%  NaCl infusion   Intravenous Continuous Adriana Black NP 70 mL/hr at 02/27/24 0755 New Bag at 02/27/24 0755    mupirocin 2 % ointment   Nasal On Call Procedure Adriana Black NP   Given at 02/27/24 0756     Current Outpatient Medications on File Prior to Encounter   Medication Sig Dispense Refill    doxycycline (VIBRAMYCIN) 100 MG Cap Take 1 capsule by mouth twice daily 60 capsule 2    metoprolol succinate (TOPROL-XL) 25 MG 24 hr tablet Take 1 tablet (25 mg total) by mouth once daily. 30 tablet 11    mirtazapine (REMERON SOL-TAB) 15 MG disintegrating tablet Dissolve 1 tablet (15 mg total) by mouth nightly. 30 tablet 11    potassium chloride SA (K-DUR,KLOR-CON) 20 MEQ tablet Take 2 tablets (40 mEq total) by mouth once daily. 90 tablet 3    torsemide (DEMADEX) 10 MG Tab Take 1 tablet (10 mg total) by mouth once daily. 30 tablet 11    amiodarone (PACERONE) 200 MG Tab Take 1 tablet (200 mg total) by mouth once daily. 90 tablet 3    apixaban (ELIQUIS) 5 mg Tab Take 1 tablet (5 mg total) by mouth 2 (two) times daily. (Patient not taking: Reported on 8/8/2024) 60 tablet 1    dapagliflozin propanediol (FARXIGA) 10 mg tablet Take 1 tablet (10 mg total) by mouth once daily. 30 tablet 11       Past Surgical History:   Procedure Laterality Date    COLONOSCOPY N/A 1/5/2024    Procedure: COLONOSCOPY;  Surgeon: Fadia Ellsworth MD;  Location: [address]);  Service: Endoscopy;  Laterality: N/A;    COLONOSCOPY, WITH DIRECTED SUBMUCOSAL INJECTION  2/27/2024    Procedure: COLONOSCOPY, WITH DIRECTED SUBMUCOSAL  INJECTION;  Surgeon: Mayur Dunn MD;  Location: [facility] OR [facility] FLR;  Service: Colon and Rectal;;    COLONOSCOPY, WITH POLYPECTOMY USING SNARE N/A 2/27/2024    Procedure: COLONOSCOPY, WITH POLYPECTOMY USING SNARE;  Surgeon: Mayur Dunn MD;  Location: [facility] OR [facility] FLR;  Service: Colon and Rectal;  Laterality: N/A;    ECHOCARDIOGRAM,TRANSESOPHAGEAL N/A 12/26/2023    Procedure: Transesophageal echo (NEW) intra-procedure log documentation;  Surgeon: [facility];  Location: [facility] EP LAB;  Service: Cardiology;  Laterality: N/A;    ECHOCARDIOGRAM,TRANSESOPHAGEAL N/A 8/19/2024    Procedure: Transesophageal echo (NEW) intra-procedure log documentation;  Surgeon: Emerson Mercado MD;  Location: [facility] EP LAB;  Service: Cardiology;  Laterality: N/A;    ESOPHAGOGASTRODUODENOSCOPY N/A 1/5/2024    Procedure: EGD (ESOPHAGOGASTRODUODENOSCOPY);  Surgeon: Fadia Ellsworth MD;  Location: [facility] ENDO (2ND FLR);  Service: Endoscopy;  Laterality: N/A;    EXCLUSION, LEFT ATRIAL APPENDAGE, OPEN, AS PART OF OPEN CHEST SURGERY Left 11/3/2023    Procedure: EXCLUSION, LEFT ATRIAL APPENDAGE, OPEN, AS PART OF OPEN CHEST SURGERY;  Surgeon: Manuel Beal MD;  [facility];  Service: Cardiothoracic;  Laterality: Left;    INSERTION OF INTRA-AORTIC BALLOON ASSIST DEVICE Right 11/2/2023    Procedure: INSERTION, INTRA-AORTIC BALLOON PUMP;  Surgeon: Jose Vidal MD;  Location: [facility] CATH LAB;  Service: Cardiology;  Laterality: Right;    MITRAL VALVE REPLACEMENT N/A 8/12/2024    Procedure: REPLACEMENT, MITRAL VALVE;  Surgeon: Manuel Beal MD;  Location: [facility] OR [facility] FLR;  Service: Cardiothoracic;  Laterality: N/A;    REPAIR, MITRAL VALVE, OPEN N/A 11/3/2023    Procedure: REPAIR, MITRAL VALVE, OPEN;  Surgeon: Manuel Beal MD;  Location: [facility] OR [facility] FLR;  Service: Cardiothoracic;  Laterality: N/A;    STERNOTOMY N/A 8/12/2024    Procedure: REDO STERNOTOMY;  Surgeon: Manuel Beal  MD;  Location: [facility] OR [facility] FLR;  Service: Cardiothoracic;  Laterality: N/A;    TREATMENT OF CARDIAC ARRHYTHMIA N/A 8/19/2024    Procedure: Cardioversion or Defibrillation;  Surgeon: George Peter MD;  Location: [facility] EP LAB;  Service: Cardiology;  Laterality: N/A;  AF, DCCV/NEW, ANES, [place], [place] [id number]       Social History     Socioeconomic History    Marital status: Single   Occupational History    Occupation: associated terminal  [employer]    Tobacco Use    Smoking status: Unknown     Passive exposure: Never   Substance and Sexual Activity    Alcohol use: Yes     Alcohol/week: 1.0 standard drink of alcohol     Types: 1 Cans of beer per week    Drug use: Never    Sexual activity: Not Currently     Partners: Female     Birth control/protection: None     Social Determinants of Health     Financial Resource Strain: Low Risk  (8/13/2024)    Overall Financial Resource Strain (CARDIA)     Difficulty of Paying Living Expenses: Not hard at all   Food Insecurity: No Food Insecurity (8/13/2024)    Hunger Vital Sign     Worried About Running Out of Food in the Last Year: Never true     Ran Out of Food in the Last Year: Never true   Transportation Needs: No Transportation Needs (8/13/2024)    TRANSPORTATION NEEDS     Transportation : No   Physical Activity: Inactive (8/13/2024)    Exercise Vital Sign     Days of Exercise per Week: 0 days     Minutes of Exercise per Session: 0 min   Stress: Patient Declined (8/13/2024)    [employer] [place] [employer] - Occupational Stress Questionnaire     Feeling of Stress : Patient declined   Housing Stability: Low Risk  (8/13/2024)    Housing Stability Vital Sign     Unable to Pay for Housing in the Last Year: No     Homeless in the Last Year: No       OBJECTIVE:     Vital Signs Range (Last 24H):  Temp:  [36.6 °C (97.9 °F)-37.1 °C (98.7 °F)]   Pulse:  [49-54]   Resp:  [13-27]   BP: (104-130)/(53-89)   SpO2:  [93 %-100 %]       Significant Labs:  Lab Results   Component Value  Date    WBC 9.10 08/20/2024    HGB 7.9 (L) 08/20/2024    HCT 24.9 (L) 08/20/2024     08/20/2024    TRIG 370 (H) 10/31/2023    ALT 17 08/20/2024    AST 32 08/20/2024     08/20/2024    K 3.8 08/20/2024     08/20/2024    CREATININE 1.2 08/20/2024    BUN 15 08/20/2024    CO2 26 08/20/2024    TSH 1.303 06/22/2024    INR 3.7 (H) 08/20/2024    HGBA1C 5.4 08/08/2024       Diagnostic Studies: No relevant studies.    EKG:   Results for orders placed or performed during the hospital encounter of 08/12/24   EKG 12-lead    Collection Time: 08/16/24 10:15 PM   Result Value Ref Range    QRS Duration 80 ms    [facility]HS QTC Calculation 541 ms    Narrative    Test Reason :     Vent. Rate : 050 BPM     Atrial Rate : 267 BPM     P-R Int : 000 ms          QRS Dur : 080 ms      QT Int : 594 ms       P-R-T Axes : 214 051 023 degrees     QTc Int : 541 ms    AF  ST and T wave abnormality, consider anterolateral ischemia  Prolonged QT  Abnormal ECG    Confirmed by Mian MORENO MD ([id number]) on 8/17/2024 5:46:37 PM    Referred By: HERBERTH BENNETT           Confirmed By:Mian MORENO MD       2D ECHO:  TTE:  Results for orders placed or performed during the hospital encounter of 07/05/24   Echo   Result Value Ref Range    BSA 1.98 m2    Est. RA pres 3 mmHg    LVOT stroke volume 71.37 cm3    LVIDd 6.28 (A) 3.5 - 6.0 cm    LV Systolic Volume 62.66 mL    LV Systolic Volume Index 31.5 mL/m2    LVIDs 3.82 2.1 - 4.0 cm    LV Diastolic Volume 200.07 mL    LV Diastolic Volume Index 100.54 mL/m2    IVS 0.88 0.6 - 1.1 cm    LVOT diameter 2.18 cm    LVOT area 3.7 cm2    FS 39 28 - 44 %    Left Ventricle Relative Wall Thickness 0.20 cm    Posterior Wall 0.64 0.6 - 1.1 cm    LV mass 189.36 g    LV Mass Index 95 g/m2    MV Peak E Jabier 1.51 m/s    TDI LATERAL 0.16 m/s    TDI SEPTAL 0.07 m/s    E/E' ratio 13.13 m/s    MV Peak A Jabier 0.41 m/s    TR Max Jabier 3.56 m/s    E/A ratio 3.68     E wave deceleration time 534.74 msec    LV SEPTAL E/E' RATIO 21.57 m/s     LA Volume Index 76.5 mL/m2    LV LATERAL E/E' RATIO 9.44 m/s    LA volume 152.28 cm3    LVOT peak brittany 0.76 m/s    RV- molina basal diam 3.9 cm    TAPSE 1.75 cm    LA size 5.35 cm    Left Atrium Minor Axis 6.01 cm    Left Atrium Major Axis 5.95 cm    LA volume (mod) 148.03 cm3    LA WIDTH 5.60 cm    LA Volume Index (Mod) 74.4 mL/m2    RA Major Axis 5.17 cm    RA Width 3.60 cm    AV mean gradient 4 mmHg    AV peak gradient 6 mmHg    Ao peak brittnay 1.22 m/s    Ao VTI 30.05 cm    LVOT peak VTI 19.13 cm    AV valve area 2.37 cm²    AV Velocity Ratio 0.62     AV index (prosthetic) 0.64     FATUMA by Velocity Ratio 2.32 cm²    Mr max brittany 0.05 m/s    MV stenosis pressure 1/2 time 155.07 ms    MV valve area p 1/2 method 1.42 cm2    TV resting pulmonary artery pressure 54 mmHg    RV TB RVSP 7 mmHg    Triscuspid Valve Regurgitation Peak Gradient 51 mmHg    Sinus 3.47 cm    STJ 2.51 cm    Ascending aorta 3.55 cm    Mean e' 0.12 m/s    ZLVIDS 0.59     ZLVIDD 0.84     Narrative    Images from the original result were not included.      Left Ventricle: The left ventricle is moderately dilated. Normal wall   thickness. Normal wall motion. There is normal systolic function with a   visually estimated ejection fraction of 55 - 60%. Diastolic function   cannot be reliably determined in the presence of mitral valve disease and   mitral valve ring/replacement.    Right Ventricle: Normal right ventricular cavity size. Wall thickness   is normal. Right ventricle wall motion  is normal. Systolic function is   normal.    Left Atrium: Left atrium is severely dilated.    Right Atrium: Normal right atrial size.    Mitral Valve: The mitral valve is repaired by annular ring 40 mm   Medtronic. There is mild bileaflet sclerosis. There is severe   regurgitation with a posterolateral eccentriccally directed and a central    jet. There appears to be a perforation in Anterior leaflets (clip #96 to   96- see below).Torn tissue is seen in this area in LA but  its proximal   attacment is not well seen. Consider NEW if clinically indicated. Mean   gradient is 2.5 mmHg at HR o 53 bpm.    Tricuspid Valve: There is mild to moderate regurgitation with a   centrally directed jet.    Pulmonic Valve: There is mild regurgitation.    Aorta: Aortic root is normal in size measuring 3.47 cm. Ascending aorta   is normal measuring 3.55 cm.    Pulmonary Artery: There is moderate pulmonary hypertension. The   estimated pulmonary artery systolic pressure is 54 mmHg.    IVC/SVC: Normal venous pressure at 3 mmHg.    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   2)  Resection of left atrial appendage          NEW:  Results for orders placed or performed during the hospital encounter of 08/12/24   Transesophageal echo (NEW)   Result Value Ref Range    BSA 1.95 m2    Narrative      Limited NEW prior to DCCV    Left Ventricle: The left ventricle is normal in size. Normal wall   thickness. There is normal systolic function with a visually estimated   ejection fraction of 55 - 60%.    Left Atrium: Left atrium is dilated. Surgical closure of the Left   Atrial Appendage.The left atrial appendage has been surgically closed with   small remnant present. No thrombus visualized    Aortic Valve: The aortic valve is a trileaflet valve.    Mitral Valve: There is a bileaflet mechanical valve in the mitral   position that is well-seated. It is reported to be a 33 mm Carbomedics   valve. There is no significant regurgitation. No paravalvular   regurgitation.         ASSESSMENT/PLAN:           Pre-op Assessment    I have reviewed the Patient Summary Reports.     I have reviewed the Nursing Notes. I have reviewed the NPO Status.   I have reviewed the Medications.     Review of Systems  Anesthesia Hx:  No problems with previous Anesthesia   History of  prior surgery of interest to airway management or planning:  Previous anesthesia: General, MAC        Denies Family Hx of Anesthesia complications.    Denies Personal Hx of Anesthesia complications.                    Social:  Non-Smoker, No Alcohol Use       Hematology/Oncology:       -- Denies Anemia:                  Denies Current/Recent Cancer                Cardiovascular:     Hypertension Valvular problems/Murmurs (MR from endocarditis s/p repair), MR   Denies CAD.    Dysrhythmias atrial fibrillation  CHF    no hyperlipidemia                             Pulmonary:    Denies COPD.  Denies Asthma.     Denies Sleep Apnea.                Renal/:  Chronic Renal Disease, CKD                Hepatic/GI:      Denies GERD. Denies Liver Disease.            Musculoskeletal:  Denies Arthritis.               Neurological:    Denies CVA. Denies Neuromuscular Disease.   Denies Seizures.          Denies Chronic Pain Syndrome                         Endocrine:  Denies Diabetes.   Denies Hyperthyroidism.       Denies Obesity / BMI > 30  Psych:   denies anxiety denies depression                Physical Exam  General: Well nourished    Airway:  Mallampati: III / II  Mouth Opening: Normal  TM Distance: Normal  Tongue: Normal  Neck ROM: Normal ROM    Dental:  All teeth removable other than 4-5 teeth in front, currently removed  Chest/Lungs:  Normal Respiratory Rate    Heart:  Rate: Normal        Anesthesia Plan  Type of Anesthesia, risks & benefits discussed:    Anesthesia Type: Gen ETT, Gen Supraglottic Airway, Gen Natural Airway  Intra-op Monitoring Plan: Standard ASA Monitors, Art Line, Central Line and NEW  Post Op Pain Control Plan: multimodal analgesia  Induction:  IV  Airway Plan: Video, Post-Induction  Informed Consent: Informed consent signed with the Patient and all parties understand the risks and agree with anesthesia plan.  All questions answered.   ASA Score: 4  Day of Surgery Review of History & Physical: H&P  Update referred to the surgeon/provider.    Ready For Surgery From Anesthesia Perspective.     .

## 2024-08-20 NOTE — PLAN OF CARE
Post-Acute Therapy Recommendation: no further therapy indicated      All goals met and d/c from PT services today.     Please continue Progressive Mobility Protocol as appropriate.    Appropriate transfer level with nursing staff: ambulation 3-4x/day with supervision    2024    Problem: Physical Therapy  Goal: Physical Therapy Goal  Description: Goals to be met by: 2024     Patient will increase functional independence with mobility by performin. Sit to stand transfer with Modified [place]  2. Gait  x 250 feet with Supervision using physician approved AD prn.   3. Lower extremity exercise program x30 reps per handout, with independence  4. Recite 3/3 sternal precautions and remain complaint with precautions throughout session with no verbal cues    Outcome: Met

## 2024-08-20 NOTE — PLAN OF CARE
Problem: Occupational Therapy  Goal: Occupational Therapy Goal  Description: Goals to be met by: 9/6/2024     Patient will increase functional independence with ADLs by performing:    UE Dressing with Set-up Assistance.  LE Dressing with Supervision.  Grooming while standing at sink with Modified [place].  Toileting from toilet with Modified [place] for hygiene and clothing management.   Supine to sit with Stand-by Assistance.  Stand pivot transfers with Modified [place].  Toilet transfer to toilet with Modified [place].    Outcome: Met

## 2024-08-20 NOTE — PLAN OF CARE
SICU PLAN OF CARE    Dx: Endocarditis    Vital Signs (last 12 hours):   Temp:  [98.2 °F (36.8 °C)-98.5 °F (36.9 °C)]   Pulse:  [49-54]   Resp:  [13-27]   BP: (101-140)/(52-64)   SpO2:  [93 %-99 %]      Neuro: AAOx4, Follows commands , and Moves all extremities spontaneously     Cardiac: V paced @ 50    Respiratory: Room Air    Gtts: N/A    Urine Output: Voids Spontaneously  425 mL/shift and 1 unmeasured occurrence    Diet: Cardiac     Skin:  No new or worsening skin breakdown noted. Midline incision GABRIEL with steri strips CDI. Patient turns independently, bony prominences protected, and mattress inflated/working correctly.     Shift Events:  Anticipated pace maker placement postponed until tomorrow. No acute events this shift.

## 2024-08-20 NOTE — PROGRESS NOTES
[facility] Intensive Care  Cardiac Electrophysiology  Progress Note    Admission Date: 8/12/2024  Code Status: Full Code   Attending Physician: Manuel Beal MD   Expected Discharge Date: 8/23/2024  Principal Problem:Endocarditis    Subjective:     Interval History:   - NAEON, asymptomatic  - Tele V paced at 50bpm    Objective:     Vital Signs (Most Recent):  Temp: 98.4 °F (36.9 °C) (08/20/24 0300)  Pulse: (!) 50 (08/20/24 0815)  Resp: 20 (08/20/24 0800)  BP: 119/60 (08/20/24 0800)  SpO2: 98 % (08/20/24 0815) Vital Signs (24h Range):  Temp:  [97.9 °F (36.6 °C)-98.7 °F (37.1 °C)] 98.4 °F (36.9 °C)  Pulse:  [49-52] 50  Resp:  [13-27] 20  SpO2:  [95 %-100 %] 98 %  BP: (104-130)/(53-89) 119/60     Weight: 74.8 kg (165 lb)  Body mass index is 22.38 kg/m².     SpO2: 98 %        Physical Exam  Constitutional:       Appearance: Normal appearance.   HENT:      Head: Normocephalic and atraumatic.      Mouth/Throat:      Mouth: Mucous membranes are moist.   Eyes:      Extraocular Movements: Extraocular movements intact.      Conjunctiva/sclera: Conjunctivae normal.   Cardiovascular:      Rate and Rhythm: Normal rate and regular rhythm.      Pulses: Normal pulses.   Pulmonary:      Effort: Pulmonary effort is normal.   Abdominal:      General: Abdomen is flat.      Palpations: Abdomen is soft.   Musculoskeletal:      Right lower leg: No edema.      Left lower leg: No edema.   Skin:     General: Skin is warm and dry.   Neurological:      General: No focal deficit present.      Mental Status: He is alert and oriented to person, place, and time.            Significant Labs: EP:   Recent Labs   Lab 08/19/24  0309 08/19/24  0352 08/19/24  0352 08/19/24  1201 08/20/24  0256    136  --   --  137   K 4.6 3.7  --   --  3.8    99  --   --  101   CO2 24 27  --   --  26    102  --   --  101   BUN 15 15  --   --  15   CREATININE 1.0 1.1  --   --  1.2   CALCIUM 8.7 9.1  --   --  9.0   PROT 6.3 6.2  --   --   "6.4   ALBUMIN 2.7* 2.6*  --   --  2.7*   BILITOT 0.6 0.6  --   --  0.5   ALKPHOS 79 81  --   --  87   AST 42* 30  --   --  32   ALT 14 15  --   --  17   ANIONGAP 11 10  --   --  10   WBC  --  8.46  --   --  9.10   HGB  --  8.1*  --   --  7.9*   HCT  --  25.7*   < >  --  24.9*   PLT  --  293  --   --  344   INR  --  4.5*  --  3.7* 4.0*    < > = values in this interval not displayed.    and Blood Culture: No results for input(s): "LABBLOO" in the last 48 hours.    Significant Imaging: Echocardiogram: Transthoracic echo (TTE) complete (Cupid Only):   Results for orders placed or performed during the hospital encounter of 07/05/24   Echo   Result Value Ref Range    BSA 1.98 m2    Est. RA pres 3 mmHg    LVOT stroke volume 71.37 cm3    LVIDd 6.28 (A) 3.5 - 6.0 cm    LV Systolic Volume 62.66 mL    LV Systolic Volume Index 31.5 mL/m2    LVIDs 3.82 2.1 - 4.0 cm    LV Diastolic Volume 200.07 mL    LV Diastolic Volume Index 100.54 mL/m2    IVS 0.88 0.6 - 1.1 cm    LVOT diameter 2.18 cm    LVOT area 3.7 cm2    FS 39 28 - 44 %    Left Ventricle Relative Wall Thickness 0.20 cm    Posterior Wall 0.64 0.6 - 1.1 cm    LV mass 189.36 g    LV Mass Index 95 g/m2    MV Peak E Jabier 1.51 m/s    TDI LATERAL 0.16 m/s    TDI SEPTAL 0.07 m/s    E/E' ratio 13.13 m/s    MV Peak A Jabier 0.41 m/s    TR Max Jabier 3.56 m/s    E/A ratio 3.68     E wave deceleration time 534.74 msec    LV SEPTAL E/E' RATIO 21.57 m/s    LA Volume Index 76.5 mL/m2    LV LATERAL E/E' RATIO 9.44 m/s    LA volume 152.28 cm3    LVOT peak jabier 0.76 m/s    RV- molina basal diam 3.9 cm    TAPSE 1.75 cm    LA size 5.35 cm    Left Atrium Minor Axis 6.01 cm    Left Atrium Major Axis 5.95 cm    LA volume (mod) 148.03 cm3    LA WIDTH 5.60 cm    LA Volume Index (Mod) 74.4 mL/m2    RA Major Axis 5.17 cm    RA Width 3.60 cm    AV mean gradient 4 mmHg    AV peak gradient 6 mmHg    Ao peak jabier 1.22 m/s    Ao VTI 30.05 cm    LVOT peak VTI 19.13 cm    AV valve area 2.37 cm²    AV Velocity Ratio " 0.62     AV index (prosthetic) 0.64     FATUMA by Velocity Ratio 2.32 cm²    Mr max brittany 0.05 m/s    MV stenosis pressure 1/2 time 155.07 ms    MV valve area p 1/2 method 1.42 cm2    TV resting pulmonary artery pressure 54 mmHg    RV TB RVSP 7 mmHg    Triscuspid Valve Regurgitation Peak Gradient 51 mmHg    Sinus 3.47 cm    STJ 2.51 cm    Ascending aorta 3.55 cm    Mean e' 0.12 m/s    ZLVIDS 0.59     ZLVIDD 0.84     Narrative    Images from the original result were not included.      Left Ventricle: The left ventricle is moderately dilated. Normal wall   thickness. Normal wall motion. There is normal systolic function with a   visually estimated ejection fraction of 55 - 60%. Diastolic function   cannot be reliably determined in the presence of mitral valve disease and   mitral valve ring/replacement.    Right Ventricle: Normal right ventricular cavity size. Wall thickness   is normal. Right ventricle wall motion  is normal. Systolic function is   normal.    Left Atrium: Left atrium is severely dilated.    Right Atrium: Normal right atrial size.    Mitral Valve: The mitral valve is repaired by annular ring 40 mm   [employer]tr[employer]. There is mild bileaflet sclerosis. There is severe   regurgitation with a posterolateral eccentriccally directed and a central    jet. There appears to be a perforation in Anterior leaflets (clip #96 to   96- see below).Torn tissue is seen in this area in LA but its proximal   attacment is not well seen. Consider NEW if clinically indicated. Mean   gradient is 2.5 mmHg at HR o 53 bpm.    Tricuspid Valve: There is mild to moderate regurgitation with a   centrally directed jet.    Pulmonic Valve: There is mild regurgitation.    Aorta: Aortic root is normal in size measuring 3.47 cm. Ascending aorta   is normal measuring 3.55 cm.    Pulmonary Artery: There is moderate pulmonary hypertension. The   estimated pulmonary artery systolic pressure is 54 mmHg.    IVC/SVC: Normal venous pressure at 3  mmHg.    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   2)  Resection of left atrial appendage        Assessment and Plan:     Complete heart block  Patient with h/o MRSA mitral valve endocarditis, s/p mechanical mitral valve replacement and ventricular pacing wires placement 8/14. Patient also has a h/o Afib/flutter in the s/o valvular disease, first noted 11-12/2023. Patient noted to be in Aflutter, likely 4:1. S/p DCCV 8/19 with return to intrinsic rhythm. Patient's rhythm notably complete heart block that has sustained since valve replacement.     Chest tubes removed prior to cardioversion. INR elevated, though improved on repeat, on Warfarin.     Epicardial ventricular pacing wires in place. Threshold this AM 3.5 (4.0 yesterday).     Recommendations:  - Plan for DC PPM placement today, 8/20  - Hold heparin products  - Ok to continue Warfarin  - NPO  - Tele  - K>4, Mg>2    Discussed and staffed with EP attending. We will continue to follow.     Bianca Henry MD  Cardiac Electrophysiology  [facility]

## 2024-08-20 NOTE — PT/OT/SLP PROGRESS
Occupational Therapy   Treatment/Discharge    Name: Lorenzo Nino  MRN: [id number]  Admitting Diagnosis:  Endocarditis  1 Day Post-Op    Recommendations:     Discharge Recommendations: No Therapy Indicated  Discharge Equipment Recommendations:  none  Barriers to discharge:  None    Assessment:     Lorenzo Nino is a 48 y.o. male with a medical diagnosis of Endocarditis.  He presents performing ADL safely and without A.  Performance deficits affecting function are weakness, gait instability, decreased upper extremity function, impaired cardiopulmonary response to activity, impaired endurance, impaired balance, impaired self care skills, impaired functional mobility, decreased coordination, pain.     Rehab Prognosis:  Good; patient is no longer in need of acute skilled OT services to address these deficits and reach maximum level of function.       Plan:     Patient to be d/c'd from OT services  Plan of Care Expires: 09/13/24  Plan of Care Reviewed with: patient, mother    Subjective     Chief Complaint: denies  Patient/Family Comments/goals: to go home  Pain/Comfort:  Pain Rating 1: 0/10  Pain Rating Post-Intervention 1: 0/10    Objective:     Communicated with: RN prior to session.  Patient found supine with blood pressure cuff, pulse ox (continuous), telemetry, external pacer upon OT entry to room.    General Precautions: Standard, fall, sternal    Orthopedic Precautions:N/A  Braces: N/A  Respiratory Status: Room air     Occupational Performance:     Bed Mobility:    NT     Functional Mobility/Transfers:  Patient completed Sit <> Stand Transfer with independence  with  no assistive device   Patient completed Toilet Transfer STS technique with independence with  no AD  Functional Mobility: Independent around room during ADL including during reaching, bending and carrying    Activities of Daily Living:  Feeding:  independence    Grooming: independence    Lower Body Dressing: modified independence        [facility] 6  Click ADL: 24    Treatment & Education:  Pt ed on OT POC  Pt recalled 3/3 sternal precautions  Pt ed on impending PPM Precautions as well as importance of ROM ex's within precautions  Pt ed on hemidressing technique s/p PPM  Pt ed on ROM ex's daily for increased overall strength and endurance to reduce risk of hospital acquired weakness  Pt ed on safety with ADL and fall risk  Reinforced use of call button to contact nursing staff for assistance with mobility    Patient left up in chair with all lines intact, call button in reach, and RN notified    GOALS:   Multidisciplinary Problems       Occupational Therapy Goals       Not on file              Multidisciplinary Problems (Resolved)          Problem: Occupational Therapy    Goal Priority Disciplines Outcome Interventions   Occupational Therapy Goal   (Resolved)     OT, PT/OT Met    Description: Goals to be met by: 9/6/2024     Patient will increase functional independence with ADLs by performing:    UE Dressing with Set-up Assistance.  LE Dressing with Supervision.  Grooming while standing at sink with Modified [place].  Toileting from toilet with Modified [place] for hygiene and clothing management.   Supine to sit with Stand-by Assistance.  Stand pivot transfers with Modified [place].  Toilet transfer to toilet with Modified [place].                         Time Tracking:     OT Date of Treatment: 08/20/24  OT Start Time: 1305  OT Stop Time: 1317  OT Total Time (min): 12 min    Billable Minutes:Self Care/Home Management 10               8/20/2024

## 2024-08-21 LAB
ALBUMIN SERPL BCP-MCNC: 2.9 G/DL (ref 3.5–5.2)
ALP SERPL-CCNC: 93 U/L (ref 55–135)
ALT SERPL W/O P-5'-P-CCNC: 19 U/L (ref 10–44)
ANION GAP SERPL CALC-SCNC: 9 MMOL/L (ref 8–16)
APTT PPP: 64 SEC (ref 21–32)
AST SERPL-CCNC: 33 U/L (ref 10–40)
BASOPHILS # BLD AUTO: 0.04 K/UL (ref 0–0.2)
BASOPHILS NFR BLD: 0.4 % (ref 0–1.9)
BILIRUB SERPL-MCNC: 0.5 MG/DL (ref 0.1–1)
BUN SERPL-MCNC: 15 MG/DL (ref 6–20)
CALCIUM SERPL-MCNC: 9.1 MG/DL (ref 8.7–10.5)
CHLORIDE SERPL-SCNC: 99 MMOL/L (ref 95–110)
CO2 SERPL-SCNC: 29 MMOL/L (ref 23–29)
CREAT SERPL-MCNC: 1.2 MG/DL (ref 0.5–1.4)
DIFFERENTIAL METHOD BLD: ABNORMAL
EOSINOPHIL # BLD AUTO: 0.4 K/UL (ref 0–0.5)
EOSINOPHIL NFR BLD: 3.7 % (ref 0–8)
ERYTHROCYTE [DISTWIDTH] IN BLOOD BY AUTOMATED COUNT: 20.3 % (ref 11.5–14.5)
EST. GFR  (NO RACE VARIABLE): >60 ML/MIN/1.73 M^2
GLUCOSE SERPL-MCNC: 102 MG/DL (ref 70–110)
HCT VFR BLD AUTO: 27.5 % (ref 40–54)
HGB BLD-MCNC: 8.2 G/DL (ref 14–18)
IMM GRANULOCYTES # BLD AUTO: 0.31 K/UL (ref 0–0.04)
IMM GRANULOCYTES NFR BLD AUTO: 3.2 % (ref 0–0.5)
INR PPP: 3 (ref 0.8–1.2)
INR PPP: 3.5 (ref 0.8–1.2)
LYMPHOCYTES # BLD AUTO: 2.2 K/UL (ref 1–4.8)
LYMPHOCYTES NFR BLD: 22 % (ref 18–48)
MAGNESIUM SERPL-MCNC: 2.1 MG/DL (ref 1.6–2.6)
MCH RBC QN AUTO: 22.6 PG (ref 27–31)
MCHC RBC AUTO-ENTMCNC: 29.8 G/DL (ref 32–36)
MCV RBC AUTO: 76 FL (ref 82–98)
MONOCYTES # BLD AUTO: 1.3 K/UL (ref 0.3–1)
MONOCYTES NFR BLD: 13 % (ref 4–15)
NEUTROPHILS # BLD AUTO: 5.7 K/UL (ref 1.8–7.7)
NEUTROPHILS NFR BLD: 57.7 % (ref 38–73)
NRBC BLD-RTO: 0 /100 WBC
PHOSPHATE SERPL-MCNC: 3.5 MG/DL (ref 2.7–4.5)
PLATELET # BLD AUTO: 456 K/UL (ref 150–450)
PMV BLD AUTO: 10.3 FL (ref 9.2–12.9)
POTASSIUM SERPL-SCNC: 3.5 MMOL/L (ref 3.5–5.1)
PROT SERPL-MCNC: 6.8 G/DL (ref 6–8.4)
PROTHROMBIN TIME: 30.3 SEC (ref 9–12.5)
PROTHROMBIN TIME: 35.5 SEC (ref 9–12.5)
RBC # BLD AUTO: 3.63 M/UL (ref 4.6–6.2)
SODIUM SERPL-SCNC: 137 MMOL/L (ref 136–145)
WBC # BLD AUTO: 9.83 K/UL (ref 3.9–12.7)

## 2024-08-21 PROCEDURE — 63600175 PHARM REV CODE 636 W HCPCS

## 2024-08-21 PROCEDURE — 85025 COMPLETE CBC W/AUTO DIFF WBC: CPT | Performed by: THORACIC SURGERY (CARDIOTHORACIC VASCULAR SURGERY)

## 2024-08-21 PROCEDURE — 85610 PROTHROMBIN TIME: CPT | Performed by: THORACIC SURGERY (CARDIOTHORACIC VASCULAR SURGERY)

## 2024-08-21 PROCEDURE — 25000003 PHARM REV CODE 250: Performed by: STUDENT IN AN ORGANIZED HEALTH CARE EDUCATION/TRAINING PROGRAM

## 2024-08-21 PROCEDURE — 84100 ASSAY OF PHOSPHORUS: CPT | Performed by: THORACIC SURGERY (CARDIOTHORACIC VASCULAR SURGERY)

## 2024-08-21 PROCEDURE — 83735 ASSAY OF MAGNESIUM: CPT | Performed by: THORACIC SURGERY (CARDIOTHORACIC VASCULAR SURGERY)

## 2024-08-21 PROCEDURE — 25000003 PHARM REV CODE 250

## 2024-08-21 PROCEDURE — 94799 UNLISTED PULMONARY SVC/PX: CPT

## 2024-08-21 PROCEDURE — 20000000 HC ICU ROOM

## 2024-08-21 PROCEDURE — 80053 COMPREHEN METABOLIC PANEL: CPT | Performed by: THORACIC SURGERY (CARDIOTHORACIC VASCULAR SURGERY)

## 2024-08-21 PROCEDURE — 85610 PROTHROMBIN TIME: CPT | Mod: 91

## 2024-08-21 PROCEDURE — 99233 SBSQ HOSP IP/OBS HIGH 50: CPT | Mod: ,,, | Performed by: SURGERY

## 2024-08-21 PROCEDURE — 94761 N-INVAS EAR/PLS OXIMETRY MLT: CPT

## 2024-08-21 PROCEDURE — 99233 SBSQ HOSP IP/OBS HIGH 50: CPT | Mod: ,,, | Performed by: STUDENT IN AN ORGANIZED HEALTH CARE EDUCATION/TRAINING PROGRAM

## 2024-08-21 PROCEDURE — 85730 THROMBOPLASTIN TIME PARTIAL: CPT | Performed by: THORACIC SURGERY (CARDIOTHORACIC VASCULAR SURGERY)

## 2024-08-21 RX ORDER — POTASSIUM CHLORIDE 29.8 MG/ML
40 INJECTION INTRAVENOUS ONCE
Status: DISCONTINUED | OUTPATIENT
Start: 2024-08-21 | End: 2024-08-21

## 2024-08-21 RX ORDER — POTASSIUM CHLORIDE 20 MEQ/1
40 TABLET, EXTENDED RELEASE ORAL ONCE
Status: COMPLETED | OUTPATIENT
Start: 2024-08-21 | End: 2024-08-21

## 2024-08-21 RX ADMIN — FAMOTIDINE 20 MG: 20 TABLET ORAL at 08:08

## 2024-08-21 RX ADMIN — ACETAMINOPHEN 1000 MG: 500 TABLET ORAL at 01:08

## 2024-08-21 RX ADMIN — ACETAMINOPHEN 1000 MG: 500 TABLET ORAL at 05:08

## 2024-08-21 RX ADMIN — ACETAMINOPHEN 1000 MG: 500 TABLET ORAL at 10:08

## 2024-08-21 RX ADMIN — FUROSEMIDE 20 MG: 10 INJECTION, SOLUTION INTRAVENOUS at 08:08

## 2024-08-21 RX ADMIN — POTASSIUM CHLORIDE 40 MEQ: 1500 TABLET, EXTENDED RELEASE ORAL at 05:08

## 2024-08-21 RX ADMIN — ASPIRIN 81 MG: 81 TABLET, COATED ORAL at 08:08

## 2024-08-21 RX ADMIN — SENNOSIDES AND DOCUSATE SODIUM 1 TABLET: 50; 8.6 TABLET ORAL at 08:08

## 2024-08-21 RX ADMIN — MIRTAZAPINE 15 MG: 15 TABLET, ORALLY DISINTEGRATING ORAL at 08:08

## 2024-08-21 RX ADMIN — TAMSULOSIN HYDROCHLORIDE 0.4 MG: 0.4 CAPSULE ORAL at 08:08

## 2024-08-21 RX ADMIN — FUROSEMIDE 20 MG: 10 INJECTION, SOLUTION INTRAVENOUS at 05:08

## 2024-08-21 NOTE — ANESTHESIA POSTPROCEDURE EVALUATION
Anesthesia Post Evaluation    Patient: Lorenzo Nino    Procedure(s) Performed: Procedure(s) (LRB):  Cardioversion or Defibrillation (N/A)  Transesophageal echo (NEW) intra-procedure log documentation (N/A)    Final Anesthesia Type: general      Patient location during evaluation: ICU  Patient participation: Yes- Able to Participate  Level of consciousness: awake and alert and oriented  Post-procedure vital signs: reviewed and stable  Pain management: adequate  Airway patency: patent    PONV status at discharge: No PONV  Anesthetic complications: no      Cardiovascular status: blood pressure returned to baseline, hemodynamically stable and stable  Respiratory status: unassisted, room air and spontaneous ventilation  Hydration status: euvolemic  Follow-up not needed.              Vitals Value Taken Time   /57 08/21/24 1302   Temp 37.2 °C (98.9 °F) 08/21/24 1104   Pulse 50 08/21/24 1353   Resp 30 08/21/24 1353   SpO2 95 % 08/21/24 1353   Vitals shown include unfiled device data.      No case tracking events are documented in the log.      Pain/Gladis Score: Pain Rating Prior to Med Admin: 0 (8/21/2024  1:14 PM)  Pain Rating Post Med Admin: 1 (8/20/2024 10:12 PM)

## 2024-08-21 NOTE — PLAN OF CARE
Pt AAO x 4. VSS throughout shift. V Paced @ 50. Room air. No gtts. Lasix 20 mg IVP x 2. UOP adequate. Permanent pacemaker placement rescheduled for tomorrow, NPO at midnight. Pt and family updated on plan of care, all questions and concerns addressed.

## 2024-08-21 NOTE — SUBJECTIVE & OBJECTIVE
Interval History:   - NAEON, feeling well this AM, asymptomatic  - TVP backup rate of 50bpm, patient fully paced, threshold 4.5 mA this AM (3.5 yesterday)    Objective:     Vital Signs (Most Recent):  Temp: 98.2 °F (36.8 °C) (08/21/24 0701)  Pulse: (!) 50 (08/21/24 0830)  Resp: (!) 45 (08/21/24 0830)  BP: (!) 118/57 (08/21/24 0800)  SpO2: 96 % (08/21/24 0830) Vital Signs (24h Range):  Temp:  [98.2 °F (36.8 °C)-99 °F (37.2 °C)] 98.2 °F (36.8 °C)  Pulse:  [49-65] 50  Resp:  [15-48] 45  SpO2:  [92 %-98 %] 96 %  BP: (101-140)/(52-64) 118/57     Weight: 78 kg (171 lb 15.3 oz)  Body mass index is 23.32 kg/m².     SpO2: 96 %        Physical Exam  Constitutional:       Appearance: Normal appearance.   HENT:      Mouth/Throat:      Mouth: Mucous membranes are moist.   Eyes:      Extraocular Movements: Extraocular movements intact.      Conjunctiva/sclera: Conjunctivae normal.   Cardiovascular:      Rate and Rhythm: Normal rate and regular rhythm.   Pulmonary:      Effort: Pulmonary effort is normal.   Abdominal:      Palpations: Abdomen is soft.   Skin:     General: Skin is warm and dry.   Neurological:      General: No focal deficit present.      Mental Status: He is alert and oriented to person, place, and time.            Significant Labs: EP:   Recent Labs   Lab 08/20/24  0256 08/20/24  1001 08/20/24  2107 08/21/24  0253     --   --  137   K 3.8  --   --  3.5     --   --  99   CO2 26  --   --  29     --   --  102   BUN 15  --   --  15   CREATININE 1.2  --   --  1.2   CALCIUM 9.0  --   --  9.1   PROT 6.4  --   --  6.8   ALBUMIN 2.7*  --   --  2.9*   BILITOT 0.5  --   --  0.5   ALKPHOS 87  --   --  93   AST 32  --   --  33   ALT 17  --   --  19   ANIONGAP 10  --   --  9   WBC 9.10  --   --  9.83   HGB 7.9*  --   --  8.2*   HCT 24.9*  --   --  27.5*     --   --  456*   INR 4.0* 3.7* 3.9* 3.5*       Significant Imaging: No new

## 2024-08-21 NOTE — ANESTHESIA PREPROCEDURE EVALUATION
08/21/2024  Pre-operative evaluation for Procedure(s) (LRB):  Cardioversion or Defibrillation (N/A)  Transesophageal echo (NEW) intra-procedure log documentation (N/A)    Lorenzo Nino is a 48 y.o. male     Patient Active Problem List   Diagnosis    Thrombocytopenia    Elevated transaminase level    Endocarditis    Elevated troponin    A-fib with RVR    Heart failure with mid-range ejection fraction (HFmEF)    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    Severe mitral regurgitation    Rupture of chordae tendineae    Surgical wound present    Stress hyperglycemia    S/P MVR (mitral valve repair)    Subretinal hemorrhage, right    HFmrEF    Hypokalemia    Microcytic anemia    Retinal macular atrophy    Colon polyp    Typical atrial flutter    Chronic diastolic heart failure    Pulmonary hypertension    Stage 3 chronic kidney disease    Pre-op testing    Complete heart block       Review of patient's allergies indicates:  Not on File    Current Facility-Administered Medications on File Prior to Encounter   Medication Dose Route Frequency Provider Last Rate Last Admin    0.9%  NaCl infusion   Intravenous Continuous Adriana Black NP 70 mL/hr at 02/27/24 0755 New Bag at 02/27/24 0755    mupirocin 2 % ointment   Nasal On Call Procedure Adriana Black NP   Given at 02/27/24 0756     Current Outpatient Medications on File Prior to Encounter   Medication Sig Dispense Refill    doxycycline (VIBRAMYCIN) 100 MG Cap Take 1 capsule by mouth twice daily 60 capsule 2    metoprolol succinate (TOPROL-XL) 25 MG 24 hr tablet Take 1 tablet (25 mg total) by mouth once daily. 30 tablet 11    mirtazapine (REMERON SOL-TAB) 15 MG disintegrating tablet Dissolve 1 tablet (15 mg total) by mouth nightly. 30 tablet 11    potassium chloride SA (K-DUR,KLOR-CON) 20 MEQ tablet Take 2 tablets  (40 mEq total) by mouth once daily. 90 tablet 3    torsemide (DEMADEX) 10 MG Tab Take 1 tablet (10 mg total) by mouth once daily. 30 tablet 11    amiodarone (PACERONE) 200 MG Tab Take 1 tablet (200 mg total) by mouth once daily. 90 tablet 3    apixaban (ELIQUIS) 5 mg Tab Take 1 tablet (5 mg total) by mouth 2 (two) times daily. (Patient not taking: Reported on 8/8/2024) 60 tablet 1    dapagliflozin propanediol (FARXIGA) 10 mg tablet Take 1 tablet (10 mg total) by mouth once daily. 30 tablet 11       Past Surgical History:   Procedure Laterality Date    COLONOSCOPY N/A 1/5/2024    Procedure: COLONOSCOPY;  Surgeon: Fadia Ellsworth MD;  Location: [facility] ENDO (2ND FLR);  Service: Endoscopy;  Laterality: N/A;    COLONOSCOPY, WITH DIRECTED SUBMUCOSAL INJECTION  2/27/2024    Procedure: COLONOSCOPY, WITH DIRECTED SUBMUCOSAL INJECTION;  Surgeon: Mayur Dunn MD;  Location: [facility] OR 2ND FLR;  Service: Colon and Rectal;;    COLONOSCOPY, WITH POLYPECTOMY USING SNARE N/A 2/27/2024    Procedure: COLONOSCOPY, WITH POLYPECTOMY USING SNARE;  Surgeon: Mayur Dunn MD;  Location: [facility] OR 2ND FLR;  Service: Colon and Rectal;  Laterality: N/A;    ECHOCARDIOGRAM,TRANSESOPHAGEAL N/A 12/26/2023    Procedure: Transesophageal echo (NEW) intra-procedure log documentation;  Surgeon: David, [facility];  Location: [facility] EP LAB;  Service: Cardiology;  Laterality: N/A;    ECHOCARDIOGRAM,TRANSESOPHAGEAL N/A 8/19/2024    Procedure: Transesophageal echo (NEW) intra-procedure log documentation;  Surgeon: Emerson Mercado MD;  Location: [facility] EP LAB;  Service: Cardiology;  Laterality: N/A;    ESOPHAGOGASTRODUODENOSCOPY N/A 1/5/2024    Procedure: EGD (ESOPHAGOGASTRODUODENOSCOPY);  Surgeon: Fadia Ellsworth MD;  Location: [facility] ENDO (2ND FLR);  Service: Endoscopy;  Laterality: N/A;    EXCLUSION, LEFT ATRIAL APPENDAGE, OPEN, AS PART OF OPEN CHEST SURGERY Left 11/3/2023    Procedure: EXCLUSION, LEFT ATRIAL APPENDAGE, OPEN, AS PART  OF OPEN CHEST SURGERY;  Surgeon: Manuel Beal MD;  Location: [facility] OR [facility] FLR;  Service: Cardiothoracic;  Laterality: Left;    INSERTION OF INTRA-AORTIC BALLOON ASSIST DEVICE Right 11/2/2023    Procedure: INSERTION, INTRA-AORTIC BALLOON PUMP;  Surgeon: Jose Vidal MD;  Location: [facility] CATH LAB;  Service: Cardiology;  Laterality: Right;    MITRAL VALVE REPLACEMENT N/A 8/12/2024    Procedure: REPLACEMENT, MITRAL VALVE;  Surgeon: Manuel Beal MD;  [facility];  Service: Cardiothoracic;  Laterality: N/A;    REPAIR, MITRAL VALVE, OPEN N/A 11/3/2023    Procedure: REPAIR, MITRAL VALVE, OPEN;  Surgeon: Manuel Beal MD;  [facility];  Service: Cardiothoracic;  Laterality: N/A;    STERNOTOMY N/A 8/12/2024    Procedure: REDO STERNOTOMY;  Surgeon: Manuel Beal MD;  [facility];  Service: Cardiothoracic;  Laterality: N/A;    TREATMENT OF CARDIAC ARRHYTHMIA N/A 8/19/2024    Procedure: Cardioversion or Defibrillation;  Surgeon: George Peter MD;  Location: [facility] EP LAB;  Service: Cardiology;  Laterality: N/A;  AF, DCCV/NEW, ANES, [place], [place] [id number]       Social History     Socioeconomic History    Marital status: Single   Occupational History    Occupation: associated terminal  [employer]    Tobacco Use    Smoking status: Unknown     Passive exposure: Never   Substance and Sexual Activity    Alcohol use: Yes     Alcohol/week: 1.0 standard drink of alcohol     Types: 1 Cans of beer per week    Drug use: Never    Sexual activity: Not Currently     Partners: Female     Birth control/protection: None     Social Determinants of Health     Financial Resource Strain: Low Risk  (8/13/2024)    Overall Financial Resource Strain (CARDIA)     Difficulty of Paying Living Expenses: Not hard at all   Food Insecurity: No Food Insecurity (8/13/2024)    Hunger Vital Sign     Worried About Running Out of Food in the Last Year: Never true     Ran Out of Food in the Last  Year: Never true   Transportation Needs: No Transportation Needs (2024)    TRANSPORTATION NEEDS     Transportation : No   Physical Activity: Inactive (2024)    Exercise Vital Sign     Days of Exercise per Week: 0 days     Minutes of Exercise per Session: 0 min   Stress: Patient Declined (2024)    [employer] [place] [employer] - Occupational Stress Questionnaire     Feeling of Stress : Patient declined   Housing Stability: Low Risk  (2024)    Housing Stability Vital Sign     Unable to Pay for Housing in the Last Year: No     Homeless in the Last Year: No         CBC:   Recent Labs     24  0256 24  0253   WBC 9.10 9.83   RBC 3.39* 3.63*   HGB 7.9* 8.2*   HCT 24.9* 27.5*    456*   MCV 74* 76*   MCH 23.3* 22.6*   MCHC 31.7* 29.8*       CMP:   Recent Labs     24  0256 24  0253    137   K 3.8 3.5    99   CO2 26 29   BUN 15 15   CREATININE 1.2 1.2    102   MG 2.3 2.1   PHOS 3.8 3.5   CALCIUM 9.0 9.1   ALBUMIN 2.7* 2.9*   PROT 6.4 6.8   ALKPHOS 87 93   ALT 17 19   AST 32 33   BILITOT 0.5 0.5       INR  Recent Labs     24  0352 24  1201 24  0256 24  1001 24  2107 24  0253   INR 4.5*   < > 4.0* 3.7* 3.9* 3.5*   APTT 65.4*  --  75.2*  --   --  64.0*    < > = values in this interval not displayed.           Diagnostic Studies:      EKD Echo:  No results found for this or any previous visit.        Pre-op Assessment    I have reviewed the Patient Summary Reports.     I have reviewed the Nursing Notes. I have reviewed the NPO Status.   I have reviewed the Medications.     Review of Systems  Anesthesia Hx:  No problems with previous Anesthesia   History of prior surgery of interest to airway management or planning:          Denies Family Hx of Anesthesia complications.    Denies Personal Hx of Anesthesia complications.                    Cardiovascular:     Hypertension    Dysrhythmias                                     Pulmonary:    Denies COPD.  Denies Asthma.     Denies Sleep Apnea.                Renal/:  Chronic Renal Disease                Hepatic/GI:      Denies GERD. Denies Liver Disease.            Neurological:    Denies CVA.    Denies Seizures.                                Endocrine:  Denies Diabetes.               Physical Exam  General: Well nourished, Cooperative, Alert and Oriented    Airway:  Mallampati: III / II  Mouth Opening: Normal  TM Distance: Normal  Tongue: Normal    Chest/Lungs:  Normal Respiratory Rate    Heart:  Rate: Normal        Anesthesia Plan  Type of Anesthesia, risks & benefits discussed:    Anesthesia Type: Gen Natural Airway  Intra-op Monitoring Plan: Standard ASA Monitors  Induction:  IV  Informed Consent: Informed consent signed with the Patient and all parties understand the risks and agree with anesthesia plan.  All questions answered.   ASA Score: 3  Day of Surgery Review of History & Physical: H&P Update referred to the surgeon/provider.    Ready For Surgery From Anesthesia Perspective.     .

## 2024-08-21 NOTE — PLAN OF CARE
SICU PLAN OF CARE    Dx: Endocarditis    Goals of Care: MAP 60-80    Vital Signs (last 12 hours):   Temp:  [98.4 °F (36.9 °C)-98.8 °F (37.1 °C)]   Pulse:  [49-51]   Resp:  [15-48]   BP: (104-128)/(54-64)   SpO2:  [92 %-98 %]      Neuro: AAOx4, Follows commands , and Moves all extremities spontaneously     Cardiac: V Paced at 50 bpm    Respiratory: Room Air    Urine Output: Voids Spontaneously  450 mL/shift    Diet: Cardiac  and 1.5mL Fluid Restriction     Labs/Accuchecks: daily labs    Skin: Patient turns independently, bony prominences protected, and mattress inflated/working correctly.   Dilip Score: 20. If Dilip Score is 16 or less, complete 4EYES note each shift.    Shift Events:  See flowsheet for further assessment/details.  Family updated on current condition/plan of care, questions answered, and emotional support provided.  MD updated on current condition, vitals, labs, and gtts.

## 2024-08-21 NOTE — PROGRESS NOTES
[facility]  Critical Care - Surgery  Progress Note    Patient Name: Lorenzo Nino  MRN: [id number]  Admission Date: 8/12/2024  Hospital Length of Stay: 9 days  Code Status: Full Code  Attending Provider: Manuel Beal MD  Primary Care Provider: No, Primary Doctor   Principal Problem: Endocarditis    Subjective:     Hospital/ICU Course:  No notes on file    Interval History/Significant Events: No acute events overnight. Patient is resting comfortably in bed. Paced at 50 in NSR.     Follow-up For: Procedure(s) (LRB):  Cardioversion or Defibrillation (N/A)  Transesophageal echo (NEW) intra-procedure log documentation (N/A)    Post-Operative Day: 2 Days Post-Op    Objective:     Vital Signs (Most Recent):  Temp: 98.8 °F (37.1 °C) (08/21/24 0300)  Pulse: (!) 50 (08/21/24 0530)  Resp: (!) 38 (08/21/24 0530)  BP: 114/61 (08/21/24 0500)  SpO2: 95 % (08/21/24 0530) Vital Signs (24h Range):  Temp:  [98.2 °F (36.8 °C)-99 °F (37.2 °C)] 98.8 °F (37.1 °C)  Pulse:  [49-54] 50  Resp:  [13-48] 38  SpO2:  [92 %-99 %] 95 %  BP: (101-140)/(52-64) 114/61     Weight: 78 kg (171 lb 15.3 oz)  Body mass index is 23.32 kg/m².      Intake/Output Summary (Last 24 hours) at 8/21/2024 0537  Last data filed at 8/20/2024 2100  Gross per 24 hour   Intake 120 ml   Output 875 ml   Net -755 ml          Physical Exam  Constitutional:       Appearance: Normal appearance. He is not ill-appearing.   HENT:      Head: Normocephalic and atraumatic.      Mouth/Throat:      Mouth: Mucous membranes are moist.      Pharynx: Oropharynx is clear.   Eyes:      Extraocular Movements: Extraocular movements intact.      Conjunctiva/sclera: Conjunctivae normal.   Cardiovascular:      Pulses: Normal pulses.      Comments: Midline sternotomy c/d/i  Paced at 50bpm  V wires in place        Pulmonary:      Effort: Pulmonary effort is normal. No respiratory distress.   Abdominal:      General: There is no distension.      Palpations: Abdomen is  soft.      Tenderness: There is no abdominal tenderness.   Musculoskeletal:      Cervical back: Normal range of motion. No rigidity.      Right lower leg: No edema.      Left lower leg: No edema.   Skin:     General: Skin is warm and dry.   Neurological:      General: No focal deficit present.      Mental Status: He is oriented to person, place, and time. Mental status is at baseline.   Psychiatric:         Mood and Affect: Mood normal.         Behavior: Behavior normal.            Vents:  Vent Mode: Spont (08/13/24 0715)  Ventilator Initiated: Yes (08/12/24 1507)  Set Rate: 16 BPM (08/13/24 0529)  Vt Set: 500 mL (08/13/24 0529)  Pressure Support: 10 cmH20 (08/13/24 0715)  PEEP/CPAP: 5 cmH20 (08/13/24 0715)  Oxygen Concentration (%): 40 (08/13/24 0715)  Peak Airway Pressure: 15 cmH20 (08/13/24 0715)  Plateau Pressure: 0 cmH20 (08/13/24 0715)  Total Ve: 9.35 L/m (08/13/24 0715)  Negative Inspiratory Force (cm H2O): 0 (08/13/24 0715)  F/VT Ratio<105 (RSBI): (!) 49.18 (08/13/24 0715)    Lines/Drains/Airways       Line  Duration                  Pacer Wires 08/12/24 0931 8 days              Peripheral Intravenous Line  Duration                  Peripheral IV - Single Lumen 08/19/24 1202 22 G Anterior;Right Forearm 1 day         Peripheral IV - Single Lumen 08/21/24 0250 18 G Right Antecubital <1 day                    Significant Labs:    CBC/Anemia Profile:  Recent Labs   Lab 08/20/24  0256 08/21/24  0253   WBC 9.10 9.83   HGB 7.9* 8.2*   HCT 24.9* 27.5*    456*   MCV 74* 76*   RDW 20.0* 20.3*        Chemistries:  Recent Labs   Lab 08/20/24  0256 08/21/24  0253    137   K 3.8 3.5    99   CO2 26 29   BUN 15 15   CREATININE 1.2 1.2   CALCIUM 9.0 9.1   ALBUMIN 2.7* 2.9*   PROT 6.4 6.8   BILITOT 0.5 0.5   ALKPHOS 87 93   ALT 17 19   AST 32 33   MG 2.3 2.1   PHOS 3.8 3.5       All pertinent labs within the past 24 hours have been reviewed.    Significant Imaging:  I have reviewed all pertinent imaging  results/findings within the past 24 hours.  Assessment/Plan:     Cardiac/Vascular  Severe mitral regurgitation  Lorenzo Nino is a 48 y.o. 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 He presents to the SICU s/p resternotomy and mechanical MVR with Dr. Beal on 8/12.      Neuro/Psych:     - Sedation: none    - Pain:     - Scheduled Tylenol 1000mg Q8H   - PRN Oxycodone 5mg    - Psych: home med mirtazipine 15 qd             Cardiac:     - S/p redo MVR with Dr. Beal    - BP Goal: MAP 60-80    - Inotropes/Pressors: none    - Anti-HTNs: none    - Rhythm: paced at 50 (native rhythm 3:1 block 40 bpm)   -- EP consulted, recommend slow wean of pacing; will reach out again for continued bradycardia/arrhythmia   -- home amiodarone 200mg qd held in the setting of native bradycardia    - Beta Blocker: held in the setting of native bradycardia   -- Home medication toprol XL 25 qd    - Statin: n/a    - 08/19: NEW with DCCV by EP. Remained bradycardic following procedure   - PPM placement by EP planned 08/21      Pulmonary:     - Goal SpO2 >92%; satting well on RA    - Chest Tubes dc'ed     Renal:    - Baseline Cr 1.6-2    - Trend BUN/Cr         - Lasix 20 BID with appropriate diuresis    Recent Labs   Lab 08/19/24  0352 08/20/24  0256 08/21/24  0253   BUN 15 15 15   CREATININE 1.1 1.2 1.2           FEN / GI:     - Daily CMP, PRN K/Mag/Phos per protocol    - Replace electrolytes as needed    - Nutrition: cardiac regular diet, NPO pm of 8/20-21 for PPM placement 8/21    - Bowel Regimen: Miralax, docusate      ID:     - Aebrile    - Abx:   - ID consulted, recommended dc Daptomycin in setting of neg cultures, signed off 8/16.  - Completed perioperative cefazolin 2g Q8H x 5 doses   - F/u OR cultures, NGTD    Recent Labs   Lab 08/19/24  0352 08/20/24  0256 08/21/24  0253   WBC 8.46 9.10 9.83           Heme/Onc:     - Hgb  11.4 pre-operatively  Products:   - 2 FFP intra-op     - S/p 1u pRBC postop 8/13    - Heparin gtt initiated for mechanical valve thromboprophylaxis bridge - goal PTT 60-80   - Discontinue 8/15 given therapeutic on warfarin    - Warfarin 5mg initiated 8/14 for mechanical valve thromboprophylaxis - goal INR 2.5-3.5    - Warfarin 5 dc'd 8/14, switched to Warfarin 1  - supratherapeutic 8/17, 8/18, 8/19, 8/20    - Daily CBC, PTT, PT/INR    - ASA 325mg daily - dc'd 8/13    - ASA 81mg daily     Recent Labs   Lab 08/19/24  0352 08/19/24  1201 08/20/24  0256 08/20/24  1001 08/20/24  2107 08/21/24  0253   HGB 8.1*  --  7.9*  --   --  8.2*     --  344  --   --  456*   APTT 65.4*  --  75.2*  --   --  64.0*   INR 4.5*   < > 4.0* 3.7* 3.9* 3.5*    < > = values in this interval not displayed.           Endocrine:     - CTS Goal -140    - HgbA1c: 5.4    - Novolog (Insulin Aspart) prn for BG excursions MDC SSI (150/25)    - BG checks AC/HS    - Endocrinology consulted for insulin management      PPx:   Feeding: NPO for PPM  Analgesia/Sedation: sched tylenol, oxy 5  Thromboembolic Prevention: SCDs, warfarin (titrate dose for INR 2.5-3.5)  HOB >30: Yes  Stress Ulcer: Yes - famotidine 20mg PO BID  Glucose Control: Yes, insulin management per Endocrinology     Lines/Drains/Airway:   Pacing Wires: Temporary V pacing wires      Dispo/Code Status/Palliative:     - Continue SICU Care    - Full Code         Amanda Benitez MD  Critical Care - Surgery  [facility]

## 2024-08-21 NOTE — ASSESSMENT & PLAN NOTE
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 Patient also has a h/o Afib/flutter in the s/o valvular disease, first noted 11-12/2023. Patient noted to be in Aflutter, likely 4:1. S/p DCCV 8/19 with return to intrinsic rhythm. Patient's rhythm notably complete heart block that has sustained since valve replacement.     Epicardial ventricular pacing wires in place. Threshold this AM 4.5 (3.5 yesterday). INR within goal, ideally 3.5 or lower, on Warfarin.     Recommendations:  - Due to scheduling and urgent add on cases today, will have to move DC PPM placement to tomorrow, 8/22  - Continue to hold heparin products  - Ok to continue Warfarin, goal INR for procedure 3.5 or lower  - NPO MN  - Tele  - K>4, Mg>2

## 2024-08-21 NOTE — ASSESSMENT & PLAN NOTE
Lorenzo Nino is a 48 y.o. 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 He presents to the SICU s/p resternotomy and mechanical MVR with Dr. Beal on 8/12.      Neuro/Psych:     - Sedation: none    - Pain:     - Scheduled Tylenol 1000mg Q8H   - PRN Oxycodone 5mg    - Psych: home med mirtazipine 15 qd             Cardiac:     - S/p redo MVR with Dr. Beal    - BP Goal: MAP 60-80    - Inotropes/Pressors: none    - Anti-HTNs: none    - Rhythm: paced at 50 (native rhythm 3:1 block 40 bpm)   -- EP consulted, recommend slow wean of pacing; will reach out again for continued bradycardia/arrhythmia   -- home amiodarone 200mg qd held in the setting of native bradycardia    - Beta Blocker: held in the setting of native bradycardia   -- Home medication toprol XL 25 qd    - Statin: n/a    - 08/19: NEW with DCCV by EP. Remained bradycardic following procedure   - PPM placement by EP planned 08/21      Pulmonary:     - Goal SpO2 >92%; satting well on RA    - Chest Tubes dc'ed     Renal:    - Baseline Cr 1.6-2    - Trend BUN/Cr         - Lasix 20 BID with appropriate diuresis    Recent Labs   Lab 08/19/24  0352 08/20/24  0256 08/21/24  0253   BUN 15 15 15   CREATININE 1.1 1.2 1.2           FEN / GI:     - Daily CMP, PRN K/Mag/Phos per protocol    - Replace electrolytes as needed    - Nutrition: cardiac regular diet, NPO pm of 8/20-21 for PPM placement 8/21    - Bowel Regimen: Miralax, docusate      ID:     - Aebrile    - Abx:   - ID consulted, recommended dc Daptomycin in setting of neg cultures, signed off 8/16.  - Completed perioperative cefazolin 2g Q8H x 5 doses   - F/u OR cultures, NGTD    Recent Labs   Lab 08/19/24  0352 08/20/24  0256 08/21/24  0253   WBC 8.46 9.10 9.83           Heme/Onc:     - Hgb 11.4 pre-operatively  Products:   - 2 FFP intra-op     - S/p 1u pRBC postop 8/13    - Heparin gtt initiated for  mechanical valve thromboprophylaxis bridge - goal PTT 60-80   - Discontinue 8/15 given therapeutic on warfarin    - Warfarin 5mg initiated 8/14 for mechanical valve thromboprophylaxis - goal INR 2.5-3.5    - Warfarin 5 dc'd 8/14, switched to Warfarin 1  - supratherapeutic 8/17, 8/18, 8/19, 8/20    - Daily CBC, PTT, PT/INR    - ASA 325mg daily - dc'd 8/13    - ASA 81mg daily     Recent Labs   Lab 08/19/24  0352 08/19/24  1201 08/20/24  0256 08/20/24  1001 08/20/24  2107 08/21/24  0253   HGB 8.1*  --  7.9*  --   --  8.2*     --  344  --   --  456*   APTT 65.4*  --  75.2*  --   --  64.0*   INR 4.5*   < > 4.0* 3.7* 3.9* 3.5*    < > = values in this interval not displayed.           Endocrine:     - CTS Goal -140    - HgbA1c: 5.4    - Novolog (Insulin Aspart) prn for BG excursions MDC SSI (150/25)    - BG checks AC/HS    - Endocrinology consulted for insulin management      PPx:   Feeding: NPO for PPM  Analgesia/Sedation: sched tylenol, oxy 5  Thromboembolic Prevention: SCDs, warfarin (titrate dose for INR 2.5-3.5)  HOB >30: Yes  Stress Ulcer: Yes - famotidine 20mg PO BID  Glucose Control: Yes, insulin management per Endocrinology     Lines/Drains/Airway:   Pacing Wires: Temporary V pacing wires      Dispo/Code Status/Palliative:     - Continue SICU Care    - Full Code

## 2024-08-21 NOTE — PROGRESS NOTES
[facility]  Cardiac Electrophysiology  Progress Note    Admission Date: 8/12/2024  Code Status: Full Code   Attending Physician: Manuel Beal MD   Expected Discharge Date: 8/23/2024  Principal Problem:Endocarditis    Subjective:     Interval History:   - NAEON, feeling well this AM, asymptomatic  - TVP backup rate of 50bpm, patient fully paced, threshold 4.5 mA this AM (3.5 yesterday)    Objective:     Vital Signs (Most Recent):  Temp: 98.2 °F (36.8 °C) (08/21/24 0701)  Pulse: (!) 50 (08/21/24 0830)  Resp: (!) 45 (08/21/24 0830)  BP: (!) 118/57 (08/21/24 0800)  SpO2: 96 % (08/21/24 0830) Vital Signs (24h Range):  Temp:  [98.2 °F (36.8 °C)-99 °F (37.2 °C)] 98.2 °F (36.8 °C)  Pulse:  [49-65] 50  Resp:  [15-48] 45  SpO2:  [92 %-98 %] 96 %  BP: (101-140)/(52-64) 118/57     Weight: 78 kg (171 lb 15.3 oz)  Body mass index is 23.32 kg/m².     SpO2: 96 %        Physical Exam  Constitutional:       Appearance: Normal appearance.   HENT:      Mouth/Throat:      Mouth: Mucous membranes are moist.   Eyes:      Extraocular Movements: Extraocular movements intact.      Conjunctiva/sclera: Conjunctivae normal.   Cardiovascular:      Rate and Rhythm: Normal rate and regular rhythm.   Pulmonary:      Effort: Pulmonary effort is normal.   Abdominal:      Palpations: Abdomen is soft.   Skin:     General: Skin is warm and dry.   Neurological:      General: No focal deficit present.      Mental Status: He is alert and oriented to person, place, and time.            Significant Labs: EP:   Recent Labs   Lab 08/20/24  0256 08/20/24  1001 08/20/24  2107 08/21/24  0253     --   --  137   K 3.8  --   --  3.5     --   --  99   CO2 26  --   --  29     --   --  102   BUN 15  --   --  15   CREATININE 1.2  --   --  1.2   CALCIUM 9.0  --   --  9.1   PROT 6.4  --   --  6.8   ALBUMIN 2.7*  --   --  2.9*   BILITOT 0.5  --   --  0.5   ALKPHOS 87  --   --  93   AST 32  --   --  33   ALT 17  --   --  19    ANIONGAP 10  --   --  9   WBC 9.10  --   --  9.83   HGB 7.9*  --   --  8.2*   HCT 24.9*  --   --  27.5*     --   --  456*   INR 4.0* 3.7* 3.9* 3.5*       Significant Imaging: No new  Assessment and Plan:     Complete heart block  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 Patient also has a h/o Afib/flutter in the s/o valvular disease, first noted 11-12/2023. Patient noted to be in Aflutter, likely 4:1. S/p DCCV 8/19 with return to intrinsic rhythm. Patient's rhythm notably complete heart block that has sustained since valve replacement.     Epicardial ventricular pacing wires in place. Threshold this AM 4.5 (3.5 yesterday). INR within goal, ideally 3.5 or lower, on Warfarin.     Recommendations:  - Due to scheduling and urgent add on cases today, will have to move DC PPM placement to tomorrow, 8/22  - Continue to hold heparin products  - Ok to continue Warfarin, goal INR for procedure 3.5 or lower  - NPO MN  - Tele  - K>4, Mg>2      Bianca Henry MD  Cardiac Electrophysiology  [facility]

## 2024-08-21 NOTE — SUBJECTIVE & OBJECTIVE
Interval History/Significant Events: No acute events overnight. Patient is resting comfortably in bed. Paced at 50 in NSR.     Follow-up For: Procedure(s) (LRB):  Cardioversion or Defibrillation (N/A)  Transesophageal echo (NEW) intra-procedure log documentation (N/A)    Post-Operative Day: 2 Days Post-Op    Objective:     Vital Signs (Most Recent):  Temp: 98.8 °F (37.1 °C) (08/21/24 0300)  Pulse: (!) 50 (08/21/24 0530)  Resp: (!) 38 (08/21/24 0530)  BP: 114/61 (08/21/24 0500)  SpO2: 95 % (08/21/24 0530) Vital Signs (24h Range):  Temp:  [98.2 °F (36.8 °C)-99 °F (37.2 °C)] 98.8 °F (37.1 °C)  Pulse:  [49-54] 50  Resp:  [13-48] 38  SpO2:  [92 %-99 %] 95 %  BP: (101-140)/(52-64) 114/61     Weight: 78 kg (171 lb 15.3 oz)  Body mass index is 23.32 kg/m².      Intake/Output Summary (Last 24 hours) at 8/21/2024 0537  Last data filed at 8/20/2024 2100  Gross per 24 hour   Intake 120 ml   Output 875 ml   Net -755 ml          Physical Exam  Constitutional:       Appearance: Normal appearance. He is not ill-appearing.   HENT:      Head: Normocephalic and atraumatic.      Mouth/Throat:      Mouth: Mucous membranes are moist.      Pharynx: Oropharynx is clear.   Eyes:      Extraocular Movements: Extraocular movements intact.      Conjunctiva/sclera: Conjunctivae normal.   Cardiovascular:      Pulses: Normal pulses.      Comments: Midline sternotomy c/d/i  Paced at 50bpm  V wires in place        Pulmonary:      Effort: Pulmonary effort is normal. No respiratory distress.   Abdominal:      General: There is no distension.      Palpations: Abdomen is soft.      Tenderness: There is no abdominal tenderness.   Musculoskeletal:      Cervical back: Normal range of motion. No rigidity.      Right lower leg: No edema.      Left lower leg: No edema.   Skin:     General: Skin is warm and dry.   Neurological:      General: No focal deficit present.      Mental Status: He is oriented to person, place, and time. Mental status is at baseline.    Psychiatric:         Mood and Affect: Mood normal.         Behavior: Behavior normal.            Vents:  Vent Mode: Spont (08/13/24 0715)  Ventilator Initiated: Yes (08/12/24 1507)  Set Rate: 16 BPM (08/13/24 0529)  Vt Set: 500 mL (08/13/24 0529)  Pressure Support: 10 cmH20 (08/13/24 0715)  PEEP/CPAP: 5 cmH20 (08/13/24 0715)  Oxygen Concentration (%): 40 (08/13/24 0715)  Peak Airway Pressure: 15 cmH20 (08/13/24 0715)  Plateau Pressure: 0 cmH20 (08/13/24 0715)  Total Ve: 9.35 L/m (08/13/24 0715)  Negative Inspiratory Force (cm H2O): 0 (08/13/24 0715)  F/VT Ratio<105 (RSBI): (!) 49.18 (08/13/24 0715)    Lines/Drains/Airways       Line  Duration                  Pacer Wires 08/12/24 0931 8 days              Peripheral Intravenous Line  Duration                  Peripheral IV - Single Lumen 08/19/24 1202 22 G Anterior;Right Forearm 1 day         Peripheral IV - Single Lumen 08/21/24 0250 18 G Right Antecubital <1 day                    Significant Labs:    CBC/Anemia Profile:  Recent Labs   Lab 08/20/24  0256 08/21/24  0253   WBC 9.10 9.83   HGB 7.9* 8.2*   HCT 24.9* 27.5*    456*   MCV 74* 76*   RDW 20.0* 20.3*        Chemistries:  Recent Labs   Lab 08/20/24  0256 08/21/24  0253    137   K 3.8 3.5    99   CO2 26 29   BUN 15 15   CREATININE 1.2 1.2   CALCIUM 9.0 9.1   ALBUMIN 2.7* 2.9*   PROT 6.4 6.8   BILITOT 0.5 0.5   ALKPHOS 87 93   ALT 17 19   AST 32 33   MG 2.3 2.1   PHOS 3.8 3.5       All pertinent labs within the past 24 hours have been reviewed.    Significant Imaging:  I have reviewed all pertinent imaging results/findings within the past 24 hours.

## 2024-08-22 ENCOUNTER — TELEPHONE (OUTPATIENT)
Dept: CARDIOLOGY | Facility: CLINIC | Age: 49
End: 2024-08-22
Payer: MEDICAID

## 2024-08-22 DIAGNOSIS — Z95.0 CARDIAC PACEMAKER IN SITU: Primary | ICD-10-CM

## 2024-08-22 DIAGNOSIS — I48.0 PAROXYSMAL ATRIAL FIBRILLATION: ICD-10-CM

## 2024-08-22 LAB
ALBUMIN SERPL BCP-MCNC: 2.8 G/DL (ref 3.5–5.2)
ALP SERPL-CCNC: 86 U/L (ref 55–135)
ALT SERPL W/O P-5'-P-CCNC: 22 U/L (ref 10–44)
ANION GAP SERPL CALC-SCNC: 12 MMOL/L (ref 8–16)
APTT PPP: 63.9 SEC (ref 21–32)
AST SERPL-CCNC: 35 U/L (ref 10–40)
BASOPHILS # BLD AUTO: 0.05 K/UL (ref 0–0.2)
BASOPHILS NFR BLD: 0.5 % (ref 0–1.9)
BILIRUB SERPL-MCNC: 0.5 MG/DL (ref 0.1–1)
BUN SERPL-MCNC: 16 MG/DL (ref 6–20)
CALCIUM SERPL-MCNC: 8.9 MG/DL (ref 8.7–10.5)
CHLORIDE SERPL-SCNC: 101 MMOL/L (ref 95–110)
CO2 SERPL-SCNC: 26 MMOL/L (ref 23–29)
CREAT SERPL-MCNC: 1.1 MG/DL (ref 0.5–1.4)
DIFFERENTIAL METHOD BLD: ABNORMAL
EOSINOPHIL # BLD AUTO: 0.3 K/UL (ref 0–0.5)
EOSINOPHIL NFR BLD: 3.1 % (ref 0–8)
ERYTHROCYTE [DISTWIDTH] IN BLOOD BY AUTOMATED COUNT: 19.9 % (ref 11.5–14.5)
EST. GFR  (NO RACE VARIABLE): >60 ML/MIN/1.73 M^2
GLUCOSE SERPL-MCNC: 98 MG/DL (ref 70–110)
HCT VFR BLD AUTO: 26.7 % (ref 40–54)
HGB BLD-MCNC: 8.1 G/DL (ref 14–18)
IMM GRANULOCYTES # BLD AUTO: 0.25 K/UL (ref 0–0.04)
IMM GRANULOCYTES NFR BLD AUTO: 2.5 % (ref 0–0.5)
INR PPP: 2.8 (ref 0.8–1.2)
LYMPHOCYTES # BLD AUTO: 2.1 K/UL (ref 1–4.8)
LYMPHOCYTES NFR BLD: 21.2 % (ref 18–48)
MAGNESIUM SERPL-MCNC: 1.9 MG/DL (ref 1.6–2.6)
MCH RBC QN AUTO: 22.7 PG (ref 27–31)
MCHC RBC AUTO-ENTMCNC: 30.3 G/DL (ref 32–36)
MCV RBC AUTO: 75 FL (ref 82–98)
MONOCYTES # BLD AUTO: 1.2 K/UL (ref 0.3–1)
MONOCYTES NFR BLD: 11.8 % (ref 4–15)
NEUTROPHILS # BLD AUTO: 6 K/UL (ref 1.8–7.7)
NEUTROPHILS NFR BLD: 60.9 % (ref 38–73)
NRBC BLD-RTO: 0 /100 WBC
OHS QRS DURATION: 148 MS
OHS QRS DURATION: 148 MS
OHS QTC CALCULATION: 560 MS
OHS QTC CALCULATION: 567 MS
PHOSPHATE SERPL-MCNC: 3.6 MG/DL (ref 2.7–4.5)
PLATELET # BLD AUTO: 509 K/UL (ref 150–450)
PMV BLD AUTO: 10.3 FL (ref 9.2–12.9)
POTASSIUM SERPL-SCNC: 3.3 MMOL/L (ref 3.5–5.1)
PROT SERPL-MCNC: 6.5 G/DL (ref 6–8.4)
PROTHROMBIN TIME: 28.7 SEC (ref 9–12.5)
RBC # BLD AUTO: 3.57 M/UL (ref 4.6–6.2)
SODIUM SERPL-SCNC: 139 MMOL/L (ref 136–145)
WBC # BLD AUTO: 9.89 K/UL (ref 3.9–12.7)

## 2024-08-22 PROCEDURE — 33208 INSRT HEART PM ATRIAL & VENT: CPT | Mod: 22,KX,, | Performed by: STUDENT IN AN ORGANIZED HEALTH CARE EDUCATION/TRAINING PROGRAM

## 2024-08-22 PROCEDURE — C1894 INTRO/SHEATH, NON-LASER: HCPCS | Performed by: STUDENT IN AN ORGANIZED HEALTH CARE EDUCATION/TRAINING PROGRAM

## 2024-08-22 PROCEDURE — 63600175 PHARM REV CODE 636 W HCPCS: Performed by: PHYSICIAN ASSISTANT

## 2024-08-22 PROCEDURE — 63600175 PHARM REV CODE 636 W HCPCS: Performed by: NURSE ANESTHETIST, CERTIFIED REGISTERED

## 2024-08-22 PROCEDURE — 83735 ASSAY OF MAGNESIUM: CPT | Performed by: THORACIC SURGERY (CARDIOTHORACIC VASCULAR SURGERY)

## 2024-08-22 PROCEDURE — 25000003 PHARM REV CODE 250: Performed by: NURSE PRACTITIONER

## 2024-08-22 PROCEDURE — 84100 ASSAY OF PHOSPHORUS: CPT | Performed by: THORACIC SURGERY (CARDIOTHORACIC VASCULAR SURGERY)

## 2024-08-22 PROCEDURE — 02HK3JZ INSERTION OF PACEMAKER LEAD INTO RIGHT VENTRICLE, PERCUTANEOUS APPROACH: ICD-10-PCS | Performed by: STUDENT IN AN ORGANIZED HEALTH CARE EDUCATION/TRAINING PROGRAM

## 2024-08-22 PROCEDURE — 25000003 PHARM REV CODE 250: Performed by: NURSE ANESTHETIST, CERTIFIED REGISTERED

## 2024-08-22 PROCEDURE — 25000003 PHARM REV CODE 250: Performed by: STUDENT IN AN ORGANIZED HEALTH CARE EDUCATION/TRAINING PROGRAM

## 2024-08-22 PROCEDURE — 85730 THROMBOPLASTIN TIME PARTIAL: CPT | Performed by: THORACIC SURGERY (CARDIOTHORACIC VASCULAR SURGERY)

## 2024-08-22 PROCEDURE — 25000003 PHARM REV CODE 250: Performed by: INTERNAL MEDICINE

## 2024-08-22 PROCEDURE — 85610 PROTHROMBIN TIME: CPT | Performed by: THORACIC SURGERY (CARDIOTHORACIC VASCULAR SURGERY)

## 2024-08-22 PROCEDURE — 85025 COMPLETE CBC W/AUTO DIFF WBC: CPT | Performed by: THORACIC SURGERY (CARDIOTHORACIC VASCULAR SURGERY)

## 2024-08-22 PROCEDURE — 80053 COMPREHEN METABOLIC PANEL: CPT | Performed by: THORACIC SURGERY (CARDIOTHORACIC VASCULAR SURGERY)

## 2024-08-22 PROCEDURE — 27201423 OPTIME MED/SURG SUP & DEVICES STERILE SUPPLY: Performed by: STUDENT IN AN ORGANIZED HEALTH CARE EDUCATION/TRAINING PROGRAM

## 2024-08-22 PROCEDURE — 25000003 PHARM REV CODE 250

## 2024-08-22 PROCEDURE — 02H63JZ INSERTION OF PACEMAKER LEAD INTO RIGHT ATRIUM, PERCUTANEOUS APPROACH: ICD-10-PCS | Performed by: STUDENT IN AN ORGANIZED HEALTH CARE EDUCATION/TRAINING PROGRAM

## 2024-08-22 PROCEDURE — C1898 LEAD, PMKR, OTHER THAN TRANS: HCPCS | Performed by: STUDENT IN AN ORGANIZED HEALTH CARE EDUCATION/TRAINING PROGRAM

## 2024-08-22 PROCEDURE — C1785 PMKR, DUAL, RATE-RESP: HCPCS | Performed by: STUDENT IN AN ORGANIZED HEALTH CARE EDUCATION/TRAINING PROGRAM

## 2024-08-22 PROCEDURE — C1887 CATHETER, GUIDING: HCPCS | Performed by: STUDENT IN AN ORGANIZED HEALTH CARE EDUCATION/TRAINING PROGRAM

## 2024-08-22 PROCEDURE — 33208 INSRT HEART PM ATRIAL & VENT: CPT | Mod: KX | Performed by: STUDENT IN AN ORGANIZED HEALTH CARE EDUCATION/TRAINING PROGRAM

## 2024-08-22 PROCEDURE — 37000008 HC ANESTHESIA 1ST 15 MINUTES: Performed by: STUDENT IN AN ORGANIZED HEALTH CARE EDUCATION/TRAINING PROGRAM

## 2024-08-22 PROCEDURE — 99233 SBSQ HOSP IP/OBS HIGH 50: CPT | Mod: ,,, | Performed by: STUDENT IN AN ORGANIZED HEALTH CARE EDUCATION/TRAINING PROGRAM

## 2024-08-22 PROCEDURE — 63600175 PHARM REV CODE 636 W HCPCS: Performed by: STUDENT IN AN ORGANIZED HEALTH CARE EDUCATION/TRAINING PROGRAM

## 2024-08-22 PROCEDURE — 27800903 OPTIME MED/SURG SUP & DEVICES OTHER IMPLANTS: Performed by: STUDENT IN AN ORGANIZED HEALTH CARE EDUCATION/TRAINING PROGRAM

## 2024-08-22 PROCEDURE — 63600175 PHARM REV CODE 636 W HCPCS

## 2024-08-22 PROCEDURE — 21400001 HC TELEMETRY ROOM

## 2024-08-22 PROCEDURE — 37000009 HC ANESTHESIA EA ADD 15 MINS: Performed by: STUDENT IN AN ORGANIZED HEALTH CARE EDUCATION/TRAINING PROGRAM

## 2024-08-22 PROCEDURE — 25500020 PHARM REV CODE 255: Performed by: NURSE ANESTHETIST, CERTIFIED REGISTERED

## 2024-08-22 DEVICE — LEAD 5076-52 MRI US RCMCRD
Type: IMPLANTABLE DEVICE | Site: HEART | Status: FUNCTIONAL
Brand: CAPSUREFIX NOVUS MRI™ SURESCAN®

## 2024-08-22 DEVICE — IPG W3DR01 AZURE S DR MRI USA
Type: IMPLANTABLE DEVICE | Site: CHEST  WALL | Status: FUNCTIONAL
Brand: AZURE™ S DR MRI SURESCAN™

## 2024-08-22 DEVICE — LEAD 3830 US MKT/ 69CM MRI LBBAP
Type: IMPLANTABLE DEVICE | Site: HEART | Status: FUNCTIONAL
Brand: SELECTSECURE™ MRI SURESCAN™

## 2024-08-22 DEVICE — ENVELOPE CMRM6122 ABSORB MED MR
Type: IMPLANTABLE DEVICE | Site: CHEST  WALL | Status: FUNCTIONAL
Brand: TYRX™

## 2024-08-22 RX ORDER — SODIUM CHLORIDE 0.9 G/100ML
IRRIGANT IRRIGATION
Status: DISCONTINUED | OUTPATIENT
Start: 2024-08-22 | End: 2024-08-22

## 2024-08-22 RX ORDER — DOXYCYCLINE HYCLATE 100 MG
100 TABLET ORAL EVERY 12 HOURS
Status: DISCONTINUED | OUTPATIENT
Start: 2024-08-22 | End: 2024-08-23 | Stop reason: HOSPADM

## 2024-08-22 RX ORDER — EPHEDRINE SULFATE 50 MG/ML
INJECTION, SOLUTION INTRAVENOUS
Status: DISCONTINUED | OUTPATIENT
Start: 2024-08-22 | End: 2024-08-22

## 2024-08-22 RX ORDER — OXYCODONE HYDROCHLORIDE 5 MG/1
5 TABLET ORAL ONCE
Status: COMPLETED | OUTPATIENT
Start: 2024-08-22 | End: 2024-08-22

## 2024-08-22 RX ORDER — SODIUM,POTASSIUM PHOSPHATES 280-250MG
2 POWDER IN PACKET (EA) ORAL
Status: DISCONTINUED | OUTPATIENT
Start: 2024-08-22 | End: 2024-08-22

## 2024-08-22 RX ORDER — FUROSEMIDE 20 MG/1
20 TABLET ORAL 2 TIMES DAILY
Status: DISCONTINUED | OUTPATIENT
Start: 2024-08-22 | End: 2024-08-23 | Stop reason: HOSPADM

## 2024-08-22 RX ORDER — BUPIVACAINE HYDROCHLORIDE 2.5 MG/ML
INJECTION, SOLUTION EPIDURAL; INFILTRATION; INTRACAUDAL
Status: DISCONTINUED | OUTPATIENT
Start: 2024-08-22 | End: 2024-08-22

## 2024-08-22 RX ORDER — PROPOFOL 10 MG/ML
VIAL (ML) INTRAVENOUS CONTINUOUS PRN
Status: DISCONTINUED | OUTPATIENT
Start: 2024-08-22 | End: 2024-08-22

## 2024-08-22 RX ORDER — LANOLIN ALCOHOL/MO/W.PET/CERES
800 CREAM (GRAM) TOPICAL
Status: DISCONTINUED | OUTPATIENT
Start: 2024-08-22 | End: 2024-08-22

## 2024-08-22 RX ORDER — IODIXANOL 320 MG/ML
INJECTION, SOLUTION INTRAVASCULAR
Status: DISCONTINUED | OUTPATIENT
Start: 2024-08-22 | End: 2024-08-22

## 2024-08-22 RX ORDER — LIDOCAINE HYDROCHLORIDE 20 MG/ML
INJECTION INTRAVENOUS
Status: DISCONTINUED | OUTPATIENT
Start: 2024-08-22 | End: 2024-08-22

## 2024-08-22 RX ORDER — FUROSEMIDE 40 MG/1
40 TABLET ORAL 2 TIMES DAILY
Status: DISCONTINUED | OUTPATIENT
Start: 2024-08-22 | End: 2024-08-22

## 2024-08-22 RX ORDER — VANCOMYCIN HYDROCHLORIDE 1 G/20ML
INJECTION, POWDER, LYOPHILIZED, FOR SOLUTION INTRAVENOUS
Status: DISCONTINUED | OUTPATIENT
Start: 2024-08-22 | End: 2024-08-22

## 2024-08-22 RX ORDER — DEXMEDETOMIDINE HYDROCHLORIDE 100 UG/ML
INJECTION, SOLUTION INTRAVENOUS
Status: DISCONTINUED | OUTPATIENT
Start: 2024-08-22 | End: 2024-08-22

## 2024-08-22 RX ORDER — PROPOFOL 10 MG/ML
VIAL (ML) INTRAVENOUS
Status: DISCONTINUED | OUTPATIENT
Start: 2024-08-22 | End: 2024-08-22

## 2024-08-22 RX ORDER — WARFARIN 1 MG/1
1 TABLET ORAL ONCE
Status: COMPLETED | OUTPATIENT
Start: 2024-08-22 | End: 2024-08-22

## 2024-08-22 RX ORDER — CEFAZOLIN SODIUM 1 G/3ML
INJECTION, POWDER, FOR SOLUTION INTRAMUSCULAR; INTRAVENOUS
Status: DISCONTINUED | OUTPATIENT
Start: 2024-08-22 | End: 2024-08-22

## 2024-08-22 RX ORDER — LIDOCAINE HYDROCHLORIDE 20 MG/ML
INJECTION, SOLUTION INFILTRATION; PERINEURAL
Status: DISCONTINUED | OUTPATIENT
Start: 2024-08-22 | End: 2024-08-22

## 2024-08-22 RX ORDER — FENTANYL CITRATE 50 UG/ML
INJECTION, SOLUTION INTRAMUSCULAR; INTRAVENOUS
Status: DISCONTINUED | OUTPATIENT
Start: 2024-08-22 | End: 2024-08-22

## 2024-08-22 RX ORDER — MIDAZOLAM HYDROCHLORIDE 1 MG/ML
INJECTION INTRAMUSCULAR; INTRAVENOUS
Status: DISCONTINUED | OUTPATIENT
Start: 2024-08-22 | End: 2024-08-22

## 2024-08-22 RX ORDER — ACETAMINOPHEN 500 MG
1000 TABLET ORAL EVERY 8 HOURS PRN
Status: DISCONTINUED | OUTPATIENT
Start: 2024-08-22 | End: 2024-08-23 | Stop reason: HOSPADM

## 2024-08-22 RX ORDER — POTASSIUM CHLORIDE 20 MEQ/1
40 TABLET, EXTENDED RELEASE ORAL ONCE
Status: COMPLETED | OUTPATIENT
Start: 2024-08-22 | End: 2024-08-22

## 2024-08-22 RX ADMIN — ACETAMINOPHEN 1000 MG: 500 TABLET ORAL at 05:08

## 2024-08-22 RX ADMIN — OXYCODONE 5 MG: 5 TABLET ORAL at 03:08

## 2024-08-22 RX ADMIN — SODIUM CHLORIDE: 0.9 INJECTION, SOLUTION INTRAVENOUS at 10:08

## 2024-08-22 RX ADMIN — FENTANYL CITRATE 25 MCG: 0.05 INJECTION, SOLUTION INTRAMUSCULAR; INTRAVENOUS at 11:08

## 2024-08-22 RX ADMIN — EPHEDRINE SULFATE 10 MG: 50 INJECTION INTRAVENOUS at 11:08

## 2024-08-22 RX ADMIN — ACETAMINOPHEN 1000 MG: 500 TABLET ORAL at 03:08

## 2024-08-22 RX ADMIN — ASPIRIN 81 MG: 81 TABLET, COATED ORAL at 08:08

## 2024-08-22 RX ADMIN — FUROSEMIDE 20 MG: 20 TABLET ORAL at 05:08

## 2024-08-22 RX ADMIN — CEFAZOLIN 2 G: 330 INJECTION, POWDER, FOR SOLUTION INTRAMUSCULAR; INTRAVENOUS at 10:08

## 2024-08-22 RX ADMIN — FAMOTIDINE 20 MG: 20 TABLET ORAL at 08:08

## 2024-08-22 RX ADMIN — FUROSEMIDE 20 MG: 10 INJECTION, SOLUTION INTRAVENOUS at 08:08

## 2024-08-22 RX ADMIN — DOXYCYCLINE HYCLATE 100 MG: 100 TABLET, COATED ORAL at 08:08

## 2024-08-22 RX ADMIN — PHENYLEPHRINE HYDROCHLORIDE 0.1 MCG/KG/MIN: 10 INJECTION INTRAVENOUS at 10:08

## 2024-08-22 RX ADMIN — MIDAZOLAM HYDROCHLORIDE 2 MG: 2 INJECTION, SOLUTION INTRAMUSCULAR; INTRAVENOUS at 10:08

## 2024-08-22 RX ADMIN — LIDOCAINE HYDROCHLORIDE 100 MG: 20 INJECTION INTRAVENOUS at 10:08

## 2024-08-22 RX ADMIN — PROPOFOL 50 MG: 10 INJECTION, EMULSION INTRAVENOUS at 10:08

## 2024-08-22 RX ADMIN — DEXMEDETOMIDINE 4 MCG: 200 INJECTION, SOLUTION INTRAVENOUS at 11:08

## 2024-08-22 RX ADMIN — POTASSIUM CHLORIDE 40 MEQ: 1500 TABLET, EXTENDED RELEASE ORAL at 05:08

## 2024-08-22 RX ADMIN — WARFARIN SODIUM 1 MG: 1 TABLET ORAL at 04:08

## 2024-08-22 RX ADMIN — PROPOFOL 150 MCG/KG/MIN: 10 INJECTION, EMULSION INTRAVENOUS at 10:08

## 2024-08-22 RX ADMIN — EPHEDRINE SULFATE 10 MG: 50 INJECTION INTRAVENOUS at 12:08

## 2024-08-22 RX ADMIN — MAGNESIUM SULFATE HEPTAHYDRATE 2 G: 40 INJECTION, SOLUTION INTRAVENOUS at 05:08

## 2024-08-22 RX ADMIN — SODIUM CHLORIDE: 9 INJECTION, SOLUTION INTRAVENOUS at 10:08

## 2024-08-22 RX ADMIN — TAMSULOSIN HYDROCHLORIDE 0.4 MG: 0.4 CAPSULE ORAL at 08:08

## 2024-08-22 RX ADMIN — OXYCODONE 5 MG: 5 TABLET ORAL at 08:08

## 2024-08-22 RX ADMIN — MIRTAZAPINE 15 MG: 15 TABLET, ORALLY DISINTEGRATING ORAL at 08:08

## 2024-08-22 RX ADMIN — IODIXANOL 10 ML: 320 INJECTION, SOLUTION INTRAVASCULAR at 11:08

## 2024-08-22 NOTE — BRIEF OP NOTE
: Joan Ordonez MD  Date of Procedure: 08/22/2024    Post-operative Diagnosis: complete heart block     Procedure Performed: Dual chamber permanent pacemaker implantation with left bundle area pacing lead     Description of Procedure: The patient was brought to the EP lab in the fasting state. Prepped and draped in sterile fashion. Safety timeout was performed. Sedation administered by anesthesia staff. Selective venogram of the left axillary and cephalic veins performed via left ac IV. Lidocaine used for local anesthetic. Fluoroscopic guided axillary access utilized. Guide/J Wire advanced and confirmed in IVC. Incision made. Blunt and electrocautery dissection performed. Pocket made. Second fluoroscopic guided axillary access obtained. Guide/J wire advanced and confirmed in IVC. Peel away sheath advanced over J wire. RV lead advanced into RV. R waves and injury pattern adequate. Lead fixed into place and sutured in place. Second peel away sheath advanced over J wire. RA lead advanced into RA via peel away sheath. After adequate p waves and current of injury detected, the RA lead was fixed into place in the RA appendage. Peel away sheath removed and RA lead sutured into place. Pocket washed using antibiotic solution. Leads connected to generator. Generator placed into pocket, sutured in place, and washed with antibiotic solution. Deep layer closed with interrupted 3-0 suture. Intermediate layer closed with running 3-0 suture. Superficial layer closed with running 4-0 suture. Skin closed with Dermabond. Aquacel Ag dressing to be placed after dermabond has dried.     EBL: <10 mL    Specimens: none  Complications: no immediate    Recommendations:  Ancef 2 g  (given prior to procedure in the EP lab)  Antibiotics: Doxycycline 100 mg PO BID x 5 days   No heparin products for 5 days post-implant, including DOACs (warfarin ok to continue immediately post-procedure)  No lifting elbow above shoulder height  on arm ipsilateral to pocket site for 6 weeks  No lifting over 10 lbs with arm ipsilateral to pocket site for 1 week  Sling to arm - wear for 48 hours post-procedure, then only at night for 6 weeks  Aquacel Ag - to be removed in 1 week at device clinic follow-up  CXR (ordered)  ECG (ordered)  Follow up in device clinic in 1 week for device and wound checks    Brigido Jackman MD  [facility]  Cardiovascular Disease, PGY-VII

## 2024-08-22 NOTE — ASSESSMENT & PLAN NOTE
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 Patient also has a h/o Afib/flutter in the s/o valvular disease, first noted 11-12/2023. Patient noted to be in Aflutter, likely 4:1. S/p DCCV 8/19 with return to intrinsic rhythm. Patient's rhythm notably complete heart block that has sustained since valve replacement.     Epicardial ventricular pacing wires in place. Threshold this AM 4.0 (4.5 yesterday). INR within goal, ideally 3.5 or lower, on Warfarin.     Recommendations:  - DC PPM placement today, 8/22  - Continue to hold heparin products  - Ok to continue Warfarin, goal INR for procedure 3.5 or lower  - NPO  - Tele  - K>4, Mg>2

## 2024-08-22 NOTE — PLAN OF CARE
SICU PLAN OF CARE    Dx: Endocarditis    Goals of Care: MAP 60-80    Shift Events:  NAEON. Pt has been NPO since MN with plans for PPM placement today. CHG bath completed. Pre-op checklist and flowsheet completed.     Vital Signs (last 12 hours):   Temp:  [98.3 °F (36.8 °C)-98.6 °F (37 °C)]   Pulse:  [49-54]   Resp:  [14-48]   BP: ()/(51-63)   SpO2:  [93 %-98 %]      Neuro: AAOx4, Follows commands , and Moves all extremities spontaneously     Cardiac: V-paced at 50 bpm, V wires in place.    Respiratory: Room Air    Urine Output: Voids Spontaneously  625 mL/shift    Diet: NPO  since MN    Skin:  No new skin issues.  Patient turns independently, bony prominences protected, and mattress inflated/working correctly.   Dilip Score: 21. If Dilip Score is 16 or less, complete 4EYES note each shift.    See flowsheet for further assessment/details.  Patient and significant other updated on current condition/plan of care, questions answered, and emotional support provided.  MD updated on current condition, vitals, labs, and gtts.        Nurses Note -- 4 Eyes  8/22/2024   6:28 AM      Skin assessed during: Q Shift  [x] No Altered Skin Integrity Present    [x]Prevention Measures Documented  [] Yes- Altered Skin Integrity Present or Discovered   [] LDA Added if Not in Epic (Describe Wound)   [] New Altered Skin Integrity was Present on Admit and Documented in LDA   [] Wound Image Taken  Wound Care Consulted? No  Attending Nurse:  Yelena

## 2024-08-22 NOTE — ANESTHESIA POSTPROCEDURE EVALUATION
Anesthesia Post Evaluation    Patient: Lorenzo Nino    Procedure(s) Performed: Procedure(s) (LRB):  INSERTION, CARDIAC PACEMAKER, DUAL CHAMBER (N/A)    Final Anesthesia Type: general      Patient location during evaluation: ICU  Patient participation: Yes- Able to Participate  Level of consciousness: awake  Post-procedure vital signs: reviewed and stable  Airway patency: patent    PONV status at discharge: No PONV  Anesthetic complications: no      Cardiovascular status: hemodynamically stable  Respiratory status: unassisted  Follow-up not needed.              Vitals Value Taken Time   BP 94/58 08/22/24 1346   Temp 36.7 °C (98 °F) 08/22/24 1346   Pulse 78 08/22/24 1400   Resp 14 08/22/24 1400   SpO2 96 % 08/22/24 1400   Vitals shown include unfiled device data.      No case tracking events are documented in the log.      Pain/Gladis Score: Pain Rating Prior to Med Admin: 0 (8/22/2024  5:13 AM)  Pain Rating Post Med Admin: 0 (8/22/2024  6:13 AM)

## 2024-08-22 NOTE — SUBJECTIVE & OBJECTIVE
Interval History/Significant Events: No acute events overnight. Patient is resting comfortably in bed.     Follow-up For: Procedure(s) (LRB):  Cardioversion or Defibrillation (N/A)  Transesophageal echo (NEW) intra-procedure log documentation (N/A)    Post-Operative Day: 3 Days Post-Op    Objective:     Vital Signs (Most Recent):  Temp: 98.3 °F (36.8 °C) (08/22/24 0315)  Pulse: (!) 50 (08/22/24 0415)  Resp: 20 (08/22/24 0415)  BP: (!) 99/51 (08/22/24 0400)  SpO2: 96 % (08/22/24 0415) Vital Signs (24h Range):  Temp:  [98.2 °F (36.8 °C)-98.9 °F (37.2 °C)] 98.3 °F (36.8 °C)  Pulse:  [49-65] 50  Resp:  [14-51] 20  SpO2:  [94 %-98 %] 96 %  BP: ()/(51-63) 99/51     Weight: 78 kg (171 lb 15.3 oz)  Body mass index is 23.32 kg/m².      Intake/Output Summary (Last 24 hours) at 8/22/2024 0558  Last data filed at 8/21/2024 2054  Gross per 24 hour   Intake 635 ml   Output 1025 ml   Net -390 ml          Physical Exam  Constitutional:       Appearance: Normal appearance. He is not ill-appearing.   HENT:      Head: Normocephalic and atraumatic.      Mouth/Throat:      Mouth: Mucous membranes are moist.      Pharynx: Oropharynx is clear.   Eyes:      Extraocular Movements: Extraocular movements intact.      Conjunctiva/sclera: Conjunctivae normal.   Cardiovascular:      Pulses: Normal pulses.      Comments: Midline sternotomy c/d/i  Paced at 50bpm  V wires in place        Pulmonary:      Effort: Pulmonary effort is normal. No respiratory distress.   Abdominal:      General: There is no distension.      Palpations: Abdomen is soft.      Tenderness: There is no abdominal tenderness.   Musculoskeletal:      Cervical back: Normal range of motion. No rigidity.      Right lower leg: No edema.      Left lower leg: No edema.   Skin:     General: Skin is warm and dry.   Neurological:      General: No focal deficit present.      Mental Status: He is oriented to person, place, and time. Mental status is at baseline.   Psychiatric:          Mood and Affect: Mood normal.         Behavior: Behavior normal.            Vents:  Vent Mode: Spont (08/13/24 0715)  Ventilator Initiated: Yes (08/12/24 1507)  Set Rate: 16 BPM (08/13/24 0529)  Vt Set: 500 mL (08/13/24 0529)  Pressure Support: 10 cmH20 (08/13/24 0715)  PEEP/CPAP: 5 cmH20 (08/13/24 0715)  Oxygen Concentration (%): 40 (08/13/24 0715)  Peak Airway Pressure: 15 cmH20 (08/13/24 0715)  Plateau Pressure: 0 cmH20 (08/13/24 0715)  Total Ve: 9.35 L/m (08/13/24 0715)  Negative Inspiratory Force (cm H2O): 0 (08/13/24 0715)  F/VT Ratio<105 (RSBI): (!) 49.18 (08/13/24 0715)    Lines/Drains/Airways       Line  Duration                  Pacer Wires 08/12/24 0931 9 days              Peripheral Intravenous Line  Duration                  Peripheral IV - Single Lumen 08/19/24 1202 22 G Anterior;Right Forearm 2 days         Peripheral IV - Single Lumen 08/21/24 0250 18 G Right Antecubital 1 day         Peripheral IV - Single Lumen 08/21/24 0830 20 G Anterior;Left Wrist <1 day                    Significant Labs:    CBC/Anemia Profile:  Recent Labs   Lab 08/21/24  0253 08/22/24  0316   WBC 9.83 9.89   HGB 8.2* 8.1*   HCT 27.5* 26.7*   * 509*   MCV 76* 75*   RDW 20.3* 19.9*        Chemistries:  Recent Labs   Lab 08/21/24  0253 08/22/24  0316    139   K 3.5 3.3*   CL 99 101   CO2 29 26   BUN 15 16   CREATININE 1.2 1.1   CALCIUM 9.1 8.9   ALBUMIN 2.9* 2.8*   PROT 6.8 6.5   BILITOT 0.5 0.5   ALKPHOS 93 86   ALT 19 22   AST 33 35   MG 2.1 1.9   PHOS 3.5 3.6       All pertinent labs within the past 24 hours have been reviewed.    Significant Imaging:  I have reviewed all pertinent imaging results/findings within the past 24 hours.

## 2024-08-22 NOTE — PROGRESS NOTES
[facility] - [facility]  Cardiac Electrophysiology  Progress Note    Admission Date: 8/12/2024  Code Status: Full Code   Attending Physician: Manuel Beal MD   Expected Discharge Date: 8/23/2024  Principal Problem:Endocarditis    Subjective:     Interval History:   - NAEON, feeling well this AM, asymptomatic  - TVP backup rate of 50bpm, patient fully paced, threshold 4.0 mA this AM (4.5 yesterday)    Objective:     Vital Signs (Most Recent):  Temp: 98.4 °F (36.9 °C) (08/22/24 0715)  Pulse: (!) 50 (08/22/24 0730)  Resp: (!) 33 (08/22/24 0730)  BP: (!) 106/57 (08/22/24 0700)  SpO2: 95 % (08/22/24 0730) Vital Signs (24h Range):  Temp:  [98.3 °F (36.8 °C)-98.9 °F (37.2 °C)] 98.4 °F (36.9 °C)  Pulse:  [49-58] 50  Resp:  [14-51] 33  SpO2:  [93 %-98 %] 95 %  BP: ()/(51-63) 106/57     Weight: 78 kg (171 lb 15.3 oz)  Body mass index is 23.32 kg/m².     SpO2: 95 %        Physical Exam  Constitutional:       Appearance: Normal appearance.   HENT:      Mouth/Throat:      Mouth: Mucous membranes are moist.   Eyes:      Extraocular Movements: Extraocular movements intact.      Conjunctiva/sclera: Conjunctivae normal.   Cardiovascular:      Rate and Rhythm: Normal rate and regular rhythm.   Pulmonary:      Effort: Pulmonary effort is normal.   Abdominal:      General: Abdomen is flat.      Palpations: Abdomen is soft.   Musculoskeletal:      Right lower leg: No edema.      Left lower leg: No edema.   Skin:     General: Skin is warm and dry.   Neurological:      General: No focal deficit present.      Mental Status: He is alert and oriented to person, place, and time.            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 Patient also has a h/o Afib/flutter in the s/o valvular disease, first noted 11-12/2023. Patient noted to be in Aflutter, likely 4:1. S/p DCCV 8/19 with return to intrinsic rhythm. Patient's rhythm notably complete heart block that has sustained since valve replacement.     Epicardial ventricular pacing wires in place. Threshold this AM 4.0 (4.5 yesterday). INR within goal, ideally 3.5 or lower, on Warfarin.     Recommendations:  - DC PPM placement today, 8/22  - Continue to hold heparin products  - Ok to continue Warfarin, goal INR for procedure 3.5 or lower  - NPO  - Tele  - K>4, Mg>2      Bianca Henry MD  Cardiac Electrophysiology  [facility] - [facility]

## 2024-08-22 NOTE — PLAN OF CARE
9:45 AM      The SW contacted Cardiology Clinic and spoke with Dr. BISHNU Mercado's nurse. The SW informed the MD's nurse that this patient has been placed on Coumadin and will need monitoring. The SW informed the nurse that this patient's referral for Coumadin monitoring will be placed under Dr. Mercado's name.     Martin Conroy, LORENZO  Case Management [facility]

## 2024-08-22 NOTE — TRANSFER OF CARE
Anesthesia Transfer of Care Note    Patient: Lorenzo Nino    Procedure(s) Performed: Procedure(s) (LRB):  INSERTION, CARDIAC PACEMAKER, DUAL CHAMBER (N/A)    Patient location: ICU    Anesthesia Type: MAC    Transport from OR: Transported from OR on 6-10 L/min O2 by face mask with adequate spontaneous ventilation. Continuous SpO2 monitoring in transport. Continuous ECG monitoring in transport    Post pain: adequate analgesia    Post assessment: no apparent anesthetic complications    Post vital signs: stable    Level of consciousness: sedated    Nausea/Vomiting: no nausea/vomiting    Complications: none    Transfer of care protocol was followed    Last vitals: Visit Vitals  BP (!) 117/55   Pulse (!) 50   Temp 36.9 °C (98.4 °F) (Oral)   Resp (!) 50   Ht 6' (1.829 m)   Wt 78 kg (171 lb 15.3 oz)   SpO2 98%   BMI 23.32 kg/m²

## 2024-08-22 NOTE — PLAN OF CARE
Pt AAO x 4. VSS throughout shift. Room air. No gtts. EP for dual chamber permanent pacemaker placement. Pacemaker settings DDD 60. Chest Xray and EKG obtained. Pressure dressing applied and patient wearing arm sling. Restarted warfarin 1 mg. Pt and family updated on plan of care, all questions and concerns addressed.

## 2024-08-22 NOTE — SUBJECTIVE & OBJECTIVE
Interval History:   - NAEON, feeling well this AM, asymptomatic  - TVP backup rate of 50bpm, patient fully paced, threshold 4.0 mA this AM (4.5 yesterday)    Objective:     Vital Signs (Most Recent):  Temp: 98.4 °F (36.9 °C) (08/22/24 0715)  Pulse: (!) 50 (08/22/24 0730)  Resp: (!) 33 (08/22/24 0730)  BP: (!) 106/57 (08/22/24 0700)  SpO2: 95 % (08/22/24 0730) Vital Signs (24h Range):  Temp:  [98.3 °F (36.8 °C)-98.9 °F (37.2 °C)] 98.4 °F (36.9 °C)  Pulse:  [49-58] 50  Resp:  [14-51] 33  SpO2:  [93 %-98 %] 95 %  BP: ()/(51-63) 106/57     Weight: 78 kg (171 lb 15.3 oz)  Body mass index is 23.32 kg/m².     SpO2: 95 %        Physical Exam  Constitutional:       Appearance: Normal appearance.   HENT:      Mouth/Throat:      Mouth: Mucous membranes are moist.   Eyes:      Extraocular Movements: Extraocular movements intact.      Conjunctiva/sclera: Conjunctivae normal.   Cardiovascular:      Rate and Rhythm: Normal rate and regular rhythm.   Pulmonary:      Effort: Pulmonary effort is normal.   Abdominal:      General: Abdomen is flat.      Palpations: Abdomen is soft.   Musculoskeletal:      Right lower leg: No edema.      Left lower leg: No edema.   Skin:     General: Skin is warm and dry.   Neurological:      General: No focal deficit present.      Mental Status: He is alert and oriented to person, place, and time.            Significant Labs: EP:   Recent Labs   Lab 08/21/24  0253 08/21/24 2037 08/22/24  0316     --  139   K 3.5  --  3.3*   CL 99  --  101   CO2 29  --  26     --  98   BUN 15  --  16   CREATININE 1.2  --  1.1   CALCIUM 9.1  --  8.9   PROT 6.8  --  6.5   ALBUMIN 2.9*  --  2.8*   BILITOT 0.5  --  0.5   ALKPHOS 93  --  86   AST 33  --  35   ALT 19  --  22   ANIONGAP 9  --  12   WBC 9.83  --  9.89   HGB 8.2*  --  8.1*   HCT 27.5*  --  26.7*   *  --  509*   INR 3.5* 3.0* 2.8*       Significant Imaging:  No new

## 2024-08-22 NOTE — PLAN OF CARE
CM met with the patient and his mother to discuss discharge and his need tor Coumadin monitoring Patients mother states that he has insurance with [employer] as of 09/01/24   Team notified of the above

## 2024-08-22 NOTE — TELEPHONE ENCOUNTER
----- Message from Elvie Manzo RN sent at 8/22/2024  9:44 AM CDT -----  Regarding: Coumadin   from [facility] called to inform dr Mercado that pt will be discharged on warfarin and they are placing a coumadin clinic referral. Pt expected to be discharged next week.    RAJI

## 2024-08-22 NOTE — PROGRESS NOTES
[facility] - [facility]ensive [facility]  Critical Care - Surgery  Progress Note    Patient Name: Lorenzo Nino  MRN: [id number]  Admission Date: 8/12/2024  Hospital Length of Stay: 10 days  Code Status: Full Code  Attending Provider: Manuel Beal MD  Primary Care Provider: No, Primary Doctor   Principal Problem: Endocarditis    Subjective:     Hospital/ICU Course:  No notes on file    Interval History/Significant Events: No acute events overnight. Patient is resting comfortably in bed.     Follow-up For: Procedure(s) (LRB):  Cardioversion or Defibrillation (N/A)  Transesophageal echo (NEW) intra-procedure log documentation (N/A)    Post-Operative Day: 3 Days Post-Op    Objective:     Vital Signs (Most Recent):  Temp: 98.3 °F (36.8 °C) (08/22/24 0315)  Pulse: (!) 50 (08/22/24 0415)  Resp: 20 (08/22/24 0415)  BP: (!) 99/51 (08/22/24 0400)  SpO2: 96 % (08/22/24 0415) Vital Signs (24h Range):  Temp:  [98.2 °F (36.8 °C)-98.9 °F (37.2 °C)] 98.3 °F (36.8 °C)  Pulse:  [49-65] 50  Resp:  [14-51] 20  SpO2:  [94 %-98 %] 96 %  BP: ()/(51-63) 99/51     Weight: 78 kg (171 lb 15.3 oz)  Body mass index is 23.32 kg/m².      Intake/Output Summary (Last 24 hours) at 8/22/2024 0558  Last data filed at 8/21/2024 2054  Gross per 24 hour   Intake 635 ml   Output 1025 ml   Net -390 ml          Physical Exam  Constitutional:       Appearance: Normal appearance. He is not ill-appearing.   HENT:      Head: Normocephalic and atraumatic.      Mouth/Throat:      Mouth: Mucous membranes are moist.      Pharynx: Oropharynx is clear.   Eyes:      Extraocular Movements: Extraocular movements intact.      Conjunctiva/sclera: Conjunctivae normal.   Cardiovascular:      Pulses: Normal pulses.      Comments: Midline sternotomy c/d/i  Paced at 50bpm  V wires in place        Pulmonary:      Effort: Pulmonary effort is normal. No respiratory distress.   Abdominal:      General: There is no distension.      Palpations: Abdomen is soft.       Tenderness: There is no abdominal tenderness.   Musculoskeletal:      Cervical back: Normal range of motion. No rigidity.      Right lower leg: No edema.      Left lower leg: No edema.   Skin:     General: Skin is warm and dry.   Neurological:      General: No focal deficit present.      Mental Status: He is oriented to person, place, and time. Mental status is at baseline.   Psychiatric:         Mood and Affect: Mood normal.         Behavior: Behavior normal.            Vents:  Vent Mode: Spont (08/13/24 0715)  Ventilator Initiated: Yes (08/12/24 1507)  Set Rate: 16 BPM (08/13/24 0529)  Vt Set: 500 mL (08/13/24 0529)  Pressure Support: 10 cmH20 (08/13/24 0715)  PEEP/CPAP: 5 cmH20 (08/13/24 0715)  Oxygen Concentration (%): 40 (08/13/24 0715)  Peak Airway Pressure: 15 cmH20 (08/13/24 0715)  Plateau Pressure: 0 cmH20 (08/13/24 0715)  Total Ve: 9.35 L/m (08/13/24 0715)  Negative Inspiratory Force (cm H2O): 0 (08/13/24 0715)  F/VT Ratio<105 (RSBI): (!) 49.18 (08/13/24 0715)    Lines/Drains/Airways       Line  Duration                  Pacer Wires 08/12/24 0931 9 days              Peripheral Intravenous Line  Duration                  Peripheral IV - Single Lumen 08/19/24 1202 22 G Anterior;Right Forearm 2 days         Peripheral IV - Single Lumen 08/21/24 0250 18 G Right Antecubital 1 day         Peripheral IV - Single Lumen 08/21/24 0830 20 G Anterior;Left Wrist <1 day                    Significant Labs:    CBC/Anemia Profile:  Recent Labs   Lab 08/21/24  0253 08/22/24  0316   WBC 9.83 9.89   HGB 8.2* 8.1*   HCT 27.5* 26.7*   * 509*   MCV 76* 75*   RDW 20.3* 19.9*        Chemistries:  Recent Labs   Lab 08/21/24  0253 08/22/24  0316    139   K 3.5 3.3*   CL 99 101   CO2 29 26   BUN 15 16   CREATININE 1.2 1.1   CALCIUM 9.1 8.9   ALBUMIN 2.9* 2.8*   PROT 6.8 6.5   BILITOT 0.5 0.5   ALKPHOS 93 86   ALT 19 22   AST 33 35   MG 2.1 1.9   PHOS 3.5 3.6       All pertinent labs within the past 24 hours have been  reviewed.    Significant Imaging:  I have reviewed all pertinent imaging results/findings within the past 24 hours.  Assessment/Plan:     Cardiac/Vascular  Severe mitral regurgitation  Lorenzo Nino is a 48 y.o. 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 He presents to the SICU s/p resternotomy and mechanical MVR with Dr. Beal on 8/12.      Neuro/Psych:     - Sedation: none    - Pain:     - Scheduled Tylenol 1000mg Q8H   - PRN Oxycodone 5mg    - Psych: home med mirtazipine 15 qd             Cardiac:     - S/p redo MVR with Dr. Beal    - BP Goal: MAP 60-80    - Inotropes/Pressors: none    - Anti-HTNs: none    - Rhythm: paced at 50 (native rhythm 3:1 block 40 bpm)   -- EP consulted, recommend slow wean of pacing; will reach out again for continued bradycardia/arrhythmia   -- home amiodarone 200mg qd held in the setting of native bradycardia    - Beta Blocker: held in the setting of native bradycardia   -- Home medication toprol XL 25 qd    - Statin: n/a    - 08/19: NEW with DCCV by EP. Remained bradycardic following procedure   - PPM placement by EP planned 08/22      Pulmonary:     - Goal SpO2 >92%; satting well on RA    - Chest Tubes dc'ed     Renal:    - Baseline Cr 1.6-2    - Trend BUN/Cr         - Lasix 20 BID with appropriate diuresis    Recent Labs   Lab 08/20/24  0256 08/21/24  0253 08/22/24  0316   BUN 15 15 16   CREATININE 1.2 1.2 1.1           FEN / GI:     - Daily CMP, PRN K/Mag/Phos per protocol    - Replace electrolytes as needed    - Nutrition: cardiac regular diet, NPO pm of 8/20-21 for PPM placement 8/21    - Bowel Regimen: Miralax, docusate      ID:     - Aebrile    - Abx:   - ID consulted, recommended dc Daptomycin in setting of neg cultures, signed off 8/16.  - Completed perioperative cefazolin 2g Q8H x 5 doses   - F/u OR cultures, NGTD    Recent Labs   Lab 08/20/24  0256  08/21/24  0253 08/22/24  0316   WBC 9.10 9.83 9.89           Heme/Onc:     - Hgb 11.4 pre-operatively  Products:   - 2 FFP intra-op     - S/p 1u pRBC postop 8/13    - Heparin gtt initiated for mechanical valve thromboprophylaxis bridge - goal PTT 60-80   - Discontinue 8/15 given therapeutic on warfarin    - Warfarin 5mg initiated 8/14 for mechanical valve thromboprophylaxis - goal INR 2.5-3.5    - Warfarin 5 dc'd 8/14, switched to Warfarin 1  - supratherapeutic 8/17, 8/18, 8/19, 8/20  - Held warfarin 8/21 for PPM placement on 8/22    - Daily CBC, PTT, PT/INR    - ASA 325mg daily - dc'd 8/13    - ASA 81mg daily     Recent Labs   Lab 08/20/24  0256 08/20/24  1001 08/21/24  0253 08/21/24  2037 08/22/24  0316   HGB 7.9*  --  8.2*  --  8.1*     --  456*  --  509*   APTT 75.2*  --  64.0*  --  63.9*   INR 4.0*   < > 3.5* 3.0* 2.8*    < > = values in this interval not displayed.           Endocrine:     - CTS Goal -140    - HgbA1c: 5.4    - Novolog (Insulin Aspart) prn for BG excursions MDC SSI (150/25)    - BG checks AC/HS    - Endocrinology consulted for insulin management      PPx:   Feeding: NPO for PPM  Analgesia/Sedation: sched tylenol, oxy 5  Thromboembolic Prevention: SCDs, warfarin (titrate dose for INR 2.5-3.5)  HOB >30: Yes  Stress Ulcer: Yes - famotidine 20mg PO BID  Glucose Control: Yes, insulin management per Endocrinology     Lines/Drains/Airway:   Pacing Wires: Temporary V pacing wires      Dispo/Code Status/Palliative:     - Continue SICU Care    - Full Code         Amanda Benitez MD  Critical Care - Surgery  [facility]

## 2024-08-22 NOTE — ASSESSMENT & PLAN NOTE
Lorenzo Nino is a 48 y.o. 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 He presents to the SICU s/p resternotomy and mechanical MVR with Dr. Beal on 8/12.      Neuro/Psych:     - Sedation: none    - Pain:     - Scheduled Tylenol 1000mg Q8H   - PRN Oxycodone 5mg    - Psych: home med mirtazipine 15 qd             Cardiac:     - S/p redo MVR with Dr. Beal    - BP Goal: MAP 60-80    - Inotropes/Pressors: none    - Anti-HTNs: none    - Rhythm: paced at 50 (native rhythm 3:1 block 40 bpm)   -- EP consulted, recommend slow wean of pacing; will reach out again for continued bradycardia/arrhythmia   -- home amiodarone 200mg qd held in the setting of native bradycardia    - Beta Blocker: held in the setting of native bradycardia   -- Home medication toprol XL 25 qd    - Statin: n/a    - 08/19: NEW with DCCV by EP. Remained bradycardic following procedure   - PPM placement by EP planned 08/22      Pulmonary:     - Goal SpO2 >92%; satting well on RA    - Chest Tubes dc'ed     Renal:    - Baseline Cr 1.6-2    - Trend BUN/Cr         - Lasix 20 BID with appropriate diuresis    Recent Labs   Lab 08/20/24  0256 08/21/24  0253 08/22/24  0316   BUN 15 15 16   CREATININE 1.2 1.2 1.1           FEN / GI:     - Daily CMP, PRN K/Mag/Phos per protocol    - Replace electrolytes as needed    - Nutrition: cardiac regular diet, NPO pm of 8/20-21 for PPM placement 8/21    - Bowel Regimen: Miralax, docusate      ID:     - Aebrile    - Abx:   - ID consulted, recommended dc Daptomycin in setting of neg cultures, signed off 8/16.  - Completed perioperative cefazolin 2g Q8H x 5 doses   - F/u OR cultures, NGTD    Recent Labs   Lab 08/20/24  0256 08/21/24  0253 08/22/24  0316   WBC 9.10 9.83 9.89           Heme/Onc:     - Hgb 11.4 pre-operatively  Products:   - 2 FFP intra-op     - S/p 1u pRBC postop 8/13    - Heparin gtt initiated for  mechanical valve thromboprophylaxis bridge - goal PTT 60-80   - Discontinue 8/15 given therapeutic on warfarin    - Warfarin 5mg initiated 8/14 for mechanical valve thromboprophylaxis - goal INR 2.5-3.5    - Warfarin 5 dc'd 8/14, switched to Warfarin 1  - supratherapeutic 8/17, 8/18, 8/19, 8/20  - Held warfarin 8/21 for PPM placement on 8/22    - Daily CBC, PTT, PT/INR    - ASA 325mg daily - dc'd 8/13    - ASA 81mg daily     Recent Labs   Lab 08/20/24  0256 08/20/24  1001 08/21/24  0253 08/21/24  2037 08/22/24  0316   HGB 7.9*  --  8.2*  --  8.1*     --  456*  --  509*   APTT 75.2*  --  64.0*  --  63.9*   INR 4.0*   < > 3.5* 3.0* 2.8*    < > = values in this interval not displayed.           Endocrine:     - CTS Goal -140    - HgbA1c: 5.4    - Novolog (Insulin Aspart) prn for BG excursions MDC SSI (150/25)    - BG checks AC/HS    - Endocrinology consulted for insulin management      PPx:   Feeding: NPO for PPM  Analgesia/Sedation: sched tylenol, oxy 5  Thromboembolic Prevention: SCDs, warfarin (titrate dose for INR 2.5-3.5)  HOB >30: Yes  Stress Ulcer: Yes - famotidine 20mg PO BID  Glucose Control: Yes, insulin management per Endocrinology     Lines/Drains/Airway:   Pacing Wires: Temporary V pacing wires      Dispo/Code Status/Palliative:     - Continue SICU Care    - Full Code

## 2024-08-23 ENCOUNTER — PATIENT MESSAGE (OUTPATIENT)
Dept: CARDIOLOGY | Facility: CLINIC | Age: 49
End: 2024-08-23
Payer: MEDICAID

## 2024-08-23 ENCOUNTER — ANTI-COAG VISIT (OUTPATIENT)
Dept: CARDIOLOGY | Facility: CLINIC | Age: 49
End: 2024-08-23
Payer: MEDICAID

## 2024-08-23 ENCOUNTER — DOCUMENTATION ONLY (OUTPATIENT)
Dept: CARDIOTHORACIC SURGERY | Facility: CLINIC | Age: 49
End: 2024-08-23
Payer: MEDICAID

## 2024-08-23 VITALS
TEMPERATURE: 98 F | HEIGHT: 72 IN | WEIGHT: 171.94 LBS | DIASTOLIC BLOOD PRESSURE: 68 MMHG | RESPIRATION RATE: 16 BRPM | HEART RATE: 79 BPM | BODY MASS INDEX: 23.29 KG/M2 | SYSTOLIC BLOOD PRESSURE: 108 MMHG | OXYGEN SATURATION: 95 %

## 2024-08-23 DIAGNOSIS — I48.91 ATRIAL FIBRILLATION, UNSPECIFIED TYPE: ICD-10-CM

## 2024-08-23 DIAGNOSIS — Z98.890 S/P MVR (MITRAL VALVE REPAIR): Chronic | ICD-10-CM

## 2024-08-23 DIAGNOSIS — Z95.2 MITRAL VALVE REPLACED: ICD-10-CM

## 2024-08-23 DIAGNOSIS — I48.3 TYPICAL ATRIAL FLUTTER: ICD-10-CM

## 2024-08-23 DIAGNOSIS — Z79.01 LONG TERM (CURRENT) USE OF ANTICOAGULANTS: Primary | ICD-10-CM

## 2024-08-23 LAB
ALBUMIN SERPL BCP-MCNC: 2.7 G/DL (ref 3.5–5.2)
ALP SERPL-CCNC: 80 U/L (ref 55–135)
ALT SERPL W/O P-5'-P-CCNC: 17 U/L (ref 10–44)
ANION GAP SERPL CALC-SCNC: 9 MMOL/L (ref 8–16)
APTT PPP: 59.8 SEC (ref 21–32)
AST SERPL-CCNC: 32 U/L (ref 10–40)
BASOPHILS # BLD AUTO: 0.04 K/UL (ref 0–0.2)
BASOPHILS NFR BLD: 0.4 % (ref 0–1.9)
BILIRUB SERPL-MCNC: 0.4 MG/DL (ref 0.1–1)
BUN SERPL-MCNC: 15 MG/DL (ref 6–20)
CALCIUM SERPL-MCNC: 8.9 MG/DL (ref 8.7–10.5)
CHLORIDE SERPL-SCNC: 102 MMOL/L (ref 95–110)
CO2 SERPL-SCNC: 26 MMOL/L (ref 23–29)
CREAT SERPL-MCNC: 1.1 MG/DL (ref 0.5–1.4)
DIFFERENTIAL METHOD BLD: ABNORMAL
EOSINOPHIL # BLD AUTO: 0.3 K/UL (ref 0–0.5)
EOSINOPHIL NFR BLD: 3.2 % (ref 0–8)
ERYTHROCYTE [DISTWIDTH] IN BLOOD BY AUTOMATED COUNT: 20.3 % (ref 11.5–14.5)
EST. GFR  (NO RACE VARIABLE): >60 ML/MIN/1.73 M^2
GLUCOSE SERPL-MCNC: 95 MG/DL (ref 70–110)
HCT VFR BLD AUTO: 25.1 % (ref 40–54)
HGB BLD-MCNC: 7.8 G/DL (ref 14–18)
IMM GRANULOCYTES # BLD AUTO: 0.16 K/UL (ref 0–0.04)
IMM GRANULOCYTES NFR BLD AUTO: 1.6 % (ref 0–0.5)
INR PPP: 2.6 (ref 0.8–1.2)
LYMPHOCYTES # BLD AUTO: 1.8 K/UL (ref 1–4.8)
LYMPHOCYTES NFR BLD: 18 % (ref 18–48)
MAGNESIUM SERPL-MCNC: 2.1 MG/DL (ref 1.6–2.6)
MCH RBC QN AUTO: 23.3 PG (ref 27–31)
MCHC RBC AUTO-ENTMCNC: 31.1 G/DL (ref 32–36)
MCV RBC AUTO: 75 FL (ref 82–98)
MONOCYTES # BLD AUTO: 1.4 K/UL (ref 0.3–1)
MONOCYTES NFR BLD: 13.9 % (ref 4–15)
NEUTROPHILS # BLD AUTO: 6.1 K/UL (ref 1.8–7.7)
NEUTROPHILS NFR BLD: 62.9 % (ref 38–73)
NRBC BLD-RTO: 0 /100 WBC
PHOSPHATE SERPL-MCNC: 4 MG/DL (ref 2.7–4.5)
PLATELET # BLD AUTO: 581 K/UL (ref 150–450)
PMV BLD AUTO: 10.3 FL (ref 9.2–12.9)
POTASSIUM SERPL-SCNC: 3.7 MMOL/L (ref 3.5–5.1)
PROT SERPL-MCNC: 6.2 G/DL (ref 6–8.4)
PROTHROMBIN TIME: 26.9 SEC (ref 9–12.5)
RBC # BLD AUTO: 3.35 M/UL (ref 4.6–6.2)
SODIUM SERPL-SCNC: 137 MMOL/L (ref 136–145)
WBC # BLD AUTO: 9.7 K/UL (ref 3.9–12.7)

## 2024-08-23 PROCEDURE — 99233 SBSQ HOSP IP/OBS HIGH 50: CPT | Mod: ,,, | Performed by: STUDENT IN AN ORGANIZED HEALTH CARE EDUCATION/TRAINING PROGRAM

## 2024-08-23 PROCEDURE — 85025 COMPLETE CBC W/AUTO DIFF WBC: CPT | Performed by: THORACIC SURGERY (CARDIOTHORACIC VASCULAR SURGERY)

## 2024-08-23 PROCEDURE — 80053 COMPREHEN METABOLIC PANEL: CPT | Performed by: THORACIC SURGERY (CARDIOTHORACIC VASCULAR SURGERY)

## 2024-08-23 PROCEDURE — 25000003 PHARM REV CODE 250

## 2024-08-23 PROCEDURE — 84100 ASSAY OF PHOSPHORUS: CPT | Performed by: THORACIC SURGERY (CARDIOTHORACIC VASCULAR SURGERY)

## 2024-08-23 PROCEDURE — 83735 ASSAY OF MAGNESIUM: CPT | Performed by: THORACIC SURGERY (CARDIOTHORACIC VASCULAR SURGERY)

## 2024-08-23 PROCEDURE — 99024 POSTOP FOLLOW-UP VISIT: CPT | Mod: ,,, | Performed by: PHYSICIAN ASSISTANT

## 2024-08-23 PROCEDURE — 25000003 PHARM REV CODE 250: Performed by: INTERNAL MEDICINE

## 2024-08-23 PROCEDURE — 25000003 PHARM REV CODE 250: Performed by: PHYSICIAN ASSISTANT

## 2024-08-23 PROCEDURE — 36415 COLL VENOUS BLD VENIPUNCTURE: CPT | Performed by: THORACIC SURGERY (CARDIOTHORACIC VASCULAR SURGERY)

## 2024-08-23 PROCEDURE — 85610 PROTHROMBIN TIME: CPT | Performed by: THORACIC SURGERY (CARDIOTHORACIC VASCULAR SURGERY)

## 2024-08-23 PROCEDURE — 85730 THROMBOPLASTIN TIME PARTIAL: CPT | Performed by: THORACIC SURGERY (CARDIOTHORACIC VASCULAR SURGERY)

## 2024-08-23 RX ORDER — POLYETHYLENE GLYCOL 3350 17 G/17G
17 POWDER, FOR SOLUTION ORAL 2 TIMES DAILY PRN
Qty: 238 G | Refills: 0 | Status: SHIPPED | OUTPATIENT
Start: 2024-08-23

## 2024-08-23 RX ORDER — DOXYCYCLINE HYCLATE 100 MG
100 TABLET ORAL EVERY 12 HOURS
Qty: 10 TABLET | Refills: 0 | Status: SHIPPED | OUTPATIENT
Start: 2024-08-23 | End: 2024-08-28

## 2024-08-23 RX ORDER — POTASSIUM CHLORIDE 20 MEQ/1
20 TABLET, EXTENDED RELEASE ORAL 2 TIMES DAILY
Status: DISCONTINUED | OUTPATIENT
Start: 2024-08-23 | End: 2024-08-23 | Stop reason: HOSPADM

## 2024-08-23 RX ORDER — POTASSIUM CHLORIDE 20 MEQ/1
TABLET, EXTENDED RELEASE ORAL
Qty: 45 TABLET | Refills: 3 | Status: SHIPPED | OUTPATIENT
Start: 2024-08-23

## 2024-08-23 RX ORDER — FERROUS SULFATE 325(65) MG
325 TABLET, DELAYED RELEASE (ENTERIC COATED) ORAL DAILY
Qty: 30 TABLET | Refills: 0 | Status: SHIPPED | OUTPATIENT
Start: 2024-08-23 | End: 2024-09-22

## 2024-08-23 RX ORDER — ASPIRIN 81 MG/1
81 TABLET ORAL DAILY
Qty: 30 TABLET | Refills: 11 | Status: SHIPPED | OUTPATIENT
Start: 2024-08-23 | End: 2025-08-23

## 2024-08-23 RX ORDER — AMOXICILLIN 250 MG
1 CAPSULE ORAL DAILY PRN
Qty: 30 TABLET | Refills: 0 | Status: SHIPPED | OUTPATIENT
Start: 2024-08-23

## 2024-08-23 RX ORDER — FUROSEMIDE 20 MG/1
TABLET ORAL
Qty: 45 TABLET | Refills: 11 | Status: SHIPPED | OUTPATIENT
Start: 2024-08-23

## 2024-08-23 RX ORDER — LANOLIN ALCOHOL/MO/W.PET/CERES
1 CREAM (GRAM) TOPICAL DAILY
Status: DISCONTINUED | OUTPATIENT
Start: 2024-08-23 | End: 2024-08-23 | Stop reason: HOSPADM

## 2024-08-23 RX ORDER — OXYCODONE HYDROCHLORIDE 5 MG/1
5 TABLET ORAL EVERY 8 HOURS PRN
Qty: 21 TABLET | Refills: 0 | Status: SHIPPED | OUTPATIENT
Start: 2024-08-23

## 2024-08-23 RX ORDER — ACETAMINOPHEN 500 MG
1000 TABLET ORAL EVERY 6 HOURS PRN
Qty: 30 TABLET | Refills: 0 | Status: SHIPPED | OUTPATIENT
Start: 2024-08-23

## 2024-08-23 RX ORDER — WARFARIN 1 MG/1
2 TABLET ORAL DAILY
Qty: 60 TABLET | Refills: 11 | Status: SHIPPED | OUTPATIENT
Start: 2024-08-23 | End: 2025-08-23

## 2024-08-23 RX ORDER — FAMOTIDINE 20 MG/1
20 TABLET, FILM COATED ORAL 2 TIMES DAILY
Qty: 60 TABLET | Refills: 11 | Status: SHIPPED | OUTPATIENT
Start: 2024-08-23 | End: 2025-08-23

## 2024-08-23 RX ADMIN — ASPIRIN 81 MG: 81 TABLET, COATED ORAL at 09:08

## 2024-08-23 RX ADMIN — TAMSULOSIN HYDROCHLORIDE 0.4 MG: 0.4 CAPSULE ORAL at 09:08

## 2024-08-23 RX ADMIN — FAMOTIDINE 20 MG: 20 TABLET ORAL at 09:08

## 2024-08-23 RX ADMIN — ACETAMINOPHEN 1000 MG: 500 TABLET ORAL at 09:08

## 2024-08-23 RX ADMIN — ACETAMINOPHEN 1000 MG: 500 TABLET ORAL at 12:08

## 2024-08-23 RX ADMIN — FERROUS SULFATE TAB EC 325 MG (65 MG FE EQUIVALENT) 1 EACH: 325 (65 FE) TABLET DELAYED RESPONSE at 09:08

## 2024-08-23 RX ADMIN — POTASSIUM CHLORIDE 20 MEQ: 1500 TABLET, EXTENDED RELEASE ORAL at 09:08

## 2024-08-23 RX ADMIN — FUROSEMIDE 20 MG: 20 TABLET ORAL at 09:08

## 2024-08-23 RX ADMIN — DOXYCYCLINE HYCLATE 100 MG: 100 TABLET, COATED ORAL at 09:08

## 2024-08-23 NOTE — PROGRESS NOTES
[facility] - Cardiology Stepdown  Cardiac Electrophysiology  Progress Note    Admission Date: 8/12/2024  Code Status: Full Code   Attending Physician: Manuel Beal MD   Expected Discharge Date: 8/23/2024  Principal Problem:Endocarditis    Subjective:     Interval History:   - S/p DC PPM yesterday, no complications  - Feeling well this AM, minimal site tenderness    Objective:     Vital Signs (Most Recent):  Temp: 96.5 °F (35.8 °C) (08/23/24 0752)  Pulse: 80 (08/23/24 0752)  Resp: 17 (08/23/24 0752)  BP: 104/60 (08/23/24 0752)  SpO2: (!) 93 % (08/23/24 0752) Vital Signs (24h Range):  Temp:  [96.5 °F (35.8 °C)-98.2 °F (36.8 °C)] 96.5 °F (35.8 °C)  Pulse:  [50-91] 80  Resp:  [13-50] 17  SpO2:  [93 %-99 %] 93 %  BP: ()/(58-69) 104/60     Weight: 78 kg (171 lb 15.3 oz)  Body mass index is 23.32 kg/m².     SpO2: (!) 93 %        Physical Exam  Constitutional:       Appearance: Normal appearance.   HENT:      Mouth/Throat:      Mouth: Mucous membranes are moist.   Eyes:      Extraocular Movements: Extraocular movements intact.      Conjunctiva/sclera: Conjunctivae normal.   Cardiovascular:      Rate and Rhythm: Normal rate and regular rhythm.      Comments: PPM site without hematoma/induration  Pulmonary:      Effort: Pulmonary effort is normal.   Abdominal:      General: Abdomen is flat.      Palpations: Abdomen is soft.   Musculoskeletal:      Right lower leg: No edema.      Left lower leg: No edema.   Skin:     General: Skin is warm and dry.   Neurological:      General: No focal deficit present.      Mental Status: He is alert and oriented to person, place, and time.            Significant Labs: EP:   Recent Labs   Lab 08/21/24 2037 08/22/24 0316 08/22/24 0316 08/23/24  0605   NA  --  139  --  137   K  --  3.3*  --  3.7   CL  --  101  --  102   CO2  --  26  --  26   GLU  --  98  --  95   BUN  --  16  --  15   CREATININE  --  1.1  --  1.1   CALCIUM  --  8.9  --  8.9   PROT  --  6.5  --  6.2   ALBUMIN  --   2.8*  --  2.7*   BILITOT  --  0.5  --  0.4   ALKPHOS  --  86  --  80   AST  --  35  --  32   ALT  --  22  --  17   ANIONGAP  --  12  --  9   WBC  --  9.89  --  9.70   HGB  --  8.1*  --  7.8*   HCT  --  26.7*   < > 25.1*   PLT  --  509*  --  581*   INR 3.0* 2.8*  --  2.6*    < > = values in this interval not displayed.       Significant Imaging: X-Ray: CXR: X-Ray Chest 1 View (CXR): No results found for this visit on 08/12/24.  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 Patient also has a h/o Afib/flutter in the s/o valvular disease, first noted 11-12/2023. Patient noted to be in Aflutter, likely 4:1. S/p DCCV 8/19 with return to intrinsic rhythm. Patient's rhythm complete heart block that has sustained since valve replacement. Procedure delayed d/t supratherapeutic INR. S/p DC PPM placement 8/22.     Recommendations:  Ancef 2 g  (given prior to procedure in the EP lab)  Antibiotics: Doxycycline 100 mg PO BID x 5 days   No heparin products for 5 days post-implant, including DOACs (warfarin ok to continue immediately post-procedure)  No lifting elbow above shoulder height on arm ipsilateral to pocket site for 6 weeks  No lifting over 10 lbs with arm ipsilateral to pocket site for 1 week  Sling to arm - wear for 48 hours post-procedure, then only at night for 6 weeks  Aquacel Ag - to be removed in 1 week at device clinic follow-up  Follow up in device clinic in 1 week for device and wound checks    Patient safe for discharge from EP perspective. EP will sign off at this time, please call with questions or concerns.     Bianca Henry MD  Cardiac Electrophysiology  [facility] - Cardiology Stepdown

## 2024-08-23 NOTE — HPI
Mr. Nino is a 48-year-old gentleman who initially presented with endocarditis in late 2023.  Medical management was attempted but failed and he developed acute severe mitral regurgitation and required intubation.  He underwent urgent mitral valve repair.  He was actively infected at the time.  He recovered from the operation, but it took several weeks with the infection to clear.  He developed recurrent mitral regurgitation.  He has improved since his hospitalization, although is symptomatic for his mitral regurgitation.  He now presents for mitral valve replacement.

## 2024-08-23 NOTE — HOSPITAL COURSE
On 8/12/24, the patient was taken to the Operating Room for the above stated procedure. Please see the previously dictated operative report for complete details. Postoperatively, the patient was taken from the  Operating Room to the ICU where the vital signs were monitored and pain was kept under control. The patient was weaned from the drips and extubated in the ICU per protocol. Patient had atrial flutter with cardioversion on 8/19/24. Then developed complete heart block and a permanent pacemaker was placed on 8/22/24. Once hemodynamically stable, the patient was transferred to the Cardiac Step-Down floor for continued strengthening and ambulation. On postoperative day 11, the patient was ready for discharge to home. At the time of discharge, the patient was ambulating unassisted. Pain was well controlled with oral analgesics and the patient was tolerating the diet.     MOBILITY AND ACTIVITY: As tolerated. Patient may shower. No heavy lifting of greater than 5 pounds and no driving.     DIET: An 1800-calorie ADA with a 1500 mL fluid restriction.     WOUND CARE INSTRUCTIONS: Check for redness, swelling and drainage around the  incision or wound. Patient is to call for any obvious bleeding, drainage, pus from the wound, unusual problems or difficulties or temperature of greater than 101   degrees.     FOLLOWUP: Follow up with Dr. Beal in approximately 3 weeks. Prior to this  appointment, the patient will have a chest x-ray and EKG.     Patient not placed on Ace-Inhibitor at the time of discharge due to potential for hypotension     Patient not placed on beta blocker at the time of discharge due to complete heart block     Patient will be on lifelong anticoagulation for his mechanical aortic valve. INR goal 2.5-3.5. Patient has been enrolled in the coumadin clinic. He is current with Dr. Emerson Mercado whose office was notified about coumadin. Patient understands that the coumadin clinic will call him about going  for lab draws.       DISCHARGE CONDITION: At the time of discharge, the patient was in sinus rhythm and afebrile with stable vital signs.

## 2024-08-23 NOTE — NURSING TRANSFER
Nursing Transfer Note      8/22/2024   9:22 PM    Nurse giving handoff:Yelena    Reason patient is being transferred: Step down orders    Transfer To: CSU room 350    Transfer via bed    Transfer with cardiac monitoring, ambu bag, O2 tank    Transported by x2 RNs    Transfer Vital Signs:  Blood Pressure:117/68  Heart Rate:81  O2:98  Temperature:98.2  Respirations:23    Telemetry: Rate 81 and Rhythm V paced.   Order for Tele Monitor? Yes    Medicines sent: Mupirocin and Insulin aspart pen    Patient belongings transferred with patient: Yes    Chart send with patient: Yes    Notified: Pt's mother present during transfer    Upon arrival to floor: Pt's nurse ANNA Solano at bedside to take over care. Pt AOX4, no acute distress noted. Premedicated with x1 extra dose oxycodone 5 mg PO per MD order d/t L chest pain with movement r/t PPM placement today. PPM site without redness, bruising or hematoma. Pressure dressing CDI and sling in place.

## 2024-08-23 NOTE — DISCHARGE SUMMARY
[facility] - Cardiology Stepdown  Cardiothoracic Surgery  Discharge Summary      Patient Name: Lorenzo Nino  MRN: [id number]  Admission Date: 8/12/2024  Hospital Length of Stay: 11 days  Discharge Date and Time:  08/23/2024 10:33 AM  Attending Physician: Manuel Beal MD   Discharging Provider: Evita Barth PA-C  Primary Care Provider: Terrie Primary Doctor    HPI:   Mr. Nino is a 48-year-old gentleman who initially presented with endocarditis in late 2023.  Medical management was attempted but failed and he developed acute severe mitral regurgitation and required intubation.  He underwent urgent mitral valve repair.  He was actively infected at the time.  He recovered from the operation, but it took several weeks with the infection to clear.  He developed recurrent mitral regurgitation.  He has improved since his hospitalization, although is symptomatic for his mitral regurgitation.  He now presents for mitral valve replacement.     Procedure(s) (LRB):  INSERTION, CARDIAC PACEMAKER, DUAL CHAMBER (N/A)          Hospital Course: On 8/12/24, the patient was taken to the Operating Room for the above stated procedure. Please see the previously dictated operative report for complete details. Postoperatively, the patient was taken from the  Operating Room to the ICU where the vital signs were monitored and pain was kept under control. The patient was weaned from the drips and extubated in the ICU per protocol. Patient had atrial flutter with cardioversion on 8/19/24. Then developed complete heart block and a permanent pacemaker was placed on 8/22/24. Once hemodynamically stable, the patient was transferred to the Cardiac Step-Down floor for continued strengthening and ambulation. On postoperative day 11, the patient was ready for discharge to home. At the time of discharge, the patient was ambulating unassisted. Pain was well controlled with oral analgesics and the patient was tolerating the diet.      MOBILITY AND ACTIVITY: As tolerated. Patient may shower. No heavy lifting of greater than 5 pounds and no driving.     DIET: An 1800-calorie ADA with a 1500 mL fluid restriction.     WOUND CARE INSTRUCTIONS: Check for redness, swelling and drainage around the  incision or wound. Patient is to call for any obvious bleeding, drainage, pus from the wound, unusual problems or difficulties or temperature of greater than 101   degrees.     FOLLOWUP: Follow up with Dr. Beal in approximately 3 weeks. Prior to this  appointment, the patient will have a chest x-ray and EKG.     Patient not placed on Ace-Inhibitor at the time of discharge due to potential for hypotension     Patient not placed on beta blocker at the time of discharge due to complete heart block     Statin not indicated     Patient will be on lifelong anticoagulation for his mechanical aortic valve. INR goal 2.5-3.5. Patient has been enrolled in the coumadin clinic. He is current with Dr. Emerson Mercado whose office was notified about coumadin. Patient understands that the coumadin clinic will call him about going for lab draws.       DISCHARGE CONDITION: At the time of discharge, the patient was in sinus rhythm and afebrile with stable vital signs.      Goals of Care Treatment Preferences:  Code Status: Full Code      Consults (From admission, onward)          Status Ordering Provider     Inpatient consult to Electrophysiology  Once        Provider:  (Not yet assigned)    Completed JACK ROMEO     Inpatient consult to Electrophysiology  Once        Provider:  DIPESH Ordonez MD    Completed JORGE UP     Inpatient consult to Infectious Diseases  Once        Provider:  (Not yet assigned)    Completed TRENT CHAVEZ     Consult to Endocrinology  Once        Provider:  (Not yet assigned)    Completed KE OCONNOR     Consult Case Management/Social Work  Once        Provider:  (Not yet assigned)    Acknowledged KE OCONNOR  MECHE.            No new Assessment & Plan notes have been filed under this hospital service since the last note was generated.  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 [R78.81, B95.62] 11/03/2023 Yes      Problems Resolved During this Admission:      Discharged Condition: stable    Disposition: Home or Self Care    Follow Up:    Patient Instructions:      Ambulatory referral/consult to Anticoagulation Monitoring (PHARMACIST MANAGED)   Standing Status: Future   Referral Priority: Routine Referral Type: Consultation   Referral Reason: Specialty Services Required   Requested Specialty: Cardiology   Number of Visits Requested: 1     Medications:  Reconciled Home Medications:      Medication List        START taking these medications      acetaminophen 500 MG tablet  Commonly known as: TYLENOL  Take 2 tablets (1,000 mg total) by mouth every 6 (six) hours as needed for Pain.     aspirin 81 MG EC tablet  Commonly known as: ECOTRIN  Take 1 tablet (81 mg total) by mouth once daily.     doxycycline 100 MG tablet  Commonly known as: VIBRA-TABS  Take 1 tablet (100 mg total) by mouth every 12 (twelve) hours. for 10 doses  Replaces: doxycycline 100 MG Cap     famotidine 20 MG tablet  Commonly known as: PEPCID  Take 1 tablet (20 mg total) by mouth 2 (two) times daily.     ferrous sulfate 325 (65 FE) MG EC tablet  Take 1 tablet (325 mg total) by mouth once daily.     furosemide 20 MG tablet  Commonly known as: LASIX  Take one tablet by mouth twice daily for 7 days then decrease to  once daily     oxyCODONE 5 MG immediate release tablet  Commonly known as: ROXICODONE  Take 1 tablet (5 mg total) by mouth every 8 (eight) hours as needed for Pain.     polyethylene glycol 17 gram/dose powder  Commonly known as: GLYCOLAX  Use cap to measure 17 grams.  Dissolve as directed and take by mouth 2 (two) times daily as needed for Constipation.     senna-docusate 8.6-50 mg 8.6-50 mg per tablet  Commonly known as: PERICOLACE  Take 1 tablet by mouth daily as needed for Constipation.     warfarin 1 MG tablet  Commonly known as: COUMADIN  Take 2 tablets (2 mg total) by mouth Daily.            CHANGE how you take these medications      potassium chloride SA 20 MEQ tablet  Commonly known as: K-DURKLOR-CON  Take one tablet by mouth twice daily for 7 days then decrease to once daily  What changed:   how much to take  how to take this  when to take this  additional instructions            CONTINUE taking these medications      dapagliflozin propanediol 10 mg tablet  Commonly known as: Farxiga  Take 1 tablet (10 mg total) by mouth once daily.     mirtazapine 15 MG disintegrating tablet  Commonly known as: REMERON SOL-TAB  Dissolve 1 tablet (15 mg total) by mouth nightly.            STOP taking these medications      amiodarone 200 MG Tab  Commonly known as: PACERONE     apixaban 5 mg Tab  Commonly known as: ELIQUIS     doxycycline 100 MG Cap  Commonly known as: VIBRAMYCIN  Replaced by: doxycycline 100 MG tablet     metoprolol succinate 25 MG 24 hr tablet  Commonly known as: TOPROL-XL     torsemide 10 MG Tab  Commonly known as: DEMADEX            Time spent on the discharge of patient: 30 minutes    Evita Barth PA-C  Cardiothoracic Surgery  [facility] - Cardiology Stepdown

## 2024-08-23 NOTE — PROGRESS NOTES
Spoke to patient regarding enrollment into the Coumadin Clinic. Patient verified that he has a pink 1 mg warfarin tablet. Patient advised on warfarin regimen of 2 mg, 8/23, and 1 mg, 8/24-8/26. INR scheduled for 08/26/2024.    Patient educated on diet and when to call. Patient will avoid foods with vitamin K, and EtOH. The risks associated with consuming EtOH, while taking warfarin, and the importance of a consistent diet discussed. Questions and concerns addressed at this time, and he expressed understanding. Education sheets sent via patient portal to reinforce teaching.

## 2024-08-23 NOTE — PROGRESS NOTES
Met with pt and his mother at bedside and reviewed wound care instructions for pt's midsternal and chest tube site incisions, s/p mitral valve replacement  Reviewed daily inspection and cleaning of surgical incisions and instructed pt to call the clinic with any questions or concerns.  Pt provided with a hand-out (see below) which contains detailed instructions on daily wound care inspection and care.  Pt reminded of his 5 pound lifting, pushing, and pulling restrictions for the first 6 weeks following his surgery and to refrain from driving until released by Dr. Beal.  Pt informed of his post-op appts and was provided with a copy of his appts for that day.  Pt and his mother verbalized understanding of all instructions.          Showering   If your incisions are healing and there is no drainage - it is ok to take a shower.  Shower with your back to the shower spray.  It is ok for your incision to get wet, but the shower spray should not directly hit your chest.  Do not soak in a tub.  The water temperature should be warm -- not too hot or cold. Extreme water temperatures can cause you to feel faint.  It is expected that you wash your incisions when you shower, however only use soap and water to cleanse the sites.  -Use normal bar soap, not perfumed soap or body wash. During your recovery, do not try a new brand of soap.  -Place soapy water on your hand or a clean washcloth and gently wash your incisions using an up-and-down motion.  -Do not apply ointments, oils, or salves to your incisions.  -Pat the skin gently to dry.     1. Wash your hands before and after caring for or touching your incisions.  2. Look in the mirror daily. Inspect your incisions for redness, drainage and warmth. Your incisions should be healing; there should NOT be an increase in opening.  3. Gently wash your incisions everyday with soap and warm water using a clean washcloth, or your hand and light touch.  4. Gently pat dry with a clean  towel.  5. You do not need to cover your incisions unless they are draining.  If you are experiencing drainage, it is important to call your doctor.  Monday - Friday, from 8:00am - 5pm, call [phone].  Outside of these hours, please call [phone], which is a 24-hour nurse care advice line.     When to call your doctor:  -Increased drainage or oozing from the incision(s)  -Increased opening of the incision line(s) - there should not be gaps or pulling apart areas of the incision(s)  -Redness along the incision(s) - if the incisions were red or pink when you left the hospital, they should be improving and not becoming more red  -Warmth along the incision line(s)  -Increased body temperature / Fever - greater than 101 degrees Fahrenheit  -If you have diabetes and your blood sugar levels begin to vary

## 2024-08-23 NOTE — SUBJECTIVE & OBJECTIVE
Interval History:   - S/p DC PPM yesterday, no complications  - Feeling well this AM, minimal site tenderness    Objective:     Vital Signs (Most Recent):  Temp: 96.5 °F (35.8 °C) (08/23/24 0752)  Pulse: 80 (08/23/24 0752)  Resp: 17 (08/23/24 0752)  BP: 104/60 (08/23/24 0752)  SpO2: (!) 93 % (08/23/24 0752) Vital Signs (24h Range):  Temp:  [96.5 °F (35.8 °C)-98.2 °F (36.8 °C)] 96.5 °F (35.8 °C)  Pulse:  [50-91] 80  Resp:  [13-50] 17  SpO2:  [93 %-99 %] 93 %  BP: ()/(58-69) 104/60     Weight: 78 kg (171 lb 15.3 oz)  Body mass index is 23.32 kg/m².     SpO2: (!) 93 %        Physical Exam  Constitutional:       Appearance: Normal appearance.   HENT:      Mouth/Throat:      Mouth: Mucous membranes are moist.   Eyes:      Extraocular Movements: Extraocular movements intact.      Conjunctiva/sclera: Conjunctivae normal.   Cardiovascular:      Rate and Rhythm: Normal rate and regular rhythm.      Comments: PPM site without hematoma/induration  Pulmonary:      Effort: Pulmonary effort is normal.   Abdominal:      General: Abdomen is flat.      Palpations: Abdomen is soft.   Musculoskeletal:      Right lower leg: No edema.      Left lower leg: No edema.   Skin:     General: Skin is warm and dry.   Neurological:      General: No focal deficit present.      Mental Status: He is alert and oriented to person, place, and time.            Significant Labs: EP:   Recent Labs   Lab 08/21/24 2037 08/22/24 0316 08/22/24 0316 08/23/24  0605   NA  --  139  --  137   K  --  3.3*  --  3.7   CL  --  101  --  102   CO2  --  26  --  26   GLU  --  98  --  95   BUN  --  16  --  15   CREATININE  --  1.1  --  1.1   CALCIUM  --  8.9  --  8.9   PROT  --  6.5  --  6.2   ALBUMIN  --  2.8*  --  2.7*   BILITOT  --  0.5  --  0.4   ALKPHOS  --  86  --  80   AST  --  35  --  32   ALT  --  22  --  17   ANIONGAP  --  12  --  9   WBC  --  9.89  --  9.70   HGB  --  8.1*  --  7.8*   HCT  --  26.7*   < > 25.1*   PLT  --  509*  --  581*   INR 3.0* 2.8*   --  2.6*    < > = values in this interval not displayed.       Significant Imaging: X-Ray: CXR: X-Ray Chest 1 View (CXR): No results found for this visit on 08/12/24.

## 2024-08-23 NOTE — PLAN OF CARE
[facility] Cardiology Stepdown  Discharge Final Note    Primary Care Provider: No, Primary Doctor    Expected Discharge Date: 8/23/2024    Final Discharge Note (most recent)       Final Note - 08/23/24 0921          Final Note    Assessment Type Final Discharge Note     Anticipated Discharge Disposition Home or Self Care                 Per CTS AMEE Barth referral has been placed to the [facility] and pt is no longer in need of PCP appt at the [facility].  CTS to schedule their own follow up.      Humera Luong LMSW  [facility]  [id number]      Future Appointments   Date Time Provider Department Center   8/29/2024  2:40 PM PACEMAKER, ICD NOM ARRHPRO [facility]   9/10/2024  9:30 AM [facility] [id number] LB LIMIT [facility] XRAY OP [facility]   9/10/2024  9:45 AM EKG, APPT NOM EKG [facility]   9/10/2024 10:00 AM Manuel Beal MD NOM CARDVAS [facility]

## 2024-08-23 NOTE — NURSING
Nurses Note -- 4 Eyes      8/23/2024   6:02 AM      Skin assessed during: Transfer      [] No Altered Skin Integrity Present    []Prevention Measures Documented      [x] Yes- Altered Skin Integrity Present or Discovered   [] LDA Added if Not in Epic (Describe Wound)   [] New Altered Skin Integrity was Present on Admit and Documented in LDA   [x] Altered Skin Integrity already added in LDA    Wound Care Consulted? No    Attending Nurse:  RAVEN Huffman RN/Staff Member:  ANNA Novoa

## 2024-08-23 NOTE — PLAN OF CARE
Pt free of falls and injury. Pt AAOx4 with VSS. Fall precautions remain in place. Sternal precautions maintained and Left arm in sling. Pt remains on room air. Sats 96-98%. NSR on telemetry. Plan of care reviewed with pt. Pacer wires remained in place. Intake and output monitored. Patient complained of left shoulder pain at incision. PRN medication and ice pack applied with some relief. Pt denies chest pain, headache, and SOB. No acute distress noted,  plan of care continues.      Problem: Adult Inpatient Plan of Care  Goal: Plan of Care Review  Outcome: Progressing  Goal: Patient-Specific Goal (Individualized)  Outcome: Progressing  Goal: Absence of Hospital-Acquired Illness or Injury  Outcome: Progressing  Goal: Optimal Comfort and Wellbeing  Outcome: Progressing  Goal: Readiness for Transition of Care  Outcome: Progressing     Problem: Infection  Goal: Absence of Infection Signs and Symptoms  Outcome: Progressing     Problem: Cardiovascular Surgery  Goal: Improved Activity Tolerance  Outcome: Progressing  Goal: Optimal Coping with Heart Surgery  Outcome: Progressing  Goal: Absence of Bleeding  Outcome: Progressing  Goal: Effective Bowel Elimination  Outcome: Progressing  Goal: Effective Cardiac Function  Outcome: Progressing  Goal: Optimal Cerebral Tissue Perfusion  Outcome: Progressing  Goal: Fluid and Electrolyte Balance  Outcome: Progressing  Goal: Blood Glucose Level Within Targeted Range  Outcome: Progressing  Goal: Absence of Infection Signs and Symptoms  Outcome: Progressing  Goal: Anesthesia/Sedation Recovery  Outcome: Progressing  Goal: Acceptable Pain Control  Outcome: Progressing  Goal: Nausea and Vomiting Relief  Outcome: Progressing  Goal: Effective Urinary Elimination  Outcome: Progressing  Goal: Effective Oxygenation and Ventilation  Outcome: Progressing     Problem: Wound  Goal: Optimal Coping  Outcome: Progressing  Goal: Optimal Functional Ability  Outcome: Progressing  Goal: Absence of Infection  Signs and Symptoms  Outcome: Progressing  Goal: Improved Oral Intake  Outcome: Progressing  Goal: Optimal Pain Control and Function  Outcome: Progressing  Goal: Skin Health and Integrity  Outcome: Progressing  Goal: Optimal Wound Healing  Outcome: Progressing     Problem: Fall Injury Risk  Goal: Absence of Fall and Fall-Related Injury  Outcome: Progressing     Problem: Skin Injury Risk Increased  Goal: Skin Health and Integrity  Outcome: Progressing

## 2024-08-23 NOTE — ASSESSMENT & PLAN NOTE
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 Patient also has a h/o Afib/flutter in the s/o valvular disease, first noted 11-12/2023. Patient noted to be in Aflutter, likely 4:1. S/p DCCV 8/19 with return to intrinsic rhythm. Patient's rhythm complete heart block that has sustained since valve replacement. Procedure delayed d/t supratherapeutic INR. S/p DC PPM placement 8/22.     Recommendations:  Ancef 2 g  (given prior to procedure in the EP lab)  Antibiotics: Doxycycline 100 mg PO BID x 5 days   No heparin products for 5 days post-implant, including DOACs (warfarin ok to continue immediately post-procedure)  No lifting elbow above shoulder height on arm ipsilateral to pocket site for 6 weeks  No lifting over 10 lbs with arm ipsilateral to pocket site for 1 week  Sling to arm - wear for 48 hours post-procedure, then only at night for 6 weeks  Aquacel Ag - to be removed in 1 week at device clinic follow-up  Follow up in device clinic in 1 week for device and wound checks

## 2024-08-23 NOTE — PROGRESS NOTES
47 yo male who initially presented with endocarditis in late 2023. Medical management was attempted but failed and he developed acute severe mitral regurgitation and required intubation. He underwent urgent mitral valve repair. He was actively infected at the time. He recovered from the operation, but it took several weeks with the infection to clear. He developed recurrent mitral regurgitation. He is s/p mitral valve replacement on 8/12. He is s/p DCCV 8/19 for a.flutter then PPM 8/22 for heart block. Prior to admit pt had a.fib, s/p DCCV, and was on amiodarone & Eliquis. He additionally had GI bleed due to GI mass which has been resected since initial valve repair.     Calendar updated with doses given during admit. He is d/c with a 1mg tablet. He is also d/c on 5d course of doxycyline. Please contact to enroll & educate. Advise on dosing plan and set up INR on Monday.

## 2024-08-23 NOTE — NURSING
Patient was discharged to home. Reviewed discharge instructions which covered medications and appointments. Patient is aware of coumadin clinic to get in touch with him for appointments, after discharge. Patient aware of physical limitations post discharge, and to wear sling for set length of time discussed. Patient verbalized understanding of instructions provided. Peripheral IV's x 3 removed. Tele removed and returned to the nurse station. Patient received medications to bedside. Patient left with all personal belongings and medications. Patient escorted off the unit via wheelchair by patient escort. Patient mom at his side.

## 2024-08-26 ENCOUNTER — ANTI-COAG VISIT (OUTPATIENT)
Dept: CARDIOLOGY | Facility: CLINIC | Age: 49
End: 2024-08-26
Payer: MEDICAID

## 2024-08-26 DIAGNOSIS — Z79.01 LONG TERM (CURRENT) USE OF ANTICOAGULANTS: ICD-10-CM

## 2024-08-26 DIAGNOSIS — I48.3 TYPICAL ATRIAL FLUTTER: Primary | ICD-10-CM

## 2024-08-26 DIAGNOSIS — Z95.2 MITRAL VALVE REPLACED: ICD-10-CM

## 2024-08-26 DIAGNOSIS — I50.32 CHRONIC DIASTOLIC HEART FAILURE: Primary | Chronic | ICD-10-CM

## 2024-08-26 LAB
FUNGUS SPEC CULT: NORMAL
FUNGUS SPEC CULT: NORMAL

## 2024-08-26 PROCEDURE — 93793 ANTICOAG MGMT PT WARFARIN: CPT | Mod: ,,,

## 2024-08-27 ENCOUNTER — TELEPHONE (OUTPATIENT)
Dept: CARDIOTHORACIC SURGERY | Facility: CLINIC | Age: 49
End: 2024-08-27
Payer: MEDICAID

## 2024-08-29 ENCOUNTER — PATIENT MESSAGE (OUTPATIENT)
Dept: CARDIOLOGY | Facility: CLINIC | Age: 49
End: 2024-08-29

## 2024-08-29 ENCOUNTER — CLINICAL SUPPORT (OUTPATIENT)
Dept: CARDIOLOGY | Facility: HOSPITAL | Age: 49
End: 2024-08-29
Attending: STUDENT IN AN ORGANIZED HEALTH CARE EDUCATION/TRAINING PROGRAM
Payer: MEDICAID

## 2024-08-29 ENCOUNTER — ANTI-COAG VISIT (OUTPATIENT)
Dept: CARDIOLOGY | Facility: CLINIC | Age: 49
End: 2024-08-29
Payer: MEDICAID

## 2024-08-29 DIAGNOSIS — I48.3 TYPICAL ATRIAL FLUTTER: Primary | ICD-10-CM

## 2024-08-29 DIAGNOSIS — I48.0 PAROXYSMAL ATRIAL FIBRILLATION: ICD-10-CM

## 2024-08-29 DIAGNOSIS — Z79.01 LONG TERM (CURRENT) USE OF ANTICOAGULANTS: ICD-10-CM

## 2024-08-29 DIAGNOSIS — Z95.2 MITRAL VALVE REPLACED: ICD-10-CM

## 2024-08-29 DIAGNOSIS — Z95.0 CARDIAC PACEMAKER IN SITU: ICD-10-CM

## 2024-08-29 PROCEDURE — 93280 PM DEVICE PROGR EVAL DUAL: CPT

## 2024-08-29 PROCEDURE — 93793 ANTICOAG MGMT PT WARFARIN: CPT | Mod: ,,,

## 2024-09-03 ENCOUNTER — ANTI-COAG VISIT (OUTPATIENT)
Dept: CARDIOLOGY | Facility: CLINIC | Age: 49
End: 2024-09-03
Payer: MEDICAID

## 2024-09-03 DIAGNOSIS — Z95.2 MITRAL VALVE REPLACED: ICD-10-CM

## 2024-09-03 DIAGNOSIS — I48.3 TYPICAL ATRIAL FLUTTER: ICD-10-CM

## 2024-09-03 DIAGNOSIS — I48.0 PAROXYSMAL ATRIAL FIBRILLATION: ICD-10-CM

## 2024-09-03 DIAGNOSIS — Z79.01 LONG TERM (CURRENT) USE OF ANTICOAGULANTS: Primary | ICD-10-CM

## 2024-09-03 PROCEDURE — 93793 ANTICOAG MGMT PT WARFARIN: CPT | Mod: ,,,

## 2024-09-03 RX ORDER — ENOXAPARIN SODIUM 100 MG/ML
80 INJECTION SUBCUTANEOUS EVERY 12 HOURS
Qty: 11.2 ML | Refills: 0 | Status: SHIPPED | OUTPATIENT
Start: 2024-09-03

## 2024-09-03 RX ORDER — ENOXAPARIN SODIUM 100 MG/ML
80 INJECTION SUBCUTANEOUS EVERY 12 HOURS
Qty: 14 EACH | Refills: 0 | Status: SHIPPED | OUTPATIENT
Start: 2024-09-03 | End: 2024-09-03 | Stop reason: SDUPTHER

## 2024-09-03 NOTE — PROGRESS NOTES
Spoke with pt wife who was advised on dosing per calendar and lovenox 80mg q12h starting today ASAP. She was educated on how to administer lovenox injections. Please send new Rx of Lovenox 80mg to new pharmacy on file [facility].

## 2024-09-03 NOTE — PROGRESS NOTES
INR low. Pt missed 2 doses. They mis-read instructions. INR was low last week as well. He is s/p MVR and DCCV. Pt needs lovenox bridge until INR therapeutic. Labs and weight reviewed. Start lovenox 80mg q12h asap today. Increase warfarin dose per calendar. Repeat INR Friday by noon.

## 2024-09-06 ENCOUNTER — ANTI-COAG VISIT (OUTPATIENT)
Dept: CARDIOLOGY | Facility: CLINIC | Age: 49
End: 2024-09-06

## 2024-09-06 DIAGNOSIS — Z95.2 MITRAL VALVE REPLACED: ICD-10-CM

## 2024-09-06 DIAGNOSIS — Z79.01 LONG TERM (CURRENT) USE OF ANTICOAGULANTS: ICD-10-CM

## 2024-09-06 DIAGNOSIS — I48.3 TYPICAL ATRIAL FLUTTER: Primary | ICD-10-CM

## 2024-09-06 NOTE — PROGRESS NOTES
INR improving after missed doses last week. Adjust dose per calendar. Continue lovenox through Sunday morning then stop. INR on Monday.

## 2024-09-09 ENCOUNTER — TELEPHONE (OUTPATIENT)
Dept: CARDIOTHORACIC SURGERY | Facility: CLINIC | Age: 49
End: 2024-09-09
Payer: COMMERCIAL

## 2024-09-09 ENCOUNTER — ANTI-COAG VISIT (OUTPATIENT)
Dept: CARDIOLOGY | Facility: CLINIC | Age: 49
End: 2024-09-09
Payer: COMMERCIAL

## 2024-09-09 DIAGNOSIS — I48.3 TYPICAL ATRIAL FLUTTER: Primary | ICD-10-CM

## 2024-09-09 DIAGNOSIS — Z95.2 MITRAL VALVE REPLACED: ICD-10-CM

## 2024-09-09 DIAGNOSIS — Z79.01 LONG TERM (CURRENT) USE OF ANTICOAGULANTS: ICD-10-CM

## 2024-09-09 PROCEDURE — 93793 ANTICOAG MGMT PT WARFARIN: CPT | Mod: ,,,

## 2024-09-09 NOTE — PROGRESS NOTES
Patient seen and examined. Patient is progressively increasing activity. No significant complaints.     Sternum: stable, incision CDI  Chest xray: Acceptable post op chest  EKG: NSR     Assessment:  S/P MVR 8/12/24     Plan:  Can begin driving as long as he has power steering  Can begin cardiac rehab in the next couple of weeks  We will refer to cardiology to assume care - Dr. Rogelio johnston        No scheduled appointment, RTC prn    CTS Attending Note:    I have personally taken the history and examined this patient and agree with the SIMEON's note as stated above.

## 2024-09-09 NOTE — TELEPHONE ENCOUNTER
Spoke with pt and confirmed 9/10 post-op appt with Dr. Beal. Pt verbalized understanding of times and locations of appointments. Denies any further questions at this time.

## 2024-09-09 NOTE — PROGRESS NOTES
INR increasing. Adjust dose based on trend. Repeat INR Thursday. Ok to d/c lovenox with INR at 2.3

## 2024-09-10 ENCOUNTER — OFFICE VISIT (OUTPATIENT)
Dept: CARDIOTHORACIC SURGERY | Facility: CLINIC | Age: 49
End: 2024-09-10
Payer: COMMERCIAL

## 2024-09-10 ENCOUNTER — HOSPITAL ENCOUNTER (OUTPATIENT)
Dept: CARDIOLOGY | Facility: CLINIC | Age: 49
Discharge: HOME | End: 2024-09-10
Attending: THORACIC SURGERY (CARDIOTHORACIC VASCULAR SURGERY)
Payer: COMMERCIAL

## 2024-09-10 ENCOUNTER — HOSPITAL ENCOUNTER (OUTPATIENT)
Dept: RADIOLOGY | Facility: HOSPITAL | Age: 49
Discharge: HOME | End: 2024-09-10
Attending: THORACIC SURGERY (CARDIOTHORACIC VASCULAR SURGERY)
Payer: COMMERCIAL

## 2024-09-10 VITALS
OXYGEN SATURATION: 96 % | DIASTOLIC BLOOD PRESSURE: 75 MMHG | BODY MASS INDEX: 22.81 KG/M2 | HEIGHT: 72 IN | HEART RATE: 77 BPM | WEIGHT: 168.44 LBS | SYSTOLIC BLOOD PRESSURE: 116 MMHG

## 2024-09-10 DIAGNOSIS — Z95.2 S/P MITRAL VALVE REPLACEMENT: ICD-10-CM

## 2024-09-10 DIAGNOSIS — Z98.890 S/P MVR (MITRAL VALVE REPAIR): Primary | Chronic | ICD-10-CM

## 2024-09-10 LAB
OHS QRS DURATION: 100 MS
OHS QTC CALCULATION: 462 MS

## 2024-09-10 PROCEDURE — 3044F HG A1C LEVEL LT 7.0%: CPT | Mod: CPTII,S$GLB,, | Performed by: THORACIC SURGERY (CARDIOTHORACIC VASCULAR SURGERY)

## 2024-09-10 PROCEDURE — 71046 X-RAY EXAM CHEST 2 VIEWS: CPT | Mod: TC

## 2024-09-10 PROCEDURE — 93005 ELECTROCARDIOGRAM TRACING: CPT | Mod: S$GLB,,, | Performed by: THORACIC SURGERY (CARDIOTHORACIC VASCULAR SURGERY)

## 2024-09-10 PROCEDURE — 93010 ELECTROCARDIOGRAM REPORT: CPT | Mod: S$GLB,,, | Performed by: INTERNAL MEDICINE

## 2024-09-10 PROCEDURE — 99999 PR PBB SHADOW E&M-EST. PATIENT-LVL III: CPT | Mod: PBBFAC,,, | Performed by: THORACIC SURGERY (CARDIOTHORACIC VASCULAR SURGERY)

## 2024-09-10 PROCEDURE — 99024 POSTOP FOLLOW-UP VISIT: CPT | Mod: S$GLB,,, | Performed by: THORACIC SURGERY (CARDIOTHORACIC VASCULAR SURGERY)

## 2024-09-10 PROCEDURE — 71046 X-RAY EXAM CHEST 2 VIEWS: CPT | Mod: 26,,, | Performed by: RADIOLOGY

## 2024-09-10 PROCEDURE — 3078F DIAST BP <80 MM HG: CPT | Mod: CPTII,S$GLB,, | Performed by: THORACIC SURGERY (CARDIOTHORACIC VASCULAR SURGERY)

## 2024-09-10 PROCEDURE — 3074F SYST BP LT 130 MM HG: CPT | Mod: CPTII,S$GLB,, | Performed by: THORACIC SURGERY (CARDIOTHORACIC VASCULAR SURGERY)

## 2024-09-10 NOTE — PATIENT INSTRUCTIONS
Patient states his symptoms have improved. States the last few days he has a been mildly congested. Had a headache on Saturday. Is feeling better overall.  States Thursday and Friday were the worst.    No fever, cough, SOB, wheezing, N/V/D or sore throa
Please make appointments to check in with your PCP and Cardiologist in the next 2 to 6 weeks.    Continue walking during cooler parts of the day or early evening to build up your endurance.     You may drive, if you have power steering.    Your lifting restriction is still in place until you are 12 weeks out from your surgery:     ** 5 pounds first 6 weeks after surgery **   **20 pounds for weeks 7 - 12 after surgery **    COVID Precautions:    Continue to take precautions against COVID. Mask up when around unknown people, wash / sanitize hands frequently. Avoid large groups of people until at least 12 weeks post op.    Cardiac Rehab:    You are eligible for cardiac rehab, a 12-week exercise program designed for heart surgery patients. Please let us know if you are interested and we will send over orders for your referral. Below is a list of local facilities offering this program:    Facility Address Phone   [facility] [address] [place], [place] [address]., [place] [phone]   [facility] [address] [place] [address] [phone]   [facility] [address] [place] [address] [address], [place] [address] [phone]   [facility] [address] [place] [address] [phone]   [facility] [address] [place] [phone]   [facility] [address] [place] [phone]   [address] [place] [phone]   [address] [phone]   [address] [place] [phone]      
None

## 2024-09-10 NOTE — LETTER
September 10, 2024        Emerson Mercado MD  [address]  [facility] [address]             [facility] Cardiovasc Surg 2nd Fl  [address]  [facility] [address]  Phone: [phone]   Patient: Lorenzo Nino   MR Number: [id number]   YOB: 1975   Date of Visit: 9/10/2024       Dear Dr. Mercado:    Thank you for referring Lorenzo Nino to me for evaluation. Below are the relevant portions of my assessment and plan of care.            If you have questions, please do not hesitate to call me. I look forward to following Lorenzo along with you.    Sincerely,      Manuel Beal MD           CC  No Recipients

## 2024-09-12 ENCOUNTER — ANTI-COAG VISIT (OUTPATIENT)
Dept: CARDIOLOGY | Facility: CLINIC | Age: 49
End: 2024-09-12
Payer: COMMERCIAL

## 2024-09-12 DIAGNOSIS — I48.0 PAROXYSMAL ATRIAL FIBRILLATION: Primary | ICD-10-CM

## 2024-09-12 DIAGNOSIS — I48.3 TYPICAL ATRIAL FLUTTER: ICD-10-CM

## 2024-09-12 DIAGNOSIS — Z79.01 LONG TERM (CURRENT) USE OF ANTICOAGULANTS: ICD-10-CM

## 2024-09-12 DIAGNOSIS — Z95.2 MITRAL VALVE REPLACED: ICD-10-CM

## 2024-09-12 PROCEDURE — 93793 ANTICOAG MGMT PT WARFARIN: CPT | Mod: S$GLB,,,

## 2024-09-18 ENCOUNTER — ANTI-COAG VISIT (OUTPATIENT)
Dept: CARDIOLOGY | Facility: CLINIC | Age: 49
End: 2024-09-18
Payer: COMMERCIAL

## 2024-09-18 DIAGNOSIS — I48.3 TYPICAL ATRIAL FLUTTER: Primary | ICD-10-CM

## 2024-09-18 DIAGNOSIS — Z95.2 MITRAL VALVE REPLACED: ICD-10-CM

## 2024-09-18 DIAGNOSIS — Z79.01 LONG TERM (CURRENT) USE OF ANTICOAGULANTS: ICD-10-CM

## 2024-09-18 PROCEDURE — 93793 ANTICOAG MGMT PT WARFARIN: CPT | Mod: S$GLB,,,

## 2024-09-19 ENCOUNTER — PATIENT MESSAGE (OUTPATIENT)
Dept: PRIMARY CARE CLINIC | Facility: CLINIC | Age: 49
End: 2024-09-19
Payer: COMMERCIAL

## 2024-09-23 ENCOUNTER — CLINICAL SUPPORT (OUTPATIENT)
Dept: CARDIOLOGY | Facility: HOSPITAL | Age: 49
End: 2024-09-23
Attending: STUDENT IN AN ORGANIZED HEALTH CARE EDUCATION/TRAINING PROGRAM
Payer: COMMERCIAL

## 2024-09-23 DIAGNOSIS — Z95.0 PRESENCE OF CARDIAC PACEMAKER: ICD-10-CM

## 2024-09-23 DIAGNOSIS — I44.2 ATRIOVENTRICULAR BLOCK, COMPLETE: ICD-10-CM

## 2024-09-23 PROCEDURE — 93296 REM INTERROG EVL PM/IDS: CPT | Performed by: STUDENT IN AN ORGANIZED HEALTH CARE EDUCATION/TRAINING PROGRAM

## 2024-09-23 PROCEDURE — 93294 REM INTERROG EVL PM/LDLS PM: CPT | Mod: ,,, | Performed by: STUDENT IN AN ORGANIZED HEALTH CARE EDUCATION/TRAINING PROGRAM

## 2024-09-25 ENCOUNTER — ANTI-COAG VISIT (OUTPATIENT)
Dept: CARDIOLOGY | Facility: CLINIC | Age: 49
End: 2024-09-25
Payer: COMMERCIAL

## 2024-09-25 DIAGNOSIS — I48.3 TYPICAL ATRIAL FLUTTER: Primary | ICD-10-CM

## 2024-09-25 DIAGNOSIS — Z95.2 MITRAL VALVE REPLACED: ICD-10-CM

## 2024-09-25 DIAGNOSIS — Z79.01 LONG TERM (CURRENT) USE OF ANTICOAGULANTS: ICD-10-CM

## 2024-09-25 PROCEDURE — 93793 ANTICOAG MGMT PT WARFARIN: CPT | Mod: S$GLB,,,

## 2024-09-30 LAB
OHS CV AF BURDEN PERCENT: < 1
OHS CV DC REMOTE DEVICE TYPE: NORMAL
OHS CV RV PACING PERCENT: 10.45 %

## 2024-10-09 ENCOUNTER — ANTI-COAG VISIT (OUTPATIENT)
Dept: CARDIOLOGY | Facility: CLINIC | Age: 49
End: 2024-10-09
Payer: COMMERCIAL

## 2024-10-09 DIAGNOSIS — Z79.01 LONG TERM (CURRENT) USE OF ANTICOAGULANTS: ICD-10-CM

## 2024-10-09 DIAGNOSIS — Z95.2 MITRAL VALVE REPLACED: ICD-10-CM

## 2024-10-09 DIAGNOSIS — I48.3 TYPICAL ATRIAL FLUTTER: Primary | ICD-10-CM

## 2024-10-09 PROCEDURE — 93793 ANTICOAG MGMT PT WARFARIN: CPT | Mod: S$GLB,,,

## 2024-10-30 ENCOUNTER — ANTI-COAG VISIT (OUTPATIENT)
Dept: CARDIOLOGY | Facility: CLINIC | Age: 49
End: 2024-10-30
Payer: COMMERCIAL

## 2024-10-30 DIAGNOSIS — Z79.01 LONG TERM (CURRENT) USE OF ANTICOAGULANTS: ICD-10-CM

## 2024-10-30 DIAGNOSIS — I48.3 TYPICAL ATRIAL FLUTTER: Primary | ICD-10-CM

## 2024-10-30 DIAGNOSIS — Z95.2 MITRAL VALVE REPLACED: ICD-10-CM

## 2024-10-30 PROCEDURE — 93793 ANTICOAG MGMT PT WARFARIN: CPT | Mod: S$GLB,,,

## 2024-11-04 ENCOUNTER — ANTI-COAG VISIT (OUTPATIENT)
Dept: CARDIOLOGY | Facility: CLINIC | Age: 49
End: 2024-11-04
Payer: COMMERCIAL

## 2024-11-04 DIAGNOSIS — I48.3 TYPICAL ATRIAL FLUTTER: Primary | ICD-10-CM

## 2024-11-04 DIAGNOSIS — Z79.01 LONG TERM (CURRENT) USE OF ANTICOAGULANTS: ICD-10-CM

## 2024-11-04 DIAGNOSIS — Z95.2 MITRAL VALVE REPLACED: ICD-10-CM

## 2024-11-04 PROCEDURE — 93793 ANTICOAG MGMT PT WARFARIN: CPT | Mod: S$GLB,,,

## 2024-11-11 ENCOUNTER — ANTI-COAG VISIT (OUTPATIENT)
Dept: CARDIOLOGY | Facility: CLINIC | Age: 49
End: 2024-11-11
Payer: COMMERCIAL

## 2024-11-11 DIAGNOSIS — Z95.2 MITRAL VALVE REPLACED: ICD-10-CM

## 2024-11-11 DIAGNOSIS — I48.3 TYPICAL ATRIAL FLUTTER: ICD-10-CM

## 2024-11-11 DIAGNOSIS — Z79.01 LONG TERM (CURRENT) USE OF ANTICOAGULANTS: ICD-10-CM

## 2024-11-11 DIAGNOSIS — I48.0 PAROXYSMAL ATRIAL FIBRILLATION: Primary | ICD-10-CM

## 2024-11-11 PROCEDURE — 93793 ANTICOAG MGMT PT WARFARIN: CPT | Mod: S$GLB,,,

## 2024-11-25 ENCOUNTER — PATIENT MESSAGE (OUTPATIENT)
Dept: CARDIOLOGY | Facility: CLINIC | Age: 49
End: 2024-11-25

## 2024-11-25 ENCOUNTER — ANTI-COAG VISIT (OUTPATIENT)
Dept: CARDIOLOGY | Facility: CLINIC | Age: 49
End: 2024-11-25
Payer: COMMERCIAL

## 2024-11-25 DIAGNOSIS — Z95.2 MITRAL VALVE REPLACED: ICD-10-CM

## 2024-11-25 DIAGNOSIS — I48.0 PAROXYSMAL ATRIAL FIBRILLATION: Primary | ICD-10-CM

## 2024-11-25 DIAGNOSIS — Z79.01 LONG TERM (CURRENT) USE OF ANTICOAGULANTS: ICD-10-CM

## 2024-11-25 DIAGNOSIS — I48.3 TYPICAL ATRIAL FLUTTER: ICD-10-CM

## 2024-11-25 PROCEDURE — 93793 ANTICOAG MGMT PT WARFARIN: CPT | Mod: S$GLB,,,

## 2024-11-25 NOTE — PROGRESS NOTES
INR not at goal. Medications, chart, and patient findings reviewed. See calendar for adjustments to dose and follow up plan.    Pt reports he has a head cold and inquired on what he can take. Pt can take Coricidin for cold symptoms, Robutussin cough syrup if has a cough, Flonase nasal spray for sinus congestion, Zyrtec for runny nose/sneezing. He should NOT take Nyquil. He can take Benadryl at night for symptoms and sleep aid. All products can use generics/store brands.

## 2024-12-02 ENCOUNTER — PATIENT MESSAGE (OUTPATIENT)
Dept: CARDIOLOGY | Facility: CLINIC | Age: 49
End: 2024-12-02
Payer: COMMERCIAL

## 2024-12-04 ENCOUNTER — ANTI-COAG VISIT (OUTPATIENT)
Dept: CARDIOLOGY | Facility: CLINIC | Age: 49
End: 2024-12-04
Payer: COMMERCIAL

## 2024-12-04 DIAGNOSIS — Z79.01 LONG TERM (CURRENT) USE OF ANTICOAGULANTS: ICD-10-CM

## 2024-12-04 DIAGNOSIS — Z95.2 MITRAL VALVE REPLACED: ICD-10-CM

## 2024-12-04 DIAGNOSIS — I48.3 TYPICAL ATRIAL FLUTTER: Primary | ICD-10-CM

## 2024-12-04 PROCEDURE — 93793 ANTICOAG MGMT PT WARFARIN: CPT | Mod: S$GLB,,,

## 2024-12-04 NOTE — PROGRESS NOTES
INR at goal. Medications and chart reviewed. No changes noted to necessitate adjustment of warfarin or follow-up plan. See calendar.    Watch trend on new dose.

## 2024-12-06 NOTE — RESPIRATORY THERAPY
Pt arrived to SICU [id number] from OR intubated with a 8.0 ETT secured 24@ the teeth. Pt placed on  ventilator at documented settings, RT will continue to follow.  
Yes

## 2024-12-09 ENCOUNTER — PATIENT MESSAGE (OUTPATIENT)
Dept: CARDIOLOGY | Facility: CLINIC | Age: 49
End: 2024-12-09
Payer: COMMERCIAL

## 2024-12-11 ENCOUNTER — TELEPHONE (OUTPATIENT)
Dept: CARDIOLOGY | Facility: CLINIC | Age: 49
End: 2024-12-11
Payer: COMMERCIAL

## 2024-12-11 DIAGNOSIS — Z01.818 PRE-OP EXAM: Primary | ICD-10-CM

## 2024-12-12 ENCOUNTER — OFFICE VISIT (OUTPATIENT)
Dept: CARDIOLOGY | Facility: CLINIC | Age: 49
End: 2024-12-12
Payer: COMMERCIAL

## 2024-12-12 ENCOUNTER — HOSPITAL ENCOUNTER (OUTPATIENT)
Dept: CARDIOLOGY | Facility: CLINIC | Age: 49
Discharge: HOME | End: 2024-12-12
Payer: COMMERCIAL

## 2024-12-12 VITALS
BODY MASS INDEX: 22.63 KG/M2 | SYSTOLIC BLOOD PRESSURE: 137 MMHG | OXYGEN SATURATION: 100 % | HEART RATE: 66 BPM | DIASTOLIC BLOOD PRESSURE: 85 MMHG | WEIGHT: 166.88 LBS

## 2024-12-12 DIAGNOSIS — Z95.2 MITRAL VALVE REPLACED: Primary | ICD-10-CM

## 2024-12-12 DIAGNOSIS — I50.32 CHRONIC DIASTOLIC HEART FAILURE: Primary | ICD-10-CM

## 2024-12-12 DIAGNOSIS — R94.31 NONSPECIFIC ABNORMAL ELECTROCARDIOGRAM (ECG) (EKG): ICD-10-CM

## 2024-12-12 DIAGNOSIS — Z01.818 PRE-OP EXAM: ICD-10-CM

## 2024-12-12 DIAGNOSIS — I48.91 ATRIAL FIBRILLATION, UNSPECIFIED TYPE: ICD-10-CM

## 2024-12-12 DIAGNOSIS — I44.2 COMPLETE HEART BLOCK: ICD-10-CM

## 2024-12-12 LAB
OHS QRS DURATION: 102 MS
OHS QTC CALCULATION: 431 MS

## 2024-12-12 PROCEDURE — 99999 PR PBB SHADOW E&M-EST. PATIENT-LVL IV: CPT | Mod: PBBFAC,,, | Performed by: STUDENT IN AN ORGANIZED HEALTH CARE EDUCATION/TRAINING PROGRAM

## 2024-12-12 PROCEDURE — 93010 ELECTROCARDIOGRAM REPORT: CPT | Mod: S$GLB,,, | Performed by: STUDENT IN AN ORGANIZED HEALTH CARE EDUCATION/TRAINING PROGRAM

## 2024-12-12 NOTE — PROGRESS NOTES
v    Cardiology Clinic Note  Reason for Visit: Mitral valve Replacement    HPI:   Summary: 10/31/23 - today: 49M with long history regarding management of MRSA mitral valve endocarditis starting in 10/31/2023. Proceeded to have mitral valve repair on 11/2023 with subsequent annular ring dehiscence which required replacement with mechanical valve on 8/2024. He had associated afib/flutter from his mitral valve disease. Also CHB after his valve replacement which required dcPPM w/ LBBAP. His last EF in 8/2024 after replacement was preserved. He has no further fib/flutter events on interrogation and is off amiodarone after his CHB. Today he feels well. He is continuing to work on improving his functional status. He can walk 10 minutes, but afterwards needs to take a breath. Still has trouble walking up a flight of stairs. However does not report any chest pain, shortness of breath, palpitations, leg swelling, orthopnea. Reports some lightheadedness with standing. Initial automated BP in clinic read 137/85, but manual pressure was 126/88. EKG shows some inferior Q waves in III, avF >30ms.    10/31/23: Hospital presentation. Presented with severe MR 2/2 MRSA endocarditis requiring hospitalization and intubation.    11/3/23: Surgery. Underwent successful MV repair with triangular resection of P2 and placement of freehand-created bovine pericardial annuloplasty band using 40mm Medtronic Simuplus sizer as template. ROMAN was resected.    11/14/23: Hospital Discharge. EF remained low 40-45%. Discharged home in stable condition and IV antibiotics until 12/26/24.    12/15/23: Office visit with Dr. Mercado. Sternum was well healed, some lightheadedness with systolics 90s on nifedipine, metoprolol. Otherwise stable.   12/19/24 EF 50-55%.    1/12/24: Office visit with Dr. Mercado. Prior to this visit he presented with worsening heart failure and hypoxic respiratory failure due to annular ring dehiscence. Infectious workup was  negative. Had occurrence of atrial fibrillation which was converted and maintained to sinus on amiodarone. CT surgery goal to keep optimize pre-operative status with GDMT and diuretics before surgical reattempt    4/1/24: Office visit with Dr. Mercado. Patient replaced teeth, tubular adenoma removed without event, improved nutritional status and weight gain, without orthopnea/edema/HALL. Continued on amiodarone 400mg daily and suppressive antibiotics. Creatinine 1.7 with elevated BNP which was trending down.    7/11/24: Office visit with Dr. Mercado. Prior to visit he had admission for atrial flutter requiring flutter and continuation of amiodarone. Having NYHA class 2-3 symptoms, but without orthopnea, volume overload, and stable weight. 7/5/24 echo with EF 55-60%, severely dilated left atrium, severe MR, PASP 54mmHg. Deemed optimized for cardiac surgery and to be reevaluated by Dr. Beal    8/12-23/2024: Admitted and went to [facility] for Mitral valve replacement with 33mm Carbomedics standard mechanical prosthesis    8/19/24: DCCV with Dr. Peter. Atrial flutter converted to NSR with DCCV    8/22/24 pacemaker with Dr. Ordonez: Hospital course was complicated by CHB for which dcPPM with LBBAP lead was placed. Amiodarone was held in the setting of heart block.      ROS:    Constitution: Negative for fever, chills, weight loss or gain.   HENT: Negative for sore throat, rhinorrhea, or headache.  Eyes: Negative for blurred or double vision.   Cardiovascular: See above  Pulmonary: Negative for SOB   Gastrointestinal: Negative for abdominal pain, nausea, vomiting, or diarrhea.   : Negative for dysuria.   Neurological: Negative for focal weakness or sensory changes.  PMH:     Past Medical History:   Diagnosis Date    Hypertension      Past Surgical History:   Procedure Laterality Date    A-V CARDIAC PACEMAKER INSERTION N/A 8/22/2024    Procedure: INSERTION, CARDIAC PACEMAKER, DUAL CHAMBER;  Surgeon: DIPESH Ordonez MD;   Location: [facility] EP LAB;  Service: Cardiology;  Laterality: N/A;  AFL, DUAL PPM LBAP, mdt, [place]ES, [place] [id number]    COLONOSCOPY N/A 1/5/2024    Procedure: COLONOSCOPY;  Surgeon: Fadia Ellsworth MD;  Location: [facility] ENDO (2ND FLR);  Service: Endoscopy;  Laterality: N/A;    COLONOSCOPY, WITH DIRECTED SUBMUCOSAL INJECTION  2/27/2024    Procedure: COLONOSCOPY, WITH DIRECTED SUBMUCOSAL INJECTION;  Surgeon: Mayur Dunn MD;  Location: [facility] OR 2ND FLR;  Service: Colon and Rectal;;    COLONOSCOPY, WITH POLYPECTOMY USING SNARE N/A 2/27/2024    Procedure: COLONOSCOPY, WITH POLYPECTOMY USING SNARE;  Surgeon: Mayur Dunn MD;  Location: [facility] OR 2ND FLR;  Service: Colon and Rectal;  Laterality: N/A;    ECHOCARDIOGRAM,TRANSESOPHAGEAL N/A 12/26/2023    Procedure: Transesophageal echo (NEW) intra-procedure log documentation;  Surgeon: [facility];  Location: [facility] EP LAB;  Service: Cardiology;  Laterality: N/A;    ECHOCARDIOGRAM,TRANSESOPHAGEAL N/A 8/19/2024    Procedure: Transesophageal echo (NEW) intra-procedure log documentation;  Surgeon: Emerson Mercado MD;  Location: [facility] EP LAB;  Service: Cardiology;  Laterality: N/A;    ESOPHAGOGASTRODUODENOSCOPY N/A 1/5/2024    Procedure: EGD (ESOPHAGOGASTRODUODENOSCOPY);  Surgeon: Fadia Ellsworth MD;  Location: [facility] ENDO (2ND FLR);  Service: Endoscopy;  Laterality: N/A;    EXCLUSION, LEFT ATRIAL APPENDAGE, OPEN, AS PART OF OPEN CHEST SURGERY Left 11/3/2023    Procedure: EXCLUSION, LEFT ATRIAL APPENDAGE, OPEN, AS PART OF OPEN CHEST SURGERY;  Surgeon: Manuel Beal MD;  [facility];  Service: Cardiothoracic;  Laterality: Left;    INSERTION OF INTRA-AORTIC BALLOON ASSIST DEVICE Right 11/2/2023    Procedure: INSERTION, INTRA-AORTIC BALLOON PUMP;  Surgeon: Jose Vidal MD;  Location: [facility] CATH LAB;  Service: Cardiology;  Laterality: Right;    MITRAL VALVE REPLACEMENT N/A 8/12/2024    Procedure: REPLACEMENT, MITRAL VALVE;  Surgeon:  Manuel Beal MD;  [address]  Service: Cardiothoracic;  Laterality: N/A;    REPAIR, MITRAL VALVE, OPEN N/A 11/3/2023    Procedure: REPAIR, MITRAL VALVE, OPEN;  Surgeon: Manuel Beal MD;  [facility];  Service: Cardiothoracic;  Laterality: N/A;    STERNOTOMY N/A 8/12/2024    Procedure: REDO STERNOTOMY;  Surgeon: Manuel Beal MD;  [facility];  Service: Cardiothoracic;  Laterality: N/A;    TREATMENT OF CARDIAC ARRHYTHMIA N/A 8/19/2024    Procedure: Cardioversion or Defibrillation;  Surgeon: George Peter MD;  Location: [facility] EP LAB;  Service: Cardiology;  Laterality: N/A;  AF, DCCV/NEW, ANES, [place] [id number]     Allergies:   Review of patient's allergies indicates:  No Known Allergies  Medications:     Current Outpatient Medications on File Prior to Visit   Medication Sig Dispense Refill    aspirin (ECOTRIN) 81 MG EC tablet Take 1 tablet (81 mg total) by mouth once daily. 30 tablet 11    warfarin (COUMADIN) 1 MG tablet Take 2 tablets (2 mg total) by mouth Daily. 60 tablet 11    famotidine (PEPCID) 20 MG tablet Take 1 tablet (20 mg total) by mouth 2 (two) times daily. (Patient not taking: Reported on 12/12/2024) 60 tablet 11    mirtazapine (REMERON SOL-TAB) 15 MG disintegrating tablet Dissolve 1 tablet (15 mg total) by mouth nightly. (Patient not taking: Reported on 12/12/2024) 30 tablet 11    [DISCONTINUED] dapagliflozin propanediol (FARXIGA) 10 mg tablet Take 1 tablet (10 mg total) by mouth once daily. 30 tablet 11    [DISCONTINUED] enoxaparin (LOVENOX) 80 mg/0.8 mL Syrg Inject 0.8 mLs (80 mg total) into the skin every 12 (twelve) hours. As directed by coumadin clinic (Patient not taking: Reported on 12/12/2024) 11.2 mL 0     Current Facility-Administered Medications on File Prior to Visit   Medication Dose Route Frequency Provider Last Rate Last Admin    0.9%  NaCl infusion   Intravenous Continuous Adriana Black NP 70 mL/hr at 02/27/24 1927  New Bag at 08/22/24 1037    mupirocin 2 % ointment   Nasal On Call Procedure Adriana Black NP   Given at 02/27/24 5471     Social History:     Social History     Tobacco Use    Smoking status: Unknown     Passive exposure: Never    Smokeless tobacco: Not on file   Substance Use Topics    Alcohol use: Yes     Alcohol/week: 1.0 standard drink of alcohol     Types: 1 Cans of beer per week     Family History:     Family History   Problem Relation Name Age of Onset    Amblyopia Neg Hx      Blindness Neg Hx      Cataracts Neg Hx      Glaucoma Neg Hx      Macular degeneration Neg Hx      Retinal detachment Neg Hx      Strabismus Neg Hx       Physical Exam:   BP (P) 137/85 (Patient Position: Sitting)   Pulse 66   Ht (P) 6' (1.829 m)   Wt 75.7 kg (166 lb 14.2 oz)   SpO2 100%   BMI (P) 22.63 kg/m²    Wt Readings from Last 4 Encounters:   12/12/24 75.7 kg (166 lb 14.2 oz)   09/10/24 76.4 kg (168 lb 6.9 oz)   08/21/24 78 kg (171 lb 15.3 oz)   08/08/24 79 kg (174 lb 2.6 oz)         Constitutional: No distress, conversant  HEENT: Sclera anicteric, PERRLA, EOMI  Neck: No JVD, no masses, good movement  CV: RRR. Mechanical valve click audible. No gallops, rubs. No s3/s4. Mid clavicular PMI. No RV Heave  Pulm: Clear to auscultation bilaterally with symmetrical expansion.   GI: Abdomen soft, non-tender, good bowel sounds  Extremities: Both extremities intact and grossly normal, skin is warm, no edema noted  Skin: No ecchymosis, erythema, or ulcers  Psych: AOx3, appropriate affect  Neuro: CNII-XII intact, no focal deficits      Labs:     Lab Results   Component Value Date     08/23/2024    K 3.7 08/23/2024     08/23/2024    CO2 26 08/23/2024    BUN 15 08/23/2024    CREATININE 1.1 08/23/2024    ANIONGAP 9 08/23/2024     Lab Results   Component Value Date    HGBA1C 5.4 08/08/2024     Lab Results   Component Value Date     (H) 06/22/2024    BNP 1,029 (H) 04/08/2024     (H) 03/11/2024    Lab Results    Component Value Date    WBC 9.70 08/23/2024    HGB 7.8 (L) 08/23/2024    HCT 25.1 (L) 08/23/2024    HCT 25 (L) 08/13/2024     (H) 08/23/2024    GRAN 6.1 08/23/2024    GRAN 62.9 08/23/2024     Lab Results   Component Value Date    TRIG 370 (H) 10/31/2023          Imaging:    Echo  EF   Date Value Ref Range Status   04/08/2024 60 % Final       Assessment:   49M with long history regarding management of MRSA mitral valve endocarditis starting in 10/31/2023. Proceeded to have mitral valve repair on 11/2023 with subsequent annular ring dehiscence which required replacement with mechanical valve on 8/2024. He had associated afib/flutter from his mitral valve disease. Also CHB after his valve replacement which required dcPPM w/ LBBAP.     Plan:     1. Mitral valve replaced    2. Complete heart block    3. Atrial fibrillation, unspecified type    4. Nonspecific abnormal electrocardiogram (ECG) (EKG)      #Mechanical mitral valve replacement  Patient with extensive IE and MV repair/replacement history. 10/2023 had MRSA endocarditis which was repaired. However, the annular ring dehiscence on 12/26/23 NEW. His preoperative status was optimized and underwent mechanical mitral valve replacement with Dr. Beal on 8/12/24. He now had 33mm Carbomedics standard mechanical prosthesis. Patient deferring cardiac rehab for now.  -8/19/24 NEW with EF 55-60%. Will obtain baseline echo now, then at year 5 and 10.  -Functional status today is NYHA II. Volume status is euvolemic.  -INRs have been therapeutic, last 3.1 on 12/4/24. Warfarin regimen is currently 3mg (Sunday, Tuesday), and 2mg (all other days). Continue following coumadin clinic    #CHB  MV replacement complicated by CHB for which Medtronic dcPPM w/ LBBAP placed by Dr. Ordonez. The most recent 9/23/24 interrogation shows a pacing 0.7%, with RVpacing 10.45%. There was 0% AT/AF burden between 8/29/24 - 9/23/24.  Settings:  AAI <-> DDD switch  Lower rate 60 bpm  upper  track 130bpm  upper sense 130bpm  AV delay 180mg  AV sensed delay 150ms  Battery life 12 years.  Follow up with Dr. Ordonez     #Valvular Afib/flutter  11/2/23 EKG documents first occurrence of afib, with subsequent fib/flutter events. Occurring in the setting of his mitral valve disease. He underwent prior DCCV and was rhythm controlled on amiodarone. During in 8/2024 admission for mitral valve replacement, he had flutter which was DCCV. However developed CHB following his surgery for which amiodarone was stopped. The most recent 9/23/24 interrogation shows 0% AT/AF burden between 8/29/24-9/23/24. 10.45% RV pacing.  Continue warfarin for mechanical mitral valve.  Follow up with Dr. Ordonez. Currently asymptomatic from fib/flutter standpoint.    #Abnormal EKG  Patient with inferior Q waves >30ms. Will follow up echo now and symptoms after 6 months. If persistent Qs on serial EKGs and concern for ischemia, will do further evaluation.        Signed:  Finn Estrada M.D.  Cardiology Fellow  [facility]  12/12/2024 9:33 AM

## 2024-12-16 ENCOUNTER — ANTI-COAG VISIT (OUTPATIENT)
Dept: CARDIOLOGY | Facility: CLINIC | Age: 49
End: 2024-12-16
Payer: COMMERCIAL

## 2024-12-16 DIAGNOSIS — I48.20 CHRONIC ATRIAL FIBRILLATION: Primary | ICD-10-CM

## 2024-12-16 DIAGNOSIS — I48.3 TYPICAL ATRIAL FLUTTER: ICD-10-CM

## 2024-12-16 DIAGNOSIS — Z79.01 LONG TERM (CURRENT) USE OF ANTICOAGULANTS: ICD-10-CM

## 2024-12-16 DIAGNOSIS — Z95.2 MITRAL VALVE REPLACED: ICD-10-CM

## 2024-12-16 PROCEDURE — 93793 ANTICOAG MGMT PT WARFARIN: CPT | Mod: S$GLB,,,

## 2024-12-22 ENCOUNTER — CLINICAL SUPPORT (OUTPATIENT)
Dept: CARDIOLOGY | Facility: HOSPITAL | Age: 49
End: 2024-12-22
Payer: COMMERCIAL

## 2024-12-22 DIAGNOSIS — Z95.0 PRESENCE OF CARDIAC PACEMAKER: ICD-10-CM

## 2024-12-22 DIAGNOSIS — I44.2 ATRIOVENTRICULAR BLOCK, COMPLETE: ICD-10-CM

## 2024-12-23 ENCOUNTER — CLINICAL SUPPORT (OUTPATIENT)
Dept: CARDIOLOGY | Facility: HOSPITAL | Age: 49
End: 2024-12-23
Attending: STUDENT IN AN ORGANIZED HEALTH CARE EDUCATION/TRAINING PROGRAM
Payer: COMMERCIAL

## 2024-12-23 DIAGNOSIS — Z95.0 PRESENCE OF CARDIAC PACEMAKER: ICD-10-CM

## 2024-12-23 DIAGNOSIS — I44.2 ATRIOVENTRICULAR BLOCK, COMPLETE: ICD-10-CM

## 2024-12-23 PROCEDURE — 93296 REM INTERROG EVL PM/IDS: CPT | Performed by: STUDENT IN AN ORGANIZED HEALTH CARE EDUCATION/TRAINING PROGRAM

## 2024-12-23 PROCEDURE — 93294 REM INTERROG EVL PM/LDLS PM: CPT | Mod: ,,, | Performed by: STUDENT IN AN ORGANIZED HEALTH CARE EDUCATION/TRAINING PROGRAM

## 2024-12-26 ENCOUNTER — PATIENT MESSAGE (OUTPATIENT)
Dept: CARDIOLOGY | Facility: CLINIC | Age: 49
End: 2024-12-26
Payer: COMMERCIAL

## 2024-12-27 LAB
OHS CV AF BURDEN PERCENT: < 1
OHS CV DC REMOTE DEVICE TYPE: NORMAL
OHS CV RV PACING PERCENT: 0.06 %

## 2024-12-30 ENCOUNTER — ANTI-COAG VISIT (OUTPATIENT)
Dept: CARDIOLOGY | Facility: CLINIC | Age: 49
End: 2024-12-30
Payer: COMMERCIAL

## 2024-12-30 DIAGNOSIS — Z79.01 LONG TERM (CURRENT) USE OF ANTICOAGULANTS: ICD-10-CM

## 2024-12-30 DIAGNOSIS — Z95.2 MITRAL VALVE REPLACED: ICD-10-CM

## 2024-12-30 DIAGNOSIS — I48.3 TYPICAL ATRIAL FLUTTER: Primary | ICD-10-CM

## 2024-12-30 PROCEDURE — 93793 ANTICOAG MGMT PT WARFARIN: CPT | Mod: S$GLB,,,

## 2024-12-30 NOTE — PROGRESS NOTES
[facility]    2024 11:26 AM    Assessment/Plan:    Patient presents today with therapeutic INR.    Assessment of patient findings and chart review: INR in range. No changes noted to necessitate adjustment to warfarin regimen.     Recommendation for patient's warfarin regimen: Continue current maintenance dose    Recommend repeat INR in 2 weeks  _________________________________________________________________    Lorenzo Mica (49 y.o.) is followed by the [facility].    Anticoagulation Summary  As of 2024      INR goal:  2.5-3.5   TTR:  64.4% (4 mo)   INR used for dosin.7 (2024)   Warfarin maintenance plan:  3 mg (1 mg x 3) every Sun; 2 mg (1 mg x 2) all other days   Weekly warfarin total:  15 mg   Plan last modified:  Holley Bolaños, PharmD (2024)   Next INR check:  2025   Target end date:  --    Indications    A-fib with RVR [I48.91]  Typical atrial flutter [I48.3]  Long term (current) use of anticoagulants [Z79.01]  Mitral valve replaced [Z95.2]                 Anticoagulation Episode Summary       INR check location:  --    Preferred lab:  --    Send INR reminders to:  [facility] COUMADIN MONITORING POOL    Comments:  [facility]          Anticoagulation Care Providers       Provider Role Specialty Phone number    Emerson Mercado MD  Cardiovascular Disease [phone]

## 2025-01-08 ENCOUNTER — TELEPHONE (OUTPATIENT)
Dept: OPTOMETRY | Facility: CLINIC | Age: 50
End: 2025-01-08
Payer: COMMERCIAL

## 2025-01-10 ENCOUNTER — HOSPITAL ENCOUNTER (OUTPATIENT)
Dept: CARDIOLOGY | Facility: HOSPITAL | Age: 50
Discharge: HOME | End: 2025-01-10
Attending: STUDENT IN AN ORGANIZED HEALTH CARE EDUCATION/TRAINING PROGRAM
Payer: COMMERCIAL

## 2025-01-10 VITALS
BODY MASS INDEX: 22.6 KG/M2 | HEART RATE: 60 BPM | DIASTOLIC BLOOD PRESSURE: 82 MMHG | SYSTOLIC BLOOD PRESSURE: 132 MMHG | WEIGHT: 166.88 LBS | HEIGHT: 72 IN

## 2025-01-10 DIAGNOSIS — Z95.2 MITRAL VALVE REPLACED: ICD-10-CM

## 2025-01-10 LAB
ASCENDING AORTA: 3.23 CM
AV AREA BY CONTINUOUS VTI: 2.1 CM2
AV INDEX (PROSTH): 0.6
AV LVOT MEAN GRADIENT: 2 MMHG
AV LVOT PEAK GRADIENT: 4 MMHG
AV MEAN GRADIENT: 4.4 MMHG
AV PEAK GRADIENT: 7.8 MMHG
AV VALVE AREA BY VELOCITY RATIO: 2.5 CM²
AV VALVE AREA: 2.1 CM2
AV VELOCITY RATIO: 0.71
BSA FOR ECHO PROCEDURE: 1.96 M2
CV ECHO LV RWT: 0.32 CM
DOP CALC AO PEAK VEL: 1.4 M/S
DOP CALC AO VTI: 29.9 CM
DOP CALC LVOT AREA: 3.5 CM2
DOP CALC LVOT DIAMETER: 2.1 CM
DOP CALC LVOT PEAK VEL: 1 M/S
DOP CALC LVOT STROKE VOLUME: 61.6 CM3
DOP CALC RVOT AREA: 4.52 CM2
DOP CALC RVOT DIAMETER: 2.4 CM
DOP CALCLVOT PEAK VEL VTI: 17.8 CM
E WAVE DECELERATION TIME: 360.71 MS
E/A RATIO: 1.23
ECHO EF ESTIMATED: 62 %
ECHO LV POSTERIOR WALL: 0.8 CM (ref 0.6–1.1)
FRACTIONAL SHORTENING: 34 % (ref 28–44)
HR MV ECHO: 61 BPM
INTERVENTRICULAR SEPTUM: 0.8 CM (ref 0.6–1.1)
IVC DIAMETER: 0.83 CM
IVRT: 114.18 MS
LA MAJOR: 6.03 CM
LA MINOR: 6 CM
LA WIDTH: 4.95 CM
LEFT ATRIUM SIZE: 4.03 CM
LEFT ATRIUM VOLUME INDEX MOD: 42 ML/M2
LEFT ATRIUM VOLUME INDEX: 51.8 ML/M2
LEFT ATRIUM VOLUME MOD: 82.67 ML
LEFT ATRIUM VOLUME: 101.99 CM3
LEFT INTERNAL DIMENSION IN SYSTOLE: 3.3 CM (ref 2.1–4)
LEFT VENTRICLE DIASTOLIC VOLUME INDEX: 59.58 ML/M2
LEFT VENTRICLE DIASTOLIC VOLUME: 117.37 ML
LEFT VENTRICLE MASS INDEX: 68.9 G/M2
LEFT VENTRICLE SYSTOLIC VOLUME INDEX: 22.7 ML/M2
LEFT VENTRICLE SYSTOLIC VOLUME: 44.81 ML
LEFT VENTRICULAR INTERNAL DIMENSION IN DIASTOLE: 5 CM (ref 3.5–6)
LEFT VENTRICULAR MASS: 135.8 G
MV A" WAVE DURATION": 82.78 MS
MV MEAN GRADIENT: 3 MMHG
MV PEAK A VEL: 0.93 M/S
MV PEAK E VEL: 1.14 M/S
MV PEAK GRADIENT: 7 MMHG
MV VALVE AREA BY PLANIMETRY: 2.08 CM2
OHS CV RV/LV RATIO: 0.76 CM
OHS LV EJECTION FRACTION SIMPSONS BIPLANE MOD: 59 %
PISA TR MAX VEL: 2.5 M/S
PULM VEIN A" WAVE DURATION": 82.78 MS
PULM VEIN S/D RATIO: 1.59
PULMONIC VEIN PEAK A VELOCITY: 0.3 M/S
PV PEAK D VEL: 0.27 M/S
PV PEAK S VEL: 0.43 M/S
RA MAJOR: 4.76 CM
RA PRESSURE ESTIMATED: 3 MMHG
RA WIDTH: 3.1 CM
RIGHT ATRIAL AREA: 11.2 CM2
RIGHT VENTRICLE DIASTOLIC BASEL DIMENSION: 3.8 CM
RV TB RVSP: 6 MMHG
RV TISSUE DOPPLER FREE WALL SYSTOLIC VELOCITY 1 (APICAL 4 CHAMBER VIEW): 11.06 CM/S
SINUS: 3.42 CM
STJ: 2.74 CM
TR MAX PG: 25 MMHG
TRICUSPID ANNULAR PLANE SYSTOLIC EXCURSION: 1.39 CM
TV PEAK GRADIENT: 25 MMHG
TV REST PULMONARY ARTERY PRESSURE: 28 MMHG
Z-SCORE OF LEFT VENTRICULAR DIMENSION IN END DIASTOLE: -1.24
Z-SCORE OF LEFT VENTRICULAR DIMENSION IN END SYSTOLE: -0.42

## 2025-01-10 PROCEDURE — 93306 TTE W/DOPPLER COMPLETE: CPT | Mod: 26,,, | Performed by: INTERNAL MEDICINE

## 2025-01-10 PROCEDURE — 93306 TTE W/DOPPLER COMPLETE: CPT

## 2025-01-13 ENCOUNTER — ANTI-COAG VISIT (OUTPATIENT)
Dept: CARDIOLOGY | Facility: CLINIC | Age: 50
End: 2025-01-13
Payer: COMMERCIAL

## 2025-01-13 DIAGNOSIS — Z79.01 LONG TERM (CURRENT) USE OF ANTICOAGULANTS: ICD-10-CM

## 2025-01-13 DIAGNOSIS — I48.3 TYPICAL ATRIAL FLUTTER: Primary | ICD-10-CM

## 2025-01-13 DIAGNOSIS — Z95.2 MITRAL VALVE REPLACED: ICD-10-CM

## 2025-01-13 PROCEDURE — 93793 ANTICOAG MGMT PT WARFARIN: CPT | Mod: S$GLB,,,

## 2025-01-24 ENCOUNTER — ANTI-COAG VISIT (OUTPATIENT)
Dept: CARDIOLOGY | Facility: CLINIC | Age: 50
End: 2025-01-24
Payer: COMMERCIAL

## 2025-01-24 ENCOUNTER — PATIENT MESSAGE (OUTPATIENT)
Dept: CARDIOLOGY | Facility: CLINIC | Age: 50
End: 2025-01-24

## 2025-01-24 DIAGNOSIS — I48.3 TYPICAL ATRIAL FLUTTER: Primary | ICD-10-CM

## 2025-01-24 DIAGNOSIS — Z95.2 MITRAL VALVE REPLACED: ICD-10-CM

## 2025-01-24 DIAGNOSIS — Z79.01 LONG TERM (CURRENT) USE OF ANTICOAGULANTS: ICD-10-CM

## 2025-01-24 PROCEDURE — 93793 ANTICOAG MGMT PT WARFARIN: CPT | Mod: S$GLB,,,

## 2025-01-28 ENCOUNTER — CLINICAL SUPPORT (OUTPATIENT)
Dept: OPHTHALMOLOGY | Facility: CLINIC | Age: 50
End: 2025-01-28
Payer: COMMERCIAL

## 2025-01-28 ENCOUNTER — OFFICE VISIT (OUTPATIENT)
Dept: OPHTHALMOLOGY | Facility: CLINIC | Age: 50
End: 2025-01-28
Payer: COMMERCIAL

## 2025-01-28 DIAGNOSIS — H35.61 SUBRETINAL HEMORRHAGE, RIGHT: Primary | ICD-10-CM

## 2025-01-28 PROCEDURE — 92014 COMPRE OPH EXAM EST PT 1/>: CPT | Mod: S$GLB,,, | Performed by: OPHTHALMOLOGY

## 2025-01-28 PROCEDURE — 92201 OPSCPY EXTND RTA DRAW UNI/BI: CPT | Mod: S$GLB,,, | Performed by: OPHTHALMOLOGY

## 2025-01-28 PROCEDURE — 1160F RVW MEDS BY RX/DR IN RCRD: CPT | Mod: CPTII,S$GLB,, | Performed by: OPHTHALMOLOGY

## 2025-01-28 PROCEDURE — 1159F MED LIST DOCD IN RCRD: CPT | Mod: CPTII,S$GLB,, | Performed by: OPHTHALMOLOGY

## 2025-01-28 PROCEDURE — 99999 PR PBB SHADOW E&M-EST. PATIENT-LVL III: CPT | Mod: PBBFAC,,, | Performed by: OPHTHALMOLOGY

## 2025-01-28 PROCEDURE — 92134 CPTRZ OPH DX IMG PST SGM RTA: CPT | Mod: S$GLB,,, | Performed by: OPHTHALMOLOGY

## 2025-01-28 NOTE — PROGRESS NOTES
HPI     Follow-up     Additional comments: SRH right eye           Comments    50 yo male here today with c/o blind spot in VA OD, no pain ou and denies   floaters or flashes.    No gtts          Last edited by Kiah Charles on 1/28/2025  8:53 AM.          OCT - OD SR hemorrhage, IR hemorrhage improving.  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

## 2025-02-03 ENCOUNTER — ANTI-COAG VISIT (OUTPATIENT)
Dept: CARDIOLOGY | Facility: CLINIC | Age: 50
End: 2025-02-03
Payer: COMMERCIAL

## 2025-02-03 DIAGNOSIS — I48.3 TYPICAL ATRIAL FLUTTER: Primary | ICD-10-CM

## 2025-02-03 DIAGNOSIS — Z95.2 MITRAL VALVE REPLACED: ICD-10-CM

## 2025-02-03 DIAGNOSIS — Z79.01 LONG TERM (CURRENT) USE OF ANTICOAGULANTS: ICD-10-CM

## 2025-02-03 PROCEDURE — 93793 ANTICOAG MGMT PT WARFARIN: CPT | Mod: S$GLB,,,

## 2025-02-03 NOTE — PROGRESS NOTES
Hello MrAndrew Eastman, just following up on the results of your echocardiogram study. It looks like the valve is doing fine and your heart function is mildly low. If you are feeling well, we do not have to make any medication changes at this time. I tried to call you a couple times, but the phone did not . I would like to see you in May to see how everything is going, but if you start to feel shortness of breath, leg swelling, chest pain, please call the clinic for an earlier visit.

## 2025-02-03 NOTE — PROGRESS NOTES
INR a tad low. Pt confirmed dose and compliance. Denies changes. He drinks 2-3 [employer] shakes per week which do not contain Vit K and are fine. Increase dose. Recheck INR in 1 week

## 2025-02-05 ENCOUNTER — PATIENT MESSAGE (OUTPATIENT)
Dept: CARDIOLOGY | Facility: CLINIC | Age: 50
End: 2025-02-05
Payer: COMMERCIAL

## 2025-02-07 ENCOUNTER — PATIENT MESSAGE (OUTPATIENT)
Dept: CARDIOLOGY | Facility: CLINIC | Age: 50
End: 2025-02-07
Payer: COMMERCIAL

## 2025-02-10 ENCOUNTER — ANTI-COAG VISIT (OUTPATIENT)
Dept: CARDIOLOGY | Facility: CLINIC | Age: 50
End: 2025-02-10
Payer: COMMERCIAL

## 2025-02-10 ENCOUNTER — PATIENT MESSAGE (OUTPATIENT)
Dept: CARDIOLOGY | Facility: CLINIC | Age: 50
End: 2025-02-10

## 2025-02-10 DIAGNOSIS — I48.3 TYPICAL ATRIAL FLUTTER: Primary | ICD-10-CM

## 2025-02-10 DIAGNOSIS — Z95.2 MITRAL VALVE REPLACED: ICD-10-CM

## 2025-02-10 DIAGNOSIS — Z79.01 LONG TERM (CURRENT) USE OF ANTICOAGULANTS: ICD-10-CM

## 2025-02-10 PROCEDURE — 93793 ANTICOAG MGMT PT WARFARIN: CPT | Mod: S$GLB,,,

## 2025-02-10 NOTE — PROGRESS NOTES
INR at goal. Medications and chart reviewed. No changes noted to necessitate adjustment of warfarin or follow-up plan. See calendar.    Watch trend on new dose

## 2025-02-17 ENCOUNTER — ANTI-COAG VISIT (OUTPATIENT)
Dept: CARDIOLOGY | Facility: CLINIC | Age: 50
End: 2025-02-17
Payer: COMMERCIAL

## 2025-02-17 DIAGNOSIS — I48.3 TYPICAL ATRIAL FLUTTER: Primary | ICD-10-CM

## 2025-02-17 DIAGNOSIS — Z95.2 MITRAL VALVE REPLACED: ICD-10-CM

## 2025-02-17 DIAGNOSIS — Z79.01 LONG TERM (CURRENT) USE OF ANTICOAGULANTS: ICD-10-CM

## 2025-03-06 ENCOUNTER — ANTI-COAG VISIT (OUTPATIENT)
Dept: CARDIOLOGY | Facility: CLINIC | Age: 50
End: 2025-03-06
Payer: COMMERCIAL

## 2025-03-06 DIAGNOSIS — I48.3 TYPICAL ATRIAL FLUTTER: Primary | ICD-10-CM

## 2025-03-06 DIAGNOSIS — Z79.01 LONG TERM (CURRENT) USE OF ANTICOAGULANTS: ICD-10-CM

## 2025-03-06 DIAGNOSIS — Z95.2 MITRAL VALVE REPLACED: ICD-10-CM

## 2025-03-06 PROCEDURE — 93793 ANTICOAG MGMT PT WARFARIN: CPT | Mod: S$GLB,,,

## 2025-03-19 ENCOUNTER — TELEPHONE (OUTPATIENT)
Dept: ELECTROPHYSIOLOGY | Facility: CLINIC | Age: 50
End: 2025-03-19
Payer: COMMERCIAL

## 2025-03-20 ENCOUNTER — ANTI-COAG VISIT (OUTPATIENT)
Dept: CARDIOLOGY | Facility: CLINIC | Age: 50
End: 2025-03-20
Payer: COMMERCIAL

## 2025-03-20 DIAGNOSIS — I48.0 PAROXYSMAL ATRIAL FIBRILLATION: Primary | ICD-10-CM

## 2025-03-20 DIAGNOSIS — I48.3 TYPICAL ATRIAL FLUTTER: ICD-10-CM

## 2025-03-20 DIAGNOSIS — Z79.01 LONG TERM (CURRENT) USE OF ANTICOAGULANTS: ICD-10-CM

## 2025-03-20 DIAGNOSIS — Z95.2 MITRAL VALVE REPLACED: ICD-10-CM

## 2025-03-20 PROCEDURE — 93793 ANTICOAG MGMT PT WARFARIN: CPT | Mod: S$GLB,,,

## 2025-03-25 ENCOUNTER — PATIENT MESSAGE (OUTPATIENT)
Dept: ELECTROPHYSIOLOGY | Facility: CLINIC | Age: 50
End: 2025-03-25

## 2025-03-25 ENCOUNTER — CLINICAL SUPPORT (OUTPATIENT)
Dept: CARDIOLOGY | Facility: HOSPITAL | Age: 50
End: 2025-03-25
Payer: COMMERCIAL

## 2025-03-25 ENCOUNTER — CLINICAL SUPPORT (OUTPATIENT)
Dept: CARDIOLOGY | Facility: HOSPITAL | Age: 50
End: 2025-03-25
Attending: STUDENT IN AN ORGANIZED HEALTH CARE EDUCATION/TRAINING PROGRAM
Payer: COMMERCIAL

## 2025-03-25 DIAGNOSIS — Z95.0 PRESENCE OF CARDIAC PACEMAKER: ICD-10-CM

## 2025-03-25 DIAGNOSIS — I44.2 ATRIOVENTRICULAR BLOCK, COMPLETE: ICD-10-CM

## 2025-03-25 PROCEDURE — 93296 REM INTERROG EVL PM/IDS: CPT | Performed by: STUDENT IN AN ORGANIZED HEALTH CARE EDUCATION/TRAINING PROGRAM

## 2025-03-25 PROCEDURE — 93294 REM INTERROG EVL PM/LDLS PM: CPT | Mod: ,,, | Performed by: STUDENT IN AN ORGANIZED HEALTH CARE EDUCATION/TRAINING PROGRAM

## 2025-03-29 LAB
OHS CV AF BURDEN PERCENT: < 1
OHS CV DC REMOTE DEVICE TYPE: NORMAL
OHS CV RV PACING PERCENT: 0.12 %

## 2025-03-31 ENCOUNTER — ANTI-COAG VISIT (OUTPATIENT)
Dept: CARDIOLOGY | Facility: CLINIC | Age: 50
End: 2025-03-31
Payer: COMMERCIAL

## 2025-03-31 DIAGNOSIS — Z79.01 LONG TERM (CURRENT) USE OF ANTICOAGULANTS: ICD-10-CM

## 2025-03-31 DIAGNOSIS — I48.3 TYPICAL ATRIAL FLUTTER: Primary | ICD-10-CM

## 2025-03-31 DIAGNOSIS — Z95.2 MITRAL VALVE REPLACED: ICD-10-CM

## 2025-03-31 PROCEDURE — 93793 ANTICOAG MGMT PT WARFARIN: CPT | Mod: S$GLB,,,

## 2025-04-10 ENCOUNTER — CLINICAL SUPPORT (OUTPATIENT)
Dept: CARDIOLOGY | Facility: HOSPITAL | Age: 50
End: 2025-04-10
Attending: STUDENT IN AN ORGANIZED HEALTH CARE EDUCATION/TRAINING PROGRAM
Payer: COMMERCIAL

## 2025-04-10 ENCOUNTER — OFFICE VISIT (OUTPATIENT)
Dept: ELECTROPHYSIOLOGY | Facility: CLINIC | Age: 50
End: 2025-04-10
Payer: COMMERCIAL

## 2025-04-10 ENCOUNTER — HOSPITAL ENCOUNTER (OUTPATIENT)
Dept: CARDIOLOGY | Facility: CLINIC | Age: 50
Discharge: HOME | End: 2025-04-10
Payer: COMMERCIAL

## 2025-04-10 VITALS
WEIGHT: 172.19 LBS | HEART RATE: 89 BPM | HEIGHT: 72 IN | BODY MASS INDEX: 23.32 KG/M2 | SYSTOLIC BLOOD PRESSURE: 140 MMHG | DIASTOLIC BLOOD PRESSURE: 78 MMHG

## 2025-04-10 DIAGNOSIS — I44.2 COMPLETE HEART BLOCK: ICD-10-CM

## 2025-04-10 DIAGNOSIS — Z79.01 LONG TERM (CURRENT) USE OF ANTICOAGULANTS: Primary | ICD-10-CM

## 2025-04-10 DIAGNOSIS — I50.32 CHRONIC DIASTOLIC HEART FAILURE: ICD-10-CM

## 2025-04-10 DIAGNOSIS — I48.91 ATRIAL FIBRILLATION, UNSPECIFIED TYPE: ICD-10-CM

## 2025-04-10 DIAGNOSIS — Z95.0 PACEMAKER: ICD-10-CM

## 2025-04-10 LAB
OHS QRS DURATION: 90 MS
OHS QTC CALCULATION: 422 MS

## 2025-04-10 PROCEDURE — 1160F RVW MEDS BY RX/DR IN RCRD: CPT | Mod: CPTII,S$GLB,, | Performed by: NURSE PRACTITIONER

## 2025-04-10 PROCEDURE — 93010 ELECTROCARDIOGRAM REPORT: CPT | Mod: S$GLB,,, | Performed by: INTERNAL MEDICINE

## 2025-04-10 PROCEDURE — 93280 PM DEVICE PROGR EVAL DUAL: CPT

## 2025-04-10 PROCEDURE — 3008F BODY MASS INDEX DOCD: CPT | Mod: CPTII,S$GLB,, | Performed by: NURSE PRACTITIONER

## 2025-04-10 PROCEDURE — 1159F MED LIST DOCD IN RCRD: CPT | Mod: CPTII,S$GLB,, | Performed by: NURSE PRACTITIONER

## 2025-04-10 PROCEDURE — 93005 ELECTROCARDIOGRAM TRACING: CPT | Mod: S$GLB,,, | Performed by: STUDENT IN AN ORGANIZED HEALTH CARE EDUCATION/TRAINING PROGRAM

## 2025-04-10 PROCEDURE — 3078F DIAST BP <80 MM HG: CPT | Mod: CPTII,S$GLB,, | Performed by: NURSE PRACTITIONER

## 2025-04-10 PROCEDURE — 99999 PR PBB SHADOW E&M-EST. PATIENT-LVL III: CPT | Mod: PBBFAC,,, | Performed by: NURSE PRACTITIONER

## 2025-04-10 PROCEDURE — 99214 OFFICE O/P EST MOD 30 MIN: CPT | Mod: S$GLB,,, | Performed by: NURSE PRACTITIONER

## 2025-04-10 PROCEDURE — 3077F SYST BP >= 140 MM HG: CPT | Mod: CPTII,S$GLB,, | Performed by: NURSE PRACTITIONER

## 2025-04-10 NOTE — PROGRESS NOTES
Electrophysiology Clinic Progress Note     EP Attending: Dr. Joan Ordonez   PCP - No, Primary Doctor  Cardiology:       Patient has not been seen in EP clinic in the past.    Subjective:   Patient ID:   Lorenzo Nino is a 49 y.o. 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 Beal, postoperative atrial fibrillation, and pulmonary hypertension, SB with CHB, dcPPM (LBBAP) who presents for follow up of PPM check      HPI:    Background:    8/21/2024: Mr. 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 Beal, postoperative atrial fibrillation, and pulmonary hypertension. His systolic function remains preserved, with LVEF 55-60%. Unfortunately, following his surgery he remained in slowly conducting atrial flutter, and underwent successful NEW and cardioversion on 8/19/2024. Despite restoration and maintenance of sinus rhythm, he has remained with sinus bradycardia with complete heart block and has continued to require ventricular pacing. We discussed the indication for implantation of a pacemaker, ideally with a left bundle branch area pacing (LBBAP) system. We will await therapeutic INR levels prior to proceeding with this procedure. All questions and concerns were addressed.    8/22/2024: Successful  implantation of a Medtronic dual-chamber left bundle branch area pacing (LBBAP) pacemaker. The LV activation time at the final location was 74 msec.      Update (4/10/2025):    Today he tells me he has been doing well. Reports fatigue sometimes. HALL sometimes but no worse than normal. No swelling. He incision site to LUCW healed.     Device Interrogation (4/10/2025) reveals an intrinsic SR with stable lead and device function. AMS mode switch < 1%. SVT x 7 episodes with longest duration 1 minute 33 seconds. He paces 16.7% in the RA and 1.2% in the RV. Estimated battery longevity 14.2 years.     I have personally reviewed the patient's EKG today, which shows sinus rhythm at 64 bpm. WI interval is 184 ms. QRS is 90ms. QTc is 422 ms.    Relevant Cardiac Test Results:    2D Echo:  Echo  Result Date: 1/10/2025    Left Ventricle: The left ventricle is normal in size. Ventricular mass   is normal. Normal wall thickness. Regional wall motion abnormalities   present. There is mildly reduced systolic function with a visually   estimated ejection fraction of 45 - 50%. Diastolic function cannot be   reliably determined in the presence of mitral valve ring/replacement.    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 This is present on the study of 7/24    Right Ventricle: Normal right ventricular cavity size. Wall thickness   is normal. Right ventricle wall motion  is normal. Systolic function is   normal.    Left Atrium: Left atrium is moderately dilated.    Right Atrium: Normal right atrial size.    Mitral Valve: There is a mechanical valve in the mitral position. It is   reported to be a 33 mm Carbomedics valve. The mean pressure gradient   across the mitral valve is 3 mmHg at a heart rate of 61 bpm. There is mild   regurgitation.    Tricuspid Valve: There is mild regurgitation with a centrally directed   jet.    Aorta: Aortic root is normal in size measuring 3.42 cm.  Ascending aorta   is normal measuring 3.23 cm.    Pulmonary Artery: The estimated pulmonary artery systolic pressure is   28 mmHg.    IVC/SVC: Normal venous pressure at 3 mmHg.    Pericardium: There is no pericardial effusion.    )  Mitral valve replacement with a 33 mm Carbomedics standard mechanical   prosthesis.     2)  Redo sternotomy        Transesophageal echo (NEW)  Result Date: 8/19/2024    Limited NEW prior to DCCV    Left Ventricle: The left ventricle is normal in size. Normal wall   thickness. There is normal systolic function with a visually estimated   ejection fraction of 55 - 60%.    Left Atrium: Left atrium is dilated. Surgical closure of the Left   Atrial Appendage.The left atrial appendage has been surgically closed with   small remnant present. No thrombus visualized    Aortic Valve: The aortic valve is a trileaflet valve.    Mitral Valve: There is a bileaflet mechanical valve in the mitral   position that is well-seated. It is reported to be a 33 mm Carbomedics   valve. There is no significant regurgitation. No paravalvular   regurgitation.        Echo  Result Date: 7/5/2024  Images from the original result were not included.      Left Ventricle: The left ventricle is moderately dilated. Normal wall   thickness. Normal wall motion. There is normal systolic function with a   visually estimated ejection fraction of 55 - 60%. Diastolic function   cannot be reliably determined in the presence of mitral valve disease and   mitral valve ring/replacement.    Right Ventricle: Normal right ventricular cavity size. Wall thickness   is normal. Right ventricle wall motion  is normal. Systolic function is   normal.    Left Atrium: Left atrium is severely dilated.    Right Atrium: Normal right atrial size.    Mitral Valve: The mitral valve is repaired by annular ring 40 mm   Medtronic. There is mild bileaflet sclerosis. There is severe   regurgitation with a posterolateral eccentriccally directed and a central     jet. There appears to be a perforation in Anterior leaflets (clip #96 to   96- see below).Torn tissue is seen in this area in LA but its proximal   attacment is not well seen. Consider NEW if clinically indicated. Mean   gradient is 2.5 mmHg at HR o 53 bpm.    Tricuspid Valve: There is mild to moderate regurgitation with a   centrally directed jet.    Pulmonic Valve: There is mild regurgitation.    Aorta: Aortic root is normal in size measuring 3.47 cm. Ascending aorta   is normal measuring 3.55 cm.    Pulmonary Artery: There is moderate pulmonary hypertension. The   estimated pulmonary artery systolic pressure is 54 mmHg.    IVC/SVC: Normal venous pressure at 3 mmHg.    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   2)  Resection of left atrial appendage             Current Outpatient Medications   Medication Sig    aspirin (ECOTRIN) 81 MG EC tablet Take 1 tablet (81 mg total) by mouth once daily.    warfarin (COUMADIN) 1 MG tablet Take 2 tablets (2 mg total) by mouth Daily.     No current facility-administered medications for this visit.     Facility-Administered Medications Ordered in Other Visits   Medication    0.9%  NaCl infusion    mupirocin 2 % ointment       Review of Systems   Constitutional: Positive for malaise/fatigue (sometimes). Negative for chills and fever.   HENT:  Negative for nosebleeds.    Cardiovascular:  Positive for dyspnea on exertion (sometimes, no worse than normal). Negative for chest pain, leg swelling, near-syncope, palpitations and syncope.   Respiratory:  Negative for shortness of breath.    Hematologic/Lymphatic: Bruises/bleeds easily.   Musculoskeletal: Negative.    Gastrointestinal:  Negative for hematochezia and melena.   Genitourinary:  Negative for hematuria.   Neurological:  Negative for dizziness and  light-headedness.   Psychiatric/Behavioral:  Negative for altered mental status.        Objective:     BP (!) 140/78   Pulse 89   Ht 6' (1.829 m)   Wt 78.1 kg (172 lb 2.9 oz)   BMI 23.35 kg/m²     Physical Exam  Vitals and nursing note reviewed.   Constitutional:       General: He is not in acute distress.     Appearance: Normal appearance.   HENT:      Head: Normocephalic.      Mouth/Throat:      Mouth: Mucous membranes are moist.   Eyes:      Extraocular Movements: Extraocular movements intact.   Cardiovascular:      Rate and Rhythm: Normal rate and regular rhythm.   Pulmonary:      Effort: Pulmonary effort is normal. No respiratory distress.   Musculoskeletal:         General: Normal range of motion.      Cervical back: Normal range of motion.   Neurological:      General: No focal deficit present.      Mental Status: He is alert.   Psychiatric:         Mood and Affect: Mood normal.         Behavior: Behavior normal.         Thought Content: Thought content normal.         Judgment: Judgment normal.       Lab Results   Component Value Date     08/23/2024    K 3.7 08/23/2024     08/23/2024    CO2 26 08/23/2024    BUN 15 08/23/2024    CREATININE 1.1 08/23/2024    MG 2.1 08/23/2024    TSH 1.303 06/22/2024    ALT 17 08/23/2024    AST 32 08/23/2024       Lab Results   Component Value Date    HGBA1C 5.4 08/08/2024    Lab Results   Component Value Date    WBC 9.70 08/23/2024    HGB 7.8 (L) 08/23/2024    HCT 25.1 (L) 08/23/2024    HCT 25 (L) 08/13/2024     (H) 08/23/2024    GRAN 6.1 08/23/2024    GRAN 62.9 08/23/2024    INR 3.1 (H) 03/31/2025    INR 2.2 (H) 03/20/2025    APTT 59.8 (H) 08/23/2024                No results found.    Assessment:     1. Long term (current) use of anticoagulants    2. Complete heart block    3. Pacemaker    4. Atrial fibrillation, unspecified type    5. Chronic diastolic heart failure        Plan:     In summary, oLrenzo Nino is a 49 y.o. 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 Beal, postoperative atrial fibrillation, and pulmonary hypertension, SB with CHB, dcPPM (LBBAP) who presents for follow up of PPM check    Patient is doing well from an arrhythmia standpoint.  EKG shows sinus rhythm today.  Device interrogation shows stable device and lead function.  He is atrial paced 16% and ventricular paced 1.2%.  He is currently on Coumadin.  INR within therapeutic range.  Managed by Coumadin clinic.  No AFib seen on his device interrogation.  His blood pressure is stable on current regimen.  We will plan to follow up in office in 6 months.  Continue with remote transmission Q 3 months.    *A copy of this note has been sent to Dr. Ordonez*    Follow up in about 6 months (around 10/10/2025).    ------------------------------------------------------------------    DIAMOND Camilo, DIPESH  Cardiac Electrophysiology

## 2025-04-14 ENCOUNTER — PATIENT MESSAGE (OUTPATIENT)
Dept: CARDIOLOGY | Facility: CLINIC | Age: 50
End: 2025-04-14

## 2025-04-14 ENCOUNTER — ANTI-COAG VISIT (OUTPATIENT)
Dept: CARDIOLOGY | Facility: CLINIC | Age: 50
End: 2025-04-14
Payer: COMMERCIAL

## 2025-04-14 DIAGNOSIS — I48.3 TYPICAL ATRIAL FLUTTER: Primary | ICD-10-CM

## 2025-04-14 DIAGNOSIS — Z79.01 LONG TERM (CURRENT) USE OF ANTICOAGULANTS: ICD-10-CM

## 2025-04-14 DIAGNOSIS — Z95.2 MITRAL VALVE REPLACED: ICD-10-CM

## 2025-04-14 PROCEDURE — 93793 ANTICOAG MGMT PT WARFARIN: CPT | Mod: S$GLB,,,

## 2025-04-22 NOTE — ANESTHESIA PREPROCEDURE EVALUATION
[facility]  Anesthesia Pre-Operative Evaluation         Patient Name: Lorenzo Nino  YOB: 1975  MRN: [id number]    SUBJECTIVE:     Pre-operative evaluation for Procedure(s) (LRB):  Transesophageal echo (NEW) intra-procedure log documentation (N/A)     12/26/2023    Lorenzo Nino is a 48 y.o. male w/ a significant PMHx of bacterial endocarditis and severe MR s/p urgent repair w/ porcine annuloplasty (11/3/23) complicated by MRSA endocarditis and bacteremia who is admitted to CICU for AHRF 2/2 significant pulmonary edema 2/2 severe mitral regurgitation concerning for anterior leaflet tear vs posterior leaflet restriction. Hospital course complicated by A-fib RVR for which he was started on digoxin, heparin gtt, and diltiazem gtt. He has since converted into NSR.    Currently on room air with previous hx of being unable to lay flat beyond about 70 degrees without feeling SOB with desat.       TTE (12/19/23):    Left Ventricle: The left ventricle is normal in size. Normal wall thickness. Regional wall motion abnormalities present. There is low normal systolic function with a visually estimated ejection fraction of 50 - 55%. There is normal diastolic function.    Right Ventricle: Normal right ventricular cavity size. Wall thickness is normal. Right ventricle wall motion  is normal. Systolic function is normal.    Th left atrium is severely dilated.    Mitral Valve: The mitral valve is repaired with a bovine pericardial annuloplasty band. There is flail motion of a small part of valvular tissue, perhaps from the posterior leaflet, that leads to eccentric MR directed somewhat laterally. There is severe regurgitation.    Tricuspid Valve: There is mild regurgitation.    Pulmonary Artery: The estimated pulmonary artery systolic pressure is 51 mmHg.    IVC/SVC: Elevated venous pressure at 15 mmHg..    Patient now presents for the above procedure(s).      LDA:   PICC Double Lumen 11/13/23  1509 right basilic (Active)   Line Necessity Review Longterm central access required 12/25/23 1901   Verification by X-ray Yes 12/24/23 1902   Site Assessment No drainage;No redness;No swelling;No warmth 12/26/23 0301   Extremity Assessment Distal to IV No warmth;No swelling;No redness;No abnormal discoloration 12/26/23 0301   Line Securement Device Secured with sutureless device 12/25/23 1901   Dressing Type CHG impregnated dressing/sponge;Central line dressing 12/26/23 0301   Dressing Status Clean;Dry;Intact 12/26/23 0301   Dressing Intervention Integrity maintained 12/26/23 0301   Date on Dressing 12/25/23 12/25/23 1601   Dressing Due to be Changed 01/01/24 12/25/23 1701   Distal Patency/Care infusing 12/26/23 0301   Proximal 1 Patency/Care infusing 12/26/23 0301   Waveform Not being transduced 12/26/23 0301   Number of days: 42            Peripheral IV - Single Lumen 12/23/23 1800 20 G Anterior;Distal;Left Forearm (Active)   Site Assessment Clean;Dry;Intact;No redness;No swelling 12/26/23 0301   Extremity Assessment Distal to IV No warmth;No swelling;No redness;No abnormal discoloration 12/26/23 0301   Line Status Saline locked 12/26/23 0301   Dressing Status Clean;Dry;Intact 12/26/23 0301   Dressing Intervention Integrity maintained 12/26/23 0301   Dressing Change Due 12/27/23 12/25/23 1701   Site Change Due 12/27/23 12/25/23 0701   Reason Not Rotated Not due 12/26/23 0301   Number of days: 2       Prev airway: Method of Intubation: Glidescope; Inserted by: MD; Staff/Resident Names: Dr Bullock, Dr Caballero; Airway Device: Endotracheal Tube; Airway Device Size: 8.0; Style: Cuffed; Placement Verified By: Colorimetric EtCO2 device; Intubation Findings: Positive EtCO2;  Depth of Insertion: 24; Securment: Lips; Breath Sounds: Equal Bilateral; Insertion Attempts: 1     Drips:    furosemide (LASIX) 500 mg in 50 mL infusion (conc: 10 mg/mL) 10 mg/hr (12/26/23 0501)    heparin (porcine) in D5W 21 Units/kg/hr (12/26/23  0618)       Patient Active Problem List   Diagnosis    Septic shock    FRANCESCO (acute kidney injury)    Thrombocytopenia    Elevated transaminase level    Endocarditis    Elevated troponin    A-fib with RVR    Heart failure with mid-range ejection fraction (HFmEF)    Acute hypoxemic respiratory failure    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    Severe mitral regurgitation    Rupture of chordae tendineae    Surgical wound present    Transient hyperglycemia post procedure    S/P MVR (mitral valve repair)    Subretinal hemorrhage, right    HFmrEF    Anemia due to GI blood loss       Review of patient's allergies indicates:  No Known Allergies    Current Inpatient Medications:   famotidine  20 mg Oral BID    metoprolol tartrate  25 mg Oral TID    polyethylene glycol  17 g Oral Daily    vancomycin 1,250 mg in dextrose 5 % (D5W) 250 mL IVPB (Vial-Mate)  1,250 mg Intravenous Q24H       No current facility-administered medications on file prior to encounter.     Current Outpatient Medications on File Prior to Encounter   Medication Sig Dispense Refill    aspirin (ECOTRIN) 325 MG EC tablet Take 1 tablet (325 mg total) by mouth once daily. 30 tablet 11    metoprolol tartrate (LOPRESSOR) 50 MG tablet Take 1 tablet (50 mg total) by mouth 2 (two) times daily. 60 tablet 11    NIFEdipine (PROCARDIA-XL) 30 MG (OSM) 24 hr tablet Take 1 tablet (30 mg total) by mouth once daily. 30 tablet 11       Past Surgical History:   Procedure Laterality Date    EXCLUSION, LEFT ATRIAL APPENDAGE, OPEN, AS PART OF OPEN CHEST SURGERY Left 11/3/2023    Procedure: EXCLUSION, LEFT ATRIAL APPENDAGE, OPEN, AS PART OF OPEN CHEST SURGERY;  Surgeon: Manuel Beal MD;  Location: [facility] OR [address] [place] [address];  Service: Cardiothoracic;  Laterality: Left;    INSERTION OF INTRA-AORTIC BALLOON ASSIST DEVICE Right 11/2/2023    Procedure: INSERTION, INTRA-AORTIC BALLOON PUMP;  Surgeon: Jose Vidal MD;  Location: [facility] CATH  LAB;  Service: Cardiology;  Laterality: Right;    REPAIR, MITRAL VALVE, OPEN N/A 11/3/2023    Procedure: REPAIR, MITRAL VALVE, OPEN;  Surgeon: Manuel Beal MD;  Location: [facility] OR [address] [place] [address];  Service: Cardiothoracic;  Laterality: N/A;       Social History     Socioeconomic History    Marital status: Single   Occupational History    Occupation: associated terminal  [employer]    Tobacco Use    Smoking status: Unknown     Passive exposure: Never   Substance and Sexual Activity    Alcohol use: Yes     Alcohol/week: 1.0 standard drink of alcohol     Types: 1 Cans of beer per week    Drug use: Never    Sexual activity: Not Currently     Partners: Female     Birth control/protection: None     Social Determinants of Health     Financial Resource Strain: Low Risk  (11/1/2023)    Overall Financial Resource Strain (CARDIA)     Difficulty of Paying Living Expenses: Not hard at all   Food Insecurity: No Food Insecurity (11/1/2023)    Hunger Vital Sign     Worried About Running Out of Food in the Last Year: Never true     Ran Out of Food in the Last Year: Never true   Transportation Needs: No Transportation Needs (11/1/2023)    PRAPARE - Transportation     Lack of Transportation (Medical): No     Lack of Transportation (Non-Medical): No   Physical Activity: Unknown (11/30/2023)    Exercise Vital Sign     Days of Exercise per Week: Patient declined     Minutes of Exercise per Session: 0 min   Recent Concern: Physical Activity - Inactive (11/1/2023)    Exercise Vital Sign     Days of Exercise per Week: 0 days     Minutes of Exercise per Session: 0 min   Stress: No Stress Concern Present (11/1/2023)    [employer] [employer] - Occupational Stress Questionnaire     Feeling of Stress : Not at all   Social Connections: Socially Isolated (11/1/2023)    Social Connection and Isolation Panel [NHANES]     Frequency of Communication with Friends and Family: More than three times a week     Frequency of Social  Gatherings with Friends and Family: Once a week     Attends Hindu Services: Never     Active Member of Clubs or Organizations: No     Attends Club or Organization Meetings: Never     Marital Status: Never    Housing Stability: Low Risk  (11/30/2023)    Housing Stability Vital Sign     Unable to Pay for Housing in the Last Year: No     Number of Places Lived in the Last Year: 1     Unstable Housing in the Last Year: No       OBJECTIVE:     Vital Signs Range (Last 24H):  Temp:  [36.6 °C (97.8 °F)-37 °C (98.6 °F)]   Pulse:  [74-92]   Resp:  [15-39]   BP: ()/(60-81)   SpO2:  [93 %-100 %]       Significant Labs:  Lab Results   Component Value Date    WBC 17.25 (H) 12/25/2023    HGB 9.6 (L) 12/25/2023    HCT 30.4 (L) 12/25/2023     (H) 12/25/2023    TRIG 370 (H) 10/31/2023    ALT 18 12/25/2023    AST 31 12/25/2023     (L) 12/25/2023    K 3.6 12/25/2023    CL 90 (L) 12/25/2023    CREATININE 0.9 12/25/2023    BUN 41 (H) 12/25/2023    CO2 28 12/25/2023    INR 1.1 12/18/2023    HGBA1C 5.8 (H) 11/06/2023       Diagnostic Studies: No relevant studies.    EKG:   Results for orders placed or performed during the hospital encounter of 12/18/23   EKG 12-lead    Collection Time: 12/21/23  4:13 AM    Narrative    Test Reason : I38,    Vent. Rate : 091 BPM     Atrial Rate : 091 BPM     P-R Int : 156 ms          QRS Dur : 086 ms      QT Int : 360 ms       P-R-T Axes : 063 048 078 degrees     QTc Int : 442 ms    Normal sinus rhythm  T wave abnormality, consider inferolateral ischemia  Abnormal ECG  When compared with ECG of 19-DEC-2023 15:10,  Normal sinus rhythm Now present  Confirmed by Ashanti Peter MD ([id number]) on 12/21/2023 12:20:02 PM    Referred By: [email or web address]   SELF           Confirmed By:Ashanti Peter MD       2D ECHO:  TTE:  Results for orders placed or performed during the hospital encounter of 12/18/23   Echo   Result Value Ref Range    BSA 1.85 m2    LVOT stroke volume 92.48 cm3    LVIDd 5.11 3.5 -  6.0 cm    LV Systolic Volume 31.52 mL    LV Systolic Volume Index 16.9 mL/m2    LVIDs 2.87 2.1 - 4.0 cm    LV Diastolic Volume 124.23 mL    LV Diastolic Volume Index 66.43 mL/m2    IVS 0.80 0.6 - 1.1 cm    LVOT diameter 2.18 cm    LVOT area 3.7 cm2    FS 44 28 - 44 %    Left Ventricle Relative Wall Thickness 0.34 cm    Posterior Wall 0.87 0.6 - 1.1 cm    LV mass 148.89 g    LV Mass Index 80 g/m2    MV Peak E Jabier 1.86 m/s    TDI LATERAL 0.11 m/s    TDI SEPTAL 0.07 m/s    E/E' ratio 20.67 m/s    MV Peak A Jabier 0.79 m/s    TR Max Jabier 3.02 m/s    E/A ratio 2.35     E wave deceleration time 246.91 msec    LV SEPTAL E/E' RATIO 26.57 m/s    LA Volume Index 85.2 mL/m2    LV LATERAL E/E' RATIO 16.91 m/s    LA volume 159.32 cm3    LVOT peak jabier 1.51 m/s    RVDD 3.76 cm    TAPSE 1.12 cm    LA size 5.13 cm    Left Atrium Minor Axis 7.28 cm    Left Atrium Major Axis 6.69 cm    LA volume (mod) 139.35 cm3    LA WIDTH 5.24 cm    LA Volume Index (Mod) 74.5 mL/m2    RA Major Axis 5.45 cm    RA Width 3.20 cm    AV mean gradient 7 mmHg    AV peak gradient 12 mmHg    Ao peak jabier 1.76 m/s    Ao VTI 29.53 cm    LVOT peak VTI 24.79 cm    AV valve area 3.13 cm²    AV Velocity Ratio 0.86     AV index (prosthetic) 0.84     FATUMA by Velocity Ratio 3.20 cm²    Mr max jabier 0.05 m/s    MV mean gradient 6 mmHg    MV peak gradient 20 mmHg    MV stenosis pressure 1/2 time 71.60 ms    MV valve area p 1/2 method 3.07 cm2    MV valve area by continuity eq 2.16 cm2    MV VTI 42.87 cm    Triscuspid Valve Regurgitation Peak Gradient 36 mmHg    Sinus 3.19 cm    STJ 2.75 cm    Ascending aorta 3.28 cm    Mean e' 0.09 m/s    ZLVIDS -0.90     ZLVIDD -0.20     TV resting pulmonary artery pressure 51 mmHg    RV TB RVSP 18 mmHg    Est. RA pres 15 mmHg    Mitral Valve Heart Rate 88 bpm    Narrative      Left Ventricle: The left ventricle is normal in size. Normal wall   thickness. Regional wall motion abnormalities present. There is low normal   systolic function with  a visually estimated ejection fraction of 50 - 55%.   There is normal diastolic function.    Right Ventricle: Normal right ventricular cavity size. Wall thickness   is normal. Right ventricle wall motion  is normal. Systolic function is   normal.    Th left atrium is severely dilated.    Mitral Valve: The mitral valve is repaired with a bovine pericardial   annuloplasty band. There is flail motion of a small part of valvular   tissue, perhaps from the posterior leaflet, that leads to eccentric MR   directed somewhat laterally. There is severe regurgitation.    Tricuspid Valve: There is mild regurgitation.    Pulmonary Artery: The estimated pulmonary artery systolic pressure is   51 mmHg.    IVC/SVC: Elevated venous pressure at 15 mmHg.           ASSESSMENT/PLAN:                                                                                                                  Pre-op Assessment    I have reviewed the Patient Summary Reports.     I have reviewed the Nursing Notes.    I have reviewed the Medications.     Review of Systems  Anesthesia Hx:  No problems with previous Anesthesia   History of prior surgery of interest to airway management or planning: heart surgery.         Denies Family Hx of Anesthesia complications.    Denies Personal Hx of Anesthesia complications.                    Social:  Alcohol Use       Hematology/Oncology:  Hematology Normal   Oncology Normal    -- Denies Anemia:                                  EENT/Dental:  EENT/Dental Normal           Cardiovascular:      Denies Hypertension. Valvular problems/Murmurs, MR   Denies CAD.    Dysrhythmias atrial fibrillation  CHF    no hyperlipidemia                       Hypertension     Atrial Fibrillation     Pulmonary:  Pulmonary Normal   Denies COPD.  Denies Asthma.                    Renal/:  Renal/ Normal  Denies Chronic Renal Disease.        Kidney Function/Disease             Hepatic/GI:  Hepatic/GI Normal     Denies GERD.              Musculoskeletal:  Musculoskeletal Normal Denies Arthritis.               Neurological:  Neurology Normal   Denies CVA.    Denies Seizures.                                Endocrine:  Endocrine Normal Denies Diabetes.           Dermatological:  Skin Normal    Psych:  Psychiatric Normal  Denies Psychiatric History.                  Physical Exam  General: Well nourished, Cooperative, Alert and Oriented  Able to lay flat on room air.  Airway:  Mallampati: I   Mouth Opening: Normal  TM Distance: Normal    Dental:  4-6 teeth remain bottom row, nothing on top      Anesthesia Plan  Type of Anesthesia, risks & benefits discussed:    Anesthesia Type: Gen ETT, Gen Natural Airway, MAC, Gen Supraglottic Airway  Intra-op Monitoring Plan: Standard ASA Monitors  Post Op Pain Control Plan: multimodal analgesia and IV/PO Opioids PRN  Induction:  IV  Airway Plan: Direct, Post-Induction  Informed Consent: Informed consent signed with the Patient and all parties understand the risks and agree with anesthesia plan.  All questions answered.   ASA Score: 4  Day of Surgery Review of History & Physical: H&P Update referred to the surgeon/provider.    Ready For Surgery From Anesthesia Perspective.     .       Patient/Caregiver provided printed discharge information.

## 2025-05-05 ENCOUNTER — ANTI-COAG VISIT (OUTPATIENT)
Dept: CARDIOLOGY | Facility: CLINIC | Age: 50
End: 2025-05-05
Payer: COMMERCIAL

## 2025-05-05 DIAGNOSIS — Z79.01 LONG TERM (CURRENT) USE OF ANTICOAGULANTS: ICD-10-CM

## 2025-05-05 DIAGNOSIS — I48.0 PAROXYSMAL ATRIAL FIBRILLATION: Primary | ICD-10-CM

## 2025-05-05 DIAGNOSIS — Z95.2 MITRAL VALVE REPLACED: ICD-10-CM

## 2025-05-05 DIAGNOSIS — I48.3 TYPICAL ATRIAL FLUTTER: ICD-10-CM

## 2025-05-05 PROCEDURE — 93793 ANTICOAG MGMT PT WARFARIN: CPT | Mod: S$GLB,,,

## 2025-05-26 ENCOUNTER — ANTI-COAG VISIT (OUTPATIENT)
Dept: CARDIOLOGY | Facility: CLINIC | Age: 50
End: 2025-05-26
Payer: COMMERCIAL

## 2025-05-26 DIAGNOSIS — Z79.01 LONG TERM (CURRENT) USE OF ANTICOAGULANTS: ICD-10-CM

## 2025-05-26 DIAGNOSIS — I48.3 TYPICAL ATRIAL FLUTTER: Primary | ICD-10-CM

## 2025-05-26 DIAGNOSIS — Z95.2 MITRAL VALVE REPLACED: ICD-10-CM

## 2025-05-26 PROCEDURE — 93793 ANTICOAG MGMT PT WARFARIN: CPT | Mod: S$GLB,,,

## 2025-05-26 NOTE — PROGRESS NOTES
[facility]    05/26/2025    Lorenzo Nino (49 y.o.) is followed by the [facility].      Assessment/Plan:    Lorenzo Nino presents with a subtherapeutic  INR. Goal INR: 2.5-3.5    Lab Results   Component Value Date    INR 2.3 (H) 05/26/2025    INR 2.5 (H) 05/05/2025    INR 2.9 (H) 04/14/2025    PROTIME 24.6 (H) 05/26/2025    PROTIME 25.8 (H) 05/05/2025    PROTIME 29.5 (H) 04/14/2025       Assessment of patient findings per MA/LPN and chart review:   The following significant findings were found:   None    Recommendation for patient's warfarin regimen:   Due to subtherapeutic INR, patient was instructed to take a booster dose today 5/26/25.  Patient to resume their normal weekly dose.    Recommended repeat INR in 2 weeks      Yelena Lehman, PharmD  Preferred Contact: Secure Messaging or In Basket Message

## 2025-06-09 ENCOUNTER — ANTI-COAG VISIT (OUTPATIENT)
Dept: CARDIOLOGY | Facility: CLINIC | Age: 50
End: 2025-06-09
Payer: COMMERCIAL

## 2025-06-09 DIAGNOSIS — I48.3 TYPICAL ATRIAL FLUTTER: Primary | ICD-10-CM

## 2025-06-09 DIAGNOSIS — Z79.01 LONG TERM (CURRENT) USE OF ANTICOAGULANTS: ICD-10-CM

## 2025-06-09 DIAGNOSIS — Z95.2 MITRAL VALVE REPLACED: ICD-10-CM

## 2025-06-09 PROCEDURE — 93793 ANTICOAG MGMT PT WARFARIN: CPT | Mod: S$GLB,,,

## 2025-06-24 ENCOUNTER — CLINICAL SUPPORT (OUTPATIENT)
Dept: CARDIOLOGY | Facility: HOSPITAL | Age: 50
End: 2025-06-24
Attending: STUDENT IN AN ORGANIZED HEALTH CARE EDUCATION/TRAINING PROGRAM
Payer: COMMERCIAL

## 2025-06-24 ENCOUNTER — CLINICAL SUPPORT (OUTPATIENT)
Dept: CARDIOLOGY | Facility: HOSPITAL | Age: 50
End: 2025-06-24
Payer: COMMERCIAL

## 2025-06-24 DIAGNOSIS — I44.2 ATRIOVENTRICULAR BLOCK, COMPLETE: ICD-10-CM

## 2025-06-24 DIAGNOSIS — Z95.0 PRESENCE OF CARDIAC PACEMAKER: ICD-10-CM

## 2025-06-24 PROCEDURE — 93294 REM INTERROG EVL PM/LDLS PM: CPT | Mod: ,,, | Performed by: STUDENT IN AN ORGANIZED HEALTH CARE EDUCATION/TRAINING PROGRAM

## 2025-06-24 PROCEDURE — 93296 REM INTERROG EVL PM/IDS: CPT | Performed by: STUDENT IN AN ORGANIZED HEALTH CARE EDUCATION/TRAINING PROGRAM

## 2025-06-25 LAB
OHS CV AF BURDEN PERCENT: < 1
OHS CV DC REMOTE DEVICE TYPE: NORMAL
OHS CV RV PACING PERCENT: 0.09 %

## 2025-06-26 ENCOUNTER — PATIENT MESSAGE (OUTPATIENT)
Dept: CARDIOLOGY | Facility: CLINIC | Age: 50
End: 2025-06-26
Payer: COMMERCIAL

## 2025-07-02 ENCOUNTER — PATIENT MESSAGE (OUTPATIENT)
Dept: CARDIOLOGY | Facility: CLINIC | Age: 50
End: 2025-07-02

## 2025-07-02 ENCOUNTER — ANTI-COAG VISIT (OUTPATIENT)
Dept: CARDIOLOGY | Facility: CLINIC | Age: 50
End: 2025-07-02
Payer: COMMERCIAL

## 2025-07-02 ENCOUNTER — OFFICE VISIT (OUTPATIENT)
Dept: CARDIOLOGY | Facility: CLINIC | Age: 50
End: 2025-07-02
Payer: COMMERCIAL

## 2025-07-02 VITALS
HEIGHT: 72 IN | OXYGEN SATURATION: 100 % | SYSTOLIC BLOOD PRESSURE: 136 MMHG | HEART RATE: 63 BPM | BODY MASS INDEX: 23.5 KG/M2 | DIASTOLIC BLOOD PRESSURE: 85 MMHG | WEIGHT: 173.5 LBS

## 2025-07-02 DIAGNOSIS — I50.32 CHRONIC DIASTOLIC HEART FAILURE: ICD-10-CM

## 2025-07-02 DIAGNOSIS — Z95.2 MITRAL VALVE REPLACED: ICD-10-CM

## 2025-07-02 DIAGNOSIS — I48.0 PAROXYSMAL ATRIAL FIBRILLATION: ICD-10-CM

## 2025-07-02 DIAGNOSIS — Z79.01 LONG TERM (CURRENT) USE OF ANTICOAGULANTS: ICD-10-CM

## 2025-07-02 DIAGNOSIS — I48.3 TYPICAL ATRIAL FLUTTER: Primary | ICD-10-CM

## 2025-07-02 DIAGNOSIS — I34.0 SEVERE MITRAL REGURGITATION: ICD-10-CM

## 2025-07-02 DIAGNOSIS — I50.22 HEART FAILURE WITH MID-RANGE EJECTION FRACTION (HFMEF): Primary | ICD-10-CM

## 2025-07-02 PROCEDURE — 99999 PR PBB SHADOW E&M-EST. PATIENT-LVL III: CPT | Mod: PBBFAC,,, | Performed by: INTERNAL MEDICINE

## 2025-07-02 RX ORDER — SACUBITRIL AND VALSARTAN 24; 26 MG/1; MG/1
1 TABLET, FILM COATED ORAL 2 TIMES DAILY
Qty: 60 TABLET | Refills: 11 | Status: SHIPPED | OUTPATIENT
Start: 2025-07-02

## 2025-07-02 NOTE — PROGRESS NOTES
Upon advising MsAndrew Karsten, she requests instructions sent to [employer] because she is at work right now and can't write It down. Netlog message sent appt booked.

## 2025-07-02 NOTE — PROGRESS NOTES
Subjective:   Chief Complaint: Severe MR Follow-up and Severe MR with recent MRSA mitral endocarditis s/p mitral v  Last Clinic Visit: 7/11/2024    History of Present Illness: Lorenzo Nino is a 49 y.o.  gentleman with history of bacterial endocarditis and severe mitral regurgitation s/p repair 11/03/2023, with subsequent dehiscence of ring and readmission, and then subsequent mechanical mitral valve who presents for follow up.    Interval History: Doing well over the course the past year, still has some lingering issues of taste being an issue.  Last time we saw him was immediately prior to his valve surgery in July of 2024.  Has recovered well with significant weight gain over the course the past year from valve repair. Some dyspnea on exertion, occasional lightheadedness right when he stands up, but no figueroa presyncope or tunnel vision.  Dyspnea on exertion notable though to where he has had difficulty performing his prior level of work and job.  Most recent echo with persistently mid-range EF.  Taken off of all GDMT over the course the past year and currently only on aspirin and warfarin.    7/11/2024  Over the past 3 months he is doing okay, but had an admission with atrial flutter, requiring cardioversion, and being placed on amiodarone.  NYHA class 2-3 symptoms, able to walk up 1 flight of stairs at a time, and a few blocks before stopping, but not back to baseline level of tolerance.  He denies any acute volume overload currently, no swelling, no orthopnea, no lightheadedness, no recurrent palpitations after most recent episode.  Weight has been relatively stable in the 170 range.  All of his teeth were successfully fixed tolerating a diet well, but stomach is still upset on chronic suppressive antibiotics.  Blood pressure low normal.  Denies any bleeding on Eliquis.  Currently on torsemide 10 mg daily without change.  Most recent echocardiogram with progressive LV dilation.    4/1/2024  He successfully  had teeth replaced, and has been steadily gaining weight over the course of the past 3 months, denies any orthopnea, no lower extremity edema, no worsening dyspnea on exertion.  Currently has dyspnea on exertion at approximately 2 blocks, and 2 flights of stairs.  Avoiding salt, eating some chicken, fish, occasional vegetables, but could improve nutritional content slightly.  Denies any palpitations, no syncope, no presyncope, no lightheadedness.  Blood pressure continues to run low normal.  Continuing to take amiodarone 400 mg daily, and suppressive antibiotics for which he has to avoid sun, currently on 10 mg daily torsemide.  Recent lab work with creatinine 1.7, BNP still elevated but trending down.  Denies any recurrent GI bleeding.  Successfully had resection of tubular adenoma, without event.    1/12/2024  Shortly after we saw him last month he developed recurrent shortness of breath, weight gain, and was readmitted for congestive heart failure and hypoxic respiratory failure.  Workup subsequently revealed dehiscence of annular ring.  He was still on antibiotics at time of initial presentation continued during hospital course, no fever or recurrent cultures were present.  Evaluated by CT surgery and felt that he would benefit from better preoperative rehabilitation prior to reconsideration of additional surgery, plan  discharge with suppressive antibiotics and GDMT until adequate nutritional status could be obtained.  He had during this hospitalization acute kidney injury from titration of GDMT and diuretics, also had GI bleeding, and workup ultimately remarkable for large mass in colon.  Pathology ultimately revealed tubular adenoma.  He also had recurrent episodes of atrial fibrillation during this hospitalization now sinus on amiodarone.  Blood pressures at home running in the 100-120 range, denies any hypoxia, no lower extremity edema, no orthopnea.  Weight since discharge 134, 135, 135,  136.    12/15/2023  He did not have any known medical issues prior to presenting with shortness of breath, fever, and fatigue for several days on 10/31/2023.  He was subsequently found to have severe mitral regurgitation with bacterial endocarditis, initially thought to be involvement of aortic valve however was confirmed not to be involved on subsequent NEW.  Hospital course was complicated by acute hypoxic respiratory failure requiring intubation, along with thrombocytopenia, ultimately had successful repair on 11/03/2023.  Postoperatively sternum well healed, and will be cleared for cardiac rehab around the end of December. He was started on nifedipine metoprolol during hospitalization and has continued, checking his blood pressure on a daily basis and reports systolics in the 90s on a regular basis with some lightheadedness.  No fevers at home, no shortness of breath, no orthopnea, no dyspnea on exertion.  He does have some fatigue which is still present postoperatively.  Echocardiogram post repair did show that EF was still mildly depressed.   Still on IV antibiotics scheduled a run-through the 26th.  He does have a dry cough and has had this for the past several days.    Dx:  Bacterial endocarditis-severe mitral regurgitation  -S/p 11/3/23 Mitral valve repair with triangular resection of P2 and placement of a freehand-created bovine pericardial annuloplasty band using a 40 mm Medtronic Simuplus sizer as a template  -S/p Resection of left atrial appendage   -s/p Mitral valve replacement with a 33 mm Carbomedics standard mechanical prosthesis.  08/14/2024  MRSA endocarditis  Large Tubular Adenoma with GI bleeding  Atrial fibrillation  Malnutrition from acute illness  Suppressive antibiotics  Atrial flutter on amiodarone    Medications:  Outpatient Encounter Medications as of 7/2/2025   Medication Sig Dispense Refill    aspirin (ECOTRIN) 81 MG EC tablet Take 1 tablet (81 mg total) by mouth once daily. 30 tablet  11    warfarin (COUMADIN) 1 MG tablet Take 2-3 tablets (2-3 mg total) by mouth Daily. As directed by coumadin clinic 70 tablet 11    sacubitriL-valsartan (ENTRESTO) 24-26 mg per tablet Take 1 tablet by mouth 2 (two) times daily. 60 tablet 11    [DISCONTINUED] warfarin (COUMADIN) 1 MG tablet Take 2 tablets (2 mg total) by mouth Daily. 60 tablet 11     Facility-Administered Encounter Medications as of 7/2/2025   Medication Dose Route Frequency Provider Last Rate Last Admin    0.9%  NaCl infusion   Intravenous Continuous Adriana Black NP 70 mL/hr at 02/27/24 0755 New Bag at 08/22/24 1037    mupirocin 2 % ointment   Nasal On Call Procedure Adriana Black NP   Given at 02/27/24 0756     Social History:  Lorenzo reports current alcohol use of about 1.0 standard drink of alcohol per week. He reports that he does not use drugs.  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    Objective:   /85   Pulse 63   Ht 6' (1.829 m)   Wt 78.7 kg (173 lb 8 oz)   SpO2 100%   BMI 23.53 kg/m²     Physical Exam   Constitutional: He does not appear ill. No distress.   HENT:   Head: Normocephalic and atraumatic. Mouth/Throat: Mucous membranes are moist.   Cardiovascular: Normal rate, regular rhythm and normal pulses. Exam reveals no gallop and no friction rub.   Murmur heard.  Pulmonary/Chest: Effort normal and breath sounds normal. No stridor. No respiratory distress. He has no wheezes. He has no rhonchi. He has no rales. He exhibits no tenderness.   Abdominal: Normal appearance.   Musculoskeletal:      Right lower leg: No edema.      Left lower leg: No edema.   Neurological: He is alert.   Skin: Skin is warm.      EKG:  My independent visualization of most recent EKG is Normal sinus rhythm, diffuse T-wave inversions    NEW:  12/26/2023    NEW for evaluation of mitral valve.    Left Ventricle: The left ventricle is normal in size. Normal wall thickness. Normal wall motion. There is low normal systolic function with a  visually estimated ejection fraction of 50 - 55%.    Right Ventricle: Normal right ventricular cavity size. Wall thickness is normal. Right ventricle wall motion  is normal. Systolic function is normal.    Aortic Valve: There is no mass/vegetation present.    Tricuspid Valve: There is no mass/vegetation present.    Severe mitral reguritaiton    Mitral Valve: Status post bovine pericardial annuloplasty band. Dehiscense of band from 10 o'clock to 4 o'clock. Two regurgitant jets noted. First jet due to failure of coaptation at A2-P2 secondary to anuloplasty band dehiscence. Second jet from a perforation on the medial aspect of P2, possibly at the site of prior triangular resection.    TTE:  01/10/2025     Left Ventricle: The left ventricle is normal in size. Ventricular mass is normal. Normal wall thickness. Regional wall motion abnormalities present. There is mildly reduced systolic function with a visually estimated ejection fraction of 45 - 50%. Diastolic function cannot be reliably determined in the presence of mitral valve ring/replacement.    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 This is present on the study of 7/24    Right Ventricle: Normal right ventricular cavity size. Wall thickness is normal. Right ventricle wall motion  is normal. Systolic function is normal.    Left Atrium: Left atrium is moderately dilated.    Right Atrium: Normal right atrial size.    Mitral Valve: There is a mechanical valve in the mitral position. It is reported to be a 33 mm Carbomedics valve. The mean pressure gradient across the mitral valve is 3 mmHg at a heart rate of 61 bpm. There is mild regurgitation.    Tricuspid Valve: There is mild regurgitation with a centrally directed jet.    Aorta: Aortic root is normal in size measuring 3.42 cm. Ascending aorta is normal measuring 3.23 cm.    Pulmonary Artery: The estimated pulmonary artery systolic pressure is 28 mmHg.     IVC/SVC: Normal venous pressure at 3 mmHg.    Pericardium: There is no pericardial effusion.     )  Mitral valve replacement with a 33 mm Carbomedics standard mechanical prosthesis.     2)  Redo sternotomy     07/05/2024    Left Ventricle: The left ventricle is moderately dilated. Normal wall thickness. Normal wall motion. There is normal systolic function with a visually estimated ejection fraction of 55 - 60%. Diastolic function cannot be reliably determined in the presence of mitral valve disease and mitral valve ring/replacement.    Right Ventricle: Normal right ventricular cavity size. Wall thickness is normal. Right ventricle wall motion  is normal. Systolic function is normal.    Left Atrium: Left atrium is severely dilated.    Right Atrium: Normal right atrial size.    Mitral Valve: The mitral valve is repaired by annular ring 40 mm Medtronic. There is mild bileaflet sclerosis. There is severe regurgitation with a posterolateral eccentriccally directed and a central  jet. There appears to be a perforation in Anterior leaflets (clip #96 to 96- see below).Torn tissue is seen in this area in LA but its proximal attacment is not well seen. Consider NEW if clinically indicated. Mean gradient is 2.5 mmHg at HR o 53 bpm.    Tricuspid Valve: There is mild to moderate regurgitation with a centrally directed jet.    Pulmonic Valve: There is mild regurgitation.    Aorta: Aortic root is normal in size measuring 3.47 cm. Ascending aorta is normal measuring 3.55 cm.    Pulmonary Artery: There is moderate pulmonary hypertension. The estimated pulmonary artery systolic pressure is 54 mmHg.    IVC/SVC: Normal venous pressure at 3 mmHg.  1)  Mitral valve repair with triangular resection of P2 and placement of a freehand-created bovine pericardial annuloplasty band using a 40 mm Medtronic Simuplus sizer as a template- for MV endocarditis.   2)  Resection of left atrial appendage    01/30/2024     Left Ventricle: The left  ventricle is normal in size. Normal wall thickness. Regional wall motion abnormalities present. There is normal systolic function with a visually estimated ejection fraction of 60 - 65%. Diastolic function cannot be reliably determined in the presence of mitral valve disease.    Right Ventricle: Normal right ventricular cavity size. Wall thickness is normal. Right ventricle wall motion  is normal. Systolic function is normal.    Left Atrium: Left atrium is severely dilated.    Mitral Valve: The mitral valve is repaired with a bovine pericardial annuloplasty band. There is flail motion of a small part of valvular tissue, perhaps from the posterior leaflet, that leads to severe eccentric MR directed somewhat laterally.    Tricuspid Valve: There is mild to moderate regurgitation.    IVC/SVC: Normal venous pressure at 3 mmHg.     12/19/2023    Left Ventricle: The left ventricle is normal in size. Normal wall thickness. Regional wall motion abnormalities present. There is low normal systolic function with a visually estimated ejection fraction of 50 - 55%. There is normal diastolic function.    Right Ventricle: Normal right ventricular cavity size. Wall thickness is normal. Right ventricle wall motion  is normal. Systolic function is normal.    Th left atrium is severely dilated.    Mitral Valve: The mitral valve is repaired with a bovine pericardial annuloplasty band. There is flail motion of a small part of valvular tissue, perhaps from the posterior leaflet, that leads to eccentric MR directed somewhat laterally. There is severe regurgitation.    Tricuspid Valve: There is mild regurgitation.    Pulmonary Artery: The estimated pulmonary artery systolic pressure is 51 mmHg.    IVC/SVC: Elevated venous pressure at 15 mmHg.     11/14/23    Left Ventricle: The left ventricle is normal in size. Ventricular mass is normal. Normal wall thickness. There is concentric remodeling. Normal wall motion. See diagram for wall motion  findings. There is mildly reduced systolic function with a visually estimated ejection fraction of 40 - 45%. Diastolic function cannot be reliably determined in the presence of mitral valve ring/replacement.    Right Ventricle: Normal right ventricular cavity size. Wall thickness is normal. Right ventricle wall motion  is normal. Systolic function is normal.    Left Atrium: Left atrium is severely dilated.    Mitral Valve: The mitral valve is repaired by bovine pericardial annuloplasty band. The mean pressure gradient across the mitral valve is 2 mmHg at a heart rate of 70 bpm. There is no significant regurgitation.    Pulmonary Artery: The estimated pulmonary artery systolic pressure is 24 mmHg.    IVC/SVC: Normal venous pressure at 3 mmHg.    Pericardium: There is a moderate posterior effusion with maximal diameter 1.6cm.     11/02/2023     Left Ventricle: The left ventricle is normal in size. Ventricular mass is normal. Normal wall thickness. Global hypokinesis present. There is mildly reduced systolic function with a visually estimated ejection fraction of 40 - 45%.    Right Ventricle: Normal right ventricular cavity size. Wall thickness is normal. Right ventricle wall motion  is normal. Systolic function is normal.    Left Atrium: Left atrium is severely dilated.    Right Atrium: Right atrium is dilated.    Aortic Valve: RCC is thickended with mild prolapse.  Cannot exclude RCC vegetation. The remaining leaflets have normal mobility. There is trace aortic regurgitation.    Mitral Valve: Both leaflets are thickened.Cannot exclude anterior leaflet vegetation coating this leaflet. There is prolapse of the posterior mitral leaflet. Flail posterior leaflet. There is a large mobile echogenic mass present on the posterior leaflet consistent with vegetation. The mean pressure gradient across the mitral valve is 6 mmHg at a heart rate of 124 bpm. There is severe regurgitation with an anteromedial eccentrically directed  jet.    Tricuspid Valve: There is mild regurgitation.    Pulmonary Artery: The estimated pulmonary artery systolic pressure is 31 mmHg.    IVC/SVC: Normal venous pressure at 3 mmHg.     Lipids:       Renal:  Recent Labs   Lab 08/23/24 0605   Creatinine 1.1   Potassium 3.7   CO2 26   BUN 15     Liver:  Recent Labs   Lab 08/23/24 0605   AST 32   ALT 17     Assessment:     1. Heart failure with mid-range ejection fraction (HFmEF)    2. Chronic diastolic heart failure    3. Paroxysmal atrial fibrillation    4. Severe mitral regurgitation    5. Mitral valve replaced      Plan:   1. Heart failure with mid-range ejection fraction (HFmEF) (Primary)  Mid-range EF post mitral valve surgery, with saw on ongoing dyspnea on exertion, will add low-dose GDMT for symptom based therapy.    2. Chronic diastolic heart failure  As above, appears relatively euvolemic, encouraged regular physical activity, hopefully Entresto will provide some benefit but if it does not and he has worsening lightheadedness okay to hold.  - sacubitriL-valsartan (ENTRESTO) 24-26 mg per tablet; Take 1 tablet by mouth 2 (two) times daily.  Dispense: 60 tablet; Refill: 11    3. Paroxysmal atrial fibrillation  On warfarin for mitral valve, denies any palpitations or sudden onset tachycardia    4. Severe mitral regurgitation  Resolved post mitral valve replacement    5. Mitral valve replaced  Encouraged ongoing antibiotic prophylaxis as needed for dental procedures      Follow-up in 3-6 months      Emerson Mercado MD [facility]

## 2025-07-02 NOTE — PROGRESS NOTES
INR not at goal due to missed dose. Medications, chart, and patient findings reviewed. See calendar for adjustments to dose and follow up plan    Update 7/3: Pt message received inquiring about home meter. Please contact pt/wife to discuss meter policy and if still interested process for meter.

## 2025-07-03 NOTE — PROGRESS NOTES
Pt wife consented to be processed for INR meter through home monitoring company. I instructed her on meter set up, meter protocol in [facility], must continue going to lab appointments until trained by home monitoring company on meter, call [facility] with meter training date ( only train Monday- Thursday), and difference in cost/test frequency related to using INR meter versus going to lab appointments for INR results which he verbalized understanding. She verified insurance and address for meter and supplies to be shipped to by home monitoring company.

## 2025-07-13 ENCOUNTER — HOSPITAL ENCOUNTER (EMERGENCY)
Facility: HOSPITAL | Age: 50
Discharge: HOME | End: 2025-07-13
Attending: EMERGENCY MEDICINE
Payer: COMMERCIAL

## 2025-07-13 VITALS
BODY MASS INDEX: 23.03 KG/M2 | SYSTOLIC BLOOD PRESSURE: 117 MMHG | RESPIRATION RATE: 18 BRPM | DIASTOLIC BLOOD PRESSURE: 82 MMHG | WEIGHT: 170 LBS | OXYGEN SATURATION: 100 % | TEMPERATURE: 98 F | HEART RATE: 70 BPM | HEIGHT: 72 IN

## 2025-07-13 DIAGNOSIS — R31.9 HEMATURIA, UNSPECIFIED TYPE: Primary | ICD-10-CM

## 2025-07-13 LAB
ABSOLUTE EOSINOPHIL (OHS): 0.08 K/UL
ABSOLUTE MONOCYTE (OHS): 0.91 K/UL (ref 0.3–1)
ABSOLUTE NEUTROPHIL COUNT (OHS): 6.23 K/UL (ref 1.8–7.7)
ALBUMIN SERPL BCP-MCNC: 4.4 G/DL (ref 3.5–5.2)
ALP SERPL-CCNC: 74 UNIT/L (ref 40–150)
ALT SERPL W/O P-5'-P-CCNC: 18 UNIT/L (ref 10–44)
ANION GAP (OHS): 9 MMOL/L (ref 8–16)
AST SERPL-CCNC: 26 UNIT/L (ref 11–45)
BASOPHILS # BLD AUTO: 0.04 K/UL
BASOPHILS NFR BLD AUTO: 0.4 %
BILIRUB SERPL-MCNC: 1 MG/DL (ref 0.1–1)
BILIRUB UR QL STRIP.AUTO: NEGATIVE
BUN SERPL-MCNC: 15 MG/DL (ref 6–20)
CALCIUM SERPL-MCNC: 9.3 MG/DL (ref 8.7–10.5)
CHLORIDE SERPL-SCNC: 104 MMOL/L (ref 95–110)
CLARITY UR: CLEAR
CO2 SERPL-SCNC: 26 MMOL/L (ref 23–29)
COLOR UR AUTO: COLORLESS
CREAT SERPL-MCNC: 1.2 MG/DL (ref 0.5–1.4)
ERYTHROCYTE [DISTWIDTH] IN BLOOD BY AUTOMATED COUNT: 20.9 % (ref 11.5–14.5)
GFR SERPLBLD CREATININE-BSD FMLA CKD-EPI: >60 ML/MIN/1.73/M2
GLUCOSE SERPL-MCNC: 93 MG/DL (ref 70–110)
GLUCOSE UR QL STRIP: NEGATIVE
HCT VFR BLD AUTO: 41.1 % (ref 40–54)
HCV AB SERPL QL IA: NORMAL
HGB BLD-MCNC: 12.1 GM/DL (ref 14–18)
HGB UR QL STRIP: ABNORMAL
HIV 1+2 AB+HIV1 P24 AG SERPL QL IA: NORMAL
HOLD SPECIMEN: NORMAL
IMM GRANULOCYTES # BLD AUTO: 0.05 K/UL (ref 0–0.04)
IMM GRANULOCYTES NFR BLD AUTO: 0.5 % (ref 0–0.5)
INR PPP: 2.2 (ref 0.8–1.2)
KETONES UR QL STRIP: NEGATIVE
LEUKOCYTE ESTERASE UR QL STRIP: NEGATIVE
LYMPHOCYTES # BLD AUTO: 1.96 K/UL (ref 1–4.8)
MCH RBC QN AUTO: 22.2 PG (ref 27–31)
MCHC RBC AUTO-ENTMCNC: 29.4 G/DL (ref 32–36)
MCV RBC AUTO: 75 FL (ref 82–98)
MICROSCOPIC COMMENT: ABNORMAL
NITRITE UR QL STRIP: NEGATIVE
NUCLEATED RBC (/100WBC) (OHS): 0 /100 WBC
PH UR STRIP: 7 [PH]
PLATELET # BLD AUTO: 187 K/UL (ref 150–450)
PMV BLD AUTO: ABNORMAL FL
POTASSIUM SERPL-SCNC: 4.2 MMOL/L (ref 3.5–5.1)
PROT SERPL-MCNC: 8 GM/DL (ref 6–8.4)
PROT UR QL STRIP: NEGATIVE
PROTHROMBIN TIME: 22.6 SECONDS (ref 9–12.5)
RBC # BLD AUTO: 5.45 M/UL (ref 4.6–6.2)
RBC #/AREA URNS AUTO: 94 /HPF (ref 0–4)
RELATIVE EOSINOPHIL (OHS): 0.9 %
RELATIVE LYMPHOCYTE (OHS): 21.1 % (ref 18–48)
RELATIVE MONOCYTE (OHS): 9.8 % (ref 4–15)
RELATIVE NEUTROPHIL (OHS): 67.3 % (ref 38–73)
SODIUM SERPL-SCNC: 139 MMOL/L (ref 136–145)
SP GR UR STRIP: 1.01
UROBILINOGEN UR STRIP-ACNC: NEGATIVE EU/DL
WBC # BLD AUTO: 9.27 K/UL (ref 3.9–12.7)
WBC #/AREA URNS AUTO: 6 /HPF (ref 0–5)

## 2025-07-13 PROCEDURE — 99283 EMERGENCY DEPT VISIT LOW MDM: CPT

## 2025-07-13 PROCEDURE — 86803 HEPATITIS C AB TEST: CPT | Performed by: PHYSICIAN ASSISTANT

## 2025-07-13 PROCEDURE — 87389 HIV-1 AG W/HIV-1&-2 AB AG IA: CPT | Performed by: PHYSICIAN ASSISTANT

## 2025-07-13 PROCEDURE — 81003 URINALYSIS AUTO W/O SCOPE: CPT | Performed by: PHYSICIAN ASSISTANT

## 2025-07-13 PROCEDURE — 85025 COMPLETE CBC W/AUTO DIFF WBC: CPT | Performed by: PHYSICIAN ASSISTANT

## 2025-07-13 PROCEDURE — 80053 COMPREHEN METABOLIC PANEL: CPT | Performed by: PHYSICIAN ASSISTANT

## 2025-07-13 PROCEDURE — 85610 PROTHROMBIN TIME: CPT | Performed by: PHYSICIAN ASSISTANT

## 2025-07-13 NOTE — ED TRIAGE NOTES
Lorenzo Nino, a 49 y.o. male presents to the ED w/ complaint of hematuria.      Triage note:  Chief Complaint   Patient presents with    Urinary Frequency     X2days. Denies fever, chills, N/V/D, or any other complaints     Hematuria     Review of patient's allergies indicates:  No Known Allergies  Past Medical History:   Diagnosis Date    Hypertension

## 2025-07-13 NOTE — DISCHARGE INSTRUCTIONS
Blood noted on your urinalysis today.  This is known as hematuria.  Please follow up with Urology for this.  I placed a referral today.     Strict ED precautions given to return immediately for new, worsening, or concerning symptoms

## 2025-07-13 NOTE — ED PROVIDER NOTES
Encounter Date: 7/13/2025       History     Chief Complaint   Patient presents with    Urinary Frequency     X2days. Denies fever, chills, N/V/D, or any other complaints     Hematuria     49-year-old male with a PMHx of HTN, CHF, pulmonary hypertension, CKD, AFib (on warfarin) presents to the ED with intermittent hematuria x2 days.  He has associated urinary urgency and frequency.  He does not have a history of urinary tract infections or kidney stones in the past.  He notes normal INR levels.  He denies passing figueroa blood clots or figueroa blood. He does not have hematuria every time he urinates.  He notes less frequent episodes of hematuria since this morning.  He denies fever, chills, flank pain, abdominal pain.    The history is provided by the patient.     Review of patient's allergies indicates:  No Known Allergies  Past Medical History:   Diagnosis Date    Hypertension      Past Surgical History:   Procedure Laterality Date    A-V CARDIAC PACEMAKER INSERTION N/A 8/22/2024    Procedure: INSERTION, CARDIAC PACEMAKER, DUAL CHAMBER;  Surgeon: DIPESH Ordonez MD;  Location: [facility] EP LAB;  Service: Cardiology;  Laterality: N/A;  AFL, DUAL PPM LBAP, mdt, [place], [place] [id number]    COLONOSCOPY N/A 1/5/2024    Procedure: COLONOSCOPY;  Surgeon: Fadia Ellsworth MD;  Location: [facility] ([address] [place] [address]);  Service: Endoscopy;  Laterality: N/A;    COLONOSCOPY, WITH DIRECTED SUBMUCOSAL INJECTION  2/27/2024    Procedure: COLONOSCOPY, WITH DIRECTED SUBMUCOSAL INJECTION;  Surgeon: Mayur Dunn MD;  [facility];  Service: Colon and Rectal;;    COLONOSCOPY, WITH POLYPECTOMY USING SNARE N/A 2/27/2024    Procedure: COLONOSCOPY, WITH POLYPECTOMY USING SNARE;  Surgeon: Mayur Dunn MD;  [facility];  Service: Colon and Rectal;  Laterality: N/A;    ECHOCARDIOGRAM,TRANSESOPHAGEAL N/A 12/26/2023    Procedure: Transesophageal echo (NEW) intra-procedure log documentation;  Surgeon: Provider, Dosc  Diagnostic;  Location: [facility] EP LAB;  Service: Cardiology;  Laterality: N/A;    ECHOCARDIOGRAM,TRANSESOPHAGEAL N/A 8/19/2024    Procedure: Transesophageal echo (NEW) intra-procedure log documentation;  Surgeon: Emerson Mercado MD;  Location: [facility] EP LAB;  Service: Cardiology;  Laterality: N/A;    ESOPHAGOGASTRODUODENOSCOPY N/A 1/5/2024    Procedure: EGD (ESOPHAGOGASTRODUODENOSCOPY);  Surgeon: Fadia Ellsworth MD;  Location: [facility] ENDO (2ND FLR);  Service: Endoscopy;  Laterality: N/A;    EXCLUSION, LEFT ATRIAL APPENDAGE, OPEN, AS PART OF OPEN CHEST SURGERY Left 11/3/2023    Procedure: EXCLUSION, LEFT ATRIAL APPENDAGE, OPEN, AS PART OF OPEN CHEST SURGERY;  Surgeon: Manuel Beal MD;  Location: [facility] OR [facility] FLR;  Service: Cardiothoracic;  Laterality: Left;    INSERTION OF INTRA-AORTIC BALLOON ASSIST DEVICE Right 11/2/2023    Procedure: INSERTION, INTRA-AORTIC BALLOON PUMP;  Surgeon: Jose Vidal MD;  Location: [facility] CATH LAB;  Service: Cardiology;  Laterality: Right;    MITRAL VALVE REPLACEMENT N/A 8/12/2024    Procedure: REPLACEMENT, MITRAL VALVE;  Surgeon: Manuel Beal MD;  [facility];  Service: Cardiothoracic;  Laterality: N/A;    REPAIR, MITRAL VALVE, OPEN N/A 11/3/2023    Procedure: REPAIR, MITRAL VALVE, OPEN;  Surgeon: Manuel Beal MD;  [facility];  Service: Cardiothoracic;  Laterality: N/A;    STERNOTOMY N/A 8/12/2024    Procedure: REDO STERNOTOMY;  Surgeon: Manuel Beal MD;  Location: [facility] OR [facility] FLR;  Service: Cardiothoracic;  Laterality: N/A;    TREATMENT OF CARDIAC ARRHYTHMIA N/A 8/19/2024    Procedure: Cardioversion or Defibrillation;  Surgeon: George Peter MD;  Location: [facility] EP LAB;  Service: Cardiology;  Laterality: N/A;  AF, DCCV/NEW, [place], [place] [id number]     Family History   Problem Relation Name Age of Onset    Amblyopia Neg Hx      Blindness Neg Hx      Cataracts Neg Hx      Glaucoma Neg Hx      Macular degeneration Neg Hx       Retinal detachment Neg Hx      Strabismus Neg Hx       Social History[1]  Review of Systems    Physical Exam     Initial Vitals [07/13/25 1541]   BP Pulse Resp Temp SpO2   139/86 78 17 98.1 °F (36.7 °C) 100 %      MAP       --         Physical Exam    Nursing note and vitals reviewed.  Constitutional: He appears well-developed and well-nourished. He is not diaphoretic. No distress.   HENT:   Head: Normocephalic and atraumatic.   Nose: Nose normal.   Eyes: Conjunctivae and EOM are normal.   Neck: Neck supple.   Cardiovascular:  Normal rate.           Pulmonary/Chest: No respiratory distress.   Abdominal: Abdomen is soft. He exhibits no distension. There is no abdominal tenderness.   No right CVA tenderness.  No left CVA tenderness. There is no guarding.   Musculoskeletal:      Cervical back: Neck supple.     Neurological: He is alert and oriented to person, place, and time.   Skin: No rash noted.   Psychiatric: He has a normal mood and affect. Thought content normal.         ED Course   Procedures  Labs Reviewed   URINALYSIS, REFLEX TO URINE CULTURE - Abnormal       Result Value    Color, UA Colorless (*)     Appearance, UA Clear      pH, UA 7.0      Spec Grav UA 1.010      Protein, UA Negative      Glucose, UA Negative      Ketones, UA Negative      Bilirubin, UA Negative      Blood, UA 3+ (*)     Nitrites, UA Negative      Urobilinogen, UA Negative      Leukocyte Esterase, UA Negative     PROTIME-INR - Abnormal    PT 22.6 (*)     INR 2.2 (*)    CBC WITH DIFFERENTIAL - Abnormal    WBC 9.27      RBC 5.45      HGB 12.1 (*)     HCT 41.1      MCV 75 (*)     MCH 22.2 (*)     MCHC 29.4 (*)     RDW 20.9 (*)     Platelet Count 187      MPV        Nucleated RBC 0      Neut % 67.3      Lymph % 21.1      Mono % 9.8      Eos % 0.9      Basophil % 0.4      Imm Grans % 0.5      Neut # 6.23      Lymph # 1.96      Mono # 0.91      Eos # 0.08      Baso # 0.04      Imm Grans # 0.05 (*)    URINALYSIS MICROSCOPIC - Abnormal    RBC, UA  94 (*)     WBC, UA 6 (*)     Microscopic Comment       HEPATITIS C ANTIBODY - Normal    Hep C Ab Interp Non-Reactive     HIV 1 / 2 ANTIBODY - Normal    HIV 1/2 Ag/Ab Non-Reactive     COMPREHENSIVE METABOLIC PANEL - Normal    Sodium 139      Potassium 4.2      Chloride 104      CO2 26      Glucose 93      BUN 15      Creatinine 1.2      Calcium 9.3      Protein Total 8.0      Albumin 4.4      Bilirubin Total 1.0      ALP 74      AST 26      ALT 18      Anion Gap 9      eGFR >60     CBC W/ AUTO DIFFERENTIAL    Narrative:     The following orders were created for panel order CBC auto differential.  Procedure                               Abnormality         Status                     ---------                               -----------         ------                     CBC with Differential[[id number]]       Abnormal            Final result                 Please view results for these tests on the individual orders.   EXTRA TUBES    Narrative:     The following orders were created for panel order EXTRA TUBES.  Procedure                               Abnormality         Status                     ---------                               -----------         ------                     Light Green Top Hold[[id number]]                            Final result                 Please view results for these tests on the individual orders.   LIGHT GREEN TOP HOLD    Extra Tube Hold for add-ons.     HEP C VIRUS HOLD SPECIMEN   GREY TOP URINE HOLD          Imaging Results    None          Medications - No data to display  Medical Decision Making  49-year-old male with a PMHx of HTN, CHF, pulmonary hypertension, CKD, AFib (on warfarin) presents to the ED with hematuria x2 days. Nontoxic appearing. Hemodynamically stable. Afebrile. Exam as above. I will initiate workup and reassess.    Ddx:  UTI, obstructive uropathy, platelet dysfunction, medication side effect, bladder mass    Laboratory workup is reassuring.  Labs without  leukocytosis.  Hemoglobin is stable and much improved from prior levels.  No thrombocytopenia.  Chemistry is unremarkable.  INR is not elevated.  He does not have abdominal pain or flank pain on exam.  I do not believe emergent imaging is indicated at this time however he may need further evaluation for hematuria.  I placed a referral with Urology.  Patient reassessed and his well-appearing.  He is hemodynamically stable.  At this time he is stable for discharge. Strict ED precautions given to return immediately for new, worsening, or concerning symptoms    Amount and/or Complexity of Data Reviewed  Labs: ordered. Decision-making details documented in ED Course.               ED Course as of 07/13/25 2016   Sun Jul 13, 2025   1721 Blood, UA(!): 3+ [HM]   1721 NITRITE UA: Negative [HM]   1721 Leukocyte Esterase, UA: Negative [HM]   1804 Hemoglobin(!): 12.1  Stable [HM]   1804 Platelet Count: 187 [HM]   1804 Creatinine: 1.2 [HM]   1806 INR(!): 2.2 [HM]   2015 WBC: 9.27 [HM]      ED Course User Index  [HM] Vianney Ventura PA-C                               Clinical Impression:  Final diagnoses:  [R31.9] Hematuria, unspecified type (Primary)          ED Disposition Condition    Discharge Stable          ED Prescriptions    None       Follow-up Information       Follow up With Specialties Details Why Contact Info Additional Information    [facility] - Emergency Dept Emergency Medicine  If symptoms worsen [address] [facility] [id number] [phone]     [facility] - Urology [facility] Urology Schedule an appointment as soon as possible for a visit   [address] [facility] [id number] [phone] Main Building, 4th Floor Please park in [facility] and take [facility] elevator                   [1]   Social History  Tobacco Use    Smoking status: Unknown     Passive exposure: Never   Substance Use Topics    Alcohol use: Yes     Alcohol/week: 1.0 standard drink of alcohol     Types:  1 Cans of beer per week    Drug use: Never        Vianney Ventura PA-C  07/13/25 2016

## 2025-07-14 LAB — HOLD SPECIMEN: NORMAL

## 2025-07-16 ENCOUNTER — PATIENT MESSAGE (OUTPATIENT)
Dept: CARDIOLOGY | Facility: CLINIC | Age: 50
End: 2025-07-16

## 2025-07-16 ENCOUNTER — ANTI-COAG VISIT (OUTPATIENT)
Dept: CARDIOLOGY | Facility: CLINIC | Age: 50
End: 2025-07-16
Payer: COMMERCIAL

## 2025-07-16 DIAGNOSIS — I48.3 TYPICAL ATRIAL FLUTTER: ICD-10-CM

## 2025-07-16 DIAGNOSIS — I48.0 PAROXYSMAL ATRIAL FIBRILLATION: Primary | ICD-10-CM

## 2025-07-16 DIAGNOSIS — Z79.01 LONG TERM (CURRENT) USE OF ANTICOAGULANTS: ICD-10-CM

## 2025-07-16 DIAGNOSIS — Z95.2 MITRAL VALVE REPLACED: ICD-10-CM

## 2025-07-16 LAB — HOLD SPECIMEN: NORMAL

## 2025-07-16 PROCEDURE — 93793 ANTICOAG MGMT PT WARFARIN: CPT | Mod: S$GLB,,,

## 2025-07-16 NOTE — PROGRESS NOTES
INR slightly low again. No new changes to cause low INR. Noted pt was in ER over the weekend with hematuria and negative workup. He has urology consult on Monday. Increase dose cautiously due to recent bleeding. Repeat INR Monday with urology visit.

## 2025-07-21 ENCOUNTER — LAB VISIT (OUTPATIENT)
Dept: LAB | Facility: HOSPITAL | Age: 50
End: 2025-07-21
Attending: INTERNAL MEDICINE
Payer: COMMERCIAL

## 2025-07-21 ENCOUNTER — OFFICE VISIT (OUTPATIENT)
Dept: UROLOGY | Facility: CLINIC | Age: 50
End: 2025-07-21
Payer: COMMERCIAL

## 2025-07-21 ENCOUNTER — ANTI-COAG VISIT (OUTPATIENT)
Dept: CARDIOLOGY | Facility: CLINIC | Age: 50
End: 2025-07-21
Payer: COMMERCIAL

## 2025-07-21 VITALS
SYSTOLIC BLOOD PRESSURE: 121 MMHG | DIASTOLIC BLOOD PRESSURE: 74 MMHG | HEIGHT: 72 IN | BODY MASS INDEX: 23.5 KG/M2 | HEART RATE: 64 BPM | WEIGHT: 173.5 LBS

## 2025-07-21 DIAGNOSIS — Z79.01 LONG TERM (CURRENT) USE OF ANTICOAGULANTS: ICD-10-CM

## 2025-07-21 DIAGNOSIS — I48.3 TYPICAL ATRIAL FLUTTER: ICD-10-CM

## 2025-07-21 DIAGNOSIS — N40.1 BPH WITH URINARY OBSTRUCTION: ICD-10-CM

## 2025-07-21 DIAGNOSIS — I48.3 TYPICAL ATRIAL FLUTTER: Primary | ICD-10-CM

## 2025-07-21 DIAGNOSIS — I48.0 PAROXYSMAL ATRIAL FIBRILLATION: ICD-10-CM

## 2025-07-21 DIAGNOSIS — Z95.2 MITRAL VALVE REPLACED: ICD-10-CM

## 2025-07-21 DIAGNOSIS — R31.0 HEMATURIA, GROSS: Primary | ICD-10-CM

## 2025-07-21 DIAGNOSIS — N13.8 BPH WITH URINARY OBSTRUCTION: ICD-10-CM

## 2025-07-21 PROBLEM — T14.8XXA SURGICAL WOUND PRESENT: Status: RESOLVED | Noted: 2023-11-04 | Resolved: 2025-07-21

## 2025-07-21 PROBLEM — B95.62 MRSA BACTEREMIA: Status: RESOLVED | Noted: 2023-11-03 | Resolved: 2025-07-21

## 2025-07-21 PROBLEM — H35.61: Status: RESOLVED | Noted: 2023-11-21 | Resolved: 2025-07-21

## 2025-07-21 PROBLEM — Z01.818 PRE-OP TESTING: Status: RESOLVED | Noted: 2024-08-08 | Resolved: 2025-07-21

## 2025-07-21 PROBLEM — E87.6 HYPOKALEMIA: Status: RESOLVED | Noted: 2024-01-03 | Resolved: 2025-07-21

## 2025-07-21 PROBLEM — R73.9 STRESS HYPERGLYCEMIA: Status: RESOLVED | Noted: 2023-11-04 | Resolved: 2025-07-21

## 2025-07-21 PROBLEM — R79.89 ELEVATED TROPONIN: Status: RESOLVED | Noted: 2023-10-31 | Resolved: 2025-07-21

## 2025-07-21 PROBLEM — Z98.890 S/P MVR (MITRAL VALVE REPAIR): Chronic | Status: RESOLVED | Noted: 2023-11-09 | Resolved: 2025-07-21

## 2025-07-21 PROBLEM — R78.81 MRSA BACTEREMIA: Status: RESOLVED | Noted: 2023-11-03 | Resolved: 2025-07-21

## 2025-07-21 PROBLEM — I38 ENDOCARDITIS: Chronic | Status: RESOLVED | Noted: 2023-10-31 | Resolved: 2025-07-21

## 2025-07-21 PROBLEM — I50.41 ACUTE COMBINED SYSTOLIC AND DIASTOLIC HEART FAILURE: Status: RESOLVED | Noted: 2023-12-19 | Resolved: 2025-07-21

## 2025-07-21 PROBLEM — I51.1 RUPTURE OF CHORDAE TENDINEAE: Status: RESOLVED | Noted: 2023-11-03 | Resolved: 2025-07-21

## 2025-07-21 LAB
INR PPP: 1.9 (ref 0.8–1.2)
PROTHROMBIN TIME: 20.1 SECONDS (ref 9–12.5)

## 2025-07-21 PROCEDURE — 99999 PR PBB SHADOW E&M-EST. PATIENT-LVL IV: CPT | Mod: PBBFAC,,, | Performed by: UROLOGY

## 2025-07-21 PROCEDURE — G2211 COMPLEX E/M VISIT ADD ON: HCPCS | Mod: S$GLB,,, | Performed by: UROLOGY

## 2025-07-21 PROCEDURE — 4010F ACE/ARB THERAPY RXD/TAKEN: CPT | Mod: CPTII,S$GLB,, | Performed by: UROLOGY

## 2025-07-21 PROCEDURE — 36415 COLL VENOUS BLD VENIPUNCTURE: CPT

## 2025-07-21 PROCEDURE — 1159F MED LIST DOCD IN RCRD: CPT | Mod: CPTII,S$GLB,, | Performed by: UROLOGY

## 2025-07-21 PROCEDURE — 3008F BODY MASS INDEX DOCD: CPT | Mod: CPTII,S$GLB,, | Performed by: UROLOGY

## 2025-07-21 PROCEDURE — 3078F DIAST BP <80 MM HG: CPT | Mod: CPTII,S$GLB,, | Performed by: UROLOGY

## 2025-07-21 PROCEDURE — 85610 PROTHROMBIN TIME: CPT

## 2025-07-21 PROCEDURE — 99204 OFFICE O/P NEW MOD 45 MIN: CPT | Mod: S$GLB,,, | Performed by: UROLOGY

## 2025-07-21 PROCEDURE — 3074F SYST BP LT 130 MM HG: CPT | Mod: CPTII,S$GLB,, | Performed by: UROLOGY

## 2025-07-21 RX ORDER — LIDOCAINE HYDROCHLORIDE 20 MG/ML
JELLY TOPICAL ONCE
OUTPATIENT
Start: 2025-07-21 | End: 2025-07-21

## 2025-07-21 NOTE — PROGRESS NOTES
CHIEF COMPLAINT:    Mr. Nino is a 49 y.o. male presenting for a consultation at the request of SUZY Ventura NP. Patient presents with gross hematuria.    PRESENTING ILLNESS:    Lorenzo Nino is a 49 y.o. male with a significant heart history who c/o gross hematuria.  He's never smoked.    He has LUTS. Has nocturia x  0-1. Pleased with how he voids.     He denies ED.    REVIEW OF SYSTEMS:    Lorenzo Nino denies headache, blurred vision, fever, nausea, vomiting, chills, abdominal pain, chest pain, sore throat, bleeding per rectum, cough, SOB, recent loss of consciousness, recent mental status changes, seizures, dizziness, or upper or lower extremity weakness.    GRACIELA  1. 5  2. 5  3. 1  4. 5  5. 5      PATIENT HISTORY:    Past Medical History:   Diagnosis Date    Hypertension     Mitral valve replaced 08/23/2024    S/P MVR (mitral valve repair) 11/09/2023       Past Surgical History:   Procedure Laterality Date    A-V CARDIAC PACEMAKER INSERTION N/A 8/22/2024    Procedure: INSERTION, CARDIAC PACEMAKER, DUAL CHAMBER;  Surgeon: DIPESH Ordonez MD;  Location: [facility] EP LAB;  Service: Cardiology;  Laterality: N/A;  AFL, DUAL PPM LBSARA, mdt, ANES, , [place] [id number]    COLONOSCOPY N/A 1/5/2024    Procedure: COLONOSCOPY;  Surgeon: Fadia Ellsworth MD;  Location: [facility] ENDO (2ND FLR);  Service: Endoscopy;  Laterality: N/A;    COLONOSCOPY, WITH DIRECTED SUBMUCOSAL INJECTION  2/27/2024    Procedure: COLONOSCOPY, WITH DIRECTED SUBMUCOSAL INJECTION;  Surgeon: Mayur Dunn MD;  [facility];  Service: Colon and Rectal;;    COLONOSCOPY, WITH POLYPECTOMY USING SNARE N/A 2/27/2024    Procedure: COLONOSCOPY, WITH POLYPECTOMY USING SNARE;  Surgeon: Mayur uDnn MD;  [facility];  Service: Colon and Rectal;  Laterality: N/A;    ECHOCARDIOGRAM,TRANSESOPHAGEAL N/A 12/26/2023    Procedure: Transesophageal echo (NEW) intra-procedure log documentation;  Surgeon: Provider, Dosc Diagnostic;   Location: [facility] EP LAB;  Service: Cardiology;  Laterality: N/A;    ECHOCARDIOGRAM,TRANSESOPHAGEAL N/A 8/19/2024    Procedure: Transesophageal echo (NEW) intra-procedure log documentation;  Surgeon: Emerson Mercado MD;  Location: [facility] EP LAB;  Service: Cardiology;  Laterality: N/A;    ESOPHAGOGASTRODUODENOSCOPY N/A 1/5/2024    Procedure: EGD (ESOPHAGOGASTRODUODENOSCOPY);  Surgeon: Fadia Ellsworth MD;  Location: [facility] ENDO (2ND FLR);  Service: Endoscopy;  Laterality: N/A;    EXCLUSION, LEFT ATRIAL APPENDAGE, OPEN, AS PART OF OPEN CHEST SURGERY Left 11/3/2023    Procedure: EXCLUSION, LEFT ATRIAL APPENDAGE, OPEN, AS PART OF OPEN CHEST SURGERY;  Surgeon: Manuel Beal MD;  Location: [facility] OR [facility] FLR;  Service: Cardiothoracic;  Laterality: Left;    INSERTION OF INTRA-AORTIC BALLOON ASSIST DEVICE Right 11/2/2023    Procedure: INSERTION, INTRA-AORTIC BALLOON PUMP;  Surgeon: Jose Vidal MD;  Location: [facility] CATH LAB;  Service: Cardiology;  Laterality: Right;    MITRAL VALVE REPLACEMENT N/A 8/12/2024    Procedure: REPLACEMENT, MITRAL VALVE;  Surgeon: Manuel Beal MD;  Location: [facility] OR [facility] FLR;  Service: Cardiothoracic;  Laterality: N/A;    REPAIR, MITRAL VALVE, OPEN N/A 11/3/2023    Procedure: REPAIR, MITRAL VALVE, OPEN;  Surgeon: Manuel Beal MD;  [facility];  Service: Cardiothoracic;  Laterality: N/A;    STERNOTOMY N/A 8/12/2024    Procedure: REDO STERNOTOMY;  Surgeon: Manuel Beal MD;  Location: [facility] OR [facility] FLR;  Service: Cardiothoracic;  Laterality: N/A;    TREATMENT OF CARDIAC ARRHYTHMIA N/A 8/19/2024    Procedure: Cardioversion or Defibrillation;  Surgeon: George Peter MD;  Location: [facility] EP LAB;  Service: Cardiology;  Laterality: N/A;  AF, DCCV/NEW, [place]ES, [place] [id number]       Family History   Problem Relation Name Age of Onset    Amblyopia Neg Hx      Blindness Neg Hx      Cataracts Neg Hx      Glaucoma Neg Hx      Macular degeneration Neg Hx      Retinal  "detachment Neg Hx      Strabismus Neg Hx         Social History[1]    Allergies:  Patient has no known allergies.    Medications:  Current Medications[2]    PHYSICAL EXAMINATION:    The patient generally appears in good health, is appropriately interactive, and is in no apparent distress.     Eyes: anicteric sclerae, moist conjunctivae; no lid-lag; PERRLA     HENT: Atraumatic; oropharynx clear with moist mucous membranes and no mucosal ulcerations;normal hard and soft palate.  No evidence of lymphadenopathy.    Neck: Trachea midline.  No thyromegaly.    Skin: No lesions.    Mental: Cooperative with normal affect.  Is oriented to time, place, and person.    Neuro: Grossly intact.    Chest: Normal inspiratory effort.   No accessory muscles.  No audible wheezes.  Respirations symmetric on inspiration and expiration.    Heart: Regular rhythm.      Abdomen:  Soft, non-tender. No masses or organomegaly. Bladder is not palpable. No evidence of flank discomfort. No evidence of inguinal hernia.    Genitourinary: The penis is circumcised with no evidence of plaques or induration. The urethral meatus is normal. The testes, epididymides, and cord structures are normal in size and contour bilaterally. The scrotum is normal in size and contour.    Normal anal sphincter tone. No rectal mass.    The prostate is 35 g. Normal landmarks. Lateral sulci. Median furrow intact.  No nodularity or induration. Seminal vesicles are normal.    Extremities: No clubbing, cyanosis, or edema      LABS:    UA dipped negative today  No results found for: "PSA", "PSADIAG", "PSATOTAL", "PSAFREE", "PSAFREEPCT"    IMPRESSION:    Encounter Diagnoses   Name Primary?    Hematuria, gross Yes    BPH with urinary obstruction          PLAN:    1. Will draw a PSA as he's due.  2. Will observe his LUTS as they don't bother him.  3. Will workup the hematuria with a CT urogram and cysto. I  have explained the risk, benefits, and alternatives of the procedure in " detail. The patient voices understanding and all questions have been answered. The patient agrees to proceed as planned.    4. Visit today included increased complexity associated with the care of the episodic problem of possible malignancy addressed and managing the longitudinal care of the patient due to the serious and/or complex managed problem(s) requiring cysto.     Copy to:           [1]   Social History  Socioeconomic History    Marital status: Single   Occupational History    Occupation: associated terminal  [employer]    Tobacco Use    Smoking status: Unknown     Passive exposure: Never   Substance and Sexual Activity    Alcohol use: Yes     Alcohol/week: 1.0 standard drink of alcohol     Types: 1 Cans of beer per week    Drug use: Never    Sexual activity: Not Currently     Partners: Female     Birth control/protection: None     Social Drivers of Health     Financial Resource Strain: Low Risk  (8/13/2024)    Overall Financial Resource Strain (CARDIA)     Difficulty of Paying Living Expenses: Not hard at all   Food Insecurity: No Food Insecurity (8/13/2024)    Hunger Vital Sign     Worried About Running Out of Food in the Last Year: Never true     Ran Out of Food in the Last Year: Never true   Transportation Needs: No Transportation Needs (8/13/2024)    TRANSPORTATION NEEDS     Transportation : No   Physical Activity: Inactive (8/13/2024)    Exercise Vital Sign     Days of Exercise per Week: 0 days     Minutes of Exercise per Session: 0 min   Stress: Patient Declined (8/13/2024)    [employer] - Occupational Stress Questionnaire     Feeling of Stress : Patient declined   Housing Stability: Unknown (8/13/2024)    Housing Stability Vital Sign     Unable to Pay for Housing in the Last Year: No     Homeless in the Last Year: No   [2]   Current Outpatient Medications:     aspirin (ECOTRIN) 81 MG EC tablet, Take 1 tablet (81 mg total) by mouth once daily., Disp: 30 tablet, Rfl:  11    sacubitriL-valsartan (ENTRESTO) 24-26 mg per tablet, Take 1 tablet by mouth 2 (two) times daily., Disp: 60 tablet, Rfl: 11    warfarin (COUMADIN) 1 MG tablet, Take 2-3 tablets (2-3 mg total) by mouth Daily. As directed by coumadin clinic, Disp: 70 tablet, Rfl: 11  No current facility-administered medications for this visit.    Facility-Administered Medications Ordered in Other Visits:     0.9%  NaCl infusion, , Intravenous, Continuous, Adriana Black NP, Last Rate: 70 mL/hr at 02/27/24 0755, New Bag at 08/22/24 1037    mupirocin 2 % ointment, , Nasal, On Call Procedure, Adriana Black NP, Given at 02/27/24 0756

## 2025-07-21 NOTE — PROGRESS NOTES
[facility]    2025 2:33 PM    Assessment/Plan:    Patient presents today with subtherapeutic  INR.    Assessment of patient findings and chart review: INR decreasing despite dose increase. Pt denies new changes. Cytoscope planned for hematuria work-up    Recommendation for patient's warfarin regimen: Increase maintenance dose    Recommend repeat INR in 1 week  _________________________________________________________________    Lorenzo Mica (49 y.o.) is followed by the [facility].    Anticoagulation Summary  As of 2025      INR goal:  2.5-3.5   TTR:  61.5% (10.8 mo)   INR used for dosin.9 (2025)   Warfarin maintenance plan:  2 mg (1 mg x 2) every Wed, Fri; 3 mg (1 mg x 3) all other days   Weekly warfarin total:  19 mg   Plan last modified:  Holley Bolaños, PharmD (2025)   Next INR check:  2025   Target end date:  --    Indications    A-fib with RVR [I48.91]  Typical atrial flutter [I48.3]  Long term (current) use of anticoagulants [Z79.01]  Mitral valve replaced (Resolved) [Z95.2]                 Anticoagulation Episode Summary       INR check location:  --    Preferred lab:  --    Send INR reminders to:  [facility] COUMADIN MONITORING POOL    Comments:  [facility], see 7/3 encounter for meter process           Anticoagulation Care Providers       Provider Role Specialty Phone number    Emerson Mercado MD  Cardiology [phone]

## 2025-07-21 NOTE — PROGRESS NOTES
Spoke with Ms. Shelley. Reports blood in urine has cleared up about two days ago. No other changes. She was advised appt booked.

## 2025-07-28 ENCOUNTER — ANTI-COAG VISIT (OUTPATIENT)
Dept: CARDIOLOGY | Facility: CLINIC | Age: 50
End: 2025-07-28
Payer: COMMERCIAL

## 2025-07-28 ENCOUNTER — PATIENT MESSAGE (OUTPATIENT)
Dept: CARDIOLOGY | Facility: CLINIC | Age: 50
End: 2025-07-28

## 2025-07-28 DIAGNOSIS — Z79.01 LONG TERM (CURRENT) USE OF ANTICOAGULANTS: ICD-10-CM

## 2025-07-28 DIAGNOSIS — I48.3 TYPICAL ATRIAL FLUTTER: Primary | ICD-10-CM

## 2025-07-28 PROCEDURE — 93793 ANTICOAG MGMT PT WARFARIN: CPT | Mod: S$GLB,,,

## 2025-07-28 NOTE — PROGRESS NOTES
INR just in range on new dose. Dose has never needed to be this high. Watch trend. Continue dose as planned. Also recent hematuria, so ok with INR trending on low end.

## 2025-08-04 ENCOUNTER — ANTI-COAG VISIT (OUTPATIENT)
Dept: CARDIOLOGY | Facility: CLINIC | Age: 50
End: 2025-08-04
Payer: COMMERCIAL

## 2025-08-04 DIAGNOSIS — I48.3 TYPICAL ATRIAL FLUTTER: ICD-10-CM

## 2025-08-04 DIAGNOSIS — Z79.01 LONG TERM (CURRENT) USE OF ANTICOAGULANTS: ICD-10-CM

## 2025-08-04 DIAGNOSIS — I48.0 PAROXYSMAL ATRIAL FIBRILLATION: Primary | ICD-10-CM

## 2025-08-04 PROCEDURE — 93793 ANTICOAG MGMT PT WARFARIN: CPT | Mod: S$GLB,,,

## 2025-08-09 ENCOUNTER — HOSPITAL ENCOUNTER (OUTPATIENT)
Dept: RADIOLOGY | Facility: HOSPITAL | Age: 50
Discharge: HOME | End: 2025-08-09
Attending: UROLOGY
Payer: COMMERCIAL

## 2025-08-09 DIAGNOSIS — R31.0 HEMATURIA, GROSS: ICD-10-CM

## 2025-08-09 PROCEDURE — 25500020 PHARM REV CODE 255: Performed by: UROLOGY

## 2025-08-09 PROCEDURE — 74178 CT ABD&PLV WO CNTR FLWD CNTR: CPT | Mod: TC

## 2025-08-09 PROCEDURE — 74178 CT ABD&PLV WO CNTR FLWD CNTR: CPT | Mod: 26,,, | Performed by: RADIOLOGY

## 2025-08-09 RX ADMIN — IOHEXOL 100 ML: 350 INJECTION, SOLUTION INTRAVENOUS at 09:08

## 2025-08-11 ENCOUNTER — TELEPHONE (OUTPATIENT)
Dept: UROLOGY | Facility: CLINIC | Age: 50
End: 2025-08-11
Payer: COMMERCIAL

## 2025-08-11 ENCOUNTER — PATIENT MESSAGE (OUTPATIENT)
Dept: UROLOGY | Facility: CLINIC | Age: 50
End: 2025-08-11
Payer: COMMERCIAL

## 2025-08-12 LAB — INR PPP: 3.7

## 2025-08-13 ENCOUNTER — PATIENT MESSAGE (OUTPATIENT)
Dept: CARDIOLOGY | Facility: CLINIC | Age: 50
End: 2025-08-13

## 2025-08-13 ENCOUNTER — PATIENT MESSAGE (OUTPATIENT)
Dept: UROLOGY | Facility: CLINIC | Age: 50
End: 2025-08-13

## 2025-08-13 ENCOUNTER — ANTI-COAG VISIT (OUTPATIENT)
Dept: CARDIOLOGY | Facility: CLINIC | Age: 50
End: 2025-08-13
Payer: COMMERCIAL

## 2025-08-13 ENCOUNTER — OFFICE VISIT (OUTPATIENT)
Dept: UROLOGY | Facility: CLINIC | Age: 50
End: 2025-08-13
Payer: COMMERCIAL

## 2025-08-13 VITALS
WEIGHT: 174.19 LBS | DIASTOLIC BLOOD PRESSURE: 81 MMHG | HEART RATE: 59 BPM | SYSTOLIC BLOOD PRESSURE: 121 MMHG | HEIGHT: 72 IN | BODY MASS INDEX: 23.59 KG/M2

## 2025-08-13 DIAGNOSIS — N13.8 BPH WITH URINARY OBSTRUCTION: ICD-10-CM

## 2025-08-13 DIAGNOSIS — I48.3 TYPICAL ATRIAL FLUTTER: Primary | ICD-10-CM

## 2025-08-13 DIAGNOSIS — Z79.01 LONG TERM (CURRENT) USE OF ANTICOAGULANTS: ICD-10-CM

## 2025-08-13 DIAGNOSIS — N20.0 NEPHROLITHIASIS: Primary | ICD-10-CM

## 2025-08-13 DIAGNOSIS — Z95.2 MITRAL VALVE REPLACED: ICD-10-CM

## 2025-08-13 DIAGNOSIS — R31.0 HEMATURIA, GROSS: ICD-10-CM

## 2025-08-13 DIAGNOSIS — N40.1 BPH WITH URINARY OBSTRUCTION: ICD-10-CM

## 2025-08-13 PROCEDURE — 99999 PR PBB SHADOW E&M-EST. PATIENT-LVL III: CPT | Mod: PBBFAC,,, | Performed by: UROLOGY

## 2025-08-13 PROCEDURE — 99214 OFFICE O/P EST MOD 30 MIN: CPT | Mod: S$GLB,,, | Performed by: UROLOGY

## 2025-08-13 PROCEDURE — 1159F MED LIST DOCD IN RCRD: CPT | Mod: CPTII,S$GLB,, | Performed by: UROLOGY

## 2025-08-13 PROCEDURE — 3079F DIAST BP 80-89 MM HG: CPT | Mod: CPTII,S$GLB,, | Performed by: UROLOGY

## 2025-08-13 PROCEDURE — 3074F SYST BP LT 130 MM HG: CPT | Mod: CPTII,S$GLB,, | Performed by: UROLOGY

## 2025-08-13 PROCEDURE — G2211 COMPLEX E/M VISIT ADD ON: HCPCS | Mod: S$GLB,,, | Performed by: UROLOGY

## 2025-08-13 PROCEDURE — 4010F ACE/ARB THERAPY RXD/TAKEN: CPT | Mod: CPTII,S$GLB,, | Performed by: UROLOGY

## 2025-08-13 PROCEDURE — 3008F BODY MASS INDEX DOCD: CPT | Mod: CPTII,S$GLB,, | Performed by: UROLOGY

## 2025-08-13 PROCEDURE — 1160F RVW MEDS BY RX/DR IN RCRD: CPT | Mod: CPTII,S$GLB,, | Performed by: UROLOGY

## 2025-08-18 ENCOUNTER — ANTI-COAG VISIT (OUTPATIENT)
Dept: CARDIOLOGY | Facility: CLINIC | Age: 50
End: 2025-08-18
Payer: COMMERCIAL

## 2025-08-18 ENCOUNTER — PATIENT MESSAGE (OUTPATIENT)
Dept: CARDIOLOGY | Facility: CLINIC | Age: 50
End: 2025-08-18

## 2025-08-18 DIAGNOSIS — Z95.2 MITRAL VALVE REPLACED: ICD-10-CM

## 2025-08-18 DIAGNOSIS — I48.0 PAROXYSMAL ATRIAL FIBRILLATION: Primary | ICD-10-CM

## 2025-08-18 DIAGNOSIS — Z79.01 LONG TERM (CURRENT) USE OF ANTICOAGULANTS: ICD-10-CM

## 2025-08-18 DIAGNOSIS — I48.3 TYPICAL ATRIAL FLUTTER: ICD-10-CM

## 2025-08-18 LAB — INR PPP: 3

## 2025-08-18 PROCEDURE — 93793 ANTICOAG MGMT PT WARFARIN: CPT | Mod: S$GLB,,,

## 2025-08-25 ENCOUNTER — ANTI-COAG VISIT (OUTPATIENT)
Dept: CARDIOLOGY | Facility: CLINIC | Age: 50
End: 2025-08-25
Payer: COMMERCIAL

## 2025-08-25 ENCOUNTER — PATIENT MESSAGE (OUTPATIENT)
Dept: CARDIOLOGY | Facility: CLINIC | Age: 50
End: 2025-08-25

## 2025-08-25 DIAGNOSIS — I48.0 PAROXYSMAL ATRIAL FIBRILLATION: Primary | ICD-10-CM

## 2025-08-25 DIAGNOSIS — Z79.01 LONG TERM (CURRENT) USE OF ANTICOAGULANTS: ICD-10-CM

## 2025-08-25 DIAGNOSIS — I48.3 TYPICAL ATRIAL FLUTTER: ICD-10-CM

## 2025-08-25 DIAGNOSIS — Z95.2 MITRAL VALVE REPLACED: ICD-10-CM

## 2025-08-25 LAB — INR PPP: 3.4

## 2025-08-25 PROCEDURE — 93793 ANTICOAG MGMT PT WARFARIN: CPT | Mod: S$GLB,,,

## 2025-08-26 ENCOUNTER — TELEPHONE (OUTPATIENT)
Dept: UROLOGY | Facility: CLINIC | Age: 50
End: 2025-08-26
Payer: COMMERCIAL

## 2025-08-27 ENCOUNTER — DOCUMENTATION ONLY (OUTPATIENT)
Dept: ELECTROPHYSIOLOGY | Facility: CLINIC | Age: 50
End: 2025-08-27
Payer: COMMERCIAL

## 2025-08-27 ENCOUNTER — HOSPITAL ENCOUNTER (OUTPATIENT)
Facility: HOSPITAL | Age: 50
Discharge: HOME | End: 2025-08-27
Attending: UROLOGY | Admitting: UROLOGY
Payer: COMMERCIAL

## 2025-08-27 ENCOUNTER — ANESTHESIA (OUTPATIENT)
Dept: SURGERY | Facility: HOSPITAL | Age: 50
End: 2025-08-27
Payer: COMMERCIAL

## 2025-08-27 ENCOUNTER — ANESTHESIA EVENT (OUTPATIENT)
Dept: SURGERY | Facility: HOSPITAL | Age: 50
End: 2025-08-27
Payer: COMMERCIAL

## 2025-08-27 VITALS
OXYGEN SATURATION: 100 % | RESPIRATION RATE: 18 BRPM | DIASTOLIC BLOOD PRESSURE: 79 MMHG | TEMPERATURE: 98 F | HEART RATE: 60 BPM | SYSTOLIC BLOOD PRESSURE: 127 MMHG | BODY MASS INDEX: 27.74 KG/M2 | HEIGHT: 72 IN | WEIGHT: 204.81 LBS

## 2025-08-27 DIAGNOSIS — N20.0 NEPHROLITHIASIS: ICD-10-CM

## 2025-08-27 DIAGNOSIS — R31.0 HEMATURIA, GROSS: ICD-10-CM

## 2025-08-27 PROCEDURE — 37000008 HC ANESTHESIA 1ST 15 MINUTES: Performed by: UROLOGY

## 2025-08-27 PROCEDURE — C1769 GUIDE WIRE: HCPCS | Performed by: UROLOGY

## 2025-08-27 PROCEDURE — 71000015 HC POSTOP RECOV 1ST HR: Performed by: UROLOGY

## 2025-08-27 PROCEDURE — 71000044 HC DOSC ROUTINE RECOVERY FIRST HOUR: Performed by: UROLOGY

## 2025-08-27 PROCEDURE — 36000707: Performed by: UROLOGY

## 2025-08-27 PROCEDURE — 63600175 PHARM REV CODE 636 W HCPCS

## 2025-08-27 PROCEDURE — 25000003 PHARM REV CODE 250

## 2025-08-27 PROCEDURE — 63600175 PHARM REV CODE 636 W HCPCS: Performed by: ANESTHESIOLOGY

## 2025-08-27 PROCEDURE — 36000706: Performed by: UROLOGY

## 2025-08-27 PROCEDURE — 37000009 HC ANESTHESIA EA ADD 15 MINS: Performed by: UROLOGY

## 2025-08-27 PROCEDURE — 52351 CYSTOURETERO & OR PYELOSCOPE: CPT | Mod: ,,, | Performed by: UROLOGY

## 2025-08-27 RX ORDER — SODIUM CHLORIDE 0.9 % (FLUSH) 0.9 %
3 SYRINGE (ML) INJECTION
Status: DISCONTINUED | OUTPATIENT
Start: 2025-08-27 | End: 2025-08-27 | Stop reason: HOSPADM

## 2025-08-27 RX ORDER — CEFAZOLIN 2 G/1
2 INJECTION, POWDER, FOR SOLUTION INTRAMUSCULAR; INTRAVENOUS
Status: COMPLETED | OUTPATIENT
Start: 2025-08-27 | End: 2025-08-27

## 2025-08-27 RX ORDER — LIDOCAINE HYDROCHLORIDE 20 MG/ML
JELLY TOPICAL ONCE
Status: SHIPPED | OUTPATIENT
Start: 2025-08-27

## 2025-08-27 RX ORDER — LIDOCAINE HYDROCHLORIDE 10 MG/ML
INJECTION, SOLUTION EPIDURAL; INFILTRATION; INTRACAUDAL; PERINEURAL
Status: DISCONTINUED
Start: 2025-08-27 | End: 2025-08-27 | Stop reason: HOSPADM

## 2025-08-27 RX ORDER — LIDOCAINE HYDROCHLORIDE 20 MG/ML
INJECTION, SOLUTION EPIDURAL; INFILTRATION; INTRACAUDAL; PERINEURAL
Status: DISCONTINUED | OUTPATIENT
Start: 2025-08-27 | End: 2025-08-27

## 2025-08-27 RX ORDER — OXYCODONE AND ACETAMINOPHEN 5; 325 MG/1; MG/1
1 TABLET ORAL
Refills: 0 | Status: DISCONTINUED | OUTPATIENT
Start: 2025-08-27 | End: 2025-08-27 | Stop reason: HOSPADM

## 2025-08-27 RX ORDER — ROCURONIUM BROMIDE 10 MG/ML
INJECTION, SOLUTION INTRAVENOUS
Status: DISCONTINUED | OUTPATIENT
Start: 2025-08-27 | End: 2025-08-27

## 2025-08-27 RX ORDER — PROPOFOL 10 MG/ML
VIAL (ML) INTRAVENOUS
Status: DISCONTINUED | OUTPATIENT
Start: 2025-08-27 | End: 2025-08-27

## 2025-08-27 RX ORDER — HYDROMORPHONE HYDROCHLORIDE 1 MG/ML
0.2 INJECTION, SOLUTION INTRAMUSCULAR; INTRAVENOUS; SUBCUTANEOUS EVERY 5 MIN PRN
Refills: 0 | Status: DISCONTINUED | OUTPATIENT
Start: 2025-08-27 | End: 2025-08-27 | Stop reason: HOSPADM

## 2025-08-27 RX ORDER — FENTANYL CITRATE 50 UG/ML
INJECTION, SOLUTION INTRAMUSCULAR; INTRAVENOUS
Status: DISCONTINUED | OUTPATIENT
Start: 2025-08-27 | End: 2025-08-27

## 2025-08-27 RX ORDER — ONDANSETRON HYDROCHLORIDE 2 MG/ML
4 INJECTION, SOLUTION INTRAVENOUS DAILY PRN
Status: DISCONTINUED | OUTPATIENT
Start: 2025-08-27 | End: 2025-08-27 | Stop reason: HOSPADM

## 2025-08-27 RX ORDER — OXYBUTYNIN CHLORIDE 5 MG/1
5 TABLET ORAL 3 TIMES DAILY
Qty: 42 TABLET | Refills: 0 | Status: SHIPPED | OUTPATIENT
Start: 2025-08-27 | End: 2025-09-10

## 2025-08-27 RX ORDER — HALOPERIDOL LACTATE 5 MG/ML
0.5 INJECTION, SOLUTION INTRAMUSCULAR EVERY 10 MIN PRN
Status: DISCONTINUED | OUTPATIENT
Start: 2025-08-27 | End: 2025-08-27 | Stop reason: HOSPADM

## 2025-08-27 RX ORDER — MIDAZOLAM HYDROCHLORIDE 1 MG/ML
INJECTION, SOLUTION INTRAMUSCULAR; INTRAVENOUS
Status: DISCONTINUED | OUTPATIENT
Start: 2025-08-27 | End: 2025-08-27

## 2025-08-27 RX ORDER — GLUCAGON 1 MG
1 KIT INJECTION
Status: DISCONTINUED | OUTPATIENT
Start: 2025-08-27 | End: 2025-08-27 | Stop reason: HOSPADM

## 2025-08-27 RX ADMIN — SODIUM CHLORIDE: 0.9 INJECTION, SOLUTION INTRAVENOUS at 08:08

## 2025-08-27 RX ADMIN — SUGAMMADEX 200 MG: 100 INJECTION, SOLUTION INTRAVENOUS at 08:08

## 2025-08-27 RX ADMIN — ONDANSETRON 4 MG: 2 INJECTION INTRAMUSCULAR; INTRAVENOUS at 08:08

## 2025-08-27 RX ADMIN — MIDAZOLAM HYDROCHLORIDE 2 MG: 2 INJECTION, SOLUTION INTRAMUSCULAR; INTRAVENOUS at 07:08

## 2025-08-27 RX ADMIN — LIDOCAINE HYDROCHLORIDE 100 MG: 20 INJECTION, SOLUTION EPIDURAL; INFILTRATION; INTRACAUDAL; PERINEURAL at 08:08

## 2025-08-27 RX ADMIN — CEFAZOLIN 2 G: 2 INJECTION, POWDER, FOR SOLUTION INTRAMUSCULAR; INTRAVENOUS at 08:08

## 2025-08-27 RX ADMIN — ROCURONIUM BROMIDE 50 MG: 10 INJECTION, SOLUTION INTRAVENOUS at 08:08

## 2025-08-27 RX ADMIN — PROPOFOL 150 MG: 10 INJECTION, EMULSION INTRAVENOUS at 08:08

## 2025-08-27 RX ADMIN — FENTANYL CITRATE 50 MCG: 50 INJECTION, SOLUTION INTRAMUSCULAR; INTRAVENOUS at 08:08

## 2025-09-02 ENCOUNTER — ANTI-COAG VISIT (OUTPATIENT)
Dept: CARDIOLOGY | Facility: CLINIC | Age: 50
End: 2025-09-02
Payer: COMMERCIAL

## 2025-09-02 DIAGNOSIS — Z95.2 MITRAL VALVE REPLACED: ICD-10-CM

## 2025-09-02 DIAGNOSIS — I48.3 TYPICAL ATRIAL FLUTTER: Primary | ICD-10-CM

## 2025-09-02 DIAGNOSIS — Z79.01 LONG TERM (CURRENT) USE OF ANTICOAGULANTS: ICD-10-CM

## 2025-09-02 LAB — INR PPP: 2.5

## 2025-09-02 PROCEDURE — 93793 ANTICOAG MGMT PT WARFARIN: CPT | Mod: S$GLB,,,

## (undated) DEVICE — CANNULA MULTIPLE PERFUSIONSET

## (undated) DEVICE — SYR ONLY LUER LOCK 20CC

## (undated) DEVICE — GUIDE WIRE MOTION .035 X 150CM

## (undated) DEVICE — CANNULA RETROGRADE CARDIOPLEG

## (undated) DEVICE — KIT MICROINTRO 4F .018X40X7CM

## (undated) DEVICE — Device

## (undated) DEVICE — NDL 22GA X1 1/2 REG BEVEL

## (undated) DEVICE — DRAPE SLUSH WARMER WITH DISC

## (undated) DEVICE — TRAY CYSTO BASIN OMC

## (undated) DEVICE — LOOP VESSEL BLUE MAXI

## (undated) DEVICE — SUT SILK 2-0 SH 18IN BLACK

## (undated) DEVICE — DRESSING ADH ISLAND 3.6 X 14

## (undated) DEVICE — TRAY HEART OMC

## (undated) DEVICE — VISIPAQUE CONTRAST 320MG/100ML

## (undated) DEVICE — SUT PROLENE 3-0 SH DA 36 BL

## (undated) DEVICE — INSERTS STEALTH FIBRA SIZE 5

## (undated) DEVICE — CONTAINER SPECIMEN STRL 4OZ

## (undated) DEVICE — DRESSING AQUACEL SACRAL 9 X 9

## (undated) DEVICE — SUT ETHIBOND XTRA 2-0 SH-2

## (undated) DEVICE — LEAFLET TESTER

## (undated) DEVICE — TROCAR ENDOPATH XCEL 12X100MM

## (undated) DEVICE — DRAIN CHEST DRY SUCTION

## (undated) DEVICE — SUT SILK BLK BR. 2 2-60

## (undated) DEVICE — PACK PACER PERMANENT OMC

## (undated) DEVICE — FOGERTY SOFT JAW DISP 2/PK

## (undated) DEVICE — SUT MONOCRYL 4-0 PS-1 UND

## (undated) DEVICE — SUT PROLENE 4-0 SH BLU 36IN

## (undated) DEVICE — DRAIN CHANNEL ROUND 19FR

## (undated) DEVICE — TIP YANKAUERS BULB NO VENT

## (undated) DEVICE — KIT URINARY CATH URINE METER

## (undated) DEVICE — SUT 6 18IN STEEL MONO CCS

## (undated) DEVICE — SUT PROLENE 4-0 RB-1 BL MO

## (undated) DEVICE — GLOVE SURG BIOGEL LATEX SZ 7.5

## (undated) DEVICE — INTRODUCER PRELUDESNAP 7F 13CM

## (undated) DEVICE — BLADE SAW STERNAL 5/BX

## (undated) DEVICE — OMNIPAQUE 350 200ML

## (undated) DEVICE — TRAY CATH FOL SIL URIMTR 16FR

## (undated) DEVICE — BOWL STERILE LARGE 32OZ

## (undated) DEVICE — GLOVE BIOGEL PI MICRO SZ 7.5

## (undated) DEVICE — SUT PROLENE 5-0 24 C-1 BL

## (undated) DEVICE — STAPLER ECHELON FLEX 45MM 34CM

## (undated) DEVICE — HOLDER CATH IAB ADH STATLOCK

## (undated) DEVICE — SPONGE COTTON TRAY 4X4IN

## (undated) DEVICE — DRESSING AQUACEL AG ADV 3.5X6

## (undated) DEVICE — SUT VICRYL PLUS 3-0 FS1 27

## (undated) DEVICE — SLITTER UNIVERSAL

## (undated) DEVICE — HEMOSTAT SURGICEL NUKNIT 3X4IN

## (undated) DEVICE — SOL NORMAL USPCA 0.9%

## (undated) DEVICE — ADHESIVE DERMABOND ADVANCED

## (undated) DEVICE — SHEATH INTRODUCER 9FR 11CM

## (undated) DEVICE — SUT 2-0 VICRYL / CT-1

## (undated) DEVICE — SOL 9P NACL IRR PIC IL

## (undated) DEVICE — DRAPE CVMAX SPLIT ANES SCRN

## (undated) DEVICE — TOWEL OR DISP STRL BLUE 4/PK

## (undated) DEVICE — PAD DEFIB CADENCE ADULT R2

## (undated) DEVICE — SYR 30CC LUER LOCK

## (undated) DEVICE — SOL NACL IRR 3000ML

## (undated) DEVICE — SUT 2/0 30IN ETHIBOND

## (undated) DEVICE — SYR 10CC LUER LOCK

## (undated) DEVICE — ELECTRODE REM PLYHSV RETURN 9

## (undated) DEVICE — COVER INSTR ELASTIC BAND 40X20

## (undated) DEVICE — SUT VICRYL BR 1 GEN 27 CT-1

## (undated) DEVICE — STOPCOCK 3-WAY

## (undated) DEVICE — DRAPE INCISE IOBAN 2 23X17IN

## (undated) DEVICE — SUT CTD VICRYL 2-0 VIL BR

## (undated) DEVICE — KIT SAHARA DRAPE DRAW/LIFT

## (undated) DEVICE — CONTAINER SPECIMEN OR STER 4OZ

## (undated) DEVICE — SUT CTD VICRYL VIL BR CR/SH

## (undated) DEVICE — RELOAD GREEN FOR ECHELON

## (undated) DEVICE — SUT SILK 0 BLK BR CT-1 30IN

## (undated) DEVICE — SEE MEDLINE ITEM 156902

## (undated) DEVICE — CATH ANGIO RIGHTSITE HIS 7X43

## (undated) DEVICE — PACK CYSTOSCOPY III SIRUS

## (undated) DEVICE — PROBE CATH TEMP 16 FRFOLEY 400

## (undated) DEVICE — TRAY CATH LAB OMC

## (undated) DEVICE — TRAY CATH 1-LYR URIMTR 16FR

## (undated) DEVICE — GLOVE SIGNATURE ESSNTL LTX 7.5

## (undated) DEVICE — BLADE STERN 65.8MM

## (undated) DEVICE — GOWN SMART IMP BREATHABLE XXLG

## (undated) DEVICE — CLOSURE SKIN STERI STRIP 1/2X4

## (undated) DEVICE — GUIDEWIRE STR TIP HIWIRE 150CM

## (undated) DEVICE — SLING SWATHE UNIVERSAL FOAM

## (undated) DEVICE — GUIDEWIRE SUPRA CORE 035 190CM